# Patient Record
Sex: FEMALE | Race: BLACK OR AFRICAN AMERICAN | NOT HISPANIC OR LATINO | Employment: OTHER | URBAN - METROPOLITAN AREA
[De-identification: names, ages, dates, MRNs, and addresses within clinical notes are randomized per-mention and may not be internally consistent; named-entity substitution may affect disease eponyms.]

---

## 2017-01-03 ENCOUNTER — ANTI-COAG VISIT (OUTPATIENT)
Dept: CARDIOLOGY | Facility: CLINIC | Age: 62
End: 2017-01-03

## 2017-01-03 ENCOUNTER — LAB VISIT (OUTPATIENT)
Dept: LAB | Facility: HOSPITAL | Age: 62
End: 2017-01-03
Attending: INTERNAL MEDICINE
Payer: COMMERCIAL

## 2017-01-03 DIAGNOSIS — I48.20 ATRIAL FIBRILLATION, CHRONIC: ICD-10-CM

## 2017-01-03 DIAGNOSIS — Z95.2 STATUS POST HEART VALVE REPLACEMENT WITH MECHANICAL VALVE: ICD-10-CM

## 2017-01-03 LAB
INR PPP: 3.8
PROTHROMBIN TIME: 38.6 SEC

## 2017-01-03 PROCEDURE — 85610 PROTHROMBIN TIME: CPT

## 2017-01-03 PROCEDURE — 36415 COLL VENOUS BLD VENIPUNCTURE: CPT

## 2017-01-04 ENCOUNTER — TELEPHONE (OUTPATIENT)
Dept: SLEEP MEDICINE | Facility: OTHER | Age: 62
End: 2017-01-04

## 2017-01-10 ENCOUNTER — HOSPITAL ENCOUNTER (OUTPATIENT)
Dept: SLEEP MEDICINE | Facility: OTHER | Age: 62
Discharge: HOME | End: 2017-01-10
Attending: PSYCHIATRY & NEUROLOGY
Payer: COMMERCIAL

## 2017-01-10 DIAGNOSIS — G47.33 OSA (OBSTRUCTIVE SLEEP APNEA): ICD-10-CM

## 2017-01-10 DIAGNOSIS — G47.30 SLEEP APNEA, UNSPECIFIED TYPE: ICD-10-CM

## 2017-01-10 PROCEDURE — 95811 POLYSOM 6/>YRS CPAP 4/> PARM: CPT | Mod: 26,,, | Performed by: PSYCHIATRY & NEUROLOGY

## 2017-01-10 PROCEDURE — 95811 POLYSOM 6/>YRS CPAP 4/> PARM: CPT

## 2017-01-13 ENCOUNTER — OFFICE VISIT (OUTPATIENT)
Dept: OPTOMETRY | Facility: CLINIC | Age: 62
End: 2017-01-13
Payer: COMMERCIAL

## 2017-01-13 ENCOUNTER — ANTI-COAG VISIT (OUTPATIENT)
Dept: CARDIOLOGY | Facility: CLINIC | Age: 62
End: 2017-01-13
Payer: COMMERCIAL

## 2017-01-13 DIAGNOSIS — Z95.2 STATUS POST HEART VALVE REPLACEMENT WITH MECHANICAL VALVE: ICD-10-CM

## 2017-01-13 DIAGNOSIS — I48.20 ATRIAL FIBRILLATION, CHRONIC: ICD-10-CM

## 2017-01-13 DIAGNOSIS — H40.1111 PRIMARY OPEN ANGLE GLAUCOMA OF RIGHT EYE, MILD STAGE: Primary | ICD-10-CM

## 2017-01-13 LAB
CTP QC/QA: NORMAL
INR PPP: 3.9 (ref 2–3)

## 2017-01-13 PROCEDURE — 99999 PR PBB SHADOW E&M-EST. PATIENT-LVL II: CPT | Mod: PBBFAC,,, | Performed by: OPTOMETRIST

## 2017-01-13 PROCEDURE — 99211 OFF/OP EST MAY X REQ PHY/QHP: CPT | Mod: 25,S$GLB,,

## 2017-01-13 PROCEDURE — 85610 PROTHROMBIN TIME: CPT | Mod: QW,S$GLB,,

## 2017-01-13 PROCEDURE — 92012 INTRM OPH EXAM EST PATIENT: CPT | Mod: S$GLB,,, | Performed by: OPTOMETRIST

## 2017-01-13 RX ORDER — BRIMONIDINE TARTRATE 2 MG/ML
1 SOLUTION/ DROPS OPHTHALMIC 3 TIMES DAILY
Qty: 5 ML | Refills: 6 | Status: SHIPPED | OUTPATIENT
Start: 2017-01-13 | End: 2017-03-20 | Stop reason: SDUPTHER

## 2017-01-13 NOTE — PROGRESS NOTES
Pt confirms dose. Denies any changes to alter INR. INR now higher without any changes. Will lower dose. Pt will eat a portion of greens today as well.

## 2017-01-13 NOTE — PROGRESS NOTES
HPI     Patient's age: 61 y.o.    Approximate date of last eye examination:  12/16/16  Name of last eye doctor seen: Dr. Funes    Pt states that she is here to see how the new drops are working had   pressure in eyes on last visit    Wears glasses? yes     Wears CLs?:  no              ! OTC eyedrops currently using: OTC Refresh - OU prn   ! Prescription eye meds currently using:  Alphagan  BID - OD only            Last edited by Khushbu Funes, OD on 1/13/2017  9:48 AM.         Assessment /Plan     For exam results, see Encounter Report.    Primary open angle glaucoma of right eye, mild stage    Continue with      brimonidine 0.2% (ALPHAGAN) 0.2 % Drop; Place 1 drop into the right eye 3 (three) times daily.  Dispense: 5 mL; Refill: 6    RTC 2 months for HVF 24-2 and OCT/ IOP

## 2017-01-18 ENCOUNTER — TELEPHONE (OUTPATIENT)
Dept: SLEEP MEDICINE | Facility: CLINIC | Age: 62
End: 2017-01-18

## 2017-01-18 NOTE — PROCEDURES
"See imported Sleep Study result in "Chart Review" under the "Media tab".      (This Sleep Study was interpreted by a Board Certified Sleep Specialist who conducted an epoch-by-epoch review of the entire raw data recording.)     (The indication for this sleep study was reviewed and deemed appropriate by [facility] Practice Parameters or other reasons by a Board Certified Sleep Specialist.)    "

## 2017-01-18 NOTE — TELEPHONE ENCOUNTER
Please call the patient to inform re: recent sleep study was positive for very severe sleep apnea.  We were not  able to figure out CPAP settings  Overnight dedicated study with a different upgraded PAP machine is recommended.    If the patient has more questions, please  schedule for the follow up with MD / NP  to review sleep study results, or  can schedule CPAP titration.  Thanks!    Thanks!

## 2017-01-18 NOTE — PROGRESS NOTES
Received message from [employer] that pt needs auth form faxed for home meter. She is scheduled for clinic visit 1/20. Please have her fill out and sign an auth form then fax directly to Pooja with [employer] at [phone]. Thanks!

## 2017-01-19 ENCOUNTER — TELEPHONE (OUTPATIENT)
Dept: SLEEP MEDICINE | Facility: CLINIC | Age: 62
End: 2017-01-19

## 2017-01-20 ENCOUNTER — ANTI-COAG VISIT (OUTPATIENT)
Dept: CARDIOLOGY | Facility: CLINIC | Age: 62
End: 2017-01-20
Payer: COMMERCIAL

## 2017-01-20 DIAGNOSIS — Z95.2 STATUS POST HEART VALVE REPLACEMENT WITH MECHANICAL VALVE: ICD-10-CM

## 2017-01-20 DIAGNOSIS — I48.20 ATRIAL FIBRILLATION, CHRONIC: ICD-10-CM

## 2017-01-20 DIAGNOSIS — Z79.01 LONG TERM (CURRENT) USE OF ANTICOAGULANTS: Primary | ICD-10-CM

## 2017-01-20 LAB — INR PPP: 4 (ref 2.5–3.5)

## 2017-01-20 PROCEDURE — 85610 PROTHROMBIN TIME: CPT | Mod: QW,S$GLB,, | Performed by: PHARMACIST

## 2017-01-20 PROCEDURE — 99211 OFF/OP EST MAY X REQ PHY/QHP: CPT | Mod: 25,S$GLB,, | Performed by: PHARMACIST

## 2017-01-20 NOTE — PROGRESS NOTES
Patient reports no greens last week and is not interested in incorporating them. She denies bleeding issues.  Since she is not planning to eat greens and her INRs are slightly above her therapeutic range, I will lower her weekly coumadin dose at this time.  Patient signed authorization form and is was faxed to  Pooja with [employer] at [phone]

## 2017-01-27 ENCOUNTER — ANTI-COAG VISIT (OUTPATIENT)
Dept: CARDIOLOGY | Facility: CLINIC | Age: 62
End: 2017-01-27
Payer: COMMERCIAL

## 2017-01-27 DIAGNOSIS — I48.20 ATRIAL FIBRILLATION, CHRONIC: ICD-10-CM

## 2017-01-27 DIAGNOSIS — Z79.01 LONG TERM (CURRENT) USE OF ANTICOAGULANTS: Primary | ICD-10-CM

## 2017-01-27 DIAGNOSIS — Z95.2 STATUS POST HEART VALVE REPLACEMENT WITH MECHANICAL VALVE: ICD-10-CM

## 2017-01-27 LAB
CTP QC/QA: YES
INR PPP: 3.8 (ref 2.5–3.5)

## 2017-01-27 PROCEDURE — 85610 PROTHROMBIN TIME: CPT | Mod: QW,S$GLB,, | Performed by: PHARMACIST

## 2017-01-27 NOTE — PROGRESS NOTES
Patient took a higher than prescribed dose of coumadin on Monday. Otherwise, Patient denies any changes in diet, medications, or health that would effect warfarin therapy.

## 2017-02-02 ENCOUNTER — OFFICE VISIT (OUTPATIENT)
Dept: INTERNAL MEDICINE | Facility: CLINIC | Age: 62
End: 2017-02-02
Payer: COMMERCIAL

## 2017-02-02 ENCOUNTER — ANTI-COAG VISIT (OUTPATIENT)
Dept: CARDIOLOGY | Facility: CLINIC | Age: 62
End: 2017-02-02
Payer: COMMERCIAL

## 2017-02-02 ENCOUNTER — HOSPITAL ENCOUNTER (OUTPATIENT)
Dept: RADIOLOGY | Facility: HOSPITAL | Age: 62
Discharge: HOME | End: 2017-02-02
Attending: INTERNAL MEDICINE
Payer: COMMERCIAL

## 2017-02-02 DIAGNOSIS — I10 ESSENTIAL HYPERTENSION: ICD-10-CM

## 2017-02-02 DIAGNOSIS — I48.20 ATRIAL FIBRILLATION, CHRONIC: ICD-10-CM

## 2017-02-02 DIAGNOSIS — N13.30 HYDRONEPHROSIS, UNSPECIFIED HYDRONEPHROSIS TYPE: Primary | ICD-10-CM

## 2017-02-02 DIAGNOSIS — Z95.2 STATUS POST HEART VALVE REPLACEMENT WITH MECHANICAL VALVE: ICD-10-CM

## 2017-02-02 LAB — INR PPP: 3.3 (ref 2–3)

## 2017-02-02 PROCEDURE — 71020 XR CHEST PA AND LATERAL: CPT | Mod: TC

## 2017-02-02 PROCEDURE — 71020 XR CHEST PA AND LATERAL: CPT | Mod: 26,,, | Performed by: RADIOLOGY

## 2017-02-02 PROCEDURE — 3074F SYST BP LT 130 MM HG: CPT | Mod: S$GLB,,, | Performed by: INTERNAL MEDICINE

## 2017-02-02 PROCEDURE — 3078F DIAST BP <80 MM HG: CPT | Mod: S$GLB,,, | Performed by: INTERNAL MEDICINE

## 2017-02-02 PROCEDURE — 85610 PROTHROMBIN TIME: CPT | Mod: QW,S$GLB,,

## 2017-02-02 PROCEDURE — 99999 PR PBB SHADOW E&M-EST. PATIENT-LVL IV: CPT | Mod: PBBFAC,,, | Performed by: INTERNAL MEDICINE

## 2017-02-02 PROCEDURE — 99214 OFFICE O/P EST MOD 30 MIN: CPT | Mod: S$GLB,,, | Performed by: INTERNAL MEDICINE

## 2017-02-02 RX ORDER — FLUTICASONE PROPIONATE AND SALMETEROL 250; 50 UG/1; UG/1
1 POWDER RESPIRATORY (INHALATION) 2 TIMES DAILY
Qty: 1 EACH | Refills: 3 | Status: SHIPPED | OUTPATIENT
Start: 2017-02-02 | End: 2017-09-18 | Stop reason: SDUPTHER

## 2017-02-02 RX ORDER — ALBUTEROL SULFATE 90 UG/1
2 AEROSOL, METERED RESPIRATORY (INHALATION) EVERY 6 HOURS PRN
Qty: 18 G | Refills: 3 | Status: SHIPPED | OUTPATIENT
Start: 2017-02-02 | End: 2017-09-07 | Stop reason: SDUPTHER

## 2017-02-02 NOTE — PROGRESS NOTES
Pt confirms dose. States she may be starting new medications after seeing PCP today. Advised to call CC with any changes. Will maintain dose for now. Pt seen by Ed

## 2017-02-02 NOTE — MR AVS SNAPSHOT
[facility] - Internal Medicine  [address]  [facility] [address]  Phone: [phone]  Fax: [phone]                  Elayne Almanzar   2017 9:00 AM   Office Visit    Description:  Female : 1955   Provider:  Nubia Crocker MD   Department:  [facility] - Internal Medicine           Reason for Visit     Follow-up           Diagnoses this Visit        Comments    Hydronephrosis, unspecified hydronephrosis type    -  Primary     Essential hypertension                To Do List           Future Appointments        Provider Department Dept Phone    2017 10:10 AM LAB, APPOINTMENT [facility] INTMED [facility] [phone]    2017 10:30 AM [facility] XRIM1 [address] LIMIT [facility] [phone]    2017 8:30 AM Shaun Rahman, PharmD [facility] Coumadin [phone]    3/20/2017 10:00 AM [place], VISUAL BOONE [facility] Ophthalmology [phone]    3/20/2017 10:45 AM Khushbu Funes, OD [facility] - Optometry [phone]      Goals (5 Years of Data)     None      Follow-Up and Disposition     Return for EPP 4 months.       These Medications        Disp Refills Start End    albuterol (VENTOLIN HFA) 90 mcg/actuation inhaler 18 g 3 2017     Inhale 2 puffs into the lungs every 6 (six) hours as needed for Wheezing. - Inhalation    Pharmacy: [facility] [place] [address] Ph #: [phone]       fluticasone-salmeterol 250-50 mcg/dose (ADVAIR) 250-50 mcg/dose diskus inhaler 1 each 3 2017    Inhale 1 puff into the lungs 2 (two) times daily. - Inhalation    Pharmacy: [facility] [place] [address] Ph #: [phone]         [facility]     [facility] Care [facility] -  Assistance  Registered nurses in the [facility] provide clinical advisement, health education, appointment booking, and other advisory services.  Call for this  free service at [phone].             Medications           Message regarding Medications     Verify the changes and/or additions to your medication regime listed below are the same as discussed with your clinician today.  If any of these changes or additions are incorrect, please notify your healthcare provider.        STOP taking these medications     ondansetron (ZOFRAN) 4 MG tablet Take 1 tablet (4 mg total) by mouth every 8 (eight) hours.           Verify that the below list of medications is an accurate representation of the medications you are currently taking.  If none reported, the list may be blank. If incorrect, please contact your healthcare provider. Carry this list with you in case of emergency.           Current Medications     albuterol (VENTOLIN HFA) 90 mcg/actuation inhaler Inhale 2 puffs into the lungs every 6 (six) hours as needed for Wheezing.    aspirin (ECOTRIN) 81 MG EC tablet Take by mouth. 1 Tablet, Delayed Release (E.C.) Oral Every day    brimonidine 0.2% (ALPHAGAN) 0.2 % Drop Place 1 drop into the right eye 3 (three) times daily.    conjugated estrogens (PREMARIN) vaginal cream 1 g with applicator or dime-sized amt with finger in vagina nightly x 2 weeks, then twice a week thereafter    fluocinolone-hydroq.-tretinoin (TRI-VENTURA) 0.01-4-0.05 % Crea Apply 1 application topically once daily. To dark areas on face    fluocinonide (LIDEX) 0.05 % ointment AAA bid to affected areas of arms    fluticasone (FLONASE) 50 mcg/actuation nasal spray 1 spray by Each Nare route 2 (two) times daily as needed for Rhinitis.    furosemide (LASIX) 20 MG tablet Take 1 tablet (20 mg total) by mouth every other day.    metoprolol succinate (TOPROL-XL) 200 MG 24 hr tablet TAKE 1 TABLET (200 MG TOTAL) BY MOUTH ONCE DAILY.    oxybutynin (DITROPAN-XL) 10 MG 24 hr tablet     sertraline (ZOLOFT) 100 MG tablet Take 1 tablet (100 mg total) by mouth once daily.    tamsulosin (FLOMAX) 0.4 mg Cp24 Take 1 capsule (0.4  "mg total) by mouth once daily.    warfarin (COUMADIN) 5 MG tablet Take 1 1/2 tablets (7.5mg.) by mouth daily, except 2 tablets (10mg.) on Tuesdays, Or as directed by Coumadin Clinic.    fluticasone-salmeterol 250-50 mcg/dose (ADVAIR) 250-50 mcg/dose diskus inhaler Inhale 1 puff into the lungs 2 (two) times daily.           Clinical Reference Information           Vital Signs - Last Recorded  Most recent update: 2/2/2017  8:54 AM by Janeen Liao MA    BP Pulse Ht Wt LMP SpO2    118/64 (!) 52 5' 2.5" (1.588 m) 114.4 kg (252 lb 3.3 oz) 07/22/2012 98%    BMI                45.39 kg/m2          Blood Pressure          Most Recent Value    BP  118/64      Allergies as of 2/2/2017     No Known Allergies      Immunizations Administered on Date of Encounter - 2/2/2017     None      Orders Placed During Today's Visit      Normal Orders This Visit    Ambulatory consult to Urology     Future Labs/Procedures Expected by Expires    Comprehensive metabolic panel  2/2/2017 2/2/2018    Lipid panel  2/2/2017 2/2/2018    X-Ray Chest PA And Lateral  2/2/2017 2/2/2018      [facility] Sign-Up     Activating your [facility] account is as easy as 1-2-3!     1) Visit [email or web address], select Sign Up Now, enter this activation code and your date of birth, then select Next.  [email or web address]  Expires: 3/19/2017  9:52 AM      2) Create a username and password to use when you visit [facility] in the future and select a security question in case you lose your password and select Next.    3) Enter your e-mail address and click Sign Up!    Additional Information  If you have questions, please e-mail [email or web address] or call [phone] to talk to our [facility] staff. Remember, [facility] is NOT to be used for urgent needs. For medical emergencies, dial 911.         "

## 2017-02-03 RX ORDER — SERTRALINE HYDROCHLORIDE 100 MG/1
TABLET, FILM COATED ORAL
Qty: 30 TABLET | Refills: 3 | Status: SHIPPED | OUTPATIENT
Start: 2017-02-03 | End: 2017-08-04 | Stop reason: SDUPTHER

## 2017-02-04 ENCOUNTER — TELEPHONE (OUTPATIENT)
Dept: INTERNAL MEDICINE | Facility: CLINIC | Age: 62
End: 2017-02-04

## 2017-02-04 NOTE — TELEPHONE ENCOUNTER
Please contact patient and let her know all of her labwork is acceptable except her cholesterol is still elevated. Previously I had given her pravachol. Is she still taking pravachol?

## 2017-02-05 ENCOUNTER — TELEPHONE (OUTPATIENT)
Dept: INTERNAL MEDICINE | Facility: CLINIC | Age: 62
End: 2017-02-05

## 2017-02-05 VITALS
TEMPERATURE: 99 F | HEART RATE: 62 BPM | OXYGEN SATURATION: 98 % | DIASTOLIC BLOOD PRESSURE: 64 MMHG | BODY MASS INDEX: 44.68 KG/M2 | WEIGHT: 252.19 LBS | HEIGHT: 63 IN | SYSTOLIC BLOOD PRESSURE: 118 MMHG

## 2017-02-05 DIAGNOSIS — N13.30 HYDRONEPHROSIS, UNSPECIFIED HYDRONEPHROSIS TYPE: Primary | ICD-10-CM

## 2017-02-05 NOTE — PROGRESS NOTES
Subjective:       Patient ID: Elayne Almanzar is a 61 y.o. female.    Chief Complaint: Hypertension    HPI: She returns for management of hypertension.  She has had hypertension for over a year.  Current treatment has included medications outlined in medication list.  She denies chest pain or shortness of breath.  No palpitations.  Denies left arm or neck pain.    Past medical history: Hypertension, A. fib, COPD, hyperlipidemia, nephrolithiasis, status post mitral valve replacement . She had a colonoscopy April 2014      Medications: Proventil MDI 2 puffs 4 times a day when necessary,Advair 250/50 one puff twice a day, Lasix 20 mg every other day,Toprol- mg daily, Coumadin as monitored by Coumadin clinic , Zoloft 100 mg daily, Pravachol 40 mg daily      ALLERGIES: dilaudid           Review of Systems   Constitutional: Negative for chills, fatigue, fever and unexpected weight change.   Respiratory: Negative for chest tightness and shortness of breath.    Cardiovascular: Negative for chest pain and palpitations.   Gastrointestinal: Negative for abdominal pain and blood in stool.   Neurological: Negative for dizziness, syncope, numbness and headaches.       Objective:      Physical Exam   HENT:   Right Ear: External ear normal.   Left Ear: External ear normal.   Nose: Nose normal.   Mouth/Throat: Oropharynx is clear and moist.   Eyes: Pupils are equal, round, and reactive to light.   Neck: Normal range of motion.   Cardiovascular: Normal rate and regular rhythm.    No murmur heard.  Pulmonary/Chest: Breath sounds normal.   Abdominal: She exhibits no distension. There is no hepatosplenomegaly. There is no tenderness.   Lymphadenopathy:     She has no cervical adenopathy.     She has no axillary adenopathy.   Neurological: She has normal strength and normal reflexes. No cranial nerve deficit or sensory deficit.       Assessment:     assessment and plan: Hypertension: Check CMP, lipid panel, chest x-ray.   Schedule follow-up appointment with urology in regard to hydronephrosis      Plan:       As above

## 2017-02-05 NOTE — TELEPHONE ENCOUNTER
When she was in the office, I had requested she schedule a urology appt. Unfortunately, it does not look like it was scheduled.Please schedule urology appt, order in

## 2017-02-06 RX ORDER — WARFARIN SODIUM 5 MG/1
TABLET ORAL
Qty: 45 TABLET | Refills: 6 | Status: SHIPPED | OUTPATIENT
Start: 2017-02-06 | End: 2018-03-08 | Stop reason: SDUPTHER

## 2017-02-09 ENCOUNTER — TELEPHONE (OUTPATIENT)
Dept: INTERNAL MEDICINE | Facility: CLINIC | Age: 62
End: 2017-02-09

## 2017-02-09 NOTE — TELEPHONE ENCOUNTER
"Pt states that she is taking the pravastatin "every now and then".  Pt would like x ray results.   Please advise.    "

## 2017-02-09 NOTE — TELEPHONE ENCOUNTER
----- Message from Huy Morales sent at 2/9/2017 11:27 AM CST -----  Contact: Self/[phone] cell  Type: Returning a call    Who left a message?Janeen    When did the practice call?02/09/2017    Comments:Pt is returning phone call. Please call and advise.    Thank you!

## 2017-02-09 NOTE — TELEPHONE ENCOUNTER
Attempted to contact pt. Left message requesting a call back. Spoke w/ pts . Asked him to have the pt call the office   DELMI Liao

## 2017-02-09 NOTE — TELEPHONE ENCOUNTER
Spoke w/ pt. Urology appt was not scheduled because the pt is established with Dr. Wilkinson and other departments are not permitted to schedule on her schedule. The pt does have the number to urology and she stated she will call to schedule.   DELMI Liao

## 2017-02-09 NOTE — TELEPHONE ENCOUNTER
Please let her know her Xray was acceptable. Please advise her to be compliant with her cholesterol medication and her diet. I will recheck her level at her next visit

## 2017-02-16 ENCOUNTER — ANTI-COAG VISIT (OUTPATIENT)
Dept: CARDIOLOGY | Facility: CLINIC | Age: 62
End: 2017-02-16
Payer: COMMERCIAL

## 2017-02-16 DIAGNOSIS — Z95.2 STATUS POST HEART VALVE REPLACEMENT WITH MECHANICAL VALVE: ICD-10-CM

## 2017-02-16 DIAGNOSIS — I48.20 ATRIAL FIBRILLATION, CHRONIC: ICD-10-CM

## 2017-02-16 DIAGNOSIS — Z79.01 LONG TERM (CURRENT) USE OF ANTICOAGULANTS: Primary | ICD-10-CM

## 2017-02-16 LAB — INR PPP: 3.5 (ref 2.5–3.5)

## 2017-02-16 PROCEDURE — 99211 OFF/OP EST MAY X REQ PHY/QHP: CPT | Mod: 25,S$GLB,, | Performed by: PHARMACIST

## 2017-02-16 PROCEDURE — 85610 PROTHROMBIN TIME: CPT | Mod: QW,S$GLB,, | Performed by: PHARMACIST

## 2017-02-16 NOTE — PROGRESS NOTES
Patient reports a higher than prescribed dose of warfarin (7.5mg daily). No other changes reported. Patient was re-educated on situations that would require placing a call to the coumadin clinic, including bleeding or unusual bruising issues, changes in health, diet or medications, upcoming procedures that require warfarin interruption, and missed coumadin dose(s). Patient expressed understanding that avoidance of consistency with these parameters could cause fluctuations in INR, leading to more frequent visits and increase risk of adverse events.

## 2017-03-09 ENCOUNTER — ANTI-COAG VISIT (OUTPATIENT)
Dept: CARDIOLOGY | Facility: CLINIC | Age: 62
End: 2017-03-09
Payer: COMMERCIAL

## 2017-03-09 DIAGNOSIS — I48.20 ATRIAL FIBRILLATION, CHRONIC: ICD-10-CM

## 2017-03-09 DIAGNOSIS — H40.9 GLAUCOMA, UNSPECIFIED GLAUCOMA, UNSPECIFIED LATERALITY: Primary | ICD-10-CM

## 2017-03-09 DIAGNOSIS — Z79.01 LONG TERM (CURRENT) USE OF ANTICOAGULANTS: Primary | ICD-10-CM

## 2017-03-09 DIAGNOSIS — Z95.2 STATUS POST HEART VALVE REPLACEMENT WITH MECHANICAL VALVE: ICD-10-CM

## 2017-03-09 LAB — INR PPP: 3.8 (ref 2.5–3.5)

## 2017-03-09 PROCEDURE — 99211 OFF/OP EST MAY X REQ PHY/QHP: CPT | Mod: 25,S$GLB,, | Performed by: PHARMACIST

## 2017-03-09 PROCEDURE — 85610 PROTHROMBIN TIME: CPT | Mod: QW,S$GLB,, | Performed by: PHARMACIST

## 2017-03-09 NOTE — PROGRESS NOTES
Patient denies any changes in diet, medications, or health that would effect warfarin therapy.  No changes to explain the rise in INR. I will lower dose slightly and watch closely

## 2017-03-20 ENCOUNTER — ANTI-COAG VISIT (OUTPATIENT)
Dept: CARDIOLOGY | Facility: CLINIC | Age: 62
End: 2017-03-20
Payer: COMMERCIAL

## 2017-03-20 ENCOUNTER — CLINICAL SUPPORT (OUTPATIENT)
Dept: OPHTHALMOLOGY | Facility: CLINIC | Age: 62
End: 2017-03-20
Payer: COMMERCIAL

## 2017-03-20 ENCOUNTER — OFFICE VISIT (OUTPATIENT)
Dept: OPTOMETRY | Facility: CLINIC | Age: 62
End: 2017-03-20
Payer: COMMERCIAL

## 2017-03-20 DIAGNOSIS — H40.1111 PRIMARY OPEN ANGLE GLAUCOMA OF RIGHT EYE, MILD STAGE: Primary | ICD-10-CM

## 2017-03-20 DIAGNOSIS — Z79.01 LONG TERM (CURRENT) USE OF ANTICOAGULANTS: Primary | ICD-10-CM

## 2017-03-20 DIAGNOSIS — Z95.2 STATUS POST HEART VALVE REPLACEMENT WITH MECHANICAL VALVE: ICD-10-CM

## 2017-03-20 DIAGNOSIS — I48.20 ATRIAL FIBRILLATION, CHRONIC: ICD-10-CM

## 2017-03-20 DIAGNOSIS — H40.9 GLAUCOMA, UNSPECIFIED GLAUCOMA, UNSPECIFIED LATERALITY: ICD-10-CM

## 2017-03-20 LAB — INR PPP: 1.9 (ref 2–3)

## 2017-03-20 PROCEDURE — 92012 INTRM OPH EXAM EST PATIENT: CPT | Mod: S$GLB,,, | Performed by: OPTOMETRIST

## 2017-03-20 PROCEDURE — 85610 PROTHROMBIN TIME: CPT | Mod: QW,S$GLB,, | Performed by: PHARMACIST

## 2017-03-20 PROCEDURE — 92133 CPTRZD OPH DX IMG PST SGM ON: CPT | Mod: S$GLB,,, | Performed by: OPTOMETRIST

## 2017-03-20 PROCEDURE — 99999 PR PBB SHADOW E&M-EST. PATIENT-LVL II: CPT | Mod: PBBFAC,,, | Performed by: OPTOMETRIST

## 2017-03-20 PROCEDURE — 99211 OFF/OP EST MAY X REQ PHY/QHP: CPT | Mod: 25,S$GLB,, | Performed by: PHARMACIST

## 2017-03-20 PROCEDURE — 92083 EXTENDED VISUAL FIELD XM: CPT | Mod: S$GLB,,, | Performed by: OPTOMETRIST

## 2017-03-20 RX ORDER — BRIMONIDINE TARTRATE 2 MG/ML
1 SOLUTION/ DROPS OPHTHALMIC 3 TIMES DAILY
Qty: 15 ML | Refills: 3 | Status: SHIPPED | OUTPATIENT
Start: 2017-03-20 | End: 2018-12-05

## 2017-03-20 NOTE — PROGRESS NOTES
HPI     Patient's age: 61 y.o.    Approximate date of last eye examination:  1/13/17  Name of last eye doctor seen: Dr. Funes    Pt states that she is here for follow up exam, had test upstairs,     Wears glasses? yes         ! OTC eyedrops currently using:  Refresh - OU PRN   ! Prescription eye meds currently using:  Alphagan BID - OU last used             Last edited by Svetlana Iqbal MA on 3/20/2017 10:59 AM.         Assessment /Plan     For exam results, see Encounter Report.    Primary open angle glaucoma of right eye, mild stage    Anderson Visual Field - OU - Extended - OD Inferior arcuate defects, OS WNL  - Posterior Segment OCT Optic Nerve- OD sup nasal thin, OS WNL      Asymmetric cupping OD>OS  Borderline IOP  Jan 2016  - Posterior Segment OCT Optic Nerve- Both eyes: mild thinning OU  HVF 24-2: OD borderline/ scattered defects  OS: WNL  gonio:open 360 OU  pachy: 551/555  IOP: No difference between eyes even though only using drops in OD, not lower that previous and initial reading       Started on latanoprost OD ONLY September 2016, Changed drops to bimatoprost (LUMIGAN) 0.01 % Drop; October 2016        Discontinue Lumigan and start brimonidine 0.2% (ALPHAGAN) 0.2 % Drop; Place 1 drop into the right eye 2 (two) times daily. Dispense: 5 mL; Refill: 1     RTC 6 mo IOP, OCT, HVF

## 2017-03-20 NOTE — PROGRESS NOTES
Patient reports no bleeding or bruising, no new medications and no diet changes.  She did forget to lower her weekly dose of warfarin and took an extra 2.5mg over the past week.  She denied any missed doses or any increase in vit k.  As her INR is low, I am boosting her dose today and asking her to continue with her current weekly dose.  I will monitor closely with a repeat INR next week.  I reminded the patient to call with any problems, changes or questions before the next visit.  I have reviewed the student's initial findings and agree with their assessment.  I have personally spoken with and assessed the patient in clinic to devise care plan.

## 2017-03-21 ENCOUNTER — OFFICE VISIT (OUTPATIENT)
Dept: SLEEP MEDICINE | Facility: CLINIC | Age: 62
End: 2017-03-21
Payer: COMMERCIAL

## 2017-03-21 VITALS
HEART RATE: 57 BPM | DIASTOLIC BLOOD PRESSURE: 67 MMHG | BODY MASS INDEX: 44.65 KG/M2 | SYSTOLIC BLOOD PRESSURE: 132 MMHG | HEIGHT: 63 IN | WEIGHT: 252 LBS

## 2017-03-21 DIAGNOSIS — G47.33 OSA (OBSTRUCTIVE SLEEP APNEA): Primary | ICD-10-CM

## 2017-03-21 DIAGNOSIS — E66.01 MORBID OBESITY WITH BMI OF 45.0-49.9, ADULT: ICD-10-CM

## 2017-03-21 DIAGNOSIS — G47.31 COMPLEX SLEEP APNEA SYNDROME: ICD-10-CM

## 2017-03-21 DIAGNOSIS — I10 ESSENTIAL HYPERTENSION: ICD-10-CM

## 2017-03-21 PROCEDURE — 99999 PR PBB SHADOW E&M-EST. PATIENT-LVL III: CPT | Mod: PBBFAC,,, | Performed by: PSYCHIATRY & NEUROLOGY

## 2017-03-21 PROCEDURE — 1160F RVW MEDS BY RX/DR IN RCRD: CPT | Mod: S$GLB,,, | Performed by: PSYCHIATRY & NEUROLOGY

## 2017-03-21 PROCEDURE — 99213 OFFICE O/P EST LOW 20 MIN: CPT | Mod: S$GLB,,, | Performed by: PSYCHIATRY & NEUROLOGY

## 2017-03-21 PROCEDURE — 3078F DIAST BP <80 MM HG: CPT | Mod: S$GLB,,, | Performed by: PSYCHIATRY & NEUROLOGY

## 2017-03-21 PROCEDURE — 3075F SYST BP GE 130 - 139MM HG: CPT | Mod: S$GLB,,, | Performed by: PSYCHIATRY & NEUROLOGY

## 2017-03-21 RX ORDER — METOPROLOL SUCCINATE 200 MG/1
TABLET, EXTENDED RELEASE ORAL
Qty: 30 TABLET | Refills: 1 | Status: SHIPPED | OUTPATIENT
Start: 2017-03-21 | End: 2017-05-12 | Stop reason: SDUPTHER

## 2017-03-21 NOTE — PATIENT INSTRUCTIONS
SLEEP LAB ([facility] or [facility]) will contact you to schedulethe sleep study. Their number is [phone] (ext 1). Please call them if you do not hear from them in 10 business days from now.  [facility] [place] is located on 7th floor of the [facility]; [place] lab is located in [facility].    SLEEP CLINIC (my assistant) will call you when the sleep study results are ready - if you have not heard from us by 2 weeks from the date of the study, please call [phone] (ext 2) or you can use [facility] to contact me.    You are advised to abstain from driving should you feel sleepy or drowsy.

## 2017-03-21 NOTE — MR AVS SNAPSHOT
[facility] - Sleep Clinic  [address]  [facility] [address]  Phone: [phone]                  Elayne Almanzar   3/21/2017 9:00 AM   Office Visit    Description:  Female : 1955   Provider:  Jenna Jones MD   Department:  [facility] [place] [facility]           Reason for Visit     Sleep Apnea           Diagnoses this Visit        Comments    KEYSHAWN (obstructive sleep apnea)    -  Primary     Complex sleep apnea syndrome         Morbid obesity with BMI of 45.0-49.9, adult                To Do List           Future Appointments        Provider Department Dept Phone    3/29/2017 9:30 AM Shaun Rahman, PharmD [facility] - Coumadin [phone]    2017 9:00 AM MD [facility] - Internal Medicine [phone]      Goals (5 Years of Data)     None      [facility]     [facility] Care [facility] -  Assistance  Registered nurses in the [facility] On Call Center provide clinical advisement, health education, appointment booking, and other advisory services.  Call for this free service at [phone].             Medications           Message regarding Medications     Verify the changes and/or additions to your medication regime listed below are the same as discussed with your clinician today.  If any of these changes or additions are incorrect, please notify your healthcare provider.             Verify that the below list of medications is an accurate representation of the medications you are currently taking.  If none reported, the list may be blank. If incorrect, please contact your healthcare provider. Carry this list with you in case of emergency.           Current Medications     albuterol (VENTOLIN HFA) 90 mcg/actuation inhaler Inhale 2 puffs into the lungs every 6 (six) hours as needed for Wheezing.    aspirin (ECOTRIN) 81 MG EC tablet Take by mouth. 1 Tablet, Delayed Release (E.C.) Oral Every day    brimonidine 0.2% (ALPHAGAN) 0.2 % Drop Place  "1 drop into the right eye 3 (three) times daily.    conjugated estrogens (PREMARIN) vaginal cream 1 g with applicator or dime-sized amt with finger in vagina nightly x 2 weeks, then twice a week thereafter    fluocinolone-hydroq.-tretinoin (TRI-VENTURA) 0.01-4-0.05 % Crea Apply 1 application topically once daily. To dark areas on face    fluocinonide (LIDEX) 0.05 % ointment AAA bid to affected areas of arms    fluticasone (FLONASE) 50 mcg/actuation nasal spray 1 spray by Each Nare route 2 (two) times daily as needed for Rhinitis.    fluticasone-salmeterol 250-50 mcg/dose (ADVAIR) 250-50 mcg/dose diskus inhaler Inhale 1 puff into the lungs 2 (two) times daily.    furosemide (LASIX) 20 MG tablet Take 1 tablet (20 mg total) by mouth every other day.    metoprolol succinate (TOPROL-XL) 200 MG 24 hr tablet TAKE 1 TABLET (200 MG TOTAL) BY MOUTH ONCE DAILY.    oxybutynin (DITROPAN-XL) 10 MG 24 hr tablet     sertraline (ZOLOFT) 100 MG tablet TAKE 1 TABLET (100 MG TOTAL) BY MOUTH ONCE DAILY.    tamsulosin (FLOMAX) 0.4 mg Cp24 Take 1 capsule (0.4 mg total) by mouth once daily.    warfarin (COUMADIN) 5 MG tablet 5mg Monday and 7.5mg all other days or Or as directed by Coumadin Clinic.           Clinical Reference Information           Your Vitals Were     BP Pulse Height Weight Last Period BMI    132/67 (BP Location: Left arm, Patient Position: Sitting, BP Method: Automatic) 57 5' 2.5" (1.588 m) 114.3 kg (251 lb 15.8 oz) 07/22/2012 45.35 kg/m2      Blood Pressure          Most Recent Value    BP  132/67      Allergies as of 3/21/2017     No Known Allergies      Immunizations Administered on Date of Encounter - 3/21/2017     None      Orders Placed During Today's Visit      Normal Orders This Visit    Ambulatory Referral to Bariatric Medicine     Future Labs/Procedures Expected by Expires    CPAP Titration (Must have dx of KEYSHAWN from previous sleep study.)  As directed 3/21/2018      [facility] Sign-Up     Activating your [facility] " account is as easy as 1-2-3!     1) Visit [email or web address], select Sign Up Now, enter this activation code and your date of birth, then select Next.  [email or web address]  Expires: 5/5/2017 10:08 AM      2) Create a username and password to use when you visit [facility] in the future and select a security question in case you lose your password and select Next.    3) Enter your e-mail address and click Sign Up!    Additional Information  If you have questions, please e-mail [email or web address] or call [phone] to talk to our [facility] staff. Remember, [facility] is NOT to be used for urgent needs. For medical emergencies, dial 911.         Ant    SLEEP LAB ([facility] or [facility]) will contact you to schedulethe sleep study. Their number is [phone] (ext 1). Please call them if you do not hear from them in 10 business days from now.  [facility] Lab is located on 7th floor of the [facility]; [place] lab is located in [facility].    SLEEP CLINIC (my assistant) will call you when the sleep study results are ready - if you have not heard from us by 2 weeks from the date of the study, please call [phone] (ext 2) or you can use [facility] to contact me.    You are advised to abstain from driving should you feel sleepy or drowsy.         Language Assistance Services     ATTENTION: Language assistance services are available, free of charge. Please call [phone].      ATENCIÓN: Si habla español, tiene a kidd disposición servicios gratuitos de asistencia lingüística. Llame al [phone].     CHÚ Ý: N?u b?n nói Ti?ng Vi?t, có các d?ch v? h? tr? ngôn ng? mi?n phí dành cho b?n. G?i s? [phone].         [facility] complies with applicable Federal civil rights laws and does not discriminate on the basis of race, color, national origin, age, disability, or sex.

## 2017-03-21 NOTE — PROGRESS NOTES
Elayne Almanzar  was seen at the request of  No ref. provider found for sleep evaluation.    12/22/2016 INITIAL HISTORY OF PRESENT ILLNESS:  Elayne Almanzar is a 61 y.o. female is here to be evaluated for a sleep disorder.       CHIEF COMPLAINT:      The patient's complaints include excessive daytime sleepiness, excessive daytime fatigue, snoring, choking  and interrupted sleep since  A few months ago.    Reports more trouble sleeping since she lost her mother Cot 2015    Reports  dry mouth and sore throat  Reports nasal congestion Flonase - does not help  Reports  morning headaches  Reports occasional  interrupted sleep  Reports frequent leg movements  Denies symptoms concerning for parasomnia    The ESS ([place] Sleepiness Score) taken on initial visit is 8 /24    The patient never had tonsillectomy, adenoidectomy or UPPP     INTERVAL HISTORY:    03/21/2017:  The patient has not presented any new complaints since the previous visit. She comes to discuss PSG - extremely severe complex sleep apnea - titration was only 50% decreasing the AHi due to treatment emergent central apnea.         SLEEP ROUTINE AND LIFESTYLE 03/21/2017 :    Occupation:not working    Bed partner: her  - he is a snorer     Time to bed - wake up time on a workday : 2 AM (not sleepy earlier) to 9 AM  Time to bed - wake up time on a day off: same   Sleep onset latency: 30 min  Disruptions or awakenings: 2  Time to fall back into sleep: 1 hour   Perceived sleep quality: 0/5  Perceived total sleep time:  6 hrs  hours.  Daytime naps: 3 times    Exercise routine: no  Caffeine:      PREVIOUS SLEEP STUDIES:   PSG 1/10/176: Significant Obstructive sleep apnea (KEYSHAWN) with AHI (apnea hypopnea Index) of 116 and SaO2 of 68% (weight  255 lbs).Efective control of SDB was not achieved due to central apneas in the treatment part on every tested pressure (some centrals were present in the diagnostic part).  ESS 6/24, yet reports feeling fatigued and  sleepy.      DME:       PAST MEDICAL HISTORY:    Active Ambulatory Problems     Diagnosis Date Noted    Dyspnea on exertion 2012    Status post heart valve replacement with mechanical valve 2013    Atrial fibrillation, chronic 2013    Nephrolithiasis 2013    HTN (hypertension) 2013    Chronic anticoagulation 2013    H/O mitral valve replacement with mechanical valve 2015    COPD (chronic obstructive pulmonary disease) with acute bronchitis 2015    Hyperlipidemia 2015    Mixed urge and stress incontinence 2016    Hematuria 2016    Atrophic vaginitis 2016    Acute kidney injury 2016    Dyspepsia 2016    Sleep apnea      Resolved Ambulatory Problems     Diagnosis Date Noted    Wheezing 2012    CKD (chronic kidney disease) stage 3, GFR 30-59 ml/min 2013    Hypoxia 2013    Dizziness 2014    Encounter for screening colonoscopy 04/10/2014    Fever 2015    Hair loss 2015    Cephalalgia 2015     Past Medical History:   Diagnosis Date    Anticoagulant long-term use     Atrial fibrillation     Cataract     CHF (congestive heart failure)     Chronic kidney disease     COPD (chronic obstructive pulmonary disease)     Depression     Dysuria     Flank pain     HTN (hypertension)     Nephrolithiasis     Rheumatic disease of mitral valve     Urinary tract infection                 PAST SURGICAL HISTORY:    Past Surgical History:   Procedure Laterality Date    CARDIAC VALVE SURGERY      mechanical mitral valve     SECTION      kidney stone removal      MITRAL VALVE REPLACEMENT  2004    TUBAL LIGATION           FAMILY HISTORY:                Family History   Problem Relation Age of Onset    Stroke Paternal Grandmother     Colon cancer Maternal Grandmother     Cancer Brother      testicular cancer    Diabetes Sister     Hypertension Sister      Breast cancer Paternal Aunt     Diabetes Sister     Diabetes Sister     Heart disease Father 70     MI    Diabetes Father     Colon cancer Mother        SOCIAL HISTORY:          Tobacco:   History   Smoking Status    Former Smoker    Packs/day: 0.50    Years: 20.00    Types: Cigarettes    Quit date: 5/8/2002   Smokeless Tobacco    Never Used       alcohol use:    History   Alcohol Use No                   ALLERGIES:    Review of patient's allergies indicates:   Allergen Reactions    Hydromorphone (bulk) Nausea And Vomiting    Hydromorphone hcl Nausea And Vomiting       CURRENT MEDICATIONS:    Current Outpatient Prescriptions   Medication Sig Dispense Refill    albuterol (VENTOLIN HFA) 90 mcg/actuation inhaler Inhale 2 puffs into the lungs every 6 (six) hours as needed for Wheezing. 18 g 3    aspirin (ECOTRIN) 81 MG EC tablet Take by mouth. 1 Tablet, Delayed Release (E.C.) Oral Every day      brimonidine 0.2% (ALPHAGAN) 0.2 % Drop Place 1 drop into the right eye 3 (three) times daily. 15 mL 3    conjugated estrogens (PREMARIN) vaginal cream 1 g with applicator or dime-sized amt with finger in vagina nightly x 2 weeks, then twice a week thereafter 42.5 g 12    fluocinolone-hydroq.-tretinoin (TRI-VENTURA) 0.01-4-0.05 % Crea Apply 1 application topically once daily. To dark areas on face 30 g 3    fluocinonide (LIDEX) 0.05 % ointment AAA bid to affected areas of arms 60 g 3    fluticasone (FLONASE) 50 mcg/actuation nasal spray 1 spray by Each Nare route 2 (two) times daily as needed for Rhinitis. 15 g 3    fluticasone-salmeterol 250-50 mcg/dose (ADVAIR) 250-50 mcg/dose diskus inhaler Inhale 1 puff into the lungs 2 (two) times daily. 1 each 3    furosemide (LASIX) 20 MG tablet Take 1 tablet (20 mg total) by mouth every other day. 30 tablet 3    metoprolol succinate (TOPROL-XL) 200 MG 24 hr tablet TAKE 1 TABLET (200 MG TOTAL) BY MOUTH ONCE DAILY. 30 tablet 1    oxybutynin (DITROPAN-XL) 10 MG 24 hr  "tablet   11    sertraline (ZOLOFT) 100 MG tablet TAKE 1 TABLET (100 MG TOTAL) BY MOUTH ONCE DAILY. 30 tablet 3    tamsulosin (FLOMAX) 0.4 mg Cp24 Take 1 capsule (0.4 mg total) by mouth once daily. 30 capsule 11    warfarin (COUMADIN) 5 MG tablet 5mg Monday and 7.5mg all other days or Or as directed by Coumadin Clinic. 45 tablet 6     No current facility-administered medications for this visit.                       REVIEW OF SYSTEMS:   Sleep related symptoms as per HPI    reports weight gain  Reports occasional dyspnea  Reports palpitations  Reports occasional acid reflux   Denies polyuria  Reports  mood diturbance  Denies  anemia  Reports occasional  muscle pain  Denies  Gait imbalance    Otherwise, a balance of 10 systems reviewed is negative.    PHYSICAL EXAM:  /67 (BP Location: Left arm, Patient Position: Sitting, BP Method: Automatic)  Pulse (!) 57  Ht 5' 2.5" (1.588 m)  Wt 114.3 kg (251 lb 15.8 oz)  LMP 07/22/2012  BMI 45.35 kg/m2  GENERAL: Overweight body habitus, well groomed.  HEENT:   HEENT:  Conjunctivae are non-erythematous; Pupils equal, round, and reactive to light; Nose is symmetrical; Nasal mucosa is pink and moist; Septum isightly to the right; Inferior turbinates are hypertrophied; Nasal airflow is diminshed; Posterior pharynx is pink; Modified Mallampati:III; Posterior palate is low; Tonsils not visualized; Uvula is reddened;Tongue is normal; Dentition is fair; No TMJ tenderness; Jaw opening and protrusion without click and without discomfort.  NECK: Supple. Neck circumference is 18 inches. No thyromegaly. No palpable nodes.     SKIN: On face and neck: No abrasions, no rashes, no lesions.  No subcutaneous nodules are palpable.  RESPIRATORY: Chest is clear to auscultation.  Normal chest expansion and non-labored breathing at rest.  CARDIOVASCULAR: Normal S1, S2.  No murmurs, gallops or rubs. No carotid bruits bilaterally.  No edema. No clubbing. No cyanosis.    NEURO: Oriented to " "time, place and person. Normal attention span and concentration. Gait normal.    PSYCH: Affect is full. Mood is normal  MUSCULOSKELETAL: Moves 4 extremities. Gait normal.         Using [facility]:       ASSESSMENT:    1. Severe Complex Sleep Apnea The patient symptomatically has  excessive daytime sleepiness, snoring,  witnessed breathing pauses, excessive daytime fatigue, gasping for air in sleep and interrupted sleep  with exam findings of "a crowded oral airway and elevated body mass index. The patient has medical co-morbidities of atrial fibrillation, mechanical valve COPD, hyperlipidemia and hypertension,  which can be worsened by KEYSHAWN. This warrants treatment          PLAN:    CPAP titration with head of bed elevation at the range   16-20 cm H2O; if centrals persist, please try ASV.     Weight loss strategies were discussed in detail - would like to be referred to bariatric program.  PSG in lab testing is warranted due to medical comorbidities, iCOPD,  The patient is a graham risk for obesity hypoventilation  Syndrome given excessive BMI (nearly 50); high risk airway comromised which can be a safety issue due to inadequate titration with auto PAP.        More than 25 minutes of this 45 minutes visit was spent in counseling: during our discussion today, we talked about the etiology of  KEYSHAWN as well as the potential ramifications of untreated sleep apnea, which could include daytime sleepiness, hypertension, heart disease and/or stroke.  We discussed potential treatment options, which could include weight loss, body positioning, continuous positive airway pressure (CPAP), or referral for surgical consideration. Meanwhile, she  is urged to avoid supine sleep, weight gain and alcoholic beverages since all of these can worsen KEYSHAWN.     Precautions: The patient was advised to abstain from driving should he feel sleepy or drowsy.    Follow up: MD/NP  after the sleep study has been completed.     Thank you for allowing me " the opportunity to participate in the care of your patient.    This visit summary will be sent to referring provider via [employer]

## 2017-03-29 ENCOUNTER — ANTI-COAG VISIT (OUTPATIENT)
Dept: CARDIOLOGY | Facility: CLINIC | Age: 62
End: 2017-03-29
Payer: COMMERCIAL

## 2017-03-29 DIAGNOSIS — I48.20 ATRIAL FIBRILLATION, CHRONIC: ICD-10-CM

## 2017-03-29 DIAGNOSIS — Z79.01 LONG TERM (CURRENT) USE OF ANTICOAGULANTS: Primary | ICD-10-CM

## 2017-03-29 DIAGNOSIS — Z95.2 STATUS POST HEART VALVE REPLACEMENT WITH MECHANICAL VALVE: ICD-10-CM

## 2017-03-29 LAB — INR PPP: 3.1 (ref 2.5–3.5)

## 2017-03-29 PROCEDURE — 99211 OFF/OP EST MAY X REQ PHY/QHP: CPT | Mod: 25,S$GLB,, | Performed by: PHARMACIST

## 2017-03-29 PROCEDURE — 85610 PROTHROMBIN TIME: CPT | Mod: QW,S$GLB,, | Performed by: PHARMACIST

## 2017-03-29 NOTE — PROGRESS NOTES
INR within range but increased rapidly since last assessment on 3/20. Therefore, we will watch more closely for now. Patient was re-educated on situations that would require placing a call to the coumadin clinic, including bleeding or unusual bruising issues, changes in health, diet or medications, upcoming procedures that require warfarin interruption, and missed coumadin dose(s). Patient expressed understanding that avoidance of consistency with these parameters could cause fluctuations in INR, leading to more frequent visits and increase risk of adverse events.

## 2017-03-30 ENCOUNTER — TELEPHONE (OUTPATIENT)
Dept: SLEEP MEDICINE | Facility: OTHER | Age: 62
End: 2017-03-30

## 2017-04-05 ENCOUNTER — ANTI-COAG VISIT (OUTPATIENT)
Dept: CARDIOLOGY | Facility: CLINIC | Age: 62
End: 2017-04-05
Payer: COMMERCIAL

## 2017-04-05 ENCOUNTER — HOSPITAL ENCOUNTER (OUTPATIENT)
Dept: SLEEP MEDICINE | Facility: OTHER | Age: 62
Discharge: HOME | End: 2017-04-05
Attending: PSYCHIATRY & NEUROLOGY
Payer: COMMERCIAL

## 2017-04-05 DIAGNOSIS — Z79.01 LONG TERM (CURRENT) USE OF ANTICOAGULANTS: Primary | ICD-10-CM

## 2017-04-05 DIAGNOSIS — G47.31 COMPLEX SLEEP APNEA SYNDROME: ICD-10-CM

## 2017-04-05 DIAGNOSIS — I48.20 ATRIAL FIBRILLATION, CHRONIC: ICD-10-CM

## 2017-04-05 DIAGNOSIS — Z95.2 STATUS POST HEART VALVE REPLACEMENT WITH MECHANICAL VALVE: ICD-10-CM

## 2017-04-05 LAB — INR PPP: 3.2 (ref 2.5–3.5)

## 2017-04-05 PROCEDURE — 95811 POLYSOM 6/>YRS CPAP 4/> PARM: CPT

## 2017-04-05 PROCEDURE — 85610 PROTHROMBIN TIME: CPT | Mod: QW,S$GLB,, | Performed by: PHARMACIST

## 2017-04-05 PROCEDURE — 99211 OFF/OP EST MAY X REQ PHY/QHP: CPT | Mod: 25,S$GLB,, | Performed by: PHARMACIST

## 2017-04-05 PROCEDURE — 95811 POLYSOM 6/>YRS CPAP 4/> PARM: CPT | Mod: 26,,, | Performed by: PSYCHIATRY & NEUROLOGY

## 2017-04-05 NOTE — PROGRESS NOTES
We had a quick follow up from 3/29 to assess stability with this dose since INR had dropped a few weeks ago. INR appears to have stablized so we will extend appointment intervals.

## 2017-04-06 NOTE — PROGRESS NOTES
Titration was performed on Elayne Almanzar on 4/5/2017.  Procedure was explained and questions answered prior to start of study.     EKG in Lead I appeared to be A-Fib (known hx).  Low sat 86%.    Difficulties with study or patient: Audible and visual mask leak per video - tech in room to adjust mask.  Artifact - observed F3, C3, O1 to M2.    Humidity, C-Flex in use with small Simplus full face mask.  Titrated quickly from CPAP 4 - 19 cmH2O then swithed to ASV per MD order.  Patterned breathing was noted during NREM.  Supine REM observed on 18 cmH2O and 19 cmH2O as well as ASV.  Pt appeared to tolerate pressures and the mask well.  Patient reaction to PAP.

## 2017-04-18 NOTE — PROGRESS NOTES
Spoke with pt concerning pending status with [facility]. Gave her number to call Juan Florez so that meter can be shipped out. She agreed to do so.

## 2017-04-20 ENCOUNTER — ANTI-COAG VISIT (OUTPATIENT)
Dept: CARDIOLOGY | Facility: CLINIC | Age: 62
End: 2017-04-20
Payer: COMMERCIAL

## 2017-04-20 DIAGNOSIS — Z95.2 STATUS POST HEART VALVE REPLACEMENT WITH MECHANICAL VALVE: ICD-10-CM

## 2017-04-20 DIAGNOSIS — Z79.01 LONG TERM (CURRENT) USE OF ANTICOAGULANTS: Primary | ICD-10-CM

## 2017-04-20 DIAGNOSIS — I48.20 ATRIAL FIBRILLATION, CHRONIC: ICD-10-CM

## 2017-04-20 LAB — INR PPP: 3 (ref 2–3)

## 2017-04-20 PROCEDURE — 99211 OFF/OP EST MAY X REQ PHY/QHP: CPT | Mod: 25,S$GLB,,

## 2017-04-20 PROCEDURE — 85610 PROTHROMBIN TIME: CPT | Mod: QW,S$GLB,,

## 2017-04-20 NOTE — PROGRESS NOTES
Today was a quick follow up due to variable INRs. Patient denies changes to diet, medications, or health that would affect INR.  INR is stabilizing on warfarin regimen.  Patient will continue weekly warfarin regimen at this time.  Repeat INR 3-4 weeks.  Patient advised to contact clinic with any changes, questions, or concerns.

## 2017-04-24 ENCOUNTER — TELEPHONE (OUTPATIENT)
Dept: SLEEP MEDICINE | Facility: CLINIC | Age: 62
End: 2017-04-24

## 2017-04-24 NOTE — TELEPHONE ENCOUNTER
Please call the patient to inform re: recent sleep study was positive for significant KEYSHAWN  We were able to figure out CPAP settings  We can order CPAP machine (in that case order the follow up visit in 6 weeks), or does she want to come now to discuss sleep study with MD/NP?    Thanks!      ASV default

## 2017-04-24 NOTE — TELEPHONE ENCOUNTER
----- Message from Nilsa Hearn MA sent at 4/24/2017  9:59 AM CDT -----  Contact: pt  pls advise  ----- Message -----     From: Landon Maya     Sent: 4/21/2017   2:49 PM       To: [facility] Staff    x_ 1st Request   _ 2nd Request   _ 3rd Request     Who: KWASI JONES [[id number]]    Why: pt is requesting a call back in reference to getting the results from her sleep study 4/5    What Number to Call Back: [phone]    When to Expect a call back: (Before the end of the day)   -- if call after 3:00 call back will be tomorrow.

## 2017-04-25 ENCOUNTER — TELEPHONE (OUTPATIENT)
Dept: SLEEP MEDICINE | Facility: CLINIC | Age: 62
End: 2017-04-25

## 2017-04-25 DIAGNOSIS — G47.33 OSA (OBSTRUCTIVE SLEEP APNEA): Primary | ICD-10-CM

## 2017-04-25 NOTE — TELEPHONE ENCOUNTER
Ms Almanzar says alrite to order machine w/ 6 week f/u      ----- Message -----        From: Nilsa Hearn MA        Sent: 4/24/2017   4:24 PM          To: Nilsa Hearn MA                Please call the patient to inform re: recent sleep study was positive for significant KEYSHAWN     We were able to figure out CPAP settings     We can order CPAP machine (in that case order the follow up visit in 6 weeks), or does she want to come now to discuss sleep study with MD/NP?           Thanks!                 ASV default          Will message [facility] staff through [employer] tool to schedule the follow up.

## 2017-05-02 ENCOUNTER — INITIAL CONSULT (OUTPATIENT)
Dept: BARIATRICS | Facility: CLINIC | Age: 62
End: 2017-05-02
Payer: COMMERCIAL

## 2017-05-02 VITALS
DIASTOLIC BLOOD PRESSURE: 80 MMHG | HEIGHT: 59 IN | SYSTOLIC BLOOD PRESSURE: 138 MMHG | WEIGHT: 251.13 LBS | HEART RATE: 76 BPM | BODY MASS INDEX: 50.63 KG/M2

## 2017-05-02 DIAGNOSIS — E78.2 MIXED HYPERLIPIDEMIA: ICD-10-CM

## 2017-05-02 DIAGNOSIS — G47.31 COMPLEX SLEEP APNEA SYNDROME: ICD-10-CM

## 2017-05-02 DIAGNOSIS — Z79.01 CHRONIC ANTICOAGULATION: ICD-10-CM

## 2017-05-02 DIAGNOSIS — Z95.2 H/O MITRAL VALVE REPLACEMENT WITH MECHANICAL VALVE: Primary | ICD-10-CM

## 2017-05-02 DIAGNOSIS — I10 ESSENTIAL (PRIMARY) HYPERTENSION: ICD-10-CM

## 2017-05-02 DIAGNOSIS — I48.20 ATRIAL FIBRILLATION, CHRONIC: ICD-10-CM

## 2017-05-02 DIAGNOSIS — E66.01 MORBID OBESITY WITH BMI OF 50.0-59.9, ADULT: ICD-10-CM

## 2017-05-02 PROCEDURE — 99205 OFFICE O/P NEW HI 60 MIN: CPT | Mod: S$GLB,,, | Performed by: PHYSICIAN ASSISTANT

## 2017-05-02 PROCEDURE — 3075F SYST BP GE 130 - 139MM HG: CPT | Mod: S$GLB,,, | Performed by: PHYSICIAN ASSISTANT

## 2017-05-02 PROCEDURE — 3079F DIAST BP 80-89 MM HG: CPT | Mod: S$GLB,,, | Performed by: PHYSICIAN ASSISTANT

## 2017-05-02 PROCEDURE — 1160F RVW MEDS BY RX/DR IN RCRD: CPT | Mod: S$GLB,,, | Performed by: PHYSICIAN ASSISTANT

## 2017-05-02 PROCEDURE — 99999 PR PBB SHADOW E&M-EST. PATIENT-LVL IV: CPT | Mod: PBBFAC,,, | Performed by: PHYSICIAN ASSISTANT

## 2017-05-02 NOTE — MR AVS SNAPSHOT
[facility]ariat[facility]  [address]  [facility] [address]  Phone: [phone]  Fax: [phone]                  Elayne Almanzar   2017 12:40 PM   Initial consult    Description:  Female : 1955   Provider:  Nkechi Crawford PA-C   Department:  [facility]ariat[facility] Surgery           Reason for Visit     Consult           Diagnoses this Visit        Comments    H/O mitral valve replacement with mechanical valve    -  Primary     Essential (primary) hypertension         Mixed hyperlipidemia         Atrial fibrillation, chronic         Chronic anticoagulation         Complex sleep apnea syndrome         Morbid obesity with BMI of 50.0-59.9, adult                To Do List           Future Appointments        Provider Department Dept Phone    2017 7:30 AM LAB, APPOINTMENT [place] [facility] [phone]    2017 8:00 AM My [address] RD [facility] [phone]    2017 9:00 AM Shaun Rahman, PharmD [facility] - Coumadin [phone]    2017 9:00 AM Nubia Crocker MD [facility] - Internal Medicine [phone]    2017 10:20 AM Jenna Jones MD [facility] Sleep [facility] [phone]      Goals (5 Years of Data)     None      Follow-Up and Disposition     Return for diet appointment.      [facility]     [facility] Nurse Care Line -  Assistance  Unless otherwise directed by your provider, please contact [facility], our nurse care line that is available for  assistance.     Registered nurses in the [facility] provide: appointment scheduling, clinical advisement, health education, and other advisory services.  Call: [phone] (toll free)               Medications           Message regarding Medications     Verify the changes and/or additions to your medication regime listed below are the same as discussed with your clinician today.  If any of these changes or  additions are incorrect, please notify your healthcare provider.             Verify that the below list of medications is an accurate representation of the medications you are currently taking.  If none reported, the list may be blank. If incorrect, please contact your healthcare provider. Carry this list with you in case of emergency.           Current Medications     albuterol (VENTOLIN HFA) 90 mcg/actuation inhaler Inhale 2 puffs into the lungs every 6 (six) hours as needed for Wheezing.    aspirin (ECOTRIN) 81 MG EC tablet Take by mouth. 1 Tablet, Delayed Release (E.C.) Oral Every day    conjugated estrogens (PREMARIN) vaginal cream 1 g with applicator or dime-sized amt with finger in vagina nightly x 2 weeks, then twice a week thereafter    fluticasone (FLONASE) 50 mcg/actuation nasal spray 1 spray by Each Nare route 2 (two) times daily as needed for Rhinitis.    fluticasone-salmeterol 250-50 mcg/dose (ADVAIR) 250-50 mcg/dose diskus inhaler Inhale 1 puff into the lungs 2 (two) times daily.    furosemide (LASIX) 20 MG tablet Take 1 tablet (20 mg total) by mouth every other day.    metoprolol succinate (TOPROL-XL) 200 MG 24 hr tablet TAKE 1 TABLET (200 MG TOTAL) BY MOUTH ONCE DAILY.    oxybutynin (DITROPAN-XL) 10 MG 24 hr tablet     sertraline (ZOLOFT) 100 MG tablet TAKE 1 TABLET (100 MG TOTAL) BY MOUTH ONCE DAILY.    tamsulosin (FLOMAX) 0.4 mg Cp24 Take 1 capsule (0.4 mg total) by mouth once daily.    warfarin (COUMADIN) 5 MG tablet 5mg Monday and 7.5mg all other days or Or as directed by Coumadin Clinic.    brimonidine 0.2% (ALPHAGAN) 0.2 % Drop Place 1 drop into the right eye 3 (three) times daily.    fluocinolone-hydroq.-tretinoin (TRI-VENTURA) 0.01-4-0.05 % Crea Apply 1 application topically once daily. To dark areas on face    fluocinonide (LIDEX) 0.05 % ointment AAA bid to affected areas of arms           Clinical Reference Information           Your Vitals Were     BP Pulse Height Weight Last Period BMI     "138/80 76 4' 11" (1.499 m) 113.9 kg (251 lb 1.7 oz) 07/22/2012 50.72 kg/m2      Blood Pressure          Most Recent Value    BP  138/80      Allergies as of 5/2/2017     No Known Allergies      Immunizations Administered on Date of Encounter - 5/2/2017     None      Orders Placed During Today's Visit      Normal Orders This Visit    Psych Consult     Future Labs/Procedures Expected by Expires    CBC w/ Auto Differential  5/2/2017 7/1/2018    Free T4  5/2/2017 7/1/2018    H. Pylori Antibody, IGG  5/2/2017 7/1/2018    Hg A1c  5/2/2017 7/1/2018    Iron & TIBC  5/2/2017 7/1/2018    Magnesium  5/2/2017 7/1/2018    Phosphorus  5/2/2017 7/1/2018    T3  5/2/2017 7/1/2018    T4  5/2/2017 7/1/2018    TSH  5/2/2017 7/1/2018    Vitamin B12  5/2/2017 7/1/2018    Vitamin B1  5/2/2017 7/1/2018    Vitamin D 25 Hydroxy  5/2/2017 7/1/2018      [facility] Sign-Up     Activating your [facility] account is as easy as 1-2-3!     1) Visit [email or web address], select Sign Up Now, enter this activation code and your date of birth, then select Next.  [email or web address]  Expires: 5/5/2017 10:08 AM      2) Create a username and password to use when you visit [facility] in the future and select a security question in case you lose your password and select Next.    3) Enter your e-mail address and click Sign Up!    Additional Information  If you have questions, please e-mail [email or web address] or call [phone] to talk to our [facility] staff. Remember, [facility] is NOT to be used for urgent needs. For medical emergencies, dial 911.         Instructions    Prior to surgery you will need to complete:  - Dietitian consult and follow up appointments as needed  - Cardiac clearance  - Labs  - Chest X-ray  - EKG  - Psychological evaluation, Please call psychiatry [phone] to schedule  - EGD: completed Nov 2016    In preparation for bariatric surgery, please complete the following:   · Discuss your current medications with your primary care provider, " remember your medications will need to be crushed, chewable, or in liquid form for the first 3-6 months after a gastric bypass or sleeve.  For a gastric band, your medications will need to be crushed indefinitely.    · If you take a blood thinner such as: Coumadin (warfarin), Pradaxa (dabigatran), or Plavix (clopidogrel), you will need to speak with your prescribing provider on how or if this medication can be stopped before surgery.   · If you take a medication for depression or anxiety, you will need to begin crushing or opening the capsule 1-3 months prior to surgery.  Remember to discuss this with the psychologist or psychiatrist that you see.   · If you take medication for arthritis on a daily basis that is considered a non-steroidal anti-inflammatory (NSAID), please discuss with your prescribing physician an alternative medication.  After having gastric bypass or gastric sleeve, this group of medications is not appropriate to take due to increased risk of bleeding stomach ulcers.      DEFINITIONS  Appointments: Pre-scheduled meetings or consultations with any physician, advanced practice provider, dietitian, or psychologist, and labs, imaging studies, sleep studies, etc.   Late cancellation: Cancelling an appointment 24-48 hours prior to scheduled time.  No-Show appointment:  is when    You do NOT arrive to your appointment at the time its scheduled.   You call to cancel or cancel via [facility] less than 24 hours in advance of your scheduled appointment   You show up 15 minutes AFTER your scheduled appointment time without any notification of being late.     POLICY  1. You are allowed up to 3 cancellations for appointments.    After 3 cancellations your case will be placed on hold for 2 months and after that time you can resume the program.   2. You are allowed only 1 no-show for an appointment.    You will be re-scheduled one time and if there is a 2nd no-show at any point, your case will be placed on  hold for 3 months.  After 3 months you can resume the program.     3. Upon resuming the program after being placed on hold for either above mentioned reasons, if you have a late cancel or no show for any appointment, the bariatric team will review if youre an appropriate candidate for surgery at the monthly meeting.             Language Assistance Services     ATTENTION: Language assistance services are available, free of charge. Please call [phone].      ATENCIÓN: Si habla español, tiene a kidd disposición servicios gratuitos de asistencia lingüística. Llame al [phone].     CHÚ Ý: N?u b?n nói Ti?ng Vi?t, có các d?ch v? h? tr? ngôn ng? mi?n phí dành cho b?n. G?i s? [phone].         [facility] complies with applicable Federal civil rights laws and does not discriminate on the basis of race, color, national origin, age, disability, or sex.

## 2017-05-02 NOTE — PATIENT INSTRUCTIONS
Prior to surgery you will need to complete:  - Dietitian consult and follow up appointments as needed  - Cardiac clearance  - Labs  - Chest X-ray  - EKG  - Psychological evaluation, Please call psychiatry [phone] to schedule  - EGD: completed Nov 2016    In preparation for bariatric surgery, please complete the following:   · Discuss your current medications with your primary care provider, remember your medications will need to be crushed, chewable, or in liquid form for the first 3-6 months after a gastric bypass or sleeve.  For a gastric band, your medications will need to be crushed indefinitely.    · If you take a blood thinner such as: Coumadin (warfarin), Pradaxa (dabigatran), or Plavix (clopidogrel), you will need to speak with your prescribing provider on how or if this medication can be stopped before surgery.   · If you take a medication for depression or anxiety, you will need to begin crushing or opening the capsule 1-3 months prior to surgery.  Remember to discuss this with the psychologist or psychiatrist that you see.   · If you take medication for arthritis on a daily basis that is considered a non-steroidal anti-inflammatory (NSAID), please discuss with your prescribing physician an alternative medication.  After having gastric bypass or gastric sleeve, this group of medications is not appropriate to take due to increased risk of bleeding stomach ulcers.      DEFINITIONS  Appointments: Pre-scheduled meetings or consultations with any physician, advanced practice provider, dietitian, or psychologist, and labs, imaging studies, sleep studies, etc.   Late cancellation: Cancelling an appointment 24-48 hours prior to scheduled time.  No-Show appointment:  is when    You do NOT arrive to your appointment at the time its scheduled.   You call to cancel or cancel via [facility] less than 24 hours in advance of your scheduled appointment   You show up 15 minutes AFTER your scheduled appointment  time without any notification of being late.     POLICY  1. You are allowed up to 3 cancellations for appointments.    After 3 cancellations your case will be placed on hold for 2 months and after that time you can resume the program.   2. You are allowed only 1 no-show for an appointment.    You will be re-scheduled one time and if there is a 2nd no-show at any point, your case will be placed on hold for 3 months.  After 3 months you can resume the program.     3. Upon resuming the program after being placed on hold for either above mentioned reasons, if you have a late cancel or no show for any appointment, the bariatric team will review if youre an appropriate candidate for surgery at the monthly meeting.

## 2017-05-02 NOTE — LETTER
"  [facility] - B[facility]ric [facility]  [address]  [facility] [address]  Phone: [phone]  Fax: [phone] May 2, 2017      Jenna Jones MD  [address]  [facility] [address]  [facility] [address]    Patient: Elayne Almanzar   MR Number: [id number]   YOB: 1955   Date of Visit: 5/2/2017     Dear Dr. Jones:    Thank you for referring Elayne Almanzar to me for evaluation. Below are the relevant portions of my assessment and plan of care.    Elayne Almanzar is a 61-year-old female presenting for morbid obesity Body mass index is 50.72 kg/(m^2), and inability to lose weight. The patient has tried starvation diets as "a young women." She states that she loves vegetables, baked meats, fresh fruits. She states that she does not mcduffie foods. She states that she does not lose weight with conventional diet methods. She states that she gives up sodas and chips for Lent every year and has not restarted drinking sodas at this time. She does love sweets. She states that she started gaining weight over the past 10 years or so. She states that when she was working, she was able to maintain her weight with healthy eating and exercise: 150 pounds. She is currently at her heaviest adult weight.   Her mother passed away of stage 4 colon cancer 1 year ago.     DIAGNOSIS:  1. Morbid obesity with Body mass index is 50.72 kg/(m^2). and inability to lose weight.  2. Co-morbidities: uncontrolled KEYSHAWN, hypertension, and COPD.  3. Atrial fibrillation: on coumadin.  4. Hx mitral valve replacement: on coumadin.  5. History of tobacco use.     PLAN: The patient is a good candidate for Bariatric Surgery. she is interested in sleeve gastrectomy (LRNY not an option with insurance) with Dr. Elam. The surgery and post-op care were discussed in detail with the patient. All questions were answered.     She understands that bariatric surgery is a tool to aid in weight loss and that she needs to be committed to the diet and " exercise post-operatively for successful weight loss. Discussed expected weight loss outcomes after surgery which is 50% of the excess weight on her frame. Goal weight is 185#s. Discussed with patient that bariatric surgery is not the easy way out and that it will take plenty of dedication on the patient's part to be successful. Also discussed the possibility of weight regain if the patient strays from the diet guidelines or exercise requirements. Patient verbalized understanding and wishes to proceed with the work-up.     ORDERS:  1. Chest X-Ray, EKG and EGD.  2. Psychological Consult, Bariatric Dietician Consult and Cardiac Clearance.  3. Bariatric Labs: BMP, CBC, Folate Serum, H. pylori, HgA1C, Hepatic Panel/LFT, Iron & TIBC, Lipid Profile, Magnesium, Phosphate, T3, T4, TSH, Free T4, Vitamin B12, and Vitamin B1.    If you have questions, please do not hesitate to call me. I look forward to following Elayne along with you.    Sincerely,      Nkechi Crawford PA-C   Section of Bariatric Surgery  [facility]    UBD/kailash    CC  MD Lorraine Metcalf MD

## 2017-05-02 NOTE — PROGRESS NOTES
"BARIATRIC NEW PATIENT EVALUATION    CHIEF COMPLAINT:   Morbid obesity Body mass index is 50.72 kg/(m^2). and inability to lose weight.    HISTORY OF PRESENT ILLNESS:  Elayne Almanzar  is a 61 y.o. female presenting for morbid obesity Body mass index is 50.72 kg/(m^2). and inability to lose weight. The patient has tried starvation diets as "a young women."  She states that she loves vegetables, baked meats, fresh fruits.  She states that she does not mcduffie foods.  She states that she does not lose weight with conventional diet methods.  She states that she gives up sodas and chips for Lent every year and has not restarted drinking sodas at this time.  She does love sweets.  She states that she started gaining weight over the past 10 years or so.  She states that when she was working,  she was able to maintain her weight with healthy eating and exercise: 150 pounds.   She is currently at her heaviest adult weight.    Her mother passed away of stage 4 colon cancer 1 year ago.        PAST MEDICAL HISTORY:  Past Medical History:   Diagnosis Date    Anticoagulant long-term use     Atrial fibrillation     Cataract     CHF (congestive heart failure)     Chronic kidney disease     COPD (chronic obstructive pulmonary disease)     Depression     Dysuria     Flank pain     HTN (hypertension)     Nephrolithiasis     KEYSHAWN (obstructive sleep apnea)     awaiting CPAP machine     Rheumatic disease of mitral valve     Urinary incontinence     Urinary tract infection          PAST SURGICAL HISTORY:  Past Surgical History:   Procedure Laterality Date     SECTION      kidney stone removal      MITRAL VALVE REPLACEMENT  2004    TUBAL LIGATION         FAMILY HISTORY:  Family History   Problem Relation Age of Onset    Stroke Paternal Grandmother     Colon cancer Maternal Grandmother     Cancer Brother      testicular cancer    Diabetes Sister     Hypertension Sister     Breast cancer Paternal Aunt  "    Diabetes Sister     Diabetes Sister     Heart disease Father 70     MI    Diabetes Father     Colon cancer Mother        SOCIAL HISTORY:  Social History     Social History    Marital status:      Spouse name: N/A    Number of children: 2    Years of education: N/A     Occupational History    Cook      Retired     Social History Main Topics    Smoking status: Former Smoker     Packs/day: 0.50     Years: 20.00     Types: Cigarettes     Quit date: 5/8/2002    Smokeless tobacco: Never Used    Alcohol use No    Drug use: No    Sexual activity: No     Other Topics Concern    Not on file     Social History Narrative    Disability since 2004.  Laid off 2002.  Previously worked as cook in a kitchen.         MEDICATIONS:  Current Outpatient Prescriptions   Medication Sig Dispense Refill    albuterol (VENTOLIN HFA) 90 mcg/actuation inhaler Inhale 2 puffs into the lungs every 6 (six) hours as needed for Wheezing. 18 g 3    aspirin (ECOTRIN) 81 MG EC tablet Take by mouth. 1 Tablet, Delayed Release (E.C.) Oral Every day      conjugated estrogens (PREMARIN) vaginal cream 1 g with applicator or dime-sized amt with finger in vagina nightly x 2 weeks, then twice a week thereafter 42.5 g 12    fluticasone (FLONASE) 50 mcg/actuation nasal spray 1 spray by Each Nare route 2 (two) times daily as needed for Rhinitis. 15 g 3    fluticasone-salmeterol 250-50 mcg/dose (ADVAIR) 250-50 mcg/dose diskus inhaler Inhale 1 puff into the lungs 2 (two) times daily. 1 each 3    furosemide (LASIX) 20 MG tablet Take 1 tablet (20 mg total) by mouth every other day. 30 tablet 3    metoprolol succinate (TOPROL-XL) 200 MG 24 hr tablet TAKE 1 TABLET (200 MG TOTAL) BY MOUTH ONCE DAILY. 30 tablet 1    oxybutynin (DITROPAN-XL) 10 MG 24 hr tablet   11    sertraline (ZOLOFT) 100 MG tablet TAKE 1 TABLET (100 MG TOTAL) BY MOUTH ONCE DAILY. 30 tablet 3    tamsulosin (FLOMAX) 0.4 mg Cp24 Take 1 capsule (0.4 mg total) by mouth once  "daily. 30 capsule 11    warfarin (COUMADIN) 5 MG tablet 5mg Monday and 7.5mg all other days or Or as directed by [facility]. 45 tablet 6    brimonidine 0.2% (ALPHAGAN) 0.2 % Drop Place 1 drop into the right eye 3 (three) times daily. 15 mL 3    fluocinolone-hydroq.-tretinoin (TRI-VENTURA) 0.01-4-0.05 % Crea Apply 1 application topically once daily. To dark areas on face 30 g 3    fluocinonide (LIDEX) 0.05 % ointment AAA bid to affected areas of arms 60 g 3     No current facility-administered medications for this visit.        ALLERGIES:  Review of patient's allergies indicates:  No Known Allergies    ROS:  Review of Systems   Constitutional: Negative for chills, fever and weight loss.   Eyes: Negative for blurred vision, double vision and pain.   Respiratory: Negative for cough, shortness of breath and wheezing.    Cardiovascular: Negative for chest pain, palpitations and leg swelling.   Gastrointestinal: Negative for abdominal pain, blood in stool, constipation, diarrhea, heartburn, melena, nausea and vomiting.   Genitourinary: Negative for dysuria, frequency and hematuria.   Skin: Negative for itching and rash.   Neurological: Negative for headaches.   Psychiatric/Behavioral: Negative for depression and suicidal ideas.       PE:  Vitals:    05/02/17 1257   BP: 138/80   Pulse: 76   Weight: 113.9 kg (251 lb 1.7 oz)   Height: 4' 11" (1.499 m)       Physical Exam   Constitutional: She is oriented to person, place, and time. She appears well-developed and well-nourished. No distress.   Morbidly obese   HENT:   Head: Normocephalic and atraumatic.   Eyes: Conjunctivae are normal. Right eye exhibits no discharge. Left eye exhibits no discharge. No scleral icterus.   Neck: Neck supple.   Cardiovascular: Normal rate and regular rhythm.  Exam reveals no gallop and no friction rub.    Murmur heard.  Pulmonary/Chest: Effort normal and breath sounds normal. No respiratory distress. She has no wheezes. She has no rales. "   Abdominal: Soft. Bowel sounds are normal. She exhibits no distension and no mass. There is no tenderness. There is no rebound and no guarding. No hernia.   Musculoskeletal: Normal range of motion. She exhibits no edema.   Neurological: She is alert and oriented to person, place, and time.   Skin: Skin is warm, dry and intact. No rash noted. She is not diaphoretic. No erythema. No pallor.   Psychiatric: She has a normal mood and affect. Her speech is normal and behavior is normal. Judgment and thought content normal.   Nursing note and vitals reviewed.       DIAGNOSIS:  1. Morbid obesity with Body mass index is 50.72 kg/(m^2). and inability to lose weight.  2. Co-morbidities: uncontrolled KEYSHAWN, hypertension, and COPD.  3. Atrial fibrillation: on coumadin.  4. Hx mitral valve replacement: on coumadin.  5. History of tobacco use.    PLAN:  The patient is a good candidate for Bariatric Surgery. she is interested in sleeve gastrectomy (LRNY not an option with insurance) with Dr. Elam. The surgery and post-op care were discussed in detail with the patient. All questions were answered.    she understands that bariatric surgery is a tool to aid in weight loss and that she needs to be committed to the diet and exercise post-operatively for successful weight loss.  Discussed expected weight loss outcomes after surgery which is 50% of the excess weight on her frame.  Goal weight is 185#s.  Discussed with patient that bariatric surgery is not the easy way out and that it will take plenty of dedication on the patient's part to be successful. Also discussed the possibility of weight regain if the patient strays from the diet guidelines or exercise requirements. Patient verbalized understanding and wishes to proceed with the work-up.    ORDERS:  1. Chest X-Ray, EKG and EGD.  2. Psychological Consult, Bariatric Dietician Consult and Cardiac Clearance.  3. Bariatric Labs: BMP, CBC, Folate Serum, H. pylori, HgA1C, Hepatic  Panel/LFT, Iron & TIBC, Lipid Profile, Magnesium, Phosphate, T3, T4, TSH, Free T4, Vitamin B12, and Vitamin B1.    Primary Physician: Nubia Crocker MD  RTC: As scheduled.    Blue packet reviewed, pertinent information entered in Epic.    60 minute visit, over 50% of time spent counseling patient face to face on surgical options, risks, benefits, expected diet, recommended exercise regimen, and expected weight loss.

## 2017-05-02 NOTE — Clinical Note
May 2, 2017      Jenna Jones MD  [address]  [address]  [facility] [address]           [facility] - Bariatric Surgery  [address]  [facility] [address]  Phone: [phone]  Fax: [phone]          Patient: Elayne Almanzar   MR Number: [id number]   YOB: 1955   Date of Visit: 5/2/2017       Dear Dr. Jenna Jones:    Thank you for referring Elayne Almanzar to me for evaluation. Attached you will find relevant portions of my assessment and plan of care.    If you have questions, please do not hesitate to call me. I look forward to following Elayne Almanzar along with you.    Sincerely,    DEJON Mandelosure  CC:  No Recipients    If you would like to receive this communication electronically, please contact [email or web address] or [phone] to request more information on [email or web address] access.    For providers and/or their staff who would like to refer a patient to [facility], please contact us through our one-stop-shop provider referral line, [facility], at [phone].    If you feel you have received this communication in error or would no longer like to receive these types of communications, please e-mail [email or web address]

## 2017-05-11 ENCOUNTER — TELEPHONE (OUTPATIENT)
Dept: BARIATRICS | Facility: CLINIC | Age: 62
End: 2017-05-11

## 2017-05-11 ENCOUNTER — TELEPHONE (OUTPATIENT)
Dept: SLEEP MEDICINE | Facility: CLINIC | Age: 62
End: 2017-05-11

## 2017-05-11 DIAGNOSIS — G47.31 COMPLEX SLEEP APNEA SYNDROME: Primary | ICD-10-CM

## 2017-05-12 ENCOUNTER — OFFICE VISIT (OUTPATIENT)
Dept: CARDIOLOGY | Facility: CLINIC | Age: 62
End: 2017-05-12
Payer: COMMERCIAL

## 2017-05-12 ENCOUNTER — LAB VISIT (OUTPATIENT)
Dept: LAB | Facility: HOSPITAL | Age: 62
End: 2017-05-12
Attending: SURGERY
Payer: COMMERCIAL

## 2017-05-12 ENCOUNTER — HOSPITAL ENCOUNTER (OUTPATIENT)
Dept: CARDIOLOGY | Facility: CLINIC | Age: 62
Discharge: HOME | End: 2017-05-12
Payer: COMMERCIAL

## 2017-05-12 ENCOUNTER — CLINICAL SUPPORT (OUTPATIENT)
Dept: BARIATRICS | Facility: CLINIC | Age: 62
End: 2017-05-12
Payer: COMMERCIAL

## 2017-05-12 VITALS
SYSTOLIC BLOOD PRESSURE: 159 MMHG | HEIGHT: 62 IN | HEART RATE: 73 BPM | DIASTOLIC BLOOD PRESSURE: 73 MMHG | BODY MASS INDEX: 46.82 KG/M2 | WEIGHT: 254.44 LBS

## 2017-05-12 VITALS — WEIGHT: 253.5 LBS | BODY MASS INDEX: 51.21 KG/M2

## 2017-05-12 DIAGNOSIS — G47.31 COMPLEX SLEEP APNEA SYNDROME: ICD-10-CM

## 2017-05-12 DIAGNOSIS — Z79.01 CHRONIC ANTICOAGULATION: ICD-10-CM

## 2017-05-12 DIAGNOSIS — I10 ESSENTIAL HYPERTENSION: Chronic | ICD-10-CM

## 2017-05-12 DIAGNOSIS — Z95.2 STATUS POST HEART VALVE REPLACEMENT WITH MECHANICAL VALVE: ICD-10-CM

## 2017-05-12 DIAGNOSIS — Z01.810 PREOP CARDIOVASCULAR EXAM: Primary | ICD-10-CM

## 2017-05-12 DIAGNOSIS — I48.20 ATRIAL FIBRILLATION, CHRONIC: ICD-10-CM

## 2017-05-12 DIAGNOSIS — E66.01 MORBID OBESITY WITH BMI OF 50.0-59.9, ADULT: ICD-10-CM

## 2017-05-12 DIAGNOSIS — Z95.2 H/O MITRAL VALVE REPLACEMENT WITH MECHANICAL VALVE: ICD-10-CM

## 2017-05-12 DIAGNOSIS — E66.01 MORBID OBESITY DUE TO EXCESS CALORIES: ICD-10-CM

## 2017-05-12 DIAGNOSIS — E78.2 MIXED HYPERLIPIDEMIA: Chronic | ICD-10-CM

## 2017-05-12 DIAGNOSIS — Z71.3 DIETARY COUNSELING AND SURVEILLANCE: ICD-10-CM

## 2017-05-12 DIAGNOSIS — I48.20 ATRIAL FIBRILLATION, CHRONIC: Chronic | ICD-10-CM

## 2017-05-12 DIAGNOSIS — J44.0 COPD (CHRONIC OBSTRUCTIVE PULMONARY DISEASE) WITH ACUTE BRONCHITIS: ICD-10-CM

## 2017-05-12 DIAGNOSIS — J20.9 COPD (CHRONIC OBSTRUCTIVE PULMONARY DISEASE) WITH ACUTE BRONCHITIS: ICD-10-CM

## 2017-05-12 DIAGNOSIS — E78.2 MIXED HYPERLIPIDEMIA: ICD-10-CM

## 2017-05-12 DIAGNOSIS — I10 ESSENTIAL (PRIMARY) HYPERTENSION: ICD-10-CM

## 2017-05-12 DIAGNOSIS — I51.89 DIASTOLIC DYSFUNCTION: Chronic | ICD-10-CM

## 2017-05-12 LAB
25(OH)D3+25(OH)D2 SERPL-MCNC: 17 NG/ML
ANISOCYTOSIS BLD QL SMEAR: SLIGHT
BASOPHILS # BLD AUTO: 0.02 K/UL
BASOPHILS NFR BLD: 0.4 %
DIFFERENTIAL METHOD: ABNORMAL
EOSINOPHIL # BLD AUTO: 0.3 K/UL
EOSINOPHIL NFR BLD: 6.4 %
ERYTHROCYTE [DISTWIDTH] IN BLOOD BY AUTOMATED COUNT: 17.6 %
HCT VFR BLD AUTO: 38.2 %
HGB BLD-MCNC: 10.6 G/DL
HYPOCHROMIA BLD QL SMEAR: ABNORMAL
IRON SERPL-MCNC: 51 UG/DL
LYMPHOCYTES # BLD AUTO: 1.7 K/UL
LYMPHOCYTES NFR BLD: 31.9 %
MAGNESIUM SERPL-MCNC: 1.6 MG/DL
MCH RBC QN AUTO: 19.3 PG
MCHC RBC AUTO-ENTMCNC: 27.7 %
MCV RBC AUTO: 70 FL
MONOCYTES # BLD AUTO: 0.4 K/UL
MONOCYTES NFR BLD: 7.9 %
NEUTROPHILS # BLD AUTO: 2.8 K/UL
NEUTROPHILS NFR BLD: 53.4 %
OVALOCYTES BLD QL SMEAR: ABNORMAL
PHOSPHATE SERPL-MCNC: 3.2 MG/DL
PLATELET # BLD AUTO: 190 K/UL
PLATELET BLD QL SMEAR: ABNORMAL
PMV BLD AUTO: ABNORMAL FL
POIKILOCYTOSIS BLD QL SMEAR: SLIGHT
POLYCHROMASIA BLD QL SMEAR: ABNORMAL
RBC # BLD AUTO: 5.5 M/UL
SATURATED IRON: 10 %
T3 SERPL-MCNC: 91 NG/DL
T4 FREE SERPL-MCNC: 0.95 NG/DL
T4 SERPL-MCNC: 6 UG/DL
TOTAL IRON BINDING CAPACITY: 493 UG/DL
TRANSFERRIN SERPL-MCNC: 333 MG/DL
TSH SERPL DL<=0.005 MIU/L-ACNC: 2.15 UIU/ML
VIT B12 SERPL-MCNC: 371 PG/ML
WBC # BLD AUTO: 5.3 K/UL

## 2017-05-12 PROCEDURE — 83036 HEMOGLOBIN GLYCOSYLATED A1C: CPT

## 2017-05-12 PROCEDURE — 82607 VITAMIN B-12: CPT

## 2017-05-12 PROCEDURE — 85025 COMPLETE CBC W/AUTO DIFF WBC: CPT

## 2017-05-12 PROCEDURE — 3078F DIAST BP <80 MM HG: CPT | Mod: S$GLB,,, | Performed by: NURSE PRACTITIONER

## 2017-05-12 PROCEDURE — 86677 HELICOBACTER PYLORI ANTIBODY: CPT

## 2017-05-12 PROCEDURE — 3077F SYST BP >= 140 MM HG: CPT | Mod: S$GLB,,, | Performed by: NURSE PRACTITIONER

## 2017-05-12 PROCEDURE — 82306 VITAMIN D 25 HYDROXY: CPT

## 2017-05-12 PROCEDURE — 84443 ASSAY THYROID STIM HORMONE: CPT

## 2017-05-12 PROCEDURE — 99999 PR PBB SHADOW E&M-EST. PATIENT-LVL I: CPT | Mod: PBBFAC,,, | Performed by: DIETITIAN, REGISTERED

## 2017-05-12 PROCEDURE — 99999 PR PBB SHADOW E&M-EST. PATIENT-LVL III: CPT | Mod: PBBFAC,,, | Performed by: NURSE PRACTITIONER

## 2017-05-12 PROCEDURE — 99214 OFFICE O/P EST MOD 30 MIN: CPT | Mod: S$GLB,,, | Performed by: NURSE PRACTITIONER

## 2017-05-12 PROCEDURE — 36415 COLL VENOUS BLD VENIPUNCTURE: CPT

## 2017-05-12 PROCEDURE — 1160F RVW MEDS BY RX/DR IN RCRD: CPT | Mod: S$GLB,,, | Performed by: NURSE PRACTITIONER

## 2017-05-12 PROCEDURE — 84425 ASSAY OF VITAMIN B-1: CPT

## 2017-05-12 PROCEDURE — 84480 ASSAY TRIIODOTHYRONINE (T3): CPT

## 2017-05-12 PROCEDURE — 99499 UNLISTED E&M SERVICE: CPT | Mod: S$GLB,,, | Performed by: DIETITIAN, REGISTERED

## 2017-05-12 PROCEDURE — 83735 ASSAY OF MAGNESIUM: CPT

## 2017-05-12 PROCEDURE — 84100 ASSAY OF PHOSPHORUS: CPT

## 2017-05-12 PROCEDURE — 84436 ASSAY OF TOTAL THYROXINE: CPT

## 2017-05-12 PROCEDURE — 84439 ASSAY OF FREE THYROXINE: CPT

## 2017-05-12 PROCEDURE — 97802 MEDICAL NUTRITION INDIV IN: CPT | Mod: S$GLB,,, | Performed by: DIETITIAN, REGISTERED

## 2017-05-12 PROCEDURE — 83540 ASSAY OF IRON: CPT

## 2017-05-12 RX ORDER — METOPROLOL SUCCINATE 200 MG/1
200 TABLET, EXTENDED RELEASE ORAL DAILY
Qty: 30 TABLET | Refills: 11 | Status: SHIPPED | OUTPATIENT
Start: 2017-05-12 | End: 2017-09-18 | Stop reason: SDUPTHER

## 2017-05-12 RX ORDER — FUROSEMIDE 20 MG/1
20 TABLET ORAL EVERY OTHER DAY
Qty: 30 TABLET | Refills: 3 | Status: ON HOLD | OUTPATIENT
Start: 2017-05-12 | End: 2017-09-08

## 2017-05-12 NOTE — PROGRESS NOTES
Ms. Almanzar is a patient of Dr. Messina and was last seen in [facility] .    Subjective:   Patient ID:  Elayne Almanzar is a 61 y.o. female who presents for follow-up of Pre-op Exam (Bariatric Surgery)    HPI  Ms. Almanzar is a 61y.o.  Female with rheumatic heart disease with CHF (admission 1/2014), MVR for MS 2/2004, CKD stage III, COPD, hypertension, and hyperlipidemia. Blood pressure is running 130s/60-80s typically but elevated in clinic today.  Patient denies chest pain with exertion or at rest, palpitations, SOB, dizziness, syncope, edema, orthopnea, PND, or claudication. Patient can walk 4-5 blocks before she becomes SOB. She has untreated hyperlipidemia but she did not tolerate pravastatin therapy because of myalgias.  She weighs herself daily and takes her lasix dose if she has SOB.  10 pack year h/o smoking.    Revised Cardiac Risk Index for Pre-Operative Risk Yes +1 No 0   1. High-risk surgery: Intraperitoneal; intrathoracic; suprainguinal vascular   1    2. History of ischemic heart disease:  History of myocardial infarction (MI); history of positive exercise test; current chest paint considered due to myocardial ischemia; use of nitrate therapy or ECG with pathological Q waves    0   3. History of congestive heart failure:  Pulmonary edema, bilateral rales or S3 gallop; paroxysmal nocturnal dyspnea; chest x-ray (CXR) showing pulmonary vascular redistribution   1    4. History of cerebrovascular disease: Prior transient ischemic attack (TIA) or stroke    0   5.  Pre-operative treatment with insulin  0     Total Risk for ramila-operative major cardiac event, 6.6%, low risk.    Review of Systems   Constitution: Negative for decreased appetite, diaphoresis, weakness, malaise/fatigue, weight gain and weight loss.   HENT: Negative for headaches.    Eyes: Negative for visual disturbance.   Cardiovascular: Positive for dyspnea on exertion. Negative for chest pain, claudication, irregular  heartbeat, leg swelling, near-syncope, orthopnea, palpitations, paroxysmal nocturnal dyspnea and syncope.        Denies chest pressure   Respiratory: Negative for cough, hemoptysis, shortness of breath, sleep disturbances due to breathing and snoring.    Endocrine: Negative for cold intolerance and heat intolerance.   Hematologic/Lymphatic: Negative for bleeding problem. Does not bruise/bleed easily.   Musculoskeletal: Negative for myalgias.   Gastrointestinal: Negative for bloating, abdominal pain, anorexia, change in bowel habit, constipation, diarrhea, nausea and vomiting.   Neurological: Negative for difficulty with concentration, disturbances in coordination, excessive daytime sleepiness, dizziness, light-headedness, loss of balance and numbness.   Psychiatric/Behavioral: The patient does not have insomnia.      Allergies and current medications updated and reviewed:  Review of patient's allergies indicates:  No Known Allergies  Current Outpatient Prescriptions   Medication Sig    albuterol (VENTOLIN HFA) 90 mcg/actuation inhaler Inhale 2 puffs into the lungs every 6 (six) hours as needed for Wheezing.    aspirin (ECOTRIN) 81 MG EC tablet Take by mouth. 1 Tablet, Delayed Release (E.C.) Oral Every day    brimonidine 0.2% (ALPHAGAN) 0.2 % Drop Place 1 drop into the right eye 3 (three) times daily.    conjugated estrogens (PREMARIN) vaginal cream 1 g with applicator or dime-sized amt with finger in vagina nightly x 2 weeks, then twice a week thereafter    fluocinolone-hydroq.-tretinoin (TRI-VENTURA) 0.01-4-0.05 % Crea Apply 1 application topically once daily. To dark areas on face    fluticasone (FLONASE) 50 mcg/actuation nasal spray 1 spray by Each Nare route 2 (two) times daily as needed for Rhinitis.    fluticasone-salmeterol 250-50 mcg/dose (ADVAIR) 250-50 mcg/dose diskus inhaler Inhale 1 puff into the lungs 2 (two) times daily.    furosemide (LASIX) 20 MG tablet Take 1 tablet (20 mg total) by mouth every  "other day.    metoprolol succinate (TOPROL-XL) 200 MG 24 hr tablet Take 1 tablet (200 mg total) by mouth once daily.    oxybutynin (DITROPAN-XL) 10 MG 24 hr tablet     sertraline (ZOLOFT) 100 MG tablet TAKE 1 TABLET (100 MG TOTAL) BY MOUTH ONCE DAILY.    tamsulosin (FLOMAX) 0.4 mg Cp24 Take 1 capsule (0.4 mg total) by mouth once daily.    warfarin (COUMADIN) 5 MG tablet 5mg Monday and 7.5mg all other days or Or as directed by Coumadin Clinic.     No current facility-administered medications for this visit.        Objective:     Right Arm BP - Sittin/72 (17 1039)  Left Arm BP - Sittin/73 (17 1039)    BP (!) 159/73 (BP Location: Left arm, Patient Position: Sitting, BP Method: Automatic)  Pulse 73  Ht 5' 2" (1.575 m)  Wt 115.4 kg (254 lb 6.6 oz)  LMP 2012  BMI 46.53 kg/m2    Physical Exam   Constitutional: She is oriented to person, place, and time. Vital signs are normal. She appears well-developed and well-nourished. She is active. No distress.   HENT:   Head: Normocephalic and atraumatic.   Eyes: Conjunctivae and lids are normal. No scleral icterus.   Neck: Neck supple. Normal carotid pulses, no hepatojugular reflux and no JVD present. Carotid bruit is not present.   Cardiovascular: Normal rate, S1 normal, S2 normal and intact distal pulses.  An irregularly irregular rhythm present. PMI is not displaced.  Exam reveals a midsystolic click. Exam reveals no gallop and no friction rub.    No murmur heard.  Pulses:       Carotid pulses are 2+ on the right side, and 2+ on the left side.       Radial pulses are 2+ on the right side, and 2+ on the left side.        Dorsalis pedis pulses are 2+ on the right side, and 2+ on the left side.        Posterior tibial pulses are 1+ on the right side, and 1+ on the left side.   Pulmonary/Chest: Effort normal and breath sounds normal. No respiratory distress. She has no decreased breath sounds. She has no wheezes. She has no rhonchi. She has no " rales. She exhibits no tenderness.   Abdominal: Soft. Normal appearance and bowel sounds are normal. She exhibits no distension (Central adiposity), no fluid wave, no abdominal bruit, no ascites and no pulsatile midline mass. There is no hepatosplenomegaly. There is no tenderness.   Musculoskeletal: She exhibits no edema.   Neurological: She is alert and oriented to person, place, and time. Gait normal.   Skin: Skin is warm, dry and intact. No rash noted. She is not diaphoretic. Nails show no clubbing.   Psychiatric: She has a normal mood and affect. Her speech is normal and behavior is normal. Judgment and thought content normal. Cognition and memory are normal.   Nursing note and vitals reviewed.      Chemistry        Component Value Date/Time     02/02/2017 1007    K 4.9 02/02/2017 1007     02/02/2017 1007    CO2 31 (H) 02/02/2017 1007    BUN 18 02/02/2017 1007    CREATININE 1.2 02/02/2017 1007    GLU 94 02/02/2017 1007        Component Value Date/Time    CALCIUM 9.3 02/02/2017 1007    ALKPHOS 65 02/02/2017 1007    AST 21 02/02/2017 1007    ALT 11 02/02/2017 1007    BILITOT 0.6 02/02/2017 1007          Recent Labs  Lab 07/10/14  1701 11/28/14  1253 01/21/15  0955  07/01/16  1135 08/02/16  1010 02/02/17  1007   WHITE BLOOD CELL COUNT 6.35 8.48 5.60  < >  --  10.15  --    HEMOGLOBIN 12.5 11.1 L 11.6 L  < >  --  11.5 L  --    HEMATOCRIT 41.1 36.6 L 38.5  < >  --  38.2  --    MCV 72 L 72 L 71 L  < >  --  70 L  --    PLATELETS 215 199 180  < >  --  215  --    BNP 95 61 82  --   --   --   --    TSH  --   --   --   < > 3.247  --   --    CHOLESTEROL  --   --   --   < > 233 H  --  250 H   HDL  --   --   --   < > 44  --  44   LDL CHOLESTEROL  --   --   --   < > 165.8 H  --  178.6 H   TRIGLYCERIDES  --   --   --   < > 116  --  137   HDL/CHOLESTEROL RATIO  --   --   --   < > 18.9 L  --  17.6 L   < > = values in this interval not displayed.      Recent Labs  Lab 03/20/17  0956 03/29/17  1004 04/05/17  0828  04/20/17  1602   INR 1.9 3.1 3.2 3.0        Test(s) Reviewed  I have reviewed the following in detail:  [] Stress test   [] Angiography   [] Echocardiogram   [] Labs   [] Other:       Assessment:     1. Atrial fibrillation, chronic  Rate well controlled   2. Essential (primary) hypertension  Home BP at goal, <130/80. BP in clinic elevated.   3. Mixed hyperlipidemia  Not on statin therapy d/t intolerance   4. Status post heart valve replacement with mechanical valve  Anticoagulation therapy with coumadin   5. Chronic anticoagulation  Managed by coumadin clinic   6. Complex sleep apnea syndrome  Waiting for CPAP to be delivered   7. COPD (chronic obstructive pulmonary disease) with acute bronchitis     8. Morbid obesity with BMI of 50.0-59.9, adult  BMI 46   9. Diastolic dysfunction  No s/s of heart failure      Plan:     Preop cardiovascular exam  Comments:  Low risk for major cardiac event. No cardiac reason to postpone surgery found. Recommend bridging with LMWH when coumadin is stopped for surgery.    Atrial fibrillation, chronic  Comments:  Continue Metoprolol for rate control    Mixed hyperlipidemia  Comments:  Intolerant of therapy. Recommend mediterranean diet.     Status post heart valve replacement with mechanical valve  Comments:  Continue Coumadin therapy    Chronic anticoagulation    Complex sleep apnea syndrome    COPD (chronic obstructive pulmonary disease) with acute bronchitis    Morbid obesity with BMI of 50.0-59.9, adult    Essential hypertension  Comments:  No changes to medications. Instructed to contact clinic in 2 weeks with home blood pressure readings. Consider re-starting lisinopril  Orders:  -     metoprolol succinate (TOPROL-XL) 200 MG 24 hr tablet; Take 1 tablet (200 mg total) by mouth once daily.  Dispense: 30 tablet; Refill: 11    Diastolic dysfunction  Comments:  Weigh self daily. Low salt diet. Continue lasix as ordered. Encouraged increased exercise for a goal of 150min/week    Other  orders  -     furosemide (LASIX) 20 MG tablet; Take 1 tablet (20 mg total) by mouth every other day.  Dispense: 30 tablet; Refill: 3      Follow up with Dr. Messina as previously planned.

## 2017-05-12 NOTE — MR AVS SNAPSHOT
[facility]  [address]  [facility] [address]  Phone: [phone]                  Elayne Almanzar   2017 10:00 AM   Office Visit    Description:  Female : 1955   Provider:  Kassi Lang NP   Department:  [facility]           Reason for Visit     Pre-op Exam           Diagnoses this Visit        Comments    Preop cardiovascular exam    -  Primary Low risk for major cardiac event. No cardiac reason to postpone surgery found. Recommend bridging with LMWH when coumadin is stopped for surgery.    Atrial fibrillation, chronic     Continue Metoprolol for rate control    Mixed hyperlipidemia     Intolerant of therapy. Recommend mediterranean diet.     Status post heart valve replacement with mechanical valve     Continue Coumadin therapy    Chronic anticoagulation         Complex sleep apnea syndrome         COPD (chronic obstructive pulmonary disease) with acute bronchitis         Morbid obesity with BMI of 50.0-59.9, adult         Essential hypertension     No changes to medications. Instructed to contact clinic in 2 weeks with home blood pressure readings. Consider re-starting lisinopril    Diastolic dysfunction     Weigh self daily. Low salt diet. Continue lasix as ordered. Encouraged increased exercise for a goal of 150min/week           To Do List           Future Appointments        Provider Department Dept Phone    2017 9:00 AM Viviana OchoaD [facility] - Coumadin [phone]    2017 9:00 AM MD [facility] Met[facility] - Internal Medicine [phone]    2017 10:20 AM Jenna Jones MD [facility] - Sleep Clinic [phone]    6/15/2017 8:00 AM My Awan RD [facility] - Bariatric Surgery [phone]      Goals (5 Years of Data)     None      Follow-Up and Disposition     Follow-up and Disposition History       These Medications        Disp Refills Start End    metoprolol succinate (TOPROL-XL) 200 MG 24 hr  tablet 30 tablet 11 5/12/2017     Take 1 tablet (200 mg total) by mouth once daily. - Oral    Pharmacy: [facility] - [place], [place] - [address] Ph #: [phone]       furosemide (LASIX) 20 MG tablet 30 tablet 3 5/12/2017     Take 1 tablet (20 mg total) by mouth every other day. - Oral    Pharmacy: [facility] - [place], [place] - [address] [facility] Ph #: [phone]         [facility]     [facility] Nurse Care Line - 24/7 Assistance  Unless otherwise directed by your provider, please contact [facility], our nurse care line that is available for 24/7 assistance.     Registered nurses in the [facility] provide: appointment scheduling, clinical advisement, health education, and other advisory services.  Call: [phone] (toll free)               Medications           Message regarding Medications     Verify the changes and/or additions to your medication regime listed below are the same as discussed with your clinician today.  If any of these changes or additions are incorrect, please notify your healthcare provider.        CHANGE how you are taking these medications     Start Taking Instead of    metoprolol succinate (TOPROL-XL) 200 MG 24 hr tablet metoprolol succinate (TOPROL-XL) 200 MG 24 hr tablet    Dosage:  Take 1 tablet (200 mg total) by mouth once daily. Dosage:  TAKE 1 TABLET (200 MG TOTAL) BY MOUTH ONCE DAILY.    Reason for Change:  Reorder       STOP taking these medications     fluocinonide (LIDEX) 0.05 % ointment AAA bid to affected areas of arms           Verify that the below list of medications is an accurate representation of the medications you are currently taking.  If none reported, the list may be blank. If incorrect, please contact your healthcare provider. Carry this list with you in case of emergency.           Current Medications     albuterol (VENTOLIN HFA) 90 mcg/actuation inhaler Inhale 2 puffs into the lungs every 6  "(six) hours as needed for Wheezing.    aspirin (ECOTRIN) 81 MG EC tablet Take by mouth. 1 Tablet, Delayed Release (E.C.) Oral Every day    brimonidine 0.2% (ALPHAGAN) 0.2 % Drop Place 1 drop into the right eye 3 (three) times daily.    conjugated estrogens (PREMARIN) vaginal cream 1 g with applicator or dime-sized amt with finger in vagina nightly x 2 weeks, then twice a week thereafter    fluocinolone-hydroq.-tretinoin (TRI-VENTURA) 0.01-4-0.05 % Crea Apply 1 application topically once daily. To dark areas on face    fluticasone (FLONASE) 50 mcg/actuation nasal spray 1 spray by Each Nare route 2 (two) times daily as needed for Rhinitis.    fluticasone-salmeterol 250-50 mcg/dose (ADVAIR) 250-50 mcg/dose diskus inhaler Inhale 1 puff into the lungs 2 (two) times daily.    furosemide (LASIX) 20 MG tablet Take 1 tablet (20 mg total) by mouth every other day.    metoprolol succinate (TOPROL-XL) 200 MG 24 hr tablet Take 1 tablet (200 mg total) by mouth once daily.    oxybutynin (DITROPAN-XL) 10 MG 24 hr tablet     sertraline (ZOLOFT) 100 MG tablet TAKE 1 TABLET (100 MG TOTAL) BY MOUTH ONCE DAILY.    tamsulosin (FLOMAX) 0.4 mg Cp24 Take 1 capsule (0.4 mg total) by mouth once daily.    warfarin (COUMADIN) 5 MG tablet 5mg Monday and 7.5mg all other days or Or as directed by Coumadin Clinic.           Clinical Reference Information           Your Vitals Were     BP Pulse Height Weight Last Period BMI    159/73 (BP Location: Left arm, Patient Position: Sitting, BP Method: Automatic) 73 5' 2" (1.575 m) 115.4 kg (254 lb 6.6 oz) 07/22/2012 46.53 kg/m2      Blood Pressure          Most Recent Value    Right Arm BP - Sitting  155/72    Left Arm BP - Sitting  159/73    BP  (!)  159/73      Allergies as of 5/12/2017     No Known Allergies      Immunizations Administered on Date of Encounter - 5/12/2017     None      [facility] Sign-Up     Activating your [facility] account is as easy as 1-2-3!     1) Visit [email or web address], select Sign Up " Now, enter this activation code and your date of birth, then select Next.  [email or web address]  Expires: 6/26/2017  9:15 AM      2) Create a username and password to use when you visit [facility] in the future and select a security question in case you lose your password and select Next.    3) Enter your e-mail address and click Sign Up!    Additional Information  If you have questions, please e-mail [email or web address] or call [phone] to talk to our [facility] staff. Remember, [facility] is NOT to be used for urgent needs. For medical emergencies, dial 911.         Language Assistance Services     ATTENTION: Language assistance services are available, free of charge. Please call [phone].      ATENCIÓN: Si ximena collins, tiene a kidd disposición servicios gratuitos de asistencia lingüística. Llame al [phone].     CHÚ Ý: N?u b?n nói Ti?ng Vi?t, có các d?ch v? h? tr? ngôn ng? mi?n phí dành cho b?n. G?i s? [phone].         [facility] complies with applicable Federal civil rights laws and does not discriminate on the basis of race, color, national origin, age, disability, or sex.

## 2017-05-12 NOTE — TELEPHONE ENCOUNTER
----- Message from Mckenna Champagne sent at 5/3/2017  3:31 PM CDT -----  Regarding: RE: Mckenna   Contact: [phone]  The patients order is for a BIPAP ASV. The order can not be for Dx G47.33  ----- Message -----     From: Jenna Jones MD     Sent: 5/3/2017   2:44 PM       To: Mckenna Champagne  Subject: RE: Mckenna Andres, could you specify please?  ----- Message -----     From: Mckenna Champagne     Sent: 5/2/2017  11:05 AM       To: Jenna Jones MD  Subject: Mckenna Jones,        Please correct the patient Dx code.         Thank you,    Mckenna

## 2017-05-12 NOTE — MR AVS SNAPSHOT
[facility] - Bariatric Surgery  [address]  [facility] [address]  Phone: [phone]  Fax: [phone]                  Elayne Almanzar   2017 8:00 AM   Clinical Support    Description:  Female : 1955   Provider:  My Awan RD   Department:  [facility] - Bariatric Surgery                To Do List           Future Appointments        Provider Department Dept Phone    2017 9:30 AM EKG, APPT [facility] - [phone]    2017 10:00 AM Kassi Lang NP [facility] - Cardiology [phone]    2017 9:00 AM Shaun Rahman, PharmD [facility] Coumadin [phone]    2017 9:00 AM Nubia Crocker MD [facility] - Internal Medicine [phone]    2017 10:20 AM Jenna Jones MD [facility] Sleep Clinic [phone]      Goals (5 Years of Data)     None      [facility]     [facility] On Call Nurse Care Line -  Assistance  Unless otherwise directed by your provider, please contact [facility], our nurse care line that is available for  assistance.     Registered nurses in the [facility] On Call Center provide: appointment scheduling, clinical advisement, health education, and other advisory services.  Call: [phone] (toll free)               Medications           Message regarding Medications     Verify the changes and/or additions to your medication regime listed below are the same as discussed with your clinician today.  If any of these changes or additions are incorrect, please notify your healthcare provider.             Verify that the below list of medications is an accurate representation of the medications you are currently taking.  If none reported, the list may be blank. If incorrect, please contact your healthcare provider. Carry this list with you in case of emergency.           Current Medications     albuterol (VENTOLIN HFA) 90 mcg/actuation inhaler Inhale 2 puffs into the lungs every 6 (six) hours as needed for  Wheezing.    aspirin (ECOTRIN) 81 MG EC tablet Take by mouth. 1 Tablet, Delayed Release (E.C.) Oral Every day    brimonidine 0.2% (ALPHAGAN) 0.2 % Drop Place 1 drop into the right eye 3 (three) times daily.    conjugated estrogens (PREMARIN) vaginal cream 1 g with applicator or dime-sized amt with finger in vagina nightly x 2 weeks, then twice a week thereafter    fluocinolone-hydroq.-tretinoin (TRI-VENTURA) 0.01-4-0.05 % Crea Apply 1 application topically once daily. To dark areas on face    fluocinonide (LIDEX) 0.05 % ointment AAA bid to affected areas of arms    fluticasone (FLONASE) 50 mcg/actuation nasal spray 1 spray by Each Nare route 2 (two) times daily as needed for Rhinitis.    fluticasone-salmeterol 250-50 mcg/dose (ADVAIR) 250-50 mcg/dose diskus inhaler Inhale 1 puff into the lungs 2 (two) times daily.    furosemide (LASIX) 20 MG tablet Take 1 tablet (20 mg total) by mouth every other day.    metoprolol succinate (TOPROL-XL) 200 MG 24 hr tablet TAKE 1 TABLET (200 MG TOTAL) BY MOUTH ONCE DAILY.    oxybutynin (DITROPAN-XL) 10 MG 24 hr tablet     sertraline (ZOLOFT) 100 MG tablet TAKE 1 TABLET (100 MG TOTAL) BY MOUTH ONCE DAILY.    tamsulosin (FLOMAX) 0.4 mg Cp24 Take 1 capsule (0.4 mg total) by mouth once daily.    warfarin (COUMADIN) 5 MG tablet 5mg Monday and 7.5mg all other days or Or as directed by Coumadin Clinic.           Clinical Reference Information           Your Vitals Were     Weight Last Period BMI          115 kg (253 lb 8.5 oz) 07/22/2012 51.21 kg/m2        Allergies as of 5/12/2017     No Known Allergies      Immunizations Administered on Date of Encounter - 5/12/2017     None      [email or web address] Sign-Up     Activating your [facility] account is as easy as 1-2-3!     1) Visit [email or web address], select Sign Up Now, enter this activation code and your date of birth, then select Next.  [email or web address]  Expires: 6/26/2017  9:15 AM      2) Create a username and password to use when you visit [facility] in  the future and select a security question in case you lose your password and select Next.    3) Enter your e-mail address and click Sign Up!    Additional Information  If you have questions, please e-mail [email or web address] or call [phone] to talk to our [facility] staff. Remember, [facility] is NOT to be used for urgent needs. For medical emergencies, dial 911.         Instructions    1200- 1500 calorie Sample meal plan  80-120g protein per day.   Protein drinks and bars: 0-4 grams sugar  Drink lots of water throughout the day and exercise!  MENU # 1  Breakfast: 2 eggs, 1 turkey sausage cornel, 1 apple  Snack: Atkins bar  Lunch: 2 roll-ups (sliced turkey, low-fat sliced cheese, mustard), 12 baby carrots dipped in 1 Tbsp natural peanut butter  Mid-Day Snack: ¼ cup unsalted almonds, ½ cup fruit  Dinner: 1 chicken thigh simmered in tomato sauce + 2 Tbsp mozzarella cheese, ½ cup black beans, 1/2 cup steamed carrots  Evening Snack: 1/2 cup grapes with 4 cubes low-fat cheddar cheese   ___________________________________________________  MENU # 2  Breakfast: 200 calorie protein drink  Mid-morning snack : ¼ cup unsalted nuts, medium banana  Lunch: 3oz tuna or chicken salad made with 2 tbsp light de la rosa, over salad with tomatoes and cucumbers.   Snack: low-fat cheese stick  Dinner: 3oz hamburger cornel, slice of low-fat cheese, 1 cup boiled yellow squash and zucchini.   Snack: 6oz light yogurt  _______________________________________________________  Menu #3  Breakfast: 6oz plain Greek yogurt + fruit (½ banana, ½ cup fruit - pineapple, blueberries, strawberries, peach), may add Splenda to jamshid.  Lunch: Grilled  chicken breast w/ slice low-fat pepper ana maria cheese, 1/2 cup grilled/baked asparagus and small salad with Salad Spritzer.    Mid-Day snack: 200 calorie protein drink   Dinner: 4oz Grilled fish, ½ cup white beans, ½ cup cooked spinach  Evening Snack: fudgsicle no-sugar-added    Menu # 4  Breakfast: 1 scoop vanilla  protein powder + 4oz skim milk + 4oz coffee   Snack: Pure protein bar  Lunch: 2 Lettuce tacos: 3oz seasoned ground turkey wrapped in a Zacarias lettuce leaves with 1 Tbsp shredded cheese and dollop low-fat sour cream  Snack: ½ cup cottage cheese, ½ cup pineapple chunks  Dinner: Shrimp omelet: 2 eggs, ½ cup shrimp, green onions, and shredded cheese  ______________________________________________________  Menu #5  Breakfast: 1 cup low-fat cottage cheese, ½ cup peaches (no sugar added)  Snack: 1 apple, 4 cubes cheese  Lunch: 3oz baked pork chop, 1 cup okra and tomato stew  Snack: 1/2 cup black beans + salsa + dollop light sour cream  Dinner: Caprese chicken salad: 3 oz chicken breast, 1oz fresh mozzarella, sliced tomato, 1 Tbsp olive oil, basil  Snack: sugar-free popsicle    Menu #6  Breakfast: ½ cup part-skim ricotta cheese, 2 Tbsp sugar-free strawberry preserves, sprinkle of slivered almonds  Snack: 1 orange  Lunch: 3 oz canned chicken, 1oz shredded cheddar cheese, ½  cup black beans, 2 Tbsp salsa  Snack: 200 calorie Protein drink  Dinner: 4 oz grilled salmon steak, over mixed green salad with 2 tbsp light dressing    Meal Ideas for Regular Bariatric Diet  *Recipes and products available at [email or web address]      Breakfast: (15-20g protein)    - Egg white omelet: 2 egg whites or ½ cup Egg Beaters. (Optional proteins: cheese, shrimp, black beans, chicken, sliced turkey) (Optional veggies: tomatoes, salsa, spinach, mushrooms, onions, green peppers, or small slice avocado)     - Egg and sausage: 1 egg or ¼ cup Egg Beaters (any variety), with 1 cornel or 2 links of Turkey sausage or Veggie breakfast sausage (Lauro Farms or Boca)    - Crust-less breakfast quiche: To make a glass pie dish, mix 4oz part skim Ricotta, 1 cup skim milk, and 2 eggs as your base. Add protein: shredded cheese, sliced lean ham or turkey, turkey mujica/sausage. Add veggies: tomato, onion, green onion, mushroom, green pepper, spinach,  etc.    - Yogurt parfait: Mix 1 - 6oz container Dannon Light N Fit vanilla yogurt, with ¼ cup crushed unsalted nuts    - Cottage cheese and fruit: ½ cup part-skim cottage cheese or ricotta cheese topped with fresh fruit or sugar free preserves     - Joy Dewey's Vanilla Egg custard* (add 2 Tbsp instant coffee granules to make Cappuccino Custard*)    - Hi-Protein café latte (skim milk, decaf coffee, 1 scoop protein powder). Optional to add Sugar free syrup or extract flavoring.    - Breakfast Lox: spread fat free cream cheese on slices of smoked salmon. Serve over scrambled or egg over easy (sauteed with nonstick cookspray) OR on a cucumber slice    - Eggwhich: Scramble or cook 1 large egg over easy using nonstick cookspray. Place between 2 slices of Russian mujica and low fat cheese.     Lunch: (20-30g protein)    - ½ cup Black bean soup (Homemade or Progresso), with ¼ cup shredded low-fat cheese. Top with chopped tomato or fresh salsa.     - Lean deli turkey breast and low-fat sliced cheese, mustard or light de la rosa to moisten, rolled up together, or wrapped in a Zacarias lettuce leaf    - Chicken salad made from dinner leftovers, moisten with low-fat salad dressing or light de la rosa. Also try leftover salmon, shrimp, tuna or boiled eggs. Serve ½ cup over dark green salad    - Fat-free canned refried beans, topped with ¼ cup shredded low-fat cheese. Top with chopped tomato or fresh salsa.     - Greek salad: Top mixed greens with 1-2oz grilled chicken, tomatoes, red onions, 2-3 kalamata olives, and sprinkle lightly with feta cheese. Spritz with Balsamic vinegar to taste.     - Crust-less lunch quiche: To make a glass pie dish, mix 4oz part skim Ricotta, 1 cup skim milk, and 2 eggs as your base. Add protein: shredded cheese, sliced lean ham or turkey, shrimp, chicken. Add veggies: tomato, onion, green onion, mushroom, green pepper, spinach, artichoke, broccoli, etc.    - Pizza bake: spread a  juan j bridgett mushroom with  tomato sauce, low-fat shredded mozzarella and turkey pepperoni or Lithuanian mujica. Add any veggies. Roast for 10-15 minutes, until cheese melted.     - Cucumber crab bites: Spread ¼ cup crab dip (lump crabmeat + light cream cheese and green onions) over sliced cucumber.     - Chicken with light spinach and artichoke dip*: Puree in : 6oz cooked and drained spinach, 2 cloves garlic, 1 can cannelloni beans, ½ cup chopped green onions, 1 can drained artichoke hearts (not marinated in oil), lemon juice and basil. Mix in 2oz chopped up chicken.    Supper: (20-30g protein)    - Serve grilled fish over dark green salad tossed with low-fat dressing, served with grilled asparagus kennedy     - Rotisserie chicken salad: served with sliced strawberries, walnuts, fat-free feta cheese crumbles and 1 tbsp Chesters Own Light Raspberry Otisco Vinaigrette    - Shrimp cocktail: Dip cold boiled shrimp in homemade low-sugar cocktail sauce (1/2 cup Candelaria One Carb ketchup, 2 tbsp horseradish, 1/4 tsp hot sauce, 1 tsp Worcestershire sauce, 1 tbsp freshly-squeezed lemon juice). Serve with dark green salad, walnuts, and crumbled blue cheese drizzled with olive oil and Balsamic vinegar    - Tuna Melt: Spread tuna salad onto 2 thick slices of tomato. Top with low-fat cheese and broil until cheese is melted. May also be made with chicken salad of shrimp salad. [place] with different types of cheeses.    - Chicken or beef fajitas (no tortilla, rice, beans, chips). Top meat and veggies w/ fresh salsa, fat free sour cream.     - Homemade low-fat Chili using extra lean ground beef or ground turkey. Top with shredded cheese and salsa as desired. May add dollop fat-free sour cream if desired    - Chicken parmesan: Top chicken breast w/ low sugar marinara sauce, mozzarella cheese and bake until chicken reaches 165*.  Serve w/ spaghetti SQUASH or Latvian cut green beans    - Dinner Omelet with shrimp or chicken and onion, green peppers  and chives.    - No noodle lasagna: Use sliced zucchini or eggplant in place of noodles.  Layer with part skim ricotta cheese and low sugar meat sauce (use very lean ground beef or ground turkey).    - Mexican chicken bake: Bake chunks of chicken breast or thigh with taco seasoning, Pace brand enchilada sauce, green onions and low-fat cheese. Serve with ¼ cup black beans or fat free refried beans topped with chopped tomatoes or salsa.    - Dereje frozen meatballs, simmered in Classico Marinara sauce. Different flavors of salsa or spaghetti sauce create different dishes! Sprinkle with parmesan cheese. Serve with grilled or steamed veggies, or a dark green salad.    - Simmer boneless skinless chicken thigh chunks in Classico Marinara sauce or roasted salsa until tender with chopped onion, bell pepper, garlic, mushrooms, spinach, etc.     - Hamburger or veggie burger, without the bun, dressed the way you like. Served with grilled or steamed veggies.    - Eggplant parmesan: Bake slices of eggplant at 350 degrees for 15 minutes. Layer tomato sauce, sliced eggplant and low-fat mozzarella cheese in a baking dish and cover with foil. Bake 30-40 more minutes or until bubbly. Uncover and bake at 400 degrees for about 15 more minutes, or until top is slightly crisp.    - Fish tacos: grilled/baked white fish, wrapped in Zacarias lettuce leaf, topped with salsa, shredded low-fat cheese, and light coleslaw.    - Chicken rufus: Sprinkle chicken w/ 1 tsp of hidden valley ranch dip mix. Then grill chicken and top with black beans, salsa and 1 tsp fat free sour cream.     - Cauliflower pizza crust: Use cauliflower as crust (see recipe on keyur, no flour!). Top w/ low fat cheese, turkey pepperoni and veggies and bake again    - chicken or turkey crust pizza: use ground chicken or turkey instead of cauliflower, spread in [place] and bake at 350 for about 20-30 minutes(may want to add garlic, black pepper, oregano and other  herbs to ground meat mixture).  Remove and top w/ low fat cheese, turkey pepperoni and veggies and bake again for another 10 minutes or until cheese is browned.     Snacks: (100-200 calories; >5g protein)    - 1 low-fat cheese stick with 8 cherry tomatoes or 1 serving fresh fruit  - 4 thin slices fat-free turkey breast and 1 slice low-fat cheese  - 4 thin slices fat-free honey ham with wedge of melon  - 6-8 edamame pods (equivalent to about 1/4 cup edamame without pods).   - 1/4 cup unsalted nuts with ½ cup fruit  - 6-oz container Dannon Light n Fit vanilla yogurt, topped with 1oz unsalted nuts         - apple, celery or baby carrots spread with 2 Tbsp PB2  - apple slices with 1 oz slice low-fat cheese  - Apple slices dipped in 2 Tbsp of PB2  - celery, cucumber, bell pepper or baby carrots dipped in ¼ cup hummus bean spread or light spinach and artichoke dip (*recipe in lunch section)  - celery, cucumber, baby carrots dipped in high protein greek yogurt (Mix 16 oz plain greek yogurt + 1 packet of hidden valley ranch dip mix)  - Gilbert Links Beef Steak - 14g protein! (similar to beef jerky)  - 2 wedges Laughing Cow - Light Herb & Garlic Cheese with sliced cucumber or green bell pepper  - 1/2 cup low-fat cottage cheese with ¼ cup fruit or ¼ cup salsa  - RTD Protein drinks: Atkins, Low Carb Slim Fast, EAS light, Muscle Milk Light, etc.  - Homemade Protein drinks: GNC Soy95, Isopure, Nectar, UNJURY, Whey Gourmet, etc. Mix 1 scoop powder with 8oz skim/1% milk or light soymilk.  - Protein bars: Atkins, EAS, Pure Protein, Think Thin, Detour, etc. Must have 0-4 grams sugar - Read the label.    Takeout Options: No more than twice/week  Deli - Salads (no pasta or rice), meats, cheeses. Roasted chicken. Lox (salmon)    Mexican - Platters which don't include tortillas, chips, or rice. Go easy on the beans. Example: Fajitas without the tortillas. Ask the  not to bring chips to the table if they are too tempting.    Yoruba -  Meat or fish and vegetable, but no bread or rice. Including hummus, baba ganoush, etc, is OK. Most sit-down Greek restaurants can provide you with cucumber slices for dipping instead of nigel bread.    Fast Food (Avoid as much as possible) - Salads (no croutons and limit salad dressing to 2 tbsp), grilled chicken sandwich without the bun and ask for no de la rosa. Rachelles low fat chili or Taco Bell pintos and cheese.    BBQ - The meats are fine if you ask for sauces on the side, but most of the traditional side dishes are loaded with carbs. Phan slaw, baked beans and BBQ sauce are typically made with sugar.    Chinese - Nothing deep-fried, no rice or noodles. Many Chinese sauces have starch and sugar in them, so you'll have to use your judgement. If you find that these sauces trigger cravings, or cause Dumping, you can ask for the sauce to be made without sugar or just use soy sauce.           Language Assistance Services     ATTENTION: Language assistance services are available, free of charge. Please call [phone].      ATENCIÓN: Si habla español, tiene a kidd disposición servicios gratuitos de asistencia lingüística. Llame al [phone].     DIOR Ý: N?u b?n nói Ti?ng Vi?t, có các d?ch v? h? tr? ngôn ng? mi?n phí dành cho b?n. G?i s? [phone].         [facility] complies with applicable Federal civil rights laws and does not discriminate on the basis of race, color, national origin, age, disability, or sex.

## 2017-05-12 NOTE — PROGRESS NOTES
"NUTRITIONAL CONSULT    Referring Physician: Juvencio Elam M.D.  Reason for MNT Referral: Initial assessment for sleeve gastrectomy work-up    PAST MEDICAL HISTORY:   61 y.o. female presents with a BMI of Body mass index is 51.21 kg/(m^2).  Weight history includes she states that she started gaining weight over the past 10 years or so. She states that when she was working, she was able to maintain her weight with healthy eating and exercise: 150 pounds. Highest weight is current weight, 253 lbs.    Dieting attempts include the patient has tried starvation diets as "a young women." She states that she loves vegetables, baked meats, fresh fruits. She states that she does not mcduffie foods. She states that she does not lose weight with conventional diet methods. She states that she gives up sodas and chips for Lent every year and has not restarted drinking sodas at this time. She does love sweets. Patient reports she likes potato chips, pickles and junk foods.     Past Medical History:   Diagnosis Date    Anticoagulant long-term use     Atrial fibrillation 2002    Cataract     CHF (congestive heart failure)     Chronic kidney disease     COPD (chronic obstructive pulmonary disease)     Depression     Dysuria     Flank pain     HTN (hypertension)     Nephrolithiasis     KEYSHAWN (obstructive sleep apnea)     awaiting CPAP machine     Rheumatic disease of mitral valve     Urinary incontinence     Urinary tract infection        CLINICAL DATA:  61 y.o.-year-old Black or  female.  Height: 4 ft 11 in  Weight: 253 lbs  IBW: 123 lbs  BMI: 51.21  The patient's goal weight (50% EBW): 188 lbs  Personal goal weight: would like to lose ~100 lbs (patient reports she wants she would like to weigh 150 lbs)    Goal for Bariatric Surgery: to improve health, to improve quality of life and to lose weight    NUTRITION & HEALTH HISTORY:  Greatest challenge: sweets, starchy CHO, portion control, irregular meal " patterns, emotional eating and high-fat diet    Current diet recall: (Nutrition Questionnaire completed)  Brk: skipped  L (1 pm): 2 cups of apple sauce, Babybel cheese  Snk (2 pm): Milky Way + water + Hi-C  Snk (4 pm): Ice cream + fruit punch + 2 bottles of water  D (6 pm): Liver & onions + cauliflower + can of tea  Snk (6: 30 pm): banana + apple juice  Eats potato chips and pickles for lunch most days; eats ice throughout the day   Likes eggs, Babybel cheese, cinnamon applesauce, candy  Not a big milk drinker-reports she doesn't like yogurt but hasn't tried    Current Diet:  Meal pattern: 1-2 meals/day  Protein supplements: Patient has tried Boost-does not like  Snackin-3 / day  Vegetables: Likes a variety. Eats 2-3 times per week.  Fruits: Likes a variety. Eats 2-3 times per week.  Beverages: water, soda, sugary beverages, sweet tea and whole milk  Dining out: Weekly. (once/week- or ) Mostly fast food, restaurants and take-out. (Iris, Anais Johnston, Taco Bell)  Cooking at home: Daily. (every other day) Mostly baked, grilled and fried meat, starchy CHO and vegetables. 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  Grocery shopping and food prep done by patiet.  Patient believes the household will be supportive after surgery. (Patient reports  doesn't know and daughter objects to her having the surgery)  Alcohol: None.  Smoking: None. (Quit smoking 15 years ago)    ASSESSMENT:  · Patient reports attempts at weight loss, only to regain lost weight.  · Patient demonstrated knowledge of healthy eating behaviors and  exercise patterns; admits to not eating healthy and not exercising at this point.  · Patient states willingness to change lifestyle and make behavior modifications.  · Expect good  compliance after surgery at this time.    Insurance requires no medically supervised diet prior to consideration for bariatric surgery.    BARIATRIC DIET DISCUSSION:  Discussed diet after surgery and related to patients food record.  Reviewed diet progression before and after surgery.  Reinforced that surgery is not a magic bullet and importance of low fat foods and no snacking.  Stressed importance of exercise and its role in achieving weight loss goals.  Answered all questions.    RECOMMENDATIONS:  Patient is a potential candidate for bariatric surgery.    Needs at least one additional visit(s) with RD for dietary modifications in preparation for bariatric surgery.     PLAN:  Continue to review Bariatric Nutrition Guidebook at home and call with any questions.  Work on Bariatric Nutrition Checklist.  Work on expanding variety of vegetables.  Work on gradually cutting back on starchy CHO in the diet.  Begin trying various protein supplements to determine preference.  1200-calorie diet.  5-6 meals per day.  Start including protein supplements in the diet plan daily.  Reduce frequency of dining out.  More grocery shopping and meal preparation at home.  Increase exercise.  Start shopping for bariatric vitamins & minerals.  Return to clinic.    SESSION TIME:  60 minutes

## 2017-05-13 LAB
ESTIMATED AVG GLUCOSE: 128 MG/DL
HBA1C MFR BLD HPLC: 6.1 %

## 2017-05-15 DIAGNOSIS — E55.9 VITAMIN D DEFICIENCY: ICD-10-CM

## 2017-05-15 DIAGNOSIS — K63.5 POLYP OF COLON, UNSPECIFIED PART OF COLON, UNSPECIFIED TYPE: ICD-10-CM

## 2017-05-15 DIAGNOSIS — D64.9 ANEMIA, UNSPECIFIED TYPE: Primary | ICD-10-CM

## 2017-05-15 LAB — H PYLORI IGG SERPL QL IA: NEGATIVE

## 2017-05-15 RX ORDER — ERGOCALCIFEROL 1.25 MG/1
50000 CAPSULE ORAL
Qty: 24 CAPSULE | Refills: 0 | Status: SHIPPED | OUTPATIENT
Start: 2017-05-15 | End: 2017-05-15 | Stop reason: SDUPTHER

## 2017-05-15 RX ORDER — FERROUS SULFATE 325(65) MG
325 TABLET ORAL DAILY
Qty: 30 TABLET | Refills: 2 | Status: SHIPPED | OUTPATIENT
Start: 2017-05-15 | End: 2017-05-15 | Stop reason: SDUPTHER

## 2017-05-15 NOTE — PROGRESS NOTES
Pt labs reviewed.  Needs pharmacy for vit d & iron.  She needs updated colonoscopy  Called pt and sent rx to pharmacy and she will schedule colonoscsopy.

## 2017-05-16 ENCOUNTER — TELEPHONE (OUTPATIENT)
Dept: BARIATRICS | Facility: CLINIC | Age: 62
End: 2017-05-16

## 2017-05-16 RX ORDER — FERROUS SULFATE 325(65) MG
325 TABLET ORAL DAILY
Qty: 30 TABLET | Refills: 2 | Status: SHIPPED | OUTPATIENT
Start: 2017-05-16 | End: 2017-10-30

## 2017-05-16 RX ORDER — FERROUS SULFATE 325(65) MG
325 TABLET ORAL DAILY
Qty: 30 TABLET | Refills: 2 | Status: SHIPPED | OUTPATIENT
Start: 2017-05-16 | End: 2017-05-16 | Stop reason: SDUPTHER

## 2017-05-16 RX ORDER — ERGOCALCIFEROL 1.25 MG/1
50000 CAPSULE ORAL
Qty: 24 CAPSULE | Refills: 0 | Status: SHIPPED | OUTPATIENT
Start: 2017-05-18 | End: 2017-08-15 | Stop reason: SDUPTHER

## 2017-05-16 RX ORDER — ERGOCALCIFEROL 1.25 MG/1
50000 CAPSULE ORAL
Qty: 24 CAPSULE | Refills: 0 | Status: SHIPPED | OUTPATIENT
Start: 2017-05-18 | End: 2019-09-23 | Stop reason: SDUPTHER

## 2017-05-17 ENCOUNTER — TELEPHONE (OUTPATIENT)
Dept: INTERNAL MEDICINE | Facility: CLINIC | Age: 62
End: 2017-05-17

## 2017-05-17 ENCOUNTER — TELEPHONE (OUTPATIENT)
Dept: BARIATRICS | Facility: CLINIC | Age: 62
End: 2017-05-17

## 2017-05-17 DIAGNOSIS — E51.9 THIAMINE DEFICIENCY: ICD-10-CM

## 2017-05-17 LAB — VIT B1 SERPL-MCNC: 29 UG/L (ref 38–122)

## 2017-05-17 RX ORDER — THIAMINE HYDROCHLORIDE 100 MG/ML
100 INJECTION, SOLUTION INTRAMUSCULAR; INTRAVENOUS WEEKLY
Qty: 3 ML | Refills: 0 | Status: SHIPPED | OUTPATIENT
Start: 2017-05-17 | End: 2017-06-01

## 2017-05-17 NOTE — TELEPHONE ENCOUNTER
Message left for pt to call office to reschedule appt with dr. ennis on 6/2/17. Dr. Ennis wont be in clinic.

## 2017-05-17 NOTE — TELEPHONE ENCOUNTER
----- Message from Jennifer Marquez PA-C sent at 5/17/2017 11:47 AM CDT -----  Please set her up for once a week injection in our injection clinic for 3 weeks total.  RX for injection sent to the [facility].  Must be sent here for the injection clinic to administer.  Will repeat levels after injection series complete.

## 2017-05-17 NOTE — PROGRESS NOTES
Please set her up for once a week injection in our injection clinic for 3 weeks total.  RX for injection sent to the [facility] pharmacy.  Must be sent here for the injection clinic to administer.  Will repeat levels after injection series complete.

## 2017-05-18 ENCOUNTER — ANTI-COAG VISIT (OUTPATIENT)
Dept: CARDIOLOGY | Facility: CLINIC | Age: 62
End: 2017-05-18
Payer: COMMERCIAL

## 2017-05-18 DIAGNOSIS — Z95.2 STATUS POST HEART VALVE REPLACEMENT WITH MECHANICAL VALVE: ICD-10-CM

## 2017-05-18 DIAGNOSIS — I48.20 ATRIAL FIBRILLATION, CHRONIC: ICD-10-CM

## 2017-05-18 LAB — INR PPP: 3 (ref 2–3)

## 2017-05-18 PROCEDURE — 85610 PROTHROMBIN TIME: CPT | Mod: QW,S$GLB,,

## 2017-05-18 NOTE — PROGRESS NOTES
Patient reports no changes to alter INR. Will maintain dose.  Advised to call Coumadin Clinic with any issues.

## 2017-05-24 ENCOUNTER — TELEPHONE (OUTPATIENT)
Dept: BARIATRICS | Facility: CLINIC | Age: 62
End: 2017-05-24

## 2017-05-24 RX ORDER — SYRINGE, DISPOSABLE, 3 ML
1 SYRINGE, EMPTY DISPOSABLE MISCELLANEOUS WEEKLY
Qty: 25 EACH | Refills: 0 | Status: SHIPPED | OUTPATIENT
Start: 2017-05-24 | End: 2017-09-07 | Stop reason: SDUPTHER

## 2017-05-24 NOTE — TELEPHONE ENCOUNTER
----- Message from Twila Fortune sent at 5/24/2017  1:24 PM CDT -----  Contact: Pt  Pt is requesting syringes for Thiamine (B-1) 100mg medication    Pt contact number [phone]  Thanks

## 2017-05-24 NOTE — TELEPHONE ENCOUNTER
----- Message from Twila Fortune sent at 5/24/2017  1:24 PM CDT -----  Contact: Pt  Pt is requesting syringes for Thiamine (B-1) 100mg medication    Pt contact number [phone]  Thanks

## 2017-06-22 ENCOUNTER — TELEPHONE (OUTPATIENT)
Dept: BARIATRICS | Facility: CLINIC | Age: 62
End: 2017-06-22

## 2017-07-03 ENCOUNTER — ANTI-COAG VISIT (OUTPATIENT)
Dept: CARDIOLOGY | Facility: CLINIC | Age: 62
End: 2017-07-03
Payer: COMMERCIAL

## 2017-07-03 DIAGNOSIS — Z79.01 LONG TERM (CURRENT) USE OF ANTICOAGULANTS: Primary | ICD-10-CM

## 2017-07-03 DIAGNOSIS — I48.20 ATRIAL FIBRILLATION, CHRONIC: ICD-10-CM

## 2017-07-03 DIAGNOSIS — Z95.2 STATUS POST HEART VALVE REPLACEMENT WITH MECHANICAL VALVE: ICD-10-CM

## 2017-07-03 LAB — INR PPP: 3.7 (ref 2.5–3.5)

## 2017-07-03 PROCEDURE — 99211 OFF/OP EST MAY X REQ PHY/QHP: CPT | Mod: 25,S$GLB,, | Performed by: PHARMACIST

## 2017-07-03 PROCEDURE — 85610 PROTHROMBIN TIME: CPT | Mod: QW,S$GLB,, | Performed by: PHARMACIST

## 2017-07-03 NOTE — PROGRESS NOTES
Patient reports she will be starting vitamin B12 today. Otherwise, Patient denies any changes in diet, medications, or health that would effect warfarin therapy.  Patient/caretaker advised by student, Viviane. I have reviewed the student's initial findings and agree with their assessment.  I have personally spoken with and assessed the patient in clinic to devise care plan.

## 2017-07-27 NOTE — PROGRESS NOTES
Patient called to reschedule today's appointment due to a family emergency, it was rescheduled to 8/03

## 2017-08-03 ENCOUNTER — ANTI-COAG VISIT (OUTPATIENT)
Dept: CARDIOLOGY | Facility: CLINIC | Age: 62
End: 2017-08-03
Payer: COMMERCIAL

## 2017-08-03 DIAGNOSIS — Z79.01 LONG TERM (CURRENT) USE OF ANTICOAGULANTS: Primary | ICD-10-CM

## 2017-08-03 DIAGNOSIS — Z95.2 STATUS POST HEART VALVE REPLACEMENT WITH MECHANICAL VALVE: ICD-10-CM

## 2017-08-03 DIAGNOSIS — I48.20 ATRIAL FIBRILLATION, CHRONIC: ICD-10-CM

## 2017-08-03 LAB — INR PPP: 3.7 (ref 2.5–3.5)

## 2017-08-03 PROCEDURE — 85610 PROTHROMBIN TIME: CPT | Mod: QW,S$GLB,, | Performed by: PHARMACIST

## 2017-08-03 NOTE — PROGRESS NOTES
Patient denies any changes in diet, medications, or health that would effect warfarin therapy.  INR slightly elevated so we will lower her weekly dose at this time. Patient was re-educated on situations that would require placing a call to the coumadin clinic, including bleeding or unusual bruising issues, changes in health, diet or medications, upcoming procedures that require warfarin interruption, and missed coumadin dose(s). Patient expressed understanding that avoidance of consistency with these parameters could cause fluctuations in INR, leading to more frequent visits and increase risk of adverse events.

## 2017-08-04 ENCOUNTER — TELEPHONE (OUTPATIENT)
Dept: INTERNAL MEDICINE | Facility: CLINIC | Age: 62
End: 2017-08-04

## 2017-08-04 RX ORDER — SERTRALINE HYDROCHLORIDE 100 MG/1
TABLET, FILM COATED ORAL
Qty: 30 TABLET | Refills: 0 | Status: SHIPPED | OUTPATIENT
Start: 2017-08-04 | End: 2017-09-18 | Stop reason: SDUPTHER

## 2017-08-15 ENCOUNTER — HOSPITAL ENCOUNTER (EMERGENCY)
Facility: OTHER | Age: 62
Discharge: HOME | End: 2017-08-15
Attending: EMERGENCY MEDICINE
Payer: COMMERCIAL

## 2017-08-15 VITALS
OXYGEN SATURATION: 96 % | BODY MASS INDEX: 45.82 KG/M2 | SYSTOLIC BLOOD PRESSURE: 164 MMHG | HEART RATE: 113 BPM | RESPIRATION RATE: 18 BRPM | HEIGHT: 62 IN | TEMPERATURE: 97 F | DIASTOLIC BLOOD PRESSURE: 72 MMHG | WEIGHT: 249 LBS

## 2017-08-15 DIAGNOSIS — R06.02 SHORTNESS OF BREATH: ICD-10-CM

## 2017-08-15 DIAGNOSIS — J44.1 COPD WITH ACUTE EXACERBATION: Primary | ICD-10-CM

## 2017-08-15 DIAGNOSIS — D64.9 ANEMIA, UNSPECIFIED: ICD-10-CM

## 2017-08-15 DIAGNOSIS — R07.9 CHEST PAIN: ICD-10-CM

## 2017-08-15 LAB
ANION GAP SERPL CALC-SCNC: 8 MMOL/L
ANISOCYTOSIS BLD QL SMEAR: SLIGHT
BASOPHILS # BLD AUTO: 0.01 K/UL
BASOPHILS NFR BLD: 0.2 %
BNP SERPL-MCNC: 139 PG/ML
BUN SERPL-MCNC: 14 MG/DL
CALCIUM SERPL-MCNC: 8.9 MG/DL
CHLORIDE SERPL-SCNC: 107 MMOL/L
CO2 SERPL-SCNC: 28 MMOL/L
CREAT SERPL-MCNC: 1.1 MG/DL
DIFFERENTIAL METHOD: ABNORMAL
EOSINOPHIL # BLD AUTO: 0.5 K/UL
EOSINOPHIL NFR BLD: 9.4 %
ERYTHROCYTE [DISTWIDTH] IN BLOOD BY AUTOMATED COUNT: 17.4 %
EST. GFR  (AFRICAN AMERICAN): >60 ML/MIN/1.73 M^2
EST. GFR  (NON AFRICAN AMERICAN): 54 ML/MIN/1.73 M^2
GLUCOSE SERPL-MCNC: 100 MG/DL
HCT VFR BLD AUTO: 35.8 %
HGB BLD-MCNC: 10.2 G/DL
INR PPP: 3.1
LYMPHOCYTES # BLD AUTO: 1.2 K/UL
LYMPHOCYTES NFR BLD: 25.4 %
MCH RBC QN AUTO: 19.8 PG
MCHC RBC AUTO-ENTMCNC: 28.5 G/DL
MCV RBC AUTO: 70 FL
MONOCYTES # BLD AUTO: 0.4 K/UL
MONOCYTES NFR BLD: 7.2 %
NEUTROPHILS # BLD AUTO: 2.8 K/UL
NEUTROPHILS NFR BLD: 57.8 %
PLATELET # BLD AUTO: 195 K/UL
PLATELET BLD QL SMEAR: ABNORMAL
PMV BLD AUTO: 10.2 FL
POLYCHROMASIA BLD QL SMEAR: ABNORMAL
POTASSIUM SERPL-SCNC: 3.9 MMOL/L
PROTHROMBIN TIME: 33.5 SEC
RBC # BLD AUTO: 5.15 M/UL
SODIUM SERPL-SCNC: 143 MMOL/L
TROPONIN I SERPL DL<=0.01 NG/ML-MCNC: 0.01 NG/ML
WBC # BLD AUTO: 4.88 K/UL

## 2017-08-15 PROCEDURE — 99284 EMERGENCY DEPT VISIT MOD MDM: CPT | Mod: 25

## 2017-08-15 PROCEDURE — 85025 COMPLETE CBC W/AUTO DIFF WBC: CPT

## 2017-08-15 PROCEDURE — 93005 ELECTROCARDIOGRAM TRACING: CPT

## 2017-08-15 PROCEDURE — 96372 THER/PROPH/DIAG INJ SC/IM: CPT

## 2017-08-15 PROCEDURE — 84484 ASSAY OF TROPONIN QUANT: CPT

## 2017-08-15 PROCEDURE — 63600175 PHARM REV CODE 636 W HCPCS: Performed by: EMERGENCY MEDICINE

## 2017-08-15 PROCEDURE — 80048 BASIC METABOLIC PNL TOTAL CA: CPT

## 2017-08-15 PROCEDURE — 25000242 PHARM REV CODE 250 ALT 637 W/ HCPCS: Performed by: EMERGENCY MEDICINE

## 2017-08-15 PROCEDURE — 85610 PROTHROMBIN TIME: CPT

## 2017-08-15 PROCEDURE — 94640 AIRWAY INHALATION TREATMENT: CPT

## 2017-08-15 PROCEDURE — 93010 ELECTROCARDIOGRAM REPORT: CPT | Mod: ,,, | Performed by: INTERNAL MEDICINE

## 2017-08-15 PROCEDURE — 83880 ASSAY OF NATRIURETIC PEPTIDE: CPT

## 2017-08-15 RX ORDER — METHYLPREDNISOLONE SOD SUCC 125 MG
125 VIAL (EA) INJECTION
Status: COMPLETED | OUTPATIENT
Start: 2017-08-15 | End: 2017-08-15

## 2017-08-15 RX ORDER — IPRATROPIUM BROMIDE AND ALBUTEROL SULFATE 2.5; .5 MG/3ML; MG/3ML
3 SOLUTION RESPIRATORY (INHALATION)
Status: COMPLETED | OUTPATIENT
Start: 2017-08-15 | End: 2017-08-15

## 2017-08-15 RX ORDER — ALBUTEROL SULFATE 90 UG/1
1-2 AEROSOL, METERED RESPIRATORY (INHALATION) EVERY 6 HOURS PRN
Qty: 1 INHALER | Refills: 1 | Status: SHIPPED | OUTPATIENT
Start: 2017-08-15 | End: 2017-09-18 | Stop reason: SDUPTHER

## 2017-08-15 RX ORDER — PREDNISONE 20 MG/1
40 TABLET ORAL DAILY
Qty: 6 TABLET | Refills: 0 | Status: SHIPPED | OUTPATIENT
Start: 2017-08-15 | End: 2017-08-18

## 2017-08-15 RX ADMIN — IPRATROPIUM BROMIDE AND ALBUTEROL SULFATE 3 ML: .5; 3 SOLUTION RESPIRATORY (INHALATION) at 03:08

## 2017-08-15 RX ADMIN — IPRATROPIUM BROMIDE AND ALBUTEROL SULFATE 3 ML: .5; 3 SOLUTION RESPIRATORY (INHALATION) at 04:08

## 2017-08-15 RX ADMIN — METHYLPREDNISOLONE SODIUM SUCCINATE 125 MG: 125 INJECTION, POWDER, FOR SOLUTION INTRAMUSCULAR; INTRAVENOUS at 03:08

## 2017-08-15 NOTE — ED PROVIDER NOTES
Encounter Date: 8/15/2017       History     Chief Complaint   Patient presents with    Chest Pain     Pt c/o mid sternal CP with SOB X 2 months. Pt reports that she has COPD, but is experiencing greater SOB than usual. Pt also c/o sore throat with left ear pain.     Time seen by provider: 2:50 PM    This is a 62 y.o. female who presents with complaint of     SOB x 2 months  -typically intermittent, now stays (has been for 3 weeks)  -hasn't seen anyone  Tried inhalers with no relief.   No oxygen at home - was never told she needed it  Bronchitis and asthma  Worse with exertion better with rest  No alcohol drug smoke (quit )    Chest tightness with coughing.  No hx of CHF    Has been admitted in the past  No fever chills  L ear discomfort x few days  Sore throat  Took her medicines today   Hx of afib    Pe.  Decrease breath sound bilaterally with inspiratory and expiratory wheezes.   Oropharynx clear and intact  TM clear and intact bilaterally.          Review of patient's allergies indicates:  No Known Allergies  Past Medical History:   Diagnosis Date    Anticoagulant long-term use     Atrial fibrillation     Cataract     CHF (congestive heart failure)     Chronic kidney disease     COPD (chronic obstructive pulmonary disease)     Depression     Dysuria     Flank pain     HTN (hypertension)     Nephrolithiasis     KEYSHAWN (obstructive sleep apnea)     awaiting CPAP machine     Rheumatic disease of mitral valve     Urinary incontinence     Urinary tract infection      Past Surgical History:   Procedure Laterality Date     SECTION      kidney stone removal      MITRAL VALVE REPLACEMENT  2004    TUBAL LIGATION       Family History   Problem Relation Age of Onset    Stroke Paternal Grandmother     Colon cancer Maternal Grandmother     Cancer Brother      testicular cancer    Diabetes Sister     Hypertension Sister     Breast cancer Paternal Aunt     Diabetes Sister     Diabetes  Sister     Heart disease Father 70     MI    Diabetes Father     Colon cancer Mother      Social History   Substance Use Topics    Smoking status: Former Smoker     Packs/day: 0.50     Years: 20.00     Types: Cigarettes     Quit date: 5/8/2002    Smokeless tobacco: Never Used    Alcohol use No     Review of Systems    Physical Exam     Initial Vitals [08/15/17 1438]   BP Pulse Resp Temp SpO2   138/67 82 20 97.2 °F (36.2 °C) 96 %      MAP       90.67         Physical Exam    ED Course   Procedures  Labs Reviewed - No data to display                            ED Course     Clinical Impression:   {Add your Clinical Impression here. If you haven't documented one yet, please pend the note, finalize a Clinical Impression, and refresh your note before signing.:[id number]}

## 2017-08-15 NOTE — ED PROVIDER NOTES
Encounter Date: 8/15/2017    SCRIBE #1 NOTE: I, Martina Mijares, am scribing for, and in the presence of,  Dr. Ravi. I have scribed the entire note.       History     Chief Complaint   Patient presents with    Chest Pain     Pt c/o mid sternal CP with SOB X 2 months. Pt reports that she has COPD, but is experiencing greater SOB than usual. Pt also c/o sore throat with left ear pain.     Time seen by provider: 2:50 PM     This is a 62 y.o. female who presents with complaint of SOB x 2 months. Symptom was intermittent but has been constant for the past 3 weeks. Pt has tried her Rx inhalers with no relief. SOB is worse with exertion and better with rest. Pt notes cough and chest tightness (occurring only with cough). She has been hospitalized in the past for similar symptoms. She is not on any home oxygen. She also c/o L ear pain and sore throat for a few days. Pt denies any fever, chills, sweating, palpitations, leg swelling, abdominal pain, N/V/D, lightheadedness, and dizziness. She does not drink or use drugs. She quit smoking 15 years ago. She took her medications today. She has not seen her PCP for these symptoms. Pt's PMHx includes, but is not limited to, HTN, rheumatic disease of mitral valve, CKD, CHF, atrial fibrillation, bronchitis, asthma, and COPD. Her PSHx includes, but is not limited to mitral valve replacement (2004).      The history is provided by the patient.     Review of patient's allergies indicates:  No Known Allergies  Past Medical History:   Diagnosis Date    Anticoagulant long-term use     Atrial fibrillation 2002    Cataract     CHF (congestive heart failure)     Chronic kidney disease     COPD (chronic obstructive pulmonary disease)     Depression     Dysuria     Flank pain     HTN (hypertension)     Nephrolithiasis     KEYSHAWN (obstructive sleep apnea)     awaiting CPAP machine     Rheumatic disease of mitral valve     Urinary incontinence     Urinary tract infection       Past Surgical History:   Procedure Laterality Date     SECTION      kidney stone removal      MITRAL VALVE REPLACEMENT  2004    TUBAL LIGATION       Family History   Problem Relation Age of Onset    Stroke Paternal Grandmother     Colon cancer Maternal Grandmother     Cancer Brother      testicular cancer    Diabetes Sister     Hypertension Sister     Breast cancer Paternal Aunt     Diabetes Sister     Diabetes Sister     Heart disease Father 70     MI    Diabetes Father     Colon cancer Mother      Social History   Substance Use Topics    Smoking status: Former Smoker     Packs/day: 0.50     Years: 20.00     Types: Cigarettes     Quit date: 2002    Smokeless tobacco: Never Used    Alcohol use No     Review of Systems   Constitutional: Negative for chills, diaphoresis and fever.   HENT: Positive for ear pain (L) and sore throat. Negative for ear discharge, facial swelling and hearing loss.    Eyes: Negative for pain and visual disturbance.   Respiratory: Positive for cough, chest tightness (with cough) and shortness of breath. Negative for wheezing.    Cardiovascular: Negative for palpitations and leg swelling.   Gastrointestinal: Negative for abdominal pain, diarrhea, nausea and vomiting.   Genitourinary: Negative for decreased urine volume, difficulty urinating, dysuria and urgency.   Musculoskeletal: Negative for back pain and neck pain.   Skin: Negative for color change, pallor, rash and wound.   Neurological: Negative for dizziness, syncope, weakness, light-headedness, numbness and headaches.   Hematological: Does not bruise/bleed easily.   Psychiatric/Behavioral: Negative for behavioral problems and confusion.       Physical Exam     Initial Vitals [08/15/17 1438]   BP Pulse Resp Temp SpO2   138/67 82 20 97.2 °F (36.2 °C) 96 %      MAP       90.67         Physical Exam    Nursing note and vitals reviewed.  Constitutional: She appears well-developed and well-nourished. She is  not diaphoretic. No distress.   HENT:   Head: Normocephalic and atraumatic.   Right Ear: External ear normal.   Left Ear: External ear normal.   Oropharynx is clear and intact. Moist mucous membranes. TM clear and intact bilaterally.   Eyes: Conjunctivae and EOM are normal. Pupils are equal, round, and reactive to light. Right eye exhibits no discharge. Left eye exhibits no discharge.   Conjunctiva are pink, clear, and intact.   Neck: Normal range of motion. Neck supple.   Cardiovascular: Normal rate, regular rhythm and normal heart sounds.   Normal S1, S2. No murmurs, no rubs, no gallops.    Pulmonary/Chest: No respiratory distress. She has wheezes. She has no rhonchi. She has no rales.   Decrease breath sound bilaterally with inspiratory and expiratory wheezes.    Abdominal: Soft. Bowel sounds are normal. She exhibits no distension. There is no tenderness. There is no rebound and no guarding.   No audible bruits.   Musculoskeletal: Normal range of motion. She exhibits no edema or tenderness.   Lymphadenopathy:     She has no cervical adenopathy.   Neurological: She is alert and oriented to person, place, and time. She has normal strength. No sensory deficit.   Skin: Skin is warm and dry. No rash noted. No erythema.   No skin tenting.   Psychiatric: She has a normal mood and affect. Her behavior is normal.         ED Course   Procedures  Labs Reviewed   CBC W/ AUTO DIFFERENTIAL - Abnormal; Notable for the following:        Result Value    Hemoglobin 10.2 (*)     Hematocrit 35.8 (*)     MCV 70 (*)     MCH 19.8 (*)     MCHC 28.5 (*)     RDW 17.4 (*)     Eosinophil% 9.4 (*)     All other components within normal limits   BASIC METABOLIC PANEL - Abnormal; Notable for the following:     eGFR if non  54 (*)     All other components within normal limits   PROTIME-INR - Abnormal; Notable for the following:     Prothrombin Time 33.5 (*)     INR 3.1 (*)     All other components within normal limits   B-TYPE  NATRIURETIC PEPTIDE - Abnormal; Notable for the following:      (*)     All other components within normal limits   TROPONIN I     EKG Readings: (Independently Interpreted)   Atrial fibrillation without rapid ventricular response at a rate of 97 bpm.          Medical Decision Making:   Independently Interpreted Test(s):   I have ordered and independently interpreted EKG Reading(s) - see prior notes  Clinical Tests:   Lab Tests: Ordered and Reviewed  Medical Tests: Ordered and Reviewed  ED Management:  4:13 PM - On reevaluation, pt states that she feels much better. She has increased breath sounds with mild inspiratory and expiratory wheezes bilaterally. Will order another set of breathing treatments.   5:03 PM - Lungs are now clear to auscultation bilaterally. Pt states she feels back to baseline and is ready to go home.            Scribe Attestation:   Scribe #1: I performed the above scribed service and the documentation accurately describes the services I performed. I attest to the accuracy of the note.    Attending Attestation:           Physician Attestation for Scribe:  Physician Attestation Statement for Scribe #1: I, Dr. Ravi, reviewed documentation, as scribed by Martina Mijares in my presence, and it is both accurate and complete.         Attending ED Notes:   Emergent evaluation of 62-year-old female with shortness of breath, wheezing and cough.  Patient is afebrile and in no acute respiratory distress.  Repeat oxygen saturation is 98% on room air.  Heart rate is 113.  No elevation of white blood cell count.  H&H is 10.2 and 35.8.  No acute findings on BMP. BNP is 139.  On reevaluation lungs are clear to auscultation bilaterally.  Patient states she feels much better, back to baseline and is requesting to go home.  The patient is extensively counseled on her diagnosis and treatment including all diagnostic, laboratory and physical exam findings.  The patient is discharged in good condition and  directed follow-up with her PCP in the next 24-48 hours.          ED Course     Clinical Impression:     1. COPD with acute exacerbation    2. Chest pain    3. Shortness of breath    4. Anemia, unspecified                                 Jose M Vang MD  08/15/17 2036

## 2017-08-15 NOTE — ED NOTES
Patients stated her breathing is easier now and patient is no longer having chest discomfort.  Patient remains on cardiac monitoring, blood pressure and pulse ox.  VS are stable.  No signs of distress noted.  Patient is complete breathing treatment.  The call light is in the patients hand and she is watching tv.  Fall precautions are maintained.  Will continue to monitor.

## 2017-08-22 ENCOUNTER — ANTI-COAG VISIT (OUTPATIENT)
Dept: CARDIOLOGY | Facility: CLINIC | Age: 62
End: 2017-08-22
Payer: COMMERCIAL

## 2017-08-22 DIAGNOSIS — I48.20 ATRIAL FIBRILLATION, CHRONIC: ICD-10-CM

## 2017-08-22 DIAGNOSIS — Z95.2 STATUS POST HEART VALVE REPLACEMENT WITH MECHANICAL VALVE: ICD-10-CM

## 2017-08-22 DIAGNOSIS — Z79.01 LONG TERM (CURRENT) USE OF ANTICOAGULANTS: Primary | ICD-10-CM

## 2017-08-22 LAB — INR PPP: 2.8 (ref 2–3)

## 2017-08-22 PROCEDURE — 85610 PROTHROMBIN TIME: CPT | Mod: QW,S$GLB,, | Performed by: PHARMACIST

## 2017-08-22 PROCEDURE — 99211 OFF/OP EST MAY X REQ PHY/QHP: CPT | Mod: 25,S$GLB,, | Performed by: PHARMACIST

## 2017-08-22 NOTE — PROGRESS NOTES
Patient reports a higher than prescribed dose of warfarin; she has been taking 7.5mg daily.  Patient presented to the ER on 8/15/17 with acure COPD exacerbationa dn was prescribed prednisone 40mg daily 8/15-8/17. Otherwise, Patient denies any changes in diet, medications, or health that would effect warfarin therapy.  Patient was re-educated on situations that would require placing a call to the coumadin clinic, including bleeding or unusual bruising issues, changes in health, diet or medications, upcoming procedures that require warfarin interruption, and missed coumadin dose(s). Patient expressed understanding that avoidance of consistency with these parameters could cause fluctuations in INR, leading to more frequent visits and increase risk of adverse events.

## 2017-09-07 ENCOUNTER — ANTI-COAG VISIT (OUTPATIENT)
Dept: CARDIOLOGY | Facility: CLINIC | Age: 62
End: 2017-09-07
Payer: COMMERCIAL

## 2017-09-07 ENCOUNTER — HOSPITAL ENCOUNTER (OUTPATIENT)
Facility: OTHER | Age: 62
Discharge: HOME | End: 2017-09-08
Attending: EMERGENCY MEDICINE | Admitting: HOSPITALIST
Payer: COMMERCIAL

## 2017-09-07 DIAGNOSIS — Z79.01 LONG TERM (CURRENT) USE OF ANTICOAGULANTS: Primary | ICD-10-CM

## 2017-09-07 DIAGNOSIS — J44.1 COPD EXACERBATION: ICD-10-CM

## 2017-09-07 DIAGNOSIS — I48.20 CHRONIC ATRIAL FIBRILLATION WITH RAPID VENTRICULAR RESPONSE: ICD-10-CM

## 2017-09-07 DIAGNOSIS — I50.33 ACUTE ON CHRONIC DIASTOLIC CONGESTIVE HEART FAILURE: Primary | ICD-10-CM

## 2017-09-07 DIAGNOSIS — I48.20 ATRIAL FIBRILLATION, CHRONIC: ICD-10-CM

## 2017-09-07 DIAGNOSIS — Z95.2 STATUS POST HEART VALVE REPLACEMENT WITH MECHANICAL VALVE: ICD-10-CM

## 2017-09-07 DIAGNOSIS — J45.901 ASTHMA EXACERBATION: ICD-10-CM

## 2017-09-07 DIAGNOSIS — R06.02 SOB (SHORTNESS OF BREATH): ICD-10-CM

## 2017-09-07 PROBLEM — R79.1 SUPRATHERAPEUTIC INR: Status: ACTIVE | Noted: 2017-09-07

## 2017-09-07 LAB
ALBUMIN SERPL BCP-MCNC: 3.5 G/DL
ALP SERPL-CCNC: 65 U/L
ALT SERPL W/O P-5'-P-CCNC: 12 U/L
ANION GAP SERPL CALC-SCNC: 12 MMOL/L
ANISOCYTOSIS BLD QL SMEAR: SLIGHT
AST SERPL-CCNC: 24 U/L
BASOPHILS # BLD AUTO: 0.01 K/UL
BASOPHILS NFR BLD: 0.2 %
BILIRUB DIRECT SERPL-MCNC: 0.2 MG/DL
BILIRUB SERPL-MCNC: 0.4 MG/DL
BILIRUB UR QL STRIP: NEGATIVE
BUN SERPL-MCNC: 17 MG/DL
CALCIUM SERPL-MCNC: 9.3 MG/DL
CHLORIDE SERPL-SCNC: 109 MMOL/L
CLARITY UR: CLEAR
CO2 SERPL-SCNC: 24 MMOL/L
COLOR UR: YELLOW
CREAT SERPL-MCNC: 1.1 MG/DL
DACRYOCYTES BLD QL SMEAR: ABNORMAL
DIFFERENTIAL METHOD: ABNORMAL
EOSINOPHIL # BLD AUTO: 0.5 K/UL
EOSINOPHIL NFR BLD: 9.7 %
ERYTHROCYTE [DISTWIDTH] IN BLOOD BY AUTOMATED COUNT: 17.8 %
EST. GFR  (AFRICAN AMERICAN): >60 ML/MIN/1.73 M^2
EST. GFR  (NON AFRICAN AMERICAN): 54 ML/MIN/1.73 M^2
FLUAV AG SPEC QL IA: NEGATIVE
FLUBV AG SPEC QL IA: NEGATIVE
GIANT PLATELETS BLD QL SMEAR: PRESENT
GLUCOSE SERPL-MCNC: 142 MG/DL
GLUCOSE UR QL STRIP: NEGATIVE
HCT VFR BLD AUTO: 37 %
HGB BLD-MCNC: 10.5 G/DL
HGB UR QL STRIP: ABNORMAL
HYPOCHROMIA BLD QL SMEAR: ABNORMAL
INR PPP: 4.1 (ref 2.5–3.5)
KETONES UR QL STRIP: NEGATIVE
LEUKOCYTE ESTERASE UR QL STRIP: NEGATIVE
LYMPHOCYTES # BLD AUTO: 1.2 K/UL
LYMPHOCYTES NFR BLD: 21.3 %
MAGNESIUM SERPL-MCNC: 1.6 MG/DL
MCH RBC QN AUTO: 20 PG
MCHC RBC AUTO-ENTMCNC: 28.4 G/DL
MCV RBC AUTO: 71 FL
MONOCYTES # BLD AUTO: 0.3 K/UL
MONOCYTES NFR BLD: 6.2 %
NEUTROPHILS # BLD AUTO: 3.4 K/UL
NEUTROPHILS NFR BLD: 62.6 %
NITRITE UR QL STRIP: NEGATIVE
OVALOCYTES BLD QL SMEAR: ABNORMAL
PH UR STRIP: 6 [PH] (ref 5–8)
PHOSPHATE SERPL-MCNC: 2.4 MG/DL
PLATELET # BLD AUTO: 186 K/UL
PLATELET BLD QL SMEAR: ABNORMAL
PMV BLD AUTO: 9.9 FL
POIKILOCYTOSIS BLD QL SMEAR: SLIGHT
POLYCHROMASIA BLD QL SMEAR: ABNORMAL
POTASSIUM SERPL-SCNC: 3.8 MMOL/L
PROT SERPL-MCNC: 7.5 G/DL
PROT UR QL STRIP: NEGATIVE
RBC # BLD AUTO: 5.25 M/UL
SCHISTOCYTES BLD QL SMEAR: PRESENT
SODIUM SERPL-SCNC: 145 MMOL/L
SP GR UR STRIP: <=1.005 (ref 1–1.03)
SPECIMEN SOURCE: NORMAL
URN SPEC COLLECT METH UR: ABNORMAL
UROBILINOGEN UR STRIP-ACNC: NEGATIVE EU/DL
WBC # BLD AUTO: 5.45 K/UL

## 2017-09-07 PROCEDURE — 80076 HEPATIC FUNCTION PANEL: CPT

## 2017-09-07 PROCEDURE — 99220 PR INITIAL OBSERVATION CARE,LEVL III: CPT | Mod: ,,, | Performed by: INTERNAL MEDICINE

## 2017-09-07 PROCEDURE — 85610 PROTHROMBIN TIME: CPT | Mod: QW,S$GLB,, | Performed by: PHARMACIST

## 2017-09-07 PROCEDURE — 94761 N-INVAS EAR/PLS OXIMETRY MLT: CPT

## 2017-09-07 PROCEDURE — 83735 ASSAY OF MAGNESIUM: CPT

## 2017-09-07 PROCEDURE — 25000242 PHARM REV CODE 250 ALT 637 W/ HCPCS: Performed by: PHYSICIAN ASSISTANT

## 2017-09-07 PROCEDURE — G0378 HOSPITAL OBSERVATION PER HR: HCPCS

## 2017-09-07 PROCEDURE — 84100 ASSAY OF PHOSPHORUS: CPT

## 2017-09-07 PROCEDURE — 99220 PR INITIAL OBSERVATION CARE,LEVL III: CPT | Mod: ,,, | Performed by: PHYSICIAN ASSISTANT

## 2017-09-07 PROCEDURE — 93010 ELECTROCARDIOGRAM REPORT: CPT | Mod: ,,, | Performed by: INTERNAL MEDICINE

## 2017-09-07 PROCEDURE — 27000190 HC CPAP FULL FACE MASK W/VALVE

## 2017-09-07 PROCEDURE — 81003 URINALYSIS AUTO W/O SCOPE: CPT

## 2017-09-07 PROCEDURE — 63600175 PHARM REV CODE 636 W HCPCS: Performed by: PHYSICIAN ASSISTANT

## 2017-09-07 PROCEDURE — 63600175 PHARM REV CODE 636 W HCPCS: Performed by: EMERGENCY MEDICINE

## 2017-09-07 PROCEDURE — 99900035 HC TECH TIME PER 15 MIN (STAT)

## 2017-09-07 PROCEDURE — 87400 INFLUENZA A/B EACH AG IA: CPT | Mod: 59

## 2017-09-07 PROCEDURE — 93005 ELECTROCARDIOGRAM TRACING: CPT

## 2017-09-07 PROCEDURE — 94640 AIRWAY INHALATION TREATMENT: CPT

## 2017-09-07 PROCEDURE — 99211 OFF/OP EST MAY X REQ PHY/QHP: CPT | Mod: 25,S$GLB,, | Performed by: PHARMACIST

## 2017-09-07 PROCEDURE — 25000242 PHARM REV CODE 250 ALT 637 W/ HCPCS: Performed by: EMERGENCY MEDICINE

## 2017-09-07 PROCEDURE — 94644 CONT INHLJ TX 1ST HOUR: CPT

## 2017-09-07 PROCEDURE — 25000003 PHARM REV CODE 250: Performed by: EMERGENCY MEDICINE

## 2017-09-07 PROCEDURE — 85025 COMPLETE CBC W/AUTO DIFF WBC: CPT

## 2017-09-07 PROCEDURE — 25000003 PHARM REV CODE 250: Performed by: PHYSICIAN ASSISTANT

## 2017-09-07 PROCEDURE — 99291 CRITICAL CARE FIRST HOUR: CPT | Mod: 25

## 2017-09-07 PROCEDURE — 80048 BASIC METABOLIC PNL TOTAL CA: CPT

## 2017-09-07 PROCEDURE — 25000242 PHARM REV CODE 250 ALT 637 W/ HCPCS: Performed by: HOSPITALIST

## 2017-09-07 PROCEDURE — 94660 CPAP INITIATION&MGMT: CPT

## 2017-09-07 RX ORDER — WARFARIN SODIUM 5 MG/1
5 TABLET ORAL ONCE
Status: COMPLETED | OUTPATIENT
Start: 2017-09-07 | End: 2017-09-07

## 2017-09-07 RX ORDER — HYDRALAZINE HYDROCHLORIDE 20 MG/ML
10 INJECTION INTRAMUSCULAR; INTRAVENOUS EVERY 6 HOURS PRN
Status: DISCONTINUED | OUTPATIENT
Start: 2017-09-07 | End: 2017-09-08 | Stop reason: HOSPADM

## 2017-09-07 RX ORDER — ALBUTEROL SULFATE 0.83 MG/ML
15 SOLUTION RESPIRATORY (INHALATION)
Status: COMPLETED | OUTPATIENT
Start: 2017-09-07 | End: 2017-09-07

## 2017-09-07 RX ORDER — SERTRALINE HYDROCHLORIDE 50 MG/1
100 TABLET, FILM COATED ORAL DAILY
Status: DISCONTINUED | OUTPATIENT
Start: 2017-09-08 | End: 2017-09-08 | Stop reason: HOSPADM

## 2017-09-07 RX ORDER — PREDNISONE 20 MG/1
40 TABLET ORAL DAILY
Status: DISCONTINUED | OUTPATIENT
Start: 2017-09-08 | End: 2017-09-08 | Stop reason: HOSPADM

## 2017-09-07 RX ORDER — FUROSEMIDE 10 MG/ML
60 INJECTION INTRAMUSCULAR; INTRAVENOUS ONCE
Status: COMPLETED | OUTPATIENT
Start: 2017-09-07 | End: 2017-09-07

## 2017-09-07 RX ORDER — FLUTICASONE PROPIONATE AND SALMETEROL 250; 50 UG/1; UG/1
1 POWDER RESPIRATORY (INHALATION) 2 TIMES DAILY
Status: DISCONTINUED | OUTPATIENT
Start: 2017-09-07 | End: 2017-09-07 | Stop reason: CLARIF

## 2017-09-07 RX ORDER — ALBUTEROL SULFATE 90 UG/1
2 AEROSOL, METERED RESPIRATORY (INHALATION) EVERY 6 HOURS PRN
Status: DISCONTINUED | OUTPATIENT
Start: 2017-09-07 | End: 2017-09-08 | Stop reason: HOSPADM

## 2017-09-07 RX ORDER — ASPIRIN 81 MG/1
81 TABLET ORAL DAILY
Status: DISCONTINUED | OUTPATIENT
Start: 2017-09-08 | End: 2017-09-08 | Stop reason: HOSPADM

## 2017-09-07 RX ORDER — ACETAMINOPHEN 325 MG/1
650 TABLET ORAL EVERY 6 HOURS PRN
Status: DISCONTINUED | OUTPATIENT
Start: 2017-09-07 | End: 2017-09-08 | Stop reason: HOSPADM

## 2017-09-07 RX ORDER — ACETAMINOPHEN 325 MG/1
650 TABLET ORAL EVERY 8 HOURS PRN
Status: DISCONTINUED | OUTPATIENT
Start: 2017-09-07 | End: 2017-09-08 | Stop reason: HOSPADM

## 2017-09-07 RX ORDER — ONDANSETRON 8 MG/1
8 TABLET, ORALLY DISINTEGRATING ORAL EVERY 8 HOURS PRN
Status: DISCONTINUED | OUTPATIENT
Start: 2017-09-07 | End: 2017-09-08 | Stop reason: HOSPADM

## 2017-09-07 RX ORDER — IPRATROPIUM BROMIDE AND ALBUTEROL SULFATE 2.5; .5 MG/3ML; MG/3ML
3 SOLUTION RESPIRATORY (INHALATION)
Status: COMPLETED | OUTPATIENT
Start: 2017-09-07 | End: 2017-09-07

## 2017-09-07 RX ORDER — METOPROLOL SUCCINATE 50 MG/1
200 TABLET, EXTENDED RELEASE ORAL ONCE
Status: COMPLETED | OUTPATIENT
Start: 2017-09-07 | End: 2017-09-07

## 2017-09-07 RX ORDER — BRIMONIDINE TARTRATE 2 MG/ML
1 SOLUTION/ DROPS OPHTHALMIC 2 TIMES DAILY
Status: DISCONTINUED | OUTPATIENT
Start: 2017-09-07 | End: 2017-09-08

## 2017-09-07 RX ORDER — IPRATROPIUM BROMIDE 0.5 MG/2.5ML
1 SOLUTION RESPIRATORY (INHALATION)
Status: COMPLETED | OUTPATIENT
Start: 2017-09-07 | End: 2017-09-07

## 2017-09-07 RX ORDER — IPRATROPIUM BROMIDE AND ALBUTEROL SULFATE 2.5; .5 MG/3ML; MG/3ML
3 SOLUTION RESPIRATORY (INHALATION) EVERY 4 HOURS
Status: DISCONTINUED | OUTPATIENT
Start: 2017-09-07 | End: 2017-09-08 | Stop reason: HOSPADM

## 2017-09-07 RX ORDER — PREDNISONE 20 MG/1
60 TABLET ORAL
Status: COMPLETED | OUTPATIENT
Start: 2017-09-07 | End: 2017-09-07

## 2017-09-07 RX ORDER — ACETAMINOPHEN 325 MG/1
650 TABLET ORAL EVERY 8 HOURS PRN
Status: DISCONTINUED | OUTPATIENT
Start: 2017-09-07 | End: 2017-09-07 | Stop reason: SDUPTHER

## 2017-09-07 RX ORDER — FAMOTIDINE 20 MG/1
20 TABLET, FILM COATED ORAL 2 TIMES DAILY
Status: DISCONTINUED | OUTPATIENT
Start: 2017-09-07 | End: 2017-09-08 | Stop reason: HOSPADM

## 2017-09-07 RX ORDER — METOPROLOL SUCCINATE 50 MG/1
200 TABLET, EXTENDED RELEASE ORAL DAILY
Status: DISCONTINUED | OUTPATIENT
Start: 2017-09-08 | End: 2017-09-07

## 2017-09-07 RX ORDER — OXYBUTYNIN CHLORIDE 5 MG/1
10 TABLET, EXTENDED RELEASE ORAL DAILY
Status: DISCONTINUED | OUTPATIENT
Start: 2017-09-08 | End: 2017-09-08 | Stop reason: HOSPADM

## 2017-09-07 RX ORDER — POLYETHYLENE GLYCOL 3350 17 G/17G
17 POWDER, FOR SOLUTION ORAL DAILY
Status: DISCONTINUED | OUTPATIENT
Start: 2017-09-08 | End: 2017-09-08 | Stop reason: HOSPADM

## 2017-09-07 RX ORDER — METOPROLOL SUCCINATE 50 MG/1
200 TABLET, EXTENDED RELEASE ORAL DAILY
Status: DISCONTINUED | OUTPATIENT
Start: 2017-09-08 | End: 2017-09-08 | Stop reason: HOSPADM

## 2017-09-07 RX ORDER — RAMELTEON 8 MG/1
8 TABLET ORAL NIGHTLY PRN
Status: DISCONTINUED | OUTPATIENT
Start: 2017-09-07 | End: 2017-09-08 | Stop reason: HOSPADM

## 2017-09-07 RX ORDER — FLUTICASONE FUROATE AND VILANTEROL 100; 25 UG/1; UG/1
1 POWDER RESPIRATORY (INHALATION) DAILY
Status: DISCONTINUED | OUTPATIENT
Start: 2017-09-07 | End: 2017-09-08 | Stop reason: HOSPADM

## 2017-09-07 RX ADMIN — FLUTICASONE FUROATE AND VILANTEROL TRIFENATATE 1 PUFF: 100; 25 POWDER RESPIRATORY (INHALATION) at 09:09

## 2017-09-07 RX ADMIN — IPRATROPIUM BROMIDE AND ALBUTEROL SULFATE 3 ML: .5; 3 SOLUTION RESPIRATORY (INHALATION) at 11:09

## 2017-09-07 RX ADMIN — METOPROLOL SUCCINATE 200 MG: 50 TABLET, EXTENDED RELEASE ORAL at 07:09

## 2017-09-07 RX ADMIN — FAMOTIDINE 20 MG: 20 TABLET, FILM COATED ORAL at 09:09

## 2017-09-07 RX ADMIN — WARFARIN SODIUM 5 MG: 5 TABLET ORAL at 09:09

## 2017-09-07 RX ADMIN — IPRATROPIUM BROMIDE AND ALBUTEROL SULFATE 3 ML: .5; 3 SOLUTION RESPIRATORY (INHALATION) at 09:09

## 2017-09-07 RX ADMIN — RAMELTEON 8 MG: 8 TABLET, FILM COATED ORAL at 09:09

## 2017-09-07 RX ADMIN — IPRATROPIUM BROMIDE 1 MG: 0.5 SOLUTION RESPIRATORY (INHALATION) at 03:09

## 2017-09-07 RX ADMIN — ACETAMINOPHEN 650 MG: 325 TABLET ORAL at 09:09

## 2017-09-07 RX ADMIN — IPRATROPIUM BROMIDE AND ALBUTEROL SULFATE 3 ML: .5; 3 SOLUTION RESPIRATORY (INHALATION) at 05:09

## 2017-09-07 RX ADMIN — FUROSEMIDE 60 MG: 10 INJECTION, SOLUTION INTRAMUSCULAR; INTRAVENOUS at 09:09

## 2017-09-07 RX ADMIN — PREDNISONE 60 MG: 20 TABLET ORAL at 05:09

## 2017-09-07 RX ADMIN — ALBUTEROL SULFATE 15 MG: 2.5 SOLUTION RESPIRATORY (INHALATION) at 03:09

## 2017-09-07 NOTE — ED NOTES
Pt sitting up in bed, side rail up x1, call light within reach.  Pt awake, alert, oriented x3.  Pt has continuous breathing treatment mask on. Will continue to monitor.

## 2017-09-07 NOTE — PROGRESS NOTES
Patient has been suffering with a cold so her diet may have been a little off lately. Otherwise, Patient denies any changes in diet, medications, or health that would effect warfarin therapy.  I will lower her dose for today then re-challenge her previously stable dose.

## 2017-09-07 NOTE — ED NOTES
Pt sitting up in bed, side rails up x1.  Call light within reach.  Pt awake, alert, oriented x 3 .  Will continue to monitor.

## 2017-09-07 NOTE — ED NOTES
Patient Identifiers for Elayne Almanzar checked and correct  LOC: The patient is awake, alert and aware of environment with an appropriate affect, the patient is oriented x 3 and speaking appropriate.  APPEARANCE: Patient resting comfortably and in no acute distress. The patient is clean and well groomed. The patient's clothing is properly fastened.  SKIN: The skin is warm and dry. The patient has normal skin turgor and moist mucus membranes. No rashes or lesions upon observation. Skin Intact , no breakdown noted.  Musculoskeletal :  Normal range of motion noted. Moves all extremities well, No swelling or tenderness noted  RESPIRATORY: Airway is open and patent, respirations are spontaneous, patient has a normal effort and rate. Wheezing bilaterally, tightness noted.    CARDIAC: Patient has chronic Afib, rate is 105 no peripheral edema noted, capillary refill < 3 seconds.   PULSES: 2+ radial pulses, symmetrical in all extremities.  Will continue to monitor

## 2017-09-07 NOTE — ED PROVIDER NOTES
"Encounter Date: 9/7/2017    SCRIBE #1 NOTE: I, Nelson Phillips, am scribing for, and in the presence of, Dr. Chowdhury.       History     Chief Complaint   Patient presents with    Asthma     + increased SOB x two weeks. Pt reports using prescribed inhaler x 4 today. Pt states," I have asthma real bad and my COPD real bad but they don't give me no oxygen or a CPAP at night for my sleep apnea". Denies chest pain, jaw pain, weakness, fatigue, N/V/D or dizziness at this time     3:22 PM    Patient is a 62 year old female with history of COPD, HTN, A Fib and asthma who presents to the ED with complaint of shortness of breath. Symptoms are gradual onset over the last two weeks. She reports cough and intermittent fever. She notes a fever of 100.2 four days ago. She denies leg swelling, rash, or chest pain. She reports using an inhaler without relief. She does not have nebulizer at home. She came to the ED two weeks ago for similar symptoms, and felt better when she was sent home, but symptoms have gradually returned. She did not have a chest x-ray at that time. She reports she has been hospitalized for COPD exacerbation in the past, but have never been intubated. She takes ventolin and advair. She has a history of pneumonia. She is not currently on prednisone. She has no history of CAD or MI. She denies history of CHF, however as per medical records she has a history of CHF. She does not see a pulmonologist. She reports she quit smoking fifteen years ago.      The history is provided by the patient.     Review of patient's allergies indicates:  No Known Allergies  Past Medical History:   Diagnosis Date    Anticoagulant long-term use     Atrial fibrillation 2002    Cataract     Chronic kidney disease     COPD (chronic obstructive pulmonary disease)     Depression     Dysuria     Flank pain     HTN (hypertension)     Nephrolithiasis     KEYSHAWN (obstructive sleep apnea)     awaiting CPAP machine     Rheumatic disease " of mitral valve     Urinary incontinence     Urinary tract infection      Past Surgical History:   Procedure Laterality Date     SECTION      kidney stone removal      MITRAL VALVE REPLACEMENT  2004    TUBAL LIGATION       Family History   Problem Relation Age of Onset    Stroke Paternal Grandmother     Colon cancer Maternal Grandmother     Cancer Brother      testicular cancer    Diabetes Sister     Hypertension Sister     Breast cancer Paternal Aunt     Diabetes Sister     Diabetes Sister     Heart disease Father 70     MI    Diabetes Father     Colon cancer Mother      Social History   Substance Use Topics    Smoking status: Former Smoker     Packs/day: 0.50     Years: 20.00     Types: Cigarettes     Quit date: 2002    Smokeless tobacco: Never Used    Alcohol use No     Review of Systems   Constitutional: Positive for fever (100.2 four days ago).   HENT: Negative for sore throat.    Respiratory: Positive for cough and shortness of breath.    Cardiovascular: Negative for chest pain and leg swelling.   Gastrointestinal: Negative for abdominal pain and nausea.   Genitourinary: Negative for dysuria.   Musculoskeletal: Negative for back pain.   Skin: Negative for rash.   Neurological: Negative for weakness.   Hematological: Does not bruise/bleed easily.       Physical Exam     Initial Vitals [17 1518]   BP Pulse Resp Temp SpO2   121/79 (!) 117 (!) 26 98.2 °F (36.8 °C) 95 %      MAP       93         Physical Exam    ED Course   Critical Care  Date/Time: 2017 10:12 PM  Performed by: ENRICO YA II  Authorized by: LUCERO ADAME   Direct patient critical care time: 18 minutes  Additional history critical care time: 4 minutes  Ordering / reviewing critical care time: 8 minutes  Documentation critical care time: 10 minutes  Consulting other physicians critical care time: 4 minutes  Total critical care time (exclusive of procedural time) : 44 minutes  Critical care was  necessary to treat or prevent imminent or life-threatening deterioration of the following conditions: respiratory failure and cardiac failure.        Labs Reviewed   CBC W/ AUTO DIFFERENTIAL - Abnormal; Notable for the following:        Result Value    Hemoglobin 10.5 (*)     MCV 71 (*)     MCH 20.0 (*)     MCHC 28.4 (*)     RDW 17.8 (*)     Eosinophil% 9.7 (*)     All other components within normal limits   BASIC METABOLIC PANEL - Abnormal; Notable for the following:     Glucose 142 (*)     eGFR if non  54 (*)     All other components within normal limits      Imaging Results          X-Ray Chest AP Portable (Final result)  Result time 09/07/17 16:23:18    Final result by Galileo Jarrett MD (09/07/17 16:23:18)                 Impression:        Central vascular congestion      Electronically signed by: GALILEO JARRETT MD  Date:     09/07/17  Time:    16:23              Narrative:    PORTABLE AP CHEST:      Comparison: 2/2/17    Findings:     No consolidations. Central vascular congestion. There is no pleural effusion or pneumothorax. The cardiac silhouette is unchanged in size.  Prior mitral valve repair. There are no acute bony abnormalities.                              EKG Readings: (Independently Interpreted)   Atrial fibrillation. Ventricular rate of 115. No acute ischemia.       X-Rays:   Independently Interpreted Readings:   Chest X-Ray: Limited by habitus. No obvious infiltrate.     Medical Decision Making:   Independently Interpreted Test(s):   I have ordered and independently interpreted X-rays - see prior notes.  I have ordered and independently interpreted EKG Reading(s) - see prior notes  Clinical Tests:   Lab Tests: Ordered and Reviewed  Radiological Study: Ordered and Reviewed  Medical Tests: Ordered and Reviewed  ED Management:  4:15 PM - Patient is resting more comfortably. Decrease respiratory effort, but still with diffuse expiratory wheezes.  7:09 PM - Patient is feeling  "better, but is very tachycardic with a heart rate in the 140's, but she did not take her metoprolol. Still wheezing and diminished air exchange, will admit to the hospitalist service.   7:13 PM - Discussed with Ms. Jaeger, will admit to [facility].            Scribe Attestation:   Scribe #1: I performed the above scribed service and the documentation accurately describes the services I performed. I attest to the accuracy of the note.    Attending Attestation:           Physician Attestation for Scribe:  Physician Attestation Statement for Scribe #1: I, Dr. Chowdhury, reviewed documentation, as scribed by Nelson Phillips in my presence, and it is both accurate and complete.                 ED Course      Patient presents with shortness of breath, history of asthma/COPD.  No relief with her home inhalers.  She does not have a nebulizer machine.  Also reports history of "severe" sleep apnea but is unable to afford the co-pay portion to get the machine on initial exam diffuse wheezing and decreased air exchange and accessory muscle use.  As you chronic atrial fibrillation borderline tachycardic.  After and tinnitus and that the treatments improved but nowhere near baseline.  Given further nebulizer treatments with continued improvement, but still with wheezing and diminished air exchange also with resultant tachycardia partly due to the albuterol, but patient also did not take her high dose of Toprol today.  Given this dose.  Patient will need admission for further steroids, nebulizer treatments.  Chest x-ray does not show pneumonia.  This discussed with the hospitalist service    Clinical Impression:     1. Asthma exacerbation    2. SOB (shortness of breath)    3. Chronic atrial fibrillation with rapid ventricular response                                 Dustin Chowdhury II, MD  09/07/17 2684    "

## 2017-09-08 VITALS
HEIGHT: 62 IN | HEART RATE: 81 BPM | TEMPERATURE: 98 F | WEIGHT: 248 LBS | SYSTOLIC BLOOD PRESSURE: 147 MMHG | DIASTOLIC BLOOD PRESSURE: 64 MMHG | RESPIRATION RATE: 18 BRPM | BODY MASS INDEX: 45.64 KG/M2 | OXYGEN SATURATION: 96 %

## 2017-09-08 PROBLEM — R79.1 SUPRATHERAPEUTIC INR: Status: RESOLVED | Noted: 2017-09-07 | Resolved: 2017-09-08

## 2017-09-08 LAB
ALBUMIN SERPL BCP-MCNC: 3.5 G/DL
ALP SERPL-CCNC: 64 U/L
ALT SERPL W/O P-5'-P-CCNC: 12 U/L
ANION GAP SERPL CALC-SCNC: 11 MMOL/L
ANISOCYTOSIS BLD QL SMEAR: SLIGHT
AST SERPL-CCNC: 20 U/L
BASOPHILS # BLD AUTO: 0 K/UL
BASOPHILS NFR BLD: 0 %
BILIRUB SERPL-MCNC: 0.5 MG/DL
BUN SERPL-MCNC: 17 MG/DL
CALCIUM SERPL-MCNC: 9.8 MG/DL
CHLORIDE SERPL-SCNC: 106 MMOL/L
CO2 SERPL-SCNC: 27 MMOL/L
CREAT SERPL-MCNC: 1.2 MG/DL
DIFFERENTIAL METHOD: ABNORMAL
EOSINOPHIL # BLD AUTO: 0 K/UL
EOSINOPHIL NFR BLD: 0.2 %
ERYTHROCYTE [DISTWIDTH] IN BLOOD BY AUTOMATED COUNT: 17.9 %
EST. GFR  (AFRICAN AMERICAN): 56 ML/MIN/1.73 M^2
EST. GFR  (NON AFRICAN AMERICAN): 49 ML/MIN/1.73 M^2
GIANT PLATELETS BLD QL SMEAR: PRESENT
GLUCOSE SERPL-MCNC: 152 MG/DL
HCT VFR BLD AUTO: 37.8 %
HGB BLD-MCNC: 10.6 G/DL
INR PPP: 2.7
LYMPHOCYTES # BLD AUTO: 0.5 K/UL
LYMPHOCYTES NFR BLD: 8.1 %
MAGNESIUM SERPL-MCNC: 1.6 MG/DL
MCH RBC QN AUTO: 19.8 PG
MCHC RBC AUTO-ENTMCNC: 28 G/DL
MCV RBC AUTO: 71 FL
MONOCYTES # BLD AUTO: 0.2 K/UL
MONOCYTES NFR BLD: 3.4 %
NEUTROPHILS # BLD AUTO: 5.7 K/UL
NEUTROPHILS NFR BLD: 88.3 %
PHOSPHATE SERPL-MCNC: 2.5 MG/DL
PLATELET # BLD AUTO: 218 K/UL
PLATELET BLD QL SMEAR: ABNORMAL
PMV BLD AUTO: 10.6 FL
POIKILOCYTOSIS BLD QL SMEAR: SLIGHT
POTASSIUM SERPL-SCNC: 5 MMOL/L
PROT SERPL-MCNC: 7.7 G/DL
PROTHROMBIN TIME: 29.4 SEC
RBC # BLD AUTO: 5.36 M/UL
SODIUM SERPL-SCNC: 144 MMOL/L
WBC # BLD AUTO: 6.52 K/UL

## 2017-09-08 PROCEDURE — 84100 ASSAY OF PHOSPHORUS: CPT

## 2017-09-08 PROCEDURE — 94640 AIRWAY INHALATION TREATMENT: CPT

## 2017-09-08 PROCEDURE — G0378 HOSPITAL OBSERVATION PER HR: HCPCS

## 2017-09-08 PROCEDURE — 90670 PCV13 VACCINE IM: CPT | Performed by: HOSPITALIST

## 2017-09-08 PROCEDURE — 25000242 PHARM REV CODE 250 ALT 637 W/ HCPCS: Performed by: PHYSICIAN ASSISTANT

## 2017-09-08 PROCEDURE — 83735 ASSAY OF MAGNESIUM: CPT

## 2017-09-08 PROCEDURE — 85025 COMPLETE CBC W/AUTO DIFF WBC: CPT

## 2017-09-08 PROCEDURE — 90471 IMMUNIZATION ADMIN: CPT | Performed by: HOSPITALIST

## 2017-09-08 PROCEDURE — 94761 N-INVAS EAR/PLS OXIMETRY MLT: CPT

## 2017-09-08 PROCEDURE — 99217 PR OBSERVATION CARE DISCHARGE: CPT | Mod: ,,, | Performed by: INTERNAL MEDICINE

## 2017-09-08 PROCEDURE — 63600175 PHARM REV CODE 636 W HCPCS: Performed by: HOSPITALIST

## 2017-09-08 PROCEDURE — 36415 COLL VENOUS BLD VENIPUNCTURE: CPT

## 2017-09-08 PROCEDURE — 85610 PROTHROMBIN TIME: CPT

## 2017-09-08 PROCEDURE — 63600175 PHARM REV CODE 636 W HCPCS: Performed by: PHYSICIAN ASSISTANT

## 2017-09-08 PROCEDURE — 25000003 PHARM REV CODE 250: Performed by: PHYSICIAN ASSISTANT

## 2017-09-08 PROCEDURE — 80053 COMPREHEN METABOLIC PANEL: CPT

## 2017-09-08 RX ORDER — SODIUM,POTASSIUM PHOSPHATES 280-250MG
1 POWDER IN PACKET (EA) ORAL EVERY 4 HOURS
Status: DISPENSED | OUTPATIENT
Start: 2017-09-08 | End: 2017-09-08

## 2017-09-08 RX ORDER — FUROSEMIDE 20 MG/1
20 TABLET ORAL DAILY
Qty: 30 TABLET | Refills: 3 | Status: SHIPPED | OUTPATIENT
Start: 2017-09-08 | End: 2017-09-08

## 2017-09-08 RX ORDER — PREDNISONE 20 MG/1
40 TABLET ORAL DAILY
Qty: 10 TABLET | Refills: 0 | Status: SHIPPED | OUTPATIENT
Start: 2017-09-09 | End: 2017-09-08

## 2017-09-08 RX ORDER — BRIMONIDINE TARTRATE 1.5 MG/ML
1 SOLUTION/ DROPS OPHTHALMIC 3 TIMES DAILY
Status: DISCONTINUED | OUTPATIENT
Start: 2017-09-08 | End: 2017-09-08 | Stop reason: HOSPADM

## 2017-09-08 RX ORDER — FUROSEMIDE 20 MG/1
20 TABLET ORAL DAILY
Qty: 30 TABLET | Refills: 3 | Status: SHIPPED | OUTPATIENT
Start: 2017-09-08 | End: 2017-09-18 | Stop reason: SDUPTHER

## 2017-09-08 RX ORDER — PREDNISONE 20 MG/1
40 TABLET ORAL DAILY
Qty: 10 TABLET | Refills: 0 | Status: SHIPPED | OUTPATIENT
Start: 2017-09-09 | End: 2017-09-14

## 2017-09-08 RX ADMIN — SERTRALINE HYDROCHLORIDE 100 MG: 50 TABLET ORAL at 08:09

## 2017-09-08 RX ADMIN — FAMOTIDINE 20 MG: 20 TABLET, FILM COATED ORAL at 08:09

## 2017-09-08 RX ADMIN — PREDNISONE 40 MG: 20 TABLET ORAL at 08:09

## 2017-09-08 RX ADMIN — IPRATROPIUM BROMIDE AND ALBUTEROL SULFATE 3 ML: .5; 3 SOLUTION RESPIRATORY (INHALATION) at 08:09

## 2017-09-08 RX ADMIN — POLYETHYLENE GLYCOL 3350 17 G: 17 POWDER, FOR SOLUTION ORAL at 08:09

## 2017-09-08 RX ADMIN — IPRATROPIUM BROMIDE AND ALBUTEROL SULFATE 3 ML: .5; 3 SOLUTION RESPIRATORY (INHALATION) at 12:09

## 2017-09-08 RX ADMIN — PNEUMOCOCCAL 13-VALENT CONJUGATE VACCINE 0.5 ML: 2.2; 2.2; 2.2; 2.2; 2.2; 4.4; 2.2; 2.2; 2.2; 2.2; 2.2; 2.2; 2.2 INJECTION, SUSPENSION INTRAMUSCULAR at 02:09

## 2017-09-08 RX ADMIN — OXYBUTYNIN CHLORIDE 10 MG: 5 TABLET, EXTENDED RELEASE ORAL at 08:09

## 2017-09-08 RX ADMIN — IPRATROPIUM BROMIDE AND ALBUTEROL SULFATE 3 ML: .5; 3 SOLUTION RESPIRATORY (INHALATION) at 03:09

## 2017-09-08 RX ADMIN — FLUTICASONE FUROATE AND VILANTEROL TRIFENATATE 1 PUFF: 100; 25 POWDER RESPIRATORY (INHALATION) at 08:09

## 2017-09-08 RX ADMIN — ASPIRIN 81 MG: 81 TABLET, COATED ORAL at 08:09

## 2017-09-08 RX ADMIN — METOPROLOL SUCCINATE 200 MG: 50 TABLET, EXTENDED RELEASE ORAL at 08:09

## 2017-09-08 RX ADMIN — POTASSIUM & SODIUM PHOSPHATES POWDER PACK 280-160-250 MG 1 PACKET: 280-160-250 PACK at 05:09

## 2017-09-08 RX ADMIN — BRIMONIDINE TARTRATE 1 DROP: 1.5 SOLUTION OPHTHALMIC at 05:09

## 2017-09-08 NOTE — SUBJECTIVE & OBJECTIVE
Past Medical History:   Diagnosis Date    Anticoagulant long-term use     Atrial fibrillation     Cataract     Chronic kidney disease     COPD (chronic obstructive pulmonary disease)     Depression     Dysuria     Flank pain     HTN (hypertension)     Nephrolithiasis     KEYSHAWN (obstructive sleep apnea)     awaiting CPAP machine     Rheumatic disease of mitral valve     Urinary incontinence     Urinary tract infection        Past Surgical History:   Procedure Laterality Date     SECTION      kidney stone removal      MITRAL VALVE REPLACEMENT  2004    TUBAL LIGATION         Review of patient's allergies indicates:  No Known Allergies    No current facility-administered medications on file prior to encounter.      Current Outpatient Prescriptions on File Prior to Encounter   Medication Sig    albuterol 90 mcg/actuation inhaler Inhale 1-2 puffs into the lungs every 6 (six) hours as needed for Wheezing or Shortness of Breath. Rescue    aspirin (ECOTRIN) 81 MG EC tablet Take 81 mg by mouth once daily.     brimonidine 0.2% (ALPHAGAN) 0.2 % Drop Place 1 drop into the right eye 3 (three) times daily. (Patient taking differently: Place 1 drop into the right eye 2 (two) times daily. )    ergocalciferol (ERGOCALCIFEROL) 50,000 unit Cap Take 1 capsule (50,000 Units total) by mouth twice a week. (Patient taking differently: Take 50,000 Units by mouth once a week. on tuesday)    fluticasone-salmeterol 250-50 mcg/dose (ADVAIR) 250-50 mcg/dose diskus inhaler Inhale 1 puff into the lungs 2 (two) times daily.    furosemide (LASIX) 20 MG tablet Take 1 tablet (20 mg total) by mouth every other day. (Patient taking differently: Take 20 mg by mouth every other day. as needed)    metoprolol succinate (TOPROL-XL) 200 MG 24 hr tablet Take 1 tablet (200 mg total) by mouth once daily.    oxybutynin (DITROPAN-XL) 10 MG 24 hr tablet Take 10 mg by mouth once daily.     sertraline (ZOLOFT) 100 MG tablet TAKE  "1 TABLET (100 MG TOTAL) BY MOUTH ONCE DAILY.    warfarin (COUMADIN) 5 MG tablet 5mg Monday and 7.5mg all other days or Or as directed by [facility]. (Patient taking differently: Take 7.5mg by mouth once daily or Or as directed by [facility].)    ferrous sulfate 325 mg (65 mg iron) Tab tablet Take 1 tablet (325 mg total) by mouth once daily. Take one tablet daily on an empty stomach for anemia (Patient taking differently: Take one tablet daily on an empty stomach for anemia)    fluticasone (FLONASE) 50 mcg/actuation nasal spray 1 spray by Each Nare route 2 (two) times daily as needed for Rhinitis.    needle, disp, 26 gauge 26 gauge x 1/2" Ndle 1 application by Misc.(Non-Drug; Combo Route) route once a week.    [DISCONTINUED] albuterol (VENTOLIN HFA) 90 mcg/actuation inhaler Inhale 2 puffs into the lungs every 6 (six) hours as needed for Wheezing.    [DISCONTINUED] syringe, disposable, 3 mL Syrg 1 Syringe by Misc.(Non-Drug; Combo Route) route once a week.    [DISCONTINUED] tamsulosin (FLOMAX) 0.4 mg Cp24 Take 1 capsule (0.4 mg total) by mouth once daily.     Family History     Problem Relation (Age of Onset)    Breast cancer Paternal Aunt    Cancer Brother    Colon cancer Maternal Grandmother, Mother    Diabetes Sister, Sister, Sister, Father    Heart disease Father (70)    Hypertension Sister    Stroke Paternal Grandmother        Social History Main Topics    Smoking status: Former Smoker     Packs/day: 0.50     Years: 20.00     Types: Cigarettes     Quit date: 5/8/2002    Smokeless tobacco: Never Used    Alcohol use No    Drug use: No    Sexual activity: No     Review of Systems   Constitutional: Positive for diaphoresis and fever. Negative for appetite change, chills and fatigue.   HENT: Negative for congestion, postnasal drip and rhinorrhea.    Respiratory: Positive for cough and shortness of breath. Negative for chest tightness and wheezing.    Cardiovascular: Positive for palpitations. " Negative for chest pain (with cough and touch only) and leg swelling.   Gastrointestinal: Positive for diarrhea. Negative for abdominal pain, constipation, nausea and vomiting.   Genitourinary: Positive for dysuria. Negative for flank pain, frequency, hematuria and urgency.   Musculoskeletal: Positive for back pain (bilateral sides of mid back) and myalgias (bilateral sides of mid back). Negative for arthralgias and neck pain.   Skin: Negative for color change and pallor.   Neurological: Positive for headaches. Negative for dizziness, weakness and light-headedness.     Objective:     Vital Signs (Most Recent):  Temp: 98.5 °F (36.9 °C) (09/07/17 2045)  Pulse: (!) 130 (09/07/17 2045)  Resp: 20 (09/07/17 2045)  BP: (!) 154/90 (09/07/17 2045)  SpO2: 95 % (09/07/17 2045) Vital Signs (24h Range):  Temp:  [98.2 °F (36.8 °C)-98.5 °F (36.9 °C)] 98.5 °F (36.9 °C)  Pulse:  [104-152] 130  Resp:  [20-36] 20  SpO2:  [95 %-100 %] 95 %  BP: (121-159)/(67-90) 154/90     Weight: 112.5 kg (248 lb)  Body mass index is 45.36 kg/m².    Physical Exam   Constitutional: She is oriented to person, place, and time. She appears well-developed and well-nourished. No distress.   Obese female, appears stated age. NAD, speaking in full sentences, able to change positions with ease.    HENT:   Head: Normocephalic and atraumatic.   Eyes: Conjunctivae and EOM are normal. Pupils are equal, round, and reactive to light. No scleral icterus.   Neck: Normal range of motion. Neck supple. No tracheal deviation present.   Cardiovascular: Normal rate, regular rhythm and intact distal pulses.  Exam reveals no gallop and no friction rub.    No murmur heard.  Audible clicking of mechanical MV.   2+ distal radial/DT/PT pulses. No carotid bruits.     Pulmonary/Chest: Effort normal. No stridor. No respiratory distress. She has no wheezes. She has no rales. She exhibits tenderness (peristernal chest wall TTP ).   Decreased bilateral breath sounds.     Abdominal:  Soft. Bowel sounds are normal. She exhibits no distension. There is no tenderness. There is no guarding.   Discomfort with palpation of the RUQ of abdomen. Negative Valdes's sign. No guarding.    Musculoskeletal: Normal range of motion. She exhibits no deformity.   Neurological: She is alert and oriented to person, place, and time. No cranial nerve deficit. She exhibits normal muscle tone.   Skin: Skin is warm and dry. No rash noted. She is not diaphoretic. No erythema. No pallor.   Psychiatric: She has a normal mood and affect. Her behavior is normal. Judgment and thought content normal.   Nursing note and vitals reviewed.       Significant Labs:   BMP:   Recent Labs  Lab 09/07/17  1543   *      K 3.8      CO2 24   BUN 17   CREATININE 1.1   CALCIUM 9.3     CBC:   Recent Labs  Lab 09/07/17  1543   WBC 5.45   HGB 10.5*   HCT 37.0        CMP:   Recent Labs  Lab 09/07/17  1543      K 3.8      CO2 24   *   BUN 17   CREATININE 1.1   CALCIUM 9.3   ANIONGAP 12   EGFRNONAA 54*     All pertinent labs within the past 24 hours have been reviewed.    Significant Imaging: I have reviewed all pertinent imaging results/findings within the past 24 hours.     Imaging Results          X-Ray Chest AP Portable (Final result)  Result time 09/07/17 16:23:18    Final result by Galileo Echevarria MD (09/07/17 16:23:18)                 Impression:        Central vascular congestion      Electronically signed by: GALILEO ECHEVARRIA MD  Date:     09/07/17  Time:    16:23              Narrative:    PORTABLE AP CHEST:      Comparison: 2/2/17    Findings:     No consolidations. Central vascular congestion. There is no pleural effusion or pneumothorax. The cardiac silhouette is unchanged in size.  Prior mitral valve repair. There are no acute bony abnormalities.

## 2017-09-08 NOTE — NURSING
Patient discharged to home via transport.  arrived to  patient. IV removed and telemetry monitor removed. Patient is no apparent distress.

## 2017-09-08 NOTE — HPI
Ms. Elayne Almanzar is a 62 y.o. female, with PMH of COPD, diastolic dysfunction (EF 55% on 10/5/16), Chronic A. 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 She states the cough just became productive of clear mucus in the past 2 days. She states she has also had intermittent fever (TMAX of 100.2F PO), as well as sweats, and palpitations. She states she has been using her inhaler at home without complete relief. She last used steroids 2 weeks ago for 3 days. She has a remote 11 pack-year smoking history, quit 2002.

## 2017-09-08 NOTE — ASSESSMENT & PLAN NOTE
- has had 2 sleep studies  - can not afford CPAP machine    - consulted social work for discharge planning/assistance with obtaining affordable CPAP  - will order CPAP for while in patient

## 2017-09-08 NOTE — ASSESSMENT & PLAN NOTE
- continue coumadin 7.5 mg QD as per Coumadin clinic rec's   - follows with Dr. Rahman at Coumadin clinic

## 2017-09-08 NOTE — ASSESSMENT & PLAN NOTE
- diagnosed at Coumadin clinic today  - patient only too 5 mg coumadin as advised by clinic   - monitor INR in AM & resume 7.5 mg dose per coumadin clinic unless INR continues to be elevated

## 2017-09-08 NOTE — ASSESSMENT & PLAN NOTE
- likely 2/2 inadequate home medication regimen vs. Influenza    - rapid flu ordered   - overall appears stable, and is not in acute distress upon exam   - has increased sputum volume, increased dyspnea, and cardiac disease without recent ABX use   - continue oral steroids started in ED   - Nebs Y1inbge   - consider antibiotics   - not started as patient is afebrile, no leukocytosis

## 2017-09-08 NOTE — ASSESSMENT & PLAN NOTE
- anticoagulated on coumadin, and rate controlled with Metoprolol   - did not take metoprolol today and had A. Fib with RVR on EKG in ED    - Toprol administered in ED   - will monitor on tele   - continue all home meds

## 2017-09-08 NOTE — ASSESSMENT & PLAN NOTE
- patient states she was taken off of lisinopril by her PCP 1 year ago  - BP in ED was 154/90  - hydralazine PRN for SBP > 170

## 2017-09-08 NOTE — ASSESSMENT & PLAN NOTE
- Last ECHO was 10/2016 with EF 55% and diastolic dysfunction   - takes Lasix PRN, no recent use, no b/l LE swelling   - as CXR shows pulmonary vascular congestion, ordered Lasix 60 mg one time dose

## 2017-09-08 NOTE — H&P
Admit Date: 9/7/2017    Discharge Date and Time: 09/08/2017      Discharge Attending Physician: Jose Fenton MD     Diagnoses:  Active Hospital Problems    Diagnosis  POA    *Acute on chronic diastolic congestive heart failure [I50.33]  Yes    COPD exacerbation [J44.1]  Yes    Complex sleep apnea syndrome [G47.31]  Yes    Chronic anticoagulation [Z79.01]  Not Applicable    Chronic atrial fibrillation with rapid ventricular response [I48.2]  Yes    Essential (primary) hypertension [I10]  Yes     Overview:   dx update      Status post heart valve replacement with mechanical valve [Z95.2]  Not Applicable      Resolved Hospital Problems    Diagnosis Date Resolved POA    Supratherapeutic INR [R79.1] 09/08/2017 Yes       Discharged Condition: good      Hospital Course:   Ms. Elayne Almanzar is a 62 y.o. female, with PMH of COPD, diastolic dysfunction (EF 55% on 10/5/16), Chronic A. 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 She states the cough just became productive of clear mucus in the past 2 days. She states she has also had intermittent fever (TMAX of 100.2F PO), as well as sweats, and palpitations. She states she has been using her inhaler at home without complete relief. She last used steroids 2 weeks ago for 3 days. She has a remote 11 pack-year smoking history, quit 2002.       Clinically she seems to have some component of decompensated HF along with COPD/ She was treated with IV duiresis ( neg 1.2 L), nebs, po steroids with clinical improvement    She is not using CPAP as she can't afford the 700 dollar co-pay for the machine.    Discharged on po lasix and short course of steroids.    SW was going to see what we could do about the CPCP machine    Consults: None    Significant Diagnostic Studies:  CXR: pulmonary edema    Disposition: Home or Self Care    Patient  Instructions:     Current Discharge Medication List      START taking these medications    Details   predniSONE (DELTASONE) 20 MG tablet Take 2 tablets (40 mg total) by mouth once daily.  Qty: 10 tablet, Refills: 0    Associated Diagnoses: COPD exacerbation         CONTINUE these medications which have CHANGED    Details   furosemide (LASIX) 20 MG tablet Take 1 tablet (20 mg total) by mouth once daily.  Qty: 30 tablet, Refills: 3    Associated Diagnoses: Acute on chronic diastolic congestive heart failure         CONTINUE these medications which have NOT CHANGED    Details   albuterol 90 mcg/actuation inhaler Inhale 1-2 puffs into the lungs every 6 (six) hours as needed for Wheezing or Shortness of Breath. Rescue  Qty: 1 Inhaler, Refills: 1      aspirin (ECOTRIN) 81 MG EC tablet Take 81 mg by mouth once daily.       brimonidine 0.2% (ALPHAGAN) 0.2 % Drop Place 1 drop into the right eye 3 (three) times daily.  Qty: 15 mL, Refills: 3      ergocalciferol (ERGOCALCIFEROL) 50,000 unit Cap Take 1 capsule (50,000 Units total) by mouth twice a week.  Qty: 24 capsule, Refills: 0      fluticasone-salmeterol 250-50 mcg/dose (ADVAIR) 250-50 mcg/dose diskus inhaler Inhale 1 puff into the lungs 2 (two) times daily.  Qty: 1 each, Refills: 3      metoprolol succinate (TOPROL-XL) 200 MG 24 hr tablet Take 1 tablet (200 mg total) by mouth once daily.  Qty: 30 tablet, Refills: 11    Associated Diagnoses: Essential hypertension      oxybutynin (DITROPAN-XL) 10 MG 24 hr tablet Take 10 mg by mouth once daily.   Refills: 11      sertraline (ZOLOFT) 100 MG tablet TAKE 1 TABLET (100 MG TOTAL) BY MOUTH ONCE DAILY.  Qty: 30 tablet, Refills: 0      warfarin (COUMADIN) 5 MG tablet 5mg Monday and 7.5mg all other days or Or as directed by Coumadin Clinic.  Qty: 45 tablet, Refills: 6    Comments: [facility] ph. # [phone], fax # [phone] (Refill request was faxed)    Dr. Lorraine Messina  Associated Diagnoses: Atrial fibrillation,  "chronic; Status post heart valve replacement with mechanical valve      ferrous sulfate 325 mg (65 mg iron) Tab tablet Take 1 tablet (325 mg total) by mouth once daily. Take one tablet daily on an empty stomach for anemia  Qty: 30 tablet, Refills: 2      fluticasone (FLONASE) 50 mcg/actuation nasal spray 1 spray by Each Nare route 2 (two) times daily as needed for Rhinitis.  Qty: 15 g, Refills: 3      needle, disp, 26 gauge 26 gauge x 1/2" Ndle 1 application by Misc.(Non-Drug; Combo Route) route once a week.  Qty: 10 each, Refills: 0               Discharge Procedure Orders  Diet general     Activity as tolerated     Call MD for:  difficulty breathing or increased cough       "

## 2017-09-08 NOTE — DISCHARGE INSTRUCTIONS
Thank you for choosing [facility] as your Health Care Provider. [facility] strives to provide the best healthcare available to you. In the next few days you may receive a Survey, either by mail or email,  asking you to rate our care that was provided to you during your stay.  Please return the survey to us, as your feedback is important. We aim to meet your expectations of safe, quality health care.    From your [facility] Team.      Discharge Instructions: COPD  You have been diagnosed with chronic obstructive pulmonary disease (COPD). This is a name given to a group of diseases that limit the flow of air in and out of your lungs. This makes it harder to breathe. With COPD, you are also more likely to get lung infections. COPD includes chronic bronchitis and emphysema. COPD is most often caused by heavy, long-term cigarette smoking.  Home care  Quit smoking  · If you smoke, quit. It is the best thing you can do for your COPD and your overall health.  · Join a stop-smoking program. There are even telephone, text message, and Internet programs to help you quit.  · Ask your healthcare provider about medicines or other methods to help you quit.  · Ask family members to quit smoking as well.  · Don't allow people to smoke in your home, in your car, or when they are around you.  Protect yourself from infection  · Wash your hands often. Do your best to keep your hands away from your face. Most germs are spread from your hands to your mouth.  · Get a flu shot every year. Also ask your provider about pneumonia vaccines.  · Avoid crowds. It's especially important to do this in the winter when more people have colds and flu.  · To stay healthy, get enough sleep, exercise regularly, and eat a balanced diet. You should:  ¨ Get about 8 hours of sleep every night.  ¨ Try to exercise for at least 30 minutes on most days.  ¨ Have healthy foods including fruits and vegetables, 100% whole grains, lean  meats and fish, and low-fat dairy products. Try to stay away from foods high in fats and sugar.  Take your medicines  Take your medicines exactly as directed. Don't skip doses.  Manage your stress  Stress can make COPD worse. Use this stress management technique:  · Find a quiet place and sit or lie in a comfortable position.  · Close your eyes and perform breathing exercises for several minutes. Ask your provider about the best way to breathe.  Pulmonary rehabilitation  · Pulmonary rehab can help you feel better. These programs include exercise, breathing techniques, information about COPD, counseling, and help for smokers.  · Ask your provider or your local hospital about programs in your area.  When to call your healthcare provider  Call your provider immediately if you have any of the following:  · Shortness of breath, wheezing, or coughing  · Increased mucus  · Yellow, green, bloody, or smelly mucus  · Fever or chills  · Tightness in your chest that does not go away with rest or medicine  · An irregular heartbeat or a feeling that your heart is beating very fast  · Swollen ankles   Date Last Reviewed: 5/1/2016  © 3606-8278 [employer]. [address] [place] [address]. All rights reserved. This information is not intended as a substitute for professional medical care. Always follow your healthcare professional's instructions.      Continuous Positive Airway Pressure (CPAP)  Your healthcare provider has prescribed continuous positive airway pressure (CPAP) therapy for you. A CPAP device helps you breathe better at night. The device sends air through your nose or mouth when you breathe in to keep your air passages open. CPAP is:  · Used most often to treat sleep apnea and some other problems. (Sleep apnea is a chronic condition with periods of sleep in which you briefly stop breathing.)  · Safe and very effective. But it takes time to get used to the mask.  Your healthcare provider, nurse,  or medical supplier will give you tips for wearing and caring for your CPAP device.  General guidelines  Recommendations include the following:  · It's very important not to give up! It takes time to get used to wearing the mask at night.  · Practice using your CPAP device during the day, especially whenever you take a nap.  · Remember, there are several different types of masks. If you cant get used to your mask, ask your provider or medical supply company about trying another style.  · If you have nasal stuffiness or dryness when using your CPAP device, talk with your provider or medical supply company. There are ways to ease these problems. For example, your provider may recommend using a moistening nasal spray. Or the medical supply company may recommend a device with a humidifier.  · The goal is to use your CPAP all night, every night, during all naps, and even when you travel.  · Keep your mask clean. Wash it with soap and water. Be sure to rinse the mask and tubing well with water to remove any soap. Let them air-dry completely before using.  · Make yourself comfortable when sleeping with CPAP. Try using extra pillows.  Work with your medical supply company so that you know how to correctly use your CPAP. The company's representative will be able to help you:  · Use the CPAP correctly  · Troubleshoot any problems that come up  · Learn to clean and maintain the device  · Adjust to regular use of the CPAP     The CPAP device settings are given as centimeters of water, or cm/H2O. Each persons pressure settings are different. Your healthcare provider will tell you what settings to use. Never change your CPAP pressure setting unless your provider tells you to.  CPAP ____________cm/H20 pressure when you breathe in   Date Last Reviewed: 5/1/2016  © 8770-0580 [employer]. [address] [place] [address], [place] [address]. All rights reserved. This information is not intended as a substitute for professional  medical care. Always follow your healthcare professional's instructions.

## 2017-09-08 NOTE — NURSING
Discharge instructions reviewed with patient, all questions answered, and understanding verbalized.  Lifestyle modifications discussed.  Side effects of medications discussed.  IV removed per GENOVEVA Aguilar as well as tele.  Pt states her  will be picking her up shortly.  Transport to be requested upon his arrival.

## 2017-09-10 NOTE — DISCHARGE SUMMARY
Admit Date: 9/7/2017    Discharge Date and Time: 9/8/2017  3:52 PM     Discharge Attending Physician: No att. providers found     Diagnoses:  Active Hospital Problems    Diagnosis  POA    *Acute on chronic diastolic congestive heart failure [I50.33]  Yes    COPD exacerbation [J44.1]  Yes    Complex sleep apnea syndrome [G47.31]  Yes    Chronic anticoagulation [Z79.01]  Not Applicable    Chronic atrial fibrillation with rapid ventricular response [I48.2]  Yes    Essential (primary) hypertension [I10]  Yes     Overview:   dx update      Status post heart valve replacement with mechanical valve [Z95.2]  Not Applicable      Resolved Hospital Problems    Diagnosis Date Resolved POA    Supratherapeutic INR [R79.1] 09/08/2017 Yes       Discharged Condition: good      Hospital Course:   Hospital Course:   Ms. Elayne Almanzar is a 62 y.o. female, with PMH of COPD, diastolic dysfunction (EF 55% on 10/5/16), Chronic A. 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 She states the cough just became productive of clear mucus in the past 2 days. She states she has also had intermittent fever (TMAX of 100.2F PO), as well as sweats, and palpitations. She states she has been using her inhaler at home without complete relief. She last used steroids 2 weeks ago for 3 days. She has a remote 11 pack-year smoking history, quit 2002.         Clinically she seems to have some component of decompensated HF along with COPD/ She was treated with IV duiresis ( neg 1.2 L), nebs, po steroids with clinical improvement     She is not using CPAP as she can't afford the 700 dollar co-pay for the machine.     Discharged on po lasix and short course of steroids.     SW was going to see what we could do about the CPCP machine     Consults: None     Significant Diagnostic Studies:  CXR: pulmonary  edema         Disposition: Home or Self Care    Patient Instructions:     Discharge Medication List as of 9/8/2017  2:06 PM      CONTINUE these medications which have CHANGED    Details   furosemide (LASIX) 20 MG tablet Take 1 tablet (20 mg total) by mouth once daily., Starting Fri 9/8/2017, Normal      predniSONE (DELTASONE) 20 MG tablet Take 2 tablets (40 mg total) by mouth once daily., Starting Sat 9/9/2017, Until Thu 9/14/2017, Normal         CONTINUE these medications which have NOT CHANGED    Details   albuterol 90 mcg/actuation inhaler Inhale 1-2 puffs into the lungs every 6 (six) hours as needed for Wheezing or Shortness of Breath. Rescue, Starting Tue 8/15/2017, Until Wed 8/15/2018, Print      aspirin (ECOTRIN) 81 MG EC tablet Take 81 mg by mouth once daily. , Historical Med      brimonidine 0.2% (ALPHAGAN) 0.2 % Drop Place 1 drop into the right eye 3 (three) times daily., Starting 3/20/2017, Until Tue 3/20/18, Normal      ergocalciferol (ERGOCALCIFEROL) 50,000 unit Cap Take 1 capsule (50,000 Units total) by mouth twice a week., Starting 5/18/2017, Until Discontinued, Normal      fluticasone-salmeterol 250-50 mcg/dose (ADVAIR) 250-50 mcg/dose diskus inhaler Inhale 1 puff into the lungs 2 (two) times daily., Starting 2/2/2017, Until Fri 2/2/18, Normal      metoprolol succinate (TOPROL-XL) 200 MG 24 hr tablet Take 1 tablet (200 mg total) by mouth once daily., Starting 5/12/2017, Until Discontinued, Normal      oxybutynin (DITROPAN-XL) 10 MG 24 hr tablet Take 10 mg by mouth once daily. , Starting Mon 11/21/2016, Historical Med      sertraline (ZOLOFT) 100 MG tablet TAKE 1 TABLET (100 MG TOTAL) BY MOUTH ONCE DAILY., Normal      warfarin (COUMADIN) 5 MG tablet 5mg Monday and 7.5mg all other days or Or as directed by [facility]mad[facility] Clinic., Print      ferrous sulfate 325 mg (65 mg iron) Tab tablet Take 1 tablet (325 mg total) by mouth once daily. Take one tablet daily on an empty stomach for anemia, Starting 5/16/2017,  "Until Discontinued, Normal      fluticasone (FLONASE) 50 mcg/actuation nasal spray 1 spray by Each Nare route 2 (two) times daily as needed for Rhinitis., Starting 4/29/2015, Until Discontinued, Normal      needle, disp, 26 gauge 26 gauge x 1/2" Ndle 1 application by Misc.(Non-Drug; Combo Route) route once a week., Starting Wed 5/24/2017, Normal               Discharge Procedure Orders  Diet general     Activity as tolerated     Call MD for:  difficulty breathing or increased cough       "

## 2017-09-18 ENCOUNTER — HOSPITAL ENCOUNTER (OUTPATIENT)
Dept: RADIOLOGY | Facility: HOSPITAL | Age: 62
Discharge: HOME | End: 2017-09-18
Attending: INTERNAL MEDICINE
Payer: COMMERCIAL

## 2017-09-18 ENCOUNTER — OFFICE VISIT (OUTPATIENT)
Dept: INTERNAL MEDICINE | Facility: CLINIC | Age: 62
End: 2017-09-18
Payer: COMMERCIAL

## 2017-09-18 VITALS
OXYGEN SATURATION: 96 % | SYSTOLIC BLOOD PRESSURE: 132 MMHG | BODY MASS INDEX: 46.56 KG/M2 | HEIGHT: 62 IN | HEART RATE: 73 BPM | TEMPERATURE: 99 F | DIASTOLIC BLOOD PRESSURE: 72 MMHG | WEIGHT: 253 LBS

## 2017-09-18 DIAGNOSIS — Z01.419 ENCOUNTER FOR WELL WOMAN EXAM WITH ROUTINE GYNECOLOGICAL EXAM: ICD-10-CM

## 2017-09-18 DIAGNOSIS — Z12.31 ENCOUNTER FOR SCREENING MAMMOGRAM FOR BREAST CANCER: ICD-10-CM

## 2017-09-18 DIAGNOSIS — N13.30 HYDRONEPHROSIS, UNSPECIFIED HYDRONEPHROSIS TYPE: Primary | ICD-10-CM

## 2017-09-18 DIAGNOSIS — I48.91 ATRIAL FIBRILLATION, UNSPECIFIED TYPE: ICD-10-CM

## 2017-09-18 DIAGNOSIS — I10 ESSENTIAL HYPERTENSION: Chronic | ICD-10-CM

## 2017-09-18 DIAGNOSIS — I50.33 ACUTE ON CHRONIC DIASTOLIC CONGESTIVE HEART FAILURE: ICD-10-CM

## 2017-09-18 PROCEDURE — 3008F BODY MASS INDEX DOCD: CPT | Mod: S$GLB,,, | Performed by: INTERNAL MEDICINE

## 2017-09-18 PROCEDURE — 77067 SCR MAMMO BI INCL CAD: CPT | Mod: 26,,, | Performed by: RADIOLOGY

## 2017-09-18 PROCEDURE — 99214 OFFICE O/P EST MOD 30 MIN: CPT | Mod: S$GLB,,, | Performed by: INTERNAL MEDICINE

## 2017-09-18 PROCEDURE — 77067 SCR MAMMO BI INCL CAD: CPT | Mod: TC

## 2017-09-18 PROCEDURE — 3075F SYST BP GE 130 - 139MM HG: CPT | Mod: S$GLB,,, | Performed by: INTERNAL MEDICINE

## 2017-09-18 PROCEDURE — 99999 PR PBB SHADOW E&M-EST. PATIENT-LVL V: CPT | Mod: PBBFAC,,, | Performed by: INTERNAL MEDICINE

## 2017-09-18 PROCEDURE — 3078F DIAST BP <80 MM HG: CPT | Mod: S$GLB,,, | Performed by: INTERNAL MEDICINE

## 2017-09-18 RX ORDER — PRAVASTATIN SODIUM 40 MG/1
40 TABLET ORAL DAILY
Qty: 30 TABLET | Refills: 3 | Status: SHIPPED | OUTPATIENT
Start: 2017-09-18 | End: 2017-10-30

## 2017-09-18 RX ORDER — METOPROLOL SUCCINATE 200 MG/1
200 TABLET, EXTENDED RELEASE ORAL DAILY
Qty: 30 TABLET | Refills: 3 | Status: SHIPPED | OUTPATIENT
Start: 2017-09-18 | End: 2018-03-22 | Stop reason: SDUPTHER

## 2017-09-18 RX ORDER — SERTRALINE HYDROCHLORIDE 100 MG/1
100 TABLET, FILM COATED ORAL DAILY
Qty: 30 TABLET | Refills: 4 | Status: SHIPPED | OUTPATIENT
Start: 2017-09-18 | End: 2018-05-09 | Stop reason: SDUPTHER

## 2017-09-18 RX ORDER — FUROSEMIDE 20 MG/1
20 TABLET ORAL DAILY
Qty: 30 TABLET | Refills: 3 | Status: SHIPPED | OUTPATIENT
Start: 2017-09-18 | End: 2018-09-10 | Stop reason: SDUPTHER

## 2017-09-18 RX ORDER — FLUTICASONE PROPIONATE AND SALMETEROL 250; 50 UG/1; UG/1
1 POWDER RESPIRATORY (INHALATION) 2 TIMES DAILY
Qty: 1 EACH | Refills: 5 | Status: SHIPPED | OUTPATIENT
Start: 2017-09-18 | End: 2017-11-07

## 2017-09-18 RX ORDER — ALBUTEROL SULFATE 90 UG/1
1-2 AEROSOL, METERED RESPIRATORY (INHALATION) EVERY 6 HOURS PRN
Qty: 1 INHALER | Refills: 6 | Status: SHIPPED | OUTPATIENT
Start: 2017-09-18 | End: 2018-04-23 | Stop reason: SDUPTHER

## 2017-09-20 DIAGNOSIS — N39.46 MIXED URGE AND STRESS INCONTINENCE: ICD-10-CM

## 2017-09-22 ENCOUNTER — ANTI-COAG VISIT (OUTPATIENT)
Dept: CARDIOLOGY | Facility: CLINIC | Age: 62
End: 2017-09-22
Payer: COMMERCIAL

## 2017-09-22 DIAGNOSIS — Z95.2 STATUS POST HEART VALVE REPLACEMENT WITH MECHANICAL VALVE: ICD-10-CM

## 2017-09-22 DIAGNOSIS — I48.20 CHRONIC ATRIAL FIBRILLATION WITH RAPID VENTRICULAR RESPONSE: ICD-10-CM

## 2017-09-22 DIAGNOSIS — Z79.01 LONG TERM (CURRENT) USE OF ANTICOAGULANTS: Primary | ICD-10-CM

## 2017-09-22 LAB — INR PPP: 2.6 (ref 2.5–3.5)

## 2017-09-22 PROCEDURE — 85610 PROTHROMBIN TIME: CPT | Mod: QW,S$GLB,, | Performed by: PHARMACIST

## 2017-09-23 NOTE — PROGRESS NOTES
Subjective:       Patient ID: Elayne Almanzar is a 62 y.o. female.    Chief Complaint: Hypertension    HPI: She returns for management of hypertension.  She has had hypertension for over a year.  Current treatment has included medications outlined in medication list.  She denies chest pain or shortness of breath.  No palpitations.  Denies left arm or neck pain.    Past medical history: Hypertension, A. fib, COPD, hyperlipidemia, nephrolithiasis , hydronephrosis, glaucoma, sleep apnea, status post mitral valve replacement . She had a colonoscopy April 2014      Medications: Proventil MDI 2 puffs 4 times a day when necessary,Advair 250/50 one puff twice a day, Lasix 20 mg daily,Toprol- mg daily, Coumadin as monitored by Coumadin clinic , Zoloft 100 mg daily, Pravachol 40 mg daily      ALLERGIES: dilaudid      Review of Systems   Constitutional: Negative for chills, fatigue, fever and unexpected weight change.   Respiratory: Negative for chest tightness and shortness of breath.    Cardiovascular: Negative for chest pain and palpitations.   Gastrointestinal: Negative for abdominal pain and blood in stool.   Neurological: Negative for dizziness, syncope, numbness and headaches.       Objective:      Physical Exam   HENT:   Right Ear: External ear normal.   Left Ear: External ear normal.   Nose: Nose normal.   Mouth/Throat: Oropharynx is clear and moist.   Eyes: Pupils are equal, round, and reactive to light.   Neck: Normal range of motion.   Cardiovascular: Normal rate and regular rhythm.    No murmur heard.  Pulmonary/Chest: Breath sounds normal.   Abdominal: She exhibits no distension. There is no hepatosplenomegaly. There is no tenderness.   Lymphadenopathy:     She has no cervical adenopathy.     She has no axillary adenopathy.   Neurological: She has normal strength and normal reflexes. No cranial nerve deficit or sensory deficit.     breast exam: No mass palpated, no nipple discharge  expressed  Assessment:         assessment and plan: Hypertension: Check CMP and lipid panel.  Schedule mammogram  Plan:       As above

## 2017-09-26 RX ORDER — OXYBUTYNIN CHLORIDE 10 MG/1
10 TABLET, EXTENDED RELEASE ORAL DAILY
Qty: 30 TABLET | Refills: 11 | Status: SHIPPED | OUTPATIENT
Start: 2017-09-26 | End: 2017-10-24 | Stop reason: ALTCHOICE

## 2017-10-05 ENCOUNTER — OFFICE VISIT (OUTPATIENT)
Dept: SLEEP MEDICINE | Facility: CLINIC | Age: 62
End: 2017-10-05
Payer: COMMERCIAL

## 2017-10-05 VITALS
DIASTOLIC BLOOD PRESSURE: 73 MMHG | SYSTOLIC BLOOD PRESSURE: 142 MMHG | HEIGHT: 62 IN | BODY MASS INDEX: 46.56 KG/M2 | WEIGHT: 253 LBS | HEART RATE: 93 BPM

## 2017-10-05 DIAGNOSIS — G47.31 COMPLEX SLEEP APNEA SYNDROME: Primary | ICD-10-CM

## 2017-10-05 PROCEDURE — 99215 OFFICE O/P EST HI 40 MIN: CPT | Mod: S$GLB,,, | Performed by: PSYCHIATRY & NEUROLOGY

## 2017-10-05 PROCEDURE — 99999 PR PBB SHADOW E&M-EST. PATIENT-LVL III: CPT | Mod: PBBFAC,,, | Performed by: PSYCHIATRY & NEUROLOGY

## 2017-10-05 NOTE — PATIENT INSTRUCTIONS
Domenica Carvalho MD  4.76 / 63 responses   Primary Location  [address] [place] [address]   Get Directions   Specialties (2 total)  Pulmonary Services   Pulmonary Hypertension Program  -----------------------------------------------------------------------  MARIANNA Tolentino MD  4.93 / 131 responses   Primary Location  [address] [place] [address]   Get Directions   Specialties (1 total)  Pulmonary Services  Media Highlights  [facility]: Smoking Cessation  View more

## 2017-10-05 NOTE — PROGRESS NOTES
Elayne Almanzar  was seen at the request of  No ref. provider found for sleep evaluation.    12/22/2016 INITIAL HISTORY OF PRESENT ILLNESS:  Elayne Almanzar is a 62 y.o. female is here to be evaluated for a sleep disorder.       CHIEF COMPLAINT:      The patient's complaints include excessive daytime sleepiness, excessive daytime fatigue, snoring, choking  and interrupted sleep since  A few months ago.    Reports more trouble sleeping since she lost her mother Cot 2015    Reports  dry mouth and sore throat  Reports nasal congestion Flonase - does not help  Reports  morning headaches  Reports occasional  interrupted sleep  Reports frequent leg movements  Denies symptoms concerning for parasomnia    The ESS (Vidal Sleepiness Score) taken on initial visit is 8 /24    The patient never had tonsillectomy, adenoidectomy or UPPP     INTERVAL HISTORY:    03/21/2017:  The patient has not presented any new complaints since the previous visit. She comes to discuss PSG - extremely severe complex sleep apnea - titration was only 50% decreasing the AHi due to treatment emergent central apnea.   ASV was very effective in controlling AHI and hypoxemia.  Trying to lose weight - could not afford [facility] program. ASV was also not affordable through durable medical equipment at [facility].  ESS 11/24.        SLEEP ROUTINE AND LIFESTYLE 10/05/2017 :    Occupation:not working    Bed partner: her  - he is a snorer     Time to bed - wake up time on a workday : 2 AM (not sleepy earlier) to 9 AM  Time to bed - wake up time on a day off: same   Sleep onset latency: 30 min  Disruptions or awakenings: 2  Time to fall back into sleep: 1 hour   Perceived sleep quality: 0/5  Perceived total sleep time:  6 hrs  hours.  Daytime naps: 3 times    Exercise routine: no  Caffeine:      PREVIOUS SLEEP STUDIES:   PSG 1/10/176: Significant Obstructive sleep apnea (KEYSHAWN) with AHI (apnea hypopnea Index) of 116 and SaO2 of 68% (weight  255  lbs).Efective control of SDB was not achieved due to central apneas in the treatment part on every tested pressure (some centrals were present in the diagnostic part).  ESS 6/24, yet reports feeling fatigued and sleepy.  CPAP titration on 2017: Effective control of respiratory events was not achieved on CPAP/BPAP. Best results on ASV at default settings.        DME:       PAST MEDICAL HISTORY:    Active Ambulatory Problems     Diagnosis Date Noted    Dyspnea on exertion 08/17/2012    Status post heart valve replacement with mechanical valve 03/20/2013    Chronic atrial fibrillation with rapid ventricular response 03/21/2013    Nephrolithiasis 03/21/2013    Essential (primary) hypertension 03/21/2013    Chronic anticoagulation 12/22/2013    H/O mitral valve replacement with mechanical valve 01/22/2015    COPD (chronic obstructive pulmonary disease) with acute bronchitis 01/22/2015    Hyperlipidemia 05/01/2015    Mixed urge and stress incontinence 03/16/2016    Hematuria 03/16/2016    Atrophic vaginitis 03/16/2016    Acute kidney injury 09/28/2016    Complex sleep apnea syndrome     Morbid obesity with BMI of 50.0-59.9, adult 05/02/2017    Thiamine deficiency 05/17/2017    Asthma exacerbation 09/07/2017    COPD exacerbation 09/07/2017    Acute on chronic diastolic congestive heart failure      Resolved Ambulatory Problems     Diagnosis Date Noted    Wheezing 08/17/2012    CKD (chronic kidney disease) stage 3, GFR 30-59 ml/min 03/21/2013    Hypoxia 12/22/2013    Dizziness 03/05/2014    Encounter for screening colonoscopy 04/10/2014    Fever 01/22/2015    Hair loss 05/01/2015    Cephalalgia 05/01/2015    Dyspepsia 11/17/2016    Supratherapeutic INR 09/07/2017     Past Medical History:   Diagnosis Date    Acute on chronic diastolic congestive heart failure     Anticoagulant long-term use     Atrial fibrillation 2002    Cataract     Chronic kidney disease     COPD (chronic obstructive  pulmonary disease)     Depression     Dysuria     Flank pain     HTN (hypertension)     Nephrolithiasis     KEYSHAWN (obstructive sleep apnea)     Rheumatic disease of mitral valve     Urinary incontinence     Urinary tract infection                 PAST SURGICAL HISTORY:    Past Surgical History:   Procedure Laterality Date     SECTION      kidney stone removal      MITRAL VALVE REPLACEMENT  2004    TUBAL LIGATION           FAMILY HISTORY:                Family History   Problem Relation Age of Onset    Stroke Paternal Grandmother     Colon cancer Maternal Grandmother     Cancer Brother      testicular cancer    Diabetes Sister     Hypertension Sister     Breast cancer Paternal Aunt     Diabetes Sister     Diabetes Sister     Heart disease Father 70     MI    Diabetes Father     Colon cancer Mother        SOCIAL HISTORY:          Tobacco:   History   Smoking Status    Former Smoker    Packs/day: 0.50    Years: 20.00    Types: Cigarettes    Quit date: 2002   Smokeless Tobacco    Never Used       alcohol use:    History   Alcohol Use No                   ALLERGIES:    Review of patient's allergies indicates:   Allergen Reactions    Hydromorphone (bulk) Nausea And Vomiting    Hydromorphone hcl Nausea And Vomiting       CURRENT MEDICATIONS:    Current Outpatient Prescriptions   Medication Sig Dispense Refill    albuterol 90 mcg/actuation inhaler Inhale 1-2 puffs into the lungs every 6 (six) hours as needed for Wheezing or Shortness of Breath. Rescue 1 Inhaler 6    aspirin (ECOTRIN) 81 MG EC tablet Take 81 mg by mouth once daily.       brimonidine 0.2% (ALPHAGAN) 0.2 % Drop Place 1 drop into the right eye 3 (three) times daily. (Patient taking differently: Place 1 drop into the right eye 2 (two) times daily. ) 15 mL 3    ergocalciferol (ERGOCALCIFEROL) 50,000 unit Cap Take 1 capsule (50,000 Units total) by mouth twice a week. (Patient taking differently: Take 50,000 Units by  "mouth once a week. on tuesday) 24 capsule 0    ferrous sulfate 325 mg (65 mg iron) Tab tablet Take 1 tablet (325 mg total) by mouth once daily. Take one tablet daily on an empty stomach for anemia (Patient taking differently: Take one tablet daily on an empty stomach for anemia) 30 tablet 2    fluticasone (FLONASE) 50 mcg/actuation nasal spray 1 spray by Each Nare route 2 (two) times daily as needed for Rhinitis. 15 g 3    fluticasone-salmeterol 250-50 mcg/dose (ADVAIR) 250-50 mcg/dose diskus inhaler Inhale 1 puff into the lungs 2 (two) times daily. 1 each 5    furosemide (LASIX) 20 MG tablet Take 1 tablet (20 mg total) by mouth once daily. 30 tablet 3    metoprolol succinate (TOPROL-XL) 200 MG 24 hr tablet Take 1 tablet (200 mg total) by mouth once daily. 30 tablet 3    needle, disp, 26 gauge 26 gauge x 1/2" Ndle 1 application by Misc.(Non-Drug; Combo Route) route once a week. 10 each 0    oxybutynin (DITROPAN-XL) 10 MG 24 hr tablet Take 10 mg by mouth once daily.   11    oxybutynin (DITROPAN-XL) 10 MG 24 hr tablet TAKE 1 TABLET (10 MG TOTAL) BY MOUTH ONCE DAILY. 30 tablet 11    pravastatin (PRAVACHOL) 40 MG tablet Take 1 tablet (40 mg total) by mouth once daily. 30 tablet 3    sertraline (ZOLOFT) 100 MG tablet Take 1 tablet (100 mg total) by mouth once daily. 30 tablet 4    warfarin (COUMADIN) 5 MG tablet 5mg Monday and 7.5mg all other days or Or as directed by [facility] Clinic. (Patient taking differently: Take 7.5mg by mouth once daily or Or as directed by [facility]madin Clinic.) 45 tablet 6     No current facility-administered medications for this visit.                       REVIEW OF SYSTEMS:   Sleep related symptoms as per HPI    reports weight gain  Reports occasional dyspnea  Reports palpitations  Reports occasional acid reflux   Denies polyuria  Reports  mood diturbance  Denies  anemia  Reports occasional  muscle pain  Denies  Gait imbalance    Otherwise, a balance of 10 systems reviewed is " "negative.    PHYSICAL EXAM:  BP (!) 142/73 (BP Location: Left arm, Patient Position: Sitting, BP Method: Large (Automatic))   Pulse 93   Ht 5' 2" (1.575 m)   Wt 114.8 kg (253 lb)   LMP 07/22/2012   BMI 46.27 kg/m²   GENERAL: Overweight body habitus, well groomed.  HEENT:   HEENT:  Conjunctivae are non-erythematous; Pupils equal, round, and reactive to light; Nose is symmetrical; Nasal mucosa is pink and moist; Septum isightly to the right; Inferior turbinates are hypertrophied; Nasal airflow is diminshed; Posterior pharynx is pink; Modified Mallampati:III; Posterior palate is low; Tonsils not visualized; Uvula is reddened;Tongue is normal; Dentition is fair; No TMJ tenderness; Jaw opening and protrusion without click and without discomfort.  NECK: Supple. Neck circumference is 18 inches. No thyromegaly. No palpable nodes.     SKIN: On face and neck: No abrasions, no rashes, no lesions.  No subcutaneous nodules are palpable.  RESPIRATORY: Chest is clear to auscultation.  Normal chest expansion and non-labored breathing at rest.  CARDIOVASCULAR: Normal S1, S2.  No murmurs, gallops or rubs. No carotid bruits bilaterally.  No edema. No clubbing. No cyanosis.    NEURO: Oriented to time, place and person. Normal attention span and concentration. Gait normal.    PSYCH: Affect is full. Mood is normal  MUSCULOSKELETAL: Moves 4 extremities. Gait normal.         Using [facility]:       ASSESSMENT:    1. Severe Complex Sleep Apnea. The patient symptomatically has  excessive daytime sleepiness, snoring,  witnessed breathing pauses, excessive daytime fatigue, gasping for air in sleep and interrupted sleep  with exam findings of "a crowded oral airway and elevated body mass index. The patient has medical co-morbidities of atrial fibrillation, mechanical valve COPD, hyperlipidemia and hypertension,  which can be worsened by KEYSHAWN. This warrants treatment. CPOAP and BPAP did not control sleep apnea and hypoxemia at various " settings - only ASV provided a good control of AHi and hypoxemia.          PLAN:    ASV machine at default settings.  max pressure 25 cm H2O, EPAP min -6 cm H2O, EPAP max - 10 cm H2O, max PS_ 16, min PS 2 cm H2O. With a [employer] FFM  She was also given list of DMEs to see which one would be more affordable.    Weight loss strategies were discussed in detail - would like to be referred to bariatric program. If our program is not affordable, consider [facility].    PSG in lab testing is warranted due to medical comorbidities, iCOPD,  The patient is a graham risk for obesity hypoventilation  Syndrome given excessive BMI (nearly 50); high risk airway comromised which can be a safety issue due to inadequate titration with auto PAP.        More than 25 minutes of this 45 minutes visit was spent in counseling: during our discussion today, we talked about the etiology of  KEYSHAWN as well as the potential ramifications of untreated sleep apnea, which could include daytime sleepiness, hypertension, heart disease and/or stroke.  We discussed potential treatment options, which could include weight loss, body positioning, continuous positive airway pressure (CPAP), or referral for surgical consideration. Meanwhile, she  is urged to avoid supine sleep, weight gain and alcoholic beverages since all of these can worsen KEYSHAWN.     Precautions: The patient was advised to abstain from driving should he feel sleepy or drowsy.    Follow up: MD/NP  after the sleep study has been completed.     Thank you for allowing me the opportunity to participate in the care of your patient.    This visit summary will be sent to referring provider via inbasket

## 2017-10-20 ENCOUNTER — LAB VISIT (OUTPATIENT)
Dept: LAB | Facility: HOSPITAL | Age: 62
End: 2017-10-20
Attending: INTERNAL MEDICINE
Payer: COMMERCIAL

## 2017-10-20 ENCOUNTER — OFFICE VISIT (OUTPATIENT)
Dept: CARDIOLOGY | Facility: CLINIC | Age: 62
End: 2017-10-20
Payer: COMMERCIAL

## 2017-10-20 VITALS
OXYGEN SATURATION: 96 % | SYSTOLIC BLOOD PRESSURE: 144 MMHG | HEIGHT: 62 IN | WEIGHT: 253.75 LBS | BODY MASS INDEX: 46.7 KG/M2 | HEART RATE: 81 BPM | DIASTOLIC BLOOD PRESSURE: 76 MMHG

## 2017-10-20 DIAGNOSIS — J20.9 COPD (CHRONIC OBSTRUCTIVE PULMONARY DISEASE) WITH ACUTE BRONCHITIS: ICD-10-CM

## 2017-10-20 DIAGNOSIS — I10 ESSENTIAL (PRIMARY) HYPERTENSION: ICD-10-CM

## 2017-10-20 DIAGNOSIS — G47.31 COMPLEX SLEEP APNEA SYNDROME: ICD-10-CM

## 2017-10-20 DIAGNOSIS — E78.2 MIXED HYPERLIPIDEMIA: ICD-10-CM

## 2017-10-20 DIAGNOSIS — Z79.01 CHRONIC ANTICOAGULATION: ICD-10-CM

## 2017-10-20 DIAGNOSIS — J44.0 COPD (CHRONIC OBSTRUCTIVE PULMONARY DISEASE) WITH ACUTE BRONCHITIS: Primary | ICD-10-CM

## 2017-10-20 DIAGNOSIS — I50.32 CHRONIC DIASTOLIC CONGESTIVE HEART FAILURE: ICD-10-CM

## 2017-10-20 DIAGNOSIS — J44.0 COPD (CHRONIC OBSTRUCTIVE PULMONARY DISEASE) WITH ACUTE BRONCHITIS: ICD-10-CM

## 2017-10-20 DIAGNOSIS — Z95.2 STATUS POST HEART VALVE REPLACEMENT WITH MECHANICAL VALVE: ICD-10-CM

## 2017-10-20 DIAGNOSIS — R06.02 SHORTNESS OF BREATH: ICD-10-CM

## 2017-10-20 DIAGNOSIS — E66.01 MORBID OBESITY WITH BMI OF 50.0-59.9, ADULT: ICD-10-CM

## 2017-10-20 DIAGNOSIS — J20.9 COPD (CHRONIC OBSTRUCTIVE PULMONARY DISEASE) WITH ACUTE BRONCHITIS: Primary | ICD-10-CM

## 2017-10-20 DIAGNOSIS — Z95.2 H/O MITRAL VALVE REPLACEMENT WITH MECHANICAL VALVE: ICD-10-CM

## 2017-10-20 LAB — BNP SERPL-MCNC: 120 PG/ML

## 2017-10-20 PROCEDURE — 36415 COLL VENOUS BLD VENIPUNCTURE: CPT

## 2017-10-20 PROCEDURE — 99214 OFFICE O/P EST MOD 30 MIN: CPT | Mod: S$GLB,,, | Performed by: INTERNAL MEDICINE

## 2017-10-20 PROCEDURE — 83880 ASSAY OF NATRIURETIC PEPTIDE: CPT

## 2017-10-20 PROCEDURE — 99999 PR PBB SHADOW E&M-EST. PATIENT-LVL IV: CPT | Mod: PBBFAC,,, | Performed by: INTERNAL MEDICINE

## 2017-10-20 NOTE — PROGRESS NOTES
Subjective:   Patient ID:  Elayne Almanzar is a 62 y.o. female who presents for follow-up of Congestive Heart Failure (Hospital f/u 17) and Shortness of Breath      HPI: She was admitted last month with shortness of breath and treated for a COPD flair as well as some superimposed volume overload. It was recommend she take lasix daily but she has continued to use it as needed about once a week. She states she initially felt better while taking prednisone but since stopping the prednisone, she has been wheezing, coughing and short of breath. She has no edema, pnd, or orthopnea. She has no fevers. Her cough is productive of clear sputum. She has not yet been able to obtain a cpap mask due to the high co-pay. Her last echo was 10/16 and showed an LVEF of 55% and a well seated mechanical mitral valve with a mean gradient of 4.       Past Medical History:   Diagnosis Date    Acute on chronic diastolic congestive heart failure     Anticoagulant long-term use     Atrial fibrillation     Cataract     Chronic kidney disease     COPD (chronic obstructive pulmonary disease)     Depression     Dysuria     Flank pain     HTN (hypertension)     Nephrolithiasis     KEYSHAWN (obstructive sleep apnea)     awaiting CPAP machine     Rheumatic disease of mitral valve     Urinary incontinence     Urinary tract infection        Past Surgical History:   Procedure Laterality Date     SECTION      kidney stone removal      MITRAL VALVE REPLACEMENT  2004    TUBAL LIGATION         Social History     Social History    Marital status:      Spouse name: N/A    Number of children: 2    Years of education: N/A     Occupational History    Cook      Retired     Social History Main Topics    Smoking status: Former Smoker     Packs/day: 0.50     Years: 20.00     Types: Cigarettes     Quit date: 2002    Smokeless tobacco: Never Used    Alcohol use No    Drug use: No    Sexual activity: No  "    Other Topics Concern    None     Social History Narrative    Disability since 2004.  Laid off 2002.  Previously worked as cook in a kitchen.         Family History   Problem Relation Age of Onset    Stroke Paternal Grandmother     Colon cancer Maternal Grandmother     Cancer Brother      testicular cancer    Diabetes Sister     Hypertension Sister     Breast cancer Paternal Aunt     Diabetes Sister     Diabetes Sister     Heart disease Father 70     MI    Diabetes Father     Colon cancer Mother        Patient's Medications   New Prescriptions    No medications on file   Previous Medications    ALBUTEROL 90 MCG/ACTUATION INHALER    Inhale 1-2 puffs into the lungs every 6 (six) hours as needed for Wheezing or Shortness of Breath. Rescue    ASPIRIN (ECOTRIN) 81 MG EC TABLET    Take 81 mg by mouth once daily.     BRIMONIDINE 0.2% (ALPHAGAN) 0.2 % DROP    Place 1 drop into the right eye 3 (three) times daily.    ERGOCALCIFEROL (ERGOCALCIFEROL) 50,000 UNIT CAP    Take 1 capsule (50,000 Units total) by mouth twice a week.    FERROUS SULFATE 325 MG (65 MG IRON) TAB TABLET    Take 1 tablet (325 mg total) by mouth once daily. Take one tablet daily on an empty stomach for anemia    FLUTICASONE (FLONASE) 50 MCG/ACTUATION NASAL SPRAY    1 spray by Each Nare route 2 (two) times daily as needed for Rhinitis.    FLUTICASONE-SALMETEROL 250-50 MCG/DOSE (ADVAIR) 250-50 MCG/DOSE DISKUS INHALER    Inhale 1 puff into the lungs 2 (two) times daily.    FUROSEMIDE (LASIX) 20 MG TABLET    Take 1 tablet (20 mg total) by mouth once daily.    METOPROLOL SUCCINATE (TOPROL-XL) 200 MG 24 HR TABLET    Take 1 tablet (200 mg total) by mouth once daily.    NEEDLE, DISP, 26 GAUGE 26 GAUGE X 1/2" NDLE    1 application by Misc.(Non-Drug; Combo Route) route once a week.    OXYBUTYNIN (DITROPAN-XL) 10 MG 24 HR TABLET    Take 10 mg by mouth once daily.     OXYBUTYNIN (DITROPAN-XL) 10 MG 24 HR TABLET    TAKE 1 TABLET (10 MG TOTAL) BY MOUTH " "ONCE DAILY.    PRAVASTATIN (PRAVACHOL) 40 MG TABLET    Take 1 tablet (40 mg total) by mouth once daily.    SERTRALINE (ZOLOFT) 100 MG TABLET    Take 1 tablet (100 mg total) by mouth once daily.    WARFARIN (COUMADIN) 5 MG TABLET    5mg Monday and 7.5mg all other days or Or as directed by [facility].   Modified Medications    No medications on file   Discontinued Medications    No medications on file       Review of Systems   Constitution: Negative for weakness, malaise/fatigue and weight gain.   HENT: Negative for hearing loss.    Eyes: Negative for visual disturbance.   Cardiovascular: Positive for dyspnea on exertion. Negative for chest pain, claudication, leg swelling, near-syncope, orthopnea, palpitations, paroxysmal nocturnal dyspnea and syncope.   Respiratory: Positive for cough, shortness of breath, sleep disturbances due to breathing and wheezing. Negative for snoring.    Endocrine: Negative for cold intolerance, heat intolerance, polydipsia, polyphagia and polyuria.   Hematologic/Lymphatic: Negative for bleeding problem. Does not bruise/bleed easily.   Skin: Negative for rash and suspicious lesions.   Musculoskeletal: Negative for arthritis, falls, joint pain, muscle weakness and myalgias.   Gastrointestinal: Negative for abdominal pain, change in bowel habit, constipation, diarrhea, heartburn, hematochezia, melena and nausea.   Genitourinary: Negative for hematuria and nocturia.   Neurological: Negative for excessive daytime sleepiness, dizziness, headaches, light-headedness and loss of balance.   Psychiatric/Behavioral: Negative for depression. The patient is not nervous/anxious.    Allergic/Immunologic: Negative for environmental allergies.       BP (!) 144/76 (BP Location: Left arm, Patient Position: Sitting, BP Method: Large (Automatic))   Pulse 81   Ht 5' 2" (1.575 m)   Wt 115.1 kg (253 lb 12 oz)   LMP 07/22/2012   SpO2 96%   BMI 46.41 kg/m²     Objective:   Physical Exam   Constitutional: " She is oriented to person, place, and time. She appears well-developed and well-nourished.        HENT:   Head: Normocephalic and atraumatic.   Mouth/Throat: Oropharynx is clear and moist.   Eyes: Conjunctivae and EOM are normal. Pupils are equal, round, and reactive to light. No scleral icterus.   Neck: Normal range of motion. Neck supple. No hepatojugular reflux and no JVD present. No tracheal deviation present. No thyromegaly present.   Cardiovascular: Normal rate, normal heart sounds and intact distal pulses.  An irregularly irregular rhythm present. PMI is not displaced.    Pulses:       Carotid pulses are 2+ on the right side, and 2+ on the left side.       Radial pulses are 2+ on the right side, and 2+ on the left side.        Dorsalis pedis pulses are 2+ on the right side, and 2+ on the left side.        Posterior tibial pulses are 2+ on the right side, and 2+ on the left side.   Mechanical click present   Pulmonary/Chest: Effort normal. She has wheezes.   Abdominal: Soft. Bowel sounds are normal. She exhibits no distension and no mass. There is no hepatosplenomegaly. There is no tenderness.   Musculoskeletal: She exhibits no edema or tenderness.   Lymphadenopathy:     She has no cervical adenopathy.   Neurological: She is alert and oriented to person, place, and time.   Skin: Skin is warm and dry. No rash noted. No cyanosis or erythema. Nails show no clubbing.   Psychiatric: She has a normal mood and affect. Her speech is normal and behavior is normal.       Lab Results   Component Value Date     09/18/2017    K 4.5 09/18/2017     09/18/2017    CO2 32 (H) 09/18/2017    BUN 22 09/18/2017    CREATININE 1.2 09/18/2017    GLU 81 09/18/2017    HGBA1C 6.1 05/12/2017    MG 1.6 09/08/2017    AST 16 09/18/2017    ALT 10 09/18/2017    ALBUMIN 3.3 (L) 09/18/2017    PROT 6.9 09/18/2017    BILITOT 0.4 09/18/2017    WBC 6.52 09/08/2017    HGB 10.6 (L) 09/08/2017    HCT 37.8 09/08/2017    MCV 71 (L) 09/08/2017      09/08/2017    INR 2.6 09/22/2017    INR 2.7 (H) 09/08/2017    TSH 2.146 05/12/2017    CHOL 218 (H) 09/18/2017    HDL 54 09/18/2017    LDLCALC 132.8 09/18/2017    TRIG 156 (H) 09/18/2017     (H) 08/15/2017       Assessment:     1. COPD (chronic obstructive pulmonary disease) with acute bronchitis    2. Chronic diastolic congestive heart failure : Increase lasix to daily. BNP today and BMP in one week.    3. Essential (primary) hypertension : BP above goal today. Lasix daily added.   4. H/O mitral valve replacement with mechanical valve : Echo ordered. SBE prophylaxis is recommended prior to dental procedures.   5. Mixed hyperlipidemia : Lipids improved on pravastatin. I am not increasing th dose due to muscle cramps.   6. Status post heart valve replacement with mechanical valve    7. Chronic anticoagulation    8. Morbid obesity with BMI of 50.0-59.9, adult    9. Complex sleep apnea syndrome : She is looking for a less expensive cpap mask.   10. Shortness of breath : I am increasing her diuretics and setting her up to see pulmonary to see if her COPD regimen needs adjustment.        Plan:     Elayne was seen today for congestive heart failure and shortness of breath.    Diagnoses and all orders for this visit:    COPD (chronic obstructive pulmonary disease) with acute bronchitis  -     Brain natriuretic peptide; Future  -     Basic metabolic panel; Future  -     2D echo with color flow doppler; Future    Chronic diastolic congestive heart failure  -     Brain natriuretic peptide; Future  -     Basic metabolic panel; Future  -     2D echo with color flow doppler; Future    Essential (primary) hypertension  -     Brain natriuretic peptide; Future  -     Basic metabolic panel; Future  -     2D echo with color flow doppler; Future    H/O mitral valve replacement with mechanical valve  -     Brain natriuretic peptide; Future  -     Basic metabolic panel; Future  -     2D echo with color flow doppler;  Future    Mixed hyperlipidemia  -     Brain natriuretic peptide; Future  -     Basic metabolic panel; Future  -     2D echo with color flow doppler; Future    Status post heart valve replacement with mechanical valve  -     Brain natriuretic peptide; Future  -     Basic metabolic panel; Future  -     2D echo with color flow doppler; Future    Chronic anticoagulation    Morbid obesity with BMI of 50.0-59.9, adult    Complex sleep apnea syndrome    Shortness of breath        Thank you for allowing me to participate in this patient's care. Please do not hesitate to contact me with any questions or concerns.

## 2017-10-20 NOTE — LETTER
October 20, 2017      Nubia Crocker MD  [address]  [place] [address]           [facility] - Cardiology  [address]  [place] [address]  Phone: [phone]          Patient: Elayne Almanzar   MR Number: [id number]   YOB: 1955   Date of Visit: 10/20/2017       Dear Dr. Nubia Crocker:    Thank you for referring Elayne Almanzar to me for evaluation. Attached you will find relevant portions of my assessment and plan of care.    If you have questions, please do not hesitate to call me. I look forward to following Elayne Almanzar along with you.    Sincerely,    Lorraine Messina MD    Enclosure  CC:  No Recipients    If you would like to receive this communication electronically, please contact [email or web address] or [phone] to request more information on [employer] Link access.    For providers and/or their staff who would like to refer a patient to [facility], please contact us through our one-stop-shop provider referral line, [facility], at [phone].    If you feel you have received this communication in error or would no longer like to receive these types of communications, please e-mail [email or web address]

## 2017-10-23 DIAGNOSIS — J44.9 CHRONIC OBSTRUCTIVE PULMONARY DISEASE, UNSPECIFIED COPD TYPE: Primary | ICD-10-CM

## 2017-10-23 NOTE — PROGRESS NOTES
The pt will be scheduling multiple (~11) dental extractions and the fitting of new dentures in the near future.  She will need to hold coumadin x 4-5 days prior and, due to her hx of a mech heart valve, she will require a lovenox bridge.  I have provided a clearance for this procedure to [email or web address] and asked that we are informed of a date, once determined, for this procedure.

## 2017-10-24 ENCOUNTER — OFFICE VISIT (OUTPATIENT)
Dept: UROLOGY | Facility: CLINIC | Age: 62
End: 2017-10-24
Payer: COMMERCIAL

## 2017-10-24 ENCOUNTER — ANTI-COAG VISIT (OUTPATIENT)
Dept: CARDIOLOGY | Facility: CLINIC | Age: 62
End: 2017-10-24
Payer: COMMERCIAL

## 2017-10-24 VITALS
BODY MASS INDEX: 46.65 KG/M2 | WEIGHT: 253.5 LBS | HEART RATE: 91 BPM | HEIGHT: 62 IN | DIASTOLIC BLOOD PRESSURE: 90 MMHG | SYSTOLIC BLOOD PRESSURE: 127 MMHG

## 2017-10-24 DIAGNOSIS — Z95.2 STATUS POST HEART VALVE REPLACEMENT WITH MECHANICAL VALVE: ICD-10-CM

## 2017-10-24 DIAGNOSIS — N39.46 MIXED URGE AND STRESS INCONTINENCE: Primary | ICD-10-CM

## 2017-10-24 DIAGNOSIS — N13.30 HYDRONEPHROSIS, UNSPECIFIED HYDRONEPHROSIS TYPE: ICD-10-CM

## 2017-10-24 DIAGNOSIS — I48.20 CHRONIC ATRIAL FIBRILLATION WITH RAPID VENTRICULAR RESPONSE: ICD-10-CM

## 2017-10-24 DIAGNOSIS — Z79.01 LONG-TERM (CURRENT) USE OF ANTICOAGULANTS: Primary | ICD-10-CM

## 2017-10-24 DIAGNOSIS — N20.0 NEPHROLITHIASIS: ICD-10-CM

## 2017-10-24 LAB
BILIRUB SERPL-MCNC: NORMAL MG/DL
BLOOD URINE, POC: NORMAL
COLOR, POC UA: YELLOW
GLUCOSE UR QL STRIP: NORMAL
INR PPP: 3.1 (ref 2.5–3.5)
KETONES UR QL STRIP: NORMAL
LEUKOCYTE ESTERASE URINE, POC: NORMAL
NITRITE, POC UA: NORMAL
PH, POC UA: 5
POC RESIDUAL URINE VOLUME: 80 ML (ref 0–100)
PROTEIN, POC: NORMAL
SPECIFIC GRAVITY, POC UA: 1.02
UROBILINOGEN, POC UA: NORMAL

## 2017-10-24 PROCEDURE — 85610 PROTHROMBIN TIME: CPT | Mod: QW,S$GLB,, | Performed by: PHARMACIST

## 2017-10-24 PROCEDURE — 99999 PR PBB SHADOW E&M-EST. PATIENT-LVL V: CPT | Mod: PBBFAC,,, | Performed by: NURSE PRACTITIONER

## 2017-10-24 PROCEDURE — 51798 US URINE CAPACITY MEASURE: CPT | Mod: S$GLB,,, | Performed by: NURSE PRACTITIONER

## 2017-10-24 PROCEDURE — 99213 OFFICE O/P EST LOW 20 MIN: CPT | Mod: 25,S$GLB,, | Performed by: NURSE PRACTITIONER

## 2017-10-24 PROCEDURE — 81002 URINALYSIS NONAUTO W/O SCOPE: CPT | Mod: S$GLB,,, | Performed by: NURSE PRACTITIONER

## 2017-10-24 RX ORDER — AMOXICILLIN 500 MG/1
CAPSULE ORAL
COMMUNITY
Start: 2017-10-21 | End: 2017-11-22 | Stop reason: ALTCHOICE

## 2017-10-24 RX ORDER — HYDROCODONE BITARTRATE AND ACETAMINOPHEN 5; 325 MG/1; MG/1
TABLET ORAL
COMMUNITY
Start: 2017-10-21 | End: 2017-11-02

## 2017-10-24 RX ORDER — OXYBUTYNIN CHLORIDE 15 MG/1
15 TABLET, EXTENDED RELEASE ORAL DAILY
Qty: 30 TABLET | Refills: 11 | Status: SHIPPED | OUTPATIENT
Start: 2017-10-24 | End: 2018-10-16 | Stop reason: SDUPTHER

## 2017-10-24 NOTE — PROGRESS NOTES
CHIEF COMPLAINT:    Mrs. Almanzar is a 62 y.o. female presenting for hydronephrosis.   PRESENTING ILLNESS:    Elayne Almanzar is a 62 y.o. female with a PMH of kidney stones who presents for hydronephrosis   Last seen in the clinic with me on 9/2016.   She was last seen on 3/15/2013 with Dr. Galindo for uteroscope.    She has a hx of Afib and on coumadin.    She had a CTRSS on 9/23/17 that showed a 9 mm left UVJ stone and hydro. She was scheduled for a stone intervention at that time. She cancelled it because she stated the pain resolved and had passed the stone. She had the stone but has thrown it away now. She did not follow up for a renal US after passing the stone.     Today, she reports frequency >8x- on lasix daily, nocturia 0-1x, urgency, urge incontinence, and stress incontinence wears pads 2-3 daily. She takes oxybutynin 10 mg XR daily. She reports dry eyes and dry mouth. Denies dysuria or hematuria. Sometimes good urinary stream and sometimes a thin, slow stream. Reports complete bladder emptying. She takes flomax daily with no problems. She reports if she does not take flomax then she notices abdominal bloating and more difficulty urinating.     She also reports intermittent right flank pain that occurs 2 times a week. She describes the pain as a punch or sharpness to right flank and rates the pain a 6/10. Nothing worsens or improves pain. She does not take pain meds.     REVIEW OF SYSTEMS:    Review of Systems    Constitutional: Negative for fever and chills.   HENT: Negative for hearing loss.   Eyes: Negative for visual disturbance.   Respiratory: Negative for shortness of breath.   Cardiovascular: Negative for chest pain.   Gastrointestinal: Negative for nausea, vomiting, and constipation.   Genitourinary:  See above  Neurological: Negative for dizziness.   Hematological: Does not bruise/bleed easily.   Psychiatric/Behavioral: Negative for confusion.     PATIENT HISTORY:    Past Medical History:    Diagnosis Date    Acute on chronic diastolic congestive heart failure     Anticoagulant long-term use     Atrial fibrillation     Cataract     Chronic kidney disease     COPD (chronic obstructive pulmonary disease)     Depression     Dysuria     Flank pain     HTN (hypertension)     Nephrolithiasis     KEYSHAWN (obstructive sleep apnea)     awaiting CPAP machine     Rheumatic disease of mitral valve     Urinary incontinence     Urinary tract infection        Past Surgical History:   Procedure Laterality Date     SECTION      kidney stone removal      MITRAL VALVE REPLACEMENT  2004    TUBAL LIGATION         Family History   Problem Relation Age of Onset    Stroke Paternal Grandmother     Colon cancer Maternal Grandmother     Cancer Brother      testicular cancer    Diabetes Sister     Hypertension Sister     Breast cancer Paternal Aunt     Diabetes Sister     Diabetes Sister     Heart disease Father 70     MI    Diabetes Father     Colon cancer Mother        Social History     Social History    Marital status:      Spouse name: N/A    Number of children: 2    Years of education: N/A     Occupational History    Cook      Retired     Social History Main Topics    Smoking status: Former Smoker     Packs/day: 0.50     Years: 20.00     Types: Cigarettes     Quit date: 2002    Smokeless tobacco: Never Used    Alcohol use No    Drug use: No    Sexual activity: No     Other Topics Concern    Not on file     Social History Narrative    Disability since .  Laid off .  Previously worked as cook in a kitchen.         Allergies:  Patient has no known allergies.    Medications:    Current Outpatient Prescriptions:     albuterol 90 mcg/actuation inhaler, Inhale 1-2 puffs into the lungs every 6 (six) hours as needed for Wheezing or Shortness of Breath. Rescue, Disp: 1 Inhaler, Rfl: 6    amoxicillin (AMOXIL) 500 MG capsule, , Disp: , Rfl:     aspirin (ECOTRIN)  "81 MG EC tablet, Take 81 mg by mouth once daily. , Disp: , Rfl:     brimonidine 0.2% (ALPHAGAN) 0.2 % Drop, Place 1 drop into the right eye 3 (three) times daily. (Patient taking differently: Place 1 drop into the right eye 2 (two) times daily. ), Disp: 15 mL, Rfl: 3    ergocalciferol (ERGOCALCIFEROL) 50,000 unit Cap, Take 1 capsule (50,000 Units total) by mouth twice a week. (Patient taking differently: Take 50,000 Units by mouth once a week. on tuesday), Disp: 24 capsule, Rfl: 0    fluticasone (FLONASE) 50 mcg/actuation nasal spray, 1 spray by Each Nare route 2 (two) times daily as needed for Rhinitis., Disp: 15 g, Rfl: 3    fluticasone-salmeterol 250-50 mcg/dose (ADVAIR) 250-50 mcg/dose diskus inhaler, Inhale 1 puff into the lungs 2 (two) times daily., Disp: 1 each, Rfl: 5    furosemide (LASIX) 20 MG tablet, Take 1 tablet (20 mg total) by mouth once daily., Disp: 30 tablet, Rfl: 3    metoprolol succinate (TOPROL-XL) 200 MG 24 hr tablet, Take 1 tablet (200 mg total) by mouth once daily. (Patient taking differently: Take 100 mg by mouth once daily. ), Disp: 30 tablet, Rfl: 3    needle, disp, 26 gauge 26 gauge x 1/2" Ndle, 1 application by Misc.(Non-Drug; Combo Route) route once a week., Disp: 10 each, Rfl: 0    pravastatin (PRAVACHOL) 40 MG tablet, Take 1 tablet (40 mg total) by mouth once daily., Disp: 30 tablet, Rfl: 3    sertraline (ZOLOFT) 100 MG tablet, Take 1 tablet (100 mg total) by mouth once daily., Disp: 30 tablet, Rfl: 4    warfarin (COUMADIN) 5 MG tablet, 5mg Monday and 7.5mg all other days or Or as directed by [facility]. (Patient taking differently: Take 7.5mg by mouth once daily or Or as directed by [facility].), Disp: 45 tablet, Rfl: 6    ferrous sulfate 325 mg (65 mg iron) Tab tablet, Take 1 tablet (325 mg total) by mouth once daily. Take one tablet daily on an empty stomach for anemia (Patient taking differently: Take one tablet daily on an empty stomach for anemia), Disp: 30 " tablet, Rfl: 2    hydrocodone-acetaminophen 5-325mg (NORCO) 5-325 mg per tablet, , Disp: , Rfl:     oxybutynin (DITROPAN XL) 15 MG TR24, Take 1 tablet (15 mg total) by mouth once daily., Disp: 30 tablet, Rfl: 11    PHYSICAL EXAMINATION:    Constitutional: She is oriented to person, place, and time. She appears well-developed and well-nourished.  She is in no apparent distress.    Neck: No tracheal deviation present.     Cardiovascular: Normal rate.      Pulmonary/Chest: Effort normal. No respiratory distress.     Abdominal:  She exhibits no distension.  Right CVA tenderness and no left CVA tenderness.     Lymphadenopathy:          Right: No supraclavicular adenopathy present.        Left: No supraclavicular adenopathy present.     Neurological: She is alert and oriented to person, place, and time.     Skin: Skin is warm and dry.     Extremities: No pitting edema noted in lower extremities bilaterally    Psych: Cooperative with normal affect.    Physical Exam      LABS:    U/a today showed + leuk, and trace protein. Spec grav 1.020 and ph 5.   PVR with bladder scan showed 80 cc.     IMPRESSION:    Encounter Diagnoses   Name Primary?    Mixed urge and stress incontinence Yes    Nephrolithiasis     Hydronephrosis, unspecified hydronephrosis type        PLAN:  -Discussed plan of care with pt,  -Renal US to reassess hydro. Will notify her with the results.   -Increase oxybutynin to 15 mg XR daily.   -RTC in 1 month to reassess OAB symptoms.     I encouraged her or any of her family members to call or email me with questions and/or concerns.      ALVAREZ Echevarria

## 2017-10-24 NOTE — LETTER
October 24, 2017      Nubia Crocker MD  [address]  [place] [address]           [facility] - Urology 4th Floor  [address]  [facility] [address]  Phone: [phone]          Patient: Elayne Almanzar   MR Number: [id number]   YOB: 1955   Date of Visit: 10/24/2017       Dear Dr. Nubia Crocker:    Thank you for referring Elayne Almanzar to me for evaluation. Attached you will find relevant portions of my assessment and plan of care.    If you have questions, please do not hesitate to call me. I look forward to following Elayne Almanzar along with you.    Sincerely,    Diane Wilkinson, CHARLA    Enclosure  CC:  No Recipients    If you would like to receive this communication electronically, please contact [email or web address] or [phone] to request more information on [employer] Link access.    For providers and/or their staff who would like to refer a patient to [facility], please contact us through our one-stop-shop provider referral line, [facility], at [phone].    If you feel you have received this communication in error or would no longer like to receive these types of communications, please e-mail [email or web address]

## 2017-10-24 NOTE — PATIENT INSTRUCTIONS
Avoid Bladder Irritants: Tea, coffee, caffeine, alcohol, artificial sweeteners, citrus, spicy foods, acidic foods,chocolate, tomato-based foods, smoking

## 2017-10-26 DIAGNOSIS — R10.9 BILATERAL FLANK PAIN: ICD-10-CM

## 2017-10-26 DIAGNOSIS — N13.30 HYDRONEPHROSIS, UNSPECIFIED HYDRONEPHROSIS TYPE: ICD-10-CM

## 2017-10-30 ENCOUNTER — OFFICE VISIT (OUTPATIENT)
Dept: OBSTETRICS AND GYNECOLOGY | Facility: CLINIC | Age: 62
End: 2017-10-30
Attending: OBSTETRICS & GYNECOLOGY
Payer: COMMERCIAL

## 2017-10-30 ENCOUNTER — HOSPITAL ENCOUNTER (OUTPATIENT)
Dept: RADIOLOGY | Facility: OTHER | Age: 62
Discharge: HOME | End: 2017-10-30
Attending: NURSE PRACTITIONER
Payer: COMMERCIAL

## 2017-10-30 VITALS
WEIGHT: 253.31 LBS | DIASTOLIC BLOOD PRESSURE: 90 MMHG | HEIGHT: 62 IN | BODY MASS INDEX: 46.61 KG/M2 | SYSTOLIC BLOOD PRESSURE: 124 MMHG

## 2017-10-30 DIAGNOSIS — N39.46 MIXED URGE AND STRESS INCONTINENCE: ICD-10-CM

## 2017-10-30 DIAGNOSIS — N13.30 HYDRONEPHROSIS, UNSPECIFIED HYDRONEPHROSIS TYPE: ICD-10-CM

## 2017-10-30 DIAGNOSIS — Z01.419 WELL FEMALE EXAM WITH ROUTINE GYNECOLOGICAL EXAM: Primary | ICD-10-CM

## 2017-10-30 DIAGNOSIS — N20.0 NEPHROLITHIASIS: ICD-10-CM

## 2017-10-30 DIAGNOSIS — E66.01 MORBID OBESITY WITH BMI OF 50.0-59.9, ADULT: ICD-10-CM

## 2017-10-30 PROCEDURE — 76770 US EXAM ABDO BACK WALL COMP: CPT | Mod: 26,,, | Performed by: RADIOLOGY

## 2017-10-30 PROCEDURE — 99999 PR PBB SHADOW E&M-EST. PATIENT-LVL III: CPT | Mod: PBBFAC,,, | Performed by: OBSTETRICS & GYNECOLOGY

## 2017-10-30 PROCEDURE — 76770 US EXAM ABDO BACK WALL COMP: CPT | Mod: TC

## 2017-10-30 PROCEDURE — 88175 CYTOPATH C/V AUTO FLUID REDO: CPT

## 2017-10-30 PROCEDURE — 99396 PREV VISIT EST AGE 40-64: CPT | Mod: S$GLB,,, | Performed by: OBSTETRICS & GYNECOLOGY

## 2017-10-30 RX ORDER — TAMSULOSIN HYDROCHLORIDE 0.4 MG/1
0.4 CAPSULE ORAL DAILY
Qty: 30 CAPSULE | Refills: 11 | Status: SHIPPED | OUTPATIENT
Start: 2017-10-30 | End: 2018-11-09 | Stop reason: SDUPTHER

## 2017-10-30 NOTE — PROGRESS NOTES
SUBJECTIVE:   62 y.o. female   for routine gyn exam. Patient's last menstrual period was 2012..  She has no unusual complaints.  No PMB.  No HRT.         Past Medical History:   Diagnosis Date    Acute on chronic diastolic congestive heart failure     Anticoagulant long-term use     Atrial fibrillation     Cataract     Chronic kidney disease     COPD (chronic obstructive pulmonary disease)     Depression     Dysuria     Flank pain     HTN (hypertension)     Nephrolithiasis     KEYSHAWN (obstructive sleep apnea)     awaiting CPAP machine     Rheumatic disease of mitral valve     Urinary incontinence     Urinary tract infection      Past Surgical History:   Procedure Laterality Date     SECTION      kidney stone removal      MITRAL VALVE REPLACEMENT  2004    TUBAL LIGATION       Social History     Social History    Marital status:      Spouse name: N/A    Number of children: 2    Years of education: N/A     Occupational History    Cook      Retired     Social History Main Topics    Smoking status: Former Smoker     Packs/day: 0.50     Years: 20.00     Types: Cigarettes     Quit date: 2002    Smokeless tobacco: Never Used    Alcohol use No    Drug use: No    Sexual activity: No     Other Topics Concern    Not on file     Social History Narrative    Disability since .  Laid off .  Previously worked as cook in a kitchen.       Family History   Problem Relation Age of Onset    Stroke Paternal Grandmother     Colon cancer Maternal Grandmother     Cancer Brother      testicular cancer    Diabetes Sister     Hypertension Sister     Breast cancer Paternal Aunt     Diabetes Sister     Diabetes Sister     Heart disease Father 70     MI    Diabetes Father     Colon cancer Mother     Ovarian cancer Neg Hx      OB History    Para Term  AB Living   4 3 3   1     SAB TAB Ectopic Multiple Live Births   1              # Outcome Date GA Lbr  "Fidel/2nd Weight Sex Delivery Anes PTL Lv   4 Term      CS-LTranv      3 SAB            2 Term      CS-LTranv      1 Term      Vag-Spont               Current Outpatient Prescriptions   Medication Sig Dispense Refill    albuterol 90 mcg/actuation inhaler Inhale 1-2 puffs into the lungs every 6 (six) hours as needed for Wheezing or Shortness of Breath. Rescue 1 Inhaler 6    amoxicillin (AMOXIL) 500 MG capsule       aspirin (ECOTRIN) 81 MG EC tablet Take 81 mg by mouth once daily.       brimonidine 0.2% (ALPHAGAN) 0.2 % Drop Place 1 drop into the right eye 3 (three) times daily. (Patient taking differently: Place 1 drop into the right eye 2 (two) times daily. ) 15 mL 3    ergocalciferol (ERGOCALCIFEROL) 50,000 unit Cap Take 1 capsule (50,000 Units total) by mouth twice a week. (Patient taking differently: Take 50,000 Units by mouth once a week. on tuesday) 24 capsule 0    fluticasone (FLONASE) 50 mcg/actuation nasal spray 1 spray by Each Nare route 2 (two) times daily as needed for Rhinitis. 15 g 3    fluticasone-salmeterol 250-50 mcg/dose (ADVAIR) 250-50 mcg/dose diskus inhaler Inhale 1 puff into the lungs 2 (two) times daily. 1 each 5    furosemide (LASIX) 20 MG tablet Take 1 tablet (20 mg total) by mouth once daily. 30 tablet 3    hydrocodone-acetaminophen 5-325mg (NORCO) 5-325 mg per tablet       metoprolol succinate (TOPROL-XL) 200 MG 24 hr tablet Take 1 tablet (200 mg total) by mouth once daily. (Patient taking differently: Take 100 mg by mouth once daily. ) 30 tablet 3    needle, disp, 26 gauge 26 gauge x 1/2" Ndle 1 application by Misc.(Non-Drug; Combo Route) route once a week. 10 each 0    oxybutynin (DITROPAN XL) 15 MG TR24 Take 1 tablet (15 mg total) by mouth once daily. 30 tablet 11    sertraline (ZOLOFT) 100 MG tablet Take 1 tablet (100 mg total) by mouth once daily. 30 tablet 4    warfarin (COUMADIN) 5 MG tablet 5mg Monday and 7.5mg all other days or Or as directed by [facility]. " "(Patient taking differently: Take 7.5mg by mouth once daily or Or as directed by [facility].) 45 tablet 6    tamsulosin (FLOMAX) 0.4 mg Cp24 TAKE 1 CAPSULE (0.4 MG TOTAL) BY MOUTH ONCE DAILY. 30 capsule 11     No current facility-administered medications for this visit.      Allergies: Patient has no known allergies.     ROS:  Constitutional: no weight loss, weight gain, fever, fatigue  Eyes:  No vision changes, glasses/contacts  ENT/Mouth: No ulcers, sinus problems, ears ringing, headache  Cardiovascular: No inability to lie flat, chest pain, exercise intolerance, swelling, heart palpitations  Respiratory: + wheezing, no coughing blood, +shortness of breath, +cough  Gastrointestinal: No diarrhea, bloody stool, nausea/vomiting, constipation, gas, hemorrhoids  Genitourinary: No blood in urine, painful urination, urgency of urination, frequency of urination, incomplete emptying, incontinence, abnormal bleeding, painful periods, heavy periods, vaginal discharge, vaginal odor, painful intercourse, sexual problems, bleeding after intercourse.  Musculoskeletal: No muscle weakness  Skin/Breast: No painful breasts, nipple discharge, masses, rash, ulcers  Neurological: No passing out, seizures, numbness, headache  Endocrine: No diabetes, hypothyroid, hyperthyroid, hot flashes, hair loss, abnormal hair growth, ance  Psychiatric: No depression, crying  Hematologic: No bruises, bleeding, swollen lymph nodes, anemia.      OBJECTIVE:   The patient appears well, alert, oriented x 3, in no distress.  BP (!) 124/90 (BP Method: Large (Manual))   Ht 5' 2" (1.575 m)   Wt 114.9 kg (253 lb 4.9 oz)   LMP 07/22/2012   BMI 46.33 kg/m²   NECK: no thyromegaly, trachea midline  SKIN: no acne, striae, hirsutism  CHEST: Diffuse wheezing  CV: RRR  BREAST EXAM: breasts appear normal, no suspicious masses, no skin or nipple changes or axillary nodes  ABDOMEN: no hernias, masses, or hepatosplenomegaly  GENITALIA: normal external genitalia, " no erythema, no discharge  URETHRA: normal urethra, normal urethral meatus  VAGINA: Normal  CERVIX: no lesions or cervical motion tenderness  UTERUS: normal size, contour, position, consistency, mobility, non-tender  ADNEXA: no mass, fullness, tenderness      ASSESSMENT:   1. Well female exam with routine gynecological exam  Liquid-based pap smear, screening   2. Morbid obesity with BMI of 50.0-59.9, adult         PLAN:   Orders Placed This Encounter    Liquid-based pap smear, screening     Sees pulm MD for brochitis  Discussed weight loss, diet, exercise.  Insurance will not cover weight loss surgery  Return to clinic in 1 year

## 2017-10-30 NOTE — LETTER
October 30, 2017      Nubia Crocker MD  [address]  [facility] [address]           [facility] [address]  [address]  [facility] [address]  Phone: [phone]  Fax: [phone]          Patient: Elayne Almanzar   MR Number: [id number]   YOB: 1955   Date of Visit: 10/30/2017       Dear Dr. Nubia Crocker:    Thank you for referring Elayne Amlanzar to me for evaluation. Attached you will find relevant portions of my assessment and plan of care.    If you have questions, please do not hesitate to call me. I look forward to following Elayne Almanzar along with you.    Sincerely,    Breanne Dahl MD    Enclosure  CC:  No Recipients    If you would like to receive this communication electronically, please contact [email or web address] or [phone] to request more information on [employer] Link access.    For providers and/or their staff who would like to refer a patient to [facility], please contact us through our one-stop-shop provider referral line, [facility], at [phone].    If you feel you have received this communication in error or would no longer like to receive these types of communications, please e-mail [email or web address]

## 2017-11-01 ENCOUNTER — TELEPHONE (OUTPATIENT)
Dept: UROLOGY | Facility: CLINIC | Age: 62
End: 2017-11-01

## 2017-11-01 NOTE — TELEPHONE ENCOUNTER
Called to discuss results of renal US- Normal results.  Explained to f/u with pcp regarding pain. She verbalized understanding.

## 2017-11-02 ENCOUNTER — HOSPITAL ENCOUNTER (EMERGENCY)
Facility: OTHER | Age: 62
Discharge: HOME | End: 2017-11-02
Attending: EMERGENCY MEDICINE
Payer: COMMERCIAL

## 2017-11-02 VITALS
DIASTOLIC BLOOD PRESSURE: 59 MMHG | SYSTOLIC BLOOD PRESSURE: 168 MMHG | TEMPERATURE: 98 F | BODY MASS INDEX: 45.64 KG/M2 | HEIGHT: 62 IN | HEART RATE: 100 BPM | WEIGHT: 248 LBS | OXYGEN SATURATION: 92 % | RESPIRATION RATE: 19 BRPM

## 2017-11-02 DIAGNOSIS — R06.2 WHEEZING: ICD-10-CM

## 2017-11-02 DIAGNOSIS — J44.1 COPD WITH ACUTE EXACERBATION: Primary | ICD-10-CM

## 2017-11-02 LAB
ALBUMIN SERPL BCP-MCNC: 3.7 G/DL
ALP SERPL-CCNC: 61 U/L
ALT SERPL W/O P-5'-P-CCNC: 13 U/L
ANION GAP SERPL CALC-SCNC: 14 MMOL/L
ANISOCYTOSIS BLD QL SMEAR: ABNORMAL
AST SERPL-CCNC: 24 U/L
BASOPHILS # BLD AUTO: 0.01 K/UL
BASOPHILS NFR BLD: 0.1 %
BILIRUB SERPL-MCNC: 0.5 MG/DL
BNP SERPL-MCNC: 141 PG/ML
BUN SERPL-MCNC: 14 MG/DL
CALCIUM SERPL-MCNC: 9.9 MG/DL
CHLORIDE SERPL-SCNC: 103 MMOL/L
CO2 SERPL-SCNC: 25 MMOL/L
CREAT SERPL-MCNC: 0.9 MG/DL
DIFFERENTIAL METHOD: ABNORMAL
EOSINOPHIL # BLD AUTO: 1 K/UL
EOSINOPHIL NFR BLD: 13.6 %
ERYTHROCYTE [DISTWIDTH] IN BLOOD BY AUTOMATED COUNT: 17.9 %
EST. GFR  (AFRICAN AMERICAN): >60 ML/MIN/1.73 M^2
EST. GFR  (NON AFRICAN AMERICAN): >60 ML/MIN/1.73 M^2
GLUCOSE SERPL-MCNC: 89 MG/DL
HCT VFR BLD AUTO: 38.6 %
HGB BLD-MCNC: 10.8 G/DL
INR PPP: 2.9
LYMPHOCYTES # BLD AUTO: 1.5 K/UL
LYMPHOCYTES NFR BLD: 20.8 %
MCH RBC QN AUTO: 19.8 PG
MCHC RBC AUTO-ENTMCNC: 28 G/DL
MCV RBC AUTO: 71 FL
MONOCYTES # BLD AUTO: 0.6 K/UL
MONOCYTES NFR BLD: 8.6 %
NEUTROPHILS # BLD AUTO: 4 K/UL
NEUTROPHILS NFR BLD: 56.9 %
PLATELET # BLD AUTO: 196 K/UL
PLATELET BLD QL SMEAR: ABNORMAL
PMV BLD AUTO: 10.4 FL
POLYCHROMASIA BLD QL SMEAR: ABNORMAL
POTASSIUM SERPL-SCNC: 4.4 MMOL/L
PROT SERPL-MCNC: 7.7 G/DL
PROTHROMBIN TIME: 31 SEC
RBC # BLD AUTO: 5.45 M/UL
SODIUM SERPL-SCNC: 142 MMOL/L
TROPONIN I SERPL DL<=0.01 NG/ML-MCNC: 0.01 NG/ML
WBC # BLD AUTO: 6.98 K/UL

## 2017-11-02 PROCEDURE — 85610 PROTHROMBIN TIME: CPT

## 2017-11-02 PROCEDURE — 96374 THER/PROPH/DIAG INJ IV PUSH: CPT

## 2017-11-02 PROCEDURE — 99900035 HC TECH TIME PER 15 MIN (STAT)

## 2017-11-02 PROCEDURE — 85025 COMPLETE CBC W/AUTO DIFF WBC: CPT

## 2017-11-02 PROCEDURE — 83880 ASSAY OF NATRIURETIC PEPTIDE: CPT

## 2017-11-02 PROCEDURE — 94640 AIRWAY INHALATION TREATMENT: CPT | Mod: 76

## 2017-11-02 PROCEDURE — 93005 ELECTROCARDIOGRAM TRACING: CPT

## 2017-11-02 PROCEDURE — 84484 ASSAY OF TROPONIN QUANT: CPT

## 2017-11-02 PROCEDURE — 25000242 PHARM REV CODE 250 ALT 637 W/ HCPCS: Performed by: EMERGENCY MEDICINE

## 2017-11-02 PROCEDURE — 93010 ELECTROCARDIOGRAM REPORT: CPT | Mod: ,,, | Performed by: INTERNAL MEDICINE

## 2017-11-02 PROCEDURE — 63600175 PHARM REV CODE 636 W HCPCS: Performed by: EMERGENCY MEDICINE

## 2017-11-02 PROCEDURE — 80053 COMPREHEN METABOLIC PANEL: CPT

## 2017-11-02 PROCEDURE — 99284 EMERGENCY DEPT VISIT MOD MDM: CPT | Mod: 25

## 2017-11-02 RX ORDER — IPRATROPIUM BROMIDE AND ALBUTEROL SULFATE 2.5; .5 MG/3ML; MG/3ML
3 SOLUTION RESPIRATORY (INHALATION)
Status: COMPLETED | OUTPATIENT
Start: 2017-11-02 | End: 2017-11-02

## 2017-11-02 RX ORDER — PREDNISONE 20 MG/1
40 TABLET ORAL
Status: COMPLETED | OUTPATIENT
Start: 2017-11-02 | End: 2017-11-02

## 2017-11-02 RX ORDER — METHYLPREDNISOLONE SOD SUCC 125 MG
125 VIAL (EA) INJECTION
Status: COMPLETED | OUTPATIENT
Start: 2017-11-02 | End: 2017-11-02

## 2017-11-02 RX ORDER — PREDNISONE 20 MG/1
40 TABLET ORAL DAILY
Qty: 10 TABLET | Refills: 0 | Status: SHIPPED | OUTPATIENT
Start: 2017-11-02 | End: 2017-11-07

## 2017-11-02 RX ADMIN — METHYLPREDNISOLONE SODIUM SUCCINATE 125 MG: 125 INJECTION, POWDER, FOR SOLUTION INTRAMUSCULAR; INTRAVENOUS at 02:11

## 2017-11-02 RX ADMIN — IPRATROPIUM BROMIDE AND ALBUTEROL SULFATE 3 ML: .5; 3 SOLUTION RESPIRATORY (INHALATION) at 02:11

## 2017-11-02 RX ADMIN — PREDNISONE 40 MG: 20 TABLET ORAL at 05:11

## 2017-11-02 RX ADMIN — IPRATROPIUM BROMIDE AND ALBUTEROL SULFATE 3 ML: .5; 3 SOLUTION RESPIRATORY (INHALATION) at 04:11

## 2017-11-02 NOTE — ED PROVIDER NOTES
"Encounter Date: 11/2/2017    SCRIBE #1 NOTE: I, Saima Blancas, am scribing for, and in the presence of, Dr. Owens.       History     Chief Complaint   Patient presents with    Wheezing     pt states "my wheezing is back and none of my medications are working."  I feel like I can't breathe     Time seen by provider: 2:21 PM    This is a 62 y.o. female, with history of COPD, CHF, A-Fib, who presents with complaint of wheezing which started one month ago and has since worsened as of three days ago. The patient states that she has been out of advair for the last week and was supposed to  her prescription today. She reports associated difficulty breathing, cough, chest tightness, occasional headache, occasional rhinorrhea, left ear pain, diarrhea and sore throat but denies fever, chills, chest pain, palpitations, leg swelling, abdominal pain, dysuria, or changes in appetite. The patient states that cough worsened last night and is usually dry, but that she occasionally produces yellow sputum. She describes chest tightness as squeezing and attributes it to episodes of coughing. Patient states that "it feels like there's difficulty getting air in my lungs" after a night of coughing. The patient believes that sore throat is attributed to increased use of ventolin since she has been out of advair. Symptoms were unchanged with robitussin. Of note, the patient was recently diagnosed with obstructive sleep apnea and is scheduled to receive cpap in one week. Patient also states that she currently sleeps on three pillows each night, unchanged now.    Patient was admitted to hospital one month ago for COPD/CHF exacerbation. She states that current symptoms are inconsistent with previous episodes of CHF. Patient notes that she is currently on coumadin as well as amoxicillin for upcoming dental procedure. The patient reports that she is compliant to furosemide and denies a recent change in medications. The patient " "reports that she typically has asthmatic "flare ups" and is on prednisone twice per year. She has a PSHx of mechanical mitral valve replacement in  and stopped use of tobacco in .       The history is provided by the patient.     Review of patient's allergies indicates:  No Known Allergies  Past Medical History:   Diagnosis Date    Acute on chronic diastolic congestive heart failure     Anticoagulant long-term use     Atrial fibrillation     Cataract     Chronic kidney disease     COPD (chronic obstructive pulmonary disease)     Depression     Dysuria     Flank pain     HTN (hypertension)     Nephrolithiasis     KEYSHAWN (obstructive sleep apnea)     awaiting CPAP machine     Rheumatic disease of mitral valve     Urinary incontinence     Urinary tract infection      Past Surgical History:   Procedure Laterality Date     SECTION      kidney stone removal      MITRAL VALVE REPLACEMENT  2004    TUBAL LIGATION       Family History   Problem Relation Age of Onset    Stroke Paternal Grandmother     Colon cancer Maternal Grandmother     Cancer Brother      testicular cancer    Diabetes Sister     Hypertension Sister     Breast cancer Paternal Aunt     Diabetes Sister     Diabetes Sister     Heart disease Father 70     MI    Diabetes Father     Colon cancer Mother     Ovarian cancer Neg Hx      Social History   Substance Use Topics    Smoking status: Former Smoker     Packs/day: 0.50     Years: 20.00     Types: Cigarettes     Quit date: 2002    Smokeless tobacco: Never Used    Alcohol use No     Review of Systems   Constitutional: Negative for appetite change, chills and fever.   HENT: Positive for ear pain, rhinorrhea and sore throat.    Respiratory: Positive for cough, chest tightness, shortness of breath and wheezing.    Cardiovascular: Negative for chest pain, palpitations and leg swelling.   Gastrointestinal: Positive for diarrhea. Negative for abdominal pain, " constipation, nausea and vomiting.   Genitourinary: Negative for dysuria.   Musculoskeletal: Negative for back pain.   Skin: Negative for rash.   Neurological: Positive for headaches. Negative for weakness.   Hematological: Does not bruise/bleed easily.       Physical Exam     Initial Vitals [11/02/17 1301]   BP Pulse Resp Temp SpO2   (!) 183/85 88 20 98.3 °F (36.8 °C) 95 %      MAP       117.67         Physical Exam    Nursing note and vitals reviewed.  Constitutional: She appears well-developed and well-nourished. She is not diaphoretic. No distress.   Large body habitus.   HENT:   Head: Normocephalic and atraumatic.   Eyes: EOM are normal. Right eye exhibits no discharge. Left eye exhibits no discharge.   Neck: Normal range of motion.   Cardiovascular: Normal rate, regular rhythm and normal heart sounds. Exam reveals no gallop and no friction rub.    No murmur heard.  Pulmonary/Chest: No respiratory distress. She has wheezes. She has no rhonchi. She has no rales.   Diffuse inspiratory and expiratory wheezing.    Abdominal: Soft. There is no tenderness. There is no rebound and no guarding.   Musculoskeletal: Normal range of motion. She exhibits no edema or tenderness.   No edema.   Neurological: She is alert and oriented to person, place, and time.   Skin: Skin is warm and dry. No rash and no abscess noted. No erythema. No pallor.   Psychiatric: She has a normal mood and affect. Her behavior is normal. Judgment and thought content normal.         ED Course   Procedures  Labs Reviewed   CBC W/ AUTO DIFFERENTIAL - Abnormal; Notable for the following:        Result Value    RBC 5.45 (*)     Hemoglobin 10.8 (*)     MCV 71 (*)     MCH 19.8 (*)     MCHC 28.0 (*)     RDW 17.9 (*)     Eos # 1.0 (*)     Eosinophil% 13.6 (*)     All other components within normal limits   B-TYPE NATRIURETIC PEPTIDE - Abnormal; Notable for the following:      (*)     All other components within normal limits   PROTIME-INR - Abnormal;  Notable for the following:     Prothrombin Time 31.0 (*)     INR 2.9 (*)     All other components within normal limits   COMPREHENSIVE METABOLIC PANEL   TROPONIN I     EKG Readings: (Independently Interpreted)   Initial Reading: No STEMI.   EKG Reading (4:38PM)- A-Fib at rate of 88 bpm. No ST-T wave changes or acute ischemia compared to previous EKG. No STEMI.         X-Rays:   Independently Interpreted Readings:   Chest X-Ray: CXR Reading (2:41PM)- Cardiomegaly. Mechanical mitral valve in place. No infiltrate. No gross CHF.     Medical Decision Making:   Independently Interpreted Test(s):   I have ordered and independently interpreted X-rays - see prior notes.  I have ordered and independently interpreted EKG Reading(s) - see prior notes  Clinical Tests:   Lab Tests: Ordered and Reviewed  Radiological Study: Ordered and Reviewed  Medical Tests: Ordered and Reviewed  ED Management:      A 62 y.o. female patient with history of COPD, CHF, asthma, A-fib and mechanical valve on coumadin, presents with wheezing for one month worse in the past three days. She was admitted here about five weeks ago for CHF/COPD exacerbation and has been compliant with lasix since then. She ran out of advir one week ago with worsening symptoms since. Initial exam more consistent with COPD exacerbation, pt with no edema or rales but diffuse wheezing.     Patient treated with solumedrol and nebs with improvement, minimal wheezing on re-assess. ED workup with slightly elevated BNP but no overt CHF on X-Ray. No sign of pneumonia, and patient already on amoxicillin for dental infection which would should cover any bacterial bronchitis. Patient now ambulatory with no hypoxia or SOB. Can continue lasix regular dose and will add prednisone for five days to cover COPD exacerbation. Will restart advair as well. Patient stable for discharge. Will return for any worsening symptoms and f/u PCP        Imaging Results          X-Ray Chest PA And Lateral  (Final result)  Result time 11/02/17 14:36:27    Final result by Stacy Ovalle MD (11/02/17 14:36:27)                 Impression:        Prior median sternotomy for valve replacement.The cardiomediastinal silhouette is remains mildly enlarged, stable. Mild pulmonary vascular engorgement.    The lungs appear unchanged, without new confluent pulmonary parenchymal opacity/consolidation. No pleural fluid or pneumothorax.    Osseous structures appear intact.            Electronically signed by: STACY OVALLE MD  Date:     11/02/17  Time:    14:36              Narrative:    XR Chest    11/02/17 14:21:00    Accession# [id number]    CLINICAL INDICATION: 62 year old F with  wheezing    TECHNIQUE: PA and lateral radiographs of the chest.    COMPARISON:  09/07/2017    FINDINGS                                      Scribe Attestation:   Scribe #1: I performed the above scribed service and the documentation accurately describes the services I performed. I attest to the accuracy of the note.    I, Dr. El Owens, personally performed the services described in this documentation. All medical record entries made by the scribe were at my direction and in my presence.  I have reviewed the chart and agree that the record reflects my personal performance and is accurate and complete. El Owens MD.  12:41 AM 11/03/2017            ED Course      Clinical Impression:     1. COPD with acute exacerbation    2. Wheezing                                 El Owens MD  11/03/17 0042

## 2017-11-02 NOTE — ED NOTES
"Hourly rounding complete. Pt sitting in bed. No resp. Distress noted. Pt states "I feel much better." Pt denies any pain. Plan of care updated and discussed. Restroom and comfort needs addressed.  VS monitoring in progress. Call light within reach. Will continue to monitor.   "

## 2017-11-02 NOTE — ED NOTES
Hourly rounding Complete. Pt assisted to restroom. Pt denies pain. No respiratory distress noted. Plan of care discussed. VS monitoring in progress. Call light within reach. Will continue to monitor.

## 2017-11-02 NOTE — ED NOTES
Hourly rounding complete. Pt lying in bed. Pt states 0/10 pain level. Plan of care updated and discussed. VS monitoring in progress. Call light within reach. Comfort and restroom needs addressed. Will continue to monitor.

## 2017-11-02 NOTE — ED TRIAGE NOTES
Pt presents with wheezing for 1 month, recent hospitalization here about a month ago for asthma exacerbation. Pt has been out of advair for about a week, has albuterol but its not helping. Pt complains of chest tightness. Pt coughing during assessment, audible wheezing. Sometimes HA, denies NV.

## 2017-11-02 NOTE — ED NOTES
Ambulated pt per provider order. No respiratory distress noted. Pt O2 sats while ambulating 90%-95%. Pt demonstrated no SOB after ambulation or labored breathing.

## 2017-11-03 ENCOUNTER — HOSPITAL ENCOUNTER (OUTPATIENT)
Dept: CARDIOLOGY | Facility: CLINIC | Age: 62
Discharge: HOME | End: 2017-11-03
Payer: COMMERCIAL

## 2017-11-03 ENCOUNTER — LAB VISIT (OUTPATIENT)
Dept: LAB | Facility: HOSPITAL | Age: 62
End: 2017-11-03
Attending: INTERNAL MEDICINE
Payer: COMMERCIAL

## 2017-11-03 ENCOUNTER — DOCUMENTATION ONLY (OUTPATIENT)
Dept: CARDIOLOGY | Facility: CLINIC | Age: 62
End: 2017-11-03

## 2017-11-03 DIAGNOSIS — Z95.2 H/O MITRAL VALVE REPLACEMENT WITH MECHANICAL VALVE: ICD-10-CM

## 2017-11-03 DIAGNOSIS — I10 ESSENTIAL (PRIMARY) HYPERTENSION: ICD-10-CM

## 2017-11-03 DIAGNOSIS — Z95.2 STATUS POST HEART VALVE REPLACEMENT WITH MECHANICAL VALVE: ICD-10-CM

## 2017-11-03 DIAGNOSIS — J20.9 COPD (CHRONIC OBSTRUCTIVE PULMONARY DISEASE) WITH ACUTE BRONCHITIS: ICD-10-CM

## 2017-11-03 DIAGNOSIS — I50.32 CHRONIC DIASTOLIC CONGESTIVE HEART FAILURE: ICD-10-CM

## 2017-11-03 DIAGNOSIS — E78.2 MIXED HYPERLIPIDEMIA: ICD-10-CM

## 2017-11-03 DIAGNOSIS — J44.0 COPD (CHRONIC OBSTRUCTIVE PULMONARY DISEASE) WITH ACUTE BRONCHITIS: ICD-10-CM

## 2017-11-03 LAB
ANION GAP SERPL CALC-SCNC: 10 MMOL/L
BUN SERPL-MCNC: 18 MG/DL
CALCIUM SERPL-MCNC: 9.8 MG/DL
CHLORIDE SERPL-SCNC: 103 MMOL/L
CO2 SERPL-SCNC: 28 MMOL/L
CREAT SERPL-MCNC: 1 MG/DL
EST. GFR  (AFRICAN AMERICAN): >60 ML/MIN/1.73 M^2
EST. GFR  (NON AFRICAN AMERICAN): >60 ML/MIN/1.73 M^2
ESTIMATED PA SYSTOLIC PRESSURE: 26.15
GLUCOSE SERPL-MCNC: 108 MG/DL
POTASSIUM SERPL-SCNC: 4.8 MMOL/L
RETIRED EF AND QEF - SEE NOTES: 55 (ref 55–65)
SODIUM SERPL-SCNC: 141 MMOL/L
TRICUSPID VALVE REGURGITATION: NORMAL

## 2017-11-03 PROCEDURE — 93306 TTE W/DOPPLER COMPLETE: CPT | Mod: S$GLB,,, | Performed by: INTERNAL MEDICINE

## 2017-11-03 PROCEDURE — 36415 COLL VENOUS BLD VENIPUNCTURE: CPT

## 2017-11-03 PROCEDURE — 80048 BASIC METABOLIC PNL TOTAL CA: CPT

## 2017-11-03 NOTE — PROGRESS NOTES
Patient identified by 2 identifiers. Denies previous reactions to blood transfusions and allergies reviewed.  Procedure explained & consent obtained.  22 g IV placed to Rt Hand, flushed w/ 10cc NS pre & post contrast administration.  3cc Optison administered, echo images obtained.  Pt tolerated procedure well.  IV D/C'ed, preasure dsg applied.  Pt D/C'ed to home.

## 2017-11-06 ENCOUNTER — TELEPHONE (OUTPATIENT)
Dept: CARDIOLOGY | Facility: CLINIC | Age: 62
End: 2017-11-06

## 2017-11-06 NOTE — TELEPHONE ENCOUNTER
----- Message from Lorraine Messina MD sent at 11/3/2017  5:02 PM CDT -----  The echo showed normal heart function and a well seated mechanical mitral valve.

## 2017-11-06 NOTE — TELEPHONE ENCOUNTER
----- Message from Lorraine Messina MD sent at 11/3/2017  5:03 PM CDT -----  Kidney function is stable on increased lasix.

## 2017-11-07 ENCOUNTER — HOSPITAL ENCOUNTER (OUTPATIENT)
Dept: PULMONOLOGY | Facility: CLINIC | Age: 62
Discharge: HOME | End: 2017-11-07
Payer: COMMERCIAL

## 2017-11-07 ENCOUNTER — OFFICE VISIT (OUTPATIENT)
Dept: PULMONOLOGY | Facility: CLINIC | Age: 62
End: 2017-11-07
Payer: COMMERCIAL

## 2017-11-07 VITALS
DIASTOLIC BLOOD PRESSURE: 83 MMHG | WEIGHT: 250 LBS | BODY MASS INDEX: 46.01 KG/M2 | HEIGHT: 62 IN | HEART RATE: 82 BPM | OXYGEN SATURATION: 97 % | SYSTOLIC BLOOD PRESSURE: 132 MMHG

## 2017-11-07 DIAGNOSIS — J20.9 COPD (CHRONIC OBSTRUCTIVE PULMONARY DISEASE) WITH ACUTE BRONCHITIS: ICD-10-CM

## 2017-11-07 DIAGNOSIS — J44.9 CHRONIC OBSTRUCTIVE PULMONARY DISEASE, UNSPECIFIED COPD TYPE: ICD-10-CM

## 2017-11-07 DIAGNOSIS — J44.0 COPD (CHRONIC OBSTRUCTIVE PULMONARY DISEASE) WITH ACUTE BRONCHITIS: ICD-10-CM

## 2017-11-07 DIAGNOSIS — R06.02 SHORTNESS OF BREATH: Primary | ICD-10-CM

## 2017-11-07 DIAGNOSIS — D50.9 IRON DEFICIENCY ANEMIA, UNSPECIFIED IRON DEFICIENCY ANEMIA TYPE: ICD-10-CM

## 2017-11-07 LAB
PRE FEV1 FVC: 66
PRE FEV1: 0.85
PRE FVC: 1.28
PREDICTED FEV1 FVC: 82
PREDICTED FEV1: 2.08
PREDICTED FVC: 2.57

## 2017-11-07 PROCEDURE — 99204 OFFICE O/P NEW MOD 45 MIN: CPT | Mod: 25,S$GLB,, | Performed by: INTERNAL MEDICINE

## 2017-11-07 PROCEDURE — 94729 DIFFUSING CAPACITY: CPT | Mod: S$GLB,,, | Performed by: INTERNAL MEDICINE

## 2017-11-07 PROCEDURE — 99999 PR PBB SHADOW E&M-EST. PATIENT-LVL III: CPT | Mod: PBBFAC,,, | Performed by: INTERNAL MEDICINE

## 2017-11-07 PROCEDURE — 94010 BREATHING CAPACITY TEST: CPT | Mod: S$GLB,,, | Performed by: INTERNAL MEDICINE

## 2017-11-07 RX ORDER — FLUTICASONE PROPIONATE AND SALMETEROL 500; 50 UG/1; UG/1
1 POWDER RESPIRATORY (INHALATION) 2 TIMES DAILY
Qty: 1 INHALER | Refills: 6 | Status: SHIPPED | OUTPATIENT
Start: 2017-11-07 | End: 2018-08-20 | Stop reason: SDUPTHER

## 2017-11-07 RX ORDER — FLUTICASONE PROPIONATE 50 MCG
2 SPRAY, SUSPENSION (ML) NASAL DAILY
Qty: 1 BOTTLE | Refills: 6 | Status: SHIPPED | OUTPATIENT
Start: 2017-11-07 | End: 2020-05-27 | Stop reason: SDUPTHER

## 2017-11-07 NOTE — PROGRESS NOTES
Subjective:       Patient ID: Elayne Almanzar is a 62 y.o. female.    Chief Complaint: No chief complaint on file.    HPI   Elayne Almanzar 62 y.o. female    has a past medical history of Acute on chronic diastolic congestive heart failure; Anticoagulant long-term use; Atrial fibrillation (); Cataract; Chronic kidney disease; COPD (chronic obstructive pulmonary disease); Depression; Dysuria; Flank pain; HTN (hypertension); Nephrolithiasis; KEYSHAWN (obstructive sleep apnea); Rheumatic disease of mitral valve; Urinary incontinence; and Urinary tract infection.    has a past surgical history that includes Mitral valve replacement (2004);  section; Tubal ligation; and kidney stone removal.   reports that she quit smoking about 15 years ago. Her smoking use included Cigarettes. She has a 10.00 pack-year smoking history. She has never used smokeless tobacco. She reports that she does not drink alcohol or use drugs.  Referred by: Martinreferral Self  Who had no chief complaint listed for this encounter.  The patient's last visit with me was on 2012.    Having bronchitis more frequently  Wheezing, cough, mucus, sob, trouble sleeping  advair and ventolin  Going back to ER frequently  Prednisone helps 20mg daily  Any scents,   No history of nasal polyps  Takes flonase as needed  No other sinus treatment  Good uop with lasix 20mg   Drinking 16oz x 4 and then cups of crushed ice  Craving significant ice  hgb 10.8      Review of Systems   All other systems reviewed and are negative.      Objective:      Physical Exam   Constitutional: She is oriented to person, place, and time. She appears well-developed and well-nourished. She appears not cachectic. No distress. She is not obese.   HENT:   Head: Normocephalic.   Nose: Nose normal. No mucosal edema.   Mouth/Throat: Normal dentition. No oropharyngeal exudate.   Neck: Normal range of motion. Neck supple. No tracheal deviation present.   Cardiovascular: Normal  rate, regular rhythm, normal heart sounds and intact distal pulses.  Exam reveals no gallop and no friction rub.    No murmur heard.  Pulmonary/Chest: Normal expansion, symmetric chest wall expansion, effort normal and breath sounds normal. No stridor.   Abdominal: Soft. Bowel sounds are normal.   Musculoskeletal: Normal range of motion. She exhibits no edema, tenderness or deformity.   Lymphadenopathy: No supraclavicular adenopathy is present.     She has no cervical adenopathy.   Neurological: She is alert and oriented to person, place, and time. No cranial nerve deficit. Gait normal.   Skin: Skin is warm and dry. No rash noted. She is not diaphoretic. No cyanosis or erythema. No pallor. Nails show no clubbing.   Psychiatric: She has a normal mood and affect. Her behavior is normal. Judgment and thought content normal.     Personal Diagnostic Review    No flowsheet data found.      Assessment:       1. Shortness of breath        Outpatient Encounter Prescriptions as of 11/7/2017   Medication Sig Dispense Refill    albuterol 90 mcg/actuation inhaler Inhale 1-2 puffs into the lungs every 6 (six) hours as needed for Wheezing or Shortness of Breath. Rescue 1 Inhaler 6    amoxicillin (AMOXIL) 500 MG capsule       aspirin (ECOTRIN) 81 MG EC tablet Take 81 mg by mouth once daily.       brimonidine 0.2% (ALPHAGAN) 0.2 % Drop Place 1 drop into the right eye 3 (three) times daily. (Patient taking differently: Place 1 drop into the right eye 2 (two) times daily. ) 15 mL 3    ergocalciferol (ERGOCALCIFEROL) 50,000 unit Cap Take 1 capsule (50,000 Units total) by mouth twice a week. (Patient taking differently: Take 50,000 Units by mouth once a week. on tuesday) 24 capsule 0    fluticasone (FLONASE) 50 mcg/actuation nasal spray 1 spray by Each Nare route 2 (two) times daily as needed for Rhinitis. 15 g 3    fluticasone-salmeterol 250-50 mcg/dose (ADVAIR) 250-50 mcg/dose diskus inhaler Inhale 1 puff into the lungs 2 (two)  "times daily. 1 each 5    furosemide (LASIX) 20 MG tablet Take 1 tablet (20 mg total) by mouth once daily. 30 tablet 3    metoprolol succinate (TOPROL-XL) 200 MG 24 hr tablet Take 1 tablet (200 mg total) by mouth once daily. (Patient taking differently: Take 100 mg by mouth once daily. ) 30 tablet 3    needle, disp, 26 gauge 26 gauge x 1/2" Ndle 1 application by Misc.(Non-Drug; Combo Route) route once a week. 10 each 0    oxybutynin (DITROPAN XL) 15 MG TR24 Take 1 tablet (15 mg total) by mouth once daily. 30 tablet 11    predniSONE (DELTASONE) 20 MG tablet Take 2 tablets (40 mg total) by mouth once daily. 10 tablet 0    sertraline (ZOLOFT) 100 MG tablet Take 1 tablet (100 mg total) by mouth once daily. 30 tablet 4    tamsulosin (FLOMAX) 0.4 mg Cp24 TAKE 1 CAPSULE (0.4 MG TOTAL) BY MOUTH ONCE DAILY. 30 capsule 11    warfarin (COUMADIN) 5 MG tablet 5mg Monday and 7.5mg all other days or Or as directed by [facility]. (Patient taking differently: Take 7.5mg by mouth once daily or Or as directed by [facility] Clinic.) 45 tablet 6     No facility-administered encounter medications on file as of 11/7/2017.      No orders of the defined types were placed in this encounter.    Plan:             I personally reviewed the      1. Echo report   2. PFT moderate obstruction  3. CXR   4. CXR report     Assessment:  Diagnoses and all orders for this visit:    Shortness of breath    Iron deficiency anemia, unspecified iron deficiency anemia type  -     Occult Blood Stool, CA Screen; Future    COPD (chronic obstructive pulmonary disease) with acute bronchitis    Other orders  -     fluticasone-salmeterol 500-50 mcg/dose (ADVAIR DISKUS) 500-50 mcg/dose DsDv diskus inhaler; Inhale 1 puff into the lungs 2 (two) times daily.  -     fluticasone (FLONASE) 50 mcg/actuation nasal spray; 2 sprays by Each Nare route once daily.        Plan:  As below  Iron deficiency anemia- may need colonoscopy    No Follow-up on file.    Patient " Instructions   Iron 325 mg three times per day- take with stool softener each time    Stool sample for blood    Increase advair to 500mcg twice daily- rinse mouth out after use    Start flonase 2sprays each nostril once a day    Take benadryl 25mg nightly    Call or email me in 2-4 weeks      Immunization History   Administered Date(s) Administered    Pneumococcal Conjugate - 13 Valent 09/08/2017    Pneumococcal Conjugate - 7 Valent 12/06/2011

## 2017-11-07 NOTE — PATIENT INSTRUCTIONS
Iron 325 mg three times per day- take with stool softener each time    Stool sample for blood    Increase advair to 500mcg twice daily- rinse mouth out after use    Start flonase 2sprays each nostril once a day    Take benadryl 25mg nightly    Call or email me in 2-4 weeks

## 2017-11-21 ENCOUNTER — OFFICE VISIT (OUTPATIENT)
Dept: UROLOGY | Facility: CLINIC | Age: 62
End: 2017-11-21
Payer: COMMERCIAL

## 2017-11-21 ENCOUNTER — LAB VISIT (OUTPATIENT)
Dept: LAB | Facility: HOSPITAL | Age: 62
End: 2017-11-21
Attending: INTERNAL MEDICINE
Payer: COMMERCIAL

## 2017-11-21 VITALS — HEIGHT: 62 IN | BODY MASS INDEX: 46.01 KG/M2 | WEIGHT: 250 LBS

## 2017-11-21 DIAGNOSIS — R10.9 RIGHT FLANK PAIN: Primary | ICD-10-CM

## 2017-11-21 DIAGNOSIS — N32.81 OAB (OVERACTIVE BLADDER): ICD-10-CM

## 2017-11-21 DIAGNOSIS — D50.9 IRON DEFICIENCY ANEMIA, UNSPECIFIED IRON DEFICIENCY ANEMIA TYPE: ICD-10-CM

## 2017-11-21 LAB
BILIRUB SERPL-MCNC: NORMAL MG/DL
BLOOD URINE, POC: NORMAL
COLOR, POC UA: NORMAL
GLUCOSE UR QL STRIP: NORMAL
KETONES UR QL STRIP: NORMAL
LEUKOCYTE ESTERASE URINE, POC: NORMAL
NITRITE, POC UA: NORMAL
PH, POC UA: 5
POC RESIDUAL URINE VOLUME: NORMAL ML (ref 0–100)
PROTEIN, POC: NORMAL
SPECIFIC GRAVITY, POC UA: 1.01
UROBILINOGEN, POC UA: NORMAL

## 2017-11-21 PROCEDURE — 82270 OCCULT BLOOD FECES: CPT

## 2017-11-21 PROCEDURE — 99999 PR PBB SHADOW E&M-EST. PATIENT-LVL IV: CPT | Mod: PBBFAC,,, | Performed by: NURSE PRACTITIONER

## 2017-11-21 PROCEDURE — 51798 US URINE CAPACITY MEASURE: CPT | Mod: S$GLB,,, | Performed by: NURSE PRACTITIONER

## 2017-11-21 PROCEDURE — 87088 URINE BACTERIA CULTURE: CPT

## 2017-11-21 PROCEDURE — 87186 SC STD MICRODIL/AGAR DIL: CPT

## 2017-11-21 PROCEDURE — 81002 URINALYSIS NONAUTO W/O SCOPE: CPT | Mod: S$GLB,,, | Performed by: NURSE PRACTITIONER

## 2017-11-21 PROCEDURE — 87077 CULTURE AEROBIC IDENTIFY: CPT

## 2017-11-21 PROCEDURE — 87086 URINE CULTURE/COLONY COUNT: CPT

## 2017-11-21 PROCEDURE — 99213 OFFICE O/P EST LOW 20 MIN: CPT | Mod: 25,S$GLB,, | Performed by: NURSE PRACTITIONER

## 2017-11-21 NOTE — PROGRESS NOTES
CHIEF COMPLAINT:    Mrs. Almanzar is a 62 y.o. female presenting for right flank pain.   PRESENTING ILLNESS:    Elayne Almanzar is a 62 y.o. female with a PMH of kidney stones who presents for right flank pain.   Last seen in the clinic with me on 10/24/17  She was last seen on 3/15/2013 with Dr. Galindo for uteroscope.    She has a hx of Afib and on coumadin.    She reports sharp/ pulsating right flank pain. It occurs 2-3x weekly. She reports the pain radiates from her flank to abdomen. Nothing improves pain. She does not take pain medications. Lifting or walking too quickly increases pain. She rates the pain a 9/10 at worst. This is the same flank pain that she reported at the last visit.     She had a CTRSS on 9/23/17 that showed a 9 mm left UVJ stone and hydro. She was scheduled for a stone intervention at that time. She cancelled it because she stated the pain resolved and had passed the stone. She had the stone but has thrown it away now. Renal US on 10/24/17 showed no hydro.     Today, her OAB symptoms have improved with the increased oxybutynin 15 mg XR. she reports frequency 5-6 x- on lasix daily, nocturia 0-1x, urgency, urge incontinence, and stress incontinence wears pads 2-3 daily. She reports dry eyes and dry mouth. She is able to tolerate symptoms. Denies dysuria or hematuria. She reports an improved FOS. Reports complete bladder emptying. She takes flomax daily with no problems. She reports if she does not take flomax then she notices abdominal bloating and more difficulty urinating.     REVIEW OF SYSTEMS:    Review of Systems    Constitutional: Negative for fever and chills.   HENT: Negative for hearing loss.   Eyes: Negative for visual disturbance.   Respiratory: Negative for shortness of breath.   Cardiovascular: Negative for chest pain.   Gastrointestinal: Negative for nausea, vomiting, and constipation.   Genitourinary:  See above  Neurological: Negative for dizziness.   Hematological: Does not  bruise/bleed easily.   Psychiatric/Behavioral: Negative for confusion.     PATIENT HISTORY:    Past Medical History:   Diagnosis Date    Acute on chronic diastolic congestive heart failure     Anticoagulant long-term use     Atrial fibrillation     Cataract     Chronic kidney disease     COPD (chronic obstructive pulmonary disease)     Depression     Dysuria     Flank pain     HTN (hypertension)     Nephrolithiasis     KEYSHAWN (obstructive sleep apnea)     awaiting CPAP machine     Rheumatic disease of mitral valve     Urinary incontinence     Urinary tract infection        Past Surgical History:   Procedure Laterality Date     SECTION      kidney stone removal      MITRAL VALVE REPLACEMENT  2004    TUBAL LIGATION         Family History   Problem Relation Age of Onset    Stroke Paternal Grandmother     Colon cancer Maternal Grandmother     Cancer Brother      testicular cancer    Diabetes Sister     Hypertension Sister     Breast cancer Paternal Aunt     Diabetes Sister     Diabetes Sister     Heart disease Father 70     MI    Diabetes Father     Colon cancer Mother     Ovarian cancer Neg Hx        Social History     Social History    Marital status:      Spouse name: N/A    Number of children: 2    Years of education: N/A     Occupational History    Cook      Retired     Social History Main Topics    Smoking status: Former Smoker     Packs/day: 0.50     Years: 20.00     Types: Cigarettes     Quit date: 2002    Smokeless tobacco: Never Used    Alcohol use No    Drug use: No    Sexual activity: Not on file     Other Topics Concern    Not on file     Social History Narrative    Disability since .  Laid off .  Previously worked as cook in a kitchen.         Allergies:  Patient has no known allergies.    Medications:    Current Outpatient Prescriptions:     albuterol 90 mcg/actuation inhaler, Inhale 1-2 puffs into the lungs every 6 (six) hours as  "needed for Wheezing or Shortness of Breath. Rescue, Disp: 1 Inhaler, Rfl: 6    amoxicillin (AMOXIL) 500 MG capsule, , Disp: , Rfl:     aspirin (ECOTRIN) 81 MG EC tablet, Take 81 mg by mouth once daily. , Disp: , Rfl:     brimonidine 0.2% (ALPHAGAN) 0.2 % Drop, Place 1 drop into the right eye 3 (three) times daily. (Patient taking differently: Place 1 drop into the right eye 2 (two) times daily. ), Disp: 15 mL, Rfl: 3    ergocalciferol (ERGOCALCIFEROL) 50,000 unit Cap, Take 1 capsule (50,000 Units total) by mouth twice a week. (Patient taking differently: Take 50,000 Units by mouth once a week. on tuesday), Disp: 24 capsule, Rfl: 0    fluticasone (FLONASE) 50 mcg/actuation nasal spray, 2 sprays by Each Nare route once daily., Disp: 1 Bottle, Rfl: 6    fluticasone-salmeterol 500-50 mcg/dose (ADVAIR DISKUS) 500-50 mcg/dose DsDv diskus inhaler, Inhale 1 puff into the lungs 2 (two) times daily., Disp: 1 Inhaler, Rfl: 6    furosemide (LASIX) 20 MG tablet, Take 1 tablet (20 mg total) by mouth once daily., Disp: 30 tablet, Rfl: 3    metoprolol succinate (TOPROL-XL) 200 MG 24 hr tablet, Take 1 tablet (200 mg total) by mouth once daily. (Patient taking differently: Take 100 mg by mouth once daily. ), Disp: 30 tablet, Rfl: 3    needle, disp, 26 gauge 26 gauge x 1/2" Ndle, 1 application by Misc.(Non-Drug; Combo Route) route once a week., Disp: 10 each, Rfl: 0    oxybutynin (DITROPAN XL) 15 MG TR24, Take 1 tablet (15 mg total) by mouth once daily., Disp: 30 tablet, Rfl: 11    sertraline (ZOLOFT) 100 MG tablet, Take 1 tablet (100 mg total) by mouth once daily., Disp: 30 tablet, Rfl: 4    tamsulosin (FLOMAX) 0.4 mg Cp24, TAKE 1 CAPSULE (0.4 MG TOTAL) BY MOUTH ONCE DAILY., Disp: 30 capsule, Rfl: 11    warfarin (COUMADIN) 5 MG tablet, 5mg Monday and 7.5mg all other days or Or as directed by [facility]. (Patient taking differently: Take 7.5mg by mouth once daily or Or as directed by [facility].), Disp: 45 " tablet, Rfl: 6    PHYSICAL EXAMINATION:    Constitutional: She is oriented to person, place, and time. She appears well-developed and well-nourished.  She is in no apparent distress.    Neck: No tracheal deviation present.     Cardiovascular: Normal rate.      Pulmonary/Chest: Effort normal. No respiratory distress.     Abdominal:  She exhibits no distension.  Right CVA tenderness and no left CVA tenderness.     Lymphadenopathy:          Right: No supraclavicular adenopathy present.        Left: No supraclavicular adenopathy present.     Neurological: She is alert and oriented to person, place, and time.     Skin: Skin is warm and dry.     Extremities: No pitting edema noted in lower extremities bilaterally    Psych: Cooperative with normal affect.    Physical Exam      LABS:    U/a today showed no blood or bacteria.   PVR with bladder scan showed 88 cc    IMPRESSION:    Encounter Diagnoses   Name Primary?    Right flank pain Yes    OAB (overactive bladder)        PLAN:  -Discussed plan of care with pt,  -Continue oxybutynin to 15 mg XR daily.   -continue flomax  -CTRSS for flank pain,   -RTC based on CTRSS    I encouraged her or any of her family members to call or email me with questions and/or concerns.      ALVAREZ Echevarria

## 2017-11-22 DIAGNOSIS — N30.00 ACUTE CYSTITIS WITHOUT HEMATURIA: Primary | ICD-10-CM

## 2017-11-22 LAB
BACTERIA UR CULT: NORMAL
OB PNL STL: NEGATIVE

## 2017-11-22 RX ORDER — CEPHALEXIN 500 MG/1
500 CAPSULE ORAL 2 TIMES DAILY
Qty: 10 CAPSULE | Refills: 0 | Status: SHIPPED | OUTPATIENT
Start: 2017-11-22 | End: 2017-11-27

## 2017-11-24 ENCOUNTER — TELEPHONE (OUTPATIENT)
Dept: UROLOGY | Facility: CLINIC | Age: 62
End: 2017-11-24

## 2017-11-28 ENCOUNTER — HOSPITAL ENCOUNTER (OUTPATIENT)
Dept: RADIOLOGY | Facility: HOSPITAL | Age: 62
Discharge: HOME | End: 2017-11-28
Attending: NURSE PRACTITIONER
Payer: COMMERCIAL

## 2017-11-28 ENCOUNTER — ANTI-COAG VISIT (OUTPATIENT)
Dept: CARDIOLOGY | Facility: CLINIC | Age: 62
End: 2017-11-28
Payer: COMMERCIAL

## 2017-11-28 ENCOUNTER — TELEPHONE (OUTPATIENT)
Dept: PULMONOLOGY | Facility: CLINIC | Age: 62
End: 2017-11-28

## 2017-11-28 DIAGNOSIS — Z79.01 LONG-TERM (CURRENT) USE OF ANTICOAGULANTS: Primary | ICD-10-CM

## 2017-11-28 DIAGNOSIS — Z95.2 STATUS POST HEART VALVE REPLACEMENT WITH MECHANICAL VALVE: ICD-10-CM

## 2017-11-28 DIAGNOSIS — I48.20 CHRONIC ATRIAL FIBRILLATION WITH RAPID VENTRICULAR RESPONSE: ICD-10-CM

## 2017-11-28 DIAGNOSIS — R10.9 RIGHT FLANK PAIN: ICD-10-CM

## 2017-11-28 LAB — INR PPP: 3.6 (ref 2.5–3.5)

## 2017-11-28 PROCEDURE — 85610 PROTHROMBIN TIME: CPT | Mod: QW,S$GLB,, | Performed by: PHARMACIST

## 2017-11-28 PROCEDURE — 74176 CT ABD & PELVIS W/O CONTRAST: CPT | Mod: 26,,, | Performed by: RADIOLOGY

## 2017-11-28 PROCEDURE — 99211 OFF/OP EST MAY X REQ PHY/QHP: CPT | Mod: 25,S$GLB,, | Performed by: PHARMACIST

## 2017-11-28 PROCEDURE — 74176 CT ABD & PELVIS W/O CONTRAST: CPT | Mod: TC

## 2017-11-28 NOTE — PROGRESS NOTES
INR is slightly elevated today with no reported  problems. She did take a course of keflex and prednisone ~2-3 weeks ago. Patient advised to always call coumadin clinic when starting new medications. Patient denies any changes in diet, medications, or health that would effect warfarin therapy.  I will lower her coumadin dose for today then place her back on her previously stable dose. Patient was re-educated on situations that would require placing a call to the coumadin clinic, including bleeding or unusual bruising issues, changes in health, diet or medications, upcoming procedures that require warfarin interruption, and missed coumadin dose(s). Patient expressed understanding that avoidance of consistency with these parameters could cause fluctuations in INR, leading to more frequent visits and increase risk of adverse events.

## 2017-12-01 ENCOUNTER — TELEPHONE (OUTPATIENT)
Dept: PEDIATRIC UROLOGY | Facility: CLINIC | Age: 62
End: 2017-12-01

## 2017-12-01 DIAGNOSIS — K42.9 UMBILICAL HERNIA WITHOUT OBSTRUCTION AND WITHOUT GANGRENE: Primary | ICD-10-CM

## 2017-12-01 NOTE — TELEPHONE ENCOUNTER
Called to discuss CT results. Explained umbilical hernia and she wants to see a surgeon to discuss. She verbalized of the scan.

## 2017-12-05 ENCOUNTER — OFFICE VISIT (OUTPATIENT)
Dept: SLEEP MEDICINE | Facility: CLINIC | Age: 62
End: 2017-12-05
Payer: COMMERCIAL

## 2017-12-05 VITALS
SYSTOLIC BLOOD PRESSURE: 122 MMHG | DIASTOLIC BLOOD PRESSURE: 76 MMHG | HEART RATE: 80 BPM | HEIGHT: 62 IN | WEIGHT: 254.19 LBS | BODY MASS INDEX: 46.78 KG/M2

## 2017-12-05 DIAGNOSIS — G47.00 INSOMNIA, UNSPECIFIED TYPE: Primary | ICD-10-CM

## 2017-12-05 PROCEDURE — 99999 PR PBB SHADOW E&M-EST. PATIENT-LVL IV: CPT | Mod: PBBFAC,,, | Performed by: PSYCHIATRY & NEUROLOGY

## 2017-12-05 PROCEDURE — 99204 OFFICE O/P NEW MOD 45 MIN: CPT | Mod: S$GLB,,, | Performed by: PSYCHIATRY & NEUROLOGY

## 2017-12-05 RX ORDER — TRAZODONE HYDROCHLORIDE 50 MG/1
TABLET ORAL
Qty: 60 TABLET | Refills: 5 | Status: SHIPPED | OUTPATIENT
Start: 2017-12-05 | End: 2018-03-09

## 2017-12-05 NOTE — PROGRESS NOTES
Elayne Almanzar  was seen at the request of  No ref. provider found for sleep evaluation.    12/22/2016 INITIAL HISTORY OF PRESENT ILLNESS:  Elayne Almanzar is a 62 y.o. female is here to be evaluated for a sleep disorder.       CHIEF COMPLAINT:      The patient's complaints include excessive daytime sleepiness, excessive daytime fatigue, snoring, choking  and interrupted sleep since  A few months ago.    Reports more trouble sleeping since she lost her mother Cot 2015    Reports  dry mouth and sore throat  Reports nasal congestion Flonase - does not help  Reports  morning headaches  Reports occasional  interrupted sleep  Reports frequent leg movements  Denies symptoms concerning for parasomnia    The ESS ([place] Sleepiness Score) taken on initial visit is 8 /24    The patient never had tonsillectomy, adenoidectomy or UPPP       INTERVAL HISTORY:    12/05/2017:  The patient has not presented any new complaints since the previous visit.   Almost met compliance, but missed some d because of mask discomfort and the noise.  Likes ASV machine.   25; EPAP 10; PS 2-16; PS2; with a FFM    Therapy Event Summary Date Range: 10/28/2017 - 11/26/2017     Hide      Compliance Summary  Apnea Indices  Ventilator Statistics    Days with Device Usage:  15 days  Average AHI:  2.0  Average Breath Rate:  15.4 bpm    Percentage of Days >=4 Hours:  43.3%  Average OA Index:  0.3  Average % Patient Triggered Breaths:  96.3%    Average Usage (Days Used):  6 hrs. 15 mins. 57 secs.  Average CA Index:  0.3  Average Tidal Volume:  431.2 ml    Average Usage (All Days):  3 hrs. 7 mins. 58 secs.    Average Minute Vent:  6.7        Large Leak  Periodic Breathing     Average Time in Large Leak:  5 mins. 4 secs.  Average % of Night in PB:  0.3%     Average % of Night in Large Leak:  1.3%                        INTERVAL HISTORY:    03/21/2017:  The patient has not presented any new complaints since the previous visit. She comes to discuss PSG -  extremely severe complex sleep apnea - titration was only 50% decreasing the AHi due to treatment emergent central apnea.   ASV was very effective in controlling AHI and hypoxemia.  Trying to lose weight - could not afford [facility] program. ASV was also not affordable through durable medical equipment at [facility].  ESS 11/24.        SLEEP ROUTINE AND LIFESTYLE 12/05/2017 :    Occupation:not working    Bed partner: her  - he is a snorer     Time to bed - wake up time on a workday : 2 AM (not sleepy earlier) to 9 AM  Time to bed - wake up time on a day off: same   Sleep onset latency: 30 min  Disruptions or awakenings: 2  Time to fall back into sleep: 1 hour   Perceived sleep quality: 0/5  Perceived total sleep time:  6 hrs  hours.  Daytime naps: 3 times    Exercise routine: no  Caffeine:      PREVIOUS SLEEP STUDIES:   PSG 1/10/176: Significant Obstructive sleep apnea (KEYSHAWN) with AHI (apnea hypopnea Index) of 116 and SaO2 of 68% (weight  255 lbs).Efective control of SDB was not achieved due to central apneas in the treatment part on every tested pressure (some centrals were present in the diagnostic part).  ESS 6/24, yet reports feeling fatigued and sleepy.  CPAP titration on 2017: Effective control of respiratory events was not achieved on CPAP/BPAP. Best results on ASV at default settings.  CPAP titration on 4/17: Complex sleep Apnea. Best results achieved with ASV.         DME:       PAST MEDICAL HISTORY:    Active Ambulatory Problems     Diagnosis Date Noted    Shortness of breath 08/17/2012    Status post heart valve replacement with mechanical valve 03/20/2013    Chronic atrial fibrillation with rapid ventricular response 03/21/2013    Nephrolithiasis 03/21/2013    Essential (primary) hypertension 03/21/2013    Chronic anticoagulation 12/22/2013    H/O mitral valve replacement with mechanical valve 01/22/2015    COPD (chronic obstructive pulmonary disease) with acute bronchitis 01/22/2015     Hyperlipidemia 2015    Mixed urge and stress incontinence 2016    Hematuria 2016    Atrophic vaginitis 2016    Acute kidney injury 2016    Complex sleep apnea syndrome     Morbid obesity with BMI of 50.0-59.9, adult 2017    Thiamine deficiency 2017    Asthma exacerbation 2017    COPD exacerbation 2017    Chronic diastolic congestive heart failure      Resolved Ambulatory Problems     Diagnosis Date Noted    Wheezing 2012    CKD (chronic kidney disease) stage 3, GFR 30-59 ml/min 2013    Hypoxia 2013    Dizziness 2014    Encounter for screening colonoscopy 04/10/2014    Fever 2015    Hair loss 2015    Cephalalgia 2015    Dyspepsia 2016    Supratherapeutic INR 2017     Past Medical History:   Diagnosis Date    Acute on chronic diastolic congestive heart failure     Anticoagulant long-term use     Atrial fibrillation     Cataract     Chronic kidney disease     COPD (chronic obstructive pulmonary disease)     Depression     Dysuria     Flank pain     HTN (hypertension)     Nephrolithiasis     KEYSHAWN (obstructive sleep apnea)     Rheumatic disease of mitral valve     Urinary incontinence     Urinary tract infection                 PAST SURGICAL HISTORY:    Past Surgical History:   Procedure Laterality Date     SECTION      kidney stone removal      MITRAL VALVE REPLACEMENT  2004    TUBAL LIGATION           FAMILY HISTORY:                Family History   Problem Relation Age of Onset    Stroke Paternal Grandmother     Colon cancer Maternal Grandmother     Cancer Brother      testicular cancer    Diabetes Sister     Hypertension Sister     Breast cancer Paternal Aunt     Diabetes Sister     Diabetes Sister     Heart disease Father 70     MI    Diabetes Father     Colon cancer Mother     Ovarian cancer Neg Hx        SOCIAL HISTORY:          Tobacco:  "  History   Smoking Status    Former Smoker    Packs/day: 0.50    Years: 20.00    Types: Cigarettes    Quit date: 5/8/2002   Smokeless Tobacco    Never Used       alcohol use:    History   Alcohol Use No                   ALLERGIES:    Review of patient's allergies indicates:   Allergen Reactions    Hydromorphone (bulk) Nausea And Vomiting    Hydromorphone hcl Nausea And Vomiting       CURRENT MEDICATIONS:    Current Outpatient Prescriptions   Medication Sig Dispense Refill    albuterol 90 mcg/actuation inhaler Inhale 1-2 puffs into the lungs every 6 (six) hours as needed for Wheezing or Shortness of Breath. Rescue 1 Inhaler 6    aspirin (ECOTRIN) 81 MG EC tablet Take 81 mg by mouth once daily.       brimonidine 0.2% (ALPHAGAN) 0.2 % Drop Place 1 drop into the right eye 3 (three) times daily. (Patient taking differently: Place 1 drop into the right eye 2 (two) times daily. ) 15 mL 3    ergocalciferol (ERGOCALCIFEROL) 50,000 unit Cap Take 1 capsule (50,000 Units total) by mouth twice a week. (Patient taking differently: Take 50,000 Units by mouth once a week. on tuesday) 24 capsule 0    fluticasone (FLONASE) 50 mcg/actuation nasal spray 2 sprays by Each Nare route once daily. 1 Bottle 6    fluticasone-salmeterol 500-50 mcg/dose (ADVAIR DISKUS) 500-50 mcg/dose DsDv diskus inhaler Inhale 1 puff into the lungs 2 (two) times daily. 1 Inhaler 6    furosemide (LASIX) 20 MG tablet Take 1 tablet (20 mg total) by mouth once daily. 30 tablet 3    metoprolol succinate (TOPROL-XL) 200 MG 24 hr tablet Take 1 tablet (200 mg total) by mouth once daily. (Patient taking differently: Take 100 mg by mouth once daily. ) 30 tablet 3    needle, disp, 26 gauge 26 gauge x 1/2" Ndle 1 application by Misc.(Non-Drug; Combo Route) route once a week. 10 each 0    oxybutynin (DITROPAN XL) 15 MG TR24 Take 1 tablet (15 mg total) by mouth once daily. 30 tablet 11    sertraline (ZOLOFT) 100 MG tablet Take 1 tablet (100 mg total) by " "mouth once daily. 30 tablet 4    tamsulosin (FLOMAX) 0.4 mg Cp24 TAKE 1 CAPSULE (0.4 MG TOTAL) BY MOUTH ONCE DAILY. 30 capsule 11    warfarin (COUMADIN) 5 MG tablet 5mg Monday and 7.5mg all other days or Or as directed by [facility]. (Patient taking differently: Take 7.5mg by mouth once daily or Or as directed by [facility] Clinic.) 45 tablet 6     No current facility-administered medications for this visit.                       REVIEW OF SYSTEMS:   Sleep related symptoms as per HPI    reports weight gain  Reports occasional dyspnea  Reports palpitations  Reports occasional acid reflux   Denies polyuria  Reports  mood diturbance  Denies  anemia  Reports occasional  muscle pain  Denies  Gait imbalance    Otherwise, a balance of 10 systems reviewed is negative.    PHYSICAL EXAM:  /76 (BP Location: Right arm, Patient Position: Sitting, BP Method: Large (Automatic))   Pulse 80   Ht 5' 2" (1.575 m)   Wt 115.3 kg (254 lb 3.1 oz)   LMP 07/22/2012   BMI 46.49 kg/m²   GENERAL: Overweight body habitus, well groomed.  HEENT:   HEENT:  Conjunctivae are non-erythematous; Pupils equal, round, and reactive to light; Nose is symmetrical; Nasal mucosa is pink and moist; Septum isightly to the right; Inferior turbinates are hypertrophied; Nasal airflow is diminshed; Posterior pharynx is pink; Modified Mallampati:III; Posterior palate is low; Tonsils not visualized; Uvula is reddened;Tongue is normal; Dentition is fair; No TMJ tenderness; Jaw opening and protrusion without click and without discomfort.  NECK: Supple. Neck circumference is 18 inches. No thyromegaly. No palpable nodes.     SKIN: On face and neck: No abrasions, no rashes, no lesions.  No subcutaneous nodules are palpable.  RESPIRATORY: Chest is clear to auscultation.  Normal chest expansion and non-labored breathing at rest.  CARDIOVASCULAR: Normal S1, S2.  No murmurs, gallops or rubs. No carotid bruits bilaterally.  No edema. No clubbing. No cyanosis.  " "  NEURO: Oriented to time, place and person. Normal attention span and concentration. Gait normal.    PSYCH: Affect is full. Mood is normal  MUSCULOSKELETAL: Moves 4 extremities. Gait normal.         Using [facility]:       ASSESSMENT:    1. Severe Complex Sleep Apnea. The patient symptomatically has  excessive daytime sleepiness, snoring,  witnessed breathing pauses, excessive daytime fatigue, gasping for air in sleep and interrupted sleep  with exam findings of "a crowded oral airway and elevated body mass index. The patient has medical co-morbidities of atrial fibrillation, mechanical valve COPD, hyperlipidemia and hypertension,  which can be worsened by KEYSHAWN. This warrants treatment. CPOAP and BPAP did not control sleep apnea and hypoxemia at various settings - only ASV provided a good control of AHi and hypoxemia.          PLAN:    ASV machine at default settings.  max pressure 25 cm H2O, EPAP min -6 cm H2O, EPAP max - 10 cm H2O, max PS_ 16, min PS 2 cm H2O. With a [employer] FFM  She was also given list of DMEs to see which one would be more affordable.    Weight loss strategies were discussed in detail - would like to be referred to bariatric program. If our program is not affordable, consider [facility].    PSG in lab testing is warranted due to medical comorbidities, iCOPD,  The patient is a graham risk for obesity hypoventilation  Syndrome given excessive BMI (nearly 50); high risk airway comromised which can be a safety issue due to inadequate titration with auto PAP.        More than 25 minutes of this 45 minutes visit was spent in counseling: during our discussion today, we talked about the etiology of  KEYSHAWN as well as the potential ramifications of untreated sleep apnea, which could include daytime sleepiness, hypertension, heart disease and/or stroke.  We discussed potential treatment options, which could include weight loss, body positioning, continuous positive airway pressure (CPAP), or referral for " surgical consideration. Meanwhile, she  is urged to avoid supine sleep, weight gain and alcoholic beverages since all of these can worsen KEYSHAWN.     Precautions: The patient was advised to abstain from driving should he feel sleepy or drowsy.    Follow up: MD/NP  after the sleep study has been completed.     Thank you for allowing me the opportunity to participate in the care of your patient.    This visit summary will be sent to referring provider via inbasket

## 2017-12-05 NOTE — PATIENT INSTRUCTIONS
1. Please try Eclipse curtain from [employer]  2. 3-5-10 mg Melatonin - fast release (dissolving under the toungue or gummies)    3. If that does not work, then please bring  Paper rx for Trazodone to the pharmacy    4. Change your Zoloft to the morning    5. Please try to get more hours out your machine - if you know you did not use it enough at night, watch TV with it next day to meet the compliance

## 2017-12-19 NOTE — PROGRESS NOTES
Patient called to reschedule today's appointment with the coumadin clinic, it was rescheduled to 12/22

## 2017-12-22 ENCOUNTER — ANTI-COAG VISIT (OUTPATIENT)
Dept: CARDIOLOGY | Facility: CLINIC | Age: 62
End: 2017-12-22
Payer: COMMERCIAL

## 2017-12-22 DIAGNOSIS — Z79.01 LONG-TERM (CURRENT) USE OF ANTICOAGULANTS: Primary | ICD-10-CM

## 2017-12-22 DIAGNOSIS — I48.20 CHRONIC ATRIAL FIBRILLATION WITH RAPID VENTRICULAR RESPONSE: ICD-10-CM

## 2017-12-22 DIAGNOSIS — Z95.2 STATUS POST HEART VALVE REPLACEMENT WITH MECHANICAL VALVE: ICD-10-CM

## 2017-12-22 LAB — INR PPP: 2.5 (ref 2–3)

## 2017-12-22 PROCEDURE — 85610 PROTHROMBIN TIME: CPT | Mod: QW,S$GLB,, | Performed by: INTERNAL MEDICINE

## 2017-12-22 PROCEDURE — 99211 OFF/OP EST MAY X REQ PHY/QHP: CPT | Mod: 25,S$GLB,,

## 2017-12-22 NOTE — PROGRESS NOTES
INR within normal range today. Patient with bruises on arms from use. Denies any bleeding or other changes. Patient is stable on this dose. She will continue this dose until follow-up in 4 weeks. I advised her to contact us with any changes or problems.

## 2017-12-27 ENCOUNTER — OFFICE VISIT (OUTPATIENT)
Dept: OPTOMETRY | Facility: CLINIC | Age: 62
End: 2017-12-27
Payer: COMMERCIAL

## 2017-12-27 ENCOUNTER — CLINICAL SUPPORT (OUTPATIENT)
Dept: OPHTHALMOLOGY | Facility: CLINIC | Age: 62
End: 2017-12-27
Payer: COMMERCIAL

## 2017-12-27 DIAGNOSIS — H40.9 GLAUCOMA, UNSPECIFIED GLAUCOMA TYPE, UNSPECIFIED LATERALITY: Primary | ICD-10-CM

## 2017-12-27 DIAGNOSIS — H25.13 NS (NUCLEAR SCLEROSIS), BILATERAL: ICD-10-CM

## 2017-12-27 DIAGNOSIS — H04.123 DRY EYE SYNDROME, BILATERAL: ICD-10-CM

## 2017-12-27 DIAGNOSIS — H40.9 GLAUCOMA, UNSPECIFIED GLAUCOMA TYPE, UNSPECIFIED LATERALITY: ICD-10-CM

## 2017-12-27 DIAGNOSIS — H52.4 PRESBYOPIA: ICD-10-CM

## 2017-12-27 DIAGNOSIS — H40.1111 PRIMARY OPEN ANGLE GLAUCOMA OF RIGHT EYE, MILD STAGE: Primary | ICD-10-CM

## 2017-12-27 DIAGNOSIS — I10 ESSENTIAL HYPERTENSION: ICD-10-CM

## 2017-12-27 PROCEDURE — 99999 PR PBB SHADOW E&M-EST. PATIENT-LVL III: CPT | Mod: PBBFAC,,, | Performed by: OPTOMETRIST

## 2017-12-27 PROCEDURE — 92083 EXTENDED VISUAL FIELD XM: CPT | Mod: S$GLB,,, | Performed by: OPTOMETRIST

## 2017-12-27 PROCEDURE — 92014 COMPRE OPH EXAM EST PT 1/>: CPT | Mod: S$GLB,,, | Performed by: OPTOMETRIST

## 2017-12-27 PROCEDURE — 92133 CPTRZD OPH DX IMG PST SGM ON: CPT | Mod: S$GLB,,, | Performed by: OPTOMETRIST

## 2017-12-27 RX ORDER — OXYBUTYNIN CHLORIDE 10 MG/1
TABLET, EXTENDED RELEASE ORAL
COMMUNITY
Start: 2017-12-21 | End: 2018-04-23 | Stop reason: DRUGHIGH

## 2017-12-27 RX ORDER — DORZOLAMIDE HYDROCHLORIDE AND TIMOLOL MALEATE 20; 5 MG/ML; MG/ML
1 SOLUTION/ DROPS OPHTHALMIC 2 TIMES DAILY
Qty: 10 ML | Refills: 1 | Status: SHIPPED | OUTPATIENT
Start: 2017-12-27 | End: 2018-12-05

## 2017-12-27 NOTE — PROGRESS NOTES
HPI     Patient's age: 62 y.o.    Approximate date of last eye examination:  3/20/17  Name of last eye doctor seen: Dr. Funes    Pt states that she is here for follow up visit, in the last couple of   months been having trouble seeing with or without glasses especially in   the right eye.  Also eyes be burning    Wears glasses? yes        ! OTC eyedrops currently using:  Refresh - OU   ! Prescription eye meds currently using:  Alphagan BID - OU         Last edited by Svetlana Iqbal MA on 12/27/2017  9:48 AM. (History)            Assessment /Plan     For exam results, see Encounter Report.    Primary open angle glaucoma of right eye, mild/ moderate stage OD>OS       Anderson Visual Field - OU - Extended - OD Inferior arcuate defects, OS WNL  - Posterior Segment OCT Optic Nerve- OD sup nasal thin, OS WNL      Asymmetric cupping OD>OS  Borderline IOP  Jan 2016  - Posterior Segment OCT Optic Nerve- Both eyes: mild thinning OU,  Progression OU today  HVF 24-2: OD borderline/ scattered defects, progression today inferior arcutate  OS: WNL/ scattered defects    gonio:open 360 OU  pachy: 551/555  IOP: 17 today      pt previously on latanoprost OD ONLY September 2016, Changed drops to bimatoprost (LUMIGAN) 0.01 % Drop; October 2016    Pt reports she was not using alphagan because it would sting      Needs to restart drop  Switch to cosopt BID OU  -     dorzolamide-timolol 2-0.5% (COSOPT) 22.3-6.8 mg/mL ophthalmic solution; Place 1 drop into both eyes 2 (two) times daily.  Dispense: 10 mL; Refill: 1      RTC 1 month for IOP check    Repeat OCT and HVF 6 months       Dry eye syndrome, bilateral  NS (nuclear sclerosis), bilateral   Monitor    Essential hypertension   No retinopathy, monitor    Presbyopia   Rx specs

## 2018-01-05 ENCOUNTER — OFFICE VISIT (OUTPATIENT)
Dept: SURGERY | Facility: CLINIC | Age: 63
End: 2018-01-05
Payer: COMMERCIAL

## 2018-01-05 VITALS
HEIGHT: 62 IN | BODY MASS INDEX: 47.34 KG/M2 | WEIGHT: 257.25 LBS | HEART RATE: 97 BPM | DIASTOLIC BLOOD PRESSURE: 77 MMHG | SYSTOLIC BLOOD PRESSURE: 128 MMHG

## 2018-01-05 DIAGNOSIS — Z95.2 H/O MITRAL VALVE REPLACEMENT WITH MECHANICAL VALVE: ICD-10-CM

## 2018-01-05 DIAGNOSIS — I48.20 ATRIAL FIBRILLATION, CHRONIC: ICD-10-CM

## 2018-01-05 DIAGNOSIS — E66.01 MORBID OBESITY: ICD-10-CM

## 2018-01-05 DIAGNOSIS — K43.2 INCISIONAL HERNIA, WITHOUT OBSTRUCTION OR GANGRENE: Primary | ICD-10-CM

## 2018-01-05 PROCEDURE — 99214 OFFICE O/P EST MOD 30 MIN: CPT | Mod: S$GLB,,, | Performed by: SURGERY

## 2018-01-05 PROCEDURE — 99999 PR PBB SHADOW E&M-EST. PATIENT-LVL III: CPT | Mod: PBBFAC,,, | Performed by: SURGERY

## 2018-01-05 NOTE — LETTER
January 5, 2018      Diane Wilkinson, NP  [address] [facility] [address]           [facility] - General Surgery  [address]  [facility] [address]  Phone: [phone]          Patient: Elayne Almanzar   MR Number: [id number]   YOB: 1955   Date of Visit: 1/5/2018       Dear Diane Wilkinson:    Thank you for referring Elayne Almanzar to me for evaluation. Attached you will find relevant portions of my assessment and plan of care.    If you have questions, please do not hesitate to call me. I look forward to following Elayne Almanzar along with you.    Sincerely,    Bird Gallegos Jr., MD    Enclosure  CC:  No Recipients    If you would like to receive this communication electronically, please contact [email or web address] or [phone] to request more information on [facility] Link access.    For providers and/or their staff who would like to refer a patient to [facility], please contact us through our one-stop-shop provider referral line, [facility], at [phone].    If you feel you have received this communication in error or would no longer like to receive these types of communications, please e-mail [email or web address]

## 2018-01-05 NOTE — PROGRESS NOTES
"History & Physical    SUBJECTIVE:     History of Present Illness:  Patient is a 62 y.o. female presents with an incisional hernia at the superior aspect of her incision.  No pain.  Pt found out about this when getting a CT for kidney stones.  No changes in bowel habits. No nbausea/vomting. No fevers/chills.  No noticioble bulge.    No chief complaint on file.      Review of patient's allergies indicates:   Allergen Reactions    Hydromorphone (bulk) Nausea And Vomiting       Current Outpatient Prescriptions   Medication Sig Dispense Refill    albuterol 90 mcg/actuation inhaler Inhale 1-2 puffs into the lungs every 6 (six) hours as needed for Wheezing or Shortness of Breath. Rescue 1 Inhaler 6    aspirin (ECOTRIN) 81 MG EC tablet Take 81 mg by mouth once daily.       brimonidine 0.2% (ALPHAGAN) 0.2 % Drop Place 1 drop into the right eye 3 (three) times daily. (Patient taking differently: Place 1 drop into the right eye 2 (two) times daily. ) 15 mL 3    dorzolamide-timolol 2-0.5% (COSOPT) 22.3-6.8 mg/mL ophthalmic solution Place 1 drop into both eyes 2 (two) times daily. 10 mL 1    ergocalciferol (ERGOCALCIFEROL) 50,000 unit Cap Take 1 capsule (50,000 Units total) by mouth twice a week. (Patient taking differently: Take 50,000 Units by mouth once a week. on tuesday) 24 capsule 0    fluticasone (FLONASE) 50 mcg/actuation nasal spray 2 sprays by Each Nare route once daily. 1 Bottle 6    fluticasone-salmeterol 500-50 mcg/dose (ADVAIR DISKUS) 500-50 mcg/dose DsDv diskus inhaler Inhale 1 puff into the lungs 2 (two) times daily. 1 Inhaler 6    furosemide (LASIX) 20 MG tablet Take 1 tablet (20 mg total) by mouth once daily. 30 tablet 3    metoprolol succinate (TOPROL-XL) 200 MG 24 hr tablet Take 1 tablet (200 mg total) by mouth once daily. (Patient taking differently: Take 100 mg by mouth once daily. ) 30 tablet 3    needle, disp, 26 gauge 26 gauge x 1/2" Ndle 1 application by Misc.(Non-Drug; Combo Route) route " once a week. 10 each 0    oxybutynin (DITROPAN XL) 15 MG TR24 Take 1 tablet (15 mg total) by mouth once daily. 30 tablet 11    oxybutynin (DITROPAN-XL) 10 MG 24 hr tablet       sertraline (ZOLOFT) 100 MG tablet Take 1 tablet (100 mg total) by mouth once daily. 30 tablet 4    tamsulosin (FLOMAX) 0.4 mg Cp24 TAKE 1 CAPSULE (0.4 MG TOTAL) BY MOUTH ONCE DAILY. 30 capsule 11    traZODone (DESYREL) 50 MG tablet 1 pill PO PRN for insomnia at bedtime. OK to take 2nd pill in 30 minutes if still awake. 60 tablet 5    warfarin (COUMADIN) 5 MG tablet 5mg Monday and 7.5mg all other days or Or as directed by [facility] Clinic. (Patient taking differently: Take 7.5mg by mouth once daily or Or as directed by [facility]madin Clinic.) 45 tablet 6     No current facility-administered medications for this visit.        Past Medical History:   Diagnosis Date    Acute on chronic diastolic congestive heart failure     Anticoagulant long-term use     Atrial fibrillation     Cataract     Chronic kidney disease     COPD (chronic obstructive pulmonary disease)     Depression     Dysuria     Flank pain     HTN (hypertension)     Nephrolithiasis     KEYSHAWN (obstructive sleep apnea)     awaiting CPAP machine     Rheumatic disease of mitral valve     Urinary incontinence     Urinary tract infection      Past Surgical History:   Procedure Laterality Date     SECTION      kidney stone removal      MITRAL VALVE REPLACEMENT  2004    TUBAL LIGATION       Family History   Problem Relation Age of Onset    Stroke Paternal Grandmother     Colon cancer Maternal Grandmother     Cancer Brother      testicular cancer    Diabetes Sister     Hypertension Sister     Breast cancer Paternal Aunt     Diabetes Sister     Diabetes Sister     Heart disease Father 70     MI    Diabetes Father     Colon cancer Mother     Ovarian cancer Neg Hx      Social History   Substance Use Topics    Smoking status: Former Smoker      "Packs/day: 0.50     Years: 20.00     Types: Cigarettes     Quit date: 5/8/2002    Smokeless tobacco: Never Used    Alcohol use No        Review of Systems:  Review of Systems   Constitutional: Negative for activity change, appetite change, chills, diaphoresis, fatigue and fever.   HENT: Negative for congestion, postnasal drip, rhinorrhea, sinus pressure, sneezing, sore throat and tinnitus.    Eyes: Negative for photophobia, pain, discharge, redness, itching and visual disturbance.   Respiratory: Negative for apnea, cough, choking, chest tightness, shortness of breath, wheezing and stridor.    Cardiovascular: Negative for chest pain, palpitations and leg swelling.   Gastrointestinal: Negative for abdominal distention, abdominal pain, constipation, diarrhea, nausea and vomiting.   Musculoskeletal: Negative for arthralgias, back pain and joint swelling.   Skin: Negative for color change, pallor and rash.   Neurological: Negative for dizziness, facial asymmetry, light-headedness, numbness and headaches.   Hematological: Negative for adenopathy.   Psychiatric/Behavioral: Negative for agitation, behavioral problems, confusion and decreased concentration.       OBJECTIVE:     Vital Signs (Most Recent)  Temp:  (pt had a sip to drink) (01/05/18 0932)  Pulse: 97 (01/05/18 0932)  BP: 128/77 (01/05/18 0932)  5' 2" (1.575 m)  116.7 kg (257 lb 4.4 oz)     Physical Exam:  Physical Exam   Constitutional: She is oriented to person, place, and time. She appears well-developed and well-nourished.   HENT:   Head: Normocephalic and atraumatic.   Right Ear: External ear normal.   Left Ear: External ear normal.   Eyes: Conjunctivae and EOM are normal. Right eye exhibits no discharge. Left eye exhibits no discharge. No scleral icterus.   Neck: Normal range of motion. Neck supple. No thyromegaly present.   Cardiovascular: Exam reveals no gallop and no friction rub.    No murmur heard.  Irregularly irregular.  With click from valve " noted.     Pulmonary/Chest: Effort normal and breath sounds normal. No respiratory distress. She has no wheezes. She has no rales.   Abdominal: Soft. Bowel sounds are normal. She exhibits no distension and no mass. There is no tenderness. There is no rebound and no guarding. A hernia (non reducible.  Chronic incarceration.  Mild discomfort with palpation.) is present.   Musculoskeletal: Normal range of motion. She exhibits no edema or tenderness.   Neurological: She is alert and oriented to person, place, and time.   Skin: Skin is warm and dry. No rash noted. No erythema. No pallor.   Psychiatric: She has a normal mood and affect. Her behavior is normal. Judgment and thought content normal.         ASSESSMENT/PLAN:     Small Incisional hernia    PLAN:Plan     Pt does not desire surgery.  No pain. No sympoms  Instructed ta call with changes or desire for surgery  Would need cards leiva etc if we proceed  Continue to attempt weight loss if we proceed.         Bird Gallegos MD

## 2018-01-10 ENCOUNTER — OFFICE VISIT (OUTPATIENT)
Dept: OPTOMETRY | Facility: CLINIC | Age: 63
End: 2018-01-10
Payer: COMMERCIAL

## 2018-01-10 DIAGNOSIS — H40.1111 PRIMARY OPEN ANGLE GLAUCOMA OF RIGHT EYE, MILD STAGE: Primary | ICD-10-CM

## 2018-01-10 PROCEDURE — 92012 INTRM OPH EXAM EST PATIENT: CPT | Mod: S$GLB,,, | Performed by: OPTOMETRIST

## 2018-01-10 PROCEDURE — 99999 PR PBB SHADOW E&M-EST. PATIENT-LVL II: CPT | Mod: PBBFAC,,, | Performed by: OPTOMETRIST

## 2018-01-10 NOTE — PROGRESS NOTES
HPI     Patient in for progress check.  States that she is not having any changes   or problems with eyes at this time.    Being followed for (diagnosis):   Glaucoma    Date last seen:  12/27/17    Doctor last seen:  Dr. Funes    Prescribed eye medications(s) using:  Cosopt BID - OU    OTC eye medication(s) using:      Allergies/Medications reviewed today?  yes            Last edited by Svetlana Iqbal MA on 1/10/2018 10:04 AM. (History)            Assessment /Plan     For exam results, see Encounter Report.    Primary open angle glaucoma of right eye, mild stage      IOP stable today, slightly lower today compared to prior visit    Continue with Cosopt BID OU    May need to add drop OD if progressing    RTC June 2018 for OCT/HVF/IOP

## 2018-01-17 ENCOUNTER — TELEPHONE (OUTPATIENT)
Dept: INTERNAL MEDICINE | Facility: CLINIC | Age: 63
End: 2018-01-17

## 2018-01-25 ENCOUNTER — TELEPHONE (OUTPATIENT)
Dept: INTERNAL MEDICINE | Facility: CLINIC | Age: 63
End: 2018-01-25

## 2018-01-25 NOTE — TELEPHONE ENCOUNTER
Message left for pt to call office to reschedule appt with dr ennis that was cancelled or pt was a no show due to the weather

## 2018-01-26 ENCOUNTER — ANTI-COAG VISIT (OUTPATIENT)
Dept: CARDIOLOGY | Facility: CLINIC | Age: 63
End: 2018-01-26
Payer: COMMERCIAL

## 2018-01-26 DIAGNOSIS — Z95.2 STATUS POST HEART VALVE REPLACEMENT WITH MECHANICAL VALVE: ICD-10-CM

## 2018-01-26 DIAGNOSIS — I48.20 CHRONIC ATRIAL FIBRILLATION WITH RAPID VENTRICULAR RESPONSE: Primary | ICD-10-CM

## 2018-01-26 LAB — INR PPP: 3.3 (ref 2.5–3.5)

## 2018-01-26 PROCEDURE — 85610 PROTHROMBIN TIME: CPT | Mod: QW,S$GLB,, | Performed by: PHARMACIST

## 2018-01-26 NOTE — PROGRESS NOTES
Patient denies any changes in diet, medications, or health that would effect warfarin therapy.\Patient was re-educated on situations that would require placing a call to the coumadin clinic, including bleeding or unusual bruising issues, changes in health, diet or medications, upcoming procedures that require warfarin interruption, and missed coumadin dose(s). Patient expressed understanding that avoidance of consistency with these parameters could cause fluctuations in INR, leading to more frequent visits and increase risk of adverse events.

## 2018-02-08 ENCOUNTER — OFFICE VISIT (OUTPATIENT)
Dept: SLEEP MEDICINE | Facility: CLINIC | Age: 63
End: 2018-02-08
Payer: COMMERCIAL

## 2018-02-08 VITALS
DIASTOLIC BLOOD PRESSURE: 73 MMHG | HEIGHT: 62 IN | HEART RATE: 63 BPM | BODY MASS INDEX: 46.7 KG/M2 | WEIGHT: 253.75 LBS | SYSTOLIC BLOOD PRESSURE: 141 MMHG

## 2018-02-08 DIAGNOSIS — G47.31 COMPLEX SLEEP APNEA SYNDROME: ICD-10-CM

## 2018-02-08 DIAGNOSIS — G47.33 OSA (OBSTRUCTIVE SLEEP APNEA): ICD-10-CM

## 2018-02-08 DIAGNOSIS — G47.00 INSOMNIA, UNSPECIFIED TYPE: Primary | ICD-10-CM

## 2018-02-08 PROCEDURE — 99214 OFFICE O/P EST MOD 30 MIN: CPT | Mod: S$GLB,,, | Performed by: PSYCHIATRY & NEUROLOGY

## 2018-02-08 PROCEDURE — 3008F BODY MASS INDEX DOCD: CPT | Mod: S$GLB,,, | Performed by: PSYCHIATRY & NEUROLOGY

## 2018-02-08 PROCEDURE — 99999 PR PBB SHADOW E&M-EST. PATIENT-LVL III: CPT | Mod: PBBFAC,,, | Performed by: PSYCHIATRY & NEUROLOGY

## 2018-02-08 NOTE — PROGRESS NOTES
Elayne Almanzar  was seen at the request of  No ref. provider found for sleep evaluation.    12/22/2016 INITIAL HISTORY OF PRESENT ILLNESS:  Elayne Almanzar is a 62 y.o. female is here to be evaluated for a sleep disorder.       CHIEF COMPLAINT:      The patient's complaints include excessive daytime sleepiness, excessive daytime fatigue, snoring, choking  and interrupted sleep since  A few months ago.    Reports more trouble sleeping since she lost her mother Cot 2015    Reports  dry mouth and sore throat  Reports nasal congestion Flonase - does not help  Reports  morning headaches  Reports occasional  interrupted sleep  Reports frequent leg movements  Denies symptoms concerning for parasomnia    The ESS ([place] Sleepiness Score) taken on initial visit is 8 /24    The patient never had tonsillectomy, adenoidectomy or UPPP       Tried Ambien one - severe confused behavior when given AMBIEN in the hospital    INTERVAL HISTORY:    03/21/2017:  The patient has not presented any new complaints since the previous visit. She comes to discuss PSG - extremely severe complex sleep apnea - titration was only 50% decreasing the AHi due to treatment emergent central apnea.   ASV was very effective in controlling AHI and hypoxemia.  Trying to lose weight - could not afford [facility] program. ASV was also not affordable through durable medical equipment at [facility].  ESS 11/24.      12/05/2017:  The patient has not presented any new complaints since the previous visit.   Almost met compliance, but missed some d because of mask discomfort and the noise.  Likes ASV machine.   25; EPAP 10; PS 2-16; PS2; with a FFM    Therapy Event Summary Date Range: 10/28/2017 - 11/26/2017     Hide      Compliance Summary  Apnea Indices  Ventilator Statistics    Days with Device Usage:  15 days  Average AHI:  2.0  Average Breath Rate:  15.4 bpm    Percentage of Days >=4 Hours:  43.3%  Average OA Index:  0.3  Average % Patient Triggered  Breaths:  96.3%    Average Usage (Days Used):  6 hrs. 15 mins. 57 secs.  Average CA Index:  0.3  Average Tidal Volume:  431.2 ml    Average Usage (All Days):  3 hrs. 7 mins. 58 secs.    Average Minute Vent:  6.7        Large Leak  Periodic Breathing     Average Time in Large Leak:  5 mins. 4 secs.  Average % of Night in PB:  0.3%     Average % of Night in Large Leak:  1.3%                  02/08/2018:     Still ASV same settings. Met compliance - 93% now.  Like Simplus mask    90% - 14/9    States once she falls asleep,     Therapy Event Summary Date Range: 1/8/2018 - 2/6/2018     Hide      Compliance Summary  Apnea Indices  Ventilator Statistics    Days with Device Usage:  30 days  Average AHI:  3.7  Average Breath Rate:  15.9 bpm    Percentage of Days >=4 Hours:  93.3%  Average OA Index:  0.3  Average % Patient Triggered Breaths:  97.5%    Average Usage (Days Used):  7 hrs. 48 mins. 16 secs.  Average CA Index:  0.5  Average Tidal Volume:  349.8 ml    Average Usage (All Days):  7 hrs. 48 mins. 16 secs.    Average Minute Vent:  5.7        Large Leak  Periodic Breathing     Average Time in Large Leak:  6 secs.  Average % of Night in PB:  0.8%     Average % of Night in Large Leak:  0.0%              ECHO 11/17:    CONCLUSIONS     1 - Technically difficult study.     2 - Concentric remodeling.     3 - Normal left ventricular systolic function (EF 55-60%).     4 - Biatrial enlargement.     5 - Mechanical mitral valve prosthesis with a mean gradient of 6mm Hg at a heart rate averaging near 100 bpm.     6 - Intermediate central venous pressure.         SLEEP ROUTINE AND LIFESTYLE 02/08/2018 :    Occupation:not working    Bed partner: her  - he is a snorer     Time to bed - wake up time on a workday : 2 AM (not sleepy earlier) to 9 AM  Time to bed - wake up time on a day off: same   Sleep onset latency: 30 min  Disruptions or awakenings: 2  Time to fall back into sleep: 1 hour   Perceived sleep quality:  0/5  Perceived total sleep time:  6 hrs  hours.  Daytime naps: 3 times    Exercise routine: no  Caffeine:      PREVIOUS SLEEP STUDIES:   PSG 1/10/176: Significant Obstructive sleep apnea (KEYSHAWN) with AHI (apnea hypopnea Index) of 116 and SaO2 of 68% (weight  255 lbs).Efective control of SDB was not achieved due to central apneas in the treatment part on every tested pressure (some centrals were present in the diagnostic part).  ESS 6/24, yet reports feeling fatigued and sleepy.  CPAP titration on 2017: Effective control of respiratory events was not achieved on CPAP/BPAP. Best results on ASV at default settings.  CPAP titration on 4/17: Complex sleep Apnea. Best results achieved with ASV.         DME:       PAST MEDICAL HISTORY:    Active Ambulatory Problems     Diagnosis Date Noted    Shortness of breath 08/17/2012    Status post heart valve replacement with mechanical valve 03/20/2013    Chronic atrial fibrillation with rapid ventricular response 03/21/2013    Nephrolithiasis 03/21/2013    Essential (primary) hypertension 03/21/2013    Chronic anticoagulation 12/22/2013    H/O mitral valve replacement with mechanical valve 01/22/2015    COPD (chronic obstructive pulmonary disease) with acute bronchitis 01/22/2015    Hyperlipidemia 05/01/2015    Mixed urge and stress incontinence 03/16/2016    Hematuria 03/16/2016    Atrophic vaginitis 03/16/2016    Acute kidney injury 09/28/2016    Complex sleep apnea syndrome     Morbid obesity with BMI of 50.0-59.9, adult 05/02/2017    Thiamine deficiency 05/17/2017    Asthma exacerbation 09/07/2017    COPD exacerbation 09/07/2017    Chronic diastolic congestive heart failure      Resolved Ambulatory Problems     Diagnosis Date Noted    Wheezing 08/17/2012    CKD (chronic kidney disease) stage 3, GFR 30-59 ml/min 03/21/2013    Hypoxia 12/22/2013    Dizziness 03/05/2014    Encounter for screening colonoscopy 04/10/2014    Fever 01/22/2015    Hair loss  2015    Cephalalgia 2015    Dyspepsia 2016    Supratherapeutic INR 2017     Past Medical History:   Diagnosis Date    Acute on chronic diastolic congestive heart failure     Anticoagulant long-term use     Atrial fibrillation     Cataract     Chronic kidney disease     COPD (chronic obstructive pulmonary disease)     Depression     Dysuria     Flank pain     HTN (hypertension)     Nephrolithiasis     KEYSHAWN (obstructive sleep apnea)     Rheumatic disease of mitral valve     Urinary incontinence     Urinary tract infection                 PAST SURGICAL HISTORY:    Past Surgical History:   Procedure Laterality Date     SECTION      kidney stone removal      MITRAL VALVE REPLACEMENT  2004    TUBAL LIGATION           FAMILY HISTORY:                Family History   Problem Relation Age of Onset    Stroke Paternal Grandmother     Colon cancer Maternal Grandmother     Cancer Brother      testicular cancer    Diabetes Sister     Hypertension Sister     Breast cancer Paternal Aunt     Diabetes Sister     Diabetes Sister     Heart disease Father 70     MI    Diabetes Father     Colon cancer Mother     Ovarian cancer Neg Hx        SOCIAL HISTORY:          Tobacco:   History   Smoking Status    Former Smoker    Packs/day: 0.50    Years: 20.00    Types: Cigarettes    Quit date: 2002   Smokeless Tobacco    Never Used       alcohol use:    History   Alcohol Use No                   ALLERGIES:    Review of patient's allergies indicates:   Allergen Reactions    Hydromorphone (bulk) Nausea And Vomiting    Hydromorphone hcl Nausea And Vomiting       CURRENT MEDICATIONS:    Current Outpatient Prescriptions   Medication Sig Dispense Refill    albuterol 90 mcg/actuation inhaler Inhale 1-2 puffs into the lungs every 6 (six) hours as needed for Wheezing or Shortness of Breath. Rescue 1 Inhaler 6    aspirin (ECOTRIN) 81 MG EC tablet Take 81 mg by mouth once  "daily.       brimonidine 0.2% (ALPHAGAN) 0.2 % Drop Place 1 drop into the right eye 3 (three) times daily. (Patient taking differently: Place 1 drop into the right eye 2 (two) times daily. ) 15 mL 3    dorzolamide-timolol 2-0.5% (COSOPT) 22.3-6.8 mg/mL ophthalmic solution Place 1 drop into both eyes 2 (two) times daily. 10 mL 1    ergocalciferol (ERGOCALCIFEROL) 50,000 unit Cap Take 1 capsule (50,000 Units total) by mouth twice a week. (Patient taking differently: Take 50,000 Units by mouth once a week. on tuesday) 24 capsule 0    fluticasone (FLONASE) 50 mcg/actuation nasal spray 2 sprays by Each Nare route once daily. 1 Bottle 6    fluticasone-salmeterol 500-50 mcg/dose (ADVAIR DISKUS) 500-50 mcg/dose DsDv diskus inhaler Inhale 1 puff into the lungs 2 (two) times daily. 1 Inhaler 6    furosemide (LASIX) 20 MG tablet Take 1 tablet (20 mg total) by mouth once daily. 30 tablet 3    metoprolol succinate (TOPROL-XL) 200 MG 24 hr tablet Take 1 tablet (200 mg total) by mouth once daily. (Patient taking differently: Take 100 mg by mouth once daily. ) 30 tablet 3    needle, disp, 26 gauge 26 gauge x 1/2" Ndle 1 application by Misc.(Non-Drug; Combo Route) route once a week. 10 each 0    oxybutynin (DITROPAN XL) 15 MG TR24 Take 1 tablet (15 mg total) by mouth once daily. 30 tablet 11    oxybutynin (DITROPAN-XL) 10 MG 24 hr tablet       sertraline (ZOLOFT) 100 MG tablet Take 1 tablet (100 mg total) by mouth once daily. 30 tablet 4    tamsulosin (FLOMAX) 0.4 mg Cp24 TAKE 1 CAPSULE (0.4 MG TOTAL) BY MOUTH ONCE DAILY. 30 capsule 11    traZODone (DESYREL) 50 MG tablet 1 pill PO PRN for insomnia at bedtime. OK to take 2nd pill in 30 minutes if still awake. 60 tablet 5    warfarin (COUMADIN) 5 MG tablet 5mg Monday and 7.5mg all other days or Or as directed by [facility] Clinic. (Patient taking differently: Take 7.5mg by mouth once daily or Or as directed by Coumadin Clinic.) 45 tablet 6     No current " "facility-administered medications for this visit.                       REVIEW OF SYSTEMS:   Sleep related symptoms as per HPI    reports weight gain  Reports occasional dyspnea  Reports palpitations  Reports occasional acid reflux   Denies polyuria  Reports  mood diturbance  Denies  anemia  Reports occasional  muscle pain  Denies  Gait imbalance    Otherwise, a balance of 10 systems reviewed is negative.    PHYSICAL EXAM:  BP (!) 141/73   Pulse 63   Ht 5' 2" (1.575 m)   Wt 115.1 kg (253 lb 12 oz)   LMP 07/22/2012   BMI 46.41 kg/m²   GENERAL: Overweight body habitus, well groomed.  HEENT:   HEENT:  Conjunctivae are non-erythematous; Pupils equal, round, and reactive to light; Nose is symmetrical; Nasal mucosa is pink and moist; Septum isightly to the right; Inferior turbinates are hypertrophied; Nasal airflow is diminshed; Posterior pharynx is pink; Modified Mallampati:III; Posterior palate is low; Tonsils not visualized; Uvula is reddened;Tongue is normal; Dentition is fair; No TMJ tenderness; Jaw opening and protrusion without click and without discomfort.  NECK: Supple. Neck circumference is 18 inches. No thyromegaly. No palpable nodes.     SKIN: On face and neck: No abrasions, no rashes, no lesions.  No subcutaneous nodules are palpable.  RESPIRATORY: Chest is clear to auscultation.  Normal chest expansion and non-labored breathing at rest.  CARDIOVASCULAR: Normal S1, S2.  No murmurs, gallops or rubs. No carotid bruits bilaterally.  No edema. No clubbing. No cyanosis.    NEURO: Oriented to time, place and person. Normal attention span and concentration. Gait normal.    PSYCH: Affect is full. Mood is normal  MUSCULOSKELETAL: Moves 4 extremities. Gait normal.         Using [facility]:       ASSESSMENT:    1. Severe Complex Sleep Apnea. The patient symptomatically has  excessive daytime sleepiness, snoring,  witnessed breathing pauses, excessive daytime fatigue, gasping for air in sleep and interrupted sleep  " "with exam findings of "a crowded oral airway and elevated body mass index. The patient has medical co-morbidities of atrial fibrillation, mechanical valve COPD, hyperlipidemia and hypertension,  which can be worsened by KEYSHAWN. This warrants treatment. CPOAP and BPAP did not control sleep apnea and hypoxemia at various settings - only ASV provided a good control of AHi and hypoxemia. Doing well on ASV - met compliance, improved sleep quality and sleep continuity    2. Ongoing difficulty falling asleep. Melatonin 5 mg effective, but "groggy" in AM>          PLAN:    ASV machine at default settings.  max pressure 25 cm H2O, EPAP min -6 cm H2O, EPAP max - 10 cm H2O, max PS_ 16, min PS 2 cm H2O. With a Quattro FFM  CPAP comfort cover was recommended    She will monitor possible break through snoring    Decrease Melatonin dose to 1 mg    Weight loss strategies were discussed in detail - would like to be referred to bariatric program. If our program is not affordable, consider [facility].    PSG in lab testing is warranted due to medical comorbidities, iCOPD,  The patient is a graham risk for obesity hypoventilation  Syndrome given excessive BMI (nearly 50); high risk airway comromised which can be a safety issue due to inadequate titration with auto PAP.        More than 25 minutes of this 45 minutes visit was spent in counseling: during our discussion today, we talked about the etiology of  KEYSHAWN as well as the potential ramifications of untreated sleep apnea, which could include daytime sleepiness, hypertension, heart disease and/or stroke.  We discussed potential treatment options, which could include weight loss, body positioning, continuous positive airway pressure (CPAP), or referral for surgical consideration. Meanwhile, she  is urged to avoid supine sleep, weight gain and alcoholic beverages since all of these can worsen KEYSHAWN.     Precautions: The patient was advised to abstain from driving should he feel sleepy or " drowsy.    Follow up: MD/NP  after the sleep study has been completed.     Thank you for allowing me the opportunity to participate in the care of your patient.    This visit summary will be sent to referring provider via inbasket

## 2018-02-08 NOTE — PATIENT INSTRUCTIONS
Can take Melatonin drops - like Natrol liquid brand or 1 mg Melatonin pills or gummies.    Please go to CPAP store or [employer] to get CPAP Comfort Cover

## 2018-02-16 ENCOUNTER — HOSPITAL ENCOUNTER (EMERGENCY)
Facility: OTHER | Age: 63
Discharge: HOME | End: 2018-02-16
Attending: EMERGENCY MEDICINE
Payer: COMMERCIAL

## 2018-02-16 VITALS
SYSTOLIC BLOOD PRESSURE: 129 MMHG | HEART RATE: 122 BPM | TEMPERATURE: 98 F | DIASTOLIC BLOOD PRESSURE: 72 MMHG | RESPIRATION RATE: 19 BRPM | OXYGEN SATURATION: 91 %

## 2018-02-16 DIAGNOSIS — J44.1 COPD EXACERBATION: Primary | ICD-10-CM

## 2018-02-16 LAB
ANION GAP SERPL CALC-SCNC: 11 MMOL/L
ANISOCYTOSIS BLD QL SMEAR: SLIGHT
BASOPHILS # BLD AUTO: 0.03 K/UL
BASOPHILS NFR BLD: 0.5 %
BNP SERPL-MCNC: 76 PG/ML
BUN SERPL-MCNC: 15 MG/DL
CALCIUM SERPL-MCNC: 9.5 MG/DL
CHLORIDE SERPL-SCNC: 105 MMOL/L
CO2 SERPL-SCNC: 24 MMOL/L
CREAT SERPL-MCNC: 1.1 MG/DL
DIFFERENTIAL METHOD: ABNORMAL
EOSINOPHIL # BLD AUTO: 0.9 K/UL
EOSINOPHIL NFR BLD: 15.1 %
ERYTHROCYTE [DISTWIDTH] IN BLOOD BY AUTOMATED COUNT: 17.6 %
EST. GFR  (AFRICAN AMERICAN): >60 ML/MIN/1.73 M^2
EST. GFR  (NON AFRICAN AMERICAN): 54 ML/MIN/1.73 M^2
GIANT PLATELETS BLD QL SMEAR: PRESENT
GLUCOSE SERPL-MCNC: 91 MG/DL
HCT VFR BLD AUTO: 38.7 %
HGB BLD-MCNC: 11 G/DL
LYMPHOCYTES # BLD AUTO: 1.5 K/UL
LYMPHOCYTES NFR BLD: 25.3 %
MCH RBC QN AUTO: 19.5 PG
MCHC RBC AUTO-ENTMCNC: 28.4 G/DL
MCV RBC AUTO: 69 FL
MONOCYTES # BLD AUTO: 0.7 K/UL
MONOCYTES NFR BLD: 11.1 %
NEUTROPHILS # BLD AUTO: 2.8 K/UL
NEUTROPHILS NFR BLD: 48 %
OVALOCYTES BLD QL SMEAR: ABNORMAL
PLATELET # BLD AUTO: 192 K/UL
PLATELET BLD QL SMEAR: ABNORMAL
PMV BLD AUTO: ABNORMAL FL
POIKILOCYTOSIS BLD QL SMEAR: SLIGHT
POLYCHROMASIA BLD QL SMEAR: ABNORMAL
POTASSIUM SERPL-SCNC: 4.5 MMOL/L
RBC # BLD AUTO: 5.65 M/UL
SODIUM SERPL-SCNC: 140 MMOL/L
WBC # BLD AUTO: 5.88 K/UL

## 2018-02-16 PROCEDURE — 94644 CONT INHLJ TX 1ST HOUR: CPT

## 2018-02-16 PROCEDURE — 99284 EMERGENCY DEPT VISIT MOD MDM: CPT | Mod: 25

## 2018-02-16 PROCEDURE — 94761 N-INVAS EAR/PLS OXIMETRY MLT: CPT

## 2018-02-16 PROCEDURE — 63600175 PHARM REV CODE 636 W HCPCS: Performed by: EMERGENCY MEDICINE

## 2018-02-16 PROCEDURE — 25000003 PHARM REV CODE 250: Performed by: EMERGENCY MEDICINE

## 2018-02-16 PROCEDURE — 36415 COLL VENOUS BLD VENIPUNCTURE: CPT

## 2018-02-16 PROCEDURE — 85025 COMPLETE CBC W/AUTO DIFF WBC: CPT

## 2018-02-16 PROCEDURE — 80048 BASIC METABOLIC PNL TOTAL CA: CPT

## 2018-02-16 PROCEDURE — 94640 AIRWAY INHALATION TREATMENT: CPT

## 2018-02-16 PROCEDURE — 96374 THER/PROPH/DIAG INJ IV PUSH: CPT

## 2018-02-16 PROCEDURE — 25000242 PHARM REV CODE 250 ALT 637 W/ HCPCS: Performed by: EMERGENCY MEDICINE

## 2018-02-16 PROCEDURE — 83880 ASSAY OF NATRIURETIC PEPTIDE: CPT

## 2018-02-16 RX ORDER — ALBUTEROL SULFATE 90 UG/1
1-2 AEROSOL, METERED RESPIRATORY (INHALATION) EVERY 6 HOURS PRN
Qty: 1 INHALER | Refills: 0 | Status: SHIPPED | OUTPATIENT
Start: 2018-02-16 | End: 2018-08-20 | Stop reason: SDUPTHER

## 2018-02-16 RX ORDER — PREDNISONE 50 MG/1
50 TABLET ORAL DAILY
Qty: 5 TABLET | Refills: 0 | Status: SHIPPED | OUTPATIENT
Start: 2018-02-16 | End: 2018-02-21

## 2018-02-16 RX ORDER — ALBUTEROL SULFATE 0.83 MG/ML
15 SOLUTION RESPIRATORY (INHALATION)
Status: COMPLETED | OUTPATIENT
Start: 2018-02-16 | End: 2018-02-16

## 2018-02-16 RX ORDER — METHYLPREDNISOLONE SOD SUCC 125 MG
125 VIAL (EA) INJECTION
Status: COMPLETED | OUTPATIENT
Start: 2018-02-16 | End: 2018-02-16

## 2018-02-16 RX ORDER — IPRATROPIUM BROMIDE AND ALBUTEROL SULFATE 2.5; .5 MG/3ML; MG/3ML
3 SOLUTION RESPIRATORY (INHALATION)
Status: COMPLETED | OUTPATIENT
Start: 2018-02-16 | End: 2018-02-16

## 2018-02-16 RX ORDER — ACETAMINOPHEN 500 MG
1000 TABLET ORAL
Status: COMPLETED | OUTPATIENT
Start: 2018-02-16 | End: 2018-02-16

## 2018-02-16 RX ADMIN — ACETAMINOPHEN 1000 MG: 500 TABLET ORAL at 07:02

## 2018-02-16 RX ADMIN — ALBUTEROL SULFATE 15 MG: 2.5 SOLUTION RESPIRATORY (INHALATION) at 06:02

## 2018-02-16 RX ADMIN — IPRATROPIUM BROMIDE AND ALBUTEROL SULFATE 3 ML: .5; 3 SOLUTION RESPIRATORY (INHALATION) at 08:02

## 2018-02-16 RX ADMIN — METHYLPREDNISOLONE SODIUM SUCCINATE 125 MG: 125 INJECTION, POWDER, FOR SOLUTION INTRAMUSCULAR; INTRAVENOUS at 05:02

## 2018-02-16 NOTE — ED TRIAGE NOTES
"Patient complains of wheezing over the last three weeks that "has not got better'. She states that she has used her inhaler today with no help. Reports shortness of breath but denies chest pain.   "

## 2018-02-16 NOTE — ED PROVIDER NOTES
Encounter Date: 2018    SCRIBE #1 NOTE: I, Ruby Torres, am scribing for, and in the presence of, Dr. Euceda.       History     Chief Complaint   Patient presents with    Shortness of Breath     wheezing for 2 months but recently has not been able to get rid of the cough (non prductive) but feels a flutter in her lungs. Body aches and had fever yesterday.  Has been doing albuterol treatments at home but not helping. Expiratory wheezes all 4 lung fields     Time seen by provider: 5:44 PM    This is a 62 y.o. female, with a history of COPD and CHF, who presents with complaint of cough that began two months ago. She reports associated wheezing and shortness of breath. She presents to the Ed because symptoms are not improving. She reports fever, nausea, and one episode of vomiting that occurred two days ago. She denies fever, chills, congestion, nausea, vomiting, dizziness, or lightheadedness. She reports using an albuterol inhaler at home with little relief of symptoms. She has not received any prior evaluation for this complaint.       The history is provided by the patient.     Review of patient's allergies indicates:  No Known Allergies  Past Medical History:   Diagnosis Date    Acute on chronic diastolic congestive heart failure     Anticoagulant long-term use     Atrial fibrillation     Cataract     Chronic kidney disease     COPD (chronic obstructive pulmonary disease)     Depression     Dysuria     Flank pain     HTN (hypertension)     Nephrolithiasis     KEYSHAWN (obstructive sleep apnea)     awaiting CPAP machine     Rheumatic disease of mitral valve     Urinary incontinence     Urinary tract infection      Past Surgical History:   Procedure Laterality Date     SECTION      kidney stone removal      MITRAL VALVE REPLACEMENT  2004    TUBAL LIGATION       Family History   Problem Relation Age of Onset    Stroke Paternal Grandmother     Colon cancer Maternal Grandmother      Cancer Brother      testicular cancer    Diabetes Sister     Hypertension Sister     Breast cancer Paternal Aunt     Diabetes Sister     Diabetes Sister     Heart disease Father 70     MI    Diabetes Father     Colon cancer Mother     Ovarian cancer Neg Hx      Social History   Substance Use Topics    Smoking status: Former Smoker     Packs/day: 0.50     Years: 20.00     Types: Cigarettes     Quit date: 5/8/2002    Smokeless tobacco: Never Used    Alcohol use No     Review of Systems   Constitutional: Negative for chills and fever.   HENT: Negative for congestion and sore throat.    Respiratory: Positive for cough, shortness of breath and wheezing.    Cardiovascular: Negative for chest pain.   Gastrointestinal: Negative for abdominal pain, diarrhea, nausea and vomiting.   Genitourinary: Negative for dysuria.   Musculoskeletal: Negative for back pain.   Skin: Negative for rash.   Neurological: Negative for dizziness, weakness and light-headedness.   Hematological: Does not bruise/bleed easily.       Physical Exam     Initial Vitals [02/16/18 1621]   BP Pulse Resp Temp SpO2   136/61 73 (!) 22 98.4 °F (36.9 °C) 97 %      MAP       86         Physical Exam    Nursing note and vitals reviewed.  Constitutional: She appears well-developed and well-nourished. She is not diaphoretic. No distress.   HENT:   Head: Normocephalic and atraumatic.   Right Ear: External ear normal.   Left Ear: External ear normal.   Eyes: EOM are normal. Right eye exhibits no discharge. Left eye exhibits no discharge.   Neck: Normal range of motion.   Cardiovascular: Normal rate, regular rhythm and normal heart sounds. Exam reveals no gallop and no friction rub.    No murmur heard.  Pulmonary/Chest: No tachypnea. No respiratory distress. She has no rhonchi. She has no rales.   Bilateral wheezes.    Abdominal: Soft. There is no tenderness. There is no rebound and no guarding.   Musculoskeletal: Normal range of motion. She exhibits no  edema or tenderness.   No lower extremity edema.    Neurological: She is alert and oriented to person, place, and time.   Skin: Skin is warm and dry. No rash and no abscess noted. No erythema. No pallor.   Psychiatric: She has a normal mood and affect. Her behavior is normal. Judgment and thought content normal.         ED Course   Procedures  Labs Reviewed   CBC W/ AUTO DIFFERENTIAL - Abnormal; Notable for the following:        Result Value    RBC 5.65 (*)     Hemoglobin 11.0 (*)     MCV 69 (*)     MCH 19.5 (*)     MCHC 28.4 (*)     RDW 17.6 (*)     All other components within normal limits   BASIC METABOLIC PANEL - Abnormal; Notable for the following:     eGFR if non  54 (*)     All other components within normal limits   B-TYPE NATRIURETIC PEPTIDE     Imaging Results          X-Ray Chest AP Portable (Final result)  Result time 02/16/18 18:13:35    Final result by Courtney Leonardo MD (02/16/18 18:13:35)                 Impression:      Cardiomegaly with suspected mild interstitial edema.      Electronically signed by: COURTNEY LEONARDO MD  Date:     02/16/18  Time:    18:13              Narrative:    Chest AP single view.  Comparison: 11/2017.    Examination limited by large body habitus and relative underpenetrated technique.  Postsurgical sternotomy changes and cardiac valve repair seen.  There is prominent cardiomegaly, stable from prior exam.  Lungs are hypoinflated which accentuates pulmonary vascular markings, however there is suspected mild interstitial edema.  No evidence of focal elevation, mass, or large effusion.                                   X-Rays:   Independently Interpreted Readings:   Chest X-Ray: No opacities.      Medical Decision Making:   Independently Interpreted Test(s):   I have ordered and independently interpreted X-rays - see prior notes.  Clinical Tests:   Lab Tests: Ordered and Reviewed  Radiological Study: Ordered and Reviewed  ED Management:  Patient presents  "shortness of breath which she trims to "wheezing ".  She reports that it's her COPD.  She does have heart failure, but she reports she's not having any swelling in her legs.  She is diffusely wheezy on examination.  No eloina respiratory distress.  Significant improvement after 1 hour DuoNeb and 3 stacked DuoNeb's.  Chest x-ray no signs of infection.  No effusions.  BMP less than 100.  Very unlikely be CHF.  Started on steroid burst.  I refilled her albuterol.  Encouraged follow-up with primary care return here if worse.    I did have an extensive talk regarding signs to return for and need for follow up. Patient expressed understanding and will monitor symptoms closely and follow-up as needed.    MARTY Euceda M.D.  02/16/2018  9:15 PM                 Attending Attestation:           Physician Attestation for Scribe:  Physician Attestation Statement for Scribe #1: I, Dr. Euceda, reviewed documentation, as scribed by Ruby Torres in my presence, and it is both accurate and complete.                    Clinical Impression:     1. COPD exacerbation                               Jason Euceda MD  02/16/18 2116    "

## 2018-03-08 ENCOUNTER — ANTI-COAG VISIT (OUTPATIENT)
Dept: CARDIOLOGY | Facility: CLINIC | Age: 63
End: 2018-03-08
Payer: COMMERCIAL

## 2018-03-08 DIAGNOSIS — Z95.2 STATUS POST HEART VALVE REPLACEMENT WITH MECHANICAL VALVE: ICD-10-CM

## 2018-03-08 DIAGNOSIS — Z79.01 LONG TERM (CURRENT) USE OF ANTICOAGULANTS: Primary | ICD-10-CM

## 2018-03-08 DIAGNOSIS — I48.20 CHRONIC ATRIAL FIBRILLATION WITH RAPID VENTRICULAR RESPONSE: ICD-10-CM

## 2018-03-08 DIAGNOSIS — I48.20 ATRIAL FIBRILLATION, CHRONIC: ICD-10-CM

## 2018-03-08 LAB — INR PPP: 5 (ref 2–3)

## 2018-03-08 PROCEDURE — 85610 PROTHROMBIN TIME: CPT | Mod: QW,S$GLB,, | Performed by: PHARMACIST

## 2018-03-08 PROCEDURE — 99211 OFF/OP EST MAY X REQ PHY/QHP: CPT | Mod: 25,S$GLB,, | Performed by: PHARMACIST

## 2018-03-08 RX ORDER — WARFARIN SODIUM 5 MG/1
TABLET ORAL
Qty: 130 TABLET | Refills: 3 | Status: SHIPPED | OUTPATIENT
Start: 2018-03-08 | End: 2019-02-28 | Stop reason: SDUPTHER

## 2018-03-08 NOTE — PROGRESS NOTES
"Patient reports no bruising and no diet changes.  She reports that she has had a few nosebleeds lately.  She has had bronchitis roughly "2 weeks ago" and reports getting "steroid shots" in regards to this.  She couldn't confirm the names of medications given for her recent illness.  I am holding her coumadin tonight, advising greens and asking for an INR re-check next week.  I reminded the patient to call with any problems, changes or questions before the next visit.  I have reviewed the student's initial findings and agree with their assessment.  I have personally spoken with and assessed the patient in clinic to devise care plan.  "

## 2018-03-09 ENCOUNTER — HOSPITAL ENCOUNTER (EMERGENCY)
Facility: OTHER | Age: 63
Discharge: HOME | End: 2018-03-09
Attending: EMERGENCY MEDICINE
Payer: COMMERCIAL

## 2018-03-09 VITALS
HEIGHT: 62 IN | SYSTOLIC BLOOD PRESSURE: 132 MMHG | RESPIRATION RATE: 18 BRPM | HEART RATE: 105 BPM | BODY MASS INDEX: 45.64 KG/M2 | DIASTOLIC BLOOD PRESSURE: 86 MMHG | WEIGHT: 248 LBS | OXYGEN SATURATION: 98 % | TEMPERATURE: 98 F

## 2018-03-09 DIAGNOSIS — J44.1 COPD EXACERBATION: Primary | ICD-10-CM

## 2018-03-09 DIAGNOSIS — I48.91 ATRIAL FIBRILLATION WITH RAPID VENTRICULAR RESPONSE: ICD-10-CM

## 2018-03-09 DIAGNOSIS — R06.02 SOB (SHORTNESS OF BREATH): ICD-10-CM

## 2018-03-09 LAB
ALBUMIN SERPL BCP-MCNC: 3.8 G/DL
ALP SERPL-CCNC: 70 U/L
ALT SERPL W/O P-5'-P-CCNC: 14 U/L
ANION GAP SERPL CALC-SCNC: 13 MMOL/L
ANISOCYTOSIS BLD QL SMEAR: ABNORMAL
AST SERPL-CCNC: 21 U/L
BASOPHILS # BLD AUTO: 0.01 K/UL
BASOPHILS NFR BLD: 0.2 %
BILIRUB SERPL-MCNC: 0.7 MG/DL
BNP SERPL-MCNC: 97 PG/ML
BUN SERPL-MCNC: 13 MG/DL
CALCIUM SERPL-MCNC: 9.9 MG/DL
CHLORIDE SERPL-SCNC: 104 MMOL/L
CO2 SERPL-SCNC: 26 MMOL/L
CREAT SERPL-MCNC: 1.1 MG/DL
DIFFERENTIAL METHOD: ABNORMAL
EOSINOPHIL # BLD AUTO: 0.2 K/UL
EOSINOPHIL NFR BLD: 2.8 %
ERYTHROCYTE [DISTWIDTH] IN BLOOD BY AUTOMATED COUNT: 18.5 %
EST. GFR  (AFRICAN AMERICAN): >60 ML/MIN/1.73 M^2
EST. GFR  (NON AFRICAN AMERICAN): 54 ML/MIN/1.73 M^2
GIANT PLATELETS BLD QL SMEAR: PRESENT
GLUCOSE SERPL-MCNC: 156 MG/DL
HCT VFR BLD AUTO: 38.2 %
HGB BLD-MCNC: 10.7 G/DL
INR PPP: 2.2
LYMPHOCYTES # BLD AUTO: 0.6 K/UL
LYMPHOCYTES NFR BLD: 10.9 %
MCH RBC QN AUTO: 19.5 PG
MCHC RBC AUTO-ENTMCNC: 28 G/DL
MCV RBC AUTO: 70 FL
MONOCYTES # BLD AUTO: 0.1 K/UL
MONOCYTES NFR BLD: 2.1 %
NEUTROPHILS # BLD AUTO: 4.7 K/UL
NEUTROPHILS NFR BLD: 84 %
OVALOCYTES BLD QL SMEAR: ABNORMAL
PLATELET # BLD AUTO: 151 K/UL
PLATELET BLD QL SMEAR: ABNORMAL
PMV BLD AUTO: ABNORMAL FL
POIKILOCYTOSIS BLD QL SMEAR: SLIGHT
POLYCHROMASIA BLD QL SMEAR: ABNORMAL
POTASSIUM SERPL-SCNC: 4.3 MMOL/L
PROT SERPL-MCNC: 7.5 G/DL
PROTHROMBIN TIME: 23.4 SEC
RBC # BLD AUTO: 5.49 M/UL
SODIUM SERPL-SCNC: 143 MMOL/L
SPHEROCYTES BLD QL SMEAR: ABNORMAL
WBC # BLD AUTO: 5.62 K/UL

## 2018-03-09 PROCEDURE — 99284 EMERGENCY DEPT VISIT MOD MDM: CPT | Mod: 25

## 2018-03-09 PROCEDURE — 94640 AIRWAY INHALATION TREATMENT: CPT

## 2018-03-09 PROCEDURE — 25000003 PHARM REV CODE 250: Performed by: EMERGENCY MEDICINE

## 2018-03-09 PROCEDURE — 63600175 PHARM REV CODE 636 W HCPCS: Performed by: EMERGENCY MEDICINE

## 2018-03-09 PROCEDURE — 94761 N-INVAS EAR/PLS OXIMETRY MLT: CPT

## 2018-03-09 PROCEDURE — 85025 COMPLETE CBC W/AUTO DIFF WBC: CPT

## 2018-03-09 PROCEDURE — 85610 PROTHROMBIN TIME: CPT

## 2018-03-09 PROCEDURE — 80053 COMPREHEN METABOLIC PANEL: CPT

## 2018-03-09 PROCEDURE — 94644 CONT INHLJ TX 1ST HOUR: CPT

## 2018-03-09 PROCEDURE — 83880 ASSAY OF NATRIURETIC PEPTIDE: CPT

## 2018-03-09 PROCEDURE — 25000242 PHARM REV CODE 250 ALT 637 W/ HCPCS: Performed by: EMERGENCY MEDICINE

## 2018-03-09 PROCEDURE — 96374 THER/PROPH/DIAG INJ IV PUSH: CPT

## 2018-03-09 RX ORDER — METOPROLOL SUCCINATE 50 MG/1
TABLET, EXTENDED RELEASE ORAL
Status: DISCONTINUED
Start: 2018-03-09 | End: 2018-03-09 | Stop reason: HOSPADM

## 2018-03-09 RX ORDER — ALBUTEROL SULFATE 0.83 MG/ML
SOLUTION RESPIRATORY (INHALATION)
Status: DISPENSED
Start: 2018-03-09 | End: 2018-03-10

## 2018-03-09 RX ORDER — ALBUTEROL SULFATE 0.83 MG/ML
2.5 SOLUTION RESPIRATORY (INHALATION)
Status: DISCONTINUED | OUTPATIENT
Start: 2018-03-09 | End: 2018-03-09

## 2018-03-09 RX ORDER — IBUPROFEN 600 MG/1
600 TABLET ORAL
Status: DISCONTINUED | OUTPATIENT
Start: 2018-03-09 | End: 2018-03-09

## 2018-03-09 RX ORDER — IPRATROPIUM BROMIDE AND ALBUTEROL SULFATE 2.5; .5 MG/3ML; MG/3ML
SOLUTION RESPIRATORY (INHALATION)
Status: DISPENSED
Start: 2018-03-09 | End: 2018-03-10

## 2018-03-09 RX ORDER — PREDNISONE 10 MG/1
10 TABLET ORAL DAILY
Qty: 21 TABLET | Refills: 0 | Status: SHIPPED | OUTPATIENT
Start: 2018-03-09 | End: 2018-03-19

## 2018-03-09 RX ORDER — METOPROLOL SUCCINATE 50 MG/1
200 TABLET, EXTENDED RELEASE ORAL
Status: COMPLETED | OUTPATIENT
Start: 2018-03-09 | End: 2018-03-09

## 2018-03-09 RX ORDER — PREDNISONE 20 MG/1
TABLET ORAL
Status: DISPENSED
Start: 2018-03-09 | End: 2018-03-10

## 2018-03-09 RX ORDER — ACETAMINOPHEN 500 MG
TABLET ORAL
Status: DISPENSED
Start: 2018-03-09 | End: 2018-03-10

## 2018-03-09 RX ORDER — PREDNISONE 10 MG/1
10 TABLET ORAL DAILY
Qty: 21 TABLET | Refills: 0 | Status: SHIPPED | OUTPATIENT
Start: 2018-03-09 | End: 2018-03-09

## 2018-03-09 RX ORDER — ACETAMINOPHEN 500 MG
1000 TABLET ORAL
Status: COMPLETED | OUTPATIENT
Start: 2018-03-09 | End: 2018-03-09

## 2018-03-09 RX ORDER — METOPROLOL SUCCINATE 50 MG/1
200 TABLET, EXTENDED RELEASE ORAL DAILY
Status: DISCONTINUED | OUTPATIENT
Start: 2018-03-10 | End: 2018-03-09

## 2018-03-09 RX ORDER — METOPROLOL TARTRATE 1 MG/ML
INJECTION, SOLUTION INTRAVENOUS
Status: DISPENSED
Start: 2018-03-09 | End: 2018-03-10

## 2018-03-09 RX ORDER — IPRATROPIUM BROMIDE 0.5 MG/2.5ML
1 SOLUTION RESPIRATORY (INHALATION)
Status: COMPLETED | OUTPATIENT
Start: 2018-03-09 | End: 2018-03-09

## 2018-03-09 RX ORDER — IPRATROPIUM BROMIDE 0.5 MG/2.5ML
SOLUTION RESPIRATORY (INHALATION)
Status: DISPENSED
Start: 2018-03-09 | End: 2018-03-10

## 2018-03-09 RX ORDER — ALBUTEROL SULFATE 0.83 MG/ML
15 SOLUTION RESPIRATORY (INHALATION)
Status: COMPLETED | OUTPATIENT
Start: 2018-03-09 | End: 2018-03-09

## 2018-03-09 RX ORDER — IPRATROPIUM BROMIDE AND ALBUTEROL SULFATE 2.5; .5 MG/3ML; MG/3ML
3 SOLUTION RESPIRATORY (INHALATION)
Status: COMPLETED | OUTPATIENT
Start: 2018-03-09 | End: 2018-03-09

## 2018-03-09 RX ORDER — PREDNISONE 20 MG/1
60 TABLET ORAL
Status: COMPLETED | OUTPATIENT
Start: 2018-03-09 | End: 2018-03-09

## 2018-03-09 RX ORDER — METOPROLOL TARTRATE 1 MG/ML
5 INJECTION, SOLUTION INTRAVENOUS
Status: COMPLETED | OUTPATIENT
Start: 2018-03-09 | End: 2018-03-09

## 2018-03-09 RX ADMIN — IPRATROPIUM BROMIDE 1 MG: 0.5 SOLUTION RESPIRATORY (INHALATION) at 03:03

## 2018-03-09 RX ADMIN — ACETAMINOPHEN 1000 MG: 500 TABLET ORAL at 02:03

## 2018-03-09 RX ADMIN — PREDNISONE 60 MG: 20 TABLET ORAL at 01:03

## 2018-03-09 RX ADMIN — ALBUTEROL SULFATE 15 MG: 2.5 SOLUTION RESPIRATORY (INHALATION) at 03:03

## 2018-03-09 RX ADMIN — IPRATROPIUM BROMIDE AND ALBUTEROL SULFATE 3 ML: .5; 3 SOLUTION RESPIRATORY (INHALATION) at 01:03

## 2018-03-09 RX ADMIN — METOPROLOL TARTRATE 5 MG: 5 INJECTION INTRAVENOUS at 06:03

## 2018-03-09 RX ADMIN — METOPROLOL SUCCINATE 200 MG: 50 TABLET, EXTENDED RELEASE ORAL at 05:03

## 2018-03-09 NOTE — ED NOTES
Patient identifiers for Elayne Almanzar checked and correct.  LOC: Patient is awake, alert, and aware of environment with an appropriate affect. Patient is oriented x 3 and speaking appropriately.  APPEARANCE: Pt ambulatory in intake room 15, admits increased SOB with ambulation. Pt sitting in recliner with audible exp wheezing but able to speak in full sentences.  Patient is clean and well groomed, patient's clothing is properly fastened.  SKIN: The skin is warm and dry. Patient has normal skin turgor and moist mucus membrances. Skin is intact; no bruising or breakdown noted.  MUSKULOSKELETAL: Patient is moving all extremities well, no obvious swelling or deformities noted. Pulses intact.   RESPIRATORY: Airway is open and patent. Respirations are spontaneous, diminished and somewhat labored. Audible exp wheezes noted.   CARDIAC: Patient has a normal rate and rhythm. No peripheral edema noted. Capillary refill < 3 seconds.  ABDOMEN: No distention noted.   Allergies reported: Review of patient's allergies indicates:  No Known Allergies

## 2018-03-09 NOTE — ED NOTES
Dr. Chowdhury informed tele demonstrating: Afib with HR: 120'-150's. Ok to ambulate pt with pulse ox check in 30 mins after metoprolol given per MD. Pt informed of plan of care.

## 2018-03-09 NOTE — ED NOTES
"ROUNDING:  Reclining in chair; AAOx4. C/o HA 8/10. Continuous nebulizer treatment in progress. States breathing "much better." Denies cp or any other discomfort at this time. Skin is warm and dry. Comfort and BR needs addressed. Plan of care updated. NADN. Recliner wheels locked and call light within reach. Will continue to monitor.    "

## 2018-03-09 NOTE — ED NOTES
"Rounding on the patient has been done. she has been updated on the plan of care and her current status. Pain was assessed and is currently a 6/10 "headache." Nebulizer treatment in progress. Exp wheezing noted. Denies any sob, cp or any other discomfort at this time. Comfort and restroom needs were addressed. Necessary items were placed with in her reach and she was advised when a reassessment would take place. The call bell remains at the bedside for any additional patient needs. The patient is reclining in the chair, respirations are even and unlabored, skin warm and dry. Dr. Chowdhury informed of pt's HA 6/10. Will continue to monitor.   "

## 2018-03-09 NOTE — ED NOTES
"ROUNDING:  Reclining in chair; AAOx4. States HA 5/10 "it's getting better." Lungs clear bilat. Denies cp, sob, palpitations or any other discomfort at this time. Skin is warm and dry. Resp:20 even and unlabored. Comfort and BR needs addressed. Plan of care updated. NADN. Recliner wheels locked and call light within reach. Will continue to monitor    "

## 2018-03-09 NOTE — ED NOTES
ROUNDING: Reclining in chair with eyes closed. Eyes open with voice. Denies any sob at this time. Resp:20 even and unlabored. Exp wheezing all lobes noted. O2 sat=94-97% on room air. Skin is warm and dry. NADN. BR  and comfort needs addressed. Plan of care updated. Dr. Chowdhury notified. Recliner wheels locked and call light within reach. Will continue to monitor.

## 2018-03-09 NOTE — ED NOTES
"Continuous nebulizer treatment completed. Pt states breathing "much better." Denies cp or sob at this time. Resp:20 even and unlabored while sitting in the recliner. Skin is warm and dry. NADN. BR and comfort needs addressed. Plan of care updated. Recliner wheels locked and call light within reach. Will continue to monitor.   "

## 2018-03-09 NOTE — ED NOTES
"ROUNDING:  Reclining in chair; AAOx4. Nebulizer treatment in progress. States HA 3/10 "a lot better." Denies cp, sob or any other discomfort at this time. Skin is warm and dry. Resp:20even and unlabored. Comfort and BR needs addressed. Plan of care updated. NADN. Recliner wheels locked and call light within reach. Will continue to monitor    "

## 2018-03-09 NOTE — ED TRIAGE NOTES
Pt reports to ED c/o SOB with productive cough x 2 days, PMH of asthma, no relief from home inhaler. No swelling to lower extremities, exp wheezing noted with shallow BBS. Pt denies fevers, chills, chest pain, N/V/D. Pt did not take BP meds this morning.

## 2018-03-09 NOTE — ED PROVIDER NOTES
Encounter Date: 3/9/2018    SCRIBE #1 NOTE: I, Nelson Phillips, am scribing for, and in the presence of, Dr. Chowdhury.       History     Chief Complaint   Patient presents with    Cough     Pt c/o cough and wheezing that started yesterday. Pt states she used an inhaler at home without improvement.     1:08 PM    Patient is a 62 y.o. female with a history of COPD who presents to the ED with complaint of progressively worsening shortness of breath since yesterday. She reports associated wheezing and productive cough with yellow sputum. She denies fever, nausea, vomiting, diarrhea, or decreased appetite. Symptoms were unimproved with using inhaler at home. She reports taking a 5 day steriod regimen a couple weeks ago after being seen here for shortness of breath, but is not currently taking steroids. She reports receiving the pneumonia vaccine, but states she did not get the flu shot this year. She reports compliance with prescription medications and has not run out of any. She reports being taken off lisinoprol a few years ago. She also notes a past mitral valve replacement. Her PCP is Dr. Nubia Crocker, her pulmonologist is Dr. Domenica Carvalho, her cardiologist is Dr. Messina. She reports quitting tobacco 16 years ago.      The history is provided by the patient.     Review of patient's allergies indicates:  No Known Allergies  Past Medical History:   Diagnosis Date    Acute on chronic diastolic congestive heart failure     Anticoagulant long-term use     Atrial fibrillation     Cataract     Chronic kidney disease     COPD (chronic obstructive pulmonary disease)     Depression     Dysuria     Flank pain     HTN (hypertension)     Nephrolithiasis     KEYSHAWN (obstructive sleep apnea)     awaiting CPAP machine     Rheumatic disease of mitral valve     Urinary incontinence     Urinary tract infection      Past Surgical History:   Procedure Laterality Date     SECTION      kidney stone removal       MITRAL VALVE REPLACEMENT  2/2004    TUBAL LIGATION       Family History   Problem Relation Age of Onset    Stroke Paternal Grandmother     Colon cancer Maternal Grandmother     Cancer Brother      testicular cancer    Diabetes Sister     Hypertension Sister     Breast cancer Paternal Aunt     Diabetes Sister     Diabetes Sister     Heart disease Father 70     MI    Diabetes Father     Colon cancer Mother     Ovarian cancer Neg Hx      Social History   Substance Use Topics    Smoking status: Former Smoker     Packs/day: 0.50     Years: 20.00     Types: Cigarettes     Quit date: 5/8/2002    Smokeless tobacco: Never Used    Alcohol use No     Review of Systems   Constitutional: Negative for appetite change, chills and fever.   HENT: Negative for congestion, rhinorrhea and sore throat.    Respiratory: Positive for cough, shortness of breath and wheezing.    Cardiovascular: Negative for chest pain and palpitations.   Gastrointestinal: Negative for abdominal pain, diarrhea, nausea and vomiting.   Genitourinary: Negative for dysuria.   Musculoskeletal: Negative for back pain.   Skin: Negative for rash.   Neurological: Negative for dizziness and headaches.   Psychiatric/Behavioral: Negative for confusion.       Physical Exam     Initial Vitals [03/09/18 1258]   BP Pulse Resp Temp SpO2   (!) 199/79 94 18 98.6 °F (37 °C) (!) 94 %      MAP       119         Physical Exam    Nursing note and vitals reviewed.  Constitutional: She appears well-developed and well-nourished. No distress.   Morbidly obese. Frequent non-productive cough during exam. Mildly labored breathing but speaking in full sentences.   HENT:   Head: Normocephalic and atraumatic.   Eyes: Conjunctivae and EOM are normal.   Neck: Normal range of motion. Neck supple.   Cardiovascular: Normal rate and regular rhythm.   Loud S2.   Pulmonary/Chest: No respiratory distress. She has wheezes.   Diffuse inspiratory and expiratory wheezes with mildly  diminished air exchange. Tachypnea with exertion, but no accessory muscle use or retractions.   Musculoskeletal: Normal range of motion.   Neurological: She is alert and oriented to person, place, and time.   Skin: Skin is warm and dry.   Psychiatric: She has a normal mood and affect. Her behavior is normal. Judgment and thought content normal.         ED Course   Procedures  Labs Reviewed   CBC W/ AUTO DIFFERENTIAL - Abnormal; Notable for the following:        Result Value    RBC 5.49 (*)     Hemoglobin 10.7 (*)     MCV 70 (*)     MCH 19.5 (*)     MCHC 28.0 (*)     RDW 18.5 (*)     Lymph # 0.6 (*)     Mono # 0.1 (*)     Gran% 84.0 (*)     Lymph% 10.9 (*)     Mono% 2.1 (*)     All other components within normal limits   COMPREHENSIVE METABOLIC PANEL - Abnormal; Notable for the following:     Glucose 156 (*)     eGFR if non  54 (*)     All other components within normal limits   PROTIME-INR - Abnormal; Notable for the following:     Prothrombin Time 23.4 (*)     INR 2.2 (*)     All other components within normal limits   B-TYPE NATRIURETIC PEPTIDE      Imaging Results          X-Ray Chest PA And Lateral (Final result)  Result time 03/09/18 14:58:02    Final result by Justice Watkins MD (03/09/18 14:58:02)                 Impression:     Status post cardiac surgery - tricuspid and mitral valve repair. Probable mild CHF.      Electronically signed by: JUSTICE WATKINS MD  Date:     03/09/18  Time:    14:58              Narrative:    PA and lateral views of the chest were obtained.  Comparison -- 2/16/18. Postoperative changes from cardiac surgery, tricuspid and mitral valve repair are again identified. The heart is a little enlarger and mildly enlarged. Mediastinum shows aortic atherosclerosis. The lungs are expanded with mildly increased pulmonary vascularity. Lungs are clear without consolidation or pleural effusion. Skeletal structures show degenerative spine changes and multiple wires in the  sternum.                                     X-Rays:   Independently Interpreted Readings:   Chest X-Ray: Sternotomy wires. Artificial valve. Likely cardiomegaly. No focal infiltrate or effusions.     Medical Decision Making:   Independently Interpreted Test(s):   I have ordered and independently interpreted X-rays - see prior notes.  Clinical Tests:   Radiological Study: Ordered and Reviewed  ED Management:  2:45 PM - Feels better, but still with inspiratory wheezes and dyspnea on exertion. Will give further treatments.  4:23 PM - Continuous neb nearly complete. Feels better but not back to baseline. Still with expiratory wheezes. Will get labs in case of an admission.  5:45 PM - Heart rate in 120-140's after treatment, but now feels better and lungs clear to auscultation. Patient has not taken daily does of toprol, will administer and continue to observe.  6:43 PM - Patient ambulated up and down the belcher without dyspnea and pulse ox maintained 96% or greater. Feels ready to go home.            Scribe Attestation:   Scribe #1: I performed the above scribed service and the documentation accurately describes the services I performed. I attest to the accuracy of the note.    Attending Attestation:           Physician Attestation for Scribe:  Physician Attestation Statement for Scribe #1: I, Dr. Chowdhury, reviewed documentation, as scribed by Nelson Phillips in my presence, and it is both accurate and complete.          patient with multiple medical problems including COPD, chronic A. fib, CHF presents with shortness of breath.  No fevers.  Positive cough and wheezing.  Tried home nebulizer with inadequate relief.  Was on steroids approximately a week and a half ago.  Compliant with her medications.  On exam she has wheezing, diminished air exchange but no rails.  Clinically picture is suggestive of COPD exacerbation rather than cardiac cause.  Improved but not cleared after first round of nebulizer treatments.  Required  continuous nebulization treatment therefore x-ray and lab work performed these do not show focal infiltrate.  BNP not significantly elevated.  Mild anemia.  After further treatments, steroids patient is now feeling back to baseline and is able to ambulate without difficulty or dyspnea or hypoxia.  She did develop tachycardia after the continuous nebulizer treatment but had not taken her metoprolol.  Given her sustained release as well as a dose of IV with improvement in heart rate now approximately 100 continue home medications.  Start on another course of steroids.  Encouraged follow-up this coming week, especially if continues to be symptomatic           Clinical Impression:     1. COPD exacerbation    2. SOB (shortness of breath)    3. Atrial fibrillation with rapid ventricular response                              Dustin Chowdhury II, MD  03/10/18 0093

## 2018-03-10 NOTE — ED NOTES
Sitting in the chair: O2 sat=98% on room air  Ambulated in the hallway: O2 sat=96% on room air  Post-ambulation: O2 sat=96% on room air  Pt tolerated well without any complaints of cp, sob, palpations, dizziness, lightheadedness or any other discomfort. Pt able to converse with nurse while ambulating with no tachypnea noted. Results reported to Dr. Chowdhury. Will continue to monitor.

## 2018-03-10 NOTE — ED NOTES
ELEVATED HR:  Dr Chowdhury informed tele demonstrating: Afib with HR: 120-150's. AAOx4. Resp:18 even and unlabored. Pt denies any cp, sob, palpitations, dizziness, lightheadedenss or any other discomfort at this time. Ok to delay ambulating pt with pulse ox monitoring per Dr. Chowdhury. Pt informed of plan of care. Will continue to monitor.

## 2018-03-10 NOTE — ED NOTES
Ok to ambulate pt in the hallway while monitoring pulse oximetry per Dr. Chowdhury. Pt informed of plan of care.

## 2018-03-13 ENCOUNTER — ANTI-COAG VISIT (OUTPATIENT)
Dept: CARDIOLOGY | Facility: CLINIC | Age: 63
End: 2018-03-13
Payer: COMMERCIAL

## 2018-03-13 DIAGNOSIS — I48.20 CHRONIC ATRIAL FIBRILLATION WITH RAPID VENTRICULAR RESPONSE: ICD-10-CM

## 2018-03-13 DIAGNOSIS — Z95.2 STATUS POST HEART VALVE REPLACEMENT WITH MECHANICAL VALVE: ICD-10-CM

## 2018-03-13 DIAGNOSIS — Z79.01 LONG TERM (CURRENT) USE OF ANTICOAGULANTS: Primary | ICD-10-CM

## 2018-03-13 LAB — INR PPP: 1.9 (ref 2–3)

## 2018-03-13 PROCEDURE — 85610 PROTHROMBIN TIME: CPT | Mod: QW,S$GLB,, | Performed by: PHARMACIST

## 2018-03-13 PROCEDURE — 99211 OFF/OP EST MAY X REQ PHY/QHP: CPT | Mod: 25,S$GLB,, | Performed by: PHARMACIST

## 2018-03-13 NOTE — PROGRESS NOTES
Patient started a prednisone taper (10mg): Take 4 tabs x 3 days, then Take 2 tabs x 3 days, then Take 1 tab x 3 days., Pt seen by Lenka SCHRADER. I have reviewed her initial findings and agree with her assessment and plan

## 2018-03-13 NOTE — PROGRESS NOTES
Quick follow up for low INR on 3/9. INR low today. Patient states that she went to the ER on 3/9 for COPD exacerbation. She started Prednisone 10mg on 3/9 and will continue until 3/19. DDI expected. Will maintain current dose and monitor closely for DDI. Follow up on Friday. Patient denies any bleeding, bruising or other changes. Advised patient to notify coumadin clinic of any changes or concerns.

## 2018-03-16 ENCOUNTER — ANTI-COAG VISIT (OUTPATIENT)
Dept: CARDIOLOGY | Facility: CLINIC | Age: 63
End: 2018-03-16
Payer: COMMERCIAL

## 2018-03-16 DIAGNOSIS — I48.20 CHRONIC ATRIAL FIBRILLATION WITH RAPID VENTRICULAR RESPONSE: ICD-10-CM

## 2018-03-16 DIAGNOSIS — Z79.01 LONG TERM (CURRENT) USE OF ANTICOAGULANTS: Primary | ICD-10-CM

## 2018-03-16 DIAGNOSIS — Z95.2 STATUS POST HEART VALVE REPLACEMENT WITH MECHANICAL VALVE: ICD-10-CM

## 2018-03-16 LAB — INR PPP: 2.4 (ref 2–3)

## 2018-03-16 PROCEDURE — 99211 OFF/OP EST MAY X REQ PHY/QHP: CPT | Mod: 25,S$GLB,ICN, | Performed by: PHARMACIST

## 2018-03-16 PROCEDURE — 85610 PROTHROMBIN TIME: CPT | Mod: QW,S$GLB,, | Performed by: PHARMACIST

## 2018-03-16 NOTE — PROGRESS NOTES
Quick follow up for low INR on 3/13. INR low today. Patient denies any bleeding or bruising . She continues on Prednisone. Due to DDI we will monitor her INR closely and follow up in 1 week. Advised patient to notify us of any changes or concerns.

## 2018-03-16 NOTE — PROGRESS NOTES
Pt seen by Lenka SCHRADER . I have reviewed her initial findings and agree with her assessment and plan

## 2018-03-22 DIAGNOSIS — I10 ESSENTIAL HYPERTENSION: Chronic | ICD-10-CM

## 2018-03-23 ENCOUNTER — ANTI-COAG VISIT (OUTPATIENT)
Dept: CARDIOLOGY | Facility: CLINIC | Age: 63
End: 2018-03-23
Payer: COMMERCIAL

## 2018-03-23 ENCOUNTER — TELEPHONE (OUTPATIENT)
Dept: INTERNAL MEDICINE | Facility: CLINIC | Age: 63
End: 2018-03-23

## 2018-03-23 DIAGNOSIS — Z79.01 LONG TERM (CURRENT) USE OF ANTICOAGULANTS: Primary | ICD-10-CM

## 2018-03-23 DIAGNOSIS — I48.20 CHRONIC ATRIAL FIBRILLATION WITH RAPID VENTRICULAR RESPONSE: ICD-10-CM

## 2018-03-23 DIAGNOSIS — Z95.2 STATUS POST HEART VALVE REPLACEMENT WITH MECHANICAL VALVE: ICD-10-CM

## 2018-03-23 LAB — INR PPP: 2.1 (ref 2–3)

## 2018-03-23 PROCEDURE — 85610 PROTHROMBIN TIME: CPT | Mod: QW,S$GLB,, | Performed by: PHARMACIST

## 2018-03-23 PROCEDURE — 99211 OFF/OP EST MAY X REQ PHY/QHP: CPT | Mod: 25,S$GLB,, | Performed by: PHARMACIST

## 2018-03-23 RX ORDER — METOPROLOL SUCCINATE 200 MG/1
200 TABLET, EXTENDED RELEASE ORAL DAILY
Qty: 30 TABLET | Refills: 0 | Status: SHIPPED | OUTPATIENT
Start: 2018-03-23 | End: 2018-07-09 | Stop reason: SDUPTHER

## 2018-03-23 NOTE — PROGRESS NOTES
Pt seen by So SCHRADER. I have reviewed her documentation and agree with her assessment and plan.

## 2018-03-23 NOTE — PROGRESS NOTES
Quick follow up from low INR on 3/16. INR low today. Patient reports she finished prednisone on 3/18. No bleeding or bruising. Will increase weekly dose and follow up 1.5 weeks. Advised patient to call with any changes or concerns.

## 2018-04-03 ENCOUNTER — ANTI-COAG VISIT (OUTPATIENT)
Dept: CARDIOLOGY | Facility: CLINIC | Age: 63
End: 2018-04-03
Payer: COMMERCIAL

## 2018-04-03 DIAGNOSIS — I48.20 CHRONIC ATRIAL FIBRILLATION WITH RAPID VENTRICULAR RESPONSE: ICD-10-CM

## 2018-04-03 DIAGNOSIS — Z95.2 STATUS POST HEART VALVE REPLACEMENT WITH MECHANICAL VALVE: ICD-10-CM

## 2018-04-03 DIAGNOSIS — Z79.01 LONG TERM (CURRENT) USE OF ANTICOAGULANTS: Primary | ICD-10-CM

## 2018-04-03 LAB — INR PPP: 3.2 (ref 2.5–3.5)

## 2018-04-03 PROCEDURE — 85610 PROTHROMBIN TIME: CPT | Mod: QW,S$GLB,, | Performed by: PHARMACIST

## 2018-04-03 PROCEDURE — 99211 OFF/OP EST MAY X REQ PHY/QHP: CPT | Mod: 25,S$GLB,, | Performed by: PHARMACIST

## 2018-04-03 NOTE — PROGRESS NOTES
Patient is here for a quick follow up status post low INR on 3/23. INR within range now but we will need to watch closely at this time for rising INR. She reports a little less greens and will resume her usual amount now. She does not have a date set yet for her dental procedure but will notify us once it is set. She has completed the prednisone. Patient was re-educated on situations that would require placing a call to the coumadin clinic, including bleeding or unusual bruising issues, changes in health, diet or medications, upcoming procedures that require warfarin interruption, and missed coumadin dose(s). Patient expressed understanding that avoidance of consistency with these parameters could cause fluctuations in INR, leading to more frequent visits and increase risk of adverse events.

## 2018-04-09 ENCOUNTER — HOSPITAL ENCOUNTER (EMERGENCY)
Facility: OTHER | Age: 63
Discharge: HOME | End: 2018-04-09
Attending: EMERGENCY MEDICINE
Payer: COMMERCIAL

## 2018-04-09 VITALS
RESPIRATION RATE: 16 BRPM | SYSTOLIC BLOOD PRESSURE: 146 MMHG | DIASTOLIC BLOOD PRESSURE: 82 MMHG | HEART RATE: 88 BPM | OXYGEN SATURATION: 95 % | HEIGHT: 62 IN | WEIGHT: 240 LBS | TEMPERATURE: 98 F | BODY MASS INDEX: 44.16 KG/M2

## 2018-04-09 DIAGNOSIS — R60.9 SWELLING: ICD-10-CM

## 2018-04-09 DIAGNOSIS — R60.0 PERIPHERAL EDEMA: Primary | ICD-10-CM

## 2018-04-09 DIAGNOSIS — M79.605 PAIN OF LEFT LOWER EXTREMITY: ICD-10-CM

## 2018-04-09 PROCEDURE — 99284 EMERGENCY DEPT VISIT MOD MDM: CPT

## 2018-04-09 RX ORDER — METHOCARBAMOL 500 MG/1
1000 TABLET, FILM COATED ORAL 3 TIMES DAILY PRN
Qty: 20 TABLET | Refills: 0 | Status: SHIPPED | OUTPATIENT
Start: 2018-04-09 | End: 2018-04-14

## 2018-04-09 NOTE — ED PROVIDER NOTES
"Encounter Date: 4/9/2018    SCRIBE #1 NOTE: I, Nelson Phillips, am scribing for, and in the presence of, Dr. Chowdhury.       History     Chief Complaint   Patient presents with    Leg Pain     to left leg from knee to groin X 3 weeks, Pt states sometimes it is warm and sometimes it is swollen, also this happened around Khalif Pearson but resolved on it's own     7:12 AM    Patient is a 62 y.o. female who presents to the ED with complaint of left leg pain and swelling for the last three weeks. Pain and swelling is located in the upper leg above the knee. Pain is worse after long periods of walking and weight bearing. She also notes the leg "feels warm at night." She denies any falls or trauma to the leg. She denies swelling in the lower leg, but notes an "indentation" around her left ankle after taking her socks off. She denies a similar "indentation" in her right ankle. She denies stiffness in her left leg after waking up in the mornings. She denies foot pain, toe pain, or any other joint pain. She reports taking tylenol with minimal relief, and applying bengay, icyhot, and heating pads without relief. She is curerntly anti-coagulated on coumadin and states her INR was 3.2 when measured last week. She denies a history of blood clots. She reports no known allergies. She has made an appointment with her PCP, Dr. Nubia Crocker, but states this is in over a month. She reports a past history of tobacco use, but states she quit smoking 16 years ago. She has no additional complaints.      The history is provided by the patient.     Review of patient's allergies indicates:  No Known Allergies  Past Medical History:   Diagnosis Date    Acute on chronic diastolic congestive heart failure     Anticoagulant long-term use     Asthma     Atrial fibrillation 2002    Cataract     Chronic kidney disease     COPD (chronic obstructive pulmonary disease)     Depression     Dysuria     Flank pain     HTN (hypertension)     " Nephrolithiasis     KEYSHAWN (obstructive sleep apnea)     awaiting CPAP machine     Rheumatic disease of mitral valve     Urinary incontinence     Urinary tract infection      Past Surgical History:   Procedure Laterality Date     SECTION      kidney stone removal      MITRAL VALVE REPLACEMENT  2004    TUBAL LIGATION       Family History   Problem Relation Age of Onset    Stroke Paternal Grandmother     Colon cancer Maternal Grandmother     Cancer Brother      testicular cancer    Diabetes Sister     Hypertension Sister     Breast cancer Paternal Aunt     Diabetes Sister     Diabetes Sister     Heart disease Father 70     MI    Diabetes Father     Colon cancer Mother     Ovarian cancer Neg Hx      Social History   Substance Use Topics    Smoking status: Former Smoker     Packs/day: 0.50     Years: 20.00     Types: Cigarettes     Quit date: 2002    Smokeless tobacco: Never Used    Alcohol use No     Review of Systems   Constitutional: Negative for fever.   HENT: Negative for sore throat.    Respiratory: Negative for shortness of breath.    Cardiovascular: Positive for leg swelling (left leg). Negative for chest pain.   Gastrointestinal: Negative for nausea.   Genitourinary: Negative for dysuria.   Musculoskeletal: Positive for gait problem (secondary to pain). Negative for back pain.        Positive for left leg pain and swelling. Negative for foot or toe pain.   Skin: Negative for color change and rash.   Neurological: Negative for weakness.   Hematological: Does not bruise/bleed easily.   Psychiatric/Behavioral: Negative for confusion.       Physical Exam     Initial Vitals [18 0641]   BP Pulse Resp Temp SpO2   (!) 177/73 87 18 98.7 °F (37.1 °C) 98 %      MAP       107.67         Physical Exam    Nursing note and vitals reviewed.  Constitutional: She appears well-developed and well-nourished. She is not diaphoretic. No distress.   Morbidly obese.   HENT:   Head: Normocephalic  and atraumatic.   Eyes: Conjunctivae and EOM are normal. Pupils are equal, round, and reactive to light.   Neck: Normal range of motion. Neck supple.   Cardiovascular: Normal rate and normal heart sounds. An irregularly irregular rhythm present.   Pulmonary/Chest: Breath sounds normal. No respiratory distress. She has no wheezes. She has no rhonchi. She has no rales.   Musculoskeletal: Normal range of motion. She exhibits edema (trace edema of both ankles and lower tibial region, perhaps slightly worse on the left).   No bony tenderness. Full range of motion. No palpable cords. Calf circumferences are equal.    Neurological: She is alert and oriented to person, place, and time. She has normal strength. No sensory deficit.   Neurovascularly intact distally.   Skin: Skin is warm and dry. No erythema.   Psychiatric: She has a normal mood and affect. Her behavior is normal. Judgment and thought content normal.         ED Course   Procedures  Labs Reviewed - No data to display   Imaging Results          US Lower Extremity Veins Left (Final result)  Result time 04/09/18 08:17:59    Final result by Marco Antonio Hunter MD (04/09/18 08:17:59)                 Impression:      No evidence of deep venous thrombosis in left lower extremity      Electronically signed by: Marco Antonio Hunter MD  Date:    04/09/2018  Time:    08:17             Narrative:    EXAMINATION:  US LOWER EXTREMITY VEINS LEFT    CLINICAL HISTORY:  Edema, unspecified    TECHNIQUE:  Duplex and color flow Doppler evaluation and graded compression of the left lower extremity veins was performed.    COMPARISON:  None    FINDINGS:  Left thigh veins: The common femoral, femoral, popliteal, upper greater saphenous, and deep femoral veins are patent and free of thrombus. The veins are normally compressible and have normal phasic flow and augmentation response.    Left calf veins: The visualized calf veins are patent.    Contralateral CFV: No evidence of deep venous thrombosis in  either lower extremity.                                        Medical Decision Making:   Clinical Tests:   Radiological Study: Ordered and Reviewed            Scribe Attestation:   Scribe #1: I performed the above scribed service and the documentation accurately describes the services I performed. I attest to the accuracy of the note.    Attending Attestation:           Physician Attestation for Scribe:  Physician Attestation Statement for Scribe #1: I, Dr. Chowdhury, reviewed documentation, as scribed by Nelson Phillips in my presence, and it is both accurate and complete.          patient presents complaining of pain in the left thigh to left knee for the past several days.  Reports occasional swelling, warmth of the area but denies rash.  No recent injury.  On exam no palpable cords.  Slight edema.  No bony tenderness.  Good range of motion.  Patient is already on anticoagulants due to atrial fibrillation.  No history of DVT.  Ultrasound of the left lower extremity shows no evidence of DVT.  Stable for discharge.  Encouraged follow-up with primary care, especially if symptoms persist.           Clinical Impression:     1. Peripheral edema    2. Swelling    3. Pain of left lower extremity                                Dustin Chowdhury II, MD  04/09/18 1615

## 2018-04-09 NOTE — ED TRIAGE NOTES
Pt presents to the ED c/o of left leg pain that radiates to the groin x 3 weeks. Pt states that she have swelling and tenderness to knee cap. Pt denies trauma to left leg. Pt states that she applied a heating pad, compression stocking and Sreedhar-serrano to her left knee with no relief. Pt describes her pain as throbbing. Pt states her pain is a 9/10.

## 2018-04-16 NOTE — PROGRESS NOTES
Patient called to reschedule 4/17 coumadin clinic appointment due to transportation problem, appointment was rescheduled to 4/25

## 2018-04-18 ENCOUNTER — ANTI-COAG VISIT (OUTPATIENT)
Dept: CARDIOLOGY | Facility: CLINIC | Age: 63
End: 2018-04-18
Payer: COMMERCIAL

## 2018-04-18 DIAGNOSIS — Z95.2 STATUS POST HEART VALVE REPLACEMENT WITH MECHANICAL VALVE: ICD-10-CM

## 2018-04-18 DIAGNOSIS — Z79.01 LONG TERM (CURRENT) USE OF ANTICOAGULANTS: Primary | ICD-10-CM

## 2018-04-18 DIAGNOSIS — I48.20 CHRONIC ATRIAL FIBRILLATION WITH RAPID VENTRICULAR RESPONSE: ICD-10-CM

## 2018-04-18 LAB — INR PPP: 2.3 (ref 2–3)

## 2018-04-18 PROCEDURE — 85610 PROTHROMBIN TIME: CPT | Mod: QW,S$GLB,, | Performed by: INTERNAL MEDICINE

## 2018-04-18 PROCEDURE — 99211 OFF/OP EST MAY X REQ PHY/QHP: CPT | Mod: 25,S$GLB,, | Performed by: PHARMACIST

## 2018-04-18 NOTE — PROGRESS NOTES
Pt seen by Carmen SCHRADER. I have reviewed her documentation and agree with her assessment and plan.

## 2018-04-18 NOTE — PROGRESS NOTES
INR low today. Patient states that she has not been feeling well the last 3 days with fever, sore throat and congestion. She has bruises from use, denies any bleeding or other changes. Will boost dose today and then resume weekly dose until follow-up in 3 weeks. Advised her to call with any changes or concerns.

## 2018-04-23 ENCOUNTER — OFFICE VISIT (OUTPATIENT)
Dept: INTERNAL MEDICINE | Facility: CLINIC | Age: 63
End: 2018-04-23
Payer: COMMERCIAL

## 2018-04-23 ENCOUNTER — OFFICE VISIT (OUTPATIENT)
Dept: CARDIOLOGY | Facility: CLINIC | Age: 63
End: 2018-04-23
Payer: COMMERCIAL

## 2018-04-23 VITALS
SYSTOLIC BLOOD PRESSURE: 123 MMHG | HEIGHT: 62 IN | WEIGHT: 254 LBS | DIASTOLIC BLOOD PRESSURE: 74 MMHG | HEART RATE: 94 BPM | BODY MASS INDEX: 46.74 KG/M2

## 2018-04-23 VITALS
WEIGHT: 253.31 LBS | DIASTOLIC BLOOD PRESSURE: 74 MMHG | HEIGHT: 62 IN | OXYGEN SATURATION: 94 % | HEART RATE: 94 BPM | SYSTOLIC BLOOD PRESSURE: 123 MMHG | BODY MASS INDEX: 46.61 KG/M2

## 2018-04-23 DIAGNOSIS — E66.01 MORBID OBESITY WITH BMI OF 45.0-49.9, ADULT: ICD-10-CM

## 2018-04-23 DIAGNOSIS — Z95.2 H/O MITRAL VALVE REPLACEMENT WITH MECHANICAL VALVE: ICD-10-CM

## 2018-04-23 DIAGNOSIS — I10 ESSENTIAL (PRIMARY) HYPERTENSION: ICD-10-CM

## 2018-04-23 DIAGNOSIS — Z95.2 STATUS POST HEART VALVE REPLACEMENT WITH MECHANICAL VALVE: ICD-10-CM

## 2018-04-23 DIAGNOSIS — I48.20 CHRONIC ATRIAL FIBRILLATION WITH RAPID VENTRICULAR RESPONSE: ICD-10-CM

## 2018-04-23 DIAGNOSIS — E78.2 MIXED HYPERLIPIDEMIA: ICD-10-CM

## 2018-04-23 DIAGNOSIS — Z79.01 CHRONIC ANTICOAGULATION: ICD-10-CM

## 2018-04-23 DIAGNOSIS — J44.1 COPD EXACERBATION: ICD-10-CM

## 2018-04-23 DIAGNOSIS — I50.32 CHRONIC DIASTOLIC CONGESTIVE HEART FAILURE: ICD-10-CM

## 2018-04-23 DIAGNOSIS — I50.32 CHRONIC DIASTOLIC CONGESTIVE HEART FAILURE: Primary | Chronic | ICD-10-CM

## 2018-04-23 DIAGNOSIS — R73.02 GLUCOSE INTOLERANCE (IMPAIRED GLUCOSE TOLERANCE): ICD-10-CM

## 2018-04-23 DIAGNOSIS — J44.1 COPD EXACERBATION: Primary | ICD-10-CM

## 2018-04-23 PROCEDURE — 3078F DIAST BP <80 MM HG: CPT | Mod: CPTII,S$GLB,, | Performed by: STUDENT IN AN ORGANIZED HEALTH CARE EDUCATION/TRAINING PROGRAM

## 2018-04-23 PROCEDURE — 99999 PR PBB SHADOW E&M-EST. PATIENT-LVL III: CPT | Mod: PBBFAC,,, | Performed by: STUDENT IN AN ORGANIZED HEALTH CARE EDUCATION/TRAINING PROGRAM

## 2018-04-23 PROCEDURE — 3074F SYST BP LT 130 MM HG: CPT | Mod: CPTII,S$GLB,, | Performed by: NURSE PRACTITIONER

## 2018-04-23 PROCEDURE — 99999 PR PBB SHADOW E&M-EST. PATIENT-LVL III: CPT | Mod: PBBFAC,,, | Performed by: NURSE PRACTITIONER

## 2018-04-23 PROCEDURE — 3078F DIAST BP <80 MM HG: CPT | Mod: CPTII,S$GLB,, | Performed by: NURSE PRACTITIONER

## 2018-04-23 PROCEDURE — 99214 OFFICE O/P EST MOD 30 MIN: CPT | Mod: SA,S$GLB,, | Performed by: NURSE PRACTITIONER

## 2018-04-23 PROCEDURE — 3074F SYST BP LT 130 MM HG: CPT | Mod: CPTII,S$GLB,, | Performed by: STUDENT IN AN ORGANIZED HEALTH CARE EDUCATION/TRAINING PROGRAM

## 2018-04-23 PROCEDURE — 99214 OFFICE O/P EST MOD 30 MIN: CPT | Mod: S$GLB,,, | Performed by: STUDENT IN AN ORGANIZED HEALTH CARE EDUCATION/TRAINING PROGRAM

## 2018-04-23 RX ORDER — AMOXICILLIN 875 MG/1
875 TABLET, FILM COATED ORAL EVERY 12 HOURS
Qty: 5 TABLET | Refills: 0 | Status: SHIPPED | OUTPATIENT
Start: 2018-04-23 | End: 2018-04-28

## 2018-04-23 RX ORDER — PREDNISONE 20 MG/1
40 TABLET ORAL DAILY
Qty: 10 TABLET | Refills: 0 | Status: SHIPPED | OUTPATIENT
Start: 2018-04-23 | End: 2018-04-28

## 2018-04-23 NOTE — PATIENT INSTRUCTIONS
Increase cardiovascular exercise to 30 minutes of brisk walking a day for 4-5 days a week.  You can use a stationary bike or swim for 30 minutes a day instead of walking.  Whatever exercise you choose, make sure you are working hard enough to increase your heart rate.     You can take an extra dose of Lasix (furosemide) if weight increases 2 or more pounds in a 24 hr period, or 3-4 pounds over a 3-4 day period. Contact our office if weight does not return to baseline within 1-2 days    LOW-SALT DIET (2 grams/day)   This diet eliminates foods that are high in salt and restricts the amount of salt that you cook with. It is most often used for patients with high blood pressure, edema (fluid retention), kidney, liver, and heart disease.   Table salt contains the mineral sodium. The body needs a little bit of sodium to work normally. But too much sodium can make your health problems worse. Your total daily allowance of salt (sodium) is 2 grams. This equals 2000 milligrams (mg). This is about a teaspoon of salt. This means you can have only about 500 mg of sodium at each meal. Most individuals get excessive sodium from snacks. Look carefully for sodium levels at or around 250 mg per serving and do not eat more than 1 serving. Really low-sodium snacks contain less than 100 mg per serving.    When you cook, limit the salt you use. And if you can avoid using salt, even better. Do not add salt at the table. So, throw away the saltshaker!   When shopping, read the package labels. Salt is often called sodium on the label. Choose foods that are Salt-Free, Low Salt, or Very Low Salt. Note that foods with Reduced Salt may not lower your salt intake enough.     BEVERAGES OK: Tea, coffee, carbonated beverages, juices   AVOID: Flavored international coffees, electrolyte replacement drinks, sports beverages     BREAD & CEREALS OK: All regular bread, rolls, cereals, cakes; low-salt crackers, matzoh crackers   AVOID: Salted crackers,  pretzels, popcorn; Telugu toast, pancakes, muffins     FRUITS & DESSERTS OK: Ice cream, frozen yogurt, juice bars, gelatin (Jell-O), cookies and pies, sugar, honey, jelly, hard candy   AVOID: Most pies, cakes and cookies prepared or processed with salt, instant pudding     MEATS OK: All fresh meat, fish, poultry, low-salt tuna   AVOID: Smoked, pickled, brine-cured, or salted meats or fish. This includes mujica, chipped beef, corned beef, hot dogs, luncheon meats, ham, kosher meats, salt pork, sausage, canned tuna, salted codfish, smoked salmon, herring, sardines, or anchovies.     DAIRY OK: Milk, chocolate milk, hot chocolate mix; eggs, Low Salt cheeses, yogurt, egg substitute   AVOID: Processed cheese, cheese spreads, Roquefort, Camembert, and cottage cheese, buttermilk, instant breakfast drink     BEANS, POTATOES & PASTA OK: Dry beans, split peas, lentils, potatoes, rice, macaroni, noodles, spaghetti without added salt   AVOID: Potato chips, tortilla chips, and similar products     SOUPS OK: Low-salt soups and broths made with allowed foods   AVOID: Bouillon cubes, soups with smoked or salted meats, regular soup and broth     VEGETABLES OK: Most are okay; low-salt tomato and vegetable juices   AVOID: Sauerkraut and other brine-soaked vegetables, pickles and other pickled vegetables, tomato juice, olives       SEASONING & SPICES: OK: Most seasonings are okay. Good substitutes for salt include: fresh herb blends, Tabasco, lemon, garlic, deutsch, vinegar, dry mustard, parsley, cilantro, horseradish, tomato paste, regular margarine, mayonnaise, butter, cream cheese, vegetable oil, cream, low-salt salad dressing and gravy   AVOID: Regular ketchup, relishes, pickles, soy sauce, teriyaki sauce, Worcestershire sauce, BBQ sauce, tartar sauce, meat tenderizer, chili sauce, regular gravy, regular salad dressing   © 0242-8862 [employer], [address] [place] [id number], [place] [address]. All rights reserved. This information is  not intended as a substitute for professional medical care. Always follow your healthcare professional's instructions.      Prediabetes  You have been diagnosed with prediabetes. This means that the level of sugar (glucose) in your blood is too high. If you have prediabetes, you are at risk for developing type 2 diabetes. Type 2 diabetes is diagnosed when the level of glucose in the blood reaches a certain high level. With prediabetes, it hasnt reached this point yet, but it is higher than normal. It is vital to make lifestyle changes to lower your blood sugar, improve your health, and prevent diabetes. This sheet will tell you more.      Why worry about prediabetes?  Prediabetes is a disease where the bodys cells have trouble using glucose in the blood for energy. As a result, too much glucose stays in the blood and can affect how your heart and blood vessels work. Without changes in diet and lifestyle, the problem can get worse. Once you have type 2 diabetes, it is chronic (ongoing) and needs to be managed for the rest of your life. Diabetes can harm the body and your health by damaging organs, such as your eyes and kidneys. It makes you more likely to have heart disease. And it can damage nerves and blood vessels.  Who is a risk for prediabetes?  The exact cause of prediabetes is not clear. But certain risk factors make a person more likely to have it. These include:  · A family history of type 2 diabetes  · Being overweight  · Being over age 45  · Have hypertension or elevated cholesterol   · Having had gestational diabetes  · Not being physically active  · Being ,  American, , , , or   Diagnosing prediabetes  Prediabetes may have no symptoms or you may have some of the symptoms of diabetes. The diagnosis is made with a blood test. You may have one or more of these blood tests:   · Fasting glucose test. Blood is taken and tested after you  have fasted (not eaten) for at least 8 hours. A normal test result is 99 milligrams per deciliter (mg/dL) or lower. Prediabetes is 100 mg/dL to 125 mg/dL. Diabetes is 126 mg/dL or higher.  · Glucose tolerance test. Your blood sugar is measured before and after you drink a very sugary liquid. A normal test result is 139 milligrams per deciliter (mg/dL) or lower. Prediabetes is 140 mg/dL to 199 mg/dL. Diabetes is 200 mg/dL or higher.  · Hemoglobin A1c (HbA1c). Your HbA1c is normal if it is below 5.7%. Prediabetes is 5.7% to 6.4%. Diabetes is 6.5% or higher.   Treating prediabetes  The best way to treat prediabetes is to lose at least 5% to 7% of your current weight and be more physically active by getting at least 150 minutes a week of physical activity. When sitting for long periods of time, get up for short sessions of light activity every 30 minutes. These changes help the bodys cells use blood sugar better. Even a small amount of weight loss can help. Work with your healthcare provider to make a plan to eat well and be more active. Keep in mind that small changes can add up. Other changes in your lifestyle (or even taking certain medicines, such as metformin) may make you less likely to develop diabetes. Your healthcare provider can talk with you about these.  Follow-up  If it is untreated, prediabetes can turn into diabetes. This is a serious health condition. Take steps to stop this from happening. Follow the treatment plan you have been given. You may have your blood glucose tested again in about 12 to 18 months.  Symptoms of diabetes  Let your healthcare provider know if you have any of the following:  · Always feel very tired  · Feel very thirsty or hungry much of the time  · Have to urinate often  · Lose weight for no reason  · Feel numbness or tingling in your fingers or toes  · Have cuts or bruises that dont seem to heal  · Have blurry vision   Date Last Reviewed: 5/1/2016  © 8347-6288 [employer]. [address] [place] [address], [place] [address]. All rights reserved. This information is not intended as a substitute for professional medical care. Always follow your healthcare professional's instructions.

## 2018-04-23 NOTE — PROGRESS NOTES
Subjective:       Patient ID: Elayne Almanzar is a 62 y.o. female.    Chief Complaint: Influenza    HPI  Review of Systems    Objective:      Physical Exam    Assessment:       1. COPD exacerbation    2. H/O mitral valve replacement with mechanical valve    3. Chronic anticoagulation    4. Chronic diastolic congestive heart failure        Plan:   Elayne was seen today for influenza.    Diagnoses and all orders for this visit:    COPD exacerbation  -     predniSONE (DELTASONE) 20 MG tablet; Take 2 tablets (40 mg total) by mouth once daily.  -     amoxicillin (AMOXIL) 875 MG tablet; Take 1 tablet (875 mg total) by mouth every 12 (twelve) hours.    H/O mitral valve replacement with mechanical valve    Chronic anticoagulation    Chronic diastolic congestive heart failure      See meds, orders, follow up, routing and instructions sections of encounter.

## 2018-04-23 NOTE — PROGRESS NOTES
"Subjective:       Patient ID: Elayne Almanzar is a 62 y.o. female.    Chief Complaint: Influenza    HPI 63 yo female with CHF, COPD, Rheumatic heart diseases s/p MV replacement,  A.fibe on coumadin, and HTN, KEYSHAWN who came in form cardiology clinic with productive cough (green sputum), sob, body aches, subjective fever, "parched mouth", sore throat, hoarseness of voice, decreased appetite. Per patient, son had a cold a week ago. Denies chest pain, dizziness. Had gotten PNA vaccination, no flu shots this season. Recent admission for COPD exacerbation about two months ago.  Denies chills, nausea, vomiting, diarrhea, syncope, LE edema, orthopnea, PND.       Review of Systems   Constitutional: Positive for fatigue. Negative for chills and fever.   Respiratory: Positive for cough and wheezing. Negative for shortness of breath.    Cardiovascular: Negative for chest pain and palpitations.   Gastrointestinal: Negative for abdominal pain, nausea and vomiting.   Genitourinary: Negative for dysuria, frequency and urgency.   Musculoskeletal: Negative for back pain and myalgias.   Skin: Negative for pallor and rash.   Neurological: Negative for dizziness, tremors and headaches.       Objective:      Physical Exam   Constitutional: She is oriented to person, place, and time. She appears well-developed and well-nourished. No distress.   HENT:   Head: Normocephalic and atraumatic.   Right Ear: External ear normal.   Left Ear: External ear normal.   Nose: Nose normal.   Mouth/Throat: Oropharynx is clear and moist.   Eyes: Conjunctivae and EOM are normal. Pupils are equal, round, and reactive to light.   Neck: Normal range of motion. Neck supple. No JVD present.   Cardiovascular: Normal rate, regular rhythm, normal heart sounds and intact distal pulses.    No murmur heard.  Pulmonary/Chest: Effort normal. No respiratory distress. She has wheezes. She exhibits no tenderness.   No crackles, has expiratory wheezes in the lung bases  "   Abdominal: Soft. Bowel sounds are normal. She exhibits no distension.   Musculoskeletal: Normal range of motion. She exhibits no edema or deformity.   Neurological: She is alert and oriented to person, place, and time. She has normal reflexes. No cranial nerve deficit.   Skin: Skin is warm and dry. Capillary refill takes less than 2 seconds. No erythema.       Assessment:    61 yo female with CHF and COPD came in with productive cough and subjective fever with dyspnea concerning for mild COPD exacerbation. Also considered CHF exacerbation but no LE edema, no crackles on lung bases. Will treat as COPD exacerbation giving co-morbidities.     Plan:     Elayne was seen today for influenza.    Diagnoses and all orders for this visit:    COPD exacerbation  -     predniSONE (DELTASONE) 20 MG tablet; Take 2 tablets (40 mg total) by mouth once daily.  -     amoxicillin (AMOXIL) 875 MG tablet; Take 1 tablet (875 mg total) by mouth every 12 (twelve) hours for five days  -     Continue home breathing treatment with albuterol as needed and Advair  -     Continue hydration with increasing PO intake, hot soup, may try tea with lemon  -     Avoid second hand smoking/smoking  -     ER if symptoms worsen.

## 2018-04-23 NOTE — PATIENT INSTRUCTIONS
-Please take antibiotic 875 mg Twice daily for 5 days  -Take prednisone 40 mg, for five days  -keep yourself hydrated  -May use aleve/Tylenol for sore throat  -Take hot soup, lemon with tea   -Keep using albuterol and Advair

## 2018-04-23 NOTE — PROGRESS NOTES
Ms. Almanzar is a patient of Dr. Messina and was last seen in [facility] Cardiology Visit 10/20/17.    Subjective:   Patient ID:  Elayne Almanzar is a 62 y.o. female who presents for follow-up of COPD (chronic obstructive pulmonary disease) with acute bron and Chronic diastolic congestive heart failure    Problems:  HFpEF, EF 55-60%  Mitral valve prosthesis is well seated mechanical mitral valve with a mean gradient of 4  Rheumatic disease MV s/p MVR in 2004  COPD  KEYSHAWN using cpap  HTN  Atrial fibrillation  10 pack h/o smoking, quit in 2002  Morbid obesity  Aortic atherosclerosis  Hepatic steatosis by CT 2012  Glucose intolerance  CKD stage III    HPI  Ms. Almanzar is in clinic today for routine follow up.  Reports a 4 day h/o fever (up to 101), chills, productive cough, myalgias and arthralgias.  She did not get the flu shot.  Weight is unchanged from previous visit.  Patient denies chest pain with exertion or at rest, palpitations, dizziness, syncope, edema, orthopnea, PND, or claudication.  Reports no routine exercise.  Reports not following a low salt diet. Clinic blood pressure readings are 120-140s.  Patient is taking warfarin for thromboembolic prophylaxis.  Denies bleeding gums, epistaxis, hematuria, and hematochezia.  Patient has history of obstructive sleep apnea.  Reports nightly use of CPAP machine and denies any associated sx of KEYSHAWN.  Under ACC guidelines, this patient's 10 year risk for atherosclerotic cardiovascular disease (ASCVD) is The 10-year ASCVD risk score (Sindy NOELLE Jr., et al., 2013) is: 7.1%    Values used to calculate the score:      Age: 62 years      Sex: Female      Is Non- : Yes      Diabetic: No      Tobacco smoker: No      Systolic Blood Pressure: 123 mmHg      Is BP treated: Yes      HDL Cholesterol: 54 mg/dL      Total Cholesterol: 218 mg/dL     Last ECG 11/2/17: Atrial fibrillation     Review of Systems   Constitution: Negative for decreased appetite, diaphoresis,  weakness, malaise/fatigue, weight gain and weight loss.   HENT: Positive for congestion.    Eyes: Negative for visual disturbance.   Cardiovascular: Negative for chest pain, claudication, dyspnea on exertion, irregular heartbeat, leg swelling, near-syncope, orthopnea, palpitations, paroxysmal nocturnal dyspnea and syncope.   Respiratory: Positive for cough and shortness of breath. Negative for hemoptysis, sleep disturbances due to breathing and snoring.    Endocrine: Negative for cold intolerance and heat intolerance.   Hematologic/Lymphatic: Negative for bleeding problem. Does not bruise/bleed easily.   Musculoskeletal: Negative for myalgias.   Gastrointestinal: Negative for bloating, abdominal pain, anorexia, change in bowel habit, constipation, diarrhea, nausea and vomiting.   Neurological: Negative for difficulty with concentration, disturbances in coordination, excessive daytime sleepiness, dizziness, headaches, light-headedness, loss of balance and numbness.   Psychiatric/Behavioral: The patient does not have insomnia.      Allergies and current medications updated and reviewed:  Review of patient's allergies indicates:  No Known Allergies  Current Outpatient Prescriptions   Medication Sig    albuterol 90 mcg/actuation inhaler Inhale 1-2 puffs into the lungs every 6 (six) hours as needed for Wheezing. Rescue    aspirin (ECOTRIN) 81 MG EC tablet Take 81 mg by mouth once daily.     brimonidine 0.2% (ALPHAGAN) 0.2 % Drop Place 1 drop into the right eye 3 (three) times daily. (Patient taking differently: Place 1 drop into the right eye 2 (two) times daily. )    dorzolamide-timolol 2-0.5% (COSOPT) 22.3-6.8 mg/mL ophthalmic solution Place 1 drop into both eyes 2 (two) times daily.    ergocalciferol (ERGOCALCIFEROL) 50,000 unit Cap Take 1 capsule (50,000 Units total) by mouth twice a week. (Patient taking differently: Take 50,000 Units by mouth once a week. on tuesday)    fluticasone (FLONASE) 50 mcg/actuation  "nasal spray 2 sprays by Each Nare route once daily.    fluticasone-salmeterol 500-50 mcg/dose (ADVAIR DISKUS) 500-50 mcg/dose DsDv diskus inhaler Inhale 1 puff into the lungs 2 (two) times daily.    furosemide (LASIX) 20 MG tablet Take 1 tablet (20 mg total) by mouth once daily.    metoprolol succinate (TOPROL-XL) 200 MG 24 hr tablet TAKE 1 TABLET (200 MG TOTAL) BY MOUTH ONCE DAILY.    needle, disp, 26 gauge 26 gauge x 1/2" Ndle 1 application by Misc.(Non-Drug; Combo Route) route once a week.    oxybutynin (DITROPAN XL) 15 MG TR24 Take 1 tablet (15 mg total) by mouth once daily.    sertraline (ZOLOFT) 100 MG tablet Take 1 tablet (100 mg total) by mouth once daily.    tamsulosin (FLOMAX) 0.4 mg Cp24 TAKE 1 CAPSULE (0.4 MG TOTAL) BY MOUTH ONCE DAILY.    warfarin (COUMADIN) 5 MG tablet Take 1-1.5 pills by mouth daily or as directed by [facility].; #130 pills = 3 month supply     No current facility-administered medications for this visit.      Objective:     Right Arm BP - Sittin/78 (18 1105)  Left Arm BP - Sittin/74 (18 1105)    /74 (BP Location: Left arm, Patient Position: Sitting, BP Method: X-Large (Automatic))   Pulse 94   Ht 5' 2" (1.575 m)   Wt 114.9 kg (253 lb 4.9 oz)   LMP 2012   SpO2 (!) 94%   BMI 46.33 kg/m²     Physical Exam   Constitutional: She is oriented to person, place, and time. Vital signs are normal. She appears well-developed and well-nourished. She is active. No distress.   HENT:   Head: Normocephalic and atraumatic.   Eyes: Conjunctivae and lids are normal. No scleral icterus.   Neck: Neck supple. Normal carotid pulses, no hepatojugular reflux and no JVD present. Carotid bruit is not present.   Cardiovascular: S1 normal, S2 normal and intact distal pulses.  An irregularly irregular rhythm present. Tachycardia present.  PMI is not displaced.  Exam reveals a midsystolic click. Exam reveals no gallop and no friction rub.    No murmur " heard.  Pulses:       Carotid pulses are 2+ on the right side, and 2+ on the left side.       Radial pulses are 2+ on the right side, and 2+ on the left side.        Dorsalis pedis pulses are 2+ on the right side, and 2+ on the left side.        Posterior tibial pulses are 2+ on the right side, and 2+ on the left side.   Pulmonary/Chest: Effort normal. No respiratory distress. She has decreased breath sounds. She has no wheezes. She has no rhonchi. She has no rales. She exhibits no tenderness.   Abdominal: Soft. Normal appearance and bowel sounds are normal. She exhibits no distension, no fluid wave, no abdominal bruit, no ascites and no pulsatile midline mass. There is hepatomegaly. There is no hepatosplenomegaly. There is tenderness.   Musculoskeletal: She exhibits no edema.   Neurological: She is alert and oriented to person, place, and time. Gait normal.   Skin: Skin is warm, dry and intact. No rash noted. She is not diaphoretic. Nails show no clubbing.   Psychiatric: She has a normal mood and affect. Her speech is normal and behavior is normal. Judgment and thought content normal. Cognition and memory are normal.   Nursing note and vitals reviewed.      Chemistry        Component Value Date/Time     03/09/2018 1633    K 4.3 03/09/2018 1633     03/09/2018 1633    CO2 26 03/09/2018 1633    BUN 13 03/09/2018 1633    CREATININE 1.1 03/09/2018 1633     (H) 03/09/2018 1633        Component Value Date/Time    CALCIUM 9.9 03/09/2018 1633    ALKPHOS 70 03/09/2018 1633    AST 21 03/09/2018 1633    ALT 14 03/09/2018 1633    BILITOT 0.7 03/09/2018 1633    ESTGFRAFRICA >60 03/09/2018 1633    EGFRNONAA 54 (A) 03/09/2018 1633        Lab Results   Component Value Date    HGBA1C 6.1 05/12/2017     Lab Results   Component Value Date    GOEUBKRS08 371 05/12/2017         Recent Labs  Lab 05/12/17  1210  09/18/17  1117  11/02/17  1423  02/16/18  1931 03/09/18  1633   WHITE BLOOD CELL COUNT 5.30  < >  --   --   6.98  < >  --  5.62   HEMOGLOBIN 10.6 L  < >  --   --  10.8 L  < >  --  10.7 L   HEMATOCRIT 38.2  < >  --   --  38.6  < >  --  38.2   MCV 70 L  < >  --   --  71 L  < >  --  70 L   PLATELETS 190  < >  --   --  196  < >  --  151   BNP  --   < >  --   < > 141 H  --  76 97   TSH 2.146  --   --   --   --   --   --   --    CHOLESTEROL  --   --  218 H  --   --   --   --   --    HDL  --   --  54  --   --   --   --   --    LDL CHOLESTEROL  --   --  132.8  --   --   --   --   --    TRIGLYCERIDES  --   --  156 H  --   --   --   --   --    HDL/CHOLESTEROL RATIO  --   --  24.8  --   --   --   --   --    < > = values in this interval not displayed.  Lab Results   Component Value Date    IRON 51 05/12/2017    TIBC 493 (H) 05/12/2017    FERRITIN 41 09/20/2016       Recent Labs  Lab 03/16/18  1204 03/23/18  1133 04/03/18  0841 04/18/18  0935   INR 2.4 2.1 3.2 2.3        Test(s) Reviewed  I have reviewed the following in detail:  [] Stress test   [] Angiography   [x] Echocardiogram   [x] Labs   [] Other:         Assessment/Plan:     Chronic diastolic congestive heart failure  Comments:  Well compensated. Wt stable. SOB and cough not 2/2 HF. No changes.  Orders:  -     Comprehensive metabolic panel; Future; Expected date: 10/23/2018  -     Magnesium; Future; Expected date: 10/23/2018    Chronic atrial fibrillation with rapid ventricular response  Comments:  HR elevated, low 100s. Suspect infection and the fact that pt has only been taking 1/2 of her metoprolol dose. Instructed to take the full tablet.     Status post heart valve replacement with mechanical valve    Chronic anticoagulation  Comments:  Denies bleeding. F/U with coumadin clinic    Essential (primary) hypertension  Comments:  BP at goal <130/80. continue current regimen    Mixed hyperlipidemia  Comments:  . ASCVD risk <7.5%, not in a statin benefit group. TG >150. Discussed reationship to BG elevation    Morbid obesity with BMI of 45.0-49.9, adult  Comments:  BMI   46.3 Encouraged increased CV exercise to 30 minutes a day for 4-5 days a week  Orders:  -     Hemoglobin A1c; Future; Expected date: 04/23/2018    Glucose intolerance (impaired glucose tolerance)  Comments:  . Last A1C in 5/2017 was 6.1. Discussed that she is trying to develop DM and the implications for CV system. Encouraged lifestyle modifications.   Orders:  -     Hemoglobin A1c; Future; Expected date: 04/23/2018    COPD exacerbation  Comments:  Wheezing, SOB, cough, febrile. Suspect acute exacerbation of COPD. Set up with urgent care appointment for this afternoon.         Follow-up in about 6 months (around 10/23/2018).

## 2018-04-23 NOTE — PROGRESS NOTES
I have seen the patient, reviewed the house staff's history and physical, assessment and plan. I have personally interviewed and reexamined the patient at bedside and agree with the findings, assessment and plan.  Seen in cardio today and referred here due to ?? AECB.

## 2018-05-08 ENCOUNTER — LAB VISIT (OUTPATIENT)
Dept: LAB | Facility: HOSPITAL | Age: 63
End: 2018-05-08
Attending: INTERNAL MEDICINE
Payer: COMMERCIAL

## 2018-05-08 ENCOUNTER — ANTI-COAG VISIT (OUTPATIENT)
Dept: CARDIOLOGY | Facility: CLINIC | Age: 63
End: 2018-05-08
Payer: COMMERCIAL

## 2018-05-08 ENCOUNTER — OFFICE VISIT (OUTPATIENT)
Dept: INTERNAL MEDICINE | Facility: CLINIC | Age: 63
End: 2018-05-08
Payer: COMMERCIAL

## 2018-05-08 DIAGNOSIS — N63.0 BREAST MASS: Primary | ICD-10-CM

## 2018-05-08 DIAGNOSIS — I10 HYPERTENSION, UNSPECIFIED TYPE: ICD-10-CM

## 2018-05-08 DIAGNOSIS — R10.84 GENERALIZED ABDOMINAL PAIN: ICD-10-CM

## 2018-05-08 DIAGNOSIS — I48.20 CHRONIC ATRIAL FIBRILLATION WITH RAPID VENTRICULAR RESPONSE: ICD-10-CM

## 2018-05-08 DIAGNOSIS — M25.569 KNEE PAIN, UNSPECIFIED CHRONICITY, UNSPECIFIED LATERALITY: ICD-10-CM

## 2018-05-08 DIAGNOSIS — Z79.01 LONG TERM (CURRENT) USE OF ANTICOAGULANTS: Primary | ICD-10-CM

## 2018-05-08 DIAGNOSIS — Z95.2 STATUS POST HEART VALVE REPLACEMENT WITH MECHANICAL VALVE: ICD-10-CM

## 2018-05-08 LAB
ALBUMIN SERPL BCP-MCNC: 3.6 G/DL
ALP SERPL-CCNC: 67 U/L
ALT SERPL W/O P-5'-P-CCNC: 11 U/L
ANION GAP SERPL CALC-SCNC: 8 MMOL/L
AST SERPL-CCNC: 20 U/L
BASOPHILS # BLD AUTO: 0.07 K/UL
BASOPHILS NFR BLD: 1.3 %
BILIRUB SERPL-MCNC: 0.7 MG/DL
BUN SERPL-MCNC: 14 MG/DL
CALCIUM SERPL-MCNC: 9.7 MG/DL
CHLORIDE SERPL-SCNC: 107 MMOL/L
CO2 SERPL-SCNC: 28 MMOL/L
CREAT SERPL-MCNC: 1.1 MG/DL
DIFFERENTIAL METHOD: ABNORMAL
EOSINOPHIL # BLD AUTO: 0.4 K/UL
EOSINOPHIL NFR BLD: 6.7 %
ERYTHROCYTE [DISTWIDTH] IN BLOOD BY AUTOMATED COUNT: 19.5 %
EST. GFR  (AFRICAN AMERICAN): >60 ML/MIN/1.73 M^2
EST. GFR  (NON AFRICAN AMERICAN): 54.3 ML/MIN/1.73 M^2
GLUCOSE SERPL-MCNC: 88 MG/DL
HCT VFR BLD AUTO: 42.3 %
HGB BLD-MCNC: 11.7 G/DL
INR PPP: 4 (ref 2–3)
LYMPHOCYTES # BLD AUTO: 1.6 K/UL
LYMPHOCYTES NFR BLD: 29.6 %
MCH RBC QN AUTO: 19.8 PG
MCHC RBC AUTO-ENTMCNC: 27.7 G/DL
MCV RBC AUTO: 72 FL
MONOCYTES # BLD AUTO: 0.8 K/UL
MONOCYTES NFR BLD: 14.4 %
NEUTROPHILS # BLD AUTO: 2.6 K/UL
NEUTROPHILS NFR BLD: 47.8 %
NRBC BLD-RTO: 0 /100 WBC
PLATELET # BLD AUTO: 248 K/UL
PMV BLD AUTO: 11.3 FL
POTASSIUM SERPL-SCNC: 4.9 MMOL/L
PROT SERPL-MCNC: 7.2 G/DL
RBC # BLD AUTO: 5.92 M/UL
SODIUM SERPL-SCNC: 143 MMOL/L
WBC # BLD AUTO: 5.4 K/UL

## 2018-05-08 PROCEDURE — 3008F BODY MASS INDEX DOCD: CPT | Mod: CPTII,S$GLB,, | Performed by: INTERNAL MEDICINE

## 2018-05-08 PROCEDURE — 3075F SYST BP GE 130 - 139MM HG: CPT | Mod: CPTII,S$GLB,, | Performed by: INTERNAL MEDICINE

## 2018-05-08 PROCEDURE — 85610 PROTHROMBIN TIME: CPT | Mod: QW,S$GLB,,

## 2018-05-08 PROCEDURE — 99214 OFFICE O/P EST MOD 30 MIN: CPT | Mod: S$GLB,,, | Performed by: INTERNAL MEDICINE

## 2018-05-08 PROCEDURE — 3078F DIAST BP <80 MM HG: CPT | Mod: CPTII,S$GLB,, | Performed by: INTERNAL MEDICINE

## 2018-05-08 PROCEDURE — 80053 COMPREHEN METABOLIC PANEL: CPT

## 2018-05-08 PROCEDURE — 85025 COMPLETE CBC W/AUTO DIFF WBC: CPT

## 2018-05-08 PROCEDURE — 36415 COLL VENOUS BLD VENIPUNCTURE: CPT

## 2018-05-08 PROCEDURE — 84443 ASSAY THYROID STIM HORMONE: CPT

## 2018-05-08 PROCEDURE — 99999 PR PBB SHADOW E&M-EST. PATIENT-LVL V: CPT | Mod: PBBFAC,,, | Performed by: INTERNAL MEDICINE

## 2018-05-08 RX ORDER — OXYBUTYNIN CHLORIDE 10 MG/1
15 TABLET, EXTENDED RELEASE ORAL
COMMUNITY
Start: 2018-05-01 | End: 2018-05-08

## 2018-05-08 NOTE — PROGRESS NOTES
Quick follow up from low INR on 4/18. INR high today. Patient reports a increase in stress. Reports a decrease in appetite/diet and will resume with this new change. No bleeding or bruising. Will decrease weekly dose until follow up in 1 week for close monitoring. Advised patient to call with any changes or concerns.

## 2018-05-09 ENCOUNTER — HOSPITAL ENCOUNTER (OUTPATIENT)
Dept: RADIOLOGY | Facility: HOSPITAL | Age: 63
Discharge: HOME | End: 2018-05-09
Attending: INTERNAL MEDICINE
Payer: COMMERCIAL

## 2018-05-09 DIAGNOSIS — R10.84 GENERALIZED ABDOMINAL PAIN: ICD-10-CM

## 2018-05-09 DIAGNOSIS — M25.569 KNEE PAIN, UNSPECIFIED CHRONICITY, UNSPECIFIED LATERALITY: ICD-10-CM

## 2018-05-09 LAB — TSH SERPL DL<=0.005 MIU/L-ACNC: 1.98 UIU/ML

## 2018-05-09 PROCEDURE — 74177 CT ABD & PELVIS W/CONTRAST: CPT | Mod: 26,,, | Performed by: RADIOLOGY

## 2018-05-09 PROCEDURE — 74177 CT ABD & PELVIS W/CONTRAST: CPT | Mod: TC

## 2018-05-09 PROCEDURE — 73560 X-RAY EXAM OF KNEE 1 OR 2: CPT | Mod: 26,XS,RT, | Performed by: RADIOLOGY

## 2018-05-09 PROCEDURE — 25500020 PHARM REV CODE 255: Performed by: INTERNAL MEDICINE

## 2018-05-09 PROCEDURE — 73562 X-RAY EXAM OF KNEE 3: CPT | Mod: 26,LT,, | Performed by: RADIOLOGY

## 2018-05-09 PROCEDURE — 73560 X-RAY EXAM OF KNEE 1 OR 2: CPT | Mod: 59,TC,RT

## 2018-05-09 RX ORDER — SERTRALINE HYDROCHLORIDE 100 MG/1
100 TABLET, FILM COATED ORAL DAILY
Qty: 30 TABLET | Refills: 4 | Status: SHIPPED | OUTPATIENT
Start: 2018-05-09 | End: 2019-01-23 | Stop reason: SDUPTHER

## 2018-05-09 RX ADMIN — IOHEXOL 15 ML: 350 INJECTION, SOLUTION INTRAVENOUS at 05:05

## 2018-05-09 RX ADMIN — IOHEXOL 100 ML: 350 INJECTION, SOLUTION INTRAVENOUS at 06:05

## 2018-05-09 RX ADMIN — IOHEXOL 15 ML: 350 INJECTION, SOLUTION INTRAVENOUS at 04:05

## 2018-05-11 NOTE — PROGRESS NOTES
Patient seen by So SCHRADER. I have reviewed her initial findings and agree with her assessment.  Care plan made together.

## 2018-05-13 VITALS
WEIGHT: 246.94 LBS | TEMPERATURE: 98 F | BODY MASS INDEX: 45.44 KG/M2 | HEIGHT: 62 IN | SYSTOLIC BLOOD PRESSURE: 132 MMHG | DIASTOLIC BLOOD PRESSURE: 78 MMHG | OXYGEN SATURATION: 98 % | HEART RATE: 83 BPM

## 2018-05-13 NOTE — PROGRESS NOTES
Subjective:       Patient ID: Elayne Almanzar is a 62 y.o. female.    Chief Complaint: Hypertension    HPI  She returns for management of hypertension.  She has had hypertension for over a year.  Current treatment has included medications outlined in medication list.  She denies chest pain or shortness of breath.  No palpitations.  Denies left arm or neck pain.  She complains of intermittent bilateral lower quadrant abdominal pain.  Currently asymptomatic.  She also complains of having a lump on her breast.  No nipple discharge.  Also complains of left knee pain.  Pain is indicated along the anterior aspect of the left knee.  Denies any acute trauma    Past medical history: Hypertension, A. fib, COPD, hyperlipidemia, nephrolithiasis , hydronephrosis, glaucoma, sleep apnea, status post mitral valve replacement . She had a colonoscopy April 2014      Medications: Proventil MDI 2 puffs 4 times a day when necessary,Advair 500/50 one puff twice a day, Lasix 20 mg daily,Toprol- mg daily, Coumadin as monitored by Coumadin clinic , Zoloft 100 mg daily      ALLERGIES: dilaudid    Review of Systems   Constitutional: Negative for chills, fatigue, fever and unexpected weight change.   Respiratory: Negative for chest tightness and shortness of breath.    Cardiovascular: Negative for chest pain and palpitations.   Gastrointestinal: Negative for abdominal pain and blood in stool.   Neurological: Negative for dizziness, syncope, numbness and headaches.       Objective:      Physical Exam   HENT:   Right Ear: External ear normal.   Left Ear: External ear normal.   Nose: Nose normal.   Mouth/Throat: Oropharynx is clear and moist.   Eyes: Pupils are equal, round, and reactive to light.   Neck: Normal range of motion.   Cardiovascular: Normal rate and regular rhythm.    No murmur heard.  Pulmonary/Chest: Breath sounds normal.   Abdominal: She exhibits no distension. There is no hepatosplenomegaly. There is no tenderness.    Lymphadenopathy:     She has no cervical adenopathy.     She has no axillary adenopathy.   Neurological: She has normal strength and normal reflexes. No cranial nerve deficit or sensory deficit.     left knee without tenderness or redness  Assessment/Plan     assessment and plan: Hypertension: Check CMP  2.  Abdominal pain: Check CBC.  Schedule CT scan of abdomen  3.  Breast lump: Schedule appointment with breast Center  4.  Check left knee x-ray  5.  Check TSH

## 2018-05-15 ENCOUNTER — ANTI-COAG VISIT (OUTPATIENT)
Dept: CARDIOLOGY | Facility: CLINIC | Age: 63
End: 2018-05-15
Payer: COMMERCIAL

## 2018-05-15 DIAGNOSIS — I48.20 CHRONIC ATRIAL FIBRILLATION WITH RAPID VENTRICULAR RESPONSE: ICD-10-CM

## 2018-05-15 DIAGNOSIS — Z79.01 LONG TERM (CURRENT) USE OF ANTICOAGULANTS: Primary | ICD-10-CM

## 2018-05-15 DIAGNOSIS — Z95.2 STATUS POST HEART VALVE REPLACEMENT WITH MECHANICAL VALVE: ICD-10-CM

## 2018-05-15 LAB — INR PPP: 3.3 (ref 2–3)

## 2018-05-15 PROCEDURE — 85610 PROTHROMBIN TIME: CPT | Mod: QW,S$GLB,, | Performed by: INTERNAL MEDICINE

## 2018-05-15 NOTE — PROGRESS NOTES
Quick follow up for elevated INR on 5/8. .INR within therapeutic range today. Patient denies any bleeding, bruising or other changes that would affect warfarin therapy. Will maintain current dose. Patient advised to call coumadin clinic with any changes or concerns.

## 2018-05-15 NOTE — PROGRESS NOTES
Pt seen by Lenka SCHRADER. I have reviewed her initial findings and agree with her assessment and plan

## 2018-05-30 ENCOUNTER — ANTI-COAG VISIT (OUTPATIENT)
Dept: CARDIOLOGY | Facility: CLINIC | Age: 63
End: 2018-05-30
Payer: COMMERCIAL

## 2018-05-30 DIAGNOSIS — Z95.2 STATUS POST HEART VALVE REPLACEMENT WITH MECHANICAL VALVE: ICD-10-CM

## 2018-05-30 DIAGNOSIS — Z79.01 LONG TERM (CURRENT) USE OF ANTICOAGULANTS: Primary | ICD-10-CM

## 2018-05-30 DIAGNOSIS — I48.20 CHRONIC ATRIAL FIBRILLATION WITH RAPID VENTRICULAR RESPONSE: ICD-10-CM

## 2018-05-30 LAB — INR PPP: 3.3 (ref 2–3)

## 2018-05-30 PROCEDURE — 85610 PROTHROMBIN TIME: CPT | Mod: QW,S$GLB,, | Performed by: INTERNAL MEDICINE

## 2018-05-30 NOTE — PROGRESS NOTES
INR within therapeutic range today. Patient denies any bleeding, bruising or other changes that would affect warfarin therapy. Will maintain current dose. Patient advised to call coumadin clinic with any changes or concerns.

## 2018-06-07 ENCOUNTER — TELEPHONE (OUTPATIENT)
Dept: INTERNAL MEDICINE | Facility: CLINIC | Age: 63
End: 2018-06-07

## 2018-06-07 DIAGNOSIS — M25.569 KNEE PAIN, UNSPECIFIED CHRONICITY, UNSPECIFIED LATERALITY: Primary | ICD-10-CM

## 2018-06-07 NOTE — TELEPHONE ENCOUNTER
----- Message from Tres Chowdhury sent at 6/6/2018  6:11 PM CDT -----  Contact: Pt  Pt would like to be called back regarding having question about Xray images in May.    Pt can be reached at [phone].    Thank You.

## 2018-06-11 ENCOUNTER — OFFICE VISIT (OUTPATIENT)
Dept: ORTHOPEDICS | Facility: CLINIC | Age: 63
End: 2018-06-11
Payer: COMMERCIAL

## 2018-06-11 VITALS
DIASTOLIC BLOOD PRESSURE: 78 MMHG | SYSTOLIC BLOOD PRESSURE: 152 MMHG | HEART RATE: 94 BPM | HEIGHT: 62 IN | WEIGHT: 256.19 LBS | BODY MASS INDEX: 47.15 KG/M2

## 2018-06-11 DIAGNOSIS — M17.12 PRIMARY OSTEOARTHRITIS OF LEFT KNEE: Primary | ICD-10-CM

## 2018-06-11 DIAGNOSIS — E66.01 MORBID OBESITY WITH BMI OF 45.0-49.9, ADULT: ICD-10-CM

## 2018-06-11 PROCEDURE — 3077F SYST BP >= 140 MM HG: CPT | Mod: CPTII,S$GLB,, | Performed by: ORTHOPAEDIC SURGERY

## 2018-06-11 PROCEDURE — 3008F BODY MASS INDEX DOCD: CPT | Mod: CPTII,S$GLB,, | Performed by: ORTHOPAEDIC SURGERY

## 2018-06-11 PROCEDURE — 3078F DIAST BP <80 MM HG: CPT | Mod: CPTII,S$GLB,, | Performed by: ORTHOPAEDIC SURGERY

## 2018-06-11 PROCEDURE — 99203 OFFICE O/P NEW LOW 30 MIN: CPT | Mod: S$GLB,,, | Performed by: ORTHOPAEDIC SURGERY

## 2018-06-11 PROCEDURE — 99999 PR PBB SHADOW E&M-EST. PATIENT-LVL III: CPT | Mod: PBBFAC,,, | Performed by: ORTHOPAEDIC SURGERY

## 2018-06-11 NOTE — LETTER
June 11, 2018      Nubia Crocker MD  [address]  [facility] [address]           [facility]  [address]  [facility] [address]  Phone: [phone]          Patient: Elayne Almanzar   MR Number: [id number]   YOB: 1955   Date of Visit: 6/11/2018       Dear Dr. Nubia Crocker:    Thank you for referring Elayne Almanzar to me for evaluation. Attached you will find relevant portions of my assessment and plan of care.    If you have questions, please do not hesitate to call me. I look forward to following Elayne Almanzar along with you.    Sincerely,    Juan Miguel Arroyo MD    Enclosure  CC:  No Recipients    If you would like to receive this communication electronically, please contact [email or web address] or [phone] to request more information on [employer] Link access.    For providers and/or their staff who would like to refer a patient to [facility], please contact us through our one-stop-shop provider referral line, [facility], at [phone].    If you feel you have received this communication in error or would no longer like to receive these types of communications, please e-mail [email or web address]

## 2018-06-11 NOTE — PROGRESS NOTES
Subjective:      Patient ID: Elayne Almanzar is a 62 y.o. female.    Chief Complaint: Pain of the Left Knee    HPI    Elayne Almanzar is a 62 year old female, with atrial fibrillation on coumadin, here with a 6 month history of left knee pain. The patient is a homemaker. There was not a history of trauma.  The pain is severe The pain is located in the medial aspect of the knee. There is is not radiation.  There is not catching or locking.   The pain is described as achy. The patient has not had prior surgery. It is aggravated by standing and walking.  It is alleviated by rest. There is not numbness or tingling of the lower extremity.  There is not back pain.  She  has tried tylenol and topical over-the-counter creams but no injections.  (She cannot take NSAIDs due to coumadin use.) They have not helped.  She does have difficulty getting in or out of a car, getting dressed, or going up or down stairs.  The patient does not use an assistive device.    Past Medical History:   Diagnosis Date    Acute on chronic diastolic congestive heart failure     Anticoagulant long-term use     Asthma     Atrial fibrillation 2002    Cataract     Chronic kidney disease     COPD (chronic obstructive pulmonary disease)     Depression     Dysuria     Flank pain     HTN (hypertension)     Nephrolithiasis     KEYSHAWN (obstructive sleep apnea)     awaiting CPAP machine     Rheumatic disease of mitral valve     Urinary incontinence     Urinary tract infection        Current Outpatient Prescriptions on File Prior to Visit   Medication Sig Dispense Refill    albuterol 90 mcg/actuation inhaler Inhale 1-2 puffs into the lungs every 6 (six) hours as needed for Wheezing. Rescue 1 Inhaler 0    aspirin (ECOTRIN) 81 MG EC tablet Take 81 mg by mouth once daily.       dorzolamide-timolol 2-0.5% (COSOPT) 22.3-6.8 mg/mL ophthalmic solution Place 1 drop into both eyes 2 (two) times daily. 10 mL 1    ergocalciferol  "(ERGOCALCIFEROL) 50,000 unit Cap Take 1 capsule (50,000 Units total) by mouth twice a week. (Patient taking differently: Take 50,000 Units by mouth once a week. on tuesday) 24 capsule 0    fluticasone (FLONASE) 50 mcg/actuation nasal spray 2 sprays by Each Nare route once daily. 1 Bottle 6    fluticasone-salmeterol 500-50 mcg/dose (ADVAIR DISKUS) 500-50 mcg/dose DsDv diskus inhaler Inhale 1 puff into the lungs 2 (two) times daily. 1 Inhaler 6    furosemide (LASIX) 20 MG tablet Take 1 tablet (20 mg total) by mouth once daily. 30 tablet 3    metoprolol succinate (TOPROL-XL) 200 MG 24 hr tablet TAKE 1 TABLET (200 MG TOTAL) BY MOUTH ONCE DAILY. 30 tablet 0    needle, disp, 26 gauge 26 gauge x 1/2" Ndle 1 application by Misc.(Non-Drug; Combo Route) route once a week. 10 each 0    oxybutynin (DITROPAN XL) 15 MG TR24 Take 1 tablet (15 mg total) by mouth once daily. 30 tablet 11    sertraline (ZOLOFT) 100 MG tablet Take 1 tablet (100 mg total) by mouth once daily. 30 tablet 4    tamsulosin (FLOMAX) 0.4 mg Cp24 TAKE 1 CAPSULE (0.4 MG TOTAL) BY MOUTH ONCE DAILY. 30 capsule 11    warfarin (COUMADIN) 5 MG tablet Take 1-1.5 pills by mouth daily or as directed by Coumadin Clinic.; #130 pills = 3 month supply 130 tablet 3    brimonidine 0.2% (ALPHAGAN) 0.2 % Drop Place 1 drop into the right eye 3 (three) times daily. (Patient taking differently: Place 1 drop into the right eye 2 (two) times daily. ) 15 mL 3     No current facility-administered medications on file prior to visit.        Past Surgical History:   Procedure Laterality Date     SECTION      kidney stone removal      MITRAL VALVE REPLACEMENT  2004    TUBAL LIGATION         Family History   Problem Relation Age of Onset    Stroke Paternal Grandmother     Colon cancer Maternal Grandmother     Cancer Brother         testicular cancer    Diabetes Sister     Hypertension Sister     Breast cancer Paternal Aunt     Diabetes Sister     Diabetes " Sister     Heart disease Father 70        MI    Diabetes Father     Colon cancer Mother     Ovarian cancer Neg Hx        Social History     Social History    Marital status:      Spouse name: N/A    Number of children: 2    Years of education: N/A     Occupational History    Cook      Retired     Social History Main Topics    Smoking status: Former Smoker     Packs/day: 0.50     Years: 20.00     Types: Cigarettes     Quit date: 5/8/2002    Smokeless tobacco: Never Used    Alcohol use No    Drug use: No    Sexual activity: Not on file     Other Topics Concern    Not on file     Social History Narrative    Disability since 2004.  Laid off 2002.  Previously worked as cook in a kitchen.             Review of Systems   Constitution: Negative for chills, fever and night sweats.   HENT: Negative for hearing loss.    Eyes: Negative for blurred vision, double vision and pain.   Cardiovascular: Negative for chest pain, claudication, dyspnea on exertion and leg swelling.   Respiratory: Negative for cough and shortness of breath.    Endocrine: Negative for cold intolerance, heat intolerance, polydipsia, polyphagia and polyuria.   Hematologic/Lymphatic: Negative for adenopathy and bleeding problem. Does not bruise/bleed easily.   Skin: Negative for poor wound healing.   Musculoskeletal: Positive for arthritis and joint pain.   Gastrointestinal: Negative for abdominal pain, change in bowel habit, diarrhea and heartburn.   Genitourinary: Negative for bladder incontinence and dysuria.   Neurological: Negative for focal weakness, headaches, numbness, paresthesias and sensory change.   Psychiatric/Behavioral: Negative for altered mental status, depression and hallucinations. The patient is not nervous/anxious.    Allergic/Immunologic: Negative for persistent infections.         Objective:            General    Vitals reviewed.  Constitutional: She is oriented to person, place, and time. She appears well-developed  and well-nourished.   obese   HENT:   Head: Normocephalic and atraumatic.   Eyes: Conjunctivae and EOM are normal.   Neck: Normal range of motion.   Cardiovascular: Normal rate, regular rhythm and intact distal pulses.    Pulmonary/Chest: Effort normal. No respiratory distress.   Abdominal: She exhibits no distension.   Neurological: She is alert and oriented to person, place, and time.   Psychiatric: She has a normal mood and affect. Her behavior is normal.     General Musculoskeletal Exam   Gait: antalgic       Right Knee Exam     Inspection   Erythema: absent  Scars: absent  Swelling: present  Effusion: effusion  Deformity: deformity  Bruising: absent    Tenderness   The patient is tender to palpation of the medial joint line.    Range of Motion   Extension: 0   Flexion: 130     Tests   Ligament Examination Lachman: normal (-1 to 2mm)   MCL - Valgus: normal (0 to 2mm)  LCL - Varus: normal  Patella   Passive Patellar Tilt: neutral    Other   Sensation: normal    Left Knee Exam     Inspection   Erythema: absent  Scars: absent  Swelling: present  Effusion: absent  Deformity: deformity  Bruising: absent    Tenderness   The patient tender to palpation of the medial joint line and lateral joint line.    Range of Motion   Extension: 0   Flexion: 130     Tests   Meniscus   Reid:  Medial - negative Lateral - negative  Stability Lachman: normal (-1 to 2mm)   MCL - Valgus: normal (0 to 2mm)  LCL - Varus: normal (0 to 2mm)  Patella   Passive Patellar Tilt: neutral  Patellar Grind: negative  J-Sign: J sign absent    Other   Sensation: normal    Muscle Strength   Right Lower Extremity   Hip Abduction: 5/5   Quadriceps:  5/5   Hamstrin/5   Left Lower Extremity   Hip Abduction: 5/5   Quadriceps:  5/5   Hamstrin/5     Reflexes     Left Side  Quadriceps:  2+  Achilles:  2+    Right Side   Quadriceps:  2+  Achilles:  2+    Vascular Exam     Right Pulses  Dorsalis Pedis:      2+          Left Pulses  Dorsalis Pedis:       2+  Posterior Tibial:      2+        Edema  Right Lower Leg: absent  Left Lower Leg: absent        Radiographs taken May 9, 2018 and reviewed by me demonstrate mild arthritic change of the bilateral KNEE(s).There  is not bone destruction.  There is not a fracture. The medial compartment is most involved.  There is no deformity.           Assessment:       Encounter Diagnoses   Name Primary?    Primary osteoarthritis of left knee Yes    Morbid obesity with BMI of 45.0-49.9, adult           Plan:       Elayne was seen today for pain.    Diagnoses and all orders for this visit:    Primary osteoarthritis of left knee  -     Prior Authorization Order    Morbid obesity with BMI of 45.0-49.9, adult  -     Prior Authorization Order    Other orders  -     sodium hyaluronate (EUFLEXXA) 10 mg/mL(mw 2.4 -3.6 million) Syrg 40 mg; Inject 4 mLs (40 mg total) into the articular space once a week.      Treatment options have been discussed.  We have decided to proceed with left knee Euflexxa injections.  The risk of pseudoseptic reactions were discussed, as was the minimal risk of infection.  Elayne Servando Almanzar will return for her first injection..

## 2018-06-21 ENCOUNTER — TELEPHONE (OUTPATIENT)
Dept: ORTHOPEDICS | Facility: CLINIC | Age: 63
End: 2018-06-21

## 2018-06-21 NOTE — TELEPHONE ENCOUNTER
----- Message from Vidya Coronel sent at 6/21/2018  2:34 PM CDT -----  Contact: Self  Patient is currently ill and does not feel well enough to come for euflexaa injection appt tomorrow.    Please call patient to reschedule at [phone]

## 2018-06-21 NOTE — TELEPHONE ENCOUNTER
Spoke with  and she explained to me that she's having respiratory problems and she will need to r/s injection appt. I stated that her appt jax be moved and she would start injections next week with Alexandra as planned the pt verbalized understanding.

## 2018-06-26 ENCOUNTER — OFFICE VISIT (OUTPATIENT)
Dept: SURGERY | Facility: CLINIC | Age: 63
End: 2018-06-26
Payer: COMMERCIAL

## 2018-06-26 ENCOUNTER — LAB VISIT (OUTPATIENT)
Dept: LAB | Facility: HOSPITAL | Age: 63
End: 2018-06-26
Payer: COMMERCIAL

## 2018-06-26 VITALS
DIASTOLIC BLOOD PRESSURE: 70 MMHG | WEIGHT: 255.88 LBS | SYSTOLIC BLOOD PRESSURE: 110 MMHG | HEIGHT: 62 IN | HEART RATE: 55 BPM | TEMPERATURE: 98 F | BODY MASS INDEX: 47.09 KG/M2

## 2018-06-26 DIAGNOSIS — N63.0 BREAST MASS: Primary | ICD-10-CM

## 2018-06-26 DIAGNOSIS — Z95.2 STATUS POST HEART VALVE REPLACEMENT WITH MECHANICAL VALVE: ICD-10-CM

## 2018-06-26 DIAGNOSIS — Z79.01 LONG TERM (CURRENT) USE OF ANTICOAGULANTS: ICD-10-CM

## 2018-06-26 DIAGNOSIS — I48.20 CHRONIC ATRIAL FIBRILLATION WITH RAPID VENTRICULAR RESPONSE: ICD-10-CM

## 2018-06-26 DIAGNOSIS — N64.4 BREAST PAIN, RIGHT: Primary | ICD-10-CM

## 2018-06-26 LAB
INR PPP: 3
PROTHROMBIN TIME: 29.2 SEC

## 2018-06-26 PROCEDURE — 99999 PR PBB SHADOW E&M-EST. PATIENT-LVL III: CPT | Mod: PBBFAC,,, | Performed by: SURGERY

## 2018-06-26 PROCEDURE — 99243 OFF/OP CNSLTJ NEW/EST LOW 30: CPT | Mod: S$GLB,,, | Performed by: SURGERY

## 2018-06-26 PROCEDURE — 85610 PROTHROMBIN TIME: CPT

## 2018-06-26 PROCEDURE — 36415 COLL VENOUS BLD VENIPUNCTURE: CPT

## 2018-06-26 RX ORDER — OXYBUTYNIN CHLORIDE 10 MG/1
TABLET, EXTENDED RELEASE ORAL
Refills: 11 | COMMUNITY
Start: 2018-06-04 | End: 2020-01-03

## 2018-06-26 NOTE — MEDICAL/APP STUDENT
History and Physical  [facility]  Department of Surgery    CHIEF COMPLAINT: breast mass right    REFERRING:  Nubia Crocker MD  [address] [place] [address]  Nubia Crocker MD      Subjective:      Elayne Almanzar is a 62 y.o. postmenopausal female referred for evaluation of a breast mass. Change was noted one month ago.  Patient does not routinely do self breast exams.  Patient has noted a change on breast exam with her PCP.  Patient denies nipple discharge. Patient denies previous breast biopsy. Patient denies a personal history of breast cancer.     GYN History:  Age of menarche was 16. Age of menopause was 54. Patient admits to hormonal therapy. She used estrogen cream about 5 years ago for vaginal dryness. Patient is . She had one live birth and one . Third pregnancy was complicated by placenta previa. Age of first live birth was 24.  Patient did not breast feed.    FAMILY history:  Breast cancer - paternal aunt, maternal great aunts x2 breast cancer, paternal great aunt,  age unknown  Testicular cancer - brother (,23)  Colon cancer - mother (, 82) and maternal grandmother (, 74)    Past Medical History:   Diagnosis Date    Acute on chronic diastolic congestive heart failure     Anticoagulant long-term use     Asthma     Atrial fibrillation     Cataract     Chronic kidney disease     COPD (chronic obstructive pulmonary disease)     Depression     Dysuria     Flank pain     HTN (hypertension)     Nephrolithiasis     KEYSHAWN (obstructive sleep apnea)     awaiting CPAP machine     Rheumatic disease of mitral valve     Urinary incontinence     Urinary tract infection      Past Surgical History:   Procedure Laterality Date     SECTION      kidney stone removal      MITRAL VALVE REPLACEMENT  2004    TUBAL LIGATION       Current Outpatient Prescriptions on File Prior to Visit   Medication Sig Dispense Refill     "albuterol 90 mcg/actuation inhaler Inhale 1-2 puffs into the lungs every 6 (six) hours as needed for Wheezing. Rescue 1 Inhaler 0    aspirin (ECOTRIN) 81 MG EC tablet Take 81 mg by mouth once daily.       dorzolamide-timolol 2-0.5% (COSOPT) 22.3-6.8 mg/mL ophthalmic solution Place 1 drop into both eyes 2 (two) times daily. 10 mL 1    ergocalciferol (ERGOCALCIFEROL) 50,000 unit Cap Take 1 capsule (50,000 Units total) by mouth twice a week. (Patient taking differently: Take 50,000 Units by mouth once a week. on tuesday) 24 capsule 0    fluticasone (FLONASE) 50 mcg/actuation nasal spray 2 sprays by Each Nare route once daily. 1 Bottle 6    fluticasone-salmeterol 500-50 mcg/dose (ADVAIR DISKUS) 500-50 mcg/dose DsDv diskus inhaler Inhale 1 puff into the lungs 2 (two) times daily. 1 Inhaler 6    furosemide (LASIX) 20 MG tablet Take 1 tablet (20 mg total) by mouth once daily. 30 tablet 3    metoprolol succinate (TOPROL-XL) 200 MG 24 hr tablet TAKE 1 TABLET (200 MG TOTAL) BY MOUTH ONCE DAILY. 30 tablet 0    needle, disp, 26 gauge 26 gauge x 1/2" Ndle 1 application by Misc.(Non-Drug; Combo Route) route once a week. 10 each 0    oxybutynin (DITROPAN XL) 15 MG TR24 Take 1 tablet (15 mg total) by mouth once daily. 30 tablet 11    sertraline (ZOLOFT) 100 MG tablet Take 1 tablet (100 mg total) by mouth once daily. 30 tablet 4    tamsulosin (FLOMAX) 0.4 mg Cp24 TAKE 1 CAPSULE (0.4 MG TOTAL) BY MOUTH ONCE DAILY. 30 capsule 11    warfarin (COUMADIN) 5 MG tablet Take 1-1.5 pills by mouth daily or as directed by [facility]madin Clinic.; #130 pills = 3 month supply 130 tablet 3    brimonidine 0.2% (ALPHAGAN) 0.2 % Drop Place 1 drop into the right eye 3 (three) times daily. (Patient taking differently: Place 1 drop into the right eye 2 (two) times daily. ) 15 mL 3     Current Facility-Administered Medications on File Prior to Visit   Medication Dose Route Frequency Provider Last Rate Last Dose    sodium hyaluronate (EUFLEXXA) 10 " "mg/mL(mw 2.4 -3.6 million) Syrg 40 mg  40 mg Intra-articular Weekly Juan Miguel Arroyo MD         Social History     Social History    Marital status:      Spouse name: N/A    Number of children: 2    Years of education: N/A     Occupational History    Cook      Retired     Social History Main Topics    Smoking status: Former Smoker     Packs/day: 0.50     Years: 20.00     Types: Cigarettes     Quit date: 5/8/2002    Smokeless tobacco: Never Used    Alcohol use No    Drug use: No    Sexual activity: Not on file     Other Topics Concern    Not on file     Social History Narrative    Disability since 2004.  Laid off 2002.  Previously worked as cook in a kitchen.       Family History   Problem Relation Age of Onset    Stroke Paternal Grandmother     Colon cancer Maternal Grandmother     Cancer Brother         testicular cancer    Diabetes Sister     Hypertension Sister     Breast cancer Paternal Aunt     Diabetes Sister     Diabetes Sister     Heart disease Father 70        MI    Diabetes Father     Colon cancer Mother     Ovarian cancer Neg Hx        Review of Systems  Denies fevers, chills. Positive for weight gain  Denies chest pain or palpitations  Positive for dyspnea, negative for cough  Negative for nausea or vomiting  Negative for headaches, seizures, dizziness  Negative for dysuria, nocturia  Positive for easy bruising and bleeding  No skin changes        Objective:        /70 (BP Location: Left arm, Patient Position: Sitting, BP Method: Large (Automatic))   Pulse (!) 55   Temp 98 °F (36.7 °C)   Ht 5' 2" (1.575 m)   Wt 116 kg (255 lb 13.5 oz)   LMP 07/22/2012   BMI 46.79 kg/m²   General appearance: alert, appears stated age and cooperative  Head: Normocephalic, without obvious abnormality, atraumatic  Neck: no adenopathy and supple, symmetrical, trachea midline  Lungs: normal effort, nonlabored breathing  Breasts: No nipple retraction or dimpling, No nipple discharge " or bleeding, No axillary or supraclavicular adenopathy, positive findings: 3cm mass in right breast 2:00, tender to palpation  Heart: regular rate and rhythm  Abdomen: soft, non-tender; bowel sounds normal; no masses,  no organomegaly  Extremities: extremities normal, atraumatic, no cyanosis or edema  Skin: normal, no edema and no lesions noted  Lymph nodes: Cervical, supraclavicular, and axillary nodes normal.  Neurologic: Grossly normal    Radiology review: Images personally reviewed by me in the clinic.  Mammogram:9/18/17 -   The breasts are almost entirely fatty. There is no evidence of suspicious masses, microcalcifications or architectural distortion. No evidence of malignancy.     The patient's estimated lifetime risk of breast cancer (to age 85) based on Tyrer-Cuzick - 7 risk assessment model is: Tyrer-Cuzick: 10.1 %. According to the American Cancer Society,  patients with a lifetime breast cancer risk of 20% or higher might benefit from supplemental screening tests.        Assessment:      Elayne Almanzar is a 62 y.o. postmenopausal female with a right breat mass for one month.      Plan:

## 2018-06-26 NOTE — LETTER
June 27, 2018      Nubia Crocker MD  [address]  [place] [address]           [facility]  [address]  [facility] [address]  Phone: [phone]  Fax: [phone]          Patient: Elayne Almanzar   MR Number: [id number]   YOB: 1955   Date of Visit: 6/26/2018       Dear Dr. Nubia Crocker:    Thank you for referring Elayne Almanzar to me for evaluation. Attached you will find relevant portions of my assessment and plan of care.    If you have questions, please do not hesitate to call me. I look forward to following Elayne Almanzar along with you.    Sincerely,    Quique Montoya MD    Enclosure  CC:  No Recipients    If you would like to receive this communication electronically, please contact [email or web address] or [phone] to request more information on [employer] Link access.    For providers and/or their staff who would like to refer a patient to [facility], please contact us through our one-stop-shop provider referral line, [facility], at [phone].    If you feel you have received this communication in error or would no longer like to receive these types of communications, please e-mail [email or web address]

## 2018-06-27 ENCOUNTER — HOSPITAL ENCOUNTER (OUTPATIENT)
Dept: RADIOLOGY | Facility: HOSPITAL | Age: 63
Discharge: HOME | End: 2018-06-27
Attending: SURGERY
Payer: COMMERCIAL

## 2018-06-27 ENCOUNTER — ANTI-COAG VISIT (OUTPATIENT)
Dept: CARDIOLOGY | Facility: CLINIC | Age: 63
End: 2018-06-27

## 2018-06-27 DIAGNOSIS — N63.0 BREAST MASS: ICD-10-CM

## 2018-06-27 DIAGNOSIS — Z79.01 LONG TERM (CURRENT) USE OF ANTICOAGULANTS: Primary | ICD-10-CM

## 2018-06-27 DIAGNOSIS — I48.20 CHRONIC ATRIAL FIBRILLATION WITH RAPID VENTRICULAR RESPONSE: ICD-10-CM

## 2018-06-27 DIAGNOSIS — Z95.2 STATUS POST HEART VALVE REPLACEMENT WITH MECHANICAL VALVE: ICD-10-CM

## 2018-06-27 PROCEDURE — 77065 DX MAMMO INCL CAD UNI: CPT | Mod: 26,,, | Performed by: RADIOLOGY

## 2018-06-27 PROCEDURE — 77061 BREAST TOMOSYNTHESIS UNI: CPT | Mod: TC,PO

## 2018-06-27 PROCEDURE — 77065 DX MAMMO INCL CAD UNI: CPT | Mod: TC,PO

## 2018-06-27 PROCEDURE — 77061 BREAST TOMOSYNTHESIS UNI: CPT | Mod: 26,,, | Performed by: RADIOLOGY

## 2018-06-27 NOTE — PROGRESS NOTES
CHIEF COMPLAINT: breast mass right     REFERRING:  Nubia Crocker MD  [address] [place] [address]  Nubia Crocker MD        Subjective:       Elayne Almanzar is a 62 y.o. postmenopausal female referred for evaluation of a breast mass. Change was noted one month ago.  Patient does not routinely do self breast exams.  Patient has noted a change on breast exam with her PCP.  Patient denies nipple discharge. Patient denies previous breast biopsy. Patient denies a personal history of breast cancer.      GYN History:  Age of menarche was 16. Age of menopause was 54. Patient admits to hormonal therapy. She used estrogen cream about 5 years ago for vaginal dryness. Patient is . She had one live birth and one . Third pregnancy was complicated by placenta previa. Age of first live birth was 24.  Patient did not breast feed.     FAMILY history:  Breast cancer - paternal aunt, maternal great aunts x2 breast cancer, paternal great aunt,  age unknown  Testicular cancer - brother (,23)  Colon cancer - mother (, 82) and maternal grandmother (, 74)          Past Medical History:   Diagnosis Date    Acute on chronic diastolic congestive heart failure      Anticoagulant long-term use      Asthma      Atrial fibrillation     Cataract      Chronic kidney disease      COPD (chronic obstructive pulmonary disease)      Depression      Dysuria      Flank pain      HTN (hypertension)      Nephrolithiasis      KEYSHAWN (obstructive sleep apnea)       awaiting CPAP machine     Rheumatic disease of mitral valve      Urinary incontinence      Urinary tract infection              Past Surgical History:   Procedure Laterality Date     SECTION        kidney stone removal        MITRAL VALVE REPLACEMENT   2004    TUBAL LIGATION                 Current Outpatient Prescriptions on File Prior to Visit   Medication Sig Dispense Refill    albuterol 90  "mcg/actuation inhaler Inhale 1-2 puffs into the lungs every 6 (six) hours as needed for Wheezing. Rescue 1 Inhaler 0    aspirin (ECOTRIN) 81 MG EC tablet Take 81 mg by mouth once daily.         dorzolamide-timolol 2-0.5% (COSOPT) 22.3-6.8 mg/mL ophthalmic solution Place 1 drop into both eyes 2 (two) times daily. 10 mL 1    ergocalciferol (ERGOCALCIFEROL) 50,000 unit Cap Take 1 capsule (50,000 Units total) by mouth twice a week. (Patient taking differently: Take 50,000 Units by mouth once a week. on tuesday) 24 capsule 0    fluticasone (FLONASE) 50 mcg/actuation nasal spray 2 sprays by Each Nare route once daily. 1 Bottle 6    fluticasone-salmeterol 500-50 mcg/dose (ADVAIR DISKUS) 500-50 mcg/dose DsDv diskus inhaler Inhale 1 puff into the lungs 2 (two) times daily. 1 Inhaler 6    furosemide (LASIX) 20 MG tablet Take 1 tablet (20 mg total) by mouth once daily. 30 tablet 3    metoprolol succinate (TOPROL-XL) 200 MG 24 hr tablet TAKE 1 TABLET (200 MG TOTAL) BY MOUTH ONCE DAILY. 30 tablet 0    needle, disp, 26 gauge 26 gauge x 1/2" Ndle 1 application by Misc.(Non-Drug; Combo Route) route once a week. 10 each 0    oxybutynin (DITROPAN XL) 15 MG TR24 Take 1 tablet (15 mg total) by mouth once daily. 30 tablet 11    sertraline (ZOLOFT) 100 MG tablet Take 1 tablet (100 mg total) by mouth once daily. 30 tablet 4    tamsulosin (FLOMAX) 0.4 mg Cp24 TAKE 1 CAPSULE (0.4 MG TOTAL) BY MOUTH ONCE DAILY. 30 capsule 11    warfarin (COUMADIN) 5 MG tablet Take 1-1.5 pills by mouth daily or as directed by [facility].; #130 pills = 3 month supply 130 tablet 3    brimonidine 0.2% (ALPHAGAN) 0.2 % Drop Place 1 drop into the right eye 3 (three) times daily. (Patient taking differently: Place 1 drop into the right eye 2 (two) times daily. ) 15 mL 3                Current Facility-Administered Medications on File Prior to Visit   Medication Dose Route Frequency Provider Last Rate Last Dose    sodium hyaluronate (EUFLEXXA) 10 " "mg/mL(mw 2.4 -3.6 million) Syrg 40 mg  40 mg Intra-articular Weekly Juan Miguel Arroyo MD          Social History   Social History            Social History    Marital status:        Spouse name: N/A    Number of children: 2    Years of education: N/A            Occupational History    Cook         Retired            Social History Main Topics    Smoking status: Former Smoker       Packs/day: 0.50       Years: 20.00       Types: Cigarettes       Quit date: 5/8/2002    Smokeless tobacco: Never Used    Alcohol use No    Drug use: No    Sexual activity: Not on file           Other Topics Concern    Not on file          Social History Narrative     Disability since 2004.  Laid off 2002.  Previously worked as cook in a kitchen.                 Family History   Problem Relation Age of Onset    Stroke Paternal Grandmother      Colon cancer Maternal Grandmother      Cancer Brother           testicular cancer    Diabetes Sister      Hypertension Sister      Breast cancer Paternal Aunt      Diabetes Sister      Diabetes Sister      Heart disease Father 70         MI    Diabetes Father      Colon cancer Mother      Ovarian cancer Neg Hx           Review of Systems  Denies fevers, chills. Positive for weight gain  Denies chest pain or palpitations  Positive for dyspnea, negative for cough  Negative for nausea or vomiting  Negative for headaches, seizures, dizziness  Negative for dysuria, nocturia  Positive for easy bruising and bleeding  No skin changes         Objective:      /70 (BP Location: Left arm, Patient Position: Sitting, BP Method: Large (Automatic))   Pulse (!) 55   Temp 98 °F (36.7 °C)   Ht 5' 2" (1.575 m)   Wt 116 kg (255 lb 13.5 oz)   LMP 07/22/2012   BMI 46.79 kg/m²   General appearance: alert, appears stated age and cooperative  Head: Normocephalic, without obvious abnormality, atraumatic  Neck: no adenopathy and supple, symmetrical, trachea midline  Lungs: normal " effort, nonlabored breathing  Breasts: No nipple retraction or dimpling, No nipple discharge or bleeding, No axillary or supraclavicular adenopathy, positive findings: 3cm mass in right breast 2:00, tender to palpation  Heart: regular rate and rhythm  Abdomen: soft, non-tender; bowel sounds normal; no masses,  no organomegaly  Extremities: extremities normal, atraumatic, no cyanosis or edema  Skin: normal, no edema and no lesions noted  Lymph nodes: Cervical, supraclavicular, and axillary nodes normal.  Neurologic: Grossly normal     Radiology review: Images personally reviewed by me in the clinic.  Mammogram:9/18/17 -   The breasts are almost entirely fatty. There is no evidence of suspicious masses, microcalcifications or architectural distortion. No evidence of malignancy.      The patient's estimated lifetime risk of breast cancer (to age 85) based on Tyrer-Cuzick - 7 risk assessment model is: Tyrer-Cuzick: 10.1 %. According to the American Cancer Society,  patients with a lifetime breast cancer risk of 20% or higher might benefit from supplemental screening tests.           Assessment:       Elayne Almanzar is a 62 y.o. postmenopausal female with a right breat mass for one month.      Plan:     Pt with family hx of breast cancer as above.  Pt with pain and tenderness in RUIQ of breast improved since making her appt.  Density associated with it is also not as noticeable to her subjectively as sx have subsided.  B CBE essentially WNL.  No dominant masses, nodules, or denisites in the RUIQ and breast density is virtually symmetrical bilaterally.  She remains slightly tender in RUIQ but CBE is WNL with o masses, skin cahnges, or LAD.    Last MMG was Sept 2017.    Will repeat R diag MMG and R breast US of RUIQ over area of patient's symptoms.  F/U prn pending diag imaging results.

## 2018-06-29 ENCOUNTER — OFFICE VISIT (OUTPATIENT)
Dept: ORTHOPEDICS | Facility: CLINIC | Age: 63
End: 2018-06-29
Payer: COMMERCIAL

## 2018-06-29 VITALS
BODY MASS INDEX: 46.75 KG/M2 | HEIGHT: 62 IN | HEART RATE: 82 BPM | SYSTOLIC BLOOD PRESSURE: 130 MMHG | WEIGHT: 254.06 LBS | DIASTOLIC BLOOD PRESSURE: 67 MMHG

## 2018-06-29 DIAGNOSIS — M17.12 PRIMARY OSTEOARTHRITIS OF LEFT KNEE: Primary | ICD-10-CM

## 2018-06-29 PROCEDURE — 99999 PR PBB SHADOW E&M-EST. PATIENT-LVL IV: CPT | Mod: PBBFAC,,, | Performed by: PHYSICIAN ASSISTANT

## 2018-06-29 PROCEDURE — 20610 DRAIN/INJ JOINT/BURSA W/O US: CPT | Mod: LT,S$GLB,, | Performed by: PHYSICIAN ASSISTANT

## 2018-06-29 PROCEDURE — 99499 UNLISTED E&M SERVICE: CPT | Mod: S$GLB,,, | Performed by: PHYSICIAN ASSISTANT

## 2018-06-29 NOTE — PROGRESS NOTES
Elayne Almanzar presents to clinic today for the first left knee Euflexxa injection.    Exam demonstrates the no effusion in the  left knee, and the skin is intact.    Diagnosis: osteoarthritis knee    After time out was performed and patient ID, side, and site were verified, the  left  knee was sterilly prepped in the standard fashion.  A 22-gauge needle was introduced into left knee joint from an gisela-lateral site without complication and knee was then injected with 2 ml of Euflexxa.  Sterile dressing was applied.  The patient was instructed to resume activities as tolerated and to call with any problems.     We will see Elayne Almanzar back next week for the second injection.

## 2018-06-29 NOTE — LETTER
June 29, 2018      Juan Miguel Arroyo MD  [address]  [facility] [address]           [facility] - Orthopedics  [address]  [facility] [address]  Phone: [phone]          Patient: Elayne Almanzar   MR Number: [id number]   YOB: 1955   Date of Visit: 6/29/2018       Dear Dr. Juan Miguel Arroyo:    Thank you for referring Elayne Almanzar to me for evaluation. Attached you will find relevant portions of my assessment and plan of care.    If you have questions, please do not hesitate to call me. I look forward to following Elayne Almanzar along with you.    Sincerely,    Alexandra Paredes PA-C    Enclosure  CC:  No Recipients    If you would like to receive this communication electronically, please contact [email or web address] or [phone] to request more information on [facility] Link access.    For providers and/or their staff who would like to refer a patient to [facility], please contact us through our one-stop-shop provider referral line, [facility] , at [phone].    If you feel you have received this communication in error or would no longer like to receive these types of communications, please e-mail [email or web address]

## 2018-07-06 ENCOUNTER — OFFICE VISIT (OUTPATIENT)
Dept: ORTHOPEDICS | Facility: CLINIC | Age: 63
End: 2018-07-06
Payer: COMMERCIAL

## 2018-07-06 DIAGNOSIS — M17.12 PRIMARY OSTEOARTHRITIS OF LEFT KNEE: ICD-10-CM

## 2018-07-06 PROCEDURE — 99999 PR PBB SHADOW E&M-EST. PATIENT-LVL III: CPT | Mod: PBBFAC,,, | Performed by: NURSE PRACTITIONER

## 2018-07-06 PROCEDURE — 20610 DRAIN/INJ JOINT/BURSA W/O US: CPT | Mod: LT,S$GLB,, | Performed by: NURSE PRACTITIONER

## 2018-07-06 PROCEDURE — 99499 UNLISTED E&M SERVICE: CPT | Mod: S$GLB,,, | Performed by: NURSE PRACTITIONER

## 2018-07-06 NOTE — PROGRESS NOTES
Elayne Almanzar presents to clinic today for the second left knee Euflexxa injection.    Exam demonstrates the no effusion in the  left knee, and the skin is intact.    Diagnosis: osteoarthritis knee    After time out was performed and patient ID, side, and site were verified, the  left  knee was sterilly prepped in the standard fashion.  A 22-gauge needle was introduced into left knee joint from an gisela-lateral site without complication and knee was then injected with 2 ml of Euflexxa.  Sterile dressing was applied.  The patient was instructed to resume activities as tolerated and to call with any problems.     We will see Elayne Almanzar back next week for the third injection.

## 2018-07-06 NOTE — LETTER
July 6, 2018      Juan Miguel Arroyo MD  [address]  [facility] [address]           [facility] - Orthopedics  [address]  [facility] [address]  Phone: [phone]          Patient: Elayne Almanzar   MR Number: [id number]   YOB: 1955   Date of Visit: 7/6/2018       Dear Dr. Juan Miguel Arroyo:    Thank you for referring Elayne Almanzar to me for evaluation. Attached you will find relevant portions of my assessment and plan of care.    If you have questions, please do not hesitate to call me. I look forward to following Elayne Almanzar along with you.    Sincerely,    Tari Dumont, NP    Enclosure  CC:  No Recipients    If you would like to receive this communication electronically, please contact [email or web address] or [phone] to request more information on [facility] Link access.    For providers and/or their staff who would like to refer a patient to [facility], please contact us through our one-stop-shop provider referral line, [facility], at [phone].    If you feel you have received this communication in error or would no longer like to receive these types of communications, please e-mail [email or web address]

## 2018-07-09 DIAGNOSIS — I10 ESSENTIAL HYPERTENSION: Chronic | ICD-10-CM

## 2018-07-10 DIAGNOSIS — I10 ESSENTIAL HYPERTENSION: Chronic | ICD-10-CM

## 2018-07-10 RX ORDER — ALBUTEROL SULFATE 90 UG/1
1-2 AEROSOL, METERED RESPIRATORY (INHALATION) EVERY 6 HOURS PRN
Qty: 18 G | Refills: 6 | Status: SHIPPED | OUTPATIENT
Start: 2018-07-10 | End: 2019-07-10

## 2018-07-10 RX ORDER — METOPROLOL SUCCINATE 200 MG/1
200 TABLET, EXTENDED RELEASE ORAL DAILY
Qty: 30 TABLET | Refills: 2 | Status: SHIPPED | OUTPATIENT
Start: 2018-07-10 | End: 2018-09-10 | Stop reason: SDUPTHER

## 2018-07-10 RX ORDER — METOPROLOL SUCCINATE 200 MG/1
200 TABLET, EXTENDED RELEASE ORAL DAILY
Qty: 30 TABLET | Refills: 11 | Status: SHIPPED | OUTPATIENT
Start: 2018-07-10 | End: 2019-09-23 | Stop reason: SDUPTHER

## 2018-07-13 ENCOUNTER — TELEPHONE (OUTPATIENT)
Dept: ORTHOPEDICS | Facility: CLINIC | Age: 63
End: 2018-07-13

## 2018-07-13 ENCOUNTER — OFFICE VISIT (OUTPATIENT)
Dept: ORTHOPEDICS | Facility: CLINIC | Age: 63
End: 2018-07-13
Payer: COMMERCIAL

## 2018-07-13 VITALS
WEIGHT: 257.25 LBS | BODY MASS INDEX: 47.34 KG/M2 | SYSTOLIC BLOOD PRESSURE: 116 MMHG | HEIGHT: 62 IN | HEART RATE: 68 BPM | DIASTOLIC BLOOD PRESSURE: 62 MMHG

## 2018-07-13 DIAGNOSIS — M17.12 PRIMARY OSTEOARTHRITIS OF LEFT KNEE: Primary | ICD-10-CM

## 2018-07-13 PROCEDURE — 99499 UNLISTED E&M SERVICE: CPT | Mod: S$GLB,,, | Performed by: PHYSICIAN ASSISTANT

## 2018-07-13 PROCEDURE — 99999 PR PBB SHADOW E&M-EST. PATIENT-LVL IV: CPT | Mod: PBBFAC,,, | Performed by: PHYSICIAN ASSISTANT

## 2018-07-13 PROCEDURE — 20610 DRAIN/INJ JOINT/BURSA W/O US: CPT | Mod: LT,S$GLB,, | Performed by: PHYSICIAN ASSISTANT

## 2018-07-13 NOTE — LETTER
July 13, 2018      Juan Miguel Arroyo MD  [address]  [facility] [address]           [facility] - Orthopedics  [address]  [facility] [address]  Phone: [phone]          Patient: Elayne Almanzar   MR Number: [id number]   YOB: 1955   Date of Visit: 7/13/2018       Dear Dr. Juan Miguel Arroyo:    Thank you for referring Elayne Almanzar to me for evaluation. Attached you will find relevant portions of my assessment and plan of care.    If you have questions, please do not hesitate to call me. I look forward to following Elayne Almanzar along with you.    Sincerely,    Alexandra Paredes PA-C    Enclosure  CC:  No Recipients    If you would like to receive this communication electronically, please contact [email or web address] or [phone] to request more information on [facility] Link access.    For providers and/or their staff who would like to refer a patient to [facility], please contact us through our one-stop-shop provider referral line, [facility], at [phone].    If you feel you have received this communication in error or would no longer like to receive these types of communications, please e-mail [email or web address]

## 2018-07-13 NOTE — TELEPHONE ENCOUNTER
----- Message from Alexandra Paredes PA-C sent at 7/13/2018  9:15 AM CDT -----  Contact: self  No problem. Ill see her when she gets here. Financial can take care of the money part..       ----- Message -----  From: Abdiel Horta MA  Sent: 7/13/2018   9:12 AM  To: Alexandra Paredes PA-C        ----- Message -----  From: Ruby Proctor  Sent: 7/13/2018   9:02 AM  To: Reggie Morris Staff    [place] called [place]. She is still on her way for her injection apt but just want Alexandra to know that she will be a few mins late. Attempted to contact Abdiel also to inform her of the reschedule notification that was placed next to the apt and that it stated upon accessing the patient chart that she has been dismissed from [facility] due to a temporary financial hold. Please contact patient at the number above

## 2018-07-13 NOTE — PROGRESS NOTES
Elayne Almanzar presents to clinic today for the third left knee Euflexxa injection.    Exam demonstrates the no effusion in the  left knee, and the skin is intact.    Diagnosis: osteoarthritis knee    After time out was performed and patient ID, side, and site were verified, the  left  knee was sterilly prepped in the standard fashion.  A 22-gauge needle was introduced into left knee joint from an gisela-lateral site without complication and knee was then injected with 2 ml of Euflexxa.  Sterile dressing was applied.  The patient was instructed to resume activities as tolerated and to call with any problems.     F/u prn.

## 2018-08-01 ENCOUNTER — ANTI-COAG VISIT (OUTPATIENT)
Dept: CARDIOLOGY | Facility: CLINIC | Age: 63
End: 2018-08-01

## 2018-08-01 ENCOUNTER — LAB VISIT (OUTPATIENT)
Dept: LAB | Facility: HOSPITAL | Age: 63
End: 2018-08-01
Attending: INTERNAL MEDICINE
Payer: COMMERCIAL

## 2018-08-01 DIAGNOSIS — I48.20 CHRONIC ATRIAL FIBRILLATION WITH RAPID VENTRICULAR RESPONSE: ICD-10-CM

## 2018-08-01 DIAGNOSIS — Z95.2 STATUS POST HEART VALVE REPLACEMENT WITH MECHANICAL VALVE: ICD-10-CM

## 2018-08-01 DIAGNOSIS — Z79.01 LONG TERM (CURRENT) USE OF ANTICOAGULANTS: ICD-10-CM

## 2018-08-01 LAB
INR PPP: 2.6
PROTHROMBIN TIME: 25.5 SEC

## 2018-08-01 PROCEDURE — 85610 PROTHROMBIN TIME: CPT

## 2018-08-01 PROCEDURE — 36415 COLL VENOUS BLD VENIPUNCTURE: CPT

## 2018-08-20 ENCOUNTER — HOSPITAL ENCOUNTER (EMERGENCY)
Facility: OTHER | Age: 63
Discharge: HOME | End: 2018-08-21
Attending: EMERGENCY MEDICINE
Payer: COMMERCIAL

## 2018-08-20 VITALS
RESPIRATION RATE: 18 BRPM | DIASTOLIC BLOOD PRESSURE: 67 MMHG | SYSTOLIC BLOOD PRESSURE: 147 MMHG | OXYGEN SATURATION: 95 % | HEART RATE: 100 BPM | TEMPERATURE: 98 F

## 2018-08-20 DIAGNOSIS — J44.1 COPD EXACERBATION: Primary | ICD-10-CM

## 2018-08-20 DIAGNOSIS — R07.9 CHEST PAIN: ICD-10-CM

## 2018-08-20 LAB
ALBUMIN SERPL BCP-MCNC: 3.9 G/DL
ALP SERPL-CCNC: 74 U/L
ALT SERPL W/O P-5'-P-CCNC: 13 U/L
ANION GAP SERPL CALC-SCNC: 9 MMOL/L
AST SERPL-CCNC: 25 U/L
BASOPHILS # BLD AUTO: 0.02 K/UL
BASOPHILS NFR BLD: 0.4 %
BILIRUB SERPL-MCNC: 0.6 MG/DL
BNP SERPL-MCNC: 124 PG/ML
BUN SERPL-MCNC: 14 MG/DL
CALCIUM SERPL-MCNC: 9.4 MG/DL
CHLORIDE SERPL-SCNC: 106 MMOL/L
CO2 SERPL-SCNC: 26 MMOL/L
CREAT SERPL-MCNC: 1.1 MG/DL
DIFFERENTIAL METHOD: ABNORMAL
EOSINOPHIL # BLD AUTO: 0.6 K/UL
EOSINOPHIL NFR BLD: 12.6 %
ERYTHROCYTE [DISTWIDTH] IN BLOOD BY AUTOMATED COUNT: 17.3 %
EST. GFR  (AFRICAN AMERICAN): >60 ML/MIN/1.73 M^2
EST. GFR  (NON AFRICAN AMERICAN): 54 ML/MIN/1.73 M^2
GLUCOSE SERPL-MCNC: 92 MG/DL
HCT VFR BLD AUTO: 39.2 %
HGB BLD-MCNC: 11.1 G/DL
INR PPP: 2.9
LYMPHOCYTES # BLD AUTO: 1.5 K/UL
LYMPHOCYTES NFR BLD: 30.9 %
MCH RBC QN AUTO: 19.8 PG
MCHC RBC AUTO-ENTMCNC: 28.3 G/DL
MCV RBC AUTO: 70 FL
MONOCYTES # BLD AUTO: 0.4 K/UL
MONOCYTES NFR BLD: 7 %
NEUTROPHILS # BLD AUTO: 2.4 K/UL
NEUTROPHILS NFR BLD: 48.9 %
PLATELET # BLD AUTO: 187 K/UL
PMV BLD AUTO: 9.8 FL
POTASSIUM SERPL-SCNC: 4.4 MMOL/L
PROT SERPL-MCNC: 8 G/DL
PROTHROMBIN TIME: 31.3 SEC
RBC # BLD AUTO: 5.62 M/UL
SODIUM SERPL-SCNC: 141 MMOL/L
TROPONIN I SERPL DL<=0.01 NG/ML-MCNC: <0.006 NG/ML
WBC # BLD AUTO: 4.99 K/UL

## 2018-08-20 PROCEDURE — 85610 PROTHROMBIN TIME: CPT

## 2018-08-20 PROCEDURE — 84484 ASSAY OF TROPONIN QUANT: CPT

## 2018-08-20 PROCEDURE — 83880 ASSAY OF NATRIURETIC PEPTIDE: CPT

## 2018-08-20 PROCEDURE — 94761 N-INVAS EAR/PLS OXIMETRY MLT: CPT

## 2018-08-20 PROCEDURE — 25000242 PHARM REV CODE 250 ALT 637 W/ HCPCS: Performed by: EMERGENCY MEDICINE

## 2018-08-20 PROCEDURE — 93005 ELECTROCARDIOGRAM TRACING: CPT

## 2018-08-20 PROCEDURE — 63600175 PHARM REV CODE 636 W HCPCS: Performed by: EMERGENCY MEDICINE

## 2018-08-20 PROCEDURE — 85025 COMPLETE CBC W/AUTO DIFF WBC: CPT

## 2018-08-20 PROCEDURE — 94644 CONT INHLJ TX 1ST HOUR: CPT

## 2018-08-20 PROCEDURE — 94645 CONT INHLJ TX EACH ADDL HOUR: CPT

## 2018-08-20 PROCEDURE — 80053 COMPREHEN METABOLIC PANEL: CPT

## 2018-08-20 PROCEDURE — 94640 AIRWAY INHALATION TREATMENT: CPT

## 2018-08-20 PROCEDURE — 93010 ELECTROCARDIOGRAM REPORT: CPT | Mod: ,,, | Performed by: INTERNAL MEDICINE

## 2018-08-20 PROCEDURE — 36415 COLL VENOUS BLD VENIPUNCTURE: CPT

## 2018-08-20 PROCEDURE — 99284 EMERGENCY DEPT VISIT MOD MDM: CPT | Mod: 25

## 2018-08-20 RX ORDER — IPRATROPIUM BROMIDE AND ALBUTEROL SULFATE 2.5; .5 MG/3ML; MG/3ML
3 SOLUTION RESPIRATORY (INHALATION)
Status: DISCONTINUED | OUTPATIENT
Start: 2018-08-20 | End: 2018-08-20

## 2018-08-20 RX ORDER — IPRATROPIUM BROMIDE 0.5 MG/2.5ML
1 SOLUTION RESPIRATORY (INHALATION)
Status: COMPLETED | OUTPATIENT
Start: 2018-08-20 | End: 2018-08-20

## 2018-08-20 RX ORDER — ALBUTEROL SULFATE 0.83 MG/ML
SOLUTION RESPIRATORY (INHALATION)
Status: COMPLETED
Start: 2018-08-20 | End: 2018-08-20

## 2018-08-20 RX ORDER — ALBUTEROL SULFATE 0.83 MG/ML
2.5 SOLUTION RESPIRATORY (INHALATION)
Status: DISCONTINUED | OUTPATIENT
Start: 2018-08-20 | End: 2018-08-20

## 2018-08-20 RX ORDER — IPRATROPIUM BROMIDE 0.5 MG/2.5ML
1.5 SOLUTION RESPIRATORY (INHALATION) CONTINUOUS
Status: ACTIVE | OUTPATIENT
Start: 2018-08-20 | End: 2018-08-20

## 2018-08-20 RX ORDER — IPRATROPIUM BROMIDE 0.5 MG/2.5ML
SOLUTION RESPIRATORY (INHALATION)
Status: COMPLETED
Start: 2018-08-20 | End: 2018-08-20

## 2018-08-20 RX ORDER — ALBUTEROL SULFATE 0.83 MG/ML
15 SOLUTION RESPIRATORY (INHALATION)
Status: COMPLETED | OUTPATIENT
Start: 2018-08-20 | End: 2018-08-20

## 2018-08-20 RX ORDER — ALBUTEROL SULFATE 0.83 MG/ML
15 SOLUTION RESPIRATORY (INHALATION) CONTINUOUS
Status: DISPENSED | OUTPATIENT
Start: 2018-08-20 | End: 2018-08-20

## 2018-08-20 RX ORDER — PREDNISONE 20 MG/1
60 TABLET ORAL DAILY
Qty: 15 TABLET | Refills: 0 | Status: SHIPPED | OUTPATIENT
Start: 2018-08-20 | End: 2018-08-25

## 2018-08-20 RX ORDER — PREDNISONE 20 MG/1
60 TABLET ORAL
Status: COMPLETED | OUTPATIENT
Start: 2018-08-20 | End: 2018-08-20

## 2018-08-20 RX ORDER — FLUTICASONE PROPIONATE AND SALMETEROL 500; 50 UG/1; UG/1
1 POWDER RESPIRATORY (INHALATION) 2 TIMES DAILY
Qty: 1 INHALER | Refills: 6 | Status: SHIPPED | OUTPATIENT
Start: 2018-08-20 | End: 2018-09-10 | Stop reason: SDUPTHER

## 2018-08-20 RX ORDER — ALBUTEROL SULFATE 90 UG/1
1-2 AEROSOL, METERED RESPIRATORY (INHALATION) EVERY 6 HOURS PRN
Qty: 1 INHALER | Refills: 6 | Status: SHIPPED | OUTPATIENT
Start: 2018-08-20 | End: 2018-09-10 | Stop reason: SDUPTHER

## 2018-08-20 RX ADMIN — IPRATROPIUM BROMIDE 1 MG: 0.5 SOLUTION RESPIRATORY (INHALATION) at 08:08

## 2018-08-20 RX ADMIN — ALBUTEROL SULFATE 15 MG: 2.5 SOLUTION RESPIRATORY (INHALATION) at 08:08

## 2018-08-20 RX ADMIN — PREDNISONE 60 MG: 20 TABLET ORAL at 06:08

## 2018-08-20 NOTE — ED PROVIDER NOTES
"Encounter Date: 2018    SCRIBE #1 NOTE: I, Martha Mann, am scribing for, and in the presence of, Dr. Coelho.   SCRIBE #2 NOTE: I, Juliana Conroy, am scribing for, and in the presence of, Dr. Coelho.     History     Chief Complaint   Patient presents with    Wheezing     for month, increasingly worse.     Shortness of Breath    Chest Pain     started last night. jaw pain      Time seen by provider: 6:25 PM    This is a 63 y.o. female who presents with complaint of wheezing and coughing worsening over the last month. She denies improvement with Ventolin "all day" and Advair BID. The patient mentions using distilled water in CPAP, though it feels like it is "expanding" her lungs too much. The patient reports having chest pain with cough, back pain, and "burning" and "aching" bilateral leg pain at baseline. She denies fever, chills, N/V/D, or leg swelling. She is currently taking Coumadin, and has her levels checked once a month.      The history is provided by the patient.     Review of patient's allergies indicates:  No Known Allergies  Past Medical History:   Diagnosis Date    Acute on chronic diastolic congestive heart failure     Anticoagulant long-term use     Asthma     Atrial fibrillation     Cataract     Chronic kidney disease     COPD (chronic obstructive pulmonary disease)     Depression     Dysuria     Flank pain     HTN (hypertension)     Nephrolithiasis     KEYSHAWN (obstructive sleep apnea)     awaiting CPAP machine     Rheumatic disease of mitral valve     Urinary incontinence     Urinary tract infection      Past Surgical History:   Procedure Laterality Date     SECTION      kidney stone removal      MITRAL VALVE REPLACEMENT  2004    TUBAL LIGATION       Family History   Problem Relation Age of Onset    Stroke Paternal Grandmother     Colon cancer Maternal Grandmother     Cancer Brother         testicular cancer    Diabetes Sister     Hypertension Sister     " Breast cancer Paternal Aunt     Diabetes Sister     Diabetes Sister     Heart disease Father 70        MI    Diabetes Father     Colon cancer Mother     Breast cancer Maternal Aunt     Ovarian cancer Neg Hx      Social History     Tobacco Use    Smoking status: Former Smoker     Packs/day: 0.50     Years: 20.00     Pack years: 10.00     Types: Cigarettes     Last attempt to quit: 2002     Years since quittin.2    Smokeless tobacco: Never Used   Substance Use Topics    Alcohol use: No    Drug use: No     Review of Systems   Constitutional: Negative for chills and fever.   HENT: Negative for congestion and sore throat.    Eyes: Negative for visual disturbance.   Respiratory: Positive for cough and wheezing. Negative for shortness of breath.    Cardiovascular: Positive for chest pain. Negative for palpitations and leg swelling.   Gastrointestinal: Negative for abdominal pain, diarrhea, nausea and vomiting.   Genitourinary: Negative for decreased urine volume, dysuria and vaginal discharge.   Musculoskeletal: Positive for back pain. Negative for joint swelling, myalgias, neck pain and neck stiffness.        Positive for leg pain.   Skin: Negative for rash and wound.   Neurological: Negative for weakness, numbness and headaches.   Hematological: Does not bruise/bleed easily.   Psychiatric/Behavioral: Negative for confusion.       Physical Exam     Initial Vitals [18 1813]   BP Pulse Resp Temp SpO2   (!) 143/87 81 (!) 24 98.3 °F (36.8 °C) 96 %      MAP       --         Physical Exam    Nursing note and vitals reviewed.  Constitutional: She appears well-developed and well-nourished. She is not diaphoretic.   HENT:   Head: Normocephalic and atraumatic.   Eyes: Conjunctivae and EOM are normal. No scleral icterus.   Neck: Normal range of motion. Neck supple.   Cardiovascular: Normal rate and normal heart sounds. An irregularly irregular rhythm present.  Exam reveals no gallop and no friction rub.     No murmur heard.  Pulmonary/Chest: No respiratory distress. She has wheezes (Inspiratory and expiratory in posterior lung fields). She has no rhonchi. She has no rales.   Prolonged expiration.   Musculoskeletal: Normal range of motion.   TTP lateral right calf area without edema or swelling.   Lymphadenopathy:     She has no cervical adenopathy.   Neurological: She is alert and oriented to person, place, and time.   Skin: Skin is warm and dry. No erythema. No pallor.   Psychiatric: She has a normal mood and affect. Her behavior is normal. Judgment and thought content normal.         ED Course   Procedures  Labs Reviewed   CBC W/ AUTO DIFFERENTIAL - Abnormal; Notable for the following components:       Result Value    RBC 5.62 (*)     Hemoglobin 11.1 (*)     MCV 70 (*)     MCH 19.8 (*)     MCHC 28.3 (*)     RDW 17.3 (*)     Eos # 0.6 (*)     Eosinophil% 12.6 (*)     All other components within normal limits   COMPREHENSIVE METABOLIC PANEL - Abnormal; Notable for the following components:    eGFR if non  54 (*)     All other components within normal limits   B-TYPE NATRIURETIC PEPTIDE - Abnormal; Notable for the following components:     (*)     All other components within normal limits   PROTIME-INR - Abnormal; Notable for the following components:    Prothrombin Time 31.3 (*)     INR 2.9 (*)     All other components within normal limits   TROPONIN I   APTT   PROTIME-INR     EKG Readings: (Independently Interpreted)   Atrial fibrillation at rate of 79 bpm. No STEMI.       Imaging Results          X-Ray Chest AP Portable (Final result)  Result time 08/20/18 19:29:00    Final result by Tre Galeana III, MD (08/20/18 19:29:00)                 Impression:      See above    Cardiomegaly and possible mild CHF.      Electronically signed by: Tre Galeana MD  Date:    08/20/2018  Time:    19:29             Narrative:    EXAMINATION:  XR CHEST AP PORTABLE    CLINICAL  HISTORY:  Asthma;    FINDINGS:  Heart size is upper normal.  There is postoperative change.  There is a valve replacement.  There may be mild CHF.                              X-Rays:   Independently Interpreted Readings:   Chest X-Ray: Limited due to portable technique and body habitus. Enlarged cardiac silhouette with patchy interstitial infiltrates throughout. Sternotomy wires in place. No pneumothorax.     Medical Decision Making:   Clinical Tests:   Lab Tests: Ordered and Reviewed  Radiological Study: Ordered and Reviewed  Medical Tests: Ordered and Reviewed  ED Management:  Emergent evaluation of 63-year-old female with complaint of wheezing and shortness of breath, history of COPD.  Patient states symptoms have been ongoing and worsening over the last month.  Vital signs are benign, afebrile.  On exam she is in atrial fibrillation of which she has a history.  There is diffuse wheezes without significant respiratory distress. She was treated with duo nebs and prednisone with improvement.  She did require 2nd set of nebulizer treatments for resolution.  Patient has many potential reasons to be short of breath including her atrial fibrillation, CHF with valve replacement, COPD, possible MI.  Workup shows minimal elevation in BNP, and symptoms resolved with treatments for COPD.  Chest x-ray shows no pneumonia.  I am comfortable with discharge home.  I do not think she has had an acute MI.  She is discharged with prescription for prednisone and also requested prescriptions for Advair and albuterol, admits at time of discharge that she has been out of her Advair.  She was discharged in good condition and is encouraged close follow-up with PCP or return for worsening.            Scribe Attestation:   Scribe #1: I performed the above scribed service and the documentation accurately describes the services I performed. I attest to the accuracy of the note.  Scribe #2: I performed the above scribed service and the  documentation accurately describes the services I performed. I attest to the accuracy of the note.    Attending Attestation:           Physician Attestation for Scribe:  Physician Attestation Statement for Scribe #1: I, Dr. Coelho, reviewed documentation, as scribed by Martha Mann in my presence, and it is both accurate and complete.   Physician Attestation Statement for Scribe #2: I, Dr. Coelho, reviewed documentation, as scribed by Juliana Conroy in my presence, and it is both accurate and complete. I also acknowledge and confirm the content of the note done by Scribe #1.              ED Course as of Aug 20 2335   Mon Aug 20, 2018   2013 On reassessment, she has continued expiratory wheezes.  Will repeat nebulizer treatments.  [AK]   2236 On reexamination, wheezing has resolved.  Patient states she is feeling much improved and is ready to go on home.  [AK]      ED Course User Index  [AK] Monique Coelho MD     Clinical Impression:     1. COPD exacerbation    2. Chest pain                                   Monique Coelho MD  08/20/18 3018

## 2018-08-20 NOTE — ED NOTES
Pt presents to ED via self with complaints of SOB. PMH of COPD, tobacco use, asthma, bronchitis. Pt reports SOB over past week, worsening up until this encounter. Used home inhalers without relief. Inspiratory and expiratory wheezes heard on auscultation. Pt denies fever/chills, dysuria, N/V/D. AAOx4, RR even and unlabored. Will continue to monitor.

## 2018-08-21 NOTE — ED NOTES
Pt lying in bed with eyes open, Tx in process, respirations even and unlabored. Pt reports improvement of symptoms. Bed locked and low rails up Xs 2 will continue to monitor.

## 2018-08-21 NOTE — ED NOTES
Neb Tx complete Pt reports improvement in symptoms, NAD respirations even and unlabored, will continue to monitor.

## 2018-09-10 ENCOUNTER — LAB VISIT (OUTPATIENT)
Dept: LAB | Facility: HOSPITAL | Age: 63
End: 2018-09-10
Attending: INTERNAL MEDICINE
Payer: COMMERCIAL

## 2018-09-10 ENCOUNTER — ANTI-COAG VISIT (OUTPATIENT)
Dept: CARDIOLOGY | Facility: CLINIC | Age: 63
End: 2018-09-10

## 2018-09-10 ENCOUNTER — OFFICE VISIT (OUTPATIENT)
Dept: INTERNAL MEDICINE | Facility: CLINIC | Age: 63
End: 2018-09-10
Payer: COMMERCIAL

## 2018-09-10 DIAGNOSIS — Z12.31 SCREENING MAMMOGRAM, ENCOUNTER FOR: Primary | ICD-10-CM

## 2018-09-10 DIAGNOSIS — I48.20 CHRONIC ATRIAL FIBRILLATION WITH RAPID VENTRICULAR RESPONSE: ICD-10-CM

## 2018-09-10 DIAGNOSIS — I50.33 ACUTE ON CHRONIC DIASTOLIC CONGESTIVE HEART FAILURE: ICD-10-CM

## 2018-09-10 DIAGNOSIS — M79.606 PAIN OF LOWER EXTREMITY, UNSPECIFIED LATERALITY: ICD-10-CM

## 2018-09-10 DIAGNOSIS — Z95.2 STATUS POST HEART VALVE REPLACEMENT WITH MECHANICAL VALVE: ICD-10-CM

## 2018-09-10 DIAGNOSIS — I10 ESSENTIAL HYPERTENSION: Chronic | ICD-10-CM

## 2018-09-10 DIAGNOSIS — Z79.01 LONG TERM (CURRENT) USE OF ANTICOAGULANTS: ICD-10-CM

## 2018-09-10 DIAGNOSIS — N28.1 RENAL CYST: ICD-10-CM

## 2018-09-10 LAB
INR PPP: 4.5
PROTHROMBIN TIME: 45.2 SEC

## 2018-09-10 PROCEDURE — 36415 COLL VENOUS BLD VENIPUNCTURE: CPT

## 2018-09-10 PROCEDURE — 99999 PR PBB SHADOW E&M-EST. PATIENT-LVL V: CPT | Mod: PBBFAC,,, | Performed by: INTERNAL MEDICINE

## 2018-09-10 PROCEDURE — 3074F SYST BP LT 130 MM HG: CPT | Mod: CPTII,S$GLB,, | Performed by: INTERNAL MEDICINE

## 2018-09-10 PROCEDURE — 99214 OFFICE O/P EST MOD 30 MIN: CPT | Mod: S$GLB,,, | Performed by: INTERNAL MEDICINE

## 2018-09-10 PROCEDURE — 85610 PROTHROMBIN TIME: CPT

## 2018-09-10 PROCEDURE — 3008F BODY MASS INDEX DOCD: CPT | Mod: CPTII,S$GLB,, | Performed by: INTERNAL MEDICINE

## 2018-09-10 PROCEDURE — 3078F DIAST BP <80 MM HG: CPT | Mod: CPTII,S$GLB,, | Performed by: INTERNAL MEDICINE

## 2018-09-10 RX ORDER — METOPROLOL SUCCINATE 200 MG/1
200 TABLET, EXTENDED RELEASE ORAL DAILY
Qty: 30 TABLET | Refills: 4 | Status: SHIPPED | OUTPATIENT
Start: 2018-09-10 | End: 2019-05-20 | Stop reason: SDUPTHER

## 2018-09-10 RX ORDER — FUROSEMIDE 20 MG/1
20 TABLET ORAL DAILY
Qty: 30 TABLET | Refills: 4 | Status: SHIPPED | OUTPATIENT
Start: 2018-09-10 | End: 2019-02-26 | Stop reason: SDUPTHER

## 2018-09-10 RX ORDER — GABAPENTIN 300 MG/1
300 CAPSULE ORAL NIGHTLY
Qty: 30 CAPSULE | Refills: 3 | Status: ON HOLD | OUTPATIENT
Start: 2018-09-10 | End: 2020-07-10 | Stop reason: HOSPADM

## 2018-09-10 RX ORDER — FLUTICASONE PROPIONATE AND SALMETEROL 500; 50 UG/1; UG/1
1 POWDER RESPIRATORY (INHALATION) 2 TIMES DAILY
Qty: 1 INHALER | Refills: 6 | Status: SHIPPED | OUTPATIENT
Start: 2018-09-10 | End: 2019-03-08 | Stop reason: SDUPTHER

## 2018-09-10 RX ORDER — ALBUTEROL SULFATE 90 UG/1
2 AEROSOL, METERED RESPIRATORY (INHALATION) EVERY 6 HOURS PRN
Qty: 1 INHALER | Refills: 6 | Status: SHIPPED | OUTPATIENT
Start: 2018-09-10 | End: 2019-03-08 | Stop reason: SDUPTHER

## 2018-09-10 NOTE — PROGRESS NOTES
INR elevated today, previously stable on this dose for quite some time.  Will hold x 1 then maintain with close follow-up.  Course of steroids late August, but no other changes recently expect diarrhea over the weekend.

## 2018-09-15 ENCOUNTER — TELEPHONE (OUTPATIENT)
Dept: INTERNAL MEDICINE | Facility: CLINIC | Age: 63
End: 2018-09-15

## 2018-09-15 VITALS
HEIGHT: 62 IN | HEART RATE: 69 BPM | BODY MASS INDEX: 47.06 KG/M2 | DIASTOLIC BLOOD PRESSURE: 72 MMHG | SYSTOLIC BLOOD PRESSURE: 116 MMHG | WEIGHT: 255.75 LBS

## 2018-09-15 DIAGNOSIS — E78.5 HYPERLIPIDEMIA, UNSPECIFIED HYPERLIPIDEMIA TYPE: Primary | ICD-10-CM

## 2018-09-15 NOTE — TELEPHONE ENCOUNTER
Please contact patient and inform her that her cholesterol is elevated. I will need to give her a medication for this. Please ask her what pharmacy she would like that sent to

## 2018-09-16 NOTE — PROGRESS NOTES
Subjective:       Patient ID: Elayne Almanzar is a 63 y.o. female.    Chief Complaint: Hypertension    HPI  She returns for management of hypertension.  She has had hypertension for over a year.  Current treatment has included medications outlined in medication list.  She denies chest pain or shortness of breath.  No palpitations.  Denies left arm or neck pain.    Past medical history: Hypertension, A. fib, COPD, hyperlipidemia, nephrolithiasis , hydronephrosis, glaucoma, sleep apnea, status post mitral valve replacement . She had a colonoscopy April 2014      Medications: Proventil MDI 2 puffs 4 times a day when necessary,Advair 500/50 one puff twice a day, Lasix 20 mg daily,Toprol- mg daily, Coumadin as monitored by Coumadin clinic , Zoloft 100 mg daily      ALLERGIES: dilaudid    Review of Systems   Constitutional: Negative for chills, fatigue, fever and unexpected weight change.   Respiratory: Negative for chest tightness and shortness of breath.    Cardiovascular: Negative for chest pain and palpitations.   Gastrointestinal: Negative for abdominal pain and blood in stool.   Neurological: Negative for dizziness, syncope, numbness and headaches.       Objective:      Physical Exam   HENT:   Right Ear: External ear normal.   Left Ear: External ear normal.   Nose: Nose normal.   Mouth/Throat: Oropharynx is clear and moist.   Eyes: Pupils are equal, round, and reactive to light.   Neck: Normal range of motion.   Cardiovascular: Normal rate and regular rhythm.   No murmur heard.  Pulmonary/Chest: Breath sounds normal.   Abdominal: She exhibits no distension. There is no hepatosplenomegaly. There is no tenderness.   Lymphadenopathy:     She has no cervical adenopathy.     She has no axillary adenopathy.   Neurological: She has normal strength and normal reflexes. No cranial nerve deficit or sensory deficit.     breast exam:  No masses palpated, no nipple discharge expressed  Assessment/Plan       Assessment  and plan:  Hypertension:  Check CMP and lipid panel.  Schedule NISHANT.  Schedule mammogram

## 2018-09-18 RX ORDER — PRAVASTATIN SODIUM 40 MG/1
40 TABLET ORAL DAILY
Qty: 30 TABLET | Refills: 3 | Status: SHIPPED | OUTPATIENT
Start: 2018-09-18 | End: 2019-03-08 | Stop reason: SDUPTHER

## 2018-09-18 NOTE — TELEPHONE ENCOUNTER
Pt advised of results, verbalized understanding. Please send rx to [facility] pharmacy in [place]. Pt stated she can't take lipitor because it makes her legs hurt.

## 2018-09-19 ENCOUNTER — CLINICAL SUPPORT (OUTPATIENT)
Dept: CARDIOLOGY | Facility: CLINIC | Age: 63
End: 2018-09-19
Attending: INTERNAL MEDICINE
Payer: COMMERCIAL

## 2018-09-19 ENCOUNTER — ANTI-COAG VISIT (OUTPATIENT)
Dept: CARDIOLOGY | Facility: CLINIC | Age: 63
End: 2018-09-19

## 2018-09-19 ENCOUNTER — LAB VISIT (OUTPATIENT)
Dept: LAB | Facility: HOSPITAL | Age: 63
End: 2018-09-19
Payer: COMMERCIAL

## 2018-09-19 DIAGNOSIS — Z79.01 LONG TERM (CURRENT) USE OF ANTICOAGULANTS: ICD-10-CM

## 2018-09-19 DIAGNOSIS — M79.606 PAIN OF LOWER EXTREMITY, UNSPECIFIED LATERALITY: ICD-10-CM

## 2018-09-19 DIAGNOSIS — I48.20 CHRONIC ATRIAL FIBRILLATION WITH RAPID VENTRICULAR RESPONSE: ICD-10-CM

## 2018-09-19 DIAGNOSIS — Z95.2 STATUS POST HEART VALVE REPLACEMENT WITH MECHANICAL VALVE: ICD-10-CM

## 2018-09-19 LAB
INR PPP: 3.1
PROTHROMBIN TIME: 29.5 SEC
VASCULAR ANKLE BRACHIAL INDEX (ABI) LEFT: 1.03 (ref 0.9–1.2)

## 2018-09-19 PROCEDURE — 36415 COLL VENOUS BLD VENIPUNCTURE: CPT

## 2018-09-19 PROCEDURE — 85610 PROTHROMBIN TIME: CPT

## 2018-09-19 PROCEDURE — 93922 UPR/L XTREMITY ART 2 LEVELS: CPT | Mod: S$GLB,,, | Performed by: INTERNAL MEDICINE

## 2018-10-03 ENCOUNTER — HOSPITAL ENCOUNTER (OUTPATIENT)
Dept: RADIOLOGY | Facility: HOSPITAL | Age: 63
Discharge: HOME | End: 2018-10-03
Attending: INTERNAL MEDICINE
Payer: COMMERCIAL

## 2018-10-03 DIAGNOSIS — Z12.31 SCREENING MAMMOGRAM, ENCOUNTER FOR: ICD-10-CM

## 2018-10-03 PROCEDURE — 77067 SCR MAMMO BI INCL CAD: CPT | Mod: TC

## 2018-10-03 PROCEDURE — 77067 SCR MAMMO BI INCL CAD: CPT | Mod: 26,,, | Performed by: RADIOLOGY

## 2018-10-05 ENCOUNTER — ANTI-COAG VISIT (OUTPATIENT)
Dept: CARDIOLOGY | Facility: CLINIC | Age: 63
End: 2018-10-05
Payer: COMMERCIAL

## 2018-10-05 ENCOUNTER — OFFICE VISIT (OUTPATIENT)
Dept: UROLOGY | Facility: CLINIC | Age: 63
End: 2018-10-05
Payer: COMMERCIAL

## 2018-10-05 VITALS
SYSTOLIC BLOOD PRESSURE: 147 MMHG | HEART RATE: 59 BPM | BODY MASS INDEX: 47.26 KG/M2 | HEIGHT: 62 IN | WEIGHT: 256.81 LBS | DIASTOLIC BLOOD PRESSURE: 69 MMHG

## 2018-10-05 DIAGNOSIS — N95.2 ATROPHIC VAGINITIS: ICD-10-CM

## 2018-10-05 DIAGNOSIS — Z79.01 LONG TERM (CURRENT) USE OF ANTICOAGULANTS: Primary | ICD-10-CM

## 2018-10-05 DIAGNOSIS — R82.71 BACTERIA IN URINE: ICD-10-CM

## 2018-10-05 DIAGNOSIS — N20.0 NEPHROLITHIASIS: ICD-10-CM

## 2018-10-05 DIAGNOSIS — R30.0 DYSURIA: Primary | ICD-10-CM

## 2018-10-05 DIAGNOSIS — I48.20 CHRONIC ATRIAL FIBRILLATION WITH RAPID VENTRICULAR RESPONSE: ICD-10-CM

## 2018-10-05 DIAGNOSIS — Z95.2 STATUS POST HEART VALVE REPLACEMENT WITH MECHANICAL VALVE: ICD-10-CM

## 2018-10-05 LAB
BILIRUB SERPL-MCNC: NORMAL MG/DL
BLOOD URINE, POC: NORMAL
COLOR, POC UA: YELLOW
GLUCOSE UR QL STRIP: NORMAL
INR PPP: 3.7 (ref 2–3)
KETONES UR QL STRIP: NORMAL
LEUKOCYTE ESTERASE URINE, POC: NORMAL
NITRITE, POC UA: NORMAL
PH, POC UA: NORMAL
PROTEIN, POC: NORMAL
SPECIFIC GRAVITY, POC UA: 1.02
UROBILINOGEN, POC UA: NORMAL

## 2018-10-05 PROCEDURE — 99211 OFF/OP EST MAY X REQ PHY/QHP: CPT | Mod: 25,S$GLB,, | Performed by: INTERNAL MEDICINE

## 2018-10-05 PROCEDURE — 3077F SYST BP >= 140 MM HG: CPT | Mod: CPTII,S$GLB,, | Performed by: NURSE PRACTITIONER

## 2018-10-05 PROCEDURE — 87086 URINE CULTURE/COLONY COUNT: CPT

## 2018-10-05 PROCEDURE — 85610 PROTHROMBIN TIME: CPT | Mod: QW,S$GLB,, | Performed by: INTERNAL MEDICINE

## 2018-10-05 PROCEDURE — 81002 URINALYSIS NONAUTO W/O SCOPE: CPT | Mod: S$GLB,,, | Performed by: NURSE PRACTITIONER

## 2018-10-05 PROCEDURE — 99213 OFFICE O/P EST LOW 20 MIN: CPT | Mod: 25,S$GLB,, | Performed by: NURSE PRACTITIONER

## 2018-10-05 PROCEDURE — 51701 INSERT BLADDER CATHETER: CPT | Mod: S$GLB,,, | Performed by: NURSE PRACTITIONER

## 2018-10-05 PROCEDURE — 3008F BODY MASS INDEX DOCD: CPT | Mod: CPTII,S$GLB,, | Performed by: NURSE PRACTITIONER

## 2018-10-05 PROCEDURE — 99999 PR PBB SHADOW E&M-EST. PATIENT-LVL V: CPT | Mod: PBBFAC,,, | Performed by: NURSE PRACTITIONER

## 2018-10-05 PROCEDURE — 3078F DIAST BP <80 MM HG: CPT | Mod: CPTII,S$GLB,, | Performed by: NURSE PRACTITIONER

## 2018-10-05 RX ORDER — AMOXICILLIN AND CLAVULANATE POTASSIUM 500; 125 MG/1; MG/1
1 TABLET, FILM COATED ORAL 2 TIMES DAILY
Qty: 10 TABLET | Refills: 0 | Status: SHIPPED | OUTPATIENT
Start: 2018-10-05 | End: 2018-10-10

## 2018-10-05 NOTE — PROGRESS NOTES
INR elevated today. Patient reports new medication to be started today: Augmentin for UTI. No DDI. She denies any bleeding, bruising or other changes that may affect warfarin therapy. We advised a decreased dose today then resume. Follow up in 2 weeks. Care plan made with CIARA JACOBSON

## 2018-10-05 NOTE — PROGRESS NOTES
"CHIEF COMPLAINT:    Mrs. Almanzar is a 63 y.o. female presenting for right back pain and dysuria.   PRESENTING ILLNESS:    Elayne Almanzar is a 63 y.o. female with a PMH of kidney stones who presents for right back pain.   Last seen in the clinic with me on 11/21/17  She was last seen on 3/15/2013 with Dr. Galindo for uteroscope.    She has a hx of Afib and on coumadin.    She reports aching right back pain. It occurs 2-3x weekly. She reports the pain radiates from her back to abdomen. Nothing improves pain. She does not take pain medications. Lifting or walking too quickly increases pain. She rates the pain a 7/10. This is the same pain that she reported at the last visit 2 visits.     She had a CTRSS on 9/23/17 that showed a 9 mm left UVJ stone and hydro. She was scheduled for a stone intervention at that time. She cancelled it because she stated the pain resolved and had passed the stone. She had the stone but has thrown it away now. CTRSS in 11/2017 showed punctate left stones.    She reports today for dysuira throughout her entire stream that started last week. She also reports that her urine is "cloudy and frothy". Her symptoms are worsening. Denies hematuria. Denies F/V/N/V.    Her OAB symptoms have continued to improve with oxybutynin 15 mg XR. She reports frequency 5-6 x- on lasix daily, nocturia 0-1x, urgency, and stress incontinence wears pads 2-3 daily.  She reports dry eyes and dry mouth. She is able to tolerate symptoms.  Reports complete bladder emptying and good FOS. She takes flomax daily with no problems. She reports if she does not take flomax then she notices abdominal bloating and more difficulty urinating.     REVIEW OF SYSTEMS:    Review of Systems    Constitutional: Negative for fever and chills.   HENT: Negative for hearing loss.   Eyes: Negative for visual disturbance.   Respiratory: Negative for shortness of breath.   Cardiovascular: Negative for chest pain.   Gastrointestinal: Negative for " nausea, vomiting, and constipation.   Genitourinary:  See above  Neurological: Negative for dizziness.   Hematological: Does not bruise/bleed easily.   Psychiatric/Behavioral: Negative for confusion.     PATIENT HISTORY:    Past Medical History:   Diagnosis Date    Acute on chronic diastolic congestive heart failure     Anticoagulant long-term use     Asthma     Atrial fibrillation 2002    Cataract     Chronic kidney disease     COPD (chronic obstructive pulmonary disease)     Depression     Dysuria     Flank pain     HTN (hypertension)     Nephrolithiasis     KEYSHAWN (obstructive sleep apnea)     awaiting CPAP machine     Rheumatic disease of mitral valve     Urinary incontinence     Urinary tract infection        Past Surgical History:   Procedure Laterality Date     SECTION      COLONOSCOPY N/A 4/10/2014    Performed by [facility] (4TH FLR)    CYSTOSCOPY N/A 3/22/2013    Performed by Deandre Galindo Jr., [facility] at [facility] OR 1ST FLR    ESOPHAGOGASTRODUODENOSCOPY (EGD) N/A 2016    Performed by Jose M Kelly MD at [facility] ENDO (4TH FLR)    EXTRACTION - STONE Right 3/22/2013    Performed by [facility] Jr[facility] at [facility] OR 1ST FLR    kidney stone removal      MITRAL VALVE REPLACEMENT  2004    REMOVAL, STENT, URETER Right 3/22/2013    Per[facility] Jr[facility] at [facility] OR 1ST FLR    TUBAL LIGATION      URETEROSCOPY Right 3/22/2013    Performed by [facility]re [facility] Jr[facility] at [facility] OR 1ST FLR       Family History   Problem Relation Age of Onset    Stroke Paternal Grandmother     Colon cancer Maternal Grandmother     Cancer Brother         testicular cancer    Diabetes Sister     Hypertension Sister     Breast cancer Paternal Aunt     Diabetes Sister     Diabetes Sister     Heart disease Father 70        MI    Diabetes Father     Colon cancer Mother     Breast cancer Maternal Aunt     Ovarian cancer Neg Hx        Social History      Socioeconomic History    Marital status:      Spouse name: Not on file    Number of children: 2    Years of education: Not on file    Highest education level: Not on file   Social Needs    Financial resource strain: Not on file    Food insecurity - worry: Not on file    Food insecurity - inability: Not on file    Transportation needs - medical: Not on file    Transportation needs - non-medical: Not on file   Occupational History    Occupation: [employer]     Comment: Retired   Tobacco Use    Smoking status: Former Smoker     Packs/day: 0.50     Years: 20.00     Pack years: 10.00     Types: Cigarettes     Last attempt to quit: 2002     Years since quittin.4    Smokeless tobacco: Never Used   Substance and Sexual Activity    Alcohol use: No    Drug use: No    Sexual activity: Not on file   Other Topics Concern    Not on file   Social History Narrative    Disability since .  Laid off .  Previously worked as cook in a kitchen.         Allergies:  Hydromorphone (bulk)    Medications:    Current Outpatient Medications:     albuterol 90 mcg/actuation inhaler, Inhale 2 puffs into the lungs every 6 (six) hours as needed for Wheezing. Rescue, Disp: 1 Inhaler, Rfl: 6    aspirin (ECOTRIN) 81 MG EC tablet, Take 81 mg by mouth once daily. , Disp: , Rfl:     dorzolamide-timolol 2-0.5% (COSOPT) 22.3-6.8 mg/mL ophthalmic solution, Place 1 drop into both eyes 2 (two) times daily., Disp: 10 mL, Rfl: 1    ergocalciferol (ERGOCALCIFEROL) 50,000 unit Cap, Take 1 capsule (50,000 Units total) by mouth twice a week. (Patient taking differently: Take 50,000 Units by mouth once a week. on tuesday), Disp: 24 capsule, Rfl: 0    fluticasone (FLONASE) 50 mcg/actuation nasal spray, 2 sprays by Each Nare route once daily., Disp: 1 Bottle, Rfl: 6    fluticasone-salmeterol 500-50 mcg/dose (ADVAIR DISKUS) 500-50 mcg/dose DsDv diskus inhaler, Inhale 1 puff into the lungs 2 (two) times daily., Disp: 1 Inhaler,  "Rfl: 6    furosemide (LASIX) 20 MG tablet, Take 1 tablet (20 mg total) by mouth once daily., Disp: 30 tablet, Rfl: 4    gabapentin (NEURONTIN) 300 MG capsule, Take 1 capsule (300 mg total) by mouth every evening., Disp: 30 capsule, Rfl: 3    metoprolol succinate (TOPROL-XL) 200 MG 24 hr tablet, TAKE 1 TABLET (200 MG TOTAL) BY MOUTH ONCE DAILY., Disp: 30 tablet, Rfl: 11    metoprolol succinate (TOPROL-XL) 200 MG 24 hr tablet, Take 1 tablet (200 mg total) by mouth once daily., Disp: 30 tablet, Rfl: 4    needle, disp, 26 gauge 26 gauge x 1/2" Ndle, 1 application by Misc.(Non-Drug; Combo Route) route once a week., Disp: 10 each, Rfl: 0    oxybutynin (DITROPAN XL) 15 MG TR24, Take 1 tablet (15 mg total) by mouth once daily., Disp: 30 tablet, Rfl: 11    oxybutynin (DITROPAN-XL) 10 MG 24 hr tablet, TAKE 1 TABLET  10 MG TOTAL  BY MOUTH ONCE DAILY, Disp: , Rfl: 11    pravastatin (PRAVACHOL) 40 MG tablet, Take 1 tablet (40 mg total) by mouth once daily., Disp: 30 tablet, Rfl: 3    sertraline (ZOLOFT) 100 MG tablet, Take 1 tablet (100 mg total) by mouth once daily., Disp: 30 tablet, Rfl: 4    tamsulosin (FLOMAX) 0.4 mg Cp24, TAKE 1 CAPSULE (0.4 MG TOTAL) BY MOUTH ONCE DAILY., Disp: 30 capsule, Rfl: 11    VENTOLIN HFA 90 mcg/actuation inhaler, INHALE 1-2 PUFFS INTO THE LUNGS EVERY 6 (SIX) HOURS AS NEEDED FOR WHEEZING OR SHORTNESS OF BREATH. RESCUE, Disp: 18 g, Rfl: 6    warfarin (COUMADIN) 5 MG tablet, Take 1-1.5 pills by mouth daily or as directed by Coumadin Clinic.; #130 pills = 3 month supply, Disp: 130 tablet, Rfl: 3    amoxicillin-clavulanate 500-125mg (AUGMENTIN) 500-125 mg Tab, Take 1 tablet (500 mg total) by mouth 2 (two) times daily. for 5 days, Disp: 10 tablet, Rfl: 0    brimonidine 0.2% (ALPHAGAN) 0.2 % Drop, Place 1 drop into the right eye 3 (three) times daily. (Patient taking differently: Place 1 drop into the right eye 2 (two) times daily. ), Disp: 15 mL, Rfl: 3    Current Facility-Administered " Medications:     sodium hyaluronate (EUFLEXXA) 10 mg/mL(mw 2.4 -3.6 million) Syrg 40 mg, 40 mg, Intra-articular, Weekly, Juan Miguel Arroyo MD, 20 mg at 07/13/18 1210    PHYSICAL EXAMINATION:    Constitutional: She is oriented to person, place, and time. She appears well-developed and well-nourished.  She is in no apparent distress.    Neck: No tracheal deviation present.     Cardiovascular: Normal rate.      Pulmonary/Chest: Effort normal. No respiratory distress.     Abdominal:  She exhibits no distension.      Lymphadenopathy:          Right: No supraclavicular adenopathy present.        Left: No supraclavicular adenopathy present.     Neurological: She is alert and oriented to person, place, and time.     Skin: Skin is warm and dry.     Extremities: No pitting edema noted in lower extremities bilaterally    Psych: Cooperative with normal affect.    Normal external female genitalia  Urethral meatus is normal  Atrophic vaginitis  PVR by catheterization was 5 cc    Physical Exam      LABS:    U/a today showed +leuk, nit, and blood. Spec grav 1.025 and ph 5.       IMPRESSION:    Encounter Diagnoses   Name Primary?    Dysuria Yes    Bacteria in urine     Nephrolithiasis     Atrophic vaginitis        PLAN:  -Discussed plan of care with pt,  -Dicussed her Ct abd pelvis  -Continue oxybutynin to 15 mg XR daily and flomax  -UC sent from cath urine. Will notify with results. Augmentin sent empirically.    -Continue to monitor kidney stones  -RTC based on results    I encouraged her or any of her family members to call or email me with questions and/or concerns.      ALVAREZ Echevarria

## 2018-10-05 NOTE — LETTER
October 5, 2018      Nubia Crocker MD  [address]  [place] [address]           [facility] - Urology 4th Floor  [address]  [facility] [address]  Phone: [phone]          Patient: Elayne Almanzar   MR Number: [id number]   YOB: 1955   Date of Visit: 10/5/2018       Dear Dr. Nubia Crocker:    Thank you for referring Elayne Almanzar to me for evaluation. Attached you will find relevant portions of my assessment and plan of care.    If you have questions, please do not hesitate to call me. I look forward to following Elayne Almanzar along with you.    Sincerely,    Diane Wilkinson, CHARLA    Enclosure  CC:  No Recipients    If you would like to receive this communication electronically, please contact [email or web address] or [phone] to request more information on [employer] access.    For providers and/or their staff who would like to refer a patient to [facility], please contact us through our one-stop-shop provider referral line, [facility], at [phone].    If you feel you have received this communication in error or would no longer like to receive these types of communications, please e-mail [email or web address]

## 2018-10-06 LAB — BACTERIA UR CULT: NO GROWTH

## 2018-10-12 DIAGNOSIS — N39.46 MIXED URGE AND STRESS INCONTINENCE: ICD-10-CM

## 2018-10-12 RX ORDER — OXYBUTYNIN CHLORIDE 10 MG/1
10 TABLET, EXTENDED RELEASE ORAL DAILY
Qty: 30 TABLET | Refills: 11 | OUTPATIENT
Start: 2018-10-12

## 2018-10-16 DIAGNOSIS — N39.46 MIXED URGE AND STRESS INCONTINENCE: ICD-10-CM

## 2018-10-16 RX ORDER — OXYBUTYNIN CHLORIDE 15 MG/1
15 TABLET, EXTENDED RELEASE ORAL DAILY
Qty: 30 TABLET | Refills: 11 | Status: SHIPPED | OUTPATIENT
Start: 2018-10-16 | End: 2019-10-16

## 2018-10-18 ENCOUNTER — LAB VISIT (OUTPATIENT)
Dept: LAB | Facility: HOSPITAL | Age: 63
End: 2018-10-18
Attending: INTERNAL MEDICINE
Payer: COMMERCIAL

## 2018-10-18 ENCOUNTER — ANTI-COAG VISIT (OUTPATIENT)
Dept: CARDIOLOGY | Facility: CLINIC | Age: 63
End: 2018-10-18

## 2018-10-18 DIAGNOSIS — Z79.01 LONG TERM (CURRENT) USE OF ANTICOAGULANTS: ICD-10-CM

## 2018-10-18 DIAGNOSIS — Z95.2 STATUS POST HEART VALVE REPLACEMENT WITH MECHANICAL VALVE: ICD-10-CM

## 2018-10-18 DIAGNOSIS — E78.5 HYPERLIPIDEMIA, UNSPECIFIED HYPERLIPIDEMIA TYPE: ICD-10-CM

## 2018-10-18 DIAGNOSIS — I48.20 CHRONIC ATRIAL FIBRILLATION WITH RAPID VENTRICULAR RESPONSE: ICD-10-CM

## 2018-10-18 LAB
ALBUMIN SERPL BCP-MCNC: 3.5 G/DL
ALP SERPL-CCNC: 69 U/L
ALT SERPL W/O P-5'-P-CCNC: 12 U/L
ANION GAP SERPL CALC-SCNC: 5 MMOL/L
AST SERPL-CCNC: 24 U/L
BILIRUB SERPL-MCNC: 0.6 MG/DL
BUN SERPL-MCNC: 17 MG/DL
CALCIUM SERPL-MCNC: 9.3 MG/DL
CHLORIDE SERPL-SCNC: 107 MMOL/L
CO2 SERPL-SCNC: 29 MMOL/L
CREAT SERPL-MCNC: 1 MG/DL
EST. GFR  (AFRICAN AMERICAN): >60 ML/MIN/1.73 M^2
EST. GFR  (NON AFRICAN AMERICAN): >60 ML/MIN/1.73 M^2
GLUCOSE SERPL-MCNC: 104 MG/DL
INR PPP: 2.3
POTASSIUM SERPL-SCNC: 4.3 MMOL/L
PROT SERPL-MCNC: 7 G/DL
PROTHROMBIN TIME: 22.9 SEC
SODIUM SERPL-SCNC: 141 MMOL/L

## 2018-10-18 PROCEDURE — 80053 COMPREHEN METABOLIC PANEL: CPT

## 2018-10-18 PROCEDURE — 85610 PROTHROMBIN TIME: CPT

## 2018-10-18 PROCEDURE — 36415 COLL VENOUS BLD VENIPUNCTURE: CPT

## 2018-10-18 NOTE — PROGRESS NOTES
INR slightly low today but patient does report some increased vit K food intake. Will boost and f/u in 2 weeks.

## 2018-10-19 ENCOUNTER — DOCUMENTATION ONLY (OUTPATIENT)
Dept: BARIATRICS | Facility: CLINIC | Age: 63
End: 2018-10-19

## 2018-11-02 ENCOUNTER — ANTI-COAG VISIT (OUTPATIENT)
Dept: CARDIOLOGY | Facility: CLINIC | Age: 63
End: 2018-11-02
Payer: COMMERCIAL

## 2018-11-02 DIAGNOSIS — Z95.2 STATUS POST HEART VALVE REPLACEMENT WITH MECHANICAL VALVE: ICD-10-CM

## 2018-11-02 DIAGNOSIS — N13.30 HYDRONEPHROSIS, UNSPECIFIED HYDRONEPHROSIS TYPE: ICD-10-CM

## 2018-11-02 DIAGNOSIS — R10.9 BILATERAL FLANK PAIN: ICD-10-CM

## 2018-11-02 DIAGNOSIS — I48.20 CHRONIC ATRIAL FIBRILLATION WITH RAPID VENTRICULAR RESPONSE: ICD-10-CM

## 2018-11-02 DIAGNOSIS — Z79.01 LONG TERM (CURRENT) USE OF ANTICOAGULANTS: Primary | ICD-10-CM

## 2018-11-02 LAB — INR PPP: 4.1 (ref 2–3)

## 2018-11-02 PROCEDURE — 99211 OFF/OP EST MAY X REQ PHY/QHP: CPT | Mod: 25,S$GLB,, | Performed by: INTERNAL MEDICINE

## 2018-11-02 PROCEDURE — 85610 PROTHROMBIN TIME: CPT | Mod: QW,S$GLB,, | Performed by: INTERNAL MEDICINE

## 2018-11-02 RX ORDER — TAMSULOSIN HYDROCHLORIDE 0.4 MG/1
0.4 CAPSULE ORAL DAILY
Qty: 30 CAPSULE | Refills: 11 | OUTPATIENT
Start: 2018-11-02

## 2018-11-02 NOTE — PROGRESS NOTES
Quick follow up from low INR on 10/18. INR high today. Patient reports a increase in stress. Reports appetite has not been very good. No bleeding or bruising. Will decrease today and resume with weekly dose until follow up in 2 weeks. Advised patient to call with any changes or concerns. Care Plan made with Viviana Bobo D.

## 2018-11-09 DIAGNOSIS — R10.9 BILATERAL FLANK PAIN: ICD-10-CM

## 2018-11-09 DIAGNOSIS — N13.30 HYDRONEPHROSIS, UNSPECIFIED HYDRONEPHROSIS TYPE: ICD-10-CM

## 2018-11-09 RX ORDER — TAMSULOSIN HYDROCHLORIDE 0.4 MG/1
0.4 CAPSULE ORAL DAILY
Qty: 30 CAPSULE | Refills: 11 | Status: SHIPPED | OUTPATIENT
Start: 2018-11-09 | End: 2020-01-03

## 2018-11-16 ENCOUNTER — ANTI-COAG VISIT (OUTPATIENT)
Dept: CARDIOLOGY | Facility: CLINIC | Age: 63
End: 2018-11-16
Payer: COMMERCIAL

## 2018-11-16 DIAGNOSIS — I48.20 CHRONIC ATRIAL FIBRILLATION WITH RAPID VENTRICULAR RESPONSE: ICD-10-CM

## 2018-11-16 DIAGNOSIS — Z95.2 STATUS POST HEART VALVE REPLACEMENT WITH MECHANICAL VALVE: ICD-10-CM

## 2018-11-16 DIAGNOSIS — Z79.01 LONG TERM (CURRENT) USE OF ANTICOAGULANTS: Primary | ICD-10-CM

## 2018-11-16 LAB — INR PPP: 2.5 (ref 2–3)

## 2018-11-16 PROCEDURE — 85610 PROTHROMBIN TIME: CPT | Mod: QW,S$GLB,, | Performed by: INTERNAL MEDICINE

## 2018-11-16 PROCEDURE — 99211 OFF/OP EST MAY X REQ PHY/QHP: CPT | Mod: 25,S$GLB,, | Performed by: INTERNAL MEDICINE

## 2018-11-16 NOTE — PROGRESS NOTES
Quick follow up from high INR on 11/2. INR in therapeutic range. Patient reports no bleeding, bruising or other changes. Will maintain weekly dose until follow up in 2 weeks. Advised patient to call with any changes or concerns.

## 2018-11-30 ENCOUNTER — ANTI-COAG VISIT (OUTPATIENT)
Dept: CARDIOLOGY | Facility: CLINIC | Age: 63
End: 2018-11-30
Payer: COMMERCIAL

## 2018-11-30 DIAGNOSIS — Z79.01 LONG TERM (CURRENT) USE OF ANTICOAGULANTS: Primary | ICD-10-CM

## 2018-11-30 DIAGNOSIS — Z95.2 STATUS POST HEART VALVE REPLACEMENT WITH MECHANICAL VALVE: ICD-10-CM

## 2018-11-30 DIAGNOSIS — I48.20 CHRONIC ATRIAL FIBRILLATION WITH RAPID VENTRICULAR RESPONSE: ICD-10-CM

## 2018-11-30 LAB — INR PPP: 3.3 (ref 2–3)

## 2018-11-30 PROCEDURE — 85610 PROTHROMBIN TIME: CPT | Mod: QW,S$GLB,, | Performed by: INTERNAL MEDICINE

## 2018-11-30 PROCEDURE — 99211 OFF/OP EST MAY X REQ PHY/QHP: CPT | Mod: 25,S$GLB,, | Performed by: PHARMACIST

## 2018-11-30 NOTE — PROGRESS NOTES
INR within therapeutic range today. Bruising noted to arms from use. Patient denies any bleeding or other changes that would affect warfarin therapy. Will maintain current dose and follow up in 3 weeks. Patient advised to call coumadin clinic with any changes or concerns. Care plan made with CIARA OHARA

## 2018-12-03 DIAGNOSIS — H40.009 OPEN ANGLE WITH BORDERLINE INTRAOCULAR PRESSURE, UNSPECIFIED LATERALITY: Primary | ICD-10-CM

## 2018-12-04 ENCOUNTER — TELEPHONE (OUTPATIENT)
Dept: OPTOMETRY | Facility: CLINIC | Age: 63
End: 2018-12-04

## 2018-12-04 NOTE — TELEPHONE ENCOUNTER
[facility] BENEFIT DETAILS AS OF 12/5/18:    Member: KWASI JONES  YOB: 1955  Subscriber ID: [id number]  Product Name:   Plan Code: EF  Please Note: Member must be eligible at date of service to receive benefit.  In Network Coverage Frequency  Category Benefit Eligibility Frequency    Exam Available 1 every 12 month(s)  Selection Contact Lens Fit Available 1 every 12 month(s)  Non-Selection Contact Lens Fit Available 1 every 12 month(s)  Frame Available 1 every 12 month(s)  Lenses Available 1 every 12 month(s)  Selection Contact Lenses - Daily Wearï Available Every 12 month(s)  Selection Contact Lenses - Monthly Wearï Available Every 12 month(s)  Non Selection Contact Lensesï Available Every 12 month(s)    Dilated Fundus Exam: Recommended  ï Contact Lenses are in Lieu of Eyeglasses  In Network Coverage   [facility] Patient Responsibility  (includes applicable copay)  Professional Services   Exam $10.00  Non-Selection Contact Lens Fit 100% of Billed Charges  Selection Contact Lens Fit Covered-in-Full  Frames  Frame 70.00% of Balance over $130.00 Benefit Allowance  Pediatric Replacement Frames Ages 0-12 70.00% of Balance over $130.00 Benefit Allowance for  Ages 0-12  Lenses  Lenses / Blended Bifocals 80% of Billed Charges  Lenses / Free-form SV Lenses 80% of Billed Charges  Lenses / MF Aspheric Lenses 80% of Billed Charges  Lenses / Occupational Double Seg Lenses 80% of Billed Charges  Lenses / Progressive Lenses: Tier 1 (Standard) $70.00  Lenses / Progressive Lenses: Tier 2 (Deluxe) $110.00  Lenses / Progressive Lenses: Tier 3 (Premium) $150.00  Lenses / Progressive Lenses: Tier 4 (Platinum) $250.00  Lenses / Progressive Lenses: Tier 5 (Non-formulary) 80% of Billed Charges  Lenses / Standard Lenses Covered-in-Full  Lenses / SV Aspheric Lenses 80% of Billed Charges  Rendered: 12/4/2018 10:12 AM Page 1 of 3  Pediatric Replacement All Lenses Ages 0-12 / Blended Bifocals 80% of Billed  Charges for Ages 0-12  Pediatric Replacement All Lenses Ages 0-12 / Free-form SV  Lenses  80% of Billed Charges for Ages 0-12  Pediatric Replacement All Lenses Ages 0-12 / MF Aspheric  Lenses  80% of Billed Charges for Ages 0-12  Pediatric Replacement All Lenses Ages 0-12 / Occupational  Double Seg Lenses  80% of Billed Charges for Ages 0-12  Pediatric Replacement All Lenses Ages 0-12 / Progressive  Lenses: Tier 1 (Standard)  $70.00 for Ages 0-12  Pediatric Replacement All Lenses Ages 0-12 / Progressive  Lenses: Tier 2 (Deluxe)  $110.00 for Ages 0-12  Pediatric Replacement All Lenses Ages 0-12 / Progressive  Lenses: Tier 3 (Premium)  $150.00 for Ages 0-12  Pediatric Replacement All Lenses Ages 0-12 / Progressive  Lenses: Tier 4 (Platinum)  $250.00 for Ages 0-12  Pediatric Replacement All Lenses Ages 0-12 / Progressive  Lenses: Tier 5 (Non-formulary)  80% of Billed Charges for Ages 0-12  Pediatric Replacement All Lenses Ages 0-12 / Standard Lenses Covered-in-Full for Ages 0-12  Pediatric Replacement All Lenses Ages 0-12 / SV Aspheric  Lenses  80% of Billed Charges for Ages 0-12  Lens Materials  (Pricing shown is in addition to Patient Responsibility from Lens section above)  High Index 1.66 - 1.73 $63.00  High Index less than or equal to 1.66 $53.00  High Index, >= 1.74 80% of Billed Charges  Polycarbonate Lenses Covered-in-Full for Ages 0-18  Polycarbonate Lenses $33.00 for Ages 19+  Lens Options  Edge Coating 80% of Billed Charges  Miscellaneous Lens Options 80% of Billed Charges  Non-Formulary Anti-Reflective Coating 80% of Billed Charges  One Year Scratch Warranty $10.00  Oversize Lenses 80% of Billed Charges  Photochromic $67.00  Platinum Anti-Reflective Coating $90.00  Polarized 80% of Billed Charges  Polished Edges / Roll & Polish $13.00  Premium Anti-Reflective Coating $80.00  Scratch Coating Covered-in-Full  Standard Anti-Reflective Coating $40.00  Tint $14.00  UV Coating $16.00  All other lens options - 20%  off Provider's retail price  Rendered: 12/4/2018 10:12 AM Page 2 of 3  Contact Lenses  Necessary Contact Lensesï Covered-in-Full  Non Selection Contact Lensesï 100.00% of Balance over $105.00 Benefit Allowance  Selection Contact Lenses - Daily Wearï Covered-in-Full for up to 4 Boxes  Selection Contact Lenses - Monthly Wearï Covered-in-Full for up to 2 Boxes  Please use the Selection Contact Lenses for Vision Plans Formulary. Contact lenses (including disposable lenses), the  fitting/evaluation fees, and up to two follow-up visits are covered-in-full up to the maximum allowed in a benefit year

## 2018-12-05 ENCOUNTER — OFFICE VISIT (OUTPATIENT)
Dept: OPTOMETRY | Facility: CLINIC | Age: 63
End: 2018-12-05
Payer: COMMERCIAL

## 2018-12-05 ENCOUNTER — CLINICAL SUPPORT (OUTPATIENT)
Dept: OPHTHALMOLOGY | Facility: CLINIC | Age: 63
End: 2018-12-05
Payer: COMMERCIAL

## 2018-12-05 DIAGNOSIS — H52.4 PRESBYOPIA: Primary | ICD-10-CM

## 2018-12-05 DIAGNOSIS — H40.009 OPEN ANGLE WITH BORDERLINE INTRAOCULAR PRESSURE, UNSPECIFIED LATERALITY: ICD-10-CM

## 2018-12-05 DIAGNOSIS — H10.13 CONJUNCTIVITIS, ALLERGIC, BILATERAL: ICD-10-CM

## 2018-12-05 DIAGNOSIS — H04.123 DRY EYE SYNDROME, BILATERAL: ICD-10-CM

## 2018-12-05 DIAGNOSIS — H35.62 RETINAL HEMORRHAGE OF LEFT EYE: ICD-10-CM

## 2018-12-05 DIAGNOSIS — H40.1111 PRIMARY OPEN ANGLE GLAUCOMA OF RIGHT EYE, MILD STAGE: ICD-10-CM

## 2018-12-05 DIAGNOSIS — I10 ESSENTIAL HYPERTENSION: ICD-10-CM

## 2018-12-05 DIAGNOSIS — H25.13 NS (NUCLEAR SCLEROSIS), BILATERAL: ICD-10-CM

## 2018-12-05 PROCEDURE — 92250 FUNDUS PHOTOGRAPHY W/I&R: CPT | Mod: S$GLB,,, | Performed by: OPTOMETRIST

## 2018-12-05 PROCEDURE — 92133 CPTRZD OPH DX IMG PST SGM ON: CPT | Mod: S$GLB,,, | Performed by: OPTOMETRIST

## 2018-12-05 PROCEDURE — 92015 DETERMINE REFRACTIVE STATE: CPT | Mod: S$GLB,,, | Performed by: OPTOMETRIST

## 2018-12-05 PROCEDURE — 99999 PR PBB SHADOW E&M-EST. PATIENT-LVL II: CPT | Mod: PBBFAC,,, | Performed by: OPTOMETRIST

## 2018-12-05 PROCEDURE — 92014 COMPRE OPH EXAM EST PT 1/>: CPT | Mod: S$GLB,,, | Performed by: OPTOMETRIST

## 2018-12-05 PROCEDURE — 92083 EXTENDED VISUAL FIELD XM: CPT | Mod: S$GLB,,, | Performed by: OPTOMETRIST

## 2018-12-05 RX ORDER — OLOPATADINE HYDROCHLORIDE 2 MG/ML
1 SOLUTION/ DROPS OPHTHALMIC EVERY MORNING
Qty: 2.5 ML | Refills: 12 | Status: ON HOLD | OUTPATIENT
Start: 2018-12-05 | End: 2020-07-10 | Stop reason: HOSPADM

## 2018-12-05 RX ORDER — LATANOPROST 50 UG/ML
1 SOLUTION/ DROPS OPHTHALMIC DAILY
Qty: 2.5 ML | Refills: 12 | Status: SHIPPED | OUTPATIENT
Start: 2018-12-05 | End: 2020-01-08 | Stop reason: SDUPTHER

## 2018-12-19 NOTE — PROGRESS NOTES
"HPI     63yr old female present for 6 months check due to POAG OU. Patient notice   blurry vision OU with and without wearing glasses x 6 months. Patient   seeing "sparkles" in her vision when headaches occur. Patient seeing   floaters on occasion, no flashes of light. Patient says she discontinue   using Dorzolamide-Timolol BID "sometime this year", due to drops burning   eyes like "fire".     Last edited by West Brown MA on 12/5/2018  9:53 AM. (History)            Assessment /Plan     For exam results, see Encounter Report.  Presbyopia              Rx specs       Retinal hemorrhage of left eye  Essential hypertension   Color Fundus Photography - OU - Both Eyes   RTC 1 moth DFE    Dry eye syndrome, bilateral   Art tears PRN    NS (nuclear sclerosis), bilateral    Conjunctivitis, allergic, bilateral    olopatadine (PATADAY) 0.2 % Drop; Place 1 drop into both eyes every morning.  Dispense: 2.5 mL; Refill: 12      Primary open angle glaucoma of right eye, mild/ moderate stage OD>OS   Asymmetric cupping OD>OS  Borderline IOP    - Posterior Segment OCT Optic Nerve- OD thin temporally, OD mild thinning temp  HVF 24-2: OD borderline/ scattered defects, progression today inferior arcutate                 OS: WNL/ scattered defects     gonio:open 360 OU  pachy: 551/555  IOP: 19 today       pt previously on latanoprost OD ONLY September 2016, Changed drops to bimatoprost (LUMIGAN) 0.01 % Drop; October 2016    Pt reports she was not using alphagan because it would sting   2017 Switch to cosopt BID OU                   Pt stopped because it was burning    Restart today:    latanoprost (XALATAN) 0.005 % ophthalmic solution; Place 1 drop into the right eye once daily.  Dispense: 2.5 mL; Refill: 12    RTC 1 month for IOP check     Repeat OCT and HVF 6 months                Drops : pataday QAM OU, latanaprost OD only QHS    "

## 2018-12-21 ENCOUNTER — ANTI-COAG VISIT (OUTPATIENT)
Dept: CARDIOLOGY | Facility: CLINIC | Age: 63
End: 2018-12-21
Payer: COMMERCIAL

## 2018-12-21 DIAGNOSIS — Z95.2 STATUS POST HEART VALVE REPLACEMENT WITH MECHANICAL VALVE: ICD-10-CM

## 2018-12-21 DIAGNOSIS — I48.20 CHRONIC ATRIAL FIBRILLATION WITH RAPID VENTRICULAR RESPONSE: ICD-10-CM

## 2018-12-21 DIAGNOSIS — Z79.01 LONG TERM (CURRENT) USE OF ANTICOAGULANTS: Primary | ICD-10-CM

## 2018-12-21 LAB — INR PPP: 2.5 (ref 2–3)

## 2018-12-21 PROCEDURE — 99211 OFF/OP EST MAY X REQ PHY/QHP: CPT | Mod: 25,S$GLB,,

## 2018-12-21 PROCEDURE — 85610 PROTHROMBIN TIME: CPT | Mod: QW,S$GLB,, | Performed by: INTERNAL MEDICINE

## 2018-12-21 NOTE — PROGRESS NOTES
INR within therapeutic range today. Bruising noted to arms from use. Patient denies any bleeding or other changes that would affect warfarin therapy. Will maintain current dose and follow up in 4 weeks. Patient advised to call coumadin clinic with any changes or concerns. Care plan made with CIARA OHARA.

## 2018-12-28 ENCOUNTER — TELEPHONE (OUTPATIENT)
Dept: OPTOMETRY | Facility: CLINIC | Age: 63
End: 2018-12-28

## 2018-12-28 NOTE — TELEPHONE ENCOUNTER
[facility] BENEFITS as of 1/2/19:    Member: KWASI JONES  YOB: 1955  Subscriber ID: [id number]  Product Name:   Plan Code: EF  Please Note: Member must be eligible at date of service to receive benefit.  In Network Coverage Frequency  Category Benefit Eligibility Frequency     Exam Available on 12/05/2019 1 every 12 month(s)    Selection Contact Lens Fit Available 1 every 12 month(s)  Non-Selection Contact Lens Fit Available 1 every 12 month(s)  Frame Available 1 every 12 month(s)  Lenses Available 1 every 12 month(s)  Selection Contact Lenses - Daily Wearï Available Every 12 month(s)  Selection Contact Lenses - Monthly Wearï Available Every 12 month(s)  Non-Selection Contact Lensesï Available Every 12 month(s)   Dilated Fundus Exam: Recommended  ï Contact Lenses are in Lieu of Eyeglasses  In Network Coverage  [facility] Patient Responsibility  (includes applicable copay)  Professional Services  Exam $10.00  Non-Selection Contact Lens Fit 100% of Billed Charges  Selection Contact Lens Fit Covered-in-Full  Frames  Frame 70.00% of Balance over $130.00 Benefit Allowance  Pediatric Replacement Frames Ages 0-12 70.00% of Balance over $130.00 Benefit Allowance for  Ages 0-12  Lenses  Lenses / Blended Bifocals 80% of Billed Charges  Lenses / Free-form SV Lenses 80% of Billed Charges  Lenses / MF Aspheric Lenses 80% of Billed Charges  Lenses / Occupational Double Seg Lenses 80% of Billed Charges  Lenses / Progressive Lenses: Tier 1 (Standard) $70.00  Lenses / Progressive Lenses: Tier 2 (Deluxe) $110.00  Lenses / Progressive Lenses: Tier 3 (Premium) $150.00  Lenses / Progressive Lenses: Tier 4 (Platinum) $250.00  Lenses / Progressive Lenses: Tier 5 (Non-formulary) 80% of Billed Charges  Lenses / Standard Lenses Covered-in-Full  Lenses / SV Aspheric Lenses 80% of Billed Charges  Rendered: 12/28/2018 10:18 AM Page 1 of 3  Pediatric Replacement All Lenses Ages 0-12 / Blended Bifocals 80% of  Billed Charges for Ages 0-12  Pediatric Replacement All Lenses Ages 0-12 / Free-form SV  Lenses  80% of Billed Charges for Ages 0-12  Pediatric Replacement All Lenses Ages 0-12 / MF Aspheric  Lenses  80% of Billed Charges for Ages 0-12  Pediatric Replacement All Lenses Ages 0-12 / Occupational  Double Seg Lenses  80% of Billed Charges for Ages 0-12  Pediatric Replacement All Lenses Ages 0-12 / Progressive  Lenses: Tier 1 (Standard)  $70.00 for Ages 0-12  Pediatric Replacement All Lenses Ages 0-12 / Progressive  Lenses: Tier 2 (Deluxe)  $110.00 for Ages 0-12  Pediatric Replacement All Lenses Ages 0-12 / Progressive  Lenses: Tier 3 (Premium)  $150.00 for Ages 0-12  Pediatric Replacement All Lenses Ages 0-12 / Progressive  Lenses: Tier 4 (Platinum)  $250.00 for Ages 0-12  Pediatric Replacement All Lenses Ages 0-12 / Progressive  Lenses: Tier 5 (Non-formulary)  80% of Billed Charges for Ages 0-12  Pediatric Replacement All Lenses Ages 0-12 / Standard Lenses Covered-in-Full for Ages 0-12  Pediatric Replacement All Lenses Ages 0-12 / SV Aspheric  Lenses  80% of Billed Charges for Ages 0-12  Lens Materials  (Pricing shown is in addition to Patient Responsibility from Lens section above)  High Index 1.66 - 1.73 $63.00  High Index less than or equal to 1.66 $53.00  High Index, >= 1.74 80% of Billed Charges  Polycarbonate Lenses Covered-in-Full for Ages 0-18  Polycarbonate Lenses $33.00 for Ages 19+  Lens Options  Edge Coating 80% of Billed Charges  Miscellaneous Lens Options 80% of Billed Charges  Non-Formulary Anti-Reflective Coating 80% of Billed Charges  One Year Scratch Warranty $10.00  Oversize Lenses 80% of Billed Charges  Photochromic $67.00  Platinum Anti-Reflective Coating $90.00  Polarized 80% of Billed Charges  Polished Edges / Roll & Polish $13.00  Premium Anti-Reflective Coating $80.00  Scratch Coating Covered-in-Full  Standard Anti-Reflective Coating $40.00  Tint $14.00  UV Coating $16.00  All other lens  options - 20% off Provider's retail price  Rendered: 12/28/2018 10:18 AM Page 2 of 3  Contact Lenses  Necessary Contact Lensesï Covered-in-Full  Non-Selection Contact Lensesï 100.00% of Balance over $105.00 Benefit Allowance  Selection Contact Lenses - Daily Wearï Covered-in-Full for up to 4 Boxes  Selection Contact Lenses - Monthly Wearï Covered-in-Full for up to 2 Boxes  Please use the Selection Contact Lenses for Vision Plans Formulary. Contact lenses (including disposable lenses), the  fitting/evaluation fees, and up to two follow-up visits are covered-in-full up to the maximum allowed in a benefit year.

## 2019-01-02 ENCOUNTER — OFFICE VISIT (OUTPATIENT)
Dept: OPTOMETRY | Facility: CLINIC | Age: 64
End: 2019-01-02
Payer: COMMERCIAL

## 2019-01-02 DIAGNOSIS — H40.1111 PRIMARY OPEN ANGLE GLAUCOMA OF RIGHT EYE, MILD STAGE: Primary | ICD-10-CM

## 2019-01-02 DIAGNOSIS — H35.62 RETINAL HEMORRHAGE OF LEFT EYE: ICD-10-CM

## 2019-01-02 PROCEDURE — 99999 PR PBB SHADOW E&M-EST. PATIENT-LVL II: CPT | Mod: PBBFAC,,, | Performed by: OPTOMETRIST

## 2019-01-02 PROCEDURE — 92014 PR EYE EXAM, EST PATIENT,COMPREHESV: ICD-10-PCS | Mod: S$GLB,,, | Performed by: OPTOMETRIST

## 2019-01-02 PROCEDURE — 99999 PR PBB SHADOW E&M-EST. PATIENT-LVL II: ICD-10-PCS | Mod: PBBFAC,,, | Performed by: OPTOMETRIST

## 2019-01-02 PROCEDURE — 92014 COMPRE OPH EXAM EST PT 1/>: CPT | Mod: S$GLB,,, | Performed by: OPTOMETRIST

## 2019-01-03 NOTE — PROGRESS NOTES
HPI     1 mo IOP check   DLS- 12/05/2018 Dr. Funes     Pt sts no changes or problems since last visit.     Latanoprost QHS OD only (sometimes burns eye)       Last edited by Lauren So on 1/2/2019 10:06 AM. (History)            Assessment /Plan     For exam results, see Encounter Report.    Primary open angle glaucoma of right eye, mild stage   IOP OD lower from 19 last visit to 15 today   Continue with Latanoprost OD Only QHS  Allergic conjunctivitis   Patanol too expensive   Switch to [place] QAM OU    Retinal hemorrhage of left eye   Resolved, no hemes today    RTC 6 mo for IOP and repeat HVF 24-2 and OCT optic nerve   Consider adding drop OS at next visit

## 2019-01-23 RX ORDER — SERTRALINE HYDROCHLORIDE 100 MG/1
100 TABLET, FILM COATED ORAL DAILY
Qty: 30 TABLET | Refills: 4 | Status: SHIPPED | OUTPATIENT
Start: 2019-01-23 | End: 2019-05-20 | Stop reason: SDUPTHER

## 2019-02-08 ENCOUNTER — ANTI-COAG VISIT (OUTPATIENT)
Dept: CARDIOLOGY | Facility: CLINIC | Age: 64
End: 2019-02-08
Payer: COMMERCIAL

## 2019-02-08 DIAGNOSIS — Z79.01 LONG TERM (CURRENT) USE OF ANTICOAGULANTS: Primary | ICD-10-CM

## 2019-02-08 DIAGNOSIS — Z95.2 STATUS POST HEART VALVE REPLACEMENT WITH MECHANICAL VALVE: ICD-10-CM

## 2019-02-08 DIAGNOSIS — I48.20 ATRIAL FIBRILLATION, CHRONIC: ICD-10-CM

## 2019-02-08 DIAGNOSIS — I48.20 CHRONIC ATRIAL FIBRILLATION WITH RAPID VENTRICULAR RESPONSE: ICD-10-CM

## 2019-02-08 LAB — INR PPP: 3.1 (ref 2–3)

## 2019-02-08 PROCEDURE — 85610 POCT INR: ICD-10-PCS | Mod: QW,S$GLB,, | Performed by: INTERNAL MEDICINE

## 2019-02-08 PROCEDURE — 85610 PROTHROMBIN TIME: CPT | Mod: QW,S$GLB,, | Performed by: INTERNAL MEDICINE

## 2019-02-08 PROCEDURE — 99211 OFF/OP EST MAY X REQ PHY/QHP: CPT | Mod: 25,S$GLB,,

## 2019-02-08 PROCEDURE — 99211 PR OFFICE/OUTPT VISIT, EST, LEVL I: ICD-10-PCS | Mod: 25,S$GLB,,

## 2019-02-08 NOTE — PROGRESS NOTES
INR within range, denies change to health, medications, or diet; will maintain and follow up in 4 weeks, Bruising from use but denies bleeding or other issues.

## 2019-02-26 ENCOUNTER — HOSPITAL ENCOUNTER (EMERGENCY)
Facility: OTHER | Age: 64
Discharge: HOME | End: 2019-02-26
Attending: EMERGENCY MEDICINE
Payer: COMMERCIAL

## 2019-02-26 VITALS
OXYGEN SATURATION: 96 % | TEMPERATURE: 99 F | HEART RATE: 78 BPM | DIASTOLIC BLOOD PRESSURE: 61 MMHG | SYSTOLIC BLOOD PRESSURE: 120 MMHG | WEIGHT: 235 LBS | RESPIRATION RATE: 18 BRPM | BODY MASS INDEX: 43.24 KG/M2 | HEIGHT: 62 IN

## 2019-02-26 DIAGNOSIS — I50.33 ACUTE ON CHRONIC DIASTOLIC CONGESTIVE HEART FAILURE: ICD-10-CM

## 2019-02-26 DIAGNOSIS — R06.02 SOB (SHORTNESS OF BREATH): ICD-10-CM

## 2019-02-26 DIAGNOSIS — J44.1 COPD WITH ACUTE EXACERBATION: ICD-10-CM

## 2019-02-26 DIAGNOSIS — I50.9 CONGESTIVE HEART FAILURE, UNSPECIFIED HF CHRONICITY, UNSPECIFIED HEART FAILURE TYPE: Primary | ICD-10-CM

## 2019-02-26 LAB
ALBUMIN SERPL BCP-MCNC: 3.6 G/DL
ALP SERPL-CCNC: 64 U/L
ALT SERPL W/O P-5'-P-CCNC: 16 U/L
ANION GAP SERPL CALC-SCNC: 12 MMOL/L
ANISOCYTOSIS BLD QL SMEAR: SLIGHT
AST SERPL-CCNC: 27 U/L
BASOPHILS # BLD AUTO: 0.03 K/UL
BASOPHILS NFR BLD: 0.6 %
BILIRUB SERPL-MCNC: 0.5 MG/DL
BNP SERPL-MCNC: 123 PG/ML
BUN SERPL-MCNC: 16 MG/DL
CALCIUM SERPL-MCNC: 9.2 MG/DL
CHLORIDE SERPL-SCNC: 106 MMOL/L
CO2 SERPL-SCNC: 24 MMOL/L
CREAT SERPL-MCNC: 1.1 MG/DL
DIFFERENTIAL METHOD: ABNORMAL
EOSINOPHIL # BLD AUTO: 0.7 K/UL
EOSINOPHIL NFR BLD: 13.9 %
ERYTHROCYTE [DISTWIDTH] IN BLOOD BY AUTOMATED COUNT: 17.7 %
EST. GFR  (AFRICAN AMERICAN): >60 ML/MIN/1.73 M^2
EST. GFR  (NON AFRICAN AMERICAN): 54 ML/MIN/1.73 M^2
GLUCOSE SERPL-MCNC: 114 MG/DL
HCT VFR BLD AUTO: 37.8 %
HGB BLD-MCNC: 10.9 G/DL
HYPOCHROMIA BLD QL SMEAR: ABNORMAL
INR PPP: 4.5
LYMPHOCYTES # BLD AUTO: 1.2 K/UL
LYMPHOCYTES NFR BLD: 23.2 %
MAGNESIUM SERPL-MCNC: 1.7 MG/DL
MCH RBC QN AUTO: 20.1 PG
MCHC RBC AUTO-ENTMCNC: 28.8 G/DL
MCV RBC AUTO: 70 FL
MONOCYTES # BLD AUTO: 0.6 K/UL
MONOCYTES NFR BLD: 11.1 %
NEUTROPHILS # BLD AUTO: 2.5 K/UL
NEUTROPHILS NFR BLD: 51.2 %
OVALOCYTES BLD QL SMEAR: ABNORMAL
PLATELET # BLD AUTO: 192 K/UL
PLATELET BLD QL SMEAR: ABNORMAL
PMV BLD AUTO: ABNORMAL FL
POIKILOCYTOSIS BLD QL SMEAR: SLIGHT
POLYCHROMASIA BLD QL SMEAR: ABNORMAL
POTASSIUM SERPL-SCNC: 4.4 MMOL/L
PROT SERPL-MCNC: 7 G/DL
PROTHROMBIN TIME: 49.1 SEC
RBC # BLD AUTO: 5.42 M/UL
SCHISTOCYTES BLD QL SMEAR: PRESENT
SODIUM SERPL-SCNC: 142 MMOL/L
WBC # BLD AUTO: 4.96 K/UL

## 2019-02-26 PROCEDURE — 25000242 PHARM REV CODE 250 ALT 637 W/ HCPCS: Performed by: EMERGENCY MEDICINE

## 2019-02-26 PROCEDURE — 85610 PROTHROMBIN TIME: CPT

## 2019-02-26 PROCEDURE — 96375 TX/PRO/DX INJ NEW DRUG ADDON: CPT

## 2019-02-26 PROCEDURE — 80053 COMPREHEN METABOLIC PANEL: CPT

## 2019-02-26 PROCEDURE — 93010 ELECTROCARDIOGRAM REPORT: CPT | Mod: ,,, | Performed by: INTERNAL MEDICINE

## 2019-02-26 PROCEDURE — 85025 COMPLETE CBC W/AUTO DIFF WBC: CPT

## 2019-02-26 PROCEDURE — 83880 ASSAY OF NATRIURETIC PEPTIDE: CPT

## 2019-02-26 PROCEDURE — 94640 AIRWAY INHALATION TREATMENT: CPT

## 2019-02-26 PROCEDURE — 63600175 PHARM REV CODE 636 W HCPCS: Performed by: EMERGENCY MEDICINE

## 2019-02-26 PROCEDURE — 83735 ASSAY OF MAGNESIUM: CPT

## 2019-02-26 PROCEDURE — 93005 ELECTROCARDIOGRAM TRACING: CPT

## 2019-02-26 PROCEDURE — 93010 EKG 12-LEAD: ICD-10-PCS | Mod: ,,, | Performed by: INTERNAL MEDICINE

## 2019-02-26 PROCEDURE — 99285 EMERGENCY DEPT VISIT HI MDM: CPT | Mod: 25

## 2019-02-26 PROCEDURE — 94761 N-INVAS EAR/PLS OXIMETRY MLT: CPT

## 2019-02-26 PROCEDURE — 96374 THER/PROPH/DIAG INJ IV PUSH: CPT

## 2019-02-26 RX ORDER — PREDNISONE 10 MG/1
10 TABLET ORAL DAILY
Qty: 21 TABLET | Refills: 0 | Status: SHIPPED | OUTPATIENT
Start: 2019-02-26 | End: 2019-03-08

## 2019-02-26 RX ORDER — IPRATROPIUM BROMIDE AND ALBUTEROL SULFATE 2.5; .5 MG/3ML; MG/3ML
3 SOLUTION RESPIRATORY (INHALATION)
Status: COMPLETED | OUTPATIENT
Start: 2019-02-26 | End: 2019-02-26

## 2019-02-26 RX ORDER — METHYLPREDNISOLONE SOD SUCC 125 MG
125 VIAL (EA) INJECTION
Status: COMPLETED | OUTPATIENT
Start: 2019-02-26 | End: 2019-02-26

## 2019-02-26 RX ORDER — FUROSEMIDE 20 MG/1
20 TABLET ORAL DAILY
Qty: 60 TABLET | Refills: 0 | Status: SHIPPED | OUTPATIENT
Start: 2019-02-26 | End: 2019-03-08 | Stop reason: SDUPTHER

## 2019-02-26 RX ORDER — FUROSEMIDE 10 MG/ML
40 INJECTION INTRAMUSCULAR; INTRAVENOUS
Status: COMPLETED | OUTPATIENT
Start: 2019-02-26 | End: 2019-02-26

## 2019-02-26 RX ADMIN — IPRATROPIUM BROMIDE AND ALBUTEROL SULFATE 3 ML: .5; 3 SOLUTION RESPIRATORY (INHALATION) at 06:02

## 2019-02-26 RX ADMIN — FUROSEMIDE 40 MG: 10 INJECTION, SOLUTION INTRAVENOUS at 07:02

## 2019-02-26 RX ADMIN — IPRATROPIUM BROMIDE AND ALBUTEROL SULFATE 3 ML: .5; 3 SOLUTION RESPIRATORY (INHALATION) at 05:02

## 2019-02-26 RX ADMIN — METHYLPREDNISOLONE SODIUM SUCCINATE 125 MG: 125 INJECTION, POWDER, FOR SOLUTION INTRAMUSCULAR; INTRAVENOUS at 05:02

## 2019-02-26 NOTE — ED NOTES
"Pt resting in bed calmly RR easy non labored, NAD. Pt reports feeling better after first round of breathing tx's but "my chest still feels tight", bilateral equal chest rise and fall, AAOx4, VSS, updated on current plan of care, MD aware of current status, side rails up x2, call light within reach, bed in lowest locked position, will continue to monitor and assess for changes.   "

## 2019-02-26 NOTE — ED NOTES
Pt ambulated with mobile pulse ox approx 50 feet in ED hallway. Pt able to ambulate with steady gait. HR is 98, O2 sats >95% throughout ambulation. MD, Andrea, aware. Will continue to monitor.

## 2019-02-26 NOTE — ED NOTES
BSSR received from GENOVEVA Navraro. Pt resting comfortably in stretcher at this time. Pt has no questions, comments, or concerns. Pt updated on plan of care. Pt is AAOx4, speaking in clear and complete sentences, RR even and unlabored. Breath sounds are minimally diminished bilaterally after receiving breathing treatment. Skin is warm, dry, and intact. Will continue to monitor.

## 2019-02-26 NOTE — ED PROVIDER NOTES
Encounter Date: 2019    SCRIBE #1 NOTE: INelson, am scribing for, and in the presence of, Dr. Owens.       History     Chief Complaint   Patient presents with    Shortness of Breath     SOB, wheezing, and cough.      Seen by provider: 5:14 AM    Patient is a 63 y.o. female with a history of AFIB, COPD, CHF, and mechanical MVR who presents to the ED with complaint of shortness of breath for about one week. She reports associated wheezing and chest tightness and states her symptoms feel like her COPD. She denies any fever, chills, cough, congestion, runny nose, sore throat, vomiting, or chest pain. She states her son moved home about two months ago and smokes cigars in her home, which she believes is exacerbating her COPD. She takes Advair and ventolin and reports using her inhalers with only temporary improvement. She states she does not have frequent COPD exacerbations, last about 6 months ago. She denies recent steroid use. She takes lasix as needed, but states she only takes this rarely. She reports complaince with Coumadin. She has no additional complaints at this time.       The history is provided by the patient.     Review of patient's allergies indicates:  No Known Allergies  Past Medical History:   Diagnosis Date    Acute on chronic diastolic congestive heart failure     Anticoagulant long-term use     Asthma     Atrial fibrillation     Cataract     Chronic kidney disease     COPD (chronic obstructive pulmonary disease)     Depression     Dysuria     Flank pain     HTN (hypertension)     Nephrolithiasis     KEYSHAWN (obstructive sleep apnea)     awaiting CPAP machine     Rheumatic disease of mitral valve     Urinary incontinence     Urinary tract infection      Past Surgical History:   Procedure Laterality Date     SECTION      COLONOSCOPY N/A 4/10/2014    Performed by [facility]ling [facility] at [facility] (4[facility] F[facility])    CYSTOSCOPY N/A 3/22/2013    Performed by  Deandre Galindo Jr., MD at [facility] OR 1ST FLR    ESOPHAGOGASTRODUODENOSCOPY (EGD) N/A 2016    Performed by Jose M Kelly MD at [facility] ENDO (4TH FLR)    EXTRACTION - STONE Right 3/22/2013    Performed by Deandre Galindo Jr., MD at [facility] OR 1ST FLR    kidney stone removal      MITRAL VALVE REPLACEMENT  2004    REMOVAL, STENT, URETER Right 3/22/2013    Performed by De[facility]re Galindo Jr., MD at [facility] OR 1ST FLR    TUBAL LIGATION      URETEROSCOPY Right 3/22/2013    Performed by Deandre Galindo Jr., MD at [facility] OR 1ST FLR     Family History   Problem Relation Age of Onset    Stroke Paternal Grandmother     Colon cancer Maternal Grandmother     Cancer Brother         testicular cancer    Diabetes Sister     Hypertension Sister     Breast cancer Paternal Aunt     Diabetes Sister     Diabetes Sister     Heart disease Father 70        MI    Diabetes Father     Colon cancer Mother     Breast cancer Maternal Aunt     Ovarian cancer Neg Hx      Social History     Tobacco Use    Smoking status: Former Smoker     Packs/day: 0.50     Years: 20.00     Pack years: 10.00     Types: Cigarettes     Last attempt to quit: 2002     Years since quittin.8    Smokeless tobacco: Never Used   Substance Use Topics    Alcohol use: No    Drug use: No     Review of Systems   Constitutional: Negative for chills and fever.   HENT: Negative for congestion and sore throat.    Respiratory: Positive for chest tightness, shortness of breath and wheezing. Negative for cough.    Cardiovascular: Negative for chest pain and leg swelling.   Gastrointestinal: Negative for nausea and vomiting.   Genitourinary: Negative for dysuria.   Musculoskeletal: Negative for back pain.   Skin: Negative for rash.   Neurological: Negative for weakness and headaches.   Psychiatric/Behavioral: Negative for confusion.       Physical Exam     Initial Vitals [19 0500]   BP Pulse Resp Temp SpO2   (!) 145/103 77 (!) 22 98.4 °F (36.9 °C) 95 %       MAP       --         Physical Exam    Nursing note and vitals reviewed.  Constitutional: She appears well-developed and well-nourished. She is not diaphoretic. No distress.   HENT:   Head: Normocephalic and atraumatic.   Eyes: Conjunctivae and EOM are normal. Pupils are equal, round, and reactive to light.   Neck: Normal range of motion. Neck supple.   Cardiovascular: Normal rate, normal heart sounds and normal pulses. An irregularly irregular rhythm present.    No murmur heard.  Pulmonary/Chest: She has decreased breath sounds. She has wheezes.   Decreased breath sounds with diffuse expiratory wheezes.    Abdominal: Soft. There is no tenderness. There is no rebound and no guarding.   Musculoskeletal: She exhibits no edema.   Neurological: She is alert and oriented to person, place, and time. She has normal strength. No cranial nerve deficit.   Skin: Skin is warm and dry.   Psychiatric: She has a normal mood and affect. Her behavior is normal. Thought content normal.         ED Course   Procedures  Labs Reviewed   COMPREHENSIVE METABOLIC PANEL - Abnormal; Notable for the following components:       Result Value    Glucose 114 (*)     eGFR if non  54 (*)     All other components within normal limits   CBC W/ AUTO DIFFERENTIAL - Abnormal; Notable for the following components:    RBC 5.42 (*)     Hemoglobin 10.9 (*)     MCV 70 (*)     MCH 20.1 (*)     MCHC 28.8 (*)     RDW 17.7 (*)     Eos # 0.7 (*)     Eosinophil% 13.9 (*)     All other components within normal limits   B-TYPE NATRIURETIC PEPTIDE - Abnormal; Notable for the following components:     (*)     All other components within normal limits   PROTIME-INR - Abnormal; Notable for the following components:    Prothrombin Time 49.1 (*)     INR 4.5 (*)     All other components within normal limits   MAGNESIUM     EKG Readings: (Independently Interpreted)   Atrial fibrillation. Rate of 79 bpm. No significant change from previous.         Imaging Results          X-Ray Chest PA And Lateral (Final result)  Result time 02/26/19 06:01:01    Final result by Jose M Lux MD (02/26/19 06:01:01)                 Impression:      Central venous congestion and increased interstitial attenuation bilaterally suggesting edema/CHF.      Electronically signed by: Jose M Lux MD  Date:    02/26/2019  Time:    06:01             Narrative:    EXAMINATION:  XR CHEST PA AND LATERAL    CLINICAL HISTORY:  Chronic obstructive pulmonary disease with (acute) exacerbation    TECHNIQUE:  PA and lateral views of the chest were performed.    COMPARISON:  08/20/2018, 03/09/2018    FINDINGS:  Cardiac monitoring leads overlie the chest.  Cardiomediastinal silhouette is enlarged.  There is postoperative change of prior median sternotomy and valve replacement.  There is atherosclerotic calcification of the thoracic aorta.  There is prominence of the central pulmonary vasculature with increased interstitial attenuation bilaterally suggesting edema.  No large pleural effusion or pneumothorax identified.  Osseous structures demonstrate stable degenerative changes.                                 Medical Decision Making:   Initial Assessment:       63-year-old female with history of COPD, CHF, AFib and MVR on Coumadin presents with persistent wheezing and SOB for the past week.  No preceding URI symptoms or cough/fever, wheezing is only transiently relieved by albuterol.  She thinks it is due to her son smoking cigars in her house, but he has been there for the past 2 months and she only developed symptoms in the past week.  Her COPD usually mild and she has not required in about 6 months.  She does not take Lasix regularly for CHF, only p.r.n. edema, and has not needed for while since no edema. No associated orthopnea, and no recent CHF exacerbations.       ED workup with CBC with baseline anemia, normal CMP, slightly elevated BNP, and chest x-ray with some evidence of  CHF.  Her SOB and wheezing appear to be from a mixed COPD and CHF exacerbation.  Her last admission for CHF was about 1.5 years ago, and she did not have edema then either.  After COPD treatment of duonebs and Solu-Medrol, she feels better but still has some persistent wheezing.  She was then treated with IV Lasix with good urine output.  She was ambulatory in the ED with no hypoxia or tachypnea, and feels comfortable with outpatient management.  Will Rx prednisone to treat COPD exacerbation and continue albuterol p.r.n., and restart Lasix at 20 mg daily until SOB and wheezing resolved.  She will closely follow up with PCP and return to the ED for any worsening SOB, wheezing, or any other concerns.  Of note, INR still pending when she was discharged and she will follow up results on [facility] and call Cardiology Clinic for Coumadin dose adjustment as needed.    Independently Interpreted Test(s):   I have ordered and independently interpreted EKG Reading(s) - see prior notes  Clinical Tests:   Lab Tests: Ordered and Reviewed  Radiological Study: Ordered and Reviewed  Medical Tests: Ordered and Reviewed            Scribe Attestation:   Scribe #1: I performed the above scribed service and the documentation accurately describes the services I performed. I attest to the accuracy of the note.    Attending Attestation:           Physician Attestation for Scribe:  Physician Attestation Statement for Scribe #1: I, Dr. Owens, reviewed documentation, as scribed by Nelson Phillips in my presence, and it is both accurate and complete.                    Clinical Impression:     1. Congestive heart failure, unspecified HF chronicity, unspecified heart failure type    2. SOB (shortness of breath)    3. COPD with acute exacerbation    4. Acute on chronic diastolic congestive heart failure                              El Owens MD  02/27/19 2454

## 2019-02-26 NOTE — ED NOTES
"Pt presented to ED via POV with complaints of increasing SOB x1 week pta, pt has hx of COPD and CHF and relates new onset of symptoms to "my son has been smoking cigars in the damn house again". On arrival pt appears calm and cooperative RR easy non labored, NAD. Expiratory wheezing noted without distress, speaking in full sentences with oxygen saturation of 97% RA. Denies any other symptoms at this time, AAOx4, VSS, updated on current plan of care. Continuous blood pressure, pulse ox and cardiac monitoring in place, side rails up x2, call light within reach, bed in lowest locked position, will continue to monitor and assess for changes.   "

## 2019-02-28 DIAGNOSIS — I48.20 ATRIAL FIBRILLATION, CHRONIC: ICD-10-CM

## 2019-02-28 DIAGNOSIS — Z95.2 STATUS POST HEART VALVE REPLACEMENT WITH MECHANICAL VALVE: ICD-10-CM

## 2019-02-28 RX ORDER — WARFARIN SODIUM 5 MG/1
TABLET ORAL
Qty: 45 TABLET | Refills: 4 | Status: SHIPPED | OUTPATIENT
Start: 2019-02-28 | End: 2019-08-13 | Stop reason: SDUPTHER

## 2019-03-01 RX ORDER — WARFARIN SODIUM 5 MG/1
TABLET ORAL
Qty: 130 TABLET | Refills: 3 | Status: SHIPPED | OUTPATIENT
Start: 2019-03-01 | End: 2019-08-12 | Stop reason: SDUPTHER

## 2019-03-08 ENCOUNTER — OFFICE VISIT (OUTPATIENT)
Dept: INTERNAL MEDICINE | Facility: CLINIC | Age: 64
End: 2019-03-08
Payer: COMMERCIAL

## 2019-03-08 ENCOUNTER — ANTI-COAG VISIT (OUTPATIENT)
Dept: CARDIOLOGY | Facility: CLINIC | Age: 64
End: 2019-03-08

## 2019-03-08 ENCOUNTER — LAB VISIT (OUTPATIENT)
Dept: LAB | Facility: HOSPITAL | Age: 64
End: 2019-03-08
Attending: INTERNAL MEDICINE
Payer: COMMERCIAL

## 2019-03-08 DIAGNOSIS — I10 ESSENTIAL HYPERTENSION: Chronic | ICD-10-CM

## 2019-03-08 DIAGNOSIS — I48.91 ATRIAL FIBRILLATION, UNSPECIFIED TYPE: ICD-10-CM

## 2019-03-08 DIAGNOSIS — Z80.0 FAMILY HISTORY OF COLON CANCER: Primary | ICD-10-CM

## 2019-03-08 DIAGNOSIS — I50.33 ACUTE ON CHRONIC DIASTOLIC CONGESTIVE HEART FAILURE: ICD-10-CM

## 2019-03-08 DIAGNOSIS — Z95.2 STATUS POST HEART VALVE REPLACEMENT WITH MECHANICAL VALVE: ICD-10-CM

## 2019-03-08 DIAGNOSIS — I48.20 CHRONIC ATRIAL FIBRILLATION WITH RAPID VENTRICULAR RESPONSE: ICD-10-CM

## 2019-03-08 LAB
ALBUMIN SERPL BCP-MCNC: 3.6 G/DL
ALP SERPL-CCNC: 69 U/L
ALT SERPL W/O P-5'-P-CCNC: 11 U/L
ANION GAP SERPL CALC-SCNC: 14 MMOL/L
AST SERPL-CCNC: 16 U/L
BILIRUB SERPL-MCNC: 0.6 MG/DL
BUN SERPL-MCNC: 21 MG/DL
CALCIUM SERPL-MCNC: 9.5 MG/DL
CHLORIDE SERPL-SCNC: 101 MMOL/L
CHOLEST SERPL-MCNC: 228 MG/DL
CHOLEST/HDLC SERPL: 3.5 {RATIO}
CO2 SERPL-SCNC: 25 MMOL/L
CREAT SERPL-MCNC: 1.2 MG/DL
EST. GFR  (AFRICAN AMERICAN): 56 ML/MIN/1.73 M^2
EST. GFR  (NON AFRICAN AMERICAN): 49 ML/MIN/1.73 M^2
GLUCOSE SERPL-MCNC: 74 MG/DL
HDLC SERPL-MCNC: 66 MG/DL
HDLC SERPL: 28.9 %
LDLC SERPL CALC-MCNC: 139.2 MG/DL
NONHDLC SERPL-MCNC: 162 MG/DL
POTASSIUM SERPL-SCNC: 4.1 MMOL/L
PROT SERPL-MCNC: 7.1 G/DL
SODIUM SERPL-SCNC: 140 MMOL/L
TRIGL SERPL-MCNC: 114 MG/DL

## 2019-03-08 PROCEDURE — 99999 PR PBB SHADOW E&M-EST. PATIENT-LVL V: ICD-10-PCS | Mod: PBBFAC,,, | Performed by: INTERNAL MEDICINE

## 2019-03-08 PROCEDURE — 3074F SYST BP LT 130 MM HG: CPT | Mod: CPTII,S$GLB,, | Performed by: INTERNAL MEDICINE

## 2019-03-08 PROCEDURE — 99214 PR OFFICE/OUTPT VISIT, EST, LEVL IV, 30-39 MIN: ICD-10-PCS | Mod: S$GLB,,, | Performed by: INTERNAL MEDICINE

## 2019-03-08 PROCEDURE — 3008F BODY MASS INDEX DOCD: CPT | Mod: CPTII,S$GLB,, | Performed by: INTERNAL MEDICINE

## 2019-03-08 PROCEDURE — 99214 OFFICE O/P EST MOD 30 MIN: CPT | Mod: S$GLB,,, | Performed by: INTERNAL MEDICINE

## 2019-03-08 PROCEDURE — 3074F PR MOST RECENT SYSTOLIC BLOOD PRESSURE < 130 MM HG: ICD-10-PCS | Mod: CPTII,S$GLB,, | Performed by: INTERNAL MEDICINE

## 2019-03-08 PROCEDURE — 80053 COMPREHEN METABOLIC PANEL: CPT

## 2019-03-08 PROCEDURE — 3078F DIAST BP <80 MM HG: CPT | Mod: CPTII,S$GLB,, | Performed by: INTERNAL MEDICINE

## 2019-03-08 PROCEDURE — 3078F PR MOST RECENT DIASTOLIC BLOOD PRESSURE < 80 MM HG: ICD-10-PCS | Mod: CPTII,S$GLB,, | Performed by: INTERNAL MEDICINE

## 2019-03-08 PROCEDURE — 3008F PR BODY MASS INDEX (BMI) DOCUMENTED: ICD-10-PCS | Mod: CPTII,S$GLB,, | Performed by: INTERNAL MEDICINE

## 2019-03-08 PROCEDURE — 36415 COLL VENOUS BLD VENIPUNCTURE: CPT

## 2019-03-08 PROCEDURE — 99999 PR PBB SHADOW E&M-EST. PATIENT-LVL V: CPT | Mod: PBBFAC,,, | Performed by: INTERNAL MEDICINE

## 2019-03-08 PROCEDURE — 80061 LIPID PANEL: CPT

## 2019-03-08 RX ORDER — HYDROCODONE BITARTRATE AND ACETAMINOPHEN 5; 325 MG/1; MG/1
1 TABLET ORAL EVERY 8 HOURS PRN
Qty: 21 TABLET | Refills: 0 | Status: SHIPPED | OUTPATIENT
Start: 2019-03-08 | End: 2019-10-25

## 2019-03-08 RX ORDER — FUROSEMIDE 20 MG/1
20 TABLET ORAL DAILY
Qty: 60 TABLET | Refills: 3 | Status: SHIPPED | OUTPATIENT
Start: 2019-03-08 | End: 2019-05-20 | Stop reason: SDUPTHER

## 2019-03-08 RX ORDER — PRAVASTATIN SODIUM 40 MG/1
40 TABLET ORAL DAILY
Qty: 30 TABLET | Refills: 3 | Status: SHIPPED | OUTPATIENT
Start: 2019-03-08 | End: 2019-05-20 | Stop reason: SDUPTHER

## 2019-03-08 RX ORDER — FLUTICASONE PROPIONATE AND SALMETEROL 500; 50 UG/1; UG/1
1 POWDER RESPIRATORY (INHALATION) 2 TIMES DAILY
Qty: 1 INHALER | Refills: 6 | Status: SHIPPED | OUTPATIENT
Start: 2019-03-08 | End: 2019-09-20 | Stop reason: SDUPTHER

## 2019-03-08 RX ORDER — AMOXICILLIN 875 MG/1
875 TABLET, FILM COATED ORAL EVERY 12 HOURS
Qty: 14 TABLET | Refills: 0 | Status: SHIPPED | OUTPATIENT
Start: 2019-03-08 | End: 2019-03-15

## 2019-03-08 RX ORDER — ALBUTEROL SULFATE 90 UG/1
2 AEROSOL, METERED RESPIRATORY (INHALATION) EVERY 6 HOURS PRN
Qty: 1 INHALER | Refills: 6 | Status: SHIPPED | OUTPATIENT
Start: 2019-03-08 | End: 2019-05-20 | Stop reason: SDUPTHER

## 2019-03-08 NOTE — PROGRESS NOTES
INR elevated again. Patient reports taking warfarin doses as instructed. She did, however, have a course of steroids last week and a long bout of diarrhea recently which has now resolved. Will lower tonight's dose and resume maintenance plan. Will f/u INR next week w/appt.

## 2019-03-10 VITALS
DIASTOLIC BLOOD PRESSURE: 64 MMHG | BODY MASS INDEX: 45.72 KG/M2 | WEIGHT: 248.44 LBS | OXYGEN SATURATION: 95 % | HEART RATE: 63 BPM | HEIGHT: 62 IN | SYSTOLIC BLOOD PRESSURE: 118 MMHG

## 2019-03-11 NOTE — PROGRESS NOTES
Subjective:       Patient ID: Elayne Almanzar is a 63 y.o. female.    Chief Complaint: Hypertension    HPI  She returns for management of hypertension.  She has had hypertension for over a year.  Current treatment has included medications outlined in medication list.  She denies chest pain or shortness of breath.  No palpitations.  Denies left arm or neck pain.    Past medical history: Hypertension, A. fib, COPD, hyperlipidemia, nephrolithiasis , hydronephrosis, glaucoma, sleep apnea, status post mitral valve replacement , family history of colon cancer-mother. She had a colonoscopy April 2014      Medications: Proventil MDI 2 puffs 4 times a day when necessary,Advair 500/50 one puff twice a day, Lasix 20 mg daily,Toprol-XL 200 mg daily, Coumadin as monitored by Coumadin clinic , Zoloft 100 mg daily, xalatan, Pravachol 40 mg daily      No known drug allergies       Review of Systems   Constitutional: Negative for chills, fatigue, fever and unexpected weight change.   Respiratory: Negative for chest tightness and shortness of breath.    Cardiovascular: Negative for chest pain and palpitations.   Gastrointestinal: Negative for abdominal pain and blood in stool.   Neurological: Negative for dizziness, syncope, numbness and headaches.       Objective:      Physical Exam   HENT:   Right Ear: External ear normal.   Left Ear: External ear normal.   Nose: Nose normal.   Mouth/Throat: Oropharynx is clear and moist.   Eyes: Pupils are equal, round, and reactive to light.   Neck: Normal range of motion.   Cardiovascular: Normal rate and regular rhythm.   No murmur heard.  Pulmonary/Chest: Breath sounds normal.   Abdominal: She exhibits no distension. There is no hepatosplenomegaly. There is no tenderness.   Lymphadenopathy:     She has no cervical adenopathy.     She has no axillary adenopathy.   Neurological: She has normal strength and normal reflexes. No cranial nerve deficit or sensory deficit.       Assessment/Plan         Assessment and plan:  1.  Hypertension:  Check CMP and lipid panel  2.  Family history of colon cancer:  Schedule colonoscopy  3.  Schedule echocardiogram.  Follow up with Cardiology  4.  Discussed Pap smear, pelvic exam.  She declined all

## 2019-03-15 ENCOUNTER — HOSPITAL ENCOUNTER (OUTPATIENT)
Dept: CARDIOLOGY | Facility: CLINIC | Age: 64
Discharge: HOME | End: 2019-03-15
Attending: INTERNAL MEDICINE
Payer: COMMERCIAL

## 2019-03-15 ENCOUNTER — ANTI-COAG VISIT (OUTPATIENT)
Dept: CARDIOLOGY | Facility: CLINIC | Age: 64
End: 2019-03-15
Payer: COMMERCIAL

## 2019-03-15 VITALS
WEIGHT: 248 LBS | HEIGHT: 62 IN | DIASTOLIC BLOOD PRESSURE: 80 MMHG | HEART RATE: 56 BPM | SYSTOLIC BLOOD PRESSURE: 134 MMHG | BODY MASS INDEX: 45.64 KG/M2

## 2019-03-15 DIAGNOSIS — Z95.2 STATUS POST HEART VALVE REPLACEMENT WITH MECHANICAL VALVE: ICD-10-CM

## 2019-03-15 DIAGNOSIS — I50.33 ACUTE ON CHRONIC DIASTOLIC CONGESTIVE HEART FAILURE: ICD-10-CM

## 2019-03-15 DIAGNOSIS — Z79.01 LONG TERM (CURRENT) USE OF ANTICOAGULANTS: Primary | ICD-10-CM

## 2019-03-15 DIAGNOSIS — I48.20 CHRONIC ATRIAL FIBRILLATION WITH RAPID VENTRICULAR RESPONSE: ICD-10-CM

## 2019-03-15 LAB
ASCENDING AORTA: 3.01 CM
AV INDEX (PROSTH): 0.69
AV MEAN GRADIENT: 4.59 MMHG
AV PEAK GRADIENT: 6.76 MMHG
AV VALVE AREA: 2.35 CM2
AV VELOCITY RATIO: 0.69
BSA FOR ECHO PROCEDURE: 2.22 M2
CV ECHO LV RWT: 0.3 CM
DOP CALC AO PEAK VEL: 1.3 M/S
DOP CALC AO VTI: 34.95 CM
DOP CALC LVOT AREA: 3.43 CM2
DOP CALC LVOT DIAMETER: 2.09 CM
DOP CALC LVOT PEAK VEL: 0.9 M/S
DOP CALC LVOT STROKE VOLUME: 82.23 CM3
DOP CALCLVOT PEAK VEL VTI: 23.98 CM
E/E' RATIO: 19.88
ECHO LV POSTERIOR WALL: 0.75 CM (ref 0.6–1.1)
FRACTIONAL SHORTENING: 36 % (ref 28–44)
INR PPP: 5 (ref 2–3)
INTERVENTRICULAR SEPTUM: 0.74 CM (ref 0.6–1.1)
LA MAJOR: 4.53 CM
LA MINOR: 6 CM
LA WIDTH: 3.82 CM
LEFT ATRIUM SIZE: 6.14 CM
LEFT ATRIUM VOLUME INDEX: 49.1 ML/M2
LEFT ATRIUM VOLUME: 102.92 CM3
LEFT INTERNAL DIMENSION IN SYSTOLE: 3.24 CM (ref 2.1–4)
LEFT VENTRICLE DIASTOLIC VOLUME INDEX: 58.06 ML/M2
LEFT VENTRICLE DIASTOLIC VOLUME: 121.61 ML
LEFT VENTRICLE MASS INDEX: 60.5 G/M2
LEFT VENTRICLE SYSTOLIC VOLUME INDEX: 20.1 ML/M2
LEFT VENTRICLE SYSTOLIC VOLUME: 42.18 ML
LEFT VENTRICULAR INTERNAL DIMENSION IN DIASTOLE: 5.06 CM (ref 3.5–6)
LEFT VENTRICULAR MASS: 126.61 G
LV LATERAL E/E' RATIO: 16.9
LV SEPTAL E/E' RATIO: 24.14
MV MEAN GRADIENT: 2 MMHG
MV PEAK E VEL: 1.69 M/S
PISA TR MAX VEL: 2.53 M/S
PULM VEIN S/D RATIO: 1.71
PV PEAK D VEL: 0.21 M/S
PV PEAK S VEL: 0.36 M/S
RA MAJOR: 4.46 CM
RA PRESSURE: 3 MMHG
RA WIDTH: 3.42 CM
RIGHT VENTRICULAR END-DIASTOLIC DIMENSION: 3.06 CM
RV TISSUE DOPPLER FREE WALL SYSTOLIC VELOCITY 1 (APICAL 4 CHAMBER VIEW): 6.38 M/S
SINUS: 2.69 CM
STJ: 2.91 CM
TDI LATERAL: 0.1
TDI SEPTAL: 0.07
TDI: 0.09
TR MAX PG: 25.6 MMHG
TRICUSPID ANNULAR PLANE SYSTOLIC EXCURSION: 1.16 CM
TV REST PULMONARY ARTERY PRESSURE: 29 MMHG

## 2019-03-15 PROCEDURE — 99211 OFF/OP EST MAY X REQ PHY/QHP: CPT | Mod: 25,S$GLB,,

## 2019-03-15 PROCEDURE — 99211 PR OFFICE/OUTPT VISIT, EST, LEVL I: ICD-10-PCS | Mod: 25,S$GLB,,

## 2019-03-15 PROCEDURE — 85610 PROTHROMBIN TIME: CPT | Mod: QW,S$GLB,, | Performed by: INTERNAL MEDICINE

## 2019-03-15 PROCEDURE — 93306 TRANSTHORACIC ECHO (TTE) COMPLETE (CUPID ONLY): ICD-10-PCS | Mod: S$GLB,,, | Performed by: INTERNAL MEDICINE

## 2019-03-15 PROCEDURE — 93306 TTE W/DOPPLER COMPLETE: CPT | Mod: S$GLB,,, | Performed by: INTERNAL MEDICINE

## 2019-03-15 PROCEDURE — 85610 POCT INR: ICD-10-PCS | Mod: QW,S$GLB,, | Performed by: INTERNAL MEDICINE

## 2019-03-15 NOTE — PROGRESS NOTES
Procedure explained. 22 g sl started in right forearm for optison use. optison given ivp via sl for imaging. Denies transfusion rxn. Tolerated well. Sl d/barbra after. Pressure applied.

## 2019-03-22 ENCOUNTER — ANTI-COAG VISIT (OUTPATIENT)
Dept: CARDIOLOGY | Facility: CLINIC | Age: 64
End: 2019-03-22
Payer: COMMERCIAL

## 2019-03-22 DIAGNOSIS — I48.20 CHRONIC ATRIAL FIBRILLATION WITH RAPID VENTRICULAR RESPONSE: ICD-10-CM

## 2019-03-22 DIAGNOSIS — Z79.01 LONG TERM (CURRENT) USE OF ANTICOAGULANTS: Primary | ICD-10-CM

## 2019-03-22 DIAGNOSIS — Z95.2 STATUS POST HEART VALVE REPLACEMENT WITH MECHANICAL VALVE: ICD-10-CM

## 2019-03-22 LAB — INR PPP: 3.6 (ref 2–3)

## 2019-03-22 PROCEDURE — 85610 POCT INR: ICD-10-PCS | Mod: QW,S$GLB,, | Performed by: INTERNAL MEDICINE

## 2019-03-22 PROCEDURE — 99211 OFF/OP EST MAY X REQ PHY/QHP: CPT | Mod: 25,S$GLB,, | Performed by: INTERNAL MEDICINE

## 2019-03-22 PROCEDURE — 99211 PR OFFICE/OUTPT VISIT, EST, LEVL I: ICD-10-PCS | Mod: 25,S$GLB,, | Performed by: INTERNAL MEDICINE

## 2019-03-22 PROCEDURE — 85610 PROTHROMBIN TIME: CPT | Mod: QW,S$GLB,, | Performed by: INTERNAL MEDICINE

## 2019-03-22 NOTE — PROGRESS NOTES
Quick follow up for elevated INR on 4/15. INR elevated today but improved. Patient denies any bleeding or bruising. She states that she is going to have all her top teeth removed on 3/28. She will need to hold and bridge with lovenox. We advised a decreased dose X 2 days then resume. Follow up with lab on 3/25. I called her dental office and spoke with dental assistant Polo at [facility] GIANNA Vargas D.D.S (P-( [phone] F- ([phone]). Polo states that the patient is NOT scheduled for extraction until 4/1. Patient should be off coumadin for minimum 3 days and expect bleeding for a week post-extraction. We will continue with current dosing plan until INR on Monday.     Patient was re-educated on situations that would require placing a call to the Coumadin  Clinic, including bleeding or unusual bruising issues, changes in health, diet or medications,upcoming procedures that require warfarin interruption, and missed Coumadin dose(s). Patient expressed understanding that avoidance of consistency with these parameters could cause fluctuations in INR, leading to more frequent visits and increase risk of adverse events. Care plan made with CIARA OAHRA

## 2019-03-25 ENCOUNTER — LAB VISIT (OUTPATIENT)
Dept: LAB | Facility: HOSPITAL | Age: 64
End: 2019-03-25
Payer: COMMERCIAL

## 2019-03-25 ENCOUNTER — ANTI-COAG VISIT (OUTPATIENT)
Dept: CARDIOLOGY | Facility: CLINIC | Age: 64
End: 2019-03-25

## 2019-03-25 DIAGNOSIS — Z79.01 LONG TERM (CURRENT) USE OF ANTICOAGULANTS: Primary | ICD-10-CM

## 2019-03-25 DIAGNOSIS — Z95.2 STATUS POST HEART VALVE REPLACEMENT WITH MECHANICAL VALVE: ICD-10-CM

## 2019-03-25 DIAGNOSIS — I48.20 CHRONIC ATRIAL FIBRILLATION WITH RAPID VENTRICULAR RESPONSE: ICD-10-CM

## 2019-03-25 DIAGNOSIS — Z79.01 LONG TERM (CURRENT) USE OF ANTICOAGULANTS: ICD-10-CM

## 2019-03-25 LAB
INR PPP: 3.7 (ref 0.8–1.2)
PROTHROMBIN TIME: 35.6 SEC (ref 9–12.5)

## 2019-03-25 PROCEDURE — 36415 COLL VENOUS BLD VENIPUNCTURE: CPT

## 2019-03-25 PROCEDURE — 85610 PROTHROMBIN TIME: CPT

## 2019-03-25 NOTE — PROGRESS NOTES
I spoke to Dr Villegas, dentist who will be extracting 5 teeth. He also reports it wll not be a routine exctraction and more surgical as he expects a need for cutting into the gums. I advised that I recommend then holding coumadin 5 days prior, especially since her INRs have been running high. I also advised that we will be doing a lovenox bridge as she has a mechanical mitral valve and plan to stop lovenox 24 hours pre-procedure.  As far as post procedure, he will not be able to predict post procedure bleed risk until after the procedure so we agreed to communicate with him on Monday to see if anticoagulation can be restarted as usual protocol.  ( [phone])    Of note, dose of lovenox will be 110mg every 12 hours (crcl>30); Rx called into [facility] 120mg syringes #14 with 1 refill

## 2019-03-28 RX ORDER — ENOXAPARIN SODIUM 150 MG/ML
110 INJECTION SUBCUTANEOUS
COMMUNITY
End: 2019-04-16

## 2019-03-29 ENCOUNTER — ANTI-COAG VISIT (OUTPATIENT)
Dept: CARDIOLOGY | Facility: CLINIC | Age: 64
End: 2019-03-29
Payer: COMMERCIAL

## 2019-03-29 DIAGNOSIS — Z95.2 STATUS POST HEART VALVE REPLACEMENT WITH MECHANICAL VALVE: ICD-10-CM

## 2019-03-29 DIAGNOSIS — I48.20 CHRONIC ATRIAL FIBRILLATION WITH RAPID VENTRICULAR RESPONSE: ICD-10-CM

## 2019-03-29 DIAGNOSIS — Z79.01 LONG TERM (CURRENT) USE OF ANTICOAGULANTS: Primary | ICD-10-CM

## 2019-03-29 LAB — INR PPP: 2 (ref 2–3)

## 2019-03-29 PROCEDURE — 99211 OFF/OP EST MAY X REQ PHY/QHP: CPT | Mod: 25,S$GLB,,

## 2019-03-29 PROCEDURE — 85610 POCT INR: ICD-10-PCS | Mod: QW,S$GLB,, | Performed by: INTERNAL MEDICINE

## 2019-03-29 PROCEDURE — 85610 PROTHROMBIN TIME: CPT | Mod: QW,S$GLB,, | Performed by: INTERNAL MEDICINE

## 2019-03-29 PROCEDURE — 99211 PR OFFICE/OUTPT VISIT, EST, LEVL I: ICD-10-PCS | Mod: 25,S$GLB,,

## 2019-03-29 NOTE — PROGRESS NOTES
INR low but patient has been holding since 3/27/19 with multiple dental extractions planning for 4/1/19.  Patient given verbal and written instructions to continue holding Coumadin per calendar, Lovenox per pharmD notes, Lovenox prescription sent to [facility] on [address] for , and plan of care that patient will call on 4/1/19 after dental procedure to notify us of the dentist's instructions for when Coumadin and  Lovenox can be resumed so we can give her further instructions on meds and next INR will be which she verbalized understanding.

## 2019-03-29 NOTE — PROGRESS NOTES
Patient has already begun holding warfarin for dental extractions on 4/1. She needs LMWH bridge instructions at this time    Pre-Procedure Instructions:    Start enoxaparin injections the evening of:         3/29                    Enoxaparin dose is:     110mg                Every 12 hours (Twice Daily)  Last dose of enoxaparin will be the morning of:            3/31                    Post-Procedure Instructions:    We will f/u with dentist and patient on 4/1 following procedure to make a plan for post-procedure anticoagulation

## 2019-04-01 NOTE — PROGRESS NOTES
Patient called after dental procedure today and states her dentist says she was clotting well can proceed with Coumadin. She states he prescribed her a medication but she is not sure what it is and has not picked it up from her pharmacy yet.  I called the dentist office and they states is was Tylenol with Codeine for pain control.  Will send chart to pharmD for dosing.

## 2019-04-04 ENCOUNTER — ANTI-COAG VISIT (OUTPATIENT)
Dept: CARDIOLOGY | Facility: CLINIC | Age: 64
End: 2019-04-04
Payer: COMMERCIAL

## 2019-04-04 ENCOUNTER — LAB VISIT (OUTPATIENT)
Dept: LAB | Facility: HOSPITAL | Age: 64
End: 2019-04-04
Payer: COMMERCIAL

## 2019-04-04 DIAGNOSIS — Z95.2 STATUS POST HEART VALVE REPLACEMENT WITH MECHANICAL VALVE: ICD-10-CM

## 2019-04-04 DIAGNOSIS — Z79.01 LONG TERM (CURRENT) USE OF ANTICOAGULANTS: ICD-10-CM

## 2019-04-04 DIAGNOSIS — I48.20 CHRONIC ATRIAL FIBRILLATION WITH RAPID VENTRICULAR RESPONSE: ICD-10-CM

## 2019-04-04 LAB
INR PPP: 1.4 (ref 0.8–1.2)
PROTHROMBIN TIME: 14.1 SEC (ref 9–12.5)

## 2019-04-04 PROCEDURE — 93793 ANTICOAG MGMT PT WARFARIN: CPT | Mod: S$GLB,,, | Performed by: PHARMACIST

## 2019-04-04 PROCEDURE — 93793 PR ANTICOAGULANT MGMT FOR PT TAKING WARFARIN: ICD-10-PCS | Mod: S$GLB,,, | Performed by: PHARMACIST

## 2019-04-04 PROCEDURE — 85610 PROTHROMBIN TIME: CPT

## 2019-04-04 PROCEDURE — 36415 COLL VENOUS BLD VENIPUNCTURE: CPT

## 2019-04-04 NOTE — PROGRESS NOTES
INR subtherapeutic today.  Patient had 2 teeth surgically removed and just resumed warfarin on 4/1 at usual dose.  Patient is still noticing some gum bleeding.  Using Lovenox BID.

## 2019-04-06 ENCOUNTER — HOSPITAL ENCOUNTER (EMERGENCY)
Facility: OTHER | Age: 64
Discharge: ANOTHER HOSPITAL | End: 2019-04-06
Attending: EMERGENCY MEDICINE
Payer: COMMERCIAL

## 2019-04-06 VITALS
WEIGHT: 240 LBS | TEMPERATURE: 99 F | DIASTOLIC BLOOD PRESSURE: 78 MMHG | HEART RATE: 92 BPM | BODY MASS INDEX: 44.16 KG/M2 | HEIGHT: 62 IN | SYSTOLIC BLOOD PRESSURE: 153 MMHG | RESPIRATION RATE: 20 BRPM | OXYGEN SATURATION: 95 %

## 2019-04-06 DIAGNOSIS — I48.20 CHRONIC ATRIAL FIBRILLATION: Primary | ICD-10-CM

## 2019-04-06 DIAGNOSIS — R58 BLEEDING: ICD-10-CM

## 2019-04-06 DIAGNOSIS — K08.89 PAIN, DENTAL: ICD-10-CM

## 2019-04-06 DIAGNOSIS — D62 ACUTE BLOOD LOSS AS CAUSE OF POSTOPERATIVE ANEMIA: ICD-10-CM

## 2019-04-06 LAB
ABO + RH BLD: NORMAL
ALBUMIN SERPL BCP-MCNC: 3.5 G/DL (ref 3.5–5.2)
ALP SERPL-CCNC: 61 U/L (ref 55–135)
ALT SERPL W/O P-5'-P-CCNC: 11 U/L (ref 10–44)
ANION GAP SERPL CALC-SCNC: 10 MMOL/L (ref 8–16)
ANISOCYTOSIS BLD QL SMEAR: SLIGHT
AST SERPL-CCNC: 17 U/L (ref 10–40)
BASOPHILS # BLD AUTO: 0.02 K/UL (ref 0–0.2)
BASOPHILS NFR BLD: 0.3 % (ref 0–1.9)
BILIRUB SERPL-MCNC: 0.7 MG/DL (ref 0.1–1)
BLD GP AB SCN CELLS X3 SERPL QL: NORMAL
BNP SERPL-MCNC: 107 PG/ML (ref 0–99)
BUN SERPL-MCNC: 12 MG/DL (ref 8–23)
CALCIUM SERPL-MCNC: 9.6 MG/DL (ref 8.7–10.5)
CHLORIDE SERPL-SCNC: 101 MMOL/L (ref 95–110)
CO2 SERPL-SCNC: 28 MMOL/L (ref 23–29)
CREAT SERPL-MCNC: 0.9 MG/DL (ref 0.5–1.4)
DIFFERENTIAL METHOD: ABNORMAL
EOSINOPHIL # BLD AUTO: 0.2 K/UL (ref 0–0.5)
EOSINOPHIL NFR BLD: 3.2 % (ref 0–8)
ERYTHROCYTE [DISTWIDTH] IN BLOOD BY AUTOMATED COUNT: 17.8 % (ref 11.5–14.5)
EST. GFR  (AFRICAN AMERICAN): >60 ML/MIN/1.73 M^2
EST. GFR  (NON AFRICAN AMERICAN): >60 ML/MIN/1.73 M^2
GIANT PLATELETS BLD QL SMEAR: PRESENT
GLUCOSE SERPL-MCNC: 106 MG/DL (ref 70–110)
HCT VFR BLD AUTO: 32.3 % (ref 37–48.5)
HGB BLD-MCNC: 9.4 G/DL (ref 12–16)
HYPOCHROMIA BLD QL SMEAR: ABNORMAL
INR PPP: 1.8 (ref 0.8–1.2)
LYMPHOCYTES # BLD AUTO: 1.5 K/UL (ref 1–4.8)
LYMPHOCYTES NFR BLD: 20.2 % (ref 18–48)
MCH RBC QN AUTO: 20.3 PG (ref 27–31)
MCHC RBC AUTO-ENTMCNC: 29.1 G/DL (ref 32–36)
MCV RBC AUTO: 70 FL (ref 82–98)
MONOCYTES # BLD AUTO: 0.9 K/UL (ref 0.3–1)
MONOCYTES NFR BLD: 12.2 % (ref 4–15)
NEUTROPHILS # BLD AUTO: 4.6 K/UL (ref 1.8–7.7)
NEUTROPHILS NFR BLD: 64.1 % (ref 38–73)
OVALOCYTES BLD QL SMEAR: ABNORMAL
PLATELET # BLD AUTO: 204 K/UL (ref 150–350)
PLATELET BLD QL SMEAR: ABNORMAL
PMV BLD AUTO: 10.9 FL (ref 9.2–12.9)
POCT GLUCOSE: 104 MG/DL (ref 70–110)
POIKILOCYTOSIS BLD QL SMEAR: SLIGHT
POTASSIUM SERPL-SCNC: 3.9 MMOL/L (ref 3.5–5.1)
PROT SERPL-MCNC: 7 G/DL (ref 6–8.4)
PROTHROMBIN TIME: 20.1 SEC (ref 9–12.5)
RBC # BLD AUTO: 4.64 M/UL (ref 4–5.4)
SODIUM SERPL-SCNC: 139 MMOL/L (ref 136–145)
WBC # BLD AUTO: 7.24 K/UL (ref 3.9–12.7)

## 2019-04-06 PROCEDURE — 93010 ELECTROCARDIOGRAM REPORT: CPT | Mod: ,,, | Performed by: INTERNAL MEDICINE

## 2019-04-06 PROCEDURE — 80053 COMPREHEN METABOLIC PANEL: CPT

## 2019-04-06 PROCEDURE — 83880 ASSAY OF NATRIURETIC PEPTIDE: CPT

## 2019-04-06 PROCEDURE — 63600175 PHARM REV CODE 636 W HCPCS: Performed by: EMERGENCY MEDICINE

## 2019-04-06 PROCEDURE — 85610 PROTHROMBIN TIME: CPT

## 2019-04-06 PROCEDURE — 93005 ELECTROCARDIOGRAM TRACING: CPT

## 2019-04-06 PROCEDURE — 82962 GLUCOSE BLOOD TEST: CPT

## 2019-04-06 PROCEDURE — 93010 EKG 12-LEAD: ICD-10-PCS | Mod: ,,, | Performed by: INTERNAL MEDICINE

## 2019-04-06 PROCEDURE — 85025 COMPLETE CBC W/AUTO DIFF WBC: CPT

## 2019-04-06 PROCEDURE — 96361 HYDRATE IV INFUSION ADD-ON: CPT

## 2019-04-06 PROCEDURE — 96374 THER/PROPH/DIAG INJ IV PUSH: CPT

## 2019-04-06 PROCEDURE — 99291 CRITICAL CARE FIRST HOUR: CPT | Mod: 25

## 2019-04-06 PROCEDURE — 25000003 PHARM REV CODE 250: Performed by: EMERGENCY MEDICINE

## 2019-04-06 PROCEDURE — 86901 BLOOD TYPING SEROLOGIC RH(D): CPT

## 2019-04-06 RX ORDER — ACETAMINOPHEN 325 MG/1
650 TABLET ORAL
Status: DISCONTINUED | OUTPATIENT
Start: 2019-04-06 | End: 2019-04-06 | Stop reason: HOSPADM

## 2019-04-06 RX ORDER — ONDANSETRON 2 MG/ML
4 INJECTION INTRAMUSCULAR; INTRAVENOUS
Status: COMPLETED | OUTPATIENT
Start: 2019-04-06 | End: 2019-04-06

## 2019-04-06 RX ADMIN — ONDANSETRON 4 MG: 2 INJECTION INTRAMUSCULAR; INTRAVENOUS at 12:04

## 2019-04-06 RX ADMIN — SODIUM CHLORIDE 500 ML: 0.9 INJECTION, SOLUTION INTRAVENOUS at 12:04

## 2019-04-06 NOTE — ED NOTES
MD at [facility] discussing POC and pt transfer to [facility] for OMFS consult. Family member at [facility].

## 2019-04-06 NOTE — ED NOTES
Pt AAOx4 and appropriate at this time, sitting upright at 90 degrees, holding suction, suctioning inside her mouth, as needed. Bleeding controlled at this time. Respirations even and unlabored. No acute distress noted.  Awaiting further orders. Pt updated on POC. Bed is locked and in lowest position with side rails up x2. Call bell within reach and pt oriented to use of call bell. Pt remains on continuous cardiac monitoring, continuous pulse ox, and continuous BP cuff. Will continue to monitor. Family member at BS.

## 2019-04-06 NOTE — ED NOTES
Matthewian at BS. Pt AAOx4 and appropriate at this time. Respirations even and unlabored. No acute distress noted. Skin warm and dry.

## 2019-04-06 NOTE — ED NOTES
Appearance: Pt awake, alert & oriented to person, place & time. Pt in no acute distress at present time. Pt is clean and well groomed with clothes appropriately fastened.   Skin: Skin warm, dry & intact. Pt with generalized pallor throughout.  Mucous membranes dry. Bruising to lower face noted. Upper lip swelling noted.   Musculoskeletal: Patient moving all extremities well, no obvious swelling or deformities noted.   Respiratory: Respirations spontaneous, even, and non-labored. Visible chest rise noted. Airway is open and patent. No accessory muscle use noted.   Neurologic: Sensation is intact. Muffled speech noted due to having large clot inside upper mouth noted. Eyes open spontaneously, behavior appropriate to situation, follows commands, facial expression symmetrical, bilateral hand grasp equal and even, purposeful motor response noted.  Cardiac: All peripheral pulses present. No Bilateral lower extremity edema. Cap refill is <3 seconds. Pt denies active chest pains, SOB, dizziness, blurred vision, weakness or fatigue at this time.   Abdomen: Pt denies active abd pains, cramping or discomfort, No N/V/D at this time.

## 2019-04-06 NOTE — ED NOTES
Per Eliza in transfer center, pt to be transferred to [facility] for OMFS consult. Accepting physician ED MD, Dr. Ng. Pt updated on POC.

## 2019-04-06 NOTE — ED NOTES
Called report to [facility] ED, gave report via telephone to GENOVEVA Blackwood.

## 2019-04-06 NOTE — ED PROVIDER NOTES
Encounter Date: 4/6/2019    SCRIBE #1 NOTE: I, Laura Abernathy, am scribing for, and in the presence of,  Dr. Yadav. I have scribed the following portions of the note - the EKG reading. Other sections scribed: Patient re-evaluation.       History     Chief Complaint   Patient presents with    Dental Problem     Pt reports + dental pain after dental surgery removing front teeth on 4/1/2019. Pt reports + active bleeding since procedure. Denies recent injury or truama. + nausea w/out vomiting. Pt reports taking blood thinners.     Coagulation Disorder     + large dark colored clot noted to mouth, no resp distress noted at this time.      63-year-old patient with history of COPD CHF chronic AFib and mechanical valve who is followed by [facility] cardiologist Dr. Breanne Messina and doctor [facility].  Her dentist Dr. Serge Taylor  extracted four upper teeth on April 1st when patient's bridge broke and she needed care to her upper teeth.  She was given Augmentin for several days before the treatment.  Her Coumadin was discontinued and she was bridged with Lovenox.  She did continue her baby aspirin.  Her Coumadin Clinic has scheduled her according to her needs (mecahnical valve) and her INR.  On  March 29th her INR was 2.0.  Currently she is taking 7 mg of Coumadin daily, this is her usual dose. .  She is still taking her metoprolol and her other oral medications.  Since the procedure she has continued to have oozing of blood.She has been  Using several packs of gauze daily.  She develops a very large clot in her mouth,  And eventually the clot is expectorated  she has more bleeding and then another clot develops.  She has been getting  g fatigued with this process.  No cough no vomiting. She is able to eat take her medicines and take in fluids.  On Friday (yesterday) she thought the bleeding was getting worse and she noticed feeling hot and some chills. Her niece took her temperature orally and  found it to be 100 .  They called the dentist.  The dentist told her to go to the emergency room.  Patient did not feel bad enough to need emergency care until till early this morning when she called her niece.  Her niece thinks she looks pale.  Patient is breathing through her nose comfortably and swallowing without dysphagia or odynophagia, however she cannot swallow to keep up with the oozing so spits it into a cup. The  large clot and continual bleeding from her gums is irritating, fatiguing, and concerning to her.  She feels a bit weak in general.  Also nausea from the swallowed blood.  No cough no chest pain. Today she has no fever and chills does report a mild frontal headache.  Patient says yesterday her upper lip started tingling/burning.  It is swollen and tender.  She also has some nontender flat ecchymosis at her mandibular region of her face bilaterally  She denies melena and denies hematuria      Need for admit and Transfer was explained to patient and her niece.  They are in agreement with the transfer    The history is provided by the patient and a relative. The history is limited by the condition of the patient (Patient alert and good historian, but her ability to articulate well is obstructed by the large clot in her mouth and the continual oozing when gauze is applied.).   Dental Pain   The primary symptoms include mouth pain, oral bleeding and fever. Primary symptoms do not include shortness of breath. The symptoms began several days ago (Bleeding started after surgery on 04/01). The symptoms are worsening. The symptoms are new. The symptoms occur constantly.   Mouth pain began 5 - 7 days ago. Mouth pain occurs constantly. Affected locations include: gum(s) (Upper lip swollen, alveolar ridge tender, gums bleeding with large clots). At its highest the mouth pain was at 7/10. The mouth pain is currently at 7/10.   The bleeding is worsening (Bleeding sounds to be basically unchanged, but it is  getting harder to tolerate.). Location of the bleeding: gum(s). Oral bleeding associated with: Elective extraction of 4 upper teeth. The fever began yesterday. The fever has been resolved since its onset. The fever has been present for less than 1 day. The temperature was taken by an oral thermometer. The maximum temperature recorded prior to her arrival was 100 to 100.9 F (Oral temp of 100° reported by her niece went to temperature taking yesterday).   Additional symptoms include: gum swelling and facial swelling. Additional symptoms do not include: pain with swallowing.     Review of patient's allergies indicates:  No Known Allergies  Past Medical History:   Diagnosis Date    Acute on chronic diastolic congestive heart failure     Anticoagulant long-term use     Asthma     Atrial fibrillation     Cataract     Chronic kidney disease     COPD (chronic obstructive pulmonary disease)     Depression     Dysuria     Flank pain     HTN (hypertension)     Nephrolithiasis     KEYSHAWN (obstructive sleep apnea)     awaiting CPAP machine     Rheumatic disease of mitral valve     Urinary incontinence     Urinary tract infection      Past Surgical History:   Procedure Laterality Date     SECTION      COLONOSCOPY N/A 4/10/2014    Performed by Devon Bowling MD at [facility] ENDO (4TH FLR)    CYSTOSCOPY N/A 3/22/2013    Performed by Deandre Galindo Jr., MD at [facility] OR 1ST FLR    ESOPHAGOGASTRODUODENOSCOPY (EGD) N/A 2016    Performed by Jose M Kelly MD at [facility] ENDO (4TH FLR)    EXTRACTION - STONE Right 3/22/2013    Performed by Deandre Galindo Jr., MD at [facility] OR 1ST FLR    kidney stone removal      MITRAL VALVE REPLACEMENT  2004    REMOVAL, STENT, URETER Right 3/22/2013    Performed by De[facility]re Galindo Jr., MD at [facility] OR 1ST FLR    TUBAL LIGATION      URETEROSCOPY Right 3/22/2013    Performed by Deandre Galindo Jr., MD at [facility] OR 1ST FLR     Family History   Problem Relation Age of Onset     Stroke Paternal Grandmother     Colon cancer Maternal Grandmother     Cancer Brother         testicular cancer    Diabetes Sister     Hypertension Sister     Breast cancer Paternal Aunt     Diabetes Sister     Diabetes Sister     Heart disease Father 70        MI    Diabetes Father     Colon cancer Mother     Breast cancer Maternal Aunt     Ovarian cancer Neg Hx      Social History     Tobacco Use    Smoking status: Former Smoker     Packs/day: 0.50     Years: 20.00     Pack years: 10.00     Types: Cigarettes     Last attempt to quit: 2002     Years since quittin.9    Smokeless tobacco: Never Used   Substance Use Topics    Alcohol use: No    Drug use: No     Review of Systems   Constitutional: Positive for fever.   HENT: Positive for dental problem and facial swelling. Negative for voice change.    Eyes: Negative for pain.   Respiratory: Negative for shortness of breath.    Cardiovascular: Negative for chest pain and leg swelling.   Gastrointestinal: Negative for abdominal pain.   Genitourinary: Negative for difficulty urinating and hematuria.   Musculoskeletal: Negative for back pain.   Skin: Positive for pallor.   Neurological: Positive for facial asymmetry. Negative for dizziness and numbness.   Hematological: Bruises/bleeds easily.   Psychiatric/Behavioral: Negative for confusion, decreased concentration and dysphoric mood.       Physical Exam     Initial Vitals [19 1051]   BP Pulse Resp Temp SpO2   (!) 151/74 (!) 111 20 -- 98 %      MAP       --         Physical Exam    Nursing note and vitals reviewed.  Constitutional: She appears well-developed and well-nourished. She is not diaphoretic.   Patient is spitting blood in to a cup, protecting her airway  Her speech is muffled her normal swallowing ability somewhat impaired by a large clot at her upper gums.    She has no trismus no swelling on the floor of her mouth and her posterior pharynx is clear   HENT:   Head:  Normocephalic.       Nose: No mucosal edema or rhinorrhea. No epistaxis. Right sinus exhibits no maxillary sinus tenderness and no frontal sinus tenderness. Left sinus exhibits no maxillary sinus tenderness and no frontal sinus tenderness.   Mouth/Throat: Uvula is midline. Mucous membranes are dry. No trismus in the jaw. Abnormal dentition. No uvula swelling. No posterior oropharyngeal edema.       Eyes: Conjunctivae are normal. Pupils are equal, round, and reactive to light.   Neck: Normal range of motion.   Pulmonary/Chest: Breath sounds normal. She has no wheezes. She has no rales.   Abdominal: Soft.   Musculoskeletal: Normal range of motion.   Neurological: She is alert and oriented to person, place, and time. She has normal strength. No cranial nerve deficit.   Skin: Skin is warm. Capillary refill takes less than 2 seconds. There is pallor.   Psychiatric: She has a normal mood and affect. Thought content normal.         ED Course   Critical Care  Date/Time: 4/6/2019 1:00 PM  Performed by: Katie Yadav MD  Authorized by: Katie Yadav MD   Direct patient critical care time: 10 minutes  Additional history critical care time: 5 minutes  Ordering / reviewing critical care time: 5 minutes  Documentation critical care time: 5 minutes  Consulting other physicians critical care time: 2 minutes  Other critical care time: 3 (Transfer center, 3 calls) minutes  Total critical care time (exclusive of procedural time) : 30 minutes        Labs Reviewed   PROTIME-INR - Abnormal; Notable for the following components:       Result Value    Prothrombin Time 20.1 (*)     INR 1.8 (*)     All other components within normal limits   CBC W/ AUTO DIFFERENTIAL - Abnormal; Notable for the following components:    Hemoglobin 9.4 (*)     Hematocrit 32.3 (*)     MCV 70 (*)     MCH 20.3 (*)     MCHC 29.1 (*)     RDW 17.8 (*)     All other components within normal limits   COMPREHENSIVE METABOLIC PANEL   B-TYPE NATRIURETIC PEPTIDE    TYPE & SCREEN   POCT GLUCOSE   POCT GLUCOSE MONITORING CONTINUOUS     EKG Readings: (Independently Interpreted)   Initial Reading: No STEMI.   Atrial fibrillation. Rate of 104 bpm. No ST elevation. History of atrial fibrillation. Unchanged compared to EKG from 2/26/2019.       Imaging Results          X-Ray Chest 1 View (Final result)  Result time 04/06/19 11:39:34    Final result by bK Raza DO (04/06/19 11:39:34)                 Impression:      No acute cardiopulmonary process.      Electronically signed by: bK Raza DO  Date:    04/06/2019  Time:    11:39             Narrative:    EXAMINATION:  XR CHEST 1 VIEW    CLINICAL HISTORY:  Hemorrhage, not elsewhere classified    TECHNIQUE:  Single frontal view of the chest was performed.    COMPARISON:  02/26/2019    FINDINGS:  No infiltrates or pleural effusions.  There is evidence for prior median sternotomy.  A cardiac valve replacement is noted.  The heart is enlarged.  There is calcification of the aortic knob.                                 Medical Decision Making:   History:   I obtained history from: someone other than patient.       <> Summary of History: History taking from patient, from her niece, and from the medical records  Old Medical Records: I decided to obtain old medical records.  Old Records Summarized: records from clinic visits.  Initial Assessment:   63-year-old female, pale, spitting blood into a cup with large clot in her mouth, adherent to her upper gums.  Status post 4 teeth extraction on April 1st.  She is a Coumadin patient who had a dental procedure and has had continual bleeding  Differential Diagnosis:   Respiratory distress, airway obstruction, aspiration of blood, coagulopathic, anemia, upper lip abscess, angioedema, CHF exacerbation, aspiration pneumonia, amongst other diagnosis  ED Management:  12:10 PM- Patient doing fine. Using suction to suction blood in mouth. She reports clot in mouth has been collecting and  then dislocating and reforming again since surgery. Patient able to swallow an take medications.    12:22 PM- Discussed case with Dr. Villalba (Internal Medicine ). After hearing details, he recommends transferring patient somewhere that has OMFS. Will plan to transfer to [facility].    12:34 PM- Discussed case with transfer center. Will plan to transfer patient to closest facility with OMFS. Patient will most likely be transferred to [facility].            Scribe Attestation:   Scribe #1: I performed the above scribed service and the documentation accurately describes the services I performed. I attest to the accuracy of the note.    Attending Attestation:           Physician Attestation for Scribe:  Physician Attestation Statement for Scribe #1: I, Dr. Yadav, reviewed documentation, as scribed by Laura Abernathy in my presence, and it is both accurate and complete.                    Clinical Impression:     1. Chronic atrial fibrillation    2. Bleeding    3. Acute blood loss as cause of postoperative anemia    4. Pain, dental                                 Katie Yadav MD  04/06/19 1303       Katie Yadav MD  04/06/19 1320       Katie Yadav MD  04/06/19 1330

## 2019-04-06 NOTE — ED NOTES
"Pt to ED, Hx of AFib, currently on oral blood thinners, reporting she had dental procedure 4/1/19, stating "they had to fix my bridge on my upper teeth." pt reporting blood clot to upper mouth with bleeding since procedure. Pt with muffled speech due to large clot located to upper mouth. Mild intermittent bleeding noted from mouth. Denies respiratory or airway difficulty. Pt AAOx4 and appropriate at this time. Respirations even and unlabored. No acute distress noted. Awaiting further orders. Pt updated on POC. Bed is locked and in lowest position with side rails up x2. Call bell within reach and pt oriented to use of call bell. Pt placed on continuous cardiac monitoring, continuous pulse ox, and continuous BP cuff. Will continue to monitor.     "

## 2019-04-06 NOTE — ED NOTES
Pt remains sitting upright using suction to suction sanguinous oral secretions. Pt AAOx4 and appropriate at this time. Respirations even and unlabored. No acute distress noted. . Awaiting further orders. Pt updated on POC. Bed is locked and in lowest position with side rails up x2. Call bell within reach and pt oriented to use of call bell. Pt on continuous cardiac monitoring, continuous pulse ox, and continuous BP cuff. Will continue to monitor.

## 2019-04-08 ENCOUNTER — ANTI-COAG VISIT (OUTPATIENT)
Dept: CARDIOLOGY | Facility: CLINIC | Age: 64
End: 2019-04-08

## 2019-04-08 DIAGNOSIS — I48.20 CHRONIC ATRIAL FIBRILLATION WITH RAPID VENTRICULAR RESPONSE: ICD-10-CM

## 2019-04-08 DIAGNOSIS — Z95.2 STATUS POST HEART VALVE REPLACEMENT WITH MECHANICAL VALVE: ICD-10-CM

## 2019-04-09 NOTE — PROGRESS NOTES
([phone])- [facility]) called 04/09/19 to inform us that patient is being discharged today. Patient is going home on (Warfarin 7.5mg daily, except 5mg Tues). Also (Lovenox 120mg) twice daily. And (Sennacot 8.6mg) 1 tablet twice daily. Please advise.

## 2019-04-10 ENCOUNTER — LAB VISIT (OUTPATIENT)
Dept: LAB | Facility: OTHER | Age: 64
End: 2019-04-10
Payer: COMMERCIAL

## 2019-04-10 ENCOUNTER — ANTI-COAG VISIT (OUTPATIENT)
Dept: CARDIOLOGY | Facility: CLINIC | Age: 64
End: 2019-04-10
Payer: COMMERCIAL

## 2019-04-10 DIAGNOSIS — Z79.01 LONG TERM (CURRENT) USE OF ANTICOAGULANTS: ICD-10-CM

## 2019-04-10 DIAGNOSIS — Z95.2 STATUS POST HEART VALVE REPLACEMENT WITH MECHANICAL VALVE: ICD-10-CM

## 2019-04-10 DIAGNOSIS — I48.20 CHRONIC ATRIAL FIBRILLATION WITH RAPID VENTRICULAR RESPONSE: ICD-10-CM

## 2019-04-10 LAB
INR PPP: 1.1 (ref 0.8–1.2)
PROTHROMBIN TIME: 11.9 SEC (ref 9–12.5)

## 2019-04-10 PROCEDURE — 36415 COLL VENOUS BLD VENIPUNCTURE: CPT

## 2019-04-10 PROCEDURE — 85610 PROTHROMBIN TIME: CPT

## 2019-04-10 PROCEDURE — 93793 ANTICOAG MGMT PT WARFARIN: CPT | Mod: S$GLB,,, | Performed by: PHARMACIST

## 2019-04-10 PROCEDURE — 93793 PR ANTICOAGULANT MGMT FOR PT TAKING WARFARIN: ICD-10-PCS | Mod: S$GLB,,, | Performed by: PHARMACIST

## 2019-04-10 NOTE — PROGRESS NOTES
Patient reports being discharged from [facility]  on yesterday 4/9. Patient reports she was admitted to [facility] due to bleeding post dental procedure.  She reports the bleeding stopped on Saturday evening and the MD at [facility] restarted her coumadin Tuesday 4/9 but she thinks her lovenox was restarted Sunday 4/7.     Reports taking a Lovenox injection on last night and this am     Reports she was told by Dr Juarez to  Lovenox RX from pharmacy but was advised by pharmacy only RX received was for warfarin.   Reports taking 7.5mg last night of warfarin. Confirmed with pharmacy on file that lovenox was not called in. I called in Rx for 120mg syringes #12 with 1 refill(110mg every 12 hours per coumadin clinic). Close watch needed to avoid bleeding issues.

## 2019-04-12 ENCOUNTER — ANTI-COAG VISIT (OUTPATIENT)
Dept: CARDIOLOGY | Facility: CLINIC | Age: 64
End: 2019-04-12
Payer: COMMERCIAL

## 2019-04-12 ENCOUNTER — LAB VISIT (OUTPATIENT)
Dept: LAB | Facility: HOSPITAL | Age: 64
End: 2019-04-12
Payer: COMMERCIAL

## 2019-04-12 DIAGNOSIS — I48.20 CHRONIC ATRIAL FIBRILLATION WITH RAPID VENTRICULAR RESPONSE: ICD-10-CM

## 2019-04-12 DIAGNOSIS — Z95.2 STATUS POST HEART VALVE REPLACEMENT WITH MECHANICAL VALVE: ICD-10-CM

## 2019-04-12 DIAGNOSIS — Z79.01 LONG TERM (CURRENT) USE OF ANTICOAGULANTS: ICD-10-CM

## 2019-04-12 LAB
INR PPP: 1.4 (ref 0.8–1.2)
PROTHROMBIN TIME: 14 SEC (ref 9–12.5)

## 2019-04-12 PROCEDURE — 36415 COLL VENOUS BLD VENIPUNCTURE: CPT

## 2019-04-12 PROCEDURE — 93793 PR ANTICOAGULANT MGMT FOR PT TAKING WARFARIN: ICD-10-PCS | Mod: S$GLB,,,

## 2019-04-12 PROCEDURE — 85610 PROTHROMBIN TIME: CPT

## 2019-04-12 PROCEDURE — 93793 ANTICOAG MGMT PT WARFARIN: CPT | Mod: S$GLB,,,

## 2019-04-12 NOTE — PROGRESS NOTES
INR not at goal. Medications, chart, and patient findings reviewed. Will forego boost dose in light of recent bleeding. Will continue LMWH. See calendar for adjustments to dose and follow up plan.

## 2019-04-16 ENCOUNTER — LAB VISIT (OUTPATIENT)
Dept: LAB | Facility: HOSPITAL | Age: 64
End: 2019-04-16
Payer: COMMERCIAL

## 2019-04-16 ENCOUNTER — ANTI-COAG VISIT (OUTPATIENT)
Dept: CARDIOLOGY | Facility: CLINIC | Age: 64
End: 2019-04-16
Payer: COMMERCIAL

## 2019-04-16 ENCOUNTER — OFFICE VISIT (OUTPATIENT)
Dept: INTERNAL MEDICINE | Facility: CLINIC | Age: 64
End: 2019-04-16
Payer: COMMERCIAL

## 2019-04-16 ENCOUNTER — TELEPHONE (OUTPATIENT)
Dept: INTERNAL MEDICINE | Facility: CLINIC | Age: 64
End: 2019-04-16

## 2019-04-16 VITALS
DIASTOLIC BLOOD PRESSURE: 60 MMHG | SYSTOLIC BLOOD PRESSURE: 132 MMHG | OXYGEN SATURATION: 96 % | BODY MASS INDEX: 43.82 KG/M2 | HEIGHT: 62 IN | HEART RATE: 81 BPM | WEIGHT: 238.13 LBS

## 2019-04-16 DIAGNOSIS — I48.20 CHRONIC ATRIAL FIBRILLATION WITH RAPID VENTRICULAR RESPONSE: ICD-10-CM

## 2019-04-16 DIAGNOSIS — I50.32 CHRONIC DIASTOLIC CONGESTIVE HEART FAILURE: ICD-10-CM

## 2019-04-16 DIAGNOSIS — Z95.2 STATUS POST HEART VALVE REPLACEMENT WITH MECHANICAL VALVE: ICD-10-CM

## 2019-04-16 DIAGNOSIS — K06.8 GUMS, BLEEDING: Primary | ICD-10-CM

## 2019-04-16 DIAGNOSIS — I10 ESSENTIAL (PRIMARY) HYPERTENSION: ICD-10-CM

## 2019-04-16 DIAGNOSIS — Z79.01 CHRONIC ANTICOAGULATION: ICD-10-CM

## 2019-04-16 DIAGNOSIS — M79.89 NODULE OF SOFT TISSUE: ICD-10-CM

## 2019-04-16 DIAGNOSIS — E66.01 MORBID OBESITY WITH BMI OF 45.0-49.9, ADULT: ICD-10-CM

## 2019-04-16 DIAGNOSIS — J44.0 COPD (CHRONIC OBSTRUCTIVE PULMONARY DISEASE) WITH ACUTE BRONCHITIS: ICD-10-CM

## 2019-04-16 DIAGNOSIS — J20.9 COPD (CHRONIC OBSTRUCTIVE PULMONARY DISEASE) WITH ACUTE BRONCHITIS: ICD-10-CM

## 2019-04-16 DIAGNOSIS — G47.31 COMPLEX SLEEP APNEA SYNDROME: ICD-10-CM

## 2019-04-16 DIAGNOSIS — E78.2 MIXED HYPERLIPIDEMIA: ICD-10-CM

## 2019-04-16 DIAGNOSIS — R73.02 GLUCOSE INTOLERANCE (IMPAIRED GLUCOSE TOLERANCE): ICD-10-CM

## 2019-04-16 DIAGNOSIS — Z79.01 LONG TERM (CURRENT) USE OF ANTICOAGULANTS: ICD-10-CM

## 2019-04-16 LAB
INR PPP: 2.1 (ref 0.8–1.2)
PROTHROMBIN TIME: 20.5 SEC (ref 9–12.5)

## 2019-04-16 PROCEDURE — 3008F BODY MASS INDEX DOCD: CPT | Mod: CPTII,S$GLB,, | Performed by: NURSE PRACTITIONER

## 2019-04-16 PROCEDURE — 3078F PR MOST RECENT DIASTOLIC BLOOD PRESSURE < 80 MM HG: ICD-10-PCS | Mod: CPTII,S$GLB,, | Performed by: NURSE PRACTITIONER

## 2019-04-16 PROCEDURE — 99214 PR OFFICE/OUTPT VISIT, EST, LEVL IV, 30-39 MIN: ICD-10-PCS | Mod: 25,S$GLB,, | Performed by: NURSE PRACTITIONER

## 2019-04-16 PROCEDURE — 3075F PR MOST RECENT SYSTOLIC BLOOD PRESS GE 130-139MM HG: ICD-10-PCS | Mod: CPTII,S$GLB,, | Performed by: NURSE PRACTITIONER

## 2019-04-16 PROCEDURE — 3008F PR BODY MASS INDEX (BMI) DOCUMENTED: ICD-10-PCS | Mod: CPTII,S$GLB,, | Performed by: NURSE PRACTITIONER

## 2019-04-16 PROCEDURE — 3075F SYST BP GE 130 - 139MM HG: CPT | Mod: CPTII,S$GLB,, | Performed by: NURSE PRACTITIONER

## 2019-04-16 PROCEDURE — 99999 PR PBB SHADOW E&M-EST. PATIENT-LVL V: ICD-10-PCS | Mod: PBBFAC,,, | Performed by: NURSE PRACTITIONER

## 2019-04-16 PROCEDURE — 99214 OFFICE O/P EST MOD 30 MIN: CPT | Mod: 25,S$GLB,, | Performed by: NURSE PRACTITIONER

## 2019-04-16 PROCEDURE — 93793 PR ANTICOAGULANT MGMT FOR PT TAKING WARFARIN: ICD-10-PCS | Mod: S$GLB,,, | Performed by: PHARMACIST

## 2019-04-16 PROCEDURE — 99999 PR PBB SHADOW E&M-EST. PATIENT-LVL V: CPT | Mod: PBBFAC,,, | Performed by: NURSE PRACTITIONER

## 2019-04-16 PROCEDURE — 36415 COLL VENOUS BLD VENIPUNCTURE: CPT

## 2019-04-16 PROCEDURE — 85610 PROTHROMBIN TIME: CPT

## 2019-04-16 PROCEDURE — 93793 ANTICOAG MGMT PT WARFARIN: CPT | Mod: S$GLB,,, | Performed by: PHARMACIST

## 2019-04-16 PROCEDURE — 3078F DIAST BP <80 MM HG: CPT | Mod: CPTII,S$GLB,, | Performed by: NURSE PRACTITIONER

## 2019-04-16 NOTE — PROGRESS NOTES
INTERNAL MEDICINE PROGRESS NOTE    CHIEF COMPLAINT     Chief Complaint   Patient presents with    Follow-up    Headache       HPI     Elayne Almanzar is a 63 y.o. female with COPD, CHF, chronic Afib and mechanical valve who presents for an ED follow up visit today.    Was seen in the ED 4/6/2019 for dental pain and bleeding - he is on Coumadin transferred to [facility] where OMFS was consulted. She p/w prolonged postop bleeding after dental extraction of multiple teeth on 4/1 that worsened as pt was placed lovenox bridge to warfarin. Pt. Was initially given vitamin K to decrease bleeding and bleeding stopped 1 days after admission.   Has follow up with Dr Salmon who performed procedure  Tomorrow     Prolonged post-op gum bleeding 2/2 extraction of multiple teeth on 4/1 in setting of Lovenox bridge. Hb trended down to 7.9 from baseline of 10.9 inpatient.     Chronic afib- EKG Afib INR decreased from 2.5 to 2.2. On metoprolol 200mg. Coumadin held throughout stay. Restarted on coumadin at d/c - continue lovenox bridge   Today anticoag visit - IRN 2.1     Hx of rheumatic heart disease - pt at high risk of CVA when not on A/C lovenox bridge was started after it was confirmed for 1 day that she did not have any bleeding in her gums    Gum pain- initially needed percocet, after pain resolved percocet d/c'ed and pain controlled with tylenol 650mg prn.     COPD- advair 100-50 and albuterol PRN.     Depression zoloft 100mg nightly     Bladder spasms- oxybutinin 10mg     HLD- atorvastatin 10mg     Here for hospital follow up - She has completed lovenox bridge and is taking coumadin as directed. Woke up this morning with minimal bleeding from the one spot on the front left. Attributes to CPAP use at night. Has mild tenderness at the gumline. Also with headache to the frontal region.   Denies fever and chills.   INR this morning 2.1.     Also with right ankle soft tissue swelling with area of induration. Lump is itchy and tender  to touch with warm. Since 4/6/2019. Has increased in size.     Past Medical History:  Past Medical History:   Diagnosis Date    Acute on chronic diastolic congestive heart failure     Anticoagulant long-term use     Asthma     Atrial fibrillation 2002    Cataract     Chronic kidney disease     COPD (chronic obstructive pulmonary disease)     Depression     Dysuria     Flank pain     HTN (hypertension)     Nephrolithiasis     KEYSHAWN (obstructive sleep apnea)     awaiting CPAP machine     Rheumatic disease of mitral valve     Urinary incontinence     Urinary tract infection        Home Medications:  Prior to Admission medications    Medication Sig Start Date End Date Taking? Authorizing Provider   albuterol (PROVENTIL/VENTOLIN HFA) 90 mcg/actuation inhaler Inhale 2 puffs into the lungs every 6 (six) hours as needed for Wheezing. Rescue 3/8/19 3/7/20 Yes Nubia Crocker MD   aspirin (ECOTRIN) 81 MG EC tablet Take 81 mg by mouth once daily.    Yes Historical Provider, MD   enoxaparin (LOVENOX) 120 mg/0.8 mL Syrg Inject 110 mg into the skin every 12 (twelve) hours. Dose is 110mg every 12 hours per coumadin clinic   Yes Historical Provider, MD   enoxaparin (LOVENOX) 120 mg/0.8 mL Syrg Inject 110 mg into the skin every 12 hours as directed by clinic 3/28/19  Yes Lorraine Messina MD   ergocalciferol (ERGOCALCIFEROL) 50,000 unit Cap Take 1 capsule (50,000 Units total) by mouth twice a week.  Patient taking differently: Take 50,000 Units by mouth once a week. on tuesday 5/18/17  Yes Jennifer Marquez PA-C   fluticasone (FLONASE) 50 mcg/actuation nasal spray 2 sprays by Each Nare route once daily. 11/7/17  Yes Domenica Carvalho MD   fluticasone-salmeterol diskus inhaler 500-50 mcg Inhale 1 puff into the lungs 2 (two) times daily. 3/8/19  Yes Nubia Crocker MD   furosemide (LASIX) 20 MG tablet Take 1 tablet (20 mg total) by mouth once daily. 3/8/19  Yes Nubia Crocker MD   gabapentin (NEURONTIN) 300 MG  "capsule Take 1 capsule (300 mg total) by mouth every evening. 9/10/18 9/10/19 Yes Nubia Crocker MD   HYDROcodone-acetaminophen (NORCO) 5-325 mg per tablet Take 1 tablet by mouth every 8 (eight) hours as needed for Pain. 3/8/19  Yes Nubia Crocker MD   latanoprost (XALATAN) 0.005 % ophthalmic solution Place 1 drop into the right eye once daily. 12/5/18 12/5/19 Yes Khushbu Funes, OD   metoprolol succinate (TOPROL-XL) 200 MG 24 hr tablet TAKE 1 TABLET (200 MG TOTAL) BY MOUTH ONCE DAILY. 7/10/18  Yes Kassi Lang NP   metoprolol succinate (TOPROL-XL) 200 MG 24 hr tablet Take 1 tablet (200 mg total) by mouth once daily. 9/10/18  Yes Nubia Crocker MD   needle, disp, 26 gauge 26 gauge x 1/2" Ndle 1 application by Misc.(Non-Drug; Combo Route) route once a week. 5/24/17  Yes Jennifer Marquez PA-C   olopatadine (PATADAY) 0.2 % Drop Place 1 drop into both eyes every morning. 12/5/18 12/5/19 Yes Khushbu Funes, OD   oxybutynin (DITROPAN XL) 15 MG TR24 Take 1 tablet (15 mg total) by mouth once daily. 10/16/18 10/16/19 Yes Diane Wilkinson NP   oxybutynin (DITROPAN-XL) 10 MG 24 hr tablet TAKE 1 TABLET  10 MG TOTAL  BY MOUTH ONCE DAILY 6/4/18  Yes Breana Mcnair MD   pravastatin (PRAVACHOL) 40 MG tablet Take 1 tablet (40 mg total) by mouth once daily. 3/8/19 3/7/20 Yes Nubia Crocker MD   sertraline (ZOLOFT) 100 MG tablet TAKE 1 TABLET (100 MG TOTAL) BY MOUTH ONCE DAILY. 1/23/19  Yes Nubia Crocker MD   tamsulosin (FLOMAX) 0.4 mg Cap Take 1 capsule (0.4 mg total) by mouth once daily. 11/9/18 11/9/19 Yes Betsy Shah, NP   VENTOLIN HFA 90 mcg/actuation inhaler INHALE 1-2 PUFFS INTO THE LUNGS EVERY 6 (SIX) HOURS AS NEEDED FOR WHEEZING OR SHORTNESS OF BREATH. RESCUE 7/10/18 7/10/19 Yes Nubia Crocker MD   warfarin (COUMADIN) 5 MG tablet TAKE 1-1.5 PILLS BY MOUTH DAILY OR AS DIRECTED BY COUMADIN CLINIC.; #130 PILLS = 3 MONTH SUPPLY 3/1/19  Yes Lorraine Messina MD   warfarin (COUMADIN) 5 " "MG tablet Take 1 1/2 tablets (7.5mg) by mouth daily, except 1 tablet (5mg.) on Tuesdays, Or as directed by Coumadin Clinic 2/28/19  Yes Lorraine Messina MD       Review of Systems:  Review of Systems   Constitutional: Negative for chills, fatigue and fever.   Respiratory: Negative for cough, shortness of breath and wheezing.    Cardiovascular: Positive for palpitations (heart racing ). Negative for chest pain.   Neurological: Positive for headaches. Negative for dizziness and light-headedness.       Health Maintainence:   Immunizations:  Health Maintenance       Date Due Completion Date    TETANUS VACCINE 07/29/1974 ---    Colonoscopy 04/10/2015 4/10/2014    Zoster Vaccine 07/29/2016 ---    Influenza Vaccine 08/01/2018 ---    Mammogram 10/03/2019 10/3/2018    Lipid Panel 03/08/2020 3/8/2019    Pap Smear with HPV Cotest 10/30/2020 10/30/2017           PHYSICAL EXAM     /60 (BP Location: Left arm, Patient Position: Sitting, BP Method: Large (Manual))   Pulse 81   Ht 5' 2" (1.575 m)   Wt 108 kg (238 lb 1.6 oz)   LMP 07/22/2012   SpO2 96%   BMI 43.55 kg/m²     Physical Exam   Constitutional: She is oriented to person, place, and time. She appears well-developed and well-nourished.   HENT:   Head: Normocephalic.   Right Ear: External ear normal.   Left Ear: External ear normal.   Nose: Nose normal.   Mouth/Throat: Oropharynx is clear and moist. No trismus in the jaw. Abnormal dentition. No uvula swelling or lacerations. No oropharyngeal exudate.       Eyes: Pupils are equal, round, and reactive to light.   Neck: Neck supple. No JVD present. No tracheal deviation present. No thyromegaly present.   Cardiovascular: Normal rate, normal heart sounds and intact distal pulses. An irregular rhythm present. Exam reveals no gallop and no friction rub.   No murmur heard.  Pulmonary/Chest: Effort normal and breath sounds normal. No respiratory distress. She has no wheezes. She has no rales.   Abdominal: Soft. Bowel " sounds are normal. She exhibits no distension. There is no tenderness.   Musculoskeletal: Normal range of motion. She exhibits no edema or tenderness.   Lymphadenopathy:     She has no cervical adenopathy.   Neurological: She is alert and oriented to person, place, and time.   Skin: Skin is warm and dry. Rash noted. Rash is nodular.        Psychiatric: She has a normal mood and affect. Her behavior is normal.   Vitals reviewed.      LABS     Lab Results   Component Value Date    HGBA1C 6.1 05/12/2017     CMP  Sodium   Date Value Ref Range Status   04/06/2019 139 136 - 145 mmol/L Final     Potassium   Date Value Ref Range Status   04/06/2019 3.9 3.5 - 5.1 mmol/L Final     Chloride   Date Value Ref Range Status   04/06/2019 101 95 - 110 mmol/L Final     CO2   Date Value Ref Range Status   04/06/2019 28 23 - 29 mmol/L Final     Glucose   Date Value Ref Range Status   04/06/2019 106 70 - 110 mg/dL Final     BUN, Bld   Date Value Ref Range Status   04/06/2019 12 8 - 23 mg/dL Final     Creatinine   Date Value Ref Range Status   04/06/2019 0.9 0.5 - 1.4 mg/dL Final     Calcium   Date Value Ref Range Status   04/06/2019 9.6 8.7 - 10.5 mg/dL Final     Total Protein   Date Value Ref Range Status   04/06/2019 7.0 6.0 - 8.4 g/dL Final     Albumin   Date Value Ref Range Status   04/06/2019 3.5 3.5 - 5.2 g/dL Final     Total Bilirubin   Date Value Ref Range Status   04/06/2019 0.7 0.1 - 1.0 mg/dL Final     Comment:     For infants and newborns, interpretation of results should be based  on gestational age, weight and in agreement with clinical  observations.  Premature Infant recommended reference ranges:  Up to 24 hours.............<8.0 mg/dL  Up to 48 hours............<12.0 mg/dL  3-5 days..................<15.0 mg/dL  6-29 days.................<15.0 mg/dL       Alkaline Phosphatase   Date Value Ref Range Status   04/06/2019 61 55 - 135 U/L Final     AST   Date Value Ref Range Status   04/06/2019 17 10 - 40 U/L Final     ALT    Date Value Ref Range Status   04/06/2019 11 10 - 44 U/L Final     Anion Gap   Date Value Ref Range Status   04/06/2019 10 8 - 16 mmol/L Final     eGFR if    Date Value Ref Range Status   04/06/2019 >60 >60 mL/min/1.73 m^2 Final     eGFR if non    Date Value Ref Range Status   04/06/2019 >60 >60 mL/min/1.73 m^2 Final     Comment:     Calculation used to obtain the estimated glomerular filtration  rate (eGFR) is the CKD-EPI equation.        Lab Results   Component Value Date    WBC 7.24 04/06/2019    HGB 9.4 (L) 04/06/2019    HCT 32.3 (L) 04/06/2019    MCV 70 (L) 04/06/2019     04/06/2019     Lab Results   Component Value Date    CHOL 228 (H) 03/08/2019    CHOL 229 (H) 09/10/2018    CHOL 218 (H) 09/18/2017     Lab Results   Component Value Date    HDL 66 03/08/2019    HDL 40 09/10/2018    HDL 54 09/18/2017     Lab Results   Component Value Date    LDLCALC 139.2 03/08/2019    LDLCALC 159.2 (H) 09/10/2018    LDLCALC 132.8 09/18/2017     Lab Results   Component Value Date    TRIG 114 03/08/2019    TRIG 149 09/10/2018    TRIG 156 (H) 09/18/2017     Lab Results   Component Value Date    CHOLHDL 28.9 03/08/2019    CHOLHDL 17.5 (L) 09/10/2018    CHOLHDL 24.8 09/18/2017     Lab Results   Component Value Date    TSH 1.982 05/08/2018    W0EMEML 91 05/12/2017    X0ZOXLS 6.0 05/12/2017       ASSESSMENT/PLAN     Elayne Almanzar is a 63 y.o. female with  Past Medical History:   Diagnosis Date    Acute on chronic diastolic congestive heart failure     Anticoagulant long-term use     Asthma     Atrial fibrillation 2002    Cataract     Chronic kidney disease     COPD (chronic obstructive pulmonary disease)     Depression     Dysuria     Flank pain     HTN (hypertension)     Nephrolithiasis     KEYSHAWN (obstructive sleep apnea)     awaiting CPAP machine     Rheumatic disease of mitral valve     Urinary incontinence     Urinary tract infection      Gums, bleeding- f/u with  dentist tomorrow. Avoid CPAP at this time - likely causing tissue to dry out and bleed easily. Will check CBC.   -     CBC auto differential; Future; Expected date: 04/16/2019    Chronic atrial fibrillation with rapid ventricular response- rate controlled. Will cont metoprolol and coumadin     Chronic diastolic congestive heart failure- weight stable. No swelling. Cont lasix, metoprolol, statin. Low Na diet and daily weights     Essential (primary) hypertension- at goal. Cont current meds.     Mixed hyperlipidemia- stable. Cont statin     Status post heart valve replacement with mechanical valve- stable. Cont coumadin     COPD (chronic obstructive pulmonary disease) with acute bronchitis- stable. No wheezing. Cont advair bid and albuterol as needed     Chronic anticoagulation- stable. INR reviewed.     Glucose intolerance (impaired glucose tolerance)- stable. Cont low sugar and carb diet     Complex sleep apnea syndrome    Nodule of soft tissue- will send for u/s   -     Cancel: US Extremity Non Vascular Complete Right; Future; Expected date: 04/16/2019  -     US Extremity Non Vascular Limited Right; Future; Expected date: 04/16/2019    Morbid obesity with BMI of 45.0-49.9, adult    Follow up with PCP in 1 month     Patient education provided from [facility]. Patient was counseled on when and how to seek emergent care.       Jia GRAVES, ZACHARIAH, FNP-c   Department of Internal Medicine - [facility]  9:58 AM

## 2019-04-16 NOTE — PROGRESS NOTES
"Confirmed coumadin 10mg 4/12 and correct dose after.    Reports having 1 Lovenox injection remaining.     Reports gum bleeding, seen by nurse pract. Today. (Woke up this morning with minimal bleeding from the one spot on the front left. Attributes to CPAP use at night. Per GIANNA Wilson NP, "  Gums, bleeding- f/u with dentist tomorrow. Avoid CPAP at this time - likely causing tissue to dry out and bleed easily. Will check CBC. Coumadin continued         Denies any other changes. Due to bleeding and INR of 2.1 we will stop the lovenox at this time .   Patient will be advised to call coumadin clinic if bleeding restarts.   "

## 2019-04-16 NOTE — PROGRESS NOTES
Patient was advised of coumadin instructions: 4/16 -7.5mg, 4/17 -5mg and get lab redrawn early 4/18/19, Patient stated understanding, appointment already booked

## 2019-04-17 ENCOUNTER — HOSPITAL ENCOUNTER (OUTPATIENT)
Dept: RADIOLOGY | Facility: HOSPITAL | Age: 64
Discharge: HOME | End: 2019-04-17
Attending: NURSE PRACTITIONER
Payer: COMMERCIAL

## 2019-04-17 DIAGNOSIS — M79.89 NODULE OF SOFT TISSUE: ICD-10-CM

## 2019-04-17 PROCEDURE — 76882 US EXTREMITY NON VASCULAR LIMITED RIGHT: ICD-10-PCS | Mod: 26,RT,, | Performed by: RADIOLOGY

## 2019-04-17 PROCEDURE — 76882 US LMTD JT/FCL EVL NVASC XTR: CPT | Mod: 26,RT,, | Performed by: RADIOLOGY

## 2019-04-17 PROCEDURE — 76882 US LMTD JT/FCL EVL NVASC XTR: CPT | Mod: TC,RT

## 2019-04-18 ENCOUNTER — LAB VISIT (OUTPATIENT)
Dept: LAB | Facility: HOSPITAL | Age: 64
End: 2019-04-18
Attending: INTERNAL MEDICINE
Payer: COMMERCIAL

## 2019-04-18 ENCOUNTER — ANTI-COAG VISIT (OUTPATIENT)
Dept: CARDIOLOGY | Facility: CLINIC | Age: 64
End: 2019-04-18
Payer: COMMERCIAL

## 2019-04-18 DIAGNOSIS — Z79.01 LONG TERM (CURRENT) USE OF ANTICOAGULANTS: ICD-10-CM

## 2019-04-18 DIAGNOSIS — I48.20 CHRONIC ATRIAL FIBRILLATION WITH RAPID VENTRICULAR RESPONSE: ICD-10-CM

## 2019-04-18 DIAGNOSIS — Z95.2 STATUS POST HEART VALVE REPLACEMENT WITH MECHANICAL VALVE: ICD-10-CM

## 2019-04-18 LAB
INR PPP: 2.3 (ref 0.8–1.2)
PROTHROMBIN TIME: 21.9 SEC (ref 9–12.5)

## 2019-04-18 PROCEDURE — 93793 PR ANTICOAGULANT MGMT FOR PT TAKING WARFARIN: ICD-10-PCS | Mod: S$GLB,,, | Performed by: PHARMACIST

## 2019-04-18 PROCEDURE — 36415 COLL VENOUS BLD VENIPUNCTURE: CPT

## 2019-04-18 PROCEDURE — 93793 ANTICOAG MGMT PT WARFARIN: CPT | Mod: S$GLB,,, | Performed by: PHARMACIST

## 2019-04-18 PROCEDURE — 85610 PROTHROMBIN TIME: CPT

## 2019-04-22 ENCOUNTER — ANTI-COAG VISIT (OUTPATIENT)
Dept: CARDIOLOGY | Facility: CLINIC | Age: 64
End: 2019-04-22
Payer: COMMERCIAL

## 2019-04-22 ENCOUNTER — LAB VISIT (OUTPATIENT)
Dept: LAB | Facility: HOSPITAL | Age: 64
End: 2019-04-22
Attending: INTERNAL MEDICINE
Payer: COMMERCIAL

## 2019-04-22 DIAGNOSIS — Z95.2 STATUS POST HEART VALVE REPLACEMENT WITH MECHANICAL VALVE: ICD-10-CM

## 2019-04-22 DIAGNOSIS — I48.20 CHRONIC ATRIAL FIBRILLATION WITH RAPID VENTRICULAR RESPONSE: ICD-10-CM

## 2019-04-22 DIAGNOSIS — Z79.01 LONG TERM (CURRENT) USE OF ANTICOAGULANTS: ICD-10-CM

## 2019-04-22 LAB
INR PPP: 2.5 (ref 0.8–1.2)
PROTHROMBIN TIME: 24.4 SEC (ref 9–12.5)

## 2019-04-22 PROCEDURE — 93793 ANTICOAG MGMT PT WARFARIN: CPT | Mod: S$GLB,,, | Performed by: PHARMACIST

## 2019-04-22 PROCEDURE — 93793 PR ANTICOAGULANT MGMT FOR PT TAKING WARFARIN: ICD-10-PCS | Mod: S$GLB,,, | Performed by: PHARMACIST

## 2019-04-22 PROCEDURE — 85610 PROTHROMBIN TIME: CPT

## 2019-04-22 PROCEDURE — 36415 COLL VENOUS BLD VENIPUNCTURE: CPT

## 2019-04-23 NOTE — PROGRESS NOTES
Patient was called and given lab result, verified coumadin: 7.5mg daily except 5mg -Tuesday, no other changes reported

## 2019-04-23 NOTE — PROGRESS NOTES
Patient was given lab result, coumadin instructions: 7.5mg daily except 5mg-Wednesday and redraw lab 4/25, patient stated understanding, appointment booked

## 2019-04-25 ENCOUNTER — ANTI-COAG VISIT (OUTPATIENT)
Dept: CARDIOLOGY | Facility: CLINIC | Age: 64
End: 2019-04-25
Payer: COMMERCIAL

## 2019-04-25 ENCOUNTER — LAB VISIT (OUTPATIENT)
Dept: LAB | Facility: HOSPITAL | Age: 64
End: 2019-04-25
Payer: COMMERCIAL

## 2019-04-25 DIAGNOSIS — Z95.2 STATUS POST HEART VALVE REPLACEMENT WITH MECHANICAL VALVE: ICD-10-CM

## 2019-04-25 DIAGNOSIS — Z79.01 CHRONIC ANTICOAGULATION: ICD-10-CM

## 2019-04-25 DIAGNOSIS — I48.20 CHRONIC ATRIAL FIBRILLATION WITH RAPID VENTRICULAR RESPONSE: ICD-10-CM

## 2019-04-25 LAB
INR PPP: 2.5 (ref 0.8–1.2)
PROTHROMBIN TIME: 23.8 SEC (ref 9–12.5)

## 2019-04-25 PROCEDURE — 85610 PROTHROMBIN TIME: CPT

## 2019-04-25 PROCEDURE — 93793 ANTICOAG MGMT PT WARFARIN: CPT | Mod: S$GLB,,,

## 2019-04-25 PROCEDURE — 36415 COLL VENOUS BLD VENIPUNCTURE: CPT

## 2019-04-25 PROCEDURE — 93793 PR ANTICOAGULANT MGMT FOR PT TAKING WARFARIN: ICD-10-PCS | Mod: S$GLB,,,

## 2019-05-01 ENCOUNTER — TELEPHONE (OUTPATIENT)
Dept: INTERNAL MEDICINE | Facility: CLINIC | Age: 64
End: 2019-05-01

## 2019-05-08 ENCOUNTER — LAB VISIT (OUTPATIENT)
Dept: LAB | Facility: HOSPITAL | Age: 64
End: 2019-05-08
Payer: COMMERCIAL

## 2019-05-08 ENCOUNTER — ANTI-COAG VISIT (OUTPATIENT)
Dept: CARDIOLOGY | Facility: CLINIC | Age: 64
End: 2019-05-08
Payer: COMMERCIAL

## 2019-05-08 DIAGNOSIS — I48.20 CHRONIC ATRIAL FIBRILLATION WITH RAPID VENTRICULAR RESPONSE: ICD-10-CM

## 2019-05-08 DIAGNOSIS — Z79.01 CHRONIC ANTICOAGULATION: ICD-10-CM

## 2019-05-08 DIAGNOSIS — Z95.2 STATUS POST HEART VALVE REPLACEMENT WITH MECHANICAL VALVE: ICD-10-CM

## 2019-05-08 LAB
INR PPP: 3.2 (ref 0.8–1.2)
PROTHROMBIN TIME: 31.3 SEC (ref 9–12.5)

## 2019-05-08 PROCEDURE — 36415 COLL VENOUS BLD VENIPUNCTURE: CPT

## 2019-05-08 PROCEDURE — 93793 PR ANTICOAGULANT MGMT FOR PT TAKING WARFARIN: ICD-10-PCS | Mod: S$GLB,,,

## 2019-05-08 PROCEDURE — 85610 PROTHROMBIN TIME: CPT

## 2019-05-08 PROCEDURE — 93793 ANTICOAG MGMT PT WARFARIN: CPT | Mod: S$GLB,,,

## 2019-05-20 ENCOUNTER — ANTI-COAG VISIT (OUTPATIENT)
Dept: CARDIOLOGY | Facility: CLINIC | Age: 64
End: 2019-05-20
Payer: COMMERCIAL

## 2019-05-20 ENCOUNTER — OFFICE VISIT (OUTPATIENT)
Dept: INTERNAL MEDICINE | Facility: CLINIC | Age: 64
End: 2019-05-20
Payer: COMMERCIAL

## 2019-05-20 ENCOUNTER — LAB VISIT (OUTPATIENT)
Dept: LAB | Facility: HOSPITAL | Age: 64
End: 2019-05-20
Payer: COMMERCIAL

## 2019-05-20 DIAGNOSIS — Z79.01 CHRONIC ANTICOAGULATION: ICD-10-CM

## 2019-05-20 DIAGNOSIS — Z95.2 STATUS POST HEART VALVE REPLACEMENT WITH MECHANICAL VALVE: ICD-10-CM

## 2019-05-20 DIAGNOSIS — Z80.0 FAMILY HISTORY OF COLON CANCER: Primary | ICD-10-CM

## 2019-05-20 DIAGNOSIS — I10 ESSENTIAL HYPERTENSION: Chronic | ICD-10-CM

## 2019-05-20 DIAGNOSIS — I50.33 ACUTE ON CHRONIC DIASTOLIC CONGESTIVE HEART FAILURE: ICD-10-CM

## 2019-05-20 DIAGNOSIS — I48.20 CHRONIC ATRIAL FIBRILLATION WITH RAPID VENTRICULAR RESPONSE: ICD-10-CM

## 2019-05-20 LAB
INR PPP: 2.6 (ref 0.8–1.2)
PROTHROMBIN TIME: 26.8 SEC (ref 9–12.5)

## 2019-05-20 PROCEDURE — 99214 PR OFFICE/OUTPT VISIT, EST, LEVL IV, 30-39 MIN: ICD-10-PCS | Mod: 25,S$GLB,, | Performed by: INTERNAL MEDICINE

## 2019-05-20 PROCEDURE — 93793 PR ANTICOAGULANT MGMT FOR PT TAKING WARFARIN: ICD-10-PCS | Mod: S$GLB,,,

## 2019-05-20 PROCEDURE — 3079F PR MOST RECENT DIASTOLIC BLOOD PRESSURE 80-89 MM HG: ICD-10-PCS | Mod: CPTII,S$GLB,, | Performed by: INTERNAL MEDICINE

## 2019-05-20 PROCEDURE — 99214 OFFICE O/P EST MOD 30 MIN: CPT | Mod: 25,S$GLB,, | Performed by: INTERNAL MEDICINE

## 2019-05-20 PROCEDURE — 36415 COLL VENOUS BLD VENIPUNCTURE: CPT

## 2019-05-20 PROCEDURE — 3008F BODY MASS INDEX DOCD: CPT | Mod: CPTII,S$GLB,, | Performed by: INTERNAL MEDICINE

## 2019-05-20 PROCEDURE — 99999 PR PBB SHADOW E&M-EST. PATIENT-LVL IV: CPT | Mod: PBBFAC,,, | Performed by: INTERNAL MEDICINE

## 2019-05-20 PROCEDURE — 3008F PR BODY MASS INDEX (BMI) DOCUMENTED: ICD-10-PCS | Mod: CPTII,S$GLB,, | Performed by: INTERNAL MEDICINE

## 2019-05-20 PROCEDURE — 85610 PROTHROMBIN TIME: CPT

## 2019-05-20 PROCEDURE — 3079F DIAST BP 80-89 MM HG: CPT | Mod: CPTII,S$GLB,, | Performed by: INTERNAL MEDICINE

## 2019-05-20 PROCEDURE — 99999 PR PBB SHADOW E&M-EST. PATIENT-LVL IV: ICD-10-PCS | Mod: PBBFAC,,, | Performed by: INTERNAL MEDICINE

## 2019-05-20 PROCEDURE — 3075F SYST BP GE 130 - 139MM HG: CPT | Mod: CPTII,S$GLB,, | Performed by: INTERNAL MEDICINE

## 2019-05-20 PROCEDURE — 3075F PR MOST RECENT SYSTOLIC BLOOD PRESS GE 130-139MM HG: ICD-10-PCS | Mod: CPTII,S$GLB,, | Performed by: INTERNAL MEDICINE

## 2019-05-20 PROCEDURE — 93793 ANTICOAG MGMT PT WARFARIN: CPT | Mod: S$GLB,,,

## 2019-05-20 RX ORDER — AMOXICILLIN 500 MG/1
CAPSULE ORAL
Refills: 0 | COMMUNITY
Start: 2019-05-08 | End: 2019-09-23

## 2019-05-20 RX ORDER — ALBUTEROL SULFATE 90 UG/1
2 AEROSOL, METERED RESPIRATORY (INHALATION) EVERY 6 HOURS PRN
Qty: 1 INHALER | Refills: 6 | Status: SHIPPED | OUTPATIENT
Start: 2019-05-20 | End: 2019-09-20 | Stop reason: SDUPTHER

## 2019-05-20 RX ORDER — FUROSEMIDE 20 MG/1
20 TABLET ORAL DAILY
Qty: 60 TABLET | Refills: 3 | Status: SHIPPED | OUTPATIENT
Start: 2019-05-20 | End: 2019-09-20 | Stop reason: SDUPTHER

## 2019-05-20 RX ORDER — METOPROLOL SUCCINATE 200 MG/1
200 TABLET, EXTENDED RELEASE ORAL DAILY
Qty: 30 TABLET | Refills: 4 | Status: SHIPPED | OUTPATIENT
Start: 2019-05-20 | End: 2019-09-20 | Stop reason: SDUPTHER

## 2019-05-20 RX ORDER — SERTRALINE HYDROCHLORIDE 100 MG/1
100 TABLET, FILM COATED ORAL DAILY
Qty: 30 TABLET | Refills: 4 | Status: SHIPPED | OUTPATIENT
Start: 2019-05-20 | End: 2019-09-20 | Stop reason: SDUPTHER

## 2019-05-20 RX ORDER — PRAVASTATIN SODIUM 40 MG/1
40 TABLET ORAL DAILY
Qty: 30 TABLET | Refills: 3 | Status: SHIPPED | OUTPATIENT
Start: 2019-05-20 | End: 2019-09-20 | Stop reason: SDUPTHER

## 2019-05-25 VITALS
DIASTOLIC BLOOD PRESSURE: 80 MMHG | HEART RATE: 73 BPM | WEIGHT: 236.56 LBS | OXYGEN SATURATION: 96 % | HEIGHT: 62 IN | BODY MASS INDEX: 43.53 KG/M2 | SYSTOLIC BLOOD PRESSURE: 138 MMHG

## 2019-05-26 NOTE — PROGRESS NOTES
Subjective:       Patient ID: Elayne Almanzar is a 63 y.o. female.    Chief Complaint: Hypertension    HPI  She returns for management of hypertension.  She has had hypertension for over a year.  Current treatment has included medications outlined in medication list.  She denies chest pain or shortness of breath.  No palpitations.  Denies left arm or neck pain.    Past medical history: Hypertension, A. fib, COPD, hyperlipidemia, nephrolithiasis , glaucoma, sleep apnea, status post mitral valve replacement , family history of colon cancer-mother. She had a colonoscopy April 2014      Medications: Proventil MDI 2 puffs 4 times a day when necessary,Advair 500/50 one puff twice a day, Lasix 20 mg daily,Toprol-XL 200 mg daily, Coumadin as monitored by Coumadin clinic , Zoloft 100 mg daily, xalatan, Pravachol 40 mg daily      No known drug allergies          Review of Systems   Constitutional: Negative for chills, fatigue, fever and unexpected weight change.   Respiratory: Negative for chest tightness and shortness of breath.    Cardiovascular: Negative for chest pain and palpitations.   Gastrointestinal: Negative for abdominal pain and blood in stool.   Neurological: Negative for dizziness, syncope, numbness and headaches.       Objective:      Physical Exam   HENT:   Right Ear: External ear normal.   Left Ear: External ear normal.   Nose: Nose normal.   Mouth/Throat: Oropharynx is clear and moist.   Eyes: Pupils are equal, round, and reactive to light.   Neck: Normal range of motion.   Cardiovascular: Normal rate and regular rhythm.   No murmur heard.  Pulmonary/Chest: Breath sounds normal.   Abdominal: She exhibits no distension. There is no hepatosplenomegaly. There is no tenderness.   Lymphadenopathy:     She has no cervical adenopathy.     She has no axillary adenopathy.   Neurological: She has normal strength and normal reflexes. No cranial nerve deficit or sensory deficit.       Assessment/Plan        Assessment and plan:  Hypertension:  Controlled.  Schedule colonoscopy

## 2019-05-30 ENCOUNTER — TELEPHONE (OUTPATIENT)
Dept: ENDOSCOPY | Facility: HOSPITAL | Age: 64
End: 2019-05-30

## 2019-05-30 DIAGNOSIS — Z12.11 SPECIAL SCREENING FOR MALIGNANT NEOPLASMS, COLON: Primary | ICD-10-CM

## 2019-05-30 RX ORDER — POLYETHYLENE GLYCOL 3350, SODIUM SULFATE ANHYDROUS, SODIUM BICARBONATE, SODIUM CHLORIDE, POTASSIUM CHLORIDE 236; 22.74; 6.74; 5.86; 2.97 G/4L; G/4L; G/4L; G/4L; G/4L
4 POWDER, FOR SOLUTION ORAL ONCE
Qty: 4000 ML | Refills: 0 | Status: SHIPPED | OUTPATIENT
Start: 2019-05-30 | End: 2019-05-30

## 2019-05-30 NOTE — PROGRESS NOTES
5/30/19 received message that patient is scheduled for c-scope on 6/26. Due to mechanical valve replacement patient will require LMWH bridge with 5 day warfarin interruption. Procedure team updated and Dr. JUWAN Messina in agreement with plan.

## 2019-05-30 NOTE — TELEPHONE ENCOUNTER
We have cleared this patient to hold warfarin x5 days for c-scope on 6/26. We are planning to bridge with LMWH due to mechanical valve replacement. Please let us know if you have any questions or concerns otherwise we will proceed as planned.     Thanks!    Coumadin Clinic  Ph [phone]

## 2019-05-30 NOTE — TELEPHONE ENCOUNTER
Nina Krishna, PharmD   to Me  Lorraine Messina MD            5/30/19 9:08 AM    See notes   Nina Krishna, Abraham           5/30/19 9:07 AM   Note      We have cleared this patient to hold warfarin x5 days for c-scope on 6/26. We are planning to bridge with LMWH due to mechanical valve replacement. Please let us know if you have any questions or concerns otherwise we will proceed as planned.      Thanks!     Coumadin Clinic   [phone]                    5/30/19 9:04 AM   You routed this conversation to [facility] Coumad Provider    Me           5/30/19 9:02 AM   Note      Pt is scheduled for a colonoscopy on 6/26/19.  Please advise pt on holding instructions for Coumadin for procedure.  Thanks

## 2019-05-30 NOTE — TELEPHONE ENCOUNTER
Pt is scheduled for a colonoscopy on 6/26/19.  Please advise pt on holding instructions for Coumadin for procedure.  Thanks

## 2019-06-10 ENCOUNTER — ANTI-COAG VISIT (OUTPATIENT)
Dept: CARDIOLOGY | Facility: CLINIC | Age: 64
End: 2019-06-10
Payer: COMMERCIAL

## 2019-06-10 ENCOUNTER — LAB VISIT (OUTPATIENT)
Dept: LAB | Facility: HOSPITAL | Age: 64
End: 2019-06-10
Payer: COMMERCIAL

## 2019-06-10 DIAGNOSIS — I48.20 CHRONIC ATRIAL FIBRILLATION WITH RAPID VENTRICULAR RESPONSE: ICD-10-CM

## 2019-06-10 DIAGNOSIS — Z95.2 STATUS POST HEART VALVE REPLACEMENT WITH MECHANICAL VALVE: ICD-10-CM

## 2019-06-10 DIAGNOSIS — Z79.01 CHRONIC ANTICOAGULATION: ICD-10-CM

## 2019-06-10 LAB
INR PPP: 2.5 (ref 0.8–1.2)
PROTHROMBIN TIME: 24.6 SEC (ref 9–12.5)

## 2019-06-10 PROCEDURE — 93793 ANTICOAG MGMT PT WARFARIN: CPT | Mod: S$GLB,,,

## 2019-06-10 PROCEDURE — 93793 PR ANTICOAGULANT MGMT FOR PT TAKING WARFARIN: ICD-10-PCS | Mod: S$GLB,,,

## 2019-06-10 PROCEDURE — 85610 PROTHROMBIN TIME: CPT

## 2019-06-10 PROCEDURE — 36415 COLL VENOUS BLD VENIPUNCTURE: CPT

## 2019-06-19 ENCOUNTER — OFFICE VISIT (OUTPATIENT)
Dept: CARDIOLOGY | Facility: CLINIC | Age: 64
End: 2019-06-19
Payer: COMMERCIAL

## 2019-06-19 ENCOUNTER — ANTI-COAG VISIT (OUTPATIENT)
Dept: CARDIOLOGY | Facility: CLINIC | Age: 64
End: 2019-06-19
Payer: COMMERCIAL

## 2019-06-19 ENCOUNTER — LAB VISIT (OUTPATIENT)
Dept: LAB | Facility: HOSPITAL | Age: 64
End: 2019-06-19
Payer: COMMERCIAL

## 2019-06-19 VITALS
SYSTOLIC BLOOD PRESSURE: 128 MMHG | OXYGEN SATURATION: 96 % | BODY MASS INDEX: 43.41 KG/M2 | HEIGHT: 62 IN | WEIGHT: 235.88 LBS | DIASTOLIC BLOOD PRESSURE: 82 MMHG | HEART RATE: 103 BPM

## 2019-06-19 DIAGNOSIS — Z95.2 STATUS POST HEART VALVE REPLACEMENT WITH MECHANICAL VALVE: ICD-10-CM

## 2019-06-19 DIAGNOSIS — E78.2 MIXED HYPERLIPIDEMIA: ICD-10-CM

## 2019-06-19 DIAGNOSIS — I48.20 CHRONIC ATRIAL FIBRILLATION WITH RAPID VENTRICULAR RESPONSE: ICD-10-CM

## 2019-06-19 DIAGNOSIS — I48.20 CHRONIC ATRIAL FIBRILLATION WITH RAPID VENTRICULAR RESPONSE: Primary | ICD-10-CM

## 2019-06-19 DIAGNOSIS — I50.32 CHRONIC DIASTOLIC CONGESTIVE HEART FAILURE: ICD-10-CM

## 2019-06-19 DIAGNOSIS — Z79.01 LONG TERM (CURRENT) USE OF ANTICOAGULANTS: Primary | ICD-10-CM

## 2019-06-19 DIAGNOSIS — Z95.2 H/O MITRAL VALVE REPLACEMENT WITH MECHANICAL VALVE: ICD-10-CM

## 2019-06-19 DIAGNOSIS — I10 ESSENTIAL (PRIMARY) HYPERTENSION: ICD-10-CM

## 2019-06-19 DIAGNOSIS — G47.31 COMPLEX SLEEP APNEA SYNDROME: ICD-10-CM

## 2019-06-19 DIAGNOSIS — Z79.01 CHRONIC ANTICOAGULATION: ICD-10-CM

## 2019-06-19 LAB
INR PPP: 2.1 (ref 0.8–1.2)
PROTHROMBIN TIME: 20.3 SEC (ref 9–12.5)

## 2019-06-19 PROCEDURE — 99214 PR OFFICE/OUTPT VISIT, EST, LEVL IV, 30-39 MIN: ICD-10-PCS | Mod: S$GLB,,, | Performed by: INTERNAL MEDICINE

## 2019-06-19 PROCEDURE — 3008F BODY MASS INDEX DOCD: CPT | Mod: CPTII,S$GLB,, | Performed by: INTERNAL MEDICINE

## 2019-06-19 PROCEDURE — 3074F SYST BP LT 130 MM HG: CPT | Mod: CPTII,S$GLB,, | Performed by: INTERNAL MEDICINE

## 2019-06-19 PROCEDURE — 85610 PROTHROMBIN TIME: CPT

## 2019-06-19 PROCEDURE — 3079F PR MOST RECENT DIASTOLIC BLOOD PRESSURE 80-89 MM HG: ICD-10-PCS | Mod: CPTII,S$GLB,, | Performed by: INTERNAL MEDICINE

## 2019-06-19 PROCEDURE — 3008F PR BODY MASS INDEX (BMI) DOCUMENTED: ICD-10-PCS | Mod: CPTII,S$GLB,, | Performed by: INTERNAL MEDICINE

## 2019-06-19 PROCEDURE — 3074F PR MOST RECENT SYSTOLIC BLOOD PRESSURE < 130 MM HG: ICD-10-PCS | Mod: CPTII,S$GLB,, | Performed by: INTERNAL MEDICINE

## 2019-06-19 PROCEDURE — 99214 OFFICE O/P EST MOD 30 MIN: CPT | Mod: S$GLB,,, | Performed by: INTERNAL MEDICINE

## 2019-06-19 PROCEDURE — 36415 COLL VENOUS BLD VENIPUNCTURE: CPT

## 2019-06-19 PROCEDURE — 99999 PR PBB SHADOW E&M-EST. PATIENT-LVL III: CPT | Mod: PBBFAC,,, | Performed by: INTERNAL MEDICINE

## 2019-06-19 PROCEDURE — 99999 PR PBB SHADOW E&M-EST. PATIENT-LVL III: ICD-10-PCS | Mod: PBBFAC,,, | Performed by: INTERNAL MEDICINE

## 2019-06-19 PROCEDURE — 3079F DIAST BP 80-89 MM HG: CPT | Mod: CPTII,S$GLB,, | Performed by: INTERNAL MEDICINE

## 2019-06-19 RX ORDER — AMLODIPINE BESYLATE 2.5 MG/1
2.5 TABLET ORAL DAILY
Qty: 30 TABLET | Refills: 11 | Status: SHIPPED | OUTPATIENT
Start: 2019-06-19 | End: 2020-07-01 | Stop reason: SDUPTHER

## 2019-06-19 NOTE — LETTER
June 19, 2019      Nubia Crocker MD  [address]  [place] [address]           [facility] - Cardiology  [address]  [place] [address]  Phone: [phone]          Patient: Elayne Almanzar   MR Number: [id number]   YOB: 1955   Date of Visit: 6/19/2019       Dear Dr. Nubia Crocker:    Thank you for referring Elayne Almanzar to me for evaluation. Attached you will find relevant portions of my assessment and plan of care.    If you have questions, please do not hesitate to call me. I look forward to following Elayne Almanzar along with you.    Sincerely,    Lorraine Messina MD    Enclosure  CC:  No Recipients    If you would like to receive this communication electronically, please contact [email or web address] or [phone] to request more information on [employer] Link access.    For providers and/or their staff who would like to refer a patient to [facility], please contact us through our one-stop-shop provider referral line, [facility], at [phone].    If you feel you have received this communication in error or would no longer like to receive these types of communications, please e-mail [email or web address]

## 2019-06-19 NOTE — PROGRESS NOTES
Subjective:   Patient ID:  Elayne Almanzar is a 63 y.o. female who presents for follow-up of Atrial Fibrillation; Shortness of Breath; and Echo Results      HPI: She has been having a flare of her COPD. Her heart rate has been up. She stopped drinking soda and eating chips and has lost 20 lbs. She had an echo done 3/19 which showed an LVEF of 55% with a well seated mechanical MVR. NISHANT's done  were normal.    ECG (19): Atrial fibrillation     Past Medical History:   Diagnosis Date    Acute on chronic diastolic congestive heart failure     Anticoagulant long-term use     Asthma     Atrial fibrillation     Cataract     Chronic kidney disease     COPD (chronic obstructive pulmonary disease)     Depression     Dysuria     Flank pain     HTN (hypertension)     Nephrolithiasis     KEYSHAWN (obstructive sleep apnea)     awaiting CPAP machine     Rheumatic disease of mitral valve     Urinary incontinence     Urinary tract infection        Past Surgical History:   Procedure Laterality Date     SECTION      COLONOSCOPY N/A 4/10/2014    Performed by Devon Bowling MD at [facility] ENDO (4TH FLR)    CYSTOSCOPY N/A 3/22/2013    Performed by Deandre Galindo Jr., MD at [facility] OR 1ST FLR    ESOPHAGOGASTRODUODENOSCOPY (EGD) N/A 2016    Performed by Jose M Kelly MD at [facility] ENDO (4TH FLR)    EXTRACTION - STONE Right 3/22/2013    Performed by Deandre Galindo Jr., MD at [facility] OR 1ST FLR    kidney stone removal      MITRAL VALVE REPLACEMENT  2004    REMOVAL, STENT, URETER Right 3/22/2013    Performed by Deandre Galindo Jr., MD at [facility] OR 1ST FLR    TUBAL LIGATION      URETEROSCOPY Right 3/22/2013    Performed by Deandre Galindo Jr., MD at [facility] OR 1ST FLR       Social History     Socioeconomic History    Marital status:      Spouse name: Not on file    Number of children: 2    Years of education: Not on file    Highest education level: Not on file   Occupational History     Occupation: [employer]     Comment: Retired   Social Needs    Financial resource strain: Not on file    Food insecurity:     Worry: Not on file     Inability: Not on file    Transportation needs:     Medical: Not on file     Non-medical: Not on file   Tobacco Use    Smoking status: Former Smoker     Packs/day: 0.50     Years: 20.00     Pack years: 10.00     Types: Cigarettes     Last attempt to quit: 2002     Years since quittin.1    Smokeless tobacco: Never Used   Substance and Sexual Activity    Alcohol use: No    Drug use: No    Sexual activity: Not on file   Lifestyle    Physical activity:     Days per week: Not on file     Minutes per session: Not on file    Stress: Not on file   Relationships    Social connections:     Talks on phone: Not on file     Gets together: Not on file     Attends Zoroastrian service: Not on file     Active member of club or organization: Not on file     Attends meetings of clubs or organizations: Not on file     Relationship status: Not on file   Other Topics Concern    Not on file   Social History Narrative    Disability since .  Laid off .  Previously worked as cook in a kitchen.         Family History   Problem Relation Age of Onset    Stroke Paternal Grandmother     Colon cancer Maternal Grandmother     Cancer Brother         testicular cancer    Diabetes Sister     Hypertension Sister     Breast cancer Paternal Aunt     Diabetes Sister     Diabetes Sister     Heart disease Father 70        MI    Diabetes Father     Colon cancer Mother     Breast cancer Maternal Aunt     Ovarian cancer Neg Hx        Patient's Medications   New Prescriptions    AMLODIPINE (NORVASC) 2.5 MG TABLET    Take 1 tablet (2.5 mg total) by mouth once daily.   Previous Medications    ALBUTEROL (PROVENTIL/VENTOLIN HFA) 90 MCG/ACTUATION INHALER    Inhale 2 puffs into the lungs every 6 (six) hours as needed for Wheezing. Rescue    AMOXICILLIN (AMOXIL) 500 MG CAPSULE    TAKE 1  "CAPSULE EVERY 8 HOURS UNTIL GONE.    ASPIRIN (ECOTRIN) 81 MG EC TABLET    Take 81 mg by mouth once daily.     ERGOCALCIFEROL (ERGOCALCIFEROL) 50,000 UNIT CAP    Take 1 capsule (50,000 Units total) by mouth twice a week.    FLUTICASONE (FLONASE) 50 MCG/ACTUATION NASAL SPRAY    2 sprays by Each Nare route once daily.    FLUTICASONE-SALMETEROL DISKUS INHALER 500-50 MCG    Inhale 1 puff into the lungs 2 (two) times daily.    FUROSEMIDE (LASIX) 20 MG TABLET    Take 1 tablet (20 mg total) by mouth once daily.    GABAPENTIN (NEURONTIN) 300 MG CAPSULE    Take 1 capsule (300 mg total) by mouth every evening.    HYDROCODONE-ACETAMINOPHEN (NORCO) 5-325 MG PER TABLET    Take 1 tablet by mouth every 8 (eight) hours as needed for Pain.    LATANOPROST (XALATAN) 0.005 % OPHTHALMIC SOLUTION    Place 1 drop into the right eye once daily.    METOPROLOL SUCCINATE (TOPROL-XL) 200 MG 24 HR TABLET    TAKE 1 TABLET (200 MG TOTAL) BY MOUTH ONCE DAILY.    METOPROLOL SUCCINATE (TOPROL-XL) 200 MG 24 HR TABLET    Take 1 tablet (200 mg total) by mouth once daily.    NEEDLE, DISP, 26 GAUGE 26 GAUGE X 1/2" NDLE    1 application by Misc.(Non-Drug; Combo Route) route once a week.    OLOPATADINE (PATADAY) 0.2 % DROP    Place 1 drop into both eyes every morning.    OXYBUTYNIN (DITROPAN XL) 15 MG TR24    Take 1 tablet (15 mg total) by mouth once daily.    OXYBUTYNIN (DITROPAN-XL) 10 MG 24 HR TABLET    TAKE 1 TABLET  10 MG TOTAL  BY MOUTH ONCE DAILY    PRAVASTATIN (PRAVACHOL) 40 MG TABLET    Take 1 tablet (40 mg total) by mouth once daily.    SERTRALINE (ZOLOFT) 100 MG TABLET    Take 1 tablet (100 mg total) by mouth once daily.    TAMSULOSIN (FLOMAX) 0.4 MG CAP    Take 1 capsule (0.4 mg total) by mouth once daily.    VENTOLIN HFA 90 MCG/ACTUATION INHALER    INHALE 1-2 PUFFS INTO THE LUNGS EVERY 6 (SIX) HOURS AS NEEDED FOR WHEEZING OR SHORTNESS OF BREATH. RESCUE    WARFARIN (COUMADIN) 5 MG TABLET    TAKE 1-1.5 PILLS BY MOUTH DAILY OR AS DIRECTED BY " "COUMADIN CLINIC.; #130 PILLS = 3 MONTH SUPPLY    WARFARIN (COUMADIN) 5 MG TABLET    Take 1 1/2 tablets (7.5mg) by mouth daily, except 1 tablet (5mg.) on Tuesdays, Or as directed by Coumadin Clinic   Modified Medications    No medications on file   Discontinued Medications    No medications on file       Review of Systems   Constitution: Negative for malaise/fatigue and weight gain.   HENT: Negative for hearing loss.    Eyes: Negative for visual disturbance.   Cardiovascular: Negative for chest pain, claudication, dyspnea on exertion, leg swelling, near-syncope, orthopnea, palpitations, paroxysmal nocturnal dyspnea and syncope.   Respiratory: Positive for shortness of breath and wheezing. Negative for cough, sleep disturbances due to breathing and snoring.    Endocrine: Negative for cold intolerance, heat intolerance, polydipsia, polyphagia and polyuria.   Hematologic/Lymphatic: Negative for bleeding problem. Does not bruise/bleed easily.   Skin: Negative for rash and suspicious lesions.   Musculoskeletal: Negative for arthritis, falls, joint pain, muscle weakness and myalgias.   Gastrointestinal: Negative for abdominal pain, change in bowel habit, constipation, diarrhea, heartburn, hematochezia, melena and nausea.   Genitourinary: Negative for hematuria and nocturia.   Neurological: Negative for excessive daytime sleepiness, dizziness, headaches, light-headedness, loss of balance and weakness.   Psychiatric/Behavioral: Negative for depression. The patient is not nervous/anxious.    Allergic/Immunologic: Negative for environmental allergies.       /82   Pulse 103   Ht 5' 2" (1.575 m)   Wt 107 kg (235 lb 14.3 oz)   LMP 07/22/2012   SpO2 96%   BMI 43.15 kg/m²     Objective:   Physical Exam   Constitutional: She is oriented to person, place, and time. She appears well-developed and well-nourished.        HENT:   Head: Normocephalic and atraumatic.   Mouth/Throat: Oropharynx is clear and moist.   Eyes: Pupils " are equal, round, and reactive to light. Conjunctivae and EOM are normal. No scleral icterus.   Neck: Normal range of motion. Neck supple. No hepatojugular reflux and no JVD present. No tracheal deviation present. No thyromegaly present.   Cardiovascular: Normal rate, normal heart sounds and intact distal pulses. An irregularly irregular rhythm present. PMI is not displaced.   Pulses:       Carotid pulses are 2+ on the right side, and 2+ on the left side.       Radial pulses are 2+ on the right side, and 2+ on the left side.        Dorsalis pedis pulses are 2+ on the right side, and 2+ on the left side.        Posterior tibial pulses are 2+ on the right side, and 2+ on the left side.   Mechanical S1   Pulmonary/Chest: Effort normal. She has wheezes.   Abdominal: Soft. Bowel sounds are normal. She exhibits no distension and no mass. There is no hepatosplenomegaly. There is no tenderness.   Musculoskeletal: She exhibits no edema or tenderness.   Lymphadenopathy:     She has no cervical adenopathy.   Neurological: She is alert and oriented to person, place, and time.   Skin: Skin is warm and dry. No rash noted. No cyanosis or erythema. Nails show no clubbing.   Psychiatric: She has a normal mood and affect. Her speech is normal and behavior is normal.       Lab Results   Component Value Date     04/06/2019    K 3.9 04/06/2019     04/06/2019    CO2 28 04/06/2019    BUN 12 04/06/2019    CREATININE 0.9 04/06/2019     04/06/2019    HGBA1C 6.1 05/12/2017    MG 1.7 02/26/2019    AST 17 04/06/2019    ALT 11 04/06/2019    ALBUMIN 3.5 04/06/2019    PROT 7.0 04/06/2019    BILITOT 0.7 04/06/2019    WBC 5.17 04/16/2019    HGB 8.8 (L) 04/16/2019    HCT 31.4 (L) 04/16/2019    MCV 71 (L) 04/16/2019     04/16/2019    INR 2.1 (H) 06/19/2019    TSH 1.982 05/08/2018    CHOL 228 (H) 03/08/2019    HDL 66 03/08/2019    LDLCALC 139.2 03/08/2019    TRIG 114 03/08/2019     (H) 04/06/2019       Assessment:      1. Chronic atrial fibrillation with rapid ventricular response : Continue coumadin and metoprolol. 24 hour holter monitor ordered to assess rate control.   2. Chronic diastolic congestive heart failure : She appears well compensated. She is using lasix prn.   3. H/O mitral valve replacement with mechanical valve : Continue coumadin and asa.   4. Essential (primary) hypertension : Her blood pressure has been above goal. Start amlodipine 2.5 mg daily. She is not interested in Digital HTN Program.   5. Mixed hyperlipidemia : LDL is above goal. She does not want to increase pravastatin or change to a more potent statin due to myalgias in the past.   6. Status post heart valve replacement with mechanical valve    7. Complex sleep apnea syndrome : Continue cpap.       Plan:     Elayne was seen today for atrial fibrillation, shortness of breath and echo results.    Diagnoses and all orders for this visit:    Chronic atrial fibrillation with rapid ventricular response  -     Holter monitor - 24 hour; Future    Chronic diastolic congestive heart failure    H/O mitral valve replacement with mechanical valve    Essential (primary) hypertension    Mixed hyperlipidemia    Status post heart valve replacement with mechanical valve    Complex sleep apnea syndrome    Other orders  -     amLODIPine (NORVASC) 2.5 MG tablet; Take 1 tablet (2.5 mg total) by mouth once daily.        Thank you for allowing me to participate in this patient's care. Please do not hesitate to contact me with any questions or concerns.

## 2019-06-20 NOTE — PROGRESS NOTES
INR at goal. Medications and chart reviewed. No changes noted to necessitate adjustment of warfarin or follow-up plan. See calendar.    Patient is here for lovenox bridging instructions    Height  62in              Weight   107kg           SCr    0.9            CrCl   51ml/min              Pre-Procedure Instructions:    Take last dose of warfarin on :  6/20/19                               Start enoxaparin injections the evening of:     6/21/2019                            Enoxaparin dose is:    100mg                    Every 12 hours (Twice Daily)    Last dose of enoxaparin will be the morning of:       6/25/2019                          Post-Procedure Instructions:    Restart warfarin dose on    7.5mg  6/26/2019                       Unless directed otherwise by your physician    Restart lovenox injections on the morning of     6/27/2019                      Unless directed otherwise by your physician    Call the coumadin clinic your physician has different recommendations post procedure    Pt advised & instructed.  She wrote down her instructions & answered questions appropriately.  Notified pt if she gets confused to contact [facility] and we can further assist her.

## 2019-06-21 RX ORDER — ENOXAPARIN SODIUM 100 MG/ML
100 INJECTION SUBCUTANEOUS EVERY 12 HOURS
Qty: 20 ML | Refills: 1 | Status: SHIPPED | OUTPATIENT
Start: 2019-06-21 | End: 2019-07-02 | Stop reason: SDUPTHER

## 2019-06-25 ENCOUNTER — ANESTHESIA EVENT (OUTPATIENT)
Dept: ENDOSCOPY | Facility: HOSPITAL | Age: 64
End: 2019-06-25
Payer: COMMERCIAL

## 2019-06-26 ENCOUNTER — HOSPITAL ENCOUNTER (OUTPATIENT)
Facility: HOSPITAL | Age: 64
Discharge: HOME | End: 2019-06-26
Attending: COLON & RECTAL SURGERY | Admitting: COLON & RECTAL SURGERY
Payer: COMMERCIAL

## 2019-06-26 ENCOUNTER — ANESTHESIA (OUTPATIENT)
Dept: ENDOSCOPY | Facility: HOSPITAL | Age: 64
End: 2019-06-26
Payer: COMMERCIAL

## 2019-06-26 VITALS
RESPIRATION RATE: 16 BRPM | HEART RATE: 84 BPM | DIASTOLIC BLOOD PRESSURE: 56 MMHG | BODY MASS INDEX: 43.06 KG/M2 | HEIGHT: 62 IN | SYSTOLIC BLOOD PRESSURE: 119 MMHG | WEIGHT: 234 LBS | OXYGEN SATURATION: 98 % | TEMPERATURE: 98 F

## 2019-06-26 DIAGNOSIS — Z12.11 ENCOUNTER FOR SCREENING COLONOSCOPY: ICD-10-CM

## 2019-06-26 PROCEDURE — E9220 PRA ENDO ANESTHESIA: ICD-10-PCS | Mod: 33,,, | Performed by: NURSE ANESTHETIST, CERTIFIED REGISTERED

## 2019-06-26 PROCEDURE — 45380 COLONOSCOPY AND BIOPSY: CPT | Mod: 33,,, | Performed by: COLON & RECTAL SURGERY

## 2019-06-26 PROCEDURE — 45380 PR COLONOSCOPY,BIOPSY: ICD-10-PCS | Mod: 33,,, | Performed by: COLON & RECTAL SURGERY

## 2019-06-26 PROCEDURE — 27201012 HC FORCEPS, HOT/COLD, DISP: Performed by: COLON & RECTAL SURGERY

## 2019-06-26 PROCEDURE — 45380 COLONOSCOPY AND BIOPSY: CPT | Performed by: COLON & RECTAL SURGERY

## 2019-06-26 PROCEDURE — 25000003 PHARM REV CODE 250: Performed by: NURSE ANESTHETIST, CERTIFIED REGISTERED

## 2019-06-26 PROCEDURE — 63600175 PHARM REV CODE 636 W HCPCS: Performed by: NURSE ANESTHETIST, CERTIFIED REGISTERED

## 2019-06-26 PROCEDURE — 88305 TISSUE SPECIMEN TO PATHOLOGY - SURGERY: ICD-10-PCS | Mod: 26,,, | Performed by: PATHOLOGY

## 2019-06-26 PROCEDURE — 88305 TISSUE EXAM BY PATHOLOGIST: CPT | Mod: 26,,, | Performed by: PATHOLOGY

## 2019-06-26 PROCEDURE — 88305 TISSUE EXAM BY PATHOLOGIST: CPT | Performed by: PATHOLOGY

## 2019-06-26 PROCEDURE — E9220 PRA ENDO ANESTHESIA: HCPCS | Mod: 33,,, | Performed by: NURSE ANESTHETIST, CERTIFIED REGISTERED

## 2019-06-26 PROCEDURE — 25000003 PHARM REV CODE 250: Performed by: COLON & RECTAL SURGERY

## 2019-06-26 PROCEDURE — 37000009 HC ANESTHESIA EA ADD 15 MINS: Performed by: COLON & RECTAL SURGERY

## 2019-06-26 PROCEDURE — 37000008 HC ANESTHESIA 1ST 15 MINUTES: Performed by: COLON & RECTAL SURGERY

## 2019-06-26 RX ORDER — PROPOFOL 10 MG/ML
INJECTION, EMULSION INTRAVENOUS CONTINUOUS PRN
Status: DISCONTINUED | OUTPATIENT
Start: 2019-06-26 | End: 2019-06-26

## 2019-06-26 RX ORDER — PROPOFOL 10 MG/ML
INJECTION, EMULSION INTRAVENOUS
Status: DISCONTINUED | OUTPATIENT
Start: 2019-06-26 | End: 2019-06-26

## 2019-06-26 RX ORDER — LIDOCAINE HCL/PF 100 MG/5ML
SYRINGE (ML) INTRAVENOUS
Status: DISCONTINUED | OUTPATIENT
Start: 2019-06-26 | End: 2019-06-26

## 2019-06-26 RX ORDER — SODIUM CHLORIDE 9 MG/ML
INJECTION, SOLUTION INTRAVENOUS CONTINUOUS
Status: DISCONTINUED | OUTPATIENT
Start: 2019-06-26 | End: 2019-06-26 | Stop reason: HOSPADM

## 2019-06-26 RX ORDER — GLYCOPYRROLATE 0.2 MG/ML
INJECTION INTRAMUSCULAR; INTRAVENOUS
Status: DISCONTINUED | OUTPATIENT
Start: 2019-06-26 | End: 2019-06-26

## 2019-06-26 RX ADMIN — PROPOFOL 200 MCG/KG/MIN: 10 INJECTION, EMULSION INTRAVENOUS at 08:06

## 2019-06-26 RX ADMIN — SODIUM CHLORIDE: 0.9 INJECTION, SOLUTION INTRAVENOUS at 08:06

## 2019-06-26 RX ADMIN — GLYCOPYRROLATE 0.2 MG: 0.2 INJECTION, SOLUTION INTRAMUSCULAR; INTRAVENOUS at 08:06

## 2019-06-26 RX ADMIN — LIDOCAINE HYDROCHLORIDE 100 MG: 20 INJECTION, SOLUTION INTRAVENOUS at 08:06

## 2019-06-26 RX ADMIN — PROPOFOL 70 MG: 10 INJECTION, EMULSION INTRAVENOUS at 08:06

## 2019-06-26 NOTE — H&P
Endoscopy H&P    Procedure : Colonoscopy      asymptomatic screening exam      Past Medical History:   Diagnosis Date    Acute on chronic diastolic congestive heart failure     Anticoagulant long-term use     Asthma     Atrial fibrillation     Cataract     Chronic kidney disease     COPD (chronic obstructive pulmonary disease)     Depression     Dysuria     Flank pain     HTN (hypertension)     Nephrolithiasis     KEYSHAWN (obstructive sleep apnea)     awaiting CPAP machine     Rheumatic disease of mitral valve     Urinary incontinence     Urinary tract infection        Family History   Problem Relation Age of Onset    Stroke Paternal Grandmother     Colon cancer Maternal Grandmother     Cancer Brother         testicular cancer    Diabetes Sister     Hypertension Sister     Breast cancer Paternal Aunt     Diabetes Sister     Diabetes Sister     Heart disease Father 70        MI    Diabetes Father     Colon cancer Mother     Breast cancer Maternal Aunt     Ovarian cancer Neg Hx        Social History     Socioeconomic History    Marital status:      Spouse name: Not on file    Number of children: 2    Years of education: Not on file    Highest education level: Not on file   Occupational History    Occupation: [employer]     Comment: Retired   Social Needs    Financial resource strain: Not on file    Food insecurity:     Worry: Not on file     Inability: Not on file    Transportation needs:     Medical: Not on file     Non-medical: Not on file   Tobacco Use    Smoking status: Former Smoker     Packs/day: 0.50     Years: 20.00     Pack years: 10.00     Types: Cigarettes     Last attempt to quit: 2002     Years since quittin.1    Smokeless tobacco: Never Used   Substance and Sexual Activity    Alcohol use: No    Drug use: No    Sexual activity: Not on file   Lifestyle    Physical activity:     Days  per week: Not on file     Minutes per session: Not on file    Stress: Not on file   Relationships    Social connections:     Talks on phone: Not on file     Gets together: Not on file     Attends [employer] service: Not on file     Active member of club or organization: Not on file     Attends meetings of clubs or organizations: Not on file     Relationship status: Not on file   Other Topics Concern    Not on file   Social History Narrative    Disability since 2004.  Laid off 2002.  Previously worked as cook in a kitchen.         Review of Systems:  Respiratory ROS: no cough, shortness of breath, or wheezing  Cardiovascular ROS: no chest pain or dyspnea on exertion  Gastrointestinal ROS: no abdominal pain, change in bowel habits, or black or bloody stools  Musculoskeletal ROS: negative  Neurological ROS: no TIA or stroke symptoms        Physical Exam:  General: no distress  Head: normocephalic  Neck: supple, symmetrical, trachea midline  Lungs:  clear to auscultation bilaterally and normal respiratory effort  Heart: regular rate and rhythm, S1, S2 normal, no murmur, rub or gallop  Abdomen: soft, non-tender non-distented; bowel sounds normal; no masses,  no organomegaly  Extremities: no cyanosis or edema, or clubbing       Deep Sedation: Mallampati Score II (hard and soft palate, upper portion of tonsils anduvula visible)    II    Assessment and Plan:  Proceed with Colonoscopy

## 2019-06-26 NOTE — ANESTHESIA POSTPROCEDURE EVALUATION
Anesthesia Post Evaluation    Patient: Elayne Almanzar    Procedure(s) Performed: Procedure(s) (LRB):  COLONOSCOPY (N/A)    Final Anesthesia Type: general  Patient location during evaluation: PACU  Patient participation: Yes- Able to Participate  Level of consciousness: awake and alert  Post-procedure vital signs: reviewed and stable  Pain management: adequate  Airway patency: patent  PONV status at discharge: No PONV  Anesthetic complications: no      Cardiovascular status: blood pressure returned to baseline and stable  Respiratory status: unassisted  Hydration status: euvolemic  Follow-up not needed.          Vitals Value Taken Time   /56 6/26/2019  9:31 AM   Temp 36.6 °C (97.9 °F) 6/26/2019  9:10 AM   Pulse 84 6/26/2019  9:31 AM   Resp 16 6/26/2019  9:31 AM   SpO2 98 % 6/26/2019  9:31 AM         Event Time     Out of Recovery 09:32:41          Pain/Cleo Score: Cleo Score: 10 (6/26/2019  9:31 AM)  Modified Cleo Score: 20 (6/26/2019  9:31 AM)

## 2019-06-26 NOTE — TRANSFER OF CARE
"Anesthesia Transfer of Care Note    Patient: Elayne Almanzar    Procedure(s) Performed: Procedure(s) (LRB):  COLONOSCOPY (N/A)    Patient location: GI    Anesthesia Type: general    Transport from OR: Transported from OR on room air with adequate spontaneous ventilation    Post pain: adequate analgesia    Post assessment: no apparent anesthetic complications and tolerated procedure well    Post vital signs: stable    Level of consciousness: awake    Nausea/Vomiting: no nausea/vomiting    Complications: none    Transfer of care protocol was followed      Last vitals:   Visit Vitals  BP (!) 100/56 (BP Location: Left arm, Patient Position: Lying)   Pulse 89   Temp 36.6 °C (97.9 °F) (Temporal)   Resp 16   Ht 5' 2" (1.575 m)   Wt 106.1 kg (234 lb)   LMP 07/22/2012   SpO2 100%   Breastfeeding? No   BMI 42.80 kg/m²     "

## 2019-06-26 NOTE — DISCHARGE INSTRUCTIONS
Colonoscopy     A camera attached to a flexible tube with a viewing lens is used to take video pictures.     Colonoscopy is a test to view the inside of your lower digestive tract (colon and rectum). Sometimes it can show the last part of the small intestine (ileum). During the test, small pieces of tissue may be removed for testing. This is called a biopsy. Small growths, such as polyps, may also be removed.   Why is colonoscopy done?  The test is done to help look for colon cancer. And it can help find the source of abdominal pain, bleeding, and changes in bowel habits. It may be needed once a year, depending on factors such as your:  · Age  · Health history  · Family health history  · Symptoms  · Results from any prior colonoscopy  Risks and possible complications  These include:  · Bleeding               · A puncture or tear in the colon   · Risks of anesthesia  · A cancer lesion not being seen  Getting ready   To prepare for the test:  · Talk with your healthcare provider about the risks of the test (see below). Also ask your healthcare provider about alternatives to the test.  · Tell your healthcare provider about any medicines you take. Also tell him or her about any health conditions you may have.  · Make sure your rectum and colon are empty for the test. Follow the diet and bowel prep instructions exactly. If you dont, the test may need to be rescheduled.  · Plan for a friend or family member to drive you home after the test.     Colonoscopy provides an inside view of the entire colon.     You may discuss the results with your doctor right away or at a future visit.  During the test   The test is usually done in the hospital on an outpatient basis. This means you go home the same day. The procedure takes about 30 minutes. During that time:  · You are given relaxing (sedating) medicine through an IV line. You may be drowsy, or fully asleep.  · The healthcare provider will first give you a physical exam to  check for anal and rectal problems.  · Then the anus is lubricated and the scope inserted.  · If you are awake, you may have a feeling similar to needing to have a bowel movement. You may also feel pressure as air is pumped into the colon. Its OK to pass gas during the procedure.  · Biopsy, polyp removal, or other treatments may be done during the test.  After the test   You may have gas right after the test. It can help to try to pass it to help prevent later bloating. Your healthcare provider may discuss the results with you right away. Or you may need to schedule a follow-up visit to talk about the results. After the test, you can go back to your normal eating and other activities. You may be tired from the sedation and need to rest for a few hours.  Date Last Reviewed: 11/1/2016 © 2000-2017 [employer]. [address] [place] [address], [place] [address]. All rights reserved. This information is not intended as a substitute for professional medical care. Always follow your healthcare professional's instructions.

## 2019-06-26 NOTE — ANESTHESIA PREPROCEDURE EVALUATION
06/26/2019  Elayne Almanzar is a 63 y.o., female.    Anesthesia Evaluation    I have reviewed the Patient Summary Reports.        Review of Systems  Anesthesia Hx:  No problems with previous Anesthesia    Social:  Non-Smoker    Hematology/Oncology:  Hematology Normal   Oncology Normal     EENT/Dental:EENT/Dental Normal   Cardiovascular:   Hypertension CHF    Pulmonary:   COPD Asthma Shortness of breath Sleep Apnea    Renal/:   Chronic Renal Disease, CRI    Hepatic/GI:  Hepatic/GI Normal    Musculoskeletal:  Musculoskeletal Normal    Neurological:  Neurology Normal    Endocrine:  Endocrine Normal    Dermatological:  Skin Normal    Psych:  Psychiatric Normal           Physical Exam  General:  Well nourished    Airway/Jaw/Neck:  Airway Findings: Mouth Opening: Normal Tongue: Normal  General Airway Assessment: Adult  Mallampati: II  Improves to II with phonation.  TM Distance: Normal, at least 6 cm  Jaw/Neck Findings:  Neck ROM: Normal ROM      Dental:  Dental Findings: In tact   Chest/Lungs:  Chest/Lungs Findings: Clear to auscultation, Normal Respiratory Rate     Heart/Vascular:  Heart Findings: Rate: Normal  Rhythm: Regular Rhythm  Sounds: Normal             Anesthesia Plan  Type of Anesthesia, risks & benefits discussed:  Anesthesia Type:  general  Patient's Preference: General  Intra-op Monitoring Plan:   Intra-op Monitoring Plan Comments:   Post Op Pain Control Plan:   Post Op Pain Control Plan Comments:   Induction:   IV  Beta Blocker:  Patient is not currently on a Beta-Blocker (No further documentation required).       Informed Consent: Patient understands risks and agrees with Anesthesia plan.  Questions answered. Anesthesia consent signed with patient.  ASA Score: 3     Day of Surgery Review of History & Physical: I have interviewed and examined the patient. I have reviewed the patient's H&P  dated:  There are no significant changes.          Ready For Surgery From Anesthesia Perspective.

## 2019-06-26 NOTE — [employer] PATIENT INSTRUCTIONS
Discharge Summary/Instructions after an Endoscopic Procedure  Patient Name: Elayne Almanzar  Patient MRN: [id number]  Patient YOB: 1955 Wednesday, June 26, 2019  Devon Bowling MD  RESTRICTIONS:  During your procedure today, you received medications for sedation.  These   medications may affect your judgment, balance and coordination.  Therefore,   for 24 hours, you have the following restrictions:   - DO NOT drive a car, operate machinery, make legal/financial decisions,   sign important papers or drink alcohol.    ACTIVITY:  Today: no heavy lifting, straining or running due to procedural   sedation/anesthesia.  The following day: return to full activity including work.  DIET:  Eat and drink normally unless instructed otherwise.     TREATMENT FOR COMMON SIDE EFFECTS:  - Mild abdominal pain, nausea, belching, bloating or excessive gas:  rest,   eat lightly and use a heating pad.  - Sore Throat: treat with throat lozenges and/or gargle with warm salt   water.  - Because air was used during the procedure, expelling large amounts of air   from your rectum or belching is normal.  - If a bowel prep was taken, you may not have a bowel movement for 1-3 days.    This is normal.  SYMPTOMS TO WATCH FOR AND REPORT TO YOUR PHYSICIAN:  1. Abdominal pain or bloating, other than gas cramps.  2. Chest pain.  3. Back pain.  4. Signs of infection such as: chills or fever occurring within 24 hours   after the procedure.  5. Rectal bleeding, which would show as bright red, maroon, or black stools.   (A tablespoon of blood from the rectum is not serious, especially if   hemorrhoids are present.)  6. Vomiting.  7. Weakness or dizziness.  GO DIRECTLY TO THE NEAREST EMERGENCY ROOM IF YOU HAVE ANY OF THE FOLLOWING:      Difficulty breathing              Chills and/or fever over 101 F   Persistent vomiting and/or vomiting blood   Severe abdominal pain   Severe chest pain   Black, tarry stools   Bleeding- more than one  tablespoon   Any other symptom or condition that you feel may need urgent attention  Your doctor recommends these additional instructions:  If any biopsies were taken, your doctors clinic will contact you in 1 to 2   weeks with any results.  - Repeat colonoscopy in 5 years for screening purposes.   - Resume previous diet indefinitely.   - Continue present medications.   - Discharge patient to home (ambulatory).  For questions, problems or results please call your physician - Devon Bowling MD at Work:  [phone].  [facility], EMERGENCY ROOM PHONE NUMBER: [phone]  IF A COMPLICATION OR EMERGENCY SITUATION ARISES AND YOU ARE UNABLE TO REACH   YOUR PHYSICIAN - GO DIRECTLY TO THE EMERGENCY ROOM.  Devon Bowling MD  6/26/2019 9:04:52 AM  This report has been verified and signed electronically.  [employer]

## 2019-07-02 ENCOUNTER — LAB VISIT (OUTPATIENT)
Dept: LAB | Facility: HOSPITAL | Age: 64
End: 2019-07-02
Payer: COMMERCIAL

## 2019-07-02 ENCOUNTER — ANTI-COAG VISIT (OUTPATIENT)
Dept: CARDIOLOGY | Facility: CLINIC | Age: 64
End: 2019-07-02
Payer: COMMERCIAL

## 2019-07-02 DIAGNOSIS — I48.20 CHRONIC ATRIAL FIBRILLATION WITH RAPID VENTRICULAR RESPONSE: ICD-10-CM

## 2019-07-02 DIAGNOSIS — Z95.2 STATUS POST HEART VALVE REPLACEMENT WITH MECHANICAL VALVE: ICD-10-CM

## 2019-07-02 DIAGNOSIS — Z79.01 CHRONIC ANTICOAGULATION: ICD-10-CM

## 2019-07-02 DIAGNOSIS — Z79.01 LONG TERM (CURRENT) USE OF ANTICOAGULANTS: ICD-10-CM

## 2019-07-02 LAB
INR PPP: 1.5 (ref 0.8–1.2)
PROTHROMBIN TIME: 14.9 SEC (ref 9–12.5)

## 2019-07-02 PROCEDURE — 93793 ANTICOAG MGMT PT WARFARIN: CPT | Mod: S$GLB,,,

## 2019-07-02 PROCEDURE — 93793 PR ANTICOAGULANT MGMT FOR PT TAKING WARFARIN: ICD-10-PCS | Mod: S$GLB,,,

## 2019-07-02 PROCEDURE — 85610 PROTHROMBIN TIME: CPT

## 2019-07-02 PROCEDURE — 36415 COLL VENOUS BLD VENIPUNCTURE: CPT

## 2019-07-02 RX ORDER — ENOXAPARIN SODIUM 100 MG/ML
100 INJECTION SUBCUTANEOUS EVERY 12 HOURS
Qty: 10 ML | Refills: 0 | Status: SHIPPED | OUTPATIENT
Start: 2019-07-02 | End: 2019-07-12

## 2019-07-02 NOTE — PROGRESS NOTES
INR not at goal. Medications, chart, and patient findings reviewed. See calendar for adjustments to dose and follow up plan.  Will resume lovenox at previous dose for valve protection and low INR today.  Pt joseph not take requested boosted boost and additionally ate dark greens.  Will boost and repeat INR on Friday stat.

## 2019-07-03 ENCOUNTER — TELEPHONE (OUTPATIENT)
Dept: ENDOSCOPY | Facility: HOSPITAL | Age: 64
End: 2019-07-03

## 2019-07-05 ENCOUNTER — LAB VISIT (OUTPATIENT)
Dept: LAB | Facility: HOSPITAL | Age: 64
End: 2019-07-05
Attending: INTERNAL MEDICINE
Payer: COMMERCIAL

## 2019-07-05 ENCOUNTER — ANTI-COAG VISIT (OUTPATIENT)
Dept: CARDIOLOGY | Facility: CLINIC | Age: 64
End: 2019-07-05
Payer: COMMERCIAL

## 2019-07-05 DIAGNOSIS — Z95.2 STATUS POST HEART VALVE REPLACEMENT WITH MECHANICAL VALVE: ICD-10-CM

## 2019-07-05 DIAGNOSIS — I48.20 CHRONIC ATRIAL FIBRILLATION WITH RAPID VENTRICULAR RESPONSE: ICD-10-CM

## 2019-07-05 DIAGNOSIS — Z79.01 CHRONIC ANTICOAGULATION: ICD-10-CM

## 2019-07-05 LAB
INR PPP: 1.9 (ref 0.8–1.2)
PROTHROMBIN TIME: 18.2 SEC (ref 9–12.5)

## 2019-07-05 PROCEDURE — 93793 PR ANTICOAGULANT MGMT FOR PT TAKING WARFARIN: ICD-10-PCS | Mod: S$GLB,,,

## 2019-07-05 PROCEDURE — 36415 COLL VENOUS BLD VENIPUNCTURE: CPT

## 2019-07-05 PROCEDURE — 93793 ANTICOAG MGMT PT WARFARIN: CPT | Mod: S$GLB,,,

## 2019-07-05 PROCEDURE — 85610 PROTHROMBIN TIME: CPT

## 2019-07-05 NOTE — PROGRESS NOTES
Patient questioned for low INR, Patient questioned and is  currently on Lovenox due to being subtherapeutic, denies change to health, medications, or diet, she verified her past Coumadin doses which were taking correctly

## 2019-07-05 NOTE — PROGRESS NOTES
INR not at goal. Medications, chart, and patient findings reviewed. See calendar for adjustments to dose and follow up plan.  Dose per calendar and continue lovenox until >2.0.

## 2019-07-05 NOTE — PROGRESS NOTES
Patient was given coumadin instructions: 7/05 -10mg, 7/06-10mg, 7/07-7.5mg and also to continue Lovenox and get lab 7/08/19, Patient verbalized understanding, appointment booked

## 2019-07-08 ENCOUNTER — LAB VISIT (OUTPATIENT)
Dept: LAB | Facility: HOSPITAL | Age: 64
End: 2019-07-08
Payer: COMMERCIAL

## 2019-07-08 ENCOUNTER — ANTI-COAG VISIT (OUTPATIENT)
Dept: CARDIOLOGY | Facility: CLINIC | Age: 64
End: 2019-07-08
Payer: COMMERCIAL

## 2019-07-08 DIAGNOSIS — I48.20 CHRONIC ATRIAL FIBRILLATION WITH RAPID VENTRICULAR RESPONSE: ICD-10-CM

## 2019-07-08 DIAGNOSIS — Z95.2 STATUS POST HEART VALVE REPLACEMENT WITH MECHANICAL VALVE: ICD-10-CM

## 2019-07-08 DIAGNOSIS — Z79.01 CHRONIC ANTICOAGULATION: ICD-10-CM

## 2019-07-08 LAB
INR PPP: 2.2 (ref 0.8–1.2)
PROTHROMBIN TIME: 21.3 SEC (ref 9–12.5)

## 2019-07-08 PROCEDURE — 36415 COLL VENOUS BLD VENIPUNCTURE: CPT

## 2019-07-08 PROCEDURE — 85610 PROTHROMBIN TIME: CPT

## 2019-07-08 PROCEDURE — 93793 PR ANTICOAGULANT MGMT FOR PT TAKING WARFARIN: ICD-10-PCS | Mod: S$GLB,,,

## 2019-07-08 PROCEDURE — 93793 ANTICOAG MGMT PT WARFARIN: CPT | Mod: S$GLB,,,

## 2019-07-08 NOTE — PROGRESS NOTES
Patient given coumadin instructions: 7/08 -10mg, 7/09-10mg, 7/10-5mg, get lab drawn 7/11 and if no call regarding the result then on 7/11 take 7.5mg, Patient was also advised to stop the lovenox injections, patient verbalized understanding, appointment booked

## 2019-07-08 NOTE — PROGRESS NOTES
INR not at goal. Medications, chart, and patient findings reviewed. See calendar for adjustments to dose and follow up plan.  Pt findings: Pt confirms dose and continues on lovenox.  She had a few cashews in a salad and denies any other changes.  Pt can d/c lovenox at this time since INR > 2.0.  Will dose coumadin per calendar and re-assess INR 7/11.

## 2019-07-11 ENCOUNTER — LAB VISIT (OUTPATIENT)
Dept: LAB | Facility: HOSPITAL | Age: 64
End: 2019-07-11
Payer: COMMERCIAL

## 2019-07-11 ENCOUNTER — CLINICAL SUPPORT (OUTPATIENT)
Dept: CARDIOLOGY | Facility: HOSPITAL | Age: 64
End: 2019-07-11
Attending: INTERNAL MEDICINE
Payer: COMMERCIAL

## 2019-07-11 ENCOUNTER — ANTI-COAG VISIT (OUTPATIENT)
Dept: CARDIOLOGY | Facility: CLINIC | Age: 64
End: 2019-07-11
Payer: COMMERCIAL

## 2019-07-11 DIAGNOSIS — I48.20 CHRONIC ATRIAL FIBRILLATION WITH RAPID VENTRICULAR RESPONSE: ICD-10-CM

## 2019-07-11 DIAGNOSIS — Z95.2 STATUS POST HEART VALVE REPLACEMENT WITH MECHANICAL VALVE: ICD-10-CM

## 2019-07-11 DIAGNOSIS — Z79.01 CHRONIC ANTICOAGULATION: ICD-10-CM

## 2019-07-11 LAB
INR PPP: 3.1 (ref 0.8–1.2)
PROTHROMBIN TIME: 29.7 SEC (ref 9–12.5)

## 2019-07-11 PROCEDURE — 93227 HOLTER MONITOR - 24 HOUR (CUPID ONLY): ICD-10-PCS | Mod: ,,, | Performed by: INTERNAL MEDICINE

## 2019-07-11 PROCEDURE — 85610 PROTHROMBIN TIME: CPT

## 2019-07-11 PROCEDURE — 93793 ANTICOAG MGMT PT WARFARIN: CPT | Mod: S$GLB,,,

## 2019-07-11 PROCEDURE — 36415 COLL VENOUS BLD VENIPUNCTURE: CPT

## 2019-07-11 PROCEDURE — 93793 PR ANTICOAGULANT MGMT FOR PT TAKING WARFARIN: ICD-10-PCS | Mod: S$GLB,,,

## 2019-07-11 PROCEDURE — 93227 XTRNL ECG REC<48 HR R&I: CPT | Mod: ,,, | Performed by: INTERNAL MEDICINE

## 2019-07-11 PROCEDURE — 93226 XTRNL ECG REC<48 HR SCAN A/R: CPT

## 2019-07-12 NOTE — PROGRESS NOTES
Left tcb, if unable to call back today left message to take 7.5mg today -Friday, tomorrow -Saturday and Sunday and call back Monday am for further instructions, No answer at 's #

## 2019-07-12 NOTE — PROGRESS NOTES
Left message with coumadin dose and to call back to verify message was received and to be given further instructions

## 2019-07-16 ENCOUNTER — TELEPHONE (OUTPATIENT)
Dept: CARDIOLOGY | Facility: CLINIC | Age: 64
End: 2019-07-16

## 2019-07-16 LAB
OHS CV EVENT MONITOR DAY: 0
OHS CV HOLTER LENGTH DECIMAL HOURS: 24
OHS CV HOLTER LENGTH HOURS: 24
OHS CV HOLTER LENGTH MINUTES: 0

## 2019-07-16 NOTE — TELEPHONE ENCOUNTER
Spoke with patient and gave her holter results. She verbalized understanding and repeated back.    Notes recorded by Lorraine Messina MD on 7/16/2019 at 1:52 PM CDT  AFib rate controlled. Average HR 66. No changes to medication recommended.

## 2019-07-18 ENCOUNTER — LAB VISIT (OUTPATIENT)
Dept: LAB | Facility: HOSPITAL | Age: 64
End: 2019-07-18
Payer: COMMERCIAL

## 2019-07-18 ENCOUNTER — ANTI-COAG VISIT (OUTPATIENT)
Dept: CARDIOLOGY | Facility: CLINIC | Age: 64
End: 2019-07-18
Payer: COMMERCIAL

## 2019-07-18 DIAGNOSIS — Z95.2 STATUS POST HEART VALVE REPLACEMENT WITH MECHANICAL VALVE: ICD-10-CM

## 2019-07-18 DIAGNOSIS — I48.20 CHRONIC ATRIAL FIBRILLATION WITH RAPID VENTRICULAR RESPONSE: ICD-10-CM

## 2019-07-18 DIAGNOSIS — Z79.01 CHRONIC ANTICOAGULATION: ICD-10-CM

## 2019-07-18 LAB
INR PPP: 2.6 (ref 0.8–1.2)
PROTHROMBIN TIME: 24.8 SEC (ref 9–12.5)

## 2019-07-18 PROCEDURE — 36415 COLL VENOUS BLD VENIPUNCTURE: CPT

## 2019-07-18 PROCEDURE — 93793 ANTICOAG MGMT PT WARFARIN: CPT | Mod: S$GLB,,,

## 2019-07-18 PROCEDURE — 93793 PR ANTICOAGULANT MGMT FOR PT TAKING WARFARIN: ICD-10-PCS | Mod: S$GLB,,,

## 2019-07-18 PROCEDURE — 85610 PROTHROMBIN TIME: CPT

## 2019-07-31 ENCOUNTER — ANTI-COAG VISIT (OUTPATIENT)
Dept: CARDIOLOGY | Facility: CLINIC | Age: 64
End: 2019-07-31
Payer: COMMERCIAL

## 2019-07-31 ENCOUNTER — LAB VISIT (OUTPATIENT)
Dept: LAB | Facility: HOSPITAL | Age: 64
End: 2019-07-31
Attending: INTERNAL MEDICINE
Payer: COMMERCIAL

## 2019-07-31 DIAGNOSIS — I48.20 CHRONIC ATRIAL FIBRILLATION WITH RAPID VENTRICULAR RESPONSE: ICD-10-CM

## 2019-07-31 DIAGNOSIS — Z79.01 CHRONIC ANTICOAGULATION: ICD-10-CM

## 2019-07-31 DIAGNOSIS — Z95.2 STATUS POST HEART VALVE REPLACEMENT WITH MECHANICAL VALVE: ICD-10-CM

## 2019-07-31 LAB
INR PPP: 3 (ref 0.8–1.2)
PROTHROMBIN TIME: 28.7 SEC (ref 9–12.5)

## 2019-07-31 PROCEDURE — 85610 PROTHROMBIN TIME: CPT

## 2019-07-31 PROCEDURE — 36415 COLL VENOUS BLD VENIPUNCTURE: CPT

## 2019-07-31 PROCEDURE — 93793 ANTICOAG MGMT PT WARFARIN: CPT | Mod: S$GLB,,,

## 2019-07-31 PROCEDURE — 93793 PR ANTICOAGULANT MGMT FOR PT TAKING WARFARIN: ICD-10-PCS | Mod: S$GLB,,,

## 2019-08-12 DIAGNOSIS — I48.20 ATRIAL FIBRILLATION, CHRONIC: ICD-10-CM

## 2019-08-12 DIAGNOSIS — Z95.2 STATUS POST HEART VALVE REPLACEMENT WITH MECHANICAL VALVE: ICD-10-CM

## 2019-08-12 RX ORDER — WARFARIN SODIUM 5 MG/1
TABLET ORAL
Qty: 45 TABLET | Refills: 3 | Status: SHIPPED | OUTPATIENT
Start: 2019-08-12 | End: 2019-08-13 | Stop reason: SDUPTHER

## 2019-08-13 ENCOUNTER — LAB VISIT (OUTPATIENT)
Dept: LAB | Facility: HOSPITAL | Age: 64
End: 2019-08-13
Payer: COMMERCIAL

## 2019-08-13 ENCOUNTER — ANTI-COAG VISIT (OUTPATIENT)
Dept: CARDIOLOGY | Facility: CLINIC | Age: 64
End: 2019-08-13
Payer: COMMERCIAL

## 2019-08-13 DIAGNOSIS — I48.20 CHRONIC ATRIAL FIBRILLATION WITH RAPID VENTRICULAR RESPONSE: ICD-10-CM

## 2019-08-13 DIAGNOSIS — Z79.01 LONG TERM (CURRENT) USE OF ANTICOAGULANTS: ICD-10-CM

## 2019-08-13 DIAGNOSIS — Z95.2 STATUS POST HEART VALVE REPLACEMENT WITH MECHANICAL VALVE: ICD-10-CM

## 2019-08-13 DIAGNOSIS — I48.20 ATRIAL FIBRILLATION, CHRONIC: ICD-10-CM

## 2019-08-13 LAB
INR PPP: 2.4 (ref 0.8–1.2)
PROTHROMBIN TIME: 22.8 SEC (ref 9–12.5)

## 2019-08-13 PROCEDURE — 93793 PR ANTICOAGULANT MGMT FOR PT TAKING WARFARIN: ICD-10-PCS | Mod: S$GLB,,,

## 2019-08-13 PROCEDURE — 93793 ANTICOAG MGMT PT WARFARIN: CPT | Mod: S$GLB,,,

## 2019-08-13 PROCEDURE — 36415 COLL VENOUS BLD VENIPUNCTURE: CPT

## 2019-08-13 PROCEDURE — 85610 PROTHROMBIN TIME: CPT

## 2019-08-13 RX ORDER — WARFARIN SODIUM 5 MG/1
TABLET ORAL
Qty: 45 TABLET | Refills: 4 | Status: SHIPPED | OUTPATIENT
Start: 2019-08-13 | End: 2020-02-21 | Stop reason: SDUPTHER

## 2019-08-13 NOTE — PROGRESS NOTES
INR not at goal. Medications, chart, and patient findings reviewed. See calendar for adjustments to dose and follow up plan.  Pt denies any changes.  Will instruct pt to take 10mg 8/13 & resume maintenance regimen.  Plan to re-assess in 2 weeks. New prescription sent to preferred pharmacy.

## 2019-08-13 NOTE — PROGRESS NOTES
Patient was given coumadin instructions: 8/13 -10mg then resume 5mg Wednesdays and 7.5mg all other days, and also given next lab date, verbalized understanding, appointment booked

## 2019-09-02 ENCOUNTER — HOSPITAL ENCOUNTER (EMERGENCY)
Facility: OTHER | Age: 64
Discharge: HOME | End: 2019-09-03
Attending: EMERGENCY MEDICINE
Payer: COMMERCIAL

## 2019-09-02 DIAGNOSIS — D64.9 ANEMIA, UNSPECIFIED TYPE: ICD-10-CM

## 2019-09-02 DIAGNOSIS — J44.1 COPD WITH ACUTE EXACERBATION: Primary | ICD-10-CM

## 2019-09-02 DIAGNOSIS — R06.02 SHORTNESS OF BREATH: ICD-10-CM

## 2019-09-02 LAB
ALBUMIN SERPL BCP-MCNC: 3.6 G/DL (ref 3.5–5.2)
ALP SERPL-CCNC: 71 U/L (ref 55–135)
ALT SERPL W/O P-5'-P-CCNC: 11 U/L (ref 10–44)
ANION GAP SERPL CALC-SCNC: 10 MMOL/L (ref 8–16)
ANISOCYTOSIS BLD QL SMEAR: SLIGHT
AST SERPL-CCNC: 19 U/L (ref 10–40)
BASOPHILS # BLD AUTO: 0.05 K/UL (ref 0–0.2)
BASOPHILS NFR BLD: 0.7 % (ref 0–1.9)
BILIRUB SERPL-MCNC: 0.5 MG/DL (ref 0.1–1)
BNP SERPL-MCNC: 129 PG/ML (ref 0–99)
BUN SERPL-MCNC: 18 MG/DL (ref 8–23)
CALCIUM SERPL-MCNC: 9.3 MG/DL (ref 8.7–10.5)
CHLORIDE SERPL-SCNC: 107 MMOL/L (ref 95–110)
CO2 SERPL-SCNC: 26 MMOL/L (ref 23–29)
CREAT SERPL-MCNC: 1.1 MG/DL (ref 0.5–1.4)
DACRYOCYTES BLD QL SMEAR: ABNORMAL
DIFFERENTIAL METHOD: ABNORMAL
EOSINOPHIL # BLD AUTO: 0.7 K/UL (ref 0–0.5)
EOSINOPHIL NFR BLD: 9.9 % (ref 0–8)
ERYTHROCYTE [DISTWIDTH] IN BLOOD BY AUTOMATED COUNT: 20.2 % (ref 11.5–14.5)
EST. GFR  (AFRICAN AMERICAN): >60 ML/MIN/1.73 M^2
EST. GFR  (NON AFRICAN AMERICAN): 54 ML/MIN/1.73 M^2
GIANT PLATELETS BLD QL SMEAR: PRESENT
GLUCOSE SERPL-MCNC: 99 MG/DL (ref 70–110)
HCT VFR BLD AUTO: 29.8 % (ref 37–48.5)
HGB BLD-MCNC: 7.9 G/DL (ref 12–16)
HYPOCHROMIA BLD QL SMEAR: ABNORMAL
IMM GRANULOCYTES # BLD AUTO: 0.03 K/UL (ref 0–0.04)
IMM GRANULOCYTES NFR BLD AUTO: 0.4 % (ref 0–0.5)
INR PPP: 2 (ref 0.8–1.2)
LYMPHOCYTES # BLD AUTO: 1.2 K/UL (ref 1–4.8)
LYMPHOCYTES NFR BLD: 17.7 % (ref 18–48)
MCH RBC QN AUTO: 16.8 PG (ref 27–31)
MCHC RBC AUTO-ENTMCNC: 26.5 G/DL (ref 32–36)
MCV RBC AUTO: 63 FL (ref 82–98)
MONOCYTES # BLD AUTO: 0.7 K/UL (ref 0.3–1)
MONOCYTES NFR BLD: 10.2 % (ref 4–15)
NEUTROPHILS # BLD AUTO: 4.2 K/UL (ref 1.8–7.7)
NEUTROPHILS NFR BLD: 61.1 % (ref 38–73)
NRBC BLD-RTO: 0 /100 WBC
PLATELET # BLD AUTO: 239 K/UL (ref 150–350)
PLATELET BLD QL SMEAR: ABNORMAL
PMV BLD AUTO: ABNORMAL FL (ref 9.2–12.9)
POIKILOCYTOSIS BLD QL SMEAR: SLIGHT
POLYCHROMASIA BLD QL SMEAR: ABNORMAL
POTASSIUM SERPL-SCNC: 4.3 MMOL/L (ref 3.5–5.1)
PROT SERPL-MCNC: 7.4 G/DL (ref 6–8.4)
PROTHROMBIN TIME: 22.3 SEC (ref 9–12.5)
RBC # BLD AUTO: 4.7 M/UL (ref 4–5.4)
SODIUM SERPL-SCNC: 143 MMOL/L (ref 136–145)
TROPONIN I SERPL DL<=0.01 NG/ML-MCNC: <0.006 NG/ML (ref 0–0.03)
WBC # BLD AUTO: 6.89 K/UL (ref 3.9–12.7)

## 2019-09-02 PROCEDURE — 93010 EKG 12-LEAD: ICD-10-PCS | Mod: ,,, | Performed by: INTERNAL MEDICINE

## 2019-09-02 PROCEDURE — 63600175 PHARM REV CODE 636 W HCPCS: Performed by: PHYSICIAN ASSISTANT

## 2019-09-02 PROCEDURE — 84484 ASSAY OF TROPONIN QUANT: CPT

## 2019-09-02 PROCEDURE — 85025 COMPLETE CBC W/AUTO DIFF WBC: CPT

## 2019-09-02 PROCEDURE — 96365 THER/PROPH/DIAG IV INF INIT: CPT

## 2019-09-02 PROCEDURE — 36415 COLL VENOUS BLD VENIPUNCTURE: CPT

## 2019-09-02 PROCEDURE — 83880 ASSAY OF NATRIURETIC PEPTIDE: CPT

## 2019-09-02 PROCEDURE — 85610 PROTHROMBIN TIME: CPT

## 2019-09-02 PROCEDURE — 93005 ELECTROCARDIOGRAM TRACING: CPT

## 2019-09-02 PROCEDURE — 25000242 PHARM REV CODE 250 ALT 637 W/ HCPCS: Performed by: PHYSICIAN ASSISTANT

## 2019-09-02 PROCEDURE — 94644 CONT INHLJ TX 1ST HOUR: CPT

## 2019-09-02 PROCEDURE — 99285 EMERGENCY DEPT VISIT HI MDM: CPT | Mod: 25

## 2019-09-02 PROCEDURE — 63600175 PHARM REV CODE 636 W HCPCS: Performed by: EMERGENCY MEDICINE

## 2019-09-02 PROCEDURE — 80053 COMPREHEN METABOLIC PANEL: CPT

## 2019-09-02 PROCEDURE — 94640 AIRWAY INHALATION TREATMENT: CPT

## 2019-09-02 PROCEDURE — 93010 ELECTROCARDIOGRAM REPORT: CPT | Mod: ,,, | Performed by: INTERNAL MEDICINE

## 2019-09-02 RX ORDER — ALBUTEROL SULFATE 0.83 MG/ML
2.5 SOLUTION RESPIRATORY (INHALATION)
Status: COMPLETED | OUTPATIENT
Start: 2019-09-02 | End: 2019-09-02

## 2019-09-02 RX ORDER — MAGNESIUM SULFATE 1 G/100ML
1 INJECTION INTRAVENOUS ONCE
Status: COMPLETED | OUTPATIENT
Start: 2019-09-02 | End: 2019-09-03

## 2019-09-02 RX ORDER — MAGNESIUM SULFATE 1 G/100ML
1 INJECTION INTRAVENOUS ONCE
Status: DISCONTINUED | OUTPATIENT
Start: 2019-09-03 | End: 2019-09-02

## 2019-09-02 RX ORDER — ALBUTEROL SULFATE 0.83 MG/ML
15 SOLUTION RESPIRATORY (INHALATION)
Status: COMPLETED | OUTPATIENT
Start: 2019-09-02 | End: 2019-09-02

## 2019-09-02 RX ORDER — PREDNISONE 20 MG/1
60 TABLET ORAL
Status: COMPLETED | OUTPATIENT
Start: 2019-09-02 | End: 2019-09-02

## 2019-09-02 RX ADMIN — ALBUTEROL SULFATE 15 MG: 2.5 SOLUTION RESPIRATORY (INHALATION) at 10:09

## 2019-09-02 RX ADMIN — ALBUTEROL SULFATE 2.5 MG: 2.5 SOLUTION RESPIRATORY (INHALATION) at 09:09

## 2019-09-02 RX ADMIN — PREDNISONE 60 MG: 20 TABLET ORAL at 09:09

## 2019-09-02 RX ADMIN — MAGNESIUM SULFATE 1 G: 1 INJECTION INTRAVENOUS at 11:09

## 2019-09-03 ENCOUNTER — ANTI-COAG VISIT (OUTPATIENT)
Dept: CARDIOLOGY | Facility: CLINIC | Age: 64
End: 2019-09-03
Payer: COMMERCIAL

## 2019-09-03 VITALS
BODY MASS INDEX: 44.16 KG/M2 | HEART RATE: 82 BPM | WEIGHT: 240 LBS | RESPIRATION RATE: 16 BRPM | HEIGHT: 62 IN | DIASTOLIC BLOOD PRESSURE: 58 MMHG | OXYGEN SATURATION: 95 % | SYSTOLIC BLOOD PRESSURE: 126 MMHG | TEMPERATURE: 99 F

## 2019-09-03 DIAGNOSIS — I48.20 CHRONIC ATRIAL FIBRILLATION WITH RAPID VENTRICULAR RESPONSE: ICD-10-CM

## 2019-09-03 DIAGNOSIS — Z95.2 STATUS POST HEART VALVE REPLACEMENT WITH MECHANICAL VALVE: ICD-10-CM

## 2019-09-03 PROCEDURE — 93793 ANTICOAG MGMT PT WARFARIN: CPT | Mod: S$GLB,,,

## 2019-09-03 PROCEDURE — 93793 PR ANTICOAGULANT MGMT FOR PT TAKING WARFARIN: ICD-10-PCS | Mod: S$GLB,,,

## 2019-09-03 RX ORDER — PREDNISONE 50 MG/1
50 TABLET ORAL DAILY
Qty: 4 TABLET | Refills: 0 | Status: SHIPPED | OUTPATIENT
Start: 2019-09-03 | End: 2019-09-07

## 2019-09-03 NOTE — PROGRESS NOTES
INR not at goal. Medications, chart, and patient findings reviewed. See calendar for adjustments to dose and follow up plan.  Findings: Pt starts Prednisone 50mg x4 days; complains of diarrhea on Saturday and Sunday; and ate spinach on Saturday and had some cashews.  Will increase pt's maintenance regimen & re-assess in 1 week.

## 2019-09-03 NOTE — ED TRIAGE NOTES
"Pt arrives to ED with c/o SOB with onset 2 months ago. Pt states "I thought it would go away but it didn't, gemma been short of breath. I feel like I have a cold." pt sitting in bed, respirations labored, even, NAD noted, answering questions appropriately. Pt placed on BP cycling, pulse ox, and cardiac monitoring.  "

## 2019-09-03 NOTE — ED PROVIDER NOTES
Encounter Date: 2019       History     Chief Complaint   Patient presents with    Shortness of Breath     Pt CO SOB Xs 2 months     64-year-old female who is obese with hypertension, chronic kidney disease, atrial fibrillation with long-term use of anticoagulants, COPD, obstructive sleep apnea and asthma, CHF presents emergency department with complaints of shortness of breath for the last 2 months.  She reports that her symptoms have gradually worsened.  She states that at night she is experiencing some diaphoresis, chest pain and jaw pain.  She denies any fever but does report cough.  She denies any swelling to the lower extremities. She complains of overall pain as an 8/10.    The history is provided by the patient.     Review of patient's allergies indicates:  No Known Allergies  Past Medical History:   Diagnosis Date    Acute on chronic diastolic congestive heart failure     Anticoagulant long-term use     Asthma     Atrial fibrillation     Cataract     Chronic kidney disease     COPD (chronic obstructive pulmonary disease)     Depression     Dysuria     Flank pain     HTN (hypertension)     Nephrolithiasis     KEYSHAWN (obstructive sleep apnea)     awaiting CPAP machine     Rheumatic disease of mitral valve     Urinary incontinence     Urinary tract infection      Past Surgical History:   Procedure Laterality Date     SECTION      COLONOSCOPY N/A 2019    Performed by [facility] at [facility] ([facility])    COLONOSCOPY N/A 4/10/2014    [facility] at [facility] ([facility] F[facility])    CYSTOSCOPY N/A 3/22/2013    Per[facility] by [facility]re [facility]indo Jr., [facility] at [facility] OR 1ST FLR    ESOPHAGOGASTRODUODENOSCOPY (EGD) N/A 2016    Performed by Jose M Kelly MD at [facility] (4TH FLR)    EXTRACTION - STONE Right 3/22/2013    Per[facility] Jr[facility] at [facility] 1[facility] FLR    kidney stone removal      MITRAL VALVE REPLACEMENT  2004    REMOVAL, STENT,  URETER Right 3/22/2013    [facility] F[facility]    TUBAL LIGATION      URETEROSCOPY Right 3/22/2013    Per[facility] by [facility] 1ST FLR     Family History   Problem Relation Age of Onset    Stroke Paternal Grandmother     Colon cancer Maternal Grandmother     Cancer Brother         testicular cancer    Diabetes Sister     Hypertension Sister     Breast cancer Paternal Aunt     Diabetes Sister     Diabetes Sister     Heart disease Father 70        MI    Diabetes Father     Colon cancer Mother     Breast cancer Maternal Aunt     Ovarian cancer Neg Hx      Social History     Tobacco Use    Smoking status: Former Smoker     Packs/day: 0.50     Years: 20.00     Pack years: 10.00     Types: Cigarettes     Last attempt to quit: 2002     Years since quittin.3    Smokeless tobacco: Never Used   Substance Use Topics    Alcohol use: No    Drug use: No     Review of Systems   Constitutional: Positive for diaphoresis. Negative for chills and fever.   HENT: Negative for sore throat.    Respiratory: Positive for chest tightness and shortness of breath.    Cardiovascular: Positive for chest pain. Negative for leg swelling.   Gastrointestinal: Negative for nausea and vomiting.   Genitourinary: Negative for dysuria.   Musculoskeletal: Negative for back pain.   Skin: Negative for rash.   Neurological: Negative for weakness.   Hematological: Does not bruise/bleed easily.       Physical Exam     Initial Vitals [19]   BP Pulse Resp Temp SpO2   139/74 88 (!) 26 98.6 °F (37 °C) 95 %      MAP       --         Physical Exam    Nursing note and vitals reviewed.  Constitutional: She appears well-developed and well-nourished. She is not diaphoretic. She is Obese .  Non-toxic appearance. She appears distressed.   HENT:   Head: Normocephalic and atraumatic.   Right Ear: External ear normal.   Left Ear: External ear normal.   Nose: Nose normal.    Mouth/Throat: Oropharynx is clear and moist.   Eyes: Conjunctivae, EOM and lids are normal. Pupils are equal, round, and reactive to light. No scleral icterus.   Neck: Normal range of motion and phonation normal. Neck supple.   Cardiovascular: Normal rate, regular rhythm and normal heart sounds. Exam reveals no gallop and no friction rub.    No murmur heard.  Pulmonary/Chest: Tachypnea noted. She is in respiratory distress. She has wheezes. She has no rhonchi. She has no rales.   Abdominal: Normal appearance.   Musculoskeletal: Normal range of motion.   No obvious deformities, moving all extremities, normal gait   Neurological: She is alert and oriented to person, place, and time. No sensory deficit.   Skin: Skin is warm, dry and intact. No lesion and no rash noted. No erythema.   Psychiatric: She has a normal mood and affect. Her speech is normal and behavior is normal. Judgment normal. Cognition and memory are normal.         ED Course   Procedures  Labs Reviewed   CBC W/ AUTO DIFFERENTIAL - Abnormal; Notable for the following components:       Result Value    Hemoglobin 7.9 (*)     Hematocrit 29.8 (*)     Mean Corpuscular Volume 63 (*)     Mean Corpuscular Hemoglobin 16.8 (*)     Mean Corpuscular Hemoglobin Conc 26.5 (*)     RDW 20.2 (*)     Eos # 0.7 (*)     Lymph% 17.7 (*)     Eosinophil% 9.9 (*)     All other components within normal limits   COMPREHENSIVE METABOLIC PANEL - Abnormal; Notable for the following components:    eGFR if non  54 (*)     All other components within normal limits   B-TYPE NATRIURETIC PEPTIDE - Abnormal; Notable for the following components:     (*)     All other components within normal limits   PROTIME-INR - Abnormal; Notable for the following components:    Prothrombin Time 22.3 (*)     INR 2.0 (*)     All other components within normal limits   TROPONIN I   PROTIME-INR     EKG Readings: (Independently Interpreted)   Initial Reading: No STEMI. Rhythm: Atrial  Fibrillation. Heart Rate: 88. Clinical Impression: Atrial Fibrillation       Imaging Results          X-Ray Chest AP Portable (Final result)  Result time 09/02/19 22:11:35    Final result by Gabi Carbone MD (09/02/19 22:11:35)                 Impression:      Cardiomegaly with suspected mild pulmonary interstitial edema.      Electronically signed by: Gabi Carbone MD  Date:    09/02/2019  Time:    22:11             Narrative:    EXAMINATION:  XR CHEST AP PORTABLE    CLINICAL HISTORY:  Shortness of breath    TECHNIQUE:  Single frontal view of the chest was performed.    COMPARISON:  04/06/2019.    FINDINGS:  Heart is enlarged but stable in size.  Postsurgical sternotomy changes and cardiac valve replacement are seen.  There is increased interstitial attenuation suggestive for mild edema.  No evidence of new focal consolidation, pneumothorax, or significant pleural effusion.  No acute osseous abnormality identified.                                 Medical Decision Making:   History:   Old Medical Records: I decided to obtain old medical records.  Initial Assessment:   64-year-old female with complaints consistent with COPD exacerbation with associated shortness of breath. Tachypneic on arrival to the emergency department.  Afebrile.  Oxygen saturation was 95% on room air.  She was not tachycardic.  On exam she has diffuse wheezing heard in all lung fields.  Independently Interpreted Test(s):   I have ordered and independently interpreted EKG Reading(s) - see prior notes  Clinical Tests:   Lab Tests: Ordered and Reviewed  Radiological Study: Ordered and Reviewed  Medical Tests: Reviewed and Ordered  ED Management:  Workup obtained due to patient's wheezing and shortness of breath with extensive history of COPD, KEYSHAWN and CHF as well as anemia.  No elevation in white blood cell count.  Her H&H is 7.9 and 29.8.  Patient with known history of anemia.  She has not been compliant with her B12 injections, stating that  she did not receive this syringes.  Patient also admits that she has not been taking her oral iron supplements.  She denies any bleeding and had recent scope done.  No significant electrolyte abnormality.  Her BNP is minimally elevated at 129.  Troponin is not elevated.  Her INR is 2.  Chest x-ray shows cardiomegaly with suspected mild pulmonary interstitial edema. EKG consistent with atrial fibrillation with a rate of 88 beats per minute. Patient with known history of AFib.  Blood pressure well controlled here in the emergency department.  She has no pitting edema to lower extremities. She states that this shortness of breath is more consistent with asthma/COPD and not with her CHF flares in the past.  She was administered 3 albuterol nebs as well as oral prednisone.  On re-evaluation she had persistent wheezing.  She was administered a hour long continuous neb as well as IV magnesium.  12:50 AM patient ambulated without significant decompensation.  Patient states I feel like my old self again.  Will discharge home with a short course of oral steroids.  She is urged to continue using her inhalers as prescribed.  She is urged to follow up closely with her primary care physician in the next 48 hr or return to the emergency department for any worsening signs or symptoms otherwise.  She states understanding agrees with this plan.  This is the extent of patient's complaints today.  This patient was discussed with the attending physician who also evaluated her and agrees with treatment plan  Other:   I have discussed this case with another health care provider.       <> Summary of the Discussion: Roman  This note was created using [employer] dictation.  There may be typographical errors secondary to dictation.                        Clinical Impression:     1. COPD with acute exacerbation    2. Shortness of breath    3. Anemia, unspecified type            Disposition:   Disposition: Discharged  Condition:  Stable                        Ynes Horton PA-C  09/03/19 0055

## 2019-09-09 ENCOUNTER — ANTI-COAG VISIT (OUTPATIENT)
Dept: CARDIOLOGY | Facility: CLINIC | Age: 64
End: 2019-09-09
Payer: COMMERCIAL

## 2019-09-09 ENCOUNTER — LAB VISIT (OUTPATIENT)
Dept: LAB | Facility: HOSPITAL | Age: 64
End: 2019-09-09
Attending: INTERNAL MEDICINE
Payer: COMMERCIAL

## 2019-09-09 DIAGNOSIS — Z95.2 STATUS POST HEART VALVE REPLACEMENT WITH MECHANICAL VALVE: ICD-10-CM

## 2019-09-09 DIAGNOSIS — I48.20 CHRONIC ATRIAL FIBRILLATION WITH RAPID VENTRICULAR RESPONSE: ICD-10-CM

## 2019-09-09 LAB
INR PPP: 2.5 (ref 0.8–1.2)
PROTHROMBIN TIME: 24.4 SEC (ref 9–12.5)

## 2019-09-09 PROCEDURE — 93793 PR ANTICOAGULANT MGMT FOR PT TAKING WARFARIN: ICD-10-PCS | Mod: S$GLB,,,

## 2019-09-09 PROCEDURE — 85610 PROTHROMBIN TIME: CPT

## 2019-09-09 PROCEDURE — 36415 COLL VENOUS BLD VENIPUNCTURE: CPT

## 2019-09-09 PROCEDURE — 93793 ANTICOAG MGMT PT WARFARIN: CPT | Mod: S$GLB,,,

## 2019-09-20 ENCOUNTER — ANTI-COAG VISIT (OUTPATIENT)
Dept: CARDIOLOGY | Facility: CLINIC | Age: 64
End: 2019-09-20
Payer: COMMERCIAL

## 2019-09-20 ENCOUNTER — LAB VISIT (OUTPATIENT)
Dept: LAB | Facility: HOSPITAL | Age: 64
End: 2019-09-20
Attending: INTERNAL MEDICINE
Payer: COMMERCIAL

## 2019-09-20 ENCOUNTER — OFFICE VISIT (OUTPATIENT)
Dept: INTERNAL MEDICINE | Facility: CLINIC | Age: 64
End: 2019-09-20
Payer: COMMERCIAL

## 2019-09-20 DIAGNOSIS — E55.9 VITAMIN D DEFICIENCY: ICD-10-CM

## 2019-09-20 DIAGNOSIS — I10 ESSENTIAL HYPERTENSION: Chronic | ICD-10-CM

## 2019-09-20 DIAGNOSIS — D64.9 ANEMIA, UNSPECIFIED TYPE: ICD-10-CM

## 2019-09-20 DIAGNOSIS — I50.33 ACUTE ON CHRONIC DIASTOLIC CONGESTIVE HEART FAILURE: ICD-10-CM

## 2019-09-20 DIAGNOSIS — R07.9 CHEST PAIN, UNSPECIFIED TYPE: ICD-10-CM

## 2019-09-20 DIAGNOSIS — Z12.31 SCREENING MAMMOGRAM, ENCOUNTER FOR: Primary | ICD-10-CM

## 2019-09-20 DIAGNOSIS — Z95.2 STATUS POST HEART VALVE REPLACEMENT WITH MECHANICAL VALVE: ICD-10-CM

## 2019-09-20 DIAGNOSIS — I48.20 CHRONIC ATRIAL FIBRILLATION WITH RAPID VENTRICULAR RESPONSE: ICD-10-CM

## 2019-09-20 LAB
25(OH)D3+25(OH)D2 SERPL-MCNC: 15 NG/ML (ref 30–96)
BASOPHILS # BLD AUTO: 0.03 K/UL (ref 0–0.2)
BASOPHILS NFR BLD: 0.5 % (ref 0–1.9)
DIFFERENTIAL METHOD: ABNORMAL
EOSINOPHIL # BLD AUTO: 0.5 K/UL (ref 0–0.5)
EOSINOPHIL NFR BLD: 8.5 % (ref 0–8)
ERYTHROCYTE [DISTWIDTH] IN BLOOD BY AUTOMATED COUNT: 20.9 % (ref 11.5–14.5)
FERRITIN SERPL-MCNC: 18 NG/ML (ref 20–300)
FOLATE SERPL-MCNC: 5.7 NG/ML (ref 4–24)
HCT VFR BLD AUTO: 33.4 % (ref 37–48.5)
HGB BLD-MCNC: 8.5 G/DL (ref 12–16)
IRON SERPL-MCNC: 25 UG/DL (ref 30–160)
LYMPHOCYTES # BLD AUTO: 1.5 K/UL (ref 1–4.8)
LYMPHOCYTES NFR BLD: 26.3 % (ref 18–48)
MCH RBC QN AUTO: 16.8 PG (ref 27–31)
MCHC RBC AUTO-ENTMCNC: 25.4 G/DL (ref 32–36)
MCV RBC AUTO: 66 FL (ref 82–98)
MONOCYTES # BLD AUTO: 0.6 K/UL (ref 0.3–1)
MONOCYTES NFR BLD: 10.4 % (ref 4–15)
NEUTROPHILS # BLD AUTO: 3.1 K/UL (ref 1.8–7.7)
NEUTROPHILS NFR BLD: 54 % (ref 38–73)
NRBC BLD-RTO: 0 /100 WBC
PLATELET # BLD AUTO: 222 K/UL (ref 150–350)
PMV BLD AUTO: ABNORMAL FL (ref 9.2–12.9)
RBC # BLD AUTO: 5.05 M/UL (ref 4–5.4)
RETICS/RBC NFR AUTO: 1.4 % (ref 0.5–2.5)
SATURATED IRON: 5 % (ref 20–50)
TOTAL IRON BINDING CAPACITY: 533 UG/DL (ref 250–450)
TRANSFERRIN SERPL-MCNC: 360 MG/DL (ref 200–375)
TSH SERPL DL<=0.005 MIU/L-ACNC: 1.75 UIU/ML (ref 0.4–4)
VIT B12 SERPL-MCNC: 482 PG/ML (ref 210–950)
WBC # BLD AUTO: 5.75 K/UL (ref 3.9–12.7)

## 2019-09-20 PROCEDURE — 82728 ASSAY OF FERRITIN: CPT

## 2019-09-20 PROCEDURE — 82607 VITAMIN B-12: CPT

## 2019-09-20 PROCEDURE — 36415 COLL VENOUS BLD VENIPUNCTURE: CPT

## 2019-09-20 PROCEDURE — 85045 AUTOMATED RETICULOCYTE COUNT: CPT

## 2019-09-20 PROCEDURE — 3078F PR MOST RECENT DIASTOLIC BLOOD PRESSURE < 80 MM HG: ICD-10-PCS | Mod: CPTII,S$GLB,, | Performed by: INTERNAL MEDICINE

## 2019-09-20 PROCEDURE — 99214 PR OFFICE/OUTPT VISIT, EST, LEVL IV, 30-39 MIN: ICD-10-PCS | Mod: S$GLB,,, | Performed by: INTERNAL MEDICINE

## 2019-09-20 PROCEDURE — 85025 COMPLETE CBC W/AUTO DIFF WBC: CPT

## 2019-09-20 PROCEDURE — 84443 ASSAY THYROID STIM HORMONE: CPT

## 2019-09-20 PROCEDURE — 82306 VITAMIN D 25 HYDROXY: CPT

## 2019-09-20 PROCEDURE — 99214 OFFICE O/P EST MOD 30 MIN: CPT | Mod: S$GLB,,, | Performed by: INTERNAL MEDICINE

## 2019-09-20 PROCEDURE — 3074F SYST BP LT 130 MM HG: CPT | Mod: CPTII,S$GLB,, | Performed by: INTERNAL MEDICINE

## 2019-09-20 PROCEDURE — 99999 PR PBB SHADOW E&M-EST. PATIENT-LVL V: ICD-10-PCS | Mod: PBBFAC,,, | Performed by: INTERNAL MEDICINE

## 2019-09-20 PROCEDURE — 93793 PR ANTICOAGULANT MGMT FOR PT TAKING WARFARIN: ICD-10-PCS | Mod: S$GLB,,,

## 2019-09-20 PROCEDURE — 3008F PR BODY MASS INDEX (BMI) DOCUMENTED: ICD-10-PCS | Mod: CPTII,S$GLB,, | Performed by: INTERNAL MEDICINE

## 2019-09-20 PROCEDURE — 3078F DIAST BP <80 MM HG: CPT | Mod: CPTII,S$GLB,, | Performed by: INTERNAL MEDICINE

## 2019-09-20 PROCEDURE — 93793 ANTICOAG MGMT PT WARFARIN: CPT | Mod: S$GLB,,,

## 2019-09-20 PROCEDURE — 99999 PR PBB SHADOW E&M-EST. PATIENT-LVL V: CPT | Mod: PBBFAC,,, | Performed by: INTERNAL MEDICINE

## 2019-09-20 PROCEDURE — 3074F PR MOST RECENT SYSTOLIC BLOOD PRESSURE < 130 MM HG: ICD-10-PCS | Mod: CPTII,S$GLB,, | Performed by: INTERNAL MEDICINE

## 2019-09-20 PROCEDURE — 82746 ASSAY OF FOLIC ACID SERUM: CPT

## 2019-09-20 PROCEDURE — 3008F BODY MASS INDEX DOCD: CPT | Mod: CPTII,S$GLB,, | Performed by: INTERNAL MEDICINE

## 2019-09-20 PROCEDURE — 83540 ASSAY OF IRON: CPT

## 2019-09-20 RX ORDER — WARFARIN SODIUM 5 MG/1
TABLET ORAL
COMMUNITY
Start: 2019-04-09 | End: 2019-11-29

## 2019-09-20 RX ORDER — PRAVASTATIN SODIUM 40 MG/1
40 TABLET ORAL DAILY
Qty: 30 TABLET | Refills: 3 | Status: ON HOLD | OUTPATIENT
Start: 2019-09-20 | End: 2020-07-10

## 2019-09-20 RX ORDER — FLUTICASONE PROPIONATE AND SALMETEROL 500; 50 UG/1; UG/1
1 POWDER RESPIRATORY (INHALATION) 2 TIMES DAILY
Qty: 1 INHALER | Refills: 6 | Status: ON HOLD | OUTPATIENT
Start: 2019-09-20 | End: 2020-07-10 | Stop reason: HOSPADM

## 2019-09-20 RX ORDER — ALBUTEROL SULFATE 90 UG/1
AEROSOL, METERED RESPIRATORY (INHALATION)
Refills: 6 | COMMUNITY
Start: 2019-06-24 | End: 2020-06-11

## 2019-09-20 RX ORDER — ALBUTEROL SULFATE 90 UG/1
2 AEROSOL, METERED RESPIRATORY (INHALATION) EVERY 6 HOURS PRN
Qty: 1 INHALER | Refills: 6 | Status: SHIPPED | OUTPATIENT
Start: 2019-09-20 | End: 2019-10-25

## 2019-09-20 RX ORDER — METOPROLOL SUCCINATE 200 MG/1
200 TABLET, EXTENDED RELEASE ORAL DAILY
Qty: 30 TABLET | Refills: 4 | Status: SHIPPED | OUTPATIENT
Start: 2019-09-20 | End: 2020-05-13 | Stop reason: SDUPTHER

## 2019-09-20 RX ORDER — SERTRALINE HYDROCHLORIDE 100 MG/1
100 TABLET, FILM COATED ORAL DAILY
Qty: 30 TABLET | Refills: 4 | Status: SHIPPED | OUTPATIENT
Start: 2019-09-20 | End: 2020-07-20

## 2019-09-20 RX ORDER — FUROSEMIDE 20 MG/1
20 TABLET ORAL DAILY
Qty: 60 TABLET | Refills: 3 | Status: ON HOLD | OUTPATIENT
Start: 2019-09-20 | End: 2020-07-10 | Stop reason: SDUPTHER

## 2019-09-20 NOTE — PROGRESS NOTES
INR not at goal. Medications, chart, and patient findings reviewed. See calendar for adjustments to dose and follow up plan.  Pt denies any changes.  Will slightly increase pt's dose & re-assess in 2 weeks.

## 2019-09-21 ENCOUNTER — TELEPHONE (OUTPATIENT)
Dept: INTERNAL MEDICINE | Facility: CLINIC | Age: 64
End: 2019-09-21

## 2019-09-21 DIAGNOSIS — D50.9 IRON DEFICIENCY ANEMIA, UNSPECIFIED IRON DEFICIENCY ANEMIA TYPE: Primary | ICD-10-CM

## 2019-09-21 NOTE — TELEPHONE ENCOUNTER
Pt informed her labs revealed she has iron deficiency anemia and her vitamin d level is low. Pt verbaly understood. Please send prescriptions to pharmacy on file, [facility] pharmacy. Gastro appt scheduled for November 11th. Appt also added to wait list.

## 2019-09-21 NOTE — TELEPHONE ENCOUNTER
Please contact patient and inform her that her lab work indicates she has an iron deficiency anemia.  I will need to give her an iron supplement.  Please ask her what pharmacy she would like that sent to.    She will also need to see Gastroenterology.  Order in.  Please schedule    Her vitamin-D level is low as well, I will need to give her a supplement for this

## 2019-09-23 RX ORDER — FERROUS SULFATE 325(65) MG
325 TABLET, DELAYED RELEASE (ENTERIC COATED) ORAL 2 TIMES DAILY
Qty: 60 TABLET | Refills: 4 | Status: SHIPPED | OUTPATIENT
Start: 2019-09-23 | End: 2019-11-29

## 2019-09-23 RX ORDER — ERGOCALCIFEROL 1.25 MG/1
50000 CAPSULE ORAL
Qty: 12 CAPSULE | Refills: 1 | Status: SHIPPED | OUTPATIENT
Start: 2019-09-23 | End: 2019-10-25 | Stop reason: SDUPTHER

## 2019-09-24 ENCOUNTER — TELEPHONE (OUTPATIENT)
Dept: INTERNAL MEDICINE | Facility: CLINIC | Age: 64
End: 2019-09-24

## 2019-09-24 NOTE — TELEPHONE ENCOUNTER
Spoke with patient and informed her that her medications were sent to the pharmacy. Ferrous sulfate 325 mg and ergocalciferol 50,000 units oral.

## 2019-09-24 NOTE — TELEPHONE ENCOUNTER
----- Message from Radha Faye sent at 9/24/2019  1:18 PM CDT -----  Contact: KWASI JONES   Patient Returning Call      Who Called: KWASI JONES       Who Left Message for Patient: unknown      Does the patient know what this is regarding?: unknown      Best Call Back Number: [phone]      Additional Information: n/a

## 2019-09-26 VITALS
HEART RATE: 80 BPM | OXYGEN SATURATION: 98 % | BODY MASS INDEX: 43.53 KG/M2 | SYSTOLIC BLOOD PRESSURE: 124 MMHG | HEIGHT: 62 IN | DIASTOLIC BLOOD PRESSURE: 78 MMHG | WEIGHT: 236.56 LBS

## 2019-09-27 NOTE — PROGRESS NOTES
Subjective:       Patient ID: Elayne Almanzar is a 64 y.o. female.    Chief Complaint: Hypertension    HPI  She returns for management of hypertension.  She has had hypertension for over a year.  Current treatment has included medications outlined in medication list.  She denies chest pain or shortness of breath.  No palpitations.  Denies left arm or neck pain.    Past medical history: Hypertension, A. fib, COPD, hyperlipidemia, nephrolithiasis , glaucoma, sleep apnea, status post mitral valve replacement , family history of colon cancer-mother. She had a colonoscopy June 2019      Medications: Proventil MDI 2 puffs 4 times a day when necessary,Advair 500/50 one puff twice a day, Lasix 20 mg daily,Toprol-XL 200 mg daily, Coumadin as monitored by Coumadin clinic , Zoloft 100 mg daily, xalatan, Pravachol 40 mg daily, Norvasc 2.5 mg daily      No known drug allergies       Review of Systems   Constitutional: Negative for chills, fatigue, fever and unexpected weight change.   Respiratory: Negative for chest tightness and shortness of breath.    Cardiovascular: Negative for chest pain and palpitations.   Gastrointestinal: Negative for abdominal pain and blood in stool.   Neurological: Negative for dizziness, syncope, numbness and headaches.       Objective:      Physical Exam   HENT:   Right Ear: External ear normal.   Left Ear: External ear normal.   Nose: Nose normal.   Mouth/Throat: Oropharynx is clear and moist.   Eyes: Pupils are equal, round, and reactive to light.   Neck: Normal range of motion.   Cardiovascular: Normal rate and regular rhythm.   No murmur heard.  Pulmonary/Chest: Breath sounds normal.   Abdominal: She exhibits no distension. There is no hepatosplenomegaly. There is no tenderness.   Lymphadenopathy:     She has no cervical adenopathy.     She has no axillary adenopathy.   Neurological: She has normal strength and normal reflexes. No cranial nerve deficit or sensory deficit.     breast exam:   No masses palpated, no nipple discharge expressed  Assessment/Plan       Assessment and plan:  1.  Hypertension:  Check vitamin-D and TSH  2.  Anemia:  Check CBC, iron, TIBC, ferritin, B12, folate  3.  Schedule mammogram

## 2019-10-03 ENCOUNTER — ANTI-COAG VISIT (OUTPATIENT)
Dept: CARDIOLOGY | Facility: CLINIC | Age: 64
End: 2019-10-03
Payer: COMMERCIAL

## 2019-10-03 ENCOUNTER — LAB VISIT (OUTPATIENT)
Dept: LAB | Facility: HOSPITAL | Age: 64
End: 2019-10-03
Attending: INTERNAL MEDICINE
Payer: COMMERCIAL

## 2019-10-03 DIAGNOSIS — I48.20 CHRONIC ATRIAL FIBRILLATION WITH RAPID VENTRICULAR RESPONSE: ICD-10-CM

## 2019-10-03 DIAGNOSIS — Z95.2 STATUS POST HEART VALVE REPLACEMENT WITH MECHANICAL VALVE: ICD-10-CM

## 2019-10-03 LAB
INR PPP: 2.2 (ref 0.8–1.2)
PROTHROMBIN TIME: 21.5 SEC (ref 9–12.5)

## 2019-10-03 PROCEDURE — 93793 ANTICOAG MGMT PT WARFARIN: CPT | Mod: S$GLB,,,

## 2019-10-03 PROCEDURE — 85610 PROTHROMBIN TIME: CPT

## 2019-10-03 PROCEDURE — 93793 PR ANTICOAGULANT MGMT FOR PT TAKING WARFARIN: ICD-10-PCS | Mod: S$GLB,,,

## 2019-10-03 PROCEDURE — 36415 COLL VENOUS BLD VENIPUNCTURE: CPT

## 2019-10-03 NOTE — PROGRESS NOTES
INR not at goal. Medications, chart, and patient findings reviewed. See calendar for adjustments to dose and follow up plan.  Findings:  Pt reports greens 1x week on Saturdays & denies any other changes.  Will instruct pt to take 10mg 10/3 & increase maintenance dose further.  Plan to re-assess in 2 weeks.

## 2019-10-04 ENCOUNTER — HOSPITAL ENCOUNTER (OUTPATIENT)
Dept: RADIOLOGY | Facility: HOSPITAL | Age: 64
Discharge: HOME | End: 2019-10-04
Attending: INTERNAL MEDICINE
Payer: COMMERCIAL

## 2019-10-04 DIAGNOSIS — Z12.31 SCREENING MAMMOGRAM, ENCOUNTER FOR: ICD-10-CM

## 2019-10-04 PROCEDURE — 77063 MAMMO DIGITAL SCREENING BILAT WITH TOMOSYNTHESIS_CAD: ICD-10-PCS | Mod: 26,,, | Performed by: RADIOLOGY

## 2019-10-04 PROCEDURE — 77063 BREAST TOMOSYNTHESIS BI: CPT | Mod: 26,,, | Performed by: RADIOLOGY

## 2019-10-04 PROCEDURE — 77067 SCR MAMMO BI INCL CAD: CPT | Mod: 26,,, | Performed by: RADIOLOGY

## 2019-10-04 PROCEDURE — 77067 SCR MAMMO BI INCL CAD: CPT | Mod: TC

## 2019-10-04 PROCEDURE — 77067 MAMMO DIGITAL SCREENING BILAT WITH TOMOSYNTHESIS_CAD: ICD-10-PCS | Mod: 26,,, | Performed by: RADIOLOGY

## 2019-10-10 ENCOUNTER — TELEPHONE (OUTPATIENT)
Dept: RADIOLOGY | Facility: HOSPITAL | Age: 64
End: 2019-10-10

## 2019-10-10 NOTE — TELEPHONE ENCOUNTER
Spoke with patient and explained mammogram findings.Patient expressed understanding of results. Patient scheduled abnormal mammogram follow up appointment at [facility] on 10/17/2019.

## 2019-10-17 ENCOUNTER — HOSPITAL ENCOUNTER (OUTPATIENT)
Dept: RADIOLOGY | Facility: HOSPITAL | Age: 64
Discharge: HOME | End: 2019-10-17
Attending: INTERNAL MEDICINE
Payer: COMMERCIAL

## 2019-10-17 ENCOUNTER — TELEPHONE (OUTPATIENT)
Dept: RADIOLOGY | Facility: HOSPITAL | Age: 64
End: 2019-10-17

## 2019-10-17 ENCOUNTER — ANTI-COAG VISIT (OUTPATIENT)
Dept: CARDIOLOGY | Facility: CLINIC | Age: 64
End: 2019-10-17
Payer: COMMERCIAL

## 2019-10-17 DIAGNOSIS — R92.8 ABNORMAL MAMMOGRAM: ICD-10-CM

## 2019-10-17 DIAGNOSIS — Z95.2 STATUS POST HEART VALVE REPLACEMENT WITH MECHANICAL VALVE: Primary | ICD-10-CM

## 2019-10-17 DIAGNOSIS — I48.20 CHRONIC ATRIAL FIBRILLATION WITH RAPID VENTRICULAR RESPONSE: ICD-10-CM

## 2019-10-17 PROCEDURE — 93793 PR ANTICOAGULANT MGMT FOR PT TAKING WARFARIN: ICD-10-PCS | Mod: S$GLB,,,

## 2019-10-17 PROCEDURE — 77061 MAMMO DIGITAL DIAGNOSTIC LEFT WITH TOMOSYNTHESIS_CAD: ICD-10-PCS | Mod: 26,LT,, | Performed by: RADIOLOGY

## 2019-10-17 PROCEDURE — 77061 BREAST TOMOSYNTHESIS UNI: CPT | Mod: TC,PO,LT

## 2019-10-17 PROCEDURE — 77065 MAMMO DIGITAL DIAGNOSTIC LEFT WITH TOMOSYNTHESIS_CAD: ICD-10-PCS | Mod: 26,LT,, | Performed by: RADIOLOGY

## 2019-10-17 PROCEDURE — 77065 DX MAMMO INCL CAD UNI: CPT | Mod: 26,LT,, | Performed by: RADIOLOGY

## 2019-10-17 PROCEDURE — 93793 ANTICOAG MGMT PT WARFARIN: CPT | Mod: S$GLB,,,

## 2019-10-17 PROCEDURE — 77061 BREAST TOMOSYNTHESIS UNI: CPT | Mod: 26,LT,, | Performed by: RADIOLOGY

## 2019-10-17 PROCEDURE — 77065 DX MAMMO INCL CAD UNI: CPT | Mod: TC,PO,LT

## 2019-10-17 NOTE — TELEPHONE ENCOUNTER
Spoke with patient. Reviewed breast biopsy procedure and reviewed instructions for breast biopsy. Patient expressed understanding and all questions were answered. Provided patient with my phone number to call for any further concerns or questions.   Patient scheduled breast biopsy at the [facility] on 10/25/2019.

## 2019-10-17 NOTE — PROGRESS NOTES
Prem Espinoza/Breast Imaging) ext ([phone]) called 10/17/19 to inform us that patient is having a (Breast Biopsy) on 10/25/19. Patient is requested to *Hold* coumadin for 3 days. Please advise.

## 2019-10-18 RX ORDER — ENOXAPARIN SODIUM 150 MG/ML
120 INJECTION SUBCUTANEOUS EVERY 12 HOURS
Qty: 20 SYRINGE | Refills: 1 | Status: SHIPPED | OUTPATIENT
Start: 2019-10-18 | End: 2019-12-11 | Stop reason: SDUPTHER

## 2019-10-18 NOTE — PROGRESS NOTES
Patient instructed to take Coumadin per calender, Lovenox per pharmacist progress note, and INR 10/29/19 which she verbalized understanding and repeated back correctly.

## 2019-10-18 NOTE — PROGRESS NOTES
INR at goal but upcoming procedure needing bridge 2/2 mechanical valve replacement. Medications, chart, and patient findings reviewed. See calendar for adjustments to dose and follow up plan.    Weight 107kg and est CrCl >30 mL/min    Pre-Procedure Instructions:    Take last dose of warfarin on :           10/21                 Start enoxaparin injections the morning of:        10/23                      Enoxaparin dose is:       110mg                Every 12 hours (Twice Daily)  Last dose of enoxaparin will be the morning of:          10/24                    Post-Procedure Instructions:    Restart warfarin on        10/25                  At a dose of       Per calendar               Unless directed otherwise by your physician    Restart lovenox injections on the morning of          10/26               Unless directed otherwise by your physician    The pt has been instructed to call the Coumadin Clinic if given different recommendations post procedure.

## 2019-10-22 ENCOUNTER — PATIENT OUTREACH (OUTPATIENT)
Dept: ADMINISTRATIVE | Facility: OTHER | Age: 64
End: 2019-10-22

## 2019-10-24 ENCOUNTER — TELEPHONE (OUTPATIENT)
Dept: CARDIOLOGY | Facility: CLINIC | Age: 64
End: 2019-10-24

## 2019-10-24 DIAGNOSIS — R00.2 PALPITATION: Primary | ICD-10-CM

## 2019-10-25 ENCOUNTER — OFFICE VISIT (OUTPATIENT)
Dept: CARDIOLOGY | Facility: CLINIC | Age: 64
End: 2019-10-25
Payer: COMMERCIAL

## 2019-10-25 ENCOUNTER — HOSPITAL ENCOUNTER (OUTPATIENT)
Dept: RADIOLOGY | Facility: HOSPITAL | Age: 64
Discharge: HOME | End: 2019-10-25
Attending: INTERNAL MEDICINE
Payer: COMMERCIAL

## 2019-10-25 ENCOUNTER — HOSPITAL ENCOUNTER (OUTPATIENT)
Dept: CARDIOLOGY | Facility: CLINIC | Age: 64
Discharge: HOME | End: 2019-10-25
Payer: COMMERCIAL

## 2019-10-25 VITALS
BODY MASS INDEX: 43.73 KG/M2 | WEIGHT: 237.63 LBS | HEART RATE: 85 BPM | SYSTOLIC BLOOD PRESSURE: 133 MMHG | DIASTOLIC BLOOD PRESSURE: 60 MMHG | HEIGHT: 62 IN

## 2019-10-25 DIAGNOSIS — R00.2 PALPITATION: ICD-10-CM

## 2019-10-25 DIAGNOSIS — R92.8 ABNORMAL MAMMOGRAM: ICD-10-CM

## 2019-10-25 DIAGNOSIS — I48.20 CHRONIC ATRIAL FIBRILLATION WITH RAPID VENTRICULAR RESPONSE: ICD-10-CM

## 2019-10-25 DIAGNOSIS — R07.9 CHEST PAIN, UNSPECIFIED TYPE: Primary | ICD-10-CM

## 2019-10-25 DIAGNOSIS — I10 ESSENTIAL (PRIMARY) HYPERTENSION: ICD-10-CM

## 2019-10-25 DIAGNOSIS — Z95.2 H/O MITRAL VALVE REPLACEMENT WITH MECHANICAL VALVE: ICD-10-CM

## 2019-10-25 DIAGNOSIS — I50.32 CHRONIC DIASTOLIC CONGESTIVE HEART FAILURE: ICD-10-CM

## 2019-10-25 PROCEDURE — 88305 TISSUE SPECIMEN TO PATHOLOGY, RADIOLOGY: ICD-10-PCS | Mod: 26,,, | Performed by: PATHOLOGY

## 2019-10-25 PROCEDURE — 99999 PR PBB SHADOW E&M-EST. PATIENT-LVL III: ICD-10-PCS | Mod: PBBFAC,,, | Performed by: INTERNAL MEDICINE

## 2019-10-25 PROCEDURE — 3078F PR MOST RECENT DIASTOLIC BLOOD PRESSURE < 80 MM HG: ICD-10-PCS | Mod: CPTII,S$GLB,, | Performed by: INTERNAL MEDICINE

## 2019-10-25 PROCEDURE — 77065 DX MAMMO INCL CAD UNI: CPT | Mod: TC,PO,LT

## 2019-10-25 PROCEDURE — 3075F SYST BP GE 130 - 139MM HG: CPT | Mod: CPTII,S$GLB,, | Performed by: INTERNAL MEDICINE

## 2019-10-25 PROCEDURE — 19081 MAMMO BREAST STEREOTACTIC BREAST BIOPSY LEFT: ICD-10-PCS | Mod: LT,,, | Performed by: RADIOLOGY

## 2019-10-25 PROCEDURE — 88305 TISSUE EXAM BY PATHOLOGIST: CPT | Mod: 26,,, | Performed by: PATHOLOGY

## 2019-10-25 PROCEDURE — 77065 MAMMO DIGITAL DIAGNOSTIC LEFT WITH CAD: ICD-10-PCS | Mod: 26,LT,, | Performed by: RADIOLOGY

## 2019-10-25 PROCEDURE — 3008F PR BODY MASS INDEX (BMI) DOCUMENTED: ICD-10-PCS | Mod: CPTII,S$GLB,, | Performed by: INTERNAL MEDICINE

## 2019-10-25 PROCEDURE — 3008F BODY MASS INDEX DOCD: CPT | Mod: CPTII,S$GLB,, | Performed by: INTERNAL MEDICINE

## 2019-10-25 PROCEDURE — 93000 ELECTROCARDIOGRAM COMPLETE: CPT | Mod: S$GLB,,, | Performed by: INTERNAL MEDICINE

## 2019-10-25 PROCEDURE — 19081 BX BREAST 1ST LESION STRTCTC: CPT | Mod: PO,LT

## 2019-10-25 PROCEDURE — 3078F DIAST BP <80 MM HG: CPT | Mod: CPTII,S$GLB,, | Performed by: INTERNAL MEDICINE

## 2019-10-25 PROCEDURE — 99214 PR OFFICE/OUTPT VISIT, EST, LEVL IV, 30-39 MIN: ICD-10-PCS | Mod: S$GLB,,, | Performed by: INTERNAL MEDICINE

## 2019-10-25 PROCEDURE — 19081 BX BREAST 1ST LESION STRTCTC: CPT | Mod: LT,,, | Performed by: RADIOLOGY

## 2019-10-25 PROCEDURE — 3075F PR MOST RECENT SYSTOLIC BLOOD PRESS GE 130-139MM HG: ICD-10-PCS | Mod: CPTII,S$GLB,, | Performed by: INTERNAL MEDICINE

## 2019-10-25 PROCEDURE — 25000003 PHARM REV CODE 250: Mod: PO | Performed by: INTERNAL MEDICINE

## 2019-10-25 PROCEDURE — 99214 OFFICE O/P EST MOD 30 MIN: CPT | Mod: S$GLB,,, | Performed by: INTERNAL MEDICINE

## 2019-10-25 PROCEDURE — 88305 TISSUE EXAM BY PATHOLOGIST: CPT | Performed by: PATHOLOGY

## 2019-10-25 PROCEDURE — 77065 DX MAMMO INCL CAD UNI: CPT | Mod: 26,LT,, | Performed by: RADIOLOGY

## 2019-10-25 PROCEDURE — 99999 PR PBB SHADOW E&M-EST. PATIENT-LVL III: CPT | Mod: PBBFAC,,, | Performed by: INTERNAL MEDICINE

## 2019-10-25 PROCEDURE — 93000 EKG 12-LEAD: ICD-10-PCS | Mod: S$GLB,,, | Performed by: INTERNAL MEDICINE

## 2019-10-25 RX ORDER — NITROGLYCERIN 0.4 MG/1
0.4 TABLET SUBLINGUAL EVERY 5 MIN PRN
Qty: 30 TABLET | Refills: 1 | Status: ON HOLD | OUTPATIENT
Start: 2019-10-25 | End: 2023-01-01 | Stop reason: HOSPADM

## 2019-10-25 RX ORDER — ERGOCALCIFEROL 1.25 MG/1
50000 CAPSULE ORAL
Qty: 12 CAPSULE | Refills: 1 | Status: SHIPPED | OUTPATIENT
Start: 2019-10-25 | End: 2020-03-04

## 2019-10-25 RX ORDER — LIDOCAINE HYDROCHLORIDE AND EPINEPHRINE 20; 10 MG/ML; UG/ML
14 INJECTION, SOLUTION INFILTRATION; PERINEURAL ONCE
Status: COMPLETED | OUTPATIENT
Start: 2019-10-25 | End: 2019-10-25

## 2019-10-25 RX ORDER — LIDOCAINE HYDROCHLORIDE 10 MG/ML
2 INJECTION, SOLUTION EPIDURAL; INFILTRATION; INTRACAUDAL; PERINEURAL ONCE
Status: COMPLETED | OUTPATIENT
Start: 2019-10-25 | End: 2019-10-25

## 2019-10-25 RX ADMIN — LIDOCAINE HYDROCHLORIDE 2 ML: 10 INJECTION, SOLUTION EPIDURAL; INFILTRATION; INTRACAUDAL; PERINEURAL at 01:10

## 2019-10-25 RX ADMIN — LIDOCAINE HYDROCHLORIDE AND EPINEPHRINE 14 ML: 20; 10 INJECTION, SOLUTION INFILTRATION; PERINEURAL at 01:10

## 2019-10-25 NOTE — PROGRESS NOTES
"    Cardiology Clinic Note  Reason for Visit: Chest pain    HPI:   Ms. Elayne Almanzar is a 64 y.o. woman with history of Hypertension, permanent A. fib, COPD, hyperlipidemia, nephrolithiasis , glaucoma, sleep apnea, status post mechanical mitral valve replacement  due to rheumatic mitral stenosis who presents with complaints of chest pressure on exertion for last 1 month. She can walk upto 3 blocks and feels her heart racing and that her heart "is tired". She reports shortness of breath on exertion but attributes it to her COPD. She had 2 episodes of jaw pain in last 2 months at rest but also states that she had extensive dental work done in July. She denies any associated nausea, vomiting, diaphoresis with her chest pain. She denies orthopnea, PND-sleeps with a CPAP. She denies any dizziness or syncope. No fever or chills.     ROS:    Review of Systems   All other systems reviewed and are negative.    PMH:     Past Medical History:   Diagnosis Date    Acute on chronic diastolic congestive heart failure     Anticoagulant long-term use     Asthma     Atrial fibrillation     Cataract     Chronic kidney disease     COPD (chronic obstructive pulmonary disease)     Depression     Dysuria     Flank pain     HTN (hypertension)     Nephrolithiasis     KEYSHAWN (obstructive sleep apnea)     awaiting CPAP machine     Rheumatic disease of mitral valve     Urinary incontinence     Urinary tract infection      Past Surgical History:   Procedure Laterality Date     SECTION      COLONOSCOPY N/A 2019    Procedure: COLONOSCOPY;  Surgeon: Devon Bowling MD;  Location: [facility] ([address] [place] [address]);  Service: Endoscopy;  Laterality: N/A;  ok to hold Coumadin x 5 days with Lovenox bridge per Coumadin clinic-[facility]    kidney stone removal      MITRAL VALVE REPLACEMENT  2004    TUBAL LIGATION       Allergies:   Review of patient's allergies indicates:  No Known Allergies  Medications:     Current " "Outpatient Medications on File Prior to Visit   Medication Sig Dispense Refill    albuterol (PROVENTIL/VENTOLIN HFA) 90 mcg/actuation inhaler Inhale 2 puffs into the lungs every 6 (six) hours as needed for Wheezing. Rescue 1 Inhaler 6    amLODIPine (NORVASC) 2.5 MG tablet Take 1 tablet (2.5 mg total) by mouth once daily. 30 tablet 11    aspirin (ECOTRIN) 81 MG EC tablet Take 81 mg by mouth once daily.       enoxaparin (LOVENOX) 120 mg/0.8 mL Syrg Inject 0.8 mLs (120 mg total) into the skin every 12 (twelve) hours. AS DIRECTED BY COUMADIN CLINIC 20 Syringe 1    ergocalciferol (ERGOCALCIFEROL) 50,000 unit Cap Take 1 capsule (50,000 Units total) by mouth every 7 days. 12 capsule 1    ferrous sulfate 325 (65 FE) MG EC tablet Take 1 tablet (325 mg total) by mouth 2 (two) times daily. 60 tablet 4    fluticasone (FLONASE) 50 mcg/actuation nasal spray 2 sprays by Each Nare route once daily. 1 Bottle 6    fluticasone-salmeterol diskus inhaler 500-50 mcg Inhale 1 puff into the lungs 2 (two) times daily. 1 Inhaler 6    furosemide (LASIX) 20 MG tablet Take 1 tablet (20 mg total) by mouth once daily. 60 tablet 3    gabapentin (NEURONTIN) 300 MG capsule Take 1 capsule (300 mg total) by mouth every evening. 30 capsule 3    HYDROcodone-acetaminophen (NORCO) 5-325 mg per tablet Take 1 tablet by mouth every 8 (eight) hours as needed for Pain. 21 tablet 0    latanoprost (XALATAN) 0.005 % ophthalmic solution Place 1 drop into the right eye once daily. 2.5 mL 12    metoprolol succinate (TOPROL-XL) 200 MG 24 hr tablet Take 1 tablet (200 mg total) by mouth once daily. 30 tablet 4    needle, disp, 26 gauge 26 gauge x 1/2" Ndle 1 application by Misc.(Non-Drug; Combo Route) route once a week. 10 each 0    olopatadine (PATADAY) 0.2 % Drop Place 1 drop into both eyes every morning. 2.5 mL 12    oxybutynin (DITROPAN-XL) 10 MG 24 hr tablet TAKE 1 TABLET  10 MG TOTAL  BY MOUTH ONCE DAILY  11    pravastatin (PRAVACHOL) 40 MG " tablet Take 1 tablet (40 mg total) by mouth once daily. 30 tablet 3    sertraline (ZOLOFT) 100 MG tablet Take 1 tablet (100 mg total) by mouth once daily. 30 tablet 4    tamsulosin (FLOMAX) 0.4 mg Cap Take 1 capsule (0.4 mg total) by mouth once daily. 30 capsule 11    VENTOLIN HFA 90 mcg/actuation inhaler INHALE 2 PUFFS INTO THE LUNGS EVERY 6 (SIX) HOURS AS NEEDED FOR WHEEZING. RESCUE  6    warfarin (COUMADIN) 5 MG tablet Take 1 1/2 tablets (7.5mg) by mouth daily, except 1 tablet (5mg.) on , Or as directed by Coumadin Clinic 45 tablet 4    warfarin (COUMADIN) 5 MG tablet Take 1 and 1/2 tablets by mouth daily except 1 tablets on Tuesday       Current Facility-Administered Medications on File Prior to Visit   Medication Dose Route Frequency Provider Last Rate Last Dose    sodium hyaluronate (EUFLEXXA) 10 mg/mL(mw 2.4 -3.6 million) Syrg 40 mg  40 mg Intra-articular Weekly Juan Miguel Arroyo MD   20 mg at 18 1210     Social History:     Social History     Tobacco Use    Smoking status: Former Smoker     Packs/day: 0.50     Years: 20.00     Pack years: 10.00     Types: Cigarettes     Last attempt to quit: 2002     Years since quittin.4    Smokeless tobacco: Never Used   Substance Use Topics    Alcohol use: No     Family History:     Family History   Problem Relation Age of Onset    Stroke Paternal Grandmother     Colon cancer Maternal Grandmother     Cancer Brother         testicular cancer    Diabetes Sister     Hypertension Sister     Breast cancer Paternal Aunt     Diabetes Sister     Diabetes Sister     Heart disease Father 70        MI    Diabetes Father     Colon cancer Mother     Ovarian cancer Neg Hx      Physical Exam:   LMP 2012      Physical Exam  General: alert, awake and oriented x 3  Eyes:PERRL.   Neck:no JVD   Lungs:  clear to auscultation bilaterally   Cardiovascular: Heart: Irregular rate and rhythm, S1, S2 normal, no murmur, click, rub or gallop.    Chest Wall: no tenderness.   Pulses-1+ radial, DP, PT b/l  Extremities: no cyanosis or edema.   Abdomen/Rectal: Abdomen: soft, non-tender non-distented; bowel sounds normal  Neurologic: Normal strength and tone. No focal numbness or weakness  Labs:     Lab Results   Component Value Date     09/02/2019    K 4.3 09/02/2019     09/02/2019    CO2 26 09/02/2019    BUN 18 09/02/2019    CREATININE 1.1 09/02/2019    ANIONGAP 10 09/02/2019     Lab Results   Component Value Date    HGBA1C 6.1 05/12/2017     Lab Results   Component Value Date     (H) 09/02/2019     (H) 04/06/2019     (H) 02/26/2019    Lab Results   Component Value Date    WBC 5.75 09/20/2019    HGB 8.5 (L) 09/20/2019    HCT 33.4 (L) 09/20/2019     09/20/2019    GRAN 3.1 09/20/2019    GRAN 54.0 09/20/2019     Lab Results   Component Value Date    CHOL 228 (H) 03/08/2019    HDL 66 03/08/2019    LDLCALC 139.2 03/08/2019    TRIG 114 03/08/2019          Imaging:   Holter 7/2019  · Persistent rate controlled AF  · 1% PVC burden with 1 5-beat run of nonsustained VT at a rate of 111bpm.  Persistent atrial fibrillation with ventricular rates varying between 36 and 111 bpm with an average of 66 bpm.  Ventricular Arrhythmias  There were frequent PVCs totalling 953 and averaging 39.71 per hour. The ventricular arrhythmias percentage was 1.    · Normal left ventricular systolic function. The estimated ejection fraction is 55%  · Indeterminate left ventricular diastolic function.  · Severe left atrial enlargement.  · Normal right ventricular systolic function.  · Mild tricuspid regurgitation.  · There is a mechanical mitral valve prosthesis.  · The estimated PA systolic pressure is 29 mm Hg  · Normal central venous pressure (3 mm Hg).      The right ankle brachial index was 1.06 which is normal.   The left ankle brachial index was 1.03 which is normal.     TTE 3/2019  · Normal left ventricular systolic function. The estimated  ejection fraction is 55%  · Indeterminate left ventricular diastolic function.  · Severe left atrial enlargement.  · Normal right ventricular systolic function.  · Mild tricuspid regurgitation.  · There is a mechanical mitral valve prosthesis.  · The estimated PA systolic pressure is 29 mm Hg  · Normal central venous pressure (3 mm Hg).  Assessment/plan:     1.        2. Chest pain on exertion concerning for angina  Will get cardiac PET  Patient has anemia that is relatively new onset(last 6 months) which could be contributing.  NTG prn  Permanent atrial fibrillation with rapid ventricular response : Continue coumadin and metoprolol. 24 hour holter monitor as above with adequate rate control.     3. Chronic diastolic congestive heart failure : She appears well compensated. She is using lasix prn.   4. H/O mitral valve replacement with mechanical valve : Continue coumadin and asa.   5. Essential (primary) hypertension : Bp at goal  Continue amlodipine 2.5 mg po daily   6. Mixed hyperlipidemia : LDL is above goal. She does not want to increase pravastatin or change to a more potent statin due to myalgias in the past.  3/2019  T.colesterol 228    HDL 66     7. Status post heart valve replacement with mechanical valve    8. Complex sleep apnea syndrome : Continue cpap.               ROBERTO HOLDER  CARDIOLOGY FELLOW, PGY 6  [phone]

## 2019-10-29 ENCOUNTER — ANTI-COAG VISIT (OUTPATIENT)
Dept: CARDIOLOGY | Facility: CLINIC | Age: 64
End: 2019-10-29
Payer: COMMERCIAL

## 2019-10-29 DIAGNOSIS — Z95.2 STATUS POST HEART VALVE REPLACEMENT WITH MECHANICAL VALVE: ICD-10-CM

## 2019-10-29 DIAGNOSIS — Z95.2 H/O MITRAL VALVE REPLACEMENT WITH MECHANICAL VALVE: ICD-10-CM

## 2019-10-29 DIAGNOSIS — Z79.01 LONG TERM (CURRENT) USE OF ANTICOAGULANTS: Primary | ICD-10-CM

## 2019-10-29 DIAGNOSIS — I48.20 CHRONIC ATRIAL FIBRILLATION WITH RAPID VENTRICULAR RESPONSE: ICD-10-CM

## 2019-10-29 LAB — INR PPP: 2.1 (ref 2–3)

## 2019-10-29 PROCEDURE — 93793 ANTICOAG MGMT PT WARFARIN: CPT | Mod: S$GLB,,,

## 2019-10-29 PROCEDURE — 85610 POCT INR: ICD-10-PCS | Mod: QW,S$GLB,, | Performed by: INTERNAL MEDICINE

## 2019-10-29 PROCEDURE — 93793 PR ANTICOAGULANT MGMT FOR PT TAKING WARFARIN: ICD-10-PCS | Mod: S$GLB,,,

## 2019-10-29 PROCEDURE — 85610 PROTHROMBIN TIME: CPT | Mod: QW,S$GLB,, | Performed by: INTERNAL MEDICINE

## 2019-10-29 NOTE — PROGRESS NOTES
INR not at goal. Medications, chart, and patient findings reviewed. See calendar for adjustments to dose and follow up plan.  Findings: Pt s/p breast biopsy; she has bruising due to lovenox & denies any other changes.  Will instruct pt to take 10mg 10/29 & resume maintenance dose.  Pt can d/c lovenox.  Plan to re-assess in 2 weeks.   Patient was re-educated on situations that would require placing a call to the Coumadin Clinic, including bleeding or unusual bruising issues, changes in health, diet or medications,upcoming procedures that require warfarin interruption, and missed Coumadin dose(s). Patient expressed understanding that avoidance of consistency with these parameters could cause fluctuations in INR, leading to more frequent visits and increase risk of adverse events.

## 2019-10-31 ENCOUNTER — TELEPHONE (OUTPATIENT)
Dept: SURGERY | Facility: CLINIC | Age: 64
End: 2019-10-31

## 2019-10-31 NOTE — TELEPHONE ENCOUNTER
Patient called with results of breast biopsy from 10/25/2019,   no atypia/benign, all questions answered, pt advised to follow up in 6 months with repeat imaging per core biopsy protocol.  reviewed biopsy results and results are benign and concordant. Patient verbalized understanding.

## 2019-11-01 RX ORDER — OXYBUTYNIN CHLORIDE 10 MG/1
TABLET, EXTENDED RELEASE ORAL
Refills: 11 | OUTPATIENT
Start: 2019-11-01

## 2019-11-01 NOTE — TELEPHONE ENCOUNTER
----- Message from Moy Lugo sent at 11/1/2019  1:02 PM CDT -----  Contact: [facility] [phone] (Phone)   Patient is calling for an RX refill or new RX.  Is this a refill or new RX:  refill  RX name and strength: oxybutynin (DITROPAN-XL) 10 MG 24 hr tablet  Directions (copy/paste from chart):    Is this a 30 day or 90 day RX:  30 day  Local pharmacy or mail order pharmacy: local   Pharmacy name and phone # (copy/paste from chart):   [facility] - [address] [phone] (Phone)  [phone] (Fax)  Comments:  Patient is out of meds, please advise.

## 2019-11-01 NOTE — TELEPHONE ENCOUNTER
Refused Medications      oxybutynin (DITROPAN-XL) 10 MG 24 hr tablet         Sig: N/A    Disp:  Not specified    Refills:  11    Start: 11/1/2019    Class: Normal    Refused by: Nubia Crocker MD    Refusal reason: Refill not appropriate        Fill requested from: [facility] [address] Kyle Muller        Spoke with patient to inform them that medication has been refused.

## 2019-11-07 ENCOUNTER — PATIENT OUTREACH (OUTPATIENT)
Dept: ADMINISTRATIVE | Facility: OTHER | Age: 64
End: 2019-11-07

## 2019-11-11 ENCOUNTER — ANTI-COAG VISIT (OUTPATIENT)
Dept: CARDIOLOGY | Facility: CLINIC | Age: 64
End: 2019-11-11
Payer: COMMERCIAL

## 2019-11-11 ENCOUNTER — OFFICE VISIT (OUTPATIENT)
Dept: GASTROENTEROLOGY | Facility: CLINIC | Age: 64
End: 2019-11-11
Payer: COMMERCIAL

## 2019-11-11 ENCOUNTER — LAB VISIT (OUTPATIENT)
Dept: LAB | Facility: HOSPITAL | Age: 64
End: 2019-11-11
Payer: COMMERCIAL

## 2019-11-11 VITALS
SYSTOLIC BLOOD PRESSURE: 160 MMHG | HEART RATE: 85 BPM | HEIGHT: 62 IN | DIASTOLIC BLOOD PRESSURE: 65 MMHG | BODY MASS INDEX: 43.98 KG/M2 | WEIGHT: 239 LBS

## 2019-11-11 DIAGNOSIS — J44.0 COPD (CHRONIC OBSTRUCTIVE PULMONARY DISEASE) WITH ACUTE BRONCHITIS: ICD-10-CM

## 2019-11-11 DIAGNOSIS — E66.01 MORBID OBESITY WITH BMI OF 45.0-49.9, ADULT: ICD-10-CM

## 2019-11-11 DIAGNOSIS — I48.20 CHRONIC ATRIAL FIBRILLATION WITH RAPID VENTRICULAR RESPONSE: ICD-10-CM

## 2019-11-11 DIAGNOSIS — I10 ESSENTIAL (PRIMARY) HYPERTENSION: ICD-10-CM

## 2019-11-11 DIAGNOSIS — Z95.2 STATUS POST HEART VALVE REPLACEMENT WITH MECHANICAL VALVE: ICD-10-CM

## 2019-11-11 DIAGNOSIS — R14.0 ABDOMINAL BLOATING: ICD-10-CM

## 2019-11-11 DIAGNOSIS — I50.32 CHRONIC DIASTOLIC CONGESTIVE HEART FAILURE: ICD-10-CM

## 2019-11-11 DIAGNOSIS — J20.9 COPD (CHRONIC OBSTRUCTIVE PULMONARY DISEASE) WITH ACUTE BRONCHITIS: ICD-10-CM

## 2019-11-11 DIAGNOSIS — Z95.2 H/O MITRAL VALVE REPLACEMENT WITH MECHANICAL VALVE: ICD-10-CM

## 2019-11-11 DIAGNOSIS — K21.9 GASTROESOPHAGEAL REFLUX DISEASE WITHOUT ESOPHAGITIS: Primary | ICD-10-CM

## 2019-11-11 DIAGNOSIS — D50.9 IRON DEFICIENCY ANEMIA, UNSPECIFIED IRON DEFICIENCY ANEMIA TYPE: ICD-10-CM

## 2019-11-11 DIAGNOSIS — Z79.01 ENCOUNTER FOR CURRENT LONG-TERM USE OF ANTICOAGULANTS: ICD-10-CM

## 2019-11-11 LAB
INR PPP: 2.1 (ref 0.8–1.2)
PROTHROMBIN TIME: 20 SEC (ref 9–12.5)

## 2019-11-11 PROCEDURE — 99215 PR OFFICE/OUTPT VISIT, EST, LEVL V, 40-54 MIN: ICD-10-PCS | Mod: S$GLB,,, | Performed by: PHYSICIAN ASSISTANT

## 2019-11-11 PROCEDURE — 3077F PR MOST RECENT SYSTOLIC BLOOD PRESSURE >= 140 MM HG: ICD-10-PCS | Mod: CPTII,S$GLB,, | Performed by: PHYSICIAN ASSISTANT

## 2019-11-11 PROCEDURE — 99999 PR PBB SHADOW E&M-EST. PATIENT-LVL V: ICD-10-PCS | Mod: PBBFAC,,, | Performed by: PHYSICIAN ASSISTANT

## 2019-11-11 PROCEDURE — 99215 OFFICE O/P EST HI 40 MIN: CPT | Mod: S$GLB,,, | Performed by: PHYSICIAN ASSISTANT

## 2019-11-11 PROCEDURE — 99999 PR PBB SHADOW E&M-EST. PATIENT-LVL V: CPT | Mod: PBBFAC,,, | Performed by: PHYSICIAN ASSISTANT

## 2019-11-11 PROCEDURE — 3078F PR MOST RECENT DIASTOLIC BLOOD PRESSURE < 80 MM HG: ICD-10-PCS | Mod: CPTII,S$GLB,, | Performed by: PHYSICIAN ASSISTANT

## 2019-11-11 PROCEDURE — 3078F DIAST BP <80 MM HG: CPT | Mod: CPTII,S$GLB,, | Performed by: PHYSICIAN ASSISTANT

## 2019-11-11 PROCEDURE — 85610 PROTHROMBIN TIME: CPT

## 2019-11-11 PROCEDURE — 93793 ANTICOAG MGMT PT WARFARIN: CPT | Mod: S$GLB,,,

## 2019-11-11 PROCEDURE — 3077F SYST BP >= 140 MM HG: CPT | Mod: CPTII,S$GLB,, | Performed by: PHYSICIAN ASSISTANT

## 2019-11-11 PROCEDURE — 93793 PR ANTICOAGULANT MGMT FOR PT TAKING WARFARIN: ICD-10-PCS | Mod: S$GLB,,,

## 2019-11-11 PROCEDURE — 36415 COLL VENOUS BLD VENIPUNCTURE: CPT

## 2019-11-11 PROCEDURE — 3008F PR BODY MASS INDEX (BMI) DOCUMENTED: ICD-10-PCS | Mod: CPTII,S$GLB,, | Performed by: PHYSICIAN ASSISTANT

## 2019-11-11 PROCEDURE — 3008F BODY MASS INDEX DOCD: CPT | Mod: CPTII,S$GLB,, | Performed by: PHYSICIAN ASSISTANT

## 2019-11-11 RX ORDER — PANTOPRAZOLE SODIUM 40 MG/1
40 TABLET, DELAYED RELEASE ORAL DAILY
Qty: 30 TABLET | Refills: 3 | Status: SHIPPED | OUTPATIENT
Start: 2019-11-11 | End: 2021-05-03 | Stop reason: SDUPTHER

## 2019-11-11 NOTE — LETTER
November 11, 2019      Nubia Crocker MD  [address]  [place] [address]           [facility]  [address]  [place] [address]  Phone: [phone]  Fax: [phone]          Patient: Elayne Almanzar   MR Number: [id number]   YOB: 1955   Date of Visit: 11/11/2019       Dear Dr. Nubia Crocker:    Thank you for referring Elayne Almanzar to me for evaluation. Attached you will find relevant portions of my assessment and plan of care.    If you have questions, please do not hesitate to call me. I look forward to following Elayne Almanzar along with you.    Sincerely,    Simin Calvert PA-C    Enclosure  CC:  No Recipients    If you would like to receive this communication electronically, please contact [email or web address] or [phone] to request more information on [employer] access.    For providers and/or their staff who would like to refer a patient to [facility], please contact us through our one-stop-shop provider referral line, [facility], at [phone].    If you feel you have received this communication in error or would no longer like to receive these types of communications, please e-mail [email or web address]

## 2019-11-11 NOTE — PROGRESS NOTES
INR not at goal. Medications, chart, and patient findings reviewed. See calendar for adjustments to dose and follow up plan.  Findings:  Pt reports no greens last week (to resume greens 1x week) & denies any other changes.  Will instruct pt to take 12.5mg & increase maintenance dose.  Plan to re-assess in 1 week.

## 2019-11-11 NOTE — PATIENT INSTRUCTIONS
After you are cleared from a cardiac standpoint, call to schedule your EGD    For GERD:    Take your PPI 30-45 minutes before your first protein containing meal (breakfast) every day.    Remain upright for at least 3 hours after eating.    Elevate the head of the bead about 6 inches.  Some patients place cinder blocks under the head of the bed for elevation.    Avoid foods that you have noticed make your symptoms worse.    Set a weight loss goal of 10% of your body weight. (For example, if you weigh 150 lbs, your goal should be to loose 15 lbs).        GERD  Worst Foods for Acid Reflux  Chocolate (milk chocolate worse than dark chocolate)  Soda (all carbonated beverages)  Alcohol (beer, liquor, wine)  Fried foods  Alejo, sausage, ribs  Cream sauce  Fatty meats (beef)  Butter, margarine, lard, shortening  Coffee, tea  Mint   High fat nuts  Hot sauces and pepper  Citrus fruit/juices      Acidic foods (pH - 1 is MORE acidic, 5 is LESS acidic)     Do not eat or drink these (lower numbers are worse)    Induction diet - For 2 weeks eat nothing below pH 5     Lemon juice 2.3  Grape cranberry juice 2.5  Stomach Acid 2.5  Gelatin Dessert 2.6  Lemon/lime 2.9/2.7  Vinegar 2.9  Gatorade 3.0  Fruits - plums, apricots, strawberries, cherries 3.0  Vitamin C (ascorbic acid) 3.0  Iced tea, Snapple 3.1  Mustard 3.2  Soft drinks 3.3  Nectarines 3.3  Pomegranate 3.3  Applesauce 3.4  Grapefruit 3.4  Kiwi 3.4  Barbecue sauce 3.4  Caesar dressing 3.5  Thousand island dressing 3.6  Strawberries 3.5  Pineapple juice 3.5  Beer 3.5  Wine 3.5  Grape 3.6  Apples 3.6  Pineapple 3.7  Pickle 3.7  Blackberries, blueberries 3.7  Floris 3.7  Orange 3.8  Cherries 3.9  Red Bull 3.9  Tomatoes 4.2  Coffee 5.1      These are Safe foods:  Agave  Aloe Vera  Apple (only red)  Bagels  Banana (worsens reflux in 1%)  Beans - black, red, lima, lentils  Bread - whole grain, rye  Caramel  Celery  Chamomile tea  Chicken - skinless, never fried  Chicken stock or  bouillon  Coffee - one cup/day with milk  Fennel  Fish  María  Green vegetables (no green peppers)  Herbs  Honey  Melon  Milk - skin, soy, or Lactaid skim milk  Mushrooms  Oatmeal  Olive oil  Parsley  Pasta  Pears  Popcorn  Potatoes  Red bell peppers  Rice  Soups  Tofu  Turkey Breast  Turnip  Vegetables - no onion, tomatoes, peppers  Vinaigrette  Water - non carbonated  Whole grain breads, crackers, breakfast cereals      Best Foods for Acid Reflux  Whole grain breads  Oatmeal  Aloe Vera  Salad (no tomatoes, onions, cheese, or high fat dressing)  Banana  Melon  Fennel  Chicken and turkey (skinless, never fried)  Fish/seafood (never fried)  Celery  Parsley  Couscous and Rice    Maybe bad foods (Everyone is unique)  Tomatoes  Garlic  Onion  Nuts (macadamia nuts)  Apples (especially green)  Cucumber  Green peppers  Spicy food  Some herbal teas    GERD tips  Change what you eat:  Eat smaller meals  Eat slowly and chew thoroughly until food is almost liquid  Cut down on junk carbohydrates such as sugar and white flour  Use herbs in your cooking  Eat more raw foods (more than 10 ingredients is not a raw food)  Avoid trans fats and partially hydrogenated oils  Eat more fish and switch to grass fed beef  Switch your cooking oil to macadamia nut or olive oil  Watch extremes of salt intake (too high or too low is bad)    Change these habits:  Stop smoking  Eat dinner earlier (3-4 hours before lying down to sleep)  Elevate the head of your bed 6 inches (blocks under the head of the bed are better than pillows)  Exercise (but wait 2 hours after eating)  Drink more water (between meals)    Take these supplements:  Multi vitamin  Probiotic  Fish oil    Most common food allergens: milk, eggs, peanuts, tree nuts, fish, shellfish, wheat, and soy    All natural immediate relief:  Chew 2-3 soft probiotic capsules - Dr. Brown's Probiotics 12 Plus  Chew chewable DGL licorice tablet  Chew papaya tablet with high protein meal - [employer]  Drink 2 ounces of aloe vera juice  Swedish bitters  Prelief- reduce the acid in food to keep it form burning sensitive tissue  Iberogast  [place]  Drink Chamomile Tea  Teaspoon of baking soda in water  Spoonful of vinegar in water      All natural ulcer healers:  Zinc carnosine - 75.5 mg with food twice a day x 8 weeks   Gabrielle by Taylor - $8 for 60 pills  DGL (deglycyrrhizinated licorice) - 2 tablets before meals. Heals stomach lining   Natural Factors brand, Enzymatic therapy brand.  Aloe Vera juice  - 2 to 8 ounces a day   [place] or [place]

## 2019-11-11 NOTE — PROGRESS NOTES
[facility]    Reason for Consult:  The primary encounter diagnosis was Gastroesophageal reflux disease without esophagitis. Diagnoses of Iron deficiency anemia, unspecified iron deficiency anemia type, Abdominal bloating, Encounter for current long-term use of anticoagulants, COPD (chronic obstructive pulmonary disease) with acute bronchitis, Chronic atrial fibrillation with rapid ventricular response, Chronic diastolic congestive heart failure, Essential (primary) hypertension, H/O mitral valve replacement with mechanical valve, and Morbid obesity with BMI of 45.0-49.9, adult were also pertinent to this visit.    PCP:   Nubia Crocker   [address] [facility] / [place] [address]    Referring MD:  Nubia Crocker Md  [address] [place] [address]    HPI:  This is a 64 y.o. female here for evaluation of iron deficiency anemia  Hypertension, permanent A. fib, COPD, hyperlipidemia, nephrolithiasis , glaucoma, sleep apnea, status post mechanical mitral valve replacement 2005 due to rheumatic mitral stenosis    09/20/2019 labs revealed hemoglobin 8.5, MCV 66, iron 25, iron saturation 5, TIBC elevated, ferritin 18  Currently taking ferrous sulfate 325 mg twice daily  Recently seen by her cardiologist for chest pain  Has upcoming PET stress test    Today complains of + SOB, wheezing, some chest pain,     From a GI standpoint she reports:  Gas and stromach growling after meals  Abdominal bloating after meals  Denies rectal bleeding or melena    Increased acid reflux in the last few weeks  Denies dysphagia, N, V    6/2019 colonoscopy   11/17/16 EGD - gastritis    Diarrhea triggers - milk, ice cream, coffee    ROS:  Constitutional: No fevers, chills, No weight loss  ENT: No allergies  CV: + chest pain  Pulm: No cough, No shortness of breath  Ophtho: No vision changes  GI: see HPI  Derm: No rash  Heme: No lymphadenopathy, No bruising  MSK: No arthritis  : No dysuria,  No hematuria  Endo: No hot or cold intolerance  Neuro: No syncope, No seizure  Psych: No anxiety, No depression    Medical History:  has a past medical history of Acute on chronic diastolic congestive heart failure, Anticoagulant long-term use, Asthma, Atrial fibrillation (), Cataract, Chronic kidney disease, COPD (chronic obstructive pulmonary disease), Depression, Dysuria, Flank pain, HTN (hypertension), Nephrolithiasis, KEYSHAWN (obstructive sleep apnea), Rheumatic disease of mitral valve, Urinary incontinence, and Urinary tract infection.    Surgical History:  has a past surgical history that includes Mitral valve replacement (2004);  section; Tubal ligation; kidney stone removal; and Colonoscopy (N/A, 2019).    Family History: family history includes Breast cancer in her paternal aunt; Cancer in her brother; Colon cancer in her maternal grandmother and mother; Diabetes in her father, sister, sister, and sister; Heart disease (age of onset: 70) in her father; Hypertension in her sister; Stroke in her paternal grandmother..     Social History:  reports that she quit smoking about 17 years ago. Her smoking use included cigarettes. She has a 10.00 pack-year smoking history. She has never used smokeless tobacco. She reports that she does not drink alcohol or use drugs.    Review of patient's allergies indicates:  No Known Allergies    Current Outpatient Medications on File Prior to Visit   Medication Sig Dispense Refill    amLODIPine (NORVASC) 2.5 MG tablet Take 1 tablet (2.5 mg total) by mouth once daily. 30 tablet 11    aspirin (ECOTRIN) 81 MG EC tablet Take 81 mg by mouth once daily.       diphth,pertus,acell,,tetanus (BOOSTRIX) 2.5-8-5 Lf-mcg-Lf/0.5mL Syrg injection Inject 0.5 ml into the muscle for one dose 0.5 mL 0    enoxaparin (LOVENOX) 120 mg/0.8 mL Syrg Inject 0.8 mLs (120 mg total) into the skin every 12 (twelve) hours. AS DIRECTED BY COUMADIN CLINIC 20 Syringe 1    ergocalciferol  "(ERGOCALCIFEROL) 50,000 unit Cap TAKE 1 CAPSULE (50,000 UNITS TOTAL) BY MOUTH EVERY 7 DAYS. 12 capsule 1    ferrous sulfate 325 (65 FE) MG EC tablet Take 1 tablet (325 mg total) by mouth 2 (two) times daily. 60 tablet 4    fluticasone (FLONASE) 50 mcg/actuation nasal spray 2 sprays by Each Nare route once daily. 1 Bottle 6    fluticasone-salmeterol diskus inhaler 500-50 mcg Inhale 1 puff into the lungs 2 (two) times daily. 1 Inhaler 6    furosemide (LASIX) 20 MG tablet Take 1 tablet (20 mg total) by mouth once daily. (Patient taking differently: Take 20 mg by mouth every other day. ) 60 tablet 3    latanoprost (XALATAN) 0.005 % ophthalmic solution Place 1 drop into the right eye once daily. 2.5 mL 12    metoprolol succinate (TOPROL-XL) 200 MG 24 hr tablet Take 1 tablet (200 mg total) by mouth once daily. 30 tablet 4    needle, disp, 26 gauge 26 gauge x 1/2" Ndle 1 application by Misc.(Non-Drug; Combo Route) route once a week. 10 each 0    nitroGLYCERIN (NITROSTAT) 0.4 MG SL tablet Place 1 tablet (0.4 mg total) under the tongue every 5 (five) minutes as needed for Chest pain. 30 tablet 1    olopatadine (PATADAY) 0.2 % Drop Place 1 drop into both eyes every morning. 2.5 mL 12    oxybutynin (DITROPAN-XL) 10 MG 24 hr tablet TAKE 1 TABLET  10 MG TOTAL  BY MOUTH ONCE DAILY  11    pravastatin (PRAVACHOL) 40 MG tablet Take 1 tablet (40 mg total) by mouth once daily. 30 tablet 3    sertraline (ZOLOFT) 100 MG tablet Take 1 tablet (100 mg total) by mouth once daily. 30 tablet 4    VENTOLIN HFA 90 mcg/actuation inhaler INHALE 2 PUFFS INTO THE LUNGS EVERY 6 (SIX) HOURS AS NEEDED FOR WHEEZING. RESCUE  6    warfarin (COUMADIN) 5 MG tablet Take 1 1/2 tablets (7.5mg) by mouth daily, except 1 tablet (5mg.) on Tuesdays, Or as directed by Coumadin Clinic 45 tablet 4    warfarin (COUMADIN) 5 MG tablet Take 1 and 1/2 tablets by mouth daily except 1 tablets on Tuesday      gabapentin (NEURONTIN) 300 MG capsule Take 1 " "capsule (300 mg total) by mouth every evening. 30 capsule 3    tamsulosin (FLOMAX) 0.4 mg Cap Take 1 capsule (0.4 mg total) by mouth once daily. 30 capsule 11     Current Facility-Administered Medications on File Prior to Visit   Medication Dose Route Frequency Provider Last Rate Last Dose    sodium hyaluronate (EUFLEXXA) 10 mg/mL(mw 2.4 -3.6 million) Syrg 40 mg  40 mg Intra-articular Weekly Juan Miguel Arroyo MD   20 mg at 07/13/18 1210         Objective Findings:    Vital Signs:  BP (!) 160/65 (BP Location: Right arm, Patient Position: Sitting, BP Method: Large (Automatic))   Pulse 85   Ht 5' 2" (1.575 m)   Wt 108.4 kg (238 lb 15.7 oz)   LMP 07/22/2012   BMI 43.71 kg/m²   Body mass index is 43.71 kg/m².    Physical Exam:  General Appearance: Well appearing in no acute distress  Head:   Normocephalic, without obvious abnormality  Eyes:    No scleral icterus  ENT: Neck supple, Lips, mucosa, and tongue normal  Lungs: CTA bilaterally in anterior and posterior fields, no wheezes, no crackles.  Heart:  Regular rate and rhythm, S1, S2 normal, no murmurs heard  Abdomen: Soft, epigastric and right upper quadrant tenderness tender, no guarding, non distended with positive bowel sounds in all four quadrants.   Extremities: no edema  Skin: No rash  Neurologic: AAO x 3      Labs:  Lab Results   Component Value Date    WBC 5.75 09/20/2019    HGB 8.5 (L) 09/20/2019    HCT 33.4 (L) 09/20/2019     09/20/2019    CHOL 228 (H) 03/08/2019    TRIG 114 03/08/2019    HDL 66 03/08/2019    ALT 11 09/02/2019    AST 19 09/02/2019     09/02/2019    K 4.3 09/02/2019     09/02/2019    CREATININE 1.1 09/02/2019    BUN 18 09/02/2019    CO2 26 09/02/2019    TSH 1.748 09/20/2019    INR 2.1 (H) 11/11/2019    HGBA1C 6.1 05/12/2017       Imaging:    Endoscopy:    6/2019 colonoscopy   11/17/16 EGD - gastritis    Assessment:  1. Gastroesophageal reflux disease without esophagitis    2. Iron deficiency anemia, unspecified iron " deficiency anemia type    3. Abdominal bloating    4. Encounter for current long-term use of anticoagulants    5. COPD (chronic obstructive pulmonary disease) with acute bronchitis    6. Chronic atrial fibrillation with rapid ventricular response    7. Chronic diastolic congestive heart failure    8. Essential (primary) hypertension    9. H/O mitral valve replacement with mechanical valve    10. Morbid obesity with BMI of 45.0-49.9, adult       64-year-old female Hypertension, permanent A. fib, COPD, hyperlipidemia, nephrolithiasis , glaucoma, sleep apnea, status post mechanical mitral valve replacement 2005 due to rheumatic mitral stenosis.    presents with iron deficiency anemia.  Clinically she is having abdominal bloating after meals and increase GERD    Recommendations:  1.  Start Protonix 40 mg daily  2.  Schedule EGD to rule out gastric ulcers in sources of bleeding.  Pending upcoming stress test to rule out ischemia  3.  CBC see to rule out worsening anemia, and lab to rule out H pylori    Her blood pressure is elevated today she admits that she did not take her blood pressure medicine this morning.  Agrees she will take when she gets back home    [address] coded for today's visit:  2nd floor endoscopy. Patient is high risk for endoscopy given a number of comorbidities which include Hypertension, permanent A. fib, COPD, hyperlipidemia, sleep apnea, status post mechanical mitral valve replacement 2005 due to rheumatic mitral stenosis, BMI 43  Follow up in about 2 months (around 1/11/2020).      Order summary:  Orders Placed This Encounter    H. PYLORI ANTIBODY, IGG    CBC auto differential    pantoprazole (PROTONIX) 40 MG tablet    Case request GI: EGD (ESOPHAGOGASTRODUODENOSCOPY)         Thank you so much for allowing me to participate in the care of Elayne Calvert PA-C

## 2019-11-13 ENCOUNTER — TELEPHONE (OUTPATIENT)
Dept: CARDIOLOGY | Facility: CLINIC | Age: 64
End: 2019-11-13

## 2019-11-15 ENCOUNTER — TELEPHONE (OUTPATIENT)
Dept: CARDIAC CATH/INVASIVE PROCEDURES | Facility: HOSPITAL | Age: 64
End: 2019-11-15

## 2019-11-15 ENCOUNTER — CLINICAL SUPPORT (OUTPATIENT)
Dept: CARDIOLOGY | Facility: CLINIC | Age: 64
End: 2019-11-15
Attending: INTERNAL MEDICINE
Payer: COMMERCIAL

## 2019-11-15 VITALS — WEIGHT: 238 LBS | HEIGHT: 62 IN | BODY MASS INDEX: 43.79 KG/M2

## 2019-11-15 DIAGNOSIS — R07.9 CHEST PAIN, UNSPECIFIED TYPE: ICD-10-CM

## 2019-11-15 LAB
CFR FLOW - ANTERIOR: 1.25 CC/MIN/G
CFR FLOW - INFERIOR: 1.49 CC/MIN/G
CFR FLOW - LATERAL: 1.39 CC/MIN/G
CFR FLOW - MAX: 2.2 CC/MIN/G
CFR FLOW - MIN: 1 CC/MIN/G
CFR FLOW - SEPTAL: 1.32 CC/MIN/G
CFR FLOW - WHOLE HEART: 1.36
CV PHARM DOSE: 60 MG
CV STRESS BASE HR: 78 BPM
DIASTOLIC BLOOD PRESSURE: 64 MMHG
END DIASTOLIC INDEX-HIGH: 170 ML/M2
END SYSTOLIC INDEX-HIGH: 70 ML/M2
NUC REST DIASTOLIC VOLUME INDEX: 80
NUC REST EJECTION FRACTION: 63
NUC REST SYSTOLIC VOLUME INDEX: 29
NUC STRESS DIASTOLIC VOLUME INDEX: 75
NUC STRESS EJECTION FRACTION: 81 %
NUC STRESS SYSTOLIC VOLUME INDEX: 15
OHS CV CPX 85 PERCENT MAX PREDICTED HEART RATE MALE: 128
OHS CV CPX MAX PREDICTED HEART RATE: 151
OHS CV CPX PATIENT IS FEMALE: 1
OHS CV CPX PATIENT IS MALE: 0
OHS CV CPX PEAK DIASTOLIC BLOOD PRESSURE: 64 MMHG
OHS CV CPX PEAK HEAR RATE: 74 BPM
OHS CV CPX PEAK RATE PRESSURE PRODUCT: 8584
OHS CV CPX PEAK SYSTOLIC BLOOD PRESSURE: 116 MMHG
OHS CV CPX PERCENT MAX PREDICTED HEART RATE ACHIEVED: 49
OHS CV CPX RATE PRESSURE PRODUCT PRESENTING: 9750
REST FLOW - ANTERIOR: 0.82 CC/MIN/G
REST FLOW - INFERIOR: 0.68 CC/MIN/G
REST FLOW - LATERAL: 0.77 CC/MIN/G
REST FLOW - MAX: 1.1 CC/MIN/G
REST FLOW - MIN: 0.5 CC/MIN/G
REST FLOW - SEPTAL: 0.71 CC/MIN/G
REST FLOW - WHOLE HEART: 0.75 CC/MIN/G
RETIRED EF AND QEF - SEE NOTES: 51 %
STRESS ECHO TARGET HR: 133 BPM
STRESS FLOW - ANTERIOR: 1.01 CC/MIN/G
STRESS FLOW - INFERIOR: 1.01 CC/MIN/G
STRESS FLOW - LATERAL: 1.05 CC/MIN/G
STRESS FLOW - MAX: 1.4 CC/MIN/G
STRESS FLOW - MIN: 0.7 CC/MIN/G
STRESS FLOW - SEPTAL: 0.92 CC/MIN/G
STRESS FLOW - WHOLE HEART: 1 CC/MIN/G
SYSTOLIC BLOOD PRESSURE: 125 MMHG

## 2019-11-15 PROCEDURE — 93015 CV STRESS TEST SUPVJ I&R: CPT | Mod: S$GLB,,, | Performed by: INTERNAL MEDICINE

## 2019-11-15 PROCEDURE — A9555 RB82 RUBIDIUM: HCPCS | Mod: S$GLB,,, | Performed by: INTERNAL MEDICINE

## 2019-11-15 PROCEDURE — 93015 CARDIAC PET SCAN STRESS (CUPID ONLY): ICD-10-PCS | Mod: S$GLB,,, | Performed by: INTERNAL MEDICINE

## 2019-11-15 PROCEDURE — 78492 CARDIAC PET SCAN STRESS (CUPID ONLY): ICD-10-PCS | Mod: S$GLB,,, | Performed by: INTERNAL MEDICINE

## 2019-11-15 PROCEDURE — 99999 PR PBB SHADOW E&M-EST. PATIENT-LVL I: CPT | Mod: PBBFAC,,,

## 2019-11-15 PROCEDURE — 78492 MYOCRD IMG PET MLT RST&STRS: CPT | Mod: S$GLB,,, | Performed by: INTERNAL MEDICINE

## 2019-11-15 PROCEDURE — 99999 PR PBB SHADOW E&M-EST. PATIENT-LVL I: ICD-10-PCS | Mod: PBBFAC,,,

## 2019-11-15 PROCEDURE — A9555 CARDIAC PET SCAN STRESS (CUPID ONLY): ICD-10-PCS | Mod: S$GLB,,, | Performed by: INTERNAL MEDICINE

## 2019-11-15 RX ORDER — DIPYRIDAMOLE 5 MG/ML
60 INJECTION INTRAVENOUS
Status: COMPLETED | OUTPATIENT
Start: 2019-11-15 | End: 2019-11-15

## 2019-11-15 RX ADMIN — DIPYRIDAMOLE 60 MG: 5 INJECTION INTRAVENOUS at 09:11

## 2019-11-18 ENCOUNTER — TELEPHONE (OUTPATIENT)
Dept: ENDOSCOPY | Facility: HOSPITAL | Age: 64
End: 2019-11-18

## 2019-11-18 ENCOUNTER — LAB VISIT (OUTPATIENT)
Dept: LAB | Facility: HOSPITAL | Age: 64
End: 2019-11-18
Payer: COMMERCIAL

## 2019-11-18 ENCOUNTER — ANTI-COAG VISIT (OUTPATIENT)
Dept: CARDIOLOGY | Facility: CLINIC | Age: 64
End: 2019-11-18
Payer: COMMERCIAL

## 2019-11-18 DIAGNOSIS — I48.20 CHRONIC ATRIAL FIBRILLATION WITH RAPID VENTRICULAR RESPONSE: ICD-10-CM

## 2019-11-18 DIAGNOSIS — Z95.2 STATUS POST HEART VALVE REPLACEMENT WITH MECHANICAL VALVE: ICD-10-CM

## 2019-11-18 LAB
INR PPP: 2.9 (ref 0.8–1.2)
PROTHROMBIN TIME: 27.9 SEC (ref 9–12.5)

## 2019-11-18 PROCEDURE — 36415 COLL VENOUS BLD VENIPUNCTURE: CPT

## 2019-11-18 PROCEDURE — 85610 PROTHROMBIN TIME: CPT

## 2019-11-18 PROCEDURE — 93793 PR ANTICOAGULANT MGMT FOR PT TAKING WARFARIN: ICD-10-PCS | Mod: S$GLB,,,

## 2019-11-18 PROCEDURE — 93793 ANTICOAG MGMT PT WARFARIN: CPT | Mod: S$GLB,,,

## 2019-11-18 NOTE — TELEPHONE ENCOUNTER
Patient has an order for an EGD.  Is it ok to hold Coumadin 5 days prior to procedure?  Please advise.  Thank you.    Muriel

## 2019-11-21 ENCOUNTER — TELEPHONE (OUTPATIENT)
Dept: ENDOSCOPY | Facility: HOSPITAL | Age: 64
End: 2019-11-21

## 2019-11-21 NOTE — PROGRESS NOTES
11/21/19 Received message:   On 11/18/19 , a message was sent by one of our schedulers (Muriel Gage) a request to hold Coumadin for 5 days prior to an EGD. I read your note on the same day, however I do not see a instructions to hold or a reply back to  with those holding instructions.  Please advise.     Pt s/p MVR w/ mechanical valve, Afib (CHADVASC score=4) recommend that pt is bridged on lovenox while coumadin is held for 5 days.

## 2019-11-21 NOTE — TELEPHONE ENCOUNTER
Hello,    On 11/18/19 , a message was sent by one of our schedulers (Muriel Gage) a request to hold Coumadin for 5 days prior to an EGD. I read your note on the same day, however I do not see a instructions to hold or a reply back to  with those holding instructions.  Please advise.     Thank you,  Deana

## 2019-11-24 ENCOUNTER — TELEPHONE (OUTPATIENT)
Dept: GASTROENTEROLOGY | Facility: CLINIC | Age: 64
End: 2019-11-24

## 2019-11-25 ENCOUNTER — TELEPHONE (OUTPATIENT)
Dept: ENDOSCOPY | Facility: HOSPITAL | Age: 64
End: 2019-11-25

## 2019-11-29 ENCOUNTER — HOSPITAL ENCOUNTER (EMERGENCY)
Facility: OTHER | Age: 64
Discharge: HOME | End: 2019-11-29
Attending: EMERGENCY MEDICINE
Payer: COMMERCIAL

## 2019-11-29 VITALS
HEART RATE: 116 BPM | BODY MASS INDEX: 43.43 KG/M2 | SYSTOLIC BLOOD PRESSURE: 142 MMHG | OXYGEN SATURATION: 98 % | HEIGHT: 62 IN | WEIGHT: 236 LBS | RESPIRATION RATE: 31 BRPM | TEMPERATURE: 98 F | DIASTOLIC BLOOD PRESSURE: 78 MMHG

## 2019-11-29 DIAGNOSIS — R06.02 SOB (SHORTNESS OF BREATH): ICD-10-CM

## 2019-11-29 DIAGNOSIS — R06.2 WHEEZING: ICD-10-CM

## 2019-11-29 DIAGNOSIS — J44.1 COPD WITH ACUTE EXACERBATION: Primary | ICD-10-CM

## 2019-11-29 LAB
ALBUMIN SERPL BCP-MCNC: 3.7 G/DL (ref 3.5–5.2)
ALP SERPL-CCNC: 60 U/L (ref 55–135)
ALT SERPL W/O P-5'-P-CCNC: 12 U/L (ref 10–44)
ANION GAP SERPL CALC-SCNC: 11 MMOL/L (ref 8–16)
ANISOCYTOSIS BLD QL SMEAR: SLIGHT
AST SERPL-CCNC: 32 U/L (ref 10–40)
BASOPHILS # BLD AUTO: 0.05 K/UL (ref 0–0.2)
BASOPHILS NFR BLD: 0.8 % (ref 0–1.9)
BILIRUB SERPL-MCNC: 0.5 MG/DL (ref 0.1–1)
BNP SERPL-MCNC: 106 PG/ML (ref 0–99)
BUN SERPL-MCNC: 16 MG/DL (ref 8–23)
CALCIUM SERPL-MCNC: 9.1 MG/DL (ref 8.7–10.5)
CHLORIDE SERPL-SCNC: 107 MMOL/L (ref 95–110)
CO2 SERPL-SCNC: 22 MMOL/L (ref 23–29)
CREAT SERPL-MCNC: 1.1 MG/DL (ref 0.5–1.4)
DACRYOCYTES BLD QL SMEAR: ABNORMAL
DIFFERENTIAL METHOD: ABNORMAL
EOSINOPHIL # BLD AUTO: 0.8 K/UL (ref 0–0.5)
EOSINOPHIL NFR BLD: 12.7 % (ref 0–8)
ERYTHROCYTE [DISTWIDTH] IN BLOOD BY AUTOMATED COUNT: 20.5 % (ref 11.5–14.5)
EST. GFR  (AFRICAN AMERICAN): >60 ML/MIN/1.73 M^2
EST. GFR  (NON AFRICAN AMERICAN): 54 ML/MIN/1.73 M^2
GLUCOSE SERPL-MCNC: 110 MG/DL (ref 70–110)
HCT VFR BLD AUTO: 32.2 % (ref 37–48.5)
HGB BLD-MCNC: 8.3 G/DL (ref 12–16)
HYPOCHROMIA BLD QL SMEAR: ABNORMAL
IMM GRANULOCYTES # BLD AUTO: 0.02 K/UL (ref 0–0.04)
IMM GRANULOCYTES NFR BLD AUTO: 0.3 % (ref 0–0.5)
LYMPHOCYTES # BLD AUTO: 1.2 K/UL (ref 1–4.8)
LYMPHOCYTES NFR BLD: 19.7 % (ref 18–48)
MCH RBC QN AUTO: 16.8 PG (ref 27–31)
MCHC RBC AUTO-ENTMCNC: 25.8 G/DL (ref 32–36)
MCV RBC AUTO: 65 FL (ref 82–98)
MONOCYTES # BLD AUTO: 0.6 K/UL (ref 0.3–1)
MONOCYTES NFR BLD: 10.2 % (ref 4–15)
NEUTROPHILS # BLD AUTO: 3.5 K/UL (ref 1.8–7.7)
NEUTROPHILS NFR BLD: 56.3 % (ref 38–73)
NRBC BLD-RTO: 0 /100 WBC
PLATELET # BLD AUTO: 230 K/UL (ref 150–350)
PMV BLD AUTO: 10.1 FL (ref 9.2–12.9)
POLYCHROMASIA BLD QL SMEAR: ABNORMAL
POTASSIUM SERPL-SCNC: 4.7 MMOL/L (ref 3.5–5.1)
PROT SERPL-MCNC: 7.4 G/DL (ref 6–8.4)
RBC # BLD AUTO: 4.93 M/UL (ref 4–5.4)
SCHISTOCYTES BLD QL SMEAR: PRESENT
SODIUM SERPL-SCNC: 140 MMOL/L (ref 136–145)
WBC # BLD AUTO: 6.2 K/UL (ref 3.9–12.7)

## 2019-11-29 PROCEDURE — 94761 N-INVAS EAR/PLS OXIMETRY MLT: CPT

## 2019-11-29 PROCEDURE — 25000242 PHARM REV CODE 250 ALT 637 W/ HCPCS: Performed by: EMERGENCY MEDICINE

## 2019-11-29 PROCEDURE — 93010 ELECTROCARDIOGRAM REPORT: CPT | Mod: ,,, | Performed by: INTERNAL MEDICINE

## 2019-11-29 PROCEDURE — 63600175 PHARM REV CODE 636 W HCPCS: Performed by: EMERGENCY MEDICINE

## 2019-11-29 PROCEDURE — 93010 EKG 12-LEAD: ICD-10-PCS | Mod: ,,, | Performed by: INTERNAL MEDICINE

## 2019-11-29 PROCEDURE — 85025 COMPLETE CBC W/AUTO DIFF WBC: CPT

## 2019-11-29 PROCEDURE — 83880 ASSAY OF NATRIURETIC PEPTIDE: CPT

## 2019-11-29 PROCEDURE — 93005 ELECTROCARDIOGRAM TRACING: CPT

## 2019-11-29 PROCEDURE — 99285 EMERGENCY DEPT VISIT HI MDM: CPT | Mod: 25

## 2019-11-29 PROCEDURE — 80053 COMPREHEN METABOLIC PANEL: CPT

## 2019-11-29 PROCEDURE — 94640 AIRWAY INHALATION TREATMENT: CPT

## 2019-11-29 RX ORDER — IPRATROPIUM BROMIDE AND ALBUTEROL SULFATE 2.5; .5 MG/3ML; MG/3ML
3 SOLUTION RESPIRATORY (INHALATION)
Status: COMPLETED | OUTPATIENT
Start: 2019-11-29 | End: 2019-11-29

## 2019-11-29 RX ORDER — PREDNISONE 20 MG/1
60 TABLET ORAL
Status: COMPLETED | OUTPATIENT
Start: 2019-11-29 | End: 2019-11-29

## 2019-11-29 RX ORDER — PREDNISONE 20 MG/1
40 TABLET ORAL DAILY
Qty: 10 TABLET | Refills: 0 | Status: SHIPPED | OUTPATIENT
Start: 2019-11-29 | End: 2019-12-04

## 2019-11-29 RX ADMIN — PREDNISONE 60 MG: 20 TABLET ORAL at 06:11

## 2019-11-29 RX ADMIN — IPRATROPIUM BROMIDE AND ALBUTEROL SULFATE 3 ML: .5; 3 SOLUTION RESPIRATORY (INHALATION) at 06:11

## 2019-11-29 RX ADMIN — IPRATROPIUM BROMIDE AND ALBUTEROL SULFATE 3 ML: .5; 3 SOLUTION RESPIRATORY (INHALATION) at 07:11

## 2019-11-29 NOTE — ED NOTES
MD Coelho aware of pt's elevated HR of 127, orders to d/c delay and continue to monitor pt. Pt denies pains or needs currently, aware and updated on plan of care. Aaox4, speaking in clear full sentences, resp even and unlabored w/ no distress. Skin warm and dry. Will re-assess d/c at 10AM. Pt remains on cardiac monitor, continuous pulse oximetry and automatic blood pressure cuff cycling w/ alarms set. Bed placed in low locked position, side rails up x 2, call light is within reach of patient or family, alarms set and turned on for monitor and pulse ox, will continue to monitor.

## 2019-11-29 NOTE — ED NOTES
Rounding on the patient has been done. she has been updated on the plan of care and her current status. Pain was assessed and is currently a {0/10. Comfort positioning and restroom needs were addressed. Necessary items were placed with in her reach and she was advised when a reassessment would take place. The call bell remains at the bedside for any additional patient needs. The patient is resting comfortably on the stretcher, respirations are even and unlabored, skin warm and dry. Will continue to monitor. Pt states breathing better.

## 2019-11-29 NOTE — ED PROVIDER NOTES
Encounter Date: 2019    SCRIBE #1 NOTE: I, Ninfa Marques, am scribing for, and in the presence of,  Dr. Chowdhury. I have scribed the entire note.       History     Chief Complaint   Patient presents with    Wheezing     pt reports wheezing and SOB with onset 2 months ago, reports back pain h/o COPD     Time patient was seen by the provider: 5:53 AM      The patient is a 64 y.o. female with a past medical history of COPD and hypertension who presents to the ED with a complaint of gradually worsening wheezing for the past month. She also reports chest tightness and rhinorrhea. She has been using her Advair twice a day and has been using ventolin once a month. She denies cough, fever, chill, or any other symptoms.          The history is provided by the patient.     Review of patient's allergies indicates:  No Known Allergies  Past Medical History:   Diagnosis Date    Acute on chronic diastolic congestive heart failure     Anticoagulant long-term use     Asthma     Atrial fibrillation     Cataract     Chronic kidney disease     COPD (chronic obstructive pulmonary disease)     Depression     Dysuria     Flank pain     HTN (hypertension)     Nephrolithiasis     KEYSHAWN (obstructive sleep apnea)     awaiting CPAP machine     Rheumatic disease of mitral valve     Urinary incontinence     Urinary tract infection      Past Surgical History:   Procedure Laterality Date     SECTION      COLONOSCOPY N/A 2019    Procedure: COLONOSCOPY;  Surgeon: Devon Bowling MD;  Location: [facility] ([address] [place] [address]);  Service: Endoscopy;  Laterality: N/A;  ok to hold Coumadin x 5 days with Lovenox bridge per Coumadin clinic-[facility]    kidney stone removal      MITRAL VALVE REPLACEMENT  2004    TUBAL LIGATION       Family History   Problem Relation Age of Onset    Stroke Paternal Grandmother     Colon cancer Maternal Grandmother     Cancer Brother         testicular cancer    Diabetes Sister     Hypertension  Sister     Breast cancer Paternal Aunt     Diabetes Sister     Diabetes Sister     Heart disease Father 70        MI    Diabetes Father     Colon cancer Mother     Ovarian cancer Neg Hx      Social History     Tobacco Use    Smoking status: Former Smoker     Packs/day: 0.50     Years: 20.00     Pack years: 10.00     Types: Cigarettes     Last attempt to quit: 2002     Years since quittin.5    Smokeless tobacco: Never Used   Substance Use Topics    Alcohol use: No    Drug use: No     Review of Systems   Constitutional: Negative for chills and fever.   HENT: Positive for rhinorrhea. Negative for congestion.    Eyes: Negative for photophobia and visual disturbance.   Respiratory: Positive for chest tightness and wheezing. Negative for cough.    Gastrointestinal: Negative for abdominal pain, nausea and vomiting.   Genitourinary: Negative for difficulty urinating and dysuria.   Musculoskeletal: Negative for back pain and neck pain.   Skin: Negative for pallor.   Neurological: Negative for syncope and headaches.       Physical Exam     Initial Vitals [19 0549]   BP Pulse Resp Temp SpO2   (!) 143/68 74 18 97.9 °F (36.6 °C) 98 %      MAP       --         Physical Exam    Nursing note and vitals reviewed.  Constitutional:   Obese. Uncomfortable appearing. Easily winded with minimal exertion but can speak in full sentences.   Eyes:   Subconjunctival hemorrhage of right from 8-10 o'clock.    Cardiovascular: Normal rate, regular rhythm, normal heart sounds and intact distal pulses. Exam reveals no gallop and no friction rub.    Pulmonary/Chest:   Moderate diminished air exchange with diffuse expiratory wheezing. Accessory muscle use without retractions.    Musculoskeletal:   Trace edema bilateral ankles.         ED Course   Procedures  Labs Reviewed   CBC W/ AUTO DIFFERENTIAL - Abnormal; Notable for the following components:       Result Value    Hemoglobin 8.3 (*)     Hematocrit 32.2 (*)     Mean  Corpuscular Volume 65 (*)     Mean Corpuscular Hemoglobin 16.8 (*)     Mean Corpuscular Hemoglobin Conc 25.8 (*)     RDW 20.5 (*)     Eos # 0.8 (*)     Eosinophil% 12.7 (*)     All other components within normal limits   COMPREHENSIVE METABOLIC PANEL - Abnormal; Notable for the following components:    CO2 22 (*)     eGFR if non  54 (*)     All other components within normal limits   B-TYPE NATRIURETIC PEPTIDE - Abnormal; Notable for the following components:     (*)     All other components within normal limits     EKG Readings: (Independently Interpreted)   Initial Reading: No STEMI. Rhythm: Atrial Fibrillation.   No acute ischemia, similar to Nov 15.       Imaging Results          X-Ray Chest AP Portable (Final result)  Result time 11/29/19 06:17:08    Final result by Germain Cornejo MD (11/29/19 06:17:08)                 Impression:      Prominent appearance of the cardiac size, mild prominence of the pulmonary vascular and mild prominence of the interstitial markings correlation for mild pattern of edema is needed.      Electronically signed by: Germain Cornejo  Date:    11/29/2019  Time:    06:17             Narrative:    EXAMINATION:  XR CHEST AP PORTABLE    CLINICAL HISTORY:  Asthma;    TECHNIQUE:  Single frontal view of the chest was performed.    COMPARISON:  September 2, 2019    FINDINGS:  Single chest view is submitted, postoperative cardiothoracic changes noted, the cardiomediastinal silhouette appears stable.  There is mild prominence of the pulmonary vascular and mild pattern of accentuated interstitial prominence, there is no evidence for dense consolidation, significant pleural effusion or pneumothorax.  The osseous structures appear intact                              X-Rays:   Independently Interpreted Readings:   Chest X-Ray: Cardiomegaly.  Mild pulm vasc congestion.  No focal infiltrate     Medical Decision Making:   History:   Old Medical Records: I decided to obtain  old medical records.  Clinical Tests:   Lab Tests: Ordered and Reviewed  Radiological Study: Ordered and Reviewed  Medical Tests: Reviewed and Ordered            Scribe Attestation:   Scribe #1: I performed the above scribed service and the documentation accurately describes the services I performed. I attest to the accuracy of the note.    Attending Attestation:           Physician Attestation for Scribe:  Physician Attestation Statement for Scribe #1: I, Dr. Chowdhury, reviewed documentation, as scribed by Ninfa Marques in my presence, and it is both accurate and complete.                      Patient presents with shortness of breath reports has been present for the past month worse for the past few days despite use of her inhalers occasional cough.  On exam she is afebrile, but she is dyspneic with diminished air exchange in diffuse wheezing.  She does not have basilar rales.  EKG shows chronic AFib laboratory studies do not suggest CHF exacerbation nor does the chest x-ray.  Focal pneumonia likewise not seen on the chest x-ray after nebulized treatments the patient is feeling better and on repeat exam has improved air exchange and only faint expiratory wheeze.  She has been given a dose of prednisone.  Her care is turned over at 7:00 a.m. pending a 2nd round of nebulizer treatments per expect patient to be able to be discharged as she is clinically much improved.             Clinical Impression:       ICD-10-CM ICD-9-CM   1. COPD with acute exacerbation J44.1 491.21   2. SOB (shortness of breath) R06.02 786.05                             Dustin Chowdhury II, MD  11/29/19 0711

## 2019-11-29 NOTE — ED TRIAGE NOTES
"Pt to ED with CO of SOB, and wheezing Xs 1-2 months worsening tonight. Pt states "those damn saints did me this" Pt reports worsening SOB with exertion. Pt denies orthopnea. Pt reports tightness in her chest, but denies chest pain. Pt is compliant with all home Rx.   "

## 2019-12-01 ENCOUNTER — PATIENT OUTREACH (OUTPATIENT)
Dept: ADMINISTRATIVE | Facility: OTHER | Age: 64
End: 2019-12-01

## 2019-12-02 NOTE — PROGRESS NOTES
2/09/19 -Patient called to reschedule 1/16/19 missed appointment, it was rescheduled to 2/08/19   no

## 2019-12-03 DIAGNOSIS — I48.20 ATRIAL FIBRILLATION, CHRONIC: ICD-10-CM

## 2019-12-03 DIAGNOSIS — Z95.2 STATUS POST HEART VALVE REPLACEMENT WITH MECHANICAL VALVE: ICD-10-CM

## 2019-12-03 RX ORDER — WARFARIN SODIUM 5 MG/1
TABLET ORAL
Qty: 45 TABLET | Refills: 3 | OUTPATIENT
Start: 2019-12-03

## 2019-12-04 ENCOUNTER — ANTI-COAG VISIT (OUTPATIENT)
Dept: CARDIOLOGY | Facility: CLINIC | Age: 64
End: 2019-12-04
Payer: COMMERCIAL

## 2019-12-04 ENCOUNTER — OFFICE VISIT (OUTPATIENT)
Dept: CARDIOLOGY | Facility: CLINIC | Age: 64
End: 2019-12-04
Payer: COMMERCIAL

## 2019-12-04 ENCOUNTER — CLINICAL SUPPORT (OUTPATIENT)
Dept: INTERNAL MEDICINE | Facility: CLINIC | Age: 64
End: 2019-12-04
Payer: COMMERCIAL

## 2019-12-04 VITALS
HEIGHT: 62 IN | DIASTOLIC BLOOD PRESSURE: 74 MMHG | SYSTOLIC BLOOD PRESSURE: 164 MMHG | WEIGHT: 239.19 LBS | HEART RATE: 70 BPM | BODY MASS INDEX: 44.01 KG/M2

## 2019-12-04 DIAGNOSIS — I48.21 PERMANENT ATRIAL FIBRILLATION: ICD-10-CM

## 2019-12-04 DIAGNOSIS — J20.9 COPD (CHRONIC OBSTRUCTIVE PULMONARY DISEASE) WITH ACUTE BRONCHITIS: ICD-10-CM

## 2019-12-04 DIAGNOSIS — I10 ESSENTIAL (PRIMARY) HYPERTENSION: ICD-10-CM

## 2019-12-04 DIAGNOSIS — Z79.01 LONG TERM (CURRENT) USE OF ANTICOAGULANTS: Primary | ICD-10-CM

## 2019-12-04 DIAGNOSIS — J44.0 COPD (CHRONIC OBSTRUCTIVE PULMONARY DISEASE) WITH ACUTE BRONCHITIS: ICD-10-CM

## 2019-12-04 DIAGNOSIS — Z95.2 STATUS POST HEART VALVE REPLACEMENT WITH MECHANICAL VALVE: ICD-10-CM

## 2019-12-04 DIAGNOSIS — I48.20 CHRONIC ATRIAL FIBRILLATION WITH RAPID VENTRICULAR RESPONSE: ICD-10-CM

## 2019-12-04 DIAGNOSIS — Z95.2 H/O MITRAL VALVE REPLACEMENT WITH MECHANICAL VALVE: Primary | ICD-10-CM

## 2019-12-04 DIAGNOSIS — E78.2 MIXED HYPERLIPIDEMIA: ICD-10-CM

## 2019-12-04 DIAGNOSIS — Z79.01 CHRONIC ANTICOAGULATION: ICD-10-CM

## 2019-12-04 LAB — INR PPP: 4.2 (ref 2.5–3.5)

## 2019-12-04 PROCEDURE — 99214 OFFICE O/P EST MOD 30 MIN: CPT | Mod: S$GLB,,, | Performed by: INTERNAL MEDICINE

## 2019-12-04 PROCEDURE — 3078F PR MOST RECENT DIASTOLIC BLOOD PRESSURE < 80 MM HG: ICD-10-PCS | Mod: CPTII,S$GLB,, | Performed by: INTERNAL MEDICINE

## 2019-12-04 PROCEDURE — 3008F BODY MASS INDEX DOCD: CPT | Mod: CPTII,S$GLB,, | Performed by: INTERNAL MEDICINE

## 2019-12-04 PROCEDURE — 85610 PROTHROMBIN TIME: CPT | Mod: QW,S$GLB,, | Performed by: INTERNAL MEDICINE

## 2019-12-04 PROCEDURE — 90471 IMMUNIZATION ADMIN: CPT | Mod: S$GLB,,, | Performed by: INTERNAL MEDICINE

## 2019-12-04 PROCEDURE — 99999 PR PBB SHADOW E&M-EST. PATIENT-LVL I: ICD-10-PCS | Mod: PBBFAC,,,

## 2019-12-04 PROCEDURE — 99999 PR PBB SHADOW E&M-EST. PATIENT-LVL I: CPT | Mod: PBBFAC,,,

## 2019-12-04 PROCEDURE — 3077F SYST BP >= 140 MM HG: CPT | Mod: CPTII,S$GLB,, | Performed by: INTERNAL MEDICINE

## 2019-12-04 PROCEDURE — 3077F PR MOST RECENT SYSTOLIC BLOOD PRESSURE >= 140 MM HG: ICD-10-PCS | Mod: CPTII,S$GLB,, | Performed by: INTERNAL MEDICINE

## 2019-12-04 PROCEDURE — 90471 TDAP VACCINE GREATER THAN OR EQUAL TO 7YO IM: ICD-10-PCS | Mod: S$GLB,,, | Performed by: INTERNAL MEDICINE

## 2019-12-04 PROCEDURE — 90715 TDAP VACCINE GREATER THAN OR EQUAL TO 7YO IM: ICD-10-PCS | Mod: S$GLB,,, | Performed by: INTERNAL MEDICINE

## 2019-12-04 PROCEDURE — 99999 PR PBB SHADOW E&M-EST. PATIENT-LVL III: ICD-10-PCS | Mod: PBBFAC,,, | Performed by: INTERNAL MEDICINE

## 2019-12-04 PROCEDURE — 3008F PR BODY MASS INDEX (BMI) DOCUMENTED: ICD-10-PCS | Mod: CPTII,S$GLB,, | Performed by: INTERNAL MEDICINE

## 2019-12-04 PROCEDURE — 3078F DIAST BP <80 MM HG: CPT | Mod: CPTII,S$GLB,, | Performed by: INTERNAL MEDICINE

## 2019-12-04 PROCEDURE — 85610 POCT INR: ICD-10-PCS | Mod: QW,S$GLB,, | Performed by: INTERNAL MEDICINE

## 2019-12-04 PROCEDURE — 99214 PR OFFICE/OUTPT VISIT, EST, LEVL IV, 30-39 MIN: ICD-10-PCS | Mod: S$GLB,,, | Performed by: INTERNAL MEDICINE

## 2019-12-04 PROCEDURE — 99999 PR PBB SHADOW E&M-EST. PATIENT-LVL III: CPT | Mod: PBBFAC,,, | Performed by: INTERNAL MEDICINE

## 2019-12-04 PROCEDURE — 90715 TDAP VACCINE 7 YRS/> IM: CPT | Mod: S$GLB,,, | Performed by: INTERNAL MEDICINE

## 2019-12-04 NOTE — PROGRESS NOTES
"    Cardiology Clinic Note  Reason for Visit: Follow up    HPI:   Ms. Elayne Almanzar is a 64 y.o. woman with history of Hypertension, permanent A. fib, COPD, hyperlipidemia, nephrolithiasis , glaucoma, sleep apnea, status post mechanical mitral valve replacement  due to rheumatic mitral stenosis who presents for followup.     She was last seen in clinic 10/25/2019 with complaints of chest pressure on exertion for last 1 month. She reported that she can walk upto 3 blocks and feels her heart racing and that her heart "is tired". She reported shortness of breath on exertion but attributes it to her COPD. She had 2 episodes of jaw pain in last 2 months at rest but also states that she had extensive dental work done in July.     She was scheduled for Cardiac PET which was negative for perfusion abnormality. She denies any more episodes of chest pain since. She was seen in ED for COPD exacerbation and is currently on steroids which have improved her shortness of breath,She denies any associated nausea, vomiting, diaphoresis, leg swelling. She denies orthopnea, PND-sleeps with a CPAP. She denies any dizziness or syncope. No fever or chills.        ROS:    Review of Systems   All other systems reviewed and are negative.    PMH:     Past Medical History:   Diagnosis Date    Acute on chronic diastolic congestive heart failure     Anticoagulant long-term use     Asthma     Atrial fibrillation     Cataract     Chronic kidney disease     COPD (chronic obstructive pulmonary disease)     Depression     Dysuria     Flank pain     HTN (hypertension)     Nephrolithiasis     KEYSHAWN (obstructive sleep apnea)     awaiting CPAP machine     Rheumatic disease of mitral valve     Urinary incontinence     Urinary tract infection      Past Surgical History:   Procedure Laterality Date     SECTION      COLONOSCOPY N/A 2019    Procedure: COLONOSCOPY;  Surgeon: Devon Bowling MD;  Location: [facility]" "(4TH FLR);  Service: Endoscopy;  Laterality: N/A;  ok to hold Coumadin x 5 days with Lovenox bridge per [facility]madin [facility]    kidney stone removal      MITRAL VALVE REPLACEMENT  2/2004    TUBAL LIGATION       Allergies:   Review of patient's allergies indicates:  No Known Allergies  Medications:     Current Outpatient Medications on File Prior to Visit   Medication Sig Dispense Refill    amLODIPine (NORVASC) 2.5 MG tablet Take 1 tablet (2.5 mg total) by mouth once daily. 30 tablet 11    aspirin (ECOTRIN) 81 MG EC tablet Take 81 mg by mouth once daily.       diphth,pertus,acell,,tetanus (BOOSTRIX) 2.5-8-5 Lf-mcg-Lf/0.5mL Syrg injection Inject 0.5 ml into the muscle for one dose 0.5 mL 0    enoxaparin (LOVENOX) 120 mg/0.8 mL Syrg Inject 0.8 mLs (120 mg total) into the skin every 12 (twelve) hours. AS DIRECTED BY COUMADIN CLINIC 20 Syringe 1    ergocalciferol (ERGOCALCIFEROL) 50,000 unit Cap TAKE 1 CAPSULE (50,000 UNITS TOTAL) BY MOUTH EVERY 7 DAYS. 12 capsule 1    fluticasone (FLONASE) 50 mcg/actuation nasal spray 2 sprays by Each Nare route once daily. 1 Bottle 6    fluticasone-salmeterol diskus inhaler 500-50 mcg Inhale 1 puff into the lungs 2 (two) times daily. 1 Inhaler 6    furosemide (LASIX) 20 MG tablet Take 1 tablet (20 mg total) by mouth once daily. (Patient taking differently: Take 20 mg by mouth every other day. ) 60 tablet 3    gabapentin (NEURONTIN) 300 MG capsule Take 1 capsule (300 mg total) by mouth every evening. 30 capsule 3    latanoprost (XALATAN) 0.005 % ophthalmic solution Place 1 drop into the right eye once daily. 2.5 mL 12    metoprolol succinate (TOPROL-XL) 200 MG 24 hr tablet Take 1 tablet (200 mg total) by mouth once daily. 30 tablet 4    needle, disp, 26 gauge 26 gauge x 1/2" Ndle 1 application by Misc.(Non-Drug; Combo Route) route once a week. 10 each 0    nitroGLYCERIN (NITROSTAT) 0.4 MG SL tablet Place 1 tablet (0.4 mg total) under the tongue every 5 (five) minutes as " needed for Chest pain. 30 tablet 1    olopatadine (PATADAY) 0.2 % Drop Place 1 drop into both eyes every morning. 2.5 mL 12    oxybutynin (DITROPAN-XL) 10 MG 24 hr tablet TAKE 1 TABLET  10 MG TOTAL  BY MOUTH ONCE DAILY  11    pantoprazole (PROTONIX) 40 MG tablet Take 1 tablet (40 mg total) by mouth once daily. 30 tablet 3    pravastatin (PRAVACHOL) 40 MG tablet Take 1 tablet (40 mg total) by mouth once daily. 30 tablet 3    predniSONE (DELTASONE) 20 MG tablet Take 2 tablets (40 mg total) by mouth once daily. for 5 days 10 tablet 0    sertraline (ZOLOFT) 100 MG tablet Take 1 tablet (100 mg total) by mouth once daily. 30 tablet 4    tamsulosin (FLOMAX) 0.4 mg Cap Take 1 capsule (0.4 mg total) by mouth once daily. 30 capsule 11    VENTOLIN HFA 90 mcg/actuation inhaler INHALE 2 PUFFS INTO THE LUNGS EVERY 6 (SIX) HOURS AS NEEDED FOR WHEEZING. RESCUE  6    warfarin (COUMADIN) 5 MG tablet Take 1 1/2 tablets (7.5mg) by mouth daily, except 1 tablet (5mg.) on , Or as directed by Coumadin Clinic (Patient taking differently: 10 mg every Tues, Thurs, Sat. Take 1 1/2 tablets (7.5mg) by mouth daily on Mon, Wed, Friday, ) 45 tablet 4     Current Facility-Administered Medications on File Prior to Visit   Medication Dose Route Frequency Provider Last Rate Last Dose    sodium hyaluronate (EUFLEXXA) 10 mg/mL(mw 2.4 -3.6 million) Syrg 40 mg  40 mg Intra-articular Weekly Juan Miguel Arroyo MD   20 mg at 18 1210     Social History:     Social History     Tobacco Use    Smoking status: Former Smoker     Packs/day: 0.50     Years: 20.00     Pack years: 10.00     Types: Cigarettes     Last attempt to quit: 2002     Years since quittin.5    Smokeless tobacco: Never Used   Substance Use Topics    Alcohol use: No     Family History:     Family History   Problem Relation Age of Onset    Stroke Paternal Grandmother     Colon cancer Maternal Grandmother     Cancer Brother         testicular cancer     Diabetes Sister     Hypertension Sister     Breast cancer Paternal Aunt     Diabetes Sister     Diabetes Sister     Heart disease Father 70        MI    Diabetes Father     Colon cancer Mother     Ovarian cancer Neg Hx      Physical Exam:   LMP 07/22/2012      Physical Exam  General: alert, awake and oriented x 3  Eyes:PERRL.   Neck:no JVD   Lungs:  clear to auscultation bilaterally   Cardiovascular: Heart: Irregular rate and rhythm, mechanical S1   Chest Wall: no tenderness.   Pulses-1+ radial, DP, PT b/l  Extremities: no cyanosis or edema.   Abdomen/Rectal: Abdomen: soft, non-tender non-distented; bowel sounds normal  Neurologic: Normal strength and tone. No focal numbness or weakness  Labs:     Lab Results   Component Value Date     11/29/2019    K 4.7 11/29/2019     11/29/2019    CO2 22 (L) 11/29/2019    BUN 16 11/29/2019    CREATININE 1.1 11/29/2019    ANIONGAP 11 11/29/2019     Lab Results   Component Value Date    HGBA1C 6.1 05/12/2017     Lab Results   Component Value Date     (H) 11/29/2019     (H) 09/02/2019     (H) 04/06/2019    Lab Results   Component Value Date    WBC 6.20 11/29/2019    HGB 8.3 (L) 11/29/2019    HCT 32.2 (L) 11/29/2019     11/29/2019    GRAN 3.5 11/29/2019    GRAN 56.3 11/29/2019     Lab Results   Component Value Date    CHOL 228 (H) 03/08/2019    HDL 66 03/08/2019    LDLCALC 139.2 03/08/2019    TRIG 114 03/08/2019          Imaging:     Nuc Stress EF   Date Value Ref Range Status   11/15/2019 81 % Final     Nuc Rest EF   Date Value Ref Range Status   11/15/2019 63  Final       Cardiac PET 11/15/2019    The relative PET images are normal showing no clinically significant regional resting or stress induced perfusion defects.    Whole heart absolute myocardial perfusion (cc/min/g) averaged 0.75 cc/min/g at rest (which is normal), 1.00 cc/min/g at stress (which is moderately reduced), and 1.36 CFR (which is moderately reduced).    Coronary  flow capacity is moderately reduced globally.    Visually estimated ejection fraction is normal at rest and at stress.    Wall motion was normal at rest and during stress.    The EKG portion of this study is negative for ischemia.    There were no arrhythmias during stress.    The patient reported no chest pain during the stress test.    There are no prior studies for comparison      TTE 3/15/2019  · Normal left ventricular systolic function. The estimated ejection fraction is 55%  · Indeterminate left ventricular diastolic function.  · Severe left atrial enlargement.  · Normal right ventricular systolic function.  · Mild tricuspid regurgitation.  · There is a mechanical mitral valve prosthesis.  · The estimated PA systolic pressure is 29 mm Hg  · Normal central venous pressure (3 mm Hg).    Assessment/plan:     1.           2. Chest pain on exertion concerning for angina  Resolved, negative cardiac PET  Patient has anemia that is relatively new onset(last 6 months) which could be contributing.Upper GI endoscopy scheduled for next month.  Permanent atrial fibrillation : Continue coumadin and metoprolol. 24 hour holter monitor(7/2019) with adequate rate control.      3. Chronic diastolic congestive heart failure : She appears well compensated. She is using lasix prn.   4. H/O mitral valve replacement with mechanical valve : Continue coumadin and asa.   5. Essential (primary) hypertension :High today could be related to steroid therapy  Continue amlodipine 2.5 mg po daily  Digital HTN enrollment   6. Mixed hyperlipidemia : LDL is above goal. She does not want to increase pravastatin or change to a more potent statin due to myalgias in the past.  3/2019  T.colesterol 228    HDL 66     7. Status post heart valve replacement with mechanical valve Continue coumadin   8. Complex sleep apnea syndrome : Continue cpap.         Discussed with Dr. Ivory. RTC in 3 months.         ROBERTO HOLDER  CARDIOLOGY  FELLOW, PGY 6  [phone]

## 2019-12-04 NOTE — PROGRESS NOTES
Patient took a course of PREDNISONE 40mg daily 11/29-12/2, which likely made INR rise. Otherwise, Patient denies any changes in diet, medications, or health that would effect warfarin therapy.  She will be here next week for lovenox instructions. Patient was re-educated on situations that would require placing a call to the coumadin clinic, including bleeding or unusual bruising issues, changes in health, diet or medications, upcoming procedures that require warfarin interruption, and missed coumadin dose(s). Patient expressed understanding that avoidance of consistency with these parameters could cause fluctuations in INR, leading to more frequent visits and increase risk of adverse events.

## 2019-12-11 ENCOUNTER — ANTI-COAG VISIT (OUTPATIENT)
Dept: CARDIOLOGY | Facility: CLINIC | Age: 64
End: 2019-12-11
Payer: COMMERCIAL

## 2019-12-11 DIAGNOSIS — Z79.01 LONG TERM (CURRENT) USE OF ANTICOAGULANTS: Primary | ICD-10-CM

## 2019-12-11 DIAGNOSIS — Z95.2 STATUS POST HEART VALVE REPLACEMENT WITH MECHANICAL VALVE: ICD-10-CM

## 2019-12-11 DIAGNOSIS — I48.20 CHRONIC ATRIAL FIBRILLATION WITH RAPID VENTRICULAR RESPONSE: ICD-10-CM

## 2019-12-11 LAB — INR PPP: 2.9 (ref 2–3)

## 2019-12-11 PROCEDURE — 85610 POCT INR: ICD-10-PCS | Mod: QW,S$GLB,, | Performed by: INTERNAL MEDICINE

## 2019-12-11 PROCEDURE — 85610 PROTHROMBIN TIME: CPT | Mod: QW,S$GLB,, | Performed by: INTERNAL MEDICINE

## 2019-12-11 PROCEDURE — 93793 ANTICOAG MGMT PT WARFARIN: CPT | Mod: S$GLB,,,

## 2019-12-11 PROCEDURE — 93793 PR ANTICOAGULANT MGMT FOR PT TAKING WARFARIN: ICD-10-PCS | Mod: S$GLB,,,

## 2019-12-11 RX ORDER — ENOXAPARIN SODIUM 150 MG/ML
110 INJECTION SUBCUTANEOUS EVERY 12 HOURS
Qty: 10 SYRINGE | Refills: 1 | Status: SHIPPED | OUTPATIENT
Start: 2019-12-11 | End: 2019-12-16

## 2019-12-11 NOTE — PROGRESS NOTES
INR at goal. Medications and chart reviewed. No changes noted to necessitate adjustment of warfarin or follow-up plan. See calendar.  Findings:  Pt completed prednisone, EGD scheduled 12/19 & denies any other changes.    Patient is here for lovenox bridging instructions:     Height  62in              Weight  108.5kg            SCr   1.1             CrCl   ~41ml/min              Pre-Procedure Instructions:    Take last dose of warfarin on :     12/13/19                            Start enoxaparin injections the morning of:     12/15/19                            Enoxaparin dose is:     110mg                   Every 12 hours (Twice Daily)  Last dose of enoxaparin will be the morning of:    12/18/19                             Post-Procedure Instructions:    Restart warfarin dose on    12/19/19                         Unless directed otherwise by your physician  Restart lovenox injections on the morning of    12/20/19                       Unless directed otherwise by your physician  Call the coumadin clinic your physician has different recommendations post procedure

## 2019-12-12 ENCOUNTER — HOSPITAL ENCOUNTER (EMERGENCY)
Facility: OTHER | Age: 64
Discharge: HOME | End: 2019-12-12
Attending: EMERGENCY MEDICINE
Payer: COMMERCIAL

## 2019-12-12 VITALS
WEIGHT: 238 LBS | BODY MASS INDEX: 43.53 KG/M2 | DIASTOLIC BLOOD PRESSURE: 73 MMHG | RESPIRATION RATE: 23 BRPM | HEART RATE: 125 BPM | SYSTOLIC BLOOD PRESSURE: 116 MMHG | OXYGEN SATURATION: 96 % | TEMPERATURE: 99 F

## 2019-12-12 DIAGNOSIS — R06.02 SHORTNESS OF BREATH: ICD-10-CM

## 2019-12-12 DIAGNOSIS — J45.901 EXACERBATION OF ASTHMA, UNSPECIFIED ASTHMA SEVERITY, UNSPECIFIED WHETHER PERSISTENT: Primary | ICD-10-CM

## 2019-12-12 DIAGNOSIS — R07.89 CHEST TIGHTNESS: ICD-10-CM

## 2019-12-12 PROCEDURE — 94640 AIRWAY INHALATION TREATMENT: CPT

## 2019-12-12 PROCEDURE — 93005 ELECTROCARDIOGRAM TRACING: CPT

## 2019-12-12 PROCEDURE — 93010 EKG 12-LEAD: ICD-10-PCS | Mod: ,,, | Performed by: INTERNAL MEDICINE

## 2019-12-12 PROCEDURE — 96361 HYDRATE IV INFUSION ADD-ON: CPT

## 2019-12-12 PROCEDURE — 96374 THER/PROPH/DIAG INJ IV PUSH: CPT

## 2019-12-12 PROCEDURE — 93010 ELECTROCARDIOGRAM REPORT: CPT | Mod: ,,, | Performed by: INTERNAL MEDICINE

## 2019-12-12 PROCEDURE — 25000003 PHARM REV CODE 250: Performed by: EMERGENCY MEDICINE

## 2019-12-12 PROCEDURE — 63600175 PHARM REV CODE 636 W HCPCS: Performed by: EMERGENCY MEDICINE

## 2019-12-12 PROCEDURE — 96375 TX/PRO/DX INJ NEW DRUG ADDON: CPT

## 2019-12-12 PROCEDURE — 25000242 PHARM REV CODE 250 ALT 637 W/ HCPCS: Performed by: EMERGENCY MEDICINE

## 2019-12-12 PROCEDURE — 99285 EMERGENCY DEPT VISIT HI MDM: CPT | Mod: 25

## 2019-12-12 RX ORDER — PREDNISONE 20 MG/1
60 TABLET ORAL
Status: COMPLETED | OUTPATIENT
Start: 2019-12-12 | End: 2019-12-12

## 2019-12-12 RX ORDER — SODIUM CHLORIDE 9 MG/ML
1000 INJECTION, SOLUTION INTRAVENOUS
Status: COMPLETED | OUTPATIENT
Start: 2019-12-12 | End: 2019-12-12

## 2019-12-12 RX ORDER — IPRATROPIUM BROMIDE AND ALBUTEROL SULFATE 2.5; .5 MG/3ML; MG/3ML
3 SOLUTION RESPIRATORY (INHALATION)
Status: COMPLETED | OUTPATIENT
Start: 2019-12-12 | End: 2019-12-12

## 2019-12-12 RX ORDER — ONDANSETRON 4 MG/1
4 TABLET, ORALLY DISINTEGRATING ORAL
Status: COMPLETED | OUTPATIENT
Start: 2019-12-12 | End: 2019-12-12

## 2019-12-12 RX ORDER — METOPROLOL TARTRATE 1 MG/ML
5 INJECTION, SOLUTION INTRAVENOUS
Status: COMPLETED | OUTPATIENT
Start: 2019-12-12 | End: 2019-12-12

## 2019-12-12 RX ORDER — DILTIAZEM HYDROCHLORIDE 5 MG/ML
10 INJECTION INTRAVENOUS
Status: COMPLETED | OUTPATIENT
Start: 2019-12-12 | End: 2019-12-12

## 2019-12-12 RX ORDER — PREDNISONE 20 MG/1
60 TABLET ORAL DAILY
Qty: 12 TABLET | Refills: 0 | Status: SHIPPED | OUTPATIENT
Start: 2019-12-12 | End: 2019-12-16

## 2019-12-12 RX ORDER — BENZONATATE 100 MG/1
100 CAPSULE ORAL 3 TIMES DAILY PRN
Qty: 20 CAPSULE | Refills: 0 | Status: SHIPPED | OUTPATIENT
Start: 2019-12-12 | End: 2019-12-22

## 2019-12-12 RX ADMIN — PREDNISONE 60 MG: 20 TABLET ORAL at 01:12

## 2019-12-12 RX ADMIN — DILTIAZEM HYDROCHLORIDE 10 MG: 5 INJECTION INTRAVENOUS at 02:12

## 2019-12-12 RX ADMIN — METOPROLOL TARTRATE 5 MG: 1 INJECTION, SOLUTION INTRAVENOUS at 03:12

## 2019-12-12 RX ADMIN — ONDANSETRON 4 MG: 4 TABLET, ORALLY DISINTEGRATING ORAL at 02:12

## 2019-12-12 RX ADMIN — SODIUM CHLORIDE 1000 ML: 0.9 INJECTION, SOLUTION INTRAVENOUS at 03:12

## 2019-12-12 RX ADMIN — IPRATROPIUM BROMIDE AND ALBUTEROL SULFATE 3 ML: .5; 3 SOLUTION RESPIRATORY (INHALATION) at 02:12

## 2019-12-12 RX ADMIN — IPRATROPIUM BROMIDE AND ALBUTEROL SULFATE 3 ML: .5; 3 SOLUTION RESPIRATORY (INHALATION) at 01:12

## 2019-12-12 NOTE — ED NOTES
Rec'd report from DELMI Cruz RN. Pt is sitting up in bed A & O x 3, c/o SOB w/ productive cough of yellow sputum. Audible wheezing noted. Pt is able to speak in complete sentences. NO nasal flaring and no use of accessory muscles. Skin is warm, dry and pink. VS. Pt is in A-fib. Pt is connected to the pulse ox, B/P cuff and EKG monitor. Bed is locked and in the low position w/ the side rails up and locked for pt safety. Call bell @ the BS. Will continue to monitor closely.

## 2019-12-12 NOTE — ED NOTES
Pt states she is breathing much better and is ready to go home. No respiratory distress observed. Will continue to monitor closely.

## 2019-12-12 NOTE — ED PROVIDER NOTES
"Encounter Date: 2019    SCRIBE #1 NOTE: I, Martina Mijares, am scribing for, and in the presence of, Dr. Berger.       History     Chief Complaint   Patient presents with    Shortness of Breath     pt reports "having COPD flare" with onset yesterday, repotrs using breathing treatments and inhalers with no relief of symptoms     Time seen by provider: 1:07 AM    This is a 64 y.o. female with hx of COPD, HTN, CKD, atrial fibrillation on coumadin, and KEYSHAWN who presents with complaint of SOB for yesterday morning. She states that this is similar to prior COPD flares. She believes this was triggered by weather change. It is unchanged with exertion. Pt states that when at baseline she uses her albuterol inhaler BID and Advair BID. She states that she used her albuterol inhaler four times today. She denies any fever or chills.    The history is provided by the patient.     Review of patient's allergies indicates:  No Known Allergies  Past Medical History:   Diagnosis Date    Acute on chronic diastolic congestive heart failure     Anticoagulant long-term use     Asthma     Atrial fibrillation     Cataract     Chronic kidney disease     COPD (chronic obstructive pulmonary disease)     Depression     Dysuria     Flank pain     HTN (hypertension)     Nephrolithiasis     KEYSHAWN (obstructive sleep apnea)     awaiting CPAP machine     Rheumatic disease of mitral valve     Urinary incontinence     Urinary tract infection      Past Surgical History:   Procedure Laterality Date     SECTION      COLONOSCOPY N/A 2019    Procedure: COLONOSCOPY;  Surgeon: Devon Bowling MD;  Location: [address]);  Service: Endoscopy;  Laterality: N/A;  ok to hold Coumadin x 5 days with Lovenox bridge per [facility]    kidney stone removal      MITRAL VALVE REPLACEMENT  2004    TUBAL LIGATION       Family History   Problem Relation Age of Onset    Stroke Paternal Grandmother     Colon " cancer Maternal Grandmother     Cancer Brother         testicular cancer    Diabetes Sister     Hypertension Sister     Breast cancer Paternal Aunt     Diabetes Sister     Diabetes Sister     Heart disease Father 70        MI    Diabetes Father     Colon cancer Mother     Ovarian cancer Neg Hx      Social History     Tobacco Use    Smoking status: Former Smoker     Packs/day: 0.50     Years: 20.00     Pack years: 10.00     Types: Cigarettes     Last attempt to quit: 2002     Years since quittin.6    Smokeless tobacco: Never Used   Substance Use Topics    Alcohol use: No    Drug use: No     Review of Systems   Constitutional: Negative for chills, diaphoresis and fever.   HENT: Negative for congestion.    Eyes: Negative for discharge.   Respiratory: Positive for shortness of breath.    Cardiovascular: Negative for chest pain.   Genitourinary: Negative for dysuria.   Musculoskeletal: Negative for back pain.   Neurological: Negative for dizziness.   Hematological: Does not bruise/bleed easily.   All other systems reviewed and are negative.      Physical Exam     Initial Vitals [19 0025]   BP Pulse Resp Temp SpO2   133/73 98 18 98.8 °F (37.1 °C) 95 %      MAP       --         Physical Exam    Nursing note and vitals reviewed.  Constitutional: She appears well-developed and well-nourished.  Non-toxic appearance. She does not have a sickly appearance. No distress.   HENT:   Head: Normocephalic and atraumatic.   Nose: Nose normal.   Mouth/Throat: Oropharynx is clear and moist.   Eyes: Conjunctivae, EOM and lids are normal. Pupils are equal, round, and reactive to light. Right eye exhibits no nystagmus. Left eye exhibits no nystagmus.   Neck: Trachea normal, normal range of motion and phonation normal. Neck supple. No stridor present.   Cardiovascular: Normal rate, normal heart sounds and normal pulses. An irregularly irregular rhythm present.  Exam reveals no gallop and no friction rub.    No  murmur heard.  Pulmonary/Chest: No respiratory distress. She has wheezes. She has rhonchi. She has no rales.   Inspiratory and expiratory wheezing and rhonchi in all lung fields. No accessory muscle use.   Abdominal: Soft. Normal appearance and bowel sounds are normal. She exhibits no distension. There is no tenderness. There is no rigidity, no rebound, no guarding, no tenderness at McBurney's point and negative Valdes's sign.   Musculoskeletal: She exhibits no edema or tenderness.   Neurological: She is alert and oriented to person, place, and time. She has normal strength and normal reflexes. She displays normal reflexes. No cranial nerve deficit or sensory deficit. She displays a negative Romberg sign. GCS eye subscore is 4. GCS verbal subscore is 5. GCS motor subscore is 6.   Skin: Skin is warm, dry and intact. Capillary refill takes less than 2 seconds. No rash noted. No pallor.   Psychiatric: Her speech is normal and behavior is normal.         ED Course   Procedures  Labs Reviewed - No data to display  EKG Readings: (Independently Interpreted)   Rate of 93. Irregular rhythm. Narrow QRS. No acute ST/T wave abnormalities. No change from 11/29/19.        Imaging Results          X-Ray Chest AP Portable (Final result)  Result time 12/12/19 01:35:39    Final result by Leslie Olivera MD (12/12/19 01:35:39)                 Impression:      Cardiomegaly.  Possible pulmonary vascular congestion.  Possibility of mild CHF can not be entirely excluded.      Electronically signed by: Leslie Olivera  Date:    12/12/2019  Time:    01:35             Narrative:    EXAMINATION:  AP PORTABLE CHEST    CLINICAL HISTORY:  Shortness of breath    TECHNIQUE:  AP portable chest radiograph was submitted.    COMPARISON:  11/20/2019    FINDINGS:  Examination is limited by body habitus.  There is median sternotomy changes and a mitral replacement.  AP portable chest radiograph demonstrates cardiac silhouette.  There is mild  prominence of the pulmonary vascular and possible interstitial prominence.  There is no focal consolidation, pneumothorax, or pleural effusion.                              X-Rays:   Independently Interpreted Readings:   Chest X-Ray: Cardiomegaly. Sternal wires from prior CABG. Mildly increased congestion.      Medical Decision Making:   History:   Old Medical Records: I decided to obtain old medical records.  Old Records Summarized: other records.       <> Summary of Records:   Pt was seen here on 11/29/19 with same complaints, discharged home after many breathing treatments and blood work without significant abnormalities.  Initial Assessment:   64-year-old female with shortness of breath.  States it is her asthma flare up.  No significant respiratory distress.  Lungs have some mild wheezing.  Will give a couple breathing treatments.  Does have a history of atrial fibrillation but his rate control at this time between .  History is inconsistent with pneumonia.  Patient states it is worse due to the cold weather that just came in.  Do not suspect dissection or pulmonary embolus or pneumothorax.  Do not feel any blood work is indicated at this time.  Independently Interpreted Test(s):   I have ordered and independently interpreted X-rays - see prior notes.  I have ordered and independently interpreted EKG Reading(s) - see prior notes  Clinical Tests:   Radiological Study: Ordered and Reviewed  Medical Tests: Ordered and Reviewed            Scribe Attestation:   Scribe #1: I performed the above scribed service and the documentation accurately describes the services I performed. I attest to the accuracy of the note.    Attending Attestation:           Physician Attestation for Scribe:  Physician Attestation Statement for Scribe #1: I, Dr. Berger, reviewed documentation, as scribed by Martina Mijares in my presence, and it is both accurate and complete.                 ED Course as of Dec 12 0427   u Dec  12, 2019 0238 Upon reevaluation, pt is feeling much better. Lungs are clear. Noted that HR is 143, most likely due to beta agonist. Will give diltiazem and plan for discharge.     [CA]   0309 After tendon diltiazem heart rate went down to 117.  Blood pressure is 105/80.  Re-evaluation patient states she feels fine and a typically when she takes her breathing treatments at home her heart rate was up.  Will give her some fluids and then plan to discharge after ambulating the patient.    [SM]   0333 Re-evaluation patient continues to be asymptomatic.  Receiving IV fluids and will continue observe.  Patient states her blood pressure often is between  systolic.    [SM]   0349 On re-evaluation patient's blood pressure is 115/53.  Heart rates irregular 136 consistent with AFib with RVR but she is asymptomatic.  Will give 5 mg of metoprolol and plan for discharge.    [SM]   0425 On re-evaluation blood pressure is normal.  Patient has a stable. Heart rate down to 113.  I feel comfortable this time she be safely discharged home.  She states she will take her home metoprolol when she takes her regular medications between 9 and 10 this morning.  Feels is reasonable and will discharge patient.    Patient discharged home in stable condition. Diagnosis and treatment plan explained to patient and/or family member who is at bedside. I have answered all questions and the patient is satisfied with the plan of care. The patient demonstrates understanding of the care plan. This is the extent to the patients complaints today here in the emergency department.    [SM]      ED Course User Index  [CA] Martina Mijares  [SM] Colten Berger, DO                Clinical Impression:     1. Exacerbation of asthma, unspecified asthma severity, unspecified whether persistent    2. Chest tightness    3. Shortness of breath                              Colten Berger, DO  12/12/19 1801

## 2019-12-12 NOTE — DISCHARGE INSTRUCTIONS
Use the steroids daily for the next 3 days.  Make sure you take your blood pressure medication.  Also year medication for your atrial fibrillation.    Return for any other concerns or significant worsening issues.

## 2019-12-12 NOTE — ED TRIAGE NOTES
Pt arrived with c/o SOB since yesterday.  Reports a hx of COPD.  Incessant cough noted, pt reports sputum.  Reports chest tightness with her cough.  Denies any fever.  Speaking in full sentences.  Prolonged expiration noted.  aao x 4.

## 2019-12-12 NOTE — ED NOTES
Pt not keeping inhaler in her mouth, states she feels like she has to vomit. Pt urged to keep inhaler in mouth to reap benefits of medication. Pt complied.

## 2019-12-13 ENCOUNTER — PES CALL (OUTPATIENT)
Dept: ADMINISTRATIVE | Facility: CLINIC | Age: 64
End: 2019-12-13

## 2019-12-13 DIAGNOSIS — R10.9 BILATERAL FLANK PAIN: ICD-10-CM

## 2019-12-13 DIAGNOSIS — N13.30 HYDRONEPHROSIS, UNSPECIFIED HYDRONEPHROSIS TYPE: ICD-10-CM

## 2019-12-16 RX ORDER — ENOXAPARIN SODIUM 150 MG/ML
110 INJECTION SUBCUTANEOUS EVERY 12 HOURS
Qty: 10 SYRINGE | Refills: 1 | Status: SHIPPED | OUTPATIENT
Start: 2019-12-16 | End: 2020-03-04

## 2019-12-16 RX ORDER — TAMSULOSIN HYDROCHLORIDE 0.4 MG/1
0.4 CAPSULE ORAL DAILY
Qty: 30 CAPSULE | Refills: 11 | OUTPATIENT
Start: 2019-12-16 | End: 2020-12-15

## 2019-12-16 RX ORDER — ENOXAPARIN SODIUM 150 MG/ML
110 INJECTION SUBCUTANEOUS EVERY 12 HOURS
Qty: 10 SYRINGE | Refills: 1 | Status: SHIPPED | OUTPATIENT
Start: 2019-12-16 | End: 2019-12-16 | Stop reason: SDUPTHER

## 2019-12-19 ENCOUNTER — HOSPITAL ENCOUNTER (OUTPATIENT)
Facility: HOSPITAL | Age: 64
Discharge: HOME | End: 2019-12-19
Attending: INTERNAL MEDICINE | Admitting: INTERNAL MEDICINE
Payer: COMMERCIAL

## 2019-12-19 ENCOUNTER — ANESTHESIA EVENT (OUTPATIENT)
Dept: ENDOSCOPY | Facility: HOSPITAL | Age: 64
End: 2019-12-19
Payer: COMMERCIAL

## 2019-12-19 ENCOUNTER — ANESTHESIA (OUTPATIENT)
Dept: ENDOSCOPY | Facility: HOSPITAL | Age: 64
End: 2019-12-19
Payer: COMMERCIAL

## 2019-12-19 VITALS
WEIGHT: 236 LBS | SYSTOLIC BLOOD PRESSURE: 127 MMHG | BODY MASS INDEX: 43.43 KG/M2 | DIASTOLIC BLOOD PRESSURE: 80 MMHG | RESPIRATION RATE: 20 BRPM | OXYGEN SATURATION: 100 % | HEIGHT: 62 IN | TEMPERATURE: 97 F | HEART RATE: 130 BPM

## 2019-12-19 DIAGNOSIS — D50.9 IDA (IRON DEFICIENCY ANEMIA): ICD-10-CM

## 2019-12-19 DIAGNOSIS — D50.9 IRON DEFICIENCY ANEMIA, UNSPECIFIED IRON DEFICIENCY ANEMIA TYPE: Primary | ICD-10-CM

## 2019-12-19 PROCEDURE — 43235 PR EGD, FLEX, DIAGNOSTIC: ICD-10-PCS | Mod: ,,, | Performed by: INTERNAL MEDICINE

## 2019-12-19 PROCEDURE — D9220A PRA ANESTHESIA: Mod: ANES,,, | Performed by: ANESTHESIOLOGY

## 2019-12-19 PROCEDURE — 37000009 HC ANESTHESIA EA ADD 15 MINS: Performed by: INTERNAL MEDICINE

## 2019-12-19 PROCEDURE — D9220A PRA ANESTHESIA: ICD-10-PCS | Mod: CRNA,,, | Performed by: NURSE ANESTHETIST, CERTIFIED REGISTERED

## 2019-12-19 PROCEDURE — 37000008 HC ANESTHESIA 1ST 15 MINUTES: Performed by: INTERNAL MEDICINE

## 2019-12-19 PROCEDURE — 63600175 PHARM REV CODE 636 W HCPCS: Performed by: INTERNAL MEDICINE

## 2019-12-19 PROCEDURE — 63600175 PHARM REV CODE 636 W HCPCS: Performed by: NURSE ANESTHETIST, CERTIFIED REGISTERED

## 2019-12-19 PROCEDURE — 94640 AIRWAY INHALATION TREATMENT: CPT

## 2019-12-19 PROCEDURE — 43235 EGD DIAGNOSTIC BRUSH WASH: CPT | Mod: ,,, | Performed by: INTERNAL MEDICINE

## 2019-12-19 PROCEDURE — D9220A PRA ANESTHESIA: ICD-10-PCS | Mod: ANES,,, | Performed by: ANESTHESIOLOGY

## 2019-12-19 PROCEDURE — 43235 EGD DIAGNOSTIC BRUSH WASH: CPT | Performed by: INTERNAL MEDICINE

## 2019-12-19 PROCEDURE — D9220A PRA ANESTHESIA: Mod: CRNA,,, | Performed by: NURSE ANESTHETIST, CERTIFIED REGISTERED

## 2019-12-19 PROCEDURE — 25000242 PHARM REV CODE 250 ALT 637 W/ HCPCS: Performed by: ANESTHESIOLOGY

## 2019-12-19 PROCEDURE — 25000003 PHARM REV CODE 250: Performed by: NURSE ANESTHETIST, CERTIFIED REGISTERED

## 2019-12-19 RX ORDER — KETAMINE HCL IN 0.9 % NACL 50 MG/5 ML
SYRINGE (ML) INTRAVENOUS
Status: DISCONTINUED | OUTPATIENT
Start: 2019-12-19 | End: 2019-12-19

## 2019-12-19 RX ORDER — SODIUM CHLORIDE 0.9 % (FLUSH) 0.9 %
10 SYRINGE (ML) INJECTION
Status: DISCONTINUED | OUTPATIENT
Start: 2019-12-19 | End: 2019-12-19 | Stop reason: HOSPADM

## 2019-12-19 RX ORDER — GLYCOPYRROLATE 0.2 MG/ML
INJECTION INTRAMUSCULAR; INTRAVENOUS
Status: DISCONTINUED | OUTPATIENT
Start: 2019-12-19 | End: 2019-12-19

## 2019-12-19 RX ORDER — PROPOFOL 10 MG/ML
VIAL (ML) INTRAVENOUS
Status: DISCONTINUED | OUTPATIENT
Start: 2019-12-19 | End: 2019-12-19

## 2019-12-19 RX ORDER — LIDOCAINE HCL/PF 100 MG/5ML
SYRINGE (ML) INTRAVENOUS
Status: DISCONTINUED | OUTPATIENT
Start: 2019-12-19 | End: 2019-12-19

## 2019-12-19 RX ORDER — SODIUM CHLORIDE 9 MG/ML
INJECTION, SOLUTION INTRAVENOUS CONTINUOUS
Status: DISCONTINUED | OUTPATIENT
Start: 2019-12-19 | End: 2019-12-19 | Stop reason: HOSPADM

## 2019-12-19 RX ORDER — IPRATROPIUM BROMIDE AND ALBUTEROL SULFATE 2.5; .5 MG/3ML; MG/3ML
3 SOLUTION RESPIRATORY (INHALATION) ONCE AS NEEDED
Status: COMPLETED | OUTPATIENT
Start: 2019-12-19 | End: 2019-12-19

## 2019-12-19 RX ORDER — PROPOFOL 10 MG/ML
VIAL (ML) INTRAVENOUS CONTINUOUS PRN
Status: DISCONTINUED | OUTPATIENT
Start: 2019-12-19 | End: 2019-12-19

## 2019-12-19 RX ADMIN — PROPOFOL 70 MG: 10 INJECTION, EMULSION INTRAVENOUS at 11:12

## 2019-12-19 RX ADMIN — SODIUM CHLORIDE: 0.9 INJECTION, SOLUTION INTRAVENOUS at 10:12

## 2019-12-19 RX ADMIN — PROPOFOL 175 MCG/KG/MIN: 10 INJECTION, EMULSION INTRAVENOUS at 11:12

## 2019-12-19 RX ADMIN — IPRATROPIUM BROMIDE AND ALBUTEROL SULFATE 3 ML: .5; 3 SOLUTION RESPIRATORY (INHALATION) at 11:12

## 2019-12-19 RX ADMIN — Medication 15 MG: at 11:12

## 2019-12-19 RX ADMIN — GLYCOPYRROLATE 0.1 MG: 0.2 INJECTION, SOLUTION INTRAMUSCULAR; INTRAVENOUS at 11:12

## 2019-12-19 RX ADMIN — LIDOCAINE HYDROCHLORIDE 100 MG: 20 INJECTION, SOLUTION INTRAVENOUS at 11:12

## 2019-12-19 RX ADMIN — PROPOFOL 20 MG: 10 INJECTION, EMULSION INTRAVENOUS at 11:12

## 2019-12-19 NOTE — H&P
Short Stay Endoscopy History and Physical    PCP - Nubia Crocker MD     Procedure - EGD  ASA - per anesthesia  Mallampati - per anesthesia  History of Anesthesia problems - no  Family history Anesthesia problems -  no   Plan of anesthesia - General    HPI:  This is a 64 y.o. female here for evaluation of : iron deficiency anemia.    Recent colonoscopy (2019) 1st deg rel with colon cancer, with 3mm rectal polyp, otherwise unremarkable. Prior EGD 2016 (negative).    On ASA and lovenox (hx of mechanical mitral valve). Takes prn anacin (asa) for pain. Recommended against NSAIDs due to risk for PUD.    Last dose lovenox yesterday 9AM.  INR 2.9 ()    Reflux - no  Dysphagia - no  Abdominal pain - no  Diarrhea - no    ROS:  Constitutional: No fevers, chills, No weight loss  CV: No chest pain  Pulm: No cough, No shortness of breath  Ophtho: No vision changes  GI: see HPI  Derm: No rash    Medical History:  has a past medical history of Acute on chronic diastolic congestive heart failure, Anticoagulant long-term use, Asthma, Atrial fibrillation (), Cataract, Chronic kidney disease, COPD (chronic obstructive pulmonary disease), Depression, Dysuria, Flank pain, HTN (hypertension), Nephrolithiasis, KEYSHAWN (obstructive sleep apnea), Rheumatic disease of mitral valve, Urinary incontinence, and Urinary tract infection.    Surgical History:  has a past surgical history that includes Mitral valve replacement (2004);  section; Tubal ligation; kidney stone removal; and Colonoscopy (N/A, 2019).    Family History: family history includes Breast cancer in her paternal aunt; Cancer in her brother; Colon cancer in her maternal grandmother; Colon cancer (age of onset: 83) in her mother; Diabetes in her father, sister, sister, and sister; Heart disease (age of onset: 70) in her father; Hypertension in her sister; Stroke in her paternal grandmother.. Otherwise no colon cancer, inflammatory bowel disease, or GI  malignancies.    Social History:  reports that she quit smoking about 17 years ago. Her smoking use included cigarettes. She has a 10.00 pack-year smoking history. She has never used smokeless tobacco. She reports that she does not drink alcohol or use drugs.    Review of patient's allergies indicates:  No Known Allergies    Medications:   Facility-Administered Medications Prior to Admission   Medication Dose Route Frequency Provider Last Rate Last Dose    sodium hyaluronate (EUFLEXXA) 10 mg/mL(mw 2.4 -3.6 million) Syrg 40 mg  40 mg Intra-articular Weekly Juan Miguel Arroyo MD   20 mg at 07/13/18 1210     Medications Prior to Admission   Medication Sig Dispense Refill Last Dose    amLODIPine (NORVASC) 2.5 MG tablet Take 1 tablet (2.5 mg total) by mouth once daily. 30 tablet 11 12/18/2019 at Unknown time    aspirin (ECOTRIN) 81 MG EC tablet Take 81 mg by mouth once daily.    12/18/2019 at Unknown time    benzonatate (TESSALON) 100 MG capsule Take 1 capsule (100 mg total) by mouth 3 (three) times daily as needed for Cough. 20 capsule 0 12/18/2019 at Unknown time    enoxaparin (LOVENOX) 120 mg/0.8 mL Syrg Inject 0.7 mLs (105 mg total) into the skin every 12 (twelve) hours. Take 110mg Q12hrs SUBQ as directed by [facility] 10 Syringe 1 12/18/2019 at Unknown time    fluticasone (FLONASE) 50 mcg/actuation nasal spray 2 sprays by Each Nare route once daily. 1 Bottle 6 Past Week at Unknown time    fluticasone-salmeterol diskus inhaler 500-50 mcg Inhale 1 puff into the lungs 2 (two) times daily. 1 Inhaler 6 Past Week at Unknown time    furosemide (LASIX) 20 MG tablet Take 1 tablet (20 mg total) by mouth once daily. (Patient taking differently: Take 20 mg by mouth once as needed. ) 60 tablet 3 12/18/2019 at Unknown time    metoprolol succinate (TOPROL-XL) 200 MG 24 hr tablet Take 1 tablet (200 mg total) by mouth once daily. 30 tablet 4 12/18/2019 at Unknown time    pantoprazole (PROTONIX) 40 MG tablet Take 1  "tablet (40 mg total) by mouth once daily. 30 tablet 3 12/18/2019 at Unknown time    pravastatin (PRAVACHOL) 40 MG tablet Take 1 tablet (40 mg total) by mouth once daily. 30 tablet 3 12/18/2019 at Unknown time    VENTOLIN HFA 90 mcg/actuation inhaler INHALE 2 PUFFS INTO THE LUNGS EVERY 6 (SIX) HOURS AS NEEDED FOR WHEEZING. RESCUE  6 12/18/2019 at Unknown time    diphth,pertus,acell,,tetanus (BOOSTRIX) 2.5-8-5 Lf-mcg-Lf/0.5mL Syrg injection Inject 0.5 ml into the muscle for one dose 0.5 mL 0 Taking    ergocalciferol (ERGOCALCIFEROL) 50,000 unit Cap TAKE 1 CAPSULE (50,000 UNITS TOTAL) BY MOUTH EVERY 7 DAYS. 12 capsule 1 Unknown at Unknown time    gabapentin (NEURONTIN) 300 MG capsule Take 1 capsule (300 mg total) by mouth every evening. 30 capsule 3 12/11/2019    latanoprost (XALATAN) 0.005 % ophthalmic solution Place 1 drop into the right eye once daily. 2.5 mL 12 12/12/2019    needle, disp, 26 gauge 26 gauge x 1/2" Ndle 1 application by Misc.(Non-Drug; Combo Route) route once a week. 10 each 0 Taking    nitroGLYCERIN (NITROSTAT) 0.4 MG SL tablet Place 1 tablet (0.4 mg total) under the tongue every 5 (five) minutes as needed for Chest pain. 30 tablet 1 Unknown at Unknown time    olopatadine (PATADAY) 0.2 % Drop Place 1 drop into both eyes every morning. 2.5 mL 12 Taking    oxybutynin (DITROPAN-XL) 10 MG 24 hr tablet TAKE 1 TABLET  10 MG TOTAL  BY MOUTH ONCE DAILY  11 Unknown at Unknown time    sertraline (ZOLOFT) 100 MG tablet Take 1 tablet (100 mg total) by mouth once daily. 30 tablet 4 Past Week    tamsulosin (FLOMAX) 0.4 mg Cap Take 1 capsule (0.4 mg total) by mouth once daily. 30 capsule 11 12/11/2019    warfarin (COUMADIN) 5 MG tablet Take 1 1/2 tablets (7.5mg) by mouth daily, except 1 tablet (5mg.) on Tuesdays, Or as directed by [facility] (Patient taking differently: 10 mg every Tues, Thurs, Sat. Take 1 1/2 tablets (7.5mg) by mouth daily on Mon, Wed, Friday, Sunday) 45 tablet 4 12/12/2019 "       Physical Exam:    Vital Signs:   Vitals:    12/19/19 1037   BP: (!) 144/71   Pulse: 79   Resp: 16   Temp: 98.1 °F (36.7 °C)       General Appearance: Well appearing in no acute distress  Eyes:    No scleral icterus  ENT: Neck supple, Lips, mucosa, and tongue normal; teeth and gums normal  Lungs: CTA anteriorly  Heart:  Regular rate, S1, S2 normal, no murmurs heard.  Abdomen: Soft, non tender, non distended with normal bowel sounds. No hepatosplenomegaly, ascites, or mass.  Extremities: No edema  Skin: No rash    Labs:  Lab Results   Component Value Date    WBC 6.20 11/29/2019    HGB 8.3 (L) 11/29/2019    HCT 32.2 (L) 11/29/2019     11/29/2019    CHOL 228 (H) 03/08/2019    TRIG 114 03/08/2019    HDL 66 03/08/2019    ALT 12 11/29/2019    AST 32 11/29/2019     11/29/2019    K 4.7 11/29/2019     11/29/2019    CREATININE 1.1 11/29/2019    BUN 16 11/29/2019    CO2 22 (L) 11/29/2019    TSH 1.748 09/20/2019    INR 2.9 12/11/2019    HGBA1C 6.1 05/12/2017       I have explained the risks and benefits of endoscopy procedures to the patient including but not limited to bleeding, perforation, infection, and death.  The patient was asked if they understand and allowed to ask any further questions to their satisfaction.      Titi Ramirez MD

## 2019-12-19 NOTE — DISCHARGE INSTRUCTIONS

## 2019-12-19 NOTE — ANESTHESIA POSTPROCEDURE EVALUATION
Anesthesia Post Evaluation    Patient: Elayne Almanzar    Procedure(s) Performed: Procedure(s) (LRB):  EGD (ESOPHAGOGASTRODUODENOSCOPY) (N/A)    Final Anesthesia Type: general    Patient location during evaluation: PACU  Patient participation: Yes- Able to Participate  Level of consciousness: awake and alert  Post-procedure vital signs: reviewed and stable  Pain management: adequate  Airway patency: patent    PONV status at discharge: No PONV  Anesthetic complications: no      Cardiovascular status: stable  Respiratory status: unassisted and spontaneous ventilation  Hydration status: euvolemic  Follow-up not needed.          Vitals Value Taken Time   /80 12/19/2019 12:15 PM   Temp 36.3 °C (97.4 °F) 12/19/2019 12:15 PM   Pulse 130 12/19/2019 12:15 PM   Resp 20 12/19/2019 12:15 PM   SpO2 100 % 12/19/2019 12:15 PM         No case tracking events are documented in the log.      Pain/Cleo Score: Cleo Score: 10 (12/19/2019 12:15 PM)

## 2019-12-19 NOTE — ANESTHESIA PREPROCEDURE EVALUATION
12/19/2019  Elayne Almanzar is a 64 y.o., female.  Pre-operative evaluation for Procedure(s) (LRB):  EGD (ESOPHAGOGASTRODUODENOSCOPY) (N/A)    Elayne Almanzar is a 64 y.o. female     Patient Active Problem List   Diagnosis    Shortness of breath    Status post heart valve replacement with mechanical valve    Chronic atrial fibrillation with rapid ventricular response    Nephrolithiasis    Essential (primary) hypertension    Chronic anticoagulation    H/O mitral valve replacement with mechanical valve    COPD (chronic obstructive pulmonary disease) with acute bronchitis    Hyperlipidemia    Mixed urge and stress incontinence    Hematuria    Atrophic vaginitis    Acute kidney injury    Complex sleep apnea syndrome    Morbid obesity with BMI of 45.0-49.9, adult    Thiamine deficiency    Asthma exacerbation    COPD exacerbation    Chronic diastolic congestive heart failure    Glucose intolerance (impaired glucose tolerance)    Encounter for screening colonoscopy    MARTHA (iron deficiency anemia)       Review of patient's allergies indicates:  No Known Allergies    No current facility-administered medications on file prior to encounter.      Current Outpatient Medications on File Prior to Encounter   Medication Sig Dispense Refill    amLODIPine (NORVASC) 2.5 MG tablet Take 1 tablet (2.5 mg total) by mouth once daily. 30 tablet 11    aspirin (ECOTRIN) 81 MG EC tablet Take 81 mg by mouth once daily.       fluticasone (FLONASE) 50 mcg/actuation nasal spray 2 sprays by Each Nare route once daily. 1 Bottle 6    fluticasone-salmeterol diskus inhaler 500-50 mcg Inhale 1 puff into the lungs 2 (two) times daily. 1 Inhaler 6    furosemide (LASIX) 20 MG tablet Take 1 tablet (20 mg total) by mouth once daily. (Patient taking differently: Take 20 mg by mouth once as needed. ) 60 tablet 3     "metoprolol succinate (TOPROL-XL) 200 MG 24 hr tablet Take 1 tablet (200 mg total) by mouth once daily. 30 tablet 4    pantoprazole (PROTONIX) 40 MG tablet Take 1 tablet (40 mg total) by mouth once daily. 30 tablet 3    pravastatin (PRAVACHOL) 40 MG tablet Take 1 tablet (40 mg total) by mouth once daily. 30 tablet 3    VENTOLIN HFA 90 mcg/actuation inhaler INHALE 2 PUFFS INTO THE LUNGS EVERY 6 (SIX) HOURS AS NEEDED FOR WHEEZING. RESCUE  6    diphth,pertus,acell,,tetanus (BOOSTRIX) 2.5-8-5 Lf-mcg-Lf/0.5mL Syrg injection Inject 0.5 ml into the muscle for one dose 0.5 mL 0    ergocalciferol (ERGOCALCIFEROL) 50,000 unit Cap TAKE 1 CAPSULE (50,000 UNITS TOTAL) BY MOUTH EVERY 7 DAYS. 12 capsule 1    gabapentin (NEURONTIN) 300 MG capsule Take 1 capsule (300 mg total) by mouth every evening. 30 capsule 3    latanoprost (XALATAN) 0.005 % ophthalmic solution Place 1 drop into the right eye once daily. 2.5 mL 12    needle, disp, 26 gauge 26 gauge x 1/2" Ndle 1 application by Misc.(Non-Drug; Combo Route) route once a week. 10 each 0    nitroGLYCERIN (NITROSTAT) 0.4 MG SL tablet Place 1 tablet (0.4 mg total) under the tongue every 5 (five) minutes as needed for Chest pain. 30 tablet 1    olopatadine (PATADAY) 0.2 % Drop Place 1 drop into both eyes every morning. 2.5 mL 12    oxybutynin (DITROPAN-XL) 10 MG 24 hr tablet TAKE 1 TABLET  10 MG TOTAL  BY MOUTH ONCE DAILY  11    sertraline (ZOLOFT) 100 MG tablet Take 1 tablet (100 mg total) by mouth once daily. 30 tablet 4    tamsulosin (FLOMAX) 0.4 mg Cap Take 1 capsule (0.4 mg total) by mouth once daily. 30 capsule 11    warfarin (COUMADIN) 5 MG tablet Take 1 1/2 tablets (7.5mg) by mouth daily, except 1 tablet (5mg.) on , Or as directed by [facility] (Patient taking differently: 10 mg every Tues, Thurs, Sat. Take 1 1/2 tablets (7.5mg) by mouth daily on Mon, Wed, Friday, ) 45 tablet 4       Past Surgical History:   Procedure Laterality Date     " SECTION      COLONOSCOPY N/A 2019    Procedure: COLONOSCOPY;  Surgeon: Devon Bowling MD;  Location: [facility] ([address] [place] [address]);  Service: Endoscopy;  Laterality: N/A;  ok to hold Coumadin x 5 days with Lovenox bridge per [facility]    kidney stone removal      MITRAL VALVE REPLACEMENT  2004    TUBAL LIGATION         Social History     Socioeconomic History    Marital status:      Spouse name: Not on file    Number of children: 2    Years of education: Not on file    Highest education level: Not on file   Occupational History    Occupation: Cook     Comment: Retired   Social Needs    Financial resource strain: Not on file    Food insecurity:     Worry: Not on file     Inability: Not on file    Transportation needs:     Medical: Not on file     Non-medical: Not on file   Tobacco Use    Smoking status: Former Smoker     Packs/day: 0.50     Years: 20.00     Pack years: 10.00     Types: Cigarettes     Last attempt to quit: 2002     Years since quittin.6    Smokeless tobacco: Never Used   Substance and Sexual Activity    Alcohol use: No    Drug use: No    Sexual activity: Not on file   Lifestyle    Physical activity:     Days per week: Not on file     Minutes per session: Not on file    Stress: Not on file   Relationships    Social connections:     Talks on phone: Not on file     Gets together: Not on file     Attends Tenriism service: Not on file     Active member of club or organization: Not on file     Attends meetings of clubs or organizations: Not on file     Relationship status: Not on file   Other Topics Concern    Not on file   Social History Narrative    Disability since .  Laid off .  Previously worked as cook in a kitchen.           CBC: No results for input(s): WBC, RBC, HGB, HCT, PLT, MCV, MCH, MCHC in the last 72 hours.    CMP: No results for input(s): NA, K, CL, CO2, BUN, CREATININE, GLU, MG, PHOS, CALCIUM, ALBUMIN, PROT, ALKPHOS, ALT, AST, BILITOT  in the last 72 hours.    INR  No results for input(s): PT, INR, PROTIME, APTT in the last 72 hours.          2D Echo:  Results for orders placed or performed during the hospital encounter of 11/03/17   2D echo with color flow doppler   Result Value Ref Range    QEF 55 55 - 65    Est. PA Systolic Pressure 26.15     Tricuspid Valve Regurgitation TRIVIAL        Anesthesia Evaluation    I have reviewed the Patient Summary Reports.     I have reviewed the Medications.     Review of Systems  Anesthesia Hx:  No problems with previous Anesthesia  History of prior surgery of interest to airway management or planning:  Denies Personal Hx of Anesthesia complications.   Social:  Former Smoker    Cardiovascular:   Hypertension Valvular problems/Murmurs, MR Dysrhythmias atrial fibrillation    Pulmonary:   COPD, moderate Asthma Sleep Apnea    Hepatic/GI:  Hepatic/GI Normal    Neurological:  Neurology Normal    Endocrine:  Endocrine Normal        Physical Exam  General:  Obesity    Airway/Jaw/Neck:  Airway Findings: Mouth Opening: Normal Tongue: Normal  Improves to I with phonation.  TM Distance: Normal, at least 6 cm      Dental:  Dental Findings: In tact        Mental Status:  Mental Status Findings:  Cooperative, Alert and Oriented         Anesthesia Plan  Type of Anesthesia, risks & benefits discussed:  Anesthesia Type:  general  Patient's Preference:   Intra-op Monitoring Plan: standard ASA monitors  Intra-op Monitoring Plan Comments:   Post Op Pain Control Plan:   Post Op Pain Control Plan Comments:   Induction:   IV  Beta Blocker:  Patient is not currently on a Beta-Blocker (No further documentation required).       Informed Consent: Patient understands risks and agrees with Anesthesia plan.  Questions answered. Anesthesia consent signed with patient.  ASA Score: 3     Day of Surgery Review of History & Physical:    H&P update referred to the surgeon.         Ready For Surgery From Anesthesia Perspective.

## 2019-12-19 NOTE — TRANSFER OF CARE
"Anesthesia Transfer of Care Note    Patient: Elayne Almanzar    Procedure(s) Performed: Procedure(s) (LRB):  EGD (ESOPHAGOGASTRODUODENOSCOPY) (N/A)    Patient location: [facility]    Anesthesia Type: general    Transport from OR: Transported from OR on 2-3 L/min O2 by NC with adequate spontaneous ventilation    Post pain: adequate analgesia    Post assessment: no apparent anesthetic complications and tolerated procedure well    Post vital signs: stable    Level of consciousness: awake, alert and oriented    Nausea/Vomiting: no nausea/vomiting    Complications: none    Transfer of care protocol was followed      Last vitals:   Visit Vitals  BP (!) 144/71 (BP Location: Left arm, Patient Position: Lying)   Pulse 79   Temp 36.7 °C (98.1 °F) (Temporal)   Resp 16   Ht 5' 2" (1.575 m)   Wt 107 kg (236 lb)   LMP 07/22/2012   SpO2 96%   Breastfeeding? No   BMI 43.16 kg/m²     "

## 2019-12-19 NOTE — [employer] PATIENT INSTRUCTIONS
Discharge Summary/Instructions after an Endoscopic Procedure  Patient Name: Elayne Almanzar  Patient MRN: [id number]  Patient YOB: 1955 Thursday, December 19, 2019  Smooth Torrez MD  RESTRICTIONS:  During your procedure today, you received medications for sedation.  These   medications may affect your judgment, balance and coordination.  Therefore,   for 24 hours, you have the following restrictions:   - DO NOT drive a car, operate machinery, make legal/financial decisions,   sign important papers or drink alcohol.    ACTIVITY:  Today: no heavy lifting, straining or running due to procedural   sedation/anesthesia.  The following day: return to full activity including work.  DIET:  Eat and drink normally unless instructed otherwise.     TREATMENT FOR COMMON SIDE EFFECTS:  - Mild abdominal pain, nausea, belching, bloating or excessive gas:  rest,   eat lightly and use a heating pad.  - Sore Throat: treat with throat lozenges and/or gargle with warm salt   water.  - Because air was used during the procedure, expelling large amounts of air   from your rectum or belching is normal.  - If a bowel prep was taken, you may not have a bowel movement for 1-3 days.    This is normal.  SYMPTOMS TO WATCH FOR AND REPORT TO YOUR PHYSICIAN:  1. Abdominal pain or bloating, other than gas cramps.  2. Chest pain.  3. Back pain.  4. Signs of infection such as: chills or fever occurring within 24 hours   after the procedure.  5. Rectal bleeding, which would show as bright red, maroon, or black stools.   (A tablespoon of blood from the rectum is not serious, especially if   hemorrhoids are present.)  6. Vomiting.  7. Weakness or dizziness.  GO DIRECTLY TO THE NEAREST EMERGENCY ROOM IF YOU HAVE ANY OF THE FOLLOWING:      Difficulty breathing              Chills and/or fever over 101 F   Persistent vomiting and/or vomiting blood   Severe abdominal pain   Severe chest pain   Black, tarry stools   Bleeding- more than one tablespoon   Any  other symptom or condition that you feel may need urgent attention  Your doctor recommends these additional instructions:  If any biopsies were taken, your doctors clinic will contact you in 1 to 2   weeks with any results.  - Discharge patient to home.   - Resume previous diet.   - Continue present medications.   - Resume Coumadin (warfarin) at prior dose today.   - Resume Lovenox (enoxaparin) at prior dose today.   - The findings and recommendations were discussed with the patient.   - Patient has a contact number available for emergencies.  The signs and   symptoms of potential delayed complications were discussed with the   patient.  Return to normal activities tomorrow.  Written discharge   instructions were provided to the patient.   For questions, problems or results please call your physician - Smooth Torrez MD at Work:  [phone].  [facility], EMERGENCY ROOM PHONE NUMBER: [phone]  IF A COMPLICATION OR EMERGENCY SITUATION ARISES AND YOU ARE UNABLE TO REACH   YOUR PHYSICIAN - GO DIRECTLY TO THE EMERGENCY ROOM.  Smooth Torrez MD  12/19/2019 11:21:45 AM  This report has been verified and signed electronically.  [employer]

## 2019-12-24 ENCOUNTER — OFFICE VISIT (OUTPATIENT)
Dept: URGENT CARE | Facility: CLINIC | Age: 64
End: 2019-12-24
Payer: COMMERCIAL

## 2019-12-24 ENCOUNTER — ANTI-COAG VISIT (OUTPATIENT)
Dept: CARDIOLOGY | Facility: CLINIC | Age: 64
End: 2019-12-24
Payer: COMMERCIAL

## 2019-12-24 VITALS
DIASTOLIC BLOOD PRESSURE: 68 MMHG | HEIGHT: 62 IN | TEMPERATURE: 98 F | SYSTOLIC BLOOD PRESSURE: 145 MMHG | WEIGHT: 235.88 LBS | HEART RATE: 77 BPM | OXYGEN SATURATION: 99 % | RESPIRATION RATE: 20 BRPM | BODY MASS INDEX: 43.41 KG/M2

## 2019-12-24 DIAGNOSIS — I48.20 CHRONIC ATRIAL FIBRILLATION WITH RAPID VENTRICULAR RESPONSE: ICD-10-CM

## 2019-12-24 DIAGNOSIS — J45.901 EXACERBATION OF ASTHMA, UNSPECIFIED ASTHMA SEVERITY, UNSPECIFIED WHETHER PERSISTENT: Primary | ICD-10-CM

## 2019-12-24 DIAGNOSIS — E66.01 MORBID OBESITY WITH BMI OF 45.0-49.9, ADULT: ICD-10-CM

## 2019-12-24 DIAGNOSIS — I10 ESSENTIAL (PRIMARY) HYPERTENSION: ICD-10-CM

## 2019-12-24 DIAGNOSIS — R05.9 COUGH IN ADULT: ICD-10-CM

## 2019-12-24 DIAGNOSIS — Z79.01 LONG TERM (CURRENT) USE OF ANTICOAGULANTS: Primary | ICD-10-CM

## 2019-12-24 DIAGNOSIS — Z95.2 STATUS POST HEART VALVE REPLACEMENT WITH MECHANICAL VALVE: ICD-10-CM

## 2019-12-24 DIAGNOSIS — R50.9 FEVER, UNSPECIFIED FEVER CAUSE: ICD-10-CM

## 2019-12-24 LAB
CTP QC/QA: YES
FLUAV AG NPH QL: NEGATIVE
FLUBV AG NPH QL: NEGATIVE
INR PPP: 2.5 (ref 2–3)

## 2019-12-24 PROCEDURE — 87804 INFLUENZA ASSAY W/OPTIC: CPT | Mod: QW,S$GLB,, | Performed by: NURSE PRACTITIONER

## 2019-12-24 PROCEDURE — 99214 OFFICE O/P EST MOD 30 MIN: CPT | Mod: 25,S$GLB,, | Performed by: NURSE PRACTITIONER

## 2019-12-24 PROCEDURE — 94640 PR INHAL RX, AIRWAY OBST/DX SPUTUM INDUCT: ICD-10-PCS | Mod: S$GLB,,, | Performed by: NURSE PRACTITIONER

## 2019-12-24 PROCEDURE — 71046 XR CHEST PA AND LATERAL: ICD-10-PCS | Mod: S$GLB,,, | Performed by: RADIOLOGY

## 2019-12-24 PROCEDURE — 85610 PROTHROMBIN TIME: CPT | Mod: QW,S$GLB,, | Performed by: INTERNAL MEDICINE

## 2019-12-24 PROCEDURE — 93793 PR ANTICOAGULANT MGMT FOR PT TAKING WARFARIN: ICD-10-PCS | Mod: S$GLB,,,

## 2019-12-24 PROCEDURE — 99214 PR OFFICE/OUTPT VISIT, EST, LEVL IV, 30-39 MIN: ICD-10-PCS | Mod: 25,S$GLB,, | Performed by: NURSE PRACTITIONER

## 2019-12-24 PROCEDURE — 71046 X-RAY EXAM CHEST 2 VIEWS: CPT | Mod: S$GLB,,, | Performed by: RADIOLOGY

## 2019-12-24 PROCEDURE — 94640 AIRWAY INHALATION TREATMENT: CPT | Mod: S$GLB,,, | Performed by: NURSE PRACTITIONER

## 2019-12-24 PROCEDURE — 85610 POCT INR: ICD-10-PCS | Mod: QW,S$GLB,, | Performed by: INTERNAL MEDICINE

## 2019-12-24 PROCEDURE — 87804 POCT INFLUENZA A/B: ICD-10-PCS | Mod: QW,S$GLB,, | Performed by: NURSE PRACTITIONER

## 2019-12-24 PROCEDURE — 93793 ANTICOAG MGMT PT WARFARIN: CPT | Mod: S$GLB,,,

## 2019-12-24 RX ORDER — BENZONATATE 200 MG/1
200 CAPSULE ORAL 3 TIMES DAILY PRN
Qty: 30 CAPSULE | Refills: 0 | Status: SHIPPED | OUTPATIENT
Start: 2019-12-24 | End: 2020-01-03

## 2019-12-24 RX ORDER — IPRATROPIUM BROMIDE 0.5 MG/2.5ML
0.5 SOLUTION RESPIRATORY (INHALATION)
Status: COMPLETED | OUTPATIENT
Start: 2019-12-24 | End: 2019-12-24

## 2019-12-24 RX ORDER — FLUTICASONE PROPIONATE AND SALMETEROL XINAFOATE 115; 21 UG/1; UG/1
2 AEROSOL, METERED RESPIRATORY (INHALATION) EVERY 12 HOURS
Qty: 12 G | Refills: 0 | Status: SHIPPED | OUTPATIENT
Start: 2019-12-24 | End: 2020-07-01 | Stop reason: SDUPTHER

## 2019-12-24 RX ORDER — ALBUTEROL SULFATE 0.83 MG/ML
2.5 SOLUTION RESPIRATORY (INHALATION)
Status: COMPLETED | OUTPATIENT
Start: 2019-12-24 | End: 2019-12-24

## 2019-12-24 RX ADMIN — ALBUTEROL SULFATE 2.5 MG: 0.83 SOLUTION RESPIRATORY (INHALATION) at 11:12

## 2019-12-24 RX ADMIN — IPRATROPIUM BROMIDE 0.5 MG: 0.5 SOLUTION RESPIRATORY (INHALATION) at 11:12

## 2019-12-24 NOTE — PROGRESS NOTES
"Subjective:       Patient ID: Elayne Almanzar is a 64 y.o. female.    Vitals:  height is 5' 2" (1.575 m) and weight is 107 kg (235 lb 14.3 oz). Her oral temperature is 98.4 °F (36.9 °C). Her blood pressure is 145/68 (abnormal) and her pulse is 77. Her respiration is 20 and oxygen saturation is 99%.     Chief Complaint: Cough    Cough and body aches x's 2 weeks.  States that she has having body sweats at night.  Has been taking Mucinex without relief.    Cough   This is a new problem. The current episode started 1 to 4 weeks ago. The problem has been gradually worsening. The problem occurs every few minutes. The cough is productive of sputum. Associated symptoms include ear congestion, ear pain, a fever, headaches, myalgias, nasal congestion, postnasal drip, rhinorrhea, a sore throat and wheezing. Pertinent negatives include no chest pain, chills, eye redness, heartburn, hemoptysis, rash, shortness of breath, sweats or weight loss. Nothing aggravates the symptoms. She has tried OTC cough suppressant for the symptoms. The treatment provided mild relief. Her past medical history is significant for asthma and COPD. There is no history of bronchiectasis, bronchitis, emphysema, environmental allergies or pneumonia.       Constitution: Positive for sweating, fatigue and fever. Negative for chills.   HENT: Positive for ear pain, congestion, postnasal drip and sore throat. Negative for sinus pain, sinus pressure and voice change.    Neck: Negative for painful lymph nodes.   Cardiovascular: Negative for chest pain.   Eyes: Negative for eye redness.   Respiratory: Positive for cough, sputum production, wheezing and asthma. Negative for chest tightness, bloody sputum, COPD, shortness of breath and stridor.    Gastrointestinal: Negative for nausea, vomiting and heartburn.   Musculoskeletal: Positive for muscle ache.   Skin: Negative for rash.   Allergic/Immunologic: Positive for asthma. Negative for environmental allergies " and seasonal allergies.   Neurological: Positive for headaches.   Hematologic/Lymphatic: Negative for swollen lymph nodes.       Objective:      Physical Exam   Constitutional: She is oriented to person, place, and time. She appears well-developed and well-nourished. She is cooperative.  Non-toxic appearance. She does not have a sickly appearance. She does not appear ill. No distress.   Morbidly obese and hypertensive in clinic   HENT:   Head: Normocephalic and atraumatic.   Right Ear: Hearing, external ear and ear canal normal. A middle ear effusion is present.   Left Ear: Hearing, external ear and ear canal normal. A middle ear effusion is present.   Nose: Mucosal edema and rhinorrhea present. No nasal deformity. No epistaxis. Right sinus exhibits no maxillary sinus tenderness and no frontal sinus tenderness. Left sinus exhibits no maxillary sinus tenderness and no frontal sinus tenderness.   Mouth/Throat: Uvula is midline and mucous membranes are normal. No trismus in the jaw. Normal dentition. No uvula swelling. Posterior oropharyngeal erythema and cobblestoning present. No oropharyngeal exudate or posterior oropharyngeal edema.   Eyes: Pupils are equal, round, and reactive to light. Conjunctivae, EOM and lids are normal. No scleral icterus.   Neck: Trachea normal, normal range of motion, full passive range of motion without pain and phonation normal. Neck supple. No spinous process tenderness and no muscular tenderness present. No neck rigidity. No edema, no erythema and normal range of motion present.   Cardiovascular: Normal rate, regular rhythm, normal heart sounds and normal pulses.   Audible click noted from valve replacement   Pulmonary/Chest: Effort normal. No respiratory distress. She has decreased breath sounds in the right upper field, the right middle field, the right lower field, the left upper field and the left lower field. She has wheezes in the right upper field, the right middle field, the  right lower field, the left upper field and the left lower field. She has no rhonchi.   On initial assessment patient has decreased breath sounds throughout bilateral expiratory wheezing.  Chest x-ray performed and DuoNeb given.  X-ray negative for acute changes.  On reassessment breath sounds are now improved and clear to auscultation.  Had a detailed discussion with the patient on following up with her PCP.  States that she does have an appointment in January.   Abdominal: Normal appearance.   Musculoskeletal: Normal range of motion. She exhibits no edema or deformity.   Lymphadenopathy:     She has cervical adenopathy.        Right cervical: Superficial cervical adenopathy present.        Left cervical: Superficial cervical adenopathy present.   Neurological: She is alert and oriented to person, place, and time. She exhibits normal muscle tone. Coordination normal.   Skin: Skin is warm, dry, intact, not diaphoretic and not pale. Capillary refill takes less than 2 seconds.   Psychiatric: She has a normal mood and affect. Her speech is normal and behavior is normal. Judgment and thought content normal. Cognition and memory are normal.   Nursing note and vitals reviewed.  Xr Chest Pa And Lateral    Result Date: 12/24/2019  EXAMINATION: XR CHEST PA AND LATERAL CLINICAL HISTORY: Fever, unspecified FINDINGS: There is postoperative change.  There is cardiomegaly and aortic plaque.  Lungs are clear and there is DJD and DISH.     Cardiomegaly and possible mild CHF. Electronically signed by: Tre Galeana MD Date:    12/24/2019 Time:    11:12    X-ray Chest Ap Portable    Result Date: 12/12/2019  EXAMINATION: AP PORTABLE CHEST CLINICAL HISTORY: Shortness of breath TECHNIQUE: AP portable chest radiograph was submitted. COMPARISON: 11/20/2019 FINDINGS: Examination is limited by body habitus.  There is median sternotomy changes and a mitral replacement.  AP portable chest radiograph demonstrates cardiac silhouette.  There is  mild prominence of the pulmonary vascular and possible interstitial prominence.  There is no focal consolidation, pneumothorax, or pleural effusion.     Cardiomegaly.  Possible pulmonary vascular congestion.  Possibility of mild CHF can not be entirely excluded. Electronically signed by: Leslie Olivera Date:    12/12/2019 Time:    01:35    X-ray Chest Ap Portable    Result Date: 11/29/2019  EXAMINATION: XR CHEST AP PORTABLE CLINICAL HISTORY: Asthma; TECHNIQUE: Single frontal view of the chest was performed. COMPARISON: September 2, 2019 FINDINGS: Single chest view is submitted, postoperative cardiothoracic changes noted, the cardiomediastinal silhouette appears stable.  There is mild prominence of the pulmonary vascular and mild pattern of accentuated interstitial prominence, there is no evidence for dense consolidation, significant pleural effusion or pneumothorax.  The osseous structures appear intact     Prominent appearance of the cardiac size, mild prominence of the pulmonary vascular and mild prominence of the interstitial markings correlation for mild pattern of edema is needed. Electronically signed by: Germain Cornejo Date:    11/29/2019 Time:    06:17  Results for orders placed or performed in visit on 12/24/19   POCT Influenza A/B   Result Value Ref Range    Rapid Influenza A Ag Negative Negative    Rapid Influenza B Ag Negative Negative     Acceptable Yes          Assessment:       1. Exacerbation of asthma, unspecified asthma severity, unspecified whether persistent    2. Fever, unspecified fever cause    3. Cough in adult    4. Essential (primary) hypertension    5. Morbid obesity with BMI of 45.0-49.9, adult        Plan:         Exacerbation of asthma, unspecified asthma severity, unspecified whether persistent  -     albuterol nebulizer solution 2.5 mg  -     ipratropium 0.02 % nebulizer solution 0.5 mg  -     fluticasone propion-salmeterol 115-21 mcg/dose (ADVAIR HFA) 115-21  mcg/actuation HFAA inhaler; Inhale 2 puffs into the lungs every 12 (twelve) hours. Controller  Dispense: 12 g; Refill: 0    Fever, unspecified fever cause  -     POCT Influenza A/B  -     XR CHEST PA AND LATERAL; Future; Expected date: 12/24/2019    Cough in adult  -     benzonatate (TESSALON) 200 MG capsule; Take 1 capsule (200 mg total) by mouth 3 (three) times daily as needed for Cough.  Dispense: 30 capsule; Refill: 0    Essential (primary) hypertension    Morbid obesity with BMI of 45.0-49.9, adult          Patient Instructions       If your condition worsens or fails to improve we recommend that you receive another evaluation at the emergency room immediately or contact your primary medical clinic to discuss your concerns.   You must understand that you have received an Urgent Care treatment only and that you may be released before all of your medical problems are known or treated. You, the patient, will arrange for follow up care as instructed.   Please return here or go to the Emergency Department for any concerns or worsening of condition.  If you were prescribed antibiotics, please take them to completion.  If you were prescribed a narcotic medication, do not drive or operate heavy equipment or machinery while taking these medications.  Please follow up with your primary care doctor or specialist as needed.    If you  smoke, please stop smoking.    Discharge Instructions for Asthma  You have been diagnosed with an asthma attack. With the help of your healthcare provider, you can keep your asthma under control and have less emergency department visits and stays in the hospital.    Managing asthma  · Take your asthma medicines exactly as your provider tells you. Do this even if you feel that your athma is under control.  · Learn how to monitor your asthma. Some people watch for early changes of symptoms getting worse. Some use a peak flow meter. Your healthcare provider may decide to give you an asthma  action plan.  · Be sure to always have a quick-relief inhaler with you. If you were given a prescription, make sure you go to a pharmacy to get it filled as soon as possible.  Controlling asthma triggers  Triggers are those things that make your asthma symptoms worse or cause asthma attacks. Many people with asthma have allergies that can be triggers. Your healthcare provider may have you get allergy testing to find out what you are allergic to. This can help you stay away from triggers.  Dust or dust mites are a common asthma trigger. To avoid a dust mites, do the following:  · Use dust-proof covers on your mattress and pillows. Wash the sheets and blankets on your bed once a week in very hot water.  · Dont sleep or lie on cloth-covered cushions or furniture.  · Ask someone else to vacuum and dust your house.  · If you do vacuum and dust yourself, wear a dust mask. You can buy them from the [employer] store.  · Use a vacuum with a double-layered bag or HEPA (high-efficiency particulate air) filter.  Pets with fur or feathers are triggers for some people. If you must have pets, take these precautions:  · Keep pets out of your bedroom and off your bed. Keep the bedroom door closed.  · Cover the air vents in your bedroom with heavy material to filter the air.  · Don't use carpets or cloth-covered furniture in your home. If this is not possible, keep pets out of rooms with these items.  · Have someone bathe your pets every week. And brush them often.  If you smoke, do your best to quit.  · Enroll in a stop-smoking program to increase your chance of success.  · Ask your healthcare provider about medicines or other methods to help you quit.  · Ask family members to quit smoking as well.  · Dont allow anyone to smoke in your home, in your car, or around you.  Other steps to take  · Make sure you know what to do if exercise is a trigger for you. Many people use quick-relief inhalers before exercise or physical  activity.  · Get a flu shot every year and get pneumonia shots as advised by your healthcare provider.  · Try to keep your windows closed during pollen seasons and when mold counts are high.  · On cold or windy days, cover your nose and mouth with a scarf.  · Try to stay away from people who are sick with colds or the flu. Wash your hands often or use a hand . If respiratory infections like colds or flu trigger your asthma, use your quick-relief medicines as soon as you begin to notice respiratory symptoms. They may include a runny or stuffy nose, sore throat, or a cough.  Follow-up care  Make a follow-up appointment as directed by our staff. Follow your asthma action plan if you were given one.  When to seek medical attention  Call 911 right away if you have:  · Severe wheezing  · Shortness of breath that is not relieved by your quick-relief medication  · Trouble walking or talking because of shortness of breath  · Blue lips or fingernails  · If you monitor symptoms with a peak flow meter, readings less than 50% of your personal best   Date Last Reviewed: 2/3/2017  © 3469-5696 [employer]. [address] [place] [address], [place] [address]. All rights reserved. This information is not intended as a substitute for professional medical care. Always follow your healthcare professional's instructions.

## 2019-12-24 NOTE — PATIENT INSTRUCTIONS
If your condition worsens or fails to improve we recommend that you receive another evaluation at the emergency room immediately or contact your primary medical clinic to discuss your concerns.   You must understand that you have received an Urgent Care treatment only and that you may be released before all of your medical problems are known or treated. You, the patient, will arrange for follow up care as instructed.   Please return here or go to the Emergency Department for any concerns or worsening of condition.  If you were prescribed antibiotics, please take them to completion.  If you were prescribed a narcotic medication, do not drive or operate heavy equipment or machinery while taking these medications.  Please follow up with your primary care doctor or specialist as needed.    If you  smoke, please stop smoking.    Discharge Instructions for Asthma  You have been diagnosed with an asthma attack. With the help of your healthcare provider, you can keep your asthma under control and have less emergency department visits and stays in the hospital.    Managing asthma  · Take your asthma medicines exactly as your provider tells you. Do this even if you feel that your athma is under control.  · Learn how to monitor your asthma. Some people watch for early changes of symptoms getting worse. Some use a peak flow meter. Your healthcare provider may decide to give you an asthma action plan.  · Be sure to always have a quick-relief inhaler with you. If you were given a prescription, make sure you go to a pharmacy to get it filled as soon as possible.  Controlling asthma triggers  Triggers are those things that make your asthma symptoms worse or cause asthma attacks. Many people with asthma have allergies that can be triggers. Your healthcare provider may have you get allergy testing to find out what you are allergic to. This can help you stay away from triggers.  Dust or dust mites are a common asthma trigger. To  avoid a dust mites, do the following:  · Use dust-proof covers on your mattress and pillows. Wash the sheets and blankets on your bed once a week in very hot water.  · Dont sleep or lie on cloth-covered cushions or furniture.  · Ask someone else to vacuum and dust your house.  · If you do vacuum and dust yourself, wear a dust mask. You can buy them from the hardware store.  · Use a vacuum with a double-layered bag or HEPA (high-efficiency particulate air) filter.  Pets with fur or feathers are triggers for some people. If you must have pets, take these precautions:  · Keep pets out of your bedroom and off your bed. Keep the bedroom door closed.  · Cover the air vents in your bedroom with heavy material to filter the air.  · Don't use carpets or cloth-covered furniture in your home. If this is not possible, keep pets out of rooms with these items.  · Have someone bathe your pets every week. And brush them often.  If you smoke, do your best to quit.  · Enroll in a stop-smoking program to increase your chance of success.  · Ask your healthcare provider about medicines or other methods to help you quit.  · Ask family members to quit smoking as well.  · Dont allow anyone to smoke in your home, in your car, or around you.  Other steps to take  · Make sure you know what to do if exercise is a trigger for you. Many people use quick-relief inhalers before exercise or physical activity.  · Get a flu shot every year and get pneumonia shots as advised by your healthcare provider.  · Try to keep your windows closed during pollen seasons and when mold counts are high.  · On cold or windy days, cover your nose and mouth with a scarf.  · Try to stay away from people who are sick with colds or the flu. Wash your hands often or use a hand . If respiratory infections like colds or flu trigger your asthma, use your quick-relief medicines as soon as you begin to notice respiratory symptoms. They may include a runny or stuffy  nose, sore throat, or a cough.  Follow-up care  Make a follow-up appointment as directed by our staff. Follow your asthma action plan if you were given one.  When to seek medical attention  Call 911 right away if you have:  · Severe wheezing  · Shortness of breath that is not relieved by your quick-relief medication  · Trouble walking or talking because of shortness of breath  · Blue lips or fingernails  · If you monitor symptoms with a peak flow meter, readings less than 50% of your personal best   Date Last Reviewed: 2/3/2017  © 1115-9579 [employer]. [address] [place] [address]. All rights reserved. This information is not intended as a substitute for professional medical care. Always follow your healthcare professional's instructions.

## 2020-01-02 DIAGNOSIS — R10.9 BILATERAL FLANK PAIN: ICD-10-CM

## 2020-01-02 DIAGNOSIS — N13.30 HYDRONEPHROSIS, UNSPECIFIED HYDRONEPHROSIS TYPE: ICD-10-CM

## 2020-01-02 RX ORDER — TAMSULOSIN HYDROCHLORIDE 0.4 MG/1
0.4 CAPSULE ORAL DAILY
Qty: 30 CAPSULE | Refills: 11 | OUTPATIENT
Start: 2020-01-02 | End: 2021-01-01

## 2020-01-03 ENCOUNTER — OFFICE VISIT (OUTPATIENT)
Dept: INTERNAL MEDICINE | Facility: CLINIC | Age: 65
End: 2020-01-03
Payer: COMMERCIAL

## 2020-01-03 VITALS
HEART RATE: 96 BPM | HEIGHT: 62 IN | DIASTOLIC BLOOD PRESSURE: 70 MMHG | OXYGEN SATURATION: 96 % | BODY MASS INDEX: 43.73 KG/M2 | SYSTOLIC BLOOD PRESSURE: 118 MMHG | WEIGHT: 237.63 LBS

## 2020-01-03 DIAGNOSIS — H40.9 GLAUCOMA, UNSPECIFIED GLAUCOMA TYPE, UNSPECIFIED LATERALITY: Primary | ICD-10-CM

## 2020-01-03 DIAGNOSIS — R32 URINARY INCONTINENCE, UNSPECIFIED TYPE: ICD-10-CM

## 2020-01-03 DIAGNOSIS — I10 HYPERTENSION, UNSPECIFIED TYPE: ICD-10-CM

## 2020-01-03 PROCEDURE — 3008F BODY MASS INDEX DOCD: CPT | Mod: CPTII,S$GLB,, | Performed by: INTERNAL MEDICINE

## 2020-01-03 PROCEDURE — 99214 PR OFFICE/OUTPT VISIT, EST, LEVL IV, 30-39 MIN: ICD-10-PCS | Mod: S$GLB,,, | Performed by: INTERNAL MEDICINE

## 2020-01-03 PROCEDURE — 3078F DIAST BP <80 MM HG: CPT | Mod: CPTII,S$GLB,, | Performed by: INTERNAL MEDICINE

## 2020-01-03 PROCEDURE — 3074F SYST BP LT 130 MM HG: CPT | Mod: CPTII,S$GLB,, | Performed by: INTERNAL MEDICINE

## 2020-01-03 PROCEDURE — 99999 PR PBB SHADOW E&M-EST. PATIENT-LVL V: ICD-10-PCS | Mod: PBBFAC,,, | Performed by: INTERNAL MEDICINE

## 2020-01-03 PROCEDURE — 3074F PR MOST RECENT SYSTOLIC BLOOD PRESSURE < 130 MM HG: ICD-10-PCS | Mod: CPTII,S$GLB,, | Performed by: INTERNAL MEDICINE

## 2020-01-03 PROCEDURE — 3008F PR BODY MASS INDEX (BMI) DOCUMENTED: ICD-10-PCS | Mod: CPTII,S$GLB,, | Performed by: INTERNAL MEDICINE

## 2020-01-03 PROCEDURE — 3078F PR MOST RECENT DIASTOLIC BLOOD PRESSURE < 80 MM HG: ICD-10-PCS | Mod: CPTII,S$GLB,, | Performed by: INTERNAL MEDICINE

## 2020-01-03 PROCEDURE — 99999 PR PBB SHADOW E&M-EST. PATIENT-LVL V: CPT | Mod: PBBFAC,,, | Performed by: INTERNAL MEDICINE

## 2020-01-03 PROCEDURE — 99214 OFFICE O/P EST MOD 30 MIN: CPT | Mod: S$GLB,,, | Performed by: INTERNAL MEDICINE

## 2020-01-03 RX ORDER — FERROUS SULFATE 325(65) MG
325 TABLET, DELAYED RELEASE (ENTERIC COATED) ORAL 2 TIMES DAILY
Qty: 60 TABLET | Refills: 3 | Status: SHIPPED | OUTPATIENT
Start: 2020-01-03 | End: 2020-03-04

## 2020-01-05 NOTE — PROGRESS NOTES
Subjective:       Patient ID: Elayne Almanzar is a 64 y.o. female.    Chief Complaint: Hypertension    HPI  She returns for management of hypertension.  She has had hypertension for over a year.  Current treatment has included medications outlined in medication list.  She denies chest pain or shortness of breath.  No palpitations.  Denies left arm or neck pain.    Past medical history: Hypertension, A. fib, COPD, hyperlipidemia, nephrolithiasis , iron deficiency anemia, glaucoma, sleep apnea, status post mitral valve replacement , family history of colon cancer-mother. She had a colonoscopy June 2019      Medications: Proventil MDI 2 puffs 4 times a day when necessary,Advair 500/50 one puff twice a day, Lasix 20 mg daily,Toprol-XL 200 mg daily, Coumadin as monitored by Coumadin clinic , Zoloft 100 mg daily, xalatan, Pravachol 40 mg daily, Norvasc 2.5 mg daily      No known drug allergies    Review of Systems   Constitutional: Negative for chills, fatigue, fever and unexpected weight change.   Respiratory: Negative for chest tightness and shortness of breath.    Cardiovascular: Negative for chest pain and palpitations.   Gastrointestinal: Negative for abdominal pain and blood in stool.   Neurological: Negative for dizziness, syncope, numbness and headaches.       Objective:      Physical Exam   HENT:   Right Ear: External ear normal.   Left Ear: External ear normal.   Nose: Nose normal.   Mouth/Throat: Oropharynx is clear and moist.   Eyes: Pupils are equal, round, and reactive to light.   Neck: Normal range of motion.   Cardiovascular: Normal rate and regular rhythm.   No murmur heard.  Pulmonary/Chest: Breath sounds normal.   Abdominal: She exhibits no distension. There is no hepatosplenomegaly. There is no tenderness.   Lymphadenopathy:     She has no cervical adenopathy.     She has no axillary adenopathy.   Neurological: She has normal strength and normal reflexes. No cranial nerve deficit or sensory  deficit.       Assessment/Plan       Assessment and plan:  Hypertension:  Controlled.  She was here for urgent care only appointment.  She will return to clinic for a physical

## 2020-01-07 ENCOUNTER — ANTI-COAG VISIT (OUTPATIENT)
Dept: CARDIOLOGY | Facility: CLINIC | Age: 65
End: 2020-01-07
Payer: COMMERCIAL

## 2020-01-07 DIAGNOSIS — Z79.01 LONG TERM (CURRENT) USE OF ANTICOAGULANTS: Primary | ICD-10-CM

## 2020-01-07 DIAGNOSIS — Z95.2 STATUS POST HEART VALVE REPLACEMENT WITH MECHANICAL VALVE: ICD-10-CM

## 2020-01-07 DIAGNOSIS — I48.20 CHRONIC ATRIAL FIBRILLATION WITH RAPID VENTRICULAR RESPONSE: ICD-10-CM

## 2020-01-07 LAB — INR PPP: 2.7 (ref 2.5–3.5)

## 2020-01-07 PROCEDURE — 85610 PROTHROMBIN TIME: CPT | Mod: QW,S$GLB,, | Performed by: INTERNAL MEDICINE

## 2020-01-07 PROCEDURE — 93793 ANTICOAG MGMT PT WARFARIN: CPT | Mod: S$GLB,,, | Performed by: PHARMACIST

## 2020-01-07 PROCEDURE — 85610 POCT INR: ICD-10-PCS | Mod: QW,S$GLB,, | Performed by: INTERNAL MEDICINE

## 2020-01-07 PROCEDURE — 93793 PR ANTICOAGULANT MGMT FOR PT TAKING WARFARIN: ICD-10-PCS | Mod: S$GLB,,, | Performed by: PHARMACIST

## 2020-01-07 NOTE — PROGRESS NOTES
INR at goal. Patient reports no changes. Reports she was taking 10 mg 3 days a week and 7.5 mg all the other days which was different from our calendar. Advised her to continue her dose. Will recheck INR in 3 weeks.

## 2020-01-08 ENCOUNTER — OFFICE VISIT (OUTPATIENT)
Dept: OPTOMETRY | Facility: CLINIC | Age: 65
End: 2020-01-08
Payer: COMMERCIAL

## 2020-01-08 DIAGNOSIS — H40.1111 PRIMARY OPEN ANGLE GLAUCOMA OF RIGHT EYE, MILD STAGE: ICD-10-CM

## 2020-01-08 DIAGNOSIS — H40.009 OPEN ANGLE WITH BORDERLINE INTRAOCULAR PRESSURE, UNSPECIFIED LATERALITY: ICD-10-CM

## 2020-01-08 DIAGNOSIS — H52.4 PRESBYOPIA: Primary | ICD-10-CM

## 2020-01-08 DIAGNOSIS — H25.13 NS (NUCLEAR SCLEROSIS), BILATERAL: ICD-10-CM

## 2020-01-08 PROCEDURE — 92015 PR REFRACTION: ICD-10-PCS | Mod: S$GLB,,, | Performed by: OPTOMETRIST

## 2020-01-08 PROCEDURE — 99999 PR PBB SHADOW E&M-EST. PATIENT-LVL II: CPT | Mod: PBBFAC,,, | Performed by: OPTOMETRIST

## 2020-01-08 PROCEDURE — 92014 PR EYE EXAM, EST PATIENT,COMPREHESV: ICD-10-PCS | Mod: S$GLB,,, | Performed by: OPTOMETRIST

## 2020-01-08 PROCEDURE — 99999 PR PBB SHADOW E&M-EST. PATIENT-LVL II: ICD-10-PCS | Mod: PBBFAC,,, | Performed by: OPTOMETRIST

## 2020-01-08 PROCEDURE — 92014 COMPRE OPH EXAM EST PT 1/>: CPT | Mod: S$GLB,,, | Performed by: OPTOMETRIST

## 2020-01-08 PROCEDURE — 92015 DETERMINE REFRACTIVE STATE: CPT | Mod: S$GLB,,, | Performed by: OPTOMETRIST

## 2020-01-08 RX ORDER — LATANOPROST 50 UG/ML
1 SOLUTION/ DROPS OPHTHALMIC DAILY
Qty: 2.5 ML | Refills: 12 | Status: SHIPPED | OUTPATIENT
Start: 2020-01-08 | End: 2021-02-26 | Stop reason: SDUPTHER

## 2020-01-08 NOTE — LETTER
January 16, 2020      Nubia Crocker MD  [address] [facility]  [facility] [address]           [facility]  [address]  [place] [address]  Phone: [phone]  Fax: [phone]          Patient: Elayne Almanzar   MR Number: [id number]   YOB: 1955   Date of Visit: 1/8/2020       Dear Dr. Nubia Crocker:    Thank you for referring Elayne Almanzar to me for evaluation. Attached you will find relevant portions of my assessment and plan of care.    If you have questions, please do not hesitate to call me. I look forward to following Elayne Almanzar along with you.    Sincerely,    Khushbu Funes, OD    Enclosure  CC:  No Recipients    If you would like to receive this communication electronically, please contact [email or web address] or [phone] to request more information on [employer] access.    For providers and/or their staff who would like to refer a patient to [facility], please contact us through our one-stop-shop provider referral line, [facility], at [phone].    If you feel you have received this communication in error or would no longer like to receive these types of communications, please e-mail [email or web address]

## 2020-01-13 ENCOUNTER — INITIAL CONSULT (OUTPATIENT)
Dept: UROGYNECOLOGY | Facility: CLINIC | Age: 65
End: 2020-01-13
Payer: COMMERCIAL

## 2020-01-13 VITALS
HEIGHT: 62 IN | BODY MASS INDEX: 44.16 KG/M2 | SYSTOLIC BLOOD PRESSURE: 123 MMHG | DIASTOLIC BLOOD PRESSURE: 78 MMHG | WEIGHT: 240 LBS

## 2020-01-13 DIAGNOSIS — N39.46 MIXED INCONTINENCE URGE AND STRESS (MALE)(FEMALE): Primary | ICD-10-CM

## 2020-01-13 PROCEDURE — 99999 PR PBB SHADOW E&M-EST. PATIENT-LVL II: ICD-10-PCS | Mod: PBBFAC,,, | Performed by: OBSTETRICS & GYNECOLOGY

## 2020-01-13 PROCEDURE — 3078F DIAST BP <80 MM HG: CPT | Mod: CPTII,S$GLB,, | Performed by: OBSTETRICS & GYNECOLOGY

## 2020-01-13 PROCEDURE — 99999 PR PBB SHADOW E&M-EST. PATIENT-LVL II: CPT | Mod: PBBFAC,,, | Performed by: OBSTETRICS & GYNECOLOGY

## 2020-01-13 PROCEDURE — 3008F PR BODY MASS INDEX (BMI) DOCUMENTED: ICD-10-PCS | Mod: CPTII,S$GLB,, | Performed by: OBSTETRICS & GYNECOLOGY

## 2020-01-13 PROCEDURE — 3074F SYST BP LT 130 MM HG: CPT | Mod: CPTII,S$GLB,, | Performed by: OBSTETRICS & GYNECOLOGY

## 2020-01-13 PROCEDURE — 3078F PR MOST RECENT DIASTOLIC BLOOD PRESSURE < 80 MM HG: ICD-10-PCS | Mod: CPTII,S$GLB,, | Performed by: OBSTETRICS & GYNECOLOGY

## 2020-01-13 PROCEDURE — 3008F BODY MASS INDEX DOCD: CPT | Mod: CPTII,S$GLB,, | Performed by: OBSTETRICS & GYNECOLOGY

## 2020-01-13 PROCEDURE — 99214 OFFICE O/P EST MOD 30 MIN: CPT | Mod: S$GLB,,, | Performed by: OBSTETRICS & GYNECOLOGY

## 2020-01-13 PROCEDURE — 99214 PR OFFICE/OUTPT VISIT, EST, LEVL IV, 30-39 MIN: ICD-10-PCS | Mod: S$GLB,,, | Performed by: OBSTETRICS & GYNECOLOGY

## 2020-01-13 PROCEDURE — 3074F PR MOST RECENT SYSTOLIC BLOOD PRESSURE < 130 MM HG: ICD-10-PCS | Mod: CPTII,S$GLB,, | Performed by: OBSTETRICS & GYNECOLOGY

## 2020-01-13 RX ORDER — OXYBUTYNIN CHLORIDE 15 MG/1
15 TABLET, EXTENDED RELEASE ORAL DAILY
Qty: 30 TABLET | Refills: 12 | Status: SHIPPED | OUTPATIENT
Start: 2020-01-13 | End: 2020-05-27

## 2020-01-13 RX ORDER — TAMSULOSIN HYDROCHLORIDE 0.4 MG/1
0.4 CAPSULE ORAL DAILY
Qty: 30 CAPSULE | Refills: 11 | Status: SHIPPED | OUTPATIENT
Start: 2020-01-13 | End: 2021-02-25

## 2020-01-13 NOTE — LETTER
January 13, 2020      Nubia Crocker MD  [address]  [facility] [address]           [facility] [address]   [address] [place] [id number]  Phone: [phone]          Patient: Elayne Almanzar   MR Number: [id number]   YOB: 1955   Date of Visit: 1/13/2020       Dear Dr. Nubia Crocker:    Thank you for referring Elayne Almanzar to me for evaluation. Attached you will find relevant portions of my assessment and plan of care.    If you have questions, please do not hesitate to call me. I look forward to following Elayne Almanzar along with you.    Sincerely,    Bret Hargrove MD    Enclosure  CC:  No Recipients    If you would like to receive this communication electronically, please contact [email or web address] or [phone] to request more information on [employer] access.    For providers and/or their staff who would like to refer a patient to [facility], please contact us through our one-stop-shop provider referral line, [facility], at [phone].    If you feel you have received this communication in error or would no longer like to receive these types of communications, please e-mail [email or web address]

## 2020-01-13 NOTE — PROGRESS NOTES
Subjective:      Patient ID: Elayne Almanzar is a 64 y.o. female.    Chief Complaint:  Consult and Urinary Incontinence (KOFI )      History of Present Illness  Sixty-three  4 para 3013 vaginal delivery followed by 2  sections.  History of salpingectomy.  Patient was seen 3 years ago within this clinic diagnosis of mixed incontinence urge predominant had responded very favorably to oxybutynin at 10 actually was increased oxybutynin to 15 by Urology whom patient was referred to secondary to right flank pain.  Hannah was stone which passed.  Patient states she simply run of medicine wants to restart her meds that she found so effective.  There are no other issues.      No repeat episodes of any type of bleeding per vagina          Past Medical History:   Diagnosis Date    Acute on chronic diastolic congestive heart failure     Anticoagulant long-term use     Asthma     Atrial fibrillation     Cataract     Chronic kidney disease     COPD (chronic obstructive pulmonary disease)     Depression     Dysuria     Flank pain     HTN (hypertension)     Nephrolithiasis     KEYSHAWN (obstructive sleep apnea)     awaiting CPAP machine     Rheumatic disease of mitral valve     Urinary incontinence     Urinary tract infection        Past Surgical History:   Procedure Laterality Date     SECTION      COLONOSCOPY N/A 2019    Procedure: COLONOSCOPY;  Surgeon: Devon oBwling MD;  Location: [facility] (4TH FLR);  Service: Endoscopy;  Laterality: N/A;  ok to hold Coumadin x 5 days with Lovenox bridge per Coumadin clinic-[facility]    ESOPHAGOGASTRODUODENOSCOPY N/A 2019    Procedure: EGD (ESOPHAGOGASTRODUODENOSCOPY);  Surgeon: Smooth Torrez MD;  Location: [facility] (2ND FLR);  Service: Endoscopy;  Laterality: N/A;  2nd floor requested due to comorbidities, Hypertension, permanent A. fib, COPD, CHF, sleep apnea, status post mechanical mitral valve replacement  due to rheumatic mitral  stenosis, bm! 44     per Coumadin clinic-ok to hold for 5 days w/bridge    kidney stone removal      MITRAL VALVE REPLACEMENT  2004    TUBAL LIGATION         GYN & OB History  Patient's last menstrual period was 2012.   Date of Last Pap: 2017    OB History    Para Term  AB Living   4 3 3   1     SAB TAB Ectopic Multiple Live Births   1              # Outcome Date GA Lbr Fidel/2nd Weight Sex Delivery Anes PTL Lv   4 Term      CS-LTranv      3 SAB            2 Term      CS-LTranv      1 Term      Vag-Spont          Health Maintenance       Date Due Completion Date    Shingles Vaccine (1 of 2) 2006 ---    Lipid Panel 2020 3/8/2019    Colonoscopy 2020    Mammogram 10/25/2020 10/25/2019    Pap Smear with HPV Cotest 10/30/2020 10/30/2017    TETANUS VACCINE 2029          Family History   Problem Relation Age of Onset    Stroke Paternal Grandmother     Colon cancer Maternal Grandmother     Cancer Brother         testicular cancer    Diabetes Sister     Hypertension Sister     Breast cancer Paternal Aunt     Diabetes Sister     Diabetes Sister     Heart disease Father 70        MI    Diabetes Father     Colon cancer Mother 83    Ovarian cancer Neg Hx        Social History     Socioeconomic History    Marital status:      Spouse name: Not on file    Number of children: 2    Years of education: Not on file    Highest education level: Not on file   Occupational History    Occupation: [employer]     Comment: Retired   Social Needs    Financial resource strain: Not on file    Food insecurity:     Worry: Not on file     Inability: Not on file    Transportation needs:     Medical: Not on file     Non-medical: Not on file   Tobacco Use    Smoking status: Former Smoker     Packs/day: 0.50     Years: 20.00     Pack years: 10.00     Types: Cigarettes     Last attempt to quit: 2002     Years since quittin.6    Smokeless tobacco: Never  "Used   Substance and Sexual Activity    Alcohol use: No    Drug use: No    Sexual activity: Not on file   Lifestyle    Physical activity:     Days per week: Not on file     Minutes per session: Not on file    Stress: Not on file   Relationships    Social connections:     Talks on phone: Not on file     Gets together: Not on file     Attends [employer] service: Not on file     Active member of club or organization: Not on file     Attends meetings of clubs or organizations: Not on file     Relationship status: Not on file   Other Topics Concern    Not on file   Social History Narrative    Disability since 2004.  Laid off 2002.  Previously worked as cook in a kitchen.         Review of Systems  Review of Systems   Genitourinary: Positive for frequency and urgency.          Objective:   /78 (BP Location: Right arm, Patient Position: Sitting)   Ht 5' 2" (1.575 m)   Wt 108.9 kg (240 lb)   LMP 07/22/2012   BMI 43.90 kg/m²     Physical Exam   Constitutional: She is oriented to person, place, and time. She appears well-developed and well-nourished.   HENT:   Head: Normocephalic and atraumatic.   Neck: Normal range of motion. Neck supple.   Cardiovascular: Normal rate, regular rhythm and normal heart sounds.   Pulmonary/Chest: Effort normal and breath sounds normal.   Abdominal: Soft. Bowel sounds are normal.   Genitourinary: Pelvic exam was performed with patient supine. Rectum normal, vagina normal, uterus normal, cervix normal, skenes normal, bartholins normal and vaginal cuff normal. Right labia normal and left labia normal. Urethra exhibits no urethral caruncle, no urethral diverticulum, no urethral mass and no hypermobility. Kegel: 4/5    POP-Q  Aa: -3 Ba: -11 C: -9   GH: 2 PB: 2 TVL: 12   Ap: -3 Bp: -8 D: -10                     Neurological: She is alert and oriented to person, place, and time.   Psychiatric: She has a normal mood and affect. Her behavior is normal. Thought content normal.      "   Assessment:     1. Mixed incontinence urge and stress (male)(female)            Plan:     1. Mixed incontinence urge and stress (male)(female)      After examination had patient present my office we then reviewed my findings essentially unchanged I am going to start by renewing a medicine patient found that except will.  Excess thanked me for the encounter patient no other questions.  Total time this visit 30 min greater than 50% of time 20 min in direct discussion pain in history as well as discussing medication and treatment pathway should there be no success with an anticholinergic.  There are no Patient Instructions on file for this visit.

## 2020-01-16 NOTE — PROGRESS NOTES
HPI     Pt here for annual and iop chk  Patient denies diplopia, headaches, flashes/floaters, pain, and   itching/burning.   Supposed to be using latanoprost OD qhs, but Has stopped using the drops   Pt has been having a lot of tearing and some change in va in OD near va  Pt is taking medication that is contraindicated for glaucoma    Last edited by Khushbu Funes, OD on 1/8/2020  1:07 PM. (History)            Assessment /Plan     For exam results, see Encounter Report.    Presbyopia   Rx specs    NS (nuclear sclerosis), bilateral    Primary open angle glaucoma of right eye, mild stage  Open angle with borderline intraocular pressure, unspecified laterality  -     latanoprost (XALATAN) 0.005 % ophthalmic solution; Place 1 drop into the right eye once daily.  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

## 2020-01-17 DIAGNOSIS — H47.393 OPTIC NERVE CUPPING OF BOTH EYES: Primary | ICD-10-CM

## 2020-01-28 ENCOUNTER — ANTI-COAG VISIT (OUTPATIENT)
Dept: CARDIOLOGY | Facility: CLINIC | Age: 65
End: 2020-01-28
Payer: COMMERCIAL

## 2020-01-28 DIAGNOSIS — Z95.2 STATUS POST HEART VALVE REPLACEMENT WITH MECHANICAL VALVE: ICD-10-CM

## 2020-01-28 DIAGNOSIS — I48.20 CHRONIC ATRIAL FIBRILLATION WITH RAPID VENTRICULAR RESPONSE: ICD-10-CM

## 2020-01-28 DIAGNOSIS — Z79.01 LONG TERM (CURRENT) USE OF ANTICOAGULANTS: Primary | ICD-10-CM

## 2020-01-28 LAB — INR PPP: 3.6 (ref 2.5–3.5)

## 2020-01-28 PROCEDURE — 93793 ANTICOAG MGMT PT WARFARIN: CPT | Mod: S$GLB,,, | Performed by: PHARMACIST

## 2020-01-28 PROCEDURE — 85610 PROTHROMBIN TIME: CPT | Mod: QW,S$GLB,, | Performed by: INTERNAL MEDICINE

## 2020-01-28 PROCEDURE — 85610 POCT INR: ICD-10-PCS | Mod: QW,S$GLB,, | Performed by: INTERNAL MEDICINE

## 2020-01-28 PROCEDURE — 93793 PR ANTICOAGULANT MGMT FOR PT TAKING WARFARIN: ICD-10-PCS | Mod: S$GLB,,, | Performed by: PHARMACIST

## 2020-01-28 NOTE — PROGRESS NOTES
INR not at goal. Reports having no greens this week. Uncertain of which days she takes 10 mg, says she takes 10 mg 3 days a week and that she took 10mg on Sunday and Monday of this week. Plan to continue dosing per calendar and follow up in 3 weeks. Adjusted dosing calendar to account for the 10 mg doses being taken earlier this week. Advised her to resume normal diet and have a serving of greens today. Patient was re-educated on situations that would require placing a call to the coumadin clinic, including bleeding or unusual bruising issues, changes in health, diet or medications, upcoming procedures that require warfarin interruption, and missed coumadin dose(s). Patient expressed understanding that avoidance of consistency with these parameters could cause fluctuations in INR, leading to more frequent visits and increase risk of adverse events.

## 2020-02-13 ENCOUNTER — CLINICAL SUPPORT (OUTPATIENT)
Dept: OPHTHALMOLOGY | Facility: CLINIC | Age: 65
End: 2020-02-13
Payer: COMMERCIAL

## 2020-02-13 ENCOUNTER — OFFICE VISIT (OUTPATIENT)
Dept: OPTOMETRY | Facility: CLINIC | Age: 65
End: 2020-02-13
Payer: COMMERCIAL

## 2020-02-13 DIAGNOSIS — H47.393 OPTIC NERVE CUPPING OF BOTH EYES: ICD-10-CM

## 2020-02-13 DIAGNOSIS — H40.1111 PRIMARY OPEN ANGLE GLAUCOMA OF RIGHT EYE, MILD STAGE: Primary | ICD-10-CM

## 2020-02-13 PROCEDURE — 92083 HUMPHREY VISUAL FIELD - OU - BOTH EYES: ICD-10-PCS | Mod: S$GLB,,, | Performed by: OPTOMETRIST

## 2020-02-13 PROCEDURE — 92020 PR SPECIAL EYE EVAL,GONIOSCOPY: ICD-10-PCS | Mod: S$GLB,,, | Performed by: OPTOMETRIST

## 2020-02-13 PROCEDURE — 92133 OCT, OPTIC NERVE - OU - BOTH EYES: ICD-10-PCS | Mod: S$GLB,,, | Performed by: OPTOMETRIST

## 2020-02-13 PROCEDURE — 92133 CPTRZD OPH DX IMG PST SGM ON: CPT | Mod: S$GLB,,, | Performed by: OPTOMETRIST

## 2020-02-13 PROCEDURE — 99999 PR PBB SHADOW E&M-EST. PATIENT-LVL II: CPT | Mod: PBBFAC,,, | Performed by: OPTOMETRIST

## 2020-02-13 PROCEDURE — 92012 INTRM OPH EXAM EST PATIENT: CPT | Mod: S$GLB,,, | Performed by: OPTOMETRIST

## 2020-02-13 PROCEDURE — 99999 PR PBB SHADOW E&M-EST. PATIENT-LVL II: ICD-10-PCS | Mod: PBBFAC,,, | Performed by: OPTOMETRIST

## 2020-02-13 PROCEDURE — 92083 EXTENDED VISUAL FIELD XM: CPT | Mod: S$GLB,,, | Performed by: OPTOMETRIST

## 2020-02-13 PROCEDURE — 92012 PR EYE EXAM, EST PATIENT,INTERMED: ICD-10-PCS | Mod: S$GLB,,, | Performed by: OPTOMETRIST

## 2020-02-13 PROCEDURE — 92020 GONIOSCOPY: CPT | Mod: S$GLB,,, | Performed by: OPTOMETRIST

## 2020-02-13 NOTE — PROGRESS NOTES
HPI     Pt here for iop chk and hvf/oct review  Using latanoprost qhs OD only    Last edited by Alexandra Hines on 2/13/2020  9:45 AM. (History)            Assessment /Plan     For exam results, see Encounter Report.    Primary open angle glaucoma of right eye, mild stage                  IOP OD lower from 19 last visit to 15 today              Continue with Latanoprost OD Only QHS  HVF today: inferior defect OD, WNL OS  OCT today OD thin superior, OS WNL  Stable OU  Gonio OPen OU    Allergic conjunctivitis              Patanol too expensive              Switch to [place] QAM OU       RTC 6 mo for IOP and repeat HVF 24-2 and OCT optic nerve              Consider adding drop OS at next visit

## 2020-02-18 ENCOUNTER — ANTI-COAG VISIT (OUTPATIENT)
Dept: CARDIOLOGY | Facility: CLINIC | Age: 65
End: 2020-02-18
Payer: COMMERCIAL

## 2020-02-18 DIAGNOSIS — Z95.2 STATUS POST HEART VALVE REPLACEMENT WITH MECHANICAL VALVE: ICD-10-CM

## 2020-02-18 DIAGNOSIS — Z79.01 LONG TERM (CURRENT) USE OF ANTICOAGULANTS: Primary | ICD-10-CM

## 2020-02-18 DIAGNOSIS — I48.20 CHRONIC ATRIAL FIBRILLATION WITH RAPID VENTRICULAR RESPONSE: ICD-10-CM

## 2020-02-18 DIAGNOSIS — I48.20 ATRIAL FIBRILLATION, CHRONIC: ICD-10-CM

## 2020-02-18 LAB — INR PPP: 2.7 (ref 2.5–3.5)

## 2020-02-18 PROCEDURE — 93793 PR ANTICOAGULANT MGMT FOR PT TAKING WARFARIN: ICD-10-PCS | Mod: S$GLB,,, | Performed by: PHARMACIST

## 2020-02-18 PROCEDURE — 93793 ANTICOAG MGMT PT WARFARIN: CPT | Mod: S$GLB,,, | Performed by: PHARMACIST

## 2020-02-18 PROCEDURE — 85610 PROTHROMBIN TIME: CPT | Mod: QW,S$GLB,, | Performed by: INTERNAL MEDICINE

## 2020-02-18 PROCEDURE — 85610 POCT INR: ICD-10-PCS | Mod: QW,S$GLB,, | Performed by: INTERNAL MEDICINE

## 2020-02-21 ENCOUNTER — TELEPHONE (OUTPATIENT)
Dept: INTERNAL MEDICINE | Facility: CLINIC | Age: 65
End: 2020-02-21

## 2020-02-21 RX ORDER — WARFARIN SODIUM 5 MG/1
10 TABLET ORAL
Qty: 60 TABLET | Refills: 5 | OUTPATIENT
Start: 2020-02-22 | End: 2021-02-25

## 2020-02-21 NOTE — TELEPHONE ENCOUNTER
----- Message from Michelle Bruno sent at 2/21/2020  3:34 PM CST -----  Contact: [phone]  Type: Rx    Name of medication(s): warfarin (COUMADIN) 5 MG tablet    Is this a refill? New rx? Refill     Pharmacy Name, Phone, & Location:[address]   [phone] (Phone)  [phone] (Fax)    Comments:patients pharmacy did not receive any prescription for patient.  Please advise, thanks

## 2020-02-21 NOTE — TELEPHONE ENCOUNTER
Patient's medication was filled on 2/21/2020 but  it was in print form, by Dr. Lorraine Messina. Coumadin 5 mg tablet

## 2020-03-04 ENCOUNTER — LAB VISIT (OUTPATIENT)
Dept: LAB | Facility: HOSPITAL | Age: 65
End: 2020-03-04
Payer: COMMERCIAL

## 2020-03-04 ENCOUNTER — OFFICE VISIT (OUTPATIENT)
Dept: CARDIOLOGY | Facility: CLINIC | Age: 65
End: 2020-03-04
Payer: COMMERCIAL

## 2020-03-04 VITALS
SYSTOLIC BLOOD PRESSURE: 130 MMHG | WEIGHT: 239.63 LBS | BODY MASS INDEX: 44.1 KG/M2 | HEIGHT: 62 IN | DIASTOLIC BLOOD PRESSURE: 59 MMHG | HEART RATE: 75 BPM

## 2020-03-04 DIAGNOSIS — D50.8 OTHER IRON DEFICIENCY ANEMIA: ICD-10-CM

## 2020-03-04 DIAGNOSIS — Z95.2 STATUS POST HEART VALVE REPLACEMENT WITH MECHANICAL VALVE: ICD-10-CM

## 2020-03-04 DIAGNOSIS — Z95.2 STATUS POST HEART VALVE REPLACEMENT WITH MECHANICAL VALVE: Primary | ICD-10-CM

## 2020-03-04 LAB
ALBUMIN SERPL BCP-MCNC: 3.8 G/DL (ref 3.5–5.2)
ALP SERPL-CCNC: 65 U/L (ref 55–135)
ALT SERPL W/O P-5'-P-CCNC: 8 U/L (ref 10–44)
ANION GAP SERPL CALC-SCNC: 8 MMOL/L (ref 8–16)
ANISOCYTOSIS BLD QL SMEAR: SLIGHT
AST SERPL-CCNC: 17 U/L (ref 10–40)
BASOPHILS # BLD AUTO: 0.04 K/UL (ref 0–0.2)
BASOPHILS NFR BLD: 0.8 % (ref 0–1.9)
BILIRUB SERPL-MCNC: 0.6 MG/DL (ref 0.1–1)
BUN SERPL-MCNC: 18 MG/DL (ref 8–23)
CALCIUM SERPL-MCNC: 9.6 MG/DL (ref 8.7–10.5)
CHLORIDE SERPL-SCNC: 107 MMOL/L (ref 95–110)
CO2 SERPL-SCNC: 30 MMOL/L (ref 23–29)
CREAT SERPL-MCNC: 1.2 MG/DL (ref 0.5–1.4)
DIFFERENTIAL METHOD: ABNORMAL
EOSINOPHIL # BLD AUTO: 0.3 K/UL (ref 0–0.5)
EOSINOPHIL NFR BLD: 6.5 % (ref 0–8)
ERYTHROCYTE [DISTWIDTH] IN BLOOD BY AUTOMATED COUNT: 20.1 % (ref 11.5–14.5)
EST. GFR  (AFRICAN AMERICAN): 55.6 ML/MIN/1.73 M^2
EST. GFR  (NON AFRICAN AMERICAN): 48.2 ML/MIN/1.73 M^2
GLUCOSE SERPL-MCNC: 101 MG/DL (ref 70–110)
HAPTOGLOB SERPL-MCNC: 49 MG/DL (ref 30–250)
HCT VFR BLD AUTO: 32.7 % (ref 37–48.5)
HGB BLD-MCNC: 8.2 G/DL (ref 12–16)
HYPOCHROMIA BLD QL SMEAR: ABNORMAL
IMM GRANULOCYTES # BLD AUTO: 0.01 K/UL (ref 0–0.04)
IMM GRANULOCYTES NFR BLD AUTO: 0.2 % (ref 0–0.5)
LDH SERPL L TO P-CCNC: 367 U/L (ref 110–260)
LYMPHOCYTES # BLD AUTO: 1.3 K/UL (ref 1–4.8)
LYMPHOCYTES NFR BLD: 26.7 % (ref 18–48)
MCH RBC QN AUTO: 16.5 PG (ref 27–31)
MCHC RBC AUTO-ENTMCNC: 25.1 G/DL (ref 32–36)
MCV RBC AUTO: 66 FL (ref 82–98)
MONOCYTES # BLD AUTO: 0.6 K/UL (ref 0.3–1)
MONOCYTES NFR BLD: 11.1 % (ref 4–15)
NEUTROPHILS # BLD AUTO: 2.7 K/UL (ref 1.8–7.7)
NEUTROPHILS NFR BLD: 54.7 % (ref 38–73)
NRBC BLD-RTO: 0 /100 WBC
OVALOCYTES BLD QL SMEAR: ABNORMAL
PLATELET # BLD AUTO: 236 K/UL (ref 150–350)
PMV BLD AUTO: 9.7 FL (ref 9.2–12.9)
POIKILOCYTOSIS BLD QL SMEAR: SLIGHT
POLYCHROMASIA BLD QL SMEAR: ABNORMAL
POTASSIUM SERPL-SCNC: 4.7 MMOL/L (ref 3.5–5.1)
PROT SERPL-MCNC: 7.3 G/DL (ref 6–8.4)
RBC # BLD AUTO: 4.96 M/UL (ref 4–5.4)
RETICS/RBC NFR AUTO: 1.5 % (ref 0.5–2.5)
SODIUM SERPL-SCNC: 145 MMOL/L (ref 136–145)
WBC # BLD AUTO: 4.95 K/UL (ref 3.9–12.7)

## 2020-03-04 PROCEDURE — 83615 LACTATE (LD) (LDH) ENZYME: CPT

## 2020-03-04 PROCEDURE — 99214 PR OFFICE/OUTPT VISIT, EST, LEVL IV, 30-39 MIN: ICD-10-PCS | Mod: S$GLB,,, | Performed by: INTERNAL MEDICINE

## 2020-03-04 PROCEDURE — 99999 PR PBB SHADOW E&M-EST. PATIENT-LVL IV: CPT | Mod: PBBFAC,,, | Performed by: INTERNAL MEDICINE

## 2020-03-04 PROCEDURE — 3078F DIAST BP <80 MM HG: CPT | Mod: CPTII,S$GLB,, | Performed by: INTERNAL MEDICINE

## 2020-03-04 PROCEDURE — 3008F PR BODY MASS INDEX (BMI) DOCUMENTED: ICD-10-PCS | Mod: CPTII,S$GLB,, | Performed by: INTERNAL MEDICINE

## 2020-03-04 PROCEDURE — 80053 COMPREHEN METABOLIC PANEL: CPT

## 2020-03-04 PROCEDURE — 3075F PR MOST RECENT SYSTOLIC BLOOD PRESS GE 130-139MM HG: ICD-10-PCS | Mod: CPTII,S$GLB,, | Performed by: INTERNAL MEDICINE

## 2020-03-04 PROCEDURE — 36415 COLL VENOUS BLD VENIPUNCTURE: CPT

## 2020-03-04 PROCEDURE — 3075F SYST BP GE 130 - 139MM HG: CPT | Mod: CPTII,S$GLB,, | Performed by: INTERNAL MEDICINE

## 2020-03-04 PROCEDURE — 3078F PR MOST RECENT DIASTOLIC BLOOD PRESSURE < 80 MM HG: ICD-10-PCS | Mod: CPTII,S$GLB,, | Performed by: INTERNAL MEDICINE

## 2020-03-04 PROCEDURE — 3008F BODY MASS INDEX DOCD: CPT | Mod: CPTII,S$GLB,, | Performed by: INTERNAL MEDICINE

## 2020-03-04 PROCEDURE — 99214 OFFICE O/P EST MOD 30 MIN: CPT | Mod: S$GLB,,, | Performed by: INTERNAL MEDICINE

## 2020-03-04 PROCEDURE — 83010 ASSAY OF HAPTOGLOBIN QUANT: CPT

## 2020-03-04 PROCEDURE — 99999 PR PBB SHADOW E&M-EST. PATIENT-LVL IV: ICD-10-PCS | Mod: PBBFAC,,, | Performed by: INTERNAL MEDICINE

## 2020-03-04 PROCEDURE — 85045 AUTOMATED RETICULOCYTE COUNT: CPT

## 2020-03-04 PROCEDURE — 85025 COMPLETE CBC W/AUTO DIFF WBC: CPT

## 2020-03-04 RX ORDER — FERROUS SULFATE 324(65)MG
325 TABLET, DELAYED RELEASE (ENTERIC COATED) ORAL 3 TIMES DAILY
Qty: 90 TABLET | Refills: 6 | Status: SHIPPED | OUTPATIENT
Start: 2020-03-04 | End: 2020-08-17

## 2020-03-04 NOTE — PROGRESS NOTES
"    Cardiology Clinic Note  Reason for Visit: Follow up    HPI:   Ms. Ealyne Almanzar is a 64 y.o. woman with history of Hypertension, permanent A. fib, COPD, hyperlipidemia, nephrolithiasis, glaucoma, sleep apnea, status post mechanical mitral valve replacement 2005 due to rheumatic mitral stenosis who presents for followup.      Clinic visit 10/25/2019 -presented with complaints of chest pressure on exertion for last 1 month. She reported that she can walk upto 3 blocks and feels her heart racing and that her heart "is tired". She reported shortness of breath on exertion but attributes it to her COPD. She had 2 episodes of jaw pain in last 2 months at rest but also states that she had extensive dental work done in July. She was scheduled for Cardiac PET which was negative for perfusion abnormality. She denies any more episodes of chest pain since.       Today She reports extreme fatigue, headaches and palpitations going on for last 3 weeks. She denies orthopnea, PND-sleeps with a CPAP. She denies any dizziness or syncope. No fever or chills.She has been having worsening anemia over the course of last year and had colonoscopy 6/26/19 that showed a sessile polyp which was removed. An upper GI endoscopy done 12/19 showed gastritis. She denies any bleeding.    ROS:    Review of Systems   Constitution: Positive for malaise/fatigue. Negative for decreased appetite and fever.   HENT: Negative for hoarse voice and nosebleeds.    Eyes: Negative for blurred vision and photophobia.   Cardiovascular: Positive for palpitations. Negative for chest pain, dyspnea on exertion and orthopnea.   Respiratory: Negative for cough and shortness of breath.    Hematologic/Lymphatic: Negative for bleeding problem. Does not bruise/bleed easily.   Skin: Negative for itching and rash.   Musculoskeletal: Negative for joint swelling and neck pain.   Gastrointestinal: Negative for abdominal pain, hematemesis, hematochezia, nausea and " vomiting.   Genitourinary: Negative for hematuria.   Neurological: Positive for headaches. Negative for light-headedness and seizures.   Psychiatric/Behavioral: Negative for altered mental status. The patient is not nervous/anxious.      PMH:     Past Medical History:   Diagnosis Date    Acute on chronic diastolic congestive heart failure     Anticoagulant long-term use     Asthma     Atrial fibrillation     Cataract     Chronic kidney disease     COPD (chronic obstructive pulmonary disease)     Depression     Dysuria     Flank pain     HTN (hypertension)     Nephrolithiasis     KEYSHAWN (obstructive sleep apnea)     awaiting CPAP machine     Rheumatic disease of mitral valve     Urinary incontinence     Urinary tract infection      Past Surgical History:   Procedure Laterality Date     SECTION      COLONOSCOPY N/A 2019    Procedure: COLONOSCOPY;  Surgeon: Devon Bowling MD;  Location: [facility] (4TH FLR);  Service: Endoscopy;  Laterality: N/A;  ok to hold Coumadin x 5 days with Lovenox bridge per Coumadin clinic-[facility]    ESOPHAGOGASTRODUODENOSCOPY N/A 2019    Procedure: EGD (ESOPHAGOGASTRODUODENOSCOPY);  Surgeon: Smooth Torrez MD;  Location: [facility] (2ND FLR);  Service: Endoscopy;  Laterality: N/A;  2nd floor requested due to comorbidities, Hypertension, permanent A. 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 44     per Coumadin clinic-ok to hold for 5 days w/bridge    kidney stone removal      MITRAL VALVE REPLACEMENT  2004    TUBAL LIGATION       Allergies:   Review of patient's allergies indicates:  No Known Allergies  Medications:     Current Outpatient Medications on File Prior to Visit   Medication Sig Dispense Refill    amLODIPine (NORVASC) 2.5 MG tablet Take 1 tablet (2.5 mg total) by mouth once daily. 30 tablet 11    aspirin (ECOTRIN) 81 MG EC tablet Take 81 mg by mouth once daily.        "diphth,pertus,acell,,tetanus (BOOSTRIX) 2.5-8-5 Lf-mcg-Lf/0.5mL Syrg injection Inject 0.5 ml into the muscle for one dose 0.5 mL 0    enoxaparin (LOVENOX) 120 mg/0.8 mL Syrg Inject 0.7 mLs (105 mg total) into the skin every 12 (twelve) hours. Take 110mg Q12hrs SUBQ as directed by Coumadin Clinic 10 Syringe 1    ergocalciferol (ERGOCALCIFEROL) 50,000 unit Cap TAKE 1 CAPSULE (50,000 UNITS TOTAL) BY MOUTH EVERY 7 DAYS. 12 capsule 1    ferrous sulfate 325 (65 FE) MG EC tablet Take 1 tablet (325 mg total) by mouth 2 (two) times daily. 60 tablet 3    fluticasone (FLONASE) 50 mcg/actuation nasal spray 2 sprays by Each Nare route once daily. 1 Bottle 6    fluticasone propion-salmeterol 115-21 mcg/dose (ADVAIR HFA) 115-21 mcg/actuation HFAA inhaler Inhale 2 puffs into the lungs every 12 (twelve) hours. Controller 12 g 0    fluticasone-salmeterol diskus inhaler 500-50 mcg Inhale 1 puff into the lungs 2 (two) times daily. 1 Inhaler 6    furosemide (LASIX) 20 MG tablet Take 1 tablet (20 mg total) by mouth once daily. (Patient taking differently: Take 20 mg by mouth once as needed. ) 60 tablet 3    gabapentin (NEURONTIN) 300 MG capsule Take 1 capsule (300 mg total) by mouth every evening. 30 capsule 3    latanoprost (XALATAN) 0.005 % ophthalmic solution Place 1 drop into the right eye once daily. 2.5 mL 12    metoprolol succinate (TOPROL-XL) 200 MG 24 hr tablet Take 1 tablet (200 mg total) by mouth once daily. 30 tablet 4    needle, disp, 26 gauge 26 gauge x 1/2" Ndle 1 application by Misc.(Non-Drug; Combo Route) route once a week. 10 each 0    nitroGLYCERIN (NITROSTAT) 0.4 MG SL tablet Place 1 tablet (0.4 mg total) under the tongue every 5 (five) minutes as needed for Chest pain. 30 tablet 1    olopatadine (PATADAY) 0.2 % Drop Place 1 drop into both eyes every morning. 2.5 mL 12    oxybutynin (DITROPAN XL) 15 MG TR24 Take 1 tablet (15 mg total) by mouth once daily. 30 tablet 12    pantoprazole (PROTONIX) 40 MG " tablet Take 1 tablet (40 mg total) by mouth once daily. 30 tablet 3    pravastatin (PRAVACHOL) 40 MG tablet Take 1 tablet (40 mg total) by mouth once daily. 30 tablet 3    sertraline (ZOLOFT) 100 MG tablet Take 1 tablet (100 mg total) by mouth once daily. 30 tablet 4    tamsulosin (FLOMAX) 0.4 mg Cap Take 1 capsule (0.4 mg total) by mouth once daily. 30 capsule 11    VENTOLIN HFA 90 mcg/actuation inhaler INHALE 2 PUFFS INTO THE LUNGS EVERY 6 (SIX) HOURS AS NEEDED FOR WHEEZING. RESCUE  6    warfarin (COUMADIN) 5 MG tablet Take 2 tablets (10 mg total) by mouth every Tues, Thurs, Sat. Take 1 1/2 tablets (7.5mg) by mouth daily on Mon, Wed, Friday,  60 tablet 5     Current Facility-Administered Medications on File Prior to Visit   Medication Dose Route Frequency Provider Last Rate Last Dose    sodium hyaluronate (EUFLEXXA) 10 mg/mL(mw 2.4 -3.6 million) Syrg 40 mg  40 mg Intra-articular Weekly Juan Miguel Arroyo MD   20 mg at 18 1210     Social History:     Social History     Tobacco Use    Smoking status: Former Smoker     Packs/day: 0.50     Years: 20.00     Pack years: 10.00     Types: Cigarettes     Last attempt to quit: 2002     Years since quittin.8    Smokeless tobacco: Never Used   Substance Use Topics    Alcohol use: No     Family History:     Family History   Problem Relation Age of Onset    Stroke Paternal Grandmother     Colon cancer Maternal Grandmother     Cancer Brother         testicular cancer    Diabetes Sister     Hypertension Sister     Breast cancer Paternal Aunt     Diabetes Sister     Diabetes Sister     Heart disease Father 70        MI    Diabetes Father     Colon cancer Mother 83    Ovarian cancer Neg Hx      Physical Exam:   LMP 2012      Physical Exam  General: alert, awake and oriented x 3  Eyes:PERRL.   Neck:no JVD   Lungs:  clear to auscultation bilaterally   Cardiovascular: Heart: Irregular rate and rhythm, mechanical S1   Chest Wall: no  tenderness.   Pulses-1+ radial, DP, PT b/l  Extremities: no cyanosis or edema.   Abdomen/Rectal: Abdomen: soft, non-tender non-distented; bowel sounds normal  Neurologic: Normal strength and tone. No focal numbness or weakness  Labs:     Lab Results   Component Value Date     11/29/2019    K 4.7 11/29/2019     11/29/2019    CO2 22 (L) 11/29/2019    BUN 16 11/29/2019    CREATININE 1.1 11/29/2019    ANIONGAP 11 11/29/2019     Lab Results   Component Value Date    HGBA1C 6.1 05/12/2017     Lab Results   Component Value Date     (H) 11/29/2019     (H) 09/02/2019     (H) 04/06/2019    Lab Results   Component Value Date    WBC 6.20 11/29/2019    HGB 8.3 (L) 11/29/2019    HCT 32.2 (L) 11/29/2019     11/29/2019    GRAN 3.5 11/29/2019    GRAN 56.3 11/29/2019     Lab Results   Component Value Date    CHOL 228 (H) 03/08/2019    HDL 66 03/08/2019    LDLCALC 139.2 03/08/2019    TRIG 114 03/08/2019          Imaging:     Nuc Stress EF   Date Value Ref Range Status   11/15/2019 81 % Final     Nuc Rest EF   Date Value Ref Range Status   11/15/2019 63  Final       Assessment/plan:     1.                 2. Microcytic anemia  Upper Gi endoscopy(12/2019)with gastritis  Unclear etiology-Will get CBC, CMP, retic, LDH, haptoglobulin and echo to evaluate mechnical valve  Start Iron supplementation      Permanent atrial fibrillation : Continue coumadin and metoprolol. 24 hour holter monitor(7/2019) with adequate rate control.      3. Chronic diastolic congestive heart failure : She appears well compensated. She is using lasix prn.   4. H/O mitral valve replacement with mechanical valve : Continue coumadin and asa.Repeat 2d echo   5. Essential (primary) hypertension :  Continue amlodipine 2.5 mg po daily  Digital HTN not eligible   6. Mixed hyperlipidemia : LDL is above goal. She does not want to increase pravastatin or change to a more potent statin due to myalgias in the past.  Recheck Lipid profile  prior to next visit-might benefit from zetia vs addition of PCSK 9 inhibitor   7. Status post heart valve replacement with mechanical valve Continue coumadin   8. Complex sleep apnea syndrome : Continue cpap.       Discussed with Dr. Melo.         ROBERTO HOLDER  CARDIOLOGY FELLOW, PGY 6  [phone]

## 2020-03-13 ENCOUNTER — HOSPITAL ENCOUNTER (OUTPATIENT)
Dept: CARDIOLOGY | Facility: CLINIC | Age: 65
Discharge: HOME | End: 2020-03-13
Attending: INTERNAL MEDICINE
Payer: COMMERCIAL

## 2020-03-13 VITALS
SYSTOLIC BLOOD PRESSURE: 118 MMHG | HEIGHT: 62 IN | HEART RATE: 70 BPM | BODY MASS INDEX: 43.98 KG/M2 | WEIGHT: 239 LBS | DIASTOLIC BLOOD PRESSURE: 72 MMHG

## 2020-03-13 DIAGNOSIS — Z95.2 STATUS POST HEART VALVE REPLACEMENT WITH MECHANICAL VALVE: ICD-10-CM

## 2020-03-13 DIAGNOSIS — D50.8 OTHER IRON DEFICIENCY ANEMIA: ICD-10-CM

## 2020-03-13 LAB
ASCENDING AORTA: 2.63 CM
AV INDEX (PROSTH): 0.69
AV MEAN GRADIENT: 5 MMHG
AV PEAK GRADIENT: 9 MMHG
AV VALVE AREA: 1.85 CM2
AV VELOCITY RATIO: 0.68
BSA FOR ECHO PROCEDURE: 2.18 M2
CV ECHO LV RWT: 0.28 CM
DOP CALC AO PEAK VEL: 1.47 M/S
DOP CALC AO VTI: 36.45 CM
DOP CALC LVOT AREA: 2.7 CM2
DOP CALC LVOT DIAMETER: 1.85 CM
DOP CALC LVOT PEAK VEL: 1 M/S
DOP CALC LVOT STROKE VOLUME: 67.46 CM3
DOP CALCLVOT PEAK VEL VTI: 25.11 CM
ECHO LV POSTERIOR WALL: 0.8 CM (ref 0.6–1.1)
FRACTIONAL SHORTENING: 43 % (ref 28–44)
HR TR ECHO: 57 BPM
INTERVENTRICULAR SEPTUM: 0.64 CM (ref 0.6–1.1)
LA MAJOR: 7.08 CM
LA MINOR: 6.98 CM
LA WIDTH: 5.63 CM
LEFT ATRIUM SIZE: 5.37 CM
LEFT ATRIUM VOLUME INDEX: 87.6 ML/M2
LEFT ATRIUM VOLUME: 180.65 CM3
LEFT INTERNAL DIMENSION IN SYSTOLE: 3.23 CM (ref 2.1–4)
LEFT VENTRICLE DIASTOLIC VOLUME INDEX: 75.59 ML/M2
LEFT VENTRICLE DIASTOLIC VOLUME: 155.85 ML
LEFT VENTRICLE MASS INDEX: 71 G/M2
LEFT VENTRICLE SYSTOLIC VOLUME INDEX: 20.3 ML/M2
LEFT VENTRICLE SYSTOLIC VOLUME: 41.92 ML
LEFT VENTRICULAR INTERNAL DIMENSION IN DIASTOLE: 5.63 CM (ref 3.5–6)
LEFT VENTRICULAR MASS: 146.15 G
MV MEAN GRADIENT: 5 MMHG
PISA TR MAX VEL: 2.48 M/S
PULM VEIN S/D RATIO: 0.71
PV PEAK D VEL: 0.41 M/S
PV PEAK S VEL: 0.29 M/S
RA MAJOR: 6.09 CM
RA PRESSURE: 8 MMHG
RA WIDTH: 3.62 CM
RV TISSUE DOPPLER FREE WALL SYSTOLIC VELOCITY 1 (APICAL 4 CHAMBER VIEW): 5.9 CM/S
SINUS: 2.2 CM
STJ: 1.97 CM
TR MAX PG: 25 MMHG
TRICUSPID ANNULAR PLANE SYSTOLIC EXCURSION: 1.55 CM
TV REST PULMONARY ARTERY PRESSURE: 33 MMHG

## 2020-03-13 PROCEDURE — 93306 ECHO (CUPID ONLY): ICD-10-PCS | Mod: 26,,, | Performed by: INTERNAL MEDICINE

## 2020-03-13 PROCEDURE — 93306 TTE W/DOPPLER COMPLETE: CPT

## 2020-03-13 PROCEDURE — 93306 TTE W/DOPPLER COMPLETE: CPT | Mod: 26,,, | Performed by: INTERNAL MEDICINE

## 2020-03-13 PROCEDURE — C8929 TTE W OR WO FOL WCON,DOPPLER: HCPCS

## 2020-03-13 NOTE — PROGRESS NOTES
Procedure explained. 20 g sl started in left forearmfpor optison use. optison given ivp via sl for imaging. Denies transfusion rxn. Tolerated well. Sl d/barbra after.; pressure applied

## 2020-03-17 ENCOUNTER — ANTI-COAG VISIT (OUTPATIENT)
Dept: CARDIOLOGY | Facility: CLINIC | Age: 65
End: 2020-03-17
Payer: COMMERCIAL

## 2020-03-17 DIAGNOSIS — I48.20 CHRONIC ATRIAL FIBRILLATION WITH RAPID VENTRICULAR RESPONSE: ICD-10-CM

## 2020-03-17 DIAGNOSIS — Z95.2 STATUS POST HEART VALVE REPLACEMENT WITH MECHANICAL VALVE: ICD-10-CM

## 2020-03-17 DIAGNOSIS — Z79.01 LONG TERM (CURRENT) USE OF ANTICOAGULANTS: Primary | ICD-10-CM

## 2020-03-17 LAB — INR PPP: 3 (ref 2.5–3.5)

## 2020-03-17 PROCEDURE — 93793 PR ANTICOAGULANT MGMT FOR PT TAKING WARFARIN: ICD-10-PCS | Mod: S$GLB,,, | Performed by: PHARMACIST

## 2020-03-17 PROCEDURE — 85610 POCT INR: ICD-10-PCS | Mod: QW,S$GLB,, | Performed by: INTERNAL MEDICINE

## 2020-03-17 PROCEDURE — 85610 PROTHROMBIN TIME: CPT | Mod: QW,S$GLB,, | Performed by: INTERNAL MEDICINE

## 2020-03-17 PROCEDURE — 93793 ANTICOAG MGMT PT WARFARIN: CPT | Mod: S$GLB,,, | Performed by: PHARMACIST

## 2020-03-19 ENCOUNTER — TELEPHONE (OUTPATIENT)
Dept: CARDIOLOGY | Facility: HOSPITAL | Age: 65
End: 2020-03-19

## 2020-03-19 NOTE — TELEPHONE ENCOUNTER
Informed patient of results of lab work and echocardiogram. Continue iron supplementation. Echocardiogram unchanged from prior. No evidence of hemolysis on current labs.

## 2020-04-21 ENCOUNTER — ANTI-COAG VISIT (OUTPATIENT)
Dept: CARDIOLOGY | Facility: CLINIC | Age: 65
End: 2020-04-21
Payer: COMMERCIAL

## 2020-04-21 ENCOUNTER — LAB VISIT (OUTPATIENT)
Dept: LAB | Facility: HOSPITAL | Age: 65
End: 2020-04-21
Payer: COMMERCIAL

## 2020-04-21 DIAGNOSIS — I48.20 CHRONIC ATRIAL FIBRILLATION WITH RAPID VENTRICULAR RESPONSE: ICD-10-CM

## 2020-04-21 DIAGNOSIS — Z95.2 STATUS POST HEART VALVE REPLACEMENT WITH MECHANICAL VALVE: ICD-10-CM

## 2020-04-21 LAB
INR PPP: 3.4 (ref 0.8–1.2)
PROTHROMBIN TIME: 32.1 SEC (ref 9–12.5)

## 2020-04-21 PROCEDURE — 36415 COLL VENOUS BLD VENIPUNCTURE: CPT

## 2020-04-21 PROCEDURE — 93793 ANTICOAG MGMT PT WARFARIN: CPT | Mod: S$GLB,,,

## 2020-04-21 PROCEDURE — 93793 PR ANTICOAGULANT MGMT FOR PT TAKING WARFARIN: ICD-10-PCS | Mod: S$GLB,,,

## 2020-04-21 PROCEDURE — 85610 PROTHROMBIN TIME: CPT

## 2020-05-13 ENCOUNTER — TELEPHONE (OUTPATIENT)
Dept: INTERNAL MEDICINE | Facility: CLINIC | Age: 65
End: 2020-05-13

## 2020-05-13 DIAGNOSIS — I10 ESSENTIAL HYPERTENSION: Chronic | ICD-10-CM

## 2020-05-13 RX ORDER — METOPROLOL SUCCINATE 200 MG/1
200 TABLET, EXTENDED RELEASE ORAL DAILY
Qty: 30 TABLET | Refills: 1 | Status: SHIPPED | OUTPATIENT
Start: 2020-05-13 | End: 2020-07-01 | Stop reason: SDUPTHER

## 2020-05-13 RX ORDER — METOPROLOL SUCCINATE 200 MG/1
200 TABLET, EXTENDED RELEASE ORAL DAILY
Qty: 30 TABLET | Refills: 4 | OUTPATIENT
Start: 2020-05-13

## 2020-05-13 NOTE — TELEPHONE ENCOUNTER
----- Message from Tiffany Hawthorne sent at 5/13/2020 10:44 AM CDT -----  Contact: [facility] [phone]  Requesting an RX refill or new RX.  Is this a refill or new RX:    RX name and strength: metoprolol 200 mg  Directions (copy/paste from chart):  1 tablet by mouth once daily  Is this a 30 day or 90 day RX:  30  Local pharmacy or mail order pharmacy:  local   Pharmacy name and phone # (copy/paste from chart):    [facility] - [address] [phone]      Comments:  Pharmacy has sent several requests with no response.

## 2020-05-26 ENCOUNTER — TELEPHONE (OUTPATIENT)
Dept: INTERNAL MEDICINE | Facility: CLINIC | Age: 65
End: 2020-05-26

## 2020-05-26 DIAGNOSIS — Z20.822 EXPOSURE TO COVID-19 VIRUS: Primary | ICD-10-CM

## 2020-05-26 NOTE — TELEPHONE ENCOUNTER
----- Message from Lia Kunz sent at 5/26/2020  8:36 AM CDT -----  Contact: Elayne [phone]  Needs Advice    Reason for call:     Pt states that she called on Friday to see if the provider can send her a medication for sore throat which is causing ear pain. P  Communication Preference: Elayne [phone]    Additional Information:    Please call to advise

## 2020-05-26 NOTE — TELEPHONE ENCOUNTER
Spoke with patient and she would like to come in because she wants her throat checked because she has a mechanical  mitral valve. And COPD and wants to make sure nothing is wrong.

## 2020-05-26 NOTE — TELEPHONE ENCOUNTER
Spoke with patient and scheduled appointment, patient agreed to date and time. Patient did state that her  was positive with COVID-19. 1 st time was in March, 2nd was in May and they sent him home from work and then he came here to get tested for work and it came out positive again. They sleep in separate rooms. He has no symptoms., except no smell and taste which was in March. Patient no fever, does have sore throat and headaches has been inside since March. And states no other symptoms.

## 2020-05-27 ENCOUNTER — OFFICE VISIT (OUTPATIENT)
Dept: INTERNAL MEDICINE | Facility: CLINIC | Age: 65
End: 2020-05-27
Payer: COMMERCIAL

## 2020-05-27 ENCOUNTER — LAB VISIT (OUTPATIENT)
Dept: INTERNAL MEDICINE | Facility: CLINIC | Age: 65
End: 2020-05-27
Payer: COMMERCIAL

## 2020-05-27 VITALS
HEIGHT: 62 IN | TEMPERATURE: 98 F | HEART RATE: 60 BPM | SYSTOLIC BLOOD PRESSURE: 120 MMHG | BODY MASS INDEX: 44.87 KG/M2 | WEIGHT: 243.81 LBS | DIASTOLIC BLOOD PRESSURE: 82 MMHG

## 2020-05-27 DIAGNOSIS — I50.32 CHRONIC DIASTOLIC CONGESTIVE HEART FAILURE: ICD-10-CM

## 2020-05-27 DIAGNOSIS — J44.9 CHRONIC OBSTRUCTIVE PULMONARY DISEASE, UNSPECIFIED COPD TYPE: ICD-10-CM

## 2020-05-27 DIAGNOSIS — E66.01 MORBID OBESITY WITH BMI OF 40.0-44.9, ADULT: ICD-10-CM

## 2020-05-27 DIAGNOSIS — Z20.822 EXPOSURE TO COVID-19 VIRUS: ICD-10-CM

## 2020-05-27 DIAGNOSIS — I48.20 CHRONIC ATRIAL FIBRILLATION WITH RAPID VENTRICULAR RESPONSE: ICD-10-CM

## 2020-05-27 DIAGNOSIS — J30.1 SEASONAL ALLERGIC RHINITIS DUE TO POLLEN: Primary | ICD-10-CM

## 2020-05-27 PROBLEM — Z12.11 ENCOUNTER FOR SCREENING COLONOSCOPY: Status: RESOLVED | Noted: 2019-06-26 | Resolved: 2020-05-27

## 2020-05-27 PROBLEM — J44.1 COPD EXACERBATION: Status: RESOLVED | Noted: 2017-09-07 | Resolved: 2020-05-27

## 2020-05-27 PROCEDURE — 99213 OFFICE O/P EST LOW 20 MIN: CPT | Mod: S$GLB,,, | Performed by: PHYSICIAN ASSISTANT

## 2020-05-27 PROCEDURE — 3008F BODY MASS INDEX DOCD: CPT | Mod: CPTII,S$GLB,, | Performed by: PHYSICIAN ASSISTANT

## 2020-05-27 PROCEDURE — 99213 PR OFFICE/OUTPT VISIT, EST, LEVL III, 20-29 MIN: ICD-10-PCS | Mod: S$GLB,,, | Performed by: PHYSICIAN ASSISTANT

## 2020-05-27 PROCEDURE — U0003 INFECTIOUS AGENT DETECTION BY NUCLEIC ACID (DNA OR RNA); SEVERE ACUTE RESPIRATORY SYNDROME CORONAVIRUS 2 (SARS-COV-2) (CORONAVIRUS DISEASE [COVID-19]), AMPLIFIED PROBE TECHNIQUE, MAKING USE OF HIGH THROUGHPUT TECHNOLOGIES AS DESCRIBED BY CMS-2020-01-R: HCPCS

## 2020-05-27 PROCEDURE — 99999 PR PBB SHADOW E&M-EST. PATIENT-LVL V: ICD-10-PCS | Mod: PBBFAC,,, | Performed by: PHYSICIAN ASSISTANT

## 2020-05-27 PROCEDURE — 3079F DIAST BP 80-89 MM HG: CPT | Mod: CPTII,S$GLB,, | Performed by: PHYSICIAN ASSISTANT

## 2020-05-27 PROCEDURE — 3074F SYST BP LT 130 MM HG: CPT | Mod: CPTII,S$GLB,, | Performed by: PHYSICIAN ASSISTANT

## 2020-05-27 PROCEDURE — 3074F PR MOST RECENT SYSTOLIC BLOOD PRESSURE < 130 MM HG: ICD-10-PCS | Mod: CPTII,S$GLB,, | Performed by: PHYSICIAN ASSISTANT

## 2020-05-27 PROCEDURE — 3079F PR MOST RECENT DIASTOLIC BLOOD PRESSURE 80-89 MM HG: ICD-10-PCS | Mod: CPTII,S$GLB,, | Performed by: PHYSICIAN ASSISTANT

## 2020-05-27 PROCEDURE — 3008F PR BODY MASS INDEX (BMI) DOCUMENTED: ICD-10-PCS | Mod: CPTII,S$GLB,, | Performed by: PHYSICIAN ASSISTANT

## 2020-05-27 PROCEDURE — 99999 PR PBB SHADOW E&M-EST. PATIENT-LVL V: CPT | Mod: PBBFAC,,, | Performed by: PHYSICIAN ASSISTANT

## 2020-05-27 RX ORDER — FLUTICASONE PROPIONATE 50 MCG
2 SPRAY, SUSPENSION (ML) NASAL DAILY
Qty: 16 G | Refills: 3 | Status: ON HOLD | OUTPATIENT
Start: 2020-05-27 | End: 2023-01-01

## 2020-05-27 NOTE — PROGRESS NOTES
Subjective:       Patient ID: Elayne Almanzar is a 64 y.o. female.    Chief Complaint: Sore Throat and Otalgia    Patient presents with a one-month history of ear pain and pressure and throat.  Her  has tested positive for corona virus and is completely asymptomatic with the exception loss of taste and smell.  They do live in the same house but have been staying .  She denies any fever, cough, shortness of breath or wheezing.  She does sleep with the CPAP and says over the last month she has found it hard to tolerate her sinus issues.  She denies any colored mucus.  She has not tried taking anything for the symptoms.    Patient's medical problems are as follows:    Patient Active Problem List   Diagnosis    Status post heart valve replacement with mechanical valve    Chronic atrial fibrillation with rapid ventricular response    Nephrolithiasis    Essential (primary) hypertension    Chronic anticoagulation    H/O mitral valve replacement with mechanical valve    COPD (chronic obstructive pulmonary disease) with acute bronchitis    Hyperlipidemia    Mixed urge and stress incontinence    Atrophic vaginitis    Complex sleep apnea syndrome    Morbid obesity with BMI of 45.0-49.9, adult    Thiamine deficiency    Asthma exacerbation    Chronic diastolic congestive heart failure    Glucose intolerance (impaired glucose tolerance)    MARTHA (iron deficiency anemia)       Review of Systems   Constitutional: Negative for activity change, appetite change, chills, fatigue and fever.   HENT: Positive for congestion, postnasal drip, rhinorrhea and sore throat. Negative for ear discharge, ear pain, hearing loss, nosebleeds, trouble swallowing and voice change.    Eyes: Negative for discharge, redness and visual disturbance.   Respiratory: Negative for cough, chest tightness, shortness of breath and wheezing.    Cardiovascular: Negative for chest pain and leg swelling.   Gastrointestinal:  Negative.    Musculoskeletal: Negative for neck pain.       Objective:      Physical Exam   Constitutional: She appears well-developed and well-nourished. No distress.   HENT:   Head: Normocephalic and atraumatic.   Right Ear: External ear normal.   Left Ear: External ear normal.   Mouth/Throat: Uvula is midline, oropharynx is clear and moist and mucous membranes are normal. No oral lesions. No uvula swelling. No oropharyngeal exudate, posterior oropharyngeal edema or posterior oropharyngeal erythema.   Eyes: Pupils are equal, round, and reactive to light. Conjunctivae and EOM are normal.   Neck: Normal range of motion. Neck supple. No thyromegaly present.   Cardiovascular: Normal rate, regular rhythm and normal heart sounds. Exam reveals no gallop and no friction rub.   No murmur heard.  Pulmonary/Chest: Effort normal and breath sounds normal. No respiratory distress. She has no wheezes. She has no rales.   Abdominal: Soft. Bowel sounds are normal. There is no tenderness.   Skin: She is not diaphoretic.       Assessment:       1. Seasonal allergic rhinitis due to pollen    2. Morbid obesity with BMI of 40.0-44.9, adult    3. Chronic obstructive pulmonary disease, unspecified COPD type    4. Chronic atrial fibrillation with rapid ventricular response    5. Chronic diastolic congestive heart failure        Plan:   Elayne was seen today for sore throat and otalgia.    Diagnoses and all orders for this visit:    Seasonal allergic rhinitis due to pollen  Comments:  Flonase and Claritin daily  Orders:  -     fluticasone propionate (FLONASE) 50 mcg/actuation nasal spray; 2 sprays (100 mcg total) by Each Nostril route once daily.    Morbid obesity with BMI of 40.0-44.9, adult  Comments:  Uncontrolled, encourage diet    Chronic obstructive pulmonary disease, unspecified COPD type  Comments:  Controlled, continue current regimen    Chronic atrial fibrillation with rapid ventricular response  Comments:  Controlled, continue  current regimen    Chronic diastolic congestive heart failure  Comments:  Controlled, continue current regimen     Patient wishes to get tested for COVID due to exposure.  Order was placed yesterday.

## 2020-05-28 ENCOUNTER — TELEPHONE (OUTPATIENT)
Dept: INTERNAL MEDICINE | Facility: CLINIC | Age: 65
End: 2020-05-28

## 2020-05-28 LAB — SARS-COV-2 RNA RESP QL NAA+PROBE: NOT DETECTED

## 2020-05-28 NOTE — TELEPHONE ENCOUNTER
----- Message from Ave Bob PA-C sent at 5/28/2020  8:37 AM CDT -----  Inform patient of Negative COVID results.

## 2020-06-09 ENCOUNTER — ANTI-COAG VISIT (OUTPATIENT)
Dept: CARDIOLOGY | Facility: CLINIC | Age: 65
End: 2020-06-09
Payer: COMMERCIAL

## 2020-06-09 ENCOUNTER — LAB VISIT (OUTPATIENT)
Dept: LAB | Facility: HOSPITAL | Age: 65
End: 2020-06-09
Attending: INTERNAL MEDICINE
Payer: COMMERCIAL

## 2020-06-09 DIAGNOSIS — Z95.2 STATUS POST HEART VALVE REPLACEMENT WITH MECHANICAL VALVE: ICD-10-CM

## 2020-06-09 DIAGNOSIS — I48.20 CHRONIC ATRIAL FIBRILLATION WITH RAPID VENTRICULAR RESPONSE: ICD-10-CM

## 2020-06-09 LAB
INR PPP: 4.2 (ref 0.8–1.2)
PROTHROMBIN TIME: 39.6 SEC (ref 9–12.5)

## 2020-06-09 PROCEDURE — 36415 COLL VENOUS BLD VENIPUNCTURE: CPT

## 2020-06-09 PROCEDURE — 93793 ANTICOAG MGMT PT WARFARIN: CPT | Mod: S$GLB,,,

## 2020-06-09 PROCEDURE — 93793 PR ANTICOAGULANT MGMT FOR PT TAKING WARFARIN: ICD-10-PCS | Mod: S$GLB,,,

## 2020-06-09 PROCEDURE — 85610 PROTHROMBIN TIME: CPT

## 2020-06-09 NOTE — PROGRESS NOTES
INR not at goal. Medications, chart, and patient findings reviewed. See calendar for adjustments to dose and follow up plan.  Pt has had a decrease in vit k intake.  She has been instructed to hold coumadin 6/9 & resume.  She indicated understanding.  Plan to re-assess in 5 weeks.   Patient was re-educated on situations that would require placing a call to the [facility], including bleeding or unusual bruising issues, changes in health, diet or medications,upcoming procedures that require warfarin interruption, and missed Coumadin dose(s). Patient expressed understanding that avoidance of consistency with these parameters could cause fluctuations in INR, leading to more frequent visits and increase risk of adverse events.

## 2020-07-01 ENCOUNTER — OFFICE VISIT (OUTPATIENT)
Dept: INTERNAL MEDICINE | Facility: CLINIC | Age: 65
End: 2020-07-01
Payer: COMMERCIAL

## 2020-07-01 DIAGNOSIS — I10 ESSENTIAL HYPERTENSION: Chronic | ICD-10-CM

## 2020-07-01 DIAGNOSIS — Z98.890 H/O BREAST BIOPSY: ICD-10-CM

## 2020-07-01 DIAGNOSIS — J45.901 EXACERBATION OF ASTHMA, UNSPECIFIED ASTHMA SEVERITY, UNSPECIFIED WHETHER PERSISTENT: ICD-10-CM

## 2020-07-01 DIAGNOSIS — I48.91 ATRIAL FIBRILLATION, UNSPECIFIED TYPE: Primary | ICD-10-CM

## 2020-07-01 PROCEDURE — 3078F PR MOST RECENT DIASTOLIC BLOOD PRESSURE < 80 MM HG: ICD-10-PCS | Mod: CPTII,S$GLB,, | Performed by: INTERNAL MEDICINE

## 2020-07-01 PROCEDURE — 99999 PR PBB SHADOW E&M-EST. PATIENT-LVL V: ICD-10-PCS | Mod: PBBFAC,,, | Performed by: INTERNAL MEDICINE

## 2020-07-01 PROCEDURE — 3075F PR MOST RECENT SYSTOLIC BLOOD PRESS GE 130-139MM HG: ICD-10-PCS | Mod: CPTII,S$GLB,, | Performed by: INTERNAL MEDICINE

## 2020-07-01 PROCEDURE — 3008F PR BODY MASS INDEX (BMI) DOCUMENTED: ICD-10-PCS | Mod: CPTII,S$GLB,, | Performed by: INTERNAL MEDICINE

## 2020-07-01 PROCEDURE — 99214 PR OFFICE/OUTPT VISIT, EST, LEVL IV, 30-39 MIN: ICD-10-PCS | Mod: S$GLB,,, | Performed by: INTERNAL MEDICINE

## 2020-07-01 PROCEDURE — 99999 PR PBB SHADOW E&M-EST. PATIENT-LVL V: CPT | Mod: PBBFAC,,, | Performed by: INTERNAL MEDICINE

## 2020-07-01 PROCEDURE — 3008F BODY MASS INDEX DOCD: CPT | Mod: CPTII,S$GLB,, | Performed by: INTERNAL MEDICINE

## 2020-07-01 PROCEDURE — 99214 OFFICE O/P EST MOD 30 MIN: CPT | Mod: S$GLB,,, | Performed by: INTERNAL MEDICINE

## 2020-07-01 PROCEDURE — 3075F SYST BP GE 130 - 139MM HG: CPT | Mod: CPTII,S$GLB,, | Performed by: INTERNAL MEDICINE

## 2020-07-01 PROCEDURE — 3078F DIAST BP <80 MM HG: CPT | Mod: CPTII,S$GLB,, | Performed by: INTERNAL MEDICINE

## 2020-07-01 RX ORDER — METOPROLOL SUCCINATE 200 MG/1
200 TABLET, EXTENDED RELEASE ORAL DAILY
Qty: 30 TABLET | Refills: 1 | Status: SHIPPED | OUTPATIENT
Start: 2020-07-01 | End: 2020-09-11

## 2020-07-01 RX ORDER — FLUTICASONE PROPIONATE AND SALMETEROL XINAFOATE 115; 21 UG/1; UG/1
2 AEROSOL, METERED RESPIRATORY (INHALATION) EVERY 12 HOURS
Qty: 12 G | Refills: 11 | Status: SHIPPED | OUTPATIENT
Start: 2020-07-01 | End: 2021-07-01

## 2020-07-01 RX ORDER — ALBUTEROL SULFATE 90 UG/1
AEROSOL, METERED RESPIRATORY (INHALATION)
Qty: 18 G | Refills: 6 | Status: SHIPPED | OUTPATIENT
Start: 2020-07-01 | End: 2020-11-02 | Stop reason: SDUPTHER

## 2020-07-01 RX ORDER — AMLODIPINE BESYLATE 2.5 MG/1
2.5 TABLET ORAL DAILY
Qty: 30 TABLET | Refills: 4 | Status: SHIPPED | OUTPATIENT
Start: 2020-07-01 | End: 2020-11-02 | Stop reason: SDUPTHER

## 2020-07-04 ENCOUNTER — TELEPHONE (OUTPATIENT)
Dept: INTERNAL MEDICINE | Facility: CLINIC | Age: 65
End: 2020-07-04

## 2020-07-04 DIAGNOSIS — D64.9 ANEMIA, UNSPECIFIED TYPE: Primary | ICD-10-CM

## 2020-07-04 NOTE — TELEPHONE ENCOUNTER
Please contact patient and inform her that she remains anemic.  Please ask if she has been taking her iron supplement.    I would like for her to see Hematology.  Order in.  Please schedule

## 2020-07-05 VITALS
TEMPERATURE: 99 F | HEART RATE: 84 BPM | HEIGHT: 62 IN | BODY MASS INDEX: 46.25 KG/M2 | SYSTOLIC BLOOD PRESSURE: 136 MMHG | DIASTOLIC BLOOD PRESSURE: 74 MMHG | OXYGEN SATURATION: 95 % | WEIGHT: 251.31 LBS

## 2020-07-05 NOTE — PROGRESS NOTES
Subjective:       Patient ID: Elayne Almanzar is a 64 y.o. female.    Chief Complaint: Hypertension    HPI  She returns for management of hypertension.  She has had hypertension for over a year.  Current treatment has included medications outlined in medication list.  She denies chest pain or shortness of breath.  No palpitations.  Denies left arm or neck pain.    Medications:  See med list    Social history:  Does not smoke, does not drink alcohol      Review of Systems   Constitutional: Negative for chills, fatigue, fever and unexpected weight change.   Respiratory: Negative for chest tightness and shortness of breath.    Cardiovascular: Negative for chest pain and palpitations.   Gastrointestinal: Negative for abdominal pain and blood in stool.   Neurological: Negative for dizziness, syncope, numbness and headaches.       Objective:      Physical Exam  HENT:      Right Ear: External ear normal.      Left Ear: External ear normal.      Nose: Nose normal.      Mouth/Throat:      Mouth: Mucous membranes are moist.      Pharynx: Oropharynx is clear.   Eyes:      Pupils: Pupils are equal, round, and reactive to light.   Neck:      Musculoskeletal: Normal range of motion.   Cardiovascular:      Rate and Rhythm: Normal rate and regular rhythm.      Heart sounds: No murmur.   Pulmonary:      Breath sounds: Normal breath sounds.   Abdominal:      General: There is no distension.      Palpations: There is no hepatomegaly or splenomegaly.      Tenderness: There is no abdominal tenderness.   Lymphadenopathy:      Cervical: No cervical adenopathy.      Upper Body:      Right upper body: No axillary adenopathy.      Left upper body: No axillary adenopathy.   Neurological:      Cranial Nerves: No cranial nerve deficit.      Sensory: No sensory deficit.      Motor: Motor function is intact.      Deep Tendon Reflexes: Reflexes are normal and symmetric.       breast exam:  No masses palpated, no nipple discharge expressed              assessment and plan:  Hypertension:  Check CMP, lipid panel, CBC.  Schedule mammogram

## 2020-07-06 ENCOUNTER — TELEPHONE (OUTPATIENT)
Dept: HEMATOLOGY/ONCOLOGY | Facility: CLINIC | Age: 65
End: 2020-07-06

## 2020-07-07 ENCOUNTER — HOSPITAL ENCOUNTER (INPATIENT)
Facility: OTHER | Age: 65
LOS: 3 days | Discharge: HOME | DRG: 190 | End: 2020-07-10
Attending: EMERGENCY MEDICINE | Admitting: EMERGENCY MEDICINE
Payer: COMMERCIAL

## 2020-07-07 ENCOUNTER — TELEPHONE (OUTPATIENT)
Dept: HEMATOLOGY/ONCOLOGY | Facility: CLINIC | Age: 65
End: 2020-07-07

## 2020-07-07 DIAGNOSIS — D64.9 CHRONIC ANEMIA: ICD-10-CM

## 2020-07-07 DIAGNOSIS — R09.02 HYPOXIA: ICD-10-CM

## 2020-07-07 DIAGNOSIS — Z95.2 H/O MITRAL VALVE REPLACEMENT WITH MECHANICAL VALVE: Chronic | ICD-10-CM

## 2020-07-07 DIAGNOSIS — E66.01 MORBID OBESITY WITH BMI OF 40.0-44.9, ADULT: Chronic | ICD-10-CM

## 2020-07-07 DIAGNOSIS — R06.02 SOB (SHORTNESS OF BREATH): ICD-10-CM

## 2020-07-07 DIAGNOSIS — J81.0 ACUTE PULMONARY EDEMA: ICD-10-CM

## 2020-07-07 DIAGNOSIS — Z20.822 SUSPECTED COVID-19 VIRUS INFECTION: ICD-10-CM

## 2020-07-07 DIAGNOSIS — J96.21 ACUTE ON CHRONIC RESPIRATORY FAILURE WITH HYPOXIA: ICD-10-CM

## 2020-07-07 DIAGNOSIS — I50.33 ACUTE ON CHRONIC DIASTOLIC CONGESTIVE HEART FAILURE: ICD-10-CM

## 2020-07-07 DIAGNOSIS — J18.9 BILATERAL PNEUMONIA: Primary | ICD-10-CM

## 2020-07-07 DIAGNOSIS — Z79.01 CHRONIC ANTICOAGULATION: Chronic | ICD-10-CM

## 2020-07-07 PROBLEM — J45.901 ASTHMA EXACERBATION: Status: RESOLVED | Noted: 2017-09-07 | Resolved: 2020-07-07

## 2020-07-07 PROBLEM — D50.9 IDA (IRON DEFICIENCY ANEMIA): Chronic | Status: ACTIVE | Noted: 2019-12-19

## 2020-07-07 LAB
ALBUMIN SERPL BCP-MCNC: 3.5 G/DL (ref 3.5–5.2)
ALP SERPL-CCNC: 67 U/L (ref 55–135)
ALT SERPL W/O P-5'-P-CCNC: 9 U/L (ref 10–44)
ANION GAP SERPL CALC-SCNC: 10 MMOL/L (ref 8–16)
AST SERPL-CCNC: 16 U/L (ref 10–40)
BASOPHILS # BLD AUTO: 0.03 K/UL (ref 0–0.2)
BASOPHILS NFR BLD: 0.3 % (ref 0–1.9)
BILIRUB SERPL-MCNC: 0.9 MG/DL (ref 0.1–1)
BNP SERPL-MCNC: 155 PG/ML (ref 0–99)
BUN SERPL-MCNC: 15 MG/DL (ref 8–23)
CALCIUM SERPL-MCNC: 8.7 MG/DL (ref 8.7–10.5)
CHLORIDE SERPL-SCNC: 105 MMOL/L (ref 95–110)
CK SERPL-CCNC: 72 U/L (ref 20–180)
CO2 SERPL-SCNC: 24 MMOL/L (ref 23–29)
CREAT SERPL-MCNC: 1 MG/DL (ref 0.5–1.4)
CRP SERPL-MCNC: 58.1 MG/L (ref 0–8.2)
DIFFERENTIAL METHOD: ABNORMAL
EOSINOPHIL # BLD AUTO: 0.3 K/UL (ref 0–0.5)
EOSINOPHIL NFR BLD: 3.5 % (ref 0–8)
ERYTHROCYTE [DISTWIDTH] IN BLOOD BY AUTOMATED COUNT: 21.2 % (ref 11.5–14.5)
ERYTHROCYTE [SEDIMENTATION RATE] IN BLOOD: 45 MM/HR (ref 0–20)
EST. GFR  (AFRICAN AMERICAN): >60 ML/MIN/1.73 M^2
EST. GFR  (NON AFRICAN AMERICAN): >60 ML/MIN/1.73 M^2
GLUCOSE SERPL-MCNC: 96 MG/DL (ref 70–110)
HCT VFR BLD AUTO: 28.3 % (ref 37–48.5)
HGB BLD-MCNC: 7.5 G/DL (ref 12–16)
IMM GRANULOCYTES # BLD AUTO: 0.04 K/UL (ref 0–0.04)
IMM GRANULOCYTES NFR BLD AUTO: 0.4 % (ref 0–0.5)
LACTATE SERPL-SCNC: 0.6 MMOL/L (ref 0.5–2.2)
LDH SERPL L TO P-CCNC: 399 U/L (ref 110–260)
LYMPHOCYTES # BLD AUTO: 0.9 K/UL (ref 1–4.8)
LYMPHOCYTES NFR BLD: 9.3 % (ref 18–48)
MCH RBC QN AUTO: 17.2 PG (ref 27–31)
MCHC RBC AUTO-ENTMCNC: 26.5 G/DL (ref 32–36)
MCV RBC AUTO: 65 FL (ref 82–98)
MONOCYTES # BLD AUTO: 1 K/UL (ref 0.3–1)
MONOCYTES NFR BLD: 10.9 % (ref 4–15)
NEUTROPHILS # BLD AUTO: 7 K/UL (ref 1.8–7.7)
NEUTROPHILS NFR BLD: 75.6 % (ref 38–73)
NRBC BLD-RTO: 0 /100 WBC
PLATELET # BLD AUTO: 205 K/UL (ref 150–350)
PMV BLD AUTO: 10.2 FL (ref 9.2–12.9)
POCT GLUCOSE: 95 MG/DL (ref 70–110)
POCT GLUCOSE: 96 MG/DL (ref 70–110)
POTASSIUM SERPL-SCNC: 4.3 MMOL/L (ref 3.5–5.1)
PROCALCITONIN SERPL IA-MCNC: 0.04 NG/ML
PROT SERPL-MCNC: 7.2 G/DL (ref 6–8.4)
RBC # BLD AUTO: 4.36 M/UL (ref 4–5.4)
SARS-COV-2 RDRP RESP QL NAA+PROBE: NEGATIVE
SODIUM SERPL-SCNC: 139 MMOL/L (ref 136–145)
TROPONIN I SERPL DL<=0.01 NG/ML-MCNC: <0.006 NG/ML (ref 0–0.03)
WBC # BLD AUTO: 9.21 K/UL (ref 3.9–12.7)

## 2020-07-07 PROCEDURE — U0003 INFECTIOUS AGENT DETECTION BY NUCLEIC ACID (DNA OR RNA); SEVERE ACUTE RESPIRATORY SYNDROME CORONAVIRUS 2 (SARS-COV-2) (CORONAVIRUS DISEASE [COVID-19]), AMPLIFIED PROBE TECHNIQUE, MAKING USE OF HIGH THROUGHPUT TECHNOLOGIES AS DESCRIBED BY CMS-2020-01-R: HCPCS

## 2020-07-07 PROCEDURE — 83605 ASSAY OF LACTIC ACID: CPT

## 2020-07-07 PROCEDURE — 85025 COMPLETE CBC W/AUTO DIFF WBC: CPT

## 2020-07-07 PROCEDURE — 25000242 PHARM REV CODE 250 ALT 637 W/ HCPCS: Performed by: EMERGENCY MEDICINE

## 2020-07-07 PROCEDURE — 36415 COLL VENOUS BLD VENIPUNCTURE: CPT

## 2020-07-07 PROCEDURE — 27000221 HC OXYGEN, UP TO 24 HOURS

## 2020-07-07 PROCEDURE — 99223 1ST HOSP IP/OBS HIGH 75: CPT | Mod: ,,, | Performed by: INTERNAL MEDICINE

## 2020-07-07 PROCEDURE — 86140 C-REACTIVE PROTEIN: CPT

## 2020-07-07 PROCEDURE — 83880 ASSAY OF NATRIURETIC PEPTIDE: CPT

## 2020-07-07 PROCEDURE — 99223 PR INITIAL HOSPITAL CARE,LEVL III: ICD-10-PCS | Mod: ,,, | Performed by: INTERNAL MEDICINE

## 2020-07-07 PROCEDURE — 83615 LACTATE (LD) (LDH) ENZYME: CPT

## 2020-07-07 PROCEDURE — 63600175 PHARM REV CODE 636 W HCPCS: Performed by: INTERNAL MEDICINE

## 2020-07-07 PROCEDURE — 63700000 PHARM REV CODE 250 ALT 637 W/O HCPCS: Performed by: INTERNAL MEDICINE

## 2020-07-07 PROCEDURE — 82962 GLUCOSE BLOOD TEST: CPT

## 2020-07-07 PROCEDURE — 93010 EKG 12-LEAD: ICD-10-PCS | Mod: ,,, | Performed by: INTERNAL MEDICINE

## 2020-07-07 PROCEDURE — 82550 ASSAY OF CK (CPK): CPT

## 2020-07-07 PROCEDURE — U0002 COVID-19 LAB TEST NON-CDC: HCPCS

## 2020-07-07 PROCEDURE — 85651 RBC SED RATE NONAUTOMATED: CPT

## 2020-07-07 PROCEDURE — 93005 ELECTROCARDIOGRAM TRACING: CPT

## 2020-07-07 PROCEDURE — 94761 N-INVAS EAR/PLS OXIMETRY MLT: CPT

## 2020-07-07 PROCEDURE — 87040 BLOOD CULTURE FOR BACTERIA: CPT

## 2020-07-07 PROCEDURE — 93010 ELECTROCARDIOGRAM REPORT: CPT | Mod: ,,, | Performed by: INTERNAL MEDICINE

## 2020-07-07 PROCEDURE — 99285 EMERGENCY DEPT VISIT HI MDM: CPT | Mod: 25

## 2020-07-07 PROCEDURE — 84145 PROCALCITONIN (PCT): CPT

## 2020-07-07 PROCEDURE — 11000001 HC ACUTE MED/SURG PRIVATE ROOM

## 2020-07-07 PROCEDURE — 80053 COMPREHEN METABOLIC PANEL: CPT

## 2020-07-07 PROCEDURE — 84484 ASSAY OF TROPONIN QUANT: CPT

## 2020-07-07 RX ORDER — OXYBUTYNIN CHLORIDE 5 MG/1
15 TABLET, EXTENDED RELEASE ORAL DAILY
Status: DISCONTINUED | OUTPATIENT
Start: 2020-07-08 | End: 2020-07-10 | Stop reason: HOSPADM

## 2020-07-07 RX ORDER — GLUCAGON 1 MG
1 KIT INJECTION
Status: DISCONTINUED | OUTPATIENT
Start: 2020-07-07 | End: 2020-07-10 | Stop reason: HOSPADM

## 2020-07-07 RX ORDER — AZITHROMYCIN 250 MG/1
250 TABLET, FILM COATED ORAL DAILY
Status: DISCONTINUED | OUTPATIENT
Start: 2020-07-08 | End: 2020-07-10 | Stop reason: HOSPADM

## 2020-07-07 RX ORDER — PANTOPRAZOLE SODIUM 40 MG/1
40 TABLET, DELAYED RELEASE ORAL DAILY
Status: DISCONTINUED | OUTPATIENT
Start: 2020-07-08 | End: 2020-07-10 | Stop reason: HOSPADM

## 2020-07-07 RX ORDER — FLUTICASONE FUROATE AND VILANTEROL 100; 25 UG/1; UG/1
1 POWDER RESPIRATORY (INHALATION) DAILY
Status: DISCONTINUED | OUTPATIENT
Start: 2020-07-08 | End: 2020-07-10 | Stop reason: HOSPADM

## 2020-07-07 RX ORDER — WARFARIN SODIUM 5 MG/1
10 TABLET ORAL
Status: DISCONTINUED | OUTPATIENT
Start: 2020-07-09 | End: 2020-07-10 | Stop reason: HOSPADM

## 2020-07-07 RX ORDER — AZITHROMYCIN 250 MG/1
500 TABLET, FILM COATED ORAL ONCE
Status: COMPLETED | OUTPATIENT
Start: 2020-07-07 | End: 2020-07-07

## 2020-07-07 RX ORDER — FUROSEMIDE 10 MG/ML
80 INJECTION INTRAMUSCULAR; INTRAVENOUS
Status: DISCONTINUED | OUTPATIENT
Start: 2020-07-07 | End: 2020-07-07

## 2020-07-07 RX ORDER — SERTRALINE HYDROCHLORIDE 50 MG/1
100 TABLET, FILM COATED ORAL DAILY
Status: DISCONTINUED | OUTPATIENT
Start: 2020-07-08 | End: 2020-07-09

## 2020-07-07 RX ORDER — SODIUM CHLORIDE 0.9 % (FLUSH) 0.9 %
10 SYRINGE (ML) INJECTION
Status: DISCONTINUED | OUTPATIENT
Start: 2020-07-07 | End: 2020-07-10 | Stop reason: HOSPADM

## 2020-07-07 RX ORDER — IBUPROFEN 200 MG
24 TABLET ORAL
Status: DISCONTINUED | OUTPATIENT
Start: 2020-07-07 | End: 2020-07-10 | Stop reason: HOSPADM

## 2020-07-07 RX ORDER — IBUPROFEN 200 MG
16 TABLET ORAL
Status: DISCONTINUED | OUTPATIENT
Start: 2020-07-07 | End: 2020-07-10 | Stop reason: HOSPADM

## 2020-07-07 RX ORDER — METOPROLOL SUCCINATE 50 MG/1
200 TABLET, EXTENDED RELEASE ORAL DAILY
Status: DISCONTINUED | OUTPATIENT
Start: 2020-07-08 | End: 2020-07-10 | Stop reason: HOSPADM

## 2020-07-07 RX ORDER — TAMSULOSIN HYDROCHLORIDE 0.4 MG/1
0.4 CAPSULE ORAL DAILY
Status: DISCONTINUED | OUTPATIENT
Start: 2020-07-08 | End: 2020-07-10 | Stop reason: HOSPADM

## 2020-07-07 RX ADMIN — IPRATROPIUM BROMIDE AND ALBUTEROL 4 PUFF: 20; 100 SPRAY, METERED RESPIRATORY (INHALATION) at 03:07

## 2020-07-07 RX ADMIN — AZITHROMYCIN MONOHYDRATE 500 MG: 250 TABLET ORAL at 05:07

## 2020-07-07 RX ADMIN — CEFTRIAXONE 1 G: 1 INJECTION, SOLUTION INTRAVENOUS at 05:07

## 2020-07-07 NOTE — SUBJECTIVE & OBJECTIVE
Past Medical History:   Diagnosis Date    Acute on chronic diastolic congestive heart failure     Anticoagulant long-term use     Asthma     Atrial fibrillation     Cataract     Chronic kidney disease     COPD (chronic obstructive pulmonary disease)     Depression     Dysuria     Flank pain     HTN (hypertension)     Nephrolithiasis     KEYSHAWN (obstructive sleep apnea)     awaiting CPAP machine     Rheumatic disease of mitral valve     Uncontrolled type 2 diabetes mellitus with complication, with long-term current use of insulin 2020    Urinary incontinence     Urinary tract infection        Past Surgical History:   Procedure Laterality Date     SECTION      COLONOSCOPY N/A 2019    Procedure: COLONOSCOPY;  Surgeon: Devon Bowling MD;  Location: [facility] ENDO (4TH FLR);  Service: Endoscopy;  Laterality: N/A;  ok to hold Coumadin x 5 days with Lovenox bridge per Coumadin clinic-[facility]    ESOPHAGOGASTRODUODENOSCOPY N/A 2019    Procedure: EGD (ESOPHAGOGASTRODUODENOSCOPY);  Surgeon: Smooth Torrez MD;  Location: [facility] END[facility] (2ND FLR);  Service: Endoscopy;  Laterality: N/A;  2nd floor requested due to comorbidities, Hypertension, permanent A. 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 44     per Coumadin clinic-ok to hold for 5 days w/bridge    kidney stone removal      MITRAL VALVE REPLACEMENT  2004    TUBAL LIGATION         Review of patient's allergies indicates:  No Known Allergies    Current Facility-Administered Medications on File Prior to Encounter   Medication    [DISCONTINUED] sodium hyaluronate (EUFLEXXA) 10 mg/mL(mw 2.4 -3.6 million) Syrg 40 mg     Current Outpatient Medications on File Prior to Encounter   Medication Sig    amLODIPine (NORVASC) 2.5 MG tablet Take 1 tablet (2.5 mg total) by mouth once daily.    ferrous sulfate 324 mg (65 mg iron) TbEC Take 1 tablet (324 mg total) by mouth 3  "(three) times daily.    fluticasone propion-salmeterol 115-21 mcg/dose (ADVAIR HFA) 115-21 mcg/actuation HFAA inhaler Inhale 2 puffs into the lungs every 12 (twelve) hours. Controller    fluticasone propionate (FLONASE) 50 mcg/actuation nasal spray 2 sprays (100 mcg total) by Each Nostril route once daily.    fluticasone-salmeterol diskus inhaler 500-50 mcg Inhale 1 puff into the lungs 2 (two) times daily.    furosemide (LASIX) 20 MG tablet Take 1 tablet (20 mg total) by mouth once daily. (Patient taking differently: Take 20 mg by mouth once as needed. )    metoprolol succinate (TOPROL-XL) 200 MG 24 hr tablet Take 1 tablet (200 mg total) by mouth once daily.    OXYBUTYNIN CHLORIDE ORAL Take by mouth.    pantoprazole (PROTONIX) 40 MG tablet Take 1 tablet (40 mg total) by mouth once daily.    sertraline (ZOLOFT) 100 MG tablet Take 1 tablet (100 mg total) by mouth once daily.    tamsulosin (FLOMAX) 0.4 mg Cap Take 1 capsule (0.4 mg total) by mouth once daily.    warfarin (COUMADIN) 5 MG tablet Take 2 tablets (10 mg total) by mouth every Tues, Thurs, Sat. Take 1 1/2 tablets (7.5mg) by mouth daily on Mon, Wed, Friday, Sunday    albuterol (VENTOLIN HFA) 90 mcg/actuation inhaler INHALE 2 PUFFS INTO THE LUNGS EVERY 6 (SIX) HOURS AS NEEDED FOR WHEEZING. RESCUE    aspirin (ECOTRIN) 81 MG EC tablet Take 81 mg by mouth once daily.     gabapentin (NEURONTIN) 300 MG capsule Take 1 capsule (300 mg total) by mouth every evening. (Patient not taking: Reported on 5/27/2020)    latanoprost (XALATAN) 0.005 % ophthalmic solution Place 1 drop into the right eye once daily.    needle, disp, 26 gauge 26 gauge x 1/2" Ndle 1 application by Misc.(Non-Drug; Combo Route) route once a week.    nitroGLYCERIN (NITROSTAT) 0.4 MG SL tablet Place 1 tablet (0.4 mg total) under the tongue every 5 (five) minutes as needed for Chest pain. (Patient not taking: Reported on 7/1/2020)    olopatadine (PATADAY) 0.2 % Drop Place 1 drop into " both eyes every morning. (Patient not taking: Reported on 2020)    pravastatin (PRAVACHOL) 40 MG tablet Take 1 tablet (40 mg total) by mouth once daily.     Family History     Problem Relation (Age of Onset)    Breast cancer Paternal Aunt    Cancer Brother    Colon cancer Maternal Grandmother, Mother (83)    Diabetes Sister, Sister, Sister, Father    Heart disease Father (70)    Hypertension Sister    Stroke Paternal Grandmother        Tobacco Use    Smoking status: Former Smoker     Packs/day: 0.50     Years: 20.00     Pack years: 10.00     Types: Cigarettes     Quit date: 2002     Years since quittin.1    Smokeless tobacco: Never Used   Substance and Sexual Activity    Alcohol use: No    Drug use: No    Sexual activity: Not on file     Review of Systems   Constitutional: Positive for fatigue and fever. Negative for chills.   HENT: Positive for sore throat. Negative for trouble swallowing.    Eyes: Negative for pain and visual disturbance.   Respiratory: Positive for shortness of breath. Negative for cough.    Cardiovascular: Negative for chest pain and palpitations.   Gastrointestinal: Negative for abdominal pain, nausea and vomiting.   Genitourinary: Negative for difficulty urinating and dysuria.   Musculoskeletal: Positive for arthralgias and myalgias.   Skin: Negative for rash and wound.   Neurological: Negative for weakness and numbness.     Objective:     Vital Signs (Most Recent):  Temp: 98.8 °F (37.1 °C) (20 1135)  Pulse: 87 (20 1702)  Resp: 18 (20 1511)  BP: 124/62 (20 1702)  SpO2: 95 % (20 1653) Vital Signs (24h Range):  Temp:  [98.8 °F (37.1 °C)] 98.8 °F (37.1 °C)  Pulse:  [84-99] 87  Resp:  [18] 18  SpO2:  [90 %-100 %] 95 %  BP: (108-153)/(54-74) 124/62     Weight: 110.2 kg (243 lb)  Body mass index is 44.45 kg/m².    Physical Exam  Vitals signs and nursing note reviewed.   Constitutional:       General: She is not in acute distress.     Appearance: She  is well-developed. She is obese.   HENT:      Head: Normocephalic and atraumatic.   Eyes:      General:         Right eye: No discharge.         Left eye: No discharge.      Conjunctiva/sclera: Conjunctivae normal.   Cardiovascular:      Rate and Rhythm: Normal rate. Rhythm irregularly irregular.   Pulmonary:      Effort: Pulmonary effort is normal. No respiratory distress.      Breath sounds: Wheezing (trace expiratory) present.   Abdominal:      Palpations: Abdomen is soft.      Tenderness: There is no abdominal tenderness.   Musculoskeletal: Normal range of motion.      Right lower leg: Edema (trace) present.      Left lower leg: Edema (trace) present.   Skin:     General: Skin is warm and dry.   Neurological:      Mental Status: She is alert and oriented to person, place, and time.        Significant Labs:   CBC:  Recent Labs   Lab 07/07/20  1235   WBC 9.21   HGB 7.5*   HCT 28.3*      GRAN 75.6*  7.0   LYMPH 9.3*  0.9*   MONO 10.9  1.0   EOS 0.3   BASO 0.03      CMP:  Recent Labs   Lab 07/07/20  1235      K 4.3      CO2 24   BUN 15   CREATININE 1.0   GLU 96   CALCIUM 8.7   ALKPHOS 67   AST 16   ALT 9*   BILITOT 0.9   PROT 7.2   ALBUMIN 3.5   ANIONGAP 10     Recent Labs   Lab 07/07/20  1235   TROPONINI <0.006   *   PROCAL 0.04   CRP 58.1*     Recent Labs   Lab 07/07/20  1218   [id number] Negative     Significant Imaging:   CXR 07/07/20:  Moderate cardiomegaly.  Postsurgical changes status post valve replacement.  Atherosclerosis of the aortic arch.  Moderate cardiomegaly, slightly more prominent when compared to prior exam.  Prominent interstitial lung markings which may be seen with pulmonary edema, slightly more prominent when compared to prior exam.  No areas of consolidation.  No pleural effusion.  No pneumothorax.  Osseous structures are unremarkable.    CT Chest WO Contrast 07/07/20:  There are surgical changes with prosthetic mitral and tricuspid valves with numerous  sternotomy wires present.  The heart is borderline enlarged.  There is prominent atherosclerotic calcification within the coronary arteries.  Atherosclerotic calcification is also present within the thoracic aorta which is normal in caliber without evidence for aneurysmal dilatation.  There are multiple mediastinal lymph nodes which are at the upper limits of normal in size particularly in the right parahilar region.  Patchy ground-glass pulmonary consolidation is identified involving all lobes of both lungs.  The appearance is most suggestive of patchy bilateral pneumonia.  There is no evidence for pneumothorax or pleural effusions.  Bony structures appear intact.  Visualized upper abdomen is grossly unremarkable.

## 2020-07-07 NOTE — ASSESSMENT & PLAN NOTE
- Suspected COVID-19 infection clinically with home contact with COVID, bilateral ground glass opacities on CT, mild elevation in CRP. Differential includes bacterial PNA vs. potentially COPD exacerbation though she has no significant cough or sputum production.  - Repeat COVID testing; trend CRP, ferritin, ESR, D-dimer.  - Start empiric antibiotic therapy with 5 day course of ceftriaxone 1g IV daily, azithromycin 500mg PO once followed by 250mg PO daily for 4 additional days. Will discontinue if COVID+.  - Isolation precautions.  - Monitor symptoms; if requiring supplemental oxygen and meets higher risk criteria will start steroids / ID consult for consideration of antiviral therapy.  - Supplemental oxygen with goal SpO2 >90%.

## 2020-07-07 NOTE — ASSESSMENT & PLAN NOTE
- Continuing metoprolol 200mg PO daily, warfarin 7.5mg PO every Mon/Wed/Fri/Sun, 10mg PO every Tue/Thur/Sat; monitor INR.

## 2020-07-07 NOTE — ED PROVIDER NOTES
"Encounter Date: 2020    SCRIBE #1 NOTE: I, Laura Abernathy, am scribing for, and in the presence of, Dr. Kam.       History     Chief Complaint   Patient presents with    General Illness     pt reports low grade fever with body aches and sore throat. states "i have a bad feeling". pt reports hx of COPD.      Time seen by provider: 12:05 PM    This is a 64 y.o. female who presents with complaint of 1-2 days of mailse, chills, and 100.7 fever. Also reports SOB and mild cough. She denies any sick contacts but states her  tested positive for COVID on screening exam in April. He works at [employer]. They followed strict quarantine because he had repeated positive result. He went back to work 1 week ago. She denies any sick contacts or any other positive COVID contacts. She did not take any medications for symptoms. SOB is mild but worse with exertion.    The history is provided by the patient and medical records.     Review of patient's allergies indicates:  No Known Allergies  Past Medical History:   Diagnosis Date    Acute on chronic diastolic congestive heart failure     Anticoagulant long-term use     Asthma     Atrial fibrillation     Cataract     Chronic kidney disease     COPD (chronic obstructive pulmonary disease)     Depression     Dysuria     Flank pain     HTN (hypertension)     Nephrolithiasis     KEYSHAWN (obstructive sleep apnea)     awaiting CPAP machine     Rheumatic disease of mitral valve     Uncontrolled type 2 diabetes mellitus with complication, with long-term current use of insulin 2020    Urinary incontinence     Urinary tract infection      Past Surgical History:   Procedure Laterality Date     SECTION      COLONOSCOPY N/A 2019    Procedure: COLONOSCOPY;  Surgeon: Devon Bowling MD;  Location: [facility] ([address] [place] [address]);  Service: Endoscopy;  Laterality: N/A;  ok to hold Coumadin x 5 days with Lovenox bridge per [facility]mad[facility]    " "ESOPHAGOGASTRODUODENOSCOPY N/A 2019    Procedure: EGD (ESOPHAGOGASTRODUODENOSCOPY);  Surgeon: Smooth Torrez MD;  Location: [facility] ([address] [place] [address]);  Service: Endoscopy;  Laterality: N/A;  2nd floor requested due to comorbidities, Hypertension, permanent A. 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 44     per Coumadin clinic-ok to hold for 5 days w/bridge    kidney stone removal      MITRAL VALVE REPLACEMENT  2004    TUBAL LIGATION       Family History   Problem Relation Age of Onset    Stroke Paternal Grandmother     Colon cancer Maternal Grandmother     Cancer Brother         testicular cancer    Diabetes Sister     Hypertension Sister     Breast cancer Paternal Aunt     Diabetes Sister     Diabetes Sister     Heart disease Father 70        MI    Diabetes Father     Colon cancer Mother 83    Ovarian cancer Neg Hx      Social History     Tobacco Use    Smoking status: Former Smoker     Packs/day: 0.50     Years: 20.00     Pack years: 10.00     Types: Cigarettes     Quit date: 2002     Years since quittin.1    Smokeless tobacco: Never Used   Substance Use Topics    Alcohol use: No    Drug use: No     Review of Systems  ROS: As per HPI and below:   General: Notes fever and chills.   HENT: No facial pain.   Eyes: Negative for eye pain.   Cardiovascular: No chest pain.   Respiratory:  Notes dyspnea and cough.   GI: No abdominal pain. No nausea. No vomiting. No diarrhea.   Skin: No rashes.   Neuro:  No syncope.  No focal deficits.   Musculoskeletal: No extremity pain.  All other systems reviewed and are negative.    Physical Exam     Initial Vitals [20 1135]   BP Pulse Resp Temp SpO2   135/69 99 18 98.8 °F (37.1 °C) (!) 90 %      MAP       --         Physical Exam  Nursing note and vitals reviewed.  BP (!) 153/74   Pulse 84   Temp 98.8 °F (37.1 °C) (Oral)   Resp 18   Ht 5' 2" (1.575 m)   Wt 110.2 kg (243 " lb)   LMP 07/22/2012   SpO2 100%   BMI 44.45 kg/m²   Constitutional: AAOx3. No distress. Obese.  Eyes: EOMI. No discharge. Anicteric.  HENT:   Neck: Normal range of motion. Neck supple.  Cardiovascular: Normal rate.  Regular rhythm.    Pulmonary/Chest: No respiratory distress. Effort normal.  No tachypnea.  Abdominal: No distension and no mass.   Musculoskeletal: Normal range of motion.  No lower extremity edema.  Neurological: GCS 15. Alert and oriented to person, place, and time. No gross cranial nerve, light touch or strength deficit. Coordination normal.   Skin: Skin is warm and dry.   EXT: 2+ radial pulses.   Psychiatric: Behavior is normal. Judgment normal.    ED Course   Procedures  Labs Reviewed   CBC W/ AUTO DIFFERENTIAL - Abnormal; Notable for the following components:       Result Value    Hemoglobin 7.5 (*)     Hematocrit 28.3 (*)     Mean Corpuscular Volume 65 (*)     Mean Corpuscular Hemoglobin 17.2 (*)     Mean Corpuscular Hemoglobin Conc 26.5 (*)     RDW 21.2 (*)     Lymph # 0.9 (*)     Gran% 75.6 (*)     Lymph% 9.3 (*)     All other components within normal limits   COMPREHENSIVE METABOLIC PANEL - Abnormal; Notable for the following components:    ALT 9 (*)     All other components within normal limits   C-REACTIVE PROTEIN - Abnormal; Notable for the following components:    CRP 58.1 (*)     All other components within normal limits   LACTATE DEHYDROGENASE - Abnormal; Notable for the following components:     (*)     All other components within normal limits   B-TYPE NATRIURETIC PEPTIDE - Abnormal; Notable for the following components:     (*)     All other components within normal limits   SARS-COV-2 RNA AMPLIFICATION, QUAL    Narrative:     Pre-admit screen   CK   LACTIC ACID, PLASMA   TROPONIN I   PROCALCITONIN   B-TYPE NATRIURETIC PEPTIDE   POCT GLUCOSE, HAND-HELD DEVICE   POCT GLUCOSE        ECG Results          EKG 12-lead (In process)  Result time 07/07/20 13:23:10    In  process by Interface, Lab In [facility] (07/07/20 13:23:10)                 Narrative:    Test Reason : R06.02,    Vent. Rate : 098 BPM     Atrial Rate : 100 BPM     P-R Int : 000 ms          QRS Dur : 072 ms      QT Int : 356 ms       P-R-T Axes : 000 091 006 degrees     QTc Int : 454 ms    Atrial fibrillation  Rightward axis  Abnormal ECG      Referred By: [email or web address]   SELF           Confirmed By:                             Imaging Results          X-Ray Chest AP Portable (Final result)  Result time 07/07/20 13:09:31    Final result by Lio Gan MD (07/07/20 13:09:31)                 Impression:      Cardiomegaly with findings suggestive of mild pulmonary edema.      Electronically signed by: Lio Gan MD  Date:    07/07/2020  Time:    13:09             Narrative:    EXAMINATION:  XR CHEST AP PORTABLE    CLINICAL HISTORY:  Suspected Covid-19 Virus Infection;    TECHNIQUE:  Single frontal view of the chest was performed.    COMPARISON:  Multiple prior exams, most recent from 12/24/2019    FINDINGS:  Moderate cardiomegaly.  Postsurgical changes status post valve replacement.  Atherosclerosis of the aortic arch.  Moderate cardiomegaly, slightly more prominent when compared to prior exam.  Prominent interstitial lung markings which may be seen with pulmonary edema, slightly more prominent when compared to prior exam.  No areas of consolidation.  No pleural effusion.  No pneumothorax.  Osseous structures are unremarkable.                                 Medical Decision Making:   History:   Old Medical Records: I decided to obtain old medical records.  Independently Interpreted Test(s):   I have ordered and independently interpreted EKG Reading(s) - see prior notes  Clinical Tests:   Medical Tests: Ordered and Reviewed            Scribe Attestation:   Scribe #1: I performed the above scribed service and the documentation accurately describes the services I performed. I attest to the accuracy of the  note.    Attending Attestation:           Physician Attestation for Scribe:  Physician Attestation Statement for Scribe #1: I, Dr. Kam, reviewed documentation, as scribed by Laura Abernathy in my presence, and it is both accurate and complete.                 ED Course as of Jul 07 1419   Tue Jul 07, 2020   1254 I independently reviewed and interpreted EKG which shows rate controlled atrial fibrillation, no STEMI, no ischemic changes, normal intervals.  No acute change compared to prior tracing.      [LT]   1355 Patient is a 64-year-old female with atrial fibrillation, hypertension, COPD, well compensated diastolic CHF, obesity, chronic anticoagulation, mitral valve replacement in 2005 who presents with dyspnea on exertion and at rest sternal stay.  She denies associated lower extremity edema.  She does associated cough.    [RC]   1412 Independently interpreted and reviewed the patient's labs notable for negative COVID19 screen, BNP less than 200, elevated CRP which is nonspecific, stable baseline anemia with Hb 7.5, normal troponin.   CT pending. Patient history, findings, results discussed with Dr. Richmond. He requests reassessment after attempt at diuresis.  I discussed the patient history, findings, plan with Dr. Owens, who assumes care.        [RC]      ED Course User Index  [LT] Laura Abernathy  [RC] Navdeep Kam MD                Clinical Impression:     1. Chronic anemia    2. SOB (shortness of breath)    3. Acute pulmonary edema    4. Hypoxia                                   Navdeep Kam MD  07/07/20 1419       Navdeep Kam MD  07/07/20 1420

## 2020-07-07 NOTE — ASSESSMENT & PLAN NOTE
- Hold home amlodipine 2.5mg PO daily for time being; continue metoprolol as above. If BPs tolerating metoprolol alone, resume amlodipine.

## 2020-07-07 NOTE — ED NOTES
Pt to ED with c/o worsening SOB x 1 day. Pt reports fever x 3 days. Pt reports generalized body aches x 3 days. Pt reports HA and fatigue x 3 days. Pt reports  has tested positive for COVID-19 4 times since April. Tachypnea noted. AAOx4. Pt denies n/v/d, CP. Pt reports history of COPD and A.fib. Pt attached to cardiac monitor, pulse ox and BP cycled.

## 2020-07-07 NOTE — HPI
Ms. Almanzar is a 64/F with PMH COPD, HTN, A-fib, s/p mechanical mitral valve (on warfarin), iron deficiency anemia who presented to ED 07/07 with a 4 day history of fatigue, fever, myalgias and sore throat. She reports that her  had been found on routine screening to be COVID+, though she reports he did not have any significant symptoms, and had returned to work several weeks prior to presentation. The Sunday prior to presenting she began having sore throat and fatigue, and noted Tmax of 100.5F at home the day prior to presenting. In ED, she was found to have borderline oxygen saturations in the low 90s. Rapid COVID was negative; CT Chest was ordered and demonstrated bilateral ground glass opacities diffusely. CRP was slightly elevated and procalcitonin negative. With concern for developing pneumonia vs. COVID, hospital medicine was contacted for admission.

## 2020-07-07 NOTE — H&P
"[facility] Hospital Medicine  History & Physical    Patient Name: Elayne Almanzar  MRN: [id number]  Admission Date: 7/7/2020  Attending Physician: MAYDA Pat MD  Primary Care Provider: Nubia Crocker MD    Patient information was obtained from patient, past medical records and ER records.     Subjective:     Principal Problem:Suspected Covid-19 Virus Infection    Chief Complaint:   Chief Complaint   Patient presents with    General Illness     pt reports low grade fever with body aches and sore throat. states "i have a bad feeling". pt reports hx of COPD.         HPI: Ms. Almanzar is a 64/F with PMH COPD, HTN, A-fib, s/p mechanical mitral valve (on warfarin), iron deficiency anemia who presented to ED 07/07 with a 4 day history of fatigue, fever, myalgias and sore throat. She reports that her  had been found on routine screening to be COVID+, though she reports he did not have any significant symptoms, and had returned to work several weeks prior to presentation. The Sunday prior to presenting she began having sore throat and fatigue, and noted Tmax of 100.5F at home the day prior to presenting. In ED, she was found to have borderline oxygen saturations in the low 90s. Rapid COVID was negative; CT Chest was ordered and demonstrated bilateral ground glass opacities diffusely. CRP was slightly elevated and procalcitonin negative. With concern for developing pneumonia vs. COVID, hospital medicine was contacted for admission.    Past Medical History:   Diagnosis Date    Acute on chronic diastolic congestive heart failure     Anticoagulant long-term use     Asthma     Atrial fibrillation 2002    Cataract     Chronic kidney disease     COPD (chronic obstructive pulmonary disease)     Depression     Dysuria     Flank pain     HTN (hypertension)     Nephrolithiasis     KEYSHAWN (obstructive sleep apnea)     awaiting CPAP machine     Rheumatic disease of mitral valve     " Uncontrolled type 2 diabetes mellitus with complication, with long-term current use of insulin 2020    Urinary incontinence     Urinary tract infection        Past Surgical History:   Procedure Laterality Date     SECTION      COLONOSCOPY N/A 2019    Procedure: COLONOSCOPY;  Surgeon: Devon Bowling MD;  Location: [facility] (4TH FLR);  Service: Endoscopy;  Laterality: N/A;  ok to hold Coumadin x 5 days with Lovenox bridge per [facility]    ESOPHAGOGASTRODUODENOSCOPY N/A 2019    Procedure: EGD (ESOPHAGOGASTRODUODENOSCOPY);  Surgeon: Smooth Torrez MD;  Location: [facility] (2ND FLR);  Service: Endoscopy;  Laterality: N/A;  2nd floor requested due to comorbidities, Hypertension, permanent A. 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 44     per Coumadin clinic-ok to hold for 5 days w/bridge    kidney stone removal      MITRAL VALVE REPLACEMENT  2004    TUBAL LIGATION         Review of patient's allergies indicates:  No Known Allergies    Current Facility-Administered Medications on File Prior to Encounter   Medication    [DISCONTINUED] sodium hyaluronate (EUFLEXXA) 10 mg/mL(mw 2.4 -3.6 million) Syrg 40 mg     Current Outpatient Medications on File Prior to Encounter   Medication Sig    amLODIPine (NORVASC) 2.5 MG tablet Take 1 tablet (2.5 mg total) by mouth once daily.    ferrous sulfate 324 mg (65 mg iron) TbEC Take 1 tablet (324 mg total) by mouth 3 (three) times daily.    fluticasone propion-salmeterol 115-21 mcg/dose (ADVAIR HFA) 115-21 mcg/actuation HFAA inhaler Inhale 2 puffs into the lungs every 12 (twelve) hours. Controller    fluticasone propionate (FLONASE) 50 mcg/actuation nasal spray 2 sprays (100 mcg total) by Each Nostril route once daily.    fluticasone-salmeterol diskus inhaler 500-50 mcg Inhale 1 puff into the lungs 2 (two) times daily.    furosemide (LASIX) 20 MG tablet Take 1  "tablet (20 mg total) by mouth once daily. (Patient taking differently: Take 20 mg by mouth once as needed. )    metoprolol succinate (TOPROL-XL) 200 MG 24 hr tablet Take 1 tablet (200 mg total) by mouth once daily.    OXYBUTYNIN CHLORIDE ORAL Take by mouth.    pantoprazole (PROTONIX) 40 MG tablet Take 1 tablet (40 mg total) by mouth once daily.    sertraline (ZOLOFT) 100 MG tablet Take 1 tablet (100 mg total) by mouth once daily.    tamsulosin (FLOMAX) 0.4 mg Cap Take 1 capsule (0.4 mg total) by mouth once daily.    warfarin (COUMADIN) 5 MG tablet Take 2 tablets (10 mg total) by mouth every Tues, Thurs, Sat. Take 1 1/2 tablets (7.5mg) by mouth daily on Mon, Wed, Friday, Sunday    albuterol (VENTOLIN HFA) 90 mcg/actuation inhaler INHALE 2 PUFFS INTO THE LUNGS EVERY 6 (SIX) HOURS AS NEEDED FOR WHEEZING. RESCUE    aspirin (ECOTRIN) 81 MG EC tablet Take 81 mg by mouth once daily.     gabapentin (NEURONTIN) 300 MG capsule Take 1 capsule (300 mg total) by mouth every evening. (Patient not taking: Reported on 5/27/2020)    latanoprost (XALATAN) 0.005 % ophthalmic solution Place 1 drop into the right eye once daily.    needle, disp, 26 gauge 26 gauge x 1/2" Ndle 1 application by Misc.(Non-Drug; Combo Route) route once a week.    nitroGLYCERIN (NITROSTAT) 0.4 MG SL tablet Place 1 tablet (0.4 mg total) under the tongue every 5 (five) minutes as needed for Chest pain. (Patient not taking: Reported on 7/1/2020)    olopatadine (PATADAY) 0.2 % Drop Place 1 drop into both eyes every morning. (Patient not taking: Reported on 5/27/2020)    pravastatin (PRAVACHOL) 40 MG tablet Take 1 tablet (40 mg total) by mouth once daily.     Family History     Problem Relation (Age of Onset)    Breast cancer Paternal Aunt    Cancer Brother    Colon cancer Maternal Grandmother, Mother (83)    Diabetes Sister, Sister, Sister, Father    Heart disease Father (70)    Hypertension Sister    Stroke Paternal Grandmother        Tobacco Use "    Smoking status: Former Smoker     Packs/day: 0.50     Years: 20.00     Pack years: 10.00     Types: Cigarettes     Quit date: 2002     Years since quittin.1    Smokeless tobacco: Never Used   Substance and Sexual Activity    Alcohol use: No    Drug use: No    Sexual activity: Not on file     Review of Systems   Constitutional: Positive for fatigue and fever. Negative for chills.   HENT: Positive for sore throat. Negative for trouble swallowing.    Eyes: Negative for pain and visual disturbance.   Respiratory: Positive for shortness of breath. Negative for cough.    Cardiovascular: Negative for chest pain and palpitations.   Gastrointestinal: Negative for abdominal pain, nausea and vomiting.   Genitourinary: Negative for difficulty urinating and dysuria.   Musculoskeletal: Positive for arthralgias and myalgias.   Skin: Negative for rash and wound.   Neurological: Negative for weakness and numbness.     Objective:     Vital Signs (Most Recent):  Temp: 98.8 °F (37.1 °C) (20 1135)  Pulse: 87 (20 1702)  Resp: 18 (20 1511)  BP: 124/62 (20 1702)  SpO2: 95 % (20 1653) Vital Signs (24h Range):  Temp:  [98.8 °F (37.1 °C)] 98.8 °F (37.1 °C)  Pulse:  [84-99] 87  Resp:  [18] 18  SpO2:  [90 %-100 %] 95 %  BP: (108-153)/(54-74) 124/62     Weight: 110.2 kg (243 lb)  Body mass index is 44.45 kg/m².    Physical Exam  Vitals signs and nursing note reviewed.   Constitutional:       General: She is not in acute distress.     Appearance: She is well-developed. She is obese.   HENT:      Head: Normocephalic and atraumatic.   Eyes:      General:         Right eye: No discharge.         Left eye: No discharge.      Conjunctiva/sclera: Conjunctivae normal.   Cardiovascular:      Rate and Rhythm: Normal rate. Rhythm irregularly irregular.   Pulmonary:      Effort: Pulmonary effort is normal. No respiratory distress.      Breath sounds: Wheezing (trace expiratory) present.   Abdominal:       Palpations: Abdomen is soft.      Tenderness: There is no abdominal tenderness.   Musculoskeletal: Normal range of motion.      Right lower leg: Edema (trace) present.      Left lower leg: Edema (trace) present.   Skin:     General: Skin is warm and dry.   Neurological:      Mental Status: She is alert and oriented to person, place, and time.        Significant Labs:   CBC:  Recent Labs   Lab 07/07/20  1235   WBC 9.21   HGB 7.5*   HCT 28.3*      GRAN 75.6*  7.0   LYMPH 9.3*  0.9*   MONO 10.9  1.0   EOS 0.3   BASO 0.03      CMP:  Recent Labs   Lab 07/07/20  1235      K 4.3      CO2 24   BUN 15   CREATININE 1.0   GLU 96   CALCIUM 8.7   ALKPHOS 67   AST 16   ALT 9*   BILITOT 0.9   PROT 7.2   ALBUMIN 3.5   ANIONGAP 10     Recent Labs   Lab 07/07/20  1235   TROPONINI <0.006   *   PROCAL 0.04   CRP 58.1*     Recent Labs   Lab 07/07/20  1218   [id number] Negative     Significant Imaging:   CXR 07/07/20:  Moderate cardiomegaly.  Postsurgical changes status post valve replacement.  Atherosclerosis of the aortic arch.  Moderate cardiomegaly, slightly more prominent when compared to prior exam.  Prominent interstitial lung markings which may be seen with pulmonary edema, slightly more prominent when compared to prior exam.  No areas of consolidation.  No pleural effusion.  No pneumothorax.  Osseous structures are unremarkable.    CT Chest WO Contrast 07/07/20:  There are surgical changes with prosthetic mitral and tricuspid valves with numerous sternotomy wires present.  The heart is borderline enlarged.  There is prominent atherosclerotic calcification within the coronary arteries.  Atherosclerotic calcification is also present within the thoracic aorta which is normal in caliber without evidence for aneurysmal dilatation.  There are multiple mediastinal lymph nodes which are at the upper limits of normal in size particularly in the right parahilar region.  Patchy ground-glass pulmonary  consolidation is identified involving all lobes of both lungs.  The appearance is most suggestive of patchy bilateral pneumonia.  There is no evidence for pneumothorax or pleural effusions.  Bony structures appear intact.  Visualized upper abdomen is grossly unremarkable.    Assessment/Plan:     * Suspected Covid-19 Virus Infection  - Suspected COVID-19 infection clinically with home contact with COVID, bilateral ground glass opacities on CT, mild elevation in CRP. Differential includes bacterial PNA vs. potentially COPD exacerbation though she has no significant cough or sputum production.  - Repeat COVID testing; trend CRP, ferritin, ESR, D-dimer.  - Start empiric antibiotic therapy with 5 day course of ceftriaxone 1g IV daily, azithromycin 500mg PO once followed by 250mg PO daily for 4 additional days. Will discontinue if COVID+.  - Isolation precautions.  - Monitor symptoms; if requiring supplemental oxygen and meets higher risk criteria will start steroids / ID consult for consideration of antiviral therapy.  - Supplemental oxygen with goal SpO2 >90%.    Iron deficiency anemia  - Chronic; monitor.    Morbid obesity with BMI of 40.0-44.9, adult  - Dietary counseling.    Mixed urge and stress incontinence  - Continuing oxybutynin 15mg PO daily, tamsulosin 0.4mg PO daily.    COPD (chronic obstructive pulmonary disease)  - Continue chronic therapy with fluticasone-vilanterol 100-25mcg inhaled daily while inpatient.    H/O mitral valve replacement with mechanical valve  - Warfarin as above.    Chronic anticoagulation  - Warfarin as above.    Essential (primary) hypertension  - Hold home amlodipine 2.5mg PO daily for time being; continue metoprolol as above. If BPs tolerating metoprolol alone, resume amlodipine.    Chronic atrial fibrillation  - Continuing metoprolol 200mg PO daily, warfarin 7.5mg PO every Mon/Wed/Fri/Sun, 10mg PO every Tue/Thur/Sat; monitor INR.      VTE Risk Mitigation (From admission, onward)     None           MAYDA Pat MD  Department of Hospital Medicine   [facility]

## 2020-07-07 NOTE — ED NOTES
Pt ambulated down hallway to room 8 with continuous pulse ox attached. O2 sat dropped to 89%. Pt able to recover to 92% on RA sitting on ED stretcher. Pt placed on 1 L NC o2 sat 95%.

## 2020-07-08 LAB
ALBUMIN SERPL BCP-MCNC: 3.5 G/DL (ref 3.5–5.2)
ALP SERPL-CCNC: 63 U/L (ref 55–135)
ALT SERPL W/O P-5'-P-CCNC: 8 U/L (ref 10–44)
ANION GAP SERPL CALC-SCNC: 8 MMOL/L (ref 8–16)
AST SERPL-CCNC: 16 U/L (ref 10–40)
BASOPHILS # BLD AUTO: 0.04 K/UL (ref 0–0.2)
BASOPHILS NFR BLD: 0.5 % (ref 0–1.9)
BILIRUB SERPL-MCNC: 0.9 MG/DL (ref 0.1–1)
BUN SERPL-MCNC: 15 MG/DL (ref 8–23)
CALCIUM SERPL-MCNC: 8.9 MG/DL (ref 8.7–10.5)
CHLORIDE SERPL-SCNC: 105 MMOL/L (ref 95–110)
CO2 SERPL-SCNC: 26 MMOL/L (ref 23–29)
CREAT SERPL-MCNC: 1 MG/DL (ref 0.5–1.4)
CRP SERPL-MCNC: 85.3 MG/L (ref 0–8.2)
D DIMER PPP IA.FEU-MCNC: 0.3 MG/L FEU
DIFFERENTIAL METHOD: ABNORMAL
EOSINOPHIL # BLD AUTO: 0.3 K/UL (ref 0–0.5)
EOSINOPHIL NFR BLD: 3.5 % (ref 0–8)
ERYTHROCYTE [DISTWIDTH] IN BLOOD BY AUTOMATED COUNT: 21.3 % (ref 11.5–14.5)
EST. GFR  (AFRICAN AMERICAN): >60 ML/MIN/1.73 M^2
EST. GFR  (NON AFRICAN AMERICAN): >60 ML/MIN/1.73 M^2
FERRITIN SERPL-MCNC: 27 NG/ML (ref 20–300)
GLUCOSE SERPL-MCNC: 93 MG/DL (ref 70–110)
HCT VFR BLD AUTO: 29.1 % (ref 37–48.5)
HGB BLD-MCNC: 7.6 G/DL (ref 12–16)
IMM GRANULOCYTES # BLD AUTO: 0.04 K/UL (ref 0–0.04)
IMM GRANULOCYTES NFR BLD AUTO: 0.5 % (ref 0–0.5)
INR PPP: 2.7 (ref 0.8–1.2)
LYMPHOCYTES # BLD AUTO: 0.8 K/UL (ref 1–4.8)
LYMPHOCYTES NFR BLD: 9.9 % (ref 18–48)
MAGNESIUM SERPL-MCNC: 1.7 MG/DL (ref 1.6–2.6)
MCH RBC QN AUTO: 16.9 PG (ref 27–31)
MCHC RBC AUTO-ENTMCNC: 26.1 G/DL (ref 32–36)
MCV RBC AUTO: 65 FL (ref 82–98)
MONOCYTES # BLD AUTO: 1 K/UL (ref 0.3–1)
MONOCYTES NFR BLD: 12.3 % (ref 4–15)
NEUTROPHILS # BLD AUTO: 6.2 K/UL (ref 1.8–7.7)
NEUTROPHILS NFR BLD: 73.3 % (ref 38–73)
NRBC BLD-RTO: 0 /100 WBC
PHOSPHATE SERPL-MCNC: 3.1 MG/DL (ref 2.7–4.5)
PLATELET # BLD AUTO: 219 K/UL (ref 150–350)
PMV BLD AUTO: ABNORMAL FL (ref 9.2–12.9)
POTASSIUM SERPL-SCNC: 4.5 MMOL/L (ref 3.5–5.1)
PROCALCITONIN SERPL IA-MCNC: 0.06 NG/ML
PROT SERPL-MCNC: 7.1 G/DL (ref 6–8.4)
PROTHROMBIN TIME: 27.6 SEC (ref 9–12.5)
RBC # BLD AUTO: 4.51 M/UL (ref 4–5.4)
SARS-COV-2 RNA RESP QL NAA+PROBE: NOT DETECTED
SODIUM SERPL-SCNC: 139 MMOL/L (ref 136–145)
WBC # BLD AUTO: 8.39 K/UL (ref 3.9–12.7)

## 2020-07-08 PROCEDURE — 94640 AIRWAY INHALATION TREATMENT: CPT

## 2020-07-08 PROCEDURE — 83735 ASSAY OF MAGNESIUM: CPT

## 2020-07-08 PROCEDURE — 85379 FIBRIN DEGRADATION QUANT: CPT

## 2020-07-08 PROCEDURE — 94660 CPAP INITIATION&MGMT: CPT

## 2020-07-08 PROCEDURE — 99233 PR SUBSEQUENT HOSPITAL CARE,LEVL III: ICD-10-PCS | Mod: ,,, | Performed by: INTERNAL MEDICINE

## 2020-07-08 PROCEDURE — 25000003 PHARM REV CODE 250: Performed by: INTERNAL MEDICINE

## 2020-07-08 PROCEDURE — 99233 SBSQ HOSP IP/OBS HIGH 50: CPT | Mod: ,,, | Performed by: INTERNAL MEDICINE

## 2020-07-08 PROCEDURE — 25000242 PHARM REV CODE 250 ALT 637 W/ HCPCS: Performed by: INTERNAL MEDICINE

## 2020-07-08 PROCEDURE — 85025 COMPLETE CBC W/AUTO DIFF WBC: CPT

## 2020-07-08 PROCEDURE — 82728 ASSAY OF FERRITIN: CPT

## 2020-07-08 PROCEDURE — 85610 PROTHROMBIN TIME: CPT

## 2020-07-08 PROCEDURE — 84145 PROCALCITONIN (PCT): CPT

## 2020-07-08 PROCEDURE — 27000221 HC OXYGEN, UP TO 24 HOURS

## 2020-07-08 PROCEDURE — 63600175 PHARM REV CODE 636 W HCPCS: Performed by: INTERNAL MEDICINE

## 2020-07-08 PROCEDURE — 27000190 HC CPAP FULL FACE MASK W/VALVE

## 2020-07-08 PROCEDURE — 99900035 HC TECH TIME PER 15 MIN (STAT)

## 2020-07-08 PROCEDURE — 11000001 HC ACUTE MED/SURG PRIVATE ROOM

## 2020-07-08 PROCEDURE — 86140 C-REACTIVE PROTEIN: CPT

## 2020-07-08 PROCEDURE — 36415 COLL VENOUS BLD VENIPUNCTURE: CPT

## 2020-07-08 PROCEDURE — 84100 ASSAY OF PHOSPHORUS: CPT

## 2020-07-08 PROCEDURE — 94761 N-INVAS EAR/PLS OXIMETRY MLT: CPT

## 2020-07-08 PROCEDURE — 63700000 PHARM REV CODE 250 ALT 637 W/O HCPCS: Performed by: INTERNAL MEDICINE

## 2020-07-08 PROCEDURE — 80053 COMPREHEN METABOLIC PANEL: CPT

## 2020-07-08 RX ADMIN — FLUTICASONE FUROATE AND VILANTEROL TRIFENATATE 1 PUFF: 100; 25 POWDER RESPIRATORY (INHALATION) at 08:07

## 2020-07-08 RX ADMIN — PANTOPRAZOLE SODIUM 40 MG: 40 TABLET, DELAYED RELEASE ORAL at 10:07

## 2020-07-08 RX ADMIN — THERA TABS 1 TABLET: TAB at 10:07

## 2020-07-08 RX ADMIN — OXYBUTYNIN CHLORIDE 15 MG: 5 TABLET, EXTENDED RELEASE ORAL at 10:07

## 2020-07-08 RX ADMIN — METOPROLOL SUCCINATE 200 MG: 50 TABLET, EXTENDED RELEASE ORAL at 10:07

## 2020-07-08 RX ADMIN — TAMSULOSIN HYDROCHLORIDE 0.4 MG: 0.4 CAPSULE ORAL at 10:07

## 2020-07-08 RX ADMIN — IPRATROPIUM BROMIDE AND ALBUTEROL 1 PUFF: 20; 100 SPRAY, METERED RESPIRATORY (INHALATION) at 07:07

## 2020-07-08 RX ADMIN — CEFTRIAXONE 1 G: 1 INJECTION, SOLUTION INTRAVENOUS at 05:07

## 2020-07-08 RX ADMIN — IPRATROPIUM BROMIDE AND ALBUTEROL 1 PUFF: 20; 100 SPRAY, METERED RESPIRATORY (INHALATION) at 08:07

## 2020-07-08 RX ADMIN — IPRATROPIUM BROMIDE AND ALBUTEROL 1 PUFF: 20; 100 SPRAY, METERED RESPIRATORY (INHALATION) at 01:07

## 2020-07-08 RX ADMIN — IPRATROPIUM BROMIDE AND ALBUTEROL 1 PUFF: 20; 100 SPRAY, METERED RESPIRATORY (INHALATION) at 12:07

## 2020-07-08 RX ADMIN — WARFARIN SODIUM 7.5 MG: 5 TABLET ORAL at 05:07

## 2020-07-08 RX ADMIN — AZITHROMYCIN MONOHYDRATE 250 MG: 250 TABLET ORAL at 10:07

## 2020-07-08 NOTE — ASSESSMENT & PLAN NOTE
- Suspected COVID-19 infection clinically with home contact with COVID, bilateral ground glass opacities on CT, mild elevation in CRP. Differential includes bacterial PNA vs. potentially COPD exacerbation though she has no significant cough or sputum production.  - Repeat COVID testing pending; trend CRP, ferritin, ESR, D-dimer.  - Continuing empiric antibiotic therapy with 5 day course of ceftriaxone 1g IV daily, azithromycin 500mg PO once followed by 250mg PO daily for 4 additional days. Will discontinue if COVID+.  - Isolation precautions.  - Monitor symptoms; if requiring supplemental oxygen and meets higher risk criteria will start steroids / ID consult for consideration of antiviral therapy.  - Supplemental oxygen with goal SpO2 >90%.

## 2020-07-08 NOTE — PROGRESS NOTES
Pt received on 1LNC;SPO2 normal. Inhalers were given and tolerated well. No changes were made. Will continue to monitor.

## 2020-07-08 NOTE — PROGRESS NOTES
[facility] Hospital Medicine  Progress Note    Patient Name: Elayne Almanzar  MRN: [id number]  Patient Class: IP- Inpatient   Admission Date: 7/7/2020  Length of Stay: 1 days  Attending Physician: KAVON Pat MD  Primary Care Provider: Nubia Crocker MD        Subjective:     Principal Problem:Suspected Covid-19 Virus Infection        HPI:  Ms. Almanzar is a 64/F with PMH COPD, HTN, A-fib, s/p mechanical mitral valve (on warfarin), iron deficiency anemia who presented to ED 07/07 with a 4 day history of fatigue, fever, myalgias and sore throat. She reports that her  had been found on routine screening to be COVID+, though she reports he did not have any significant symptoms, and had returned to work several weeks prior to presentation. The Sunday prior to presenting she began having sore throat and fatigue, and noted Tmax of 100.5F at home the day prior to presenting. In ED, she was found to have borderline oxygen saturations in the low 90s. Rapid COVID was negative; CT Chest was ordered and demonstrated bilateral ground glass opacities diffusely. CRP was slightly elevated and procalcitonin negative. With concern for developing pneumonia vs. COVID, hospital medicine was contacted for admission.    Overview/Hospital Course:  No notes on file    Interval History: No acute events overnight. CRP trending upward. No other concerns at this time.    Review of Systems   Constitutional: Negative for chills and fever.   Respiratory: Positive for shortness of breath and wheezing. Negative for cough.    Cardiovascular: Negative for chest pain and palpitations.   Gastrointestinal: Negative for abdominal pain, nausea and vomiting.     Objective:     Vital Signs (Most Recent):  Temp: 98.7 °F (37.1 °C) (07/08/20 0834)  Pulse: 88 (07/08/20 1600)  Resp: 18 (07/08/20 1324)  BP: (!) 149/67 (07/08/20 0834)  SpO2: 96 % (07/08/20 1324) Vital Signs (24h Range):  Temp:  [98.7 °F (37.1 °C)-99.7 °F (37.6 °C)]  98.7 °F (37.1 °C)  Pulse:  [] 88  Resp:  [18-20] 18  SpO2:  [92 %-96 %] 96 %  BP: (124-153)/(59-74) 149/67     Weight: 117 kg (257 lb 15 oz)  Body mass index is 47.18 kg/m².    Intake/Output Summary (Last 24 hours) at 7/8/2020 1700  Last data filed at 7/8/2020 0241  Gross per 24 hour   Intake 530 ml   Output 300 ml   Net 230 ml      Physical Exam  Vitals signs and nursing note reviewed.   Constitutional:       General: She is not in acute distress.     Appearance: She is well-developed. She is obese.   HENT:      Head: Normocephalic and atraumatic.   Eyes:      General:         Right eye: No discharge.         Left eye: No discharge.      Conjunctiva/sclera: Conjunctivae normal.   Cardiovascular:      Rate and Rhythm: Normal rate. Rhythm irregularly irregular.   Pulmonary:      Effort: Pulmonary effort is normal. No respiratory distress.      Breath sounds: Wheezing (trace expiratory) present.   Abdominal:      Palpations: Abdomen is soft.      Tenderness: There is no abdominal tenderness.   Musculoskeletal: Normal range of motion.      Right lower leg: Edema (trace) present.      Left lower leg: Edema (trace) present.   Skin:     General: Skin is warm and dry.   Neurological:      Mental Status: She is alert and oriented to person, place, and time.     Significant Labs:   CBC:  Recent Labs   Lab 07/07/20  1235 07/08/20  0508   WBC 9.21 8.39   HGB 7.5* 7.6*   HCT 28.3* 29.1*    219   GRAN 75.6*  7.0 73.3*  6.2   LYMPH 9.3*  0.9* 9.9*  0.8*   MONO 10.9  1.0 12.3  1.0   EOS 0.3 0.3   BASO 0.03 0.04     CMP:  Recent Labs   Lab 07/07/20  1235 07/08/20  0507    139   K 4.3 4.5    105   CO2 24 26   BUN 15 15   CREATININE 1.0 1.0   GLU 96 93   CALCIUM 8.7 8.9   MG  --  1.7   PHOS  --  3.1   ALKPHOS 67 63   AST 16 16   ALT 9* 8*   BILITOT 0.9 0.9   PROT 7.2 7.1   ALBUMIN 3.5 3.5   ANIONGAP 10 8     Recent Labs   Lab 07/07/20  2209 07/08/20  0507 07/08/20  0508   DDIMER  --   --  0.30   SEDRATE  45*  --   --    PROCAL  --  0.06  --    CRP  --  85.3*  --      Significant Imaging:   No new imaging this morning.      Assessment/Plan:      * Suspected Covid-19 Virus Infection  - Suspected COVID-19 infection clinically with home contact with COVID, bilateral ground glass opacities on CT, mild elevation in CRP. Differential includes bacterial PNA vs. potentially COPD exacerbation though she has no significant cough or sputum production.  - Repeat COVID testing pending; trend CRP, ferritin, ESR, D-dimer.  - Continuing empiric antibiotic therapy with 5 day course of ceftriaxone 1g IV daily, azithromycin 500mg PO once followed by 250mg PO daily for 4 additional days. Will discontinue if COVID+.  - Isolation precautions.  - Monitor symptoms; if requiring supplemental oxygen and meets higher risk criteria will start steroids / ID consult for consideration of antiviral therapy.  - Supplemental oxygen with goal SpO2 >90%.    Iron deficiency anemia  - Chronic; monitor.    Morbid obesity with BMI of 40.0-44.9, adult  - Dietary counseling.    Mixed urge and stress incontinence  - Continuing oxybutynin 15mg PO daily, tamsulosin 0.4mg PO daily.    COPD (chronic obstructive pulmonary disease)  - Continue chronic therapy with fluticasone-vilanterol 100-25mcg inhaled daily while inpatient.    H/O mitral valve replacement with mechanical valve  - Warfarin as above.    Chronic anticoagulation  - Warfarin as above.    Essential (primary) hypertension  - Hold home amlodipine 2.5mg PO daily for time being; continue metoprolol as above. If BPs tolerating metoprolol alone, resume amlodipine.    Chronic atrial fibrillation  - Continuing metoprolol 200mg PO daily, warfarin 7.5mg PO every Mon/Wed/Fri/Sun, 10mg PO every Tue/Thur/Sat; monitor INR.    VTE Risk Mitigation (From admission, onward)         Ordered     warfarin (COUMADIN) tablet 10 mg  Every Tues, Thurs, Sat      07/07/20 2019     warfarin tablet 7.5 mg  Every Mon, Wed, Fri,  Sun      07/07/20 2019     IP VTE HIGH RISK PATIENT  Once      07/07/20 2019     Place sequential compression device  Until discontinued      07/07/20 2019                      MAYDA Pat MD  Department of Hospital Medicine   [facility]

## 2020-07-08 NOTE — PLAN OF CARE
LMSW completed a telephone assessment with the patient. Patient is on isolation for suspected covid-19.     Patient is alert and oriented with no communication barriers.     Prior to admission patient was independent. Patient is not current with HH or DME.       Patients PCP is correct on the face sheet. Patient choice pharmacy is [facility].         Patient denies having advance directives.      Patients family will transport the patient home at discharge.     No CM needs identified for discharge at this time.     CM team will continue to follow.                07/08/20 0974   Discharge Assessment   Assessment Type Discharge Planning Assessment   Confirmed/corrected address and phone number on facesheet? Yes   Assessment information obtained from? Patient   Communicated expected length of stay with patient/caregiver no   Prior to hospitilization cognitive status: Alert/Oriented   Prior to hospitalization functional status: Independent   Current cognitive status: Alert/Oriented   Current Functional Status: Independent   Lives With spouse   Able to Return to Prior Arrangements yes   Is patient able to care for self after discharge? Yes   Patient's perception of discharge disposition home or selfcare   Readmission Within the Last 30 Days no previous admission in last 30 days   Patient currently being followed by outpatient case management? No   Patient currently receives any other outside agency services? No   Equipment Currently Used at Home none   Do you have any problems affording any of your prescribed medications? No   Is the patient taking medications as prescribed? yes   Does the patient have transportation home? Yes   Transportation Anticipated family or friend will provide   Does the patient receive services at the [facility]madin Clinic? No   Discharge Plan A Home   DME Needed Upon Discharge  none   Patient/Family in Agreement with Plan yes

## 2020-07-08 NOTE — NURSING
Patient arrived to the unit at approximately 2000, VSS, AAOx4 on 1L nasal cannula. Patient oriented to room and call light. All safety measures in place, patient instructed to call staff for assistance.

## 2020-07-08 NOTE — ED NOTES
Bedside report received from GENOVEVA Joya.  Pt AAO x 4 and NAD noted.  Updated on POC.  Denies pain or other needs at this time.  Bedside toilet at bedside.  Call light within reach and rails up x 2.

## 2020-07-08 NOTE — PLAN OF CARE
Plan of care reviewed with patient. Patient is AAOx4, VSS, on 1 L nasal cannula. Patient is able to ambulate to the bathroom independently. Patient wears a CPAP at night for KEYSHAWN which was held due to potential COVID-19 infection. Patient's oxygen saturations stable on 1 L nasal cannula, and she reports no SOB. Patient has a skin tear under right breast. Patient states this is due to her bra. Mepilexs applied to bilateral breast folds. Tele monitored. Purposeful rounding completed. Patient instructed to call staff for any needs.

## 2020-07-08 NOTE — SUBJECTIVE & OBJECTIVE
Interval History: No acute events overnight. CRP trending upward. No other concerns at this time.    Review of Systems   Constitutional: Negative for chills and fever.   Respiratory: Positive for shortness of breath and wheezing. Negative for cough.    Cardiovascular: Negative for chest pain and palpitations.   Gastrointestinal: Negative for abdominal pain, nausea and vomiting.     Objective:     Vital Signs (Most Recent):  Temp: 98.7 °F (37.1 °C) (07/08/20 0834)  Pulse: 88 (07/08/20 1600)  Resp: 18 (07/08/20 1324)  BP: (!) 149/67 (07/08/20 0834)  SpO2: 96 % (07/08/20 1324) Vital Signs (24h Range):  Temp:  [98.7 °F (37.1 °C)-99.7 °F (37.6 °C)] 98.7 °F (37.1 °C)  Pulse:  [] 88  Resp:  [18-20] 18  SpO2:  [92 %-96 %] 96 %  BP: (124-153)/(59-74) 149/67     Weight: 117 kg (257 lb 15 oz)  Body mass index is 47.18 kg/m².    Intake/Output Summary (Last 24 hours) at 7/8/2020 1700  Last data filed at 7/8/2020 0241  Gross per 24 hour   Intake 530 ml   Output 300 ml   Net 230 ml      Physical Exam  Vitals signs and nursing note reviewed.   Constitutional:       General: She is not in acute distress.     Appearance: She is well-developed. She is obese.   HENT:      Head: Normocephalic and atraumatic.   Eyes:      General:         Right eye: No discharge.         Left eye: No discharge.      Conjunctiva/sclera: Conjunctivae normal.   Cardiovascular:      Rate and Rhythm: Normal rate. Rhythm irregularly irregular.   Pulmonary:      Effort: Pulmonary effort is normal. No respiratory distress.      Breath sounds: Wheezing (trace expiratory) present.   Abdominal:      Palpations: Abdomen is soft.      Tenderness: There is no abdominal tenderness.   Musculoskeletal: Normal range of motion.      Right lower leg: Edema (trace) present.      Left lower leg: Edema (trace) present.   Skin:     General: Skin is warm and dry.   Neurological:      Mental Status: She is alert and oriented to person, place, and time.     Significant Labs:    CBC:  Recent Labs   Lab 07/07/20  1235 07/08/20  0508   WBC 9.21 8.39   HGB 7.5* 7.6*   HCT 28.3* 29.1*    219   GRAN 75.6*  7.0 73.3*  6.2   LYMPH 9.3*  0.9* 9.9*  0.8*   MONO 10.9  1.0 12.3  1.0   EOS 0.3 0.3   BASO 0.03 0.04     CMP:  Recent Labs   Lab 07/07/20  1235 07/08/20  0507    139   K 4.3 4.5    105   CO2 24 26   BUN 15 15   CREATININE 1.0 1.0   GLU 96 93   CALCIUM 8.7 8.9   MG  --  1.7   PHOS  --  3.1   ALKPHOS 67 63   AST 16 16   ALT 9* 8*   BILITOT 0.9 0.9   PROT 7.2 7.1   ALBUMIN 3.5 3.5   ANIONGAP 10 8     Recent Labs   Lab 07/07/20 2209 07/08/20  0507 07/08/20  0508   DDIMER  --   --  0.30   SEDRATE 45*  --   --    PROCAL  --  0.06  --    CRP  --  85.3*  --      Significant Imaging:   No new imaging this morning.

## 2020-07-09 PROBLEM — J18.9 BILATERAL PNEUMONIA: Status: ACTIVE | Noted: 2020-07-07

## 2020-07-09 PROBLEM — J96.21 ACUTE ON CHRONIC RESPIRATORY FAILURE WITH HYPOXIA: Status: ACTIVE | Noted: 2020-07-09

## 2020-07-09 PROCEDURE — 99223 1ST HOSP IP/OBS HIGH 75: CPT | Mod: ,,, | Performed by: INTERNAL MEDICINE

## 2020-07-09 PROCEDURE — 27000221 HC OXYGEN, UP TO 24 HOURS

## 2020-07-09 PROCEDURE — 99233 SBSQ HOSP IP/OBS HIGH 50: CPT | Mod: ,,, | Performed by: INTERNAL MEDICINE

## 2020-07-09 PROCEDURE — 94660 CPAP INITIATION&MGMT: CPT

## 2020-07-09 PROCEDURE — 25000242 PHARM REV CODE 250 ALT 637 W/ HCPCS: Performed by: INTERNAL MEDICINE

## 2020-07-09 PROCEDURE — 63600175 PHARM REV CODE 636 W HCPCS: Performed by: INTERNAL MEDICINE

## 2020-07-09 PROCEDURE — 94640 AIRWAY INHALATION TREATMENT: CPT

## 2020-07-09 PROCEDURE — 99223 PR INITIAL HOSPITAL CARE,LEVL III: ICD-10-PCS | Mod: ,,, | Performed by: INTERNAL MEDICINE

## 2020-07-09 PROCEDURE — 94761 N-INVAS EAR/PLS OXIMETRY MLT: CPT

## 2020-07-09 PROCEDURE — 63700000 PHARM REV CODE 250 ALT 637 W/O HCPCS: Performed by: INTERNAL MEDICINE

## 2020-07-09 PROCEDURE — 99233 PR SUBSEQUENT HOSPITAL CARE,LEVL III: ICD-10-PCS | Mod: ,,, | Performed by: INTERNAL MEDICINE

## 2020-07-09 PROCEDURE — 11000001 HC ACUTE MED/SURG PRIVATE ROOM

## 2020-07-09 PROCEDURE — 99900035 HC TECH TIME PER 15 MIN (STAT)

## 2020-07-09 PROCEDURE — 25000003 PHARM REV CODE 250: Performed by: INTERNAL MEDICINE

## 2020-07-09 RX ORDER — FUROSEMIDE 10 MG/ML
20 INJECTION INTRAMUSCULAR; INTRAVENOUS ONCE
Status: COMPLETED | OUTPATIENT
Start: 2020-07-09 | End: 2020-07-09

## 2020-07-09 RX ORDER — PREDNISONE 20 MG/1
40 TABLET ORAL DAILY
Status: DISCONTINUED | OUTPATIENT
Start: 2020-07-09 | End: 2020-07-10 | Stop reason: HOSPADM

## 2020-07-09 RX ORDER — SERTRALINE HYDROCHLORIDE 50 MG/1
100 TABLET, FILM COATED ORAL NIGHTLY
Status: DISCONTINUED | OUTPATIENT
Start: 2020-07-09 | End: 2020-07-10 | Stop reason: HOSPADM

## 2020-07-09 RX ADMIN — AZITHROMYCIN MONOHYDRATE 250 MG: 250 TABLET ORAL at 09:07

## 2020-07-09 RX ADMIN — PREDNISONE 40 MG: 20 TABLET ORAL at 02:07

## 2020-07-09 RX ADMIN — TAMSULOSIN HYDROCHLORIDE 0.4 MG: 0.4 CAPSULE ORAL at 09:07

## 2020-07-09 RX ADMIN — THERA TABS 1 TABLET: TAB at 09:07

## 2020-07-09 RX ADMIN — WARFARIN SODIUM 10 MG: 5 TABLET ORAL at 05:07

## 2020-07-09 RX ADMIN — PANTOPRAZOLE SODIUM 40 MG: 40 TABLET, DELAYED RELEASE ORAL at 09:07

## 2020-07-09 RX ADMIN — IPRATROPIUM BROMIDE AND ALBUTEROL 1 PUFF: 20; 100 SPRAY, METERED RESPIRATORY (INHALATION) at 07:07

## 2020-07-09 RX ADMIN — METOPROLOL SUCCINATE 200 MG: 50 TABLET, EXTENDED RELEASE ORAL at 09:07

## 2020-07-09 RX ADMIN — FUROSEMIDE 20 MG: 10 INJECTION, SOLUTION INTRAMUSCULAR; INTRAVENOUS at 02:07

## 2020-07-09 RX ADMIN — SERTRALINE HYDROCHLORIDE 100 MG: 50 TABLET ORAL at 08:07

## 2020-07-09 RX ADMIN — IPRATROPIUM BROMIDE AND ALBUTEROL 1 PUFF: 20; 100 SPRAY, METERED RESPIRATORY (INHALATION) at 01:07

## 2020-07-09 RX ADMIN — OXYBUTYNIN CHLORIDE 15 MG: 5 TABLET, EXTENDED RELEASE ORAL at 09:07

## 2020-07-09 RX ADMIN — FLUTICASONE FUROATE AND VILANTEROL TRIFENATATE 1 PUFF: 100; 25 POWDER RESPIRATORY (INHALATION) at 07:07

## 2020-07-09 RX ADMIN — IPRATROPIUM BROMIDE AND ALBUTEROL 1 PUFF: 20; 100 SPRAY, METERED RESPIRATORY (INHALATION) at 12:07

## 2020-07-09 NOTE — PLAN OF CARE
POC reviewed with pt, pt verbalized understanding. Safety maintained throughout shift, bed locked and in lowest position, call light in reach, Side rails up X 2. Non skid sock on when OOB. Pt remained free of fall/trauma. Pt denies pain throughout shift. VSS, pt remained afebrile this shift. Airborne and droplet precautions maintained throughout shift . NC at 1L maintained, pt desats when off 02. Pt tolerating meals well, no reports of nausea and vomiting. IV abx  infused as ordered.  Telemetry placed , Afib  Will continue to monitor.

## 2020-07-09 NOTE — HPI
"64 year old former smoking female with hx COPD, HTN, Mechanical Mitral Valve on Coumadin, Paroxysmal Atrial Fibrillation presenting with 4 days of myalgias and mild fever 100.5F taken orally. She has associated fatigue and sore throat. Patient states that this is typical of her usual COPD exacerbation, which occurs about twice per year. These episodes do not usually require hospitalization but do require prednisone and ED visits. Of note, her  has been diagnosed with COVID "4 times" per patient but only has symptoms of anosmia and ageusia. In the ED, patient mildly hypoxic. CT Chest was obtained showing bilateral GGOs. She has been admitted to hospital medicine.    "

## 2020-07-09 NOTE — ASSESSMENT & PLAN NOTE
· BNP only mildly elevated but likely blunted in the setting of morbid obesity  · Recommend strict I/O. Aim for daily net negative fluid balance with Lasix PRN

## 2020-07-09 NOTE — ASSESSMENT & PLAN NOTE
- Initially suspected COVID-19 infection clinically with home contact with COVID, bilateral ground glass opacities on CT, mild elevation in CRP. Repeat COVID - though did have uptrending CRP. Potentially viral PNA versus COPD exacerbation, versus prior COVID infection (relatively asymptomatic, by history, if so) with residual imaging findings.  - Continuing empiric antibiotic therapy with 5 day course of azithromycin 250mg PO daily. Will discontinue if COVID+. With potential COPD exacerbation, start prednisone 40mg PO daily.  - Supplemental oxygen with goal SpO2 >90%.  - Pulmonology consulted; appreciate assistance.

## 2020-07-09 NOTE — PLAN OF CARE
Pt remained free from falls or injuries this shift. Pt independent in repositioning. No skin breakdown noticed. Pt rested well through the night.  CPAP whils sleeping. No signs of resp distress. Pt reports feeling much improved than when first admitted. Airborne and contact isolation precautions in place

## 2020-07-09 NOTE — SUBJECTIVE & OBJECTIVE
Past Medical History:   Diagnosis Date    Acute on chronic diastolic congestive heart failure     Anticoagulant long-term use     Asthma     Atrial fibrillation     Cataract     Chronic kidney disease     COPD (chronic obstructive pulmonary disease)     Depression     Dysuria     Flank pain     HTN (hypertension)     Nephrolithiasis     KEYSHAWN (obstructive sleep apnea)     awaiting CPAP machine     Rheumatic disease of mitral valve     Uncontrolled type 2 diabetes mellitus with complication, with long-term current use of insulin 2020    Urinary incontinence     Urinary tract infection        Past Surgical History:   Procedure Laterality Date     SECTION      COLONOSCOPY N/A 2019    Procedure: COLONOSCOPY;  Surgeon: Devon Bowling MD;  Location: [facility] ENDO (4TH FLR);  Service: Endoscopy;  Laterality: N/A;  ok to hold Coumadin x 5 days with Lovenox bridge per Coumadin clinic-[facility]    ESOPHAGOGASTRODUODENOSCOPY N/A 2019    Procedure: EGD (ESOPHAGOGASTRODUODENOSCOPY);  Surgeon: Smooth Torrez MD;  Location: [facility] ENDO (2ND FLR);  Service: Endoscopy;  Laterality: N/A;  2nd floor requested due to comorbidities, Hypertension, permanent A. 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 44     per Coumadin clinic-ok to hold for 5 days w/bridge    kidney stone removal      MITRAL VALVE REPLACEMENT  2004    TUBAL LIGATION         Review of patient's allergies indicates:  No Known Allergies    Family History     Problem Relation (Age of Onset)    Breast cancer Paternal Aunt    Cancer Brother    Colon cancer Maternal Grandmother, Mother (83)    Diabetes Sister, Sister, Sister, Father    Heart disease Father (70)    Hypertension Sister    Stroke Paternal Grandmother        Tobacco Use    Smoking status: Former Smoker     Packs/day: 0.50     Years: 20.00     Pack years: 10.00     Types: Cigarettes     Quit date:  2002     Years since quittin.1    Smokeless tobacco: Never Used   Substance and Sexual Activity    Alcohol use: No    Drug use: No    Sexual activity: Not on file         Review of Systems   Constitutional: Positive for fatigue and fever. Negative for activity change, appetite change, chills and diaphoresis.   HENT: Positive for sore throat. Negative for congestion, postnasal drip, rhinorrhea, sinus pressure and sinus pain.    Respiratory: Positive for shortness of breath. Negative for cough, chest tightness and wheezing.    Cardiovascular: Negative for chest pain, palpitations and leg swelling.   Gastrointestinal: Negative for abdominal distention, abdominal pain, constipation, diarrhea, nausea and vomiting.   Musculoskeletal: Positive for myalgias.   Skin: Negative for pallor, rash and wound.   Allergic/Immunologic: Negative for environmental allergies.   Neurological: Negative for headaches.     Objective:     Vital Signs (Most Recent):  Temp: 98.9 °F (37.2 °C) (20 0738)  Pulse: 100 (20 1000)  Resp: 19 (20 0738)  BP: (!) 150/71 (20 0738)  SpO2: (!) 92 % (20 0747) Vital Signs (24h Range):  Temp:  [97.3 °F (36.3 °C)-98.9 °F (37.2 °C)] 98.9 °F (37.2 °C)  Pulse:  [] 100  Resp:  [16-20] 19  SpO2:  [88 %-97 %] 92 %  BP: (121-150)/(57-71) 150/71     Weight: 117 kg (257 lb 15 oz)  Body mass index is 47.18 kg/m².      Intake/Output Summary (Last 24 hours) at 2020 1058  Last data filed at 2020 0400  Gross per 24 hour   Intake 1130 ml   Output 1350 ml   Net -220 ml       Physical Exam  Vitals signs and nursing note reviewed.   Constitutional:       General: She is not in acute distress.     Appearance: She is morbidly obese. She is not ill-appearing, toxic-appearing or diaphoretic.   HENT:      Head: Normocephalic and atraumatic.      Nose: No congestion or rhinorrhea.      Mouth/Throat:      Mouth: Mucous membranes are moist.      Pharynx: Oropharynx is clear.  Posterior oropharyngeal erythema present. No oropharyngeal exudate.   Eyes:      General: No scleral icterus.     Extraocular Movements: Extraocular movements intact.      Conjunctiva/sclera: Conjunctivae normal.      Pupils: Pupils are equal, round, and reactive to light.   Cardiovascular:      Rate and Rhythm: Normal rate. Rhythm irregular.      Heart sounds: Murmur (mechanical click) present.   Pulmonary:      Effort: No tachypnea, accessory muscle usage or respiratory distress.      Breath sounds: No decreased air movement. No decreased breath sounds, wheezing, rhonchi or rales.   Abdominal:      General: There is no distension.      Palpations: Abdomen is soft.   Musculoskeletal:      Right lower leg: No edema.      Left lower leg: No edema.   Skin:     General: Skin is warm and dry.      Findings: No erythema or rash.   Neurological:      General: No focal deficit present.      Mental Status: She is alert and oriented to person, place, and time. Mental status is at baseline.      Cranial Nerves: No cranial nerve deficit.         Vents:       Lines/Drains/Airways     Peripheral Intravenous Line                 Peripheral IV - Single Lumen 07/07/20 1225 22 G Right Antecubital 1 day                Significant Labs:    CBC/Anemia Profile:  Recent Labs   Lab 07/07/20  1235 07/08/20  0507 07/08/20  0508   WBC 9.21  --  8.39   HGB 7.5*  --  7.6*   HCT 28.3*  --  29.1*     --  219   MCV 65*  --  65*   RDW 21.2*  --  21.3*   FERRITIN  --  27  --         Chemistries:  Recent Labs   Lab 07/07/20  1235 07/08/20  0507    139   K 4.3 4.5    105   CO2 24 26   BUN 15 15   CREATININE 1.0 1.0   CALCIUM 8.7 8.9   ALBUMIN 3.5 3.5   PROT 7.2 7.1   BILITOT 0.9 0.9   ALKPHOS 67 63   ALT 9* 8*   AST 16 16   MG  --  1.7   PHOS  --  3.1       All pertinent labs within the past 24 hours have been reviewed.    Significant Imaging:   I have reviewed and interpreted all pertinent imaging results/findings within the past  24 hours.

## 2020-07-09 NOTE — ASSESSMENT & PLAN NOTE
· FEV1 41% but likely contribution of restrictive process from body habitus  · Continue home LABA/ICS. Duonebs Q6 PRN. Treat for COPD exacerbation as noted  · Resume follow up with pulmonary as outpatient. Encouraged weight loss and exercise regimen

## 2020-07-09 NOTE — ASSESSMENT & PLAN NOTE
· Former smoker with hx of COPD (FEV1 41%) on Advair and KEYSHAWN on CPAP  · Fever, fatigue myalgias with neg PCT. COVID neg x 2. CT chest with bilateral GGO  · Recommend treating for COPD exacerbation with Prednisone 40mg daily. This will effectively address steroid treatment for suspected COVID as well  · Would discontinue Rocephin and continue Azithromycin. CT could represent recent COVID infection vs viral PNA  · Continue home Advair or hospital equivalent. Duonebs Q4 PRN.  Avoid over-oxygenation with spO2 goal >88%.   · Out of bed to chair with aggressive PT/OT

## 2020-07-09 NOTE — PROGRESS NOTES
[facility] Hospital Medicine  Progress Note    Patient Name: Elayne Almanzar  MRN: [id number]  Patient Class: IP- Inpatient   Admission Date: 7/7/2020  Length of Stay: 2 days  Attending Physician: KAVON Pat MD  Primary Care Provider: Nubia Crocker MD        Subjective:     Principal Problem:Bilateral pneumonia    HPI:  Ms. Almanzar is a 64/F with PMH COPD, HTN, A-fib, s/p mechanical mitral valve (on warfarin), iron deficiency anemia who presented to ED 07/07 with a 4 day history of fatigue, fever, myalgias and sore throat. She reports that her  had been found on routine screening to be COVID+, though she reports he did not have any significant symptoms, and had returned to work several weeks prior to presentation. The Sunday prior to presenting she began having sore throat and fatigue, and noted Tmax of 100.5F at home the day prior to presenting. In ED, she was found to have borderline oxygen saturations in the low 90s. Rapid COVID was negative; CT Chest was ordered and demonstrated bilateral ground glass opacities diffusely. CRP was slightly elevated and procalcitonin negative. With concern for developing pneumonia vs. COVID, hospital medicine was contacted for admission.    Overview/Hospital Course:  No notes on file    Interval History: No acute events overnight. Feeling improved. No new concerns.    Review of Systems   Constitutional: Negative for chills and fever.   Respiratory: Negative for cough, shortness of breath and wheezing.    Cardiovascular: Negative for chest pain and palpitations.   Gastrointestinal: Negative for abdominal pain, nausea and vomiting.     Objective:     Vital Signs (Most Recent):  Temp: 98.3 °F (36.8 °C) (07/09/20 1704)  Pulse: 97 (07/09/20 1704)  Resp: 18 (07/09/20 1704)  BP: 132/88 (07/09/20 1704)  SpO2: (!) 90 % (07/09/20 1704) Vital Signs (24h Range):  Temp:  [98.3 °F (36.8 °C)-98.9 °F (37.2 °C)] 98.3 °F (36.8 °C)  Pulse:  [] 97  Resp:   [16-20] 18  SpO2:  [88 %-97 %] 90 %  BP: (121-150)/(57-88) 132/88     Weight: 117 kg (257 lb 15 oz)  Body mass index is 47.18 kg/m².    Intake/Output Summary (Last 24 hours) at 7/9/2020 1759  Last data filed at 7/9/2020 1400  Gross per 24 hour   Intake 770 ml   Output 2150 ml   Net -1380 ml      Physical Exam  Vitals signs and nursing note reviewed.   Constitutional:       General: She is not in acute distress.     Appearance: She is well-developed. She is obese.   HENT:      Head: Normocephalic and atraumatic.   Eyes:      General:         Right eye: No discharge.         Left eye: No discharge.      Conjunctiva/sclera: Conjunctivae normal.   Cardiovascular:      Rate and Rhythm: Normal rate. Rhythm irregularly irregular.   Pulmonary:      Effort: Pulmonary effort is normal. No respiratory distress.      Breath sounds: No wheezing.   Abdominal:      Palpations: Abdomen is soft.      Tenderness: There is no abdominal tenderness.   Musculoskeletal: Normal range of motion.      Right lower leg: Edema (trace) present.      Left lower leg: Edema (trace) present.   Skin:     General: Skin is warm and dry.   Neurological:      Mental Status: She is alert and oriented to person, place, and time.         Significant Labs:   CBC:  Recent Labs   Lab 07/07/20  1235 07/08/20  0508   WBC 9.21 8.39   HGB 7.5* 7.6*   HCT 28.3* 29.1*    219   GRAN 75.6*  7.0 73.3*  6.2   LYMPH 9.3*  0.9* 9.9*  0.8*   MONO 10.9  1.0 12.3  1.0   EOS 0.3 0.3   BASO 0.03 0.04      CMP:  Recent Labs   Lab 07/07/20  1235 07/08/20  0507    139   K 4.3 4.5    105   CO2 24 26   BUN 15 15   CREATININE 1.0 1.0   GLU 96 93   CALCIUM 8.7 8.9   MG  --  1.7   PHOS  --  3.1   ALKPHOS 67 63   AST 16 16   ALT 9* 8*   BILITOT 0.9 0.9   PROT 7.2 7.1   ALBUMIN 3.5 3.5   ANIONGAP 10 8     Recent Labs   Lab 07/07/20  2209 07/08/20  0507 07/08/20  0508   CRP  --  85.3*  --    DDIMER  --   --  0.30   SEDRATE 45*  --   --    FERRITIN  --  27  --     PROCAL  --  0.06  --      Significant Imaging:   No new imaging this morning.      Assessment/Plan:      * Bilateral pneumonia  - Initially suspected COVID-19 infection clinically with home contact with COVID, bilateral ground glass opacities on CT, mild elevation in CRP. Repeat COVID - though did have uptrending CRP. Potentially viral PNA versus COPD exacerbation, versus prior COVID infection (relatively asymptomatic, by history, if so) with residual imaging findings.  - Continuing empiric antibiotic therapy with 5 day course of azithromycin 250mg PO daily. Will discontinue if COVID+. With potential COPD exacerbation, start prednisone 40mg PO daily.  - Supplemental oxygen with goal SpO2 >90%.  - Pulmonology consulted; appreciate assistance.    Iron deficiency anemia  - Chronic; monitor.    Morbid obesity with BMI of 40.0-44.9, adult  - Dietary counseling.    Mixed urge and stress incontinence  - Continuing oxybutynin 15mg PO daily, tamsulosin 0.4mg PO daily.    COPD (chronic obstructive pulmonary disease)  - Continue chronic therapy with fluticasone-vilanterol 100-25mcg inhaled daily while inpatient.    H/O mitral valve replacement with mechanical valve  - Warfarin as above.    Chronic anticoagulation  - Warfarin as above.    Essential (primary) hypertension  - Hold home amlodipine 2.5mg PO daily for time being; continue metoprolol as above. If BPs tolerating metoprolol alone, resume amlodipine.    Chronic atrial fibrillation  - Continuing metoprolol 200mg PO daily, warfarin 7.5mg PO every Mon/Wed/Fri/Sun, 10mg PO every Tue/Thur/Sat; monitor INR.    VTE Risk Mitigation (From admission, onward)         Ordered     warfarin (COUMADIN) tablet 10 mg  Every Tues, Thurs, Sat      07/07/20 2019     warfarin tablet 7.5 mg  Every Mon, Wed, Fri, Sun      07/07/20 2019     IP VTE HIGH RISK PATIENT  Once      07/07/20 2019     Place sequential compression device  Until discontinued      07/07/20 2019                      D  Colten Pat MD  Department of Hospital Medicine   [facility]

## 2020-07-09 NOTE — SUBJECTIVE & OBJECTIVE
Interval History: No acute events overnight. Feeling improved. No new concerns.    Review of Systems   Constitutional: Negative for chills and fever.   Respiratory: Negative for cough, shortness of breath and wheezing.    Cardiovascular: Negative for chest pain and palpitations.   Gastrointestinal: Negative for abdominal pain, nausea and vomiting.     Objective:     Vital Signs (Most Recent):  Temp: 98.3 °F (36.8 °C) (07/09/20 1704)  Pulse: 97 (07/09/20 1704)  Resp: 18 (07/09/20 1704)  BP: 132/88 (07/09/20 1704)  SpO2: (!) 90 % (07/09/20 1704) Vital Signs (24h Range):  Temp:  [98.3 °F (36.8 °C)-98.9 °F (37.2 °C)] 98.3 °F (36.8 °C)  Pulse:  [] 97  Resp:  [16-20] 18  SpO2:  [88 %-97 %] 90 %  BP: (121-150)/(57-88) 132/88     Weight: 117 kg (257 lb 15 oz)  Body mass index is 47.18 kg/m².    Intake/Output Summary (Last 24 hours) at 7/9/2020 1759  Last data filed at 7/9/2020 1400  Gross per 24 hour   Intake 770 ml   Output 2150 ml   Net -1380 ml      Physical Exam  Vitals signs and nursing note reviewed.   Constitutional:       General: She is not in acute distress.     Appearance: She is well-developed. She is obese.   HENT:      Head: Normocephalic and atraumatic.   Eyes:      General:         Right eye: No discharge.         Left eye: No discharge.      Conjunctiva/sclera: Conjunctivae normal.   Cardiovascular:      Rate and Rhythm: Normal rate. Rhythm irregularly irregular.   Pulmonary:      Effort: Pulmonary effort is normal. No respiratory distress.      Breath sounds: No wheezing.   Abdominal:      Palpations: Abdomen is soft.      Tenderness: There is no abdominal tenderness.   Musculoskeletal: Normal range of motion.      Right lower leg: Edema (trace) present.      Left lower leg: Edema (trace) present.   Skin:     General: Skin is warm and dry.   Neurological:      Mental Status: She is alert and oriented to person, place, and time.         Significant Labs:   CBC:  Recent Labs   Lab 07/07/20  1235  07/08/20  0508   WBC 9.21 8.39   HGB 7.5* 7.6*   HCT 28.3* 29.1*    219   GRAN 75.6*  7.0 73.3*  6.2   LYMPH 9.3*  0.9* 9.9*  0.8*   MONO 10.9  1.0 12.3  1.0   EOS 0.3 0.3   BASO 0.03 0.04      CMP:  Recent Labs   Lab 07/07/20  1235 07/08/20  0507    139   K 4.3 4.5    105   CO2 24 26   BUN 15 15   CREATININE 1.0 1.0   GLU 96 93   CALCIUM 8.7 8.9   MG  --  1.7   PHOS  --  3.1   ALKPHOS 67 63   AST 16 16   ALT 9* 8*   BILITOT 0.9 0.9   PROT 7.2 7.1   ALBUMIN 3.5 3.5   ANIONGAP 10 8     Recent Labs   Lab 07/07/20  2209 07/08/20  0507 07/08/20  0508   CRP  --  85.3*  --    DDIMER  --   --  0.30   SEDRATE 45*  --   --    FERRITIN  --  27  --    PROCAL  --  0.06  --      Significant Imaging:   No new imaging this morning.

## 2020-07-09 NOTE — CONSULTS
"[facility]  Pulmonology  Consult Note    Patient Name: Elayne Almanzar  MRN: [id number]  Admission Date: 2020  Hospital Length of Stay: 2 days  Code Status: Full Code  Attending Physician: KAVON Pat MD  Primary Care Provider: Nubia Crocker MD   Principal Problem: Bilateral pneumonia    Inpatient consult to Pulmonary Critical Care  Consult performed by: Erick Barba MD  Consult ordered by: KAVON Pat MD        Subjective:     HPI:  64 year old former smoking female with hx COPD, HTN, Mechanical Mitral Valve on Coumadin, Paroxysmal Atrial Fibrillation presenting with 4 days of myalgias and mild fever 100.5F taken orally. She has associated fatigue and sore throat. Patient states that this is typical of her usual COPD exacerbation, which occurs about twice per year. These episodes do not usually require hospitalization but do require prednisone and ED visits. Of note, her  has been diagnosed with COVID "4 times" per patient but only has symptoms of anosmia and ageusia. In the ED, patient mildly hypoxic. CT Chest was obtained showing bilateral GGOs. She has been admitted to hospital medicine.      Past Medical History:   Diagnosis Date    Acute on chronic diastolic congestive heart failure     Anticoagulant long-term use     Asthma     Atrial fibrillation     Cataract     Chronic kidney disease     COPD (chronic obstructive pulmonary disease)     Depression     Dysuria     Flank pain     HTN (hypertension)     Nephrolithiasis     KEYSHAWN (obstructive sleep apnea)     awaiting CPAP machine     Rheumatic disease of mitral valve     Uncontrolled type 2 diabetes mellitus with complication, with long-term current use of insulin 2020    Urinary incontinence     Urinary tract infection        Past Surgical History:   Procedure Laterality Date     SECTION      COLONOSCOPY N/A 2019    Procedure: COLONOSCOPY;  Surgeon: Devon Bowling MD;  " Location: [facility] (4TH FLR);  Service: Endoscopy;  Laterality: N/A;  ok to hold Coumadin x 5 days with Lovenox bridge per Coumadin clinic-[facility]    ESOPHAGOGASTRODUODENOSCOPY N/A 2019    Procedure: EGD (ESOPHAGOGASTRODUODENOSCOPY);  Surgeon: Smooth Torrez MD;  Location: [facility] (2ND FLR);  Service: Endoscopy;  Laterality: N/A;  2nd floor requested due to comorbidities, Hypertension, permanent A. 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 44     per Coumadin clinic-ok to hold for 5 days w/bridge    kidney stone removal      MITRAL VALVE REPLACEMENT  2004    TUBAL LIGATION         Review of patient's allergies indicates:  No Known Allergies    Family History     Problem Relation (Age of Onset)    Breast cancer Paternal Aunt    Cancer Brother    Colon cancer Maternal Grandmother, Mother (83)    Diabetes Sister, Sister, Sister, Father    Heart disease Father (70)    Hypertension Sister    Stroke Paternal Grandmother        Tobacco Use    Smoking status: Former Smoker     Packs/day: 0.50     Years: 20.00     Pack years: 10.00     Types: Cigarettes     Quit date: 2002     Years since quittin.1    Smokeless tobacco: Never Used   Substance and Sexual Activity    Alcohol use: No    Drug use: No    Sexual activity: Not on file         Review of Systems   Constitutional: Positive for fatigue and fever. Negative for activity change, appetite change, chills and diaphoresis.   HENT: Positive for sore throat. Negative for congestion, postnasal drip, rhinorrhea, sinus pressure and sinus pain.    Respiratory: Positive for shortness of breath. Negative for cough, chest tightness and wheezing.    Cardiovascular: Negative for chest pain, palpitations and leg swelling.   Gastrointestinal: Negative for abdominal distention, abdominal pain, constipation, diarrhea, nausea and vomiting.   Musculoskeletal: Positive for myalgias.   Skin:  Negative for pallor, rash and wound.   Allergic/Immunologic: Negative for environmental allergies.   Neurological: Negative for headaches.     Objective:     Vital Signs (Most Recent):  Temp: 98.9 °F (37.2 °C) (07/09/20 0738)  Pulse: 100 (07/09/20 1000)  Resp: 19 (07/09/20 0738)  BP: (!) 150/71 (07/09/20 0738)  SpO2: (!) 92 % (07/09/20 0747) Vital Signs (24h Range):  Temp:  [97.3 °F (36.3 °C)-98.9 °F (37.2 °C)] 98.9 °F (37.2 °C)  Pulse:  [] 100  Resp:  [16-20] 19  SpO2:  [88 %-97 %] 92 %  BP: (121-150)/(57-71) 150/71     Weight: 117 kg (257 lb 15 oz)  Body mass index is 47.18 kg/m².      Intake/Output Summary (Last 24 hours) at 7/9/2020 1058  Last data filed at 7/9/2020 0400  Gross per 24 hour   Intake 1130 ml   Output 1350 ml   Net -220 ml       Physical Exam  Vitals signs and nursing note reviewed.   Constitutional:       General: She is not in acute distress.     Appearance: She is morbidly obese. She is not ill-appearing, toxic-appearing or diaphoretic.   HENT:      Head: Normocephalic and atraumatic.      Nose: No congestion or rhinorrhea.      Mouth/Throat:      Mouth: Mucous membranes are moist.      Pharynx: Oropharynx is clear. Posterior oropharyngeal erythema present. No oropharyngeal exudate.   Eyes:      General: No scleral icterus.     Extraocular Movements: Extraocular movements intact.      Conjunctiva/sclera: Conjunctivae normal.      Pupils: Pupils are equal, round, and reactive to light.   Cardiovascular:      Rate and Rhythm: Normal rate. Rhythm irregular.      Heart sounds: Murmur (mechanical click) present.   Pulmonary:      Effort: No tachypnea, accessory muscle usage or respiratory distress.      Breath sounds: No decreased air movement. No decreased breath sounds, wheezing, rhonchi or rales.   Abdominal:      General: There is no distension.      Palpations: Abdomen is soft.   Musculoskeletal:      Right lower leg: No edema.      Left lower leg: No edema.   Skin:     General: Skin is  warm and dry.      Findings: No erythema or rash.   Neurological:      General: No focal deficit present.      Mental Status: She is alert and oriented to person, place, and time. Mental status is at baseline.      Cranial Nerves: No cranial nerve deficit.         Vents:       Lines/Drains/Airways     Peripheral Intravenous Line                 Peripheral IV - Single Lumen 07/07/20 1225 22 G Right Antecubital 1 day                Significant Labs:    CBC/Anemia Profile:  Recent Labs   Lab 07/07/20  1235 07/08/20  0507 07/08/20  0508   WBC 9.21  --  8.39   HGB 7.5*  --  7.6*   HCT 28.3*  --  29.1*     --  219   MCV 65*  --  65*   RDW 21.2*  --  21.3*   FERRITIN  --  27  --         Chemistries:  Recent Labs   Lab 07/07/20  1235 07/08/20  0507    139   K 4.3 4.5    105   CO2 24 26   BUN 15 15   CREATININE 1.0 1.0   CALCIUM 8.7 8.9   ALBUMIN 3.5 3.5   PROT 7.2 7.1   BILITOT 0.9 0.9   ALKPHOS 67 63   ALT 9* 8*   AST 16 16   MG  --  1.7   PHOS  --  3.1       All pertinent labs within the past 24 hours have been reviewed.    Significant Imaging:   I have reviewed and interpreted all pertinent imaging results/findings within the past 24 hours.    Assessment/Plan:     Acute on chronic respiratory failure with hypoxia  · Former smoker with hx of COPD (FEV1 41%) on Advair and KEYSHAWN on CPAP  · Fever, fatigue myalgias with neg PCT. COVID neg x 2. CT chest with bilateral GGO  · Recommend treating for COPD exacerbation with Prednisone 40mg daily. This will effectively address steroid treatment for suspected COVID as well  · Would discontinue Rocephin and continue Azithromycin. CT could represent recent COVID infection vs viral PNA  · Continue home Advair or hospital equivalent. Duonebs Q4 PRN.  Avoid over-oxygenation with spO2 goal >88%.   · Out of bed to chair with aggressive PT/OT    Chronic diastolic congestive heart failure  · BNP only mildly elevated but likely blunted in the setting of morbid  obesity  · Recommend strict I/O. Aim for daily net negative fluid balance with Lasix PRN    Complex sleep apnea syndrome  · Continue nightly CPAP at home settings. Encouraged ongoing compliance    COPD (chronic obstructive pulmonary disease)  · FEV1 41% but likely contribution of restrictive process from body habitus  · Continue home LABA/ICS. Duonebs Q6 PRN. Treat for COPD exacerbation as noted  · Resume follow up with pulmonary as outpatient. Encouraged weight loss and exercise regimen        Thank you for the consult. Please feel free to contact us with any question or concerns regarding the care of this patient.       Erick Barba MD  Pulmonology  [facility]

## 2020-07-09 NOTE — NURSING
Pt AAOx4, NAD noted. Pt taken off of precautions during shift after Covid negative x2. Pt denied pain, SOB, N/V/D. Pt on RA, sats maintained >92%. Vitals monitored and stable. Pt able to ambulate independently, remained free from falls or injury. Pt tolerating cardiac diet, no N/V. Pt currently up in chair, safety measures implemented. Will continue to monitor.

## 2020-07-10 VITALS
OXYGEN SATURATION: 92 % | WEIGHT: 257.94 LBS | RESPIRATION RATE: 17 BRPM | SYSTOLIC BLOOD PRESSURE: 143 MMHG | HEART RATE: 82 BPM | HEIGHT: 62 IN | BODY MASS INDEX: 47.47 KG/M2 | TEMPERATURE: 98 F | DIASTOLIC BLOOD PRESSURE: 70 MMHG

## 2020-07-10 LAB
ALBUMIN SERPL BCP-MCNC: 3.2 G/DL (ref 3.5–5.2)
ALP SERPL-CCNC: 68 U/L (ref 55–135)
ALT SERPL W/O P-5'-P-CCNC: 10 U/L (ref 10–44)
ANION GAP SERPL CALC-SCNC: 12 MMOL/L (ref 8–16)
ANISOCYTOSIS BLD QL SMEAR: SLIGHT
AST SERPL-CCNC: 18 U/L (ref 10–40)
BASOPHILS # BLD AUTO: 0.02 K/UL (ref 0–0.2)
BASOPHILS NFR BLD: 0.2 % (ref 0–1.9)
BILIRUB SERPL-MCNC: 0.7 MG/DL (ref 0.1–1)
BUN SERPL-MCNC: 18 MG/DL (ref 8–23)
CALCIUM SERPL-MCNC: 9.2 MG/DL (ref 8.7–10.5)
CHLORIDE SERPL-SCNC: 104 MMOL/L (ref 95–110)
CO2 SERPL-SCNC: 24 MMOL/L (ref 23–29)
CREAT SERPL-MCNC: 1 MG/DL (ref 0.5–1.4)
CRP SERPL-MCNC: 106.3 MG/L (ref 0–8.2)
D DIMER PPP IA.FEU-MCNC: 0.27 MG/L FEU
DIFFERENTIAL METHOD: ABNORMAL
EOSINOPHIL # BLD AUTO: 0 K/UL (ref 0–0.5)
EOSINOPHIL NFR BLD: 0.1 % (ref 0–8)
ERYTHROCYTE [DISTWIDTH] IN BLOOD BY AUTOMATED COUNT: 21.4 % (ref 11.5–14.5)
EST. GFR  (AFRICAN AMERICAN): >60 ML/MIN/1.73 M^2
EST. GFR  (NON AFRICAN AMERICAN): >60 ML/MIN/1.73 M^2
FERRITIN SERPL-MCNC: 34 NG/ML (ref 20–300)
GLUCOSE SERPL-MCNC: 145 MG/DL (ref 70–110)
HCT VFR BLD AUTO: 29.4 % (ref 37–48.5)
HGB BLD-MCNC: 7.7 G/DL (ref 12–16)
HYPOCHROMIA BLD QL SMEAR: ABNORMAL
IMM GRANULOCYTES # BLD AUTO: 0.05 K/UL (ref 0–0.04)
IMM GRANULOCYTES NFR BLD AUTO: 0.6 % (ref 0–0.5)
INR PPP: 2.3 (ref 0.8–1.2)
LYMPHOCYTES # BLD AUTO: 0.7 K/UL (ref 1–4.8)
LYMPHOCYTES NFR BLD: 7.8 % (ref 18–48)
MAGNESIUM SERPL-MCNC: 1.9 MG/DL (ref 1.6–2.6)
MCH RBC QN AUTO: 17 PG (ref 27–31)
MCHC RBC AUTO-ENTMCNC: 26.2 G/DL (ref 32–36)
MCV RBC AUTO: 65 FL (ref 82–98)
MONOCYTES # BLD AUTO: 0.7 K/UL (ref 0.3–1)
MONOCYTES NFR BLD: 7.6 % (ref 4–15)
NEUTROPHILS # BLD AUTO: 7.4 K/UL (ref 1.8–7.7)
NEUTROPHILS NFR BLD: 83.7 % (ref 38–73)
NRBC BLD-RTO: 0 /100 WBC
PHOSPHATE SERPL-MCNC: 3.1 MG/DL (ref 2.7–4.5)
PLATELET # BLD AUTO: 236 K/UL (ref 150–350)
PLATELET BLD QL SMEAR: ABNORMAL
PMV BLD AUTO: ABNORMAL FL (ref 9.2–12.9)
POIKILOCYTOSIS BLD QL SMEAR: SLIGHT
POLYCHROMASIA BLD QL SMEAR: ABNORMAL
POTASSIUM SERPL-SCNC: 4.3 MMOL/L (ref 3.5–5.1)
PROCALCITONIN SERPL IA-MCNC: 0.06 NG/ML
PROT SERPL-MCNC: 7.2 G/DL (ref 6–8.4)
PROTHROMBIN TIME: 23.6 SEC (ref 9–12.5)
RBC # BLD AUTO: 4.54 M/UL (ref 4–5.4)
SCHISTOCYTES BLD QL SMEAR: ABNORMAL
SODIUM SERPL-SCNC: 140 MMOL/L (ref 136–145)
WBC # BLD AUTO: 8.83 K/UL (ref 3.9–12.7)

## 2020-07-10 PROCEDURE — 94761 N-INVAS EAR/PLS OXIMETRY MLT: CPT

## 2020-07-10 PROCEDURE — 99239 PR HOSPITAL DISCHARGE DAY,>30 MIN: ICD-10-PCS | Mod: ,,, | Performed by: INTERNAL MEDICINE

## 2020-07-10 PROCEDURE — 83735 ASSAY OF MAGNESIUM: CPT

## 2020-07-10 PROCEDURE — 25000242 PHARM REV CODE 250 ALT 637 W/ HCPCS: Performed by: INTERNAL MEDICINE

## 2020-07-10 PROCEDURE — 99232 SBSQ HOSP IP/OBS MODERATE 35: CPT | Mod: ,,, | Performed by: INTERNAL MEDICINE

## 2020-07-10 PROCEDURE — 85025 COMPLETE CBC W/AUTO DIFF WBC: CPT

## 2020-07-10 PROCEDURE — 85610 PROTHROMBIN TIME: CPT

## 2020-07-10 PROCEDURE — 36415 COLL VENOUS BLD VENIPUNCTURE: CPT

## 2020-07-10 PROCEDURE — 94640 AIRWAY INHALATION TREATMENT: CPT

## 2020-07-10 PROCEDURE — 82728 ASSAY OF FERRITIN: CPT

## 2020-07-10 PROCEDURE — 99232 PR SUBSEQUENT HOSPITAL CARE,LEVL II: ICD-10-PCS | Mod: ,,, | Performed by: INTERNAL MEDICINE

## 2020-07-10 PROCEDURE — 99239 HOSP IP/OBS DSCHRG MGMT >30: CPT | Mod: ,,, | Performed by: INTERNAL MEDICINE

## 2020-07-10 PROCEDURE — 84145 PROCALCITONIN (PCT): CPT

## 2020-07-10 PROCEDURE — 85379 FIBRIN DEGRADATION QUANT: CPT

## 2020-07-10 PROCEDURE — 25000003 PHARM REV CODE 250: Performed by: INTERNAL MEDICINE

## 2020-07-10 PROCEDURE — 80053 COMPREHEN METABOLIC PANEL: CPT

## 2020-07-10 PROCEDURE — 63700000 PHARM REV CODE 250 ALT 637 W/O HCPCS: Performed by: INTERNAL MEDICINE

## 2020-07-10 PROCEDURE — 84100 ASSAY OF PHOSPHORUS: CPT

## 2020-07-10 PROCEDURE — 86140 C-REACTIVE PROTEIN: CPT

## 2020-07-10 PROCEDURE — 63600175 PHARM REV CODE 636 W HCPCS: Performed by: INTERNAL MEDICINE

## 2020-07-10 PROCEDURE — 94660 CPAP INITIATION&MGMT: CPT

## 2020-07-10 RX ORDER — FUROSEMIDE 20 MG/1
20 TABLET ORAL DAILY PRN
Start: 2020-07-10 | End: 2020-09-22 | Stop reason: SDUPTHER

## 2020-07-10 RX ORDER — PREDNISONE 20 MG/1
40 TABLET ORAL DAILY
Qty: 6 TABLET | Refills: 0 | Status: SHIPPED | OUTPATIENT
Start: 2020-07-11 | End: 2020-07-14

## 2020-07-10 RX ORDER — AZITHROMYCIN 250 MG/1
250 TABLET, FILM COATED ORAL DAILY
Qty: 2 TABLET | Refills: 0 | Status: SHIPPED | OUTPATIENT
Start: 2020-07-10 | End: 2020-07-12

## 2020-07-10 RX ADMIN — IPRATROPIUM BROMIDE AND ALBUTEROL 1 PUFF: 20; 100 SPRAY, METERED RESPIRATORY (INHALATION) at 08:07

## 2020-07-10 RX ADMIN — THERA TABS 1 TABLET: TAB at 09:07

## 2020-07-10 RX ADMIN — AZITHROMYCIN MONOHYDRATE 250 MG: 250 TABLET ORAL at 09:07

## 2020-07-10 RX ADMIN — PANTOPRAZOLE SODIUM 40 MG: 40 TABLET, DELAYED RELEASE ORAL at 09:07

## 2020-07-10 RX ADMIN — METOPROLOL SUCCINATE 200 MG: 50 TABLET, EXTENDED RELEASE ORAL at 09:07

## 2020-07-10 RX ADMIN — OXYBUTYNIN CHLORIDE 15 MG: 5 TABLET, EXTENDED RELEASE ORAL at 09:07

## 2020-07-10 RX ADMIN — PREDNISONE 40 MG: 20 TABLET ORAL at 09:07

## 2020-07-10 RX ADMIN — IPRATROPIUM BROMIDE AND ALBUTEROL 1 PUFF: 20; 100 SPRAY, METERED RESPIRATORY (INHALATION) at 01:07

## 2020-07-10 RX ADMIN — TAMSULOSIN HYDROCHLORIDE 0.4 MG: 0.4 CAPSULE ORAL at 09:07

## 2020-07-10 RX ADMIN — FLUTICASONE FUROATE AND VILANTEROL TRIFENATATE 1 PUFF: 100; 25 POWDER RESPIRATORY (INHALATION) at 08:07

## 2020-07-10 NOTE — PROGRESS NOTES
Patient identified on pressure injury midnight batch report  Nursing documentation states pat has a skin tear to right lateral breast.  Upon wound care assessment noted to have intertriginous dermatitis under breasts with partial thickness skin loss. Area cleaned and aquacel lite dressing applied. Offered pt a sample of interdry AG to utilize in her brassiere to wick away moisture from the fold. Pt excited to try and see if this will improve the dermatitis as she suffers with it each summer.      07/10/20 1300        Altered Skin Integrity 07/07/20 2200 Right lower Breast Intertrigo   Date First Assessed/Time First Assessed: 07/07/20 2200   Altered Skin Integrity Present on Admission: yes  Side: Right  Orientation: lower  Location: Breast  Is this injury device related?: No  Primary Wound Type: Intertrigo   Wound Image   (media file unavailable due to wifi issues)   Dressing Appearance Clean;Intact   Drainage Amount None   Drainage Characteristics/Odor No odor   Appearance Pink;Moist   Tissue loss description Partial thickness   Red (%), Wound Tissue Color 100 %  (pink)   Periwound Area Moist   Wound Edges Open   Wound Length (cm) 0.5 cm   Wound Width (cm) 3 cm   Wound Depth (cm) 0.1 cm   Wound Volume (cm^3) 0.15 cm^3   Wound Surface Area (cm^2) 1.5 cm^2   Care Cleansed with:;Sterile normal saline   Dressing Applied;Foam  (gave sample of Interdry AG moisture wicking textile)     Xin Monet RN CWON  [id number]

## 2020-07-10 NOTE — DISCHARGE SUMMARY
[facility] Hospital Medicine  Discharge Summary      Patient Name: Elayne Almanzar  MRN: [id number]  Admission Date: 7/7/2020  Hospital Length of Stay: 3 days  Discharge Date and Time: 7/10/2020 12:59 PM  Attending Physician: No att. providers found   Discharging Provider: MAYDA Pat MD  Primary Care Provider: Nubia Crocker MD      HPI:   Ms. Almanzar is a 64/F with PMH COPD, HTN, A-fib, s/p mechanical mitral valve (on warfarin), iron deficiency anemia who presented to ED 07/07 with a 4 day history of fatigue, fever, myalgias and sore throat. She reports that her  had been found on routine screening to be COVID+, though she reports he did not have any significant symptoms, and had returned to work several weeks prior to presentation. The Sunday prior to presenting she began having sore throat and fatigue, and noted Tmax of 100.5F at home the day prior to presenting. In ED, she was found to have borderline oxygen saturations in the low 90s. Rapid COVID was negative; CT Chest was ordered and demonstrated bilateral ground glass opacities diffusely. CRP was slightly elevated and procalcitonin negative. With concern for developing pneumonia vs. COVID, hospital medicine was contacted for admission.    * No surgery found *      Hospital Course:   Admitted with concern for bilateral viral pneumonia with presentation suspicious for COVID19. Repeat COVID testing upon arrival to the floor, started on empiric ceftriaxone and azithromycin for potential bacterial pneumonia. She had minimal supplemental oxygen requirements and was able to be weaned to room air without difficulty. Pulmonology was consulted after repeat COVID testing resulted as negative, and recommended treatment for COPD exacerbation with steroids as well as continued short course of azithromycin alone. With clinical improvement and vital stability, she was prepared for discharge home. She had remotely followed with [facility] and referral was placed for her to establish follow-up with them once again.    Consults:   Consults (From admission, onward)        Status Ordering Provider     Inpatient consult to Pulmonary Critical Care  Once     Provider:  Erick Barba MD    Completed KAVON SNOWDEN          No new Assessment & Plan notes have been filed under this hospital service since the last note was generated.  Service: Hospital Medicine    Final Active Diagnoses:    Diagnosis Date Noted POA    PRINCIPAL PROBLEM:  Bilateral pneumonia [J18.9] 07/07/2020 Yes    Acute on chronic respiratory failure with hypoxia [J96.21] 07/09/2020 Yes    Suspected Covid-19 Virus Infection [R68.89]  Yes    Iron deficiency anemia [D50.9] 12/19/2019 Yes     Chronic    Chronic diastolic congestive heart failure [I50.32]  Yes     Chronic    Morbid obesity with BMI of 40.0-44.9, adult [E66.01, Z68.41] 05/02/2017 Not Applicable     Chronic    Complex sleep apnea syndrome [G47.31]  Yes    Mixed urge and stress incontinence [N39.46] 03/16/2016 Yes     Chronic    COPD (chronic obstructive pulmonary disease) [J44.9] 01/22/2015 Yes     Chronic    H/O mitral valve replacement with mechanical valve [Z95.2] 01/22/2015 Not Applicable     Chronic    Chronic anticoagulation [Z79.01] 12/22/2013 Not Applicable     Chronic    Essential (primary) hypertension [I10] 03/21/2013 Yes     Chronic    Chronic atrial fibrillation [I48.20] 03/21/2013 Yes     Chronic      Problems Resolved During this Admission:       Discharged Condition: good    Disposition: Home or Self Care    Follow Up:  Follow-up Information     Nubia Crocker MD In 2 weeks.    Specialty: Internal Medicine  Why: post-hospital follow-up  Contact information:  [address]  [place] [address]  [phone]                 Patient Instructions:      Ambulatory referral/consult to Pulmonology   Standing Status: Future   Referral Priority: Routine Referral Type: Consultation    Referral Reason: Specialty Services Required   Requested Specialty: Pulmonary Disease   Number of Visits Requested: 1     Diet Cardiac     Notify your health care provider if you experience any of the following:  temperature >100.4     Notify your health care provider if you experience any of the following:  increased confusion or weakness     Notify your health care provider if you experience any of the following:  persistent dizziness, light-headedness, or visual disturbances     Notify your health care provider if you experience any of the following:  difficulty breathing or increased cough     Activity as tolerated       Significant Diagnostic Studies:   CBC:  Recent Labs   Lab 07/07/20  1235 07/08/20  0508 07/10/20  0501   WBC 9.21 8.39 8.83   HGB 7.5* 7.6* 7.7*   HCT 28.3* 29.1* 29.4*    219 236   GRAN 75.6*  7.0 73.3*  6.2 83.7*  7.4   LYMPH 9.3*  0.9* 9.9*  0.8* 7.8*  0.7*   MONO 10.9  1.0 12.3  1.0 7.6  0.7   EOS 0.3 0.3 0.0   BASO 0.03 0.04 0.02     CMP:  Recent Labs   Lab 07/07/20  1235 07/08/20  0507 07/10/20  0501    139 140   K 4.3 4.5 4.3    105 104   CO2 24 26 24   BUN 15 15 18   CREATININE 1.0 1.0 1.0   GLU 96 93 145*   CALCIUM 8.7 8.9 9.2   MG  --  1.7 1.9   PHOS  --  3.1 3.1   ALKPHOS 67 63 68   AST 16 16 18   ALT 9* 8* 10   BILITOT 0.9 0.9 0.7   PROT 7.2 7.1 7.2   ALBUMIN 3.5 3.5 3.2*   ANIONGAP 10 8 12     Recent Labs   Lab 07/07/20  1218 07/07/20  2123   [id number] Negative Not Detected         Pending Diagnostic Studies:     None         Medications:  Reconciled Home Medications:      Medication List      START taking these medications    azithromycin 250 MG tablet  Commonly known as: Z-MARGARITA  Take 1 tablet (250 mg total) by mouth once daily. for 2 days     predniSONE 20 MG tablet  Commonly known as: DELTASONE  Take 2 tablets (40 mg total) by mouth once daily. for 3 days        CHANGE how you take these medications    fluticasone propion-salmeterol 115-21 mcg/dose  "115-21 mcg/actuation Hfaa inhaler  Commonly known as: ADVAIR HFA  Inhale 2 puffs into the lungs every 12 (twelve) hours. Controller  What changed: Another medication with the same name was removed. Continue taking this medication, and follow the directions you see here.        CONTINUE taking these medications    albuterol 90 mcg/actuation inhaler  Commonly known as: VENTOLIN HFA  INHALE 2 PUFFS INTO THE LUNGS EVERY 6 (SIX) HOURS AS NEEDED FOR WHEEZING. RESCUE     amLODIPine 2.5 MG tablet  Commonly known as: NORVASC  Take 1 tablet (2.5 mg total) by mouth once daily.     aspirin 81 MG EC tablet  Commonly known as: ECOTRIN  Take 81 mg by mouth once daily.     ferrous sulfate 324 mg (65 mg iron) Tbec  Take 1 tablet (324 mg total) by mouth 3 (three) times daily.     fluticasone propionate 50 mcg/actuation nasal spray  Commonly known as: FLONASE  2 sprays (100 mcg total) by Each Nostril route once daily.     furosemide 20 MG tablet  Commonly known as: LASIX  Take 1 tablet (20 mg total) by mouth daily as needed (Leg swelling).     latanoprost 0.005 % ophthalmic solution  Commonly known as: XALATAN  Place 1 drop into the right eye once daily.     metoprolol succinate 200 MG 24 hr tablet  Commonly known as: TOPROL-XL  Take 1 tablet (200 mg total) by mouth once daily.     needle (disp) 26 gauge 26 gauge x 1/2" Ndle  1 application by Misc.(Non-Drug; Combo Route) route once a week.     nitroGLYCERIN 0.4 MG SL tablet  Commonly known as: NITROSTAT  Place 1 tablet (0.4 mg total) under the tongue every 5 (five) minutes as needed for Chest pain.     pantoprazole 40 MG tablet  Commonly known as: PROTONIX  Take 1 tablet (40 mg total) by mouth once daily.     sertraline 100 MG tablet  Commonly known as: ZOLOFT  Take 1 tablet (100 mg total) by mouth once daily.     tamsulosin 0.4 mg Cap  Commonly known as: FLOMAX  Take 1 capsule (0.4 mg total) by mouth once daily.     warfarin 5 MG tablet  Commonly known as: COUMADIN  Take 2 tablets " (10 mg total) by mouth every Tues, Thurs, Sat. Take 1 1/2 tablets (7.5mg) by mouth daily on Mon, Wed, Friday, Sunday        STOP taking these medications    gabapentin 300 MG capsule  Commonly known as: NEURONTIN     olopatadine 0.2 % Drop  Commonly known as: PATADAY     OXYBUTYNIN CHLORIDE ORAL        ASK your doctor about these medications    oxybutynin 15 MG Tr24  Commonly known as: DITROPAN XL  Take 1 tablet (15 mg total) by mouth once daily.  Ask about: Should I take this medication?            Indwelling Lines/Drains at time of discharge:   Lines/Drains/Airways     None                 Time spent on the discharge of patient: 35 minutes  Patient was seen and examined on the date of discharge and determined to be suitable for discharge.         MAYDA Pat MD  Department of Hospital Medicine  [facility]

## 2020-07-10 NOTE — SUBJECTIVE & OBJECTIVE
No acute respiratory issues overnight off O2. Feeling well today and ready for discharge    Objective:     Vital Signs (Most Recent):  Temp: 98.4 °F (36.9 °C) (07/10/20 1152)  Pulse: 82 (07/10/20 1152)  Resp: 17 (07/10/20 0813)  BP: (!) 143/70 (07/10/20 1152)  SpO2: (!) 92 % (07/10/20 1152) Vital Signs (24h Range):  Temp:  [96.5 °F (35.8 °C)-98.9 °F (37.2 °C)] 98.4 °F (36.9 °C)  Pulse:  [] 82  Resp:  [17-18] 17  SpO2:  [90 %-95 %] 92 %  BP: (128-143)/(63-88) 143/70     Weight: 117 kg (257 lb 15 oz)  Body mass index is 47.18 kg/m².      Intake/Output Summary (Last 24 hours) at 7/10/2020 1250  Last data filed at 7/10/2020 0800  Gross per 24 hour   Intake 240 ml   Output 2550 ml   Net -2310 ml       Physical Exam  Vitals signs and nursing note reviewed.   Constitutional:       General: She is not in acute distress.     Appearance: She is morbidly obese. She is not ill-appearing, toxic-appearing or diaphoretic.   HENT:      Head: Normocephalic and atraumatic.      Nose: No congestion or rhinorrhea.      Mouth/Throat:      Mouth: Mucous membranes are moist.      Pharynx: Oropharynx is clear. Posterior oropharyngeal erythema present. No oropharyngeal exudate.   Eyes:      General: No scleral icterus.     Extraocular Movements: Extraocular movements intact.      Conjunctiva/sclera: Conjunctivae normal.      Pupils: Pupils are equal, round, and reactive to light.   Cardiovascular:      Rate and Rhythm: Normal rate. Rhythm irregular.      Heart sounds: Murmur (mechanical click) present.   Pulmonary:      Effort: No tachypnea, accessory muscle usage or respiratory distress.      Breath sounds: No decreased air movement. No decreased breath sounds, wheezing, rhonchi or rales.   Abdominal:      General: There is no distension.      Palpations: Abdomen is soft.   Musculoskeletal:      Right lower leg: No edema.      Left lower leg: No edema.   Skin:     General: Skin is warm and dry.      Findings: No erythema or rash.    Neurological:      General: No focal deficit present.      Mental Status: She is alert and oriented to person, place, and time. Mental status is at baseline.      Cranial Nerves: No cranial nerve deficit.         Vents:       Lines/Drains/Airways     Peripheral Intravenous Line                 Peripheral IV - Single Lumen 07/07/20 1225 22 G Right Antecubital 3 days                Significant Labs:    CBC/Anemia Profile:  Recent Labs   Lab 07/10/20  0501   WBC 8.83   HGB 7.7*   HCT 29.4*      MCV 65*   RDW 21.4*        Chemistries:  Recent Labs   Lab 07/10/20  0501      K 4.3      CO2 24   BUN 18   CREATININE 1.0   CALCIUM 9.2   ALBUMIN 3.2*   PROT 7.2   BILITOT 0.7   ALKPHOS 68   ALT 10   AST 18   MG 1.9   PHOS 3.1       All pertinent labs within the past 24 hours have been reviewed.    Significant Imaging:   I have reviewed and interpreted all pertinent imaging results/findings within the past 24 hours.

## 2020-07-10 NOTE — PROGRESS NOTES
[facility]  Pulmonology  Progress Note    Patient Name: Elayne Almanzar  MRN: [id number]  Admission Date: 7/7/2020  Hospital Length of Stay: 3 days  Code Status: Full Code  Attending Provider: KAVON Pat MD  Primary Care Provider: Nubia Crocker MD   Principal Problem: Bilateral pneumonia    Subjective:     No acute respiratory issues overnight off O2. Feeling well today and ready for discharge    Objective:     Vital Signs (Most Recent):  Temp: 98.4 °F (36.9 °C) (07/10/20 1152)  Pulse: 82 (07/10/20 1152)  Resp: 17 (07/10/20 0813)  BP: (!) 143/70 (07/10/20 1152)  SpO2: (!) 92 % (07/10/20 1152) Vital Signs (24h Range):  Temp:  [96.5 °F (35.8 °C)-98.9 °F (37.2 °C)] 98.4 °F (36.9 °C)  Pulse:  [] 82  Resp:  [17-18] 17  SpO2:  [90 %-95 %] 92 %  BP: (128-143)/(63-88) 143/70     Weight: 117 kg (257 lb 15 oz)  Body mass index is 47.18 kg/m².      Intake/Output Summary (Last 24 hours) at 7/10/2020 1250  Last data filed at 7/10/2020 0800  Gross per 24 hour   Intake 240 ml   Output 2550 ml   Net -2310 ml       Physical Exam  Vitals signs and nursing note reviewed.   Constitutional:       General: She is not in acute distress.     Appearance: She is morbidly obese. She is not ill-appearing, toxic-appearing or diaphoretic.   HENT:      Head: Normocephalic and atraumatic.      Nose: No congestion or rhinorrhea.      Mouth/Throat:      Mouth: Mucous membranes are moist.      Pharynx: Oropharynx is clear. Posterior oropharyngeal erythema present. No oropharyngeal exudate.   Eyes:      General: No scleral icterus.     Extraocular Movements: Extraocular movements intact.      Conjunctiva/sclera: Conjunctivae normal.      Pupils: Pupils are equal, round, and reactive to light.   Cardiovascular:      Rate and Rhythm: Normal rate. Rhythm irregular.      Heart sounds: Murmur (mechanical click) present.   Pulmonary:      Effort: No tachypnea, accessory muscle usage or respiratory distress.      Breath  sounds: No decreased air movement. No decreased breath sounds, wheezing, rhonchi or rales.   Abdominal:      General: There is no distension.      Palpations: Abdomen is soft.   Musculoskeletal:      Right lower leg: No edema.      Left lower leg: No edema.   Skin:     General: Skin is warm and dry.      Findings: No erythema or rash.   Neurological:      General: No focal deficit present.      Mental Status: She is alert and oriented to person, place, and time. Mental status is at baseline.      Cranial Nerves: No cranial nerve deficit.         Vents:       Lines/Drains/Airways     Peripheral Intravenous Line                 Peripheral IV - Single Lumen 07/07/20 1225 22 G Right Antecubital 3 days                Significant Labs:    CBC/Anemia Profile:  Recent Labs   Lab 07/10/20  0501   WBC 8.83   HGB 7.7*   HCT 29.4*      MCV 65*   RDW 21.4*        Chemistries:  Recent Labs   Lab 07/10/20  0501      K 4.3      CO2 24   BUN 18   CREATININE 1.0   CALCIUM 9.2   ALBUMIN 3.2*   PROT 7.2   BILITOT 0.7   ALKPHOS 68   ALT 10   AST 18   MG 1.9   PHOS 3.1       All pertinent labs within the past 24 hours have been reviewed.    Significant Imaging:   I have reviewed and interpreted all pertinent imaging results/findings within the past 24 hours.    Assessment/Plan:     Acute on chronic respiratory failure with hypoxia  · Former smoker with hx of COPD (FEV1 41%) on Advair and KEYSHAWN on CPAP  · Fever, fatigue myalgias with neg PCT. COVID neg x 2. CT chest with bilateral GGO  · Recommend treating for COPD exacerbation with Prednisone 40mg daily. This will effectively address steroid treatment for suspected COVID as well  · Continue Azithromycin. CT could represent recent COVID infection vs viral PNA  · Continue home Advair or hospital equivalent. Duonebs Q4 PRN.  Avoid over-oxygenation with spO2 goal >88%.   · Out of bed to chair with aggressive PT/OT    Chronic diastolic congestive heart failure  · BNP only  mildly elevated but likely blunted in the setting of morbid obesity  · Recommend strict I/O. Aim for daily net negative fluid balance with Lasix PRN    Complex sleep apnea syndrome  · Continue nightly CPAP at home settings. Encouraged ongoing compliance    COPD (chronic obstructive pulmonary disease)  · FEV1 41% but likely contribution of restrictive process from body habitus  · Continue home LABA/ICS. Duonebs Q6 PRN. Treat for COPD exacerbation as noted  · Resume follow up with pulmonary as outpatient. Encouraged weight loss and exercise regimen  · Pulmonary follow up after discharge with Dr Deven Barba MD  Pulmonology  [facility]

## 2020-07-10 NOTE — PHYSICIAN QUERY
PT Name: Elayne Almanzar  MR #: [id number]     ACUITY OF CONDITION CLARIFICATION      CDS: Alexx Franco RN CCDS               Contact information: [email or web address]   This form is a permanent document in the medical record.     Query Date: July 10, 2020    By submitting this query, we are merely seeking further clarification of documentation to reflect the severity of illness of your patient. Please utilize your independent clinical judgment when addressing the question(s) below.    The Medical record reflects the following:     Indicators   Supporting Clinical Findings Location in Medical Record     x Documentation of condition Chronic diastolic congestive heart failure 7/9/2020 Pulmonology consult Erick Barba MD/Reggie Wagoner MD     x Lab Value(s)  7/7/2020 12:35    (H)    Lab value     x Radiology Findings Cardiomegaly with findings suggestive of mild pulmonary edema.  Abnormal examination with patchy ground-glass pulmonary consolidation scattered within all lobes of both lungs.  The appearance is most suggestive of an infectious/inflammatory process and both viral and bacterial pneumonia should be considered.  The appearance and scattered patchy distribution would be atypical for pulmonary edema. 7/7/2020 Chest x-ray     7/7/2020 CT chest        x Treatment/Medication furosemide 80 mg IV  furosemide 20 mg IV  metoprolol succinate (TOPROL-XL) 24 hr tablet 200 mg PO daily 7/7/2020 Medication  7/9/2020 Medication  7/8/2020 & Current Medication     x Other SOB is mild but worse with exertion. Hypoxia, Acute pulmonary edema    Right lower leg: Edema (trace) present.   Left lower leg: Edema (trace) present.  Morbid obesity with BMI of 40.0-44.9, adult 7/7/2020 ED provider notes Navdeep Kam MD      7/7/2020 H&P D. Colten Pat MD      Provider, please specify the acuity/chronicity of Diastolic congestive heart failure:    [   ] Acute on chronic   [ X ] Chronic   [   ]  Clinically  Undetermined             Please document in your progress notes daily for the duration of treatment until resolved, and include in your discharge summary.

## 2020-07-10 NOTE — ASSESSMENT & PLAN NOTE
· Former smoker with hx of COPD (FEV1 41%) on Advair and KEYSHAWN on CPAP  · Fever, fatigue myalgias with neg PCT. COVID neg x 2. CT chest with bilateral GGO  · Recommend treating for COPD exacerbation with Prednisone 40mg daily. This will effectively address steroid treatment for suspected COVID as well  · Continue Azithromycin. CT could represent recent COVID infection vs viral PNA  · Continue home Advair or hospital equivalent. Duonebs Q4 PRN.  Avoid over-oxygenation with spO2 goal >88%.   · Out of bed to chair with aggressive PT/OT

## 2020-07-10 NOTE — PHYSICIAN QUERY
PT Name: Elayne Almanzar  MR #: [id number]     Documentation Clarification      CDS: Alexx Franco RN CCDS               Contact information: [email or web address]     This form is a permanent document in the medical record.     Query Date: July 10, 2020    By submitting this query, we are merely seeking further clarification of documentation. Please utilize your independent clinical judgment when addressing the question(s) below.    The Medical Record reflects the following:    Clinical Findings Location in Medical Record   Acute on chronic respiratory failure with hypoxia    Former smoker with hx of COPD (FEV1 41%) on Advair and KEYSHAWN on CPAP  COPD exacerbation +/- community-acquired pneumonia  Complex sleep apnea syndrome  Continue nightly CPAP at home settings.   7/9/2020 Pulmonology consult Erick Barba MD/Reggie Wagoner MD   In ED, she was found to have borderline oxygen saturations in the low 90s.  Pulmonary:      Effort: Pulmonary effort is normal. No respiratory distress.      Breath sounds: Wheezing (trace expiratory) present. 7/7/2020 H&P D. Colten Pat MD   Pt ambulated down hallway to room 8 with continuous pulse ox attached. O2 sat dropped to 89%. Pt able to recover to 92% on RA sitting on ED stretcher. 7/7/2020 ED nursing notes   SOB is mild but worse with exertion.  Pulmonary/Chest: No respiratory distress. Effort normal.  No tachypnea.  Hypoxia   Acute pulmonary edema 7/7/2020 ED provider notes Navdeep Kam MD   O2 sat 90% on RA, RR 18  Supplemental O2 @ 1L 7/7/2020 Vitals  7/7-7/9/2020 Vitals   Cardiomegaly with findings suggestive of mild pulmonary edema.    Abnormal examination with patchy ground-glass pulmonary consolidation scattered within all lobes of both lungs.  The appearance is most suggestive of an infectious/inflammatory process and both viral and bacterial pneumonia should be considered.  The appearance and scattered patchy distribution would be atypical for pulmonary  edema. 7/7/2020 Chest x-ray     7/7/2020 CT chest                                                                             Provider, please clarify the diagnosis of Acute on chronic respiratory failure with hypoxia:      [   ] Acute on chronic respiratory failure with hypoxia ruled in and additional clinical support/decision-making indicators for the diagnosis include (please specify):__________________________   [   ] Acute on chronic respiratory failure with hypoxia has been ruled out   [   ] Acute on chronic respiratory failure with hypoxia has been ruled out, other diagnosis ruled in:      [   ] Hypoxia only      [   ] Chronic Respiratory Failure with Hypoxia      [   ] Other (please specify):_______________   [   ] Other clarification (please specify): ___________________   [ x ] Clinically undetermined

## 2020-07-10 NOTE — PLAN OF CARE
Patient is discharged home with self care.     No CM needs for discharge.     Family will transport the patient home.     07/10/20 1038   Final Note   Assessment Type Final Discharge Note   Anticipated Discharge Disposition Home   What phone number can be called within the next 1-3 days to see how you are doing after discharge? [phone]   [facility] Referral Info   Post Acute Recommendation No Care

## 2020-07-10 NOTE — NURSING
Discharge instructions and follow up reviewed with pt.  Telemetry box returned.  IV access removed.  Pt denies questions at this time.  Medications delivered to bedside.  Family will provide transport home.

## 2020-07-10 NOTE — PLAN OF CARE
Patient on RA. Sats 90 to 93%. MDI txs given. Cpap w/1L O2 qhs, settings as documented. Pt. in no distress, will continue to monitor.

## 2020-07-13 ENCOUNTER — PATIENT OUTREACH (OUTPATIENT)
Dept: ADMINISTRATIVE | Facility: CLINIC | Age: 65
End: 2020-07-13

## 2020-07-13 LAB — BACTERIA BLD CULT: NORMAL

## 2020-07-13 RX ORDER — OXYBUTYNIN CHLORIDE 5 MG/1
5 TABLET, EXTENDED RELEASE ORAL DAILY
Status: ON HOLD | COMMUNITY
End: 2023-01-01

## 2020-07-13 NOTE — PATIENT INSTRUCTIONS
Pneumonia (Adult)  Pneumonia is an infection deep within the lungs. It is in the small air sacs (alveoli). Pneumonia may be caused by a virus or bacteria. Pneumonia caused by bacteria is usually treated with an antibiotic. Severe cases may need to be treated in the hospital. Milder cases can be treated at home. Symptoms usually start to get better during the first 2 days of treatment.    Home care  Follow these guidelines when caring for yourself at home:  · Rest at home for the first 2 to 3 days, or until you feel stronger. Dont let yourself get overly tired when you go back to your activities.  · Stay away from cigarette smoke - yours or other peoples.  · You may use acetaminophen or ibuprofen to control fever or pain, unless another medicine was prescribed. If you have chronic liver or kidney disease, talk with your healthcare provider before using these medicines. Also talk with your provider if youve had a stomach ulcer or gastrointestinal bleeding. Dont give aspirin to anyone younger than 18 years of age who is ill with a fever. It may cause severe liver damage.  · Your appetite may be poor, so a light diet is fine.  · Drink 6 to 8 glasses of fluids every day to make sure you are getting enough fluids. Beverages can include water, sport drinks, sodas without caffeine, juices, tea, or soup. Fluids will help loosen secretions in the lung. This will make it easier for you to cough up the phlegm (sputum). If you also have heart or kidney disease, check with your healthcare provider before you drink extra fluids.  · Take antibiotic medicine prescribed until it is all gone, even if you are feeling better after a few days.  Follow-up care  Follow up with your healthcare provider in the next 2 to 3 days, or as advised. This is to be sure the medicine is helping you get better.  If you are 65 or older, you should get a pneumococcal vaccine and a yearly flu (influenza) shot. You should also get these vaccines if  you have chronic lung disease like asthma, emphysema, or COPD. Recently, a second type of pneumonia vaccine has become available for everyone over 65 years old. This is in addition to the previous vaccine. Ask your provider about this.  When to seek medical advice  Call your healthcare provider right away if any of these occur:  · You dont get better within the first 48 hours of treatment  · Shortness of breath gets worse  · Rapid breathing (more than 25 breaths per minute)  · Coughing up blood  · Chest pain gets worse with breathing  · Fever of 100.4°F (38°C) or higher that doesnt get better with fever medicine  · Weakness, dizziness, or fainting that gets worse  · Thirst or dry mouth that gets worse  · Sinus pain, headache, or a stiff neck  · Chest pain not caused by coughing  Date Last Reviewed: 1/1/2017  © 4947-5178 [employer]. [address] [place] [address]. All rights reserved. This information is not intended as a substitute for professional medical care. Always follow your healthcare professional's instructions.

## 2020-07-13 NOTE — TELEPHONE ENCOUNTER
Spoke with patient and she agreed to date and time of appointment. 7/30/2020 at 8 am for a hospital follow up.

## 2020-07-13 NOTE — TELEPHONE ENCOUNTER
Please let her know I will be out of town. The earliest I can see her is 7-30-20 at 8 AM- please schedule.    If she needs to be seen before then, please schedule urgent care appt, first available provider

## 2020-07-14 ENCOUNTER — HOSPITAL ENCOUNTER (OUTPATIENT)
Dept: RADIOLOGY | Facility: HOSPITAL | Age: 65
Discharge: HOME | End: 2020-07-14
Attending: INTERNAL MEDICINE
Payer: COMMERCIAL

## 2020-07-14 ENCOUNTER — ANTI-COAG VISIT (OUTPATIENT)
Dept: CARDIOLOGY | Facility: CLINIC | Age: 65
End: 2020-07-14
Payer: COMMERCIAL

## 2020-07-14 DIAGNOSIS — Z98.890 H/O BREAST BIOPSY: ICD-10-CM

## 2020-07-14 DIAGNOSIS — I48.20 CHRONIC ATRIAL FIBRILLATION: ICD-10-CM

## 2020-07-14 PROCEDURE — 77065 DX MAMMO INCL CAD UNI: CPT | Mod: 26,LT,, | Performed by: RADIOLOGY

## 2020-07-14 PROCEDURE — 77061 BREAST TOMOSYNTHESIS UNI: CPT | Mod: TC,PO,LT

## 2020-07-14 PROCEDURE — 77065 MAMMO DIGITAL DIAGNOSTIC LEFT WITH TOMOSYNTHESIS_CAD: ICD-10-PCS | Mod: 26,LT,, | Performed by: RADIOLOGY

## 2020-07-14 PROCEDURE — 77065 DX MAMMO INCL CAD UNI: CPT | Mod: TC,PO,LT

## 2020-07-14 PROCEDURE — 77061 MAMMO DIGITAL DIAGNOSTIC LEFT WITH TOMOSYNTHESIS_CAD: ICD-10-PCS | Mod: 26,LT,, | Performed by: RADIOLOGY

## 2020-07-14 PROCEDURE — 77061 BREAST TOMOSYNTHESIS UNI: CPT | Mod: 26,LT,, | Performed by: RADIOLOGY

## 2020-07-14 PROCEDURE — 93793 PR ANTICOAGULANT MGMT FOR PT TAKING WARFARIN: ICD-10-PCS | Mod: S$GLB,,,

## 2020-07-14 PROCEDURE — 93793 ANTICOAG MGMT PT WARFARIN: CPT | Mod: S$GLB,,,

## 2020-07-14 NOTE — PROGRESS NOTES
Patient was given lab result, verified coumadin dosage correctly, reports no changes, Patient was advised to continue same dose of coumadin and given next lab date, verbalized understanding, appointment booked

## 2020-07-14 NOTE — PROGRESS NOTES
INR at goal. Medications and chart reviewed. No changes noted to necessitate adjustment of warfarin or follow-up plan. See calendar.  Pt  S/p hospital admission for bilateral PNA completing prednisone today.

## 2020-07-16 ENCOUNTER — TELEPHONE (OUTPATIENT)
Dept: INTERNAL MEDICINE | Facility: CLINIC | Age: 65
End: 2020-07-16

## 2020-07-17 NOTE — PROGRESS NOTES
"  PATIENT: Elayne Almanzar  MRN: [id number]  DATE: 7/21/2020      Diagnosis:   1. Iron deficiency anemia secondary to inadequate dietary iron intake        Chief Complaint: Consult for anemia    Subjective:    Initial History: Ms. Almanzar is a 64 y.o. female with pertinent PMHx of permanent atrial fibrillation on coumadin, CKD, KEYSHAWN compliant with CPAP, chronic diastolic heart failure, and s/p mechanical mitral valve placed in 2005 due to rheumatic mitral stenosis referred for evaluation of microcytic anemia.       Pt does not take ferrous sulfate due to constipation it causes. Is compliant with aspirin 81mg qday and is therapeutic on coumadin. Her last EGD 12/19/19 showing mild gastritis and her last colonoscopy 6/26/19 showed 3mm sessile polyp which was resected and a right colon "full of actively bleeding AVMs." Has had a stable persistent MCV of 68-70 since 2011 with a steady decline in her hgb/hct since that time. Most recent H&H 7.7 and 29% with MCV 65.  Her other cell lines have remained normal.  Has also had a persistently low/normal ferritin with previous iron studies indicating iron deficiency anemia and a persistently elevated RDW. B12 and folate WNL. Reticulocytes 1.4%. Does have a persistently elevated LDH with low/normal haptoglobin but normal t.bili.  CTabd May 2018 without splenomegaly. Has had TTG checked in the past which was negative.    Does feel fatigued and often cold but otherwise denies symptoms including fever, chills, shortness of breath, chest pain, hair loss, abdominal pain, n/v/d. Always has cravings for ice and feels she could eat ice all day which has been ongoing for many years. She denies a hx of heavy periods and underwent menopause at age 55. Denies maroon and black colored stools. Denies hematuria. Has intentionally lost about 20 lbs recently due to cutting out sodium and the associated foods. She quit smoking in 2002, denies alcohol use, and denies recreational drug use. Had her " home redone after Marian and drinks bottled water. States her mothers and sister all have iron deficiency anemia but denies any knowledge of a genetic condition. Denies personal hx of cancer but has significant family hx of mother and grandmother with colon cancer, aunt with breast cancer, and brother who passed away at age 23 due to testicular cancer.        Past Medical History:   Past Medical History:   Diagnosis Date    Acute on chronic diastolic congestive heart failure     Anticoagulant long-term use     Asthma     Atrial fibrillation     Cataract     Chronic kidney disease     COPD (chronic obstructive pulmonary disease)     Depression     Dysuria     Flank pain     HTN (hypertension)     Nephrolithiasis     KEYSHAWN (obstructive sleep apnea)     awaiting CPAP machine     Rheumatic disease of mitral valve     Uncontrolled type 2 diabetes mellitus with complication, with long-term current use of insulin 2020    Urinary incontinence     Urinary tract infection        Past Surgical HIstory:   Past Surgical History:   Procedure Laterality Date     SECTION      COLONOSCOPY N/A 2019    Procedure: COLONOSCOPY;  Surgeon: Devon Bowling MD;  Location: [facility] (4TH FLR);  Service: Endoscopy;  Laterality: N/A;  ok to hold Coumadin x 5 days with Lovenox bridge per Coumadin clinic-[facility]    ESOPHAGOGASTRODUODENOSCOPY N/A 2019    Procedure: EGD (ESOPHAGOGASTRODUODENOSCOPY);  Surgeon: Smooth Torrez MD;  Location: [facility] (2ND FLR);  Service: Endoscopy;  Laterality: N/A;  2nd floor requested due to comorbidities, Hypertension, permanent A. 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 44     per Coumadin clinic-ok to hold for 5 days w/bridge    kidney stone removal      MITRAL VALVE REPLACEMENT  2004    TUBAL LIGATION         Family History:   Family History   Problem Relation Age of Onset    Stroke  "Paternal Grandmother     Colon cancer Maternal Grandmother     Cancer Brother         testicular cancer    Diabetes Sister     Hypertension Sister     Breast cancer Paternal Aunt     Diabetes Sister     Diabetes Sister     Heart disease Father 70        MI    Diabetes Father     Colon cancer Mother 83    Ovarian cancer Neg Hx        Social History:  reports that she quit smoking about 18 years ago. Her smoking use included cigarettes. She has a 10.00 pack-year smoking history. She has never used smokeless tobacco. She reports that she does not drink alcohol or use drugs.    Allergies:  Review of patient's allergies indicates:  No Known Allergies    Medications:  Current Outpatient Medications   Medication Sig Dispense Refill    albuterol (VENTOLIN HFA) 90 mcg/actuation inhaler INHALE 2 PUFFS INTO THE LUNGS EVERY 6 (SIX) HOURS AS NEEDED FOR WHEEZING. RESCUE 18 g 6    amLODIPine (NORVASC) 2.5 MG tablet Take 1 tablet (2.5 mg total) by mouth once daily. 30 tablet 4    aspirin (ECOTRIN) 81 MG EC tablet Take 81 mg by mouth once daily.       ferrous sulfate 324 mg (65 mg iron) TbEC Take 1 tablet (324 mg total) by mouth 3 (three) times daily. 90 tablet 6    fluticasone propion-salmeterol 115-21 mcg/dose (ADVAIR HFA) 115-21 mcg/actuation HFAA inhaler Inhale 2 puffs into the lungs every 12 (twelve) hours. Controller 12 g 11    fluticasone propionate (FLONASE) 50 mcg/actuation nasal spray 2 sprays (100 mcg total) by Each Nostril route once daily. 16 g 3    furosemide (LASIX) 20 MG tablet Take 1 tablet (20 mg total) by mouth daily as needed (Leg swelling).      latanoprost (XALATAN) 0.005 % ophthalmic solution Place 1 drop into the right eye once daily. 2.5 mL 12    metoprolol succinate (TOPROL-XL) 200 MG 24 hr tablet Take 1 tablet (200 mg total) by mouth once daily. 30 tablet 1    needle, disp, 26 gauge 26 gauge x 1/2" Ndle 1 application by Misc.(Non-Drug; Combo Route) route once a week. 10 each 0    " "nitroGLYCERIN (NITROSTAT) 0.4 MG SL tablet Place 1 tablet (0.4 mg total) under the tongue every 5 (five) minutes as needed for Chest pain. 30 tablet 1    oxybutynin (DITROPAN-XL) 5 MG TR24 Take 5 mg by mouth once daily.      pantoprazole (PROTONIX) 40 MG tablet Take 1 tablet (40 mg total) by mouth once daily. 30 tablet 3    sertraline (ZOLOFT) 100 MG tablet TAKE 1 TABLET (100 MG TOTAL) BY MOUTH ONCE DAILY. 30 tablet 0    tamsulosin (FLOMAX) 0.4 mg Cap Take 1 capsule (0.4 mg total) by mouth once daily. 30 capsule 11    warfarin (COUMADIN) 5 MG tablet Take 2 tablets (10 mg total) by mouth every Tues, Thurs, Sat. Take 1 1/2 tablets (7.5mg) by mouth daily on Mon, Wed, Friday, Sunday 60 tablet 5     No current facility-administered medications for this visit.        Review of Systems   Constitutional: Positive for fatigue. Negative for appetite change, chills and fever.   HENT: Negative for nosebleeds and sore throat.    Eyes: Negative for photophobia and visual disturbance.   Respiratory: Negative for cough and shortness of breath.    Cardiovascular: Negative for chest pain, palpitations and leg swelling.   Gastrointestinal: Negative for abdominal pain, blood in stool, diarrhea, nausea and vomiting.   Endocrine: Positive for cold intolerance. Negative for heat intolerance, polydipsia and polyuria.   Genitourinary: Negative for dysuria, hematuria and urgency.   Musculoskeletal: Negative for arthralgias and myalgias.   Skin: Negative for rash and wound.   Neurological: Negative for dizziness and headaches.   Hematological: Negative for adenopathy. Does not bruise/bleed easily.   Psychiatric/Behavioral: Negative for confusion. The patient is not nervous/anxious.           Objective:      Vitals:   Vitals:    07/21/20 0805   BP: 138/61   Pulse: 75   Resp: 17   Temp: 98.4 °F (36.9 °C)   SpO2: 96%   Weight: 104.3 kg (230 lb)   Height: 5' 2" (1.575 m)       Physical Exam  Vitals signs reviewed.   Constitutional:       " Appearance: Normal appearance.   HENT:      Head: Normocephalic and atraumatic.      Mouth/Throat:      Mouth: Mucous membranes are moist.   Eyes:      Extraocular Movements: Extraocular movements intact.      Comments: Pale conjunctivae   Neck:      Musculoskeletal: Normal range of motion and neck supple.   Cardiovascular:      Rate and Rhythm: Rhythm irregularly irregular.   Pulmonary:      Effort: Pulmonary effort is normal.      Breath sounds: Normal breath sounds.   Abdominal:      General: Bowel sounds are normal.      Palpations: Abdomen is soft.   Skin:     General: Skin is warm and dry.      Capillary Refill: Capillary refill takes more than 3 seconds.   Neurological:      General: No focal deficit present.      Mental Status: She is alert and oriented to person, place, and time.   Psychiatric:         Mood and Affect: Affect normal.         Behavior: Behavior is cooperative.         Laboratory Data:  No visits with results within 1 Week(s) from this visit.   Latest known visit with results is:   Lab Visit on 07/14/2020   Component Date Value Ref Range Status    Prothrombin Time 07/14/2020 24.0* 9.0 - 12.5 sec Final    INR 07/14/2020 2.5* 0.8 - 1.2 Final    Comment: Coumadin Therapy:  2.0 - 3.0 for INR for all indicators except mechanical heart valves  and antiphospholipid syndromes which should use 2.5 - 3.5.           Imaging:   Assessment:       1. Iron deficiency anemia secondary to inadequate dietary iron intake            Plan:     1. Microcytic Hypochromic Anemia, likely multifactorial  2. Iron Deficiency Anemia  3. Chronic blood loss Anemia  - MCV persistently 68-70 since 2011 with most recent at 65  - Down trending hgb from ~12 to 7.7 over that time frame  - Persistently low/normal ferritin since 2013 with iron studies indicating iron deficiency anemia (low iron, low iron sat, elevated TIBC)  - Noncompliant with ferrous sulfate 324mg TID due to constipation  - Likely chronic blood loss as  "follows: Previous colonoscopy 6/26/19  showed right colon "full of actively bleeding AVMs" with pt on aspirin and coumadin. UAs also persistently positive for blood in the setting of frequent nephrolithiasis although this is much less likely contributing  - Retic 1.4, inappropriately normal indicating hypoproliferation  - B12/ folate WNL  - Has mechanical mitral valve with persistently elevated LDH but haptoglobin and T. Bili normal without schistocytes seen on smear      Recs:  1. Pt not tolerating oral iron, will schedule for IV iron  2. Will check iron/TIBC, retic, peripheral smear, copper, hgb electropheresis  3. Return to clinic in 3 months with repeat labs at that time      Signature    Jose M Park, PGY- IV  Hematology/Oncology Fellow  "

## 2020-07-21 ENCOUNTER — LAB VISIT (OUTPATIENT)
Dept: LAB | Facility: HOSPITAL | Age: 65
End: 2020-07-21
Attending: INTERNAL MEDICINE
Payer: COMMERCIAL

## 2020-07-21 ENCOUNTER — OFFICE VISIT (OUTPATIENT)
Dept: HEMATOLOGY/ONCOLOGY | Facility: CLINIC | Age: 65
End: 2020-07-21
Payer: COMMERCIAL

## 2020-07-21 VITALS
BODY MASS INDEX: 42.33 KG/M2 | TEMPERATURE: 98 F | RESPIRATION RATE: 17 BRPM | OXYGEN SATURATION: 96 % | SYSTOLIC BLOOD PRESSURE: 138 MMHG | HEART RATE: 75 BPM | HEIGHT: 62 IN | DIASTOLIC BLOOD PRESSURE: 61 MMHG | WEIGHT: 230 LBS

## 2020-07-21 DIAGNOSIS — D50.8 IRON DEFICIENCY ANEMIA SECONDARY TO INADEQUATE DIETARY IRON INTAKE: ICD-10-CM

## 2020-07-21 DIAGNOSIS — E51.9 THIAMINE DEFICIENCY: ICD-10-CM

## 2020-07-21 DIAGNOSIS — Z95.2 H/O MITRAL VALVE REPLACEMENT WITH MECHANICAL VALVE: Chronic | ICD-10-CM

## 2020-07-21 DIAGNOSIS — D50.8 IRON DEFICIENCY ANEMIA SECONDARY TO INADEQUATE DIETARY IRON INTAKE: Primary | ICD-10-CM

## 2020-07-21 LAB
BASOPHILS # BLD AUTO: 0.04 K/UL (ref 0–0.2)
BASOPHILS # BLD AUTO: 0.04 K/UL (ref 0–0.2)
BASOPHILS NFR BLD: 0.7 % (ref 0–1.9)
BASOPHILS NFR BLD: 0.7 % (ref 0–1.9)
DIFFERENTIAL METHOD: ABNORMAL
DIFFERENTIAL METHOD: ABNORMAL
EOSINOPHIL # BLD AUTO: 0.1 K/UL (ref 0–0.5)
EOSINOPHIL # BLD AUTO: 0.1 K/UL (ref 0–0.5)
EOSINOPHIL NFR BLD: 2.1 % (ref 0–8)
EOSINOPHIL NFR BLD: 2.1 % (ref 0–8)
ERYTHROCYTE [DISTWIDTH] IN BLOOD BY AUTOMATED COUNT: 21.5 % (ref 11.5–14.5)
ERYTHROCYTE [DISTWIDTH] IN BLOOD BY AUTOMATED COUNT: 21.5 % (ref 11.5–14.5)
FERRITIN SERPL-MCNC: 26 NG/ML (ref 20–300)
FERRITIN SERPL-MCNC: 26 NG/ML (ref 20–300)
HCT VFR BLD AUTO: 38 % (ref 37–48.5)
HCT VFR BLD AUTO: 38 % (ref 37–48.5)
HGB BLD-MCNC: 9.4 G/DL (ref 12–16)
HGB BLD-MCNC: 9.4 G/DL (ref 12–16)
IMM GRANULOCYTES # BLD AUTO: 0.01 K/UL (ref 0–0.04)
IMM GRANULOCYTES # BLD AUTO: 0.01 K/UL (ref 0–0.04)
IMM GRANULOCYTES NFR BLD AUTO: 0.2 % (ref 0–0.5)
IMM GRANULOCYTES NFR BLD AUTO: 0.2 % (ref 0–0.5)
IRON SERPL-MCNC: 24 UG/DL (ref 30–160)
IRON SERPL-MCNC: 24 UG/DL (ref 30–160)
LYMPHOCYTES # BLD AUTO: 1.4 K/UL (ref 1–4.8)
LYMPHOCYTES # BLD AUTO: 1.4 K/UL (ref 1–4.8)
LYMPHOCYTES NFR BLD: 25.1 % (ref 18–48)
LYMPHOCYTES NFR BLD: 25.1 % (ref 18–48)
MCH RBC QN AUTO: 16.6 PG (ref 27–31)
MCH RBC QN AUTO: 16.6 PG (ref 27–31)
MCHC RBC AUTO-ENTMCNC: 24.7 G/DL (ref 32–36)
MCHC RBC AUTO-ENTMCNC: 24.7 G/DL (ref 32–36)
MCV RBC AUTO: 67 FL (ref 82–98)
MCV RBC AUTO: 67 FL (ref 82–98)
MONOCYTES # BLD AUTO: 0.9 K/UL (ref 0.3–1)
MONOCYTES # BLD AUTO: 0.9 K/UL (ref 0.3–1)
MONOCYTES NFR BLD: 15 % (ref 4–15)
MONOCYTES NFR BLD: 15 % (ref 4–15)
NEUTROPHILS # BLD AUTO: 3.3 K/UL (ref 1.8–7.7)
NEUTROPHILS # BLD AUTO: 3.3 K/UL (ref 1.8–7.7)
NEUTROPHILS NFR BLD: 56.9 % (ref 38–73)
NEUTROPHILS NFR BLD: 56.9 % (ref 38–73)
NRBC BLD-RTO: 0 /100 WBC
NRBC BLD-RTO: 0 /100 WBC
PATH REV BLD -IMP: NORMAL
PATH REV BLD -IMP: NORMAL
PLATELET # BLD AUTO: 352 K/UL (ref 150–350)
PLATELET # BLD AUTO: 352 K/UL (ref 150–350)
PMV BLD AUTO: 9.9 FL (ref 9.2–12.9)
PMV BLD AUTO: 9.9 FL (ref 9.2–12.9)
RBC # BLD AUTO: 5.65 M/UL (ref 4–5.4)
RBC # BLD AUTO: 5.65 M/UL (ref 4–5.4)
RETICS/RBC NFR AUTO: 1.8 % (ref 0.5–2.5)
RETICS/RBC NFR AUTO: 1.8 % (ref 0.5–2.5)
SATURATED IRON: 5 % (ref 20–50)
SATURATED IRON: 5 % (ref 20–50)
TOTAL IRON BINDING CAPACITY: 521 UG/DL (ref 250–450)
TOTAL IRON BINDING CAPACITY: 521 UG/DL (ref 250–450)
TRANSFERRIN SERPL-MCNC: 352 MG/DL (ref 200–375)
TRANSFERRIN SERPL-MCNC: 352 MG/DL (ref 200–375)
WBC # BLD AUTO: 5.74 K/UL (ref 3.9–12.7)
WBC # BLD AUTO: 5.74 K/UL (ref 3.9–12.7)

## 2020-07-21 PROCEDURE — 85045 AUTOMATED RETICULOCYTE COUNT: CPT

## 2020-07-21 PROCEDURE — 85025 COMPLETE CBC W/AUTO DIFF WBC: CPT

## 2020-07-21 PROCEDURE — 85060 PATHOLOGIST REVIEW: ICD-10-PCS | Mod: ,,, | Performed by: PATHOLOGY

## 2020-07-21 PROCEDURE — 82728 ASSAY OF FERRITIN: CPT

## 2020-07-21 PROCEDURE — 36415 COLL VENOUS BLD VENIPUNCTURE: CPT

## 2020-07-21 PROCEDURE — 99999 PR PBB SHADOW E&M-EST. PATIENT-LVL IV: CPT | Mod: PBBFAC,,, | Performed by: INTERNAL MEDICINE

## 2020-07-21 PROCEDURE — 99999 PR PBB SHADOW E&M-EST. PATIENT-LVL IV: ICD-10-PCS | Mod: PBBFAC,,, | Performed by: INTERNAL MEDICINE

## 2020-07-21 PROCEDURE — 99205 OFFICE O/P NEW HI 60 MIN: CPT | Mod: S$GLB,,, | Performed by: INTERNAL MEDICINE

## 2020-07-21 PROCEDURE — 99205 PR OFFICE/OUTPT VISIT, NEW, LEVL V, 60-74 MIN: ICD-10-PCS | Mod: S$GLB,,, | Performed by: INTERNAL MEDICINE

## 2020-07-21 PROCEDURE — 85060 BLOOD SMEAR INTERPRETATION: CPT | Mod: ,,, | Performed by: PATHOLOGY

## 2020-07-21 PROCEDURE — 82525 ASSAY OF COPPER: CPT

## 2020-07-21 PROCEDURE — 83020 HEMOGLOBIN ELECTROPHORESIS: CPT

## 2020-07-21 PROCEDURE — 83540 ASSAY OF IRON: CPT

## 2020-07-21 RX ORDER — SODIUM CHLORIDE 0.9 % (FLUSH) 0.9 %
10 SYRINGE (ML) INJECTION
Status: CANCELLED | OUTPATIENT
Start: 2020-07-21

## 2020-07-21 RX ORDER — SODIUM CHLORIDE 0.9 % (FLUSH) 0.9 %
10 SYRINGE (ML) INJECTION
Status: CANCELLED | OUTPATIENT
Start: 2020-07-28

## 2020-07-21 RX ORDER — HEPARIN 100 UNIT/ML
500 SYRINGE INTRAVENOUS
Status: CANCELLED | OUTPATIENT
Start: 2020-07-28

## 2020-07-21 RX ORDER — HEPARIN 100 UNIT/ML
500 SYRINGE INTRAVENOUS
Status: CANCELLED | OUTPATIENT
Start: 2020-07-21

## 2020-07-21 NOTE — Clinical Note
1. Please have obtain ferritin, iron/TIBC, reticulocyte count, peripheral smear, copper, hgb electropheresis  2. Return to clinic with myself in 3 months with CBC, ferritin, iron/TIBC, reticulocyte count prior to appointment  3. Schedule for IV iron in near future

## 2020-07-22 LAB
HGB A2 MFR BLD HPLC: 2 % (ref 2.2–3.2)
HGB FRACT BLD ELPH-IMP: ABNORMAL
HGB FRACT BLD ELPH-IMP: NORMAL

## 2020-07-23 LAB — COPPER SERPL-MCNC: 1969 UG/L (ref 810–1990)

## 2020-07-30 ENCOUNTER — OFFICE VISIT (OUTPATIENT)
Dept: INTERNAL MEDICINE | Facility: CLINIC | Age: 65
End: 2020-07-30
Payer: COMMERCIAL

## 2020-07-30 DIAGNOSIS — J18.9 PNEUMONIA DUE TO INFECTIOUS ORGANISM, UNSPECIFIED LATERALITY, UNSPECIFIED PART OF LUNG: Primary | ICD-10-CM

## 2020-07-30 DIAGNOSIS — I10 HYPERTENSION, UNSPECIFIED TYPE: ICD-10-CM

## 2020-07-30 PROCEDURE — 3075F PR MOST RECENT SYSTOLIC BLOOD PRESS GE 130-139MM HG: ICD-10-PCS | Mod: CPTII,S$GLB,, | Performed by: INTERNAL MEDICINE

## 2020-07-30 PROCEDURE — 3078F PR MOST RECENT DIASTOLIC BLOOD PRESSURE < 80 MM HG: ICD-10-PCS | Mod: CPTII,S$GLB,, | Performed by: INTERNAL MEDICINE

## 2020-07-30 PROCEDURE — 99999 PR PBB SHADOW E&M-EST. PATIENT-LVL V: ICD-10-PCS | Mod: PBBFAC,,, | Performed by: INTERNAL MEDICINE

## 2020-07-30 PROCEDURE — 90471 IMMUNIZATION ADMIN: CPT | Mod: S$GLB,,, | Performed by: INTERNAL MEDICINE

## 2020-07-30 PROCEDURE — 99214 PR OFFICE/OUTPT VISIT, EST, LEVL IV, 30-39 MIN: ICD-10-PCS | Mod: 25,S$GLB,, | Performed by: INTERNAL MEDICINE

## 2020-07-30 PROCEDURE — 3075F SYST BP GE 130 - 139MM HG: CPT | Mod: CPTII,S$GLB,, | Performed by: INTERNAL MEDICINE

## 2020-07-30 PROCEDURE — 3008F BODY MASS INDEX DOCD: CPT | Mod: CPTII,S$GLB,, | Performed by: INTERNAL MEDICINE

## 2020-07-30 PROCEDURE — 3008F PR BODY MASS INDEX (BMI) DOCUMENTED: ICD-10-PCS | Mod: CPTII,S$GLB,, | Performed by: INTERNAL MEDICINE

## 2020-07-30 PROCEDURE — 3078F DIAST BP <80 MM HG: CPT | Mod: CPTII,S$GLB,, | Performed by: INTERNAL MEDICINE

## 2020-07-30 PROCEDURE — 99214 OFFICE O/P EST MOD 30 MIN: CPT | Mod: 25,S$GLB,, | Performed by: INTERNAL MEDICINE

## 2020-07-30 PROCEDURE — 90471 PNEUMOCOCCAL POLYSACCHARIDE VACCINE 23-VALENT =>2YO SQ IM: ICD-10-PCS | Mod: S$GLB,,, | Performed by: INTERNAL MEDICINE

## 2020-07-30 PROCEDURE — 99999 PR PBB SHADOW E&M-EST. PATIENT-LVL V: CPT | Mod: PBBFAC,,, | Performed by: INTERNAL MEDICINE

## 2020-07-30 PROCEDURE — 90732 PPSV23 VACC 2 YRS+ SUBQ/IM: CPT | Mod: S$GLB,,, | Performed by: INTERNAL MEDICINE

## 2020-07-30 PROCEDURE — 90732 PNEUMOCOCCAL POLYSACCHARIDE VACCINE 23-VALENT =>2YO SQ IM: ICD-10-PCS | Mod: S$GLB,,, | Performed by: INTERNAL MEDICINE

## 2020-08-02 VITALS
OXYGEN SATURATION: 98 % | WEIGHT: 238.31 LBS | TEMPERATURE: 99 F | BODY MASS INDEX: 43.86 KG/M2 | HEIGHT: 62 IN | SYSTOLIC BLOOD PRESSURE: 136 MMHG | HEART RATE: 73 BPM | DIASTOLIC BLOOD PRESSURE: 70 MMHG

## 2020-08-02 NOTE — PROGRESS NOTES
Subjective:       Patient ID: Elayne Almanzar is a 65 y.o. female.    Chief Complaint: Hypertension    HPI  She returns for management of hypertension.  She has had hypertension for over a year.  Current treatment has included medications outlined in medication list.  She denies chest pain or shortness of breath.  No palpitations.  Denies left arm or neck pain.  She was recently hospitalized with pneumonia.  Please see hospitalization records    Past medical history: Hypertension, A. fib, COPD, hyperlipidemia,pneumonia, nephrolithiasis , iron deficiency anemia, glaucoma, sleep apnea, status post mitral valve replacement , family history of colon cancer-mother. She had a colonoscopy June 2019      Medications: Proventil MDI 2 puffs 4 times a day when necessary,Advair 500/50 one puff twice a day, Lasix 20 mg daily,Toprol-XL 200 mg daily, Coumadin as monitored by Coumadin clinic , Zoloft 100 mg daily, xalatan, Pravachol 40 mg daily, Norvasc 2.5 mg daily      No known drug allergies     Review of Systems   Constitutional: Negative for chills, fatigue, fever and unexpected weight change.   Respiratory: Negative for chest tightness and shortness of breath.    Cardiovascular: Negative for chest pain and palpitations.   Gastrointestinal: Negative for abdominal pain and blood in stool.   Neurological: Negative for dizziness, syncope, numbness and headaches.       Objective:      Physical Exam  HENT:      Right Ear: External ear normal.      Left Ear: External ear normal.      Nose: Nose normal.      Mouth/Throat:      Mouth: Mucous membranes are moist.      Pharynx: Oropharynx is clear.   Eyes:      Pupils: Pupils are equal, round, and reactive to light.   Neck:      Musculoskeletal: Normal range of motion.   Cardiovascular:      Rate and Rhythm: Normal rate and regular rhythm.      Heart sounds: No murmur.   Pulmonary:      Breath sounds: Normal breath sounds.   Abdominal:      General: There is no distension.       Palpations: There is no hepatomegaly or splenomegaly.      Tenderness: There is no abdominal tenderness.   Lymphadenopathy:      Cervical: No cervical adenopathy.      Upper Body:      Right upper body: No axillary adenopathy.      Left upper body: No axillary adenopathy.   Neurological:      Cranial Nerves: No cranial nerve deficit.      Sensory: No sensory deficit.      Motor: Motor function is intact.      Deep Tendon Reflexes: Reflexes are normal and symmetric.         Assessment/Plan       Assessment and plan:  1.  Hypertension:  Controlled  2.  Pneumonia:  Follow up with Pulmonary  3.  She was here for urgent care only appointment.  She will return to clinic for a physical

## 2020-08-03 RX ORDER — HEPARIN 100 UNIT/ML
500 SYRINGE INTRAVENOUS
Status: CANCELLED | OUTPATIENT
Start: 2021-07-29

## 2020-08-03 RX ORDER — HEPARIN 100 UNIT/ML
500 SYRINGE INTRAVENOUS
Status: CANCELLED | OUTPATIENT
Start: 2021-07-22

## 2020-08-03 RX ORDER — SODIUM CHLORIDE 0.9 % (FLUSH) 0.9 %
10 SYRINGE (ML) INJECTION
Status: CANCELLED | OUTPATIENT
Start: 2021-10-01

## 2020-08-03 RX ORDER — HEPARIN 100 UNIT/ML
500 SYRINGE INTRAVENOUS
Status: CANCELLED | OUTPATIENT
Start: 2021-10-01

## 2020-08-03 RX ORDER — HEPARIN 100 UNIT/ML
500 SYRINGE INTRAVENOUS
Status: CANCELLED | OUTPATIENT
Start: 2020-08-03

## 2020-08-03 RX ORDER — HEPARIN 100 UNIT/ML
500 SYRINGE INTRAVENOUS
Status: CANCELLED | OUTPATIENT
Start: 2021-10-08

## 2020-08-03 RX ORDER — SODIUM CHLORIDE 0.9 % (FLUSH) 0.9 %
10 SYRINGE (ML) INJECTION
Status: CANCELLED | OUTPATIENT
Start: 2021-07-29

## 2020-08-03 RX ORDER — HEPARIN 100 UNIT/ML
500 SYRINGE INTRAVENOUS
Status: CANCELLED | OUTPATIENT
Start: 2021-08-26

## 2020-08-03 RX ORDER — SODIUM CHLORIDE 0.9 % (FLUSH) 0.9 %
10 SYRINGE (ML) INJECTION
Status: CANCELLED | OUTPATIENT
Start: 2021-08-26

## 2020-08-03 RX ORDER — SODIUM CHLORIDE 0.9 % (FLUSH) 0.9 %
10 SYRINGE (ML) INJECTION
Status: CANCELLED | OUTPATIENT
Start: 2021-08-05

## 2020-08-03 RX ORDER — HEPARIN 100 UNIT/ML
500 SYRINGE INTRAVENOUS
Status: CANCELLED | OUTPATIENT
Start: 2021-08-05

## 2020-08-03 RX ORDER — SODIUM CHLORIDE 0.9 % (FLUSH) 0.9 %
10 SYRINGE (ML) INJECTION
Status: CANCELLED | OUTPATIENT
Start: 2020-08-03

## 2020-08-03 RX ORDER — HEPARIN 100 UNIT/ML
500 SYRINGE INTRAVENOUS
Status: CANCELLED | OUTPATIENT
Start: 2021-08-19

## 2020-08-03 RX ORDER — SODIUM CHLORIDE 0.9 % (FLUSH) 0.9 %
10 SYRINGE (ML) INJECTION
Status: CANCELLED | OUTPATIENT
Start: 2021-07-22

## 2020-08-03 RX ORDER — SODIUM CHLORIDE 0.9 % (FLUSH) 0.9 %
10 SYRINGE (ML) INJECTION
Status: CANCELLED | OUTPATIENT
Start: 2021-10-08

## 2020-08-03 RX ORDER — SODIUM CHLORIDE 0.9 % (FLUSH) 0.9 %
10 SYRINGE (ML) INJECTION
Status: CANCELLED | OUTPATIENT
Start: 2021-08-19

## 2020-08-04 ENCOUNTER — TELEPHONE (OUTPATIENT)
Dept: HEMATOLOGY/ONCOLOGY | Facility: CLINIC | Age: 65
End: 2020-08-04

## 2020-08-04 ENCOUNTER — ANTI-COAG VISIT (OUTPATIENT)
Dept: CARDIOLOGY | Facility: CLINIC | Age: 65
End: 2020-08-04
Payer: COMMERCIAL

## 2020-08-04 ENCOUNTER — LAB VISIT (OUTPATIENT)
Dept: LAB | Facility: HOSPITAL | Age: 65
End: 2020-08-04
Payer: COMMERCIAL

## 2020-08-04 DIAGNOSIS — Z95.2 STATUS POST HEART VALVE REPLACEMENT WITH MECHANICAL VALVE: ICD-10-CM

## 2020-08-04 DIAGNOSIS — I48.20 CHRONIC ATRIAL FIBRILLATION: ICD-10-CM

## 2020-08-04 DIAGNOSIS — I48.20 CHRONIC ATRIAL FIBRILLATION WITH RAPID VENTRICULAR RESPONSE: ICD-10-CM

## 2020-08-04 LAB
INR PPP: 3.4 (ref 0.8–1.2)
PROTHROMBIN TIME: 35.4 SEC (ref 9–12.5)

## 2020-08-04 PROCEDURE — 36415 COLL VENOUS BLD VENIPUNCTURE: CPT

## 2020-08-04 PROCEDURE — 93793 PR ANTICOAGULANT MGMT FOR PT TAKING WARFARIN: ICD-10-PCS | Mod: S$GLB,,,

## 2020-08-04 PROCEDURE — 93793 ANTICOAG MGMT PT WARFARIN: CPT | Mod: S$GLB,,,

## 2020-08-04 PROCEDURE — 85610 PROTHROMBIN TIME: CPT

## 2020-08-04 NOTE — PROGRESS NOTES
INR at higher end of goal. Medications and chart reviewed. Plan to continue dose & recheck INR in 3 weeks. See calendar.

## 2020-08-04 NOTE — TELEPHONE ENCOUNTER
----- Message from Alina Pereira RN sent at 8/3/2020  9:42 PM CDT -----  Iron is approved. Plz scheudle   ----- Message -----  From: Jose M Park MD  Sent: 7/21/2020   9:02 AM CDT  To: Alina Pereira RN    1. Please have obtain ferritin, iron/TIBC, reticulocyte count, peripheral smear, copper, hgb electropheresis  2. Return to clinic with myself in 3 months with CBC, ferritin, iron/TIBC, reticulocyte count prior to appointment  3. Schedule for IV iron in near future

## 2020-08-07 ENCOUNTER — TELEPHONE (OUTPATIENT)
Dept: HEMATOLOGY/ONCOLOGY | Facility: CLINIC | Age: 65
End: 2020-08-07

## 2020-08-11 ENCOUNTER — PATIENT OUTREACH (OUTPATIENT)
Dept: ADMINISTRATIVE | Facility: OTHER | Age: 65
End: 2020-08-11

## 2020-08-12 ENCOUNTER — OFFICE VISIT (OUTPATIENT)
Dept: PULMONOLOGY | Facility: CLINIC | Age: 65
End: 2020-08-12
Payer: COMMERCIAL

## 2020-08-12 VITALS
SYSTOLIC BLOOD PRESSURE: 126 MMHG | HEART RATE: 88 BPM | DIASTOLIC BLOOD PRESSURE: 72 MMHG | WEIGHT: 242.06 LBS | HEIGHT: 62 IN | BODY MASS INDEX: 44.55 KG/M2 | OXYGEN SATURATION: 95 %

## 2020-08-12 DIAGNOSIS — R73.02 GLUCOSE INTOLERANCE (IMPAIRED GLUCOSE TOLERANCE): ICD-10-CM

## 2020-08-12 DIAGNOSIS — D50.0 IRON DEFICIENCY ANEMIA DUE TO CHRONIC BLOOD LOSS: Primary | Chronic | ICD-10-CM

## 2020-08-12 DIAGNOSIS — J18.9 PNEUMONIA DUE TO INFECTIOUS ORGANISM, UNSPECIFIED LATERALITY, UNSPECIFIED PART OF LUNG: ICD-10-CM

## 2020-08-12 PROCEDURE — 3078F DIAST BP <80 MM HG: CPT | Mod: CPTII,S$GLB,, | Performed by: INTERNAL MEDICINE

## 2020-08-12 PROCEDURE — 3078F PR MOST RECENT DIASTOLIC BLOOD PRESSURE < 80 MM HG: ICD-10-PCS | Mod: CPTII,S$GLB,, | Performed by: INTERNAL MEDICINE

## 2020-08-12 PROCEDURE — 3008F BODY MASS INDEX DOCD: CPT | Mod: CPTII,S$GLB,, | Performed by: INTERNAL MEDICINE

## 2020-08-12 PROCEDURE — 3074F SYST BP LT 130 MM HG: CPT | Mod: CPTII,S$GLB,, | Performed by: INTERNAL MEDICINE

## 2020-08-12 PROCEDURE — 99214 PR OFFICE/OUTPT VISIT, EST, LEVL IV, 30-39 MIN: ICD-10-PCS | Mod: S$GLB,,, | Performed by: INTERNAL MEDICINE

## 2020-08-12 PROCEDURE — 99999 PR PBB SHADOW E&M-EST. PATIENT-LVL IV: ICD-10-PCS | Mod: PBBFAC,,, | Performed by: INTERNAL MEDICINE

## 2020-08-12 PROCEDURE — 3008F PR BODY MASS INDEX (BMI) DOCUMENTED: ICD-10-PCS | Mod: CPTII,S$GLB,, | Performed by: INTERNAL MEDICINE

## 2020-08-12 PROCEDURE — 99214 OFFICE O/P EST MOD 30 MIN: CPT | Mod: S$GLB,,, | Performed by: INTERNAL MEDICINE

## 2020-08-12 PROCEDURE — 3074F PR MOST RECENT SYSTOLIC BLOOD PRESSURE < 130 MM HG: ICD-10-PCS | Mod: CPTII,S$GLB,, | Performed by: INTERNAL MEDICINE

## 2020-08-12 PROCEDURE — 99999 PR PBB SHADOW E&M-EST. PATIENT-LVL IV: CPT | Mod: PBBFAC,,, | Performed by: INTERNAL MEDICINE

## 2020-08-12 NOTE — PROGRESS NOTES
Requested updates from Care Everywhere.  Reviewed chart for overdue ROCIO topics.  Updated Health Maintenance.   Reconciled immunizations in LINKS.

## 2020-08-12 NOTE — LETTER
August 12, 2020      Nubia Crocker MD  [address]  [place] [address]           [facility]monary [facility]  [address]  [place] [address]  Phone: [phone]          Patient: Elayne Almanzar   MR Number: [id number]   YOB: 1955   Date of Visit: 8/12/2020       Dear Dr. Nubia Crocker:    Thank you for referring Elayne Almanzar to me for evaluation. Attached you will find relevant portions of my assessment and plan of care.    If you have questions, please do not hesitate to call me. I look forward to following Elayne Almanzar along with you.    Sincerely,    Domenica Carvalho MD    Enclosure  CC:  No Recipients    If you would like to receive this communication electronically, please contact [email or web address] or [phone] to request more information on [facility] Link access.    For providers and/or their staff who would like to refer a patient to [facility], please contact us through our one-stop-shop provider referral line, [facility], at [phone].    If you feel you have received this communication in error or would no longer like to receive these types of communications, please e-mail [email or web address]

## 2020-08-12 NOTE — PROGRESS NOTES
Subjective:       Patient ID: Elayne Almanzar is a 65 y.o. female.    Chief Complaint: No chief complaint on file.    HPI   Elayne Almanzar 65 y.o. female    has a past medical history of Acute on chronic diastolic congestive heart failure, Anticoagulant long-term use, Asthma, Atrial fibrillation (), Cataract, Chronic kidney disease, COPD (chronic obstructive pulmonary disease), Depression, Dysuria, Flank pain, HTN (hypertension), Nephrolithiasis, KEYSHAWN (obstructive sleep apnea), Rheumatic disease of mitral valve, Uncontrolled type 2 diabetes mellitus with complication, with long-term current use of insulin (2020), Urinary incontinence, and Urinary tract infection.    has a past surgical history that includes Mitral valve replacement (2004);  section; Tubal ligation; kidney stone removal; Colonoscopy (N/A, 2019); and Esophagogastroduodenoscopy (N/A, 2019).   reports that she quit smoking about 18 years ago. Her smoking use included cigarettes. She has a 10.00 pack-year smoking history. She has never used smokeless tobacco. She reports that she does not drink alcohol or use drugs.  Referred by: Dr. Nubia Crocker  Who had no chief complaint listed for this encounter.  The patient's last visit with me was on 2017.  LAST VISIT    Interval History  Here for followup after pneumonia- patient was doing well-  with covid but patient has been negative and noted that she started fhaving sx of sob, cough. Went to [facility] ed and noted bilateral ggo. Treated with abx and steroids and has done well. Feels good, no sob, cough sputum  Not exercising, but otherwise doing well.  Noted to be anemia when I last saw her- she did not take iron and stool softner like I rec and has remained anemia  Seen by hematology and will be getting iron infusions and follow up in their clinic  No fever chills, ns, wt changes, nausea, vomiting, diarrhea, constipation, chest pain, tightness, pressure.  Remainder of review of systems is negative.  Otherwise asymptomatic from pulmonary standpoint.    Review of Systems   All other systems reviewed and are negative.      Objective:      Physical Exam   Constitutional: She is oriented to person, place, and time. She appears well-developed and well-nourished. She appears not cachectic. No distress. She is obese.   HENT:   Head: Normocephalic.   Nose: Nose normal. No mucosal edema.   Mouth/Throat: Normal dentition. No oropharyngeal exudate.   Neck: Normal range of motion. Neck supple. No tracheal deviation present.   Cardiovascular: Normal rate, regular rhythm, normal heart sounds and intact distal pulses. Exam reveals no gallop and no friction rub.   No murmur heard.  Pulmonary/Chest: Normal expansion, symmetric chest wall expansion, effort normal and breath sounds normal. No stridor.   Abdominal: Soft. Bowel sounds are normal.   Musculoskeletal: Normal range of motion.         General: No tenderness, deformity or edema.   Lymphadenopathy: No supraclavicular adenopathy is present.     She has no cervical adenopathy.   Neurological: She is alert and oriented to person, place, and time. No cranial nerve deficit. Gait normal.   Skin: Skin is warm and dry. No rash noted. She is not diaphoretic. No cyanosis or erythema. No pallor. Nails show no clubbing.   Psychiatric: She has a normal mood and affect. Her behavior is normal. Judgment and thought content normal.     Personal Diagnostic Review    No flowsheet data found.      Assessment:       1. Iron deficiency anemia due to chronic blood loss    2. Pneumonia due to infectious organism, unspecified laterality, unspecified part of lung    3. Glucose intolerance (impaired glucose tolerance)        Outpatient Encounter Medications as of 8/12/2020   Medication Sig Dispense Refill    albuterol (VENTOLIN HFA) 90 mcg/actuation inhaler INHALE 2 PUFFS INTO THE LUNGS EVERY 6 (SIX) HOURS AS NEEDED FOR WHEEZING. RESCUE 18 g 6     "amLODIPine (NORVASC) 2.5 MG tablet Take 1 tablet (2.5 mg total) by mouth once daily. 30 tablet 4    aspirin (ECOTRIN) 81 MG EC tablet Take 81 mg by mouth once daily.       ferrous sulfate 324 mg (65 mg iron) TbEC Take 1 tablet (324 mg total) by mouth 3 (three) times daily. 90 tablet 6    fluticasone propion-salmeterol 115-21 mcg/dose (ADVAIR HFA) 115-21 mcg/actuation HFAA inhaler Inhale 2 puffs into the lungs every 12 (twelve) hours. Controller 12 g 11    fluticasone propionate (FLONASE) 50 mcg/actuation nasal spray 2 sprays (100 mcg total) by Each Nostril route once daily. 16 g 3    furosemide (LASIX) 20 MG tablet Take 1 tablet (20 mg total) by mouth daily as needed (Leg swelling).      latanoprost (XALATAN) 0.005 % ophthalmic solution Place 1 drop into the right eye once daily. 2.5 mL 12    metoprolol succinate (TOPROL-XL) 200 MG 24 hr tablet Take 1 tablet (200 mg total) by mouth once daily. 30 tablet 1    needle, disp, 26 gauge 26 gauge x 1/2" Ndle 1 application by Misc.(Non-Drug; Combo Route) route once a week. 10 each 0    nitroGLYCERIN (NITROSTAT) 0.4 MG SL tablet Place 1 tablet (0.4 mg total) under the tongue every 5 (five) minutes as needed for Chest pain. 30 tablet 1    oxybutynin (DITROPAN-XL) 5 MG TR24 Take 5 mg by mouth once daily.      pantoprazole (PROTONIX) 40 MG tablet Take 1 tablet (40 mg total) by mouth once daily. 30 tablet 3    sertraline (ZOLOFT) 100 MG tablet TAKE 1 TABLET (100 MG TOTAL) BY MOUTH ONCE DAILY. 30 tablet 0    tamsulosin (FLOMAX) 0.4 mg Cap Take 1 capsule (0.4 mg total) by mouth once daily. 30 capsule 11    warfarin (COUMADIN) 5 MG tablet Take 2 tablets (10 mg total) by mouth every Tues, Thurs, Sat. Take 1 1/2 tablets (7.5mg) by mouth daily on Mon, Wed, Friday, Sunday 60 tablet 5     No facility-administered encounter medications on file as of 8/12/2020.      No orders of the defined types were placed in this encounter.      Plan:               Assessment:  Diagnoses " and all orders for this visit:    Iron deficiency anemia due to chronic blood loss    Pneumonia due to infectious organism, unspecified laterality, unspecified part of lung  -     Ambulatory referral/consult to Pulmonology    Glucose intolerance (impaired glucose tolerance)        Plan:  Problem List Items Addressed This Visit     Glucose intolerance (impaired glucose tolerance)    Iron deficiency anemia - Primary (Chronic)      Other Visit Diagnoses     Pneumonia due to infectious organism, unspecified laterality, unspecified part of lung              Pneumonia has resolved  Plan for iron infusions  F/u prn    Follow up if symptoms worsen or fail to improve.    There are no Patient Instructions on file for this visit.    Immunization History   Administered Date(s) Administered    Pneumococcal Conjugate - 13 Valent 09/08/2017    Pneumococcal Conjugate - 7 Valent 12/06/2011    Pneumococcal Polysaccharide - 23 Valent 07/30/2020    Tdap 12/04/2019

## 2020-08-17 ENCOUNTER — TELEPHONE (OUTPATIENT)
Dept: HEMATOLOGY/ONCOLOGY | Facility: CLINIC | Age: 65
End: 2020-08-17

## 2020-08-17 DIAGNOSIS — D50.8 IRON DEFICIENCY ANEMIA SECONDARY TO INADEQUATE DIETARY IRON INTAKE: Primary | ICD-10-CM

## 2020-08-17 RX ORDER — FERROUS SULFATE 325(65) MG
325 TABLET ORAL DAILY
Qty: 30 TABLET | Refills: 3 | Status: SHIPPED | OUTPATIENT
Start: 2020-08-17 | End: 2021-08-17

## 2020-08-17 NOTE — TELEPHONE ENCOUNTER
"----- Message from Emy Gan sent at 8/17/2020 12:00 PM CDT -----  Consult/Advisory:    Name Of Caller: Elayne   Contact Preference?: [phone]    Provider Name: Dr Park     What is the nature of the call?:  Pt request a callback to discuss infusion treatment plan because she is confused about what is going on.    Additional Notes:  "Thank you for all that you do for our patients'"           "

## 2020-08-17 NOTE — TELEPHONE ENCOUNTER
Spoke with patient about why there are so many iron infusions (insurance reasons)- cancelled tomorrows due to conflicting personal plans. Patient asked for oral iron. Spoke with MD. MD ordered med. Called back patient to review med and discuss if she wants oral instead of IV and possible side effects. No answer. Left  with return number

## 2020-08-21 RX ORDER — SERTRALINE HYDROCHLORIDE 100 MG/1
100 TABLET, FILM COATED ORAL DAILY
Qty: 30 TABLET | Refills: 3 | Status: SHIPPED | OUTPATIENT
Start: 2020-08-21 | End: 2021-01-07

## 2020-08-21 NOTE — TELEPHONE ENCOUNTER
----- Message from Rebekah Arita sent at 8/21/2020  1:32 PM CDT -----  Contact: Hussain vang/ [facility]   Requesting an RX refill or new RX.  Is this a refill or new RX:    RX name and strength: sertraline (ZOLOFT) 100 MG tablet  Directions (copy/paste from chart):  TAKE 1 TABLET (100 MG TOTAL) BY MOUTH ONCE DAILY. - Oral  Is this a 30 day or 90 day RX:    Local pharmacy or mail order pharmacy:    Pharmacy name and phone # (copy/paste from chart):   [facility] - [address] [phone] (Phone)  [phone] (Fax)      Comments:

## 2020-08-21 NOTE — TELEPHONE ENCOUNTER
Approved Prescriptions     sertraline (ZOLOFT) 100 MG tablet         Sig: Take 1 tablet (100 mg total) by mouth once daily.    Disp:  30 tablet    Refills:  3    Start: 8/21/2020    Class: Normal    Authorized by: Nubia Crocker MD        To be filled at: [facility] [address]

## 2020-08-24 ENCOUNTER — TELEPHONE (OUTPATIENT)
Dept: HEMATOLOGY/ONCOLOGY | Facility: CLINIC | Age: 65
End: 2020-08-24

## 2020-08-24 NOTE — TELEPHONE ENCOUNTER
Spoke with patient about oral vs iv iron again. Patient confirms that she wants to proceed with oral iron before IV infusions. Reviewed side effects of oral iron, when to call, and how to call. Patient asked to cancel all IV iron infusion appointments and she will keep us updated on how she was doing and if she wants to reconsider IV replacement. MD lincoln.

## 2020-08-24 NOTE — TELEPHONE ENCOUNTER
----- Message from Ruthann York sent at 8/24/2020  8:28 AM CDT -----  Regarding: Appt  Contact: pt  Reason: Pt ask for a call back to RS appts scheduled on tomorrow also she have a question for staff pertaining to procedure    Communication: [phone]

## 2020-08-25 ENCOUNTER — LAB VISIT (OUTPATIENT)
Dept: LAB | Facility: HOSPITAL | Age: 65
End: 2020-08-25
Attending: INTERNAL MEDICINE
Payer: COMMERCIAL

## 2020-08-25 ENCOUNTER — ANTI-COAG VISIT (OUTPATIENT)
Dept: CARDIOLOGY | Facility: CLINIC | Age: 65
End: 2020-08-25
Payer: COMMERCIAL

## 2020-08-25 DIAGNOSIS — I48.20 CHRONIC ATRIAL FIBRILLATION: ICD-10-CM

## 2020-08-25 DIAGNOSIS — Z95.2 STATUS POST HEART VALVE REPLACEMENT WITH MECHANICAL VALVE: ICD-10-CM

## 2020-08-25 DIAGNOSIS — I48.20 CHRONIC ATRIAL FIBRILLATION WITH RAPID VENTRICULAR RESPONSE: ICD-10-CM

## 2020-08-25 LAB
INR PPP: 3.3 (ref 0.8–1.2)
PROTHROMBIN TIME: 34.2 SEC (ref 9–12.5)

## 2020-08-25 PROCEDURE — 93793 ANTICOAG MGMT PT WARFARIN: CPT | Mod: S$GLB,,,

## 2020-08-25 PROCEDURE — 93793 PR ANTICOAGULANT MGMT FOR PT TAKING WARFARIN: ICD-10-PCS | Mod: S$GLB,,,

## 2020-08-25 PROCEDURE — 85610 PROTHROMBIN TIME: CPT

## 2020-08-25 PROCEDURE — 36415 COLL VENOUS BLD VENIPUNCTURE: CPT

## 2020-09-11 ENCOUNTER — TELEPHONE (OUTPATIENT)
Dept: INTERNAL MEDICINE | Facility: CLINIC | Age: 65
End: 2020-09-11

## 2020-09-11 NOTE — TELEPHONE ENCOUNTER
----- Message from Deanna Turner sent at 9/11/2020  3:06 PM CDT -----  Regarding: Ms. Wasserman @ [facility] [place] Mobile# [phone], fax# [phone]  Requesting an RX refill or new RX.  Is this a refill or new RX:  Refill  RX name and strength: metoprolol succinate (TOPROL-XL) 200 MG 24 hr tablet  Directions (copy/paste from chart):  N/A  Is this a 30 day or 90 day RX:  30  Local pharmacy or mail order pharmacy:  [facility] - [address]  Pharmacy name and phone # [phone] Fax# [phone]

## 2020-09-15 ENCOUNTER — ANTI-COAG VISIT (OUTPATIENT)
Dept: CARDIOLOGY | Facility: CLINIC | Age: 65
End: 2020-09-15
Payer: COMMERCIAL

## 2020-09-15 ENCOUNTER — LAB VISIT (OUTPATIENT)
Dept: LAB | Facility: HOSPITAL | Age: 65
End: 2020-09-15
Attending: INTERNAL MEDICINE
Payer: COMMERCIAL

## 2020-09-15 DIAGNOSIS — I48.20 CHRONIC ATRIAL FIBRILLATION WITH RAPID VENTRICULAR RESPONSE: ICD-10-CM

## 2020-09-15 DIAGNOSIS — I48.20 CHRONIC ATRIAL FIBRILLATION: ICD-10-CM

## 2020-09-15 DIAGNOSIS — Z95.2 STATUS POST HEART VALVE REPLACEMENT WITH MECHANICAL VALVE: ICD-10-CM

## 2020-09-15 LAB
INR PPP: 2.5 (ref 0.8–1.2)
PROTHROMBIN TIME: 26.4 SEC (ref 9–12.5)

## 2020-09-15 PROCEDURE — 85610 PROTHROMBIN TIME: CPT

## 2020-09-15 PROCEDURE — 36415 COLL VENOUS BLD VENIPUNCTURE: CPT

## 2020-09-15 PROCEDURE — 93793 ANTICOAG MGMT PT WARFARIN: CPT | Mod: S$GLB,,,

## 2020-09-15 PROCEDURE — 93793 PR ANTICOAGULANT MGMT FOR PT TAKING WARFARIN: ICD-10-PCS | Mod: S$GLB,,,

## 2020-09-21 ENCOUNTER — PATIENT OUTREACH (OUTPATIENT)
Dept: ADMINISTRATIVE | Facility: OTHER | Age: 65
End: 2020-09-21

## 2020-09-22 ENCOUNTER — OFFICE VISIT (OUTPATIENT)
Dept: CARDIOLOGY | Facility: CLINIC | Age: 65
End: 2020-09-22
Payer: COMMERCIAL

## 2020-09-22 VITALS
HEIGHT: 62 IN | SYSTOLIC BLOOD PRESSURE: 130 MMHG | WEIGHT: 243.63 LBS | HEART RATE: 104 BPM | DIASTOLIC BLOOD PRESSURE: 64 MMHG | BODY MASS INDEX: 44.83 KG/M2

## 2020-09-22 DIAGNOSIS — Z95.2 S/P MVR (MITRAL VALVE REPLACEMENT): Primary | ICD-10-CM

## 2020-09-22 DIAGNOSIS — I10 ESSENTIAL HYPERTENSION: Chronic | ICD-10-CM

## 2020-09-22 DIAGNOSIS — E78.2 MIXED HYPERLIPIDEMIA: ICD-10-CM

## 2020-09-22 DIAGNOSIS — I50.32 CHRONIC DIASTOLIC CONGESTIVE HEART FAILURE: Chronic | ICD-10-CM

## 2020-09-22 DIAGNOSIS — J44.9 CHRONIC OBSTRUCTIVE PULMONARY DISEASE, UNSPECIFIED COPD TYPE: Chronic | ICD-10-CM

## 2020-09-22 DIAGNOSIS — Z95.2 H/O MITRAL VALVE REPLACEMENT WITH MECHANICAL VALVE: Chronic | ICD-10-CM

## 2020-09-22 DIAGNOSIS — I50.33 ACUTE ON CHRONIC DIASTOLIC CONGESTIVE HEART FAILURE: ICD-10-CM

## 2020-09-22 DIAGNOSIS — E66.01 MORBID OBESITY WITH BMI OF 40.0-44.9, ADULT: Chronic | ICD-10-CM

## 2020-09-22 DIAGNOSIS — I48.20 CHRONIC ATRIAL FIBRILLATION: Chronic | ICD-10-CM

## 2020-09-22 DIAGNOSIS — I10 ESSENTIAL (PRIMARY) HYPERTENSION: Chronic | ICD-10-CM

## 2020-09-22 DIAGNOSIS — I48.91 ATRIAL FIBRILLATION, UNSPECIFIED TYPE: ICD-10-CM

## 2020-09-22 DIAGNOSIS — G47.31 COMPLEX SLEEP APNEA SYNDROME: ICD-10-CM

## 2020-09-22 PROCEDURE — 3075F PR MOST RECENT SYSTOLIC BLOOD PRESS GE 130-139MM HG: ICD-10-PCS | Mod: CPTII,S$GLB,, | Performed by: INTERNAL MEDICINE

## 2020-09-22 PROCEDURE — 99999 PR PBB SHADOW E&M-EST. PATIENT-LVL V: CPT | Mod: PBBFAC,,, | Performed by: INTERNAL MEDICINE

## 2020-09-22 PROCEDURE — 3075F SYST BP GE 130 - 139MM HG: CPT | Mod: CPTII,S$GLB,, | Performed by: INTERNAL MEDICINE

## 2020-09-22 PROCEDURE — 99999 PR PBB SHADOW E&M-EST. PATIENT-LVL V: ICD-10-PCS | Mod: PBBFAC,,, | Performed by: INTERNAL MEDICINE

## 2020-09-22 PROCEDURE — 3008F PR BODY MASS INDEX (BMI) DOCUMENTED: ICD-10-PCS | Mod: CPTII,S$GLB,, | Performed by: INTERNAL MEDICINE

## 2020-09-22 PROCEDURE — 3078F PR MOST RECENT DIASTOLIC BLOOD PRESSURE < 80 MM HG: ICD-10-PCS | Mod: CPTII,S$GLB,, | Performed by: INTERNAL MEDICINE

## 2020-09-22 PROCEDURE — 3008F BODY MASS INDEX DOCD: CPT | Mod: CPTII,S$GLB,, | Performed by: INTERNAL MEDICINE

## 2020-09-22 PROCEDURE — 3078F DIAST BP <80 MM HG: CPT | Mod: CPTII,S$GLB,, | Performed by: INTERNAL MEDICINE

## 2020-09-22 PROCEDURE — 99214 PR OFFICE/OUTPT VISIT, EST, LEVL IV, 30-39 MIN: ICD-10-PCS | Mod: S$GLB,,, | Performed by: INTERNAL MEDICINE

## 2020-09-22 PROCEDURE — 99214 OFFICE O/P EST MOD 30 MIN: CPT | Mod: S$GLB,,, | Performed by: INTERNAL MEDICINE

## 2020-09-22 RX ORDER — FUROSEMIDE 20 MG/1
20 TABLET ORAL DAILY PRN
Qty: 30 TABLET | Refills: 11
Start: 2020-09-22 | End: 2020-12-01

## 2020-09-22 RX ORDER — METOPROLOL SUCCINATE 200 MG/1
200 TABLET, EXTENDED RELEASE ORAL DAILY
Qty: 30 TABLET | Refills: 11 | Status: SHIPPED | OUTPATIENT
Start: 2020-09-22 | End: 2021-10-01 | Stop reason: SDUPTHER

## 2020-09-22 NOTE — LETTER
September 22, 2020      Nubia Crocker MD  [address]  [facility] [address]           [address] [address]  [address]  [facility] [address]  Phone: [phone]          Patient: Elayne Almanzar   MR Number: [id number]   YOB: 1955   Date of Visit: 9/22/2020       Dear Dr. Nubia Crocker:    Thank you for referring Elayne Almanzar to me for evaluation. Attached you will find relevant portions of my assessment and plan of care.    If you have questions, please do not hesitate to call me. I look forward to following Elayne Almanzar along with you.    Sincerely,    Lorraine Messina MD    Enclosure  CC:  No Recipients    If you would like to receive this communication electronically, please contact [email or web address] or [phone] to request more information on [employer] [facility] access.    For providers and/or their staff who would like to refer a patient to [facility], please contact us through our one-stop-shop provider referral line, [facility], at [phone].    If you feel you have received this communication in error or would no longer like to receive these types of communications, please e-mail [email or web address]

## 2020-09-22 NOTE — PROGRESS NOTES
Subjective:   Patient ID:  Elayne Almanzar is a 65 y.o. female who presents for follow-up of No chief complaint on file.      HPI:  She was admitted to [facility] in July with concern over COVID pneumonia.  However she had 2 swabs done which were negative and she was treated for decompensated heart failure.  Her  had tested positive for COVID at the time.  She has been feeling okay since her discharge.  She continues to use Lasix on an as-needed basis and ends up taking it about 2 times per week.  She does find her fluid retention is related to her diet which has been more challenging during the pandemic.  She had an echocardiogram performed in 2020 which showed a well-seated mechanical mitral valve with a mean gradient of 5 across the valve.  Her left ventricular ejection fraction was 55%.  A PET stress was performed in 2019 which was negative for ischemia.    ECG 2020:  Atrial fibrillation 98 beats per minute    Past Medical History:   Diagnosis Date    Acute on chronic diastolic congestive heart failure     Anticoagulant long-term use     Asthma     Atrial fibrillation     Cataract     Chronic kidney disease     COPD (chronic obstructive pulmonary disease)     Depression     Dysuria     Flank pain     HTN (hypertension)     Nephrolithiasis     KEYSHAWN (obstructive sleep apnea)     awaiting CPAP machine     Rheumatic disease of mitral valve     Uncontrolled type 2 diabetes mellitus with complication, with long-term current use of insulin 2020    Urinary incontinence     Urinary tract infection        Past Surgical History:   Procedure Laterality Date     SECTION      COLONOSCOPY N/A 2019    Procedure: COLONOSCOPY;  Surgeon: Devon Bowling MD;  Location: [address]);  Service: Endoscopy;  Laterality: N/A;  ok to hold Coumadin x 5 days with Lovenox bridge per Coumadin clinic-[facility]    ESOPHAGOGASTRODUODENOSCOPY N/A 2019     Procedure: EGD (ESOPHAGOGASTRODUODENOSCOPY);  Surgeon: Smooth Torrez MD;  Location: [facility] ([address] [place] [address]);  Service: Endoscopy;  Laterality: N/A;  2nd floor requested due to comorbidities, Hypertension, permanent A. 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 44     per Coumadin clinic-ok to hold for 5 days w/bridge    kidney stone removal      MITRAL VALVE REPLACEMENT  2004    TUBAL LIGATION         Social History     Socioeconomic History    Marital status:      Spouse name: Not on file    Number of children: 2    Years of education: Not on file    Highest education level: Not on file   Occupational History    Occupation: Cook     Comment: Retired   Social Needs    Financial resource strain: Not on file    Food insecurity     Worry: Not on file     Inability: Not on file    Transportation needs     Medical: Not on file     Non-medical: Not on file   Tobacco Use    Smoking status: Former Smoker     Packs/day: 0.50     Years: 20.00     Pack years: 10.00     Types: Cigarettes     Quit date: 2002     Years since quittin.3    Smokeless tobacco: Never Used   Substance and Sexual Activity    Alcohol use: No    Drug use: No    Sexual activity: Not on file   Lifestyle    Physical activity     Days per week: Not on file     Minutes per session: Not on file    Stress: Not on file   Relationships    Social connections     Talks on phone: Not on file     Gets together: Not on file     Attends Restoration service: Not on file     Active member of club or organization: Not on file     Attends meetings of clubs or organizations: Not on file     Relationship status: Not on file   Other Topics Concern    Not on file   Social History Narrative    Disability since .  Laid off .  Previously worked as cook in a kitchen.         Family History   Problem Relation Age of Onset    Stroke Paternal Grandmother     Colon cancer  "Maternal Grandmother     Cancer Brother         testicular cancer    Diabetes Sister     Hypertension Sister     Breast cancer Paternal Aunt     Diabetes Sister     Diabetes Sister     Heart disease Father 70        MI    Diabetes Father     Colon cancer Mother 83    Ovarian cancer Neg Hx        Patient's Medications   New Prescriptions    No medications on file   Previous Medications    ALBUTEROL (VENTOLIN HFA) 90 MCG/ACTUATION INHALER    INHALE 2 PUFFS INTO THE LUNGS EVERY 6 (SIX) HOURS AS NEEDED FOR WHEEZING. RESCUE    AMLODIPINE (NORVASC) 2.5 MG TABLET    Take 1 tablet (2.5 mg total) by mouth once daily.    ASPIRIN (ECOTRIN) 81 MG EC TABLET    Take 81 mg by mouth once daily.     FERROUS SULFATE (FEOSOL) 325 MG (65 MG IRON) TAB TABLET    Take 1 tablet (325 mg total) by mouth once daily.    FLUTICASONE PROPION-SALMETEROL 115-21 MCG/DOSE (ADVAIR HFA) 115-21 MCG/ACTUATION HFAA INHALER    Inhale 2 puffs into the lungs every 12 (twelve) hours. Controller    FLUTICASONE PROPIONATE (FLONASE) 50 MCG/ACTUATION NASAL SPRAY    2 sprays (100 mcg total) by Each Nostril route once daily.    LATANOPROST (XALATAN) 0.005 % OPHTHALMIC SOLUTION    Place 1 drop into the right eye once daily.    NEEDLE, DISP, 26 GAUGE 26 GAUGE X 1/2" NDLE    1 application by Misc.(Non-Drug; Combo Route) route once a week.    NITROGLYCERIN (NITROSTAT) 0.4 MG SL TABLET    Place 1 tablet (0.4 mg total) under the tongue every 5 (five) minutes as needed for Chest pain.    OXYBUTYNIN (DITROPAN-XL) 5 MG TR24    Take 5 mg by mouth once daily.    PANTOPRAZOLE (PROTONIX) 40 MG TABLET    Take 1 tablet (40 mg total) by mouth once daily.    SERTRALINE (ZOLOFT) 100 MG TABLET    Take 1 tablet (100 mg total) by mouth once daily.    TAMSULOSIN (FLOMAX) 0.4 MG CAP    Take 1 capsule (0.4 mg total) by mouth once daily.    WARFARIN (COUMADIN) 5 MG TABLET    Take 2 tablets (10 mg total) by mouth every Tues, Thurs, Sat. Take 1 1/2 tablets (7.5mg) by mouth daily " on Mon, Wed, Friday, Sunday   Modified Medications    Modified Medication Previous Medication    FUROSEMIDE (LASIX) 20 MG TABLET furosemide (LASIX) 20 MG tablet       Take 1 tablet (20 mg total) by mouth daily as needed (Leg swelling).    Take 1 tablet (20 mg total) by mouth daily as needed (Leg swelling).    METOPROLOL SUCCINATE (TOPROL-XL) 200 MG 24 HR TABLET metoprolol succinate (TOPROL-XL) 200 MG 24 hr tablet       Take 1 tablet (200 mg total) by mouth once daily.    TAKE 1 TABLET (200 MG TOTAL) BY MOUTH ONCE DAILY.   Discontinued Medications    No medications on file       Review of Systems   Constitution: Negative for malaise/fatigue and weight gain.   HENT: Negative for hearing loss.    Eyes: Negative for visual disturbance.   Cardiovascular: Positive for dyspnea on exertion. Negative for chest pain, claudication, leg swelling, near-syncope, orthopnea, palpitations, paroxysmal nocturnal dyspnea and syncope.   Respiratory: Positive for wheezing. Negative for cough, shortness of breath, sleep disturbances due to breathing and snoring.    Endocrine: Negative for cold intolerance, heat intolerance, polydipsia, polyphagia and polyuria.   Hematologic/Lymphatic: Negative for bleeding problem. Does not bruise/bleed easily.   Skin: Negative for rash and suspicious lesions.   Musculoskeletal: Negative for arthritis, falls, joint pain, muscle weakness and myalgias.   Gastrointestinal: Negative for abdominal pain, change in bowel habit, constipation, diarrhea, heartburn, hematochezia, melena and nausea.   Genitourinary: Negative for hematuria and nocturia.   Neurological: Negative for excessive daytime sleepiness, dizziness, headaches, light-headedness, loss of balance and weakness.   Psychiatric/Behavioral: Negative for depression. The patient is not nervous/anxious.    Allergic/Immunologic: Negative for environmental allergies.       /64 (BP Location: Left arm, Patient Position: Sitting, BP Method: X-Large  "(Automatic))   Pulse 104   Ht 5' 2" (1.575 m)   Wt 110.5 kg (243 lb 9.7 oz)   LMP 07/22/2012   BMI 44.56 kg/m²     Objective:   Physical Exam   Constitutional: She is oriented to person, place, and time. She appears well-developed and well-nourished.        HENT:   Head: Normocephalic and atraumatic.   Mouth/Throat: Oropharynx is clear and moist.   Eyes: Pupils are equal, round, and reactive to light. Conjunctivae and EOM are normal. No scleral icterus.   Neck: Normal range of motion. Neck supple. No hepatojugular reflux and no JVD present. No tracheal deviation present. No thyromegaly present.   Cardiovascular: Normal rate, normal heart sounds and intact distal pulses. An irregularly irregular rhythm present. PMI is not displaced.   Pulses:       Carotid pulses are 2+ on the right side and 2+ on the left side.       Radial pulses are 2+ on the right side and 2+ on the left side.        Dorsalis pedis pulses are 2+ on the right side and 2+ on the left side.        Posterior tibial pulses are 2+ on the right side and 2+ on the left side.   Mechanical click present   Pulmonary/Chest: Effort normal. She has wheezes.   Abdominal: Soft. Bowel sounds are normal. She exhibits no distension and no mass. There is no hepatosplenomegaly. There is no abdominal tenderness.   Musculoskeletal:         General: Edema present. No tenderness.      Comments: Trace edema left leg   Lymphadenopathy:     She has no cervical adenopathy.   Neurological: She is alert and oriented to person, place, and time.   Skin: Skin is warm and dry. No rash noted. No cyanosis or erythema. Nails show no clubbing.   Psychiatric: She has a normal mood and affect. Her speech is normal and behavior is normal.       Lab Results   Component Value Date     07/10/2020    K 4.3 07/10/2020     07/10/2020    CO2 24 07/10/2020    BUN 18 07/10/2020    CREATININE 1.0 07/10/2020     (H) 07/10/2020    HGBA1C 6.1 05/12/2017    MG 1.9 07/10/2020    " AST 18 07/10/2020    ALT 10 07/10/2020    ALBUMIN 3.2 (L) 07/10/2020    PROT 7.2 07/10/2020    BILITOT 0.7 07/10/2020    WBC 5.74 07/21/2020    WBC 5.74 07/21/2020    HGB 9.4 (L) 07/21/2020    HGB 9.4 (L) 07/21/2020    HCT 38.0 07/21/2020    HCT 38.0 07/21/2020    MCV 67 (L) 07/21/2020    MCV 67 (L) 07/21/2020     (H) 07/21/2020     (H) 07/21/2020    INR 2.5 (H) 09/15/2020    TSH 1.748 09/20/2019    CHOL 175 07/01/2020    HDL 41 07/01/2020    LDLCALC 117.8 07/01/2020    TRIG 81 07/01/2020     (H) 07/07/2020       Assessment:     1. S/P MVR (mitral valve replacement) :  She is chronically anticoagulated with Coumadin.  She is scheduled for a follow-up echo in 6 months time.SBE prophylaxis is recommended prior to dental procedures.     2. Atrial fibrillation, unspecified type :  She is anticoagulated with Coumadin and rate controlled with metoprolol.   3. Essential hypertension :  Her blood pressure is at goal.  I have made no changes.   4. Acute on chronic diastolic congestive heart failure :  He appears well compensated and euvolemic today.  Continue using Lasix on an as-needed basis.Limit sodium intake to < 1500mg daily.     5. Chronic obstructive pulmonary disease, unspecified COPD type    6.    7.    8.    9.    10. Mixed hyperlipidemia :  Her 10 year ASCVD risk score is 8.6% placing her at intermediate risk.  She has been intolerant of statins due to myalgias in the past.  We will continue with risk factor modification at this time.   11. Morbid obesity with BMI of 40.0-44.9, adult    12. Complex sleep apnea syndrome        Plan:     Diagnoses and all orders for this visit:    S/P MVR (mitral valve replacement)  -     Echo Color Flow Doppler? Yes    Atrial fibrillation, unspecified type  -     Ambulatory referral/consult to Cardiology    Essential hypertension  Comments:  No changes to medications. Instructed to contact clinic in 2 weeks with home blood pressure readings. Consider  re-starting lisinopril  Orders:  -     metoprolol succinate (TOPROL-XL) 200 MG 24 hr tablet; Take 1 tablet (200 mg total) by mouth once daily.    Acute on chronic diastolic congestive heart failure  -     metoprolol succinate (TOPROL-XL) 200 MG 24 hr tablet; Take 1 tablet (200 mg total) by mouth once daily.  -     furosemide (LASIX) 20 MG tablet; Take 1 tablet (20 mg total) by mouth daily as needed (Leg swelling).  -     COVID-19 (SARS CoV-2) IgG Antibody; Future  -     Echo Color Flow Doppler? Yes  -     Comprehensive metabolic panel; Future  -     CBC auto differential; Future    Chronic obstructive pulmonary disease, unspecified COPD type    Chronic atrial fibrillation    H/O mitral valve replacement with mechanical valve    Chronic diastolic congestive heart failure    Essential (primary) hypertension    Mixed hyperlipidemia    Morbid obesity with BMI of 40.0-44.9, adult    Complex sleep apnea syndrome        Thank you for allowing me to participate in this patient's care. Please do not hesitate to contact me with any questions or concerns.

## 2020-09-30 ENCOUNTER — HOSPITAL ENCOUNTER (OUTPATIENT)
Dept: CARDIOLOGY | Facility: HOSPITAL | Age: 65
Discharge: HOME | End: 2020-09-30
Attending: INTERNAL MEDICINE
Payer: COMMERCIAL

## 2020-09-30 VITALS
HEART RATE: 76 BPM | SYSTOLIC BLOOD PRESSURE: 125 MMHG | HEIGHT: 62 IN | WEIGHT: 243 LBS | BODY MASS INDEX: 44.72 KG/M2 | DIASTOLIC BLOOD PRESSURE: 78 MMHG

## 2020-09-30 LAB
ASCENDING AORTA: 3.08 CM
AV INDEX (PROSTH): 0.86
AV MEAN GRADIENT: 6 MMHG
AV PEAK GRADIENT: 10 MMHG
AV VALVE AREA: 2.62 CM2
AV VELOCITY RATIO: 0.74
BSA FOR ECHO PROCEDURE: 2.2 M2
CV ECHO LV RWT: 0.41 CM
DOP CALC AO PEAK VEL: 1.6 M/S
DOP CALC AO VTI: 36.39 CM
DOP CALC LVOT AREA: 3 CM2
DOP CALC LVOT DIAMETER: 1.97 CM
DOP CALC LVOT PEAK VEL: 1.19 M/S
DOP CALC LVOT STROKE VOLUME: 95.26 CM3
DOP CALCLVOT PEAK VEL VTI: 31.27 CM
ECHO LV POSTERIOR WALL: 1.01 CM (ref 0.6–1.1)
FRACTIONAL SHORTENING: 32 % (ref 28–44)
HR TR ECHO: 60 BPM
INTERVENTRICULAR SEPTUM: 1.02 CM (ref 0.6–1.1)
LA MAJOR: 6 CM
LA MINOR: 6 CM
LA WIDTH: 6 CM
LEFT ATRIUM SIZE: 6.2 CM
LEFT ATRIUM VOLUME INDEX: 91.4 ML/M2
LEFT ATRIUM VOLUME: 189.72 CM3
LEFT INTERNAL DIMENSION IN SYSTOLE: 3.35 CM (ref 2.1–4)
LEFT VENTRICLE DIASTOLIC VOLUME INDEX: 55.03 ML/M2
LEFT VENTRICLE DIASTOLIC VOLUME: 114.26 ML
LEFT VENTRICLE MASS INDEX: 87 G/M2
LEFT VENTRICLE SYSTOLIC VOLUME INDEX: 22.1 ML/M2
LEFT VENTRICLE SYSTOLIC VOLUME: 45.81 ML
LEFT VENTRICULAR INTERNAL DIMENSION IN DIASTOLE: 4.93 CM (ref 3.5–6)
LEFT VENTRICULAR MASS: 181.42 G
MV MEAN GRADIENT: 4 MMHG
MV PEAK E VEL: 1.43 M/S
MV STENOSIS PRESSURE HALF TIME: 73.46 MS
MV VALVE AREA P 1/2 METHOD: 2.99 CM2
PISA TR MAX VEL: 2.86 M/S
PULM VEIN S/D RATIO: 0.82
PV PEAK D VEL: 0.65 M/S
PV PEAK S VEL: 0.53 M/S
RA MAJOR: 5.09 CM
RA PRESSURE: 15 MMHG
RA WIDTH: 3.36 CM
RIGHT VENTRICULAR END-DIASTOLIC DIMENSION: 2.92 CM
SINUS: 2.44 CM
STJ: 2.89 CM
TR MAX PG: 33 MMHG
TRICUSPID ANNULAR PLANE SYSTOLIC EXCURSION: 1.7 CM
TV REST PULMONARY ARTERY PRESSURE: 48 MMHG

## 2020-09-30 PROCEDURE — 93306 TTE W/DOPPLER COMPLETE: CPT

## 2020-09-30 PROCEDURE — C8929 TTE W OR WO FOL WCON,DOPPLER: HCPCS

## 2020-09-30 PROCEDURE — 93306 ECHO (CUPID ONLY): ICD-10-PCS | Mod: 26,,, | Performed by: INTERNAL MEDICINE

## 2020-09-30 PROCEDURE — 93306 TTE W/DOPPLER COMPLETE: CPT | Mod: 26,,, | Performed by: INTERNAL MEDICINE

## 2020-10-01 ENCOUNTER — TELEPHONE (OUTPATIENT)
Dept: CARDIOLOGY | Facility: CLINIC | Age: 65
End: 2020-10-01

## 2020-10-01 NOTE — TELEPHONE ENCOUNTER
----- Message from Lorraine Messina MD sent at 10/1/2020  8:23 AM CDT -----  The echo showed the heart function remains strong, and the mitral valve replacement is functioning well. Her filling pressure were elevated, so if she having shortness of breath, she can take her lasix daily for a week.

## 2020-10-06 ENCOUNTER — LAB VISIT (OUTPATIENT)
Dept: LAB | Facility: HOSPITAL | Age: 65
End: 2020-10-06
Attending: INTERNAL MEDICINE
Payer: COMMERCIAL

## 2020-10-06 ENCOUNTER — ANTI-COAG VISIT (OUTPATIENT)
Dept: CARDIOLOGY | Facility: CLINIC | Age: 65
End: 2020-10-06
Payer: COMMERCIAL

## 2020-10-06 DIAGNOSIS — I48.20 CHRONIC ATRIAL FIBRILLATION: ICD-10-CM

## 2020-10-06 DIAGNOSIS — I50.33 ACUTE ON CHRONIC DIASTOLIC CONGESTIVE HEART FAILURE: ICD-10-CM

## 2020-10-06 LAB
INR PPP: 3.9 (ref 0.8–1.2)
PROTHROMBIN TIME: 40.9 SEC (ref 9–12.5)
SARS-COV-2 IGG SERPLBLD QL IA.RAPID: NEGATIVE

## 2020-10-06 PROCEDURE — 86769 SARS-COV-2 COVID-19 ANTIBODY: CPT

## 2020-10-06 PROCEDURE — 36415 COLL VENOUS BLD VENIPUNCTURE: CPT

## 2020-10-06 PROCEDURE — 85610 PROTHROMBIN TIME: CPT

## 2020-10-06 PROCEDURE — 93793 PR ANTICOAGULANT MGMT FOR PT TAKING WARFARIN: ICD-10-PCS | Mod: S$GLB,,,

## 2020-10-06 PROCEDURE — 93793 ANTICOAG MGMT PT WARFARIN: CPT | Mod: S$GLB,,,

## 2020-10-06 NOTE — PROGRESS NOTES
INR not at goal. Medications, chart, and patient findings reviewed. See calendar for adjustments to dose and follow up plan.  Pt states she had some alcohol & plans to remove greens from her diet.  Will decrease pt's regimen & re-assess in 2 weeks.

## 2020-10-20 ENCOUNTER — ANTI-COAG VISIT (OUTPATIENT)
Dept: CARDIOLOGY | Facility: CLINIC | Age: 65
End: 2020-10-20
Payer: COMMERCIAL

## 2020-10-20 ENCOUNTER — LAB VISIT (OUTPATIENT)
Dept: LAB | Facility: HOSPITAL | Age: 65
End: 2020-10-20
Attending: INTERNAL MEDICINE
Payer: COMMERCIAL

## 2020-10-20 DIAGNOSIS — I48.20 CHRONIC ATRIAL FIBRILLATION: ICD-10-CM

## 2020-10-20 LAB
INR PPP: 3.4 (ref 0.8–1.2)
PROTHROMBIN TIME: 35.2 SEC (ref 9–12.5)

## 2020-10-20 PROCEDURE — 85610 PROTHROMBIN TIME: CPT

## 2020-10-20 PROCEDURE — 93793 PR ANTICOAGULANT MGMT FOR PT TAKING WARFARIN: ICD-10-PCS | Mod: S$GLB,,,

## 2020-10-20 PROCEDURE — 93793 ANTICOAG MGMT PT WARFARIN: CPT | Mod: S$GLB,,,

## 2020-10-20 PROCEDURE — 36415 COLL VENOUS BLD VENIPUNCTURE: CPT

## 2020-10-25 DIAGNOSIS — D50.9 HYPOCHROMIC MICROCYTIC ANEMIA: Primary | ICD-10-CM

## 2020-10-25 NOTE — PROGRESS NOTES
"  PATIENT: Elayne Almanzar  MRN: [id number]  DATE: 10/27/2020      Diagnosis:   1. Iron deficiency anemia secondary to inadequate dietary iron intake        Chief Complaint: Anemia follow up    Subjective:    Initial History: Ms. Almanzar is a 65 y.o. female with pertinent PMHx of permanent atrial fibrillation on coumadin, CKD, KEYSHAWN compliant with CPAP, chronic diastolic heart failure, and s/p mechanical mitral valve placed in 2005 due to rheumatic mitral stenosis who was referred for evaluation of microcytic anemia.       Pt does not take ferrous sulfate due to constipation it causes. Is compliant with aspirin 81mg qday and is therapeutic on coumadin. Her last EGD 12/19/19 showing mild gastritis and her last colonoscopy 6/26/19 showed 3mm sessile polyp which was resected and a right colon "full of actively bleeding AVMs." Has had a stable persistent MCV of 68-70 since 2011 with a steady decline in her hgb/hct since that time. Most recent H&H 7.7 and 29% with MCV 65.  Her other cell lines have remained normal.  Has also had a persistently low/normal ferritin with previous iron studies indicating iron deficiency anemia and a persistently elevated RDW. B12 and folate WNL. Reticulocytes 1.4%. Does have a persistently elevated LDH with low/normal haptoglobin but normal t.bili.  CTabd May 2018 without splenomegaly. Has had TTG checked in the past which was negative.    Does feel fatigued and often cold but otherwise denies symptoms including fever, chills, shortness of breath, chest pain, hair loss, abdominal pain, n/v/d. Always has cravings for ice and feels she could eat ice all day which has been ongoing for many years. She denies a hx of heavy periods and underwent menopause at age 55. Denies maroon and black colored stools. Denies hematuria. Has intentionally lost about 20 lbs recently due to cutting out sodium and the associated foods. She quit smoking in 2002, denies alcohol use, and denies recreational drug use. Had " her home redone after Marian and drinks bottled water. States her mothers and sister all have iron deficiency anemia but denies any knowledge of a genetic condition. Denies personal hx of cancer but has significant family hx of mother and grandmother with colon cancer, aunt with breast cancer, and brother who passed away at age 23 due to testicular cancer.    Interval Hx:  Pt was prescribed IV iron at the last visit however did not want to commit to it at that time. Opted for oral iron. States pharmacy never received the rx so she has not been taking any bib supplement. Continues to feel fatigued with easy bruising. Has dyspnea on exertion but is nela to walk an entire grocery store without needing to rest. Does not feel limited by her energy. Still craves ice. Denies any new complaints.       Past Medical History:   Past Medical History:   Diagnosis Date    Acute on chronic diastolic congestive heart failure     Anticoagulant long-term use     Asthma     Atrial fibrillation     Cataract     Chronic kidney disease     COPD (chronic obstructive pulmonary disease)     Depression     Dysuria     Flank pain     HTN (hypertension)     Nephrolithiasis     KEYSHAWN (obstructive sleep apnea)     awaiting CPAP machine     Rheumatic disease of mitral valve     Uncontrolled type 2 diabetes mellitus with complication, with long-term current use of insulin 2020    Urinary incontinence     Urinary tract infection        Past Surgical HIstory:   Past Surgical History:   Procedure Laterality Date     SECTION      COLONOSCOPY N/A 2019    Procedure: COLONOSCOPY;  Surgeon: Devon Bowling MD;  Location: [facility] (4TH FLR);  Service: Endoscopy;  Laterality: N/A;  ok to hold Coumadin x 5 days with Lovenox bridge per Coumadin clinic-[facility]    ESOPHAGOGASTRODUODENOSCOPY N/A 2019    Procedure: EGD (ESOPHAGOGASTRODUODENOSCOPY);  Surgeon: Smooth Torrez MD;  Location: [facility] (2ND FLR);  Service:  Endoscopy;  Laterality: N/A;  2nd floor requested due to comorbidities, Hypertension, permanent A. fib, COPD, CHF, sleep apnea, status post mechanical mitral valve replacement 2005 due to rheumatic mitral stenosis, bm! 44     per Coumadin clinic-ok to hold for 5 days w/bridge    kidney stone removal      MITRAL VALVE REPLACEMENT  2/2004    TUBAL LIGATION         Family History:   Family History   Problem Relation Age of Onset    Stroke Paternal Grandmother     Colon cancer Maternal Grandmother     Cancer Brother         testicular cancer    Diabetes Sister     Hypertension Sister     Breast cancer Paternal Aunt     Diabetes Sister     Diabetes Sister     Heart disease Father 70        MI    Diabetes Father     Colon cancer Mother 83    Ovarian cancer Neg Hx        Social History:  reports that she quit smoking about 18 years ago. Her smoking use included cigarettes. She has a 10.00 pack-year smoking history. She has never used smokeless tobacco. She reports that she does not drink alcohol or use drugs.    Allergies:  Review of patient's allergies indicates:  No Known Allergies    Medications:  Current Outpatient Medications   Medication Sig Dispense Refill    albuterol (VENTOLIN HFA) 90 mcg/actuation inhaler INHALE 2 PUFFS INTO THE LUNGS EVERY 6 (SIX) HOURS AS NEEDED FOR WHEEZING. RESCUE 18 g 6    amLODIPine (NORVASC) 2.5 MG tablet Take 1 tablet (2.5 mg total) by mouth once daily. 30 tablet 4    aspirin (ECOTRIN) 81 MG EC tablet Take 81 mg by mouth once daily.       ferrous sulfate (FEOSOL) 325 mg (65 mg iron) Tab tablet Take 1 tablet (325 mg total) by mouth once daily. 30 tablet 3    fluticasone propion-salmeterol 115-21 mcg/dose (ADVAIR HFA) 115-21 mcg/actuation HFAA inhaler Inhale 2 puffs into the lungs every 12 (twelve) hours. Controller 12 g 11    fluticasone propionate (FLONASE) 50 mcg/actuation nasal spray 2 sprays (100 mcg total) by Each Nostril route once daily. 16 g 3    furosemide  "(LASIX) 20 MG tablet Take 1 tablet (20 mg total) by mouth daily as needed (Leg swelling). 30 tablet 11    latanoprost (XALATAN) 0.005 % ophthalmic solution Place 1 drop into the right eye once daily. 2.5 mL 12    metoprolol succinate (TOPROL-XL) 200 MG 24 hr tablet Take 1 tablet (200 mg total) by mouth once daily. 30 tablet 11    needle, disp, 26 gauge 26 gauge x 1/2" Ndle 1 application by Misc.(Non-Drug; Combo Route) route once a week. 10 each 0    oxybutynin (DITROPAN-XL) 5 MG TR24 Take 5 mg by mouth once daily.      pantoprazole (PROTONIX) 40 MG tablet Take 1 tablet (40 mg total) by mouth once daily. 30 tablet 3    sertraline (ZOLOFT) 100 MG tablet Take 1 tablet (100 mg total) by mouth once daily. 30 tablet 3    tamsulosin (FLOMAX) 0.4 mg Cap Take 1 capsule (0.4 mg total) by mouth once daily. 30 capsule 11    warfarin (COUMADIN) 5 MG tablet Take 2 tablets (10 mg total) by mouth every Tues, Thurs, Sat. Take 1 1/2 tablets (7.5mg) by mouth daily on Mon, Wed, Friday, Sunday 60 tablet 5    nitroGLYCERIN (NITROSTAT) 0.4 MG SL tablet Place 1 tablet (0.4 mg total) under the tongue every 5 (five) minutes as needed for Chest pain. 30 tablet 1     No current facility-administered medications for this visit.        Review of Systems   Constitutional: Positive for fatigue. Negative for appetite change, chills and fever.   HENT: Negative for nosebleeds and sore throat.    Eyes: Negative for photophobia and visual disturbance.   Respiratory: Negative for cough and shortness of breath.    Cardiovascular: Negative for chest pain, palpitations and leg swelling.   Gastrointestinal: Negative for abdominal pain, blood in stool, diarrhea, nausea and vomiting.   Endocrine: Positive for cold intolerance. Negative for heat intolerance, polydipsia and polyuria.   Genitourinary: Negative for dysuria, hematuria and urgency.   Musculoskeletal: Negative for arthralgias and myalgias.   Skin: Negative for rash and wound. " "  Neurological: Negative for dizziness and headaches.   Hematological: Negative for adenopathy. Does not bruise/bleed easily.   Psychiatric/Behavioral: Negative for confusion. The patient is not nervous/anxious.           Objective:      Vitals:   Vitals:    10/27/20 0854   BP: 134/60   BP Location: Left arm   Patient Position: Sitting   BP Method: Large (Automatic)   Pulse: 76   Resp: 17   Temp: 98.9 °F (37.2 °C)   SpO2: (!) 93%   Weight: 112.5 kg (248 lb)   Height: 5' 2" (1.575 m)       Physical Exam  Vitals signs reviewed.   Constitutional:       Appearance: Normal appearance.   HENT:      Head: Normocephalic and atraumatic.      Mouth/Throat:      Mouth: Mucous membranes are moist.   Eyes:      Extraocular Movements: Extraocular movements intact.      Comments: Pale conjunctivae   Neck:      Musculoskeletal: Normal range of motion and neck supple.   Cardiovascular:      Rate and Rhythm: Rhythm irregularly irregular.   Pulmonary:      Effort: Pulmonary effort is normal.      Breath sounds: Normal breath sounds.   Abdominal:      General: Bowel sounds are normal.      Palpations: Abdomen is soft.   Skin:     General: Skin is warm and dry.      Capillary Refill: Capillary refill takes more than 3 seconds.      Findings: Bruising (scattered to extremities) present.   Neurological:      General: No focal deficit present.      Mental Status: She is alert and oriented to person, place, and time.   Psychiatric:         Mood and Affect: Affect normal.         Behavior: Behavior is cooperative.         Laboratory Data:  Labs reviewed    Imaging: None  Assessment:       1. Iron deficiency anemia secondary to inadequate dietary iron intake            Plan:     Microcytic Hypochromic Anemia, likely multifactorial  Iron Deficiency Anemia  Chronic blood loss Anemia  -- MCV persistently 68-70 since 2011 with most recent at 67  -- Down trending hgb from ~12 to 7.7 over that time frame, although last check was 9.4  -- " "Persistently low/normal ferritin since 2013 with iron studies indicating iron deficiency anemia (low iron, low iron sat, elevated TIBC)  -- Noncompliant with ferrous sulfate 324mg TID due to constipation  -- Likely chronic blood loss as follows: Previous colonoscopy 6/26/19  showed right colon "full of actively bleeding AVMs" with pt on aspirin and coumadin. UAs also persistently positive for blood in the setting of frequent nephrolithiasis although this is much less likely contributing  -- Retic 1.4, inappropriately normal indicating hypoproliferation  -- B12/ folate WNL  -- Has mechanical mitral valve with persistently elevated LDH but haptoglobin and T. Bili normal without schistocytes seen on smear  -- Copper and hgb electropheresis WNL   -- Labs 10/27 showed continued MARTHA with ferritin of 19, iron of 29, and %sat of 5.     Recs:  1. Did not fill oral iron and does not want the IV iron, advised to  Ferrous Sulfate. Confirmed with pharmacy the rx is there.  2. Will check alpha gene analysis r/o alpha thalassemia  3. RTC in 3 months with repeat iron studies at that time to assess response      Jose M Park, PGY- IV  Hematology/Oncology Fellow    "

## 2020-10-27 ENCOUNTER — LAB VISIT (OUTPATIENT)
Dept: LAB | Facility: HOSPITAL | Age: 65
End: 2020-10-27
Payer: COMMERCIAL

## 2020-10-27 ENCOUNTER — OFFICE VISIT (OUTPATIENT)
Dept: HEMATOLOGY/ONCOLOGY | Facility: CLINIC | Age: 65
End: 2020-10-27
Payer: COMMERCIAL

## 2020-10-27 VITALS
RESPIRATION RATE: 17 BRPM | OXYGEN SATURATION: 93 % | DIASTOLIC BLOOD PRESSURE: 60 MMHG | SYSTOLIC BLOOD PRESSURE: 134 MMHG | HEART RATE: 76 BPM | BODY MASS INDEX: 45.64 KG/M2 | HEIGHT: 62 IN | WEIGHT: 248 LBS | TEMPERATURE: 99 F

## 2020-10-27 DIAGNOSIS — D50.8 IRON DEFICIENCY ANEMIA SECONDARY TO INADEQUATE DIETARY IRON INTAKE: ICD-10-CM

## 2020-10-27 DIAGNOSIS — D50.9 HYPOCHROMIC MICROCYTIC ANEMIA: ICD-10-CM

## 2020-10-27 DIAGNOSIS — D50.8 IRON DEFICIENCY ANEMIA SECONDARY TO INADEQUATE DIETARY IRON INTAKE: Primary | ICD-10-CM

## 2020-10-27 LAB
ACANTHOCYTES BLD QL SMEAR: PRESENT
ANISOCYTOSIS BLD QL SMEAR: SLIGHT
BASOPHILS # BLD AUTO: 0.03 K/UL (ref 0–0.2)
BASOPHILS NFR BLD: 0.6 % (ref 0–1.9)
DACRYOCYTES BLD QL SMEAR: ABNORMAL
DIFFERENTIAL METHOD: ABNORMAL
EOSINOPHIL # BLD AUTO: 0.3 K/UL (ref 0–0.5)
EOSINOPHIL NFR BLD: 6.4 % (ref 0–8)
ERYTHROCYTE [DISTWIDTH] IN BLOOD BY AUTOMATED COUNT: 21.2 % (ref 11.5–14.5)
FERRITIN SERPL-MCNC: 19 NG/ML (ref 20–300)
HCT VFR BLD AUTO: 31.1 % (ref 37–48.5)
HGB BLD-MCNC: 8 G/DL (ref 12–16)
HYPOCHROMIA BLD QL SMEAR: ABNORMAL
IMM GRANULOCYTES # BLD AUTO: 0.02 K/UL (ref 0–0.04)
IMM GRANULOCYTES NFR BLD AUTO: 0.4 % (ref 0–0.5)
IRON SERPL-MCNC: 29 UG/DL (ref 30–160)
LYMPHOCYTES # BLD AUTO: 1.2 K/UL (ref 1–4.8)
LYMPHOCYTES NFR BLD: 23 % (ref 18–48)
MCH RBC QN AUTO: 17.3 PG (ref 27–31)
MCHC RBC AUTO-ENTMCNC: 25.7 G/DL (ref 32–36)
MCV RBC AUTO: 67 FL (ref 82–98)
MONOCYTES # BLD AUTO: 0.6 K/UL (ref 0.3–1)
MONOCYTES NFR BLD: 12 % (ref 4–15)
NEUTROPHILS # BLD AUTO: 3 K/UL (ref 1.8–7.7)
NEUTROPHILS NFR BLD: 57.6 % (ref 38–73)
NRBC BLD-RTO: 0 /100 WBC
OVALOCYTES BLD QL SMEAR: ABNORMAL
PLATELET # BLD AUTO: 235 K/UL (ref 150–350)
PLATELET BLD QL SMEAR: ABNORMAL
PMV BLD AUTO: ABNORMAL FL (ref 9.2–12.9)
POIKILOCYTOSIS BLD QL SMEAR: ABNORMAL
POLYCHROMASIA BLD QL SMEAR: ABNORMAL
RBC # BLD AUTO: 4.62 M/UL (ref 4–5.4)
RETICS/RBC NFR AUTO: 1.9 % (ref 0.5–2.5)
SATURATED IRON: 5 % (ref 20–50)
SCHISTOCYTES BLD QL SMEAR: ABNORMAL
SCHISTOCYTES BLD QL SMEAR: PRESENT
TARGETS BLD QL SMEAR: ABNORMAL
TOTAL IRON BINDING CAPACITY: 542 UG/DL (ref 250–450)
TRANSFERRIN SERPL-MCNC: 366 MG/DL (ref 200–375)
WBC # BLD AUTO: 5.18 K/UL (ref 3.9–12.7)

## 2020-10-27 PROCEDURE — 83540 ASSAY OF IRON: CPT

## 2020-10-27 PROCEDURE — 3008F BODY MASS INDEX DOCD: CPT | Mod: CPTII,S$GLB,, | Performed by: INTERNAL MEDICINE

## 2020-10-27 PROCEDURE — 85045 AUTOMATED RETICULOCYTE COUNT: CPT

## 2020-10-27 PROCEDURE — 85025 COMPLETE CBC W/AUTO DIFF WBC: CPT

## 2020-10-27 PROCEDURE — 3075F PR MOST RECENT SYSTOLIC BLOOD PRESS GE 130-139MM HG: ICD-10-PCS | Mod: CPTII,S$GLB,, | Performed by: INTERNAL MEDICINE

## 2020-10-27 PROCEDURE — 81269 HBA1/HBA2 GENE DUP/DEL VRNTS: CPT

## 2020-10-27 PROCEDURE — 3008F PR BODY MASS INDEX (BMI) DOCUMENTED: ICD-10-PCS | Mod: CPTII,S$GLB,, | Performed by: INTERNAL MEDICINE

## 2020-10-27 PROCEDURE — 99999 PR PBB SHADOW E&M-EST. PATIENT-LVL IV: ICD-10-PCS | Mod: PBBFAC,,, | Performed by: INTERNAL MEDICINE

## 2020-10-27 PROCEDURE — 3075F SYST BP GE 130 - 139MM HG: CPT | Mod: CPTII,S$GLB,, | Performed by: INTERNAL MEDICINE

## 2020-10-27 PROCEDURE — 99215 PR OFFICE/OUTPT VISIT, EST, LEVL V, 40-54 MIN: ICD-10-PCS | Mod: S$GLB,,, | Performed by: INTERNAL MEDICINE

## 2020-10-27 PROCEDURE — 3078F DIAST BP <80 MM HG: CPT | Mod: CPTII,S$GLB,, | Performed by: INTERNAL MEDICINE

## 2020-10-27 PROCEDURE — 99999 PR PBB SHADOW E&M-EST. PATIENT-LVL IV: CPT | Mod: PBBFAC,,, | Performed by: INTERNAL MEDICINE

## 2020-10-27 PROCEDURE — 82728 ASSAY OF FERRITIN: CPT

## 2020-10-27 PROCEDURE — 99215 OFFICE O/P EST HI 40 MIN: CPT | Mod: S$GLB,,, | Performed by: INTERNAL MEDICINE

## 2020-10-27 PROCEDURE — 3078F PR MOST RECENT DIASTOLIC BLOOD PRESSURE < 80 MM HG: ICD-10-PCS | Mod: CPTII,S$GLB,, | Performed by: INTERNAL MEDICINE

## 2020-10-27 NOTE — Clinical Note
1. RTC with myself in 3 months  2. Obtain CBC, ferritin, iron, retic the morning of that appointment    Thanks!

## 2020-11-02 ENCOUNTER — OFFICE VISIT (OUTPATIENT)
Dept: INTERNAL MEDICINE | Facility: CLINIC | Age: 65
End: 2020-11-02
Payer: COMMERCIAL

## 2020-11-02 ENCOUNTER — LAB VISIT (OUTPATIENT)
Dept: LAB | Facility: HOSPITAL | Age: 65
End: 2020-11-02
Payer: COMMERCIAL

## 2020-11-02 ENCOUNTER — ANTI-COAG VISIT (OUTPATIENT)
Dept: CARDIOLOGY | Facility: CLINIC | Age: 65
End: 2020-11-02
Payer: COMMERCIAL

## 2020-11-02 DIAGNOSIS — I48.20 CHRONIC ATRIAL FIBRILLATION: ICD-10-CM

## 2020-11-02 DIAGNOSIS — H92.09 OTALGIA, UNSPECIFIED LATERALITY: Primary | ICD-10-CM

## 2020-11-02 DIAGNOSIS — Z12.31 SCREENING MAMMOGRAM, ENCOUNTER FOR: ICD-10-CM

## 2020-11-02 DIAGNOSIS — I10 HYPERTENSION, UNSPECIFIED TYPE: ICD-10-CM

## 2020-11-02 LAB
INR PPP: 2.5 (ref 0.8–1.2)
PROTHROMBIN TIME: 26.1 SEC (ref 9–12.5)

## 2020-11-02 PROCEDURE — 99999 PR PBB SHADOW E&M-EST. PATIENT-LVL V: ICD-10-PCS | Mod: PBBFAC,,, | Performed by: INTERNAL MEDICINE

## 2020-11-02 PROCEDURE — 93793 PR ANTICOAGULANT MGMT FOR PT TAKING WARFARIN: ICD-10-PCS | Mod: S$GLB,,,

## 2020-11-02 PROCEDURE — 93793 ANTICOAG MGMT PT WARFARIN: CPT | Mod: S$GLB,,,

## 2020-11-02 PROCEDURE — 36415 COLL VENOUS BLD VENIPUNCTURE: CPT

## 2020-11-02 PROCEDURE — 3075F PR MOST RECENT SYSTOLIC BLOOD PRESS GE 130-139MM HG: ICD-10-PCS | Mod: CPTII,S$GLB,, | Performed by: INTERNAL MEDICINE

## 2020-11-02 PROCEDURE — 99214 OFFICE O/P EST MOD 30 MIN: CPT | Mod: S$GLB,,, | Performed by: INTERNAL MEDICINE

## 2020-11-02 PROCEDURE — 99214 PR OFFICE/OUTPT VISIT, EST, LEVL IV, 30-39 MIN: ICD-10-PCS | Mod: S$GLB,,, | Performed by: INTERNAL MEDICINE

## 2020-11-02 PROCEDURE — 3008F PR BODY MASS INDEX (BMI) DOCUMENTED: ICD-10-PCS | Mod: CPTII,S$GLB,, | Performed by: INTERNAL MEDICINE

## 2020-11-02 PROCEDURE — 3008F BODY MASS INDEX DOCD: CPT | Mod: CPTII,S$GLB,, | Performed by: INTERNAL MEDICINE

## 2020-11-02 PROCEDURE — 3078F DIAST BP <80 MM HG: CPT | Mod: CPTII,S$GLB,, | Performed by: INTERNAL MEDICINE

## 2020-11-02 PROCEDURE — 3078F PR MOST RECENT DIASTOLIC BLOOD PRESSURE < 80 MM HG: ICD-10-PCS | Mod: CPTII,S$GLB,, | Performed by: INTERNAL MEDICINE

## 2020-11-02 PROCEDURE — 99999 PR PBB SHADOW E&M-EST. PATIENT-LVL V: CPT | Mod: PBBFAC,,, | Performed by: INTERNAL MEDICINE

## 2020-11-02 PROCEDURE — 3075F SYST BP GE 130 - 139MM HG: CPT | Mod: CPTII,S$GLB,, | Performed by: INTERNAL MEDICINE

## 2020-11-02 PROCEDURE — 85610 PROTHROMBIN TIME: CPT

## 2020-11-02 RX ORDER — ALBUTEROL SULFATE 90 UG/1
AEROSOL, METERED RESPIRATORY (INHALATION)
Qty: 18 G | Refills: 6 | Status: SHIPPED | OUTPATIENT
Start: 2020-11-02 | End: 2021-10-01 | Stop reason: SDUPTHER

## 2020-11-02 RX ORDER — AMLODIPINE BESYLATE 2.5 MG/1
2.5 TABLET ORAL DAILY
Qty: 30 TABLET | Refills: 4 | Status: SHIPPED | OUTPATIENT
Start: 2020-11-02 | End: 2021-05-03 | Stop reason: SDUPTHER

## 2020-11-02 RX ORDER — AMOXICILLIN 875 MG/1
875 TABLET, FILM COATED ORAL 2 TIMES DAILY
Qty: 14 TABLET | Refills: 0 | Status: SHIPPED | OUTPATIENT
Start: 2020-11-02 | End: 2020-11-09

## 2020-11-05 LAB
ALPHA GLOBIN RELEASED BY: NORMAL
ALPHA-GLOBIN SPECIMEN: NORMAL
GENETICIST REVIEW: NORMAL
HBA1 GENE MUT ANL BLD/T: NORMAL
HBA1 GENE MUT ANL BLD/T: NORMAL
REF LAB TEST METHOD: NORMAL
SERVICE CMNT-IMP: NORMAL
SPECIMEN SOURCE: NORMAL

## 2020-11-07 ENCOUNTER — PATIENT OUTREACH (OUTPATIENT)
Dept: ADMINISTRATIVE | Facility: OTHER | Age: 65
End: 2020-11-07

## 2020-11-07 NOTE — PROGRESS NOTES
LINKS immunization registry updated  Care Everywhere updated  Health Maintenance updated  Chart reviewed for overdue Proactive [facility] Encounters ([facility]) health maintenance testing (CRS, Breast Ca, Diabetic Eye Exam)   Orders entered:N/A

## 2020-11-08 VITALS
DIASTOLIC BLOOD PRESSURE: 78 MMHG | WEIGHT: 241.38 LBS | BODY MASS INDEX: 44.42 KG/M2 | HEART RATE: 77 BPM | SYSTOLIC BLOOD PRESSURE: 136 MMHG | HEIGHT: 62 IN | OXYGEN SATURATION: 99 % | TEMPERATURE: 99 F

## 2020-11-08 NOTE — PROGRESS NOTES
Subjective:       Patient ID: Elayne Almanzar is a 65 y.o. female.    Chief Complaint: Hypertension    HPI  She returns for management of hypertension.  She has had hypertension for over a year.  Current treatment has included medications outlined in medication list.  She denies chest pain or shortness of breath.  No palpitations.  Denies left arm or neck pain.    Medications:  See med list    Social history:  Does not smoke, does not drink alcohol      Review of Systems   Constitutional: Negative for chills, fatigue, fever and unexpected weight change.   Respiratory: Negative for chest tightness and shortness of breath.    Cardiovascular: Negative for chest pain and palpitations.   Gastrointestinal: Negative for abdominal pain and blood in stool.   Neurological: Negative for dizziness, syncope, numbness and headaches.       Objective:      Physical Exam  HENT:      Right Ear: External ear normal.      Left Ear: External ear normal.      Nose: Nose normal.      Mouth/Throat:      Mouth: Mucous membranes are moist.      Pharynx: Oropharynx is clear.   Eyes:      Pupils: Pupils are equal, round, and reactive to light.   Neck:      Musculoskeletal: Normal range of motion.   Cardiovascular:      Rate and Rhythm: Normal rate and regular rhythm.      Heart sounds: No murmur.   Pulmonary:      Breath sounds: Normal breath sounds.   Abdominal:      General: There is no distension.      Palpations: There is no hepatomegaly or splenomegaly.      Tenderness: There is no abdominal tenderness.   Lymphadenopathy:      Cervical: No cervical adenopathy.      Upper Body:      Right upper body: No axillary adenopathy.      Left upper body: No axillary adenopathy.   Neurological:      Cranial Nerves: No cranial nerve deficit.      Sensory: No sensory deficit.      Motor: Motor function is intact.      Deep Tendon Reflexes: Reflexes are normal and symmetric.       breast exam:  No masses palpated, no nipple discharge  expressed  Assessment/Plan           assessment and plan:  Hypertension:  Check CMP and TSH.  Schedule mammogram

## 2020-11-09 ENCOUNTER — OFFICE VISIT (OUTPATIENT)
Dept: OTOLARYNGOLOGY | Facility: CLINIC | Age: 65
End: 2020-11-09
Payer: COMMERCIAL

## 2020-11-09 ENCOUNTER — CLINICAL SUPPORT (OUTPATIENT)
Dept: AUDIOLOGY | Facility: CLINIC | Age: 65
End: 2020-11-09
Payer: COMMERCIAL

## 2020-11-09 ENCOUNTER — HOSPITAL ENCOUNTER (OUTPATIENT)
Dept: RADIOLOGY | Facility: HOSPITAL | Age: 65
Discharge: HOME | End: 2020-11-09
Attending: INTERNAL MEDICINE
Payer: COMMERCIAL

## 2020-11-09 DIAGNOSIS — J02.9 SORE THROAT: ICD-10-CM

## 2020-11-09 DIAGNOSIS — H91.93 HIGH FREQUENCY HEARING LOSS OF BOTH EARS: ICD-10-CM

## 2020-11-09 DIAGNOSIS — H92.09 OTALGIA, UNSPECIFIED LATERALITY: Primary | ICD-10-CM

## 2020-11-09 DIAGNOSIS — M26.609 TMJ (TEMPOROMANDIBULAR JOINT DISORDER): Primary | ICD-10-CM

## 2020-11-09 DIAGNOSIS — Z12.31 SCREENING MAMMOGRAM, ENCOUNTER FOR: ICD-10-CM

## 2020-11-09 DIAGNOSIS — H92.09 OTALGIA, UNSPECIFIED LATERALITY: ICD-10-CM

## 2020-11-09 DIAGNOSIS — H60.8X2 CHRONIC ECZEMATOUS OTITIS EXTERNA OF LEFT EAR: ICD-10-CM

## 2020-11-09 PROCEDURE — 99204 OFFICE O/P NEW MOD 45 MIN: CPT | Mod: S$GLB,,, | Performed by: OTOLARYNGOLOGY

## 2020-11-09 PROCEDURE — 77067 SCR MAMMO BI INCL CAD: CPT | Mod: TC

## 2020-11-09 PROCEDURE — 77067 SCR MAMMO BI INCL CAD: CPT | Mod: 26,,, | Performed by: RADIOLOGY

## 2020-11-09 PROCEDURE — 92557 PR COMPREHENSIVE HEARING TEST: ICD-10-PCS | Mod: S$GLB,,, | Performed by: PHYSICIAN ASSISTANT

## 2020-11-09 PROCEDURE — 92557 COMPREHENSIVE HEARING TEST: CPT | Mod: S$GLB,,, | Performed by: PHYSICIAN ASSISTANT

## 2020-11-09 PROCEDURE — 92567 TYMPANOMETRY: CPT | Mod: S$GLB,,, | Performed by: PHYSICIAN ASSISTANT

## 2020-11-09 PROCEDURE — 77063 MAMMO DIGITAL SCREENING BILAT WITH TOMO: ICD-10-PCS | Mod: 26,,, | Performed by: RADIOLOGY

## 2020-11-09 PROCEDURE — 99204 PR OFFICE/OUTPT VISIT, NEW, LEVL IV, 45-59 MIN: ICD-10-PCS | Mod: S$GLB,,, | Performed by: OTOLARYNGOLOGY

## 2020-11-09 PROCEDURE — 77063 BREAST TOMOSYNTHESIS BI: CPT | Mod: 26,,, | Performed by: RADIOLOGY

## 2020-11-09 PROCEDURE — 99999 PR PBB SHADOW E&M-EST. PATIENT-LVL II: CPT | Mod: PBBFAC,,, | Performed by: OTOLARYNGOLOGY

## 2020-11-09 PROCEDURE — 99999 PR PBB SHADOW E&M-EST. PATIENT-LVL II: ICD-10-PCS | Mod: PBBFAC,,, | Performed by: OTOLARYNGOLOGY

## 2020-11-09 PROCEDURE — 77067 MAMMO DIGITAL SCREENING BILAT WITH TOMO: ICD-10-PCS | Mod: 26,,, | Performed by: RADIOLOGY

## 2020-11-09 PROCEDURE — 92567 PR TYMPA2METRY: ICD-10-PCS | Mod: S$GLB,,, | Performed by: PHYSICIAN ASSISTANT

## 2020-11-09 RX ORDER — LIDOCAINE HYDROCHLORIDE 20 MG/ML
SOLUTION OROPHARYNGEAL 3 TIMES DAILY PRN
Qty: 100 ML | Refills: 2 | Status: SHIPPED | OUTPATIENT
Start: 2020-11-09 | End: 2021-11-03

## 2020-11-09 RX ORDER — BETAMETHASONE VALERATE 0.1 %
LOTION (ML) TOPICAL 2 TIMES DAILY
Qty: 60 ML | Refills: 0 | Status: SHIPPED | OUTPATIENT
Start: 2020-11-09 | End: 2021-03-29

## 2020-11-09 NOTE — PROGRESS NOTES
Otology/Neurotology History and Physical     CC: ear pain, left     HPI:  Elayne Almanzar is a 65 y.o. female who was referred to me by Dr. Nubia Crocker in consultation for left ear pain for the last 2 months. She reports that her ear feels dry and is sometimes itchy. No otorrhea. No dizziness. No changes in hearing or tinnitus. No history of ear surgery or frequent ear infections. She also complains of significant sore throat that is burning in nature associated with dry mouth. She uses CPAP nightly. She reports dysphagia to solids, especially meats. No changes in voice. She has COPD and uses albuterol PRN and advair BID. No history of thrush. She denies teeth grinding at night because she wear partial dentures.       Past Medical History  She has a past medical history of Acute on chronic diastolic congestive heart failure, Anticoagulant long-term use, Asthma, Atrial fibrillation, Cataract, Chronic kidney disease, COPD (chronic obstructive pulmonary disease), Depression, Dysuria, Flank pain, HTN (hypertension), Nephrolithiasis, KEYSHAWN (obstructive sleep apnea), Rheumatic disease of mitral valve, Uncontrolled type 2 diabetes mellitus with complication, with long-term current use of insulin, Urinary incontinence, and Urinary tract infection.    Past Surgical History  She has a past surgical history that includes Mitral valve replacement (2004);  section; Tubal ligation; kidney stone removal; Colonoscopy (N/A, 2019); and Esophagogastroduodenoscopy (N/A, 2019).    Family History  Her family history includes Breast cancer in her paternal aunt; Cancer in her brother; Colon cancer in her maternal grandmother; Colon cancer (age of onset: 83) in her mother; Diabetes in her father, sister, sister, and sister; Heart disease (age of onset: 70) in her father; Hypertension in her sister; Stroke in her paternal grandmother.    Social History  She reports that she quit smoking about 18 years ago. Her  smoking use included cigarettes. She has a 10.00 pack-year smoking history. She has never used smokeless tobacco. She reports that she does not drink alcohol or use drugs.    Allergies  She has No Known Allergies.    Medications  She has a current medication list which includes the following prescription(s): albuterol, amlodipine, amoxicillin, aspirin, betamethasone valerate 0.1%, ferrous sulfate, fluticasone propion-salmeterol 115-21 mcg/dose, fluticasone propionate, furosemide, latanoprost, lidocaine hcl 2%, metoprolol succinate, needle (disp) 26 gauge, nitroglycerin, oxybutynin, pantoprazole, sertraline, tamsulosin, and warfarin.    Review of Systems   Constitutional: Negative for chills and fever.   HENT: Positive for dental problem, ear pain, sore throat and trouble swallowing (solids (meats)). Negative for congestion, ear discharge, facial swelling, hearing loss, tinnitus and voice change.    Eyes: Negative for visual disturbance.   Respiratory: Negative for cough, shortness of breath and stridor.    Cardiovascular: Negative for chest pain.   Gastrointestinal: Negative for abdominal pain, nausea and vomiting.   Musculoskeletal: Negative for neck pain.   Skin: Negative for wound.   Allergic/Immunologic: Negative for environmental allergies and immunocompromised state.   Neurological: Negative for dizziness, speech difficulty and headaches.   Hematological: Does not bruise/bleed easily.   Psychiatric/Behavioral: Negative for decreased concentration and dysphoric mood. The patient is not nervous/anxious.           Objective:     [facility] 07/22/2012    Physical Exam  Constitutional: Well appearing/communicating. Voice clear. NAD.  Eyes: EOM I Bilaterally  Head/Face: Normocephalic.  House Brackmann I Bilaterally. Tenderness to pressure over left TMJ.  Right Ear: External Auditory Canal WNL,TM w/o masses/lesions/perforations.  Auricle WNL.  Left Ear: External Auditory Canal WNL,TM w/o masses/lesions/perforations. Auricle  WNL.  Nose: No gross nasal septal deviation. Inferior Turbinates 2+ bilaterally. No septal perforation. No masses/lesions. External nasal skin without masses/lesions.  Oral Cavity: Gingiva/lips WNL. Oral Tongue mobile. Hard Palate WNL.   Oropharynx: Tonsillar fossa/pharyngeal wall without lesions. Posterior oropharynx WNL.  Soft palate without masses. Midline uvula. No sign of thrush.  Neck/Lymphatic: No LAD I-VI bilaterally.  No thyromegaly.  No masses noted on exam.  Neuro/Psychiatric: AOx3.  Normal mood and affect.   Respiratory: Normal respiratory effort, no stridor/stertor, no retractions noted.      Procedure    None        Data Reviewed               Assessment:     1. TMJ (temporomandibular joint disorder)    2. Otalgia, unspecified laterality    3. Chronic eczematous otitis externa of left ear    4. Sore throat         Plan:   Warm compresses, Tylenol PRN, avoid gum chewing/hard foods/jaw clenching for TMJ. Handout given   Valisone rx for left ear   Try viscous lidocaine for throat pain, if no improvement then consider referral for flexible laryngoscopy     Diagnoses and all orders for this visit:    TMJ (temporomandibular joint disorder)    Otalgia, unspecified laterality  -     Ambulatory referral/consult to ENT    Chronic eczematous otitis externa of left ear    Sore throat    Other orders  -     betamethasone valerate 0.1% (VALISONE) 0.1 % Lotn; Apply topically 2 (two) times daily.  -     lidocaine HCl 2% (XYLOCAINE) 2 % Soln; by Mucous Membrane route 3 (three) times daily as needed.

## 2020-11-09 NOTE — PROGRESS NOTES
Elayne Almanzar was seen today in the clinic for an audiologic evaluation.  Her main complaint was hearing loss and pain in the ear.    Tympanometry revealed Type A in the right ear and Type A in the left ear. Audiogram results revealed a mild to moderate mixed hearing loss bilaterally. Speech reception thresholds were noted at 10 dB in the right ear and 10 dB in the left ear.  Speech discrimination scores were 92% in the right ear and 88% in the left ear.    Recommendations:  1. Otologic evaluation  2. Annual audiogram  3. Noise protection when in noise  4. Hearing aid consultation when patient is ready

## 2020-11-09 NOTE — LETTER
November 9, 2020      Nubia Crocker MD  [address]  [facility] [address]           [facility]  [address]  [facility] [address]  Phone: [phone]  Fax: [phone]          Patient: Elayne Almanzar   MR Number: [id number]   YOB: 1955   Date of Visit: 11/9/2020       Dear Dr. Nubia Crocker:    Thank you for referring Elayne Almanzar to me for evaluation. Attached you will find relevant portions of my assessment and plan of care.    If you have questions, please do not hesitate to call me. I look forward to following Elayne Almanzar along with you.    Sincerely,    Germain Knight MD    Enclosure  CC:  No Recipients    If you would like to receive this communication electronically, please contact [email or web address] or [phone] to request more information on [facility] [employer] access.    For providers and/or their staff who would like to refer a patient to [facility], please contact us through our one-stop-shop provider referral line, [facility], at [phone].    If you feel you have received this communication in error or would no longer like to receive these types of communications, please e-mail [email or web address]

## 2020-11-16 ENCOUNTER — LAB VISIT (OUTPATIENT)
Dept: LAB | Facility: HOSPITAL | Age: 65
End: 2020-11-16
Attending: INTERNAL MEDICINE
Payer: COMMERCIAL

## 2020-11-16 ENCOUNTER — ANTI-COAG VISIT (OUTPATIENT)
Dept: CARDIOLOGY | Facility: CLINIC | Age: 65
End: 2020-11-16
Payer: COMMERCIAL

## 2020-11-16 DIAGNOSIS — I48.20 CHRONIC ATRIAL FIBRILLATION: ICD-10-CM

## 2020-11-16 LAB
INR PPP: 2.3 (ref 0.8–1.2)
PROTHROMBIN TIME: 24.6 SEC (ref 9–12.5)

## 2020-11-16 PROCEDURE — 93793 ANTICOAG MGMT PT WARFARIN: CPT | Mod: S$GLB,,,

## 2020-11-16 PROCEDURE — 85610 PROTHROMBIN TIME: CPT

## 2020-11-16 PROCEDURE — 36415 COLL VENOUS BLD VENIPUNCTURE: CPT

## 2020-11-16 PROCEDURE — 93793 PR ANTICOAGULANT MGMT FOR PT TAKING WARFARIN: ICD-10-PCS | Mod: S$GLB,,,

## 2020-11-16 NOTE — PROGRESS NOTES
INR not at goal. Medications, chart, and patient findings reviewed. See calendar for adjustments to dose and follow up plan.  Pt denies any significant changes.  Recommend she takes 10mg 11/16 & resume maintenance dose.  Plan to re-assess in 2 weeks.

## 2020-11-30 ENCOUNTER — LAB VISIT (OUTPATIENT)
Dept: LAB | Facility: HOSPITAL | Age: 65
End: 2020-11-30
Attending: INTERNAL MEDICINE
Payer: COMMERCIAL

## 2020-11-30 ENCOUNTER — ANTI-COAG VISIT (OUTPATIENT)
Dept: CARDIOLOGY | Facility: CLINIC | Age: 65
End: 2020-11-30
Payer: COMMERCIAL

## 2020-11-30 DIAGNOSIS — I48.20 CHRONIC ATRIAL FIBRILLATION: ICD-10-CM

## 2020-11-30 LAB
INR PPP: 2.7 (ref 0.8–1.2)
PROTHROMBIN TIME: 28.7 SEC (ref 9–12.5)

## 2020-11-30 PROCEDURE — 36415 COLL VENOUS BLD VENIPUNCTURE: CPT

## 2020-11-30 PROCEDURE — 93793 ANTICOAG MGMT PT WARFARIN: CPT | Mod: S$GLB,,,

## 2020-11-30 PROCEDURE — 93793 PR ANTICOAGULANT MGMT FOR PT TAKING WARFARIN: ICD-10-PCS | Mod: S$GLB,,,

## 2020-11-30 PROCEDURE — 85610 PROTHROMBIN TIME: CPT

## 2020-12-14 ENCOUNTER — ANTI-COAG VISIT (OUTPATIENT)
Dept: CARDIOLOGY | Facility: CLINIC | Age: 65
End: 2020-12-14
Payer: COMMERCIAL

## 2020-12-14 ENCOUNTER — LAB VISIT (OUTPATIENT)
Dept: LAB | Facility: HOSPITAL | Age: 65
End: 2020-12-14
Attending: INTERNAL MEDICINE
Payer: COMMERCIAL

## 2020-12-14 DIAGNOSIS — I48.20 CHRONIC ATRIAL FIBRILLATION: ICD-10-CM

## 2020-12-14 LAB
INR PPP: 2.7 (ref 0.8–1.2)
PROTHROMBIN TIME: 28.2 SEC (ref 9–12.5)

## 2020-12-14 PROCEDURE — 93793 ANTICOAG MGMT PT WARFARIN: CPT | Mod: S$GLB,,,

## 2020-12-14 PROCEDURE — 36415 COLL VENOUS BLD VENIPUNCTURE: CPT

## 2020-12-14 PROCEDURE — 93793 PR ANTICOAGULANT MGMT FOR PT TAKING WARFARIN: ICD-10-PCS | Mod: S$GLB,,,

## 2020-12-14 PROCEDURE — 85610 PROTHROMBIN TIME: CPT

## 2020-12-28 ENCOUNTER — ANTI-COAG VISIT (OUTPATIENT)
Dept: CARDIOLOGY | Facility: CLINIC | Age: 65
End: 2020-12-28
Payer: COMMERCIAL

## 2020-12-28 ENCOUNTER — LAB VISIT (OUTPATIENT)
Dept: LAB | Facility: HOSPITAL | Age: 65
End: 2020-12-28
Attending: INTERNAL MEDICINE
Payer: COMMERCIAL

## 2020-12-28 DIAGNOSIS — I48.20 CHRONIC ATRIAL FIBRILLATION: ICD-10-CM

## 2020-12-28 LAB
INR PPP: 3.1 (ref 0.8–1.2)
PROTHROMBIN TIME: 31.5 SEC (ref 9–12.5)

## 2020-12-28 PROCEDURE — 93793 PR ANTICOAGULANT MGMT FOR PT TAKING WARFARIN: ICD-10-PCS | Mod: S$GLB,,,

## 2020-12-28 PROCEDURE — 93793 ANTICOAG MGMT PT WARFARIN: CPT | Mod: S$GLB,,,

## 2020-12-28 PROCEDURE — 36415 COLL VENOUS BLD VENIPUNCTURE: CPT

## 2020-12-28 PROCEDURE — 85610 PROTHROMBIN TIME: CPT

## 2021-01-19 ENCOUNTER — ANTI-COAG VISIT (OUTPATIENT)
Dept: CARDIOLOGY | Facility: CLINIC | Age: 66
End: 2021-01-19
Payer: COMMERCIAL

## 2021-01-19 ENCOUNTER — LAB VISIT (OUTPATIENT)
Dept: LAB | Facility: HOSPITAL | Age: 66
End: 2021-01-19
Attending: INTERNAL MEDICINE
Payer: COMMERCIAL

## 2021-01-19 DIAGNOSIS — I48.20 CHRONIC ATRIAL FIBRILLATION: ICD-10-CM

## 2021-01-19 LAB
INR PPP: 3.2 (ref 0.8–1.2)
PROTHROMBIN TIME: 32.8 SEC (ref 9–12.5)

## 2021-01-19 PROCEDURE — 93793 ANTICOAG MGMT PT WARFARIN: CPT | Mod: S$GLB,,,

## 2021-01-19 PROCEDURE — 85610 PROTHROMBIN TIME: CPT

## 2021-01-19 PROCEDURE — 93793 PR ANTICOAGULANT MGMT FOR PT TAKING WARFARIN: ICD-10-PCS | Mod: S$GLB,,,

## 2021-01-19 PROCEDURE — 36415 COLL VENOUS BLD VENIPUNCTURE: CPT

## 2021-01-26 ENCOUNTER — TELEPHONE (OUTPATIENT)
Dept: HEMATOLOGY/ONCOLOGY | Facility: CLINIC | Age: 66
End: 2021-01-26

## 2021-02-09 ENCOUNTER — TELEPHONE (OUTPATIENT)
Dept: HEMATOLOGY/ONCOLOGY | Facility: CLINIC | Age: 66
End: 2021-02-09

## 2021-02-11 LAB
25(OH)D3 SERPL-MCNC: 18 NG/ML
ALBUMIN SERPL BCP-MCNC: 4.1 G/DL (ref 3.5–5)
ALP SERPL-CCNC: 64 U/L (ref 25–125)
ALT SERPL W P-5'-P-CCNC: 9 U/L (ref 7–35)
ANION GAP SERPL CALC-SCNC: 17 MMOL/L (ref ?–30)
AST SERPL-CCNC: 18 U/L (ref 13–35)
BASOPHILS NFR BLD: 0.7 % (ref 0–3)
BASOPHILS NFR BLD: 32 K/UL
BILIRUB SERPL-MCNC: 0.5 MG/DL (ref 0.1–1.4)
BUN SERPL-MCNC: 17 MG/DL
CALCIUM SERPL-MCNC: 9.1 MG/DL (ref 8.7–10.7)
CHLORIDE SERPL-SCNC: 103 MMOL/L (ref 99–108)
CHOLEST SERPL-MSCNC: 213 MG/DL (ref 0–200)
CO2 SERPL-SCNC: 31 MMOL/L (ref 13–22)
CREAT SERPL-MCNC: 1 MG/DL (ref 0.5–1.1)
EOSINOPHIL NFR BLD: 329 K/UL
EOSINOPHIL NFR BLD: 7.3 %
ERYTHROCYTE [DISTWIDTH] IN BLOOD BY AUTOMATED COUNT: 19.9 % (ref 11.5–14.5)
EST. GFR  (AFRICAN AMERICAN): 67 ML/MIN/1.73 M2
EST. GFR  (NON AFRICAN AMERICAN): 57 ML/MIN/1.73 M2
FOLATE: 8 NG/ML
GLUCOSE SERPL-MCNC: 91 MG/DL
GRAN #: 2417 K/UL
GRAN%: 53.7 %
HCT VFR BLD AUTO: 33 % (ref 36–46)
HDL/CHOLESTEROL RATIO: 4.5 %
HDLC SERPL-MCNC: 47 MG/DL (ref 35–70)
HGB BLD-MCNC: 8.9 G/DL (ref 12–16)
LDLC SERPL CALC-MCNC: 146 MG/DL
LYMPH #: 1233 K/UL
LYMPH%: 27.4 % (ref 18–52)
MAGNESIUM SERPL-MCNC: 1.8 MG/DL
MCH RBC QN AUTO: 17.5 PG (ref 26–34)
MCHC RBC AUTO-ENTMCNC: 27 G/DL (ref 30–37)
MCV RBC AUTO: 65 FL (ref 82–108)
MONO #: 491 K/UL
MONO%: 10.9 % (ref 3–10)
NONHDLC SERPL-MCNC: 166 MG/DL
PLATELET # BLD AUTO: 213 K/UL (ref 150–399)
POTASSIUM SERPL-SCNC: 4.6 MMOL/L (ref 3.4–5.3)
PROT SERPL-MCNC: 7 G/DL
RBC # BLD AUTO: 5.08 M/UL (ref 4–5.2)
SODIUM BLD-SCNC: 142 MMOL/L (ref 137–147)
TRIGL SERPL-MCNC: 94 MG/DL (ref 40–160)
TSH SERPL DL<=0.005 MIU/L-ACNC: 3.45 UIU/ML (ref 0.41–5.9)
VIT B1 BLD-MCNC: 59 UG/DL
VIT B12 SERPL-MCNC: 510 PG/ML
WBC # BLD AUTO: 4.5 K/UL

## 2021-02-12 NOTE — PROGRESS NOTES
INR high, reports dental pain/infection and started taking Amoxicillin 875 mg BID for 7 days with last dose to be taken today, will hold today the 1/2 tab tomorrow then follow up on 1 week.  Bruising from use but denies bleeding or other issues.  DELMI Krishna, Pharm D assisted with dosing    Patient was re-educated on situations that would require placing a call to the [facility]madin Clinic, including bleeding or unusual bruising issues, changes in health, diet or medications, upcoming procedures that require Coumadin interruption, and missed Coumadin dose(s). Patient expressed understanding that avoidance of consistency with these parameters could cause fluctuations in INR, leading to more frequent visits and increase risk of adverse events.          Topical Clindamycin Pregnancy And Lactation Text: This medication is Pregnancy Category B and is considered safe during pregnancy. It is unknown if it is excreted in breast milk. Detail Level: Detailed Dapsone Pregnancy And Lactation Text: This medication is Pregnancy Category C and is not considered safe during pregnancy or breast feeding. Erythromycin Counseling:  I discussed with the patient the risks of erythromycin including but not limited to GI upset, allergic reaction, drug rash, diarrhea, increase in liver enzymes, and yeast infections. Tazorac Pregnancy And Lactation Text: This medication is not safe during pregnancy. It is unknown if this medication is excreted in breast milk. Tetracycline Counseling: Patient counseled regarding possible photosensitivity and increased risk for sunburn.  Patient instructed to avoid sunlight, if possible.  When exposed to sunlight, patients should wear protective clothing, sunglasses, and sunscreen.  The patient was instructed to call the office immediately if the following severe adverse effects occur:  hearing changes, easy bruising/bleeding, severe headache, or vision changes.  The patient verbalized understanding of the proper use and possible adverse effects of tetracycline.  All of the patient's questions and concerns were addressed. Patient understands to avoid pregnancy while on therapy due to potential birth defects. High Dose Vitamin A Pregnancy And Lactation Text: High dose vitamin A therapy is contraindicated during pregnancy and breast feeding. Birth Control Pills Counseling: Birth Control Pill Counseling: I discussed with the patient the potential side effects of OCPs including but not limited to increased risk of stroke, heart attack, thrombophlebitis, deep venous thrombosis, hepatic adenomas, breast changes, GI upset, headaches, and depression.  The patient verbalized understanding of the proper use and possible adverse effects of OCPs. All of the patient's questions and concerns were addressed. Minocycline Counseling: Patient advised regarding possible photosensitivity and discoloration of the teeth, skin, lips, tongue and gums.  Patient instructed to avoid sunlight, if possible.  When exposed to sunlight, patients should wear protective clothing, sunglasses, and sunscreen.  The patient was instructed to call the office immediately if the following severe adverse effects occur:  hearing changes, easy bruising/bleeding, severe headache, or vision changes.  The patient verbalized understanding of the proper use and possible adverse effects of minocycline.  All of the patient's questions and concerns were addressed. Minocycline Pregnancy And Lactation Text: This medication is Pregnancy Category D and not consider safe during pregnancy. It is also excreted in breast milk. Topical Clindamycin Counseling: Patient counseled that this medication may cause skin irritation or allergic reactions.  In the event of skin irritation, the patient was advised to reduce the amount of the drug applied or use it less frequently.   The patient verbalized understanding of the proper use and possible adverse effects of clindamycin.  All of the patient's questions and concerns were addressed. Include Pregnancy/Lactation Warning?: No Isotretinoin Pregnancy And Lactation Text: This medication is Pregnancy Category X and is considered extremely dangerous during pregnancy. It is unknown if it is excreted in breast milk. Doxycycline Pregnancy And Lactation Text: This medication is Pregnancy Category D and not consider safe during pregnancy. It is also excreted in breast milk but is considered safe for shorter treatment courses. Azithromycin Pregnancy And Lactation Text: This medication is considered safe during pregnancy and is also secreted in breast milk. Topical Retinoid counseling:  Patient advised to apply a pea-sized amount only at bedtime and wait 30 minutes after washing their face before applying.  If too drying, patient may add a non-comedogenic moisturizer. The patient verbalized understanding of the proper use and possible adverse effects of retinoids.  All of the patient's questions and concerns were addressed. Dapsone Counseling: I discussed with the patient the risks of dapsone including but not limited to hemolytic anemia, agranulocytosis, rashes, methemoglobinemia, kidney failure, peripheral neuropathy, headaches, GI upset, and liver toxicity.  Patients who start dapsone require monitoring including baseline LFTs and weekly CBCs for the first month, then every month thereafter.  The patient verbalized understanding of the proper use and possible adverse effects of dapsone.  All of the patient's questions and concerns were addressed. High Dose Vitamin A Counseling: Side effects reviewed, pt to contact office should one occur. Birth Control Pills Pregnancy And Lactation Text: This medication should be avoided if pregnant and for the first 30 days post-partum. Bactrim Counseling:  I discussed with the patient the risks of sulfa antibiotics including but not limited to GI upset, allergic reaction, drug rash, diarrhea, dizziness, photosensitivity, and yeast infections.  Rarely, more serious reactions can occur including but not limited to aplastic anemia, agranulocytosis, methemoglobinemia, blood dyscrasias, liver or kidney failure, lung infiltrates or desquamative/blistering drug rashes. Topical Retinoid Pregnancy And Lactation Text: This medication is Pregnancy Category C. It is unknown if this medication is excreted in breast milk. Bactrim Pregnancy And Lactation Text: This medication is Pregnancy Category D and is known to cause fetal risk.  It is also excreted in breast milk. Topical Sulfur Applications Pregnancy And Lactation Text: This medication is Pregnancy Category C and has an unknown safety profile during pregnancy. It is unknown if this topical medication is excreted in breast milk. Azithromycin Counseling:  I discussed with the patient the risks of azithromycin including but not limited to GI upset, allergic reaction, drug rash, diarrhea, and yeast infections. Spironolactone Pregnancy And Lactation Text: This medication can cause feminization of the male fetus and should be avoided during pregnancy. The active metabolite is also found in breast milk. Doxycycline Counseling:  Patient counseled regarding possible photosensitivity and increased risk for sunburn.  Patient instructed to avoid sunlight, if possible.  When exposed to sunlight, patients should wear protective clothing, sunglasses, and sunscreen.  The patient was instructed to call the office immediately if the following severe adverse effects occur:  hearing changes, easy bruising/bleeding, severe headache, or vision changes.  The patient verbalized understanding of the proper use and possible adverse effects of doxycycline.  All of the patient's questions and concerns were addressed. Tazorac Counseling:  Patient advised that medication is irritating and drying.  Patient may need to apply sparingly and wash off after an hour before eventually leaving it on overnight.  The patient verbalized understanding of the proper use and possible adverse effects of tazorac.  All of the patient's questions and concerns were addressed. Isotretinoin Counseling: Patient should get monthly blood tests, not donate blood, not drive at night if vision affected, not share medication, and not undergo elective surgery for 6 months after tx completed. Side effects reviewed, pt to contact office should one occur. Topical Sulfur Applications Counseling: Topical Sulfur Counseling: Patient counseled that this medication may cause skin irritation or allergic reactions.  In the event of skin irritation, the patient was advised to reduce the amount of the drug applied or use it less frequently.   The patient verbalized understanding of the proper use and possible adverse effects of topical sulfur application.  All of the patient's questions and concerns were addressed. Spironolactone Counseling: Patient advised regarding risks of diarrhea, abdominal pain, hyperkalemia, birth defects (for female patients), liver toxicity and renal toxicity. The patient may need blood work to monitor liver and kidney function and potassium levels while on therapy. The patient verbalized understanding of the proper use and possible adverse effects of spironolactone.  All of the patient's questions and concerns were addressed. Erythromycin Pregnancy And Lactation Text: This medication is Pregnancy Category B and is considered safe during pregnancy. It is also excreted in breast milk. Sarecycline Counseling: Patient advised regarding possible photosensitivity and discoloration of the teeth, skin, lips, tongue and gums.  Patient instructed to avoid sunlight, if possible.  When exposed to sunlight, patients should wear protective clothing, sunglasses, and sunscreen.  The patient was instructed to call the office immediately if the following severe adverse effects occur:  hearing changes, easy bruising/bleeding, severe headache, or vision changes.  The patient verbalized understanding of the proper use and possible adverse effects of sarecycline.  All of the patient's questions and concerns were addressed. Benzoyl Peroxide Pregnancy And Lactation Text: This medication is Pregnancy Category C. It is unknown if benzoyl peroxide is excreted in breast milk. Benzoyl Peroxide Counseling: Patient counseled that medicine may cause skin irritation and bleach clothing.  In the event of skin irritation, the patient was advised to reduce the amount of the drug applied or use it less frequently.   The patient verbalized understanding of the proper use and possible adverse effects of benzoyl peroxide.  All of the patient's questions and concerns were addressed.

## 2021-02-21 PROBLEM — D50.0 ANEMIA DUE TO CHRONIC BLOOD LOSS: Status: ACTIVE | Noted: 2021-02-21

## 2021-02-21 PROBLEM — D56.3 ALPHA THALASSEMIA TRAIT: Status: ACTIVE | Noted: 2021-02-21

## 2021-02-23 ENCOUNTER — LAB VISIT (OUTPATIENT)
Dept: LAB | Facility: HOSPITAL | Age: 66
End: 2021-02-23
Attending: INTERNAL MEDICINE
Payer: COMMERCIAL

## 2021-02-23 ENCOUNTER — ANTI-COAG VISIT (OUTPATIENT)
Dept: CARDIOLOGY | Facility: CLINIC | Age: 66
End: 2021-02-23
Payer: COMMERCIAL

## 2021-02-23 ENCOUNTER — OFFICE VISIT (OUTPATIENT)
Dept: HEMATOLOGY/ONCOLOGY | Facility: CLINIC | Age: 66
End: 2021-02-23
Payer: COMMERCIAL

## 2021-02-23 ENCOUNTER — TELEPHONE (OUTPATIENT)
Dept: CARDIOLOGY | Facility: CLINIC | Age: 66
End: 2021-02-23

## 2021-02-23 VITALS
BODY MASS INDEX: 44.92 KG/M2 | WEIGHT: 245.56 LBS | OXYGEN SATURATION: 97 % | SYSTOLIC BLOOD PRESSURE: 128 MMHG | HEART RATE: 83 BPM | TEMPERATURE: 99 F | RESPIRATION RATE: 18 BRPM | DIASTOLIC BLOOD PRESSURE: 65 MMHG

## 2021-02-23 DIAGNOSIS — I48.20 CHRONIC ATRIAL FIBRILLATION: Chronic | ICD-10-CM

## 2021-02-23 DIAGNOSIS — D50.8 IRON DEFICIENCY ANEMIA SECONDARY TO INADEQUATE DIETARY IRON INTAKE: ICD-10-CM

## 2021-02-23 DIAGNOSIS — Z79.01 CHRONIC ANTICOAGULATION: Chronic | ICD-10-CM

## 2021-02-23 DIAGNOSIS — I48.20 CHRONIC ATRIAL FIBRILLATION: ICD-10-CM

## 2021-02-23 DIAGNOSIS — Z95.2 H/O MITRAL VALVE REPLACEMENT WITH MECHANICAL VALVE: Chronic | ICD-10-CM

## 2021-02-23 DIAGNOSIS — D50.8 IRON DEFICIENCY ANEMIA SECONDARY TO INADEQUATE DIETARY IRON INTAKE: Primary | Chronic | ICD-10-CM

## 2021-02-23 DIAGNOSIS — D50.0 ANEMIA DUE TO CHRONIC BLOOD LOSS: ICD-10-CM

## 2021-02-23 DIAGNOSIS — D56.3 ALPHA THALASSEMIA TRAIT: ICD-10-CM

## 2021-02-23 DIAGNOSIS — I48.20 CHRONIC ATRIAL FIBRILLATION: Primary | ICD-10-CM

## 2021-02-23 DIAGNOSIS — I50.33 ACUTE ON CHRONIC DIASTOLIC CONGESTIVE HEART FAILURE: ICD-10-CM

## 2021-02-23 LAB
ALBUMIN SERPL BCP-MCNC: 3.6 G/DL (ref 3.5–5.2)
ALP SERPL-CCNC: 68 U/L (ref 55–135)
ALT SERPL W/O P-5'-P-CCNC: 10 U/L (ref 10–44)
ANION GAP SERPL CALC-SCNC: 7 MMOL/L (ref 8–16)
AST SERPL-CCNC: 19 U/L (ref 10–40)
BILIRUB SERPL-MCNC: 0.6 MG/DL (ref 0.1–1)
BUN SERPL-MCNC: 15 MG/DL (ref 8–23)
CALCIUM SERPL-MCNC: 8.9 MG/DL (ref 8.7–10.5)
CHLORIDE SERPL-SCNC: 106 MMOL/L (ref 95–110)
CO2 SERPL-SCNC: 28 MMOL/L (ref 23–29)
CREAT SERPL-MCNC: 1.1 MG/DL (ref 0.5–1.4)
ERYTHROCYTE [DISTWIDTH] IN BLOOD BY AUTOMATED COUNT: 20.6 % (ref 11.5–14.5)
EST. GFR  (AFRICAN AMERICAN): >60 ML/MIN/1.73 M^2
EST. GFR  (NON AFRICAN AMERICAN): 53.2 ML/MIN/1.73 M^2
FERRITIN SERPL-MCNC: 17 NG/ML (ref 20–300)
GLUCOSE SERPL-MCNC: 100 MG/DL (ref 70–110)
HCT VFR BLD AUTO: 33.6 % (ref 37–48.5)
HGB BLD-MCNC: 8.6 G/DL (ref 12–16)
INR PPP: 3.2 (ref 0.8–1.2)
IRON SERPL-MCNC: 28 UG/DL (ref 30–160)
MCH RBC QN AUTO: 17.2 PG (ref 27–31)
MCHC RBC AUTO-ENTMCNC: 25.6 G/DL (ref 32–36)
MCV RBC AUTO: 67 FL (ref 82–98)
PLATELET # BLD AUTO: 214 K/UL (ref 150–350)
PMV BLD AUTO: ABNORMAL FL (ref 9.2–12.9)
POTASSIUM SERPL-SCNC: 4.4 MMOL/L (ref 3.5–5.1)
PROT SERPL-MCNC: 7 G/DL (ref 6–8.4)
PROTHROMBIN TIME: 32.5 SEC (ref 9–12.5)
RBC # BLD AUTO: 4.99 M/UL (ref 4–5.4)
RETICS/RBC NFR AUTO: 1.6 % (ref 0.5–2.5)
SATURATED IRON: 5 % (ref 20–50)
SODIUM SERPL-SCNC: 141 MMOL/L (ref 136–145)
TOTAL IRON BINDING CAPACITY: 524 UG/DL (ref 250–450)
TRANSFERRIN SERPL-MCNC: 354 MG/DL (ref 200–375)
WBC # BLD AUTO: 4.6 K/UL (ref 3.9–12.7)

## 2021-02-23 PROCEDURE — 99999 PR PBB SHADOW E&M-EST. PATIENT-LVL IV: CPT | Mod: PBBFAC,,, | Performed by: INTERNAL MEDICINE

## 2021-02-23 PROCEDURE — 83540 ASSAY OF IRON: CPT

## 2021-02-23 PROCEDURE — 3074F PR MOST RECENT SYSTOLIC BLOOD PRESSURE < 130 MM HG: ICD-10-PCS | Mod: CPTII,S$GLB,, | Performed by: INTERNAL MEDICINE

## 2021-02-23 PROCEDURE — 99214 PR OFFICE/OUTPT VISIT, EST, LEVL IV, 30-39 MIN: ICD-10-PCS | Mod: S$GLB,,, | Performed by: INTERNAL MEDICINE

## 2021-02-23 PROCEDURE — 3074F SYST BP LT 130 MM HG: CPT | Mod: CPTII,S$GLB,, | Performed by: INTERNAL MEDICINE

## 2021-02-23 PROCEDURE — 3008F PR BODY MASS INDEX (BMI) DOCUMENTED: ICD-10-PCS | Mod: CPTII,S$GLB,, | Performed by: INTERNAL MEDICINE

## 2021-02-23 PROCEDURE — 1126F PR PAIN SEVERITY QUANTIFIED, NO PAIN PRESENT: ICD-10-PCS | Mod: S$GLB,,, | Performed by: INTERNAL MEDICINE

## 2021-02-23 PROCEDURE — 85610 PROTHROMBIN TIME: CPT

## 2021-02-23 PROCEDURE — 99214 OFFICE O/P EST MOD 30 MIN: CPT | Mod: S$GLB,,, | Performed by: INTERNAL MEDICINE

## 2021-02-23 PROCEDURE — 3008F BODY MASS INDEX DOCD: CPT | Mod: CPTII,S$GLB,, | Performed by: INTERNAL MEDICINE

## 2021-02-23 PROCEDURE — 80053 COMPREHEN METABOLIC PANEL: CPT

## 2021-02-23 PROCEDURE — 1126F AMNT PAIN NOTED NONE PRSNT: CPT | Mod: S$GLB,,, | Performed by: INTERNAL MEDICINE

## 2021-02-23 PROCEDURE — 85045 AUTOMATED RETICULOCYTE COUNT: CPT

## 2021-02-23 PROCEDURE — 3078F DIAST BP <80 MM HG: CPT | Mod: CPTII,S$GLB,, | Performed by: INTERNAL MEDICINE

## 2021-02-23 PROCEDURE — 82728 ASSAY OF FERRITIN: CPT

## 2021-02-23 PROCEDURE — 3078F PR MOST RECENT DIASTOLIC BLOOD PRESSURE < 80 MM HG: ICD-10-PCS | Mod: CPTII,S$GLB,, | Performed by: INTERNAL MEDICINE

## 2021-02-23 PROCEDURE — 93793 ANTICOAG MGMT PT WARFARIN: CPT | Mod: S$GLB,,,

## 2021-02-23 PROCEDURE — 99999 PR PBB SHADOW E&M-EST. PATIENT-LVL IV: ICD-10-PCS | Mod: PBBFAC,,, | Performed by: INTERNAL MEDICINE

## 2021-02-23 PROCEDURE — 93793 PR ANTICOAGULANT MGMT FOR PT TAKING WARFARIN: ICD-10-PCS | Mod: S$GLB,,,

## 2021-02-23 PROCEDURE — 85027 COMPLETE CBC AUTOMATED: CPT

## 2021-02-23 PROCEDURE — 36415 COLL VENOUS BLD VENIPUNCTURE: CPT

## 2021-02-23 RX ORDER — FAMOTIDINE 40 MG/1
TABLET, FILM COATED ORAL
COMMUNITY
Start: 2021-01-14 | End: 2021-05-03

## 2021-02-23 RX ORDER — METOPROLOL TARTRATE 100 MG/1
100 TABLET ORAL DAILY
COMMUNITY
Start: 2021-01-14 | End: 2021-03-22

## 2021-02-25 ENCOUNTER — TELEPHONE (OUTPATIENT)
Dept: INTERNAL MEDICINE | Facility: CLINIC | Age: 66
End: 2021-02-25

## 2021-02-25 ENCOUNTER — PATIENT OUTREACH (OUTPATIENT)
Dept: ADMINISTRATIVE | Facility: OTHER | Age: 66
End: 2021-02-25

## 2021-02-26 ENCOUNTER — OFFICE VISIT (OUTPATIENT)
Dept: OPTOMETRY | Facility: CLINIC | Age: 66
End: 2021-02-26
Payer: COMMERCIAL

## 2021-02-26 DIAGNOSIS — H52.4 PRESBYOPIA: Primary | ICD-10-CM

## 2021-02-26 DIAGNOSIS — H25.13 NS (NUCLEAR SCLEROSIS), BILATERAL: ICD-10-CM

## 2021-02-26 DIAGNOSIS — H43.811 PVD (POSTERIOR VITREOUS DETACHMENT), RIGHT EYE: ICD-10-CM

## 2021-02-26 DIAGNOSIS — H40.1111 PRIMARY OPEN ANGLE GLAUCOMA OF RIGHT EYE, MILD STAGE: ICD-10-CM

## 2021-02-26 DIAGNOSIS — H40.009 OPEN ANGLE WITH BORDERLINE INTRAOCULAR PRESSURE, UNSPECIFIED LATERALITY: ICD-10-CM

## 2021-02-26 DIAGNOSIS — H47.393 OPTIC NERVE CUPPING OF BOTH EYES: ICD-10-CM

## 2021-02-26 DIAGNOSIS — I10 ESSENTIAL (PRIMARY) HYPERTENSION: Chronic | ICD-10-CM

## 2021-02-26 DIAGNOSIS — E11.9 TYPE 2 DIABETES MELLITUS WITHOUT OPHTHALMIC MANIFESTATIONS: ICD-10-CM

## 2021-02-26 PROCEDURE — 92014 PR EYE EXAM, EST PATIENT,COMPREHESV: ICD-10-PCS | Mod: S$GLB,,, | Performed by: OPTOMETRIST

## 2021-02-26 PROCEDURE — 92015 PR REFRACTION: ICD-10-PCS | Mod: S$GLB,,, | Performed by: OPTOMETRIST

## 2021-02-26 PROCEDURE — 92250 COLOR FUNDUS PHOTOGRAPHY - OU - BOTH EYES: ICD-10-PCS | Mod: S$GLB,,, | Performed by: OPTOMETRIST

## 2021-02-26 PROCEDURE — 99999 PR PBB SHADOW E&M-EST. PATIENT-LVL I: ICD-10-PCS | Mod: PBBFAC,,, | Performed by: OPTOMETRIST

## 2021-02-26 PROCEDURE — 92015 DETERMINE REFRACTIVE STATE: CPT | Mod: S$GLB,,, | Performed by: OPTOMETRIST

## 2021-02-26 PROCEDURE — 92250 FUNDUS PHOTOGRAPHY W/I&R: CPT | Mod: S$GLB,,, | Performed by: OPTOMETRIST

## 2021-02-26 PROCEDURE — 92014 COMPRE OPH EXAM EST PT 1/>: CPT | Mod: S$GLB,,, | Performed by: OPTOMETRIST

## 2021-02-26 PROCEDURE — 99999 PR PBB SHADOW E&M-EST. PATIENT-LVL I: CPT | Mod: PBBFAC,,, | Performed by: OPTOMETRIST

## 2021-02-26 RX ORDER — LATANOPROST 50 UG/ML
1 SOLUTION/ DROPS OPHTHALMIC DAILY
Qty: 7.5 ML | Refills: 3 | Status: SHIPPED | OUTPATIENT
Start: 2021-02-26 | End: 2022-04-12 | Stop reason: SDUPTHER

## 2021-03-01 DIAGNOSIS — H47.393 OPTIC NERVE CUPPING OF BOTH EYES: Primary | ICD-10-CM

## 2021-03-10 ENCOUNTER — HOSPITAL ENCOUNTER (OUTPATIENT)
Dept: RADIOLOGY | Facility: HOSPITAL | Age: 66
Discharge: HOME | End: 2021-03-10
Attending: INTERNAL MEDICINE
Payer: COMMERCIAL

## 2021-03-10 ENCOUNTER — OFFICE VISIT (OUTPATIENT)
Dept: INTERNAL MEDICINE | Facility: CLINIC | Age: 66
End: 2021-03-10
Payer: COMMERCIAL

## 2021-03-10 DIAGNOSIS — I10 HYPERTENSION, UNSPECIFIED TYPE: Primary | ICD-10-CM

## 2021-03-10 DIAGNOSIS — Z01.818 PREOP EXAMINATION: ICD-10-CM

## 2021-03-10 DIAGNOSIS — Z01.419 WELL WOMAN EXAM: ICD-10-CM

## 2021-03-10 DIAGNOSIS — I10 HYPERTENSION, UNSPECIFIED TYPE: ICD-10-CM

## 2021-03-10 PROCEDURE — 71046 XR CHEST PA AND LATERAL: ICD-10-PCS | Mod: 26,,, | Performed by: RADIOLOGY

## 2021-03-10 PROCEDURE — 99396 PREV VISIT EST AGE 40-64: CPT | Mod: S$GLB,,, | Performed by: INTERNAL MEDICINE

## 2021-03-10 PROCEDURE — 3079F DIAST BP 80-89 MM HG: CPT | Mod: CPTII,S$GLB,, | Performed by: INTERNAL MEDICINE

## 2021-03-10 PROCEDURE — 3074F SYST BP LT 130 MM HG: CPT | Mod: CPTII,S$GLB,, | Performed by: INTERNAL MEDICINE

## 2021-03-10 PROCEDURE — 3008F PR BODY MASS INDEX (BMI) DOCUMENTED: ICD-10-PCS | Mod: CPTII,S$GLB,, | Performed by: INTERNAL MEDICINE

## 2021-03-10 PROCEDURE — 3074F PR MOST RECENT SYSTOLIC BLOOD PRESSURE < 130 MM HG: ICD-10-PCS | Mod: CPTII,S$GLB,, | Performed by: INTERNAL MEDICINE

## 2021-03-10 PROCEDURE — 71046 X-RAY EXAM CHEST 2 VIEWS: CPT | Mod: 26,,, | Performed by: RADIOLOGY

## 2021-03-10 PROCEDURE — 3008F BODY MASS INDEX DOCD: CPT | Mod: CPTII,S$GLB,, | Performed by: INTERNAL MEDICINE

## 2021-03-10 PROCEDURE — 99999 PR PBB SHADOW E&M-EST. PATIENT-LVL V: ICD-10-PCS | Mod: PBBFAC,,, | Performed by: INTERNAL MEDICINE

## 2021-03-10 PROCEDURE — 3079F PR MOST RECENT DIASTOLIC BLOOD PRESSURE 80-89 MM HG: ICD-10-PCS | Mod: CPTII,S$GLB,, | Performed by: INTERNAL MEDICINE

## 2021-03-10 PROCEDURE — 99999 PR PBB SHADOW E&M-EST. PATIENT-LVL V: CPT | Mod: PBBFAC,,, | Performed by: INTERNAL MEDICINE

## 2021-03-10 PROCEDURE — 71046 X-RAY EXAM CHEST 2 VIEWS: CPT | Mod: TC

## 2021-03-10 PROCEDURE — 99396 PR PREVENTIVE VISIT,EST,40-64: ICD-10-PCS | Mod: S$GLB,,, | Performed by: INTERNAL MEDICINE

## 2021-03-10 RX ORDER — AMOXICILLIN 500 MG/1
TABLET, FILM COATED ORAL
Qty: 4 TABLET | Refills: 0 | Status: SHIPPED | OUTPATIENT
Start: 2021-03-10 | End: 2023-01-01

## 2021-03-10 RX ORDER — MECLIZINE HYDROCHLORIDE 25 MG/1
25 TABLET ORAL 2 TIMES DAILY PRN
Qty: 30 TABLET | Refills: 1 | Status: SHIPPED | OUTPATIENT
Start: 2021-03-10 | End: 2021-10-01 | Stop reason: SDUPTHER

## 2021-03-14 VITALS
WEIGHT: 243.38 LBS | OXYGEN SATURATION: 97 % | TEMPERATURE: 99 F | HEIGHT: 62 IN | HEART RATE: 94 BPM | SYSTOLIC BLOOD PRESSURE: 118 MMHG | DIASTOLIC BLOOD PRESSURE: 86 MMHG | BODY MASS INDEX: 44.79 KG/M2

## 2021-03-15 ENCOUNTER — LAB VISIT (OUTPATIENT)
Dept: LAB | Facility: HOSPITAL | Age: 66
End: 2021-03-15
Payer: COMMERCIAL

## 2021-03-15 DIAGNOSIS — I50.33 ACUTE ON CHRONIC DIASTOLIC CONGESTIVE HEART FAILURE: ICD-10-CM

## 2021-03-15 LAB
BASOPHILS # BLD AUTO: 0.03 K/UL (ref 0–0.2)
BASOPHILS NFR BLD: 0.6 % (ref 0–1.9)
DIFFERENTIAL METHOD: ABNORMAL
EOSINOPHIL # BLD AUTO: 0.4 K/UL (ref 0–0.5)
EOSINOPHIL NFR BLD: 8.3 % (ref 0–8)
ERYTHROCYTE [DISTWIDTH] IN BLOOD BY AUTOMATED COUNT: 20.9 % (ref 11.5–14.5)
HCT VFR BLD AUTO: 35.3 % (ref 37–48.5)
HGB BLD-MCNC: 9.1 G/DL (ref 12–16)
IMM GRANULOCYTES # BLD AUTO: 0.01 K/UL (ref 0–0.04)
IMM GRANULOCYTES NFR BLD AUTO: 0.2 % (ref 0–0.5)
LYMPHOCYTES # BLD AUTO: 1.5 K/UL (ref 1–4.8)
LYMPHOCYTES NFR BLD: 28.8 % (ref 18–48)
MCH RBC QN AUTO: 17.1 PG (ref 27–31)
MCHC RBC AUTO-ENTMCNC: 25.8 G/DL (ref 32–36)
MCV RBC AUTO: 67 FL (ref 82–98)
MONOCYTES # BLD AUTO: 0.6 K/UL (ref 0.3–1)
MONOCYTES NFR BLD: 10.6 % (ref 4–15)
NEUTROPHILS # BLD AUTO: 2.7 K/UL (ref 1.8–7.7)
NEUTROPHILS NFR BLD: 51.5 % (ref 38–73)
NRBC BLD-RTO: 0 /100 WBC
PLATELET # BLD AUTO: 216 K/UL (ref 150–350)
PMV BLD AUTO: ABNORMAL FL (ref 9.2–12.9)
RBC # BLD AUTO: 5.31 M/UL (ref 4–5.4)
WBC # BLD AUTO: 5.27 K/UL (ref 3.9–12.7)

## 2021-03-15 PROCEDURE — 85025 COMPLETE CBC W/AUTO DIFF WBC: CPT | Performed by: INTERNAL MEDICINE

## 2021-03-15 PROCEDURE — 36415 COLL VENOUS BLD VENIPUNCTURE: CPT | Performed by: INTERNAL MEDICINE

## 2021-03-16 ENCOUNTER — TELEPHONE (OUTPATIENT)
Dept: INTERNAL MEDICINE | Facility: CLINIC | Age: 66
End: 2021-03-16

## 2021-03-16 DIAGNOSIS — Z01.818 PREOP EXAMINATION: Primary | ICD-10-CM

## 2021-03-21 ENCOUNTER — TELEPHONE (OUTPATIENT)
Dept: CARDIOLOGY | Facility: CLINIC | Age: 66
End: 2021-03-21

## 2021-03-22 ENCOUNTER — TELEPHONE (OUTPATIENT)
Dept: CARDIOLOGY | Facility: CLINIC | Age: 66
End: 2021-03-22

## 2021-03-22 ENCOUNTER — OFFICE VISIT (OUTPATIENT)
Dept: CARDIOLOGY | Facility: CLINIC | Age: 66
End: 2021-03-22
Payer: COMMERCIAL

## 2021-03-22 VITALS
WEIGHT: 244.69 LBS | BODY MASS INDEX: 45.03 KG/M2 | DIASTOLIC BLOOD PRESSURE: 56 MMHG | HEIGHT: 62 IN | SYSTOLIC BLOOD PRESSURE: 124 MMHG | HEART RATE: 73 BPM

## 2021-03-22 DIAGNOSIS — E66.01 MORBID OBESITY WITH BMI OF 40.0-44.9, ADULT: Chronic | ICD-10-CM

## 2021-03-22 DIAGNOSIS — J44.9 CHRONIC OBSTRUCTIVE PULMONARY DISEASE, UNSPECIFIED COPD TYPE: Chronic | ICD-10-CM

## 2021-03-22 DIAGNOSIS — Z79.01 CHRONIC ANTICOAGULATION: Chronic | ICD-10-CM

## 2021-03-22 DIAGNOSIS — I10 ESSENTIAL (PRIMARY) HYPERTENSION: Chronic | ICD-10-CM

## 2021-03-22 DIAGNOSIS — I50.32 CHRONIC DIASTOLIC CONGESTIVE HEART FAILURE: Chronic | ICD-10-CM

## 2021-03-22 DIAGNOSIS — I48.20 CHRONIC ATRIAL FIBRILLATION: Chronic | ICD-10-CM

## 2021-03-22 DIAGNOSIS — Z01.810 PRE-OPERATIVE CARDIOVASCULAR EXAMINATION: Primary | ICD-10-CM

## 2021-03-22 DIAGNOSIS — G47.31 COMPLEX SLEEP APNEA SYNDROME: ICD-10-CM

## 2021-03-22 DIAGNOSIS — Z95.2 H/O MITRAL VALVE REPLACEMENT WITH MECHANICAL VALVE: Chronic | ICD-10-CM

## 2021-03-22 DIAGNOSIS — E78.2 MIXED HYPERLIPIDEMIA: ICD-10-CM

## 2021-03-22 PROCEDURE — 1126F AMNT PAIN NOTED NONE PRSNT: CPT | Mod: S$GLB,,, | Performed by: PHYSICIAN ASSISTANT

## 2021-03-22 PROCEDURE — 3008F BODY MASS INDEX DOCD: CPT | Mod: CPTII,S$GLB,, | Performed by: PHYSICIAN ASSISTANT

## 2021-03-22 PROCEDURE — 99214 PR OFFICE/OUTPT VISIT, EST, LEVL IV, 30-39 MIN: ICD-10-PCS | Mod: S$GLB,,, | Performed by: PHYSICIAN ASSISTANT

## 2021-03-22 PROCEDURE — 3078F DIAST BP <80 MM HG: CPT | Mod: CPTII,S$GLB,, | Performed by: PHYSICIAN ASSISTANT

## 2021-03-22 PROCEDURE — 99999 PR PBB SHADOW E&M-EST. PATIENT-LVL V: CPT | Mod: PBBFAC,,, | Performed by: PHYSICIAN ASSISTANT

## 2021-03-22 PROCEDURE — 1126F PR PAIN SEVERITY QUANTIFIED, NO PAIN PRESENT: ICD-10-PCS | Mod: S$GLB,,, | Performed by: PHYSICIAN ASSISTANT

## 2021-03-22 PROCEDURE — 3078F PR MOST RECENT DIASTOLIC BLOOD PRESSURE < 80 MM HG: ICD-10-PCS | Mod: CPTII,S$GLB,, | Performed by: PHYSICIAN ASSISTANT

## 2021-03-22 PROCEDURE — 99214 OFFICE O/P EST MOD 30 MIN: CPT | Mod: S$GLB,,, | Performed by: PHYSICIAN ASSISTANT

## 2021-03-22 PROCEDURE — 3008F PR BODY MASS INDEX (BMI) DOCUMENTED: ICD-10-PCS | Mod: CPTII,S$GLB,, | Performed by: PHYSICIAN ASSISTANT

## 2021-03-22 PROCEDURE — 99999 PR PBB SHADOW E&M-EST. PATIENT-LVL V: ICD-10-PCS | Mod: PBBFAC,,, | Performed by: PHYSICIAN ASSISTANT

## 2021-03-22 PROCEDURE — 3074F PR MOST RECENT SYSTOLIC BLOOD PRESSURE < 130 MM HG: ICD-10-PCS | Mod: CPTII,S$GLB,, | Performed by: PHYSICIAN ASSISTANT

## 2021-03-22 PROCEDURE — 3074F SYST BP LT 130 MM HG: CPT | Mod: CPTII,S$GLB,, | Performed by: PHYSICIAN ASSISTANT

## 2021-03-26 ENCOUNTER — TELEPHONE (OUTPATIENT)
Dept: HEMATOLOGY/ONCOLOGY | Facility: CLINIC | Age: 66
End: 2021-03-26

## 2021-03-29 ENCOUNTER — OFFICE VISIT (OUTPATIENT)
Dept: OBSTETRICS AND GYNECOLOGY | Facility: CLINIC | Age: 66
End: 2021-03-29
Attending: OBSTETRICS & GYNECOLOGY
Payer: COMMERCIAL

## 2021-03-29 VITALS
DIASTOLIC BLOOD PRESSURE: 72 MMHG | WEIGHT: 244.25 LBS | SYSTOLIC BLOOD PRESSURE: 138 MMHG | BODY MASS INDEX: 44.95 KG/M2 | HEIGHT: 62 IN

## 2021-03-29 DIAGNOSIS — E66.01 MORBID OBESITY WITH BMI OF 40.0-44.9, ADULT: Chronic | ICD-10-CM

## 2021-03-29 DIAGNOSIS — N89.8 VAGINAL DISCHARGE: ICD-10-CM

## 2021-03-29 DIAGNOSIS — Z01.419 WELL WOMAN EXAM: ICD-10-CM

## 2021-03-29 DIAGNOSIS — Z01.419 WELL FEMALE EXAM WITH ROUTINE GYNECOLOGICAL EXAM: Primary | ICD-10-CM

## 2021-03-29 PROBLEM — J18.9 BILATERAL PNEUMONIA: Status: RESOLVED | Noted: 2020-07-07 | Resolved: 2021-03-29

## 2021-03-29 PROBLEM — J96.21 ACUTE ON CHRONIC RESPIRATORY FAILURE WITH HYPOXIA: Status: RESOLVED | Noted: 2020-07-09 | Resolved: 2021-03-29

## 2021-03-29 PROCEDURE — 3075F SYST BP GE 130 - 139MM HG: CPT | Mod: CPTII,S$GLB,, | Performed by: OBSTETRICS & GYNECOLOGY

## 2021-03-29 PROCEDURE — 3008F PR BODY MASS INDEX (BMI) DOCUMENTED: ICD-10-PCS | Mod: CPTII,S$GLB,, | Performed by: OBSTETRICS & GYNECOLOGY

## 2021-03-29 PROCEDURE — 3008F BODY MASS INDEX DOCD: CPT | Mod: CPTII,S$GLB,, | Performed by: OBSTETRICS & GYNECOLOGY

## 2021-03-29 PROCEDURE — 87481 CANDIDA DNA AMP PROBE: CPT | Mod: 59 | Performed by: OBSTETRICS & GYNECOLOGY

## 2021-03-29 PROCEDURE — 88175 CYTOPATH C/V AUTO FLUID REDO: CPT | Performed by: OBSTETRICS & GYNECOLOGY

## 2021-03-29 PROCEDURE — 99396 PREV VISIT EST AGE 40-64: CPT | Mod: S$GLB,,, | Performed by: OBSTETRICS & GYNECOLOGY

## 2021-03-29 PROCEDURE — 99999 PR PBB SHADOW E&M-EST. PATIENT-LVL IV: CPT | Mod: PBBFAC,,, | Performed by: OBSTETRICS & GYNECOLOGY

## 2021-03-29 PROCEDURE — 3075F PR MOST RECENT SYSTOLIC BLOOD PRESS GE 130-139MM HG: ICD-10-PCS | Mod: CPTII,S$GLB,, | Performed by: OBSTETRICS & GYNECOLOGY

## 2021-03-29 PROCEDURE — 1126F PR PAIN SEVERITY QUANTIFIED, NO PAIN PRESENT: ICD-10-PCS | Mod: S$GLB,,, | Performed by: OBSTETRICS & GYNECOLOGY

## 2021-03-29 PROCEDURE — 99396 PR PREVENTIVE VISIT,EST,40-64: ICD-10-PCS | Mod: S$GLB,,, | Performed by: OBSTETRICS & GYNECOLOGY

## 2021-03-29 PROCEDURE — 1126F AMNT PAIN NOTED NONE PRSNT: CPT | Mod: S$GLB,,, | Performed by: OBSTETRICS & GYNECOLOGY

## 2021-03-29 PROCEDURE — 99999 PR PBB SHADOW E&M-EST. PATIENT-LVL IV: ICD-10-PCS | Mod: PBBFAC,,, | Performed by: OBSTETRICS & GYNECOLOGY

## 2021-03-29 PROCEDURE — 3078F DIAST BP <80 MM HG: CPT | Mod: CPTII,S$GLB,, | Performed by: OBSTETRICS & GYNECOLOGY

## 2021-03-29 PROCEDURE — 3078F PR MOST RECENT DIASTOLIC BLOOD PRESSURE < 80 MM HG: ICD-10-PCS | Mod: CPTII,S$GLB,, | Performed by: OBSTETRICS & GYNECOLOGY

## 2021-03-30 ENCOUNTER — LAB VISIT (OUTPATIENT)
Dept: LAB | Facility: HOSPITAL | Age: 66
End: 2021-03-30
Attending: INTERNAL MEDICINE
Payer: COMMERCIAL

## 2021-03-30 ENCOUNTER — ANTI-COAG VISIT (OUTPATIENT)
Dept: CARDIOLOGY | Facility: CLINIC | Age: 66
End: 2021-03-30
Payer: COMMERCIAL

## 2021-03-30 ENCOUNTER — OFFICE VISIT (OUTPATIENT)
Dept: OPTOMETRY | Facility: CLINIC | Age: 66
End: 2021-03-30
Payer: COMMERCIAL

## 2021-03-30 ENCOUNTER — CLINICAL SUPPORT (OUTPATIENT)
Dept: OPHTHALMOLOGY | Facility: CLINIC | Age: 66
End: 2021-03-30
Payer: COMMERCIAL

## 2021-03-30 DIAGNOSIS — H47.393 OPTIC NERVE CUPPING OF BOTH EYES: Primary | ICD-10-CM

## 2021-03-30 DIAGNOSIS — I48.20 CHRONIC ATRIAL FIBRILLATION: Primary | ICD-10-CM

## 2021-03-30 DIAGNOSIS — H40.1111 PRIMARY OPEN ANGLE GLAUCOMA OF RIGHT EYE, MILD STAGE: ICD-10-CM

## 2021-03-30 DIAGNOSIS — I48.20 CHRONIC ATRIAL FIBRILLATION: ICD-10-CM

## 2021-03-30 DIAGNOSIS — H40.009 OPEN ANGLE WITH BORDERLINE INTRAOCULAR PRESSURE, UNSPECIFIED LATERALITY: ICD-10-CM

## 2021-03-30 DIAGNOSIS — H47.393 OPTIC NERVE CUPPING OF BOTH EYES: ICD-10-CM

## 2021-03-30 LAB
BACTERIAL VAGINOSIS DNA: NEGATIVE
CANDIDA GLABRATA DNA: NEGATIVE
CANDIDA KRUSEI DNA: NEGATIVE
CANDIDA RRNA VAG QL PROBE: NEGATIVE
INR PPP: 6.2 (ref 0.8–1.2)
INR PPP: 6.22
PROTHROMBIN TIME: 60.7 SEC (ref 9–12.5)
T VAGINALIS RRNA GENITAL QL PROBE: NEGATIVE

## 2021-03-30 PROCEDURE — 92020 GONIOSCOPY: CPT | Mod: S$GLB,,, | Performed by: OPTOMETRIST

## 2021-03-30 PROCEDURE — 92012 INTRM OPH EXAM EST PATIENT: CPT | Mod: S$GLB,,, | Performed by: OPTOMETRIST

## 2021-03-30 PROCEDURE — 99999 PR PBB SHADOW E&M-EST. PATIENT-LVL III: ICD-10-PCS | Mod: PBBFAC,,, | Performed by: OPTOMETRIST

## 2021-03-30 PROCEDURE — 99999 PR PBB SHADOW E&M-EST. PATIENT-LVL III: CPT | Mod: PBBFAC,,, | Performed by: OPTOMETRIST

## 2021-03-30 PROCEDURE — 92020 PR SPECIAL EYE EVAL,GONIOSCOPY: ICD-10-PCS | Mod: S$GLB,,, | Performed by: OPTOMETRIST

## 2021-03-30 PROCEDURE — 93793 PR ANTICOAGULANT MGMT FOR PT TAKING WARFARIN: ICD-10-PCS | Mod: S$GLB,,,

## 2021-03-30 PROCEDURE — 92012 PR EYE EXAM, EST PATIENT,INTERMED: ICD-10-PCS | Mod: S$GLB,,, | Performed by: OPTOMETRIST

## 2021-03-30 PROCEDURE — 36415 COLL VENOUS BLD VENIPUNCTURE: CPT | Performed by: INTERNAL MEDICINE

## 2021-03-30 PROCEDURE — 93793 ANTICOAG MGMT PT WARFARIN: CPT | Mod: S$GLB,,,

## 2021-03-30 PROCEDURE — 85610 PROTHROMBIN TIME: CPT | Performed by: INTERNAL MEDICINE

## 2021-03-30 RX ORDER — BRIMONIDINE TARTRATE 1 MG/ML
1 SOLUTION/ DROPS OPHTHALMIC 2 TIMES DAILY
Qty: 3 ML | Refills: 11 | Status: SHIPPED | OUTPATIENT
Start: 2021-03-30 | End: 2022-01-19 | Stop reason: ALTCHOICE

## 2021-03-31 ENCOUNTER — TELEPHONE (OUTPATIENT)
Dept: OPTOMETRY | Facility: CLINIC | Age: 66
End: 2021-03-31

## 2021-04-01 ENCOUNTER — LAB VISIT (OUTPATIENT)
Dept: LAB | Facility: HOSPITAL | Age: 66
End: 2021-04-01
Attending: INTERNAL MEDICINE
Payer: COMMERCIAL

## 2021-04-01 ENCOUNTER — ANTI-COAG VISIT (OUTPATIENT)
Dept: CARDIOLOGY | Facility: CLINIC | Age: 66
End: 2021-04-01
Payer: COMMERCIAL

## 2021-04-01 DIAGNOSIS — I48.20 CHRONIC ATRIAL FIBRILLATION: ICD-10-CM

## 2021-04-01 DIAGNOSIS — I48.20 CHRONIC ATRIAL FIBRILLATION: Primary | ICD-10-CM

## 2021-04-01 LAB
INR PPP: 3.3 (ref 0.8–1.2)
PROTHROMBIN TIME: 33.4 SEC (ref 9–12.5)

## 2021-04-01 PROCEDURE — 93793 PR ANTICOAGULANT MGMT FOR PT TAKING WARFARIN: ICD-10-PCS | Mod: S$GLB,,,

## 2021-04-01 PROCEDURE — 93793 ANTICOAG MGMT PT WARFARIN: CPT | Mod: S$GLB,,,

## 2021-04-01 PROCEDURE — 85610 PROTHROMBIN TIME: CPT | Performed by: INTERNAL MEDICINE

## 2021-04-01 PROCEDURE — 36415 COLL VENOUS BLD VENIPUNCTURE: CPT | Performed by: INTERNAL MEDICINE

## 2021-04-07 ENCOUNTER — ANTI-COAG VISIT (OUTPATIENT)
Dept: CARDIOLOGY | Facility: CLINIC | Age: 66
End: 2021-04-07
Payer: COMMERCIAL

## 2021-04-07 ENCOUNTER — LAB VISIT (OUTPATIENT)
Dept: LAB | Facility: HOSPITAL | Age: 66
End: 2021-04-07
Attending: INTERNAL MEDICINE
Payer: COMMERCIAL

## 2021-04-07 DIAGNOSIS — I48.20 CHRONIC ATRIAL FIBRILLATION: ICD-10-CM

## 2021-04-07 DIAGNOSIS — I48.20 CHRONIC ATRIAL FIBRILLATION: Primary | ICD-10-CM

## 2021-04-07 LAB
FINAL PATHOLOGIC DIAGNOSIS: NORMAL
INR PPP: 2.8 (ref 0.8–1.2)
Lab: NORMAL
PROTHROMBIN TIME: 28.4 SEC (ref 9–12.5)

## 2021-04-07 PROCEDURE — 93793 ANTICOAG MGMT PT WARFARIN: CPT | Mod: S$GLB,,,

## 2021-04-07 PROCEDURE — 93793 PR ANTICOAGULANT MGMT FOR PT TAKING WARFARIN: ICD-10-PCS | Mod: S$GLB,,,

## 2021-04-07 PROCEDURE — 85610 PROTHROMBIN TIME: CPT | Performed by: INTERNAL MEDICINE

## 2021-04-07 PROCEDURE — 36415 COLL VENOUS BLD VENIPUNCTURE: CPT | Performed by: INTERNAL MEDICINE

## 2021-04-23 ENCOUNTER — OFFICE VISIT (OUTPATIENT)
Dept: OPHTHALMOLOGY | Facility: CLINIC | Age: 66
End: 2021-04-23
Payer: COMMERCIAL

## 2021-04-23 DIAGNOSIS — H11.32 NON-TRAUMATIC SUBCONJUNCTIVAL HEMORRHAGE, LEFT: ICD-10-CM

## 2021-04-23 DIAGNOSIS — H40.1212 NORMAL TENSION GLAUCOMA OF RIGHT EYE, MODERATE STAGE: Primary | ICD-10-CM

## 2021-04-23 DIAGNOSIS — H40.002 GLAUCOMA SUSPECT, LEFT: ICD-10-CM

## 2021-04-23 PROCEDURE — 99999 PR PBB SHADOW E&M-EST. PATIENT-LVL III: CPT | Mod: PBBFAC,,, | Performed by: STUDENT IN AN ORGANIZED HEALTH CARE EDUCATION/TRAINING PROGRAM

## 2021-04-23 PROCEDURE — 99999 PR PBB SHADOW E&M-EST. PATIENT-LVL III: ICD-10-PCS | Mod: PBBFAC,,, | Performed by: STUDENT IN AN ORGANIZED HEALTH CARE EDUCATION/TRAINING PROGRAM

## 2021-04-23 PROCEDURE — 76514 PR  US, EYE, FOR CORNEAL THICKNESS: ICD-10-PCS | Mod: S$GLB,,, | Performed by: STUDENT IN AN ORGANIZED HEALTH CARE EDUCATION/TRAINING PROGRAM

## 2021-04-23 PROCEDURE — 92004 PR EYE EXAM, NEW PATIENT,COMPREHESV: ICD-10-PCS | Mod: S$GLB,,, | Performed by: STUDENT IN AN ORGANIZED HEALTH CARE EDUCATION/TRAINING PROGRAM

## 2021-04-23 PROCEDURE — 1126F AMNT PAIN NOTED NONE PRSNT: CPT | Mod: S$GLB,,, | Performed by: STUDENT IN AN ORGANIZED HEALTH CARE EDUCATION/TRAINING PROGRAM

## 2021-04-23 PROCEDURE — 92004 COMPRE OPH EXAM NEW PT 1/>: CPT | Mod: S$GLB,,, | Performed by: STUDENT IN AN ORGANIZED HEALTH CARE EDUCATION/TRAINING PROGRAM

## 2021-04-23 PROCEDURE — 76514 ECHO EXAM OF EYE THICKNESS: CPT | Mod: S$GLB,,, | Performed by: STUDENT IN AN ORGANIZED HEALTH CARE EDUCATION/TRAINING PROGRAM

## 2021-04-23 PROCEDURE — 92020 PR SPECIAL EYE EVAL,GONIOSCOPY: ICD-10-PCS | Mod: S$GLB,,, | Performed by: STUDENT IN AN ORGANIZED HEALTH CARE EDUCATION/TRAINING PROGRAM

## 2021-04-23 PROCEDURE — 92020 GONIOSCOPY: CPT | Mod: S$GLB,,, | Performed by: STUDENT IN AN ORGANIZED HEALTH CARE EDUCATION/TRAINING PROGRAM

## 2021-04-23 PROCEDURE — 1126F PR PAIN SEVERITY QUANTIFIED, NO PAIN PRESENT: ICD-10-PCS | Mod: S$GLB,,, | Performed by: STUDENT IN AN ORGANIZED HEALTH CARE EDUCATION/TRAINING PROGRAM

## 2021-04-28 ENCOUNTER — ANTI-COAG VISIT (OUTPATIENT)
Dept: CARDIOLOGY | Facility: CLINIC | Age: 66
End: 2021-04-28
Payer: COMMERCIAL

## 2021-04-28 ENCOUNTER — LAB VISIT (OUTPATIENT)
Dept: LAB | Facility: HOSPITAL | Age: 66
End: 2021-04-28
Attending: INTERNAL MEDICINE
Payer: COMMERCIAL

## 2021-04-28 DIAGNOSIS — I48.20 CHRONIC ATRIAL FIBRILLATION: Primary | ICD-10-CM

## 2021-04-28 DIAGNOSIS — I48.20 CHRONIC ATRIAL FIBRILLATION: ICD-10-CM

## 2021-04-28 LAB
INR PPP: 3.8 (ref 0.8–1.2)
PROTHROMBIN TIME: 38.4 SEC (ref 9–12.5)

## 2021-04-28 PROCEDURE — 85610 PROTHROMBIN TIME: CPT | Performed by: INTERNAL MEDICINE

## 2021-04-28 PROCEDURE — 93793 PR ANTICOAGULANT MGMT FOR PT TAKING WARFARIN: ICD-10-PCS | Mod: S$GLB,,,

## 2021-04-28 PROCEDURE — 36415 COLL VENOUS BLD VENIPUNCTURE: CPT | Performed by: INTERNAL MEDICINE

## 2021-04-28 PROCEDURE — 93793 ANTICOAG MGMT PT WARFARIN: CPT | Mod: S$GLB,,,

## 2021-05-03 ENCOUNTER — HOSPITAL ENCOUNTER (OUTPATIENT)
Dept: RADIOLOGY | Facility: HOSPITAL | Age: 66
Discharge: HOME | End: 2021-05-03
Attending: INTERNAL MEDICINE
Payer: COMMERCIAL

## 2021-05-03 ENCOUNTER — IMMUNIZATION (OUTPATIENT)
Dept: PHARMACY | Facility: CLINIC | Age: 66
End: 2021-05-03

## 2021-05-03 ENCOUNTER — OFFICE VISIT (OUTPATIENT)
Dept: INTERNAL MEDICINE | Facility: CLINIC | Age: 66
End: 2021-05-03
Payer: COMMERCIAL

## 2021-05-03 DIAGNOSIS — Z23 NEED FOR VACCINATION: Primary | ICD-10-CM

## 2021-05-03 DIAGNOSIS — K21.9 GASTROESOPHAGEAL REFLUX DISEASE WITHOUT ESOPHAGITIS: ICD-10-CM

## 2021-05-03 DIAGNOSIS — M25.569 KNEE PAIN, UNSPECIFIED CHRONICITY, UNSPECIFIED LATERALITY: ICD-10-CM

## 2021-05-03 DIAGNOSIS — Z71.89 ENCOUNTER FOR PSYCHOLOGICAL ASSESSMENT PRIOR TO BARIATRIC SURGERY: Primary | ICD-10-CM

## 2021-05-03 DIAGNOSIS — I50.33 ACUTE ON CHRONIC DIASTOLIC CONGESTIVE HEART FAILURE: ICD-10-CM

## 2021-05-03 DIAGNOSIS — I10 HYPERTENSION, UNSPECIFIED TYPE: ICD-10-CM

## 2021-05-03 PROCEDURE — 73560 XR KNEE ORTHO LEFT: ICD-10-PCS | Mod: 26,RT,, | Performed by: RADIOLOGY

## 2021-05-03 PROCEDURE — 73560 X-RAY EXAM OF KNEE 1 OR 2: CPT | Mod: 26,RT,, | Performed by: RADIOLOGY

## 2021-05-03 PROCEDURE — 73560 X-RAY EXAM OF KNEE 1 OR 2: CPT | Mod: TC,RT

## 2021-05-03 PROCEDURE — 73562 XR KNEE ORTHO LEFT: ICD-10-PCS | Mod: 26,LT,, | Performed by: RADIOLOGY

## 2021-05-03 PROCEDURE — 99999 PR PBB SHADOW E&M-EST. PATIENT-LVL V: ICD-10-PCS | Mod: PBBFAC,,, | Performed by: INTERNAL MEDICINE

## 2021-05-03 PROCEDURE — 99999 PR PBB SHADOW E&M-EST. PATIENT-LVL V: CPT | Mod: PBBFAC,,, | Performed by: INTERNAL MEDICINE

## 2021-05-03 PROCEDURE — 73562 X-RAY EXAM OF KNEE 3: CPT | Mod: 26,LT,, | Performed by: RADIOLOGY

## 2021-05-03 PROCEDURE — 3008F BODY MASS INDEX DOCD: CPT | Mod: CPTII,S$GLB,, | Performed by: INTERNAL MEDICINE

## 2021-05-03 PROCEDURE — 99214 OFFICE O/P EST MOD 30 MIN: CPT | Mod: S$GLB,,, | Performed by: INTERNAL MEDICINE

## 2021-05-03 PROCEDURE — 3008F PR BODY MASS INDEX (BMI) DOCUMENTED: ICD-10-PCS | Mod: CPTII,S$GLB,, | Performed by: INTERNAL MEDICINE

## 2021-05-03 PROCEDURE — 99214 PR OFFICE/OUTPT VISIT, EST, LEVL IV, 30-39 MIN: ICD-10-PCS | Mod: S$GLB,,, | Performed by: INTERNAL MEDICINE

## 2021-05-03 RX ORDER — ACETAMINOPHEN AND CODEINE PHOSPHATE 300; 30 MG/1; MG/1
1 TABLET ORAL 3 TIMES DAILY PRN
Qty: 21 TABLET | Refills: 0 | Status: SHIPPED | OUTPATIENT
Start: 2021-05-03 | End: 2021-05-10

## 2021-05-03 RX ORDER — AMLODIPINE BESYLATE 2.5 MG/1
2.5 TABLET ORAL DAILY
Qty: 30 TABLET | Refills: 4 | Status: SHIPPED | OUTPATIENT
Start: 2021-05-03 | End: 2021-10-01 | Stop reason: SDUPTHER

## 2021-05-03 RX ORDER — SERTRALINE HYDROCHLORIDE 100 MG/1
100 TABLET, FILM COATED ORAL DAILY
Qty: 30 TABLET | Refills: 3 | Status: SHIPPED | OUTPATIENT
Start: 2021-05-03 | End: 2021-10-01 | Stop reason: SDUPTHER

## 2021-05-03 RX ORDER — PANTOPRAZOLE SODIUM 40 MG/1
40 TABLET, DELAYED RELEASE ORAL DAILY PRN
Qty: 30 TABLET | Refills: 3 | Status: ON HOLD | OUTPATIENT
Start: 2021-05-03 | End: 2023-01-01

## 2021-05-03 RX ORDER — FUROSEMIDE 20 MG/1
20 TABLET ORAL DAILY
Qty: 30 TABLET | Refills: 4 | Status: SHIPPED | OUTPATIENT
Start: 2021-05-03 | End: 2021-10-01 | Stop reason: SDUPTHER

## 2021-05-08 VITALS
SYSTOLIC BLOOD PRESSURE: 126 MMHG | OXYGEN SATURATION: 96 % | TEMPERATURE: 99 F | HEART RATE: 87 BPM | HEIGHT: 62 IN | DIASTOLIC BLOOD PRESSURE: 70 MMHG | WEIGHT: 240.75 LBS | BODY MASS INDEX: 44.3 KG/M2

## 2021-05-12 ENCOUNTER — ANTI-COAG VISIT (OUTPATIENT)
Dept: CARDIOLOGY | Facility: CLINIC | Age: 66
End: 2021-05-12
Payer: COMMERCIAL

## 2021-05-12 ENCOUNTER — OFFICE VISIT (OUTPATIENT)
Dept: ORTHOPEDICS | Facility: CLINIC | Age: 66
End: 2021-05-12
Payer: COMMERCIAL

## 2021-05-12 ENCOUNTER — LAB VISIT (OUTPATIENT)
Dept: LAB | Facility: HOSPITAL | Age: 66
End: 2021-05-12
Attending: INTERNAL MEDICINE
Payer: COMMERCIAL

## 2021-05-12 DIAGNOSIS — I48.20 ATRIAL FIBRILLATION, CHRONIC: ICD-10-CM

## 2021-05-12 DIAGNOSIS — M25.569 KNEE PAIN, UNSPECIFIED CHRONICITY, UNSPECIFIED LATERALITY: ICD-10-CM

## 2021-05-12 DIAGNOSIS — Z79.01 LONG TERM (CURRENT) USE OF ANTICOAGULANTS: ICD-10-CM

## 2021-05-12 DIAGNOSIS — I48.20 CHRONIC ATRIAL FIBRILLATION: ICD-10-CM

## 2021-05-12 DIAGNOSIS — I48.20 CHRONIC ATRIAL FIBRILLATION: Primary | ICD-10-CM

## 2021-05-12 DIAGNOSIS — Z95.2 STATUS POST HEART VALVE REPLACEMENT WITH MECHANICAL VALVE: ICD-10-CM

## 2021-05-12 LAB
INR PPP: 2.2 (ref 0.8–1.2)
PROTHROMBIN TIME: 23.2 SEC (ref 9–12.5)

## 2021-05-12 PROCEDURE — 99999 PR PBB SHADOW E&M-EST. PATIENT-LVL III: CPT | Mod: PBBFAC,,, | Performed by: NURSE PRACTITIONER

## 2021-05-12 PROCEDURE — 1125F AMNT PAIN NOTED PAIN PRSNT: CPT | Mod: S$GLB,,, | Performed by: NURSE PRACTITIONER

## 2021-05-12 PROCEDURE — 93793 PR ANTICOAGULANT MGMT FOR PT TAKING WARFARIN: ICD-10-PCS | Mod: S$GLB,,,

## 2021-05-12 PROCEDURE — 99213 PR OFFICE/OUTPT VISIT, EST, LEVL III, 20-29 MIN: ICD-10-PCS | Mod: S$GLB,,, | Performed by: NURSE PRACTITIONER

## 2021-05-12 PROCEDURE — 99213 OFFICE O/P EST LOW 20 MIN: CPT | Mod: S$GLB,,, | Performed by: NURSE PRACTITIONER

## 2021-05-12 PROCEDURE — 85610 PROTHROMBIN TIME: CPT | Performed by: INTERNAL MEDICINE

## 2021-05-12 PROCEDURE — 1125F PR PAIN SEVERITY QUANTIFIED, PAIN PRESENT: ICD-10-PCS | Mod: S$GLB,,, | Performed by: NURSE PRACTITIONER

## 2021-05-12 PROCEDURE — 99999 PR PBB SHADOW E&M-EST. PATIENT-LVL III: ICD-10-PCS | Mod: PBBFAC,,, | Performed by: NURSE PRACTITIONER

## 2021-05-12 PROCEDURE — 36415 COLL VENOUS BLD VENIPUNCTURE: CPT | Performed by: INTERNAL MEDICINE

## 2021-05-12 PROCEDURE — 93793 ANTICOAG MGMT PT WARFARIN: CPT | Mod: S$GLB,,,

## 2021-05-12 RX ORDER — METHYLPREDNISOLONE 4 MG/1
TABLET ORAL
Qty: 1 PACKAGE | Refills: 0 | Status: SHIPPED | OUTPATIENT
Start: 2021-05-12 | End: 2021-06-02

## 2021-05-17 RX ORDER — WARFARIN SODIUM 5 MG/1
TABLET ORAL
Qty: 50 TABLET | Refills: 3 | Status: SHIPPED | OUTPATIENT
Start: 2021-05-17 | End: 2021-10-06 | Stop reason: SDUPTHER

## 2021-05-20 ENCOUNTER — OFFICE VISIT (OUTPATIENT)
Dept: SLEEP MEDICINE | Facility: CLINIC | Age: 66
End: 2021-05-20
Payer: COMMERCIAL

## 2021-05-20 VITALS
WEIGHT: 236.13 LBS | DIASTOLIC BLOOD PRESSURE: 75 MMHG | HEIGHT: 62 IN | SYSTOLIC BLOOD PRESSURE: 129 MMHG | BODY MASS INDEX: 43.45 KG/M2 | HEART RATE: 84 BPM

## 2021-05-20 DIAGNOSIS — E66.01 MORBID OBESITY WITH BMI OF 40.0-44.9, ADULT: ICD-10-CM

## 2021-05-20 DIAGNOSIS — J44.9 CHRONIC OBSTRUCTIVE PULMONARY DISEASE, UNSPECIFIED COPD TYPE: ICD-10-CM

## 2021-05-20 DIAGNOSIS — I50.32 CHRONIC DIASTOLIC CONGESTIVE HEART FAILURE: ICD-10-CM

## 2021-05-20 DIAGNOSIS — I10 ESSENTIAL (PRIMARY) HYPERTENSION: ICD-10-CM

## 2021-05-20 DIAGNOSIS — E78.2 MIXED HYPERLIPIDEMIA: ICD-10-CM

## 2021-05-20 DIAGNOSIS — I48.20 CHRONIC ATRIAL FIBRILLATION: ICD-10-CM

## 2021-05-20 DIAGNOSIS — G47.33 OSA (OBSTRUCTIVE SLEEP APNEA): Primary | ICD-10-CM

## 2021-05-20 PROCEDURE — 99999 PR PBB SHADOW E&M-EST. PATIENT-LVL IV: ICD-10-PCS | Mod: PBBFAC,,, | Performed by: INTERNAL MEDICINE

## 2021-05-20 PROCEDURE — 99999 PR PBB SHADOW E&M-EST. PATIENT-LVL IV: CPT | Mod: PBBFAC,,, | Performed by: INTERNAL MEDICINE

## 2021-05-20 PROCEDURE — 1126F PR PAIN SEVERITY QUANTIFIED, NO PAIN PRESENT: ICD-10-PCS | Mod: S$GLB,,, | Performed by: INTERNAL MEDICINE

## 2021-05-20 PROCEDURE — 1126F AMNT PAIN NOTED NONE PRSNT: CPT | Mod: S$GLB,,, | Performed by: INTERNAL MEDICINE

## 2021-05-20 PROCEDURE — 3008F PR BODY MASS INDEX (BMI) DOCUMENTED: ICD-10-PCS | Mod: CPTII,S$GLB,, | Performed by: INTERNAL MEDICINE

## 2021-05-20 PROCEDURE — 3008F BODY MASS INDEX DOCD: CPT | Mod: CPTII,S$GLB,, | Performed by: INTERNAL MEDICINE

## 2021-05-20 PROCEDURE — 99203 PR OFFICE/OUTPT VISIT, NEW, LEVL III, 30-44 MIN: ICD-10-PCS | Mod: S$GLB,,, | Performed by: INTERNAL MEDICINE

## 2021-05-20 PROCEDURE — 99203 OFFICE O/P NEW LOW 30 MIN: CPT | Mod: S$GLB,,, | Performed by: INTERNAL MEDICINE

## 2021-05-26 ENCOUNTER — LAB VISIT (OUTPATIENT)
Dept: LAB | Facility: HOSPITAL | Age: 66
End: 2021-05-26
Attending: INTERNAL MEDICINE
Payer: COMMERCIAL

## 2021-05-26 ENCOUNTER — ANTI-COAG VISIT (OUTPATIENT)
Dept: CARDIOLOGY | Facility: CLINIC | Age: 66
End: 2021-05-26
Payer: COMMERCIAL

## 2021-05-26 DIAGNOSIS — I48.20 CHRONIC ATRIAL FIBRILLATION: ICD-10-CM

## 2021-05-26 DIAGNOSIS — I48.20 CHRONIC ATRIAL FIBRILLATION: Primary | ICD-10-CM

## 2021-05-26 LAB
INR PPP: 7.4
INR PPP: 7.4 (ref 0.8–1.2)
PROTHROMBIN TIME: 71.2 SEC (ref 9–12.5)

## 2021-05-26 PROCEDURE — 93793 ANTICOAG MGMT PT WARFARIN: CPT | Mod: S$GLB,,,

## 2021-05-26 PROCEDURE — 85610 PROTHROMBIN TIME: CPT | Performed by: INTERNAL MEDICINE

## 2021-05-26 PROCEDURE — 36415 COLL VENOUS BLD VENIPUNCTURE: CPT | Performed by: INTERNAL MEDICINE

## 2021-05-26 PROCEDURE — 93793 PR ANTICOAGULANT MGMT FOR PT TAKING WARFARIN: ICD-10-PCS | Mod: S$GLB,,,

## 2021-05-28 ENCOUNTER — ANTI-COAG VISIT (OUTPATIENT)
Dept: CARDIOLOGY | Facility: CLINIC | Age: 66
End: 2021-05-28
Payer: COMMERCIAL

## 2021-05-28 ENCOUNTER — LAB VISIT (OUTPATIENT)
Dept: LAB | Facility: HOSPITAL | Age: 66
End: 2021-05-28
Attending: INTERNAL MEDICINE
Payer: COMMERCIAL

## 2021-05-28 DIAGNOSIS — I48.20 CHRONIC ATRIAL FIBRILLATION: ICD-10-CM

## 2021-05-28 DIAGNOSIS — I48.20 CHRONIC ATRIAL FIBRILLATION: Primary | ICD-10-CM

## 2021-05-28 LAB
INR PPP: 3.4 (ref 0.8–1.2)
PROTHROMBIN TIME: 34.3 SEC (ref 9–12.5)

## 2021-05-28 PROCEDURE — 85610 PROTHROMBIN TIME: CPT | Performed by: INTERNAL MEDICINE

## 2021-05-28 PROCEDURE — 93793 ANTICOAG MGMT PT WARFARIN: CPT | Mod: S$GLB,,,

## 2021-05-28 PROCEDURE — 36415 COLL VENOUS BLD VENIPUNCTURE: CPT | Performed by: INTERNAL MEDICINE

## 2021-05-28 PROCEDURE — 93793 PR ANTICOAGULANT MGMT FOR PT TAKING WARFARIN: ICD-10-PCS | Mod: S$GLB,,,

## 2021-05-31 ENCOUNTER — IMMUNIZATION (OUTPATIENT)
Dept: PHARMACY | Facility: CLINIC | Age: 66
End: 2021-05-31

## 2021-05-31 DIAGNOSIS — Z23 NEED FOR VACCINATION: Primary | ICD-10-CM

## 2021-06-01 ENCOUNTER — ANTI-COAG VISIT (OUTPATIENT)
Dept: CARDIOLOGY | Facility: CLINIC | Age: 66
End: 2021-06-01
Payer: COMMERCIAL

## 2021-06-01 ENCOUNTER — LAB VISIT (OUTPATIENT)
Dept: LAB | Facility: HOSPITAL | Age: 66
End: 2021-06-01
Attending: INTERNAL MEDICINE
Payer: COMMERCIAL

## 2021-06-01 DIAGNOSIS — I48.20 CHRONIC ATRIAL FIBRILLATION: ICD-10-CM

## 2021-06-01 DIAGNOSIS — I48.20 CHRONIC ATRIAL FIBRILLATION: Primary | ICD-10-CM

## 2021-06-01 LAB
INR PPP: 2.5 (ref 0.8–1.2)
PROTHROMBIN TIME: 25.6 SEC (ref 9–12.5)

## 2021-06-01 PROCEDURE — 93793 ANTICOAG MGMT PT WARFARIN: CPT | Mod: S$GLB,,,

## 2021-06-01 PROCEDURE — 36415 COLL VENOUS BLD VENIPUNCTURE: CPT | Performed by: INTERNAL MEDICINE

## 2021-06-01 PROCEDURE — 85610 PROTHROMBIN TIME: CPT | Performed by: INTERNAL MEDICINE

## 2021-06-01 PROCEDURE — 93793 PR ANTICOAGULANT MGMT FOR PT TAKING WARFARIN: ICD-10-PCS | Mod: S$GLB,,,

## 2021-06-15 ENCOUNTER — ANTI-COAG VISIT (OUTPATIENT)
Dept: CARDIOLOGY | Facility: CLINIC | Age: 66
End: 2021-06-15
Payer: COMMERCIAL

## 2021-06-15 ENCOUNTER — LAB VISIT (OUTPATIENT)
Dept: LAB | Facility: HOSPITAL | Age: 66
End: 2021-06-15
Attending: INTERNAL MEDICINE
Payer: COMMERCIAL

## 2021-06-15 DIAGNOSIS — I48.20 CHRONIC ATRIAL FIBRILLATION: Primary | ICD-10-CM

## 2021-06-15 DIAGNOSIS — I48.20 CHRONIC ATRIAL FIBRILLATION: ICD-10-CM

## 2021-06-15 LAB
INR PPP: 2.5 (ref 0.8–1.2)
PROTHROMBIN TIME: 26 SEC (ref 9–12.5)

## 2021-06-15 PROCEDURE — 93793 PR ANTICOAGULANT MGMT FOR PT TAKING WARFARIN: ICD-10-PCS | Mod: S$GLB,,,

## 2021-06-15 PROCEDURE — 85610 PROTHROMBIN TIME: CPT | Performed by: INTERNAL MEDICINE

## 2021-06-15 PROCEDURE — 93793 ANTICOAG MGMT PT WARFARIN: CPT | Mod: S$GLB,,,

## 2021-06-15 PROCEDURE — 36415 COLL VENOUS BLD VENIPUNCTURE: CPT | Performed by: INTERNAL MEDICINE

## 2021-06-29 ENCOUNTER — ANTI-COAG VISIT (OUTPATIENT)
Dept: CARDIOLOGY | Facility: CLINIC | Age: 66
End: 2021-06-29
Payer: COMMERCIAL

## 2021-06-29 ENCOUNTER — OFFICE VISIT (OUTPATIENT)
Dept: HEMATOLOGY/ONCOLOGY | Facility: CLINIC | Age: 66
End: 2021-06-29
Payer: COMMERCIAL

## 2021-06-29 ENCOUNTER — LAB VISIT (OUTPATIENT)
Dept: LAB | Facility: HOSPITAL | Age: 66
End: 2021-06-29
Attending: INTERNAL MEDICINE
Payer: COMMERCIAL

## 2021-06-29 VITALS
TEMPERATURE: 98 F | DIASTOLIC BLOOD PRESSURE: 60 MMHG | SYSTOLIC BLOOD PRESSURE: 130 MMHG | WEIGHT: 241.19 LBS | RESPIRATION RATE: 16 BRPM | HEIGHT: 60 IN | HEART RATE: 66 BPM | OXYGEN SATURATION: 97 % | BODY MASS INDEX: 47.35 KG/M2

## 2021-06-29 DIAGNOSIS — D56.3 ALPHA THALASSEMIA TRAIT: ICD-10-CM

## 2021-06-29 DIAGNOSIS — I48.20 CHRONIC ATRIAL FIBRILLATION: ICD-10-CM

## 2021-06-29 DIAGNOSIS — Z01.818 PREOP EXAMINATION: ICD-10-CM

## 2021-06-29 DIAGNOSIS — D50.8 IRON DEFICIENCY ANEMIA SECONDARY TO INADEQUATE DIETARY IRON INTAKE: Chronic | ICD-10-CM

## 2021-06-29 DIAGNOSIS — Z79.01 CHRONIC ANTICOAGULATION: ICD-10-CM

## 2021-06-29 DIAGNOSIS — Z95.2 H/O MITRAL VALVE REPLACEMENT WITH MECHANICAL VALVE: ICD-10-CM

## 2021-06-29 DIAGNOSIS — I48.20 CHRONIC ATRIAL FIBRILLATION: Primary | ICD-10-CM

## 2021-06-29 DIAGNOSIS — D50.0 ANEMIA DUE TO CHRONIC BLOOD LOSS: ICD-10-CM

## 2021-06-29 DIAGNOSIS — D50.8 IRON DEFICIENCY ANEMIA SECONDARY TO INADEQUATE DIETARY IRON INTAKE: Primary | ICD-10-CM

## 2021-06-29 LAB
ANISOCYTOSIS BLD QL SMEAR: SLIGHT
BASOPHILS # BLD AUTO: 0.02 K/UL (ref 0–0.2)
BASOPHILS NFR BLD: 0.4 % (ref 0–1.9)
DACRYOCYTES BLD QL SMEAR: ABNORMAL
DIFFERENTIAL METHOD: ABNORMAL
EOSINOPHIL # BLD AUTO: 0.4 K/UL (ref 0–0.5)
EOSINOPHIL NFR BLD: 8.3 % (ref 0–8)
ERYTHROCYTE [DISTWIDTH] IN BLOOD BY AUTOMATED COUNT: 20.7 % (ref 11.5–14.5)
FERRITIN SERPL-MCNC: 15 NG/ML (ref 20–300)
HCT VFR BLD AUTO: 33.2 % (ref 37–48.5)
HGB BLD-MCNC: 8.9 G/DL (ref 12–16)
IMM GRANULOCYTES # BLD AUTO: 0.01 K/UL (ref 0–0.04)
IMM GRANULOCYTES NFR BLD AUTO: 0.2 % (ref 0–0.5)
INR PPP: 2.9 (ref 0.8–1.2)
IRON SERPL-MCNC: 24 UG/DL (ref 30–160)
LYMPHOCYTES # BLD AUTO: 1.1 K/UL (ref 1–4.8)
LYMPHOCYTES NFR BLD: 23.6 % (ref 18–48)
MCH RBC QN AUTO: 18.1 PG (ref 27–31)
MCHC RBC AUTO-ENTMCNC: 26.8 G/DL (ref 32–36)
MCV RBC AUTO: 68 FL (ref 82–98)
MONOCYTES # BLD AUTO: 0.6 K/UL (ref 0.3–1)
MONOCYTES NFR BLD: 11.9 % (ref 4–15)
NEUTROPHILS # BLD AUTO: 2.6 K/UL (ref 1.8–7.7)
NEUTROPHILS NFR BLD: 55.6 % (ref 38–73)
NRBC BLD-RTO: 0 /100 WBC
OVALOCYTES BLD QL SMEAR: ABNORMAL
PLATELET # BLD AUTO: 184 K/UL (ref 150–450)
PMV BLD AUTO: ABNORMAL FL (ref 9.2–12.9)
POIKILOCYTOSIS BLD QL SMEAR: SLIGHT
POLYCHROMASIA BLD QL SMEAR: ABNORMAL
PROTHROMBIN TIME: 29.5 SEC (ref 9–12.5)
RBC # BLD AUTO: 4.91 M/UL (ref 4–5.4)
RETICS/RBC NFR AUTO: 1.3 % (ref 0.5–2.5)
SATURATED IRON: 4 % (ref 20–50)
TOTAL IRON BINDING CAPACITY: 545 UG/DL (ref 250–450)
TRANSFERRIN SERPL-MCNC: 368 MG/DL (ref 200–375)
WBC # BLD AUTO: 4.71 K/UL (ref 3.9–12.7)

## 2021-06-29 PROCEDURE — 36415 COLL VENOUS BLD VENIPUNCTURE: CPT | Performed by: INTERNAL MEDICINE

## 2021-06-29 PROCEDURE — 93793 PR ANTICOAGULANT MGMT FOR PT TAKING WARFARIN: ICD-10-PCS | Mod: S$GLB,,,

## 2021-06-29 PROCEDURE — 99999 PR PBB SHADOW E&M-EST. PATIENT-LVL V: CPT | Mod: PBBFAC,,, | Performed by: INTERNAL MEDICINE

## 2021-06-29 PROCEDURE — 3008F PR BODY MASS INDEX (BMI) DOCUMENTED: ICD-10-PCS | Mod: CPTII,S$GLB,, | Performed by: INTERNAL MEDICINE

## 2021-06-29 PROCEDURE — 99215 PR OFFICE/OUTPT VISIT, EST, LEVL V, 40-54 MIN: ICD-10-PCS | Mod: S$GLB,,, | Performed by: INTERNAL MEDICINE

## 2021-06-29 PROCEDURE — 85610 PROTHROMBIN TIME: CPT | Performed by: INTERNAL MEDICINE

## 2021-06-29 PROCEDURE — 82728 ASSAY OF FERRITIN: CPT | Performed by: INTERNAL MEDICINE

## 2021-06-29 PROCEDURE — 85025 COMPLETE CBC W/AUTO DIFF WBC: CPT | Performed by: INTERNAL MEDICINE

## 2021-06-29 PROCEDURE — 83540 ASSAY OF IRON: CPT | Performed by: INTERNAL MEDICINE

## 2021-06-29 PROCEDURE — 3008F BODY MASS INDEX DOCD: CPT | Mod: CPTII,S$GLB,, | Performed by: INTERNAL MEDICINE

## 2021-06-29 PROCEDURE — 1125F AMNT PAIN NOTED PAIN PRSNT: CPT | Mod: S$GLB,,, | Performed by: INTERNAL MEDICINE

## 2021-06-29 PROCEDURE — 85045 AUTOMATED RETICULOCYTE COUNT: CPT | Performed by: INTERNAL MEDICINE

## 2021-06-29 PROCEDURE — 99215 OFFICE O/P EST HI 40 MIN: CPT | Mod: S$GLB,,, | Performed by: INTERNAL MEDICINE

## 2021-06-29 PROCEDURE — 93793 ANTICOAG MGMT PT WARFARIN: CPT | Mod: S$GLB,,,

## 2021-06-29 PROCEDURE — 99999 PR PBB SHADOW E&M-EST. PATIENT-LVL V: ICD-10-PCS | Mod: PBBFAC,,, | Performed by: INTERNAL MEDICINE

## 2021-06-29 PROCEDURE — 1125F PR PAIN SEVERITY QUANTIFIED, PAIN PRESENT: ICD-10-PCS | Mod: S$GLB,,, | Performed by: INTERNAL MEDICINE

## 2021-07-06 ENCOUNTER — TELEPHONE (OUTPATIENT)
Dept: INTERNAL MEDICINE | Facility: CLINIC | Age: 66
End: 2021-07-06

## 2021-07-07 ENCOUNTER — TELEPHONE (OUTPATIENT)
Dept: INTERNAL MEDICINE | Facility: CLINIC | Age: 66
End: 2021-07-07

## 2021-07-13 ENCOUNTER — ANTI-COAG VISIT (OUTPATIENT)
Dept: CARDIOLOGY | Facility: CLINIC | Age: 66
End: 2021-07-13
Payer: COMMERCIAL

## 2021-07-13 ENCOUNTER — LAB VISIT (OUTPATIENT)
Dept: LAB | Facility: HOSPITAL | Age: 66
End: 2021-07-13
Attending: INTERNAL MEDICINE
Payer: COMMERCIAL

## 2021-07-13 DIAGNOSIS — I48.20 CHRONIC ATRIAL FIBRILLATION: ICD-10-CM

## 2021-07-13 DIAGNOSIS — I48.20 CHRONIC ATRIAL FIBRILLATION: Primary | ICD-10-CM

## 2021-07-13 LAB
INR PPP: 2.7 (ref 0.8–1.2)
PROTHROMBIN TIME: 28.1 SEC (ref 9–12.5)

## 2021-07-13 PROCEDURE — 93793 ANTICOAG MGMT PT WARFARIN: CPT | Mod: S$GLB,,,

## 2021-07-13 PROCEDURE — 85610 PROTHROMBIN TIME: CPT | Performed by: INTERNAL MEDICINE

## 2021-07-13 PROCEDURE — 36415 COLL VENOUS BLD VENIPUNCTURE: CPT | Performed by: INTERNAL MEDICINE

## 2021-07-13 PROCEDURE — 93793 PR ANTICOAGULANT MGMT FOR PT TAKING WARFARIN: ICD-10-PCS | Mod: S$GLB,,,

## 2021-07-15 ENCOUNTER — TELEPHONE (OUTPATIENT)
Dept: SLEEP MEDICINE | Facility: CLINIC | Age: 66
End: 2021-07-15

## 2021-07-19 ENCOUNTER — TELEPHONE (OUTPATIENT)
Dept: SLEEP MEDICINE | Facility: CLINIC | Age: 66
End: 2021-07-19

## 2021-07-21 ENCOUNTER — INFUSION (OUTPATIENT)
Dept: INFUSION THERAPY | Facility: HOSPITAL | Age: 66
End: 2021-07-21
Payer: COMMERCIAL

## 2021-07-21 VITALS
OXYGEN SATURATION: 96 % | DIASTOLIC BLOOD PRESSURE: 59 MMHG | HEART RATE: 78 BPM | SYSTOLIC BLOOD PRESSURE: 138 MMHG | RESPIRATION RATE: 18 BRPM | TEMPERATURE: 98 F

## 2021-07-21 DIAGNOSIS — D50.0 IRON DEFICIENCY ANEMIA DUE TO CHRONIC BLOOD LOSS: Primary | ICD-10-CM

## 2021-07-21 PROCEDURE — 96374 THER/PROPH/DIAG INJ IV PUSH: CPT

## 2021-07-21 PROCEDURE — 63600175 PHARM REV CODE 636 W HCPCS: Performed by: INTERNAL MEDICINE

## 2021-07-21 PROCEDURE — 25000003 PHARM REV CODE 250: Performed by: INTERNAL MEDICINE

## 2021-07-21 RX ORDER — HEPARIN 100 UNIT/ML
500 SYRINGE INTRAVENOUS
Status: DISCONTINUED | OUTPATIENT
Start: 2021-07-21 | End: 2021-07-21 | Stop reason: HOSPADM

## 2021-07-21 RX ORDER — SODIUM CHLORIDE 0.9 % (FLUSH) 0.9 %
10 SYRINGE (ML) INJECTION
Status: DISCONTINUED | OUTPATIENT
Start: 2021-07-21 | End: 2021-07-21 | Stop reason: HOSPADM

## 2021-07-21 RX ADMIN — SODIUM CHLORIDE: 9 INJECTION, SOLUTION INTRAVENOUS at 01:07

## 2021-07-21 RX ADMIN — IRON SUCROSE 200 MG: 20 INJECTION, SOLUTION INTRAVENOUS at 01:07

## 2021-07-22 ENCOUNTER — TELEPHONE (OUTPATIENT)
Dept: SLEEP MEDICINE | Facility: CLINIC | Age: 66
End: 2021-07-22

## 2021-07-27 ENCOUNTER — ANTI-COAG VISIT (OUTPATIENT)
Dept: CARDIOLOGY | Facility: CLINIC | Age: 66
End: 2021-07-27
Payer: COMMERCIAL

## 2021-07-27 ENCOUNTER — LAB VISIT (OUTPATIENT)
Dept: LAB | Facility: HOSPITAL | Age: 66
End: 2021-07-27
Attending: INTERNAL MEDICINE
Payer: COMMERCIAL

## 2021-07-27 DIAGNOSIS — I48.20 CHRONIC ATRIAL FIBRILLATION: Primary | ICD-10-CM

## 2021-07-27 DIAGNOSIS — I48.20 CHRONIC ATRIAL FIBRILLATION: ICD-10-CM

## 2021-07-27 LAB
INR PPP: 2.9 (ref 0.8–1.2)
PROTHROMBIN TIME: 29.3 SEC (ref 9–12.5)

## 2021-07-27 PROCEDURE — 93793 PR ANTICOAGULANT MGMT FOR PT TAKING WARFARIN: ICD-10-PCS | Mod: S$GLB,,,

## 2021-07-27 PROCEDURE — 93793 ANTICOAG MGMT PT WARFARIN: CPT | Mod: S$GLB,,,

## 2021-07-27 PROCEDURE — 36415 COLL VENOUS BLD VENIPUNCTURE: CPT | Performed by: INTERNAL MEDICINE

## 2021-07-27 PROCEDURE — 85610 PROTHROMBIN TIME: CPT | Performed by: INTERNAL MEDICINE

## 2021-07-28 ENCOUNTER — INFUSION (OUTPATIENT)
Dept: INFUSION THERAPY | Facility: HOSPITAL | Age: 66
End: 2021-07-28
Payer: COMMERCIAL

## 2021-07-28 VITALS
DIASTOLIC BLOOD PRESSURE: 56 MMHG | SYSTOLIC BLOOD PRESSURE: 123 MMHG | TEMPERATURE: 99 F | HEART RATE: 70 BPM | RESPIRATION RATE: 18 BRPM

## 2021-07-28 DIAGNOSIS — D50.0 IRON DEFICIENCY ANEMIA DUE TO CHRONIC BLOOD LOSS: Primary | ICD-10-CM

## 2021-07-28 PROCEDURE — 96375 TX/PRO/DX INJ NEW DRUG ADDON: CPT

## 2021-07-28 PROCEDURE — 96374 THER/PROPH/DIAG INJ IV PUSH: CPT

## 2021-07-28 PROCEDURE — 63600175 PHARM REV CODE 636 W HCPCS: Performed by: INTERNAL MEDICINE

## 2021-07-28 PROCEDURE — 25000003 PHARM REV CODE 250: Performed by: INTERNAL MEDICINE

## 2021-07-28 RX ORDER — SODIUM CHLORIDE 0.9 % (FLUSH) 0.9 %
10 SYRINGE (ML) INJECTION
Status: DISCONTINUED | OUTPATIENT
Start: 2021-07-28 | End: 2021-07-28 | Stop reason: HOSPADM

## 2021-07-28 RX ORDER — HEPARIN 100 UNIT/ML
500 SYRINGE INTRAVENOUS
Status: DISCONTINUED | OUTPATIENT
Start: 2021-07-28 | End: 2021-07-28 | Stop reason: HOSPADM

## 2021-07-28 RX ADMIN — SODIUM CHLORIDE: 0.9 INJECTION, SOLUTION INTRAVENOUS at 01:07

## 2021-07-28 RX ADMIN — IRON SUCROSE 200 MG: 20 INJECTION, SOLUTION INTRAVENOUS at 01:07

## 2021-08-04 ENCOUNTER — INFUSION (OUTPATIENT)
Dept: INFUSION THERAPY | Facility: HOSPITAL | Age: 66
End: 2021-08-04
Payer: COMMERCIAL

## 2021-08-04 VITALS
RESPIRATION RATE: 18 BRPM | OXYGEN SATURATION: 94 % | SYSTOLIC BLOOD PRESSURE: 137 MMHG | DIASTOLIC BLOOD PRESSURE: 62 MMHG | TEMPERATURE: 99 F | HEART RATE: 80 BPM

## 2021-08-04 DIAGNOSIS — D50.0 IRON DEFICIENCY ANEMIA DUE TO CHRONIC BLOOD LOSS: Primary | ICD-10-CM

## 2021-08-04 PROCEDURE — 96374 THER/PROPH/DIAG INJ IV PUSH: CPT

## 2021-08-04 PROCEDURE — 63600175 PHARM REV CODE 636 W HCPCS: Performed by: INTERNAL MEDICINE

## 2021-08-04 PROCEDURE — 25000003 PHARM REV CODE 250: Performed by: INTERNAL MEDICINE

## 2021-08-04 RX ORDER — SODIUM CHLORIDE 0.9 % (FLUSH) 0.9 %
10 SYRINGE (ML) INJECTION
Status: DISCONTINUED | OUTPATIENT
Start: 2021-08-04 | End: 2021-08-04 | Stop reason: HOSPADM

## 2021-08-04 RX ORDER — HEPARIN 100 UNIT/ML
500 SYRINGE INTRAVENOUS
Status: DISCONTINUED | OUTPATIENT
Start: 2021-08-04 | End: 2021-08-04 | Stop reason: HOSPADM

## 2021-08-04 RX ADMIN — IRON SUCROSE 200 MG: 20 INJECTION, SOLUTION INTRAVENOUS at 01:08

## 2021-08-04 RX ADMIN — SODIUM CHLORIDE: 9 INJECTION, SOLUTION INTRAVENOUS at 01:08

## 2021-08-05 ENCOUNTER — DOCUMENTATION ONLY (OUTPATIENT)
Dept: CARDIOLOGY | Facility: CLINIC | Age: 66
End: 2021-08-05

## 2021-08-06 ENCOUNTER — ANTI-COAG VISIT (OUTPATIENT)
Dept: CARDIOLOGY | Facility: CLINIC | Age: 66
End: 2021-08-06
Payer: COMMERCIAL

## 2021-08-06 ENCOUNTER — LAB VISIT (OUTPATIENT)
Dept: LAB | Facility: HOSPITAL | Age: 66
End: 2021-08-06
Attending: INTERNAL MEDICINE
Payer: COMMERCIAL

## 2021-08-06 DIAGNOSIS — I48.20 CHRONIC ATRIAL FIBRILLATION: Primary | ICD-10-CM

## 2021-08-06 DIAGNOSIS — I48.20 CHRONIC ATRIAL FIBRILLATION: ICD-10-CM

## 2021-08-06 LAB
INR PPP: 2.3 (ref 0.8–1.2)
PROTHROMBIN TIME: 24.1 SEC (ref 9–12.5)

## 2021-08-06 PROCEDURE — 93793 PR ANTICOAGULANT MGMT FOR PT TAKING WARFARIN: ICD-10-PCS | Mod: S$GLB,,,

## 2021-08-06 PROCEDURE — 93793 ANTICOAG MGMT PT WARFARIN: CPT | Mod: S$GLB,,,

## 2021-08-06 PROCEDURE — 36415 COLL VENOUS BLD VENIPUNCTURE: CPT | Performed by: INTERNAL MEDICINE

## 2021-08-06 PROCEDURE — 85610 PROTHROMBIN TIME: CPT | Performed by: INTERNAL MEDICINE

## 2021-08-10 ENCOUNTER — TELEPHONE (OUTPATIENT)
Dept: HEMATOLOGY/ONCOLOGY | Facility: CLINIC | Age: 66
End: 2021-08-10

## 2021-08-18 ENCOUNTER — ANTI-COAG VISIT (OUTPATIENT)
Dept: CARDIOLOGY | Facility: CLINIC | Age: 66
End: 2021-08-18
Payer: COMMERCIAL

## 2021-08-18 ENCOUNTER — LAB VISIT (OUTPATIENT)
Dept: LAB | Facility: HOSPITAL | Age: 66
End: 2021-08-18
Payer: COMMERCIAL

## 2021-08-18 ENCOUNTER — INFUSION (OUTPATIENT)
Dept: INFUSION THERAPY | Facility: HOSPITAL | Age: 66
End: 2021-08-18
Payer: COMMERCIAL

## 2021-08-18 VITALS
HEART RATE: 91 BPM | TEMPERATURE: 99 F | RESPIRATION RATE: 18 BRPM | DIASTOLIC BLOOD PRESSURE: 65 MMHG | SYSTOLIC BLOOD PRESSURE: 106 MMHG

## 2021-08-18 DIAGNOSIS — I48.20 CHRONIC ATRIAL FIBRILLATION: Primary | ICD-10-CM

## 2021-08-18 DIAGNOSIS — Z79.01 LONG TERM (CURRENT) USE OF ANTICOAGULANTS: ICD-10-CM

## 2021-08-18 DIAGNOSIS — I48.20 CHRONIC ATRIAL FIBRILLATION: ICD-10-CM

## 2021-08-18 DIAGNOSIS — Z95.2 STATUS POST HEART VALVE REPLACEMENT WITH MECHANICAL VALVE: ICD-10-CM

## 2021-08-18 DIAGNOSIS — I48.20 ATRIAL FIBRILLATION, CHRONIC: ICD-10-CM

## 2021-08-18 DIAGNOSIS — D50.0 IRON DEFICIENCY ANEMIA DUE TO CHRONIC BLOOD LOSS: Primary | ICD-10-CM

## 2021-08-18 LAB
INR PPP: 3.2 (ref 0.8–1.2)
PROTHROMBIN TIME: 32.9 SEC (ref 9–12.5)

## 2021-08-18 PROCEDURE — 93793 PR ANTICOAGULANT MGMT FOR PT TAKING WARFARIN: ICD-10-PCS | Mod: S$GLB,,,

## 2021-08-18 PROCEDURE — 36415 COLL VENOUS BLD VENIPUNCTURE: CPT | Performed by: INTERNAL MEDICINE

## 2021-08-18 PROCEDURE — 85610 PROTHROMBIN TIME: CPT | Performed by: INTERNAL MEDICINE

## 2021-08-18 PROCEDURE — 63600175 PHARM REV CODE 636 W HCPCS: Performed by: INTERNAL MEDICINE

## 2021-08-18 PROCEDURE — 93793 ANTICOAG MGMT PT WARFARIN: CPT | Mod: S$GLB,,,

## 2021-08-18 PROCEDURE — 96374 THER/PROPH/DIAG INJ IV PUSH: CPT

## 2021-08-18 RX ORDER — SODIUM CHLORIDE 0.9 % (FLUSH) 0.9 %
10 SYRINGE (ML) INJECTION
Status: DISCONTINUED | OUTPATIENT
Start: 2021-08-18 | End: 2021-08-18 | Stop reason: HOSPADM

## 2021-08-18 RX ORDER — HEPARIN 100 UNIT/ML
500 SYRINGE INTRAVENOUS
Status: DISCONTINUED | OUTPATIENT
Start: 2021-08-18 | End: 2021-08-18 | Stop reason: HOSPADM

## 2021-08-18 RX ADMIN — IRON SUCROSE 200 MG: 20 INJECTION, SOLUTION INTRAVENOUS at 01:08

## 2021-08-23 ENCOUNTER — TELEPHONE (OUTPATIENT)
Dept: HEMATOLOGY/ONCOLOGY | Facility: CLINIC | Age: 66
End: 2021-08-23

## 2021-08-25 ENCOUNTER — INFUSION (OUTPATIENT)
Dept: INFUSION THERAPY | Facility: HOSPITAL | Age: 66
End: 2021-08-25
Payer: COMMERCIAL

## 2021-08-25 VITALS
DIASTOLIC BLOOD PRESSURE: 60 MMHG | BODY MASS INDEX: 47.35 KG/M2 | RESPIRATION RATE: 18 BRPM | HEART RATE: 75 BPM | SYSTOLIC BLOOD PRESSURE: 133 MMHG | WEIGHT: 241.19 LBS | TEMPERATURE: 98 F | OXYGEN SATURATION: 99 % | HEIGHT: 60 IN

## 2021-08-25 DIAGNOSIS — D50.0 IRON DEFICIENCY ANEMIA DUE TO CHRONIC BLOOD LOSS: Primary | ICD-10-CM

## 2021-08-25 PROCEDURE — 63600175 PHARM REV CODE 636 W HCPCS: Performed by: INTERNAL MEDICINE

## 2021-08-25 PROCEDURE — 25000003 PHARM REV CODE 250: Performed by: INTERNAL MEDICINE

## 2021-08-25 PROCEDURE — 96365 THER/PROPH/DIAG IV INF INIT: CPT

## 2021-08-25 RX ORDER — HEPARIN 100 UNIT/ML
500 SYRINGE INTRAVENOUS
Status: DISCONTINUED | OUTPATIENT
Start: 2021-08-25 | End: 2021-08-25 | Stop reason: HOSPADM

## 2021-08-25 RX ORDER — SODIUM CHLORIDE 0.9 % (FLUSH) 0.9 %
10 SYRINGE (ML) INJECTION
Status: DISCONTINUED | OUTPATIENT
Start: 2021-08-25 | End: 2021-08-25 | Stop reason: HOSPADM

## 2021-08-25 RX ADMIN — IRON SUCROSE 200 MG: 20 INJECTION, SOLUTION INTRAVENOUS at 01:08

## 2021-08-25 RX ADMIN — SODIUM CHLORIDE: 0.9 INJECTION, SOLUTION INTRAVENOUS at 01:08

## 2021-09-27 ENCOUNTER — TELEPHONE (OUTPATIENT)
Dept: INTERNAL MEDICINE | Facility: CLINIC | Age: 66
End: 2021-09-27

## 2021-09-30 ENCOUNTER — TELEPHONE (OUTPATIENT)
Dept: HEMATOLOGY/ONCOLOGY | Facility: CLINIC | Age: 66
End: 2021-09-30

## 2021-09-30 ENCOUNTER — INFUSION (OUTPATIENT)
Dept: INFUSION THERAPY | Facility: HOSPITAL | Age: 66
End: 2021-09-30

## 2021-09-30 ENCOUNTER — LAB VISIT (OUTPATIENT)
Dept: LAB | Facility: HOSPITAL | Age: 66
End: 2021-09-30
Payer: MEDICARE

## 2021-09-30 ENCOUNTER — OFFICE VISIT (OUTPATIENT)
Dept: HEMATOLOGY/ONCOLOGY | Facility: CLINIC | Age: 66
End: 2021-09-30
Payer: MEDICARE

## 2021-09-30 VITALS
HEART RATE: 69 BPM | SYSTOLIC BLOOD PRESSURE: 142 MMHG | HEIGHT: 60 IN | TEMPERATURE: 99 F | BODY MASS INDEX: 47.57 KG/M2 | RESPIRATION RATE: 16 BRPM | OXYGEN SATURATION: 97 % | WEIGHT: 242.31 LBS | DIASTOLIC BLOOD PRESSURE: 68 MMHG

## 2021-09-30 VITALS
BODY MASS INDEX: 47.57 KG/M2 | OXYGEN SATURATION: 95 % | DIASTOLIC BLOOD PRESSURE: 64 MMHG | SYSTOLIC BLOOD PRESSURE: 136 MMHG | RESPIRATION RATE: 12 BRPM | TEMPERATURE: 98 F | HEART RATE: 79 BPM | WEIGHT: 242.31 LBS | HEIGHT: 60 IN

## 2021-09-30 DIAGNOSIS — D50.8 IRON DEFICIENCY ANEMIA SECONDARY TO INADEQUATE DIETARY IRON INTAKE: Chronic | ICD-10-CM

## 2021-09-30 DIAGNOSIS — D50.0 ANEMIA DUE TO CHRONIC BLOOD LOSS: Primary | ICD-10-CM

## 2021-09-30 DIAGNOSIS — D50.0 IRON DEFICIENCY ANEMIA DUE TO CHRONIC BLOOD LOSS: Primary | ICD-10-CM

## 2021-09-30 DIAGNOSIS — D50.8 IRON DEFICIENCY ANEMIA SECONDARY TO INADEQUATE DIETARY IRON INTAKE: ICD-10-CM

## 2021-09-30 DIAGNOSIS — D56.3 ALPHA THALASSEMIA TRAIT: ICD-10-CM

## 2021-09-30 LAB
ALBUMIN SERPL BCP-MCNC: 3.8 G/DL (ref 3.5–5.2)
ALP SERPL-CCNC: 69 U/L (ref 55–135)
ALT SERPL W/O P-5'-P-CCNC: 12 U/L (ref 10–44)
ANION GAP SERPL CALC-SCNC: 12 MMOL/L (ref 8–16)
AST SERPL-CCNC: 18 U/L (ref 10–40)
BASOPHILS # BLD AUTO: 0.04 K/UL (ref 0–0.2)
BASOPHILS NFR BLD: 0.8 % (ref 0–1.9)
BILIRUB SERPL-MCNC: 0.7 MG/DL (ref 0.1–1)
BUN SERPL-MCNC: 13 MG/DL (ref 8–23)
CALCIUM SERPL-MCNC: 9.6 MG/DL (ref 8.7–10.5)
CHLORIDE SERPL-SCNC: 101 MMOL/L (ref 95–110)
CO2 SERPL-SCNC: 25 MMOL/L (ref 23–29)
CREAT SERPL-MCNC: 0.9 MG/DL (ref 0.5–1.4)
DIFFERENTIAL METHOD: ABNORMAL
EOSINOPHIL # BLD AUTO: 0.3 K/UL (ref 0–0.5)
EOSINOPHIL NFR BLD: 6.4 % (ref 0–8)
ERYTHROCYTE [DISTWIDTH] IN BLOOD BY AUTOMATED COUNT: 20.4 % (ref 11.5–14.5)
EST. GFR  (AFRICAN AMERICAN): >60 ML/MIN/1.73 M^2
EST. GFR  (NON AFRICAN AMERICAN): >60 ML/MIN/1.73 M^2
FERRITIN SERPL-MCNC: 102 NG/ML (ref 20–300)
GLUCOSE SERPL-MCNC: 90 MG/DL (ref 70–110)
HCT VFR BLD AUTO: 42.8 % (ref 37–48.5)
HGB BLD-MCNC: 12 G/DL (ref 12–16)
IMM GRANULOCYTES # BLD AUTO: 0.02 K/UL (ref 0–0.04)
IMM GRANULOCYTES NFR BLD AUTO: 0.4 % (ref 0–0.5)
IRON SERPL-MCNC: 58 UG/DL (ref 30–160)
LYMPHOCYTES # BLD AUTO: 1.2 K/UL (ref 1–4.8)
LYMPHOCYTES NFR BLD: 25.3 % (ref 18–48)
MCH RBC QN AUTO: 20.8 PG (ref 27–31)
MCHC RBC AUTO-ENTMCNC: 28 G/DL (ref 32–36)
MCV RBC AUTO: 74 FL (ref 82–98)
MONOCYTES # BLD AUTO: 0.6 K/UL (ref 0.3–1)
MONOCYTES NFR BLD: 12.6 % (ref 4–15)
NEUTROPHILS # BLD AUTO: 2.6 K/UL (ref 1.8–7.7)
NEUTROPHILS NFR BLD: 54.5 % (ref 38–73)
NRBC BLD-RTO: 0 /100 WBC
PLATELET # BLD AUTO: 193 K/UL (ref 150–450)
PMV BLD AUTO: 10.5 FL (ref 9.2–12.9)
POTASSIUM SERPL-SCNC: 4.6 MMOL/L (ref 3.5–5.1)
PROT SERPL-MCNC: 7.3 G/DL (ref 6–8.4)
RBC # BLD AUTO: 5.76 M/UL (ref 4–5.4)
SATURATED IRON: ABNORMAL % (ref 20–50)
SODIUM SERPL-SCNC: 138 MMOL/L (ref 136–145)
TOTAL IRON BINDING CAPACITY: ABNORMAL UG/DL (ref 250–450)
TRANSFERRIN SERPL-MCNC: <20 MG/DL (ref 200–375)
WBC # BLD AUTO: 4.83 K/UL (ref 3.9–12.7)

## 2021-09-30 PROCEDURE — 3008F PR BODY MASS INDEX (BMI) DOCUMENTED: ICD-10-PCS | Mod: HCNC,CPTII,S$GLB, | Performed by: INTERNAL MEDICINE

## 2021-09-30 PROCEDURE — 99999 PR PBB SHADOW E&M-EST. PATIENT-LVL V: ICD-10-PCS | Mod: PBBFAC,HCNC,, | Performed by: INTERNAL MEDICINE

## 2021-09-30 PROCEDURE — 3008F BODY MASS INDEX DOCD: CPT | Mod: HCNC,CPTII,S$GLB, | Performed by: INTERNAL MEDICINE

## 2021-09-30 PROCEDURE — 80053 COMPREHEN METABOLIC PANEL: CPT | Mod: HCNC | Performed by: INTERNAL MEDICINE

## 2021-09-30 PROCEDURE — 84466 ASSAY OF TRANSFERRIN: CPT | Mod: HCNC | Performed by: INTERNAL MEDICINE

## 2021-09-30 PROCEDURE — 36415 COLL VENOUS BLD VENIPUNCTURE: CPT | Mod: HCNC | Performed by: INTERNAL MEDICINE

## 2021-09-30 PROCEDURE — 3288F PR FALLS RISK ASSESSMENT DOCUMENTED: ICD-10-PCS | Mod: HCNC,CPTII,S$GLB, | Performed by: INTERNAL MEDICINE

## 2021-09-30 PROCEDURE — 82728 ASSAY OF FERRITIN: CPT | Mod: HCNC | Performed by: INTERNAL MEDICINE

## 2021-09-30 PROCEDURE — 99999 PR PBB SHADOW E&M-EST. PATIENT-LVL V: CPT | Mod: PBBFAC,HCNC,, | Performed by: INTERNAL MEDICINE

## 2021-09-30 PROCEDURE — 99215 OFFICE O/P EST HI 40 MIN: CPT | Mod: HCNC,S$GLB,, | Performed by: INTERNAL MEDICINE

## 2021-09-30 PROCEDURE — 3075F SYST BP GE 130 - 139MM HG: CPT | Mod: HCNC,CPTII,S$GLB, | Performed by: INTERNAL MEDICINE

## 2021-09-30 PROCEDURE — 85025 COMPLETE CBC W/AUTO DIFF WBC: CPT | Mod: HCNC | Performed by: INTERNAL MEDICINE

## 2021-09-30 PROCEDURE — 3078F DIAST BP <80 MM HG: CPT | Mod: HCNC,CPTII,S$GLB, | Performed by: INTERNAL MEDICINE

## 2021-09-30 PROCEDURE — 1101F PT FALLS ASSESS-DOCD LE1/YR: CPT | Mod: HCNC,CPTII,S$GLB, | Performed by: INTERNAL MEDICINE

## 2021-09-30 PROCEDURE — 99215 PR OFFICE/OUTPT VISIT, EST, LEVL V, 40-54 MIN: ICD-10-PCS | Mod: HCNC,S$GLB,, | Performed by: INTERNAL MEDICINE

## 2021-09-30 PROCEDURE — 3075F PR MOST RECENT SYSTOLIC BLOOD PRESS GE 130-139MM HG: ICD-10-PCS | Mod: HCNC,CPTII,S$GLB, | Performed by: INTERNAL MEDICINE

## 2021-09-30 PROCEDURE — 3078F PR MOST RECENT DIASTOLIC BLOOD PRESSURE < 80 MM HG: ICD-10-PCS | Mod: HCNC,CPTII,S$GLB, | Performed by: INTERNAL MEDICINE

## 2021-09-30 PROCEDURE — 1101F PR PT FALLS ASSESS DOC 0-1 FALLS W/OUT INJ PAST YR: ICD-10-PCS | Mod: HCNC,CPTII,S$GLB, | Performed by: INTERNAL MEDICINE

## 2021-09-30 PROCEDURE — 3288F FALL RISK ASSESSMENT DOCD: CPT | Mod: HCNC,CPTII,S$GLB, | Performed by: INTERNAL MEDICINE

## 2021-09-30 RX ORDER — HEPARIN 100 UNIT/ML
500 SYRINGE INTRAVENOUS
Status: DISCONTINUED | OUTPATIENT
Start: 2021-09-30 | End: 2021-09-30 | Stop reason: HOSPADM

## 2021-09-30 RX ORDER — SODIUM CHLORIDE 0.9 % (FLUSH) 0.9 %
10 SYRINGE (ML) INJECTION
Status: DISCONTINUED | OUTPATIENT
Start: 2021-09-30 | End: 2021-09-30 | Stop reason: HOSPADM

## 2021-10-01 ENCOUNTER — OFFICE VISIT (OUTPATIENT)
Dept: INTERNAL MEDICINE | Facility: CLINIC | Age: 66
End: 2021-10-01
Payer: MEDICARE

## 2021-10-01 ENCOUNTER — LAB VISIT (OUTPATIENT)
Dept: LAB | Facility: HOSPITAL | Age: 66
End: 2021-10-01
Attending: INTERNAL MEDICINE
Payer: MEDICARE

## 2021-10-01 DIAGNOSIS — I10 ESSENTIAL HYPERTENSION: Chronic | ICD-10-CM

## 2021-10-01 DIAGNOSIS — I50.33 ACUTE ON CHRONIC DIASTOLIC CONGESTIVE HEART FAILURE: ICD-10-CM

## 2021-10-01 DIAGNOSIS — H40.9 GLAUCOMA, UNSPECIFIED GLAUCOMA TYPE, UNSPECIFIED LATERALITY: ICD-10-CM

## 2021-10-01 DIAGNOSIS — Z78.0 ASYMPTOMATIC MENOPAUSAL STATE: ICD-10-CM

## 2021-10-01 DIAGNOSIS — N63.0 BREAST MASS: ICD-10-CM

## 2021-10-01 DIAGNOSIS — R45.84 ANHEDONIA: ICD-10-CM

## 2021-10-01 DIAGNOSIS — N64.9 DISORDER OF BREAST, UNSPECIFIED: ICD-10-CM

## 2021-10-01 DIAGNOSIS — G47.30 SLEEP APNEA, UNSPECIFIED TYPE: ICD-10-CM

## 2021-10-01 DIAGNOSIS — I48.91 ATRIAL FIBRILLATION, UNSPECIFIED TYPE: Primary | ICD-10-CM

## 2021-10-01 DIAGNOSIS — M25.569 KNEE PAIN, UNSPECIFIED CHRONICITY, UNSPECIFIED LATERALITY: ICD-10-CM

## 2021-10-01 LAB
CHOLEST SERPL-MCNC: 241 MG/DL (ref 120–199)
CHOLEST/HDLC SERPL: 4.6 {RATIO} (ref 2–5)
HDLC SERPL-MCNC: 52 MG/DL (ref 40–75)
HDLC SERPL: 21.6 % (ref 20–50)
LDLC SERPL CALC-MCNC: 163 MG/DL (ref 63–159)
NONHDLC SERPL-MCNC: 189 MG/DL
TRIGL SERPL-MCNC: 130 MG/DL (ref 30–150)
TSH SERPL DL<=0.005 MIU/L-ACNC: 2.03 UIU/ML (ref 0.4–4)

## 2021-10-01 PROCEDURE — 1101F PT FALLS ASSESS-DOCD LE1/YR: CPT | Mod: HCNC,CPTII,S$GLB, | Performed by: INTERNAL MEDICINE

## 2021-10-01 PROCEDURE — 3288F FALL RISK ASSESSMENT DOCD: CPT | Mod: HCNC,CPTII,S$GLB, | Performed by: INTERNAL MEDICINE

## 2021-10-01 PROCEDURE — 36415 COLL VENOUS BLD VENIPUNCTURE: CPT | Mod: HCNC | Performed by: INTERNAL MEDICINE

## 2021-10-01 PROCEDURE — 99499 RISK ADDL DX/OHS AUDIT: ICD-10-PCS | Mod: S$GLB,,, | Performed by: INTERNAL MEDICINE

## 2021-10-01 PROCEDURE — 3008F BODY MASS INDEX DOCD: CPT | Mod: HCNC,CPTII,S$GLB, | Performed by: INTERNAL MEDICINE

## 2021-10-01 PROCEDURE — 99999 PR PBB SHADOW E&M-EST. PATIENT-LVL V: ICD-10-PCS | Mod: PBBFAC,HCNC,, | Performed by: INTERNAL MEDICINE

## 2021-10-01 PROCEDURE — 3008F PR BODY MASS INDEX (BMI) DOCUMENTED: ICD-10-PCS | Mod: HCNC,CPTII,S$GLB, | Performed by: INTERNAL MEDICINE

## 2021-10-01 PROCEDURE — 99214 OFFICE O/P EST MOD 30 MIN: CPT | Mod: HCNC,S$GLB,, | Performed by: INTERNAL MEDICINE

## 2021-10-01 PROCEDURE — 80061 LIPID PANEL: CPT | Mod: HCNC | Performed by: INTERNAL MEDICINE

## 2021-10-01 PROCEDURE — 99999 PR PBB SHADOW E&M-EST. PATIENT-LVL V: CPT | Mod: PBBFAC,HCNC,, | Performed by: INTERNAL MEDICINE

## 2021-10-01 PROCEDURE — 3288F PR FALLS RISK ASSESSMENT DOCUMENTED: ICD-10-PCS | Mod: HCNC,CPTII,S$GLB, | Performed by: INTERNAL MEDICINE

## 2021-10-01 PROCEDURE — 3078F PR MOST RECENT DIASTOLIC BLOOD PRESSURE < 80 MM HG: ICD-10-PCS | Mod: HCNC,CPTII,S$GLB, | Performed by: INTERNAL MEDICINE

## 2021-10-01 PROCEDURE — 1101F PR PT FALLS ASSESS DOC 0-1 FALLS W/OUT INJ PAST YR: ICD-10-PCS | Mod: HCNC,CPTII,S$GLB, | Performed by: INTERNAL MEDICINE

## 2021-10-01 PROCEDURE — 3074F SYST BP LT 130 MM HG: CPT | Mod: HCNC,CPTII,S$GLB, | Performed by: INTERNAL MEDICINE

## 2021-10-01 PROCEDURE — 3078F DIAST BP <80 MM HG: CPT | Mod: HCNC,CPTII,S$GLB, | Performed by: INTERNAL MEDICINE

## 2021-10-01 PROCEDURE — 99214 PR OFFICE/OUTPT VISIT, EST, LEVL IV, 30-39 MIN: ICD-10-PCS | Mod: HCNC,S$GLB,, | Performed by: INTERNAL MEDICINE

## 2021-10-01 PROCEDURE — 99499 UNLISTED E&M SERVICE: CPT | Mod: S$GLB,,, | Performed by: INTERNAL MEDICINE

## 2021-10-01 PROCEDURE — 84443 ASSAY THYROID STIM HORMONE: CPT | Mod: HCNC | Performed by: INTERNAL MEDICINE

## 2021-10-01 PROCEDURE — 3074F PR MOST RECENT SYSTOLIC BLOOD PRESSURE < 130 MM HG: ICD-10-PCS | Mod: HCNC,CPTII,S$GLB, | Performed by: INTERNAL MEDICINE

## 2021-10-01 RX ORDER — ALBUTEROL SULFATE 90 UG/1
AEROSOL, METERED RESPIRATORY (INHALATION)
Qty: 18 G | Refills: 6 | Status: SHIPPED | OUTPATIENT
Start: 2021-10-01 | End: 2022-03-10 | Stop reason: SDUPTHER

## 2021-10-01 RX ORDER — FLUTICASONE PROPIONATE AND SALMETEROL 50; 500 UG/1; UG/1
POWDER RESPIRATORY (INHALATION)
Qty: 60 EACH | Refills: 6 | Status: SHIPPED | OUTPATIENT
Start: 2021-10-01 | End: 2022-03-10 | Stop reason: SDUPTHER

## 2021-10-01 RX ORDER — MECLIZINE HYDROCHLORIDE 25 MG/1
25 TABLET ORAL 2 TIMES DAILY PRN
Qty: 30 TABLET | Refills: 1 | Status: ON HOLD | OUTPATIENT
Start: 2021-10-01 | End: 2023-01-01

## 2021-10-01 RX ORDER — METOPROLOL SUCCINATE 200 MG/1
200 TABLET, EXTENDED RELEASE ORAL DAILY
Qty: 30 TABLET | Refills: 4 | Status: SHIPPED | OUTPATIENT
Start: 2021-10-01 | End: 2022-03-10 | Stop reason: SDUPTHER

## 2021-10-01 RX ORDER — AMLODIPINE BESYLATE 2.5 MG/1
2.5 TABLET ORAL DAILY
Qty: 30 TABLET | Refills: 4 | Status: SHIPPED | OUTPATIENT
Start: 2021-10-01 | End: 2022-03-10 | Stop reason: SDUPTHER

## 2021-10-01 RX ORDER — FUROSEMIDE 20 MG/1
20 TABLET ORAL DAILY
Qty: 30 TABLET | Refills: 4 | Status: SHIPPED | OUTPATIENT
Start: 2021-10-01 | End: 2022-03-10 | Stop reason: SDUPTHER

## 2021-10-01 RX ORDER — SERTRALINE HYDROCHLORIDE 100 MG/1
100 TABLET, FILM COATED ORAL DAILY
Qty: 30 TABLET | Refills: 3 | Status: SHIPPED | OUTPATIENT
Start: 2021-10-01 | End: 2022-03-10 | Stop reason: SDUPTHER

## 2021-10-02 ENCOUNTER — TELEPHONE (OUTPATIENT)
Dept: INTERNAL MEDICINE | Facility: CLINIC | Age: 66
End: 2021-10-02

## 2021-10-05 ENCOUNTER — HOSPITAL ENCOUNTER (OUTPATIENT)
Dept: RADIOLOGY | Facility: OTHER | Age: 66
Discharge: HOME | End: 2021-10-05
Attending: INTERNAL MEDICINE
Payer: MEDICARE

## 2021-10-05 DIAGNOSIS — Z78.0 ASYMPTOMATIC MENOPAUSAL STATE: ICD-10-CM

## 2021-10-05 PROCEDURE — 77080 DEXA BONE DENSITY SPINE HIP: ICD-10-PCS | Mod: 26,HCNC,, | Performed by: RADIOLOGY

## 2021-10-05 PROCEDURE — 77080 DXA BONE DENSITY AXIAL: CPT | Mod: 26,HCNC,, | Performed by: RADIOLOGY

## 2021-10-05 PROCEDURE — 77080 DXA BONE DENSITY AXIAL: CPT | Mod: TC,HCNC

## 2021-10-06 ENCOUNTER — OFFICE VISIT (OUTPATIENT)
Dept: ORTHOPEDICS | Facility: CLINIC | Age: 66
End: 2021-10-06
Payer: MEDICARE

## 2021-10-06 ENCOUNTER — HOSPITAL ENCOUNTER (OUTPATIENT)
Dept: RADIOLOGY | Facility: HOSPITAL | Age: 66
Discharge: HOME | End: 2021-10-06
Attending: INTERNAL MEDICINE
Payer: MEDICARE

## 2021-10-06 ENCOUNTER — HOSPITAL ENCOUNTER (OUTPATIENT)
Dept: RADIOLOGY | Facility: HOSPITAL | Age: 66
Discharge: HOME | End: 2021-10-06
Attending: NURSE PRACTITIONER
Payer: MEDICARE

## 2021-10-06 ENCOUNTER — TELEPHONE (OUTPATIENT)
Dept: SURGERY | Facility: CLINIC | Age: 66
End: 2021-10-06

## 2021-10-06 VITALS — WEIGHT: 240 LBS | BODY MASS INDEX: 44.16 KG/M2 | HEIGHT: 62 IN

## 2021-10-06 VITALS — HEIGHT: 62 IN | BODY MASS INDEX: 44.18 KG/M2 | WEIGHT: 240.06 LBS

## 2021-10-06 DIAGNOSIS — N63.0 BREAST MASS: ICD-10-CM

## 2021-10-06 DIAGNOSIS — M25.569 KNEE PAIN, UNSPECIFIED CHRONICITY, UNSPECIFIED LATERALITY: ICD-10-CM

## 2021-10-06 DIAGNOSIS — N64.9 DISORDER OF BREAST, UNSPECIFIED: ICD-10-CM

## 2021-10-06 DIAGNOSIS — M17.12 PRIMARY OSTEOARTHRITIS OF LEFT KNEE: Primary | ICD-10-CM

## 2021-10-06 PROCEDURE — 73564 X-RAY EXAM KNEE 4 OR MORE: CPT | Mod: TC,HCNC,RT

## 2021-10-06 PROCEDURE — 99999 PR PBB SHADOW E&M-EST. PATIENT-LVL IV: ICD-10-PCS | Mod: PBBFAC,HCNC,, | Performed by: NURSE PRACTITIONER

## 2021-10-06 PROCEDURE — 3288F PR FALLS RISK ASSESSMENT DOCUMENTED: ICD-10-PCS | Mod: HCNC,CPTII,S$GLB, | Performed by: NURSE PRACTITIONER

## 2021-10-06 PROCEDURE — 99213 OFFICE O/P EST LOW 20 MIN: CPT | Mod: 25,HCNC,S$GLB, | Performed by: NURSE PRACTITIONER

## 2021-10-06 PROCEDURE — 1159F PR MEDICATION LIST DOCUMENTED IN MEDICAL RECORD: ICD-10-PCS | Mod: HCNC,CPTII,S$GLB, | Performed by: NURSE PRACTITIONER

## 2021-10-06 PROCEDURE — 77062 MAMMO DIGITAL DIAGNOSTIC BILAT WITH TOMO: ICD-10-PCS | Mod: 26,HCNC,, | Performed by: RADIOLOGY

## 2021-10-06 PROCEDURE — 99213 PR OFFICE/OUTPT VISIT, EST, LEVL III, 20-29 MIN: ICD-10-PCS | Mod: 25,HCNC,S$GLB, | Performed by: NURSE PRACTITIONER

## 2021-10-06 PROCEDURE — 3288F FALL RISK ASSESSMENT DOCD: CPT | Mod: HCNC,CPTII,S$GLB, | Performed by: NURSE PRACTITIONER

## 2021-10-06 PROCEDURE — 3008F PR BODY MASS INDEX (BMI) DOCUMENTED: ICD-10-PCS | Mod: HCNC,CPTII,S$GLB, | Performed by: NURSE PRACTITIONER

## 2021-10-06 PROCEDURE — 1159F MED LIST DOCD IN RCRD: CPT | Mod: HCNC,CPTII,S$GLB, | Performed by: NURSE PRACTITIONER

## 2021-10-06 PROCEDURE — 3008F BODY MASS INDEX DOCD: CPT | Mod: HCNC,CPTII,S$GLB, | Performed by: NURSE PRACTITIONER

## 2021-10-06 PROCEDURE — 1160F PR REVIEW ALL MEDS BY PRESCRIBER/CLIN PHARMACIST DOCUMENTED: ICD-10-PCS | Mod: HCNC,CPTII,S$GLB, | Performed by: NURSE PRACTITIONER

## 2021-10-06 PROCEDURE — 99999 PR PBB SHADOW E&M-EST. PATIENT-LVL IV: CPT | Mod: PBBFAC,HCNC,, | Performed by: NURSE PRACTITIONER

## 2021-10-06 PROCEDURE — 1101F PT FALLS ASSESS-DOCD LE1/YR: CPT | Mod: HCNC,CPTII,S$GLB, | Performed by: NURSE PRACTITIONER

## 2021-10-06 PROCEDURE — 77066 MAMMO DIGITAL DIAGNOSTIC BILAT WITH TOMO: ICD-10-PCS | Mod: 26,HCNC,, | Performed by: RADIOLOGY

## 2021-10-06 PROCEDURE — 20610 DRAIN/INJ JOINT/BURSA W/O US: CPT | Mod: HCNC,LT,S$GLB, | Performed by: NURSE PRACTITIONER

## 2021-10-06 PROCEDURE — 76642 ULTRASOUND BREAST LIMITED: CPT | Mod: 26,HCNC,LT, | Performed by: RADIOLOGY

## 2021-10-06 PROCEDURE — 76642 ULTRASOUND BREAST LIMITED: CPT | Mod: TC,HCNC,LT

## 2021-10-06 PROCEDURE — 1160F RVW MEDS BY RX/DR IN RCRD: CPT | Mod: HCNC,CPTII,S$GLB, | Performed by: NURSE PRACTITIONER

## 2021-10-06 PROCEDURE — 77062 BREAST TOMOSYNTHESIS BI: CPT | Mod: 26,HCNC,, | Performed by: RADIOLOGY

## 2021-10-06 PROCEDURE — 73564 XR KNEE ORTHO RIGHT WITH FLEXION: ICD-10-PCS | Mod: 26,HCNC,RT, | Performed by: RADIOLOGY

## 2021-10-06 PROCEDURE — 20610 PR DRAIN/INJECT LARGE JOINT/BURSA: ICD-10-PCS | Mod: HCNC,LT,S$GLB, | Performed by: NURSE PRACTITIONER

## 2021-10-06 PROCEDURE — 73564 X-RAY EXAM KNEE 4 OR MORE: CPT | Mod: 26,HCNC,RT, | Performed by: RADIOLOGY

## 2021-10-06 PROCEDURE — 77062 BREAST TOMOSYNTHESIS BI: CPT | Mod: TC,HCNC

## 2021-10-06 PROCEDURE — 1101F PR PT FALLS ASSESS DOC 0-1 FALLS W/OUT INJ PAST YR: ICD-10-PCS | Mod: HCNC,CPTII,S$GLB, | Performed by: NURSE PRACTITIONER

## 2021-10-06 PROCEDURE — 77066 DX MAMMO INCL CAD BI: CPT | Mod: 26,HCNC,, | Performed by: RADIOLOGY

## 2021-10-06 PROCEDURE — 73562 X-RAY EXAM OF KNEE 3: CPT | Mod: 26,HCNC,LT, | Performed by: RADIOLOGY

## 2021-10-06 PROCEDURE — 73562 XR KNEE ORTHO RIGHT WITH FLEXION: ICD-10-PCS | Mod: 26,HCNC,LT, | Performed by: RADIOLOGY

## 2021-10-06 PROCEDURE — 76642 US BREAST LEFT LIMITED: ICD-10-PCS | Mod: 26,HCNC,LT, | Performed by: RADIOLOGY

## 2021-10-06 RX ORDER — ACETAMINOPHEN AND CODEINE PHOSPHATE 300; 30 MG/1; MG/1
1 TABLET ORAL EVERY 6 HOURS PRN
Qty: 28 TABLET | Refills: 0 | Status: SHIPPED | OUTPATIENT
Start: 2021-10-06 | End: 2021-10-16

## 2021-10-06 RX ORDER — WARFARIN SODIUM 5 MG/1
TABLET ORAL
Qty: 45 TABLET | Refills: 3 | Status: SHIPPED | OUTPATIENT
Start: 2021-10-06 | End: 2022-02-09 | Stop reason: SDUPTHER

## 2021-10-06 RX ADMIN — TRIAMCINOLONE ACETONIDE 40 MG: 40 INJECTION, SUSPENSION INTRA-ARTICULAR; INTRAMUSCULAR at 03:10

## 2021-10-08 VITALS
HEART RATE: 96 BPM | BODY MASS INDEX: 44.26 KG/M2 | OXYGEN SATURATION: 95 % | WEIGHT: 240.5 LBS | SYSTOLIC BLOOD PRESSURE: 112 MMHG | DIASTOLIC BLOOD PRESSURE: 62 MMHG | TEMPERATURE: 99 F | HEIGHT: 62 IN

## 2021-10-08 RX ORDER — TRIAMCINOLONE ACETONIDE 40 MG/ML
40 INJECTION, SUSPENSION INTRA-ARTICULAR; INTRAMUSCULAR
Status: COMPLETED | OUTPATIENT
Start: 2021-10-06 | End: 2021-10-06

## 2021-10-28 ENCOUNTER — TELEPHONE (OUTPATIENT)
Dept: BARIATRICS | Facility: CLINIC | Age: 66
End: 2021-10-28
Payer: MEDICARE

## 2021-11-03 ENCOUNTER — TELEPHONE (OUTPATIENT)
Dept: BARIATRICS | Facility: CLINIC | Age: 66
End: 2021-11-03
Payer: MEDICARE

## 2021-11-03 ENCOUNTER — OFFICE VISIT (OUTPATIENT)
Dept: CARDIOLOGY | Facility: CLINIC | Age: 66
End: 2021-11-03
Payer: MEDICARE

## 2021-11-03 VITALS
SYSTOLIC BLOOD PRESSURE: 132 MMHG | HEART RATE: 77 BPM | DIASTOLIC BLOOD PRESSURE: 61 MMHG | BODY MASS INDEX: 44.46 KG/M2 | WEIGHT: 241.63 LBS | HEIGHT: 62 IN

## 2021-11-03 DIAGNOSIS — I10 ESSENTIAL (PRIMARY) HYPERTENSION: Chronic | ICD-10-CM

## 2021-11-03 DIAGNOSIS — J44.9 CHRONIC OBSTRUCTIVE PULMONARY DISEASE, UNSPECIFIED COPD TYPE: Primary | Chronic | ICD-10-CM

## 2021-11-03 DIAGNOSIS — Z95.2 H/O MITRAL VALVE REPLACEMENT WITH MECHANICAL VALVE: ICD-10-CM

## 2021-11-03 DIAGNOSIS — R73.02 GLUCOSE INTOLERANCE (IMPAIRED GLUCOSE TOLERANCE): ICD-10-CM

## 2021-11-03 DIAGNOSIS — R07.9 CHEST PAIN, UNSPECIFIED TYPE: ICD-10-CM

## 2021-11-03 DIAGNOSIS — I48.20 CHRONIC ATRIAL FIBRILLATION: ICD-10-CM

## 2021-11-03 DIAGNOSIS — G47.31 COMPLEX SLEEP APNEA SYNDROME: ICD-10-CM

## 2021-11-03 DIAGNOSIS — E78.2 MIXED HYPERLIPIDEMIA: ICD-10-CM

## 2021-11-03 DIAGNOSIS — E66.01 MORBID OBESITY WITH BMI OF 40.0-44.9, ADULT: Chronic | ICD-10-CM

## 2021-11-03 DIAGNOSIS — I50.32 CHRONIC DIASTOLIC CONGESTIVE HEART FAILURE: ICD-10-CM

## 2021-11-03 DIAGNOSIS — I48.91 ATRIAL FIBRILLATION, UNSPECIFIED TYPE: ICD-10-CM

## 2021-11-03 PROCEDURE — 93010 ELECTROCARDIOGRAM REPORT: CPT | Mod: HCNC,S$GLB,, | Performed by: INTERNAL MEDICINE

## 2021-11-03 PROCEDURE — 99999 PR PBB SHADOW E&M-EST. PATIENT-LVL V: CPT | Mod: PBBFAC,HCNC,, | Performed by: INTERNAL MEDICINE

## 2021-11-03 PROCEDURE — 3078F PR MOST RECENT DIASTOLIC BLOOD PRESSURE < 80 MM HG: ICD-10-PCS | Mod: HCNC,CPTII,S$GLB, | Performed by: INTERNAL MEDICINE

## 2021-11-03 PROCEDURE — 93005 EKG 12-LEAD: ICD-10-PCS | Mod: HCNC,S$GLB,, | Performed by: INTERNAL MEDICINE

## 2021-11-03 PROCEDURE — 99999 PR PBB SHADOW E&M-EST. PATIENT-LVL V: ICD-10-PCS | Mod: PBBFAC,HCNC,, | Performed by: INTERNAL MEDICINE

## 2021-11-03 PROCEDURE — 93005 ELECTROCARDIOGRAM TRACING: CPT | Mod: HCNC,S$GLB,, | Performed by: INTERNAL MEDICINE

## 2021-11-03 PROCEDURE — 3008F BODY MASS INDEX DOCD: CPT | Mod: HCNC,CPTII,S$GLB, | Performed by: INTERNAL MEDICINE

## 2021-11-03 PROCEDURE — 99499 UNLISTED E&M SERVICE: CPT | Mod: S$GLB,,, | Performed by: INTERNAL MEDICINE

## 2021-11-03 PROCEDURE — 99214 PR OFFICE/OUTPT VISIT, EST, LEVL IV, 30-39 MIN: ICD-10-PCS | Mod: HCNC,S$GLB,, | Performed by: INTERNAL MEDICINE

## 2021-11-03 PROCEDURE — 1159F PR MEDICATION LIST DOCUMENTED IN MEDICAL RECORD: ICD-10-PCS | Mod: HCNC,CPTII,S$GLB, | Performed by: INTERNAL MEDICINE

## 2021-11-03 PROCEDURE — 3075F SYST BP GE 130 - 139MM HG: CPT | Mod: HCNC,CPTII,S$GLB, | Performed by: INTERNAL MEDICINE

## 2021-11-03 PROCEDURE — 3075F PR MOST RECENT SYSTOLIC BLOOD PRESS GE 130-139MM HG: ICD-10-PCS | Mod: HCNC,CPTII,S$GLB, | Performed by: INTERNAL MEDICINE

## 2021-11-03 PROCEDURE — 99214 OFFICE O/P EST MOD 30 MIN: CPT | Mod: HCNC,S$GLB,, | Performed by: INTERNAL MEDICINE

## 2021-11-03 PROCEDURE — 99499 RISK ADDL DX/OHS AUDIT: ICD-10-PCS | Mod: S$GLB,,, | Performed by: INTERNAL MEDICINE

## 2021-11-03 PROCEDURE — 1159F MED LIST DOCD IN RCRD: CPT | Mod: HCNC,CPTII,S$GLB, | Performed by: INTERNAL MEDICINE

## 2021-11-03 PROCEDURE — 3008F PR BODY MASS INDEX (BMI) DOCUMENTED: ICD-10-PCS | Mod: HCNC,CPTII,S$GLB, | Performed by: INTERNAL MEDICINE

## 2021-11-03 PROCEDURE — 93010 EKG 12-LEAD: ICD-10-PCS | Mod: HCNC,S$GLB,, | Performed by: INTERNAL MEDICINE

## 2021-11-03 PROCEDURE — 3078F DIAST BP <80 MM HG: CPT | Mod: HCNC,CPTII,S$GLB, | Performed by: INTERNAL MEDICINE

## 2021-11-03 RX ORDER — ROSUVASTATIN CALCIUM 20 MG/1
20 TABLET, COATED ORAL DAILY
Qty: 90 TABLET | Refills: 3 | Status: SHIPPED | OUTPATIENT
Start: 2021-11-03 | End: 2022-01-01 | Stop reason: SDUPTHER

## 2021-11-04 ENCOUNTER — OFFICE VISIT (OUTPATIENT)
Dept: INTERNAL MEDICINE | Facility: CLINIC | Age: 66
End: 2021-11-04
Payer: MEDICARE

## 2021-11-04 DIAGNOSIS — I10 HYPERTENSION, UNSPECIFIED TYPE: Primary | ICD-10-CM

## 2021-11-04 DIAGNOSIS — E78.5 HYPERLIPIDEMIA, UNSPECIFIED HYPERLIPIDEMIA TYPE: ICD-10-CM

## 2021-11-04 PROCEDURE — 3008F BODY MASS INDEX DOCD: CPT | Mod: HCNC,CPTII,S$GLB, | Performed by: INTERNAL MEDICINE

## 2021-11-04 PROCEDURE — 99214 PR OFFICE/OUTPT VISIT, EST, LEVL IV, 30-39 MIN: ICD-10-PCS | Mod: HCNC,S$GLB,, | Performed by: INTERNAL MEDICINE

## 2021-11-04 PROCEDURE — 3079F PR MOST RECENT DIASTOLIC BLOOD PRESSURE 80-89 MM HG: ICD-10-PCS | Mod: HCNC,CPTII,S$GLB, | Performed by: INTERNAL MEDICINE

## 2021-11-04 PROCEDURE — 1101F PT FALLS ASSESS-DOCD LE1/YR: CPT | Mod: HCNC,CPTII,S$GLB, | Performed by: INTERNAL MEDICINE

## 2021-11-04 PROCEDURE — 3288F FALL RISK ASSESSMENT DOCD: CPT | Mod: HCNC,CPTII,S$GLB, | Performed by: INTERNAL MEDICINE

## 2021-11-04 PROCEDURE — 3008F PR BODY MASS INDEX (BMI) DOCUMENTED: ICD-10-PCS | Mod: HCNC,CPTII,S$GLB, | Performed by: INTERNAL MEDICINE

## 2021-11-04 PROCEDURE — 1159F PR MEDICATION LIST DOCUMENTED IN MEDICAL RECORD: ICD-10-PCS | Mod: HCNC,CPTII,S$GLB, | Performed by: INTERNAL MEDICINE

## 2021-11-04 PROCEDURE — 99214 OFFICE O/P EST MOD 30 MIN: CPT | Mod: HCNC,S$GLB,, | Performed by: INTERNAL MEDICINE

## 2021-11-04 PROCEDURE — 3079F DIAST BP 80-89 MM HG: CPT | Mod: HCNC,CPTII,S$GLB, | Performed by: INTERNAL MEDICINE

## 2021-11-04 PROCEDURE — 1159F MED LIST DOCD IN RCRD: CPT | Mod: HCNC,CPTII,S$GLB, | Performed by: INTERNAL MEDICINE

## 2021-11-04 PROCEDURE — 1101F PR PT FALLS ASSESS DOC 0-1 FALLS W/OUT INJ PAST YR: ICD-10-PCS | Mod: HCNC,CPTII,S$GLB, | Performed by: INTERNAL MEDICINE

## 2021-11-04 PROCEDURE — 3075F SYST BP GE 130 - 139MM HG: CPT | Mod: HCNC,CPTII,S$GLB, | Performed by: INTERNAL MEDICINE

## 2021-11-04 PROCEDURE — 3288F PR FALLS RISK ASSESSMENT DOCUMENTED: ICD-10-PCS | Mod: HCNC,CPTII,S$GLB, | Performed by: INTERNAL MEDICINE

## 2021-11-04 PROCEDURE — 3075F PR MOST RECENT SYSTOLIC BLOOD PRESS GE 130-139MM HG: ICD-10-PCS | Mod: HCNC,CPTII,S$GLB, | Performed by: INTERNAL MEDICINE

## 2021-11-04 PROCEDURE — 99999 PR PBB SHADOW E&M-EST. PATIENT-LVL IV: CPT | Mod: PBBFAC,HCNC,, | Performed by: INTERNAL MEDICINE

## 2021-11-04 PROCEDURE — 99999 PR PBB SHADOW E&M-EST. PATIENT-LVL IV: ICD-10-PCS | Mod: PBBFAC,HCNC,, | Performed by: INTERNAL MEDICINE

## 2021-11-07 VITALS
OXYGEN SATURATION: 97 % | HEART RATE: 75 BPM | SYSTOLIC BLOOD PRESSURE: 132 MMHG | BODY MASS INDEX: 44.3 KG/M2 | TEMPERATURE: 99 F | WEIGHT: 240.75 LBS | DIASTOLIC BLOOD PRESSURE: 80 MMHG | HEIGHT: 62 IN

## 2021-11-08 ENCOUNTER — LAB VISIT (OUTPATIENT)
Dept: LAB | Facility: HOSPITAL | Age: 66
End: 2021-11-08
Payer: MEDICARE

## 2021-11-08 ENCOUNTER — ANTI-COAG VISIT (OUTPATIENT)
Dept: CARDIOLOGY | Facility: CLINIC | Age: 66
End: 2021-11-08
Payer: MEDICARE

## 2021-11-08 DIAGNOSIS — I48.20 CHRONIC ATRIAL FIBRILLATION: ICD-10-CM

## 2021-11-08 DIAGNOSIS — I48.20 CHRONIC ATRIAL FIBRILLATION: Primary | ICD-10-CM

## 2021-11-08 LAB
INR PPP: 6.39
INR PPP: 6.4 (ref 0.8–1.2)
PROTHROMBIN TIME: 62.3 SEC (ref 9–12.5)

## 2021-11-08 PROCEDURE — 85610 PROTHROMBIN TIME: CPT | Mod: HCNC | Performed by: INTERNAL MEDICINE

## 2021-11-08 PROCEDURE — 93793 ANTICOAG MGMT PT WARFARIN: CPT | Mod: S$GLB,,,

## 2021-11-08 PROCEDURE — 93793 PR ANTICOAGULANT MGMT FOR PT TAKING WARFARIN: ICD-10-PCS | Mod: S$GLB,,,

## 2021-11-08 PROCEDURE — 36415 COLL VENOUS BLD VENIPUNCTURE: CPT | Mod: HCNC | Performed by: INTERNAL MEDICINE

## 2021-11-11 ENCOUNTER — ANTI-COAG VISIT (OUTPATIENT)
Dept: CARDIOLOGY | Facility: CLINIC | Age: 66
End: 2021-11-11
Payer: MEDICARE

## 2021-11-11 ENCOUNTER — TELEPHONE (OUTPATIENT)
Dept: SURGERY | Facility: CLINIC | Age: 66
End: 2021-11-11
Payer: MEDICARE

## 2021-11-11 ENCOUNTER — TELEPHONE (OUTPATIENT)
Dept: BARIATRICS | Facility: CLINIC | Age: 66
End: 2021-11-11
Payer: MEDICARE

## 2021-11-11 ENCOUNTER — LAB VISIT (OUTPATIENT)
Dept: LAB | Facility: HOSPITAL | Age: 66
End: 2021-11-11
Attending: INTERNAL MEDICINE
Payer: MEDICARE

## 2021-11-11 DIAGNOSIS — I48.20 CHRONIC ATRIAL FIBRILLATION: ICD-10-CM

## 2021-11-11 DIAGNOSIS — I48.20 CHRONIC ATRIAL FIBRILLATION: Primary | ICD-10-CM

## 2021-11-11 LAB
INR PPP: 2.4 (ref 0.8–1.2)
PROTHROMBIN TIME: 24.5 SEC (ref 9–12.5)

## 2021-11-11 PROCEDURE — 93793 ANTICOAG MGMT PT WARFARIN: CPT | Mod: S$GLB,,,

## 2021-11-11 PROCEDURE — 85610 PROTHROMBIN TIME: CPT | Mod: HCNC | Performed by: INTERNAL MEDICINE

## 2021-11-11 PROCEDURE — 93793 PR ANTICOAGULANT MGMT FOR PT TAKING WARFARIN: ICD-10-PCS | Mod: S$GLB,,,

## 2021-11-11 PROCEDURE — 36415 COLL VENOUS BLD VENIPUNCTURE: CPT | Mod: HCNC | Performed by: INTERNAL MEDICINE

## 2021-11-17 ENCOUNTER — LAB VISIT (OUTPATIENT)
Dept: LAB | Facility: HOSPITAL | Age: 66
End: 2021-11-17
Attending: INTERNAL MEDICINE
Payer: MEDICARE

## 2021-11-17 ENCOUNTER — ANTI-COAG VISIT (OUTPATIENT)
Dept: CARDIOLOGY | Facility: CLINIC | Age: 66
End: 2021-11-17
Payer: MEDICARE

## 2021-11-17 DIAGNOSIS — I48.20 CHRONIC ATRIAL FIBRILLATION: ICD-10-CM

## 2021-11-17 DIAGNOSIS — I48.20 CHRONIC ATRIAL FIBRILLATION: Primary | ICD-10-CM

## 2021-11-17 LAB
INR PPP: 2.8 (ref 0.8–1.2)
PROTHROMBIN TIME: 28.9 SEC (ref 9–12.5)

## 2021-11-17 PROCEDURE — 85610 PROTHROMBIN TIME: CPT | Mod: HCNC | Performed by: INTERNAL MEDICINE

## 2021-11-17 PROCEDURE — 93793 PR ANTICOAGULANT MGMT FOR PT TAKING WARFARIN: ICD-10-PCS | Mod: S$GLB,,,

## 2021-11-17 PROCEDURE — 36415 COLL VENOUS BLD VENIPUNCTURE: CPT | Mod: HCNC | Performed by: INTERNAL MEDICINE

## 2021-11-17 PROCEDURE — 93793 ANTICOAG MGMT PT WARFARIN: CPT | Mod: S$GLB,,,

## 2021-11-26 ENCOUNTER — HOSPITAL ENCOUNTER (OUTPATIENT)
Dept: CARDIOLOGY | Facility: HOSPITAL | Age: 66
Discharge: HOME | End: 2021-11-26
Attending: INTERNAL MEDICINE
Payer: COMMERCIAL

## 2021-11-26 ENCOUNTER — HOSPITAL ENCOUNTER (OUTPATIENT)
Dept: CARDIOLOGY | Facility: HOSPITAL | Age: 66
Discharge: HOME | End: 2021-11-26
Attending: INTERNAL MEDICINE
Payer: MEDICARE

## 2021-11-26 VITALS
DIASTOLIC BLOOD PRESSURE: 70 MMHG | BODY MASS INDEX: 44.16 KG/M2 | HEART RATE: 80 BPM | HEIGHT: 62 IN | SYSTOLIC BLOOD PRESSURE: 130 MMHG | WEIGHT: 240 LBS

## 2021-11-26 VITALS
WEIGHT: 240 LBS | BODY MASS INDEX: 44.16 KG/M2 | SYSTOLIC BLOOD PRESSURE: 159 MMHG | DIASTOLIC BLOOD PRESSURE: 74 MMHG | HEIGHT: 62 IN | HEART RATE: 73 BPM

## 2021-11-26 DIAGNOSIS — Z95.2 H/O MITRAL VALVE REPLACEMENT WITH MECHANICAL VALVE: Chronic | ICD-10-CM

## 2021-11-26 DIAGNOSIS — I48.20 CHRONIC ATRIAL FIBRILLATION: Chronic | ICD-10-CM

## 2021-11-26 DIAGNOSIS — I50.32 CHRONIC DIASTOLIC CONGESTIVE HEART FAILURE: Chronic | ICD-10-CM

## 2021-11-26 LAB
ASCENDING AORTA: 3.02 CM
AV INDEX (PROSTH): 0.84
AV MEAN GRADIENT: 7 MMHG
AV PEAK GRADIENT: 12 MMHG
AV VALVE AREA: 2.36 CM2
AV VELOCITY RATIO: 0.75
BSA FOR ECHO PROCEDURE: 2.18 M2
CFR FLOW - ANTERIOR: 0.53
CFR FLOW - INFERIOR: 1.09
CFR FLOW - LATERAL: 1.15
CFR FLOW - MIN: 1.93
CFR FLOW - SEPTAL: 1.15
CFR FLOW - WHOLE HEART: 0.98
CV ECHO LV RWT: 0.32 CM
CV PHARM DOSE: 60 MG
CV STRESS BASE HR: 73 BPM
DIASTOLIC BLOOD PRESSURE: 74 MMHG
DOP CALC AO PEAK VEL: 1.74 M/S
DOP CALC AO VTI: 41.1 CM
DOP CALC LVOT AREA: 2.8 CM2
DOP CALC LVOT DIAMETER: 1.89 CM
DOP CALC LVOT PEAK VEL: 1.31 M/S
DOP CALC LVOT STROKE VOLUME: 96.99 CM3
DOP CALC MV VTI: 36.92 CM
DOP CALCLVOT PEAK VEL VTI: 34.59 CM
E/E' RATIO: 15.25 M/S
ECHO LV POSTERIOR WALL: 0.75 CM (ref 0.6–1.1)
EJECTION FRACTION- HIGH: 65 %
EJECTION FRACTION: 65 %
END DIASTOLIC INDEX-HIGH: 153 ML/M2
END DIASTOLIC INDEX-LOW: 93 ML/M2
END SYSTOLIC INDEX-HIGH: 71 ML/M2
END SYSTOLIC INDEX-LOW: 31 ML/M2
FRACTIONAL SHORTENING: 37 % (ref 28–44)
HR MV ECHO: 60 BPM
INTERVENTRICULAR SEPTUM: 0.71 CM (ref 0.6–1.1)
IVRT: 41.86 MSEC
LA MAJOR: 6.97 CM
LA MINOR: 7.17 CM
LA WIDTH: 4.64 CM
LEFT ATRIUM SIZE: 5.35 CM
LEFT ATRIUM VOLUME INDEX MOD: 58.1 ML/M2
LEFT ATRIUM VOLUME INDEX: 72.1 ML/M2
LEFT ATRIUM VOLUME MOD: 120.35 CM3
LEFT ATRIUM VOLUME: 149.15 CM3
LEFT INTERNAL DIMENSION IN SYSTOLE: 3.01 CM (ref 2.1–4)
LEFT VENTRICLE DIASTOLIC VOLUME INDEX: 51 ML/M2
LEFT VENTRICLE DIASTOLIC VOLUME: 105.57 ML
LEFT VENTRICLE MASS INDEX: 54 G/M2
LEFT VENTRICLE SYSTOLIC VOLUME INDEX: 17 ML/M2
LEFT VENTRICLE SYSTOLIC VOLUME: 35.25 ML
LEFT VENTRICULAR INTERNAL DIMENSION IN DIASTOLE: 4.76 CM (ref 3.5–6)
LEFT VENTRICULAR MASS: 111.08 G
LV LATERAL E/E' RATIO: 12.2 M/S
LV SEPTAL E/E' RATIO: 20.33 M/S
MV MEAN GRADIENT: 5 MMHG
MV PEAK E VEL: 1.22 M/S
MV PEAK GRADIENT: 14 MMHG
MV VALVE AREA BY CONTINUITY EQUATION: 2.63 CM2
NUC REST DIASTOLIC VOLUME INDEX: 94
NUC REST EJECTION FRACTION: 55
NUC REST SYSTOLIC VOLUME INDEX: 42
NUC STRESS DIASTOLIC VOLUME INDEX: 110
NUC STRESS EJECTION FRACTION: 60 %
NUC STRESS SYSTOLIC VOLUME INDEX: 44
OHS CV CPX 85 PERCENT MAX PREDICTED HEART RATE MALE: 126
OHS CV CPX MAX PREDICTED HEART RATE: 149
OHS CV CPX PATIENT IS FEMALE: 1
OHS CV CPX PATIENT IS MALE: 0
OHS CV CPX PEAK DIASTOLIC BLOOD PRESSURE: 59 MMHG
OHS CV CPX PEAK HEAR RATE: 71 BPM
OHS CV CPX PEAK RATE PRESSURE PRODUCT: 8875
OHS CV CPX PEAK SYSTOLIC BLOOD PRESSURE: 125 MMHG
OHS CV CPX PERCENT MAX PREDICTED HEART RATE ACHIEVED: 48
OHS CV CPX RATE PRESSURE PRODUCT PRESENTING: NORMAL
PISA TR MAX VEL: 2.52 M/S
RA MAJOR: 5.14 CM
RA PRESSURE: 8 MMHG
RA WIDTH: 3.44 CM
REST FLOW - ANTERIOR: 0.79 CC/MIN/G
REST FLOW - INFERIOR: 0.66 CC/MIN/G
REST FLOW - LATERAL: 0.79 CC/MIN/G
REST FLOW - MAX: 1.05 CC/MIN/G
REST FLOW - MIN: 0.41 CC/MIN/G
REST FLOW - SEPTAL: 0.65 CC/MIN/G
REST FLOW - WHOLE HEART: 0.72 CC/MIN/G
RETIRED EF AND QEF - SEE NOTES: 53 %
RIGHT VENTRICULAR END-DIASTOLIC DIMENSION: 3.22 CM
RV TISSUE DOPPLER FREE WALL SYSTOLIC VELOCITY 1 (APICAL 4 CHAMBER VIEW): 6.43 CM/S
SINUS: 2.73 CM
STJ: 2.33 CM
STRESS FLOW - ANTERIOR: 0.91 CC/MIN/G
STRESS FLOW - INFERIOR: 0.74 CC/MIN/G
STRESS FLOW - LATERAL: 0.9 CC/MIN/G
STRESS FLOW - MAX: 1.37 CC/MIN/G
STRESS FLOW - MIN: 0.41 CC/MIN/G
STRESS FLOW - SEPTAL: 0.7 CC/MIN/G
STRESS FLOW - WHOLE HEART: 0.81 CC/MIN/G
SYSTOLIC BLOOD PRESSURE: 159 MMHG
TDI LATERAL: 0.1 M/S
TDI SEPTAL: 0.06 M/S
TDI: 0.08 M/S
TR MAX PG: 25 MMHG
TRICUSPID ANNULAR PLANE SYSTOLIC EXCURSION: 1.35 CM
TV REST PULMONARY ARTERY PRESSURE: 33 MMHG

## 2021-11-26 PROCEDURE — 93306 TTE W/DOPPLER COMPLETE: CPT | Mod: 26,HCNC,, | Performed by: INTERNAL MEDICINE

## 2021-11-26 PROCEDURE — 93306 ECHO (CUPID ONLY): ICD-10-PCS | Mod: 26,HCNC,, | Performed by: INTERNAL MEDICINE

## 2021-11-26 PROCEDURE — 93018 CARDIAC PET SCAN STRESS (CUPID ONLY): ICD-10-PCS | Mod: HCNC,,, | Performed by: INTERNAL MEDICINE

## 2021-11-26 PROCEDURE — 93016 CARDIAC PET SCAN STRESS (CUPID ONLY): ICD-10-PCS | Mod: HCNC,,, | Performed by: INTERNAL MEDICINE

## 2021-11-26 PROCEDURE — 93306 TTE W/DOPPLER COMPLETE: CPT | Mod: HCNC

## 2021-11-26 PROCEDURE — 63600175 PHARM REV CODE 636 W HCPCS: Mod: HCNC | Performed by: INTERNAL MEDICINE

## 2021-11-26 PROCEDURE — 78431 CARDIAC PET SCAN STRESS (CUPID ONLY): ICD-10-PCS | Mod: 26,HCNC,, | Performed by: INTERNAL MEDICINE

## 2021-11-26 PROCEDURE — 93016 CV STRESS TEST SUPVJ ONLY: CPT | Mod: HCNC,,, | Performed by: INTERNAL MEDICINE

## 2021-11-26 PROCEDURE — 78434 AQMBF PET REST & RX STRESS: CPT | Mod: 26,HCNC,, | Performed by: INTERNAL MEDICINE

## 2021-11-26 PROCEDURE — 78434 AQMBF PET REST & RX STRESS: CPT | Mod: HCNC

## 2021-11-26 PROCEDURE — 93018 CV STRESS TEST I&R ONLY: CPT | Mod: HCNC,,, | Performed by: INTERNAL MEDICINE

## 2021-11-26 PROCEDURE — 78434 CARDIAC PET SCAN STRESS (CUPID ONLY): ICD-10-PCS | Mod: 26,HCNC,, | Performed by: INTERNAL MEDICINE

## 2021-11-26 PROCEDURE — 78431 MYOCRD IMG PET RST&STRS CT: CPT | Mod: 26,HCNC,, | Performed by: INTERNAL MEDICINE

## 2021-11-26 RX ORDER — DIPYRIDAMOLE 5 MG/ML
60 INJECTION INTRAVENOUS ONCE
Status: COMPLETED | OUTPATIENT
Start: 2021-11-26 | End: 2021-11-26

## 2021-11-26 RX ADMIN — DIPYRIDAMOLE 60 MG: 5 INJECTION INTRAVENOUS at 11:11

## 2021-11-29 ENCOUNTER — ANTI-COAG VISIT (OUTPATIENT)
Dept: CARDIOLOGY | Facility: CLINIC | Age: 66
End: 2021-11-29
Payer: MEDICARE

## 2021-11-29 ENCOUNTER — OFFICE VISIT (OUTPATIENT)
Dept: OPTOMETRY | Facility: CLINIC | Age: 66
End: 2021-11-29
Payer: MEDICARE

## 2021-11-29 ENCOUNTER — LAB VISIT (OUTPATIENT)
Dept: LAB | Facility: HOSPITAL | Age: 66
End: 2021-11-29
Attending: INTERNAL MEDICINE
Payer: MEDICARE

## 2021-11-29 ENCOUNTER — TELEPHONE (OUTPATIENT)
Dept: CARDIOLOGY | Facility: CLINIC | Age: 66
End: 2021-11-29
Payer: MEDICARE

## 2021-11-29 DIAGNOSIS — H25.13 NS (NUCLEAR SCLEROSIS), BILATERAL: ICD-10-CM

## 2021-11-29 DIAGNOSIS — H52.4 PRESBYOPIA: ICD-10-CM

## 2021-11-29 DIAGNOSIS — H47.393 OPTIC NERVE CUPPING OF BOTH EYES: Primary | ICD-10-CM

## 2021-11-29 DIAGNOSIS — I48.20 CHRONIC ATRIAL FIBRILLATION: ICD-10-CM

## 2021-11-29 DIAGNOSIS — H40.1111 PRIMARY OPEN ANGLE GLAUCOMA OF RIGHT EYE, MILD STAGE: ICD-10-CM

## 2021-11-29 DIAGNOSIS — H40.009 OPEN ANGLE WITH BORDERLINE INTRAOCULAR PRESSURE, UNSPECIFIED LATERALITY: ICD-10-CM

## 2021-11-29 DIAGNOSIS — I48.20 CHRONIC ATRIAL FIBRILLATION: Primary | ICD-10-CM

## 2021-11-29 LAB
INR PPP: 3.6 (ref 0.8–1.2)
PROTHROMBIN TIME: 36.3 SEC (ref 9–12.5)

## 2021-11-29 PROCEDURE — 99999 PR PBB SHADOW E&M-EST. PATIENT-LVL III: ICD-10-PCS | Mod: PBBFAC,HCNC,, | Performed by: OPTOMETRIST

## 2021-11-29 PROCEDURE — 99211 OFF/OP EST MAY X REQ PHY/QHP: CPT | Mod: S$GLB,,, | Performed by: INTERNAL MEDICINE

## 2021-11-29 PROCEDURE — 92015 PR REFRACTION: ICD-10-PCS | Mod: HCNC,S$GLB,, | Performed by: OPTOMETRIST

## 2021-11-29 PROCEDURE — 36415 COLL VENOUS BLD VENIPUNCTURE: CPT | Mod: HCNC | Performed by: INTERNAL MEDICINE

## 2021-11-29 PROCEDURE — 92012 INTRM OPH EXAM EST PATIENT: CPT | Mod: HCNC,S$GLB,, | Performed by: OPTOMETRIST

## 2021-11-29 PROCEDURE — 92012 PR EYE EXAM, EST PATIENT,INTERMED: ICD-10-PCS | Mod: HCNC,S$GLB,, | Performed by: OPTOMETRIST

## 2021-11-29 PROCEDURE — 99211 PR OFFICE/OUTPT VISIT, EST, LEVL I: ICD-10-PCS | Mod: S$GLB,,, | Performed by: INTERNAL MEDICINE

## 2021-11-29 PROCEDURE — 92015 DETERMINE REFRACTIVE STATE: CPT | Mod: HCNC,S$GLB,, | Performed by: OPTOMETRIST

## 2021-11-29 PROCEDURE — 85610 PROTHROMBIN TIME: CPT | Mod: HCNC | Performed by: INTERNAL MEDICINE

## 2021-11-29 PROCEDURE — 99999 PR PBB SHADOW E&M-EST. PATIENT-LVL III: CPT | Mod: PBBFAC,HCNC,, | Performed by: OPTOMETRIST

## 2021-12-15 ENCOUNTER — IMMUNIZATION (OUTPATIENT)
Dept: PHARMACY | Facility: CLINIC | Age: 66
End: 2021-12-15
Payer: MEDICARE

## 2021-12-15 ENCOUNTER — ANTI-COAG VISIT (OUTPATIENT)
Dept: CARDIOLOGY | Facility: CLINIC | Age: 66
End: 2021-12-15
Payer: MEDICARE

## 2021-12-15 ENCOUNTER — LAB VISIT (OUTPATIENT)
Dept: LAB | Facility: HOSPITAL | Age: 66
End: 2021-12-15
Attending: INTERNAL MEDICINE
Payer: MEDICARE

## 2021-12-15 DIAGNOSIS — Z23 NEED FOR VACCINATION: Primary | ICD-10-CM

## 2021-12-15 DIAGNOSIS — I48.20 CHRONIC ATRIAL FIBRILLATION: ICD-10-CM

## 2021-12-15 DIAGNOSIS — E78.2 MIXED HYPERLIPIDEMIA: ICD-10-CM

## 2021-12-15 DIAGNOSIS — I48.20 CHRONIC ATRIAL FIBRILLATION: Primary | ICD-10-CM

## 2021-12-15 LAB
ALBUMIN SERPL BCP-MCNC: 3.7 G/DL (ref 3.5–5.2)
ALP SERPL-CCNC: 65 U/L (ref 55–135)
ALT SERPL W/O P-5'-P-CCNC: 8 U/L (ref 10–44)
AST SERPL-CCNC: 16 U/L (ref 10–40)
BILIRUB DIRECT SERPL-MCNC: 0.3 MG/DL (ref 0.1–0.3)
BILIRUB SERPL-MCNC: 0.7 MG/DL (ref 0.1–1)
CHOLEST SERPL-MCNC: 141 MG/DL (ref 120–199)
CHOLEST/HDLC SERPL: 2.6 {RATIO} (ref 2–5)
HDLC SERPL-MCNC: 54 MG/DL (ref 40–75)
HDLC SERPL: 38.3 % (ref 20–50)
INR PPP: 4.6 (ref 0.8–1.2)
LDLC SERPL CALC-MCNC: 70.2 MG/DL (ref 63–159)
NONHDLC SERPL-MCNC: 87 MG/DL
PROT SERPL-MCNC: 7.2 G/DL (ref 6–8.4)
PROTHROMBIN TIME: 45.8 SEC (ref 9–12.5)
TRIGL SERPL-MCNC: 84 MG/DL (ref 30–150)

## 2021-12-15 PROCEDURE — 80061 LIPID PANEL: CPT | Mod: HCNC | Performed by: INTERNAL MEDICINE

## 2021-12-15 PROCEDURE — 99211 OFF/OP EST MAY X REQ PHY/QHP: CPT | Mod: S$GLB,,, | Performed by: INTERNAL MEDICINE

## 2021-12-15 PROCEDURE — 36415 COLL VENOUS BLD VENIPUNCTURE: CPT | Mod: HCNC | Performed by: INTERNAL MEDICINE

## 2021-12-15 PROCEDURE — 85610 PROTHROMBIN TIME: CPT | Mod: HCNC | Performed by: INTERNAL MEDICINE

## 2021-12-15 PROCEDURE — 80076 HEPATIC FUNCTION PANEL: CPT | Mod: HCNC | Performed by: INTERNAL MEDICINE

## 2021-12-15 PROCEDURE — 99211 PR OFFICE/OUTPT VISIT, EST, LEVL I: ICD-10-PCS | Mod: S$GLB,,, | Performed by: INTERNAL MEDICINE

## 2021-12-16 ENCOUNTER — TELEPHONE (OUTPATIENT)
Dept: CARDIOLOGY | Facility: CLINIC | Age: 66
End: 2021-12-16
Payer: MEDICARE

## 2021-12-21 DIAGNOSIS — D50.8 IRON DEFICIENCY ANEMIA SECONDARY TO INADEQUATE DIETARY IRON INTAKE: ICD-10-CM

## 2021-12-21 DIAGNOSIS — D50.0 ANEMIA DUE TO CHRONIC BLOOD LOSS: Primary | ICD-10-CM

## 2021-12-22 ENCOUNTER — LAB VISIT (OUTPATIENT)
Dept: LAB | Facility: HOSPITAL | Age: 66
End: 2021-12-22
Attending: INTERNAL MEDICINE
Payer: MEDICARE

## 2021-12-22 ENCOUNTER — ANTI-COAG VISIT (OUTPATIENT)
Dept: CARDIOLOGY | Facility: CLINIC | Age: 66
End: 2021-12-22
Payer: MEDICARE

## 2021-12-22 DIAGNOSIS — I48.20 CHRONIC ATRIAL FIBRILLATION: ICD-10-CM

## 2021-12-22 DIAGNOSIS — I48.20 CHRONIC ATRIAL FIBRILLATION: Primary | ICD-10-CM

## 2021-12-22 LAB
INR PPP: 3.7 (ref 0.8–1.2)
PROTHROMBIN TIME: 37.7 SEC (ref 9–12.5)

## 2021-12-22 PROCEDURE — 36415 COLL VENOUS BLD VENIPUNCTURE: CPT | Mod: HCNC | Performed by: INTERNAL MEDICINE

## 2021-12-22 PROCEDURE — 85610 PROTHROMBIN TIME: CPT | Mod: HCNC | Performed by: INTERNAL MEDICINE

## 2021-12-22 PROCEDURE — 99211 PR OFFICE/OUTPT VISIT, EST, LEVL I: ICD-10-PCS | Mod: S$GLB,,, | Performed by: INTERNAL MEDICINE

## 2021-12-22 PROCEDURE — 99211 OFF/OP EST MAY X REQ PHY/QHP: CPT | Mod: S$GLB,,, | Performed by: INTERNAL MEDICINE

## 2022-01-01 ENCOUNTER — OFFICE VISIT (OUTPATIENT)
Dept: ORTHOPEDICS | Facility: CLINIC | Age: 67
End: 2022-01-01
Payer: MEDICARE

## 2022-01-01 ENCOUNTER — PATIENT MESSAGE (OUTPATIENT)
Dept: ADMINISTRATIVE | Facility: HOSPITAL | Age: 67
End: 2022-01-01
Payer: MEDICARE

## 2022-01-01 ENCOUNTER — TELEPHONE (OUTPATIENT)
Dept: CARDIOLOGY | Facility: CLINIC | Age: 67
End: 2022-01-01
Payer: MEDICARE

## 2022-01-01 ENCOUNTER — HOSPITAL ENCOUNTER (OUTPATIENT)
Dept: RADIOLOGY | Facility: HOSPITAL | Age: 67
Discharge: HOME | End: 2022-12-07
Attending: INTERNAL MEDICINE
Payer: MEDICARE

## 2022-01-01 ENCOUNTER — ANTI-COAG VISIT (OUTPATIENT)
Dept: CARDIOLOGY | Facility: CLINIC | Age: 67
End: 2022-01-01
Payer: MEDICARE

## 2022-01-01 ENCOUNTER — PATIENT MESSAGE (OUTPATIENT)
Dept: BARIATRICS | Facility: CLINIC | Age: 67
End: 2022-01-01
Payer: MEDICARE

## 2022-01-01 ENCOUNTER — TELEPHONE (OUTPATIENT)
Dept: BARIATRICS | Facility: CLINIC | Age: 67
End: 2022-01-01
Payer: MEDICARE

## 2022-01-01 ENCOUNTER — LAB VISIT (OUTPATIENT)
Dept: LAB | Facility: HOSPITAL | Age: 67
End: 2022-01-01
Attending: INTERNAL MEDICINE
Payer: MEDICARE

## 2022-01-01 ENCOUNTER — HOSPITAL ENCOUNTER (OUTPATIENT)
Dept: CARDIOLOGY | Facility: HOSPITAL | Age: 67
Discharge: HOME | End: 2022-10-12
Attending: INTERNAL MEDICINE
Payer: MEDICARE

## 2022-01-01 ENCOUNTER — OFFICE VISIT (OUTPATIENT)
Dept: OPTOMETRY | Facility: CLINIC | Age: 67
End: 2022-01-01
Payer: MEDICARE

## 2022-01-01 ENCOUNTER — HOSPITAL ENCOUNTER (OUTPATIENT)
Dept: RADIOLOGY | Facility: HOSPITAL | Age: 67
Discharge: HOME | End: 2022-12-14
Attending: INTERNAL MEDICINE
Payer: MEDICARE

## 2022-01-01 ENCOUNTER — OFFICE VISIT (OUTPATIENT)
Dept: INTERNAL MEDICINE | Facility: CLINIC | Age: 67
End: 2022-01-01
Payer: MEDICARE

## 2022-01-01 ENCOUNTER — HOSPITAL ENCOUNTER (OUTPATIENT)
Dept: RADIOLOGY | Facility: HOSPITAL | Age: 67
Discharge: HOME | End: 2022-11-03
Attending: PHYSICIAN ASSISTANT
Payer: MEDICARE

## 2022-01-01 ENCOUNTER — TELEPHONE (OUTPATIENT)
Dept: INTERNAL MEDICINE | Facility: CLINIC | Age: 67
End: 2022-01-01
Payer: MEDICARE

## 2022-01-01 ENCOUNTER — TELEPHONE (OUTPATIENT)
Dept: PAIN MEDICINE | Facility: CLINIC | Age: 67
End: 2022-01-01
Payer: MEDICARE

## 2022-01-01 ENCOUNTER — OFFICE VISIT (OUTPATIENT)
Dept: OBSTETRICS AND GYNECOLOGY | Facility: CLINIC | Age: 67
End: 2022-01-01
Payer: MEDICARE

## 2022-01-01 ENCOUNTER — OFFICE VISIT (OUTPATIENT)
Dept: HEMATOLOGY/ONCOLOGY | Facility: CLINIC | Age: 67
End: 2022-01-01
Payer: MEDICARE

## 2022-01-01 VITALS
TEMPERATURE: 99 F | OXYGEN SATURATION: 96 % | BODY MASS INDEX: 43.57 KG/M2 | WEIGHT: 236.75 LBS | DIASTOLIC BLOOD PRESSURE: 64 MMHG | HEIGHT: 62 IN | HEART RATE: 86 BPM | SYSTOLIC BLOOD PRESSURE: 126 MMHG

## 2022-01-01 VITALS
HEART RATE: 62 BPM | DIASTOLIC BLOOD PRESSURE: 88 MMHG | DIASTOLIC BLOOD PRESSURE: 82 MMHG | TEMPERATURE: 99 F | HEIGHT: 62 IN | WEIGHT: 238.13 LBS | SYSTOLIC BLOOD PRESSURE: 138 MMHG | WEIGHT: 233.94 LBS | BODY MASS INDEX: 43.82 KG/M2 | SYSTOLIC BLOOD PRESSURE: 132 MMHG | BODY MASS INDEX: 42.78 KG/M2 | OXYGEN SATURATION: 99 %

## 2022-01-01 VITALS
DIASTOLIC BLOOD PRESSURE: 69 MMHG | SYSTOLIC BLOOD PRESSURE: 131 MMHG | BODY MASS INDEX: 42.55 KG/M2 | WEIGHT: 231.25 LBS | HEART RATE: 71 BPM | HEIGHT: 62 IN

## 2022-01-01 VITALS
DIASTOLIC BLOOD PRESSURE: 74 MMHG | BODY MASS INDEX: 42.69 KG/M2 | HEIGHT: 62 IN | HEART RATE: 80 BPM | WEIGHT: 232 LBS | SYSTOLIC BLOOD PRESSURE: 134 MMHG

## 2022-01-01 VITALS
RESPIRATION RATE: 16 BRPM | WEIGHT: 233 LBS | HEART RATE: 74 BPM | OXYGEN SATURATION: 96 % | SYSTOLIC BLOOD PRESSURE: 133 MMHG | BODY MASS INDEX: 42.88 KG/M2 | HEIGHT: 62 IN | DIASTOLIC BLOOD PRESSURE: 67 MMHG | TEMPERATURE: 99 F

## 2022-01-01 DIAGNOSIS — I48.20 CHRONIC ATRIAL FIBRILLATION: Primary | Chronic | ICD-10-CM

## 2022-01-01 DIAGNOSIS — E11.9 TYPE 2 DIABETES MELLITUS WITHOUT COMPLICATION: ICD-10-CM

## 2022-01-01 DIAGNOSIS — Z01.419 WELL WOMAN EXAM: ICD-10-CM

## 2022-01-01 DIAGNOSIS — M54.40 ACUTE BACK PAIN WITH SCIATICA, UNSPECIFIED LATERALITY: ICD-10-CM

## 2022-01-01 DIAGNOSIS — H25.13 NS (NUCLEAR SCLEROSIS), BILATERAL: ICD-10-CM

## 2022-01-01 DIAGNOSIS — I50.33 ACUTE ON CHRONIC DIASTOLIC CONGESTIVE HEART FAILURE: ICD-10-CM

## 2022-01-01 DIAGNOSIS — M54.9 DORSALGIA, UNSPECIFIED: ICD-10-CM

## 2022-01-01 DIAGNOSIS — M25.552 HIP PAIN, BILATERAL: ICD-10-CM

## 2022-01-01 DIAGNOSIS — M79.10 MYALGIA: ICD-10-CM

## 2022-01-01 DIAGNOSIS — H04.123 DRY EYE SYNDROME, BILATERAL: Primary | ICD-10-CM

## 2022-01-01 DIAGNOSIS — H52.203 ASTIGMATISM OF BOTH EYES, UNSPECIFIED TYPE: ICD-10-CM

## 2022-01-01 DIAGNOSIS — Z95.2 H/O MITRAL VALVE REPLACEMENT WITH MECHANICAL VALVE: Chronic | ICD-10-CM

## 2022-01-01 DIAGNOSIS — I10 HYPERTENSION, UNSPECIFIED TYPE: ICD-10-CM

## 2022-01-01 DIAGNOSIS — I48.20 CHRONIC ATRIAL FIBRILLATION: ICD-10-CM

## 2022-01-01 DIAGNOSIS — M25.552 HIP PAIN, BILATERAL: Primary | ICD-10-CM

## 2022-01-01 DIAGNOSIS — I50.32 CHRONIC DIASTOLIC CONGESTIVE HEART FAILURE: Primary | Chronic | ICD-10-CM

## 2022-01-01 DIAGNOSIS — D50.8 IRON DEFICIENCY ANEMIA SECONDARY TO INADEQUATE DIETARY IRON INTAKE: Primary | ICD-10-CM

## 2022-01-01 DIAGNOSIS — M54.9 DORSALGIA, UNSPECIFIED: Primary | ICD-10-CM

## 2022-01-01 DIAGNOSIS — M54.50 LUMBAR SPINE PAIN: ICD-10-CM

## 2022-01-01 DIAGNOSIS — M70.61 TROCHANTERIC BURSITIS OF BOTH HIPS: ICD-10-CM

## 2022-01-01 DIAGNOSIS — R35.0 URINE FREQUENCY: ICD-10-CM

## 2022-01-01 DIAGNOSIS — I70.0 AORTIC ATHEROSCLEROSIS: ICD-10-CM

## 2022-01-01 DIAGNOSIS — D50.0 IRON DEFICIENCY ANEMIA DUE TO CHRONIC BLOOD LOSS: ICD-10-CM

## 2022-01-01 DIAGNOSIS — M70.62 TROCHANTERIC BURSITIS OF BOTH HIPS: ICD-10-CM

## 2022-01-01 DIAGNOSIS — I48.20 ATRIAL FIBRILLATION, CHRONIC: ICD-10-CM

## 2022-01-01 DIAGNOSIS — Z95.2 STATUS POST HEART VALVE REPLACEMENT WITH MECHANICAL VALVE: ICD-10-CM

## 2022-01-01 DIAGNOSIS — M16.0 PRIMARY OSTEOARTHRITIS OF BOTH HIPS: ICD-10-CM

## 2022-01-01 DIAGNOSIS — M25.551 HIP PAIN, BILATERAL: ICD-10-CM

## 2022-01-01 DIAGNOSIS — I50.32 CHRONIC DIASTOLIC CONGESTIVE HEART FAILURE: Chronic | ICD-10-CM

## 2022-01-01 DIAGNOSIS — J44.9 CHRONIC OBSTRUCTIVE PULMONARY DISEASE, UNSPECIFIED COPD TYPE: Chronic | ICD-10-CM

## 2022-01-01 DIAGNOSIS — Z01.419 WELL WOMAN EXAM WITH ROUTINE GYNECOLOGICAL EXAM: Primary | ICD-10-CM

## 2022-01-01 DIAGNOSIS — E78.2 MIXED HYPERLIPIDEMIA: ICD-10-CM

## 2022-01-01 DIAGNOSIS — M25.569 KNEE PAIN, UNSPECIFIED CHRONICITY, UNSPECIFIED LATERALITY: ICD-10-CM

## 2022-01-01 DIAGNOSIS — I10 HYPERTENSION, UNSPECIFIED TYPE: Primary | ICD-10-CM

## 2022-01-01 DIAGNOSIS — Z12.31 SCREENING MAMMOGRAM, ENCOUNTER FOR: ICD-10-CM

## 2022-01-01 DIAGNOSIS — Z79.01 LONG TERM (CURRENT) USE OF ANTICOAGULANTS: ICD-10-CM

## 2022-01-01 DIAGNOSIS — E11.9 TYPE 2 DIABETES MELLITUS WITHOUT COMPLICATION, UNSPECIFIED WHETHER LONG TERM INSULIN USE: ICD-10-CM

## 2022-01-01 DIAGNOSIS — M25.551 HIP PAIN, BILATERAL: Primary | ICD-10-CM

## 2022-01-01 DIAGNOSIS — Z95.2 H/O MITRAL VALVE REPLACEMENT WITH MECHANICAL VALVE: ICD-10-CM

## 2022-01-01 DIAGNOSIS — H40.1212 NORMAL TENSION GLAUCOMA OF RIGHT EYE, MODERATE STAGE: ICD-10-CM

## 2022-01-01 LAB
ALBUMIN SERPL BCP-MCNC: 3.5 G/DL (ref 3.5–5.2)
ALP SERPL-CCNC: 68 U/L (ref 55–135)
ALT SERPL W/O P-5'-P-CCNC: 11 U/L (ref 10–44)
ANION GAP SERPL CALC-SCNC: 7 MMOL/L (ref 8–16)
ANION GAP SERPL CALC-SCNC: 7 MMOL/L (ref 8–16)
ASCENDING AORTA: 2.59 CM
AST SERPL-CCNC: 17 U/L (ref 10–40)
AV MEAN GRADIENT: 5 MMHG
BASOPHILS # BLD AUTO: 0.03 K/UL (ref 0–0.2)
BASOPHILS NFR BLD: 0.6 % (ref 0–1.9)
BILIRUB SERPL-MCNC: 0.9 MG/DL (ref 0.1–1)
BILIRUB SERPL-MCNC: NORMAL MG/DL
BLOOD URINE, POC: NORMAL
BSA FOR ECHO PROCEDURE: 2.15 M2
BUN SERPL-MCNC: 18 MG/DL (ref 8–23)
BUN SERPL-MCNC: 20 MG/DL (ref 8–23)
CALCIUM SERPL-MCNC: 9 MG/DL (ref 8.7–10.5)
CALCIUM SERPL-MCNC: 9.7 MG/DL (ref 8.7–10.5)
CHLORIDE SERPL-SCNC: 103 MMOL/L (ref 95–110)
CHLORIDE SERPL-SCNC: 106 MMOL/L (ref 95–110)
CLARITY, POC UA: CLEAR
CO2 SERPL-SCNC: 30 MMOL/L (ref 23–29)
CO2 SERPL-SCNC: 32 MMOL/L (ref 23–29)
COLOR, POC UA: YELLOW
CREAT SERPL-MCNC: 0.9 MG/DL (ref 0.5–1.4)
CREAT SERPL-MCNC: 1 MG/DL (ref 0.5–1.4)
CV ECHO LV RWT: 0.26 CM
DIFFERENTIAL METHOD: ABNORMAL
DOP CALC LVOT AREA: 2.7 CM2
DOP CALC LVOT DIAMETER: 1.86 CM
DOP CALC LVOT PEAK VEL: 0.79 M/S
DOP CALC LVOT STROKE VOLUME: 51.76 CM3
DOP CALC MV VTI: 51.01 CM
DOP CALCLVOT PEAK VEL VTI: 19.06 CM
ECHO LV POSTERIOR WALL: 0.6 CM (ref 0.6–1.1)
EJECTION FRACTION: 58 %
EOSINOPHIL # BLD AUTO: 0.2 K/UL (ref 0–0.5)
EOSINOPHIL NFR BLD: 4.4 % (ref 0–8)
ERYTHROCYTE [DISTWIDTH] IN BLOOD BY AUTOMATED COUNT: 16.2 % (ref 11.5–14.5)
EST. GFR  (NO RACE VARIABLE): >60 ML/MIN/1.73 M^2
EST. GFR  (NO RACE VARIABLE): >60 ML/MIN/1.73 M^2
FERRITIN SERPL-MCNC: 46 NG/ML (ref 20–300)
FRACTIONAL SHORTENING: 34 % (ref 28–44)
GLUCOSE SERPL-MCNC: 86 MG/DL (ref 70–110)
GLUCOSE SERPL-MCNC: 96 MG/DL (ref 70–110)
GLUCOSE UR QL STRIP: NORMAL
HCT VFR BLD AUTO: 39.7 % (ref 37–48.5)
HGB BLD-MCNC: 11.4 G/DL (ref 12–16)
IMM GRANULOCYTES # BLD AUTO: 0.02 K/UL (ref 0–0.04)
IMM GRANULOCYTES NFR BLD AUTO: 0.4 % (ref 0–0.5)
INR PPP: 2.6 (ref 0.8–1.2)
INR PPP: 2.8 (ref 0.8–1.2)
INR PPP: 3.3 (ref 0.8–1.2)
INTERVENTRICULAR SEPTUM: 0.92 CM (ref 0.6–1.1)
IRON SERPL-MCNC: 71 UG/DL (ref 30–160)
KETONES UR QL STRIP: NORMAL
LA MAJOR: 5.03 CM
LA MINOR: 5.94 CM
LA WIDTH: 5.67 CM
LEFT ATRIUM SIZE: 6.11 CM
LEFT ATRIUM VOLUME INDEX MOD: 58.5 ML/M2
LEFT ATRIUM VOLUME INDEX: 78.6 ML/M2
LEFT ATRIUM VOLUME MOD: 119.25 CM3
LEFT ATRIUM VOLUME: 160.41 CM3
LEFT INTERNAL DIMENSION IN SYSTOLE: 3.02 CM (ref 2.1–4)
LEFT VENTRICLE DIASTOLIC VOLUME INDEX: 46.44 ML/M2
LEFT VENTRICLE DIASTOLIC VOLUME: 94.74 ML
LEFT VENTRICLE MASS INDEX: 53 G/M2
LEFT VENTRICLE SYSTOLIC VOLUME INDEX: 17.4 ML/M2
LEFT VENTRICLE SYSTOLIC VOLUME: 35.52 ML
LEFT VENTRICULAR INTERNAL DIMENSION IN DIASTOLE: 4.55 CM (ref 3.5–6)
LEFT VENTRICULAR MASS: 108.3 G
LEUKOCYTE ESTERASE URINE, POC: NORMAL
LYMPHOCYTES # BLD AUTO: 1.2 K/UL (ref 1–4.8)
LYMPHOCYTES NFR BLD: 23.8 % (ref 18–48)
MCH RBC QN AUTO: 21.3 PG (ref 27–31)
MCHC RBC AUTO-ENTMCNC: 28.7 G/DL (ref 32–36)
MCV RBC AUTO: 74 FL (ref 82–98)
MONOCYTES # BLD AUTO: 0.7 K/UL (ref 0.3–1)
MONOCYTES NFR BLD: 12.9 % (ref 4–15)
MV A" WAVE DURATION": 11.42 MSEC
MV MEAN GRADIENT: 11 MMHG
MV PEAK E VEL: 2.02 M/S
MV PEAK GRADIENT: 22 MMHG
MV STENOSIS PRESSURE HALF TIME: 90.07 MS
MV VALVE AREA BY CONTINUITY EQUATION: 1.01 CM2
MV VALVE AREA P 1/2 METHOD: 2.44 CM2
NEUTROPHILS # BLD AUTO: 3 K/UL (ref 1.8–7.7)
NEUTROPHILS NFR BLD: 57.9 % (ref 38–73)
NITRITE, POC UA: NORMAL
NRBC BLD-RTO: 0 /100 WBC
PH, POC UA: NORMAL
PISA TR MAX VEL: 3.14 M/S
PLATELET # BLD AUTO: 182 K/UL (ref 150–450)
PMV BLD AUTO: 10.3 FL (ref 9.2–12.9)
POTASSIUM SERPL-SCNC: 4.1 MMOL/L (ref 3.5–5.1)
POTASSIUM SERPL-SCNC: 4.5 MMOL/L (ref 3.5–5.1)
PROT SERPL-MCNC: 6.8 G/DL (ref 6–8.4)
PROTEIN, POC: NORMAL
PROTHROMBIN TIME: 25.9 SEC (ref 9–12.5)
PROTHROMBIN TIME: 27.3 SEC (ref 9–12.5)
PROTHROMBIN TIME: 31.9 SEC (ref 9–12.5)
PULM VEIN S/D RATIO: 0.92
PV PEAK D VEL: 0.36 M/S
PV PEAK S VEL: 0.33 M/S
RA MAJOR: 5.23 CM
RA PRESSURE: 15 MMHG
RA WIDTH: 5.07 CM
RBC # BLD AUTO: 5.35 M/UL (ref 4–5.4)
RIGHT VENTRICULAR END-DIASTOLIC DIMENSION: 4.27 CM
RV TISSUE DOPPLER FREE WALL SYSTOLIC VELOCITY 1 (APICAL 4 CHAMBER VIEW): 4.41 CM/S
SATURATED IRON: 17 % (ref 20–50)
SINUS: 2.37 CM
SODIUM SERPL-SCNC: 142 MMOL/L (ref 136–145)
SODIUM SERPL-SCNC: 143 MMOL/L (ref 136–145)
SPECIFIC GRAVITY, POC UA: 1
STJ: 2.16 CM
TOTAL IRON BINDING CAPACITY: 416 UG/DL (ref 250–450)
TR MAX PG: 39 MMHG
TRANSFERRIN SERPL-MCNC: 281 MG/DL (ref 200–375)
TRICUSPID ANNULAR PLANE SYSTOLIC EXCURSION: 1.53 CM
TV REST PULMONARY ARTERY PRESSURE: 54 MMHG
UROBILINOGEN, POC UA: NORMAL
WBC # BLD AUTO: 5.21 K/UL (ref 3.9–12.7)

## 2022-01-01 PROCEDURE — 72131 CT LUMBAR SPINE W/O DYE: CPT | Mod: 26,,, | Performed by: RADIOLOGY

## 2022-01-01 PROCEDURE — 3008F BODY MASS INDEX DOCD: CPT | Mod: CPTII,S$GLB,, | Performed by: PHYSICIAN ASSISTANT

## 2022-01-01 PROCEDURE — 73521 X-RAY EXAM HIPS BI 2 VIEWS: CPT | Mod: TC

## 2022-01-01 PROCEDURE — 3078F PR MOST RECENT DIASTOLIC BLOOD PRESSURE < 80 MM HG: ICD-10-PCS | Mod: CPTII,S$GLB,, | Performed by: INTERNAL MEDICINE

## 2022-01-01 PROCEDURE — 3288F PR FALLS RISK ASSESSMENT DOCUMENTED: ICD-10-PCS | Mod: CPTII,S$GLB,, | Performed by: INTERNAL MEDICINE

## 2022-01-01 PROCEDURE — 1125F PR PAIN SEVERITY QUANTIFIED, PAIN PRESENT: ICD-10-PCS | Mod: CPTII,S$GLB,, | Performed by: PHYSICIAN ASSISTANT

## 2022-01-01 PROCEDURE — 3072F LOW RISK FOR RETINOPATHY: CPT | Mod: CPTII,S$GLB,, | Performed by: OBSTETRICS & GYNECOLOGY

## 2022-01-01 PROCEDURE — 1101F PR PT FALLS ASSESS DOC 0-1 FALLS W/OUT INJ PAST YR: ICD-10-PCS | Mod: CPTII,S$GLB,, | Performed by: INTERNAL MEDICINE

## 2022-01-01 PROCEDURE — 99214 OFFICE O/P EST MOD 30 MIN: CPT | Mod: HCNC,S$GLB,, | Performed by: INTERNAL MEDICINE

## 2022-01-01 PROCEDURE — 3072F LOW RISK FOR RETINOPATHY: CPT | Mod: CPTII,S$GLB,, | Performed by: PHYSICIAN ASSISTANT

## 2022-01-01 PROCEDURE — 93306 TTE W/DOPPLER COMPLETE: CPT

## 2022-01-01 PROCEDURE — 1159F MED LIST DOCD IN RCRD: CPT | Mod: HCNC,CPTII,S$GLB, | Performed by: INTERNAL MEDICINE

## 2022-01-01 PROCEDURE — 72110 XR LUMBAR SPINE AP AND LAT WITH FLEX/EXT: ICD-10-PCS | Mod: 26,,, | Performed by: RADIOLOGY

## 2022-01-01 PROCEDURE — 1126F AMNT PAIN NOTED NONE PRSNT: CPT | Mod: CPTII,S$GLB,, | Performed by: OBSTETRICS & GYNECOLOGY

## 2022-01-01 PROCEDURE — 99215 OFFICE O/P EST HI 40 MIN: CPT | Mod: S$GLB,,, | Performed by: INTERNAL MEDICINE

## 2022-01-01 PROCEDURE — 3008F PR BODY MASS INDEX (BMI) DOCUMENTED: ICD-10-PCS | Mod: HCNC,CPTII,S$GLB, | Performed by: INTERNAL MEDICINE

## 2022-01-01 PROCEDURE — 93793 PR ANTICOAGULANT MGMT FOR PT TAKING WARFARIN: ICD-10-PCS | Mod: S$GLB,,,

## 2022-01-01 PROCEDURE — 99999 PR PBB SHADOW E&M-EST. PATIENT-LVL V: CPT | Mod: PBBFAC,,, | Performed by: PHYSICIAN ASSISTANT

## 2022-01-01 PROCEDURE — 1101F PT FALLS ASSESS-DOCD LE1/YR: CPT | Mod: CPTII,S$GLB,, | Performed by: INTERNAL MEDICINE

## 2022-01-01 PROCEDURE — 72110 X-RAY EXAM L-2 SPINE 4/>VWS: CPT | Mod: 26,,, | Performed by: RADIOLOGY

## 2022-01-01 PROCEDURE — 93793 ANTICOAG MGMT PT WARFARIN: CPT | Mod: S$GLB,,,

## 2022-01-01 PROCEDURE — 85025 COMPLETE CBC W/AUTO DIFF WBC: CPT | Performed by: INTERNAL MEDICINE

## 2022-01-01 PROCEDURE — 3008F BODY MASS INDEX DOCD: CPT | Mod: CPTII,S$GLB,, | Performed by: INTERNAL MEDICINE

## 2022-01-01 PROCEDURE — 3074F PR MOST RECENT SYSTOLIC BLOOD PRESSURE < 130 MM HG: ICD-10-PCS | Mod: HCNC,CPTII,S$GLB, | Performed by: INTERNAL MEDICINE

## 2022-01-01 PROCEDURE — 36415 COLL VENOUS BLD VENIPUNCTURE: CPT | Performed by: INTERNAL MEDICINE

## 2022-01-01 PROCEDURE — 1101F PR PT FALLS ASSESS DOC 0-1 FALLS W/OUT INJ PAST YR: ICD-10-PCS | Mod: CPTII,S$GLB,, | Performed by: PHYSICIAN ASSISTANT

## 2022-01-01 PROCEDURE — 3075F SYST BP GE 130 - 139MM HG: CPT | Mod: CPTII,S$GLB,, | Performed by: PHYSICIAN ASSISTANT

## 2022-01-01 PROCEDURE — 81002 POCT URINE DIPSTICK WITHOUT MICROSCOPE: ICD-10-PCS | Mod: S$GLB,,, | Performed by: OBSTETRICS & GYNECOLOGY

## 2022-01-01 PROCEDURE — 85610 PROTHROMBIN TIME: CPT | Performed by: INTERNAL MEDICINE

## 2022-01-01 PROCEDURE — 1101F PT FALLS ASSESS-DOCD LE1/YR: CPT | Mod: HCNC,CPTII,S$GLB, | Performed by: INTERNAL MEDICINE

## 2022-01-01 PROCEDURE — 73562 XR KNEE ORTHO LEFT: ICD-10-PCS | Mod: 26,LT,, | Performed by: RADIOLOGY

## 2022-01-01 PROCEDURE — 3288F FALL RISK ASSESSMENT DOCD: CPT | Mod: CPTII,S$GLB,, | Performed by: OBSTETRICS & GYNECOLOGY

## 2022-01-01 PROCEDURE — 99214 PR OFFICE/OUTPT VISIT, EST, LEVL IV, 30-39 MIN: ICD-10-PCS | Mod: S$GLB,,, | Performed by: INTERNAL MEDICINE

## 2022-01-01 PROCEDURE — 3075F PR MOST RECENT SYSTOLIC BLOOD PRESS GE 130-139MM HG: ICD-10-PCS | Mod: CPTII,S$GLB,, | Performed by: INTERNAL MEDICINE

## 2022-01-01 PROCEDURE — 3074F SYST BP LT 130 MM HG: CPT | Mod: HCNC,CPTII,S$GLB, | Performed by: INTERNAL MEDICINE

## 2022-01-01 PROCEDURE — 3008F BODY MASS INDEX DOCD: CPT | Mod: CPTII,S$GLB,, | Performed by: OBSTETRICS & GYNECOLOGY

## 2022-01-01 PROCEDURE — 1126F AMNT PAIN NOTED NONE PRSNT: CPT | Mod: CPTII,S$GLB,, | Performed by: INTERNAL MEDICINE

## 2022-01-01 PROCEDURE — 99999 PR PBB SHADOW E&M-EST. PATIENT-LVL IV: CPT | Mod: PBBFAC,,, | Performed by: OBSTETRICS & GYNECOLOGY

## 2022-01-01 PROCEDURE — 3075F PR MOST RECENT SYSTOLIC BLOOD PRESS GE 130-139MM HG: ICD-10-PCS | Mod: CPTII,S$GLB,, | Performed by: OBSTETRICS & GYNECOLOGY

## 2022-01-01 PROCEDURE — 3072F LOW RISK FOR RETINOPATHY: CPT | Mod: HCNC,CPTII,S$GLB, | Performed by: INTERNAL MEDICINE

## 2022-01-01 PROCEDURE — 73560 X-RAY EXAM OF KNEE 1 OR 2: CPT | Mod: TC,59,RT

## 2022-01-01 PROCEDURE — 3075F SYST BP GE 130 - 139MM HG: CPT | Mod: CPTII,S$GLB,, | Performed by: OBSTETRICS & GYNECOLOGY

## 2022-01-01 PROCEDURE — 3079F PR MOST RECENT DIASTOLIC BLOOD PRESSURE 80-89 MM HG: ICD-10-PCS | Mod: CPTII,S$GLB,, | Performed by: OBSTETRICS & GYNECOLOGY

## 2022-01-01 PROCEDURE — 1159F MED LIST DOCD IN RCRD: CPT | Mod: CPTII,S$GLB,, | Performed by: OBSTETRICS & GYNECOLOGY

## 2022-01-01 PROCEDURE — 99999 PR PBB SHADOW E&M-EST. PATIENT-LVL III: ICD-10-PCS | Mod: PBBFAC,,, | Performed by: OPTOMETRIST

## 2022-01-01 PROCEDURE — 1159F MED LIST DOCD IN RCRD: CPT | Mod: CPTII,S$GLB,, | Performed by: PHYSICIAN ASSISTANT

## 2022-01-01 PROCEDURE — 3078F DIAST BP <80 MM HG: CPT | Mod: CPTII,S$GLB,, | Performed by: PHYSICIAN ASSISTANT

## 2022-01-01 PROCEDURE — 3288F FALL RISK ASSESSMENT DOCD: CPT | Mod: CPTII,S$GLB,, | Performed by: PHYSICIAN ASSISTANT

## 2022-01-01 PROCEDURE — 73562 X-RAY EXAM OF KNEE 3: CPT | Mod: 26,LT,, | Performed by: RADIOLOGY

## 2022-01-01 PROCEDURE — 72131 CT LUMBAR SPINE W/O DYE: CPT | Mod: TC

## 2022-01-01 PROCEDURE — 1101F PT FALLS ASSESS-DOCD LE1/YR: CPT | Mod: CPTII,S$GLB,, | Performed by: OPTOMETRIST

## 2022-01-01 PROCEDURE — 3288F PR FALLS RISK ASSESSMENT DOCUMENTED: ICD-10-PCS | Mod: CPTII,S$GLB,, | Performed by: PHYSICIAN ASSISTANT

## 2022-01-01 PROCEDURE — 3079F DIAST BP 80-89 MM HG: CPT | Mod: CPTII,S$GLB,, | Performed by: INTERNAL MEDICINE

## 2022-01-01 PROCEDURE — 99214 PR OFFICE/OUTPT VISIT, EST, LEVL IV, 30-39 MIN: ICD-10-PCS | Mod: 25,S$GLB,, | Performed by: PHYSICIAN ASSISTANT

## 2022-01-01 PROCEDURE — 3072F LOW RISK FOR RETINOPATHY: CPT | Mod: CPTII,S$GLB,, | Performed by: INTERNAL MEDICINE

## 2022-01-01 PROCEDURE — 99999 PR PBB SHADOW E&M-EST. PATIENT-LVL V: ICD-10-PCS | Mod: PBBFAC,,, | Performed by: INTERNAL MEDICINE

## 2022-01-01 PROCEDURE — 3008F PR BODY MASS INDEX (BMI) DOCUMENTED: ICD-10-PCS | Mod: CPTII,S$GLB,, | Performed by: INTERNAL MEDICINE

## 2022-01-01 PROCEDURE — 1159F MED LIST DOCD IN RCRD: CPT | Mod: CPTII,S$GLB,, | Performed by: OPTOMETRIST

## 2022-01-01 PROCEDURE — 3288F PR FALLS RISK ASSESSMENT DOCUMENTED: ICD-10-PCS | Mod: HCNC,CPTII,S$GLB, | Performed by: INTERNAL MEDICINE

## 2022-01-01 PROCEDURE — 3078F PR MOST RECENT DIASTOLIC BLOOD PRESSURE < 80 MM HG: ICD-10-PCS | Mod: HCNC,CPTII,S$GLB, | Performed by: INTERNAL MEDICINE

## 2022-01-01 PROCEDURE — 3075F SYST BP GE 130 - 139MM HG: CPT | Mod: CPTII,S$GLB,, | Performed by: INTERNAL MEDICINE

## 2022-01-01 PROCEDURE — 3072F PR LOW RISK FOR RETINOPATHY: ICD-10-PCS | Mod: CPTII,S$GLB,, | Performed by: PHYSICIAN ASSISTANT

## 2022-01-01 PROCEDURE — 73521 XR HIPS BILATERAL 2 VIEW INCL AP PELVIS: ICD-10-PCS | Mod: 26,,, | Performed by: RADIOLOGY

## 2022-01-01 PROCEDURE — 3288F PR FALLS RISK ASSESSMENT DOCUMENTED: ICD-10-PCS | Mod: CPTII,S$GLB,, | Performed by: OBSTETRICS & GYNECOLOGY

## 2022-01-01 PROCEDURE — 3078F PR MOST RECENT DIASTOLIC BLOOD PRESSURE < 80 MM HG: ICD-10-PCS | Mod: CPTII,S$GLB,, | Performed by: PHYSICIAN ASSISTANT

## 2022-01-01 PROCEDURE — G0101 PR CA SCREEN;PELVIC/BREAST EXAM: ICD-10-PCS | Mod: S$GLB,,, | Performed by: OBSTETRICS & GYNECOLOGY

## 2022-01-01 PROCEDURE — 99214 PR OFFICE/OUTPT VISIT, EST, LEVL IV, 30-39 MIN: ICD-10-PCS | Mod: HCNC,S$GLB,, | Performed by: INTERNAL MEDICINE

## 2022-01-01 PROCEDURE — 99999 PR PBB SHADOW E&M-EST. PATIENT-LVL IV: ICD-10-PCS | Mod: PBBFAC,,, | Performed by: OBSTETRICS & GYNECOLOGY

## 2022-01-01 PROCEDURE — 82728 ASSAY OF FERRITIN: CPT | Performed by: INTERNAL MEDICINE

## 2022-01-01 PROCEDURE — 3288F FALL RISK ASSESSMENT DOCD: CPT | Mod: HCNC,CPTII,S$GLB, | Performed by: INTERNAL MEDICINE

## 2022-01-01 PROCEDURE — 3079F DIAST BP 80-89 MM HG: CPT | Mod: CPTII,S$GLB,, | Performed by: OBSTETRICS & GYNECOLOGY

## 2022-01-01 PROCEDURE — 99215 PR OFFICE/OUTPT VISIT, EST, LEVL V, 40-54 MIN: ICD-10-PCS | Mod: S$GLB,,, | Performed by: INTERNAL MEDICINE

## 2022-01-01 PROCEDURE — 3072F PR LOW RISK FOR RETINOPATHY: ICD-10-PCS | Mod: CPTII,S$GLB,, | Performed by: INTERNAL MEDICINE

## 2022-01-01 PROCEDURE — G0101 CA SCREEN;PELVIC/BREAST EXAM: HCPCS | Mod: S$GLB,,, | Performed by: OBSTETRICS & GYNECOLOGY

## 2022-01-01 PROCEDURE — 20610 DRAIN/INJ JOINT/BURSA W/O US: CPT | Mod: 50,S$GLB,, | Performed by: PHYSICIAN ASSISTANT

## 2022-01-01 PROCEDURE — 1126F PR PAIN SEVERITY QUANTIFIED, NO PAIN PRESENT: ICD-10-PCS | Mod: CPTII,S$GLB,, | Performed by: OBSTETRICS & GYNECOLOGY

## 2022-01-01 PROCEDURE — 1159F PR MEDICATION LIST DOCUMENTED IN MEDICAL RECORD: ICD-10-PCS | Mod: CPTII,S$GLB,, | Performed by: OPTOMETRIST

## 2022-01-01 PROCEDURE — 73521 X-RAY EXAM HIPS BI 2 VIEWS: CPT | Mod: 26,,, | Performed by: RADIOLOGY

## 2022-01-01 PROCEDURE — 1159F PR MEDICATION LIST DOCUMENTED IN MEDICAL RECORD: ICD-10-PCS | Mod: CPTII,S$GLB,, | Performed by: OBSTETRICS & GYNECOLOGY

## 2022-01-01 PROCEDURE — 3079F PR MOST RECENT DIASTOLIC BLOOD PRESSURE 80-89 MM HG: ICD-10-PCS | Mod: CPTII,S$GLB,, | Performed by: INTERNAL MEDICINE

## 2022-01-01 PROCEDURE — 1125F AMNT PAIN NOTED PAIN PRSNT: CPT | Mod: CPTII,S$GLB,, | Performed by: PHYSICIAN ASSISTANT

## 2022-01-01 PROCEDURE — 99214 OFFICE O/P EST MOD 30 MIN: CPT | Mod: S$GLB,,, | Performed by: INTERNAL MEDICINE

## 2022-01-01 PROCEDURE — 36415 COLL VENOUS BLD VENIPUNCTURE: CPT | Mod: HCNC | Performed by: INTERNAL MEDICINE

## 2022-01-01 PROCEDURE — 99999 PR PBB SHADOW E&M-EST. PATIENT-LVL III: CPT | Mod: PBBFAC,,, | Performed by: OPTOMETRIST

## 2022-01-01 PROCEDURE — 3008F BODY MASS INDEX DOCD: CPT | Mod: HCNC,CPTII,S$GLB, | Performed by: INTERNAL MEDICINE

## 2022-01-01 PROCEDURE — 93306 TTE W/DOPPLER COMPLETE: CPT | Mod: 26,,, | Performed by: INTERNAL MEDICINE

## 2022-01-01 PROCEDURE — 1126F PR PAIN SEVERITY QUANTIFIED, NO PAIN PRESENT: ICD-10-PCS | Mod: CPTII,S$GLB,, | Performed by: INTERNAL MEDICINE

## 2022-01-01 PROCEDURE — 1101F PR PT FALLS ASSESS DOC 0-1 FALLS W/OUT INJ PAST YR: ICD-10-PCS | Mod: HCNC,CPTII,S$GLB, | Performed by: INTERNAL MEDICINE

## 2022-01-01 PROCEDURE — 80048 BASIC METABOLIC PNL TOTAL CA: CPT | Performed by: INTERNAL MEDICINE

## 2022-01-01 PROCEDURE — 99214 OFFICE O/P EST MOD 30 MIN: CPT | Mod: 25,S$GLB,, | Performed by: PHYSICIAN ASSISTANT

## 2022-01-01 PROCEDURE — 3078F DIAST BP <80 MM HG: CPT | Mod: HCNC,CPTII,S$GLB, | Performed by: INTERNAL MEDICINE

## 2022-01-01 PROCEDURE — 99999 PR PBB SHADOW E&M-EST. PATIENT-LVL III: CPT | Mod: PBBFAC,,, | Performed by: INTERNAL MEDICINE

## 2022-01-01 PROCEDURE — 3072F PR LOW RISK FOR RETINOPATHY: ICD-10-PCS | Mod: CPTII,S$GLB,, | Performed by: OBSTETRICS & GYNECOLOGY

## 2022-01-01 PROCEDURE — 99999 PR PBB SHADOW E&M-EST. PATIENT-LVL IV: ICD-10-PCS | Mod: PBBFAC,HCNC,, | Performed by: INTERNAL MEDICINE

## 2022-01-01 PROCEDURE — 72131 CT LUMBAR SPINE WITHOUT CONTRAST: ICD-10-PCS | Mod: 26,,, | Performed by: RADIOLOGY

## 2022-01-01 PROCEDURE — 20610 PR DRAIN/INJECT LARGE JOINT/BURSA: ICD-10-PCS | Mod: 50,S$GLB,, | Performed by: PHYSICIAN ASSISTANT

## 2022-01-01 PROCEDURE — 84466 ASSAY OF TRANSFERRIN: CPT | Performed by: INTERNAL MEDICINE

## 2022-01-01 PROCEDURE — 1101F PR PT FALLS ASSESS DOC 0-1 FALLS W/OUT INJ PAST YR: ICD-10-PCS | Mod: CPTII,S$GLB,, | Performed by: OBSTETRICS & GYNECOLOGY

## 2022-01-01 PROCEDURE — 99999 PR PBB SHADOW E&M-EST. PATIENT-LVL IV: CPT | Mod: PBBFAC,HCNC,, | Performed by: INTERNAL MEDICINE

## 2022-01-01 PROCEDURE — 92012 INTRM OPH EXAM EST PATIENT: CPT | Mod: S$GLB,,, | Performed by: OPTOMETRIST

## 2022-01-01 PROCEDURE — 3078F DIAST BP <80 MM HG: CPT | Mod: CPTII,S$GLB,, | Performed by: INTERNAL MEDICINE

## 2022-01-01 PROCEDURE — 93306 ECHO (CUPID ONLY): ICD-10-PCS | Mod: 26,,, | Performed by: INTERNAL MEDICINE

## 2022-01-01 PROCEDURE — 85610 PROTHROMBIN TIME: CPT | Mod: HCNC | Performed by: INTERNAL MEDICINE

## 2022-01-01 PROCEDURE — 1159F PR MEDICATION LIST DOCUMENTED IN MEDICAL RECORD: ICD-10-PCS | Mod: CPTII,S$GLB,, | Performed by: PHYSICIAN ASSISTANT

## 2022-01-01 PROCEDURE — 92015 PR REFRACTION: ICD-10-PCS | Mod: S$GLB,,, | Performed by: OPTOMETRIST

## 2022-01-01 PROCEDURE — 72110 X-RAY EXAM L-2 SPINE 4/>VWS: CPT | Mod: TC

## 2022-01-01 PROCEDURE — 73560 X-RAY EXAM OF KNEE 1 OR 2: CPT | Mod: 26,RT,, | Performed by: RADIOLOGY

## 2022-01-01 PROCEDURE — 1101F PT FALLS ASSESS-DOCD LE1/YR: CPT | Mod: CPTII,S$GLB,, | Performed by: PHYSICIAN ASSISTANT

## 2022-01-01 PROCEDURE — 3075F PR MOST RECENT SYSTOLIC BLOOD PRESS GE 130-139MM HG: ICD-10-PCS | Mod: CPTII,S$GLB,, | Performed by: PHYSICIAN ASSISTANT

## 2022-01-01 PROCEDURE — 81002 URINALYSIS NONAUTO W/O SCOPE: CPT | Mod: S$GLB,,, | Performed by: OBSTETRICS & GYNECOLOGY

## 2022-01-01 PROCEDURE — 1126F AMNT PAIN NOTED NONE PRSNT: CPT | Mod: CPTII,S$GLB,, | Performed by: OPTOMETRIST

## 2022-01-01 PROCEDURE — 1101F PR PT FALLS ASSESS DOC 0-1 FALLS W/OUT INJ PAST YR: ICD-10-PCS | Mod: CPTII,S$GLB,, | Performed by: OPTOMETRIST

## 2022-01-01 PROCEDURE — 1160F RVW MEDS BY RX/DR IN RCRD: CPT | Mod: CPTII,S$GLB,, | Performed by: PHYSICIAN ASSISTANT

## 2022-01-01 PROCEDURE — 99999 PR PBB SHADOW E&M-EST. PATIENT-LVL III: ICD-10-PCS | Mod: PBBFAC,,, | Performed by: INTERNAL MEDICINE

## 2022-01-01 PROCEDURE — 92015 DETERMINE REFRACTIVE STATE: CPT | Mod: S$GLB,,, | Performed by: OPTOMETRIST

## 2022-01-01 PROCEDURE — 1160F PR REVIEW ALL MEDS BY PRESCRIBER/CLIN PHARMACIST DOCUMENTED: ICD-10-PCS | Mod: CPTII,S$GLB,, | Performed by: PHYSICIAN ASSISTANT

## 2022-01-01 PROCEDURE — 99999 PR PBB SHADOW E&M-EST. PATIENT-LVL V: CPT | Mod: PBBFAC,,, | Performed by: INTERNAL MEDICINE

## 2022-01-01 PROCEDURE — 3288F PR FALLS RISK ASSESSMENT DOCUMENTED: ICD-10-PCS | Mod: CPTII,S$GLB,, | Performed by: OPTOMETRIST

## 2022-01-01 PROCEDURE — 99999 PR PBB SHADOW E&M-EST. PATIENT-LVL V: ICD-10-PCS | Mod: PBBFAC,,, | Performed by: PHYSICIAN ASSISTANT

## 2022-01-01 PROCEDURE — 73560 XR KNEE ORTHO LEFT: ICD-10-PCS | Mod: 26,RT,, | Performed by: RADIOLOGY

## 2022-01-01 PROCEDURE — 3288F FALL RISK ASSESSMENT DOCD: CPT | Mod: CPTII,S$GLB,, | Performed by: OPTOMETRIST

## 2022-01-01 PROCEDURE — 3008F PR BODY MASS INDEX (BMI) DOCUMENTED: ICD-10-PCS | Mod: CPTII,S$GLB,, | Performed by: OBSTETRICS & GYNECOLOGY

## 2022-01-01 PROCEDURE — 3072F PR LOW RISK FOR RETINOPATHY: ICD-10-PCS | Mod: HCNC,CPTII,S$GLB, | Performed by: INTERNAL MEDICINE

## 2022-01-01 PROCEDURE — 1126F PR PAIN SEVERITY QUANTIFIED, NO PAIN PRESENT: ICD-10-PCS | Mod: CPTII,S$GLB,, | Performed by: OPTOMETRIST

## 2022-01-01 PROCEDURE — 80053 COMPREHEN METABOLIC PANEL: CPT | Performed by: INTERNAL MEDICINE

## 2022-01-01 PROCEDURE — 92012 PR EYE EXAM, EST PATIENT,INTERMED: ICD-10-PCS | Mod: S$GLB,,, | Performed by: OPTOMETRIST

## 2022-01-01 PROCEDURE — 3008F PR BODY MASS INDEX (BMI) DOCUMENTED: ICD-10-PCS | Mod: CPTII,S$GLB,, | Performed by: PHYSICIAN ASSISTANT

## 2022-01-01 PROCEDURE — 1159F PR MEDICATION LIST DOCUMENTED IN MEDICAL RECORD: ICD-10-PCS | Mod: HCNC,CPTII,S$GLB, | Performed by: INTERNAL MEDICINE

## 2022-01-01 PROCEDURE — 3288F FALL RISK ASSESSMENT DOCD: CPT | Mod: CPTII,S$GLB,, | Performed by: INTERNAL MEDICINE

## 2022-01-01 PROCEDURE — 1101F PT FALLS ASSESS-DOCD LE1/YR: CPT | Mod: CPTII,S$GLB,, | Performed by: OBSTETRICS & GYNECOLOGY

## 2022-01-01 RX ORDER — AMLODIPINE BESYLATE 2.5 MG/1
2.5 TABLET ORAL DAILY
Qty: 90 TABLET | Refills: 1 | Status: SHIPPED | OUTPATIENT
Start: 2022-01-01 | End: 2023-01-01

## 2022-01-01 RX ORDER — TAMSULOSIN HYDROCHLORIDE 0.4 MG/1
0.4 CAPSULE ORAL DAILY
Status: ON HOLD | COMMUNITY
Start: 2022-01-01 | End: 2023-01-01

## 2022-01-01 RX ORDER — HYDROCODONE BITARTRATE AND ACETAMINOPHEN 5; 325 MG/1; MG/1
1 TABLET ORAL EVERY 6 HOURS PRN
Qty: 28 TABLET | Refills: 0 | Status: SHIPPED | OUTPATIENT
Start: 2022-01-01 | End: 2023-01-01

## 2022-01-01 RX ORDER — FUROSEMIDE 20 MG/1
40 TABLET ORAL DAILY
Qty: 30 TABLET | Refills: 6 | Status: SHIPPED | OUTPATIENT
Start: 2022-01-01 | End: 2023-01-01

## 2022-01-01 RX ORDER — ROSUVASTATIN CALCIUM 20 MG/1
20 TABLET, COATED ORAL DAILY
Qty: 90 TABLET | Refills: 3 | Status: ON HOLD | OUTPATIENT
Start: 2022-01-01 | End: 2023-01-01

## 2022-01-01 RX ORDER — WARFARIN SODIUM 5 MG/1
TABLET ORAL
Qty: 45 TABLET | Refills: 3 | Status: SHIPPED | OUTPATIENT
Start: 2022-01-01 | End: 2023-01-01 | Stop reason: SDUPTHER

## 2022-01-01 RX ORDER — HYDROCODONE BITARTRATE AND ACETAMINOPHEN 5; 325 MG/1; MG/1
1 TABLET ORAL EVERY 6 HOURS PRN
Qty: 28 TABLET | Refills: 0 | Status: SHIPPED | OUTPATIENT
Start: 2022-01-01 | End: 2022-01-01 | Stop reason: SDUPTHER

## 2022-01-01 RX ORDER — BETAMETHASONE SODIUM PHOSPHATE AND BETAMETHASONE ACETATE 3; 3 MG/ML; MG/ML
12 INJECTION, SUSPENSION INTRA-ARTICULAR; INTRALESIONAL; INTRAMUSCULAR; SOFT TISSUE
Status: COMPLETED | OUTPATIENT
Start: 2022-01-01 | End: 2022-01-01

## 2022-01-01 RX ADMIN — BETAMETHASONE SODIUM PHOSPHATE AND BETAMETHASONE ACETATE 12 MG: 3; 3 INJECTION, SUSPENSION INTRA-ARTICULAR; INTRALESIONAL; INTRAMUSCULAR; SOFT TISSUE at 03:11

## 2022-01-05 ENCOUNTER — LAB VISIT (OUTPATIENT)
Dept: LAB | Facility: HOSPITAL | Age: 67
End: 2022-01-05
Attending: INTERNAL MEDICINE
Payer: MEDICARE

## 2022-01-05 ENCOUNTER — ANTI-COAG VISIT (OUTPATIENT)
Dept: CARDIOLOGY | Facility: CLINIC | Age: 67
End: 2022-01-05
Payer: MEDICARE

## 2022-01-05 DIAGNOSIS — I48.20 CHRONIC ATRIAL FIBRILLATION: Primary | ICD-10-CM

## 2022-01-05 DIAGNOSIS — I48.20 CHRONIC ATRIAL FIBRILLATION: ICD-10-CM

## 2022-01-05 LAB
INR PPP: 2.9 (ref 0.8–1.2)
PROTHROMBIN TIME: 29.8 SEC (ref 9–12.5)

## 2022-01-05 PROCEDURE — 99211 PR OFFICE/OUTPT VISIT, EST, LEVL I: ICD-10-PCS | Mod: S$GLB,,, | Performed by: INTERNAL MEDICINE

## 2022-01-05 PROCEDURE — 99211 OFF/OP EST MAY X REQ PHY/QHP: CPT | Mod: S$GLB,,, | Performed by: INTERNAL MEDICINE

## 2022-01-05 PROCEDURE — 85610 PROTHROMBIN TIME: CPT | Mod: HCNC | Performed by: INTERNAL MEDICINE

## 2022-01-05 PROCEDURE — 36415 COLL VENOUS BLD VENIPUNCTURE: CPT | Mod: HCNC | Performed by: INTERNAL MEDICINE

## 2022-01-19 ENCOUNTER — ANTI-COAG VISIT (OUTPATIENT)
Dept: CARDIOLOGY | Facility: CLINIC | Age: 67
End: 2022-01-19
Payer: MEDICARE

## 2022-01-19 ENCOUNTER — LAB VISIT (OUTPATIENT)
Dept: LAB | Facility: HOSPITAL | Age: 67
End: 2022-01-19
Payer: MEDICARE

## 2022-01-19 ENCOUNTER — OFFICE VISIT (OUTPATIENT)
Dept: OPHTHALMOLOGY | Facility: CLINIC | Age: 67
End: 2022-01-19
Payer: MEDICARE

## 2022-01-19 DIAGNOSIS — I48.20 CHRONIC ATRIAL FIBRILLATION: ICD-10-CM

## 2022-01-19 DIAGNOSIS — H10.13 ALLERGIC CONJUNCTIVITIS OF BOTH EYES: ICD-10-CM

## 2022-01-19 DIAGNOSIS — I48.20 CHRONIC ATRIAL FIBRILLATION: Primary | ICD-10-CM

## 2022-01-19 DIAGNOSIS — H40.1212 NORMAL TENSION GLAUCOMA OF RIGHT EYE, MODERATE STAGE: Primary | ICD-10-CM

## 2022-01-19 DIAGNOSIS — H02.59 FLOPPY EYELID SYNDROME OF BOTH EYES: ICD-10-CM

## 2022-01-19 DIAGNOSIS — H40.002 GLAUCOMA SUSPECT, LEFT: ICD-10-CM

## 2022-01-19 LAB
INR PPP: 4.4 (ref 0.8–1.2)
PROTHROMBIN TIME: 44 SEC (ref 9–12.5)

## 2022-01-19 PROCEDURE — 3288F FALL RISK ASSESSMENT DOCD: CPT | Mod: HCNC,CPTII,S$GLB, | Performed by: STUDENT IN AN ORGANIZED HEALTH CARE EDUCATION/TRAINING PROGRAM

## 2022-01-19 PROCEDURE — 36415 COLL VENOUS BLD VENIPUNCTURE: CPT | Mod: HCNC | Performed by: INTERNAL MEDICINE

## 2022-01-19 PROCEDURE — 99214 PR OFFICE/OUTPT VISIT, EST, LEVL IV, 30-39 MIN: ICD-10-PCS | Mod: HCNC,S$GLB,, | Performed by: STUDENT IN AN ORGANIZED HEALTH CARE EDUCATION/TRAINING PROGRAM

## 2022-01-19 PROCEDURE — 1126F PR PAIN SEVERITY QUANTIFIED, NO PAIN PRESENT: ICD-10-PCS | Mod: HCNC,CPTII,S$GLB, | Performed by: STUDENT IN AN ORGANIZED HEALTH CARE EDUCATION/TRAINING PROGRAM

## 2022-01-19 PROCEDURE — 1160F PR REVIEW ALL MEDS BY PRESCRIBER/CLIN PHARMACIST DOCUMENTED: ICD-10-PCS | Mod: HCNC,CPTII,S$GLB, | Performed by: STUDENT IN AN ORGANIZED HEALTH CARE EDUCATION/TRAINING PROGRAM

## 2022-01-19 PROCEDURE — 99999 PR PBB SHADOW E&M-EST. PATIENT-LVL III: ICD-10-PCS | Mod: PBBFAC,HCNC,, | Performed by: STUDENT IN AN ORGANIZED HEALTH CARE EDUCATION/TRAINING PROGRAM

## 2022-01-19 PROCEDURE — 1159F MED LIST DOCD IN RCRD: CPT | Mod: HCNC,CPTII,S$GLB, | Performed by: STUDENT IN AN ORGANIZED HEALTH CARE EDUCATION/TRAINING PROGRAM

## 2022-01-19 PROCEDURE — 1160F RVW MEDS BY RX/DR IN RCRD: CPT | Mod: HCNC,CPTII,S$GLB, | Performed by: STUDENT IN AN ORGANIZED HEALTH CARE EDUCATION/TRAINING PROGRAM

## 2022-01-19 PROCEDURE — 99214 OFFICE O/P EST MOD 30 MIN: CPT | Mod: HCNC,S$GLB,, | Performed by: STUDENT IN AN ORGANIZED HEALTH CARE EDUCATION/TRAINING PROGRAM

## 2022-01-19 PROCEDURE — 3288F PR FALLS RISK ASSESSMENT DOCUMENTED: ICD-10-PCS | Mod: HCNC,CPTII,S$GLB, | Performed by: STUDENT IN AN ORGANIZED HEALTH CARE EDUCATION/TRAINING PROGRAM

## 2022-01-19 PROCEDURE — 99999 PR PBB SHADOW E&M-EST. PATIENT-LVL III: CPT | Mod: PBBFAC,HCNC,, | Performed by: STUDENT IN AN ORGANIZED HEALTH CARE EDUCATION/TRAINING PROGRAM

## 2022-01-19 PROCEDURE — 85610 PROTHROMBIN TIME: CPT | Mod: HCNC | Performed by: INTERNAL MEDICINE

## 2022-01-19 PROCEDURE — 1101F PT FALLS ASSESS-DOCD LE1/YR: CPT | Mod: HCNC,CPTII,S$GLB, | Performed by: STUDENT IN AN ORGANIZED HEALTH CARE EDUCATION/TRAINING PROGRAM

## 2022-01-19 PROCEDURE — 1126F AMNT PAIN NOTED NONE PRSNT: CPT | Mod: HCNC,CPTII,S$GLB, | Performed by: STUDENT IN AN ORGANIZED HEALTH CARE EDUCATION/TRAINING PROGRAM

## 2022-01-19 PROCEDURE — 1159F PR MEDICATION LIST DOCUMENTED IN MEDICAL RECORD: ICD-10-PCS | Mod: HCNC,CPTII,S$GLB, | Performed by: STUDENT IN AN ORGANIZED HEALTH CARE EDUCATION/TRAINING PROGRAM

## 2022-01-19 PROCEDURE — 1101F PR PT FALLS ASSESS DOC 0-1 FALLS W/OUT INJ PAST YR: ICD-10-PCS | Mod: HCNC,CPTII,S$GLB, | Performed by: STUDENT IN AN ORGANIZED HEALTH CARE EDUCATION/TRAINING PROGRAM

## 2022-01-19 PROCEDURE — 99211 PR OFFICE/OUTPT VISIT, EST, LEVL I: ICD-10-PCS | Mod: S$GLB,,, | Performed by: INTERNAL MEDICINE

## 2022-01-19 PROCEDURE — 99211 OFF/OP EST MAY X REQ PHY/QHP: CPT | Mod: S$GLB,,, | Performed by: INTERNAL MEDICINE

## 2022-01-19 RX ORDER — DIPYRIDAMOLE 5 MG/ML
INJECTION INTRAVENOUS
Status: ON HOLD | COMMUNITY
Start: 2021-11-26 | End: 2023-01-01

## 2022-01-19 RX ORDER — TRIAMCINOLONE ACETONIDE 40 MG/ML
INJECTION, SUSPENSION INTRA-ARTICULAR; INTRAMUSCULAR
COMMUNITY
Start: 2021-10-06 | End: 2023-01-01

## 2022-01-19 RX ORDER — DORZOLAMIDE HCL 20 MG/ML
1 SOLUTION/ DROPS OPHTHALMIC 2 TIMES DAILY
Qty: 10 ML | Refills: 3 | Status: SHIPPED | OUTPATIENT
Start: 2022-01-19 | End: 2022-04-12 | Stop reason: SDUPTHER

## 2022-01-19 NOTE — PROGRESS NOTES
Subjective:       Patient ID: Elayne Almanzar is a 66 y.o. female.    Chief Complaint: Glaucoma    HPI     DLS: 04/23/2021  IOP check   Glaucoma   NSC OU     Brimonidine BID OU   Latanoprost Q HS OU      Pt reports OD burning, itching, and vision foggy. Pt reports getting IV   iron infusions.     Last edited by Prisca Puckett MD on 1/19/2022 12:26 PM. (History)               Assessment & Plan   Normal tension glaucoma of right eye, moderate stage  -     dorzolamide (TRUSOPT) 2 % ophthalmic solution; Place 1 drop into the right eye 2 (two) times a day.  Dispense: 10 mL; Refill: 3  -     Posterior Segment OCT Optic Nerve- Both eyes; Future  -     Anderson Visual Field - OU - Extended - Both Eyes; Future  -     Color Fundus Photography - OU - Both Eyes; Future    Glaucoma suspect, left  -     Posterior Segment OCT Optic Nerve- Both eyes; Future  -     Anderson Visual Field - OU - Extended - Both Eyes; Future  -     Color Fundus Photography - OU - Both Eyes; Future    Floppy eyelid syndrome of both eyes    Allergic conjunctivitis of both eyes       Referral from Dr. Funes OD for consideration of SLT or step-up Tx      NTG moderate OD // Suspect OS  -(-)Fhx, ( )Steroids, (-)Trauma  -Tm 21OU  -Drops:  Latanoprost qHS OD // Dorzolamide BID OD  -Drop intolerance/contraindication: COPD/KEYSHAWN may want to avoid BB, follicular conjunctivitis with brimonidine (mild)  -Laser: none  -Surgeries: none  -CCT: normal   -Gonio: 3+ with very lightly pigmented TM and steep approach N/T OU    DFE(3/21), Gonio (3/21), Photos (2/21)  HVF (IAD OD), OCT-n (2 quadrant thinning OD / full OS)    NTG Risk factors  Vascular risk factors: (+ in youth) migraines, (+)KEYSHAWN, (+ thalassemia trait, Hgh 9 with low Fe++) anemia, (+)transfusions, (+)hypotension, (-)h/o Raynaud's disease    Getting Tx for Fe-deficiency with IV iron (connection between iron deficiency and normal tension glaucoma)  Recommend BP meds in AM instead of PM (connection between  night time hypotension and worsening NTG)      IOP well controlled today on Brimonidine BID and Latanoprost qHS OU  Has follicular conjunctivitis -- stop Brimonidine and use Dorzolamide BID OD - sent to pharmacy, warned may burn  Cont latan OD      Floppy eyelid syndrome OU  Known KEYSHAWN  Rec re-start CPAP  Use AT celia qHS      Follicular conjunctivitis OU  Allergic - brimonidine  Stop brimonidine  Start antihistamine       NS OU  NVS, monitor      PLAN  Change brimonidine to dorzolamide 2/2 allergy  Start antihistamine gtt daily  Start AT celia qHS    RTC 2 months IOP check, HVF, OCT-RNFL    Prisca Puckett M.D., M.S.  Department of Ophthalmology   Division of Glaucoma Surgery  [facility]

## 2022-01-20 ENCOUNTER — PATIENT MESSAGE (OUTPATIENT)
Dept: BARIATRICS | Facility: CLINIC | Age: 67
End: 2022-01-20
Payer: MEDICARE

## 2022-01-21 ENCOUNTER — OFFICE VISIT (OUTPATIENT)
Dept: BARIATRICS | Facility: CLINIC | Age: 67
End: 2022-01-21
Payer: MEDICARE

## 2022-01-21 ENCOUNTER — CLINICAL SUPPORT (OUTPATIENT)
Dept: BARIATRICS | Facility: CLINIC | Age: 67
End: 2022-01-21
Payer: MEDICARE

## 2022-01-21 VITALS
BODY MASS INDEX: 48.95 KG/M2 | RESPIRATION RATE: 16 BRPM | HEIGHT: 60 IN | WEIGHT: 249.31 LBS | OXYGEN SATURATION: 94 % | HEART RATE: 52 BPM

## 2022-01-21 VITALS — HEIGHT: 60 IN | WEIGHT: 249.31 LBS | BODY MASS INDEX: 48.95 KG/M2

## 2022-01-21 DIAGNOSIS — I10 ESSENTIAL (PRIMARY) HYPERTENSION: Primary | ICD-10-CM

## 2022-01-21 DIAGNOSIS — Z95.2 H/O MITRAL VALVE REPLACEMENT WITH MECHANICAL VALVE: ICD-10-CM

## 2022-01-21 DIAGNOSIS — E78.5 HYPERLIPIDEMIA, UNSPECIFIED HYPERLIPIDEMIA TYPE: ICD-10-CM

## 2022-01-21 DIAGNOSIS — I10 ESSENTIAL (PRIMARY) HYPERTENSION: ICD-10-CM

## 2022-01-21 DIAGNOSIS — Z71.3 DIETARY COUNSELING AND SURVEILLANCE: ICD-10-CM

## 2022-01-21 DIAGNOSIS — J44.9 CHRONIC OBSTRUCTIVE PULMONARY DISEASE, UNSPECIFIED COPD TYPE: ICD-10-CM

## 2022-01-21 DIAGNOSIS — E66.01 MORBID OBESITY WITH BMI OF 45.0-49.9, ADULT: ICD-10-CM

## 2022-01-21 DIAGNOSIS — I48.20 CHRONIC ATRIAL FIBRILLATION: ICD-10-CM

## 2022-01-21 DIAGNOSIS — D50.0 ANEMIA DUE TO CHRONIC BLOOD LOSS: ICD-10-CM

## 2022-01-21 DIAGNOSIS — E66.01 MORBID OBESITY WITH BMI OF 40.0-44.9, ADULT: Primary | ICD-10-CM

## 2022-01-21 PROCEDURE — 99999 PR PBB SHADOW E&M-EST. PATIENT-LVL II: CPT | Mod: PBBFAC,HCNC,, | Performed by: DIETITIAN, REGISTERED

## 2022-01-21 PROCEDURE — 1160F RVW MEDS BY RX/DR IN RCRD: CPT | Mod: HCNC,CPTII,S$GLB, | Performed by: PHYSICIAN ASSISTANT

## 2022-01-21 PROCEDURE — 99205 OFFICE O/P NEW HI 60 MIN: CPT | Mod: HCNC,S$GLB,, | Performed by: PHYSICIAN ASSISTANT

## 2022-01-21 PROCEDURE — 99999 PR PBB SHADOW E&M-EST. PATIENT-LVL IV: CPT | Mod: PBBFAC,HCNC,, | Performed by: PHYSICIAN ASSISTANT

## 2022-01-21 PROCEDURE — 99499 NO LOS: ICD-10-PCS | Mod: HCNC,S$GLB,, | Performed by: DIETITIAN, REGISTERED

## 2022-01-21 PROCEDURE — 3008F PR BODY MASS INDEX (BMI) DOCUMENTED: ICD-10-PCS | Mod: HCNC,CPTII,S$GLB, | Performed by: PHYSICIAN ASSISTANT

## 2022-01-21 PROCEDURE — 1160F PR REVIEW ALL MEDS BY PRESCRIBER/CLIN PHARMACIST DOCUMENTED: ICD-10-PCS | Mod: HCNC,CPTII,S$GLB, | Performed by: PHYSICIAN ASSISTANT

## 2022-01-21 PROCEDURE — 99499 UNLISTED E&M SERVICE: CPT | Mod: S$GLB,,, | Performed by: PHYSICIAN ASSISTANT

## 2022-01-21 PROCEDURE — 3008F BODY MASS INDEX DOCD: CPT | Mod: HCNC,CPTII,S$GLB, | Performed by: PHYSICIAN ASSISTANT

## 2022-01-21 PROCEDURE — 99999 PR PBB SHADOW E&M-EST. PATIENT-LVL II: ICD-10-PCS | Mod: PBBFAC,HCNC,, | Performed by: DIETITIAN, REGISTERED

## 2022-01-21 PROCEDURE — 1159F MED LIST DOCD IN RCRD: CPT | Mod: HCNC,CPTII,S$GLB, | Performed by: PHYSICIAN ASSISTANT

## 2022-01-21 PROCEDURE — 1159F PR MEDICATION LIST DOCUMENTED IN MEDICAL RECORD: ICD-10-PCS | Mod: HCNC,CPTII,S$GLB, | Performed by: PHYSICIAN ASSISTANT

## 2022-01-21 PROCEDURE — 99999 PR PBB SHADOW E&M-EST. PATIENT-LVL IV: ICD-10-PCS | Mod: PBBFAC,HCNC,, | Performed by: PHYSICIAN ASSISTANT

## 2022-01-21 PROCEDURE — 99205 PR OFFICE/OUTPT VISIT, NEW, LEVL V, 60-74 MIN: ICD-10-PCS | Mod: HCNC,S$GLB,, | Performed by: PHYSICIAN ASSISTANT

## 2022-01-21 PROCEDURE — 99499 UNLISTED E&M SERVICE: CPT | Mod: HCNC,S$GLB,, | Performed by: DIETITIAN, REGISTERED

## 2022-01-21 PROCEDURE — 99499 RISK ADDL DX/OHS AUDIT: ICD-10-PCS | Mod: S$GLB,,, | Performed by: PHYSICIAN ASSISTANT

## 2022-01-21 NOTE — PROGRESS NOTES
BARIATRIC NEW PATIENT EVALUATION    CHIEF COMPLAINT:   Morbid Obesity Body mass index is 48.7 kg/m². and inability to maintain weight loss.    HISTORY OF PRESENT ILLNESS:  Elayne Almanzar  is a 66 y.o. female presenting for morbid obesity Body mass index is 48.7 kg/m². and inability to maintain weight loss. Pt states that she ricardo not always been overweight states that she gradually gained. States that weight gain started after she stopped smoking in , states usually around 160 lbs. The patient has tried portion control calorie restrictions. Lost around 30 lbs once but gained it all back.    Pt goal: 140- 150 lbs  IBW: 135 lbs  PAST MEDICAL HISTORY:  Past Medical History:   Diagnosis Date    Acute on chronic diastolic congestive heart failure     Anticoagulant long-term use     Asthma     Atrial fibrillation     Cataract     Chronic kidney disease     COPD (chronic obstructive pulmonary disease)     Depression     HTN (hypertension)     Nephrolithiasis     KEYSHAWN (obstructive sleep apnea)     awaiting CPAP machine     Rheumatic disease of mitral valve     Uncontrolled type 2 diabetes mellitus with complication, with long-term current use of insulin 2020    Urinary incontinence          PAST SURGICAL HISTORY:  Past Surgical History:   Procedure Laterality Date    BREAST BIOPSY Left 10/2019     SECTION      COLONOSCOPY N/A 2019    Procedure: COLONOSCOPY;  Surgeon: Devon Bowling MD;  Location: [facility] (4TH FLR);  Service: Endoscopy;  Laterality: N/A;  ok to hold Coumadin x 5 days with Lovenox bridge per Coumadin clinic-[facility]    ESOPHAGOGASTRODUODENOSCOPY N/A 2019    Procedure: EGD (ESOPHAGOGASTRODUODENOSCOPY);  Surgeon: Smooth Torrez MD;  Location: [facility] (2ND FLR);  Service: Endoscopy;  Laterality: N/A;  2nd floor requested due to comorbidities, Hypertension, permanent A. fib, COPD, CHF, sleep apnea, status post mechanical mitral valve replacement  due to  rheumatic mitral stenosis, bm! 44     per Coumadin clinic-ok to hold for 5 days w/bridge    kidney stone removal      MITRAL VALVE REPLACEMENT  2004    TUBAL LIGATION         FAMILY HISTORY:  Family History   Problem Relation Age of Onset    Stroke Paternal Grandmother     Colon cancer Maternal Grandmother     Cancer Brother         testicular cancer    Diabetes Sister     Hypertension Sister     Breast cancer Paternal Aunt     Diabetes Sister     Diabetes Sister     Heart disease Father 70        MI    Diabetes Father     Colon cancer Mother 83    Ovarian cancer Neg Hx        SOCIAL HISTORY:  Social History     Socioeconomic History    Marital status:     Number of children: 2   Occupational History    Occupation: Cook     Comment: Retired   Tobacco Use    Smoking status: Former Smoker     Packs/day: 0.50     Years: 20.00     Pack years: 10.00     Types: Cigarettes     Quit date: 2002     Years since quittin.7    Smokeless tobacco: Never Used   Substance and Sexual Activity    Alcohol use: No    Drug use: No   Social History Narrative    Disability since .  Laid off .  Previously worked as cook in a kitchen.         MEDICATIONS:  Current Outpatient Medications   Medication Sig Dispense Refill    ADVAIR DISKUS 500-50 mcg/dose DsDv diskus inhaler INHALE 1 PUFF INTO THE LUNGS 2 (TWO) TIMES DAILY. 60 each 6    albuterol (VENTOLIN HFA) 90 mcg/actuation inhaler INHALE 2 PUFFS INTO THE LUNGS EVERY 6 (SIX) HOURS AS NEEDED FOR WHEEZING. RESCUE 18 g 6    amLODIPine (NORVASC) 2.5 MG tablet Take 1 tablet (2.5 mg total) by mouth once daily. 30 tablet 4    amoxicillin (AMOXIL) 500 MG Tab 4 tablets po 1 hour prior to procedure 4 tablet 0    aspirin (ECOTRIN) 81 MG EC tablet Take 81 mg by mouth once daily.       dipyridamole (PERSANTINE) 5 mg/mL injection       dorzolamide (TRUSOPT) 2 % ophthalmic solution Place 1 drop into the right eye 2 (two) times a day. 10 mL 3     fluticasone propionate (FLONASE) 50 mcg/actuation nasal spray 2 sprays (100 mcg total) by Each Nostril route once daily. 16 g 3    furosemide (LASIX) 20 MG tablet Take 1 tablet (20 mg total) by mouth once daily. 30 tablet 4    KENALOG 40 mg/mL injection       latanoprost (XALATAN) 0.005 % ophthalmic solution Place 1 drop into the right eye once daily. 7.5 mL 3    meclizine (ANTIVERT) 25 mg tablet Take 1 tablet (25 mg total) by mouth 2 (two) times daily as needed. 30 tablet 1    metoprolol succinate (TOPROL-XL) 200 MG 24 hr tablet Take 1 tablet (200 mg total) by mouth once daily. 30 tablet 4    nitroGLYCERIN (NITROSTAT) 0.4 MG SL tablet Place 1 tablet (0.4 mg total) under the tongue every 5 (five) minutes as needed for Chest pain. 30 tablet 1    oxybutynin (DITROPAN-XL) 5 MG TR24 Take 5 mg by mouth once daily.      pantoprazole (PROTONIX) 40 MG tablet Take 1 tablet (40 mg total) by mouth daily as needed. 30 tablet 3    rosuvastatin (CRESTOR) 20 MG tablet Take 1 tablet (20 mg total) by mouth once daily. 90 tablet 3    sertraline (ZOLOFT) 100 MG tablet Take 1 tablet (100 mg total) by mouth once daily. 30 tablet 3    tamsulosin (FLOMAX) 0.4 mg Cap TAKE 1 CAPSULE (0.4 MG TOTAL) BY MOUTH ONCE DAILY. 30 capsule 11    warfarin (COUMADIN) 5 MG tablet Take 1 tablets (5mg) by mouth Tuesday then 1.5 tablets (7.5mg) all other days or as instructed by Coumadin Clinic. 45 tablet 3     No current facility-administered medications for this visit.       ALLERGIES:  Review of patient's allergies indicates:  No Known Allergies    ROS:  Review of Systems   Constitutional: Negative for chills and fever.   HENT: Negative for sore throat and tinnitus.    Eyes: Negative for blurred vision and double vision.   Respiratory: Positive for shortness of breath (copd ). Negative for cough.    Cardiovascular: Positive for leg swelling. Negative for chest pain and palpitations.   Gastrointestinal: Negative for abdominal pain,  constipation, diarrhea, heartburn, nausea and vomiting.   Genitourinary: Negative for dysuria and hematuria.   Musculoskeletal: Negative for back pain, joint pain and neck pain.   Skin: Negative for rash.   Neurological: Negative for dizziness, tingling and headaches.   Endo/Heme/Allergies: Bruises/bleeds easily (anti coag use ).   Psychiatric/Behavioral: Positive for depression. Negative for suicidal ideas. The patient is nervous/anxious.        PE:  Vitals:    01/21/22 1319   Weight: 113.1 kg (249 lb 5.4 oz)   Height: 5' (1.524 m)       Physical Exam  Constitutional:       Appearance: She is obese.   HENT:      Head: Normocephalic and atraumatic.   Eyes:      Extraocular Movements: Extraocular movements intact.      Conjunctiva/sclera: Conjunctivae normal.      Pupils: Pupils are equal, round, and reactive to light.   Cardiovascular:      Rate and Rhythm: Normal rate and regular rhythm.   Pulmonary:      Effort: Pulmonary effort is normal. No respiratory distress.      Breath sounds: Normal breath sounds.   Abdominal:      General: Bowel sounds are normal.      Palpations: Abdomen is soft.      Tenderness: There is no abdominal tenderness.   Musculoskeletal:         General: No swelling. Normal range of motion.      Cervical back: Normal range of motion and neck supple.      Left lower leg: Edema present.   Skin:     Capillary Refill: Capillary refill takes less than 2 seconds.   Neurological:      General: No focal deficit present.      Mental Status: She is alert and oriented to person, place, and time.   Psychiatric:         Mood and Affect: Mood normal.         Behavior: Behavior normal.         Thought Content: Thought content normal.         Judgment: Judgment normal.          DIAGNOSIS:  1. Morbid Obesity with Body mass index is 48.7 kg/m². and inability to maintain weight loss.  2. Co-morbidities: HTN CHF HLD COPD A-fib     PLAN:  The patient is Good candidate for Bariatric Surgery. she is interested in  laparoscopic Aris-en-Y with Dr. Gallegos. The surgery and post-op care were discussed in detail with the patient. All questions were answered.    she understands that bariatric surgery is a tool to aid in weight loss and that she needs to be committed to the diet and exercise post-operatively for successful weight loss.  Discussed expected weight loss outcomes after surgery which is 50% of the excess weight on her frame.  Goal weight is 50%EWs.  Discussed with patient that bariatric surgery is not the easy way out and that it will take plenty of dedication on the patient's part to be successful. Also discussed the possibility of weight regain if the patient strays from the diet guidelines or exercise requirements. Patient verbalized understanding and wishes to proceed with the work-up.    ORDERS:  1. Stress Test, Chest X-Ray and EKG  2. Psychological Consult, Bariatric Dietician Consult and Cardiac Clearance  3. Bariatric Labs: BMP, CBC, Folate Serum, H. pylori, HgA1C, Hepatic Panel/LFT, Iron & TIBC, Lipid Profile, Magnesium, Phosphate, T3, T4, TSH, Free T4, Vitamin B12, and Vitamin B1.  4. No NSAIDS after surgery due to increased risk of stomach ulcers. Talk to your prescribing provider about alternative options for your pain.  5. Anticoagulation plan   6. GERTRUDIS from Dr. Dodge    Primary Physician: Nubia Crocker MD  RTC: As scheduled.    60 minute visit, over 50% of time spent counseling patient face to face on surgical options, risks, benefits, expected diet, recommended exercise regimen, and expected weight loss.

## 2022-01-21 NOTE — PROGRESS NOTES
NUTRITIONAL CONSULT    Referring Physician: Bird Gallegos M.D.   Reason for MNT Referral: Initial assessment for laparoscopic Aris-en-Y work-up    PAST MEDICAL HISTORY:   66 y.o. female  Body mass index is 48.7 kg/m².  Weight history includes gaining weight when she stopped working 2002, quit smoking, started eating more and became less active. She started work up for bariatric surgery with us in 2017 but did not go through with it.  Dieting attempts include cutting back by eating more salads and baked chicken. Lost about 30 lbs only to regain weight.    Past Medical History:   Diagnosis Date    Acute on chronic diastolic congestive heart failure     Anticoagulant long-term use     Asthma     Atrial fibrillation 2002    Cataract     Chronic kidney disease     COPD (chronic obstructive pulmonary disease)     Depression     HTN (hypertension)     Nephrolithiasis     KEYSHAWN (obstructive sleep apnea)     awaiting CPAP machine     Rheumatic disease of mitral valve     Uncontrolled type 2 diabetes mellitus with complication, with long-term current use of insulin 5/27/2020    Urinary incontinence        CLINICAL DATA:  66 y.o.-year-old Black or  female.  Height: 5 ft.   Weight: 249 lbs  IBW: 126 lbs  BMI: 48  The patient's goal weight (50% EBW): 187 lbs  Personal goal weight: 150 lbs    Goal for Bariatric Surgery: to improve health, to improve quality of life, to lose weight and to prevent future medical conditions    DAILY NUTRITIONAL NEEDS: pre-op nutritional bariatric guidelines to promote weight loss  6634-3700 Calories    Grams Protein    NUTRITION & HEALTH HISTORY:  Greatest challenge: sweets, starchy CHO, portion control, snacking at night, irregular meal patterns and emotional eating    Current diet recall:     - sips on water  L: cheese sandwich on hawaiian bread, 1/2 can of fruit punch  Sn: few handfuls of chips, 1/2 can of fruit punch  - banana popsicle  D: boiled brussels  sprouts with butter and pepper  Sn: bowl of ice cream    Current Diet:  Meal pattern: Irregular  Protein supplements: none  Snacking: chips, ice cream  Vegetables: Likes a variety. Eats daily.  Fruits: Likes a variety. Eats once per week.  Beverages: water and sugary beverages (fruit punch)  Dining out: Weekly. Mostly fast food, restaurants and take-out.  Cooking at home: Daily. Weekly. Mostly baked, grilled, smothered and fried meat, fish, starchy CHO and vegetables.    Exercise:  Past exercise: None    Current exercise: None  Restrictions to exercise: none    Vitamins / Minerals / Herbs:   none    Labs:   None available at time of visit    Food Allergies:   None known    Social:  No work.  Lives with her .  Grocery shopping and food prep done by the patient.  Patient believes the household will be supportive after surgery.  Alcohol: None.  Smoking: None.    ASSESSMENT:  · Patient reports attempts at weight loss, only to regain lost weight.  · Patient demonstrated knowledge of healthy eating behaviors and exercise patterns; admits to not eating healthy and not exercising at this point.  · Patient states willingness to change lifestyle and make behavior modifications.        Barriers to Education: none    Stage of change: determination    NUTRITION DIAGNOSIS:     Morbid Obesity related to Excessive carbohydrate intake and Physical inactivity as evidence by BMI.    BARIATRIC DIET DISCUSSION/PLAN:  Discussed diet after surgery and related to patient's food record.  Reviewed nutrition guidelines for before and after surgery.  Answered all questions.  Continue to review Bariatric Nutrition Guidebook at home and call with any questions.  Work on Bariatric Nutrition Checklist.  Work on expanding variety of vegetables.  Work on gradually cutting back on starchy CHO in the diet.  Begin trying various protein supplements to determine preference.  1200-calorie diet.  1500-calorie diet.  5-6 meals per day.  Start  including protein supplements in the diet plan daily.  Reduce frequency of dining out.  More grocery shopping and meal preparation at home.  Increase exercise.    RECOMMENDATIONS:  Patient is a potential candidate for bariatric surgery - Gastric Bypass.    Follow up in one month.  Needs additional visit(s) with RD.    Patient verbalized understanding.    Expect fair  compliance after surgery at this time.    Communicated nutrition plan with bariatric team.    SESSION TIME:  60 minutes

## 2022-01-21 NOTE — PATIENT INSTRUCTIONS
Prior to surgery you will need to complete:  - Dietitian consult and follow up appointments as needed  - Cardiology clearance  - Labs  - Chest X-ray ( completed)   - EKG ( completed)   - Psychological evaluation, Please call psychiatry [phone] to schedule  - Stress echo ( completed)   - GERTRUDIS from Dr Dodge     In preparation for bariatric surgery, please complete the following:   · Discuss your current medications with your primary care provider, remember your medications will need to be crushed, chewable, or in liquid form for the first 3-6 months after a gastric bypass or sleeve.  For a gastric band, your medications will need to be crushed indefinitely.    · If you take a blood thinner such as: Coumadin (warfarin), Pradaxa (dabigatran), or Plavix (clopidogrel), you will need to speak with your prescribing provider on how or if this medication can be stopped before surgery.   · If you take a medication for depression or anxiety, you will need to begin crushing or opening the capsule 1-3 months prior to surgery.  Remember to discuss this with the psychologist or psychiatrist that you see.   · If you take medication for arthritis on a daily basis that is considered a non-steroidal anti-inflammatory (NSAID), please discuss with your prescribing physician an alternative medication.  After having gastric bypass or gastric sleeve, this group of medications is not appropriate to take due to increased risk of bleeding stomach ulcers.      DEFINITIONS  Appointments: Pre-scheduled meetings or consultations with any physician, advanced practice provider, dietitian, or psychologist, and labs, imaging studies, sleep studies, etc.   Late cancellation: Cancelling an appointment 24-48 hours prior to scheduled time.  No-Show appointment:  is when    You do NOT arrive to your appointment at the time its scheduled.   You call to cancel or cancel via [facility] less than 24 hours in advance of your scheduled appointment   You  show up 15 minutes AFTER your scheduled appointment time without any notification of being late.     POLICY  1. You are allowed up to 3 cancellations for appointments.    After 3 cancellations your case will be placed on hold for 2 months and after that time you can resume the program.   2. You are allowed only 1 no-show for an appointment.    You will be re-scheduled one time and if there is a 2nd no-show at any point, your case will be placed on hold for 3 months.  After 3 months you can resume the program.     3. Upon resuming the program after being placed on hold for either above mentioned reasons, if you have a late cancel or no show for any appointment, the bariatric team will review if youre an appropriate candidate for surgery at the monthly meeting.

## 2022-01-21 NOTE — PATIENT INSTRUCTIONS
1200- 1500 calorie Sample meal plan  80-120g protein per day.   Protein drinks and bars: 0-4 grams sugar  Drink lots of water throughout the day and exercise!  MENU # 1  Breakfast: 2 eggs, 1 turkey sausage cornel, 1 apple  Snack: Atkins bar  Lunch: 2 roll-ups (sliced turkey, low-fat sliced cheese, mustard), 12 baby carrots dipped in 1 Tbsp natural peanut butter  Mid-Day Snack: ¼ cup unsalted almonds, ½ cup fruit  Dinner: 1 chicken thigh simmered in tomato sauce + 2 Tbsp mozzarella cheese, ½ cup black beans, 1/2 cup steamed carrots  Evening Snack: 1/2 cup grapes with 4 cubes low-fat cheddar cheese   ___________________________________________________  MENU # 2  Breakfast: 200 calorie protein drink  Mid-morning snack : ¼ cup unsalted nuts, medium banana  Lunch: 3oz tuna or chicken salad made with 2 tbsp light de la rosa, over salad with tomatoes and cucumbers.   Snack: low-fat cheese stick  Dinner: 3oz hamburger cornel, slice of low-fat cheese, 1 cup boiled yellow squash and zucchini.   Snack: 6oz light yogurt  _______________________________________________________  Menu #3  Breakfast: 6oz plain Greek yogurt + fruit (½ banana, ½ cup fruit - pineapple, blueberries, strawberries, peach), may add Splenda to jamshid.  Lunch: Grilled  chicken breast w/ slice low-fat pepper ana maria cheese, 1/2 cup grilled/baked asparagus and small salad with Salad Spritzer.    Mid-Day snack: 200 calorie protein drink   Dinner: 4oz Grilled fish, ½ cup white beans, ½ cup cooked spinach  Evening Snack: fudgsicle no-sugar-added    Menu # 4  Breakfast: 1 scoop vanilla protein powder + 4oz skim milk + 4oz coffee   Snack: Pure protein bar  Lunch: 2 Lettuce tacos: 3oz seasoned ground turkey wrapped in a Zacarias lettuce leaves with 1 Tbsp shredded cheese and dollop low-fat sour cream  Snack: ½ cup cottage cheese, ½ cup pineapple chunks  Dinner: Shrimp omelet: 2 eggs, ½ cup shrimp, green onions, and shredded  cheese  ______________________________________________________  Menu #5  Breakfast: 1 cup low-fat cottage cheese, ½ cup peaches (no sugar added)  Snack: 1 apple, 4 cubes cheese  Lunch: 3oz baked pork chop, 1 cup okra and tomato stew  Snack: 1/2 cup black beans + salsa + dollop light sour cream  Dinner: Caprese chicken salad: 3 oz chicken breast, 1oz fresh mozzarella, sliced tomato, 1 Tbsp olive oil, basil  Snack: sugar-free popsicle    Menu #6  Breakfast: ½ cup part-skim ricotta cheese, 2 Tbsp sugar-free strawberry preserves, sprinkle of slivered almonds  Snack: 1 orange  Lunch: 3 oz canned chicken, 1oz shredded cheddar cheese, ½  cup black beans, 2 Tbsp salsa  Snack: 200 calorie Protein drink  Dinner: 4 oz grilled salmon steak, over mixed green salad with 2 tbsp light dressing    Meal Ideas for Regular Bariatric Diet  *Recipes and products available at [email or web address]      Breakfast: (15-20g protein)    - Egg white omelet: 2 egg whites or ½ cup Egg Beaters. (Optional proteins: cheese, shrimp, black beans, chicken, sliced turkey) (Optional veggies: tomatoes, salsa, spinach, mushrooms, onions, green peppers, or small slice avocado)     - Egg and sausage: 1 egg or ¼ cup Egg Beaters (any variety), with 1 cornel or 2 links of Turkey sausage or Veggie breakfast sausage ([employer] or [employer])    - Crust-less breakfast quiche: To make a glass pie dish, mix 4oz part skim Ricotta, 1 cup skim milk, and 2 eggs as your base. Add protein: shredded cheese, sliced lean ham or turkey, turkey mujica/sausage. Add veggies: tomato, onion, green onion, mushroom, green pepper, spinach, etc.    - Yogurt parfait: Mix 1 - 6oz container Dannon Light N Fit vanilla yogurt, with ¼ cup crushed unsalted nuts    - Cottage cheese and fruit: ½ cup part-skim cottage cheese or ricotta cheese topped with fresh fruit or sugar free preserves     - Joy Dewey's Vanilla Egg custard* (add 2 Tbsp instant coffee granules to make Cappuccino  Custard*)    - Hi-Protein café latte (skim milk, decaf coffee, 1 scoop protein powder). Optional to add Sugar free syrup or extract flavoring.    - Breakfast Lox: spread fat free cream cheese on slices of smoked salmon. Serve over scrambled or egg over easy (sauteed with nonstick cookspray) OR on a cucumber slice    - Eggwhich: Scramble or cook 1 large egg over easy using nonstick cookspray. Place between 2 slices of Sw[place] mujica and low fat cheese.     Lunch: (20-30g protein)    - ½ cup Black bean soup (Homemade or Progresso), with ¼ cup shredded low-fat cheese. Top with chopped tomato or fresh salsa.     - Lean deli turkey breast and low-fat sliced cheese, mustard or light de la rosa to moisten, rolled up together, or wrapped in a Zacarias lettuce leaf    - Chicken salad made from dinner leftovers, moisten with low-fat salad dressing or light de la rosa. Also try leftover salmon, shrimp, tuna or boiled eggs. Serve ½ cup over dark green salad    - Fat-free canned refried beans, topped with ¼ cup shredded low-fat cheese. Top with chopped tomato or fresh salsa.     - Greek salad: Top mixed greens with 1-2oz grilled chicken, tomatoes, red onions, 2-3 kalamata olives, and sprinkle lightly with feta cheese. Spritz with Balsamic vinegar to taste.     - Crust-less lunch quiche: To make a glass pie dish, mix 4oz part skim Ricotta, 1 cup skim milk, and 2 eggs as your base. Add protein: shredded cheese, sliced lean ham or turkey, shrimp, chicken. Add veggies: tomato, onion, green onion, mushroom, green pepper, spinach, artichoke, broccoli, etc.    - Pizza bake: spread a  juan j bridgett mushroom with tomato sauce, low-fat shredded mozzarella and turkey pepperoni or [place] mujica. Add any veggies. Roast for 10-15 minutes, until cheese melted.     - Cucumber crab bites: Spread ¼ cup crab dip (lump crabmeat + light cream cheese and green onions) over sliced cucumber.     - Chicken with light spinach and artichoke dip*: Puree in food  processor: 6oz cooked and drained spinach, 2 cloves garlic, 1 can cannelloni beans, ½ cup chopped green onions, 1 can drained artichoke hearts (not marinated in oil), lemon juice and basil. Mix in 2oz chopped up chicken.    Supper: (20-30g protein)    - Serve grilled fish over dark green salad tossed with low-fat dressing, served with grilled asparagus kennedy     - Rotisserie chicken salad: served with sliced strawberries, walnuts, fat-free feta cheese crumbles and 1 tbsp Chesters Own Light Raspberry Pandora Vinaigrette    - Shrimp cocktail: Dip cold boiled shrimp in homemade low-sugar cocktail sauce (1/2 cup Candelaria One Carb ketchup, 2 tbsp horseradish, 1/4 tsp hot sauce, 1 tsp Worcestershire sauce, 1 tbsp freshly-squeezed lemon juice). Serve with dark green salad, walnuts, and crumbled blue cheese drizzled with olive oil and Balsamic vinegar    - Tuna Melt: Spread tuna salad onto 2 thick slices of tomato. Top with low-fat cheese and broil until cheese is melted. May also be made with chicken salad of shrimp salad. [place] with different types of cheeses.    - Chicken or beef fajitas (no tortilla, rice, beans, chips). Top meat and veggies w/ fresh salsa, fat free sour cream.     - Homemade low-fat Chili using extra lean ground beef or ground turkey. Top with shredded cheese and salsa as desired. May add dollop fat-free sour cream if desired    - Chicken parmesan: Top chicken breast w/ low sugar marinara sauce, mozzarella cheese and bake until chicken reaches 165*.  Serve w/ spaghetti SQUASH or Icelandic cut green beans    - Dinner Omelet with shrimp or chicken and onion, green peppers and chives.    - No noodle lasagna: Use sliced zucchini or eggplant in place of noodles.  Layer with part skim ricotta cheese and low sugar meat sauce (use very lean ground beef or ground turkey).    - Mexican chicken bake: Bake chunks of chicken breast or thigh with taco seasoning, Pace brand enchilada sauce, green onions and low-fat  cheese. Serve with ¼ cup black beans or fat free refried beans topped with chopped tomatoes or salsa.    - Dereje frozen meatballs, simmered in Classico Marinara sauce. Different flavors of salsa or spaghetti sauce create different dishes! Sprinkle with parmesan cheese. Serve with grilled or steamed veggies, or a dark green salad.    - Simmer boneless skinless chicken thigh chunks in Classico Marinara sauce or roasted salsa until tender with chopped onion, bell pepper, garlic, mushrooms, spinach, etc.     - Hamburger or veggie burger, without the bun, dressed the way you like. Served with grilled or steamed veggies.    - Eggplant parmesan: Bake slices of eggplant at 350 degrees for 15 minutes. Layer tomato sauce, sliced eggplant and low-fat mozzarella cheese in a baking dish and cover with foil. Bake 30-40 more minutes or until bubbly. Uncover and bake at 400 degrees for about 15 more minutes, or until top is slightly crisp.    - Fish tacos: grilled/baked white fish, wrapped in Zacarias lettuce leaf, topped with salsa, shredded low-fat cheese, and light coleslaw.    - Chicken rufus: Sprinkle chicken w/ 1 tsp of hidden [place] ranch dip mix. Then grill chicken and top with black beans, salsa and 1 tsp fat free sour cream.     - Cauliflower pizza crust: Use cauliflower as crust (see recipe on keyur, no flour!). Top w/ low fat cheese, turkey pepperoni and veggies and bake again    - chicken or turkey crust pizza: use ground chicken or turkey instead of cauliflower, spread in [place] and bake at 350 for about 20-30 minutes(may want to add garlic, black pepper, oregano and other herbs to ground meat mixture).  Remove and top w/ low fat cheese, turkey pepperoni and veggies and bake again for another 10 minutes or until cheese is browned.     Snacks: (100-200 calories; >5g protein)    - 1 low-fat cheese stick with 8 cherry tomatoes or 1 serving fresh fruit  - 4 thin slices fat-free turkey breast and 1 slice low-fat  cheese  - 4 thin slices fat-free honey ham with wedge of melon  - 6-8 edamame pods (equivalent to about 1/4 cup edamame without pods).   - 1/4 cup unsalted nuts with ½ cup fruit  - 6-oz container Dannon Light n Fit vanilla yogurt, topped with 1oz unsalted nuts         - apple, celery or baby carrots spread with 2 Tbsp PB2  - apple slices with 1 oz slice low-fat cheese  - Apple slices dipped in 2 Tbsp of PB2  - celery, cucumber, bell pepper or baby carrots dipped in ¼ cup hummus bean spread or light spinach and artichoke dip (*recipe in lunch section)  - celery, cucumber, baby carrots dipped in high protein greek yogurt (Mix 16 oz plain greek yogurt + 1 packet of hidden [place] ranch dip mix)  - Gilbert Links Beef Steak - 14g protein! (similar to beef jerky)  - 2 wedges Laughing Cow - Light Herb & Garlic Cheese with sliced cucumber or green bell pepper  - 1/2 cup low-fat cottage cheese with ¼ cup fruit or ¼ cup salsa  - RTD Protein drinks: Atkins, Low Carb Slim Fast, EAS light, Muscle Milk Light, etc.  - Homemade Protein drinks: GNC Soy95, Isopure, Nectar, UNJURY, Whey Gourmet, etc. Mix 1 scoop powder with 8oz skim/1% milk or light soymilk.  - Protein bars: Atkins, EAS, Pure Protein, Think Thin, Detour, etc. Must have 0-4 grams sugar - Read the label.    Takeout Options: No more than twice/week  Deli - Salads (no pasta or rice), meats, cheeses. Roasted chicken. Lox (salmon)    Mexican - Platters which don't include tortillas, chips, or rice. Go easy on the beans. Example: Fajitas without the tortillas. Ask the  not to bring chips to the table if they are too tempting.    Greek - Meat or fish and vegetable, but no bread or rice. Including hummus, baba ganoush, etc, is OK. Most sit-down Greek restaurants can provide you with cucumber slices for dipping instead of nigel bread.    Fast Food (Avoid as much as possible) - Salads (no croutons and limit salad dressing to 2 tbsp), grilled chicken sandwich without the bun  and ask for no de la rosa. Rachelles low fat chili or Taco Bell pintos and cheese.    BBQ - The meats are fine if you ask for sauces on the side, but most of the traditional side dishes are loaded with carbs. Phan slaw, baked beans and BBQ sauce are typically made with sugar.    Chinese - Nothing deep-fried, no rice or noodles. Many Chinese sauces have starch and sugar in them, so you'll have to use your judgement. If you find that these sauces trigger cravings, or cause Dumping, you can ask for the sauce to be made without sugar or just use soy sauce.

## 2022-01-24 ENCOUNTER — ANTI-COAG VISIT (OUTPATIENT)
Dept: CARDIOLOGY | Facility: CLINIC | Age: 67
End: 2022-01-24
Payer: MEDICARE

## 2022-01-24 ENCOUNTER — OFFICE VISIT (OUTPATIENT)
Dept: HEMATOLOGY/ONCOLOGY | Facility: CLINIC | Age: 67
End: 2022-01-24
Payer: MEDICARE

## 2022-01-24 ENCOUNTER — LAB VISIT (OUTPATIENT)
Dept: LAB | Facility: HOSPITAL | Age: 67
End: 2022-01-24
Payer: MEDICARE

## 2022-01-24 VITALS
SYSTOLIC BLOOD PRESSURE: 120 MMHG | WEIGHT: 248.88 LBS | HEIGHT: 60 IN | TEMPERATURE: 98 F | HEART RATE: 64 BPM | RESPIRATION RATE: 18 BRPM | OXYGEN SATURATION: 94 % | BODY MASS INDEX: 48.86 KG/M2 | DIASTOLIC BLOOD PRESSURE: 58 MMHG

## 2022-01-24 DIAGNOSIS — D56.3 ALPHA THALASSEMIA TRAIT: ICD-10-CM

## 2022-01-24 DIAGNOSIS — D50.0 ANEMIA DUE TO CHRONIC BLOOD LOSS: ICD-10-CM

## 2022-01-24 DIAGNOSIS — D50.8 IRON DEFICIENCY ANEMIA SECONDARY TO INADEQUATE DIETARY IRON INTAKE: ICD-10-CM

## 2022-01-24 DIAGNOSIS — I48.20 CHRONIC ATRIAL FIBRILLATION: ICD-10-CM

## 2022-01-24 DIAGNOSIS — I48.20 CHRONIC ATRIAL FIBRILLATION: Primary | ICD-10-CM

## 2022-01-24 DIAGNOSIS — Z79.01 CHRONIC ANTICOAGULATION: Chronic | ICD-10-CM

## 2022-01-24 DIAGNOSIS — D50.8 IRON DEFICIENCY ANEMIA SECONDARY TO INADEQUATE DIETARY IRON INTAKE: Primary | Chronic | ICD-10-CM

## 2022-01-24 LAB
ALBUMIN SERPL BCP-MCNC: 3.5 G/DL (ref 3.5–5.2)
ALP SERPL-CCNC: 64 U/L (ref 55–135)
ALT SERPL W/O P-5'-P-CCNC: 10 U/L (ref 10–44)
ANION GAP SERPL CALC-SCNC: 8 MMOL/L (ref 8–16)
AST SERPL-CCNC: 17 U/L (ref 10–40)
BASOPHILS # BLD AUTO: 0.03 K/UL (ref 0–0.2)
BASOPHILS NFR BLD: 0.6 % (ref 0–1.9)
BILIRUB SERPL-MCNC: 0.7 MG/DL (ref 0.1–1)
BUN SERPL-MCNC: 18 MG/DL (ref 8–23)
CALCIUM SERPL-MCNC: 9.2 MG/DL (ref 8.7–10.5)
CHLORIDE SERPL-SCNC: 104 MMOL/L (ref 95–110)
CO2 SERPL-SCNC: 28 MMOL/L (ref 23–29)
CREAT SERPL-MCNC: 1.2 MG/DL (ref 0.5–1.4)
DIFFERENTIAL METHOD: ABNORMAL
EOSINOPHIL # BLD AUTO: 0.3 K/UL (ref 0–0.5)
EOSINOPHIL NFR BLD: 5.5 % (ref 0–8)
ERYTHROCYTE [DISTWIDTH] IN BLOOD BY AUTOMATED COUNT: 16.2 % (ref 11.5–14.5)
EST. GFR  (AFRICAN AMERICAN): 54.8 ML/MIN/1.73 M^2
EST. GFR  (NON AFRICAN AMERICAN): 47.5 ML/MIN/1.73 M^2
FERRITIN SERPL-MCNC: 41 NG/ML (ref 20–300)
GLUCOSE SERPL-MCNC: 89 MG/DL (ref 70–110)
HCT VFR BLD AUTO: 38.9 % (ref 37–48.5)
HGB BLD-MCNC: 10.9 G/DL (ref 12–16)
IMM GRANULOCYTES # BLD AUTO: 0.02 K/UL (ref 0–0.04)
IMM GRANULOCYTES NFR BLD AUTO: 0.4 % (ref 0–0.5)
INR PPP: 3 (ref 0.8–1.2)
IRON SERPL-MCNC: 58 UG/DL (ref 30–160)
LYMPHOCYTES # BLD AUTO: 1.2 K/UL (ref 1–4.8)
LYMPHOCYTES NFR BLD: 26.2 % (ref 18–48)
MCH RBC QN AUTO: 21 PG (ref 27–31)
MCHC RBC AUTO-ENTMCNC: 28 G/DL (ref 32–36)
MCV RBC AUTO: 75 FL (ref 82–98)
MONOCYTES # BLD AUTO: 0.5 K/UL (ref 0.3–1)
MONOCYTES NFR BLD: 11.3 % (ref 4–15)
NEUTROPHILS # BLD AUTO: 2.6 K/UL (ref 1.8–7.7)
NEUTROPHILS NFR BLD: 56 % (ref 38–73)
NRBC BLD-RTO: 0 /100 WBC
PLATELET # BLD AUTO: 194 K/UL (ref 150–450)
PMV BLD AUTO: 11 FL (ref 9.2–12.9)
POTASSIUM SERPL-SCNC: 4.9 MMOL/L (ref 3.5–5.1)
PROT SERPL-MCNC: 7.3 G/DL (ref 6–8.4)
PROTHROMBIN TIME: 30.2 SEC (ref 9–12.5)
RBC # BLD AUTO: 5.19 M/UL (ref 4–5.4)
SATURATED IRON: 13 % (ref 20–50)
SODIUM SERPL-SCNC: 140 MMOL/L (ref 136–145)
TOTAL IRON BINDING CAPACITY: 463 UG/DL (ref 250–450)
TRANSFERRIN SERPL-MCNC: 313 MG/DL (ref 200–375)
WBC # BLD AUTO: 4.69 K/UL (ref 3.9–12.7)

## 2022-01-24 PROCEDURE — 3288F FALL RISK ASSESSMENT DOCD: CPT | Mod: HCNC,CPTII,S$GLB, | Performed by: INTERNAL MEDICINE

## 2022-01-24 PROCEDURE — 1101F PT FALLS ASSESS-DOCD LE1/YR: CPT | Mod: HCNC,CPTII,S$GLB, | Performed by: INTERNAL MEDICINE

## 2022-01-24 PROCEDURE — 93793 PR ANTICOAGULANT MGMT FOR PT TAKING WARFARIN: ICD-10-PCS | Mod: S$GLB,,,

## 2022-01-24 PROCEDURE — 99999 PR PBB SHADOW E&M-EST. PATIENT-LVL III: CPT | Mod: PBBFAC,HCNC,, | Performed by: INTERNAL MEDICINE

## 2022-01-24 PROCEDURE — 3288F PR FALLS RISK ASSESSMENT DOCUMENTED: ICD-10-PCS | Mod: HCNC,CPTII,S$GLB, | Performed by: INTERNAL MEDICINE

## 2022-01-24 PROCEDURE — 3074F PR MOST RECENT SYSTOLIC BLOOD PRESSURE < 130 MM HG: ICD-10-PCS | Mod: HCNC,CPTII,S$GLB, | Performed by: INTERNAL MEDICINE

## 2022-01-24 PROCEDURE — 3008F PR BODY MASS INDEX (BMI) DOCUMENTED: ICD-10-PCS | Mod: HCNC,CPTII,S$GLB, | Performed by: INTERNAL MEDICINE

## 2022-01-24 PROCEDURE — 3078F DIAST BP <80 MM HG: CPT | Mod: HCNC,CPTII,S$GLB, | Performed by: INTERNAL MEDICINE

## 2022-01-24 PROCEDURE — 84466 ASSAY OF TRANSFERRIN: CPT | Mod: HCNC | Performed by: INTERNAL MEDICINE

## 2022-01-24 PROCEDURE — 93793 ANTICOAG MGMT PT WARFARIN: CPT | Mod: S$GLB,,,

## 2022-01-24 PROCEDURE — 1101F PR PT FALLS ASSESS DOC 0-1 FALLS W/OUT INJ PAST YR: ICD-10-PCS | Mod: HCNC,CPTII,S$GLB, | Performed by: INTERNAL MEDICINE

## 2022-01-24 PROCEDURE — 36415 COLL VENOUS BLD VENIPUNCTURE: CPT | Mod: HCNC | Performed by: INTERNAL MEDICINE

## 2022-01-24 PROCEDURE — 3008F BODY MASS INDEX DOCD: CPT | Mod: HCNC,CPTII,S$GLB, | Performed by: INTERNAL MEDICINE

## 2022-01-24 PROCEDURE — 85025 COMPLETE CBC W/AUTO DIFF WBC: CPT | Mod: HCNC | Performed by: INTERNAL MEDICINE

## 2022-01-24 PROCEDURE — 99215 OFFICE O/P EST HI 40 MIN: CPT | Mod: HCNC,S$GLB,, | Performed by: INTERNAL MEDICINE

## 2022-01-24 PROCEDURE — 80053 COMPREHEN METABOLIC PANEL: CPT | Mod: HCNC | Performed by: INTERNAL MEDICINE

## 2022-01-24 PROCEDURE — 3074F SYST BP LT 130 MM HG: CPT | Mod: HCNC,CPTII,S$GLB, | Performed by: INTERNAL MEDICINE

## 2022-01-24 PROCEDURE — 99215 PR OFFICE/OUTPT VISIT, EST, LEVL V, 40-54 MIN: ICD-10-PCS | Mod: HCNC,S$GLB,, | Performed by: INTERNAL MEDICINE

## 2022-01-24 PROCEDURE — 99999 PR PBB SHADOW E&M-EST. PATIENT-LVL III: ICD-10-PCS | Mod: PBBFAC,HCNC,, | Performed by: INTERNAL MEDICINE

## 2022-01-24 PROCEDURE — 82728 ASSAY OF FERRITIN: CPT | Mod: HCNC | Performed by: INTERNAL MEDICINE

## 2022-01-24 PROCEDURE — 1126F AMNT PAIN NOTED NONE PRSNT: CPT | Mod: HCNC,CPTII,S$GLB, | Performed by: INTERNAL MEDICINE

## 2022-01-24 PROCEDURE — 85610 PROTHROMBIN TIME: CPT | Mod: HCNC | Performed by: INTERNAL MEDICINE

## 2022-01-24 PROCEDURE — 3078F PR MOST RECENT DIASTOLIC BLOOD PRESSURE < 80 MM HG: ICD-10-PCS | Mod: HCNC,CPTII,S$GLB, | Performed by: INTERNAL MEDICINE

## 2022-01-24 PROCEDURE — 1126F PR PAIN SEVERITY QUANTIFIED, NO PAIN PRESENT: ICD-10-PCS | Mod: HCNC,CPTII,S$GLB, | Performed by: INTERNAL MEDICINE

## 2022-01-24 NOTE — PROGRESS NOTES
"    PATIENT: Elayne Almanzar  MRN: [id number]  DATE: 1/24/2022    Chief Complaint: Anemia follow up    Subjective:    Initial History: Ms. Almanzar is a 66 y.o. female with pertinent PMHx of permanent atrial fibrillation on coumadin, CKD, KEYSHAWN compliant with CPAP, chronic diastolic heart failure, and s/p mechanical mitral valve placed in 2005 due to rheumatic mitral stenosis who was referred for evaluation of microcytic anemia.       Pt does not take ferrous sulfate due to constipation it causes. Is compliant with aspirin 81mg qday and is therapeutic on coumadin. Her last EGD 12/19/19 showing mild gastritis and her last colonoscopy 6/26/19 showed 3mm sessile polyp which was resected and a right colon "full of actively bleeding AVMs." Has had a stable persistent MCV of 68-70 since 2011 with a steady decline in her hgb/hct since that time. Most recent H&H 7.7 and 29% with MCV 65.  Her other cell lines have remained normal.  Has also had a persistently low/normal ferritin with previous iron studies indicating iron deficiency anemia and a persistently elevated RDW. B12 and folate WNL. Reticulocytes 1.4%. Does have a persistently elevated LDH with low/normal haptoglobin but normal t.bili.  CTabd May 2018 without splenomegaly. Has had TTG checked in the past which was negative.    Does feel fatigued and often cold but otherwise denies symptoms including fever, chills, shortness of breath, chest pain, hair loss, abdominal pain, n/v/d. Always has cravings for ice and feels she could eat ice all day which has been ongoing for many years. She denies a hx of heavy periods and underwent menopause at age 55. Denies maroon and black colored stools. Denies hematuria. Has intentionally lost about 20 lbs recently due to cutting out sodium and the associated foods. She quit smoking in 2002, denies alcohol use, and denies recreational drug use. Had her home redone after Marian and drinks bottled water. States her mothers and sister all " have iron deficiency anemia but denies any knowledge of a genetic condition. Denies personal hx of cancer but has significant family hx of mother and grandmother with colon cancer, aunt with breast cancer, and brother who passed away at age 23 due to testicular cancer.    20  Pt was prescribed IV iron at the last visit however did not want to commit to it at that time. Opted for oral iron. States pharmacy never received the rx so she has not been taking any iron supplement. Continues to feel fatigued with easy bruising. Has dyspnea on exertion but is able to walk an entire grocery store without needing to rest. Does not feel limited by her energy. Still craves ice. Denies any new complaints.     21:   Has been compliant with ferrous sulfate daily. Continues to have fatigue and dyspnea on exertion but does not feel limited in any activities. Continues to crave ice and has about 5 cups a day. Plans to have a gastric bypass later this year. Denies any new complaints.    Interval Hx:  Continues with ferrous sulfate daily. Overall feels better. No new issues or concerns.       Past Medical History:   Past Medical History:   Diagnosis Date    Acute on chronic diastolic congestive heart failure     Allergy     Anemia     Anticoagulant long-term use     Anxiety     Asthma     Atrial fibrillation     Cataract     Chronic kidney disease     COPD (chronic obstructive pulmonary disease)     Depression     Encounter for blood transfusion     HTN (hypertension)     Hyperlipidemia     Nephrolithiasis     KEYSHAWN (obstructive sleep apnea)     awaiting CPAP machine     Rheumatic disease of mitral valve     Uncontrolled type 2 diabetes mellitus with complication, with long-term current use of insulin 2020    Urinary incontinence        Past Surgical HIstory:   Past Surgical History:   Procedure Laterality Date    BREAST BIOPSY Left 10/2019     SECTION      COLONOSCOPY N/A 2019     Procedure: COLONOSCOPY;  Surgeon: Devon Bowling MD;  Location: [facility] ENDO (4TH FLR);  Service: Endoscopy;  Laterality: N/A;  ok to hold Coumadin x 5 days with Lovenox bridge per Coumadin clinic-[facility]    ESOPHAGOGASTRODUODENOSCOPY N/A 12/19/2019    Procedure: EGD (ESOPHAGOGASTRODUODENOSCOPY);  Surgeon: Smooth Torrez MD;  Location: [facility] ENDO (2ND FLR);  Service: Endoscopy;  Laterality: N/A;  2nd floor requested due to comorbidities, Hypertension, permanent A. fib, COPD, CHF, sleep apnea, status post mechanical mitral valve replacement 2005 due to rheumatic mitral stenosis, bm! 44     per Coumadin clinic-ok to hold for 5 days w/bridge    kidney stone removal      MITRAL VALVE REPLACEMENT  2/2004    TUBAL LIGATION         Family History:   Family History   Problem Relation Age of Onset    Stroke Paternal Grandmother     Colon cancer Maternal Grandmother     Cancer Brother         testicular cancer    Diabetes Sister     Hypertension Sister     Breast cancer Paternal Aunt     Diabetes Sister     Diabetes Sister     Heart disease Father 70        MI    Diabetes Father     Colon cancer Mother 83    Asthma Daughter     Asthma Son     Ovarian cancer Neg Hx        Social History:  reports that she quit smoking about 19 years ago. Her smoking use included cigarettes. She has a 10.00 pack-year smoking history. She has never used smokeless tobacco. She reports that she does not drink alcohol and does not use drugs.    Allergies:  Review of patient's allergies indicates:  No Known Allergies    Medications:  Current Outpatient Medications   Medication Sig Dispense Refill    ADVAIR DISKUS 500-50 mcg/dose DsDv diskus inhaler INHALE 1 PUFF INTO THE LUNGS 2 (TWO) TIMES DAILY. 60 each 6    albuterol (VENTOLIN HFA) 90 mcg/actuation inhaler INHALE 2 PUFFS INTO THE LUNGS EVERY 6 (SIX) HOURS AS NEEDED FOR WHEEZING. RESCUE 18 g 6    amLODIPine (NORVASC) 2.5 MG tablet Take 1 tablet (2.5 mg total) by mouth once daily. 30  tablet 4    aspirin (ECOTRIN) 81 MG EC tablet Take 81 mg by mouth once daily.       dipyridamole (PERSANTINE) 5 mg/mL injection       dorzolamide (TRUSOPT) 2 % ophthalmic solution Place 1 drop into the right eye 2 (two) times a day. 10 mL 3    fluticasone propionate (FLONASE) 50 mcg/actuation nasal spray 2 sprays (100 mcg total) by Each Nostril route once daily. 16 g 3    furosemide (LASIX) 20 MG tablet Take 1 tablet (20 mg total) by mouth once daily. 30 tablet 4    KENALOG 40 mg/mL injection       latanoprost (XALATAN) 0.005 % ophthalmic solution Place 1 drop into the right eye once daily. 7.5 mL 3    meclizine (ANTIVERT) 25 mg tablet Take 1 tablet (25 mg total) by mouth 2 (two) times daily as needed. 30 tablet 1    metoprolol succinate (TOPROL-XL) 200 MG 24 hr tablet Take 1 tablet (200 mg total) by mouth once daily. 30 tablet 4    oxybutynin (DITROPAN-XL) 5 MG TR24 Take 5 mg by mouth once daily.      pantoprazole (PROTONIX) 40 MG tablet Take 1 tablet (40 mg total) by mouth daily as needed. 30 tablet 3    rosuvastatin (CRESTOR) 20 MG tablet Take 1 tablet (20 mg total) by mouth once daily. 90 tablet 3    sertraline (ZOLOFT) 100 MG tablet Take 1 tablet (100 mg total) by mouth once daily. 30 tablet 3    tamsulosin (FLOMAX) 0.4 mg Cap TAKE 1 CAPSULE (0.4 MG TOTAL) BY MOUTH ONCE DAILY. 30 capsule 11    warfarin (COUMADIN) 5 MG tablet Take 1 tablets (5mg) by mouth Tuesday then 1.5 tablets (7.5mg) all other days or as instructed by Coumadin Clinic. 45 tablet 3    amoxicillin (AMOXIL) 500 MG Tab 4 tablets po 1 hour prior to procedure (Patient not taking: Reported on 1/24/2022) 4 tablet 0    nitroGLYCERIN (NITROSTAT) 0.4 MG SL tablet Place 1 tablet (0.4 mg total) under the tongue every 5 (five) minutes as needed for Chest pain. 30 tablet 1     No current facility-administered medications for this visit.       Review of Systems   Constitutional: Positive for fatigue. Negative for appetite change, chills and  fever.   HENT: Negative for nosebleeds and sore throat.    Eyes: Negative for photophobia and visual disturbance.   Respiratory: Positive for shortness of breath. Negative for cough.    Cardiovascular: Negative for chest pain, palpitations and leg swelling.   Gastrointestinal: Negative for abdominal pain, blood in stool, diarrhea, nausea and vomiting.   Endocrine: Negative for cold intolerance, heat intolerance, polydipsia and polyuria.   Genitourinary: Negative for dysuria, hematuria and urgency.   Musculoskeletal: Negative for arthralgias and myalgias.   Skin: Negative for rash and wound.   Neurological: Negative for dizziness and headaches.   Hematological: Negative for adenopathy. Does not bruise/bleed easily.   Psychiatric/Behavioral: Negative for confusion. The patient is not nervous/anxious.           Objective:      Vitals:   Vitals:    01/24/22 1352   BP: (!) 120/58   BP Location: Left arm   Patient Position: Sitting   BP Method: Medium (Automatic)   Pulse: 64   Resp: 18   Temp: 98 °F (36.7 °C)   TempSrc: Oral   SpO2: (!) 94%   Weight: 112.9 kg (248 lb 14.4 oz)   Height: 5' (1.524 m)       Physical Exam  Vitals reviewed.   Constitutional:       Appearance: Normal appearance.   HENT:      Head: Normocephalic and atraumatic.      Mouth/Throat:      Mouth: Mucous membranes are moist.   Eyes:      Extraocular Movements: Extraocular movements intact.   Cardiovascular:      Rate and Rhythm: Rhythm irregularly irregular.   Pulmonary:      Effort: Pulmonary effort is normal.      Breath sounds: Normal breath sounds.   Abdominal:      General: Bowel sounds are normal.      Palpations: Abdomen is soft.   Musculoskeletal:      Cervical back: Normal range of motion and neck supple.   Skin:     General: Skin is warm and dry.      Capillary Refill: Capillary refill takes more than 3 seconds.      Findings: No bruising.   Neurological:      General: No focal deficit present.      Mental Status: She is alert and oriented  "to person, place, and time.   Psychiatric:         Mood and Affect: Affect normal.         Behavior: Behavior is cooperative.         Laboratory Data:  Labs reviewed    Iron: 58  TIBC: 463  Ferritin: 41  Imaging: None  Assessment:       Iron Deficiency Anemia     Plan:     Iron Deficiency Anemia  Alpha Thalassemia Trait  Chronic blood loss Anemia  Chronic Atrial Fibrillation and Mechanical Valve on Chronic Anticoagulation  -- MCV persistently 68-70 since 2011 with most recent at 67  -- Does have alpha thalassemia trait  -- Down trending hgb from ~12 to 7.7 now recovered to 12  -- Persistently low/normal ferritin since 2013 with iron studies indicating iron deficiency anemia (low iron, low iron sat, elevated TIBC)  -- Likely chronic blood loss as follows: Previous colonoscopy 6/26/19  showed right colon "full of actively bleeding AVMs" with pt on aspirin and coumadin. UAs also persistently positive for blood in the setting of frequent nephrolithiasis although this is much less likely contributing  -- Retic 1.4, inappropriately normal indicating hypoproliferation  -- B12/ folate WNL  -- Has mechanical mitral valve on coumadin with persistently elevated LDH but haptoglobin and T. Bili normal without schistocytes seen on smear  -- Copper and hgb electropheresis WNL   -- Labs 10/27 showed continued MARTHA with ferritin of 19, iron of 29, and %sat of 5.   -- Noncompliant with ferrous sulfate 324mg TID in the past due to constipation; also did not want IV sucrose and preferred to attempt oral iron again first  -- Reports compliance with daily oral iron however labs remain consistent with iron deficiency anemia  --Received IV iron sucrose [id number]    RTC in 3 months with repeat iron studies at that time to assess response    So Linares MD  Hematology/Oncology           "

## 2022-02-02 ENCOUNTER — ANTI-COAG VISIT (OUTPATIENT)
Dept: CARDIOLOGY | Facility: CLINIC | Age: 67
End: 2022-02-02
Payer: MEDICARE

## 2022-02-02 ENCOUNTER — LAB VISIT (OUTPATIENT)
Dept: LAB | Facility: HOSPITAL | Age: 67
End: 2022-02-02
Attending: INTERNAL MEDICINE
Payer: MEDICARE

## 2022-02-02 DIAGNOSIS — I48.20 CHRONIC ATRIAL FIBRILLATION: Primary | ICD-10-CM

## 2022-02-02 DIAGNOSIS — I48.20 ATRIAL FIBRILLATION, CHRONIC: ICD-10-CM

## 2022-02-02 DIAGNOSIS — Z95.2 STATUS POST HEART VALVE REPLACEMENT WITH MECHANICAL VALVE: ICD-10-CM

## 2022-02-02 DIAGNOSIS — Z79.01 LONG TERM (CURRENT) USE OF ANTICOAGULANTS: ICD-10-CM

## 2022-02-02 DIAGNOSIS — I48.20 CHRONIC ATRIAL FIBRILLATION: ICD-10-CM

## 2022-02-02 LAB
INR PPP: 2.7 (ref 0.8–1.2)
PROTHROMBIN TIME: 28.2 SEC (ref 9–12.5)

## 2022-02-02 PROCEDURE — 85610 PROTHROMBIN TIME: CPT | Mod: HCNC | Performed by: INTERNAL MEDICINE

## 2022-02-02 PROCEDURE — 99211 OFF/OP EST MAY X REQ PHY/QHP: CPT | Mod: S$GLB,,, | Performed by: INTERNAL MEDICINE

## 2022-02-02 PROCEDURE — 99211 PR OFFICE/OUTPT VISIT, EST, LEVL I: ICD-10-PCS | Mod: S$GLB,,, | Performed by: INTERNAL MEDICINE

## 2022-02-02 PROCEDURE — 36415 COLL VENOUS BLD VENIPUNCTURE: CPT | Mod: HCNC | Performed by: INTERNAL MEDICINE

## 2022-02-02 NOTE — PROGRESS NOTES
Patient given further Coumadin instructions and re-draw date. Verbalized understanding and will comply.

## 2022-02-03 DIAGNOSIS — D50.8 IRON DEFICIENCY ANEMIA SECONDARY TO INADEQUATE DIETARY IRON INTAKE: Primary | ICD-10-CM

## 2022-02-09 RX ORDER — WARFARIN SODIUM 5 MG/1
TABLET ORAL
Qty: 45 TABLET | Refills: 3 | Status: SHIPPED | OUTPATIENT
Start: 2022-02-09 | End: 2022-07-08 | Stop reason: SDUPTHER

## 2022-02-16 ENCOUNTER — LAB VISIT (OUTPATIENT)
Dept: LAB | Facility: HOSPITAL | Age: 67
End: 2022-02-16
Attending: INTERNAL MEDICINE
Payer: MEDICARE

## 2022-02-16 ENCOUNTER — ANTI-COAG VISIT (OUTPATIENT)
Dept: CARDIOLOGY | Facility: CLINIC | Age: 67
End: 2022-02-16
Payer: MEDICARE

## 2022-02-16 DIAGNOSIS — I48.20 CHRONIC ATRIAL FIBRILLATION: Primary | ICD-10-CM

## 2022-02-16 DIAGNOSIS — I48.20 CHRONIC ATRIAL FIBRILLATION: ICD-10-CM

## 2022-02-16 LAB
INR PPP: 2.7 (ref 0.8–1.2)
PROTHROMBIN TIME: 26.6 SEC (ref 9–12.5)

## 2022-02-16 PROCEDURE — 99211 PR OFFICE/OUTPT VISIT, EST, LEVL I: ICD-10-PCS | Mod: S$GLB,,, | Performed by: INTERNAL MEDICINE

## 2022-02-16 PROCEDURE — 99211 OFF/OP EST MAY X REQ PHY/QHP: CPT | Mod: S$GLB,,, | Performed by: INTERNAL MEDICINE

## 2022-02-16 PROCEDURE — 36415 COLL VENOUS BLD VENIPUNCTURE: CPT | Mod: HCNC | Performed by: INTERNAL MEDICINE

## 2022-02-16 PROCEDURE — 85610 PROTHROMBIN TIME: CPT | Mod: HCNC | Performed by: INTERNAL MEDICINE

## 2022-02-16 NOTE — PROGRESS NOTES
Further coumadin instructions and re-draw date given, dosing repeated, verbalized understanding and will comply. No changes to report

## 2022-02-18 ENCOUNTER — CLINICAL SUPPORT (OUTPATIENT)
Dept: BARIATRICS | Facility: CLINIC | Age: 67
End: 2022-02-18
Payer: MEDICARE

## 2022-02-18 VITALS — WEIGHT: 240.06 LBS | BODY MASS INDEX: 46.89 KG/M2

## 2022-02-18 DIAGNOSIS — I10 ESSENTIAL (PRIMARY) HYPERTENSION: Chronic | ICD-10-CM

## 2022-02-18 DIAGNOSIS — E66.01 MORBID OBESITY WITH BMI OF 40.0-44.9, ADULT: Chronic | ICD-10-CM

## 2022-02-18 DIAGNOSIS — Z71.3 DIETARY COUNSELING: ICD-10-CM

## 2022-02-18 PROCEDURE — 99999 PR PBB SHADOW E&M-EST. PATIENT-LVL I: ICD-10-PCS | Mod: PBBFAC,HCNC,, | Performed by: DIETITIAN, REGISTERED

## 2022-02-18 PROCEDURE — 97802 PR MED NUTR THER, 1ST, INDIV, EA 15 MIN: ICD-10-PCS | Mod: HCNC,S$GLB,, | Performed by: DIETITIAN, REGISTERED

## 2022-02-18 PROCEDURE — 99499 NO LOS: ICD-10-PCS | Mod: HCNC,S$GLB,, | Performed by: DIETITIAN, REGISTERED

## 2022-02-18 PROCEDURE — 99499 UNLISTED E&M SERVICE: CPT | Mod: HCNC,S$GLB,, | Performed by: DIETITIAN, REGISTERED

## 2022-02-18 PROCEDURE — 99999 PR PBB SHADOW E&M-EST. PATIENT-LVL I: CPT | Mod: PBBFAC,HCNC,, | Performed by: DIETITIAN, REGISTERED

## 2022-02-18 PROCEDURE — 97802 MEDICAL NUTRITION INDIV IN: CPT | Mod: HCNC,S$GLB,, | Performed by: DIETITIAN, REGISTERED

## 2022-02-18 NOTE — PROGRESS NOTES
NUTRITIONAL Note     Referring Physician: Bird Gallegos M.D.   Reason for MNT Referral: Follow up assessment for laparoscopic Aris-en-Y work-up    66 y.o. female presents with 9 lbs weight loss over the past month by making dietary and lifestyle changes in preparation for bariatric surgery.          Past Medical History:   Diagnosis Date    Acute on chronic diastolic congestive heart failure      Anticoagulant long-term use      Asthma      Atrial fibrillation 2002    Cataract      Chronic kidney disease      COPD (chronic obstructive pulmonary disease)      Depression      HTN (hypertension)      Nephrolithiasis      KEYSHAWN (obstructive sleep apnea)       awaiting CPAP machine     Rheumatic disease of mitral valve      Uncontrolled type 2 diabetes mellitus with complication, with long-term current use of insulin 5/27/2020    Urinary incontinence           CLINICAL DATA:  66 y.o.-year-old Black or  female.  Height: 5 ft.   Weight: 240 lbs  Weight loss: 9 lbs  IBW: 126 lbs  BMI: 46.8 (from 48)     DAILY NUTRITIONAL NEEDS: pre-op nutritional bariatric guidelines to promote weight loss  2675-5454 Calories    Grams Protein     NUTRITION & HEALTH HISTORY:  Greatest challenge: sweets, starchy CHO, portion control, snacking at night, irregular meal patterns and emotional eating     Current diet recall:      - sips on water  - banana or grapes  L: salad with tomatoes, jalapeno peppers, shredded cheese, drizzled [place] and lemon juice  D: 2 boiled eggs and brussels sprouts with butter and pepper, 1/2 can of fruit punch  Sn: bowl of watermelon     Current Diet:  Meal pattern: 2-3 meals  Protein supplements: none  Snacking: fruits  Vegetables: Likes a variety. Eats daily.  Fruits: Likes a variety. Eats daily  Beverages: More water. Drinking less sugary beverages (fruit punch).   Dining out: Weekly. Mostly fast food, restaurants and take-out.  Cooking at home: Daily. Weekly. Mostly  baked, grilled, smothered and fried meat, fish, (limited starchy CHO) and vegetables.     Exercise:    Restrictions to exercise: none     Vitamins / Minerals / Herbs:   none     Labs:   None available at time of visit     Food Allergies:   None known    Lactose Intolerance     Social:  No work.  Lives with her .  Grocery shopping and food prep done by the patient.  Patient believes the household will be supportive after surgery.  Alcohol: None.  Smoking: None.     ASSESSMENT:  · Patient demonstrates willingness to change lifestyle and make behavior modifications AEB weight loss, dietary changes.        Barriers to Education: none     Stage of change: action     NUTRITION DIAGNOSIS:     Morbid Obesity related to Excessive carbohydrate intake and Physical inactivity as evidence by BMI.     BARIATRIC DIET DISCUSSION/PLAN:  Discussed diet after surgery and related to patient's food record.  Reviewed nutrition guidelines for before and after surgery.  Answered all questions.  Continue to review Bariatric Nutrition Guidebook at home and call with any questions.  Begin trying various protein supplements in preparation for surgery - list of Lactose Free provided reviewed.    RECOMMENDATIONS:  Patient is a good candidate for bariatric surgery - Gastric Bypass.     Patient verbalized understanding.     Expect good compliance after surgery at this time.    Will f/u before/after surgery as needed.     Communicated nutrition plan with bariatric team.     SESSION TIME:  30 minutes

## 2022-02-18 NOTE — PATIENT INSTRUCTIONS
Lactose-free & Artificial Sweetener-free Protein Powders    Remember, your protein shake should have less than 4 grams of sugar per serving. For whey powders, choose 100% whey pro isolate, not a blend. Blends add creatine in as a filler.    Natural Zero-calorie sweeteners  ? Stevia  ? Truvia  ? Swerve (http://www.Akredo/about-swerve/)     Lactose-free Protein Powders  ? % Egg Protein (Vanilla & Chocolate)  ? GNC Pro Performance 100% Soy Isolate (Vanilla & Chocolate)  ? Bluebonnet Protein Powder  ? Garden of Life ® raw organic protein  ? Isopure ® low carb protein powder  ? NutraBio ® 100% whey protein isolate  ? NutraBio ® organic plant protein - vegan  ? Now ® pea protein - vegan  ? Orgain ® Organic protein powder  ? Syntrax ® Nectar    Lactose-free Ready to drink Protein Shakes   Muscle Milk   GNC Total Lean Lean Shake 25   Isopure ® zero carb    Protein 2 O   [facility] Nutrition Plan   Core Power      Artificial Sweetener-free Protein Powders  ? Pure Protein Natural Whey Protein Powder (Vanilla, Chocolate & Strawberry)  ? Nile Morris Whey Pro Isolate (sweetened w/ stevia) (Vanilla, Chocolate, Strawberry, Pina Coloda & Tropical Dreamsicle)  ? Nature's Best Natural Isopure - Unflavored (zero carb, found at [facility])    Lactose & Artificial-Sweetener-Free Protein Powders  ? Nile Morris Egg White Protein (sweetened w/ stevia)  ? Garden of Life ® raw organic protein  ? Syntrax ® Nectar

## 2022-02-23 ENCOUNTER — TELEPHONE (OUTPATIENT)
Dept: BARIATRICS | Facility: CLINIC | Age: 67
End: 2022-02-23
Payer: MEDICARE

## 2022-02-23 NOTE — TELEPHONE ENCOUNTER
Phoned patient in response to phone message and LVM to RC to discuss when she will see the surgeon and sign consents and to confirm psych and cardiology clearances as well as and anticoagulation plan for coumadin.

## 2022-02-23 NOTE — TELEPHONE ENCOUNTER
----- Message from Lauren Gan, Patient Care Assistant sent at 2/23/2022  9:48 AM CST -----  Regarding: appt  Contact: Pt  Pt is requesting a call back in regards to an appt with physician. Pt states she has saw the nutritionist twice and is wonder when she will see the physician for appt.      Pt @ [phone] or [phone]

## 2022-02-23 NOTE — TELEPHONE ENCOUNTER
----- Message from Mone Berry sent at 2/23/2022  2:25 PM CST -----  Regarding: Next appointment  Patient want to know when she will see a physician; she saw a Kassi twice.

## 2022-02-25 ENCOUNTER — TELEPHONE (OUTPATIENT)
Dept: BARIATRICS | Facility: CLINIC | Age: 67
End: 2022-02-25
Payer: MEDICARE

## 2022-02-25 NOTE — TELEPHONE ENCOUNTER
Phoned Dr Eulalia Dodge's office and received the answering service.  Did not want to speak to one of the on call surgeons so will call back on Monday.

## 2022-02-25 NOTE — TELEPHONE ENCOUNTER
Phoned patient.  Discussed the workup.  Do not see the psych clearance from Dr Juvencio Krueger, nor do I see clearance from Dr Messina or Dr Crocker.  Will reach out to Dr Eulalia Donaldson office for the psych clearance and the other physicians through [employer].

## 2022-02-28 ENCOUNTER — TELEPHONE (OUTPATIENT)
Dept: INTERNAL MEDICINE | Facility: CLINIC | Age: 67
End: 2022-02-28
Payer: MEDICARE

## 2022-02-28 NOTE — TELEPHONE ENCOUNTER
----- Message from Pooja Logan RN sent at 2/25/2022  4:33 PM CST -----  Regarding: PCP clearance for gastric bypass  Hi Dr Crocker,  We will be scheduling this patient for a gastric bypass very soon and the surgeon, Dr Gallegos has requested that she get PCP clearance for the surgery.  You last saw her in clinic on 11/4/2022.  Do you need to see her in clinic again to clear or can you enter a current documentation note stating that she is cleared to proceed with the bypass surgery.    Thanks for your help,  GENOVEVA Paulino  Bariatrics

## 2022-03-02 ENCOUNTER — LAB VISIT (OUTPATIENT)
Dept: LAB | Facility: HOSPITAL | Age: 67
End: 2022-03-02
Attending: INTERNAL MEDICINE
Payer: MEDICARE

## 2022-03-02 ENCOUNTER — ANTI-COAG VISIT (OUTPATIENT)
Dept: CARDIOLOGY | Facility: CLINIC | Age: 67
End: 2022-03-02
Payer: MEDICARE

## 2022-03-02 DIAGNOSIS — I48.20 CHRONIC ATRIAL FIBRILLATION: ICD-10-CM

## 2022-03-02 DIAGNOSIS — I48.20 CHRONIC ATRIAL FIBRILLATION: Primary | ICD-10-CM

## 2022-03-02 LAB
INR PPP: 3.1 (ref 0.8–1.2)
PROTHROMBIN TIME: 30.5 SEC (ref 9–12.5)

## 2022-03-02 PROCEDURE — 36415 COLL VENOUS BLD VENIPUNCTURE: CPT | Mod: HCNC | Performed by: INTERNAL MEDICINE

## 2022-03-02 PROCEDURE — 85610 PROTHROMBIN TIME: CPT | Mod: HCNC | Performed by: INTERNAL MEDICINE

## 2022-03-02 PROCEDURE — 99211 PR OFFICE/OUTPT VISIT, EST, LEVL I: ICD-10-PCS | Mod: S$GLB,,, | Performed by: INTERNAL MEDICINE

## 2022-03-02 PROCEDURE — 99211 OFF/OP EST MAY X REQ PHY/QHP: CPT | Mod: S$GLB,,, | Performed by: INTERNAL MEDICINE

## 2022-03-04 ENCOUNTER — TELEPHONE (OUTPATIENT)
Dept: BARIATRICS | Facility: CLINIC | Age: 67
End: 2022-03-04
Payer: MEDICARE

## 2022-03-04 NOTE — TELEPHONE ENCOUNTER
When will the clearance be needed to the pt?    Please advise.   Thank you and have a wonderful day and weekend

## 2022-03-04 NOTE — TELEPHONE ENCOUNTER
Called pt and pt stated ASAP she didn't give me a date but I had also sent the message to the bariatric nurse.

## 2022-03-04 NOTE — TELEPHONE ENCOUNTER
Phoned patient and discussed the issues with the psych clearance.  Told her that Dr Krueger will not fax it over without a signed psych release of information form but that we reached ou to Dr Eulalia Dodge to see if she will fax the clearance over.  Also told her about the FC appointment next Friday at 1000. She verbalized understanding.  Also poke of the PCP clearance with Dr Crocker for 4/13/2022.

## 2022-03-07 ENCOUNTER — PATIENT MESSAGE (OUTPATIENT)
Dept: BARIATRICS | Facility: CLINIC | Age: 67
End: 2022-03-07
Payer: MEDICARE

## 2022-03-10 ENCOUNTER — HOSPITAL ENCOUNTER (OUTPATIENT)
Dept: RADIOLOGY | Facility: HOSPITAL | Age: 67
Discharge: HOME | End: 2022-03-10
Attending: INTERNAL MEDICINE
Payer: MEDICARE

## 2022-03-10 ENCOUNTER — OFFICE VISIT (OUTPATIENT)
Dept: INTERNAL MEDICINE | Facility: CLINIC | Age: 67
End: 2022-03-10
Payer: MEDICARE

## 2022-03-10 DIAGNOSIS — I50.33 ACUTE ON CHRONIC DIASTOLIC CONGESTIVE HEART FAILURE: ICD-10-CM

## 2022-03-10 DIAGNOSIS — I10 ESSENTIAL HYPERTENSION: ICD-10-CM

## 2022-03-10 DIAGNOSIS — Z01.818 PREOP EXAMINATION: Primary | ICD-10-CM

## 2022-03-10 DIAGNOSIS — I10 ESSENTIAL HYPERTENSION: Chronic | ICD-10-CM

## 2022-03-10 DIAGNOSIS — E11.9 TYPE 2 DIABETES MELLITUS WITHOUT OPHTHALMIC MANIFESTATIONS: ICD-10-CM

## 2022-03-10 PROCEDURE — 99999 PR PBB SHADOW E&M-EST. PATIENT-LVL IV: CPT | Mod: PBBFAC,HCNC,, | Performed by: INTERNAL MEDICINE

## 2022-03-10 PROCEDURE — 71046 XR CHEST PA AND LATERAL: ICD-10-PCS | Mod: 26,HCNC,, | Performed by: RADIOLOGY

## 2022-03-10 PROCEDURE — 99214 OFFICE O/P EST MOD 30 MIN: CPT | Mod: HCNC,S$GLB,, | Performed by: INTERNAL MEDICINE

## 2022-03-10 PROCEDURE — 1101F PT FALLS ASSESS-DOCD LE1/YR: CPT | Mod: HCNC,CPTII,S$GLB, | Performed by: INTERNAL MEDICINE

## 2022-03-10 PROCEDURE — 71046 X-RAY EXAM CHEST 2 VIEWS: CPT | Mod: TC,HCNC

## 2022-03-10 PROCEDURE — 99214 PR OFFICE/OUTPT VISIT, EST, LEVL IV, 30-39 MIN: ICD-10-PCS | Mod: HCNC,S$GLB,, | Performed by: INTERNAL MEDICINE

## 2022-03-10 PROCEDURE — 99999 PR PBB SHADOW E&M-EST. PATIENT-LVL IV: ICD-10-PCS | Mod: PBBFAC,HCNC,, | Performed by: INTERNAL MEDICINE

## 2022-03-10 PROCEDURE — 1126F AMNT PAIN NOTED NONE PRSNT: CPT | Mod: HCNC,CPTII,S$GLB, | Performed by: INTERNAL MEDICINE

## 2022-03-10 PROCEDURE — 3008F PR BODY MASS INDEX (BMI) DOCUMENTED: ICD-10-PCS | Mod: HCNC,CPTII,S$GLB, | Performed by: INTERNAL MEDICINE

## 2022-03-10 PROCEDURE — 1159F MED LIST DOCD IN RCRD: CPT | Mod: HCNC,CPTII,S$GLB, | Performed by: INTERNAL MEDICINE

## 2022-03-10 PROCEDURE — 3288F FALL RISK ASSESSMENT DOCD: CPT | Mod: HCNC,CPTII,S$GLB, | Performed by: INTERNAL MEDICINE

## 2022-03-10 PROCEDURE — 3288F PR FALLS RISK ASSESSMENT DOCUMENTED: ICD-10-PCS | Mod: HCNC,CPTII,S$GLB, | Performed by: INTERNAL MEDICINE

## 2022-03-10 PROCEDURE — 1101F PR PT FALLS ASSESS DOC 0-1 FALLS W/OUT INJ PAST YR: ICD-10-PCS | Mod: HCNC,CPTII,S$GLB, | Performed by: INTERNAL MEDICINE

## 2022-03-10 PROCEDURE — 3074F SYST BP LT 130 MM HG: CPT | Mod: HCNC,CPTII,S$GLB, | Performed by: INTERNAL MEDICINE

## 2022-03-10 PROCEDURE — 3078F DIAST BP <80 MM HG: CPT | Mod: HCNC,CPTII,S$GLB, | Performed by: INTERNAL MEDICINE

## 2022-03-10 PROCEDURE — 3008F BODY MASS INDEX DOCD: CPT | Mod: HCNC,CPTII,S$GLB, | Performed by: INTERNAL MEDICINE

## 2022-03-10 PROCEDURE — 1126F PR PAIN SEVERITY QUANTIFIED, NO PAIN PRESENT: ICD-10-PCS | Mod: HCNC,CPTII,S$GLB, | Performed by: INTERNAL MEDICINE

## 2022-03-10 PROCEDURE — 71046 X-RAY EXAM CHEST 2 VIEWS: CPT | Mod: 26,HCNC,, | Performed by: RADIOLOGY

## 2022-03-10 PROCEDURE — 3078F PR MOST RECENT DIASTOLIC BLOOD PRESSURE < 80 MM HG: ICD-10-PCS | Mod: HCNC,CPTII,S$GLB, | Performed by: INTERNAL MEDICINE

## 2022-03-10 PROCEDURE — 1159F PR MEDICATION LIST DOCUMENTED IN MEDICAL RECORD: ICD-10-PCS | Mod: HCNC,CPTII,S$GLB, | Performed by: INTERNAL MEDICINE

## 2022-03-10 PROCEDURE — 3074F PR MOST RECENT SYSTOLIC BLOOD PRESSURE < 130 MM HG: ICD-10-PCS | Mod: HCNC,CPTII,S$GLB, | Performed by: INTERNAL MEDICINE

## 2022-03-10 RX ORDER — FLUTICASONE PROPIONATE AND SALMETEROL 50; 500 UG/1; UG/1
POWDER RESPIRATORY (INHALATION)
Qty: 60 EACH | Refills: 6 | Status: SHIPPED | OUTPATIENT
Start: 2022-03-10 | End: 2023-01-01

## 2022-03-10 RX ORDER — TAMSULOSIN HYDROCHLORIDE 0.4 MG/1
0.4 CAPSULE ORAL DAILY
Qty: 30 CAPSULE | Refills: 11 | Status: SHIPPED | OUTPATIENT
Start: 2022-03-10 | End: 2022-07-11 | Stop reason: SDUPTHER

## 2022-03-10 RX ORDER — AMLODIPINE BESYLATE 2.5 MG/1
2.5 TABLET ORAL DAILY
Qty: 30 TABLET | Refills: 4 | Status: SHIPPED | OUTPATIENT
Start: 2022-03-10 | End: 2022-07-11 | Stop reason: SDUPTHER

## 2022-03-10 RX ORDER — SERTRALINE HYDROCHLORIDE 100 MG/1
100 TABLET, FILM COATED ORAL DAILY
Qty: 30 TABLET | Refills: 3 | Status: SHIPPED | OUTPATIENT
Start: 2022-03-10 | End: 2022-07-20

## 2022-03-10 RX ORDER — FUROSEMIDE 20 MG/1
20 TABLET ORAL DAILY
Qty: 30 TABLET | Refills: 4 | Status: SHIPPED | OUTPATIENT
Start: 2022-03-10 | End: 2022-07-11 | Stop reason: SDUPTHER

## 2022-03-10 RX ORDER — ALBUTEROL SULFATE 90 UG/1
AEROSOL, METERED RESPIRATORY (INHALATION)
Qty: 18 G | Refills: 6 | Status: SHIPPED | OUTPATIENT
Start: 2022-03-10 | End: 2022-06-01 | Stop reason: SDUPTHER

## 2022-03-10 RX ORDER — METOPROLOL SUCCINATE 200 MG/1
200 TABLET, EXTENDED RELEASE ORAL DAILY
Qty: 30 TABLET | Refills: 4 | Status: SHIPPED | OUTPATIENT
Start: 2022-03-10 | End: 2022-04-25

## 2022-03-14 ENCOUNTER — TELEPHONE (OUTPATIENT)
Dept: BARIATRICS | Facility: CLINIC | Age: 67
End: 2022-03-14
Payer: MEDICARE

## 2022-03-14 DIAGNOSIS — I10 ESSENTIAL (PRIMARY) HYPERTENSION: ICD-10-CM

## 2022-03-14 DIAGNOSIS — E66.01 MORBID OBESITY WITH BMI OF 45.0-49.9, ADULT: Primary | ICD-10-CM

## 2022-03-17 VITALS
HEART RATE: 85 BPM | DIASTOLIC BLOOD PRESSURE: 76 MMHG | WEIGHT: 235.69 LBS | TEMPERATURE: 99 F | OXYGEN SATURATION: 95 % | SYSTOLIC BLOOD PRESSURE: 124 MMHG | HEIGHT: 62 IN | BODY MASS INDEX: 43.37 KG/M2

## 2022-03-18 ENCOUNTER — PATIENT MESSAGE (OUTPATIENT)
Dept: ADMINISTRATIVE | Facility: HOSPITAL | Age: 67
End: 2022-03-18
Payer: MEDICARE

## 2022-03-18 NOTE — PROGRESS NOTES
Subjective:       Patient ID: Elayne Almanzar is a 66 y.o. female.    Chief Complaint: Hypertension    HPI  She returns for management of hypertension.  She has had hypertension for over a year.  Current treatment has included medications outlined in medication list.  She denies chest pain or shortness of breath.  No palpitations.  Denies left arm or neck pain.  She is in need of a preop clearance.  Surgery is not yet scheduled    Past medical history: Hypertension, A. fib, COPD, hyperlipidemia,pneumonia, nephrolithiasis , iron deficiency anemia, glaucoma, sleep apnea, status post mitral valve replacement , family history of colon cancer-mother. She had a colonoscopy June 2019      Medications: Proventil MDI 2 puffs 4 times a day when necessary,Advair 500/50 one puff twice a day, Lasix 20 mg daily,Toprol-XL 200 mg daily, Coumadin as monitored by Coumadin clinic , Zoloft 100 mg daily, xalatan, Norvasc 2.5 mg daily, crestor      No known drug allergies       Review of Systems   Constitutional: Negative for chills, fatigue, fever and unexpected weight change.   Respiratory: Negative for chest tightness and shortness of breath.    Cardiovascular: Negative for chest pain and palpitations.   Gastrointestinal: Negative for abdominal pain and blood in stool.   Neurological: Negative for dizziness, syncope, numbness and headaches.       Objective:      Physical Exam  HENT:      Right Ear: External ear normal.      Left Ear: External ear normal.      Nose: Nose normal.      Mouth/Throat:      Mouth: Mucous membranes are moist.      Pharynx: Oropharynx is clear.   Eyes:      Pupils: Pupils are equal, round, and reactive to light.   Cardiovascular:      Rate and Rhythm: Normal rate and regular rhythm.      Heart sounds: No murmur heard.  Pulmonary:      Breath sounds: Normal breath sounds.   Chest:   Breasts:      Right: No axillary adenopathy.      Left: No axillary adenopathy.       Abdominal:      General: There is no  distension.      Palpations: There is no hepatomegaly or splenomegaly.      Tenderness: There is no abdominal tenderness.   Musculoskeletal:      Cervical back: Normal range of motion.   Lymphadenopathy:      Cervical: No cervical adenopathy.      Upper Body:      Right upper body: No axillary adenopathy.      Left upper body: No axillary adenopathy.   Neurological:      Cranial Nerves: No cranial nerve deficit.      Sensory: No sensory deficit.      Motor: Motor function is intact.      Deep Tendon Reflexes: Reflexes are normal and symmetric.         Assessment/Plan       Assessment and plan:  1.  Hypertension:  Check CMP  2. Preop clearance:  Check CBC and chest x-ray.  She has an appoint with Cardiology for clearance on March 22nd

## 2022-03-18 NOTE — PLAN OF CARE
CM met with pt at bedside for initial discharge planning assessment. Pt has had 2 recent sleep studies and states she needs a bpap, however she states her co-pay is $700. And she is unable to afford this. JANETT informed pt she would call [facility] and check on whether they have an affordable unit. CM to follow.     09/08/17 1018   Discharge Assessment   Assessment Type Discharge Planning Assessment   Confirmed/corrected address and phone number on facesheet? Yes   Assessment information obtained from? Patient   Expected Length of Stay (days) 2   Communicated expected length of stay with patient/caregiver yes   Prior to hospitilization cognitive status: Alert/Oriented   Prior to hospitalization functional status: Independent   Current cognitive status: Alert/Oriented   Current Functional Status: Independent   Lives With spouse   Able to Return to Prior Arrangements yes   Is patient able to care for self after discharge? Yes   Who are your caregiver(s) and their phone number(s)? Leo Almanzar   Patient's perception of discharge disposition home or selfcare   Readmission Within The Last 30 Days no previous admission in last 30 days   Patient currently being followed by outpatient case management? No   Patient currently receives any other outside agency services? No   Equipment Currently Used at Home none   Do you have any problems affording any of your prescribed medications? No   Is the patient taking medications as prescribed? yes   Does the patient have transportation home? Yes   Transportation Available family or friend will provide   Does the patient receive services at the [facility]? Yes   Discharge Plan A Home   Discharge Plan B Home   Patient/Family In Agreement With Plan yes     
JANETT spoke with Carrillo at [facility]. Pt needs to fill out a hardship form, which I will provide, then mail it back in to address included. Pt will then be notified to schedule apt with the clinic to discuss options for obtaining unit. JANETT explained this info to patient and she is agreeable with plan.  
Problem: Patient Care Overview  Goal: Plan of Care Review  Outcome: Ongoing (interventions implemented as appropriate)  Patient in no distress on RA  . Placed on CPAP in order to sleep. Will continue to monitor.         
Problem: Patient Care Overview  Goal: Plan of Care Review  Outcome: Ongoing (interventions implemented as appropriate)  Pt remains on RA. Tx given and tolerated well. MDI given.      
Pt discharging home today, family to transport home. CM gave pt form to fill out for hardship request for waiving co-pay for BiPap unit. No further CM needs for discharge.     09/08/17 1341   Final Note   Assessment Type Final Discharge Note   Discharge Disposition Home   What phone number can be called within the next 1-3 days to see how you are doing after discharge? [phone]   Hospital Follow Up  Appt(s) scheduled? Yes   Discharge plans and expectations educations in teach back method with documentation complete? Yes   Right Care Referral Info   Post Acute Recommendation No Care     
show

## 2022-03-22 ENCOUNTER — OFFICE VISIT (OUTPATIENT)
Dept: CARDIOLOGY | Facility: CLINIC | Age: 67
End: 2022-03-22
Payer: MEDICARE

## 2022-03-22 ENCOUNTER — DOCUMENTATION ONLY (OUTPATIENT)
Dept: BARIATRICS | Facility: CLINIC | Age: 67
End: 2022-03-22
Payer: MEDICARE

## 2022-03-22 VITALS
HEIGHT: 62 IN | WEIGHT: 237.44 LBS | HEART RATE: 71 BPM | DIASTOLIC BLOOD PRESSURE: 63 MMHG | BODY MASS INDEX: 43.69 KG/M2 | SYSTOLIC BLOOD PRESSURE: 132 MMHG

## 2022-03-22 DIAGNOSIS — J44.9 CHRONIC OBSTRUCTIVE PULMONARY DISEASE, UNSPECIFIED COPD TYPE: Chronic | ICD-10-CM

## 2022-03-22 DIAGNOSIS — G47.33 OSA (OBSTRUCTIVE SLEEP APNEA): ICD-10-CM

## 2022-03-22 DIAGNOSIS — R73.02 GLUCOSE INTOLERANCE (IMPAIRED GLUCOSE TOLERANCE): ICD-10-CM

## 2022-03-22 DIAGNOSIS — E66.01 MORBID OBESITY WITH BMI OF 40.0-44.9, ADULT: Chronic | ICD-10-CM

## 2022-03-22 DIAGNOSIS — I50.32 CHRONIC DIASTOLIC CONGESTIVE HEART FAILURE: Primary | Chronic | ICD-10-CM

## 2022-03-22 DIAGNOSIS — I48.20 CHRONIC ATRIAL FIBRILLATION: Chronic | ICD-10-CM

## 2022-03-22 DIAGNOSIS — Z01.810 PREOP CARDIOVASCULAR EXAM: ICD-10-CM

## 2022-03-22 DIAGNOSIS — Z95.2 H/O MITRAL VALVE REPLACEMENT WITH MECHANICAL VALVE: Chronic | ICD-10-CM

## 2022-03-22 DIAGNOSIS — I25.10 CORONARY ARTERY CALCIFICATION SEEN ON CT SCAN: ICD-10-CM

## 2022-03-22 DIAGNOSIS — E78.2 MIXED HYPERLIPIDEMIA: ICD-10-CM

## 2022-03-22 PROCEDURE — 3008F PR BODY MASS INDEX (BMI) DOCUMENTED: ICD-10-PCS | Mod: HCNC,CPTII,S$GLB, | Performed by: INTERNAL MEDICINE

## 2022-03-22 PROCEDURE — 3078F PR MOST RECENT DIASTOLIC BLOOD PRESSURE < 80 MM HG: ICD-10-PCS | Mod: HCNC,CPTII,S$GLB, | Performed by: INTERNAL MEDICINE

## 2022-03-22 PROCEDURE — 3072F PR LOW RISK FOR RETINOPATHY: ICD-10-PCS | Mod: HCNC,CPTII,S$GLB, | Performed by: INTERNAL MEDICINE

## 2022-03-22 PROCEDURE — 99999 PR PBB SHADOW E&M-EST. PATIENT-LVL V: CPT | Mod: PBBFAC,HCNC,, | Performed by: INTERNAL MEDICINE

## 2022-03-22 PROCEDURE — 99214 OFFICE O/P EST MOD 30 MIN: CPT | Mod: HCNC,S$GLB,, | Performed by: INTERNAL MEDICINE

## 2022-03-22 PROCEDURE — 3072F LOW RISK FOR RETINOPATHY: CPT | Mod: HCNC,CPTII,S$GLB, | Performed by: INTERNAL MEDICINE

## 2022-03-22 PROCEDURE — 1126F PR PAIN SEVERITY QUANTIFIED, NO PAIN PRESENT: ICD-10-PCS | Mod: HCNC,CPTII,S$GLB, | Performed by: INTERNAL MEDICINE

## 2022-03-22 PROCEDURE — 3078F DIAST BP <80 MM HG: CPT | Mod: HCNC,CPTII,S$GLB, | Performed by: INTERNAL MEDICINE

## 2022-03-22 PROCEDURE — 1101F PT FALLS ASSESS-DOCD LE1/YR: CPT | Mod: HCNC,CPTII,S$GLB, | Performed by: INTERNAL MEDICINE

## 2022-03-22 PROCEDURE — 3075F SYST BP GE 130 - 139MM HG: CPT | Mod: HCNC,CPTII,S$GLB, | Performed by: INTERNAL MEDICINE

## 2022-03-22 PROCEDURE — 1159F MED LIST DOCD IN RCRD: CPT | Mod: HCNC,CPTII,S$GLB, | Performed by: INTERNAL MEDICINE

## 2022-03-22 PROCEDURE — 3288F FALL RISK ASSESSMENT DOCD: CPT | Mod: HCNC,CPTII,S$GLB, | Performed by: INTERNAL MEDICINE

## 2022-03-22 PROCEDURE — 3288F PR FALLS RISK ASSESSMENT DOCUMENTED: ICD-10-PCS | Mod: HCNC,CPTII,S$GLB, | Performed by: INTERNAL MEDICINE

## 2022-03-22 PROCEDURE — 1101F PR PT FALLS ASSESS DOC 0-1 FALLS W/OUT INJ PAST YR: ICD-10-PCS | Mod: HCNC,CPTII,S$GLB, | Performed by: INTERNAL MEDICINE

## 2022-03-22 PROCEDURE — 1159F PR MEDICATION LIST DOCUMENTED IN MEDICAL RECORD: ICD-10-PCS | Mod: HCNC,CPTII,S$GLB, | Performed by: INTERNAL MEDICINE

## 2022-03-22 PROCEDURE — 99214 PR OFFICE/OUTPT VISIT, EST, LEVL IV, 30-39 MIN: ICD-10-PCS | Mod: HCNC,S$GLB,, | Performed by: INTERNAL MEDICINE

## 2022-03-22 PROCEDURE — 3008F BODY MASS INDEX DOCD: CPT | Mod: HCNC,CPTII,S$GLB, | Performed by: INTERNAL MEDICINE

## 2022-03-22 PROCEDURE — 99999 PR PBB SHADOW E&M-EST. PATIENT-LVL V: ICD-10-PCS | Mod: PBBFAC,HCNC,, | Performed by: INTERNAL MEDICINE

## 2022-03-22 PROCEDURE — 1126F AMNT PAIN NOTED NONE PRSNT: CPT | Mod: HCNC,CPTII,S$GLB, | Performed by: INTERNAL MEDICINE

## 2022-03-22 PROCEDURE — 3075F PR MOST RECENT SYSTOLIC BLOOD PRESS GE 130-139MM HG: ICD-10-PCS | Mod: HCNC,CPTII,S$GLB, | Performed by: INTERNAL MEDICINE

## 2022-03-22 NOTE — PROGRESS NOTES
Subjective:       Patient ID: Elayne Almanzar is a 66 y.o. female.    Chief Complaint: Glaucoma     HPI     DLS: 1/19/2022    Pt here for HVF review/OCT;  Pt states no eye pain or discomfort. Pt states she's been having her   glasses for two weeks and cannot see out of them.     Meds;  Dorzolamide BID OD ONLY  Latanoprost Q HS OD ONLY  AT's BID OU        Last edited by Ruby Hyman on 3/23/2022 10:03 AM. (History)            Assessment & Plan   Normal tension glaucoma of right eye, moderate stage    Glaucoma suspect, left    Floppy eyelid syndrome of both eyes    Allergic conjunctivitis of both eyes         Referral from Dr. Funes OD for consideration of SLT or step-up Tx      NTG severe OD // Suspect OS  APD OD  -(-)Fhx, ( )Steroids, (-)Trauma  -Tm 21OU  -Drops:  Latanoprost qHS OD // Dorzolamide BID OD  -Drop intolerance/contraindication: COPD/KEYSHAWN may want to avoid BB, follicular conjunctivitis with brimonidine (mild)  -Laser: none  -Surgeries: none  -CCT: 563 // 561   -Gonio: 3+ with very lightly pigmented TM and steep approach N/T OU    3/22 HVF SAD/IAD OD // full OS --> stable OD  3/22 RNFL Dec G/T/TS/NS B N OD // full OS --> + prog OD    NTG Risk factors  Vascular risk factors: (+ in youth) migraines, (+)KEYSHAWN, (+ thalassemia trait, Hgh 9 with low Fe++) anemia, (+)transfusions, (+)hypotension, (-)h/o Raynaud's disease    Getting Tx for Fe-deficiency with IV iron  Recommend BP meds in AM instead of PM    IOP stable today on Dorzolamide and Latanoprost qHS OU  Since some mild progression OD on RNFL, likely needs lower IOP  Recommend SLT OD    APD OD  New today with decline in VA  VF loss into central degrees - likely glaucomatous   DFE no RVO/GREWAL        Floppy eyelid syndrome OU  Known KEYSHAWN  Rec re-start CPAP  Use AT celia qHS      Follicular conjunctivitis OU  Allergic - brimonidine  Stop brimonidine  Start antihistamine       NS OU  NVS, monitor      PLAN  Continue present management with drops for now    RTC  LASER DAY SLT OD, do MRx that day     Prisca Puckett M.D., M.S.  Department of Ophthalmology   Division of Glaucoma Surgery  [facility]

## 2022-03-22 NOTE — PROGRESS NOTES
Subjective:   Patient ID:  Elayne Almanzar is a 66 y.o. female who presents for follow-up of Pre-op Exam (Gastric bypass)    PROBLEM LIST:  Chronic diastolic heart failure  Permanent atrial fibrillation  Mechanical MV 2004  Coronary artery calcification on chest CT  HTN  HLD  IFG  KEYSHAWN  COPD      HPI:  She presents today for preoperative risk assessment prior to undergoing gastric bypass surgery.  She had been reporting some chest discomfort at her last visit.  A PET stress was performed which showed some heterogeneity on her perfusion images in diffusely low stress flows consistent with nonobstructive coronary artery disease.  She did not have any reversible areas of ischemia.  Her echocardiogram showed her mitral valve is well-functioning, and her overall heart function is normal.  She has not had any more chest discomfort.  She reports occasional wheezing at home which she attributes to her 's use of air freshners.  Her weight is down 11 lb since January.    [id number] 10:57:32:  Personally reviewed by me showed atrial fibrillation with a ventricular rate of 73 beats per minute.  There are no ischemic ST changes present.    TTE 11/21:  Summary    · The left ventricle is normal in size with normal systolic function. The estimated ejection fraction is 65%.  · Normal right ventricular size with normal right ventricular systolic function.  · Severe left atrial enlargement.  · There is a normally functioning mechanical mitral valve prosthesis. The mean gradient is 5mm Hg at a heart rate near 60 bpm.  · Atrial fibrillation observed.  · The estimated PA systolic pressure is 33 mmHg.  · Intermediate central venous pressure (8 mmHg).         TTE 3/13/2020  · Left ventricular systolic function. The estimated ejection fraction is 55%.  · Septal wall has abnormal motion.  · Severe biatrial enlargement.  · There is a mechanical mitral valve prosthesis.  · The mean diastolic gradient across the mitral valve is 5  mmHg at a heart rate of 57 bpm.  · Mild tricuspid regurgitation.  · The estimated PA systolic pressure is 33 mmHg.  · Intermediate central venous pressure (8 mmHg).  · Atrial fibrillation observed.     PET Stress Test 11/21:  Conclusion         There are no clinically significant perfusion abnormalities. There is a moderate (15%) amount of mild diffuse fixed heterogeneity that does not change with stress, indicative of non-obstructive disease.    The whole heart absolute myocardial perfusion values averaged 0.72 cc/min/g at rest, which is normal; 0.81 cc/min/g at stress, which is severely reduced; and CFR is 0.98 , which is severely reduced.    The gated perfusion images showed an ejection fraction of 55% at rest and 60% during stress. A normal ejection fraction is greater than 53%.    The wall motion is normal at rest and during stress.    The LV cavity size is normal at rest and stress.    The EKG portion of this study is negative for ischemia.    During stress, atrial fibrillation is noted.    The patient reported no chest pain during the stress test.    When compared to the previous study from 11/15/2019, diffuse heterogenity is now present on perfusion images.       24 Hour Holter Monitor 7/11/2019  · Persistent rate controlled AF  · 1% PVC burden with 1 5-beat run of nonsustained VT at a rate of 111bpm.     ABIs 9/19/2018  The right ankle brachial index was 1.06 which is normal.   The left ankle brachial index was 1.03 which is normal.        Past Medical History:   Diagnosis Date    Acute on chronic diastolic congestive heart failure     Allergy     Anemia     Anticoagulant long-term use     Anxiety     Asthma     Atrial fibrillation 2002    Cataract     Chronic kidney disease     COPD (chronic obstructive pulmonary disease)     Depression     Encounter for blood transfusion     HTN (hypertension)     Hyperlipidemia     Nephrolithiasis     KEYSHAWN (obstructive sleep apnea)     awaiting  CPAP machine     Rheumatic disease of mitral valve     Uncontrolled type 2 diabetes mellitus with complication, with long-term current use of insulin 2020    Urinary incontinence        Past Surgical History:   Procedure Laterality Date    BREAST BIOPSY Left 10/2019     SECTION      COLONOSCOPY N/A 2019    Procedure: COLONOSCOPY;  Surgeon: Devon Bowling MD;  Location: [facility] ENDO (4TH FLR);  Service: Endoscopy;  Laterality: N/A;  ok to hold Coumadin x 5 days with Lovenox bridge per Coumadin clinic-[facility]    ESOPHAGOGASTRODUODENOSCOPY N/A 2019    Procedure: EGD (ESOPHAGOGASTRODUODENOSCOPY);  Surgeon: Smooth Torrez MD;  Location: [facility] ENDO (2ND FLR);  Service: Endoscopy;  Laterality: N/A;  2nd floor requested due to comorbidities, Hypertension, permanent A. 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 43     per Coumadin clinic-ok to hold for 5 days w/bridge    kidney stone removal      MITRAL VALVE REPLACEMENT  2004    TUBAL LIGATION         Social History     Socioeconomic History    Marital status:     Number of children: 2   Occupational History    Occupation: Cook     Comment: Retired   Tobacco Use    Smoking status: Former Smoker     Packs/day: 0.50     Years: 20.00     Pack years: 10.00     Types: Cigarettes     Quit date: 2002     Years since quittin.8    Smokeless tobacco: Never Used   Substance and Sexual Activity    Alcohol use: No    Drug use: No   Social History Narrative    Disability since .  Laid off .  Previously worked as cook in a kitchen.         Family History   Problem Relation Age of Onset    Stroke Paternal Grandmother     Colon cancer Maternal Grandmother     Cancer Brother         testicular cancer    Diabetes Sister     Hypertension Sister     Breast cancer Paternal Aunt     Diabetes Sister     Diabetes Sister     Heart disease Father 70        MI     Diabetes Father     Colon cancer Mother 83    Asthma Daughter     Asthma Son     Ovarian cancer Neg Hx        Patient's Medications   New Prescriptions    No medications on file   Previous Medications    ADVAIR DISKUS 500-50 MCG/DOSE DSDV DISKUS INHALER    INHALE 1 PUFF INTO THE LUNGS 2 (TWO) TIMES DAILY.    ALBUTEROL (VENTOLIN HFA) 90 MCG/ACTUATION INHALER    INHALE 2 PUFFS INTO THE LUNGS EVERY 6 (SIX) HOURS AS NEEDED FOR WHEEZING. RESCUE    AMLODIPINE (NORVASC) 2.5 MG TABLET    Take 1 tablet (2.5 mg total) by mouth once daily.    AMOXICILLIN (AMOXIL) 500 MG TAB    4 tablets po 1 hour prior to procedure    ASPIRIN (ECOTRIN) 81 MG EC TABLET    Take 81 mg by mouth once daily.     DIPYRIDAMOLE (PERSANTINE) 5 MG/ML INJECTION        DORZOLAMIDE (TRUSOPT) 2 % OPHTHALMIC SOLUTION    Place 1 drop into the right eye 2 (two) times a day.    FLUTICASONE PROPIONATE (FLONASE) 50 MCG/ACTUATION NASAL SPRAY    2 sprays (100 mcg total) by Each Nostril route once daily.    FUROSEMIDE (LASIX) 20 MG TABLET    Take 1 tablet (20 mg total) by mouth once daily.    KENALOG 40 MG/ML INJECTION        LATANOPROST (XALATAN) 0.005 % OPHTHALMIC SOLUTION    Place 1 drop into the right eye once daily.    MECLIZINE (ANTIVERT) 25 MG TABLET    Take 1 tablet (25 mg total) by mouth 2 (two) times daily as needed.    METOPROLOL SUCCINATE (TOPROL-XL) 200 MG 24 HR TABLET    Take 1 tablet (200 mg total) by mouth once daily.    NITROGLYCERIN (NITROSTAT) 0.4 MG SL TABLET    Place 1 tablet (0.4 mg total) under the tongue every 5 (five) minutes as needed for Chest pain.    OXYBUTYNIN (DITROPAN-XL) 5 MG TR24    Take 5 mg by mouth once daily.    PANTOPRAZOLE (PROTONIX) 40 MG TABLET    Take 1 tablet (40 mg total) by mouth daily as needed.    ROSUVASTATIN (CRESTOR) 20 MG TABLET    Take 1 tablet (20 mg total) by mouth once daily.    SERTRALINE (ZOLOFT) 100 MG TABLET    Take 1 tablet (100 mg total) by mouth once daily.    TAMSULOSIN (FLOMAX) 0.4 MG CAP     "Take 1 capsule (0.4 mg total) by mouth once daily.    WARFARIN (COUMADIN) 5 MG TABLET    Take 1.5 tablets (7.5mg) by mouth Monday, Wednesday, Friday then 1 tablets (5mg) all other days or as instructed by Coumadin Clinic.   Modified Medications    No medications on file   Discontinued Medications    No medications on file       Review of Systems   Constitutional: Negative for malaise/fatigue and weight gain.   HENT: Negative for hearing loss.    Eyes: Negative for visual disturbance.   Cardiovascular: Negative for chest pain, claudication, dyspnea on exertion, leg swelling, near-syncope, orthopnea, palpitations, paroxysmal nocturnal dyspnea and syncope.   Respiratory: Positive for wheezing. Negative for cough, shortness of breath, sleep disturbances due to breathing and snoring.    Endocrine: Negative for cold intolerance, heat intolerance, polydipsia, polyphagia and polyuria.   Hematologic/Lymphatic: Negative for bleeding problem. Does not bruise/bleed easily.   Skin: Negative for rash and suspicious lesions.   Musculoskeletal: Negative for arthritis, falls, joint pain, muscle weakness and myalgias.   Gastrointestinal: Negative for abdominal pain, change in bowel habit, constipation, diarrhea, heartburn, hematochezia, melena and nausea.   Genitourinary: Negative for hematuria and nocturia.   Neurological: Negative for excessive daytime sleepiness, dizziness, headaches, light-headedness, loss of balance and weakness.   Psychiatric/Behavioral: Negative for depression. The patient is not nervous/anxious.    Allergic/Immunologic: Negative for environmental allergies.       /63 (BP Location: Left arm, Patient Position: Sitting, BP Method: Medium (Automatic))   Pulse 71   Ht 5' 2" (1.575 m)   Wt 107.7 kg (237 lb 7 oz)   LMP 07/22/2012   BMI 43.43 kg/m²     Objective:   Physical Exam  Constitutional:       Appearance: She is well-developed.      Comments:      HENT:      Head: Normocephalic and atraumatic. "   Eyes:      General: No scleral icterus.     Conjunctiva/sclera: Conjunctivae normal.      Pupils: Pupils are equal, round, and reactive to light.   Neck:      Thyroid: No thyromegaly.      Vascular: No hepatojugular reflux or JVD.      Trachea: No tracheal deviation.   Cardiovascular:      Rate and Rhythm: Normal rate. Rhythm irregularly irregular.      Chest Wall: PMI is not displaced.      Pulses: Intact distal pulses.           Carotid pulses are 2+ on the right side and 2+ on the left side.       Radial pulses are 2+ on the right side and 2+ on the left side.        Dorsalis pedis pulses are 2+ on the right side and 2+ on the left side.        Posterior tibial pulses are 2+ on the right side and 2+ on the left side.      Heart sounds: Normal heart sounds.      Comments: Mechanical click is present  Pulmonary:      Effort: Pulmonary effort is normal.      Breath sounds: Normal breath sounds.   Abdominal:      General: Bowel sounds are normal. There is no distension.      Palpations: Abdomen is soft. There is no mass.      Tenderness: There is no abdominal tenderness.   Musculoskeletal:         General: No tenderness.      Cervical back: Normal range of motion and neck supple.   Lymphadenopathy:      Cervical: No cervical adenopathy.   Skin:     General: Skin is warm and dry.      Findings: No erythema or rash.      Nails: There is no clubbing.   Neurological:      Mental Status: She is alert and oriented to person, place, and time.   Psychiatric:         Speech: Speech normal.         Behavior: Behavior normal.         Lab Results   Component Value Date     03/10/2022    K 4.6 03/10/2022     03/10/2022    CO2 29 03/10/2022    BUN 12 03/10/2022    CREATININE 0.9 03/10/2022    GLU 86 03/10/2022    HGBA1C 6.1 05/12/2017    MG 1.9 07/10/2020    AST 21 03/10/2022    ALT 10 03/10/2022    ALBUMIN 3.7 03/10/2022    PROT 7.3 03/10/2022    BILITOT 0.8 03/10/2022    WBC 4.25 03/10/2022    HGB 12.4 03/10/2022     HCT 44.0 03/10/2022    MCV 74 (L) 03/10/2022     03/10/2022    INR 3.1 (H) 03/02/2022    INR 6.39 11/08/2021    TSH 2.026 10/01/2021    CHOL 141 12/15/2021    HDL 54 12/15/2021    LDLCALC 70.2 12/15/2021    TRIG 84 12/15/2021     (H) 07/07/2020       Assessment:     1. Chronic obstructive pulmonary disease, unspecified COPD type :  She remains on Advair and albuterol.   2. Chronic atrial fibrillation :  She is rate controlled and anticoagulated with Coumadin.   3. Chronic diastolic congestive heart failure :  She appears well compensated and euvolemic today.  Continue her current dose of Lasix.   4. H/O mitral valve replacement with mechanical valve :  Her valve is 18 years old.  Her recent echo was normal.  SBE prophylaxis is recommended prior to dental procedures.   5. Essential (primary) hypertension : Blood pressure is at goal. I have made no changes. Continue current regimen.   6. Mixed hyperlipidemia :  Her ASCVD risk score is 17.7%.  Her lipids are at goal on rosuvastatin.  She does report some mild myalgias but would like to continue with her current dose.  We discussed trying magnesium or coenzyme Q10.   7. Glucose intolerance (impaired glucose tolerance)    8. Complex sleep apnea syndrome :  She is waiting her new mask after her system was recalled.       10. Chest pain, unspecified type :  Resolved.  Her recent PET stress was consistent with the presence of nonobstructive coronary artery disease.   11. Morbid obesity with BMI of 40.0-44.9, adult :  She is currently being evaluated by Bariatrics and is planning on undergoing a gastric bypass surgery.   12.    Preoperative cardiac risk assessment:  Her RCRI score is 2 placing her risk of major cardiac events at 6.6%.  Her recent stress test was negative for ischemia in her overall ejection fraction is normal.  She should remain on her rosuvastatin and her metoprolol during the perioperative period.  She will need bridging with Lovenox due  to her mechanical mitral valve which will be handled by the [facility]in Clinic.  Her aspirin can be held for 1 week prior to surgery if the bleeding risk is elevated.    Plan:     Elayne was seen today for pre-op exam.    Diagnoses and all orders for this visit:    Chronic diastolic congestive heart failure    Chronic atrial fibrillation    H/O mitral valve replacement with mechanical valve    Mixed hyperlipidemia    Morbid obesity with BMI of 40.0-44.9, adult    Glucose intolerance (impaired glucose tolerance)    KEYSHAWN (obstructive sleep apnea)    Chronic obstructive pulmonary disease, unspecified COPD type        Thank you for allowing me to participate in this patient's care. Please do not hesitate to contact me with any questions or concerns.

## 2022-03-23 ENCOUNTER — CLINICAL SUPPORT (OUTPATIENT)
Dept: OPHTHALMOLOGY | Facility: CLINIC | Age: 67
End: 2022-03-23
Payer: MEDICARE

## 2022-03-23 ENCOUNTER — OFFICE VISIT (OUTPATIENT)
Dept: OPHTHALMOLOGY | Facility: CLINIC | Age: 67
End: 2022-03-23
Payer: MEDICARE

## 2022-03-23 ENCOUNTER — ANTI-COAG VISIT (OUTPATIENT)
Dept: CARDIOLOGY | Facility: CLINIC | Age: 67
End: 2022-03-23
Payer: MEDICARE

## 2022-03-23 ENCOUNTER — LAB VISIT (OUTPATIENT)
Dept: LAB | Facility: HOSPITAL | Age: 67
End: 2022-03-23
Attending: INTERNAL MEDICINE
Payer: MEDICARE

## 2022-03-23 DIAGNOSIS — E11.9 TYPE 2 DIABETES MELLITUS WITHOUT COMPLICATION: ICD-10-CM

## 2022-03-23 DIAGNOSIS — H40.1213 NORMAL TENSION GLAUCOMA OF RIGHT EYE, SEVERE STAGE: Primary | ICD-10-CM

## 2022-03-23 DIAGNOSIS — H40.002 GLAUCOMA SUSPECT, LEFT: ICD-10-CM

## 2022-03-23 DIAGNOSIS — H21.561 AFFERENT PUPILLARY DEFECT OF RIGHT EYE: ICD-10-CM

## 2022-03-23 DIAGNOSIS — H02.59 FLOPPY EYELID SYNDROME OF BOTH EYES: ICD-10-CM

## 2022-03-23 DIAGNOSIS — H40.1212 NORMAL TENSION GLAUCOMA OF RIGHT EYE, MODERATE STAGE: ICD-10-CM

## 2022-03-23 DIAGNOSIS — I48.20 CHRONIC ATRIAL FIBRILLATION: ICD-10-CM

## 2022-03-23 DIAGNOSIS — I48.20 CHRONIC ATRIAL FIBRILLATION: Primary | ICD-10-CM

## 2022-03-23 LAB
INR PPP: 3.7 (ref 0.8–1.2)
PROTHROMBIN TIME: 35.7 SEC (ref 9–12.5)

## 2022-03-23 PROCEDURE — 1160F RVW MEDS BY RX/DR IN RCRD: CPT | Mod: HCNC,CPTII,S$GLB, | Performed by: STUDENT IN AN ORGANIZED HEALTH CARE EDUCATION/TRAINING PROGRAM

## 2022-03-23 PROCEDURE — 85610 PROTHROMBIN TIME: CPT | Mod: HCNC | Performed by: INTERNAL MEDICINE

## 2022-03-23 PROCEDURE — 99214 PR OFFICE/OUTPT VISIT, EST, LEVL IV, 30-39 MIN: ICD-10-PCS | Mod: HCNC,S$GLB,, | Performed by: STUDENT IN AN ORGANIZED HEALTH CARE EDUCATION/TRAINING PROGRAM

## 2022-03-23 PROCEDURE — 99211 OFF/OP EST MAY X REQ PHY/QHP: CPT | Mod: S$GLB,,, | Performed by: INTERNAL MEDICINE

## 2022-03-23 PROCEDURE — 1126F AMNT PAIN NOTED NONE PRSNT: CPT | Mod: HCNC,CPTII,S$GLB, | Performed by: STUDENT IN AN ORGANIZED HEALTH CARE EDUCATION/TRAINING PROGRAM

## 2022-03-23 PROCEDURE — 92020 PR SPECIAL EYE EVAL,GONIOSCOPY: ICD-10-PCS | Mod: HCNC,S$GLB,, | Performed by: STUDENT IN AN ORGANIZED HEALTH CARE EDUCATION/TRAINING PROGRAM

## 2022-03-23 PROCEDURE — 1159F PR MEDICATION LIST DOCUMENTED IN MEDICAL RECORD: ICD-10-PCS | Mod: HCNC,CPTII,S$GLB, | Performed by: STUDENT IN AN ORGANIZED HEALTH CARE EDUCATION/TRAINING PROGRAM

## 2022-03-23 PROCEDURE — 1101F PT FALLS ASSESS-DOCD LE1/YR: CPT | Mod: HCNC,CPTII,S$GLB, | Performed by: STUDENT IN AN ORGANIZED HEALTH CARE EDUCATION/TRAINING PROGRAM

## 2022-03-23 PROCEDURE — 1160F PR REVIEW ALL MEDS BY PRESCRIBER/CLIN PHARMACIST DOCUMENTED: ICD-10-PCS | Mod: HCNC,CPTII,S$GLB, | Performed by: STUDENT IN AN ORGANIZED HEALTH CARE EDUCATION/TRAINING PROGRAM

## 2022-03-23 PROCEDURE — 99999 PR PBB SHADOW E&M-EST. PATIENT-LVL III: CPT | Mod: PBBFAC,HCNC,, | Performed by: STUDENT IN AN ORGANIZED HEALTH CARE EDUCATION/TRAINING PROGRAM

## 2022-03-23 PROCEDURE — 1101F PR PT FALLS ASSESS DOC 0-1 FALLS W/OUT INJ PAST YR: ICD-10-PCS | Mod: HCNC,CPTII,S$GLB, | Performed by: STUDENT IN AN ORGANIZED HEALTH CARE EDUCATION/TRAINING PROGRAM

## 2022-03-23 PROCEDURE — 3288F FALL RISK ASSESSMENT DOCD: CPT | Mod: HCNC,CPTII,S$GLB, | Performed by: STUDENT IN AN ORGANIZED HEALTH CARE EDUCATION/TRAINING PROGRAM

## 2022-03-23 PROCEDURE — 1126F PR PAIN SEVERITY QUANTIFIED, NO PAIN PRESENT: ICD-10-PCS | Mod: HCNC,CPTII,S$GLB, | Performed by: STUDENT IN AN ORGANIZED HEALTH CARE EDUCATION/TRAINING PROGRAM

## 2022-03-23 PROCEDURE — 92020 GONIOSCOPY: CPT | Mod: HCNC,S$GLB,, | Performed by: STUDENT IN AN ORGANIZED HEALTH CARE EDUCATION/TRAINING PROGRAM

## 2022-03-23 PROCEDURE — 3288F PR FALLS RISK ASSESSMENT DOCUMENTED: ICD-10-PCS | Mod: HCNC,CPTII,S$GLB, | Performed by: STUDENT IN AN ORGANIZED HEALTH CARE EDUCATION/TRAINING PROGRAM

## 2022-03-23 PROCEDURE — 1159F MED LIST DOCD IN RCRD: CPT | Mod: HCNC,CPTII,S$GLB, | Performed by: STUDENT IN AN ORGANIZED HEALTH CARE EDUCATION/TRAINING PROGRAM

## 2022-03-23 PROCEDURE — 99999 PR PBB SHADOW E&M-EST. PATIENT-LVL III: ICD-10-PCS | Mod: PBBFAC,HCNC,, | Performed by: STUDENT IN AN ORGANIZED HEALTH CARE EDUCATION/TRAINING PROGRAM

## 2022-03-23 PROCEDURE — 36415 COLL VENOUS BLD VENIPUNCTURE: CPT | Mod: HCNC | Performed by: INTERNAL MEDICINE

## 2022-03-23 PROCEDURE — 99214 OFFICE O/P EST MOD 30 MIN: CPT | Mod: HCNC,S$GLB,, | Performed by: STUDENT IN AN ORGANIZED HEALTH CARE EDUCATION/TRAINING PROGRAM

## 2022-03-23 PROCEDURE — 99211 PR OFFICE/OUTPT VISIT, EST, LEVL I: ICD-10-PCS | Mod: S$GLB,,, | Performed by: INTERNAL MEDICINE

## 2022-03-23 NOTE — PROGRESS NOTES
HVF done ou./rel/fix/coop. good ou./chart checked for latex allergy./ .50 + 1.25 x 169/od .50 + 1.75 x 1/os-h

## 2022-04-07 ENCOUNTER — PATIENT MESSAGE (OUTPATIENT)
Dept: BARIATRICS | Facility: CLINIC | Age: 67
End: 2022-04-07
Payer: MEDICARE

## 2022-04-12 ENCOUNTER — OFFICE VISIT (OUTPATIENT)
Dept: PSYCHIATRY | Facility: CLINIC | Age: 67
End: 2022-04-12
Payer: MEDICARE

## 2022-04-12 ENCOUNTER — PATIENT MESSAGE (OUTPATIENT)
Dept: BARIATRICS | Facility: CLINIC | Age: 67
End: 2022-04-12
Payer: MEDICARE

## 2022-04-12 ENCOUNTER — LAB VISIT (OUTPATIENT)
Dept: LAB | Facility: HOSPITAL | Age: 67
End: 2022-04-12
Attending: INTERNAL MEDICINE
Payer: MEDICARE

## 2022-04-12 ENCOUNTER — ANTI-COAG VISIT (OUTPATIENT)
Dept: CARDIOLOGY | Facility: CLINIC | Age: 67
End: 2022-04-12
Payer: MEDICARE

## 2022-04-12 DIAGNOSIS — J44.9 CHRONIC OBSTRUCTIVE PULMONARY DISEASE, UNSPECIFIED COPD TYPE: ICD-10-CM

## 2022-04-12 DIAGNOSIS — I10 ESSENTIAL (PRIMARY) HYPERTENSION: ICD-10-CM

## 2022-04-12 DIAGNOSIS — H40.1212 NORMAL TENSION GLAUCOMA OF RIGHT EYE, MODERATE STAGE: ICD-10-CM

## 2022-04-12 DIAGNOSIS — I48.20 CHRONIC ATRIAL FIBRILLATION: ICD-10-CM

## 2022-04-12 DIAGNOSIS — I48.20 CHRONIC ATRIAL FIBRILLATION: Primary | ICD-10-CM

## 2022-04-12 DIAGNOSIS — E66.01 MORBID OBESITY DUE TO EXCESS CALORIES: ICD-10-CM

## 2022-04-12 DIAGNOSIS — Z01.818 PREOP EXAMINATION: Primary | ICD-10-CM

## 2022-04-12 DIAGNOSIS — Z95.2 H/O MITRAL VALVE REPLACEMENT WITH MECHANICAL VALVE: ICD-10-CM

## 2022-04-12 DIAGNOSIS — I50.32 CHRONIC DIASTOLIC CONGESTIVE HEART FAILURE: ICD-10-CM

## 2022-04-12 DIAGNOSIS — G47.33 OSA (OBSTRUCTIVE SLEEP APNEA): ICD-10-CM

## 2022-04-12 DIAGNOSIS — E78.2 MIXED HYPERLIPIDEMIA: ICD-10-CM

## 2022-04-12 LAB
INR PPP: 3.8 (ref 0.8–1.2)
PROTHROMBIN TIME: 37.3 SEC (ref 9–12.5)

## 2022-04-12 PROCEDURE — 96139 PR PSYCH/NEUROPSYCH TEST ADMIN/SCORING, BY TECH, 2+ TESTS, EA ADDTL 30 MIN: ICD-10-PCS | Mod: S$GLB,,, | Performed by: PSYCHOLOGIST

## 2022-04-12 PROCEDURE — 93793 ANTICOAG MGMT PT WARFARIN: CPT | Mod: S$GLB,,,

## 2022-04-12 PROCEDURE — 99999 PR PBB SHADOW E&M-EST. PATIENT-LVL I: ICD-10-PCS | Mod: PBBFAC,,, | Performed by: PSYCHOLOGIST

## 2022-04-12 PROCEDURE — 93793 PR ANTICOAGULANT MGMT FOR PT TAKING WARFARIN: ICD-10-PCS | Mod: S$GLB,,,

## 2022-04-12 PROCEDURE — 96130 PSYCL TST EVAL PHYS/QHP 1ST: CPT | Mod: S$GLB,,, | Performed by: PSYCHOLOGIST

## 2022-04-12 PROCEDURE — 96138 PR PSYCH/NEUROPSYCH TEST ADMIN/SCORING, BY TECH, 2+ TESTS, 1ST 30 MIN: ICD-10-PCS | Mod: S$GLB,,, | Performed by: PSYCHOLOGIST

## 2022-04-12 PROCEDURE — 3072F PR LOW RISK FOR RETINOPATHY: ICD-10-PCS | Mod: CPTII,S$GLB,, | Performed by: PSYCHOLOGIST

## 2022-04-12 PROCEDURE — 96131 PR PSYCHOLOGIC TEST EVAL SVCS, EA ADDTL HR: ICD-10-PCS | Mod: S$GLB,,, | Performed by: PSYCHOLOGIST

## 2022-04-12 PROCEDURE — 96131 PSYCL TST EVAL PHYS/QHP EA: CPT | Mod: S$GLB,,, | Performed by: PSYCHOLOGIST

## 2022-04-12 PROCEDURE — 96139 PSYCL/NRPSYC TST TECH EA: CPT | Mod: S$GLB,,, | Performed by: PSYCHOLOGIST

## 2022-04-12 PROCEDURE — 99999 PR PBB SHADOW E&M-EST. PATIENT-LVL I: CPT | Mod: PBBFAC,,, | Performed by: PSYCHOLOGIST

## 2022-04-12 PROCEDURE — 96138 PSYCL/NRPSYC TECH 1ST: CPT | Mod: S$GLB,,, | Performed by: PSYCHOLOGIST

## 2022-04-12 PROCEDURE — 85610 PROTHROMBIN TIME: CPT | Performed by: INTERNAL MEDICINE

## 2022-04-12 PROCEDURE — 90791 PSYCH DIAGNOSTIC EVALUATION: CPT | Mod: S$GLB,,, | Performed by: PSYCHOLOGIST

## 2022-04-12 PROCEDURE — 3072F LOW RISK FOR RETINOPATHY: CPT | Mod: CPTII,S$GLB,, | Performed by: PSYCHOLOGIST

## 2022-04-12 PROCEDURE — 36415 COLL VENOUS BLD VENIPUNCTURE: CPT | Performed by: INTERNAL MEDICINE

## 2022-04-12 PROCEDURE — 90791 PR PSYCHIATRIC DIAGNOSTIC EVALUATION: ICD-10-PCS | Mod: S$GLB,,, | Performed by: PSYCHOLOGIST

## 2022-04-12 PROCEDURE — 96130 PR PSYCHOLOGIC TEST EVAL SVCS, 1ST HR: ICD-10-PCS | Mod: S$GLB,,, | Performed by: PSYCHOLOGIST

## 2022-04-12 RX ORDER — LATANOPROST 50 UG/ML
1 SOLUTION/ DROPS OPHTHALMIC DAILY
Qty: 7.5 ML | Refills: 3 | Status: SHIPPED | OUTPATIENT
Start: 2022-04-12 | End: 2023-01-01 | Stop reason: SDUPTHER

## 2022-04-12 RX ORDER — DORZOLAMIDE HCL 20 MG/ML
1 SOLUTION/ DROPS OPHTHALMIC 2 TIMES DAILY
Qty: 10 ML | Refills: 3 | Status: SHIPPED | OUTPATIENT
Start: 2022-04-12 | End: 2023-01-01 | Stop reason: SDUPTHER

## 2022-04-12 NOTE — PSYCH TESTING
[facility]  [address] [place]  [address]  [phone]    REPORT OF PSYCHOLOGICAL TESTING    NAME: Elayne Almanzar  MRN #: [id number]  : 1955    REFERRED BY: Bird Gallegos Jr., MD    EVALUATED BY:  Audrey Brown, Ph.D., Psychologist  ANKIT Black Psychometrician    DATES OF EVALUATION: 2022, 2022    EVALUATION PROCEDURES AND TIMES:  Conducted by Psychologist (2 hours):  Integration of patient data, interpretation of standardized test results and clinical data, clinical decision-making, treatment planning and report, and interactive feedback to the patient  CPT Codes:  50649 - 1 hour; 44702 - 1 hour  Conducted by Technician (4 hours):  Psychological test administration and scoring by technician, two or more tests, any method:  [place] Multiphasic Personality Inventory - 2 - Restructured Form (MMPI-2-RF); [facility] Medicine Diagnostic (MBMD)  CPT Codes:  76815 - 30 minutes; 61043 - 30 minutes, 78499 - 30 minutes, 47083 - 30 minutes, 93501 - 30 minutes, 17052 - 30 minutes (5 additional units)    EVALUATION FINDINGS:  Before this evaluation was initiated, the purposes and process of the assessment and the limits of confidentiality were discussed with the patient who expressed understanding of these issues and orally consented to proceed with the evaluation.    Ms. Almanzar has struggled with weight since a six or seven years ago.  She stated, When I retired, then I got sick, I got well.  I found myself constantly snacking.  I eat once a day, but I eat junk food.  In addition, Ms. Almanzar acknowledges that she is an emotional eater, with stress as her primary emotional trigger to overeat.  She is working on increasing her coping mechanisms for stress, including drinking a lot of water, eating ice or fruit.  She has tried many weight loss methods on her own (i.e., portion control and calorie restriction) with temporary success and believes that her  biggest weight loss challenge is snacking.  Her motivation for seeking surgery now is to be healthy and see her grandkids grow up.  Her postsurgical goals include I want to go work.  Im bored at home.    Ms. Almanzar denied a psychiatric history and treatment.  She reported she was prescribed Zoloft since having heart surgery to help sleep at night, and she has continued to take it to deal with the stress of her sons employment situation.  She denied any history of suicidality or non-suicidal self-injury.  She denied a history of bulimic behaviors and binge eating episodes.    At her initial consultation with Rodney Al PA-C, on 1/21/2022, her BMI was 48.7 kg/m².  Her medical comorbidities include: HTN, CHF, HLD, COPD, A-fib.  She completed a nutritional consultation with Ms. Kassi Kirby RD, bariatric nutritionist, and reports that she has made many changes to her diet since beginning the program.  She has a good understanding regarding the risks and benefits of the procedure and appears motivated for change.  She was fully cooperative and engaged in the assessment process.     Ms. Almanzar produced an interpretable MMPI-2-RF profile.  Of note, validity scale results suggest Ms. Almanzar tended to endorse items that may be considered infrequent as compared to the population.  This level of responding may occur in individuals with genuine, severe problems; however, in the absence of corroborating evidence it may indicate over-reporting or exaggeration.  However, Ms. Almanzar reported a history of corroborating evidence and credible symptoms that may explain these endorsements.  Her scores indicated somatic complaints related to head pain, which she explained as occasional headaches and a history of migraines.  Her scored also indicated emotional-internalizing findings related to fears.  She explained in the clinical interview that she fears her health getting worse, including getting diabetes and needing to take insulin.   Lastly, her results indicated behavioral-externalizing problems relate to excessive activation and interpersonal difficulties relate to cynicism.  She reported she is bored at home and has energy to expend, which is why she wants to get healthier to return to working.  In relation to cynicism, she stated she is very careful with who she trusts, noting, People I thought were friend were tearing me down.  I dont have friends.  I have sisters and cousins.    The MBMD indicated that Ms. Almanzar is not reporting any significant psychiatric distress at this time.  She typically demonstrates conformity to the expectations of others, particularly those of medical professionals.  Though she identified no coping skills that are measured by this assessment, testing suggests that she is quite capable of handling the psychological discomfort of surgery.  According to test data, psychological factors are unlikely to contribute to excessive medical complications for Ms. Almanzar.  Her responses suggest that, compared to other patients seeking bariatric surgery, she is likely to experience an improved quality of life and functioning after surgery, and she is likely to follow through on making behavioral changes that will lead to optimal surgery results.  Psychological intervention is not necessary at this time.    DIAGNOSTIC IMPRESSIONS:    ICD-10-CM ICD-9-CM   1. Preop examination  Z01.818 V72.84   2. Morbid obesity due to excess calories  E66.01 278.01   3. Essential (primary) hypertension  I10 401.9   4. Chronic atrial fibrillation  I48.20 427.31   5. H/O mitral valve replacement with mechanical valve  Z95.2 V43.3   6. Chronic obstructive pulmonary disease, unspecified COPD type  J44.9 496   7. Mixed hyperlipidemia  E78.2 272.2   8. KEYSHAWN (obstructive sleep apnea)  G47.33 327.23   9. Chronic diastolic congestive heart failure  I50.32 428.32     428.0   10. BMI 45.0-49.9, adult  Z68.42 V85.42     SUMMARY AND RECOMMENDATIONS:  Ms. Almanzar  has a long history of weight problems and is pursuing bariatric surgery at this time in an effort to improve her health and quality of life.  Test results should be considered valid and indicate that she reports head pain, fears, excessive activation, and cynicism.  In the clinical interview, Ms. Almanzar reports some stress related to her sons employment problems, but she denied significant distress and reported healthy coping skills.  She reports good social support, demonstrates several characteristics that make her a good candidate for surgery, and testing suggests that she will benefit in multiple ways from bariatric surgery.  Test results show NO overt psychological contraindications for surgery.

## 2022-04-12 NOTE — PROGRESS NOTES
Psychiatry Initial Visit (PhD/LCSW)   Diagnostic Interview - CPT 76677     Date: 4/12/2022    Site: [facility]     Referral source: Bird Gallegos Jr., M.D.     Clinical status of patient: Outpatient     Ms. Almanzar, a 66 y.o.  female, presented for initial evaluation visit. Before this evaluation was initiated, the purposes and process of the assessment and the limits of confidentiality were discussed with the patient who expressed understanding of these issues and orally consented to proceed with the evaluation.     Chief complaint/reason for encounter: Routine psychological evaluation prior to bariatric surgery.     Type of surgery sought:  laparoscopic Aris-en-Y     History of present illness:  Ms. Almanzar is a 66 y.o.   female who is pursuing bariatric surgery to improve her health and quality of life.  She denied a psychiatric history and treatment.  She reported she was prescribed Zoloft since having heart surgery to help sleep at night, and she has continued to take it to deal with the stress of her sons employment situation.  She denied any history of suicidality or non-suicidal self-injury.  She currently appears psychologically stable and has good coping skills.  She has attempted making positive lifestyle changes in anticipation for surgery, with reported benefit.  The patient has a Body Mass Index of 48.7 kg/m² as documented by the referring provider.    Ms. Almanzar has struggled with weight since a six or seven years ago.  She stated, When I retired, then I got sick, I got well.  I found myself constantly snacking.  I eat once a day, but I eat junk food.  In addition, Ms. Almanzar acknowledges that she is an emotional eater, with stress as her primary emotional trigger to overeat.  She is working on increasing her coping mechanisms for stress, including drinking a lot of water, eating ice or fruit.  She has tried many weight loss methods on her own (i.e.,  portion control and calorie restriction) with temporary success and believes that her biggest weight loss challenge is snacking.  Her motivation for seeking surgery now is to be healthy and see her grandkids grow up.  Her postsurgical goals include I want to go work.  Im bored at home.    Ms. Almanzar has met with bariatric nutritionist Kassi Kirby RD, and reports that she has made the following lifestyle changes since beginning the bariatric program:  eating high protein, low carb diet.  She chose the gastric bypass because of the amount of weight she wants to lose.  She understood that she needs to focus on protein intake after surgery, which includes high protein meals.  She noted that she will need to stay away from junk food snacks, carbs, and large portions after surgery.  She hopes to lose at least 100 pounds and expects it will take about a year, something I will keep working at until I reach my goal.  Her daughter, , and her sisters will be available to help her during recovery.    Medical History:  HTN, CHF, HLD, COPD, A-fib     Current medications and drug reactions:  see medication list.    Pain:  She reported pain in her knee due to arthritis and growing old.    Psychiatric Symptoms:  Depression:  She reported feeling down at times in regard to the situation with her son.  He gets fired from jobs frequently.  She reported feeling down for a day or two.  She denied significant episodes of depressed mood or depression-related anhedonia.  Kym/Hypomania:  Denied.  She denied periods of elevated mood or abnormally increased energy or goal-directed activity.  Anxiety:  Denied.  She denied experiencing excessive, exaggerated anxiety that was unmanageable.  She reported her sons situation causes her stress, but she works to let it go because she cannot change it.  Thoughts:  Denied delusions, hallucinations.  Suicidal thoughts/behaviors:  Denied.  Self-injury:  Denied.  Sleep:  Denied  problems.  Cognitive functioning:  Denied problems.    History of eating disorders:  History of bulimia:  She denied recurrent episodes of binge eating and inappropriate compensatory behaviors.    History of binge-eating episodes:  She denied eating excessive amounts of food within a discrete time period with a lack of control during eating.  She denied eating more rapidly than normal, eating until uncomfortably full, eating large amounts of food when not physically hungry, eating alone due to embarrassment, or negative emotions (i.e., disgusted, guilty, depressed) afterwards.    Current psychosocial stressors:  Her son keeps losing jobs and her  argues with him about it.  Then she gets in the middle, and it stresses her out.  Report of coping skills:  She stated, I go sit on the back porch and take deep breaths, give me time to think or get in my car and take a ride, go sit on a park bench.  Support system:  her sisters  Strengths and liabilities:  Strength: Patient accepts guidance/feedback, Strength: Patient is expressive/articulate., Strength: Patient is motivated for change., Strength: Patient has positive support network., Strength: Patient has reasonable judgment., Strength: Patient is stable.    Current and past substance use:  Alcohol: Denied current use; denied history of abuse or dependency.   Drugs: Denied current use; denied history of abuse or dependency.  Tobacco: None.  She stopped smoking in 2002.   Caffeine: None.  She stopped drinking caffeine for lent.    Current Psychiatric Treatment:  Medications:  Zoloft (given after had open heart surgery because she could not sleep at night, it calms me).  Psychotherapy:  Denied.    Psychiatric History:  Previous diagnosis:  None reported.  Previous hospitalizations:  None reported.  History of outpatient treatment:  Denied.   Previous suicide attempt:  Denied.  Family history of psychiatric illness:  None reported.    Trauma history:   Denied.    Social history (marriage, employment, etc.):  Ms. Almanzar was born and raised in [place], [place] by her biological grandparents along with two sisters and one brother and also had other siblings from her fathers affair.  She described her childhood as nice, a good childhood.  She denied childhood trauma, abuse, and neglect.  She liked school and earned a high school diploma.  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  She stopped working in 2002 because she got sick and had to have open heart surgery.  She is currently retired.  She is not on disability and finances are adequate.  She has been  to her  for 41 years.  She has two living children (ages 41 and 36), and her first born only lived one day due to complications of placenta previa.  She currently lives with her  in [place], [place].  She enjoys music, reading a good book, watching movies, and going out with her grandkids.  She identifies as Restorationist.    Legal history: Denied.    Mental Status Exam:   General appearance:  appears stated age, neatly dressed, well groomed  Speech:  normal rate and tone  Level of cooperation:  cooperative  Thought processes:  logical, goal-directed  Mood:  euthymic  Thought content:  no illusions, no visual hallucinations, no auditory hallucinations, no delusions, no active or passive homicidal thoughts, no active or passive suicidal ideation, no obsessions, no compulsions, no violence  Affect:  appropriate  Orientation:  oriented to person, place, and date  Judgment and insight: fair  Language:  intact    Diagnostic Impression:    ICD-10-CM ICD-9-CM   1. Preop examination  Z01.818 V72.84   2. Morbid obesity due to excess calories  E66.01 278.01   3. Essential (primary) hypertension  I10 401.9   4. Chronic atrial fibrillation   I48.20 427.31   5. H/O mitral valve replacement with mechanical valve  Z95.2 V43.3   6. Chronic obstructive pulmonary disease, unspecified COPD type  J44.9 496   7. Mixed hyperlipidemia  E78.2 272.2   8. KEYSHAWN (obstructive sleep apnea)  G47.33 327.23   9. Chronic diastolic congestive heart failure  I50.32 428.32     428.0   10. BMI 45.0-49.9, adult  Z68.42 V85.42     Summary/Conclusion:   There are no overt psychological contraindications for proceeding with bariatric surgery.  The patient has no significant psychiatric history and reports no current psychiatric problems or major adjustment issues.  The patient has reasonable expectations for surgery, good social support, and has already begun making appropriate lifestyle changes in anticipation for surgery. The patient has verbalized appropriate awareness and commitment to the necessary behavioral changes associated with bariatric surgery and appears willing to comply with long-term lifestyle changes.  There are no recommendations for psychological treatment at this time.  The patient is aware of resources available should her needs change in the future.    Recommendations:    Clear     This patient is psychologically cleared to proceed with bariatric surgery.     Please see Psychological Testing report available in Notes tab of Chart Review in Epic for results of psychological testing.    Length of Session:  40 minutes

## 2022-04-12 NOTE — PROGRESS NOTES
INR not at goal. Medications, chart, and patient findings reviewed. See calendar for adjustments to dose and follow up plan.  Patient has been taking 5 mg on Mon, Wed and Fri and 7.5 mg all other days. No other changes to report

## 2022-04-14 ENCOUNTER — PATIENT OUTREACH (OUTPATIENT)
Dept: ADMINISTRATIVE | Facility: OTHER | Age: 67
End: 2022-04-14
Payer: MEDICARE

## 2022-04-14 NOTE — PROGRESS NOTES
Requested updates within Care Everywhere.  Patient's chart was reviewed for overdue ROCIO topics.  Health maintenance:updated  Immunizations:reconciled   Legacy:   Media:  Orders placed:  Tasked appts:  Labs Linked:  Upcoming appt:

## 2022-04-19 ENCOUNTER — ANTI-COAG VISIT (OUTPATIENT)
Dept: CARDIOLOGY | Facility: CLINIC | Age: 67
End: 2022-04-19
Payer: MEDICARE

## 2022-04-19 ENCOUNTER — LAB VISIT (OUTPATIENT)
Dept: LAB | Facility: HOSPITAL | Age: 67
End: 2022-04-19
Attending: INTERNAL MEDICINE
Payer: MEDICARE

## 2022-04-19 ENCOUNTER — TELEPHONE (OUTPATIENT)
Dept: BARIATRICS | Facility: CLINIC | Age: 67
End: 2022-04-19
Payer: MEDICARE

## 2022-04-19 DIAGNOSIS — I48.20 CHRONIC ATRIAL FIBRILLATION: Primary | ICD-10-CM

## 2022-04-19 DIAGNOSIS — Z95.2 H/O MITRAL VALVE REPLACEMENT WITH MECHANICAL VALVE: ICD-10-CM

## 2022-04-19 DIAGNOSIS — I48.20 CHRONIC ATRIAL FIBRILLATION: ICD-10-CM

## 2022-04-19 LAB
INR PPP: 2.8 (ref 0.8–1.2)
PROTHROMBIN TIME: 27.7 SEC (ref 9–12.5)

## 2022-04-19 PROCEDURE — 36415 COLL VENOUS BLD VENIPUNCTURE: CPT | Performed by: INTERNAL MEDICINE

## 2022-04-19 PROCEDURE — 93793 PR ANTICOAGULANT MGMT FOR PT TAKING WARFARIN: ICD-10-PCS | Mod: S$GLB,,,

## 2022-04-19 PROCEDURE — 85610 PROTHROMBIN TIME: CPT | Performed by: INTERNAL MEDICINE

## 2022-04-19 PROCEDURE — 93793 ANTICOAG MGMT PT WARFARIN: CPT | Mod: S$GLB,,,

## 2022-04-19 NOTE — TELEPHONE ENCOUNTER
----- Message from Kassi Kirby sent at 2/18/2022  9:30 AM CST -----  Regarding: pre op work up  Unsure if all her work up is complete. Looks like she has o/s records. Thanks LL

## 2022-04-20 DIAGNOSIS — E11.9 TYPE 2 DIABETES MELLITUS WITHOUT COMPLICATION: ICD-10-CM

## 2022-04-22 ENCOUNTER — TELEPHONE (OUTPATIENT)
Dept: BARIATRICS | Facility: CLINIC | Age: 67
End: 2022-04-22
Payer: MEDICARE

## 2022-04-22 DIAGNOSIS — E66.01 MORBID OBESITY: ICD-10-CM

## 2022-04-22 DIAGNOSIS — E78.5 HYPERLIPIDEMIA, UNSPECIFIED HYPERLIPIDEMIA TYPE: ICD-10-CM

## 2022-04-22 DIAGNOSIS — I48.20 CHRONIC ATRIAL FIBRILLATION: ICD-10-CM

## 2022-04-22 DIAGNOSIS — Z95.2 H/O MITRAL VALVE REPLACEMENT WITH MECHANICAL VALVE: ICD-10-CM

## 2022-04-22 DIAGNOSIS — I10 ESSENTIAL (PRIMARY) HYPERTENSION: ICD-10-CM

## 2022-04-22 DIAGNOSIS — J44.9 CHRONIC OBSTRUCTIVE PULMONARY DISEASE, UNSPECIFIED COPD TYPE: ICD-10-CM

## 2022-04-22 DIAGNOSIS — E55.9 VITAMIN D DEFICIENCY: Primary | ICD-10-CM

## 2022-04-22 DIAGNOSIS — Z79.899 ENCOUNTER FOR LONG-TERM CURRENT USE OF HIGH RISK MEDICATION: ICD-10-CM

## 2022-04-22 NOTE — TELEPHONE ENCOUNTER
Phoned patient and LVM to [facility] to discuss a surgery date of possibly 5/31/2022 and to call back quickly to secure the date.

## 2022-04-25 ENCOUNTER — TELEPHONE (OUTPATIENT)
Dept: CARDIOLOGY | Facility: CLINIC | Age: 67
End: 2022-04-25
Payer: MEDICARE

## 2022-04-25 ENCOUNTER — TELEPHONE (OUTPATIENT)
Dept: BARIATRICS | Facility: CLINIC | Age: 67
End: 2022-04-25
Payer: MEDICARE

## 2022-04-25 RX ORDER — METOPROLOL TARTRATE 100 MG/1
100 TABLET ORAL 2 TIMES DAILY
Qty: 60 TABLET | Refills: 11 | Status: SHIPPED | OUTPATIENT
Start: 2022-04-25 | End: 2023-01-01

## 2022-04-25 NOTE — TELEPHONE ENCOUNTER
----- Message from Pooja Logan RN sent at 4/22/2022  3:33 PM CDT -----  Regarding: bridging with Lovenox  To all,   We have now scheduled Ms Almanzar's surgery for Tuesday, May 31 with Dr Bird Gallegos.  She will need to be set up for bridging with Lovenox in preparation for her surgery.  Can you please contact her and get her set up.  Also Dr Messina, she is on Metoprolol Succinate 200 mg 24 hr tablet which will have to be changed to the immediate release form as she will be unable to swallow any pills larger that the tip of a pencil eraser for 4 weeks after her surgery.  This pill will not be able to be crushed.  Please let me know when all of this has been set up.  Thanks for your help,  GENOVEVA Paulino  Bariatrics

## 2022-04-25 NOTE — TELEPHONE ENCOUNTER
----- Message from Cade Lemus PharmD sent at 4/22/2022  4:29 PM CDT -----  Regarding: RE: bridging with Lovenox  Yes we can provide bridging instructions prior to procedure.  Thank you for notifying us.     Cade Lemus PharmD  [facility]   ----- Message -----  From: Nina Krishna PharmD  Sent: 4/22/2022   3:42 PM CDT  To: Cade Lemus PharmD  Subject: FW: bridging with Lovenox                          ----- Message -----  From: Pooja Logan RN  Sent: 4/22/2022   3:40 PM CDT  To: Pooja Logan RN, Lorraine Messina MD, #  Subject: bridging with Lovenox                            To all,   We have now scheduled Ms Almanzar's surgery for Tuesday, May 31 with Dr Bird Gallegos.  She will need to be set up for bridging with Lovenox in preparation for her surgery.  Can you please contact her and get her set up.  Also Dr Messina, she is on Metoprolol Succinate 200 mg 24 hr tablet which will have to be changed to the immediate release form as she will be unable to swallow any pills larger that the tip of a pencil eraser for 4 weeks after her surgery.  This pill will not be able to be crushed.  Please let me know when all of this has been set up.  Thanks for your help,  GENOVEVA Paulino  Bariatrics

## 2022-04-25 NOTE — TELEPHONE ENCOUNTER
Metoprolol succinate 200 mg daily changed to metoprolol tartrate 100 mg bid for upcoming bariatric surgery.

## 2022-04-27 DIAGNOSIS — E11.9 TYPE 2 DIABETES MELLITUS WITHOUT COMPLICATION: ICD-10-CM

## 2022-04-28 ENCOUNTER — TELEPHONE (OUTPATIENT)
Dept: BARIATRICS | Facility: CLINIC | Age: 67
End: 2022-04-28
Payer: MEDICARE

## 2022-04-28 ENCOUNTER — OFFICE VISIT (OUTPATIENT)
Dept: HEMATOLOGY/ONCOLOGY | Facility: CLINIC | Age: 67
End: 2022-04-28
Payer: MEDICARE

## 2022-04-28 ENCOUNTER — LAB VISIT (OUTPATIENT)
Dept: LAB | Facility: HOSPITAL | Age: 67
End: 2022-04-28
Payer: MEDICARE

## 2022-04-28 VITALS
RESPIRATION RATE: 16 BRPM | TEMPERATURE: 98 F | OXYGEN SATURATION: 95 % | HEIGHT: 62 IN | WEIGHT: 232.94 LBS | BODY MASS INDEX: 42.87 KG/M2 | DIASTOLIC BLOOD PRESSURE: 55 MMHG | SYSTOLIC BLOOD PRESSURE: 125 MMHG | HEART RATE: 73 BPM

## 2022-04-28 DIAGNOSIS — D50.0 ANEMIA DUE TO CHRONIC BLOOD LOSS: Primary | ICD-10-CM

## 2022-04-28 DIAGNOSIS — D50.8 IRON DEFICIENCY ANEMIA SECONDARY TO INADEQUATE DIETARY IRON INTAKE: ICD-10-CM

## 2022-04-28 DIAGNOSIS — Z79.01 CHRONIC ANTICOAGULATION: Chronic | ICD-10-CM

## 2022-04-28 DIAGNOSIS — E55.9 VITAMIN D DEFICIENCY: ICD-10-CM

## 2022-04-28 LAB
25(OH)D3+25(OH)D2 SERPL-MCNC: 12 NG/ML (ref 30–96)
ALBUMIN SERPL BCP-MCNC: 3.7 G/DL (ref 3.5–5.2)
ALP SERPL-CCNC: 67 U/L (ref 55–135)
ALT SERPL W/O P-5'-P-CCNC: 11 U/L (ref 10–44)
ANION GAP SERPL CALC-SCNC: 6 MMOL/L (ref 8–16)
AST SERPL-CCNC: 20 U/L (ref 10–40)
BASOPHILS # BLD AUTO: 0.02 K/UL (ref 0–0.2)
BASOPHILS NFR BLD: 0.5 % (ref 0–1.9)
BILIRUB SERPL-MCNC: 0.8 MG/DL (ref 0.1–1)
BUN SERPL-MCNC: 13 MG/DL (ref 8–23)
CALCIUM SERPL-MCNC: 9.5 MG/DL (ref 8.7–10.5)
CHLORIDE SERPL-SCNC: 104 MMOL/L (ref 95–110)
CO2 SERPL-SCNC: 31 MMOL/L (ref 23–29)
CREAT SERPL-MCNC: 0.8 MG/DL (ref 0.5–1.4)
DIFFERENTIAL METHOD: ABNORMAL
EOSINOPHIL # BLD AUTO: 0.2 K/UL (ref 0–0.5)
EOSINOPHIL NFR BLD: 4.5 % (ref 0–8)
ERYTHROCYTE [DISTWIDTH] IN BLOOD BY AUTOMATED COUNT: 17.3 % (ref 11.5–14.5)
EST. GFR  (AFRICAN AMERICAN): >60 ML/MIN/1.73 M^2
EST. GFR  (NON AFRICAN AMERICAN): >60 ML/MIN/1.73 M^2
FERRITIN SERPL-MCNC: 40 NG/ML (ref 20–300)
GLUCOSE SERPL-MCNC: 101 MG/DL (ref 70–110)
HCT VFR BLD AUTO: 39.2 % (ref 37–48.5)
HGB BLD-MCNC: 11.2 G/DL (ref 12–16)
IMM GRANULOCYTES # BLD AUTO: 0.01 K/UL (ref 0–0.04)
IMM GRANULOCYTES NFR BLD AUTO: 0.3 % (ref 0–0.5)
IRON SERPL-MCNC: 63 UG/DL (ref 30–160)
LYMPHOCYTES # BLD AUTO: 1.1 K/UL (ref 1–4.8)
LYMPHOCYTES NFR BLD: 28.3 % (ref 18–48)
MCH RBC QN AUTO: 20.7 PG (ref 27–31)
MCHC RBC AUTO-ENTMCNC: 28.6 G/DL (ref 32–36)
MCV RBC AUTO: 73 FL (ref 82–98)
MONOCYTES # BLD AUTO: 0.5 K/UL (ref 0.3–1)
MONOCYTES NFR BLD: 12.8 % (ref 4–15)
NEUTROPHILS # BLD AUTO: 2 K/UL (ref 1.8–7.7)
NEUTROPHILS NFR BLD: 53.6 % (ref 38–73)
NRBC BLD-RTO: 0 /100 WBC
PLATELET # BLD AUTO: 141 K/UL (ref 150–450)
PMV BLD AUTO: ABNORMAL FL (ref 9.2–12.9)
POTASSIUM SERPL-SCNC: 4.3 MMOL/L (ref 3.5–5.1)
PROT SERPL-MCNC: 7 G/DL (ref 6–8.4)
RBC # BLD AUTO: 5.4 M/UL (ref 4–5.4)
SATURATED IRON: 14 % (ref 20–50)
SODIUM SERPL-SCNC: 141 MMOL/L (ref 136–145)
TOTAL IRON BINDING CAPACITY: 444 UG/DL (ref 250–450)
TRANSFERRIN SERPL-MCNC: 300 MG/DL (ref 200–375)
WBC # BLD AUTO: 3.75 K/UL (ref 3.9–12.7)

## 2022-04-28 PROCEDURE — 3072F PR LOW RISK FOR RETINOPATHY: ICD-10-PCS | Mod: CPTII,S$GLB,, | Performed by: INTERNAL MEDICINE

## 2022-04-28 PROCEDURE — 3072F LOW RISK FOR RETINOPATHY: CPT | Mod: CPTII,S$GLB,, | Performed by: INTERNAL MEDICINE

## 2022-04-28 PROCEDURE — 85025 COMPLETE CBC W/AUTO DIFF WBC: CPT | Performed by: INTERNAL MEDICINE

## 2022-04-28 PROCEDURE — 1101F PR PT FALLS ASSESS DOC 0-1 FALLS W/OUT INJ PAST YR: ICD-10-PCS | Mod: CPTII,S$GLB,, | Performed by: INTERNAL MEDICINE

## 2022-04-28 PROCEDURE — 99999 PR PBB SHADOW E&M-EST. PATIENT-LVL V: ICD-10-PCS | Mod: PBBFAC,,, | Performed by: INTERNAL MEDICINE

## 2022-04-28 PROCEDURE — 84466 ASSAY OF TRANSFERRIN: CPT | Performed by: INTERNAL MEDICINE

## 2022-04-28 PROCEDURE — 82728 ASSAY OF FERRITIN: CPT | Performed by: INTERNAL MEDICINE

## 2022-04-28 PROCEDURE — 99999 PR PBB SHADOW E&M-EST. PATIENT-LVL V: CPT | Mod: PBBFAC,,, | Performed by: INTERNAL MEDICINE

## 2022-04-28 PROCEDURE — 3008F BODY MASS INDEX DOCD: CPT | Mod: CPTII,S$GLB,, | Performed by: INTERNAL MEDICINE

## 2022-04-28 PROCEDURE — 3074F PR MOST RECENT SYSTOLIC BLOOD PRESSURE < 130 MM HG: ICD-10-PCS | Mod: CPTII,S$GLB,, | Performed by: INTERNAL MEDICINE

## 2022-04-28 PROCEDURE — 1101F PT FALLS ASSESS-DOCD LE1/YR: CPT | Mod: CPTII,S$GLB,, | Performed by: INTERNAL MEDICINE

## 2022-04-28 PROCEDURE — 36415 COLL VENOUS BLD VENIPUNCTURE: CPT | Performed by: NURSE PRACTITIONER

## 2022-04-28 PROCEDURE — 1159F PR MEDICATION LIST DOCUMENTED IN MEDICAL RECORD: ICD-10-PCS | Mod: CPTII,S$GLB,, | Performed by: INTERNAL MEDICINE

## 2022-04-28 PROCEDURE — 3074F SYST BP LT 130 MM HG: CPT | Mod: CPTII,S$GLB,, | Performed by: INTERNAL MEDICINE

## 2022-04-28 PROCEDURE — 80053 COMPREHEN METABOLIC PANEL: CPT | Performed by: INTERNAL MEDICINE

## 2022-04-28 PROCEDURE — 3078F PR MOST RECENT DIASTOLIC BLOOD PRESSURE < 80 MM HG: ICD-10-PCS | Mod: CPTII,S$GLB,, | Performed by: INTERNAL MEDICINE

## 2022-04-28 PROCEDURE — 3008F PR BODY MASS INDEX (BMI) DOCUMENTED: ICD-10-PCS | Mod: CPTII,S$GLB,, | Performed by: INTERNAL MEDICINE

## 2022-04-28 PROCEDURE — 99215 OFFICE O/P EST HI 40 MIN: CPT | Mod: S$GLB,,, | Performed by: INTERNAL MEDICINE

## 2022-04-28 PROCEDURE — 1125F PR PAIN SEVERITY QUANTIFIED, PAIN PRESENT: ICD-10-PCS | Mod: CPTII,S$GLB,, | Performed by: INTERNAL MEDICINE

## 2022-04-28 PROCEDURE — 3288F FALL RISK ASSESSMENT DOCD: CPT | Mod: CPTII,S$GLB,, | Performed by: INTERNAL MEDICINE

## 2022-04-28 PROCEDURE — 1159F MED LIST DOCD IN RCRD: CPT | Mod: CPTII,S$GLB,, | Performed by: INTERNAL MEDICINE

## 2022-04-28 PROCEDURE — 99215 PR OFFICE/OUTPT VISIT, EST, LEVL V, 40-54 MIN: ICD-10-PCS | Mod: S$GLB,,, | Performed by: INTERNAL MEDICINE

## 2022-04-28 PROCEDURE — 82306 VITAMIN D 25 HYDROXY: CPT | Performed by: NURSE PRACTITIONER

## 2022-04-28 PROCEDURE — 1125F AMNT PAIN NOTED PAIN PRSNT: CPT | Mod: CPTII,S$GLB,, | Performed by: INTERNAL MEDICINE

## 2022-04-28 PROCEDURE — 3288F PR FALLS RISK ASSESSMENT DOCUMENTED: ICD-10-PCS | Mod: CPTII,S$GLB,, | Performed by: INTERNAL MEDICINE

## 2022-04-28 PROCEDURE — 3078F DIAST BP <80 MM HG: CPT | Mod: CPTII,S$GLB,, | Performed by: INTERNAL MEDICINE

## 2022-04-28 NOTE — PROGRESS NOTES
"    PATIENT: Elayne Almanzar  MRN: [id number]  DATE: 4/28/2022    Chief Complaint: Anemia follow up    Subjective:    Initial History: Ms. Almanzar is a 66 y.o. female with pertinent PMHx of permanent atrial fibrillation on coumadin, CKD, KEYSHAWN compliant with CPAP, chronic diastolic heart failure, and s/p mechanical mitral valve placed in 2005 due to rheumatic mitral stenosis who was referred for evaluation of microcytic anemia.       Pt does not take ferrous sulfate due to constipation it causes. Is compliant with aspirin 81mg qday and is therapeutic on coumadin. Her last EGD 12/19/19 showing mild gastritis and her last colonoscopy 6/26/19 showed 3mm sessile polyp which was resected and a right colon "full of actively bleeding AVMs." Has had a stable persistent MCV of 68-70 since 2011 with a steady decline in her hgb/hct since that time. Most recent H&H 7.7 and 29% with MCV 65.  Her other cell lines have remained normal.  Has also had a persistently low/normal ferritin with previous iron studies indicating iron deficiency anemia and a persistently elevated RDW. B12 and folate WNL. Reticulocytes 1.4%. Does have a persistently elevated LDH with low/normal haptoglobin but normal t.bili.  CTabd May 2018 without splenomegaly. Has had TTG checked in the past which was negative.    Does feel fatigued and often cold but otherwise denies symptoms including fever, chills, shortness of breath, chest pain, hair loss, abdominal pain, n/v/d. Always has cravings for ice and feels she could eat ice all day which has been ongoing for many years. She denies a hx of heavy periods and underwent menopause at age 55. Denies maroon and black colored stools. Denies hematuria. Has intentionally lost about 20 lbs recently due to cutting out sodium and the associated foods. She quit smoking in 2002, denies alcohol use, and denies recreational drug use. Had her home redone after Marian and drinks bottled water. States her mothers and sister all " have iron deficiency anemia but denies any knowledge of a genetic condition. Denies personal hx of cancer but has significant family hx of mother and grandmother with colon cancer, aunt with breast cancer, and brother who passed away at age 23 due to testicular cancer.    12/27/20  Pt was prescribed IV iron at the last visit however did not want to commit to it at that time. Opted for oral iron. States pharmacy never received the rx so she has not been taking any iron supplement. Continues to feel fatigued with easy bruising. Has dyspnea on exertion but is able to walk an entire grocery store without needing to rest. Does not feel limited by her energy. Still craves ice. Denies any new complaints.     02/23/21:   Has been compliant with ferrous sulfate daily. Continues to have fatigue and dyspnea on exertion but does not feel limited in any activities. Continues to crave ice and has about 5 cups a day. Plans to have a gastric bypass later this year. Denies any new complaints.    Interval Hx:  Previously received IV iron. Overall feels better. No new issues or concerns.      Planning to have gastric surgery in the coming months.      Past Medical History:   Past Medical History:   Diagnosis Date    Acute on chronic diastolic congestive heart failure     Allergy     Anemia     Anticoagulant long-term use     Anxiety     Asthma     Atrial fibrillation 2002    Cataract     Chronic kidney disease     COPD (chronic obstructive pulmonary disease)     Coronary artery calcification seen on CT scan 3/22/2022    Coronary artery calcification seen on CT scan 3/22/2022    Depression     Encounter for blood transfusion     HTN (hypertension)     Hyperlipidemia     Nephrolithiasis     KEYSHAWN (obstructive sleep apnea)     awaiting CPAP machine     Rheumatic disease of mitral valve     Uncontrolled type 2 diabetes mellitus with complication, with long-term current use of insulin 5/27/2020    Urinary incontinence         Past Surgical HIstory:   Past Surgical History:   Procedure Laterality Date    BREAST BIOPSY Left 10/2019    CARDIAC VALVE SURGERY  2004    mechanical mitral valve     SECTION      COLONOSCOPY N/A 2019    Procedure: COLONOSCOPY;  Surgeon: Devon Bowling MD;  Location: [facility] ENDO (4TH FLR);  Service: Endoscopy;  Laterality: N/A;  ok to hold Coumadin x 5 days with Lovenox bridge per Coumadin clinic-[facility]    ESOPHAGOGASTRODUODENOSCOPY N/A 2019    Procedure: EGD (ESOPHAGOGASTRODUODENOSCOPY);  Surgeon: Smooth Torrez MD;  Location: [facility] ENDO (2ND FLR);  Service: Endoscopy;  Laterality: N/A;  2nd floor requested due to comorbidities, Hypertension, permanent A. 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 44     per Coumadin clinic-ok to hold for 5 days w/bridge    kidney stone removal      MITRAL VALVE REPLACEMENT  2004    TUBAL LIGATION         Family History:   Family History   Problem Relation Age of Onset    Stroke Paternal Grandmother     Colon cancer Maternal Grandmother     Cancer Brother         testicular cancer    Diabetes Sister     Hypertension Sister     Breast cancer Paternal Aunt     Diabetes Sister     Diabetes Sister     Heart disease Father 70        MI    Diabetes Father     Colon cancer Mother 83    Asthma Daughter     Asthma Son     Ovarian cancer Neg Hx        Social History:  reports that she quit smoking about 19 years ago. Her smoking use included cigarettes. She has a 10.00 pack-year smoking history. She has never used smokeless tobacco. She reports that she does not drink alcohol and does not use drugs.    Allergies:  Review of patient's allergies indicates:   Allergen Reactions    Brimonidine        Medications:  Current Outpatient Medications   Medication Sig Dispense Refill    ADVAIR DISKUS 500-50 mcg/dose DsDv diskus inhaler INHALE 1 PUFF INTO THE LUNGS 2 (TWO) TIMES  DAILY. 60 each 6    albuterol (VENTOLIN HFA) 90 mcg/actuation inhaler INHALE 2 PUFFS INTO THE LUNGS EVERY 6 (SIX) HOURS AS NEEDED FOR WHEEZING. RESCUE 18 g 6    amLODIPine (NORVASC) 2.5 MG tablet Take 1 tablet (2.5 mg total) by mouth once daily. 30 tablet 4    amoxicillin (AMOXIL) 500 MG Tab 4 tablets po 1 hour prior to procedure 4 tablet 0    aspirin (ECOTRIN) 81 MG EC tablet Take 81 mg by mouth once daily.       dipyridamole (PERSANTINE) 5 mg/mL injection       dorzolamide (TRUSOPT) 2 % ophthalmic solution Place 1 drop into the right eye 2 (two) times a day. 10 mL 3    fluticasone propionate (FLONASE) 50 mcg/actuation nasal spray 2 sprays (100 mcg total) by Each Nostril route once daily. 16 g 3    furosemide (LASIX) 20 MG tablet Take 1 tablet (20 mg total) by mouth once daily. 30 tablet 4    KENALOG 40 mg/mL injection       latanoprost (XALATAN) 0.005 % ophthalmic solution Place 1 drop into the right eye once daily. 7.5 mL 3    meclizine (ANTIVERT) 25 mg tablet Take 1 tablet (25 mg total) by mouth 2 (two) times daily as needed. 30 tablet 1    metoprolol tartrate (LOPRESSOR) 100 MG tablet Take 1 tablet (100 mg total) by mouth 2 (two) times daily. 60 tablet 11    nitroGLYCERIN (NITROSTAT) 0.4 MG SL tablet Place 1 tablet (0.4 mg total) under the tongue every 5 (five) minutes as needed for Chest pain. 30 tablet 1    oxybutynin (DITROPAN-XL) 5 MG TR24 Take 5 mg by mouth once daily.      pantoprazole (PROTONIX) 40 MG tablet Take 1 tablet (40 mg total) by mouth daily as needed. 30 tablet 3    rosuvastatin (CRESTOR) 20 MG tablet Take 1 tablet (20 mg total) by mouth once daily. 90 tablet 3    sertraline (ZOLOFT) 100 MG tablet Take 1 tablet (100 mg total) by mouth once daily. 30 tablet 3    tamsulosin (FLOMAX) 0.4 mg Cap Take 1 capsule (0.4 mg total) by mouth once daily. 30 capsule 11    warfarin (COUMADIN) 5 MG tablet Take 1.5 tablets (7.5mg) by mouth Monday, Wednesday, Friday then 1 tablets (5mg) all  other days or as instructed by Coumadin Clinic. 45 tablet 3     No current facility-administered medications for this visit.       Review of Systems   Constitutional: Positive for fatigue. Negative for appetite change, chills and fever.   HENT: Negative for nosebleeds and sore throat.    Eyes: Negative for photophobia and visual disturbance.   Respiratory: Positive for shortness of breath. Negative for cough.    Cardiovascular: Negative for chest pain, palpitations and leg swelling.   Gastrointestinal: Negative for abdominal pain, blood in stool, diarrhea, nausea and vomiting.   Endocrine: Negative for cold intolerance, heat intolerance, polydipsia and polyuria.   Genitourinary: Negative for dysuria, hematuria and urgency.   Musculoskeletal: Negative for arthralgias and myalgias.   Skin: Negative for rash and wound.   Neurological: Negative for dizziness and headaches.   Hematological: Negative for adenopathy. Does not bruise/bleed easily.   Psychiatric/Behavioral: Negative for confusion. The patient is not nervous/anxious.           Objective:      Vitals:   Vitals:    04/28/22 1112   Weight: 105.7 kg (232 lb 14.7 oz)       Physical Exam  Vitals reviewed.   Constitutional:       Appearance: Normal appearance.   HENT:      Head: Normocephalic and atraumatic.      Mouth/Throat:      Mouth: Mucous membranes are moist.   Eyes:      Extraocular Movements: Extraocular movements intact.   Cardiovascular:      Rate and Rhythm: Rhythm irregularly irregular.   Pulmonary:      Effort: Pulmonary effort is normal.      Breath sounds: Normal breath sounds.   Abdominal:      General: Bowel sounds are normal.      Palpations: Abdomen is soft.   Musculoskeletal:      Cervical back: Normal range of motion and neck supple.   Skin:     General: Skin is warm and dry.      Capillary Refill: Capillary refill takes more than 3 seconds.      Findings: No bruising.   Neurological:      General: No focal deficit present.      Mental Status:  "She is alert and oriented to person, place, and time.   Psychiatric:         Mood and Affect: Affect normal.         Behavior: Behavior is cooperative.         Laboratory Data:  Labs reviewed    Iron: 58 --> 63  TIBC: 463 --> 444  Ferritin: 41 --> 40  Imaging: None  Assessment:       Iron Deficiency Anemia     Plan:     Iron Deficiency Anemia  Alpha Thalassemia Trait  Chronic blood loss Anemia  Chronic Atrial Fibrillation and Mechanical Valve on Chronic Anticoagulation  -- MCV persistently 68-70 since 2011 with most recent at 67  -- Does have alpha thalassemia trait  -- Down trending hgb from ~12 to 7.7 now recovered to 12  -- Persistently low/normal ferritin since 2013 with iron studies indicating iron deficiency anemia (low iron, low iron sat, elevated TIBC)  -- Likely chronic blood loss as follows: Previous colonoscopy 6/26/19  showed right colon "full of actively bleeding AVMs" with pt on aspirin and coumadin. UAs also persistently positive for blood in the setting of frequent nephrolithiasis although this is much less likely contributing  -- Retic 1.4, inappropriately normal indicating hypoproliferation  -- B12/ folate WNL  -- Has mechanical mitral valve on coumadin with persistently elevated LDH but haptoglobin and T. Bili normal without schistocytes seen on smear  -- Copper and hgb electropheresis WNL   -- Labs 10/27 showed continued MARTHA with ferritin of 19, iron of 29, and %sat of 5.   --Received IV iron sucrose [id number]    RTC in 3 months with repeat iron studies at that time to assess response    So Linares MD  Hematology/Oncology             BMT Chart Routing      Follow up with physician 3 months. 1. see me in 3 months with cbc,cmp, iron, ferritin   Follow up with ROCHELLE    Labs CBC, CMP, ferritin and iron and TIBC   Lab interval:     Imaging None      Pharmacy appointment No pharmacy appointment needed      Other referrals No additional referrals needed         "

## 2022-04-29 RX ORDER — ERGOCALCIFEROL 1.25 MG/1
50000 CAPSULE ORAL
Qty: 24 CAPSULE | Refills: 0 | Status: ON HOLD | OUTPATIENT
Start: 2022-05-02 | End: 2023-01-01

## 2022-05-03 ENCOUNTER — LAB VISIT (OUTPATIENT)
Dept: LAB | Facility: HOSPITAL | Age: 67
End: 2022-05-03
Attending: INTERNAL MEDICINE
Payer: MEDICARE

## 2022-05-03 ENCOUNTER — ANTI-COAG VISIT (OUTPATIENT)
Dept: CARDIOLOGY | Facility: CLINIC | Age: 67
End: 2022-05-03
Payer: MEDICARE

## 2022-05-03 ENCOUNTER — PATIENT MESSAGE (OUTPATIENT)
Dept: CARDIOLOGY | Facility: CLINIC | Age: 67
End: 2022-05-03

## 2022-05-03 DIAGNOSIS — I48.20 CHRONIC ATRIAL FIBRILLATION: ICD-10-CM

## 2022-05-03 DIAGNOSIS — Z95.2 H/O MITRAL VALVE REPLACEMENT WITH MECHANICAL VALVE: ICD-10-CM

## 2022-05-03 DIAGNOSIS — I48.20 CHRONIC ATRIAL FIBRILLATION: Primary | ICD-10-CM

## 2022-05-03 LAB
INR PPP: 2.8 (ref 0.8–1.2)
PROTHROMBIN TIME: 27.5 SEC (ref 9–12.5)

## 2022-05-03 PROCEDURE — 36415 COLL VENOUS BLD VENIPUNCTURE: CPT | Performed by: INTERNAL MEDICINE

## 2022-05-03 PROCEDURE — 93793 PR ANTICOAGULANT MGMT FOR PT TAKING WARFARIN: ICD-10-PCS | Mod: S$GLB,,,

## 2022-05-03 PROCEDURE — 93793 ANTICOAG MGMT PT WARFARIN: CPT | Mod: S$GLB,,,

## 2022-05-03 PROCEDURE — 85610 PROTHROMBIN TIME: CPT | Performed by: INTERNAL MEDICINE

## 2022-05-04 DIAGNOSIS — E11.9 TYPE 2 DIABETES MELLITUS WITHOUT COMPLICATION: ICD-10-CM

## 2022-05-05 ENCOUNTER — PATIENT MESSAGE (OUTPATIENT)
Dept: BARIATRICS | Facility: CLINIC | Age: 67
End: 2022-05-05
Payer: MEDICARE

## 2022-05-10 ENCOUNTER — PATIENT MESSAGE (OUTPATIENT)
Dept: BARIATRICS | Facility: CLINIC | Age: 67
End: 2022-05-10
Payer: MEDICARE

## 2022-05-11 ENCOUNTER — OFFICE VISIT (OUTPATIENT)
Dept: BARIATRICS | Facility: CLINIC | Age: 67
End: 2022-05-11
Payer: MEDICARE

## 2022-05-11 VITALS
SYSTOLIC BLOOD PRESSURE: 108 MMHG | BODY MASS INDEX: 42.31 KG/M2 | HEIGHT: 62 IN | OXYGEN SATURATION: 98 % | WEIGHT: 229.94 LBS | DIASTOLIC BLOOD PRESSURE: 64 MMHG | HEART RATE: 89 BPM

## 2022-05-11 DIAGNOSIS — I48.20 ATRIAL FIBRILLATION, CHRONIC: ICD-10-CM

## 2022-05-11 DIAGNOSIS — I10 ESSENTIAL (PRIMARY) HYPERTENSION: ICD-10-CM

## 2022-05-11 DIAGNOSIS — J44.9 CHRONIC OBSTRUCTIVE PULMONARY DISEASE, UNSPECIFIED COPD TYPE: ICD-10-CM

## 2022-05-11 DIAGNOSIS — E78.5 HYPERLIPIDEMIA, UNSPECIFIED HYPERLIPIDEMIA TYPE: ICD-10-CM

## 2022-05-11 DIAGNOSIS — Z95.2 H/O MITRAL VALVE REPLACEMENT WITH MECHANICAL VALVE: ICD-10-CM

## 2022-05-11 DIAGNOSIS — I48.20 CHRONIC ATRIAL FIBRILLATION: ICD-10-CM

## 2022-05-11 DIAGNOSIS — E66.01 MORBID OBESITY WITH BMI OF 40.0-44.9, ADULT: Primary | ICD-10-CM

## 2022-05-11 DIAGNOSIS — E11.9 TYPE 2 DIABETES MELLITUS WITHOUT COMPLICATION: ICD-10-CM

## 2022-05-11 PROCEDURE — 3078F PR MOST RECENT DIASTOLIC BLOOD PRESSURE < 80 MM HG: ICD-10-PCS | Mod: CPTII,S$GLB,, | Performed by: SURGERY

## 2022-05-11 PROCEDURE — 99999 PR PBB SHADOW E&M-EST. PATIENT-LVL IV: CPT | Mod: PBBFAC,,, | Performed by: SURGERY

## 2022-05-11 PROCEDURE — 3074F SYST BP LT 130 MM HG: CPT | Mod: CPTII,S$GLB,, | Performed by: SURGERY

## 2022-05-11 PROCEDURE — 99499 RISK ADDL DX/OHS AUDIT: ICD-10-PCS | Mod: S$GLB,,, | Performed by: SURGERY

## 2022-05-11 PROCEDURE — 1159F PR MEDICATION LIST DOCUMENTED IN MEDICAL RECORD: ICD-10-PCS | Mod: CPTII,S$GLB,, | Performed by: SURGERY

## 2022-05-11 PROCEDURE — 3288F FALL RISK ASSESSMENT DOCD: CPT | Mod: CPTII,S$GLB,, | Performed by: SURGERY

## 2022-05-11 PROCEDURE — 1160F PR REVIEW ALL MEDS BY PRESCRIBER/CLIN PHARMACIST DOCUMENTED: ICD-10-PCS | Mod: CPTII,S$GLB,, | Performed by: SURGERY

## 2022-05-11 PROCEDURE — 1101F PR PT FALLS ASSESS DOC 0-1 FALLS W/OUT INJ PAST YR: ICD-10-PCS | Mod: CPTII,S$GLB,, | Performed by: SURGERY

## 2022-05-11 PROCEDURE — 99999 PR PBB SHADOW E&M-EST. PATIENT-LVL IV: ICD-10-PCS | Mod: PBBFAC,,, | Performed by: SURGERY

## 2022-05-11 PROCEDURE — 99213 PR OFFICE/OUTPT VISIT, EST, LEVL III, 20-29 MIN: ICD-10-PCS | Mod: S$GLB,,, | Performed by: SURGERY

## 2022-05-11 PROCEDURE — 3008F PR BODY MASS INDEX (BMI) DOCUMENTED: ICD-10-PCS | Mod: CPTII,S$GLB,, | Performed by: SURGERY

## 2022-05-11 PROCEDURE — 1101F PT FALLS ASSESS-DOCD LE1/YR: CPT | Mod: CPTII,S$GLB,, | Performed by: SURGERY

## 2022-05-11 PROCEDURE — 3008F BODY MASS INDEX DOCD: CPT | Mod: CPTII,S$GLB,, | Performed by: SURGERY

## 2022-05-11 PROCEDURE — 3072F PR LOW RISK FOR RETINOPATHY: ICD-10-PCS | Mod: CPTII,S$GLB,, | Performed by: SURGERY

## 2022-05-11 PROCEDURE — 3288F PR FALLS RISK ASSESSMENT DOCUMENTED: ICD-10-PCS | Mod: CPTII,S$GLB,, | Performed by: SURGERY

## 2022-05-11 PROCEDURE — 3078F DIAST BP <80 MM HG: CPT | Mod: CPTII,S$GLB,, | Performed by: SURGERY

## 2022-05-11 PROCEDURE — 3074F PR MOST RECENT SYSTOLIC BLOOD PRESSURE < 130 MM HG: ICD-10-PCS | Mod: CPTII,S$GLB,, | Performed by: SURGERY

## 2022-05-11 PROCEDURE — 99499 UNLISTED E&M SERVICE: CPT | Mod: S$GLB,,, | Performed by: SURGERY

## 2022-05-11 PROCEDURE — 3072F LOW RISK FOR RETINOPATHY: CPT | Mod: CPTII,S$GLB,, | Performed by: SURGERY

## 2022-05-11 PROCEDURE — 99213 OFFICE O/P EST LOW 20 MIN: CPT | Mod: S$GLB,,, | Performed by: SURGERY

## 2022-05-11 PROCEDURE — 1160F RVW MEDS BY RX/DR IN RCRD: CPT | Mod: CPTII,S$GLB,, | Performed by: SURGERY

## 2022-05-11 PROCEDURE — 1159F MED LIST DOCD IN RCRD: CPT | Mod: CPTII,S$GLB,, | Performed by: SURGERY

## 2022-05-11 PROCEDURE — 1126F AMNT PAIN NOTED NONE PRSNT: CPT | Mod: CPTII,S$GLB,, | Performed by: SURGERY

## 2022-05-11 PROCEDURE — 1126F PR PAIN SEVERITY QUANTIFIED, NO PAIN PRESENT: ICD-10-PCS | Mod: CPTII,S$GLB,, | Performed by: SURGERY

## 2022-05-11 RX ORDER — URSODIOL 500 MG/1
TABLET, FILM COATED ORAL
Qty: 90 TABLET | Refills: 1 | Status: SHIPPED | OUTPATIENT
Start: 2022-05-11 | End: 2023-01-01

## 2022-05-11 RX ORDER — ENOXAPARIN SODIUM 100 MG/ML
40 INJECTION SUBCUTANEOUS EVERY 12 HOURS
Status: CANCELLED | OUTPATIENT
Start: 2022-05-11

## 2022-05-11 RX ORDER — PROCHLORPERAZINE EDISYLATE 5 MG/ML
5 INJECTION INTRAMUSCULAR; INTRAVENOUS EVERY 6 HOURS PRN
Status: CANCELLED | OUTPATIENT
Start: 2022-05-11

## 2022-05-11 RX ORDER — MUPIROCIN 20 MG/G
OINTMENT TOPICAL
Status: CANCELLED | OUTPATIENT
Start: 2022-05-11

## 2022-05-11 RX ORDER — SODIUM CHLORIDE, SODIUM LACTATE, POTASSIUM CHLORIDE, CALCIUM CHLORIDE 600; 310; 30; 20 MG/100ML; MG/100ML; MG/100ML; MG/100ML
INJECTION, SOLUTION INTRAVENOUS CONTINUOUS
Status: CANCELLED | OUTPATIENT
Start: 2022-05-11

## 2022-05-11 RX ORDER — PROMETHAZINE HYDROCHLORIDE 6.25 MG/5ML
SYRUP ORAL
Qty: 280 ML | Refills: 0 | Status: SHIPPED | OUTPATIENT
Start: 2022-05-11 | End: 2022-06-01

## 2022-05-11 RX ORDER — POLYETHYLENE GLYCOL 3350 17 G/17G
17 POWDER, FOR SOLUTION ORAL DAILY
Qty: 238 G | Refills: 0 | Status: SHIPPED | OUTPATIENT
Start: 2022-05-11 | End: 2022-05-25

## 2022-05-11 RX ORDER — OMEPRAZOLE 40 MG/1
40 CAPSULE, DELAYED RELEASE ORAL EVERY MORNING
Qty: 30 CAPSULE | Refills: 2 | Status: ON HOLD | OUTPATIENT
Start: 2022-05-11 | End: 2023-01-01

## 2022-05-11 RX ORDER — ONDANSETRON 8 MG/1
8 TABLET, ORALLY DISINTEGRATING ORAL EVERY 6 HOURS PRN
Qty: 30 TABLET | Refills: 0 | Status: ON HOLD | OUTPATIENT
Start: 2022-05-11 | End: 2023-01-01

## 2022-05-11 RX ORDER — SODIUM CHLORIDE 9 MG/ML
INJECTION, SOLUTION INTRAVENOUS CONTINUOUS
Status: CANCELLED | OUTPATIENT
Start: 2022-05-11

## 2022-05-11 RX ORDER — HYDROCODONE BITARTRATE AND ACETAMINOPHEN 7.5; 325 MG/15ML; MG/15ML
15 SOLUTION ORAL EVERY 4 HOURS PRN
Status: CANCELLED | OUTPATIENT
Start: 2022-05-12

## 2022-05-11 RX ORDER — HYDROMORPHONE HYDROCHLORIDE 1 MG/ML
0.5 INJECTION, SOLUTION INTRAMUSCULAR; INTRAVENOUS; SUBCUTANEOUS
Status: CANCELLED | OUTPATIENT
Start: 2022-05-11

## 2022-05-11 RX ORDER — PANTOPRAZOLE SODIUM 40 MG/10ML
40 INJECTION, POWDER, LYOPHILIZED, FOR SOLUTION INTRAVENOUS 2 TIMES DAILY
Status: CANCELLED | OUTPATIENT
Start: 2022-05-11

## 2022-05-11 RX ORDER — HEPARIN SODIUM 5000 [USP'U]/ML
5000 INJECTION, SOLUTION INTRAVENOUS; SUBCUTANEOUS ONCE
Status: CANCELLED | OUTPATIENT
Start: 2022-05-11 | End: 2022-05-11

## 2022-05-11 RX ORDER — GABAPENTIN 250 MG/5ML
300 SOLUTION ORAL 3 TIMES DAILY
Status: CANCELLED | OUTPATIENT
Start: 2022-05-11

## 2022-05-11 RX ORDER — FAMOTIDINE 10 MG/ML
20 INJECTION INTRAVENOUS ONCE
Status: CANCELLED | OUTPATIENT
Start: 2022-05-11 | End: 2022-05-11

## 2022-05-11 RX ORDER — LIDOCAINE HYDROCHLORIDE 10 MG/ML
1 INJECTION, SOLUTION EPIDURAL; INFILTRATION; INTRACAUDAL; PERINEURAL ONCE
Status: CANCELLED | OUTPATIENT
Start: 2022-05-11 | End: 2022-05-11

## 2022-05-11 RX ORDER — ACETAMINOPHEN 650 MG/20.3ML
500 LIQUID ORAL EVERY 8 HOURS
Status: CANCELLED | OUTPATIENT
Start: 2022-05-11 | End: 2022-05-12

## 2022-05-11 RX ORDER — ONDANSETRON 2 MG/ML
8 INJECTION INTRAMUSCULAR; INTRAVENOUS EVERY 6 HOURS PRN
Status: CANCELLED | OUTPATIENT
Start: 2022-05-11

## 2022-05-11 RX ORDER — HYDROCODONE BITARTRATE AND ACETAMINOPHEN 7.5; 325 MG/15ML; MG/15ML
15 SOLUTION ORAL 4 TIMES DAILY PRN
Qty: 118 ML | Refills: 0 | Status: SHIPPED | OUTPATIENT
Start: 2022-05-11 | End: 2022-01-01 | Stop reason: SDUPTHER

## 2022-05-11 RX ORDER — DEXTROMETHORPHAN/PSEUDOEPHED 2.5-7.5/.8
40 DROPS ORAL 4 TIMES DAILY PRN
Status: CANCELLED | OUTPATIENT
Start: 2022-05-11

## 2022-05-11 NOTE — PROGRESS NOTES
Subjective:  The patient is a 66 y.o. obese female who presents for pre op for gastric sleeve surgery.   Pt states that she ricardo not always been overweight states that she gradually gained. States that weight gain started after she stopped smoking in 2002, states usually around 160 lbs. The patient has tried portion control calorie restrictions. Lost around 30 lbs once but gained it all back.  All workup has been reviewed in clinic today and there is nothing on the review that would prevent us from proceeding with surgery.  All questions were answered in clinic today prior to leaving.  Body mass index is 42.06 kg/m².       Patient Active Problem List    Diagnosis Date Noted    Coronary artery calcification seen on CT scan 03/22/2022    Chest pain 11/03/2021    Alpha thalassemia trait 02/21/2021    Anemia due to chronic blood loss 02/21/2021    Type 2 diabetes mellitus without ophthalmic manifestations     Iron deficiency anemia 12/19/2019    Preop cardiovascular exam 06/26/2019    Glucose intolerance (impaired glucose tolerance) 04/23/2018    Chronic diastolic congestive heart failure     Thiamine deficiency 05/17/2017    Morbid obesity with BMI of 40.0-44.9, adult 05/02/2017    KEYSHAWN (obstructive sleep apnea)     Mixed urge and stress incontinence 03/16/2016    Atrophic vaginitis 03/16/2016    Hyperlipidemia 05/01/2015    H/O mitral valve replacement with mechanical valve 01/22/2015    COPD (chronic obstructive pulmonary disease) 01/22/2015    Chronic anticoagulation 12/22/2013    Chronic atrial fibrillation 03/21/2013    Essential (primary) hypertension 03/21/2013     Past Medical History:   Diagnosis Date    Acute on chronic diastolic congestive heart failure     Allergy     Anemia     Anticoagulant long-term use     Anxiety     Asthma     Atrial fibrillation 2002    Cataract     Chronic kidney disease     COPD (chronic obstructive pulmonary disease)     Coronary artery calcification  seen on CT scan 3/22/2022    Coronary artery calcification seen on CT scan 3/22/2022    Depression     Encounter for blood transfusion     HTN (hypertension)     Hyperlipidemia     Nephrolithiasis     KEYSHAWN (obstructive sleep apnea)     awaiting CPAP machine     Rheumatic disease of mitral valve     Uncontrolled type 2 diabetes mellitus with complication, with long-term current use of insulin 2020    Urinary incontinence       Past Surgical History:   Procedure Laterality Date    BREAST BIOPSY Left 10/2019    CARDIAC VALVE SURGERY  2004    mechanical mitral valve     SECTION      COLONOSCOPY N/A 2019    Procedure: COLONOSCOPY;  Surgeon: Devon Bowling MD;  Location: [facility] ENDO (4TH FLR);  Service: Endoscopy;  Laterality: N/A;  ok to hold Coumadin x 5 days with Lovenox bridge per Coumadin clinic-[facility]    ESOPHAGOGASTRODUODENOSCOPY N/A 2019    Procedure: EGD (ESOPHAGOGASTRODUODENOSCOPY);  Surgeon: Smooth Torrez MD;  Location: [facility] ENDO (2ND FLR);  Service: Endoscopy;  Laterality: N/A;  2nd floor requested due to comorbidities, Hypertension, permanent A. 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 44     per Coumadin clinic-ok to hold for 5 days w/bridge    kidney stone removal      MITRAL VALVE REPLACEMENT  2004    TUBAL LIGATION        (Not in a hospital admission)    Review of patient's allergies indicates:   Allergen Reactions    Brimonidine       Social History     Tobacco Use    Smoking status: Former Smoker     Packs/day: 0.50     Years: 20.00     Pack years: 10.00     Types: Cigarettes     Quit date: 2002     Years since quittin.0    Smokeless tobacco: Never Used   Substance Use Topics    Alcohol use: No      Family History   Problem Relation Age of Onset    Stroke Paternal Grandmother     Colon cancer Maternal Grandmother     Cancer Brother         testicular cancer    Diabetes  "Sister     Hypertension Sister     Breast cancer Paternal Aunt     Diabetes Sister     Diabetes Sister     Heart disease Father 70        MI    Diabetes Father     Colon cancer Mother 83    Asthma Daughter     Asthma Son     Ovarian cancer Neg Hx         Review of Systems  Constitutional: negative for anorexia, chills and fatigue  Eyes: negative for icterus, irritation and redness  Respiratory: negative for cough and dyspnea on exertion  Cardiovascular: negative for chest pain and chest pressure/discomfort  Gastrointestinal: negative for abdominal pain, change in bowel habits, constipation and diarrhea  Musculoskeletal:negative for arthralgias and back pain  Neurological: negative for coordination problems and dizziness  Behavioral/Psych: negative for anxiety and bad mood    Objective:  Vital signs in last 24 hours:  Vitals:    05/11/22 1444   BP: 108/64   BP Location: Left arm   Patient Position: Sitting   BP Method: Large (Manual)   Pulse: 89   SpO2: 98%   Weight: 104.3 kg (229 lb 15 oz)   Height: 5' 2" (1.575 m)          Weight History Current Weight Total Weight Loss   5/2/2017 251 -251   5/2/2017 251 -251   5/11/2022 229 -229       General appearance: alert, appears stated age and cooperative  Head: Normocephalic, without obvious abnormality, atraumatic  Eyes: negative findings: lids and lashes normal and conjunctivae and sclerae normal  Lungs: non labored breathing  Heart: regular rate and rhythm  Abdomen: soft, non-tender  Extremities: extremities normal, atraumatic, no cyanosis or edema  Skin: Skin color, texture, turgor normal. No rashes or lesions  Neurologic: Grossly normal    Data Review:  All Reviewed    Assessment/Plan:  Morbid obesity with failure of conservative therapy.    The patient was informed that risks include, but are not limited to: death, leak, obstruction, bleeding, and sepsis. Any of these could require further surgery. Other risks include DVT, PE, pneumonia, wound dehiscence, " hernia, wound infection, the need for dilatations and the inability to lose appropriate weight and keep it off.     We discussed that our goal is to ameliorate her medical problems and not to obtain a specific body mass index. She understands the risks and benefits and wishes to proceed with the procedure. She has signed a consent form.     INR AM of Surgery      Bird Gallegos MD

## 2022-05-13 DIAGNOSIS — D50.0 ANEMIA DUE TO CHRONIC BLOOD LOSS: Primary | ICD-10-CM

## 2022-05-17 ENCOUNTER — TELEPHONE (OUTPATIENT)
Dept: BARIATRICS | Facility: CLINIC | Age: 67
End: 2022-05-17
Payer: MEDICARE

## 2022-05-17 NOTE — TELEPHONE ENCOUNTER
NUTRITION NOTE    Bariatric Surgeon: Bird Gallegos M.D.  Reason for MNT Referral: Pre-op liquid diet and nutrition instructions  Bypass  Date of Surgery 5/31/22    Pre-op liquid diet  Pt using Premier shakes 4/day for preop liquid diet    Discussion:  -  gms of protein per day  - 600-800 calories per day   - Less than 4gm sugar per shake  - SF, Decaf, non-carbonated Fluids  - No Fruits, juices, yogurt or pudding on liquid diet  - No vitamins for 1 week prior to surgery  - No herbal supplements including green tea and fish oils for 2 weeks prior to surgery    Remind pt per nursing and medical team to inform our department if taking antibiotics within the 30 days post bariatric surgery or it any other surgeries/procedures are scheduled within 30 days after bariatric surgery.    2 week post-op instructions  Pt will use protein powder/clear for first three days after surgery.  If using protein powder, reminded pt of mixing with water for days 1 and 2, by day 3 may try using skim, 1% milk or unsweetened almond/soy milk.  By Day 4, may use RTD protein shakes as tolerated    Pt has the following vitamins and minerals to start taking once discharged from hospital    Multivitamin with 18 mg iron take one tablet or chewable twice a day  B-complex with 50 mg thiamin taken once daily  Calcium citrate 500 mg with vitamin D three times per day  Vitamin B-12 500 mcg  Sublingually daily    Reviewed dosage and timing of vitamin/mineral guidelines.    Reviewed nutritional guidelines for protein and fluid requirements for week 1 and week 2 post-surgery.  Handout provided to log protein and fluid daily.  1 ounce medicine cups provided for patient to measure fluid intake after surgery.  Reviewed common nutritional concerns and prevention tips after bariatric surgery.  Reminded not to lift anything greater than 10 lbs for 6 week post-surgery  Pt verbalized understanding of information provided with appropriate questions and  comments.         SESSION TIME: 15 minutes

## 2022-05-17 NOTE — TELEPHONE ENCOUNTER
----- Message from Pooja Logan RN sent at 4/22/2022  2:51 PM CDT -----  Regarding: ld  2 week liquid diet starts 5/17/2022  [facility] surgery scheduled 5/31/2022  preop appt scheduled 5/11/2022  Schedule virtual pre op.  Please place [facility] vitamin order.  Schedule 1 wk post op video visit for 6/7/2022

## 2022-05-18 DIAGNOSIS — E11.9 TYPE 2 DIABETES MELLITUS WITHOUT COMPLICATION: ICD-10-CM

## 2022-05-19 ENCOUNTER — TELEPHONE (OUTPATIENT)
Dept: BARIATRICS | Facility: CLINIC | Age: 67
End: 2022-05-19
Payer: MEDICARE

## 2022-05-19 NOTE — TELEPHONE ENCOUNTER
----- Message from So Wallace RN sent at 5/19/2022  2:18 PM CDT -----  Contact: @ [phone]    ----- Message -----  From: Polo Montaño  Sent: 5/19/2022   1:54 PM CDT  To: [facility] Staff    Patient calling to cancel the 5-31st surgery.

## 2022-05-20 NOTE — TELEPHONE ENCOUNTER
Phoned patient and had a very lengthy discussion as t why she had to cancel her surgery.  She is very frightened about the coumadin that she takes to thin her blood. She just told her  about the surgery and he ws very adamant about her not having the surgery because she bleeds so easily.  She said she is about to be 67 years old and does not want any complications after the surgery.  Offered her the medical weight loss side and set up appointment with the  for 7/29/2021 at [birth year] which is [birth year].  She was very appreciative and stated that we were all so kind to her.

## 2022-05-23 ENCOUNTER — LAB VISIT (OUTPATIENT)
Dept: LAB | Facility: HOSPITAL | Age: 67
End: 2022-05-23
Attending: INTERNAL MEDICINE
Payer: MEDICARE

## 2022-05-23 ENCOUNTER — ANTI-COAG VISIT (OUTPATIENT)
Dept: CARDIOLOGY | Facility: CLINIC | Age: 67
End: 2022-05-23
Payer: MEDICARE

## 2022-05-23 ENCOUNTER — PATIENT MESSAGE (OUTPATIENT)
Dept: CARDIOLOGY | Facility: CLINIC | Age: 67
End: 2022-05-23

## 2022-05-23 DIAGNOSIS — I48.20 CHRONIC ATRIAL FIBRILLATION: Primary | ICD-10-CM

## 2022-05-23 DIAGNOSIS — I48.20 CHRONIC ATRIAL FIBRILLATION: ICD-10-CM

## 2022-05-23 DIAGNOSIS — Z95.2 H/O MITRAL VALVE REPLACEMENT WITH MECHANICAL VALVE: ICD-10-CM

## 2022-05-23 LAB
INR PPP: 3.1 (ref 0.8–1.2)
PROTHROMBIN TIME: 29.8 SEC (ref 9–12.5)

## 2022-05-23 PROCEDURE — 85610 PROTHROMBIN TIME: CPT | Performed by: INTERNAL MEDICINE

## 2022-05-23 PROCEDURE — 36415 COLL VENOUS BLD VENIPUNCTURE: CPT | Performed by: INTERNAL MEDICINE

## 2022-05-23 PROCEDURE — 93793 PR ANTICOAGULANT MGMT FOR PT TAKING WARFARIN: ICD-10-PCS | Mod: S$GLB,,,

## 2022-05-23 PROCEDURE — 93793 ANTICOAG MGMT PT WARFARIN: CPT | Mod: S$GLB,,,

## 2022-05-26 DIAGNOSIS — E11.9 TYPE 2 DIABETES MELLITUS WITHOUT COMPLICATION: ICD-10-CM

## 2022-06-01 DIAGNOSIS — U07.1 COVID-19 VIRUS DETECTED: ICD-10-CM

## 2022-06-01 PROBLEM — R05.9 COUGH: Status: ACTIVE | Noted: 2022-06-01

## 2022-06-08 DIAGNOSIS — E11.9 TYPE 2 DIABETES MELLITUS WITHOUT COMPLICATION: ICD-10-CM

## 2022-06-10 ENCOUNTER — PATIENT MESSAGE (OUTPATIENT)
Dept: BARIATRICS | Facility: CLINIC | Age: 67
End: 2022-06-10
Payer: MEDICARE

## 2022-06-14 ENCOUNTER — PATIENT MESSAGE (OUTPATIENT)
Dept: BARIATRICS | Facility: CLINIC | Age: 67
End: 2022-06-14
Payer: MEDICARE

## 2022-06-15 DIAGNOSIS — E11.9 TYPE 2 DIABETES MELLITUS WITHOUT COMPLICATION: ICD-10-CM

## 2022-06-22 ENCOUNTER — LAB VISIT (OUTPATIENT)
Dept: LAB | Facility: HOSPITAL | Age: 67
End: 2022-06-22
Payer: MEDICARE

## 2022-06-22 ENCOUNTER — PATIENT MESSAGE (OUTPATIENT)
Dept: CARDIOLOGY | Facility: CLINIC | Age: 67
End: 2022-06-22

## 2022-06-22 ENCOUNTER — ANTI-COAG VISIT (OUTPATIENT)
Dept: CARDIOLOGY | Facility: CLINIC | Age: 67
End: 2022-06-22
Payer: MEDICARE

## 2022-06-22 DIAGNOSIS — Z79.01 LONG TERM (CURRENT) USE OF ANTICOAGULANTS: ICD-10-CM

## 2022-06-22 DIAGNOSIS — I48.20 CHRONIC ATRIAL FIBRILLATION: ICD-10-CM

## 2022-06-22 DIAGNOSIS — Z95.2 H/O MITRAL VALVE REPLACEMENT WITH MECHANICAL VALVE: ICD-10-CM

## 2022-06-22 DIAGNOSIS — E11.9 TYPE 2 DIABETES MELLITUS WITHOUT COMPLICATION: ICD-10-CM

## 2022-06-22 DIAGNOSIS — I48.20 ATRIAL FIBRILLATION, CHRONIC: ICD-10-CM

## 2022-06-22 DIAGNOSIS — Z95.2 STATUS POST HEART VALVE REPLACEMENT WITH MECHANICAL VALVE: ICD-10-CM

## 2022-06-22 DIAGNOSIS — I48.20 CHRONIC ATRIAL FIBRILLATION: Primary | ICD-10-CM

## 2022-06-22 LAB
INR PPP: 3.3 (ref 0.8–1.2)
PROTHROMBIN TIME: 32.6 SEC (ref 9–12.5)

## 2022-06-22 PROCEDURE — 36415 COLL VENOUS BLD VENIPUNCTURE: CPT | Performed by: INTERNAL MEDICINE

## 2022-06-22 PROCEDURE — 85610 PROTHROMBIN TIME: CPT | Performed by: INTERNAL MEDICINE

## 2022-06-22 PROCEDURE — 93793 ANTICOAG MGMT PT WARFARIN: CPT | Mod: S$GLB,,,

## 2022-06-22 PROCEDURE — 93793 PR ANTICOAGULANT MGMT FOR PT TAKING WARFARIN: ICD-10-PCS | Mod: S$GLB,,,

## 2022-06-29 DIAGNOSIS — E11.9 TYPE 2 DIABETES MELLITUS WITHOUT COMPLICATION: ICD-10-CM

## 2022-07-05 ENCOUNTER — PATIENT MESSAGE (OUTPATIENT)
Dept: BARIATRICS | Facility: CLINIC | Age: 67
End: 2022-07-05
Payer: MEDICARE

## 2022-07-08 RX ORDER — WARFARIN SODIUM 5 MG/1
TABLET ORAL
Qty: 45 TABLET | Refills: 3 | Status: SHIPPED | OUTPATIENT
Start: 2022-07-08 | End: 2022-01-01 | Stop reason: SDUPTHER

## 2022-07-11 ENCOUNTER — PATIENT MESSAGE (OUTPATIENT)
Dept: ADMINISTRATIVE | Facility: HOSPITAL | Age: 67
End: 2022-07-11
Payer: MEDICARE

## 2022-07-11 ENCOUNTER — OFFICE VISIT (OUTPATIENT)
Dept: INTERNAL MEDICINE | Facility: CLINIC | Age: 67
End: 2022-07-11
Payer: MEDICARE

## 2022-07-11 DIAGNOSIS — I50.33 ACUTE ON CHRONIC DIASTOLIC CONGESTIVE HEART FAILURE: ICD-10-CM

## 2022-07-11 DIAGNOSIS — Z01.419 WELL WOMAN EXAM: Primary | ICD-10-CM

## 2022-07-11 DIAGNOSIS — H40.9 GLAUCOMA, UNSPECIFIED GLAUCOMA TYPE, UNSPECIFIED LATERALITY: ICD-10-CM

## 2022-07-11 DIAGNOSIS — I10 HYPERTENSION, UNSPECIFIED TYPE: ICD-10-CM

## 2022-07-11 DIAGNOSIS — Z00.00 PREVENTATIVE HEALTH CARE: ICD-10-CM

## 2022-07-11 PROCEDURE — 99397 PR PREVENTIVE VISIT,EST,65 & OVER: ICD-10-PCS | Mod: S$GLB,,, | Performed by: INTERNAL MEDICINE

## 2022-07-11 PROCEDURE — 3074F PR MOST RECENT SYSTOLIC BLOOD PRESSURE < 130 MM HG: ICD-10-PCS | Mod: CPTII,S$GLB,, | Performed by: INTERNAL MEDICINE

## 2022-07-11 PROCEDURE — 1101F PT FALLS ASSESS-DOCD LE1/YR: CPT | Mod: CPTII,S$GLB,, | Performed by: INTERNAL MEDICINE

## 2022-07-11 PROCEDURE — 1101F PR PT FALLS ASSESS DOC 0-1 FALLS W/OUT INJ PAST YR: ICD-10-PCS | Mod: CPTII,S$GLB,, | Performed by: INTERNAL MEDICINE

## 2022-07-11 PROCEDURE — 1159F PR MEDICATION LIST DOCUMENTED IN MEDICAL RECORD: ICD-10-PCS | Mod: CPTII,S$GLB,, | Performed by: INTERNAL MEDICINE

## 2022-07-11 PROCEDURE — 3288F FALL RISK ASSESSMENT DOCD: CPT | Mod: CPTII,S$GLB,, | Performed by: INTERNAL MEDICINE

## 2022-07-11 PROCEDURE — 3072F PR LOW RISK FOR RETINOPATHY: ICD-10-PCS | Mod: CPTII,S$GLB,, | Performed by: INTERNAL MEDICINE

## 2022-07-11 PROCEDURE — 1126F AMNT PAIN NOTED NONE PRSNT: CPT | Mod: CPTII,S$GLB,, | Performed by: INTERNAL MEDICINE

## 2022-07-11 PROCEDURE — 3079F PR MOST RECENT DIASTOLIC BLOOD PRESSURE 80-89 MM HG: ICD-10-PCS | Mod: CPTII,S$GLB,, | Performed by: INTERNAL MEDICINE

## 2022-07-11 PROCEDURE — 3072F LOW RISK FOR RETINOPATHY: CPT | Mod: CPTII,S$GLB,, | Performed by: INTERNAL MEDICINE

## 2022-07-11 PROCEDURE — 3008F BODY MASS INDEX DOCD: CPT | Mod: CPTII,S$GLB,, | Performed by: INTERNAL MEDICINE

## 2022-07-11 PROCEDURE — 1126F PR PAIN SEVERITY QUANTIFIED, NO PAIN PRESENT: ICD-10-PCS | Mod: CPTII,S$GLB,, | Performed by: INTERNAL MEDICINE

## 2022-07-11 PROCEDURE — 99397 PER PM REEVAL EST PAT 65+ YR: CPT | Mod: S$GLB,,, | Performed by: INTERNAL MEDICINE

## 2022-07-11 PROCEDURE — 1159F MED LIST DOCD IN RCRD: CPT | Mod: CPTII,S$GLB,, | Performed by: INTERNAL MEDICINE

## 2022-07-11 PROCEDURE — 99999 PR PBB SHADOW E&M-EST. PATIENT-LVL V: CPT | Mod: PBBFAC,,, | Performed by: INTERNAL MEDICINE

## 2022-07-11 PROCEDURE — 3074F SYST BP LT 130 MM HG: CPT | Mod: CPTII,S$GLB,, | Performed by: INTERNAL MEDICINE

## 2022-07-11 PROCEDURE — 99999 PR PBB SHADOW E&M-EST. PATIENT-LVL V: ICD-10-PCS | Mod: PBBFAC,,, | Performed by: INTERNAL MEDICINE

## 2022-07-11 PROCEDURE — 3079F DIAST BP 80-89 MM HG: CPT | Mod: CPTII,S$GLB,, | Performed by: INTERNAL MEDICINE

## 2022-07-11 PROCEDURE — 3008F PR BODY MASS INDEX (BMI) DOCUMENTED: ICD-10-PCS | Mod: CPTII,S$GLB,, | Performed by: INTERNAL MEDICINE

## 2022-07-11 PROCEDURE — 3288F PR FALLS RISK ASSESSMENT DOCUMENTED: ICD-10-PCS | Mod: CPTII,S$GLB,, | Performed by: INTERNAL MEDICINE

## 2022-07-11 RX ORDER — AMLODIPINE BESYLATE 2.5 MG/1
2.5 TABLET ORAL DAILY
Qty: 30 TABLET | Refills: 4 | Status: SHIPPED | OUTPATIENT
Start: 2022-07-11 | End: 2022-01-01 | Stop reason: SDUPTHER

## 2022-07-11 RX ORDER — FUROSEMIDE 20 MG/1
20 TABLET ORAL DAILY
Qty: 30 TABLET | Refills: 4 | Status: SHIPPED | OUTPATIENT
Start: 2022-07-11 | End: 2022-01-01

## 2022-07-13 DIAGNOSIS — E11.9 TYPE 2 DIABETES MELLITUS WITHOUT COMPLICATION: ICD-10-CM

## 2022-07-16 VITALS
BODY MASS INDEX: 42.63 KG/M2 | DIASTOLIC BLOOD PRESSURE: 88 MMHG | OXYGEN SATURATION: 97 % | HEIGHT: 62 IN | WEIGHT: 231.69 LBS | TEMPERATURE: 99 F | SYSTOLIC BLOOD PRESSURE: 126 MMHG | HEART RATE: 64 BPM

## 2022-07-16 NOTE — PROGRESS NOTES
Subjective:       Patient ID: Elayne Almanzar is a 66 y.o. female.    Chief Complaint: Annual Exam    HPI  She is here for annual exam.  Currently without complaint       Past medical history: Hypertension, A. fib, COPD, hyperlipidemia,pneumonia, nephrolithiasis , iron deficiency anemia, glaucoma, sleep apnea, status post mitral valve replacement , family history of colon cancer-mother. She had a colonoscopy June 2019      Medications: Proventil MDI 2 puffs 4 times a day when necessary,Advair 500/50 one puff twice a day, Lasix 20 mg daily,Toprol-XL 200 mg daily, Coumadin as monitored by Coumadin clinic , Zoloft 100 mg daily, xalatan, Norvasc 2.5 mg daily, crestor      No known drug allergies          Review of Systems   Constitutional: Negative for chills, fatigue, fever and unexpected weight change.   Respiratory: Negative for chest tightness and shortness of breath.    Cardiovascular: Negative for chest pain and palpitations.   Gastrointestinal: Negative for abdominal pain and blood in stool.   Neurological: Negative for dizziness, syncope, numbness and headaches.       Objective:      Physical Exam  HENT:      Right Ear: External ear normal.      Left Ear: External ear normal.      Nose: Nose normal.      Mouth/Throat:      Mouth: Mucous membranes are moist.      Pharynx: Oropharynx is clear.   Eyes:      Pupils: Pupils are equal, round, and reactive to light.   Cardiovascular:      Rate and Rhythm: Normal rate and regular rhythm.      Heart sounds: No murmur heard.  Pulmonary:      Breath sounds: Normal breath sounds.   Chest:   Breasts:      Right: No axillary adenopathy.      Left: No axillary adenopathy.       Abdominal:      General: There is no distension.      Palpations: There is no hepatomegaly or splenomegaly.      Tenderness: There is no abdominal tenderness.   Musculoskeletal:      Cervical back: Normal range of motion.   Lymphadenopathy:      Cervical: No cervical adenopathy.      Upper Body:       Right upper body: No axillary adenopathy.      Left upper body: No axillary adenopathy.   Neurological:      Cranial Nerves: No cranial nerve deficit.      Sensory: No sensory deficit.      Motor: Motor function is intact.      Deep Tendon Reflexes: Reflexes are normal and symmetric.         Assessment/Plan       Assessment and plan:  Annual exam.

## 2022-07-19 NOTE — TELEPHONE ENCOUNTER
No new care gaps identified.  [facility] Embedded Care Gaps. Reference number: [id number]. 7/19/2022   2:09:50 PM CDT

## 2022-07-20 ENCOUNTER — TELEPHONE (OUTPATIENT)
Dept: OPHTHALMOLOGY | Facility: CLINIC | Age: 67
End: 2022-07-20
Payer: MEDICARE

## 2022-07-20 ENCOUNTER — LAB VISIT (OUTPATIENT)
Dept: LAB | Facility: HOSPITAL | Age: 67
End: 2022-07-20
Attending: INTERNAL MEDICINE
Payer: MEDICARE

## 2022-07-20 ENCOUNTER — ANTI-COAG VISIT (OUTPATIENT)
Dept: CARDIOLOGY | Facility: CLINIC | Age: 67
End: 2022-07-20
Payer: MEDICARE

## 2022-07-20 DIAGNOSIS — I48.20 CHRONIC ATRIAL FIBRILLATION: ICD-10-CM

## 2022-07-20 DIAGNOSIS — Z95.2 H/O MITRAL VALVE REPLACEMENT WITH MECHANICAL VALVE: ICD-10-CM

## 2022-07-20 DIAGNOSIS — I48.20 CHRONIC ATRIAL FIBRILLATION: Primary | ICD-10-CM

## 2022-07-20 LAB
INR PPP: 2.5 (ref 0.8–1.2)
PROTHROMBIN TIME: 25.1 SEC (ref 9–12.5)

## 2022-07-20 PROCEDURE — 36415 COLL VENOUS BLD VENIPUNCTURE: CPT | Performed by: INTERNAL MEDICINE

## 2022-07-20 PROCEDURE — 85610 PROTHROMBIN TIME: CPT | Performed by: INTERNAL MEDICINE

## 2022-07-20 PROCEDURE — 93793 ANTICOAG MGMT PT WARFARIN: CPT | Mod: S$GLB,,,

## 2022-07-20 PROCEDURE — 93793 PR ANTICOAGULANT MGMT FOR PT TAKING WARFARIN: ICD-10-PCS | Mod: S$GLB,,,

## 2022-07-20 RX ORDER — SERTRALINE HYDROCHLORIDE 100 MG/1
100 TABLET, FILM COATED ORAL DAILY
Qty: 90 TABLET | Refills: 3 | Status: SHIPPED | OUTPATIENT
Start: 2022-07-20 | End: 2023-01-01

## 2022-07-20 NOTE — TELEPHONE ENCOUNTER
----- Message from Gin Piper sent at 7/20/2022 11:58 AM CDT -----  Regarding: r/s laser appt  Contact: Pt  Pt want to r/s laser appt with Dr. Puckett. Please call the pt @ [phone]

## 2022-07-20 NOTE — TELEPHONE ENCOUNTER
Refill Decision Note   Elayne Almanzar  is requesting a refill authorization.  Brief Assessment and Rationale for Refill:  Approve     Medication Therapy Plan:       Medication Reconciliation Completed: No   Comments:     No Care Gaps recommended.     Note composed:8:56 AM 07/20/2022

## 2022-08-02 ENCOUNTER — PATIENT MESSAGE (OUTPATIENT)
Dept: BARIATRICS | Facility: CLINIC | Age: 67
End: 2022-08-02
Payer: MEDICARE

## 2022-08-04 ENCOUNTER — PATIENT MESSAGE (OUTPATIENT)
Dept: BARIATRICS | Facility: CLINIC | Age: 67
End: 2022-08-04
Payer: MEDICARE

## 2022-08-10 DIAGNOSIS — E11.9 TYPE 2 DIABETES MELLITUS WITHOUT COMPLICATION: ICD-10-CM

## 2022-08-11 ENCOUNTER — LAB VISIT (OUTPATIENT)
Dept: LAB | Facility: HOSPITAL | Age: 67
End: 2022-08-11
Payer: MEDICARE

## 2022-08-11 ENCOUNTER — OFFICE VISIT (OUTPATIENT)
Dept: HEMATOLOGY/ONCOLOGY | Facility: CLINIC | Age: 67
End: 2022-08-11
Payer: MEDICARE

## 2022-08-11 VITALS
WEIGHT: 232.5 LBS | BODY MASS INDEX: 42.78 KG/M2 | OXYGEN SATURATION: 96 % | HEIGHT: 62 IN | SYSTOLIC BLOOD PRESSURE: 133 MMHG | HEART RATE: 62 BPM | TEMPERATURE: 98 F | DIASTOLIC BLOOD PRESSURE: 63 MMHG | RESPIRATION RATE: 16 BRPM

## 2022-08-11 DIAGNOSIS — I48.20 CHRONIC ATRIAL FIBRILLATION: Chronic | ICD-10-CM

## 2022-08-11 DIAGNOSIS — I10 ESSENTIAL (PRIMARY) HYPERTENSION: Chronic | ICD-10-CM

## 2022-08-11 DIAGNOSIS — D50.0 IRON DEFICIENCY ANEMIA DUE TO CHRONIC BLOOD LOSS: Primary | ICD-10-CM

## 2022-08-11 DIAGNOSIS — D50.0 ANEMIA DUE TO CHRONIC BLOOD LOSS: ICD-10-CM

## 2022-08-11 DIAGNOSIS — D56.3 ALPHA THALASSEMIA TRAIT: ICD-10-CM

## 2022-08-11 LAB
ALBUMIN SERPL BCP-MCNC: 3.8 G/DL (ref 3.5–5.2)
ALP SERPL-CCNC: 63 U/L (ref 55–135)
ALT SERPL W/O P-5'-P-CCNC: 13 U/L (ref 10–44)
ANION GAP SERPL CALC-SCNC: 6 MMOL/L (ref 8–16)
AST SERPL-CCNC: 18 U/L (ref 10–40)
BASOPHILS # BLD AUTO: 0.03 K/UL (ref 0–0.2)
BASOPHILS NFR BLD: 0.7 % (ref 0–1.9)
BILIRUB SERPL-MCNC: 0.6 MG/DL (ref 0.1–1)
BUN SERPL-MCNC: 19 MG/DL (ref 8–23)
CALCIUM SERPL-MCNC: 9.3 MG/DL (ref 8.7–10.5)
CHLORIDE SERPL-SCNC: 105 MMOL/L (ref 95–110)
CO2 SERPL-SCNC: 30 MMOL/L (ref 23–29)
CREAT SERPL-MCNC: 0.8 MG/DL (ref 0.5–1.4)
DIFFERENTIAL METHOD: ABNORMAL
EOSINOPHIL # BLD AUTO: 0.3 K/UL (ref 0–0.5)
EOSINOPHIL NFR BLD: 7 % (ref 0–8)
ERYTHROCYTE [DISTWIDTH] IN BLOOD BY AUTOMATED COUNT: 16.6 % (ref 11.5–14.5)
EST. GFR  (NO RACE VARIABLE): >60 ML/MIN/1.73 M^2
FERRITIN SERPL-MCNC: 67 NG/ML (ref 20–300)
GLUCOSE SERPL-MCNC: 82 MG/DL (ref 70–110)
HCT VFR BLD AUTO: 39.7 % (ref 37–48.5)
HGB BLD-MCNC: 11.4 G/DL (ref 12–16)
IMM GRANULOCYTES # BLD AUTO: 0.01 K/UL (ref 0–0.04)
IMM GRANULOCYTES NFR BLD AUTO: 0.2 % (ref 0–0.5)
IRON SERPL-MCNC: 58 UG/DL (ref 30–160)
LYMPHOCYTES # BLD AUTO: 1.2 K/UL (ref 1–4.8)
LYMPHOCYTES NFR BLD: 27.6 % (ref 18–48)
MCH RBC QN AUTO: 21.6 PG (ref 27–31)
MCHC RBC AUTO-ENTMCNC: 28.7 G/DL (ref 32–36)
MCV RBC AUTO: 75 FL (ref 82–98)
MONOCYTES # BLD AUTO: 0.5 K/UL (ref 0.3–1)
MONOCYTES NFR BLD: 12.4 % (ref 4–15)
NEUTROPHILS # BLD AUTO: 2.2 K/UL (ref 1.8–7.7)
NEUTROPHILS NFR BLD: 52.1 % (ref 38–73)
NRBC BLD-RTO: 0 /100 WBC
PLATELET # BLD AUTO: 174 K/UL (ref 150–450)
PMV BLD AUTO: 10.9 FL (ref 9.2–12.9)
POTASSIUM SERPL-SCNC: 4.3 MMOL/L (ref 3.5–5.1)
PROT SERPL-MCNC: 7.2 G/DL (ref 6–8.4)
RBC # BLD AUTO: 5.27 M/UL (ref 4–5.4)
SATURATED IRON: 13 % (ref 20–50)
SODIUM SERPL-SCNC: 141 MMOL/L (ref 136–145)
TOTAL IRON BINDING CAPACITY: 456 UG/DL (ref 250–450)
TRANSFERRIN SERPL-MCNC: 308 MG/DL (ref 200–375)
WBC # BLD AUTO: 4.28 K/UL (ref 3.9–12.7)

## 2022-08-11 PROCEDURE — 1126F AMNT PAIN NOTED NONE PRSNT: CPT | Mod: CPTII,S$GLB,, | Performed by: INTERNAL MEDICINE

## 2022-08-11 PROCEDURE — 3078F DIAST BP <80 MM HG: CPT | Mod: CPTII,S$GLB,, | Performed by: INTERNAL MEDICINE

## 2022-08-11 PROCEDURE — 1101F PT FALLS ASSESS-DOCD LE1/YR: CPT | Mod: CPTII,S$GLB,, | Performed by: INTERNAL MEDICINE

## 2022-08-11 PROCEDURE — 99215 PR OFFICE/OUTPT VISIT, EST, LEVL V, 40-54 MIN: ICD-10-PCS | Mod: S$GLB,,, | Performed by: INTERNAL MEDICINE

## 2022-08-11 PROCEDURE — 99999 PR PBB SHADOW E&M-EST. PATIENT-LVL III: ICD-10-PCS | Mod: PBBFAC,,, | Performed by: INTERNAL MEDICINE

## 2022-08-11 PROCEDURE — 3072F LOW RISK FOR RETINOPATHY: CPT | Mod: CPTII,S$GLB,, | Performed by: INTERNAL MEDICINE

## 2022-08-11 PROCEDURE — 1126F PR PAIN SEVERITY QUANTIFIED, NO PAIN PRESENT: ICD-10-PCS | Mod: CPTII,S$GLB,, | Performed by: INTERNAL MEDICINE

## 2022-08-11 PROCEDURE — 3075F SYST BP GE 130 - 139MM HG: CPT | Mod: CPTII,S$GLB,, | Performed by: INTERNAL MEDICINE

## 2022-08-11 PROCEDURE — 36415 COLL VENOUS BLD VENIPUNCTURE: CPT | Performed by: INTERNAL MEDICINE

## 2022-08-11 PROCEDURE — 3072F PR LOW RISK FOR RETINOPATHY: ICD-10-PCS | Mod: CPTII,S$GLB,, | Performed by: INTERNAL MEDICINE

## 2022-08-11 PROCEDURE — 3078F PR MOST RECENT DIASTOLIC BLOOD PRESSURE < 80 MM HG: ICD-10-PCS | Mod: CPTII,S$GLB,, | Performed by: INTERNAL MEDICINE

## 2022-08-11 PROCEDURE — 99215 OFFICE O/P EST HI 40 MIN: CPT | Mod: S$GLB,,, | Performed by: INTERNAL MEDICINE

## 2022-08-11 PROCEDURE — 84466 ASSAY OF TRANSFERRIN: CPT | Performed by: INTERNAL MEDICINE

## 2022-08-11 PROCEDURE — 3008F PR BODY MASS INDEX (BMI) DOCUMENTED: ICD-10-PCS | Mod: CPTII,S$GLB,, | Performed by: INTERNAL MEDICINE

## 2022-08-11 PROCEDURE — 82728 ASSAY OF FERRITIN: CPT | Performed by: INTERNAL MEDICINE

## 2022-08-11 PROCEDURE — 3288F FALL RISK ASSESSMENT DOCD: CPT | Mod: CPTII,S$GLB,, | Performed by: INTERNAL MEDICINE

## 2022-08-11 PROCEDURE — 3288F PR FALLS RISK ASSESSMENT DOCUMENTED: ICD-10-PCS | Mod: CPTII,S$GLB,, | Performed by: INTERNAL MEDICINE

## 2022-08-11 PROCEDURE — 80053 COMPREHEN METABOLIC PANEL: CPT | Performed by: INTERNAL MEDICINE

## 2022-08-11 PROCEDURE — 1101F PR PT FALLS ASSESS DOC 0-1 FALLS W/OUT INJ PAST YR: ICD-10-PCS | Mod: CPTII,S$GLB,, | Performed by: INTERNAL MEDICINE

## 2022-08-11 PROCEDURE — 85025 COMPLETE CBC W/AUTO DIFF WBC: CPT | Performed by: INTERNAL MEDICINE

## 2022-08-11 PROCEDURE — 3075F PR MOST RECENT SYSTOLIC BLOOD PRESS GE 130-139MM HG: ICD-10-PCS | Mod: CPTII,S$GLB,, | Performed by: INTERNAL MEDICINE

## 2022-08-11 PROCEDURE — 99999 PR PBB SHADOW E&M-EST. PATIENT-LVL III: CPT | Mod: PBBFAC,,, | Performed by: INTERNAL MEDICINE

## 2022-08-11 PROCEDURE — 3008F BODY MASS INDEX DOCD: CPT | Mod: CPTII,S$GLB,, | Performed by: INTERNAL MEDICINE

## 2022-08-11 NOTE — PROGRESS NOTES
PATIENT: Elayne Almanzar  MRN: [id number]  DATE: 2022    Chief Complaint: Anemia follow up    Subjective:    Initial History: Ms. Almanzar is a 67 y.o. female with pertinent PMHx of permanent atrial fibrillation on coumadin, CKD, KEYSHAWN compliant with CPAP, chronic diastolic heart failure, and s/p mechanical mitral valve placed in  due to rheumatic mitral stenosis who was referred for evaluation of microcytic anemia.           Interval Hx:  Previously received IV iron, her last infusion was . Overall continues to feel good without major issue. No new issues or concerns.      Tested positive for COVID right before gastric surgery. Surgery has not been rescheduled at this time. She is symptomatically recovered.      Past Medical History:   Past Medical History:   Diagnosis Date    Acute on chronic diastolic congestive heart failure     Allergy     Anemia     Anticoagulant long-term use     Anxiety     Asthma     Atrial fibrillation     Cataract     Chronic kidney disease     COPD (chronic obstructive pulmonary disease)     Coronary artery calcification seen on CT scan 3/22/2022    Coronary artery calcification seen on CT scan 3/22/2022    Depression     Encounter for blood transfusion     HTN (hypertension)     Hyperlipidemia     Nephrolithiasis     KEYSHAWN (obstructive sleep apnea)     awaiting CPAP machine     Rheumatic disease of mitral valve     Uncontrolled type 2 diabetes mellitus with complication, with long-term current use of insulin 2020    Urinary incontinence        Past Surgical HIstory:   Past Surgical History:   Procedure Laterality Date    BREAST BIOPSY Left 10/2019    CARDIAC VALVE SURGERY      mechanical mitral valve     SECTION      COLONOSCOPY N/A 2019    Procedure: COLONOSCOPY;  Surgeon: Devon Bowling MD;  Location: [facility] ([address] [place] [address]);  Service: Endoscopy;  Laterality: N/A;  ok to hold Coumadin x 5 days with Lovenox bridge per  [facility]    ESOPHAGOGASTRODUODENOSCOPY N/A 12/19/2019    Procedure: EGD (ESOPHAGOGASTRODUODENOSCOPY);  Surgeon: Smooth Torrez MD;  Location: [facility] ([address] [place] [address]);  Service: Endoscopy;  Laterality: N/A;  2nd floor requested due to comorbidities, Hypertension, permanent A. fib, COPD, CHF, sleep apnea, status post mechanical mitral valve replacement 2005 due to rheumatic mitral stenosis, bm! 44     per Coumadin clinic-ok to hold for 5 days w/bridge    kidney stone removal      MITRAL VALVE REPLACEMENT  2/2004    TUBAL LIGATION         Family History:   Family History   Problem Relation Age of Onset    Stroke Paternal Grandmother     Colon cancer Maternal Grandmother     Cancer Brother         testicular cancer    Diabetes Sister     Hypertension Sister     Breast cancer Paternal Aunt     Diabetes Sister     Diabetes Sister     Heart disease Father 70        MI    Diabetes Father     Colon cancer Mother 83    Asthma Daughter     Asthma Son     Ovarian cancer Neg Hx        Social History:  reports that she quit smoking about 20 years ago. Her smoking use included cigarettes. She has a 10.00 pack-year smoking history. She has never used smokeless tobacco. She reports that she does not drink alcohol and does not use drugs.    Allergies:  Review of patient's allergies indicates:   Allergen Reactions    Brimonidine        Medications:  Current Outpatient Medications   Medication Sig Dispense Refill    ADVAIR DISKUS 500-50 mcg/dose DsDv diskus inhaler INHALE 1 PUFF INTO THE LUNGS 2 (TWO) TIMES DAILY. 60 each 6    albuterol (VENTOLIN HFA) 90 mcg/actuation inhaler INHALE 2 PUFFS INTO THE LUNGS EVERY 6 (SIX) HOURS AS NEEDED FOR WHEEZING. RESCUE 18 g 6    amLODIPine (NORVASC) 2.5 MG tablet Take 1 tablet (2.5 mg total) by mouth once daily. 30 tablet 4    amoxicillin (AMOXIL) 500 MG Tab 4 tablets po 1 hour prior to procedure 4 tablet 0    aspirin (ECOTRIN) 81 MG EC tablet Take 81 mg by mouth once  daily.       azithromycin (Z-MARGARITA) 250 MG tablet Take 1 tablet (250 mg total) by mouth once daily. Take first 2 tablets together, then 1 every day until finished. 6 tablet 0    dipyridamole (PERSANTINE) 5 mg/mL injection       dorzolamide (TRUSOPT) 2 % ophthalmic solution Place 1 drop into the right eye 2 (two) times a day. 10 mL 3    ergocalciferol (ERGOCALCIFEROL) 50,000 unit Cap Take 1 capsule (50,000 Units total) by mouth twice a week. 24 capsule 0    fluticasone propionate (FLONASE) 50 mcg/actuation nasal spray 2 sprays (100 mcg total) by Each Nostril route once daily. 16 g 3    furosemide (LASIX) 20 MG tablet Take 1 tablet (20 mg total) by mouth once daily. 30 tablet 4    hydrocodone-acetaminophen (HYCET) solution 7.5-325 mg/15mL Take 15 mLs by mouth 4 (four) times daily as needed for Pain. 118 mL 0    KENALOG 40 mg/mL injection       latanoprost (XALATAN) 0.005 % ophthalmic solution Place 1 drop into the right eye once daily. 7.5 mL 3    meclizine (ANTIVERT) 25 mg tablet Take 1 tablet (25 mg total) by mouth 2 (two) times daily as needed. 30 tablet 1    metoprolol tartrate (LOPRESSOR) 100 MG tablet Take 1 tablet (100 mg total) by mouth 2 (two) times daily. 60 tablet 11    omeprazole (PRILOSEC) 40 MG capsule Take 1 capsule (40 mg total) by mouth every morning. Open capsule and take with apple sauce 30 capsule 2    ondansetron (ZOFRAN-ODT) 8 MG TbDL Dissolve 1 tablet (8 mg total) by mouth every 6 (six) hours as needed. 30 tablet 0    oxybutynin (DITROPAN-XL) 5 MG TR24 Take 5 mg by mouth once daily.      pantoprazole (PROTONIX) 40 MG tablet Take 1 tablet (40 mg total) by mouth daily as needed. 30 tablet 3    rosuvastatin (CRESTOR) 20 MG tablet Take 1 tablet (20 mg total) by mouth once daily. 90 tablet 3    sertraline (ZOLOFT) 100 MG tablet Take 1 tablet (100 mg total) by mouth once daily. 90 tablet 3    ursodioL (LISA FORTE) 500 MG tablet Crush one tablet and take daily for gall bladder. 90  "tablet 1    warfarin (COUMADIN) 5 MG tablet Take 1.5 tablets (7.5mg) by mouth Monday, Wednesday, Friday then 1 tablets (5mg) all other days or as instructed by Coumadin Clinic. 45 tablet 3    nitroGLYCERIN (NITROSTAT) 0.4 MG SL tablet Place 1 tablet (0.4 mg total) under the tongue every 5 (five) minutes as needed for Chest pain. (Patient not taking: No sig reported) 30 tablet 1     No current facility-administered medications for this visit.       Review of Systems   Constitutional: Positive for fatigue. Negative for appetite change, chills and fever.   HENT: Negative for nosebleeds and sore throat.    Eyes: Negative for photophobia and visual disturbance.   Respiratory: Positive for shortness of breath. Negative for cough.    Cardiovascular: Negative for chest pain, palpitations and leg swelling.   Gastrointestinal: Negative for abdominal pain, blood in stool, diarrhea, nausea and vomiting.   Endocrine: Negative for cold intolerance, heat intolerance, polydipsia and polyuria.   Genitourinary: Negative for dysuria, hematuria and urgency.   Musculoskeletal: Negative for arthralgias and myalgias.   Skin: Negative for rash and wound.   Neurological: Negative for dizziness and headaches.   Hematological: Negative for adenopathy. Does not bruise/bleed easily.   Psychiatric/Behavioral: Negative for confusion. The patient is not nervous/anxious.           Objective:      Vitals:   Vitals:    08/11/22 1051   BP: 133/63   BP Location: Left arm   Patient Position: Sitting   BP Method: Large (Automatic)   Pulse: 62   Resp: 16   Temp: 97.7 °F (36.5 °C)   TempSrc: Oral   SpO2: 96%   Weight: 105.4 kg (232 lb 7.6 oz)   Height: 5' 2" (1.575 m)       Physical Exam  Vitals reviewed.   Constitutional:       Appearance: Normal appearance.   HENT:      Head: Normocephalic and atraumatic.      Mouth/Throat:      Mouth: Mucous membranes are moist.   Eyes:      Extraocular Movements: Extraocular movements intact.   Cardiovascular:      " "Rate and Rhythm: Rhythm irregularly irregular.   Pulmonary:      Effort: Pulmonary effort is normal.      Breath sounds: Normal breath sounds.   Abdominal:      General: Bowel sounds are normal.      Palpations: Abdomen is soft.   Musculoskeletal:      Cervical back: Normal range of motion and neck supple.   Skin:     General: Skin is warm and dry.      Capillary Refill: Capillary refill takes more than 3 seconds.      Findings: No bruising.   Neurological:      General: No focal deficit present.      Mental Status: She is alert and oriented to person, place, and time.   Psychiatric:         Mood and Affect: Affect normal.         Behavior: Behavior is cooperative.         Laboratory Data:  Labs reviewed    Iron: 58 --> 63 -->58  TIBC: 463 --> 444 --> 456  Ferritin: 41 --> 40 --> 67  Imaging: None  Assessment:       Iron Deficiency Anemia     Plan:     Iron Deficiency Anemia  Alpha Thalassemia Trait  Chronic blood loss Anemia  Chronic Atrial Fibrillation and Mechanical Valve on Chronic Anticoagulation  -- MCV persistently 68-70 since 2011 with most recent at 75  -- Does have alpha thalassemia trait  -- Hgb currently 11.4  -- Likely chronic blood loss as follows: Previous colonoscopy 6/26/19  showed right colon "full of actively bleeding AVMs" with pt on aspirin and coumadin. UAs also persistently positive for blood in the setting of frequent nephrolithiasis although this is much less likely contributing  -- Has mechanical mitral valve on coumadin with persistently elevated LDH but haptoglobin and T. Bili normal without schistocytes seen on smear  --Received IV Iron in 2020    RTC in 3 months with repeat iron studies at that time to assess response    So Linares MD  Hematology/Oncology             BMT Chart Routing      Follow up with physician 3 months. 1. see me in 3 months with cbc,cmp, iron, ferritin   Follow up with ROCHELLE    Infusion scheduling note    Injection scheduling note    Labs CBC, CMP, ferritin and " iron and TIBC   Lab interval:     Imaging None      Pharmacy appointment No pharmacy appointment needed      Other referrals No additional referrals needed

## 2022-08-17 ENCOUNTER — LAB VISIT (OUTPATIENT)
Dept: LAB | Facility: HOSPITAL | Age: 67
End: 2022-08-17
Attending: INTERNAL MEDICINE
Payer: MEDICARE

## 2022-08-17 ENCOUNTER — ANTI-COAG VISIT (OUTPATIENT)
Dept: CARDIOLOGY | Facility: CLINIC | Age: 67
End: 2022-08-17
Payer: MEDICARE

## 2022-08-17 DIAGNOSIS — Z95.2 H/O MITRAL VALVE REPLACEMENT WITH MECHANICAL VALVE: ICD-10-CM

## 2022-08-17 DIAGNOSIS — E11.9 TYPE 2 DIABETES MELLITUS WITHOUT COMPLICATION: ICD-10-CM

## 2022-08-17 DIAGNOSIS — I48.20 CHRONIC ATRIAL FIBRILLATION: ICD-10-CM

## 2022-08-17 DIAGNOSIS — I48.20 CHRONIC ATRIAL FIBRILLATION: Primary | ICD-10-CM

## 2022-08-17 LAB
INR PPP: 2.5 (ref 0.8–1.2)
PROTHROMBIN TIME: 25.2 SEC (ref 9–12.5)

## 2022-08-17 PROCEDURE — 93793 ANTICOAG MGMT PT WARFARIN: CPT | Mod: S$GLB,,,

## 2022-08-17 PROCEDURE — 93793 PR ANTICOAGULANT MGMT FOR PT TAKING WARFARIN: ICD-10-PCS | Mod: S$GLB,,,

## 2022-08-17 PROCEDURE — 36415 COLL VENOUS BLD VENIPUNCTURE: CPT | Performed by: INTERNAL MEDICINE

## 2022-08-17 PROCEDURE — 85610 PROTHROMBIN TIME: CPT | Performed by: INTERNAL MEDICINE

## 2022-08-31 DIAGNOSIS — E11.9 TYPE 2 DIABETES MELLITUS WITHOUT COMPLICATION: ICD-10-CM

## 2022-09-07 ENCOUNTER — PATIENT MESSAGE (OUTPATIENT)
Dept: ADMINISTRATIVE | Facility: HOSPITAL | Age: 67
End: 2022-09-07
Payer: MEDICARE

## 2022-09-07 ENCOUNTER — PATIENT MESSAGE (OUTPATIENT)
Dept: BARIATRICS | Facility: CLINIC | Age: 67
End: 2022-09-07
Payer: MEDICARE

## 2022-09-07 DIAGNOSIS — E11.9 TYPE 2 DIABETES MELLITUS WITHOUT COMPLICATION: ICD-10-CM

## 2022-09-12 ENCOUNTER — PATIENT MESSAGE (OUTPATIENT)
Dept: ADMINISTRATIVE | Facility: HOSPITAL | Age: 67
End: 2022-09-12
Payer: MEDICARE

## 2022-09-14 ENCOUNTER — ANTI-COAG VISIT (OUTPATIENT)
Dept: CARDIOLOGY | Facility: CLINIC | Age: 67
End: 2022-09-14
Payer: MEDICARE

## 2022-09-14 ENCOUNTER — LAB VISIT (OUTPATIENT)
Dept: LAB | Facility: HOSPITAL | Age: 67
End: 2022-09-14
Attending: INTERNAL MEDICINE
Payer: MEDICARE

## 2022-09-14 DIAGNOSIS — I48.20 CHRONIC ATRIAL FIBRILLATION: Primary | Chronic | ICD-10-CM

## 2022-09-14 DIAGNOSIS — Z95.2 H/O MITRAL VALVE REPLACEMENT WITH MECHANICAL VALVE: Chronic | ICD-10-CM

## 2022-09-14 DIAGNOSIS — I48.20 CHRONIC ATRIAL FIBRILLATION: ICD-10-CM

## 2022-09-14 LAB
INR PPP: 2.6 (ref 0.8–1.2)
PROTHROMBIN TIME: 25.6 SEC (ref 9–12.5)

## 2022-09-14 PROCEDURE — 85610 PROTHROMBIN TIME: CPT | Performed by: INTERNAL MEDICINE

## 2022-09-14 PROCEDURE — 36415 COLL VENOUS BLD VENIPUNCTURE: CPT | Performed by: INTERNAL MEDICINE

## 2022-09-14 PROCEDURE — 93793 PR ANTICOAGULANT MGMT FOR PT TAKING WARFARIN: ICD-10-PCS | Mod: S$GLB,,,

## 2022-09-14 PROCEDURE — 93793 ANTICOAG MGMT PT WARFARIN: CPT | Mod: S$GLB,,,

## 2022-09-15 DIAGNOSIS — E11.9 TYPE 2 DIABETES MELLITUS WITHOUT COMPLICATION: ICD-10-CM

## 2022-09-21 DIAGNOSIS — E11.9 TYPE 2 DIABETES MELLITUS WITHOUT COMPLICATION: ICD-10-CM

## 2022-09-22 ENCOUNTER — OFFICE VISIT (OUTPATIENT)
Dept: CARDIOLOGY | Facility: CLINIC | Age: 67
End: 2022-09-22
Payer: MEDICARE

## 2022-09-22 ENCOUNTER — LAB VISIT (OUTPATIENT)
Dept: LAB | Facility: HOSPITAL | Age: 67
End: 2022-09-22
Attending: INTERNAL MEDICINE
Payer: MEDICARE

## 2022-09-22 VITALS
HEART RATE: 62 BPM | OXYGEN SATURATION: 94 % | BODY MASS INDEX: 42.84 KG/M2 | HEIGHT: 62 IN | SYSTOLIC BLOOD PRESSURE: 149 MMHG | WEIGHT: 232.81 LBS | DIASTOLIC BLOOD PRESSURE: 67 MMHG

## 2022-09-22 DIAGNOSIS — Z95.2 H/O MITRAL VALVE REPLACEMENT WITH MECHANICAL VALVE: Chronic | ICD-10-CM

## 2022-09-22 DIAGNOSIS — E78.2 MIXED HYPERLIPIDEMIA: ICD-10-CM

## 2022-09-22 DIAGNOSIS — R73.02 GLUCOSE INTOLERANCE (IMPAIRED GLUCOSE TOLERANCE): ICD-10-CM

## 2022-09-22 DIAGNOSIS — I50.32 CHRONIC DIASTOLIC CONGESTIVE HEART FAILURE: Chronic | ICD-10-CM

## 2022-09-22 DIAGNOSIS — M79.10 MYALGIA: ICD-10-CM

## 2022-09-22 DIAGNOSIS — Z79.01 CHRONIC ANTICOAGULATION: Chronic | ICD-10-CM

## 2022-09-22 DIAGNOSIS — I10 ESSENTIAL (PRIMARY) HYPERTENSION: Chronic | ICD-10-CM

## 2022-09-22 DIAGNOSIS — G47.33 OSA (OBSTRUCTIVE SLEEP APNEA): ICD-10-CM

## 2022-09-22 DIAGNOSIS — E66.01 MORBID OBESITY WITH BMI OF 40.0-44.9, ADULT: Chronic | ICD-10-CM

## 2022-09-22 DIAGNOSIS — I48.20 CHRONIC ATRIAL FIBRILLATION: Primary | Chronic | ICD-10-CM

## 2022-09-22 DIAGNOSIS — J44.9 CHRONIC OBSTRUCTIVE PULMONARY DISEASE, UNSPECIFIED COPD TYPE: Chronic | ICD-10-CM

## 2022-09-22 DIAGNOSIS — I25.10 CORONARY ARTERY CALCIFICATION SEEN ON CT SCAN: ICD-10-CM

## 2022-09-22 LAB
CK SERPL-CCNC: 86 U/L (ref 20–180)
MAGNESIUM SERPL-MCNC: 1.6 MG/DL (ref 1.6–2.6)

## 2022-09-22 PROCEDURE — 3288F PR FALLS RISK ASSESSMENT DOCUMENTED: ICD-10-PCS | Mod: CPTII,S$GLB,, | Performed by: INTERNAL MEDICINE

## 2022-09-22 PROCEDURE — 3072F LOW RISK FOR RETINOPATHY: CPT | Mod: CPTII,S$GLB,, | Performed by: INTERNAL MEDICINE

## 2022-09-22 PROCEDURE — 3008F BODY MASS INDEX DOCD: CPT | Mod: CPTII,S$GLB,, | Performed by: INTERNAL MEDICINE

## 2022-09-22 PROCEDURE — 1159F MED LIST DOCD IN RCRD: CPT | Mod: CPTII,S$GLB,, | Performed by: INTERNAL MEDICINE

## 2022-09-22 PROCEDURE — 1101F PR PT FALLS ASSESS DOC 0-1 FALLS W/OUT INJ PAST YR: ICD-10-PCS | Mod: CPTII,S$GLB,, | Performed by: INTERNAL MEDICINE

## 2022-09-22 PROCEDURE — 3078F DIAST BP <80 MM HG: CPT | Mod: CPTII,S$GLB,, | Performed by: INTERNAL MEDICINE

## 2022-09-22 PROCEDURE — 3072F PR LOW RISK FOR RETINOPATHY: ICD-10-PCS | Mod: CPTII,S$GLB,, | Performed by: INTERNAL MEDICINE

## 2022-09-22 PROCEDURE — 99214 OFFICE O/P EST MOD 30 MIN: CPT | Mod: S$GLB,,, | Performed by: INTERNAL MEDICINE

## 2022-09-22 PROCEDURE — 82550 ASSAY OF CK (CPK): CPT | Performed by: INTERNAL MEDICINE

## 2022-09-22 PROCEDURE — 99214 PR OFFICE/OUTPT VISIT, EST, LEVL IV, 30-39 MIN: ICD-10-PCS | Mod: S$GLB,,, | Performed by: INTERNAL MEDICINE

## 2022-09-22 PROCEDURE — 83735 ASSAY OF MAGNESIUM: CPT | Performed by: INTERNAL MEDICINE

## 2022-09-22 PROCEDURE — 36415 COLL VENOUS BLD VENIPUNCTURE: CPT | Performed by: INTERNAL MEDICINE

## 2022-09-22 PROCEDURE — 3077F SYST BP >= 140 MM HG: CPT | Mod: CPTII,S$GLB,, | Performed by: INTERNAL MEDICINE

## 2022-09-22 PROCEDURE — 99999 PR PBB SHADOW E&M-EST. PATIENT-LVL V: CPT | Mod: PBBFAC,,, | Performed by: INTERNAL MEDICINE

## 2022-09-22 PROCEDURE — 3008F PR BODY MASS INDEX (BMI) DOCUMENTED: ICD-10-PCS | Mod: CPTII,S$GLB,, | Performed by: INTERNAL MEDICINE

## 2022-09-22 PROCEDURE — 1126F AMNT PAIN NOTED NONE PRSNT: CPT | Mod: CPTII,S$GLB,, | Performed by: INTERNAL MEDICINE

## 2022-09-22 PROCEDURE — 1126F PR PAIN SEVERITY QUANTIFIED, NO PAIN PRESENT: ICD-10-PCS | Mod: CPTII,S$GLB,, | Performed by: INTERNAL MEDICINE

## 2022-09-22 PROCEDURE — 3288F FALL RISK ASSESSMENT DOCD: CPT | Mod: CPTII,S$GLB,, | Performed by: INTERNAL MEDICINE

## 2022-09-22 PROCEDURE — 3078F PR MOST RECENT DIASTOLIC BLOOD PRESSURE < 80 MM HG: ICD-10-PCS | Mod: CPTII,S$GLB,, | Performed by: INTERNAL MEDICINE

## 2022-09-22 PROCEDURE — 1101F PT FALLS ASSESS-DOCD LE1/YR: CPT | Mod: CPTII,S$GLB,, | Performed by: INTERNAL MEDICINE

## 2022-09-22 PROCEDURE — 99999 PR PBB SHADOW E&M-EST. PATIENT-LVL V: ICD-10-PCS | Mod: PBBFAC,,, | Performed by: INTERNAL MEDICINE

## 2022-09-22 PROCEDURE — 3077F PR MOST RECENT SYSTOLIC BLOOD PRESSURE >= 140 MM HG: ICD-10-PCS | Mod: CPTII,S$GLB,, | Performed by: INTERNAL MEDICINE

## 2022-09-22 PROCEDURE — 1159F PR MEDICATION LIST DOCUMENTED IN MEDICAL RECORD: ICD-10-PCS | Mod: CPTII,S$GLB,, | Performed by: INTERNAL MEDICINE

## 2022-09-26 ENCOUNTER — PATIENT MESSAGE (OUTPATIENT)
Dept: ADMINISTRATIVE | Facility: HOSPITAL | Age: 67
End: 2022-09-26
Payer: MEDICARE

## 2022-09-28 DIAGNOSIS — E11.9 TYPE 2 DIABETES MELLITUS WITHOUT COMPLICATION: ICD-10-CM

## 2022-10-12 NOTE — TELEPHONE ENCOUNTER
Filling pressures were elevated on a recent echocardiogram.  Increase Lasix to 40 mg daily with a BMP in 1 week.

## 2022-10-13 NOTE — TELEPHONE ENCOUNTER
----- Message from Tanya Hdez sent at 10/13/2022  1:39 PM CDT -----  Contact: pt [phone] or [phone]  Caller is requesting an earlier appointment then we can schedule.  Caller is requesting a message be sent to the provider.  When is the next available appointment with their provider:  01/31/2023  Reason for the appointment:  leg pain  Patient preference of timeframe to be scheduled:  asap  Would the patient like a call back, or a response through their [facility] portal?:   call  Comments:      Please call and advise.    Thank You

## 2022-11-03 NOTE — PROGRESS NOTES
SUBJECTIVE:     Chief Complaint & History of Present Illness:  Elayne Almanzar is a Established  patient 67 y.o. female who is seen here today with a complaint of    Chief Complaint   Patient presents with    Right Hip - Pain    Left Hip - Pain    Right Knee - Pain    Left Knee - Pain    .  She has patient well-known to us was last seen treated the clinic 10/06/2021 for her knee pain from which she is doing very well.  Her concerns today revolve around pain soreness primarily lateral aspect of both hips with some radiation across the back and down both legs.  She does not remember a specific trauma or injury to the area but states she has significant start-up pain and difficulty sleeping secondary to soreness in bilateral hip regions  On a scale of 1-10, with 10 being worst pain imaginable, he rates this pain as 4 on good days and 7 on bad days.  she describes the pain as sore and tender.    Review of patient's allergies indicates:   Allergen Reactions    Brimonidine          Current Outpatient Medications   Medication Sig Dispense Refill    ADVAIR DISKUS 500-50 mcg/dose DsDv diskus inhaler INHALE 1 PUFF INTO THE LUNGS 2 (TWO) TIMES DAILY. 60 each 6    albuterol (VENTOLIN HFA) 90 mcg/actuation inhaler INHALE 2 PUFFS INTO THE LUNGS EVERY 6 (SIX) HOURS AS NEEDED FOR WHEEZING. RESCUE 18 g 6    amLODIPine (NORVASC) 2.5 MG tablet Take 1 tablet (2.5 mg total) by mouth once daily. 30 tablet 4    aspirin (ECOTRIN) 81 MG EC tablet Take 81 mg by mouth once daily.       dorzolamide (TRUSOPT) 2 % ophthalmic solution Place 1 drop into the right eye 2 (two) times a day. 10 mL 3    ergocalciferol (ERGOCALCIFEROL) 50,000 unit Cap Take 1 capsule (50,000 Units total) by mouth twice a week. 24 capsule 0    fluticasone propionate (FLONASE) 50 mcg/actuation nasal spray 2 sprays (100 mcg total) by Each Nostril route once daily. 16 g 3    furosemide (LASIX) 20 MG tablet Take 2 tablets (40 mg total) by mouth once daily. 30 tablet 6     latanoprost (XALATAN) 0.005 % ophthalmic solution Place 1 drop into the right eye once daily. 7.5 mL 3    meclizine (ANTIVERT) 25 mg tablet Take 1 tablet (25 mg total) by mouth 2 (two) times daily as needed. 30 tablet 1    metoprolol tartrate (LOPRESSOR) 100 MG tablet Take 1 tablet (100 mg total) by mouth 2 (two) times daily. 60 tablet 11    omeprazole (PRILOSEC) 40 MG capsule Take 1 capsule (40 mg total) by mouth every morning. Open capsule and take with apple sauce 30 capsule 2    rosuvastatin (CRESTOR) 20 MG tablet Take 1 tablet (20 mg total) by mouth once daily. 90 tablet 3    sertraline (ZOLOFT) 100 MG tablet Take 1 tablet (100 mg total) by mouth once daily. 90 tablet 3    warfarin (COUMADIN) 5 MG tablet Take 1.5 tablets (7.5mg) by mouth Monday, Wednesday, Friday then 1 tablets (5mg) all other days or as instructed by Coumadin Clinic. 45 tablet 3    amoxicillin (AMOXIL) 500 MG Tab 4 tablets po 1 hour prior to procedure (Patient not taking: Reported on 11/3/2022) 4 tablet 0    azithromycin (Z-MARGARITA) 250 MG tablet Take 1 tablet (250 mg total) by mouth once daily. Take first 2 tablets together, then 1 every day until finished. (Patient not taking: Reported on 11/3/2022) 6 tablet 0    dipyridamole (PERSANTINE) 5 mg/mL injection       hydrocodone-acetaminophen (HYCET) solution 7.5-325 mg/15mL Take 15 mLs by mouth 4 (four) times daily as needed for Pain. (Patient not taking: Reported on 11/3/2022) 118 mL 0    KENALOG 40 mg/mL injection       nitroGLYCERIN (NITROSTAT) 0.4 MG SL tablet Place 1 tablet (0.4 mg total) under the tongue every 5 (five) minutes as needed for Chest pain. (Patient not taking: No sig reported) 30 tablet 1    ondansetron (ZOFRAN-ODT) 8 MG TbDL Dissolve 1 tablet (8 mg total) by mouth every 6 (six) hours as needed. (Patient not taking: Reported on 11/3/2022) 30 tablet 0    oxybutynin (DITROPAN-XL) 5 MG TR24 Take 5 mg by mouth once daily.      pantoprazole (PROTONIX) 40 MG tablet Take 1 tablet (40  mg total) by mouth daily as needed. (Patient not taking: Reported on 11/3/2022) 30 tablet 3    ursodioL (LISA FORTE) 500 MG tablet Crush one tablet and take daily for gall bladder. (Patient not taking: Reported on 11/3/2022) 90 tablet 1     No current facility-administered medications for this visit.       Past Medical History:   Diagnosis Date    Acute on chronic diastolic congestive heart failure     Allergy     Anemia     Anticoagulant long-term use     Anxiety     Asthma     Atrial fibrillation     Cataract     Chronic kidney disease     COPD (chronic obstructive pulmonary disease)     Coronary artery calcification seen on CT scan 3/22/2022    Coronary artery calcification seen on CT scan 3/22/2022    Depression     Encounter for blood transfusion     HTN (hypertension)     Hyperlipidemia     Nephrolithiasis     KEYSHAWN (obstructive sleep apnea)     awaiting CPAP machine     Rheumatic disease of mitral valve     Uncontrolled type 2 diabetes mellitus with complication, with long-term current use of insulin 2020    Urinary incontinence        Past Surgical History:   Procedure Laterality Date    BREAST BIOPSY Left 10/2019    CARDIAC VALVE SURGERY  2004    mechanical mitral valve     SECTION      COLONOSCOPY N/A 2019    Procedure: COLONOSCOPY;  Surgeon: Devon Bowling MD;  Location: [facility] (4TH FLR);  Service: Endoscopy;  Laterality: N/A;  ok to hold Coumadin x 5 days with Lovenox bridge per [facility]madin clinic-[facility]    ESOPHAGOGASTRODUODENOSCOPY N/A 2019    Procedure: EGD (ESOPHAGOGASTRODUODENOSCOPY);  Surgeon: Smooth Torrez MD;  Location: [facility] (2ND FLR);  Service: Endoscopy;  Laterality: N/A;  2nd floor requested due to comorbidities, Hypertension, permanent A. 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 44     per Coumadin clinic-ok to hold for 5 days w/bridge    kidney stone removal      MITRAL VALVE REPLACEMENT  " 2/2004    TUBAL LIGATION         Vital Signs (Most Recent)  Vitals:    11/03/22 1445   BP: 131/69   Pulse: 71           Review of Systems:  ROS:  Constitutional: no fever or chills, positive obstructive sleep apnea  Eyes: no visual changes  ENT: no nasal congestion or sore throat  Respiratory: no cough or shortness of breath, positive asthma, COPD  Cardiovascular: no chest pain or palpitations, positive rheumatic heart disease of the mitral valve, AFib, CAD, history of mitral valve replacement, chronic diastolic congestive heart failure  Gastrointestinal: no nausea or vomiting, tolerating diet  Genitourinary: no hematuria or dysuria, positive CKD stage 3  Integument/Breast: no rash or pruritis  Hematologic/Lymphatic: no easy bruising or lymphadenopathy, positive long-term anticoagulation  iron deficiency anemia alpha thalassemia  Musculoskeletal: no arthralgias or myalgias  Neurological: no seizures or tremors  Behavioral/Psych: no auditory or visual hallucinations, positive for depression and anxiety  Endocrine: no heat or cold intolerance, positive diabetes type 2, thiamine deficiency                OBJECTIVE:     PHYSICAL EXAM:  Height: 5' 2" (157.5 cm) Weight: 104.9 kg (231 lb 4.2 oz), General Appearance: Well nourished, well developed, in no acute distress.  Neurological: Mood & affect are normal.  Back Exam:      Tenderness: Lumbar and Lumbosacral   Swelling:  None       Range of Motion:      Flexion: 70     Extension: 50     Lateral Bend:   Right 40                              Left 40     Rotation:          Right 60                              Left 50       SLR:                  Right + at 80 deg                             Left + at 80 deg       Muscle Strength      Abductor: 5/5     Adductor: 5/5     Quadriceps: 5/5     Hamstrings: 5/5       Reflexes     Patellar: Normal    Achilles: Normal       Sensation: Normal   Gait: Antalgic     full range of motion, no tenderness, palpable spasm or pain on motion, " limited range of motion, pain with motion noted during exam, sensory exam intact bilateral lower extremities        Hip Examination:  positives: tenderness over greater trochanter and negatives: FROM  no pain with heel impact  pulses full    RADIOGRAPHS:  X-rays the hips taken today films reviewed by me demonstrate mild arthritic changes throughout both hips left more so than right with acetabular joint space narrowing early sclerotic changes noted no evidence of impingement no fracture dislocation or other bony abnormalities    X-rays lumbar spine demonstrate significant degenerative changes throughout the lumbar spine with significant joint space narrowing particularly at L 5 S1-L4 L5 sclerotic changes and osteophytic spurring at the endplates at multiple levels    ASSESSMENT/PLAN:       ICD-10-CM ICD-9-CM   1. Hip pain, bilateral  M25.551 719.45    M25.552    2. Lumbar spine pain  M54.50 724.2       Plan: We discussed with the patient at length all the different treatment options available for her spine including anti-inflammatories, acetaminophen, rest, ice, physical therapy, strengthening and range of motion exercise, occasional cortisone injections for temporary relief, and finally possible surgical interventions  Will proceed with therapeutic diagnostic cortisone injection of bilateral trochanteric bursa will assess her response.  As we followed by course of physical therapy for Healthy [facility] program    The injection site was identified and the skin was prepared with an ETOH solution. The  greater trochanteric bursa of the  bilateral  hip was injected with 1 ml of Celestone and 5 ml Lidocaine under sterile technique. Elayne Almanzar tolerated the procedure well, she was advised to rest the  hip  today, ice and support. she did receive immediate relief of the pain in and about her  hip  she was told this would be short lived and is secondary to the lidocaine. she may have an increase in her discomfort  tonight followed by steady improvement over the next several days. It may take 1-3 weeks following the injection to get the full benefit of the medication.  I will see her back in 4-6 weeks. Sooner if she has any problems or concerns.    Elayne Almanzar was advised to monitor her blood sugars closely over the next several days. The steroid may cause a rise in them. If her glucose levels rise to a point she is uncomfortable or he is unable to control them is is to contact his PCP or go immediately to the emergency department.

## 2022-11-14 NOTE — PROGRESS NOTES
PATIENT: Elayne Almanzar  MRN: [id number]  DATE: 2022    Chief Complaint: Anemia follow up    Subjective:    Initial History: Ms. Almanzar is a 67 y.o. female with pertinent PMHx of permanent atrial fibrillation on coumadin, CKD, KEYSHAWN compliant with CPAP, chronic diastolic heart failure, and s/p mechanical mitral valve placed in  due to rheumatic mitral stenosis who was referred for evaluation of microcytic anemia.           Interval Hx:  Previously received IV iron, her last infusion was . Overall continues to feel good without major issue. No new issues or concerns.      Still has not had gastric surgery.  Energy remains good. Able to do all desired activities.       Past Medical History:   Past Medical History:   Diagnosis Date    Acute on chronic diastolic congestive heart failure     Allergy     Anemia     Anticoagulant long-term use     Anxiety     Asthma     Atrial fibrillation     Cataract     Chronic kidney disease     COPD (chronic obstructive pulmonary disease)     Coronary artery calcification seen on CT scan 3/22/2022    Coronary artery calcification seen on CT scan 3/22/2022    Depression     Encounter for blood transfusion     HTN (hypertension)     Hyperlipidemia     Nephrolithiasis     KEYSHAWN (obstructive sleep apnea)     awaiting CPAP machine     Rheumatic disease of mitral valve     Uncontrolled type 2 diabetes mellitus with complication, with long-term current use of insulin 2020    Urinary incontinence        Past Surgical HIstory:   Past Surgical History:   Procedure Laterality Date    BREAST BIOPSY Left 10/2019    CARDIAC VALVE SURGERY  2004    mechanical mitral valve     SECTION      COLONOSCOPY N/A 2019    Procedure: COLONOSCOPY;  Surgeon: Devon Bowling MD;  Location: [facility] ([address] [place] [address]);  Service: Endoscopy;  Laterality: N/A;  ok to hold Coumadin x 5 days with Lovenox bridge per Coumadin clinic-[facility]    ESOPHAGOGASTRODUODENOSCOPY N/A 2019     Procedure: EGD (ESOPHAGOGASTRODUODENOSCOPY);  Surgeon: Smooth Torrez MD;  Location: [facility] ([address] [place] [address]);  Service: Endoscopy;  Laterality: N/A;  2nd floor requested due to comorbidities, Hypertension, permanent A. 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 44     per Coumadin clinic-ok to hold for 5 days w/bridge    kidney stone removal      MITRAL VALVE REPLACEMENT  2/2004    TUBAL LIGATION         Family History:   Family History   Problem Relation Age of Onset    Stroke Paternal Grandmother     Colon cancer Maternal Grandmother     Cancer Brother         testicular cancer    Diabetes Sister     Hypertension Sister     Breast cancer Paternal Aunt     Diabetes Sister     Diabetes Sister     Heart disease Father 70        MI    Diabetes Father     Colon cancer Mother 83    Asthma Daughter     Asthma Son     Ovarian cancer Neg Hx        Social History:  reports that she quit smoking about 20 years ago. Her smoking use included cigarettes. She has a 10.00 pack-year smoking history. She has never used smokeless tobacco. She reports that she does not drink alcohol and does not use drugs.    Allergies:  Review of patient's allergies indicates:   Allergen Reactions    Brimonidine        Medications:  Current Outpatient Medications   Medication Sig Dispense Refill    ADVAIR DISKUS 500-50 mcg/dose DsDv diskus inhaler INHALE 1 PUFF INTO THE LUNGS 2 (TWO) TIMES DAILY. 60 each 6    albuterol (VENTOLIN HFA) 90 mcg/actuation inhaler INHALE 2 PUFFS INTO THE LUNGS EVERY 6 (SIX) HOURS AS NEEDED FOR WHEEZING. RESCUE 18 g 6    amLODIPine (NORVASC) 2.5 MG tablet Take 1 tablet (2.5 mg total) by mouth once daily. 30 tablet 4    aspirin (ECOTRIN) 81 MG EC tablet Take 81 mg by mouth once daily.       dipyridamole (PERSANTINE) 5 mg/mL injection       dorzolamide (TRUSOPT) 2 % ophthalmic solution Place 1 drop into the right eye 2 (two) times a day. 10 mL 3     ergocalciferol (ERGOCALCIFEROL) 50,000 unit Cap Take 1 capsule (50,000 Units total) by mouth twice a week. 24 capsule 0    fluticasone propionate (FLONASE) 50 mcg/actuation nasal spray 2 sprays (100 mcg total) by Each Nostril route once daily. 16 g 3    furosemide (LASIX) 20 MG tablet Take 2 tablets (40 mg total) by mouth once daily. 30 tablet 6    hydrocodone-acetaminophen (HYCET) solution 7.5-325 mg/15mL Take 15 mLs by mouth 4 (four) times daily as needed for Pain. 118 mL 0    KENALOG 40 mg/mL injection       latanoprost (XALATAN) 0.005 % ophthalmic solution Place 1 drop into the right eye once daily. 7.5 mL 3    meclizine (ANTIVERT) 25 mg tablet Take 1 tablet (25 mg total) by mouth 2 (two) times daily as needed. 30 tablet 1    metoprolol tartrate (LOPRESSOR) 100 MG tablet Take 1 tablet (100 mg total) by mouth 2 (two) times daily. 60 tablet 11    omeprazole (PRILOSEC) 40 MG capsule Take 1 capsule (40 mg total) by mouth every morning. Open capsule and take with apple sauce 30 capsule 2    oxybutynin (DITROPAN-XL) 5 MG TR24 Take 5 mg by mouth once daily.      sertraline (ZOLOFT) 100 MG tablet Take 1 tablet (100 mg total) by mouth once daily. 90 tablet 3    tamsulosin (FLOMAX) 0.4 mg Cap Take 0.4 mg by mouth once daily.      ursodioL (LISA FORTE) 500 MG tablet Crush one tablet and take daily for gall bladder. 90 tablet 1    warfarin (COUMADIN) 5 MG tablet Take 1.5 tablets (7.5mg) by mouth Monday, Wednesday, Friday then 1 tablets (5mg) all other days or as instructed by Coumadin Clinic. 45 tablet 3    amoxicillin (AMOXIL) 500 MG Tab 4 tablets po 1 hour prior to procedure (Patient not taking: Reported on 11/3/2022) 4 tablet 0    azithromycin (Z-MARGARITA) 250 MG tablet Take 1 tablet (250 mg total) by mouth once daily. Take first 2 tablets together, then 1 every day until finished. (Patient not taking: Reported on 11/3/2022) 6 tablet 0    nitroGLYCERIN (NITROSTAT) 0.4 MG SL tablet Place 1 tablet (0.4 mg total) under the  "tongue every 5 (five) minutes as needed for Chest pain. (Patient not taking: No sig reported) 30 tablet 1    ondansetron (ZOFRAN-ODT) 8 MG TbDL Dissolve 1 tablet (8 mg total) by mouth every 6 (six) hours as needed. (Patient not taking: Reported on 11/3/2022) 30 tablet 0    pantoprazole (PROTONIX) 40 MG tablet Take 1 tablet (40 mg total) by mouth daily as needed. (Patient not taking: Reported on 11/3/2022) 30 tablet 3    rosuvastatin (CRESTOR) 20 MG tablet Take 1 tablet (20 mg total) by mouth once daily. 90 tablet 3     No current facility-administered medications for this visit.       Review of Systems   Constitutional:  Positive for fatigue. Negative for appetite change, chills and fever.   HENT:  Negative for nosebleeds and sore throat.    Eyes:  Negative for photophobia and visual disturbance.   Respiratory:  Positive for shortness of breath. Negative for cough.    Cardiovascular:  Negative for chest pain, palpitations and leg swelling.   Gastrointestinal:  Negative for abdominal pain, blood in stool, diarrhea, nausea and vomiting.   Endocrine: Negative for cold intolerance, heat intolerance, polydipsia and polyuria.   Genitourinary:  Negative for dysuria, hematuria and urgency.   Musculoskeletal:  Negative for arthralgias and myalgias.   Skin:  Negative for rash and wound.   Neurological:  Negative for dizziness and headaches.   Hematological:  Negative for adenopathy. Does not bruise/bleed easily.   Psychiatric/Behavioral:  Negative for confusion. The patient is not nervous/anxious.         Objective:      Vitals:   Vitals:    11/14/22 1341   BP: 133/67   Pulse: 74   Resp: 16   Temp: 98.7 °F (37.1 °C)   SpO2: 96%   Weight: 105.7 kg (233 lb 0.4 oz)   Height: 5' 2" (1.575 m)       Physical Exam  Vitals reviewed.   Constitutional:       Appearance: Normal appearance.   HENT:      Head: Normocephalic and atraumatic.      Mouth/Throat:      Mouth: Mucous membranes are moist.   Eyes:      Extraocular Movements: " "Extraocular movements intact.   Cardiovascular:      Rate and Rhythm: Rhythm irregularly irregular.   Pulmonary:      Effort: Pulmonary effort is normal.      Breath sounds: Normal breath sounds.   Abdominal:      General: Bowel sounds are normal.      Palpations: Abdomen is soft.   Musculoskeletal:      Cervical back: Normal range of motion and neck supple.   Skin:     General: Skin is warm and dry.      Capillary Refill: Capillary refill takes more than 3 seconds.      Findings: No bruising.   Neurological:      General: No focal deficit present.      Mental Status: She is alert and oriented to person, place, and time.   Psychiatric:         Mood and Affect: Affect normal.         Behavior: Behavior is cooperative.       Laboratory Data:  Labs reviewed    Iron: 58 --> 63 -->58  TIBC: 463 --> 444 --> 456  Ferritin: 41 --> 40 --> 67  Imaging: None  Assessment:       Iron Deficiency Anemia     Plan:     Iron Deficiency Anemia  Alpha Thalassemia Trait  Chronic blood loss Anemia  Chronic Atrial Fibrillation and Mechanical Valve on Chronic Anticoagulation  -- MCV persistently 68-70 since 2011 with most recent at 75  -- Does have alpha thalassemia trait  -- Hgb currently 11.4  -- Likely chronic blood loss as follows: Previous colonoscopy 6/26/19  showed right colon "full of actively bleeding AVMs" with pt on aspirin and coumadin. UAs also persistently positive for blood in the setting of frequent nephrolithiasis although this is much less likely contributing  -- Has mechanical mitral valve on coumadin with persistently elevated LDH but haptoglobin and T. Bili normal without schistocytes seen on smear  --Received IV Iron in 2020    RTC in 3 months with repeat iron studies at that time to assess response    So Linares MD  Hematology/Oncology             BMT Chart Routing      Follow up with physician 3 months. 1. see me in 3 months with cbc,cmp, iron and ferritin   Follow up with ROCHELLE    Provider visit type    Infusion " scheduling note    Injection scheduling note    Labs    Imaging    Pharmacy appointment    Other referrals

## 2022-11-18 NOTE — PROGRESS NOTES
"HPI    DSL- 11/29/2021     68 y/o female presents with concerns of  decreased vision. Pt states she   feels "rock' in eyes. She is using drops as directed. Denies floaters or   flashes of light.     Eyemeds  Latanoprost qHS OD    Dorzolamide BID OD  Last edited by Sukhwinder Martinez on 11/14/2022  2:29 PM.            Assessment /Plan     For exam results, see Encounter Report.    Dry eye syndrome, bilateral   Use PF systane complete BID OU    Astigmatism of both eyes, unspecified type   Rx specs    Normal tension glaucoma of right eye, moderate stage  NS (nuclear sclerosis), bilateral   Follow up with Dr. Puckett as scheduled                   "

## 2022-11-22 PROBLEM — M54.50 LUMBAR SPINE PAIN: Status: ACTIVE | Noted: 2022-01-01

## 2022-12-01 NOTE — TELEPHONE ENCOUNTER
Pt is requesting a sooner appt then I can make.     Pt stated that she is having pain in her legs and has pain when walking.

## 2022-12-08 NOTE — PROGRESS NOTES
History & Physical  Gynecology      SUBJECTIVE:     Chief Complaint: Annual Exam       History of Present Illness:  Annual Exam-Postmenopausal  Patient presents for annual exam. She has no complaints today. Patient denies post-menopausal vaginal bleeding. She is not sexually active. She is not taking hormone replacement therapy.  She participates in regular exercise:  in PT .  She does not smoke.     GYN screening history: last pap: approximate date  and was normal  Mammogram history: - ordered  Colonoscopy history: 2019- 5 years  Dexa history:     FH:   Breast cancer: paternal aunt  Colon cancer: mother, mat gm  Ovarian cancer: neg    Review of patient's allergies indicates:   Allergen Reactions    Brimonidine        Past Medical History:   Diagnosis Date    Acute on chronic diastolic congestive heart failure     Allergy     Anemia     Anticoagulant long-term use     Anxiety     Asthma     Atrial fibrillation     Cataract     Chronic kidney disease     COPD (chronic obstructive pulmonary disease)     Coronary artery calcification seen on CT scan 3/22/2022    Coronary artery calcification seen on CT scan 3/22/2022    Depression     Encounter for blood transfusion     HTN (hypertension)     Hyperlipidemia     Nephrolithiasis     KEYSHAWN (obstructive sleep apnea)     awaiting CPAP machine     Rheumatic disease of mitral valve     Uncontrolled type 2 diabetes mellitus with complication, with long-term current use of insulin 2020    Urinary incontinence      Past Surgical History:   Procedure Laterality Date    BREAST BIOPSY Left 10/2019    CARDIAC VALVE SURGERY  2004    mechanical mitral valve     SECTION      COLONOSCOPY N/A 2019    Procedure: COLONOSCOPY;  Surgeon: Devon Bowling MD;  Location: [facility] ([address] [place] [address]);  Service: Endoscopy;  Laterality: N/A;  ok to hold Coumadin x 5 days with Lovenox bridge per Coumadin clinic-[facility]    ESOPHAGOGASTRODUODENOSCOPY N/A 2019    Procedure:  EGD (ESOPHAGOGASTRODUODENOSCOPY);  Surgeon: Smooth Torrez MD;  Location: [facility] ([address] [place] [address]);  Service: Endoscopy;  Laterality: N/A;  2nd floor requested due to comorbidities, Hypertension, permanent A. 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 44     per Coumadin clinic-ok to hold for 5 days w/bridge    kidney stone removal      MITRAL VALVE REPLACEMENT  2004    TUBAL LIGATION       OB History          4    Para   3    Term   3            AB   1    Living             SAB   1    IAB        Ectopic        Multiple        Live Births                   Family History   Problem Relation Age of Onset    Stroke Paternal Grandmother     Colon cancer Maternal Grandmother     Cancer Brother         testicular cancer    Diabetes Sister     Hypertension Sister     Breast cancer Paternal Aunt     Diabetes Sister     Diabetes Sister     Heart disease Father 70        MI    Diabetes Father     Colon cancer Mother 83    Asthma Daughter     Asthma Son     Ovarian cancer Neg Hx      Social History     Tobacco Use    Smoking status: Former     Packs/day: 0.50     Years: 20.00     Pack years: 10.00     Types: Cigarettes     Quit date: 2002     Years since quittin.6    Smokeless tobacco: Never   Substance Use Topics    Alcohol use: No    Drug use: No       Current Outpatient Medications   Medication Sig    ADVAIR DISKUS 500-50 mcg/dose DsDv diskus inhaler INHALE 1 PUFF INTO THE LUNGS 2 (TWO) TIMES DAILY.    albuterol (VENTOLIN HFA) 90 mcg/actuation inhaler INHALE 2 PUFFS INTO THE LUNGS EVERY 6 (SIX) HOURS AS NEEDED FOR WHEEZING. RESCUE    amLODIPine (NORVASC) 2.5 MG tablet Take 1 tablet (2.5 mg total) by mouth once daily.    amoxicillin (AMOXIL) 500 MG Tab 4 tablets po 1 hour prior to procedure    aspirin (ECOTRIN) 81 MG EC tablet Take 81 mg by mouth once daily.     dipyridamole (PERSANTINE) 5 mg/mL injection     dorzolamide (TRUSOPT) 2  % ophthalmic solution Place 1 drop into the right eye 2 (two) times a day.    ergocalciferol (ERGOCALCIFEROL) 50,000 unit Cap Take 1 capsule (50,000 Units total) by mouth twice a week.    fluticasone propionate (FLONASE) 50 mcg/actuation nasal spray 2 sprays (100 mcg total) by Each Nostril route once daily.    furosemide (LASIX) 20 MG tablet Take 2 tablets (40 mg total) by mouth once daily.    HYDROcodone-acetaminophen (NORCO) 5-325 mg per tablet Take 1 tablet by mouth every 6 (six) hours as needed for Pain.    KENALOG 40 mg/mL injection     latanoprost (XALATAN) 0.005 % ophthalmic solution Place 1 drop into the right eye once daily.    meclizine (ANTIVERT) 25 mg tablet Take 1 tablet (25 mg total) by mouth 2 (two) times daily as needed.    metoprolol tartrate (LOPRESSOR) 100 MG tablet Take 1 tablet (100 mg total) by mouth 2 (two) times daily.    omeprazole (PRILOSEC) 40 MG capsule Take 1 capsule (40 mg total) by mouth every morning. Open capsule and take with apple sauce    ondansetron (ZOFRAN-ODT) 8 MG TbDL Dissolve 1 tablet (8 mg total) by mouth every 6 (six) hours as needed.    oxybutynin (DITROPAN-XL) 5 MG TR24 Take 5 mg by mouth once daily.    pantoprazole (PROTONIX) 40 MG tablet Take 1 tablet (40 mg total) by mouth daily as needed.    rosuvastatin (CRESTOR) 20 MG tablet Take 1 tablet (20 mg total) by mouth once daily.    sertraline (ZOLOFT) 100 MG tablet Take 1 tablet (100 mg total) by mouth once daily.    tamsulosin (FLOMAX) 0.4 mg Cap Take 0.4 mg by mouth once daily.    ursodioL (LISA FORTE) 500 MG tablet Crush one tablet and take daily for gall bladder.    warfarin (COUMADIN) 5 MG tablet Take 1.5 tablets (7.5mg) by mouth Monday, Wednesday, Friday then 1 tablets (5mg) all other days or as instructed by Coumadin Clinic.    nitroGLYCERIN (NITROSTAT) 0.4 MG SL tablet Place 1 tablet (0.4 mg total) under the tongue every 5 (five) minutes as needed for Chest pain. (Patient not taking: No sig reported)     No  current facility-administered medications for this visit.       Review of Systems:  Review of Systems   Constitutional:  Negative for appetite change, fever and unexpected weight change.   Respiratory:  Negative for shortness of breath.    Cardiovascular:  Negative for chest pain.   Gastrointestinal:  Negative for nausea and vomiting.   Genitourinary:  Negative for dysuria, frequency, pelvic pain, urgency, vaginal bleeding, vaginal discharge, vaginal pain and postmenopausal bleeding.        Positive urine odor   Integumentary:  Negative for breast mass.   Breast: Negative for lump and mass     OBJECTIVE:     Physical Exam:  Physical Exam  Vitals and nursing note reviewed.   Constitutional:       Appearance: She is well-developed.   Cardiovascular:      Rate and Rhythm: Normal rate and regular rhythm.      Heart sounds: Normal heart sounds.   Pulmonary:      Effort: Pulmonary effort is normal.      Breath sounds: Normal breath sounds.   Chest:   Breasts:     Breasts are symmetrical.      Right: No inverted nipple, mass, nipple discharge, skin change or tenderness.      Left: No inverted nipple, mass, nipple discharge, skin change or tenderness.   Abdominal:      Palpations: Abdomen is soft.   Genitourinary:     General: Normal vulva.      Labia:         Right: No rash, tenderness, lesion or injury.         Left: No rash, tenderness, lesion or injury.       Urethra: No prolapse, urethral pain, urethral swelling or urethral lesion.      Vagina: Normal. No signs of injury and foreign body. No vaginal discharge, erythema, tenderness or bleeding.      Cervix: No cervical motion tenderness, discharge or friability.      Uterus: Not deviated, not enlarged, not fixed and not tender.       Adnexa:         Right: No mass, tenderness or fullness.          Left: No mass, tenderness or fullness.        Rectum: No anal fissure or external hemorrhoid.      Comments: Urethral meatus: normal size, anterior vaginal wall with no  prolapse, no lesions  Bladder: no fullness, masses or tenderness  Limited exam due to habitus  Musculoskeletal:      Cervical back: Normal range of motion.   Neurological:      Mental Status: She is alert and oriented to person, place, and time.   Psychiatric:         Behavior: Behavior normal.         Thought Content: Thought content normal.         Judgment: Judgment normal.       Chaperoned by: Nati    ASSESSMENT:       ICD-10-CM ICD-9-CM    1. Well woman exam with routine gynecological exam  Z01.419 V72.31       2. Well woman exam  Z01.419 V72.31 Ambulatory referral/consult to Obstetrics / Gynecology             Plan:      Elayne was seen today for annual exam.    Diagnoses and all orders for this visit:    Well woman exam with routine gynecological exam    Well woman exam  -     Ambulatory referral/consult to Obstetrics / Gynecology      No orders of the defined types were placed in this encounter.      Well Woman:   - Pap smear: not indicated  - Mammogram: ordered by pcp  - Colonoscopy: up to date  - Dexa: up to date  - Immunizations: with pcp  - Labs: with pcp  - Exercise recommended  - Tobacco Cessation counseling provided  - urine dipstick done today    Follow up in one year for annual, or prn.    Marina Ojeda

## 2022-12-14 NOTE — PROGRESS NOTES
Subjective:       Patient ID: Elayne Almanzar is a 67 y.o. female.    Chief Complaint: Hypertension    HPI  She returns for management of hypertension.  She has had hypertension for over a year.  Current treatment has included medications outlined in medication list.  She denies chest pain or shortness of breath.  No palpitations.  Denies left arm or neck pain.  She complains of lower back pain radiating to her bilateral legs.  Denies any acute trauma.  No numbness, no weakness      Past medical history: Hypertension, A. fib, COPD, hyperlipidemia,pneumonia, nephrolithiasis , iron deficiency anemia, glaucoma, sleep apnea, status post mitral valve replacement , family history of colon cancer-mother. She had a colonoscopy June 2019      Medications: Proventil MDI 2 puffs 4 times a day when necessary,Advair 500/50 one puff twice a day, Lasix 20 mg daily,Toprol- mg daily, Coumadin as monitored by Coumadin clinic , Zoloft 100 mg daily, xalatan, Norvasc 2.5 mg daily, crestor      No known drug allergies      Review of Systems   Constitutional:  Negative for chills, fatigue, fever and unexpected weight change.   Respiratory:  Negative for chest tightness and shortness of breath.    Cardiovascular:  Negative for chest pain and palpitations.   Gastrointestinal:  Negative for abdominal pain and blood in stool.   Neurological:  Negative for dizziness, syncope, numbness and headaches.     Objective:      Physical Exam  HENT:      Right Ear: External ear normal.      Left Ear: External ear normal.      Nose: Nose normal.      Mouth/Throat:      Mouth: Mucous membranes are moist.      Pharynx: Oropharynx is clear.   Eyes:      Pupils: Pupils are equal, round, and reactive to light.   Cardiovascular:      Rate and Rhythm: Normal rate and regular rhythm.      Heart sounds: No murmur heard.  Pulmonary:      Breath sounds: Normal breath sounds.   Abdominal:      General: There is no distension.      Palpations: There is no  hepatomegaly or splenomegaly.      Tenderness: There is no abdominal tenderness.   Musculoskeletal:      Cervical back: Normal range of motion.   Lymphadenopathy:      Cervical: No cervical adenopathy.      Upper Body:      Right upper body: No axillary adenopathy.      Left upper body: No axillary adenopathy.   Neurological:      Cranial Nerves: No cranial nerve deficit.      Sensory: No sensory deficit.      Motor: Motor function is intact.      Deep Tendon Reflexes: Reflexes are normal and symmetric.     Breast exam: No masses palpated, no nipple discharge expressed   Lumbar spine without tenderness or redness  Assessment/Plan       Assessment and plan:  1.  Hypertension:  Check lipid panel   2.  Lower back pain:  Schedule CT scan lumbar spine  3.  Schedule mammogram

## 2022-12-16 NOTE — TELEPHONE ENCOUNTER
----- Message from Cassie Dickerson RN sent at 12/15/2022  8:57 PM CST -----  Please call pt to see if interested in proceeding with scheduling medical wt loss appt

## 2022-12-30 NOTE — TELEPHONE ENCOUNTER
This message is for patient in regards to his/her appointment 1/3/22 at 10:00 am  With Dr. Alfaro        [facility] Policy: For the safety of all patients and staff members.   During this visit we're informing all patients and visitors to wear face mask at all time here at [facility].  If you have any questions or concerns please contact [phone]       also inquired IPM within message.    [facility] providers and Mid-levels offer interventional, procedure--based options to treat chronic pain. The goal is to manage chronic pain by reducing pain frequency and intensity and address your functional goals for activities of daily living while simultaneously reducing or eliminating your reliance on medications. Please bring any records or images that you have from prior treatments for your pain. You will be presented with multi-modal treatment plan that may or may not include imaging, interventional procedures, physical/occupational/aqua therapy, pain creams, and non-narcotic pain medications used for the treatments of chronic pain.

## 2022-12-31 PROBLEM — I70.0 AORTIC ATHEROSCLEROSIS: Status: ACTIVE | Noted: 2022-01-01

## 2022-12-31 NOTE — PROGRESS NOTES
Subjective:       Patient ID: Elayne Almanzar is a 67 y.o. female.    Chief Complaint: Hypertension    HPI  She returns for management of hypertension.  She has had hypertension for over a year.  Current treatment has included medications outlined in medication list.  She denies chest pain or shortness of breath.  No palpitations.  Denies left arm or neck pain.  She has hyperlipidemia.  Currently on Crestor     Medications:  See med list     Social history:  Does not smoke, does not drink alcohol       Review of Systems   Constitutional:  Negative for chills, fatigue, fever and unexpected weight change.   Respiratory:  Negative for chest tightness and shortness of breath.    Cardiovascular:  Negative for chest pain and palpitations.   Gastrointestinal:  Negative for abdominal pain and blood in stool.   Neurological:  Negative for dizziness, syncope, numbness and headaches.     Objective:      Physical Exam  HENT:      Right Ear: External ear normal.      Left Ear: External ear normal.      Nose: Nose normal.      Mouth/Throat:      Mouth: Mucous membranes are moist.      Pharynx: Oropharynx is clear.   Eyes:      Pupils: Pupils are equal, round, and reactive to light.   Cardiovascular:      Rate and Rhythm: Normal rate and regular rhythm.      Heart sounds: No murmur heard.  Pulmonary:      Breath sounds: Normal breath sounds.   Abdominal:      General: There is no distension.      Palpations: There is no hepatomegaly or splenomegaly.      Tenderness: There is no abdominal tenderness.   Musculoskeletal:      Cervical back: Normal range of motion.   Lymphadenopathy:      Cervical: No cervical adenopathy.      Upper Body:      Right upper body: No axillary adenopathy.      Left upper body: No axillary adenopathy.   Neurological:      Cranial Nerves: No cranial nerve deficit.      Sensory: No sensory deficit.      Motor: Motor function is intact.      Deep Tendon Reflexes: Reflexes are normal and symmetric.        Assessment/Plan       Assessment and plan:  1.  Hypertension:  Controlled. continue Norvasc  2.  Hyperlipidemia:  Continue Crestor

## 2023-01-01 ENCOUNTER — TELEPHONE (OUTPATIENT)
Dept: OPHTHALMOLOGY | Facility: CLINIC | Age: 68
End: 2023-01-01

## 2023-01-01 ENCOUNTER — LAB VISIT (OUTPATIENT)
Dept: LAB | Facility: HOSPITAL | Age: 68
End: 2023-01-01
Attending: INTERNAL MEDICINE
Payer: MEDICARE

## 2023-01-01 ENCOUNTER — ANTI-COAG VISIT (OUTPATIENT)
Dept: CARDIOLOGY | Facility: CLINIC | Age: 68
End: 2023-01-01
Payer: MEDICARE

## 2023-01-01 ENCOUNTER — TELEPHONE (OUTPATIENT)
Dept: BARIATRICS | Facility: CLINIC | Age: 68
End: 2023-01-01
Payer: MEDICARE

## 2023-01-01 ENCOUNTER — PATIENT MESSAGE (OUTPATIENT)
Dept: ADMINISTRATIVE | Facility: HOSPITAL | Age: 68
End: 2023-01-01
Payer: MEDICARE

## 2023-01-01 ENCOUNTER — TELEPHONE (OUTPATIENT)
Dept: INTERNAL MEDICINE | Facility: CLINIC | Age: 68
End: 2023-01-01

## 2023-01-01 ENCOUNTER — TELEPHONE (OUTPATIENT)
Dept: INTERNAL MEDICINE | Facility: CLINIC | Age: 68
End: 2023-01-01
Payer: MEDICARE

## 2023-01-01 ENCOUNTER — OFFICE VISIT (OUTPATIENT)
Dept: INTERNAL MEDICINE | Facility: CLINIC | Age: 68
End: 2023-01-01
Payer: MEDICARE

## 2023-01-01 ENCOUNTER — HOSPITAL ENCOUNTER (OUTPATIENT)
Facility: OTHER | Age: 68
Discharge: HOME | End: 2023-01-23
Attending: ANESTHESIOLOGY | Admitting: ANESTHESIOLOGY
Payer: MEDICARE

## 2023-01-01 ENCOUNTER — TELEPHONE (OUTPATIENT)
Dept: PAIN MEDICINE | Facility: CLINIC | Age: 68
End: 2023-01-01
Payer: MEDICARE

## 2023-01-01 ENCOUNTER — LAB VISIT (OUTPATIENT)
Dept: LAB | Facility: OTHER | Age: 68
End: 2023-01-01
Attending: INTERNAL MEDICINE
Payer: MEDICARE

## 2023-01-01 ENCOUNTER — OFFICE VISIT (OUTPATIENT)
Dept: OPHTHALMOLOGY | Facility: CLINIC | Age: 68
End: 2023-01-01
Payer: MEDICARE

## 2023-01-01 ENCOUNTER — HOSPITAL ENCOUNTER (OUTPATIENT)
Dept: RADIOLOGY | Facility: HOSPITAL | Age: 68
Discharge: HOME | End: 2023-04-26
Attending: NURSE PRACTITIONER
Payer: MEDICARE

## 2023-01-01 ENCOUNTER — PROCEDURE VISIT (OUTPATIENT)
Dept: PAIN MEDICINE | Facility: CLINIC | Age: 68
End: 2023-01-01
Attending: ANESTHESIOLOGY
Payer: MEDICARE

## 2023-01-01 ENCOUNTER — HOSPITAL ENCOUNTER (INPATIENT)
Facility: HOSPITAL | Age: 68
LOS: 9 days | DRG: 207 | End: 2023-10-05
Attending: INTERNAL MEDICINE | Admitting: INTERNAL MEDICINE
Payer: MEDICARE

## 2023-01-01 ENCOUNTER — HOSPITAL ENCOUNTER (OUTPATIENT)
Dept: RADIOLOGY | Facility: HOSPITAL | Age: 68
Discharge: HOME | End: 2023-02-03
Attending: INTERNAL MEDICINE
Payer: MEDICARE

## 2023-01-01 ENCOUNTER — TELEPHONE (OUTPATIENT)
Dept: ADMINISTRATIVE | Facility: OTHER | Age: 68
End: 2023-01-01
Payer: MEDICARE

## 2023-01-01 ENCOUNTER — OFFICE VISIT (OUTPATIENT)
Dept: HEMATOLOGY/ONCOLOGY | Facility: CLINIC | Age: 68
End: 2023-01-01
Payer: MEDICARE

## 2023-01-01 ENCOUNTER — ANTI-COAG VISIT (OUTPATIENT)
Dept: CARDIOLOGY | Facility: CLINIC | Age: 68
End: 2023-01-01

## 2023-01-01 ENCOUNTER — OFFICE VISIT (OUTPATIENT)
Dept: PAIN MEDICINE | Facility: CLINIC | Age: 68
End: 2023-01-01
Payer: MEDICARE

## 2023-01-01 ENCOUNTER — HOSPITAL ENCOUNTER (OUTPATIENT)
Dept: RADIOLOGY | Facility: HOSPITAL | Age: 68
Discharge: HOME | End: 2023-07-21
Attending: NURSE PRACTITIONER
Payer: MEDICARE

## 2023-01-01 ENCOUNTER — OFFICE VISIT (OUTPATIENT)
Dept: SLEEP MEDICINE | Facility: CLINIC | Age: 68
End: 2023-01-01
Payer: MEDICARE

## 2023-01-01 ENCOUNTER — CLINICAL SUPPORT (OUTPATIENT)
Dept: OPHTHALMOLOGY | Facility: CLINIC | Age: 68
End: 2023-01-01
Payer: MEDICARE

## 2023-01-01 ENCOUNTER — PES CALL (OUTPATIENT)
Dept: ADMINISTRATIVE | Facility: CLINIC | Age: 68
End: 2023-01-01
Payer: MEDICARE

## 2023-01-01 ENCOUNTER — LAB VISIT (OUTPATIENT)
Dept: LAB | Facility: HOSPITAL | Age: 68
End: 2023-01-01
Payer: MEDICARE

## 2023-01-01 ENCOUNTER — HOSPITAL ENCOUNTER (OUTPATIENT)
Dept: RADIOLOGY | Facility: HOSPITAL | Age: 68
Discharge: HOME | End: 2023-04-15
Attending: NURSE PRACTITIONER
Payer: MEDICARE

## 2023-01-01 ENCOUNTER — TELEPHONE (OUTPATIENT)
Dept: HEMATOLOGY/ONCOLOGY | Facility: CLINIC | Age: 68
End: 2023-01-01
Payer: MEDICARE

## 2023-01-01 ENCOUNTER — OFFICE VISIT (OUTPATIENT)
Dept: BARIATRICS | Facility: CLINIC | Age: 68
End: 2023-01-01
Payer: MEDICARE

## 2023-01-01 ENCOUNTER — HOSPITAL ENCOUNTER (OUTPATIENT)
Dept: RADIOLOGY | Facility: OTHER | Age: 68
Discharge: HOME | End: 2023-04-11
Attending: NURSE PRACTITIONER
Payer: MEDICARE

## 2023-01-01 ENCOUNTER — PATIENT MESSAGE (OUTPATIENT)
Dept: PAIN MEDICINE | Facility: OTHER | Age: 68
End: 2023-01-01
Payer: MEDICARE

## 2023-01-01 ENCOUNTER — TELEPHONE (OUTPATIENT)
Dept: CARDIOLOGY | Facility: CLINIC | Age: 68
End: 2023-01-01

## 2023-01-01 ENCOUNTER — OFFICE VISIT (OUTPATIENT)
Dept: ORTHOPEDICS | Facility: CLINIC | Age: 68
End: 2023-01-01
Payer: MEDICARE

## 2023-01-01 ENCOUNTER — OFFICE VISIT (OUTPATIENT)
Dept: SURGERY | Facility: CLINIC | Age: 68
End: 2023-01-01
Payer: MEDICARE

## 2023-01-01 ENCOUNTER — OFFICE VISIT (OUTPATIENT)
Dept: CARDIOLOGY | Facility: CLINIC | Age: 68
End: 2023-01-01
Payer: MEDICARE

## 2023-01-01 VITALS
HEART RATE: 70 BPM | WEIGHT: 236 LBS | DIASTOLIC BLOOD PRESSURE: 73 MMHG | BODY MASS INDEX: 43.43 KG/M2 | RESPIRATION RATE: 18 BRPM | HEIGHT: 62 IN | SYSTOLIC BLOOD PRESSURE: 139 MMHG

## 2023-01-01 VITALS
WEIGHT: 219.13 LBS | RESPIRATION RATE: 19 BRPM | HEART RATE: 70 BPM | BODY MASS INDEX: 40.33 KG/M2 | TEMPERATURE: 97 F | HEIGHT: 62 IN | DIASTOLIC BLOOD PRESSURE: 81 MMHG | SYSTOLIC BLOOD PRESSURE: 156 MMHG

## 2023-01-01 VITALS
DIASTOLIC BLOOD PRESSURE: 60 MMHG | RESPIRATION RATE: 16 BRPM | HEIGHT: 62 IN | HEART RATE: 66 BPM | OXYGEN SATURATION: 96 % | SYSTOLIC BLOOD PRESSURE: 127 MMHG | BODY MASS INDEX: 41.53 KG/M2

## 2023-01-01 VITALS
HEIGHT: 61 IN | TEMPERATURE: 97 F | SYSTOLIC BLOOD PRESSURE: 113 MMHG | HEART RATE: 84 BPM | DIASTOLIC BLOOD PRESSURE: 62 MMHG | RESPIRATION RATE: 18 BRPM | BODY MASS INDEX: 42.87 KG/M2 | WEIGHT: 227.06 LBS

## 2023-01-01 VITALS
BODY MASS INDEX: 43.36 KG/M2 | OXYGEN SATURATION: 97 % | SYSTOLIC BLOOD PRESSURE: 124 MMHG | HEART RATE: 65 BPM | HEIGHT: 62 IN | WEIGHT: 235.63 LBS | DIASTOLIC BLOOD PRESSURE: 76 MMHG

## 2023-01-01 VITALS
WEIGHT: 237.63 LBS | SYSTOLIC BLOOD PRESSURE: 136 MMHG | HEIGHT: 62 IN | DIASTOLIC BLOOD PRESSURE: 60 MMHG | HEART RATE: 64 BPM | BODY MASS INDEX: 43.73 KG/M2

## 2023-01-01 VITALS
BODY MASS INDEX: 40.33 KG/M2 | BODY MASS INDEX: 40.33 KG/M2 | WEIGHT: 219.13 LBS | HEIGHT: 62 IN | HEIGHT: 62 IN | TEMPERATURE: 99 F | DIASTOLIC BLOOD PRESSURE: 64 MMHG | SYSTOLIC BLOOD PRESSURE: 116 MMHG | OXYGEN SATURATION: 95 % | HEART RATE: 94 BPM | WEIGHT: 219.13 LBS

## 2023-01-01 VITALS
HEIGHT: 62 IN | SYSTOLIC BLOOD PRESSURE: 136 MMHG | BODY MASS INDEX: 41.78 KG/M2 | HEART RATE: 83 BPM | WEIGHT: 227.06 LBS | DIASTOLIC BLOOD PRESSURE: 81 MMHG

## 2023-01-01 VITALS
DIASTOLIC BLOOD PRESSURE: 63 MMHG | BODY MASS INDEX: 40.89 KG/M2 | SYSTOLIC BLOOD PRESSURE: 135 MMHG | OXYGEN SATURATION: 95 % | HEART RATE: 75 BPM | WEIGHT: 213.81 LBS

## 2023-01-01 VITALS — HEIGHT: 62 IN | WEIGHT: 219.13 LBS | BODY MASS INDEX: 40.33 KG/M2

## 2023-01-01 VITALS
WEIGHT: 199.06 LBS | DIASTOLIC BLOOD PRESSURE: 51 MMHG | RESPIRATION RATE: 33 BRPM | BODY MASS INDEX: 36.63 KG/M2 | SYSTOLIC BLOOD PRESSURE: 102 MMHG | TEMPERATURE: 97 F | OXYGEN SATURATION: 99 % | HEIGHT: 62 IN

## 2023-01-01 VITALS
DIASTOLIC BLOOD PRESSURE: 57 MMHG | HEIGHT: 61 IN | BODY MASS INDEX: 43.95 KG/M2 | SYSTOLIC BLOOD PRESSURE: 129 MMHG | HEART RATE: 67 BPM | OXYGEN SATURATION: 95 % | RESPIRATION RATE: 16 BRPM | WEIGHT: 232.81 LBS

## 2023-01-01 VITALS
SYSTOLIC BLOOD PRESSURE: 143 MMHG | OXYGEN SATURATION: 94 % | HEIGHT: 62 IN | HEART RATE: 89 BPM | WEIGHT: 220.44 LBS | DIASTOLIC BLOOD PRESSURE: 72 MMHG | BODY MASS INDEX: 40.57 KG/M2

## 2023-01-01 VITALS
TEMPERATURE: 98 F | DIASTOLIC BLOOD PRESSURE: 66 MMHG | RESPIRATION RATE: 16 BRPM | SYSTOLIC BLOOD PRESSURE: 142 MMHG | HEIGHT: 62 IN | OXYGEN SATURATION: 98 % | HEART RATE: 71 BPM | BODY MASS INDEX: 42.33 KG/M2 | WEIGHT: 230 LBS

## 2023-01-01 VITALS
SYSTOLIC BLOOD PRESSURE: 136 MMHG | DIASTOLIC BLOOD PRESSURE: 84 MMHG | WEIGHT: 226 LBS | BODY MASS INDEX: 42.67 KG/M2 | HEART RATE: 88 BPM | OXYGEN SATURATION: 98 % | HEIGHT: 61 IN

## 2023-01-01 VITALS
DIASTOLIC BLOOD PRESSURE: 68 MMHG | WEIGHT: 238.31 LBS | HEART RATE: 67 BPM | SYSTOLIC BLOOD PRESSURE: 161 MMHG | BODY MASS INDEX: 44.99 KG/M2 | HEIGHT: 61 IN

## 2023-01-01 VITALS
HEART RATE: 88 BPM | BODY MASS INDEX: 40.73 KG/M2 | SYSTOLIC BLOOD PRESSURE: 126 MMHG | DIASTOLIC BLOOD PRESSURE: 74 MMHG | RESPIRATION RATE: 18 BRPM | WEIGHT: 212.94 LBS | OXYGEN SATURATION: 100 %

## 2023-01-01 VITALS
BODY MASS INDEX: 42.92 KG/M2 | SYSTOLIC BLOOD PRESSURE: 134 MMHG | HEART RATE: 70 BPM | WEIGHT: 227.31 LBS | OXYGEN SATURATION: 98 % | HEIGHT: 61 IN | DIASTOLIC BLOOD PRESSURE: 72 MMHG

## 2023-01-01 VITALS — WEIGHT: 212.88 LBS | BODY MASS INDEX: 40.19 KG/M2 | HEIGHT: 61 IN

## 2023-01-01 DIAGNOSIS — E11.9 TYPE 2 DIABETES MELLITUS WITHOUT COMPLICATION: ICD-10-CM

## 2023-01-01 DIAGNOSIS — Z95.2 H/O MITRAL VALVE REPLACEMENT WITH MECHANICAL VALVE: Chronic | ICD-10-CM

## 2023-01-01 DIAGNOSIS — R52 PAIN: ICD-10-CM

## 2023-01-01 DIAGNOSIS — M25.551 RIGHT HIP PAIN: ICD-10-CM

## 2023-01-01 DIAGNOSIS — H40.1213 NORMAL TENSION GLAUCOMA OF RIGHT EYE, SEVERE STAGE: Primary | ICD-10-CM

## 2023-01-01 DIAGNOSIS — G47.33 OSA (OBSTRUCTIVE SLEEP APNEA): ICD-10-CM

## 2023-01-01 DIAGNOSIS — E78.2 MIXED HYPERLIPIDEMIA: ICD-10-CM

## 2023-01-01 DIAGNOSIS — H02.59 FLOPPY EYELID SYNDROME OF BOTH EYES: ICD-10-CM

## 2023-01-01 DIAGNOSIS — R10.31 RIGHT GROIN PAIN: Primary | ICD-10-CM

## 2023-01-01 DIAGNOSIS — E66.01 CLASS 2 SEVERE OBESITY DUE TO EXCESS CALORIES WITH SERIOUS COMORBIDITY AND BODY MASS INDEX (BMI) OF 39.0 TO 39.9 IN ADULT: Primary | ICD-10-CM

## 2023-01-01 DIAGNOSIS — M16.11 PRIMARY OSTEOARTHRITIS OF RIGHT HIP: Primary | ICD-10-CM

## 2023-01-01 DIAGNOSIS — J96.02 ACUTE RESPIRATORY FAILURE WITH HYPOXIA AND HYPERCARBIA: Primary | ICD-10-CM

## 2023-01-01 DIAGNOSIS — I48.20 CHRONIC ATRIAL FIBRILLATION: Primary | Chronic | ICD-10-CM

## 2023-01-01 DIAGNOSIS — R22.41 LOCALIZED SWELLING, MASS, OR LUMP OF RIGHT LOWER EXTREMITY: Primary | ICD-10-CM

## 2023-01-01 DIAGNOSIS — G47.33 OSA (OBSTRUCTIVE SLEEP APNEA): Primary | ICD-10-CM

## 2023-01-01 DIAGNOSIS — M25.561 CHRONIC PAIN OF RIGHT KNEE: ICD-10-CM

## 2023-01-01 DIAGNOSIS — Z79.01 LONG TERM (CURRENT) USE OF ANTICOAGULANTS: ICD-10-CM

## 2023-01-01 DIAGNOSIS — G89.29 CHRONIC HIP PAIN, UNSPECIFIED LATERALITY: ICD-10-CM

## 2023-01-01 DIAGNOSIS — Z79.01 CHRONIC ANTICOAGULATION: Chronic | ICD-10-CM

## 2023-01-01 DIAGNOSIS — E66.01 MORBID OBESITY WITH BMI OF 40.0-44.9, ADULT: Chronic | ICD-10-CM

## 2023-01-01 DIAGNOSIS — M79.651 PAIN IN RIGHT THIGH: ICD-10-CM

## 2023-01-01 DIAGNOSIS — I48.20 CHRONIC ATRIAL FIBRILLATION: Chronic | ICD-10-CM

## 2023-01-01 DIAGNOSIS — H40.002 GLAUCOMA SUSPECT, LEFT: ICD-10-CM

## 2023-01-01 DIAGNOSIS — J44.9 CHRONIC OBSTRUCTIVE PULMONARY DISEASE, UNSPECIFIED COPD TYPE: Chronic | ICD-10-CM

## 2023-01-01 DIAGNOSIS — Z95.2 STATUS POST HEART VALVE REPLACEMENT WITH MECHANICAL VALVE: ICD-10-CM

## 2023-01-01 DIAGNOSIS — M25.551 PAIN OF RIGHT HIP: Primary | ICD-10-CM

## 2023-01-01 DIAGNOSIS — I48.20 ATRIAL FIBRILLATION, CHRONIC: ICD-10-CM

## 2023-01-01 DIAGNOSIS — R93.89 ABNORMAL ULTRASOUND: ICD-10-CM

## 2023-01-01 DIAGNOSIS — M17.11 PRIMARY OSTEOARTHRITIS OF RIGHT KNEE: Primary | ICD-10-CM

## 2023-01-01 DIAGNOSIS — G89.29 CHRONIC PAIN OF RIGHT KNEE: ICD-10-CM

## 2023-01-01 DIAGNOSIS — Z00.00 ENCOUNTER FOR MEDICARE ANNUAL WELLNESS EXAM: ICD-10-CM

## 2023-01-01 DIAGNOSIS — H10.13 ALLERGIC CONJUNCTIVITIS OF BOTH EYES: ICD-10-CM

## 2023-01-01 DIAGNOSIS — R10.31 RIGHT GROIN PAIN: ICD-10-CM

## 2023-01-01 DIAGNOSIS — Z71.3 ENCOUNTER FOR WEIGHT LOSS COUNSELING: ICD-10-CM

## 2023-01-01 DIAGNOSIS — H11.32 SUBCONJUNCTIVAL HEMORRHAGE OF LEFT EYE: ICD-10-CM

## 2023-01-01 DIAGNOSIS — M79.89 SOFT TISSUE MASS: ICD-10-CM

## 2023-01-01 DIAGNOSIS — D50.8 IRON DEFICIENCY ANEMIA SECONDARY TO INADEQUATE DIETARY IRON INTAKE: Primary | ICD-10-CM

## 2023-01-01 DIAGNOSIS — D56.3 ALPHA THALASSEMIA TRAIT: ICD-10-CM

## 2023-01-01 DIAGNOSIS — G89.29 CHRONIC HIP PAIN, UNSPECIFIED LATERALITY: Primary | ICD-10-CM

## 2023-01-01 DIAGNOSIS — M25.559 CHRONIC HIP PAIN, UNSPECIFIED LATERALITY: ICD-10-CM

## 2023-01-01 DIAGNOSIS — I10 HYPERTENSION, UNSPECIFIED TYPE: Primary | ICD-10-CM

## 2023-01-01 DIAGNOSIS — R00.0 TACHYCARDIA: ICD-10-CM

## 2023-01-01 DIAGNOSIS — H40.1212 NORMAL TENSION GLAUCOMA OF RIGHT EYE, MODERATE STAGE: ICD-10-CM

## 2023-01-01 DIAGNOSIS — M25.559 CHRONIC HIP PAIN, UNSPECIFIED LATERALITY: Primary | ICD-10-CM

## 2023-01-01 DIAGNOSIS — R22.40 MASS OF THIGH, UNSPECIFIED LATERALITY: Primary | ICD-10-CM

## 2023-01-01 DIAGNOSIS — M54.9 DORSALGIA, UNSPECIFIED: ICD-10-CM

## 2023-01-01 DIAGNOSIS — H21.561 AFFERENT PUPILLARY DEFECT OF RIGHT EYE: ICD-10-CM

## 2023-01-01 DIAGNOSIS — M25.559 HIP PAIN, UNSPECIFIED LATERALITY: ICD-10-CM

## 2023-01-01 DIAGNOSIS — Z12.31 SCREENING MAMMOGRAM, ENCOUNTER FOR: ICD-10-CM

## 2023-01-01 DIAGNOSIS — I10 ESSENTIAL (PRIMARY) HYPERTENSION: Chronic | ICD-10-CM

## 2023-01-01 DIAGNOSIS — M16.0 BILATERAL PRIMARY OSTEOARTHRITIS OF HIP: ICD-10-CM

## 2023-01-01 DIAGNOSIS — H04.123 DRY EYE SYNDROME OF BOTH LACRIMAL GLANDS: ICD-10-CM

## 2023-01-01 DIAGNOSIS — M70.60 GREATER TROCHANTERIC BURSITIS, UNSPECIFIED LATERALITY: ICD-10-CM

## 2023-01-01 DIAGNOSIS — G47.31 COMPLEX SLEEP APNEA SYNDROME: ICD-10-CM

## 2023-01-01 DIAGNOSIS — M43.06 SPONDYLOLYSIS OF LUMBAR REGION: ICD-10-CM

## 2023-01-01 DIAGNOSIS — D50.8 IRON DEFICIENCY ANEMIA SECONDARY TO INADEQUATE DIETARY IRON INTAKE: ICD-10-CM

## 2023-01-01 DIAGNOSIS — E66.01 CLASS 3 SEVERE OBESITY DUE TO EXCESS CALORIES WITH SERIOUS COMORBIDITY AND BODY MASS INDEX (BMI) OF 40.0 TO 44.9 IN ADULT: Primary | ICD-10-CM

## 2023-01-01 DIAGNOSIS — M16.0 BILATERAL PRIMARY OSTEOARTHRITIS OF HIP: Primary | ICD-10-CM

## 2023-01-01 DIAGNOSIS — I50.32 CHRONIC DIASTOLIC CONGESTIVE HEART FAILURE: Chronic | ICD-10-CM

## 2023-01-01 DIAGNOSIS — M16.9 OSTEOARTHRITIS OF HIP, UNSPECIFIED LATERALITY, UNSPECIFIED OSTEOARTHRITIS TYPE: ICD-10-CM

## 2023-01-01 DIAGNOSIS — M16.0 PRIMARY OSTEOARTHRITIS OF BOTH HIPS: Primary | ICD-10-CM

## 2023-01-01 DIAGNOSIS — M17.11 PRIMARY OSTEOARTHRITIS OF RIGHT KNEE: ICD-10-CM

## 2023-01-01 DIAGNOSIS — R52 PAIN: Primary | ICD-10-CM

## 2023-01-01 DIAGNOSIS — M79.89 SOFT TISSUE MASS: Primary | ICD-10-CM

## 2023-01-01 DIAGNOSIS — J96.00 ACUTE RESPIRATORY FAILURE: ICD-10-CM

## 2023-01-01 DIAGNOSIS — Z00.00 PREVENTATIVE HEALTH CARE: ICD-10-CM

## 2023-01-01 DIAGNOSIS — J96.01 ACUTE RESPIRATORY FAILURE WITH HYPOXIA AND HYPERCARBIA: Primary | ICD-10-CM

## 2023-01-01 DIAGNOSIS — I26.99 PULMONARY EMBOLISM: ICD-10-CM

## 2023-01-01 DIAGNOSIS — R22.41 LOCALIZED SWELLING, MASS, OR LUMP OF RIGHT LOWER EXTREMITY: ICD-10-CM

## 2023-01-01 DIAGNOSIS — G62.9 NEUROPATHY: ICD-10-CM

## 2023-01-01 DIAGNOSIS — I50.33 ACUTE ON CHRONIC DIASTOLIC CONGESTIVE HEART FAILURE: ICD-10-CM

## 2023-01-01 DIAGNOSIS — R73.02 GLUCOSE INTOLERANCE (IMPAIRED GLUCOSE TOLERANCE): ICD-10-CM

## 2023-01-01 DIAGNOSIS — R73.03 PREDIABETES: ICD-10-CM

## 2023-01-01 DIAGNOSIS — I70.0 AORTIC ATHEROSCLEROSIS: ICD-10-CM

## 2023-01-01 DIAGNOSIS — I25.10 CORONARY ARTERY CALCIFICATION SEEN ON CT SCAN: ICD-10-CM

## 2023-01-01 DIAGNOSIS — R22.40 MASS OF THIGH, UNSPECIFIED LATERALITY: ICD-10-CM

## 2023-01-01 DIAGNOSIS — G89.29 CHRONIC PAIN: ICD-10-CM

## 2023-01-01 DIAGNOSIS — R19.09 RIGHT GROIN MASS: ICD-10-CM

## 2023-01-01 LAB
ABO + RH BLD: NORMAL
ABO + RH BLD: NORMAL
ADENOVIRUS: NOT DETECTED
ALBUMIN SERPL BCP-MCNC: 1.9 G/DL (ref 3.5–5.2)
ALBUMIN SERPL BCP-MCNC: 2 G/DL (ref 3.5–5.2)
ALBUMIN SERPL BCP-MCNC: 2.1 G/DL (ref 3.5–5.2)
ALBUMIN SERPL BCP-MCNC: 2.2 G/DL (ref 3.5–5.2)
ALBUMIN SERPL BCP-MCNC: 2.4 G/DL (ref 3.5–5.2)
ALBUMIN SERPL BCP-MCNC: 2.5 G/DL (ref 3.5–5.2)
ALBUMIN SERPL BCP-MCNC: 2.9 G/DL (ref 3.5–5.2)
ALBUMIN SERPL BCP-MCNC: 3.2 G/DL (ref 3.5–5.2)
ALBUMIN SERPL BCP-MCNC: 3.4 G/DL (ref 3.5–5.2)
ALBUMIN SERPL BCP-MCNC: 3.5 G/DL (ref 3.5–5.2)
ALBUMIN SERPL BCP-MCNC: 3.5 G/DL (ref 3.5–5.2)
ALBUMIN SERPL BCP-MCNC: 3.7 G/DL (ref 3.5–5.2)
ALLENS TEST: ABNORMAL
ALP SERPL-CCNC: 113 U/L (ref 55–135)
ALP SERPL-CCNC: 132 U/L (ref 55–135)
ALP SERPL-CCNC: 135 U/L (ref 55–135)
ALP SERPL-CCNC: 65 U/L (ref 55–135)
ALP SERPL-CCNC: 65 U/L (ref 55–135)
ALP SERPL-CCNC: 66 U/L (ref 55–135)
ALP SERPL-CCNC: 67 U/L (ref 55–135)
ALP SERPL-CCNC: 70 U/L (ref 55–135)
ALP SERPL-CCNC: 73 U/L (ref 55–135)
ALP SERPL-CCNC: 75 U/L (ref 55–135)
ALP SERPL-CCNC: 78 U/L (ref 55–135)
ALP SERPL-CCNC: 99 U/L (ref 55–135)
ALT SERPL W/O P-5'-P-CCNC: 10 U/L (ref 10–44)
ALT SERPL W/O P-5'-P-CCNC: 11 U/L (ref 10–44)
ALT SERPL W/O P-5'-P-CCNC: 11 U/L (ref 10–44)
ALT SERPL W/O P-5'-P-CCNC: 14 U/L (ref 10–44)
ALT SERPL W/O P-5'-P-CCNC: 16 U/L (ref 10–44)
ALT SERPL W/O P-5'-P-CCNC: 17 U/L (ref 10–44)
ALT SERPL W/O P-5'-P-CCNC: 18 U/L (ref 10–44)
ALT SERPL W/O P-5'-P-CCNC: 7 U/L (ref 10–44)
ALT SERPL W/O P-5'-P-CCNC: 9 U/L (ref 10–44)
ANION GAP SERPL CALC-SCNC: 10 MMOL/L (ref 8–16)
ANION GAP SERPL CALC-SCNC: 11 MMOL/L (ref 8–16)
ANION GAP SERPL CALC-SCNC: 11 MMOL/L (ref 8–16)
ANION GAP SERPL CALC-SCNC: 12 MMOL/L (ref 8–16)
ANION GAP SERPL CALC-SCNC: 12 MMOL/L (ref 8–16)
ANION GAP SERPL CALC-SCNC: 13 MMOL/L (ref 8–16)
ANION GAP SERPL CALC-SCNC: 13 MMOL/L (ref 8–16)
ANION GAP SERPL CALC-SCNC: 15 MMOL/L (ref 8–16)
ANION GAP SERPL CALC-SCNC: 18 MMOL/L (ref 8–16)
ANION GAP SERPL CALC-SCNC: 18 MMOL/L (ref 8–16)
ANION GAP SERPL CALC-SCNC: 8 MMOL/L (ref 8–16)
ANION GAP SERPL CALC-SCNC: 9 MMOL/L (ref 8–16)
ANISOCYTOSIS BLD QL SMEAR: ABNORMAL
ANISOCYTOSIS BLD QL SMEAR: SLIGHT
APTT PPP: 29 SEC (ref 21–32)
APTT PPP: 44.1 SEC (ref 21–32)
APTT PPP: 44.1 SEC (ref 21–32)
APTT PPP: 49.5 SEC (ref 21–32)
APTT PPP: 56.3 SEC (ref 21–32)
APTT PPP: 56.4 SEC (ref 21–32)
APTT PPP: 58.2 SEC (ref 21–32)
APTT PPP: 62 SEC (ref 21–32)
APTT PPP: 64.9 SEC (ref 21–32)
APTT PPP: 65.8 SEC (ref 21–32)
APTT PPP: 68.3 SEC (ref 21–32)
APTT PPP: 87.7 SEC (ref 21–32)
ASCENDING AORTA: 2.62 CM
AST SERPL-CCNC: 15 U/L (ref 10–40)
AST SERPL-CCNC: 16 U/L (ref 10–40)
AST SERPL-CCNC: 18 U/L (ref 10–40)
AST SERPL-CCNC: 18 U/L (ref 10–40)
AST SERPL-CCNC: 19 U/L (ref 10–40)
AST SERPL-CCNC: 22 U/L (ref 10–40)
AST SERPL-CCNC: 23 U/L (ref 10–40)
AST SERPL-CCNC: 24 U/L (ref 10–40)
AST SERPL-CCNC: 32 U/L (ref 10–40)
AST SERPL-CCNC: 38 U/L (ref 10–40)
AST SERPL-CCNC: 50 U/L (ref 10–40)
AST SERPL-CCNC: 61 U/L (ref 10–40)
AV INDEX (PROSTH): 1.14
AV MEAN GRADIENT: 1 MMHG
AV PEAK GRADIENT: 2 MMHG
AV VALVE AREA BY VELOCITY RATIO: 2.43 CM²
AV VALVE AREA: 3.19 CM²
AV VELOCITY RATIO: 0.87
BACTERIA BLD CULT: NORMAL
BACTERIA BLD CULT: NORMAL
BASO STIPL BLD QL SMEAR: ABNORMAL
BASOPHILS # BLD AUTO: 0.02 K/UL (ref 0–0.2)
BASOPHILS # BLD AUTO: 0.03 K/UL (ref 0–0.2)
BASOPHILS # BLD AUTO: 0.04 K/UL (ref 0–0.2)
BASOPHILS # BLD AUTO: 0.04 K/UL (ref 0–0.2)
BASOPHILS # BLD AUTO: 0.06 K/UL (ref 0–0.2)
BASOPHILS # BLD AUTO: ABNORMAL K/UL (ref 0–0.2)
BASOPHILS NFR BLD: 0 % (ref 0–1.9)
BASOPHILS NFR BLD: 0.1 % (ref 0–1.9)
BASOPHILS NFR BLD: 0.2 % (ref 0–1.9)
BASOPHILS NFR BLD: 0.2 % (ref 0–1.9)
BASOPHILS NFR BLD: 0.3 % (ref 0–1.9)
BASOPHILS NFR BLD: 0.5 % (ref 0–1.9)
BASOPHILS NFR BLD: 1 % (ref 0–1.9)
BASOPHILS NFR BLD: 1 % (ref 0–1.9)
BILIRUB SERPL-MCNC: 0.7 MG/DL (ref 0.1–1)
BILIRUB SERPL-MCNC: 0.7 MG/DL (ref 0.1–1)
BILIRUB SERPL-MCNC: 0.8 MG/DL (ref 0.1–1)
BILIRUB SERPL-MCNC: 0.9 MG/DL (ref 0.1–1)
BILIRUB SERPL-MCNC: 0.9 MG/DL (ref 0.1–1)
BILIRUB SERPL-MCNC: 1.3 MG/DL (ref 0.1–1)
BILIRUB SERPL-MCNC: 1.4 MG/DL (ref 0.1–1)
BILIRUB SERPL-MCNC: 1.5 MG/DL (ref 0.1–1)
BLD GP AB SCN CELLS X3 SERPL QL: NORMAL
BLD GP AB SCN CELLS X3 SERPL QL: NORMAL
BLD PROD TYP BPU: NORMAL
BLD PROD TYP BPU: NORMAL
BLOOD UNIT EXPIRATION DATE: NORMAL
BLOOD UNIT EXPIRATION DATE: NORMAL
BLOOD UNIT TYPE CODE: 5100
BLOOD UNIT TYPE CODE: 5100
BLOOD UNIT TYPE: NORMAL
BLOOD UNIT TYPE: NORMAL
BNP SERPL-MCNC: 170 PG/ML (ref 0–99)
BORDETELLA PARAPERTUSSIS (IS1001): NOT DETECTED
BORDETELLA PERTUSSIS (PTXP): NOT DETECTED
BSA FOR ECHO PROCEDURE: 1.99 M2
BUN SERPL-MCNC: 15 MG/DL (ref 8–23)
BUN SERPL-MCNC: 16 MG/DL (ref 8–23)
BUN SERPL-MCNC: 18 MG/DL (ref 8–23)
BUN SERPL-MCNC: 19 MG/DL (ref 8–23)
BUN SERPL-MCNC: 24 MG/DL (ref 8–23)
BUN SERPL-MCNC: 37 MG/DL (ref 8–23)
BUN SERPL-MCNC: 41 MG/DL (ref 8–23)
BUN SERPL-MCNC: 46 MG/DL (ref 8–23)
BUN SERPL-MCNC: 48 MG/DL (ref 8–23)
BUN SERPL-MCNC: 56 MG/DL (ref 8–23)
BUN SERPL-MCNC: 70 MG/DL (ref 8–23)
BUN SERPL-MCNC: 79 MG/DL (ref 8–23)
BUN SERPL-MCNC: 87 MG/DL (ref 8–23)
BUN SERPL-MCNC: 93 MG/DL (ref 8–23)
BURR CELLS BLD QL SMEAR: ABNORMAL
CALCIUM SERPL-MCNC: 7 MG/DL (ref 8.7–10.5)
CALCIUM SERPL-MCNC: 7 MG/DL (ref 8.7–10.5)
CALCIUM SERPL-MCNC: 7.1 MG/DL (ref 8.7–10.5)
CALCIUM SERPL-MCNC: 7.4 MG/DL (ref 8.7–10.5)
CALCIUM SERPL-MCNC: 7.6 MG/DL (ref 8.7–10.5)
CALCIUM SERPL-MCNC: 7.6 MG/DL (ref 8.7–10.5)
CALCIUM SERPL-MCNC: 7.9 MG/DL (ref 8.7–10.5)
CALCIUM SERPL-MCNC: 8 MG/DL (ref 8.7–10.5)
CALCIUM SERPL-MCNC: 9 MG/DL (ref 8.7–10.5)
CALCIUM SERPL-MCNC: 9.1 MG/DL (ref 8.7–10.5)
CALCIUM SERPL-MCNC: 9.3 MG/DL (ref 8.7–10.5)
CALCIUM SERPL-MCNC: 9.3 MG/DL (ref 8.7–10.5)
CALCIUM SERPL-MCNC: 9.5 MG/DL (ref 8.7–10.5)
CALCIUM SERPL-MCNC: 9.5 MG/DL (ref 8.7–10.5)
CHLAMYDIA PNEUMONIAE: NOT DETECTED
CHLORIDE SERPL-SCNC: 104 MMOL/L (ref 95–110)
CHLORIDE SERPL-SCNC: 105 MMOL/L (ref 95–110)
CHLORIDE SERPL-SCNC: 76 MMOL/L (ref 95–110)
CHLORIDE SERPL-SCNC: 80 MMOL/L (ref 95–110)
CHLORIDE SERPL-SCNC: 83 MMOL/L (ref 95–110)
CHLORIDE SERPL-SCNC: 84 MMOL/L (ref 95–110)
CHLORIDE SERPL-SCNC: 86 MMOL/L (ref 95–110)
CHLORIDE SERPL-SCNC: 86 MMOL/L (ref 95–110)
CHLORIDE SERPL-SCNC: 87 MMOL/L (ref 95–110)
CHLORIDE SERPL-SCNC: 87 MMOL/L (ref 95–110)
CHLORIDE SERPL-SCNC: 89 MMOL/L (ref 95–110)
CHLORIDE SERPL-SCNC: 91 MMOL/L (ref 95–110)
CHLORIDE SERPL-SCNC: 91 MMOL/L (ref 95–110)
CHLORIDE SERPL-SCNC: 94 MMOL/L (ref 95–110)
CO2 SERPL-SCNC: 23 MMOL/L (ref 23–29)
CO2 SERPL-SCNC: 26 MMOL/L (ref 23–29)
CO2 SERPL-SCNC: 30 MMOL/L (ref 23–29)
CO2 SERPL-SCNC: 30 MMOL/L (ref 23–29)
CO2 SERPL-SCNC: 33 MMOL/L (ref 23–29)
CO2 SERPL-SCNC: 35 MMOL/L (ref 23–29)
CO2 SERPL-SCNC: 35 MMOL/L (ref 23–29)
CO2 SERPL-SCNC: 36 MMOL/L (ref 23–29)
CO2 SERPL-SCNC: 37 MMOL/L (ref 23–29)
CO2 SERPL-SCNC: 37 MMOL/L (ref 23–29)
CO2 SERPL-SCNC: 38 MMOL/L (ref 23–29)
CO2 SERPL-SCNC: 39 MMOL/L (ref 23–29)
CODING SYSTEM: NORMAL
CODING SYSTEM: NORMAL
CORONAVIRUS 229E, COMMON COLD VIRUS: NOT DETECTED
CORONAVIRUS HKU1, COMMON COLD VIRUS: NOT DETECTED
CORONAVIRUS NL63, COMMON COLD VIRUS: NOT DETECTED
CORONAVIRUS OC43, COMMON COLD VIRUS: NOT DETECTED
CREAT SERPL-MCNC: 0.9 MG/DL (ref 0.5–1.4)
CREAT SERPL-MCNC: 1 MG/DL (ref 0.5–1.4)
CREAT SERPL-MCNC: 1.1 MG/DL (ref 0.5–1.4)
CREAT SERPL-MCNC: 1.3 MG/DL (ref 0.5–1.4)
CREAT SERPL-MCNC: 1.3 MG/DL (ref 0.5–1.4)
CREAT SERPL-MCNC: 1.4 MG/DL (ref 0.5–1.4)
CREAT SERPL-MCNC: 1.6 MG/DL (ref 0.5–1.4)
CREAT SERPL-MCNC: 1.8 MG/DL (ref 0.5–1.4)
CREAT SERPL-MCNC: 2.4 MG/DL (ref 0.5–1.4)
CREAT SERPL-MCNC: 2.4 MG/DL (ref 0.5–1.4)
CROSSMATCH INTERPRETATION: NORMAL
CROSSMATCH INTERPRETATION: NORMAL
CV ECHO LV RWT: 0.29 CM
DACRYOCYTES BLD QL SMEAR: ABNORMAL
DELSYS: ABNORMAL
DIFFERENTIAL METHOD: ABNORMAL
DISPENSE STATUS: NORMAL
DISPENSE STATUS: NORMAL
DOHLE BOD BLD QL SMEAR: PRESENT
DOP CALC AO PEAK VEL: 0.67 M/S
DOP CALC AO VTI: 10.56 CM
DOP CALC LVOT AREA: 2.8 CM2
DOP CALC LVOT DIAMETER: 1.89 CM
DOP CALC LVOT PEAK VEL: 0.58 M/S
DOP CALC LVOT STROKE VOLUME: 33.73 CM3
DOP CALCLVOT PEAK VEL VTI: 12.03 CM
E/E' RATIO: 18.8 M/S
ECHO LV POSTERIOR WALL: 0.6 CM (ref 0.6–1.1)
EOSINOPHIL # BLD AUTO: 0 K/UL (ref 0–0.5)
EOSINOPHIL # BLD AUTO: 0.1 K/UL (ref 0–0.5)
EOSINOPHIL # BLD AUTO: 0.2 K/UL (ref 0–0.5)
EOSINOPHIL # BLD AUTO: ABNORMAL K/UL (ref 0–0.5)
EOSINOPHIL NFR BLD: 0 % (ref 0–8)
EOSINOPHIL NFR BLD: 0.2 % (ref 0–8)
EOSINOPHIL NFR BLD: 0.5 % (ref 0–8)
EOSINOPHIL NFR BLD: 0.9 % (ref 0–8)
EOSINOPHIL NFR BLD: 1 % (ref 0–8)
EOSINOPHIL NFR BLD: 1.1 % (ref 0–8)
EOSINOPHIL NFR BLD: 1.1 % (ref 0–8)
EOSINOPHIL NFR BLD: 2 % (ref 0–8)
EOSINOPHIL NFR BLD: 2 % (ref 0–8)
EOSINOPHIL NFR BLD: 3.4 % (ref 0–8)
EOSINOPHIL NFR BLD: 4.3 % (ref 0–8)
EP: 8
ERYTHROCYTE [DISTWIDTH] IN BLOOD BY AUTOMATED COUNT: 15.5 % (ref 11.5–14.5)
ERYTHROCYTE [DISTWIDTH] IN BLOOD BY AUTOMATED COUNT: 15.6 % (ref 11.5–14.5)
ERYTHROCYTE [DISTWIDTH] IN BLOOD BY AUTOMATED COUNT: 15.7 % (ref 11.5–14.5)
ERYTHROCYTE [DISTWIDTH] IN BLOOD BY AUTOMATED COUNT: 15.8 % (ref 11.5–14.5)
ERYTHROCYTE [DISTWIDTH] IN BLOOD BY AUTOMATED COUNT: 15.9 % (ref 11.5–14.5)
ERYTHROCYTE [DISTWIDTH] IN BLOOD BY AUTOMATED COUNT: 15.9 % (ref 11.5–14.5)
ERYTHROCYTE [DISTWIDTH] IN BLOOD BY AUTOMATED COUNT: 16.1 % (ref 11.5–14.5)
ERYTHROCYTE [DISTWIDTH] IN BLOOD BY AUTOMATED COUNT: 16.2 % (ref 11.5–14.5)
ERYTHROCYTE [DISTWIDTH] IN BLOOD BY AUTOMATED COUNT: 16.2 % (ref 11.5–14.5)
ERYTHROCYTE [DISTWIDTH] IN BLOOD BY AUTOMATED COUNT: 16.3 % (ref 11.5–14.5)
ERYTHROCYTE [DISTWIDTH] IN BLOOD BY AUTOMATED COUNT: 16.8 % (ref 11.5–14.5)
ERYTHROCYTE [DISTWIDTH] IN BLOOD BY AUTOMATED COUNT: 17.2 % (ref 11.5–14.5)
ERYTHROCYTE [DISTWIDTH] IN BLOOD BY AUTOMATED COUNT: 17.2 % (ref 11.5–14.5)
ERYTHROCYTE [DISTWIDTH] IN BLOOD BY AUTOMATED COUNT: 19.9 % (ref 11.5–14.5)
ERYTHROCYTE [DISTWIDTH] IN BLOOD BY AUTOMATED COUNT: 20.4 % (ref 11.5–14.5)
ERYTHROCYTE [DISTWIDTH] IN BLOOD BY AUTOMATED COUNT: 20.6 % (ref 11.5–14.5)
ERYTHROCYTE [DISTWIDTH] IN BLOOD BY AUTOMATED COUNT: 20.8 % (ref 11.5–14.5)
ERYTHROCYTE [DISTWIDTH] IN BLOOD BY AUTOMATED COUNT: 22.2 % (ref 11.5–14.5)
ERYTHROCYTE [DISTWIDTH] IN BLOOD BY AUTOMATED COUNT: 22.6 % (ref 11.5–14.5)
ERYTHROCYTE [SEDIMENTATION RATE] IN BLOOD BY WESTERGREN METHOD: 24 MM/H
ERYTHROCYTE [SEDIMENTATION RATE] IN BLOOD BY WESTERGREN METHOD: 24 MM/H
ERYTHROCYTE [SEDIMENTATION RATE] IN BLOOD BY WESTERGREN METHOD: 28 MM/H
ERYTHROCYTE [SEDIMENTATION RATE] IN BLOOD BY WESTERGREN METHOD: 32 MM/H
ERYTHROCYTE [SEDIMENTATION RATE] IN BLOOD BY WESTERGREN METHOD: 33 MM/H
ERYTHROCYTE [SEDIMENTATION RATE] IN BLOOD BY WESTERGREN METHOD: 33 MM/H
ERYTHROCYTE [SEDIMENTATION RATE] IN BLOOD BY WESTERGREN METHOD: 35 MM/H
EST. GFR  (NO RACE VARIABLE): 21.6 ML/MIN/1.73 M^2
EST. GFR  (NO RACE VARIABLE): 21.6 ML/MIN/1.73 M^2
EST. GFR  (NO RACE VARIABLE): 30.5 ML/MIN/1.73 M^2
EST. GFR  (NO RACE VARIABLE): 35.1 ML/MIN/1.73 M^2
EST. GFR  (NO RACE VARIABLE): 41.2 ML/MIN/1.73 M^2
EST. GFR  (NO RACE VARIABLE): 45.1 ML/MIN/1.73 M^2
EST. GFR  (NO RACE VARIABLE): 45.1 ML/MIN/1.73 M^2
EST. GFR  (NO RACE VARIABLE): 55.1 ML/MIN/1.73 M^2
EST. GFR  (NO RACE VARIABLE): >60 ML/MIN/1.73 M^2
ESTIMATED AVG GLUCOSE: 103 MG/DL (ref 68–131)
ESTIMATED AVG GLUCOSE: 126 MG/DL (ref 68–131)
FERRITIN SERPL-MCNC: 36 NG/ML (ref 20–300)
FERRITIN SERPL-MCNC: 48 NG/ML (ref 20–300)
FIO2: 100
FIO2: 100
FIO2: 60
FIO2: 60
FIO2: 70
FIO2: 80
FIO2: 90
FLUBV RNA NPH QL NAA+NON-PROBE: NOT DETECTED
FRACTIONAL SHORTENING: 19 % (ref 28–44)
GIANT PLATELETS BLD QL SMEAR: PRESENT
GLUCOSE SERPL-MCNC: 118 MG/DL (ref 70–110)
GLUCOSE SERPL-MCNC: 141 MG/DL (ref 70–110)
GLUCOSE SERPL-MCNC: 162 MG/DL (ref 70–110)
GLUCOSE SERPL-MCNC: 203 MG/DL (ref 70–110)
GLUCOSE SERPL-MCNC: 209 MG/DL (ref 70–110)
GLUCOSE SERPL-MCNC: 217 MG/DL (ref 70–110)
GLUCOSE SERPL-MCNC: 257 MG/DL (ref 70–110)
GLUCOSE SERPL-MCNC: 258 MG/DL (ref 70–110)
GLUCOSE SERPL-MCNC: 263 MG/DL (ref 70–110)
GLUCOSE SERPL-MCNC: 273 MG/DL (ref 70–110)
GLUCOSE SERPL-MCNC: 277 MG/DL (ref 70–110)
GLUCOSE SERPL-MCNC: 281 MG/DL (ref 70–110)
GLUCOSE SERPL-MCNC: 83 MG/DL (ref 70–110)
GLUCOSE SERPL-MCNC: 92 MG/DL (ref 70–110)
HAPTOGLOB SERPL-MCNC: 138 MG/DL (ref 30–250)
HBA1C MFR BLD: 5.2 % (ref 4–5.6)
HBA1C MFR BLD: 6 % (ref 4–5.6)
HCO3 UR-SCNC: 29.6 MMOL/L (ref 24–28)
HCO3 UR-SCNC: 30.2 MMOL/L (ref 24–28)
HCO3 UR-SCNC: 31.1 MMOL/L (ref 24–28)
HCO3 UR-SCNC: 31.4 MMOL/L (ref 24–28)
HCO3 UR-SCNC: 34 MMOL/L (ref 24–28)
HCO3 UR-SCNC: 34.6 MMOL/L (ref 24–28)
HCO3 UR-SCNC: 35.6 MMOL/L (ref 24–28)
HCO3 UR-SCNC: 37.1 MMOL/L (ref 24–28)
HCO3 UR-SCNC: 37.5 MMOL/L (ref 24–28)
HCO3 UR-SCNC: 37.9 MMOL/L
HCO3 UR-SCNC: 38.2 MMOL/L
HCO3 UR-SCNC: 38.8 MMOL/L
HCO3 UR-SCNC: 39.1 MMOL/L (ref 24–28)
HCO3 UR-SCNC: 39.3 MMOL/L (ref 24–28)
HCO3 UR-SCNC: 39.7 MMOL/L (ref 24–28)
HCO3 UR-SCNC: 39.8 MMOL/L (ref 24–28)
HCO3 UR-SCNC: 39.9 MMOL/L (ref 24–28)
HCO3 UR-SCNC: 40 MMOL/L (ref 24–28)
HCO3 UR-SCNC: 40.1 MMOL/L (ref 24–28)
HCO3 UR-SCNC: 40.3 MMOL/L (ref 24–28)
HCO3 UR-SCNC: 40.4 MMOL/L (ref 24–28)
HCO3 UR-SCNC: 40.5 MMOL/L
HCO3 UR-SCNC: 40.5 MMOL/L (ref 24–28)
HCO3 UR-SCNC: 40.6 MMOL/L (ref 24–28)
HCO3 UR-SCNC: 40.6 MMOL/L (ref 24–28)
HCO3 UR-SCNC: 40.7 MMOL/L (ref 24–28)
HCO3 UR-SCNC: 40.9 MMOL/L (ref 24–28)
HCO3 UR-SCNC: 41 MMOL/L (ref 24–28)
HCO3 UR-SCNC: 41.2 MMOL/L (ref 24–28)
HCO3 UR-SCNC: 41.4 MMOL/L
HCO3 UR-SCNC: 41.5 MMOL/L (ref 24–28)
HCO3 UR-SCNC: 41.9 MMOL/L (ref 24–28)
HCO3 UR-SCNC: 42.4 MMOL/L (ref 24–28)
HCO3 UR-SCNC: 42.5 MMOL/L (ref 24–28)
HCO3 UR-SCNC: 42.6 MMOL/L
HCO3 UR-SCNC: 43 MMOL/L
HCO3 UR-SCNC: 43 MMOL/L (ref 24–28)
HCO3 UR-SCNC: 43.5 MMOL/L (ref 24–28)
HCO3 UR-SCNC: 43.8 MMOL/L (ref 24–28)
HCT VFR BLD AUTO: 19.8 % (ref 37–48.5)
HCT VFR BLD AUTO: 20.5 % (ref 37–48.5)
HCT VFR BLD AUTO: 20.7 % (ref 37–48.5)
HCT VFR BLD AUTO: 21.4 % (ref 37–48.5)
HCT VFR BLD AUTO: 21.8 % (ref 37–48.5)
HCT VFR BLD AUTO: 22 % (ref 37–48.5)
HCT VFR BLD AUTO: 22.3 % (ref 37–48.5)
HCT VFR BLD AUTO: 23.1 % (ref 37–48.5)
HCT VFR BLD AUTO: 23.2 % (ref 37–48.5)
HCT VFR BLD AUTO: 23.2 % (ref 37–48.5)
HCT VFR BLD AUTO: 23.6 % (ref 37–48.5)
HCT VFR BLD AUTO: 23.7 % (ref 37–48.5)
HCT VFR BLD AUTO: 30.6 % (ref 37–48.5)
HCT VFR BLD AUTO: 31.6 % (ref 37–48.5)
HCT VFR BLD AUTO: 34.2 % (ref 37–48.5)
HCT VFR BLD AUTO: 34.2 % (ref 37–48.5)
HCT VFR BLD AUTO: 34.9 % (ref 37–48.5)
HCT VFR BLD AUTO: 34.9 % (ref 37–48.5)
HCT VFR BLD AUTO: 35.4 % (ref 37–48.5)
HCT VFR BLD AUTO: 39.9 % (ref 37–48.5)
HCT VFR BLD AUTO: 45.3 % (ref 37–48.5)
HCT VFR BLD CALC: 29 %PCV (ref 36–54)
HCT VFR BLD CALC: 30 %PCV (ref 36–54)
HCT VFR BLD CALC: 31 %PCV (ref 36–54)
HCT VFR BLD CALC: 32 %PCV (ref 36–54)
HCT VFR BLD CALC: 34 %PCV (ref 36–54)
HCT VFR BLD CALC: 35 %PCV (ref 36–54)
HCT VFR BLD CALC: 36 %PCV (ref 36–54)
HCT VFR BLD CALC: 38 %PCV (ref 36–54)
HGB BLD-MCNC: 10.1 G/DL (ref 12–16)
HGB BLD-MCNC: 10.4 G/DL (ref 12–16)
HGB BLD-MCNC: 10.6 G/DL (ref 12–16)
HGB BLD-MCNC: 11.3 G/DL (ref 12–16)
HGB BLD-MCNC: 12.4 G/DL (ref 12–16)
HGB BLD-MCNC: 6.4 G/DL (ref 12–16)
HGB BLD-MCNC: 6.5 G/DL (ref 12–16)
HGB BLD-MCNC: 6.8 G/DL (ref 12–16)
HGB BLD-MCNC: 6.9 G/DL (ref 12–16)
HGB BLD-MCNC: 7 G/DL (ref 12–16)
HGB BLD-MCNC: 7.1 G/DL (ref 12–16)
HGB BLD-MCNC: 7.2 G/DL (ref 12–16)
HGB BLD-MCNC: 7.4 G/DL (ref 12–16)
HGB BLD-MCNC: 7.5 G/DL (ref 12–16)
HGB BLD-MCNC: 7.6 G/DL (ref 12–16)
HGB BLD-MCNC: 9 G/DL (ref 12–16)
HGB BLD-MCNC: 9.3 G/DL (ref 12–16)
HPIV1 RNA NPH QL NAA+NON-PROBE: NOT DETECTED
HPIV2 RNA NPH QL NAA+NON-PROBE: NOT DETECTED
HPIV3 RNA NPH QL NAA+NON-PROBE: NOT DETECTED
HPIV4 RNA NPH QL NAA+NON-PROBE: NOT DETECTED
HUMAN METAPNEUMOVIRUS: NOT DETECTED
HYPOCHROMIA BLD QL SMEAR: ABNORMAL
IMM GRANULOCYTES # BLD AUTO: 0 K/UL (ref 0–0.04)
IMM GRANULOCYTES # BLD AUTO: 0.01 K/UL (ref 0–0.04)
IMM GRANULOCYTES # BLD AUTO: 0.13 K/UL (ref 0–0.04)
IMM GRANULOCYTES # BLD AUTO: 0.13 K/UL (ref 0–0.04)
IMM GRANULOCYTES # BLD AUTO: 0.17 K/UL (ref 0–0.04)
IMM GRANULOCYTES # BLD AUTO: 0.34 K/UL (ref 0–0.04)
IMM GRANULOCYTES # BLD AUTO: 0.77 K/UL (ref 0–0.04)
IMM GRANULOCYTES # BLD AUTO: 0.79 K/UL (ref 0–0.04)
IMM GRANULOCYTES # BLD AUTO: 0.86 K/UL (ref 0–0.04)
IMM GRANULOCYTES # BLD AUTO: ABNORMAL K/UL (ref 0–0.04)
IMM GRANULOCYTES NFR BLD AUTO: 0 % (ref 0–0.5)
IMM GRANULOCYTES NFR BLD AUTO: 0.2 % (ref 0–0.5)
IMM GRANULOCYTES NFR BLD AUTO: 0.8 % (ref 0–0.5)
IMM GRANULOCYTES NFR BLD AUTO: 0.8 % (ref 0–0.5)
IMM GRANULOCYTES NFR BLD AUTO: 1 % (ref 0–0.5)
IMM GRANULOCYTES NFR BLD AUTO: 1.6 % (ref 0–0.5)
IMM GRANULOCYTES NFR BLD AUTO: 3.5 % (ref 0–0.5)
IMM GRANULOCYTES NFR BLD AUTO: 3.7 % (ref 0–0.5)
IMM GRANULOCYTES NFR BLD AUTO: 4.7 % (ref 0–0.5)
IMM GRANULOCYTES NFR BLD AUTO: ABNORMAL % (ref 0–0.5)
INFLUENZA A (SUBTYPES H1,H1-2009,H3): NOT DETECTED
INR PPP: 1.1 (ref 0.8–1.2)
INR PPP: 1.1 (ref 0.8–1.2)
INR PPP: 1.2 (ref 0.8–1.2)
INR PPP: 1.3 (ref 0.8–1.2)
INR PPP: 1.7 (ref 0.8–1.2)
INR PPP: 1.8 (ref 0.8–1.2)
INR PPP: 1.9 (ref 0.8–1.2)
INR PPP: 2.1 (ref 0.8–1.2)
INR PPP: 2.1 (ref 0.8–1.2)
INR PPP: 2.2 (ref 0.8–1.2)
INR PPP: 2.2 (ref 0.8–1.2)
INR PPP: 2.3 (ref 0.8–1.2)
INR PPP: 2.4 (ref 0.8–1.2)
INR PPP: 2.4 (ref 0.8–1.2)
INR PPP: 2.6 (ref 0.8–1.2)
INR PPP: 2.6 (ref 0.8–1.2)
INR PPP: 2.8 (ref 0.8–1.2)
INR PPP: 2.9 (ref 0.8–1.2)
INR PPP: 2.9 (ref 0.8–1.2)
INR PPP: 3.1 (ref 0.8–1.2)
INR PPP: 3.2 (ref 0.8–1.2)
INR PPP: 3.2 (ref 0.8–1.2)
INR PPP: 3.3 (ref 0.8–1.2)
INR PPP: 3.4 (ref 0.8–1.2)
INR PPP: 3.4 (ref 0.8–1.2)
INR PPP: 3.9 (ref 0.8–1.2)
INR PPP: 4.6 (ref 0.8–1.2)
INTERVENTRICULAR SEPTUM: 0.62 CM (ref 0.6–1.1)
IP: 15
IRON SERPL-MCNC: 59 UG/DL (ref 30–160)
IRON SERPL-MCNC: 74 UG/DL (ref 30–160)
LA MAJOR: 5.66 CM
LA MINOR: 5.24 CM
LA WIDTH: 3.89 CM
LACTATE SERPL-SCNC: 0.8 MMOL/L (ref 0.5–2.2)
LACTATE SERPL-SCNC: 1.6 MMOL/L (ref 0.5–2.2)
LDH SERPL L TO P-CCNC: 657 U/L (ref 110–260)
LEFT ATRIUM SIZE: 4.4 CM
LEFT ATRIUM VOLUME INDEX MOD: 26.4 ML/M2
LEFT ATRIUM VOLUME INDEX: 41.5 ML/M2
LEFT ATRIUM VOLUME MOD: 50.43 CM3
LEFT ATRIUM VOLUME: 79.17 CM3
LEFT INTERNAL DIMENSION IN SYSTOLE: 3.38 CM (ref 2.1–4)
LEFT VENTRICLE DIASTOLIC VOLUME INDEX: 40 ML/M2
LEFT VENTRICLE DIASTOLIC VOLUME: 76.4 ML
LEFT VENTRICLE MASS INDEX: 37 G/M2
LEFT VENTRICLE SYSTOLIC VOLUME INDEX: 24.6 ML/M2
LEFT VENTRICLE SYSTOLIC VOLUME: 46.93 ML
LEFT VENTRICULAR INTERNAL DIMENSION IN DIASTOLE: 4.15 CM (ref 3.5–6)
LEFT VENTRICULAR MASS: 69.97 G
LV LATERAL E/E' RATIO: 18.8 M/S
LV SEPTAL E/E' RATIO: 18.8 M/S
LYMPHOCYTES # BLD AUTO: 0.3 K/UL (ref 1–4.8)
LYMPHOCYTES # BLD AUTO: 0.4 K/UL (ref 1–4.8)
LYMPHOCYTES # BLD AUTO: 0.5 K/UL (ref 1–4.8)
LYMPHOCYTES # BLD AUTO: 0.6 K/UL (ref 1–4.8)
LYMPHOCYTES # BLD AUTO: 0.8 K/UL (ref 1–4.8)
LYMPHOCYTES # BLD AUTO: 1 K/UL (ref 1–4.8)
LYMPHOCYTES # BLD AUTO: 1.2 K/UL (ref 1–4.8)
LYMPHOCYTES # BLD AUTO: ABNORMAL K/UL (ref 1–4.8)
LYMPHOCYTES NFR BLD: 1.7 % (ref 18–48)
LYMPHOCYTES NFR BLD: 10 % (ref 18–48)
LYMPHOCYTES NFR BLD: 14 % (ref 18–48)
LYMPHOCYTES NFR BLD: 17 % (ref 18–48)
LYMPHOCYTES NFR BLD: 19 % (ref 18–48)
LYMPHOCYTES NFR BLD: 2 % (ref 18–48)
LYMPHOCYTES NFR BLD: 2.3 % (ref 18–48)
LYMPHOCYTES NFR BLD: 2.3 % (ref 18–48)
LYMPHOCYTES NFR BLD: 2.5 % (ref 18–48)
LYMPHOCYTES NFR BLD: 26 % (ref 18–48)
LYMPHOCYTES NFR BLD: 28 % (ref 18–48)
LYMPHOCYTES NFR BLD: 3 % (ref 18–48)
LYMPHOCYTES NFR BLD: 3.4 % (ref 18–48)
LYMPHOCYTES NFR BLD: 4 % (ref 18–48)
LYMPHOCYTES NFR BLD: 4 % (ref 18–48)
LYMPHOCYTES NFR BLD: 5 % (ref 18–48)
LYMPHOCYTES NFR BLD: 6 % (ref 18–48)
LYMPHOCYTES NFR BLD: 8.5 % (ref 18–48)
MAGNESIUM SERPL-MCNC: 1.6 MG/DL (ref 1.6–2.6)
MAGNESIUM SERPL-MCNC: 1.9 MG/DL (ref 1.6–2.6)
MAGNESIUM SERPL-MCNC: 1.9 MG/DL (ref 1.6–2.6)
MAGNESIUM SERPL-MCNC: 2 MG/DL (ref 1.6–2.6)
MAGNESIUM SERPL-MCNC: 2.1 MG/DL (ref 1.6–2.6)
MAGNESIUM SERPL-MCNC: 2.2 MG/DL (ref 1.6–2.6)
MAGNESIUM SERPL-MCNC: 2.4 MG/DL (ref 1.6–2.6)
MAGNESIUM SERPL-MCNC: 2.7 MG/DL (ref 1.6–2.6)
MCH RBC QN AUTO: 20.6 PG (ref 27–31)
MCH RBC QN AUTO: 21.2 PG (ref 27–31)
MCH RBC QN AUTO: 21.4 PG (ref 27–31)
MCH RBC QN AUTO: 21.5 PG (ref 27–31)
MCH RBC QN AUTO: 21.6 PG (ref 27–31)
MCH RBC QN AUTO: 21.6 PG (ref 27–31)
MCH RBC QN AUTO: 21.7 PG (ref 27–31)
MCH RBC QN AUTO: 21.8 PG (ref 27–31)
MCH RBC QN AUTO: 21.8 PG (ref 27–31)
MCH RBC QN AUTO: 21.9 PG (ref 27–31)
MCH RBC QN AUTO: 21.9 PG (ref 27–31)
MCH RBC QN AUTO: 22.2 PG (ref 27–31)
MCH RBC QN AUTO: 22.4 PG (ref 27–31)
MCH RBC QN AUTO: 23.5 PG (ref 27–31)
MCH RBC QN AUTO: 23.9 PG (ref 27–31)
MCH RBC QN AUTO: 24.7 PG (ref 27–31)
MCH RBC QN AUTO: 24.9 PG (ref 27–31)
MCH RBC QN AUTO: 25.5 PG (ref 27–31)
MCH RBC QN AUTO: 25.6 PG (ref 27–31)
MCH RBC QN AUTO: 25.8 PG (ref 27–31)
MCH RBC QN AUTO: 26.1 PG (ref 27–31)
MCHC RBC AUTO-ENTMCNC: 27.4 G/DL (ref 32–36)
MCHC RBC AUTO-ENTMCNC: 28.3 G/DL (ref 32–36)
MCHC RBC AUTO-ENTMCNC: 28.9 G/DL (ref 32–36)
MCHC RBC AUTO-ENTMCNC: 29.4 G/DL (ref 32–36)
MCHC RBC AUTO-ENTMCNC: 29.4 G/DL (ref 32–36)
MCHC RBC AUTO-ENTMCNC: 29.7 G/DL (ref 32–36)
MCHC RBC AUTO-ENTMCNC: 29.8 G/DL (ref 32–36)
MCHC RBC AUTO-ENTMCNC: 29.9 G/DL (ref 32–36)
MCHC RBC AUTO-ENTMCNC: 30 G/DL (ref 32–36)
MCHC RBC AUTO-ENTMCNC: 30.4 G/DL (ref 32–36)
MCHC RBC AUTO-ENTMCNC: 30.4 G/DL (ref 32–36)
MCHC RBC AUTO-ENTMCNC: 31.4 G/DL (ref 32–36)
MCHC RBC AUTO-ENTMCNC: 31.7 G/DL (ref 32–36)
MCHC RBC AUTO-ENTMCNC: 31.8 G/DL (ref 32–36)
MCHC RBC AUTO-ENTMCNC: 32.3 G/DL (ref 32–36)
MCHC RBC AUTO-ENTMCNC: 32.3 G/DL (ref 32–36)
MCHC RBC AUTO-ENTMCNC: 32.5 G/DL (ref 32–36)
MCHC RBC AUTO-ENTMCNC: 32.8 G/DL (ref 32–36)
MCHC RBC AUTO-ENTMCNC: 33.6 G/DL (ref 32–36)
MCHC RBC AUTO-ENTMCNC: 33.8 G/DL (ref 32–36)
MCHC RBC AUTO-ENTMCNC: 33.9 G/DL (ref 32–36)
MCV RBC AUTO: 70 FL (ref 82–98)
MCV RBC AUTO: 71 FL (ref 82–98)
MCV RBC AUTO: 72 FL (ref 82–98)
MCV RBC AUTO: 73 FL (ref 82–98)
MCV RBC AUTO: 74 FL (ref 82–98)
MCV RBC AUTO: 75 FL (ref 82–98)
MCV RBC AUTO: 76 FL (ref 82–98)
MCV RBC AUTO: 77 FL (ref 82–98)
MCV RBC AUTO: 78 FL (ref 82–98)
METAMYELOCYTES NFR BLD MANUAL: 1 %
METAMYELOCYTES NFR BLD MANUAL: 2 %
METAMYELOCYTES NFR BLD MANUAL: 2.5 %
METAMYELOCYTES NFR BLD MANUAL: 3 %
METAMYELOCYTES NFR BLD MANUAL: 4 %
METAMYELOCYTES NFR BLD MANUAL: 4 %
METAMYELOCYTES NFR BLD MANUAL: 5 %
METAMYELOCYTES NFR BLD MANUAL: 5 %
METAMYELOCYTES NFR BLD MANUAL: 5.5 %
METAMYELOCYTES NFR BLD MANUAL: 7 %
MIN VOL: 10.3
MIN VOL: 8.8
MODE: ABNORMAL
MONOCYTES # BLD AUTO: 0.5 K/UL (ref 0.3–1)
MONOCYTES # BLD AUTO: 0.6 K/UL (ref 0.3–1)
MONOCYTES # BLD AUTO: 1.2 K/UL (ref 0.3–1)
MONOCYTES # BLD AUTO: 1.5 K/UL (ref 0.3–1)
MONOCYTES # BLD AUTO: 1.6 K/UL (ref 0.3–1)
MONOCYTES # BLD AUTO: ABNORMAL K/UL (ref 0.3–1)
MONOCYTES NFR BLD: 10 % (ref 4–15)
MONOCYTES NFR BLD: 12.9 % (ref 4–15)
MONOCYTES NFR BLD: 14.1 % (ref 4–15)
MONOCYTES NFR BLD: 16 % (ref 4–15)
MONOCYTES NFR BLD: 17.5 % (ref 4–15)
MONOCYTES NFR BLD: 18 % (ref 4–15)
MONOCYTES NFR BLD: 19 % (ref 4–15)
MONOCYTES NFR BLD: 2 % (ref 4–15)
MONOCYTES NFR BLD: 2 % (ref 4–15)
MONOCYTES NFR BLD: 2.2 % (ref 4–15)
MONOCYTES NFR BLD: 3.2 % (ref 4–15)
MONOCYTES NFR BLD: 3.9 % (ref 4–15)
MONOCYTES NFR BLD: 3.9 % (ref 4–15)
MONOCYTES NFR BLD: 5 % (ref 4–15)
MONOCYTES NFR BLD: 6.5 % (ref 4–15)
MONOCYTES NFR BLD: 7.1 % (ref 4–15)
MONOCYTES NFR BLD: 7.4 % (ref 4–15)
MONOCYTES NFR BLD: 7.5 % (ref 4–15)
MONOCYTES NFR BLD: 7.5 % (ref 4–15)
MV PEAK E VEL: 0.94 M/S
MYCOPLASMA PNEUMONIAE: NOT DETECTED
MYELOCYTES NFR BLD MANUAL: 0.5 %
MYELOCYTES NFR BLD MANUAL: 1 %
MYELOCYTES NFR BLD MANUAL: 1.5 %
MYELOCYTES NFR BLD MANUAL: 2.5 %
MYELOCYTES NFR BLD MANUAL: 2.5 %
MYELOCYTES NFR BLD MANUAL: 3 %
MYELOCYTES NFR BLD MANUAL: 4 %
NEUTROPHILS # BLD AUTO: 14.3 K/UL (ref 1.8–7.7)
NEUTROPHILS # BLD AUTO: 14.8 K/UL (ref 1.8–7.7)
NEUTROPHILS # BLD AUTO: 14.8 K/UL (ref 1.8–7.7)
NEUTROPHILS # BLD AUTO: 15.7 K/UL (ref 1.8–7.7)
NEUTROPHILS # BLD AUTO: 18.6 K/UL (ref 1.8–7.7)
NEUTROPHILS # BLD AUTO: 2.2 K/UL (ref 1.8–7.7)
NEUTROPHILS # BLD AUTO: 2.2 K/UL (ref 1.8–7.7)
NEUTROPHILS # BLD AUTO: 20 K/UL (ref 1.8–7.7)
NEUTROPHILS # BLD AUTO: 20.4 K/UL (ref 1.8–7.7)
NEUTROPHILS NFR BLD: 44 % (ref 38–73)
NEUTROPHILS NFR BLD: 52 % (ref 38–73)
NEUTROPHILS NFR BLD: 53.3 % (ref 38–73)
NEUTROPHILS NFR BLD: 55 % (ref 38–73)
NEUTROPHILS NFR BLD: 56.3 % (ref 38–73)
NEUTROPHILS NFR BLD: 57 % (ref 38–73)
NEUTROPHILS NFR BLD: 68 % (ref 38–73)
NEUTROPHILS NFR BLD: 70 % (ref 38–73)
NEUTROPHILS NFR BLD: 71 % (ref 38–73)
NEUTROPHILS NFR BLD: 74 % (ref 38–73)
NEUTROPHILS NFR BLD: 75.5 % (ref 38–73)
NEUTROPHILS NFR BLD: 83 % (ref 38–73)
NEUTROPHILS NFR BLD: 83.5 % (ref 38–73)
NEUTROPHILS NFR BLD: 84.1 % (ref 38–73)
NEUTROPHILS NFR BLD: 85.4 % (ref 38–73)
NEUTROPHILS NFR BLD: 87.1 % (ref 38–73)
NEUTROPHILS NFR BLD: 88 % (ref 38–73)
NEUTROPHILS NFR BLD: 91.8 % (ref 38–73)
NEUTROPHILS NFR BLD: 91.8 % (ref 38–73)
NEUTROPHILS NFR BLD: 93.8 % (ref 38–73)
NEUTROPHILS NFR BLD: 94.1 % (ref 38–73)
NEUTS BAND NFR BLD MANUAL: 0.5 %
NEUTS BAND NFR BLD MANUAL: 1 %
NEUTS BAND NFR BLD MANUAL: 1.5 %
NEUTS BAND NFR BLD MANUAL: 10 %
NEUTS BAND NFR BLD MANUAL: 11 %
NEUTS BAND NFR BLD MANUAL: 13 %
NEUTS BAND NFR BLD MANUAL: 2 %
NEUTS BAND NFR BLD MANUAL: 2 %
NEUTS BAND NFR BLD MANUAL: 3 %
NEUTS BAND NFR BLD MANUAL: 3 %
NEUTS BAND NFR BLD MANUAL: 7 %
NEUTS BAND NFR BLD MANUAL: 7 %
NRBC BLD-RTO: 0 /100 WBC
NRBC BLD-RTO: 1 /100 WBC
NRBC BLD-RTO: 1 /100 WBC
NRBC BLD-RTO: 12 /100 WBC
NRBC BLD-RTO: 13 /100 WBC
NRBC BLD-RTO: 15 /100 WBC
NRBC BLD-RTO: 17 /100 WBC
NRBC BLD-RTO: 2 /100 WBC
NRBC BLD-RTO: 2 /100 WBC
NRBC BLD-RTO: 3 /100 WBC
NRBC BLD-RTO: 31 /100 WBC
NRBC BLD-RTO: 39 /100 WBC
NRBC BLD-RTO: 4 /100 WBC
NRBC BLD-RTO: 45 /100 WBC
NRBC BLD-RTO: 9 /100 WBC
NUM UNITS TRANS PACKED RBC: NORMAL
OVALOCYTES BLD QL SMEAR: ABNORMAL
PATH REV BLD -IMP: NORMAL
PATH REV BLD -IMP: NORMAL
PCO2 BLDA: 101.9 MMHG (ref 35–45)
PCO2 BLDA: 43.7 MMHG (ref 35–45)
PCO2 BLDA: 44.9 MMHG (ref 35–45)
PCO2 BLDA: 45.7 MMHG (ref 35–45)
PCO2 BLDA: 49.1 MMHG (ref 35–45)
PCO2 BLDA: 55.4 MMHG (ref 35–45)
PCO2 BLDA: 57.4 MMHG (ref 35–45)
PCO2 BLDA: 59 MMHG (ref 35–45)
PCO2 BLDA: 60.1 MMHG (ref 35–45)
PCO2 BLDA: 60.7 MMHG (ref 35–45)
PCO2 BLDA: 61.5 MMHG (ref 35–45)
PCO2 BLDA: 64.7 MMHG (ref 35–45)
PCO2 BLDA: 64.7 MMHG (ref 35–45)
PCO2 BLDA: 67.6 MMHG (ref 35–45)
PCO2 BLDA: 67.6 MMHG (ref 35–45)
PCO2 BLDA: 68.5 MMHG (ref 35–45)
PCO2 BLDA: 69.5 MMHG (ref 35–45)
PCO2 BLDA: 69.6 MMHG (ref 35–45)
PCO2 BLDA: 72.4 MMHG (ref 35–45)
PCO2 BLDA: 72.6 MMHG (ref 35–45)
PCO2 BLDA: 72.9 MMHG (ref 35–45)
PCO2 BLDA: 73.1 MMHG (ref 35–45)
PCO2 BLDA: 73.6 MMHG (ref 35–45)
PCO2 BLDA: 74.1 MMHG (ref 35–45)
PCO2 BLDA: 74.3 MMHG (ref 35–45)
PCO2 BLDA: 74.6 MMHG (ref 35–45)
PCO2 BLDA: 75.2 MMHG (ref 35–45)
PCO2 BLDA: 76.6 MMHG (ref 35–45)
PCO2 BLDA: 76.7 MMHG (ref 35–45)
PCO2 BLDA: 76.9 MMHG (ref 35–45)
PCO2 BLDA: 76.9 MMHG (ref 35–45)
PCO2 BLDA: 78.3 MMHG (ref 35–45)
PCO2 BLDA: 78.4 MMHG (ref 35–45)
PCO2 BLDA: 79.6 MMHG (ref 35–45)
PCO2 BLDA: 80.4 MMHG (ref 35–45)
PCO2 BLDA: 80.6 MMHG (ref 35–45)
PCO2 BLDA: 81.3 MMHG (ref 35–45)
PCO2 BLDA: 84.6 MMHG (ref 35–45)
PCO2 BLDA: 89.1 MMHG (ref 35–45)
PEEP: 10
PEEP: 12
PEEP: 8
PH SMN: 7.23 [PH] (ref 7.35–7.45)
PH SMN: 7.27 [PH] (ref 7.35–7.45)
PH SMN: 7.29 [PH] (ref 7.35–7.45)
PH SMN: 7.3 [PH] (ref 7.35–7.45)
PH SMN: 7.31 [PH] (ref 7.35–7.45)
PH SMN: 7.32 [PH] (ref 7.35–7.45)
PH SMN: 7.33 [PH] (ref 7.35–7.45)
PH SMN: 7.34 [PH] (ref 7.35–7.45)
PH SMN: 7.34 [PH] (ref 7.35–7.45)
PH SMN: 7.35 [PH] (ref 7.35–7.45)
PH SMN: 7.36 [PH] (ref 7.35–7.45)
PH SMN: 7.36 [PH] (ref 7.35–7.45)
PH SMN: 7.37 [PH] (ref 7.35–7.45)
PH SMN: 7.37 [PH] (ref 7.35–7.45)
PH SMN: 7.38 [PH] (ref 7.35–7.45)
PH SMN: 7.41 [PH] (ref 7.35–7.45)
PH SMN: 7.41 [PH] (ref 7.35–7.45)
PH SMN: 7.43 [PH] (ref 7.35–7.45)
PH SMN: 7.43 [PH] (ref 7.35–7.45)
PH SMN: 7.44 [PH] (ref 7.35–7.45)
PH SMN: 7.44 [PH] (ref 7.35–7.45)
PH SMN: 7.45 [PH] (ref 7.35–7.45)
PH SMN: 7.45 [PH] (ref 7.35–7.45)
PH SMN: 7.46 [PH] (ref 7.35–7.45)
PH SMN: 7.51 [PH] (ref 7.35–7.45)
PHOSPHATE SERPL-MCNC: 2.6 MG/DL (ref 2.7–4.5)
PHOSPHATE SERPL-MCNC: 3 MG/DL (ref 2.7–4.5)
PHOSPHATE SERPL-MCNC: 3.2 MG/DL (ref 2.7–4.5)
PHOSPHATE SERPL-MCNC: 3.6 MG/DL (ref 2.7–4.5)
PHOSPHATE SERPL-MCNC: 3.9 MG/DL (ref 2.7–4.5)
PHOSPHATE SERPL-MCNC: 4.9 MG/DL (ref 2.7–4.5)
PHOSPHATE SERPL-MCNC: 5.8 MG/DL (ref 2.7–4.5)
PHOSPHATE SERPL-MCNC: 6.7 MG/DL (ref 2.7–4.5)
PHOSPHATE SERPL-MCNC: 6.9 MG/DL (ref 2.7–4.5)
PIP: 33
PIP: 37
PLATELET # BLD AUTO: 147 K/UL (ref 150–450)
PLATELET # BLD AUTO: 153 K/UL (ref 150–450)
PLATELET # BLD AUTO: 158 K/UL (ref 150–450)
PLATELET # BLD AUTO: 162 K/UL (ref 150–450)
PLATELET # BLD AUTO: 164 K/UL (ref 150–450)
PLATELET # BLD AUTO: 178 K/UL (ref 150–450)
PLATELET # BLD AUTO: 178 K/UL (ref 150–450)
PLATELET # BLD AUTO: 181 K/UL (ref 150–450)
PLATELET # BLD AUTO: 181 K/UL (ref 150–450)
PLATELET # BLD AUTO: 182 K/UL (ref 150–450)
PLATELET # BLD AUTO: 195 K/UL (ref 150–450)
PLATELET # BLD AUTO: 229 K/UL (ref 150–450)
PLATELET # BLD AUTO: 229 K/UL (ref 150–450)
PLATELET # BLD AUTO: 230 K/UL (ref 150–450)
PLATELET # BLD AUTO: 232 K/UL (ref 150–450)
PLATELET # BLD AUTO: 233 K/UL (ref 150–450)
PLATELET # BLD AUTO: 246 K/UL (ref 150–450)
PLATELET # BLD AUTO: 248 K/UL (ref 150–450)
PLATELET # BLD AUTO: 250 K/UL (ref 150–450)
PLATELET # BLD AUTO: 253 K/UL (ref 150–450)
PLATELET # BLD AUTO: 291 K/UL (ref 150–450)
PLATELET BLD QL SMEAR: ABNORMAL
PMV BLD AUTO: 10.9 FL (ref 9.2–12.9)
PMV BLD AUTO: 10.9 FL (ref 9.2–12.9)
PMV BLD AUTO: 11 FL (ref 9.2–12.9)
PMV BLD AUTO: 11.1 FL (ref 9.2–12.9)
PMV BLD AUTO: 11.1 FL (ref 9.2–12.9)
PMV BLD AUTO: 11.2 FL (ref 9.2–12.9)
PMV BLD AUTO: 11.5 FL (ref 9.2–12.9)
PMV BLD AUTO: 11.5 FL (ref 9.2–12.9)
PMV BLD AUTO: 11.6 FL (ref 9.2–12.9)
PMV BLD AUTO: 11.7 FL (ref 9.2–12.9)
PMV BLD AUTO: 11.9 FL (ref 9.2–12.9)
PMV BLD AUTO: 12 FL (ref 9.2–12.9)
PMV BLD AUTO: 12.1 FL (ref 9.2–12.9)
PMV BLD AUTO: 12.1 FL (ref 9.2–12.9)
PMV BLD AUTO: 9.5 FL (ref 9.2–12.9)
PO2 BLDA: 100 MMHG (ref 80–100)
PO2 BLDA: 108 MMHG (ref 80–100)
PO2 BLDA: 51 MMHG (ref 80–100)
PO2 BLDA: 57 MMHG (ref 80–100)
PO2 BLDA: 57 MMHG (ref 80–100)
PO2 BLDA: 61 MMHG (ref 80–100)
PO2 BLDA: 66 MMHG (ref 80–100)
PO2 BLDA: 68 MMHG (ref 80–100)
PO2 BLDA: 69 MMHG (ref 80–100)
PO2 BLDA: 70 MMHG (ref 80–100)
PO2 BLDA: 70 MMHG (ref 80–100)
PO2 BLDA: 71 MMHG (ref 80–100)
PO2 BLDA: 72 MMHG (ref 80–100)
PO2 BLDA: 73 MMHG (ref 80–100)
PO2 BLDA: 74 MMHG (ref 80–100)
PO2 BLDA: 75 MMHG (ref 80–100)
PO2 BLDA: 76 MMHG (ref 80–100)
PO2 BLDA: 76 MMHG (ref 80–100)
PO2 BLDA: 77 MMHG (ref 80–100)
PO2 BLDA: 77 MMHG (ref 80–100)
PO2 BLDA: 78 MMHG (ref 80–100)
PO2 BLDA: 78 MMHG (ref 80–100)
PO2 BLDA: 79 MMHG (ref 80–100)
PO2 BLDA: 80 MMHG (ref 80–100)
PO2 BLDA: 81 MMHG (ref 80–100)
PO2 BLDA: 81 MMHG (ref 80–100)
PO2 BLDA: 83 MMHG (ref 80–100)
PO2 BLDA: 83 MMHG (ref 80–100)
PO2 BLDA: 84 MMHG (ref 80–100)
PO2 BLDA: 87 MMHG (ref 80–100)
POC BE: 10 MMOL/L
POC BE: 11 MMOL/L
POC BE: 12 MMOL/L
POC BE: 13 MMOL/L
POC BE: 14 MMOL/L
POC BE: 15 MMOL/L
POC BE: 16 MMOL/L
POC BE: 17 MMOL/L
POC BE: 18 MMOL/L
POC BE: 19 MMOL/L
POC BE: 3 MMOL/L
POC BE: 6 MMOL/L
POC BE: 7 MMOL/L
POC BE: 8 MMOL/L
POC BE: 8 MMOL/L
POC BE: 9 MMOL/L
POC IONIZED CALCIUM: 1.04 MMOL/L (ref 1.06–1.42)
POC IONIZED CALCIUM: 1.05 MMOL/L (ref 1.06–1.42)
POC IONIZED CALCIUM: 1.05 MMOL/L (ref 1.06–1.42)
POC IONIZED CALCIUM: 1.06 MMOL/L (ref 1.06–1.42)
POC IONIZED CALCIUM: 1.11 MMOL/L (ref 1.06–1.42)
POC IONIZED CALCIUM: 1.14 MMOL/L (ref 1.06–1.42)
POC IONIZED CALCIUM: 1.15 MMOL/L (ref 1.06–1.42)
POC IONIZED CALCIUM: 1.16 MMOL/L (ref 1.06–1.42)
POC SATURATED O2: 86 % (ref 95–100)
POC SATURATED O2: 86 % (ref 95–100)
POC SATURATED O2: 89 % (ref 95–100)
POC SATURATED O2: 90 % (ref 95–100)
POC SATURATED O2: 92 % (ref 95–100)
POC SATURATED O2: 93 % (ref 95–100)
POC SATURATED O2: 94 % (ref 95–100)
POC SATURATED O2: 95 % (ref 95–100)
POC SATURATED O2: 96 % (ref 95–100)
POC SATURATED O2: 96 % (ref 95–100)
POC SATURATED O2: 98 % (ref 95–100)
POC TCO2: 31 MMOL/L (ref 23–27)
POC TCO2: 32 MMOL/L (ref 23–27)
POC TCO2: 32 MMOL/L (ref 23–27)
POC TCO2: 33 MMOL/L (ref 23–27)
POC TCO2: 36 MMOL/L (ref 23–27)
POC TCO2: 37 MMOL/L (ref 23–27)
POC TCO2: 38 MMOL/L (ref 23–27)
POC TCO2: 39 MMOL/L (ref 23–27)
POC TCO2: 39 MMOL/L (ref 23–27)
POC TCO2: 40 MMOL/L (ref 23–27)
POC TCO2: 41 MMOL/L (ref 23–27)
POC TCO2: 41 MMOL/L (ref 23–27)
POC TCO2: 42 MMOL/L (ref 23–27)
POC TCO2: 43 MMOL/L (ref 23–27)
POC TCO2: 44 MMOL/L (ref 23–27)
POC TCO2: 44 MMOL/L (ref 23–27)
POC TCO2: 45 MMOL/L (ref 23–27)
POC TCO2: 46 MMOL/L (ref 23–27)
POC TCO2: 46 MMOL/L (ref 23–27)
POCT GLUCOSE: 142 MG/DL (ref 70–110)
POCT GLUCOSE: 145 MG/DL (ref 70–110)
POCT GLUCOSE: 152 MG/DL (ref 70–110)
POCT GLUCOSE: 153 MG/DL (ref 70–110)
POCT GLUCOSE: 155 MG/DL (ref 70–110)
POCT GLUCOSE: 160 MG/DL (ref 70–110)
POCT GLUCOSE: 161 MG/DL (ref 70–110)
POCT GLUCOSE: 163 MG/DL (ref 70–110)
POCT GLUCOSE: 163 MG/DL (ref 70–110)
POCT GLUCOSE: 165 MG/DL (ref 70–110)
POCT GLUCOSE: 167 MG/DL (ref 70–110)
POCT GLUCOSE: 168 MG/DL (ref 70–110)
POCT GLUCOSE: 171 MG/DL (ref 70–110)
POCT GLUCOSE: 173 MG/DL (ref 70–110)
POCT GLUCOSE: 173 MG/DL (ref 70–110)
POCT GLUCOSE: 174 MG/DL (ref 70–110)
POCT GLUCOSE: 177 MG/DL (ref 70–110)
POCT GLUCOSE: 178 MG/DL (ref 70–110)
POCT GLUCOSE: 178 MG/DL (ref 70–110)
POCT GLUCOSE: 179 MG/DL (ref 70–110)
POCT GLUCOSE: 180 MG/DL (ref 70–110)
POCT GLUCOSE: 186 MG/DL (ref 70–110)
POCT GLUCOSE: 188 MG/DL (ref 70–110)
POCT GLUCOSE: 189 MG/DL (ref 70–110)
POCT GLUCOSE: 196 MG/DL (ref 70–110)
POCT GLUCOSE: 202 MG/DL (ref 70–110)
POCT GLUCOSE: 204 MG/DL (ref 70–110)
POCT GLUCOSE: 208 MG/DL (ref 70–110)
POCT GLUCOSE: 215 MG/DL (ref 70–110)
POCT GLUCOSE: 217 MG/DL (ref 70–110)
POCT GLUCOSE: 218 MG/DL (ref 70–110)
POCT GLUCOSE: 221 MG/DL (ref 70–110)
POCT GLUCOSE: 222 MG/DL (ref 70–110)
POCT GLUCOSE: 224 MG/DL (ref 70–110)
POCT GLUCOSE: 228 MG/DL (ref 70–110)
POCT GLUCOSE: 232 MG/DL (ref 70–110)
POCT GLUCOSE: 240 MG/DL (ref 70–110)
POCT GLUCOSE: 247 MG/DL (ref 70–110)
POCT GLUCOSE: 253 MG/DL (ref 70–110)
POCT GLUCOSE: 255 MG/DL (ref 70–110)
POCT GLUCOSE: 264 MG/DL (ref 70–110)
POCT GLUCOSE: 276 MG/DL (ref 70–110)
POCT GLUCOSE: 278 MG/DL (ref 70–110)
POCT GLUCOSE: 278 MG/DL (ref 70–110)
POCT GLUCOSE: 280 MG/DL (ref 70–110)
POCT GLUCOSE: 280 MG/DL (ref 70–110)
POCT GLUCOSE: 281 MG/DL (ref 70–110)
POCT GLUCOSE: 282 MG/DL (ref 70–110)
POCT GLUCOSE: 282 MG/DL (ref 70–110)
POCT GLUCOSE: 289 MG/DL (ref 70–110)
POCT GLUCOSE: 294 MG/DL (ref 70–110)
POCT GLUCOSE: 298 MG/DL (ref 70–110)
POCT GLUCOSE: 302 MG/DL (ref 70–110)
POCT GLUCOSE: 305 MG/DL (ref 70–110)
POCT GLUCOSE: 307 MG/DL (ref 70–110)
POCT GLUCOSE: 308 MG/DL (ref 70–110)
POCT GLUCOSE: 316 MG/DL (ref 70–110)
POCT GLUCOSE: 319 MG/DL (ref 70–110)
POCT GLUCOSE: 320 MG/DL (ref 70–110)
POCT GLUCOSE: 338 MG/DL (ref 70–110)
POCT GLUCOSE: 344 MG/DL (ref 70–110)
POIKILOCYTOSIS BLD QL SMEAR: SLIGHT
POLYCHROMASIA BLD QL SMEAR: ABNORMAL
POTASSIUM BLD-SCNC: 3.6 MMOL/L (ref 3.5–5.1)
POTASSIUM BLD-SCNC: 3.7 MMOL/L (ref 3.5–5.1)
POTASSIUM BLD-SCNC: 3.9 MMOL/L (ref 3.5–5.1)
POTASSIUM BLD-SCNC: 4 MMOL/L (ref 3.5–5.1)
POTASSIUM BLD-SCNC: 4.3 MMOL/L (ref 3.5–5.1)
POTASSIUM BLD-SCNC: 4.3 MMOL/L (ref 3.5–5.1)
POTASSIUM BLD-SCNC: 4.4 MMOL/L (ref 3.5–5.1)
POTASSIUM SERPL-SCNC: 3.3 MMOL/L (ref 3.5–5.1)
POTASSIUM SERPL-SCNC: 3.7 MMOL/L (ref 3.5–5.1)
POTASSIUM SERPL-SCNC: 3.8 MMOL/L (ref 3.5–5.1)
POTASSIUM SERPL-SCNC: 3.9 MMOL/L (ref 3.5–5.1)
POTASSIUM SERPL-SCNC: 4 MMOL/L (ref 3.5–5.1)
POTASSIUM SERPL-SCNC: 4.2 MMOL/L (ref 3.5–5.1)
POTASSIUM SERPL-SCNC: 4.3 MMOL/L (ref 3.5–5.1)
POTASSIUM SERPL-SCNC: 4.4 MMOL/L (ref 3.5–5.1)
POTASSIUM SERPL-SCNC: 4.5 MMOL/L (ref 3.5–5.1)
POTASSIUM SERPL-SCNC: 4.5 MMOL/L (ref 3.5–5.1)
POTASSIUM SERPL-SCNC: 4.6 MMOL/L (ref 3.5–5.1)
POTASSIUM SERPL-SCNC: 4.6 MMOL/L (ref 3.5–5.1)
POTASSIUM SERPL-SCNC: 4.7 MMOL/L (ref 3.5–5.1)
POTASSIUM SERPL-SCNC: 4.9 MMOL/L (ref 3.5–5.1)
PROCALCITONIN SERPL IA-MCNC: 0.9 NG/ML
PROCALCITONIN SERPL IA-MCNC: 1.23 NG/ML
PROMYELOCYTES NFR BLD MANUAL: 0.5 %
PROMYELOCYTES NFR BLD MANUAL: 0.5 %
PROMYELOCYTES NFR BLD MANUAL: 1 %
PROT SERPL-MCNC: 4.8 G/DL (ref 6–8.4)
PROT SERPL-MCNC: 4.9 G/DL (ref 6–8.4)
PROT SERPL-MCNC: 5 G/DL (ref 6–8.4)
PROT SERPL-MCNC: 5.1 G/DL (ref 6–8.4)
PROT SERPL-MCNC: 5.6 G/DL (ref 6–8.4)
PROT SERPL-MCNC: 5.9 G/DL (ref 6–8.4)
PROT SERPL-MCNC: 6.7 G/DL (ref 6–8.4)
PROT SERPL-MCNC: 6.7 G/DL (ref 6–8.4)
PROT SERPL-MCNC: 6.8 G/DL (ref 6–8.4)
PROT SERPL-MCNC: 6.9 G/DL (ref 6–8.4)
PROT SERPL-MCNC: 7 G/DL (ref 6–8.4)
PROT SERPL-MCNC: 7.1 G/DL (ref 6–8.4)
PROTHROMBIN TIME: 11.8 SEC (ref 9–12.5)
PROTHROMBIN TIME: 11.9 SEC (ref 9–12.5)
PROTHROMBIN TIME: 12.8 SEC (ref 9–12.5)
PROTHROMBIN TIME: 13.9 SEC (ref 9–12.5)
PROTHROMBIN TIME: 17.2 SEC (ref 9–12.5)
PROTHROMBIN TIME: 18.7 SEC (ref 9–12.5)
PROTHROMBIN TIME: 19.4 SEC (ref 9–12.5)
PROTHROMBIN TIME: 21.9 SEC (ref 9–12.5)
PROTHROMBIN TIME: 22.2 SEC (ref 9–12.5)
PROTHROMBIN TIME: 22.5 SEC (ref 9–12.5)
PROTHROMBIN TIME: 22.6 SEC (ref 9–12.5)
PROTHROMBIN TIME: 23 SEC (ref 9–12.5)
PROTHROMBIN TIME: 23.2 SEC (ref 9–12.5)
PROTHROMBIN TIME: 23.4 SEC (ref 9–12.5)
PROTHROMBIN TIME: 23.9 SEC (ref 9–12.5)
PROTHROMBIN TIME: 24 SEC (ref 9–12.5)
PROTHROMBIN TIME: 24.1 SEC (ref 9–12.5)
PROTHROMBIN TIME: 26.2 SEC (ref 9–12.5)
PROTHROMBIN TIME: 26.6 SEC (ref 9–12.5)
PROTHROMBIN TIME: 27.8 SEC (ref 9–12.5)
PROTHROMBIN TIME: 29.1 SEC (ref 9–12.5)
PROTHROMBIN TIME: 29.2 SEC (ref 9–12.5)
PROTHROMBIN TIME: 31 SEC (ref 9–12.5)
PROTHROMBIN TIME: 31.7 SEC (ref 9–12.5)
PROTHROMBIN TIME: 31.7 SEC (ref 9–12.5)
PROTHROMBIN TIME: 32.9 SEC (ref 9–12.5)
PROTHROMBIN TIME: 32.9 SEC (ref 9–12.5)
PROTHROMBIN TIME: 34.1 SEC (ref 9–12.5)
PROTHROMBIN TIME: 38.1 SEC (ref 9–12.5)
PROTHROMBIN TIME: 44 SEC (ref 9–12.5)
PROVIDER NOTIFIED: ABNORMAL
RA PRESSURE ESTIMATED: 8 MMHG
RBC # BLD AUTO: 2.57 M/UL (ref 4–5.4)
RBC # BLD AUTO: 2.72 M/UL (ref 4–5.4)
RBC # BLD AUTO: 2.73 M/UL (ref 4–5.4)
RBC # BLD AUTO: 2.84 M/UL (ref 4–5.4)
RBC # BLD AUTO: 2.87 M/UL (ref 4–5.4)
RBC # BLD AUTO: 2.92 M/UL (ref 4–5.4)
RBC # BLD AUTO: 2.98 M/UL (ref 4–5.4)
RBC # BLD AUTO: 3.03 M/UL (ref 4–5.4)
RBC # BLD AUTO: 3.19 M/UL (ref 4–5.4)
RBC # BLD AUTO: 3.2 M/UL (ref 4–5.4)
RBC # BLD AUTO: 3.21 M/UL (ref 4–5.4)
RBC # BLD AUTO: 3.39 M/UL (ref 4–5.4)
RBC # BLD AUTO: 4.14 M/UL (ref 4–5.4)
RBC # BLD AUTO: 4.27 M/UL (ref 4–5.4)
RBC # BLD AUTO: 4.62 M/UL (ref 4–5.4)
RBC # BLD AUTO: 4.81 M/UL (ref 4–5.4)
RBC # BLD AUTO: 4.81 M/UL (ref 4–5.4)
RBC # BLD AUTO: 4.85 M/UL (ref 4–5.4)
RBC # BLD AUTO: 4.87 M/UL (ref 4–5.4)
RBC # BLD AUTO: 5.32 M/UL (ref 4–5.4)
RBC # BLD AUTO: 6.01 M/UL (ref 4–5.4)
RESPIRATORY INFECTION PANEL SOURCE: ABNORMAL
RSV RNA NPH QL NAA+NON-PROBE: NOT DETECTED
RV+EV RNA NPH QL NAA+NON-PROBE: DETECTED
SAMPLE: ABNORMAL
SARS-COV-2 RNA RESP QL NAA+PROBE: NOT DETECTED
SATURATED IRON: 14 % (ref 20–50)
SATURATED IRON: 17 % (ref 20–50)
SCHISTOCYTES BLD QL SMEAR: ABNORMAL
SCHISTOCYTES BLD QL SMEAR: PRESENT
SICKLE CELLS BLD QL SMEAR: ABNORMAL
SINUS: 2.5 CM
SITE: ABNORMAL
SMUDGE CELLS BLD QL SMEAR: PRESENT
SODIUM BLD-SCNC: 130 MMOL/L (ref 136–145)
SODIUM BLD-SCNC: 131 MMOL/L (ref 136–145)
SODIUM BLD-SCNC: 132 MMOL/L (ref 136–145)
SODIUM BLD-SCNC: 135 MMOL/L (ref 136–145)
SODIUM BLD-SCNC: 136 MMOL/L (ref 136–145)
SODIUM BLD-SCNC: 137 MMOL/L (ref 136–145)
SODIUM BLD-SCNC: 137 MMOL/L (ref 136–145)
SODIUM SERPL-SCNC: 117 MMOL/L (ref 136–145)
SODIUM SERPL-SCNC: 120 MMOL/L (ref 136–145)
SODIUM SERPL-SCNC: 120 MMOL/L (ref 136–145)
SODIUM SERPL-SCNC: 122 MMOL/L (ref 136–145)
SODIUM SERPL-SCNC: 125 MMOL/L (ref 136–145)
SODIUM SERPL-SCNC: 126 MMOL/L (ref 136–145)
SODIUM SERPL-SCNC: 127 MMOL/L (ref 136–145)
SODIUM SERPL-SCNC: 129 MMOL/L (ref 136–145)
SODIUM SERPL-SCNC: 130 MMOL/L (ref 136–145)
SODIUM SERPL-SCNC: 131 MMOL/L (ref 136–145)
SODIUM SERPL-SCNC: 133 MMOL/L (ref 136–145)
SODIUM SERPL-SCNC: 135 MMOL/L (ref 136–145)
SODIUM SERPL-SCNC: 135 MMOL/L (ref 136–145)
SODIUM SERPL-SCNC: 137 MMOL/L (ref 136–145)
SODIUM SERPL-SCNC: 142 MMOL/L (ref 136–145)
SODIUM SERPL-SCNC: 142 MMOL/L (ref 136–145)
SODIUM SERPL-SCNC: 143 MMOL/L (ref 136–145)
SODIUM SERPL-SCNC: 143 MMOL/L (ref 136–145)
SP02: 92
SP02: 92
SP02: 93
SP02: 96
SP02: 99
SPECIMEN OUTDATE: NORMAL
SPECIMEN OUTDATE: NORMAL
SPHEROCYTES BLD QL SMEAR: ABNORMAL
STJ: 2.23 CM
TARGETS BLD QL SMEAR: ABNORMAL
TDI LATERAL: 0.05 M/S
TDI SEPTAL: 0.05 M/S
TDI: 0.05 M/S
TOTAL IRON BINDING CAPACITY: 426 UG/DL (ref 250–450)
TOTAL IRON BINDING CAPACITY: 434 UG/DL (ref 250–450)
TOXIC GRANULES BLD QL SMEAR: PRESENT
TRANS ERYTHROCYTES VOL PATIENT: NORMAL ML
TRANSFERRIN SERPL-MCNC: 288 MG/DL (ref 200–375)
TRANSFERRIN SERPL-MCNC: 293 MG/DL (ref 200–375)
TRIGL SERPL-MCNC: 154 MG/DL (ref 30–150)
TRIGL SERPL-MCNC: 212 MG/DL (ref 30–150)
TROPONIN I SERPL DL<=0.01 NG/ML-MCNC: 0.01 NG/ML (ref 0–0.03)
VANCOMYCIN SERPL-MCNC: 17.4 UG/ML
VANCOMYCIN SERPL-MCNC: 25.2 UG/ML
VT: 250
VT: 275
VT: 280
VT: 300
VT: 360
VT: 360
WBC # BLD AUTO: 12.1 K/UL (ref 3.9–12.7)
WBC # BLD AUTO: 13.14 K/UL (ref 3.9–12.7)
WBC # BLD AUTO: 16.08 K/UL (ref 3.9–12.7)
WBC # BLD AUTO: 16.08 K/UL (ref 3.9–12.7)
WBC # BLD AUTO: 16.59 K/UL (ref 3.9–12.7)
WBC # BLD AUTO: 16.75 K/UL (ref 3.9–12.7)
WBC # BLD AUTO: 16.94 K/UL (ref 3.9–12.7)
WBC # BLD AUTO: 19.16 K/UL (ref 3.9–12.7)
WBC # BLD AUTO: 19.95 K/UL (ref 3.9–12.7)
WBC # BLD AUTO: 20.39 K/UL (ref 3.9–12.7)
WBC # BLD AUTO: 21.2 K/UL (ref 3.9–12.7)
WBC # BLD AUTO: 21.8 K/UL (ref 3.9–12.7)
WBC # BLD AUTO: 23.46 K/UL (ref 3.9–12.7)
WBC # BLD AUTO: 29.34 K/UL (ref 3.9–12.7)
WBC # BLD AUTO: 29.9 K/UL (ref 3.9–12.7)
WBC # BLD AUTO: 3.96 K/UL (ref 3.9–12.7)
WBC # BLD AUTO: 30.66 K/UL (ref 3.9–12.7)
WBC # BLD AUTO: 35 K/UL (ref 3.9–12.7)
WBC # BLD AUTO: 35.52 K/UL (ref 3.9–12.7)
WBC # BLD AUTO: 35.63 K/UL (ref 3.9–12.7)
WBC # BLD AUTO: 4.1 K/UL (ref 3.9–12.7)
Z-SCORE OF LEFT VENTRICULAR DIMENSION IN END DIASTOLE: -2.59
Z-SCORE OF LEFT VENTRICULAR DIMENSION IN END SYSTOLE: 0.16

## 2023-01-01 PROCEDURE — 3074F PR MOST RECENT SYSTOLIC BLOOD PRESSURE < 130 MM HG: ICD-10-PCS | Mod: HCNC,CPTII,S$GLB, | Performed by: INTERNAL MEDICINE

## 2023-01-01 PROCEDURE — 1160F RVW MEDS BY RX/DR IN RCRD: CPT | Mod: HCNC,CPTII,S$GLB, | Performed by: ANESTHESIOLOGY

## 2023-01-01 PROCEDURE — 93793 PR ANTICOAGULANT MGMT FOR PT TAKING WARFARIN: ICD-10-PCS | Mod: S$GLB,,,

## 2023-01-01 PROCEDURE — 25000003 PHARM REV CODE 250: Performed by: STUDENT IN AN ORGANIZED HEALTH CARE EDUCATION/TRAINING PROGRAM

## 2023-01-01 PROCEDURE — 85610 PROTHROMBIN TIME: CPT | Performed by: INTERNAL MEDICINE

## 2023-01-01 PROCEDURE — 85347 COAGULATION TIME ACTIVATED: CPT

## 2023-01-01 PROCEDURE — 83010 ASSAY OF HAPTOGLOBIN QUANT: CPT

## 2023-01-01 PROCEDURE — 63600175 PHARM REV CODE 636 W HCPCS: Performed by: INTERNAL MEDICINE

## 2023-01-01 PROCEDURE — 99999 PR PBB SHADOW E&M-EST. PATIENT-LVL III: CPT | Mod: PBBFAC,HCNC,, | Performed by: STUDENT IN AN ORGANIZED HEALTH CARE EDUCATION/TRAINING PROGRAM

## 2023-01-01 PROCEDURE — 1125F AMNT PAIN NOTED PAIN PRSNT: CPT | Mod: HCNC,CPTII,S$GLB, | Performed by: PHYSICIAN ASSISTANT

## 2023-01-01 PROCEDURE — 1101F PT FALLS ASSESS-DOCD LE1/YR: CPT | Mod: HCNC,CPTII,S$GLB, | Performed by: NURSE PRACTITIONER

## 2023-01-01 PROCEDURE — 63600175 PHARM REV CODE 636 W HCPCS: Performed by: STUDENT IN AN ORGANIZED HEALTH CARE EDUCATION/TRAINING PROGRAM

## 2023-01-01 PROCEDURE — 63600175 PHARM REV CODE 636 W HCPCS

## 2023-01-01 PROCEDURE — 99999 PR PBB SHADOW E&M-EST. PATIENT-LVL V: ICD-10-PCS | Mod: PBBFAC,HCNC,, | Performed by: STUDENT IN AN ORGANIZED HEALTH CARE EDUCATION/TRAINING PROGRAM

## 2023-01-01 PROCEDURE — 85610 PROTHROMBIN TIME: CPT | Mod: HCNC | Performed by: INTERNAL MEDICINE

## 2023-01-01 PROCEDURE — 1125F AMNT PAIN NOTED PAIN PRSNT: CPT | Mod: HCNC,CPTII,S$GLB, | Performed by: ANESTHESIOLOGY

## 2023-01-01 PROCEDURE — 99223 1ST HOSP IP/OBS HIGH 75: CPT | Mod: GC,,, | Performed by: STUDENT IN AN ORGANIZED HEALTH CARE EDUCATION/TRAINING PROGRAM

## 2023-01-01 PROCEDURE — 94640 AIRWAY INHALATION TREATMENT: CPT

## 2023-01-01 PROCEDURE — 77002 NEEDLE LOCALIZATION BY XRAY: CPT | Mod: HCNC | Performed by: ANESTHESIOLOGY

## 2023-01-01 PROCEDURE — 27200966 HC CLOSED SUCTION SYSTEM

## 2023-01-01 PROCEDURE — 72192 CT PELVIS WITHOUT CONTRAST: ICD-10-PCS | Mod: 26,HCNC,, | Performed by: RADIOLOGY

## 2023-01-01 PROCEDURE — 27100171 HC OXYGEN HIGH FLOW UP TO 24 HOURS

## 2023-01-01 PROCEDURE — 3008F BODY MASS INDEX DOCD: CPT | Mod: HCNC,CPTII,S$GLB, | Performed by: NURSE PRACTITIONER

## 2023-01-01 PROCEDURE — 3074F PR MOST RECENT SYSTOLIC BLOOD PRESSURE < 130 MM HG: ICD-10-PCS | Mod: HCNC,CPTII,S$GLB, | Performed by: STUDENT IN AN ORGANIZED HEALTH CARE EDUCATION/TRAINING PROGRAM

## 2023-01-01 PROCEDURE — 99999 PR PBB SHADOW E&M-EST. PATIENT-LVL IV: ICD-10-PCS | Mod: PBBFAC,HCNC,, | Performed by: NURSE PRACTITIONER

## 2023-01-01 PROCEDURE — 37799 UNLISTED PX VASCULAR SURGERY: CPT

## 2023-01-01 PROCEDURE — 3008F PR BODY MASS INDEX (BMI) DOCUMENTED: ICD-10-PCS | Mod: HCNC,CPTII,S$GLB, | Performed by: NURSE PRACTITIONER

## 2023-01-01 PROCEDURE — 99900035 HC TECH TIME PER 15 MIN (STAT)

## 2023-01-01 PROCEDURE — 1159F PR MEDICATION LIST DOCUMENTED IN MEDICAL RECORD: ICD-10-PCS | Mod: HCNC,CPTII,S$GLB, | Performed by: PHYSICIAN ASSISTANT

## 2023-01-01 PROCEDURE — 25000003 PHARM REV CODE 250

## 2023-01-01 PROCEDURE — 3008F PR BODY MASS INDEX (BMI) DOCUMENTED: ICD-10-PCS | Mod: HCNC,CPTII,S$GLB, | Performed by: INTERNAL MEDICINE

## 2023-01-01 PROCEDURE — 80202 ASSAY OF VANCOMYCIN: CPT | Performed by: INTERNAL MEDICINE

## 2023-01-01 PROCEDURE — 20000000 HC ICU ROOM

## 2023-01-01 PROCEDURE — 99215 OFFICE O/P EST HI 40 MIN: CPT | Mod: HCNC,S$GLB,, | Performed by: INTERNAL MEDICINE

## 2023-01-01 PROCEDURE — 36415 COLL VENOUS BLD VENIPUNCTURE: CPT | Mod: HCNC | Performed by: INTERNAL MEDICINE

## 2023-01-01 PROCEDURE — 1101F PT FALLS ASSESS-DOCD LE1/YR: CPT | Mod: HCNC,CPTII,S$GLB, | Performed by: INTERNAL MEDICINE

## 2023-01-01 PROCEDURE — 93793 ANTICOAG MGMT PT WARFARIN: CPT | Mod: S$GLB,,,

## 2023-01-01 PROCEDURE — 99214 OFFICE O/P EST MOD 30 MIN: CPT | Mod: HCNC,S$GLB,, | Performed by: PHYSICIAN ASSISTANT

## 2023-01-01 PROCEDURE — 3044F PR MOST RECENT HEMOGLOBIN A1C LEVEL <7.0%: ICD-10-PCS | Mod: HCNC,CPTII,S$GLB, | Performed by: STUDENT IN AN ORGANIZED HEALTH CARE EDUCATION/TRAINING PROGRAM

## 2023-01-01 PROCEDURE — 99233 SBSQ HOSP IP/OBS HIGH 50: CPT | Mod: GC,,, | Performed by: INTERNAL MEDICINE

## 2023-01-01 PROCEDURE — 84295 ASSAY OF SERUM SODIUM: CPT | Mod: 91

## 2023-01-01 PROCEDURE — 99397 PR PREVENTIVE VISIT,EST,65 & OVER: ICD-10-PCS | Mod: HCNC,S$GLB,, | Performed by: INTERNAL MEDICINE

## 2023-01-01 PROCEDURE — 25000003 PHARM REV CODE 250: Performed by: NURSE PRACTITIONER

## 2023-01-01 PROCEDURE — 99291 CRITICAL CARE FIRST HOUR: CPT | Mod: GC,,, | Performed by: INTERNAL MEDICINE

## 2023-01-01 PROCEDURE — 1159F PR MEDICATION LIST DOCUMENTED IN MEDICAL RECORD: ICD-10-PCS | Mod: HCNC,CPTII,S$GLB, | Performed by: STUDENT IN AN ORGANIZED HEALTH CARE EDUCATION/TRAINING PROGRAM

## 2023-01-01 PROCEDURE — 83735 ASSAY OF MAGNESIUM: CPT | Performed by: NURSE PRACTITIONER

## 2023-01-01 PROCEDURE — 99214 PR OFFICE/OUTPT VISIT, EST, LEVL IV, 30-39 MIN: ICD-10-PCS | Mod: HCNC,S$GLB,, | Performed by: NURSE PRACTITIONER

## 2023-01-01 PROCEDURE — 63600175 PHARM REV CODE 636 W HCPCS: Performed by: NURSE PRACTITIONER

## 2023-01-01 PROCEDURE — 99214 PR OFFICE/OUTPT VISIT, EST, LEVL IV, 30-39 MIN: ICD-10-PCS | Mod: HCNC,S$GLB,, | Performed by: STUDENT IN AN ORGANIZED HEALTH CARE EDUCATION/TRAINING PROGRAM

## 2023-01-01 PROCEDURE — P9016 RBC LEUKOCYTES REDUCED: HCPCS

## 2023-01-01 PROCEDURE — 99999 PR PBB SHADOW E&M-EST. PATIENT-LVL V: CPT | Mod: PBBFAC,HCNC,, | Performed by: INTERNAL MEDICINE

## 2023-01-01 PROCEDURE — 36620 INSERTION CATHETER ARTERY: CPT

## 2023-01-01 PROCEDURE — 1159F MED LIST DOCD IN RCRD: CPT | Mod: HCNC,CPTII,S$GLB, | Performed by: STUDENT IN AN ORGANIZED HEALTH CARE EDUCATION/TRAINING PROGRAM

## 2023-01-01 PROCEDURE — 73564 X-RAY EXAM KNEE 4 OR MORE: CPT | Mod: 26,HCNC,RT, | Performed by: RADIOLOGY

## 2023-01-01 PROCEDURE — 82728 ASSAY OF FERRITIN: CPT | Mod: HCNC | Performed by: INTERNAL MEDICINE

## 2023-01-01 PROCEDURE — 25000003 PHARM REV CODE 250: Mod: HCNC | Performed by: ANESTHESIOLOGY

## 2023-01-01 PROCEDURE — 84466 ASSAY OF TRANSFERRIN: CPT | Mod: HCNC | Performed by: PHYSICIAN ASSISTANT

## 2023-01-01 PROCEDURE — 3288F FALL RISK ASSESSMENT DOCD: CPT | Mod: HCNC,CPTII,S$GLB, | Performed by: STUDENT IN AN ORGANIZED HEALTH CARE EDUCATION/TRAINING PROGRAM

## 2023-01-01 PROCEDURE — 1160F RVW MEDS BY RX/DR IN RCRD: CPT | Mod: HCNC,CPTII,S$GLB, | Performed by: STUDENT IN AN ORGANIZED HEALTH CARE EDUCATION/TRAINING PROGRAM

## 2023-01-01 PROCEDURE — 25500020 PHARM REV CODE 255: Performed by: INTERNAL MEDICINE

## 2023-01-01 PROCEDURE — 3288F FALL RISK ASSESSMENT DOCD: CPT | Mod: HCNC,CPTII,S$GLB, | Performed by: ORTHOPAEDIC SURGERY

## 2023-01-01 PROCEDURE — 85025 COMPLETE CBC W/AUTO DIFF WBC: CPT | Performed by: NURSE PRACTITIONER

## 2023-01-01 PROCEDURE — 3288F PR FALLS RISK ASSESSMENT DOCUMENTED: ICD-10-PCS | Mod: HCNC,CPTII,S$GLB, | Performed by: STUDENT IN AN ORGANIZED HEALTH CARE EDUCATION/TRAINING PROGRAM

## 2023-01-01 PROCEDURE — 85025 COMPLETE CBC W/AUTO DIFF WBC: CPT | Mod: HCNC | Performed by: PHYSICIAN ASSISTANT

## 2023-01-01 PROCEDURE — 77067 MAMMO DIGITAL SCREENING BILAT WITH TOMO: ICD-10-PCS | Mod: 26,HCNC,, | Performed by: RADIOLOGY

## 2023-01-01 PROCEDURE — 73502 XR HIP WITH PELVIS WHEN PERFORMED, 2 OR 3  VIEWS RIGHT: ICD-10-PCS | Mod: 26,HCNC,RT, | Performed by: RADIOLOGY

## 2023-01-01 PROCEDURE — 3074F SYST BP LT 130 MM HG: CPT | Mod: HCNC,CPTII,S$GLB, | Performed by: INTERNAL MEDICINE

## 2023-01-01 PROCEDURE — 3288F FALL RISK ASSESSMENT DOCD: CPT | Mod: HCNC,CPTII,S$GLB, | Performed by: INTERNAL MEDICINE

## 2023-01-01 PROCEDURE — 1125F AMNT PAIN NOTED PAIN PRSNT: CPT | Mod: HCNC,CPTII,S$GLB, | Performed by: ORTHOPAEDIC SURGERY

## 2023-01-01 PROCEDURE — 1160F PR REVIEW ALL MEDS BY PRESCRIBER/CLIN PHARMACIST DOCUMENTED: ICD-10-PCS | Mod: HCNC,CPTII,S$GLB, | Performed by: ORTHOPAEDIC SURGERY

## 2023-01-01 PROCEDURE — 25000242 PHARM REV CODE 250 ALT 637 W/ HCPCS: Performed by: NURSE PRACTITIONER

## 2023-01-01 PROCEDURE — 77063 BREAST TOMOSYNTHESIS BI: CPT | Mod: 26,HCNC,, | Performed by: RADIOLOGY

## 2023-01-01 PROCEDURE — 82803 BLOOD GASES ANY COMBINATION: CPT

## 2023-01-01 PROCEDURE — 99900026 HC AIRWAY MAINTENANCE (STAT)

## 2023-01-01 PROCEDURE — 85027 COMPLETE CBC AUTOMATED: CPT | Performed by: NURSE PRACTITIONER

## 2023-01-01 PROCEDURE — 1101F PR PT FALLS ASSESS DOC 0-1 FALLS W/OUT INJ PAST YR: ICD-10-PCS | Mod: HCNC,CPTII,S$GLB, | Performed by: PHYSICIAN ASSISTANT

## 2023-01-01 PROCEDURE — 3044F HG A1C LEVEL LT 7.0%: CPT | Mod: HCNC,CPTII,S$GLB, | Performed by: ANESTHESIOLOGY

## 2023-01-01 PROCEDURE — 1125F AMNT PAIN NOTED PAIN PRSNT: CPT | Mod: HCNC,CPTII,S$GLB, | Performed by: NURSE PRACTITIONER

## 2023-01-01 PROCEDURE — 99999 PR PBB SHADOW E&M-EST. PATIENT-LVL V: CPT | Mod: PBBFAC,HCNC,, | Performed by: NURSE PRACTITIONER

## 2023-01-01 PROCEDURE — 94003 VENT MGMT INPAT SUBQ DAY: CPT

## 2023-01-01 PROCEDURE — 99292 PR CRITICAL CARE, ADDL 30 MIN: ICD-10-PCS | Mod: GC,,, | Performed by: INTERNAL MEDICINE

## 2023-01-01 PROCEDURE — 1160F PR REVIEW ALL MEDS BY PRESCRIBER/CLIN PHARMACIST DOCUMENTED: ICD-10-PCS | Mod: HCNC,CPTII,S$GLB, | Performed by: STUDENT IN AN ORGANIZED HEALTH CARE EDUCATION/TRAINING PROGRAM

## 2023-01-01 PROCEDURE — 99999 PR PBB SHADOW E&M-EST. PATIENT-LVL IV: CPT | Mod: PBBFAC,HCNC,, | Performed by: STUDENT IN AN ORGANIZED HEALTH CARE EDUCATION/TRAINING PROGRAM

## 2023-01-01 PROCEDURE — 94761 N-INVAS EAR/PLS OXIMETRY MLT: CPT

## 2023-01-01 PROCEDURE — 85730 THROMBOPLASTIN TIME PARTIAL: CPT | Performed by: NURSE PRACTITIONER

## 2023-01-01 PROCEDURE — 3044F HG A1C LEVEL LT 7.0%: CPT | Mod: HCNC,CPTII,S$GLB, | Performed by: INTERNAL MEDICINE

## 2023-01-01 PROCEDURE — 99291 CRITICAL CARE FIRST HOUR: CPT | Mod: ,,, | Performed by: INTERNAL MEDICINE

## 2023-01-01 PROCEDURE — 85014 HEMATOCRIT: CPT

## 2023-01-01 PROCEDURE — 3044F HG A1C LEVEL LT 7.0%: CPT | Mod: HCNC,CPTII,S$GLB, | Performed by: NURSE PRACTITIONER

## 2023-01-01 PROCEDURE — 3288F PR FALLS RISK ASSESSMENT DOCUMENTED: ICD-10-PCS | Mod: HCNC,CPTII,S$GLB, | Performed by: INTERNAL MEDICINE

## 2023-01-01 PROCEDURE — 1126F AMNT PAIN NOTED NONE PRSNT: CPT | Mod: HCNC,CPTII,S$GLB, | Performed by: STUDENT IN AN ORGANIZED HEALTH CARE EDUCATION/TRAINING PROGRAM

## 2023-01-01 PROCEDURE — 36600 WITHDRAWAL OF ARTERIAL BLOOD: CPT

## 2023-01-01 PROCEDURE — 85007 BL SMEAR W/DIFF WBC COUNT: CPT

## 2023-01-01 PROCEDURE — 99291 PR CRITICAL CARE, E/M 30-74 MINUTES: ICD-10-PCS | Mod: GC,,, | Performed by: INTERNAL MEDICINE

## 2023-01-01 PROCEDURE — C9113 INJ PANTOPRAZOLE SODIUM, VIA: HCPCS

## 2023-01-01 PROCEDURE — 27000207 HC ISOLATION

## 2023-01-01 PROCEDURE — 1160F PR REVIEW ALL MEDS BY PRESCRIBER/CLIN PHARMACIST DOCUMENTED: ICD-10-PCS | Mod: HCNC,CPTII,S$GLB, | Performed by: ANESTHESIOLOGY

## 2023-01-01 PROCEDURE — 3078F PR MOST RECENT DIASTOLIC BLOOD PRESSURE < 80 MM HG: ICD-10-PCS | Mod: HCNC,CPTII,S$GLB, | Performed by: INTERNAL MEDICINE

## 2023-01-01 PROCEDURE — 3075F SYST BP GE 130 - 139MM HG: CPT | Mod: HCNC,CPTII,S$GLB, | Performed by: INTERNAL MEDICINE

## 2023-01-01 PROCEDURE — 3288F FALL RISK ASSESSMENT DOCD: CPT | Mod: HCNC,CPTII,S$GLB, | Performed by: NURSE PRACTITIONER

## 2023-01-01 PROCEDURE — 3008F BODY MASS INDEX DOCD: CPT | Mod: HCNC,CPTII,S$GLB, | Performed by: STUDENT IN AN ORGANIZED HEALTH CARE EDUCATION/TRAINING PROGRAM

## 2023-01-01 PROCEDURE — 3075F PR MOST RECENT SYSTOLIC BLOOD PRESS GE 130-139MM HG: ICD-10-PCS | Mod: HCNC,CPTII,S$GLB, | Performed by: NURSE PRACTITIONER

## 2023-01-01 PROCEDURE — 84145 PROCALCITONIN (PCT): CPT

## 2023-01-01 PROCEDURE — 3077F PR MOST RECENT SYSTOLIC BLOOD PRESSURE >= 140 MM HG: ICD-10-PCS | Mod: HCNC,CPTII,S$GLB, | Performed by: ANESTHESIOLOGY

## 2023-01-01 PROCEDURE — 3044F PR MOST RECENT HEMOGLOBIN A1C LEVEL <7.0%: ICD-10-PCS | Mod: HCNC,CPTII,S$GLB, | Performed by: PHYSICIAN ASSISTANT

## 2023-01-01 PROCEDURE — 99214 OFFICE O/P EST MOD 30 MIN: CPT | Mod: HCNC,S$GLB,, | Performed by: STUDENT IN AN ORGANIZED HEALTH CARE EDUCATION/TRAINING PROGRAM

## 2023-01-01 PROCEDURE — 99999 PR PBB SHADOW E&M-EST. PATIENT-LVL IV: ICD-10-PCS | Mod: PBBFAC,HCNC,, | Performed by: ANESTHESIOLOGY

## 2023-01-01 PROCEDURE — 3008F BODY MASS INDEX DOCD: CPT | Mod: HCNC,CPTII,S$GLB, | Performed by: ANESTHESIOLOGY

## 2023-01-01 PROCEDURE — 99291 CRITICAL CARE FIRST HOUR: CPT | Mod: 25,,, | Performed by: INTERNAL MEDICINE

## 2023-01-01 PROCEDURE — 3078F PR MOST RECENT DIASTOLIC BLOOD PRESSURE < 80 MM HG: ICD-10-PCS | Mod: HCNC,CPTII,S$GLB, | Performed by: ANESTHESIOLOGY

## 2023-01-01 PROCEDURE — 94660 CPAP INITIATION&MGMT: CPT

## 2023-01-01 PROCEDURE — 73502 X-RAY EXAM HIP UNI 2-3 VIEWS: CPT | Mod: 26,HCNC,RT, | Performed by: RADIOLOGY

## 2023-01-01 PROCEDURE — 99499 UNLISTED E&M SERVICE: CPT | Mod: S$GLB,,, | Performed by: INTERNAL MEDICINE

## 2023-01-01 PROCEDURE — 3008F PR BODY MASS INDEX (BMI) DOCUMENTED: ICD-10-PCS | Mod: HCNC,CPTII,S$GLB, | Performed by: ANESTHESIOLOGY

## 2023-01-01 PROCEDURE — 3008F PR BODY MASS INDEX (BMI) DOCUMENTED: ICD-10-PCS | Mod: HCNC,CPTII,S$GLB, | Performed by: STUDENT IN AN ORGANIZED HEALTH CARE EDUCATION/TRAINING PROGRAM

## 2023-01-01 PROCEDURE — 99291 PR CRITICAL CARE, E/M 30-74 MINUTES: ICD-10-PCS | Mod: ,,, | Performed by: INTERNAL MEDICINE

## 2023-01-01 PROCEDURE — 73700 CT LOWER EXTREMITY W/O DYE: CPT | Mod: TC,HCNC,RT

## 2023-01-01 PROCEDURE — 1160F RVW MEDS BY RX/DR IN RCRD: CPT | Mod: HCNC,CPTII,S$GLB, | Performed by: NURSE PRACTITIONER

## 2023-01-01 PROCEDURE — 3078F PR MOST RECENT DIASTOLIC BLOOD PRESSURE < 80 MM HG: ICD-10-PCS | Mod: HCNC,CPTII,S$GLB, | Performed by: STUDENT IN AN ORGANIZED HEALTH CARE EDUCATION/TRAINING PROGRAM

## 2023-01-01 PROCEDURE — 84132 ASSAY OF SERUM POTASSIUM: CPT

## 2023-01-01 PROCEDURE — 25000003 PHARM REV CODE 250: Performed by: INTERNAL MEDICINE

## 2023-01-01 PROCEDURE — 99214 OFFICE O/P EST MOD 30 MIN: CPT | Mod: HCNC,S$GLB,, | Performed by: INTERNAL MEDICINE

## 2023-01-01 PROCEDURE — 80053 COMPREHEN METABOLIC PANEL: CPT | Performed by: NURSE PRACTITIONER

## 2023-01-01 PROCEDURE — 99999 PR PBB SHADOW E&M-EST. PATIENT-LVL I: ICD-10-PCS | Mod: PBBFAC,HCNC,, | Performed by: STUDENT IN AN ORGANIZED HEALTH CARE EDUCATION/TRAINING PROGRAM

## 2023-01-01 PROCEDURE — 99292 CRITICAL CARE ADDL 30 MIN: CPT | Mod: GC,,, | Performed by: INTERNAL MEDICINE

## 2023-01-01 PROCEDURE — 84100 ASSAY OF PHOSPHORUS: CPT | Performed by: NURSE PRACTITIONER

## 2023-01-01 PROCEDURE — 1160F RVW MEDS BY RX/DR IN RCRD: CPT | Mod: HCNC,CPTII,S$GLB, | Performed by: ORTHOPAEDIC SURGERY

## 2023-01-01 PROCEDURE — 85007 BL SMEAR W/DIFF WBC COUNT: CPT | Mod: 91

## 2023-01-01 PROCEDURE — 1125F AMNT PAIN NOTED PAIN PRSNT: CPT | Mod: HCNC,CPTII,S$GLB, | Performed by: STUDENT IN AN ORGANIZED HEALTH CARE EDUCATION/TRAINING PROGRAM

## 2023-01-01 PROCEDURE — 99214 OFFICE O/P EST MOD 30 MIN: CPT | Mod: HCNC,S$GLB,, | Performed by: NURSE PRACTITIONER

## 2023-01-01 PROCEDURE — 99205 PR OFFICE/OUTPT VISIT, NEW, LEVL V, 60-74 MIN: ICD-10-PCS | Mod: HCNC,GC,S$GLB, | Performed by: ANESTHESIOLOGY

## 2023-01-01 PROCEDURE — 3074F SYST BP LT 130 MM HG: CPT | Mod: HCNC,CPTII,S$GLB, | Performed by: PHYSICIAN ASSISTANT

## 2023-01-01 PROCEDURE — 99999 PR PBB SHADOW E&M-EST. PATIENT-LVL III: ICD-10-PCS | Mod: PBBFAC,HCNC,, | Performed by: ORTHOPAEDIC SURGERY

## 2023-01-01 PROCEDURE — C9113 INJ PANTOPRAZOLE SODIUM, VIA: HCPCS | Performed by: NURSE PRACTITIONER

## 2023-01-01 PROCEDURE — 3078F DIAST BP <80 MM HG: CPT | Mod: HCNC,CPTII,S$GLB, | Performed by: ANESTHESIOLOGY

## 2023-01-01 PROCEDURE — 3077F SYST BP >= 140 MM HG: CPT | Mod: HCNC,CPTII,S$GLB, | Performed by: ANESTHESIOLOGY

## 2023-01-01 PROCEDURE — 85027 COMPLETE CBC AUTOMATED: CPT | Mod: 91

## 2023-01-01 PROCEDURE — 3288F FALL RISK ASSESSMENT DOCD: CPT | Mod: HCNC,CPTII,S$GLB, | Performed by: ANESTHESIOLOGY

## 2023-01-01 PROCEDURE — 1125F PR PAIN SEVERITY QUANTIFIED, PAIN PRESENT: ICD-10-PCS | Mod: HCNC,CPTII,S$GLB, | Performed by: ANESTHESIOLOGY

## 2023-01-01 PROCEDURE — 92020 GONIOSCOPY: CPT | Mod: HCNC,S$GLB,, | Performed by: STUDENT IN AN ORGANIZED HEALTH CARE EDUCATION/TRAINING PROGRAM

## 2023-01-01 PROCEDURE — 85060 PATHOLOGIST REVIEW: ICD-10-PCS | Mod: ,,, | Performed by: PATHOLOGY

## 2023-01-01 PROCEDURE — 99214 PR OFFICE/OUTPT VISIT, EST, LEVL IV, 30-39 MIN: ICD-10-PCS | Mod: HCNC,S$GLB,, | Performed by: INTERNAL MEDICINE

## 2023-01-01 PROCEDURE — 3078F DIAST BP <80 MM HG: CPT | Mod: HCNC,CPTII,S$GLB, | Performed by: NURSE PRACTITIONER

## 2023-01-01 PROCEDURE — 1101F PT FALLS ASSESS-DOCD LE1/YR: CPT | Mod: HCNC,CPTII,S$GLB, | Performed by: PHYSICIAN ASSISTANT

## 2023-01-01 PROCEDURE — 1125F PR PAIN SEVERITY QUANTIFIED, PAIN PRESENT: ICD-10-PCS | Mod: HCNC,CPTII,S$GLB, | Performed by: PHYSICIAN ASSISTANT

## 2023-01-01 PROCEDURE — 1125F AMNT PAIN NOTED PAIN PRSNT: CPT | Mod: HCNC,CPTII,S$GLB, | Performed by: INTERNAL MEDICINE

## 2023-01-01 PROCEDURE — 99499 UNLISTED E&M SERVICE: CPT | Mod: S$GLB,,, | Performed by: PHYSICIAN ASSISTANT

## 2023-01-01 PROCEDURE — 3008F BODY MASS INDEX DOCD: CPT | Mod: HCNC,CPTII,S$GLB, | Performed by: INTERNAL MEDICINE

## 2023-01-01 PROCEDURE — 85007 BL SMEAR W/DIFF WBC COUNT: CPT | Performed by: NURSE PRACTITIONER

## 2023-01-01 PROCEDURE — 87205 SMEAR GRAM STAIN: CPT

## 2023-01-01 PROCEDURE — 82800 BLOOD PH: CPT

## 2023-01-01 PROCEDURE — 1126F PR PAIN SEVERITY QUANTIFIED, NO PAIN PRESENT: ICD-10-PCS | Mod: HCNC,CPTII,S$GLB, | Performed by: STUDENT IN AN ORGANIZED HEALTH CARE EDUCATION/TRAINING PROGRAM

## 2023-01-01 PROCEDURE — 1101F PR PT FALLS ASSESS DOC 0-1 FALLS W/OUT INJ PAST YR: ICD-10-PCS | Mod: HCNC,CPTII,S$GLB, | Performed by: ANESTHESIOLOGY

## 2023-01-01 PROCEDURE — 1160F PR REVIEW ALL MEDS BY PRESCRIBER/CLIN PHARMACIST DOCUMENTED: ICD-10-PCS | Mod: HCNC,CPTII,S$GLB, | Performed by: PHYSICIAN ASSISTANT

## 2023-01-01 PROCEDURE — 85730 THROMBOPLASTIN TIME PARTIAL: CPT | Mod: 91 | Performed by: NURSE PRACTITIONER

## 2023-01-01 PROCEDURE — 1159F MED LIST DOCD IN RCRD: CPT | Mod: HCNC,CPTII,S$GLB, | Performed by: ANESTHESIOLOGY

## 2023-01-01 PROCEDURE — 36415 COLL VENOUS BLD VENIPUNCTURE: CPT | Performed by: NURSE PRACTITIONER

## 2023-01-01 PROCEDURE — 99215 PR OFFICE/OUTPT VISIT, EST, LEVL V, 40-54 MIN: ICD-10-PCS | Mod: HCNC,S$GLB,, | Performed by: INTERNAL MEDICINE

## 2023-01-01 PROCEDURE — 3044F PR MOST RECENT HEMOGLOBIN A1C LEVEL <7.0%: ICD-10-PCS | Mod: HCNC,CPTII,S$GLB, | Performed by: ORTHOPAEDIC SURGERY

## 2023-01-01 PROCEDURE — 3008F BODY MASS INDEX DOCD: CPT | Mod: HCNC,CPTII,S$GLB, | Performed by: PHYSICIAN ASSISTANT

## 2023-01-01 PROCEDURE — 36430 TRANSFUSION BLD/BLD COMPNT: CPT

## 2023-01-01 PROCEDURE — 87106 FUNGI IDENTIFICATION YEAST: CPT

## 2023-01-01 PROCEDURE — 3008F BODY MASS INDEX DOCD: CPT | Mod: HCNC,CPTII,S$GLB, | Performed by: ORTHOPAEDIC SURGERY

## 2023-01-01 PROCEDURE — 85025 COMPLETE CBC W/AUTO DIFF WBC: CPT | Mod: HCNC | Performed by: INTERNAL MEDICINE

## 2023-01-01 PROCEDURE — 1101F PR PT FALLS ASSESS DOC 0-1 FALLS W/OUT INJ PAST YR: ICD-10-PCS | Mod: HCNC,CPTII,S$GLB, | Performed by: NURSE PRACTITIONER

## 2023-01-01 PROCEDURE — 85025 COMPLETE CBC W/AUTO DIFF WBC: CPT | Mod: 91 | Performed by: NURSE PRACTITIONER

## 2023-01-01 PROCEDURE — 99999 PR PBB SHADOW E&M-EST. PATIENT-LVL IV: ICD-10-PCS | Mod: PBBFAC,HCNC,, | Performed by: PHYSICIAN ASSISTANT

## 2023-01-01 PROCEDURE — 63600175 PHARM REV CODE 636 W HCPCS: Mod: HCNC | Performed by: ANESTHESIOLOGY

## 2023-01-01 PROCEDURE — 20611 PR DRAIN/ASP/INJECT MAJOR JOINT/BURSA W/US GUIDANCE: ICD-10-PCS | Mod: HCNC,RT,S$GLB, | Performed by: ANESTHESIOLOGY

## 2023-01-01 PROCEDURE — 3078F DIAST BP <80 MM HG: CPT | Mod: HCNC,CPTII,S$GLB, | Performed by: STUDENT IN AN ORGANIZED HEALTH CARE EDUCATION/TRAINING PROGRAM

## 2023-01-01 PROCEDURE — 99499 NO LOS: ICD-10-PCS | Mod: HCNC,S$GLB,, | Performed by: STUDENT IN AN ORGANIZED HEALTH CARE EDUCATION/TRAINING PROGRAM

## 2023-01-01 PROCEDURE — 25500020 PHARM REV CODE 255: Mod: HCNC | Performed by: ANESTHESIOLOGY

## 2023-01-01 PROCEDURE — 3078F DIAST BP <80 MM HG: CPT | Mod: HCNC,CPTII,S$GLB, | Performed by: PHYSICIAN ASSISTANT

## 2023-01-01 PROCEDURE — 85060 BLOOD SMEAR INTERPRETATION: CPT | Mod: ,,, | Performed by: PATHOLOGY

## 2023-01-01 PROCEDURE — 84466 ASSAY OF TRANSFERRIN: CPT | Mod: HCNC | Performed by: INTERNAL MEDICINE

## 2023-01-01 PROCEDURE — 73562 X-RAY EXAM OF KNEE 3: CPT | Mod: 26,HCNC,LT, | Performed by: RADIOLOGY

## 2023-01-01 PROCEDURE — 99213 OFFICE O/P EST LOW 20 MIN: CPT | Mod: HCNC,S$GLB,, | Performed by: ORTHOPAEDIC SURGERY

## 2023-01-01 PROCEDURE — 80048 BASIC METABOLIC PNL TOTAL CA: CPT | Mod: XB | Performed by: INTERNAL MEDICINE

## 2023-01-01 PROCEDURE — 77002 NEEDLE LOCALIZATION BY XRAY: CPT | Mod: 26,HCNC,, | Performed by: ANESTHESIOLOGY

## 2023-01-01 PROCEDURE — P9021 RED BLOOD CELLS UNIT: HCPCS

## 2023-01-01 PROCEDURE — 99999 PR PBB SHADOW E&M-EST. PATIENT-LVL II: ICD-10-PCS | Mod: PBBFAC,HCNC,, | Performed by: INTERNAL MEDICINE

## 2023-01-01 PROCEDURE — 99292 PR CRITICAL CARE, ADDL 30 MIN: ICD-10-PCS | Mod: ,,, | Performed by: INTERNAL MEDICINE

## 2023-01-01 PROCEDURE — 83605 ASSAY OF LACTIC ACID: CPT

## 2023-01-01 PROCEDURE — 20610 DRAIN/INJ JOINT/BURSA W/O US: CPT | Mod: 50,HCNC,, | Performed by: ANESTHESIOLOGY

## 2023-01-01 PROCEDURE — 20610 DRAIN/INJ JOINT/BURSA W/O US: CPT | Mod: 59,HCNC,RT | Performed by: ANESTHESIOLOGY

## 2023-01-01 PROCEDURE — 3074F SYST BP LT 130 MM HG: CPT | Mod: HCNC,CPTII,S$GLB, | Performed by: ANESTHESIOLOGY

## 2023-01-01 PROCEDURE — 83036 HEMOGLOBIN GLYCOSYLATED A1C: CPT | Mod: HCNC | Performed by: INTERNAL MEDICINE

## 2023-01-01 PROCEDURE — 1101F PR PT FALLS ASSESS DOC 0-1 FALLS W/OUT INJ PAST YR: ICD-10-PCS | Mod: HCNC,CPTII,S$GLB, | Performed by: INTERNAL MEDICINE

## 2023-01-01 PROCEDURE — 99292 PR CRITICAL CARE, ADDL 30 MIN: ICD-10-PCS | Mod: 25,ICN,, | Performed by: INTERNAL MEDICINE

## 2023-01-01 PROCEDURE — 92083 EXTENDED VISUAL FIELD XM: CPT | Mod: HCNC,S$GLB,, | Performed by: STUDENT IN AN ORGANIZED HEALTH CARE EDUCATION/TRAINING PROGRAM

## 2023-01-01 PROCEDURE — 73502 X-RAY EXAM HIP UNI 2-3 VIEWS: CPT | Mod: TC,HCNC,RT

## 2023-01-01 PROCEDURE — 1160F RVW MEDS BY RX/DR IN RCRD: CPT | Mod: HCNC,CPTII,S$GLB, | Performed by: PHYSICIAN ASSISTANT

## 2023-01-01 PROCEDURE — 1159F PR MEDICATION LIST DOCUMENTED IN MEDICAL RECORD: ICD-10-PCS | Mod: HCNC,CPTII,S$GLB, | Performed by: NURSE PRACTITIONER

## 2023-01-01 PROCEDURE — 99213 PR OFFICE/OUTPT VISIT, EST, LEVL III, 20-29 MIN: ICD-10-PCS | Mod: HCNC,S$GLB,, | Performed by: STUDENT IN AN ORGANIZED HEALTH CARE EDUCATION/TRAINING PROGRAM

## 2023-01-01 PROCEDURE — 3075F PR MOST RECENT SYSTOLIC BLOOD PRESS GE 130-139MM HG: ICD-10-PCS | Mod: HCNC,CPTII,S$GLB, | Performed by: ANESTHESIOLOGY

## 2023-01-01 PROCEDURE — 99214 PR OFFICE/OUTPT VISIT, EST, LEVL IV, 30-39 MIN: ICD-10-PCS | Mod: HCNC,S$GLB,, | Performed by: PHYSICIAN ASSISTANT

## 2023-01-01 PROCEDURE — 76882 US SOFT TISSUE, LOWER EXTREMITY, RIGHT: ICD-10-PCS | Mod: 26,HCNC,RT, | Performed by: RADIOLOGY

## 2023-01-01 PROCEDURE — 3288F PR FALLS RISK ASSESSMENT DOCUMENTED: ICD-10-PCS | Mod: HCNC,CPTII,S$GLB, | Performed by: ANESTHESIOLOGY

## 2023-01-01 PROCEDURE — 1101F PT FALLS ASSESS-DOCD LE1/YR: CPT | Mod: HCNC,CPTII,S$GLB, | Performed by: ANESTHESIOLOGY

## 2023-01-01 PROCEDURE — 1101F PR PT FALLS ASSESS DOC 0-1 FALLS W/OUT INJ PAST YR: ICD-10-PCS | Mod: HCNC,CPTII,S$GLB, | Performed by: STUDENT IN AN ORGANIZED HEALTH CARE EDUCATION/TRAINING PROGRAM

## 2023-01-01 PROCEDURE — 99213 OFFICE O/P EST LOW 20 MIN: CPT | Mod: HCNC,S$GLB,, | Performed by: NURSE PRACTITIONER

## 2023-01-01 PROCEDURE — 85610 PROTHROMBIN TIME: CPT | Mod: 91 | Performed by: NURSE PRACTITIONER

## 2023-01-01 PROCEDURE — 3008F PR BODY MASS INDEX (BMI) DOCUMENTED: ICD-10-PCS | Mod: HCNC,CPTII,S$GLB, | Performed by: PHYSICIAN ASSISTANT

## 2023-01-01 PROCEDURE — 93010 ELECTROCARDIOGRAM REPORT: CPT | Mod: ,,, | Performed by: INTERNAL MEDICINE

## 2023-01-01 PROCEDURE — 84478 ASSAY OF TRIGLYCERIDES: CPT | Performed by: INTERNAL MEDICINE

## 2023-01-01 PROCEDURE — 99999 PR PBB SHADOW E&M-EST. PATIENT-LVL III: ICD-10-PCS | Mod: PBBFAC,HCNC,, | Performed by: STUDENT IN AN ORGANIZED HEALTH CARE EDUCATION/TRAINING PROGRAM

## 2023-01-01 PROCEDURE — 99999 PR PBB SHADOW E&M-EST. PATIENT-LVL V: CPT | Mod: PBBFAC,HCNC,, | Performed by: STUDENT IN AN ORGANIZED HEALTH CARE EDUCATION/TRAINING PROGRAM

## 2023-01-01 PROCEDURE — 1159F PR MEDICATION LIST DOCUMENTED IN MEDICAL RECORD: ICD-10-PCS | Mod: HCNC,CPTII,S$GLB, | Performed by: INTERNAL MEDICINE

## 2023-01-01 PROCEDURE — 3044F PR MOST RECENT HEMOGLOBIN A1C LEVEL <7.0%: ICD-10-PCS | Mod: HCNC,CPTII,S$GLB, | Performed by: INTERNAL MEDICINE

## 2023-01-01 PROCEDURE — 99205 OFFICE O/P NEW HI 60 MIN: CPT | Mod: HCNC,GC,S$GLB, | Performed by: ANESTHESIOLOGY

## 2023-01-01 PROCEDURE — 3075F SYST BP GE 130 - 139MM HG: CPT | Mod: HCNC,CPTII,S$GLB, | Performed by: NURSE PRACTITIONER

## 2023-01-01 PROCEDURE — 83605 ASSAY OF LACTIC ACID: CPT | Performed by: NURSE PRACTITIONER

## 2023-01-01 PROCEDURE — 3288F PR FALLS RISK ASSESSMENT DOCUMENTED: ICD-10-PCS | Mod: HCNC,CPTII,S$GLB, | Performed by: PHYSICIAN ASSISTANT

## 2023-01-01 PROCEDURE — 87186 SC STD MICRODIL/AGAR DIL: CPT

## 2023-01-01 PROCEDURE — 3079F DIAST BP 80-89 MM HG: CPT | Mod: HCNC,CPTII,S$GLB, | Performed by: NURSE PRACTITIONER

## 2023-01-01 PROCEDURE — 84132 ASSAY OF SERUM POTASSIUM: CPT | Performed by: INTERNAL MEDICINE

## 2023-01-01 PROCEDURE — 80053 COMPREHEN METABOLIC PANEL: CPT | Mod: 91 | Performed by: NURSE PRACTITIONER

## 2023-01-01 PROCEDURE — 99204 OFFICE O/P NEW MOD 45 MIN: CPT | Mod: HCNC,S$GLB,, | Performed by: STUDENT IN AN ORGANIZED HEALTH CARE EDUCATION/TRAINING PROGRAM

## 2023-01-01 PROCEDURE — 99999 PR PBB SHADOW E&M-EST. PATIENT-LVL V: ICD-10-PCS | Mod: PBBFAC,HCNC,, | Performed by: NURSE PRACTITIONER

## 2023-01-01 PROCEDURE — 31500 PR INSERT, EMERGENCY ENDOTRACH AIRWAY: ICD-10-PCS | Mod: GC,,, | Performed by: INTERNAL MEDICINE

## 2023-01-01 PROCEDURE — 99999 PR PBB SHADOW E&M-EST. PATIENT-LVL V: ICD-10-PCS | Mod: PBBFAC,HCNC,, | Performed by: ANESTHESIOLOGY

## 2023-01-01 PROCEDURE — 1101F PT FALLS ASSESS-DOCD LE1/YR: CPT | Mod: HCNC,CPTII,S$GLB, | Performed by: STUDENT IN AN ORGANIZED HEALTH CARE EDUCATION/TRAINING PROGRAM

## 2023-01-01 PROCEDURE — 1159F MED LIST DOCD IN RCRD: CPT | Mod: HCNC,CPTII,S$GLB, | Performed by: NURSE PRACTITIONER

## 2023-01-01 PROCEDURE — 99213 PR OFFICE/OUTPT VISIT, EST, LEVL III, 20-29 MIN: ICD-10-PCS | Mod: HCNC,S$GLB,, | Performed by: NURSE PRACTITIONER

## 2023-01-01 PROCEDURE — 3074F PR MOST RECENT SYSTOLIC BLOOD PRESSURE < 130 MM HG: ICD-10-PCS | Mod: HCNC,CPTII,S$GLB, | Performed by: ANESTHESIOLOGY

## 2023-01-01 PROCEDURE — 99999 PR PBB SHADOW E&M-EST. PATIENT-LVL V: CPT | Mod: PBBFAC,HCNC,, | Performed by: ANESTHESIOLOGY

## 2023-01-01 PROCEDURE — 51702 INSERT TEMP BLADDER CATH: CPT

## 2023-01-01 PROCEDURE — 1101F PT FALLS ASSESS-DOCD LE1/YR: CPT | Mod: HCNC,CPTII,S$GLB, | Performed by: ORTHOPAEDIC SURGERY

## 2023-01-01 PROCEDURE — 99397 PER PM REEVAL EST PAT 65+ YR: CPT | Mod: HCNC,S$GLB,, | Performed by: INTERNAL MEDICINE

## 2023-01-01 PROCEDURE — 1125F PR PAIN SEVERITY QUANTIFIED, PAIN PRESENT: ICD-10-PCS | Mod: HCNC,CPTII,S$GLB, | Performed by: ORTHOPAEDIC SURGERY

## 2023-01-01 PROCEDURE — 73564 XR KNEE ORTHO RIGHT WITH FLEXION: ICD-10-PCS | Mod: 26,HCNC,RT, | Performed by: RADIOLOGY

## 2023-01-01 PROCEDURE — 85007 BL SMEAR W/DIFF WBC COUNT: CPT | Mod: 91 | Performed by: INTERNAL MEDICINE

## 2023-01-01 PROCEDURE — 99213 OFFICE O/P EST LOW 20 MIN: CPT | Mod: HCNC,S$GLB,, | Performed by: STUDENT IN AN ORGANIZED HEALTH CARE EDUCATION/TRAINING PROGRAM

## 2023-01-01 PROCEDURE — 99213 PR OFFICE/OUTPT VISIT, EST, LEVL III, 20-29 MIN: ICD-10-PCS | Mod: HCNC,S$GLB,, | Performed by: ORTHOPAEDIC SURGERY

## 2023-01-01 PROCEDURE — 3074F SYST BP LT 130 MM HG: CPT | Mod: HCNC,CPTII,S$GLB, | Performed by: STUDENT IN AN ORGANIZED HEALTH CARE EDUCATION/TRAINING PROGRAM

## 2023-01-01 PROCEDURE — 3288F PR FALLS RISK ASSESSMENT DOCUMENTED: ICD-10-PCS | Mod: HCNC,CPTII,S$GLB, | Performed by: ORTHOPAEDIC SURGERY

## 2023-01-01 PROCEDURE — 3044F HG A1C LEVEL LT 7.0%: CPT | Mod: HCNC,CPTII,S$GLB, | Performed by: ORTHOPAEDIC SURGERY

## 2023-01-01 PROCEDURE — 99215 OFFICE O/P EST HI 40 MIN: CPT | Mod: HCNC,S$GLB,, | Performed by: NURSE PRACTITIONER

## 2023-01-01 PROCEDURE — 36415 COLL VENOUS BLD VENIPUNCTURE: CPT | Performed by: INTERNAL MEDICINE

## 2023-01-01 PROCEDURE — 86901 BLOOD TYPING SEROLOGIC RH(D): CPT | Performed by: INTERNAL MEDICINE

## 2023-01-01 PROCEDURE — 20611 DRAIN/INJ JOINT/BURSA W/US: CPT | Mod: HCNC,RT,S$GLB, | Performed by: ANESTHESIOLOGY

## 2023-01-01 PROCEDURE — 80053 COMPREHEN METABOLIC PANEL: CPT | Mod: HCNC | Performed by: INTERNAL MEDICINE

## 2023-01-01 PROCEDURE — 99213 OFFICE O/P EST LOW 20 MIN: CPT | Mod: HCNC,GC,S$GLB, | Performed by: ANESTHESIOLOGY

## 2023-01-01 PROCEDURE — 25000003 PHARM REV CODE 250: Mod: HCNC | Performed by: STUDENT IN AN ORGANIZED HEALTH CARE EDUCATION/TRAINING PROGRAM

## 2023-01-01 PROCEDURE — 99999 PR PBB SHADOW E&M-EST. PATIENT-LVL I: CPT | Mod: PBBFAC,HCNC,, | Performed by: STUDENT IN AN ORGANIZED HEALTH CARE EDUCATION/TRAINING PROGRAM

## 2023-01-01 PROCEDURE — 99999 PR PBB SHADOW E&M-EST. PATIENT-LVL III: CPT | Mod: PBBFAC,HCNC,, | Performed by: PHYSICIAN ASSISTANT

## 2023-01-01 PROCEDURE — 1160F PR REVIEW ALL MEDS BY PRESCRIBER/CLIN PHARMACIST DOCUMENTED: ICD-10-PCS | Mod: HCNC,CPTII,S$GLB, | Performed by: NURSE PRACTITIONER

## 2023-01-01 PROCEDURE — 99291 CRITICAL CARE FIRST HOUR: CPT | Mod: GC,,, | Performed by: PSYCHIATRY & NEUROLOGY

## 2023-01-01 PROCEDURE — 99999 PR PBB SHADOW E&M-EST. PATIENT-LVL IV: CPT | Mod: PBBFAC,HCNC,, | Performed by: NURSE PRACTITIONER

## 2023-01-01 PROCEDURE — 77063 MAMMO DIGITAL SCREENING BILAT WITH TOMO: ICD-10-PCS | Mod: 26,HCNC,, | Performed by: RADIOLOGY

## 2023-01-01 PROCEDURE — 1125F PR PAIN SEVERITY QUANTIFIED, PAIN PRESENT: ICD-10-PCS | Mod: HCNC,CPTII,S$GLB, | Performed by: STUDENT IN AN ORGANIZED HEALTH CARE EDUCATION/TRAINING PROGRAM

## 2023-01-01 PROCEDURE — 72192 CT PELVIS W/O DYE: CPT | Mod: TC,HCNC

## 2023-01-01 PROCEDURE — 83615 LACTATE (LD) (LDH) ENZYME: CPT

## 2023-01-01 PROCEDURE — 3079F PR MOST RECENT DIASTOLIC BLOOD PRESSURE 80-89 MM HG: ICD-10-PCS | Mod: HCNC,CPTII,S$GLB, | Performed by: NURSE PRACTITIONER

## 2023-01-01 PROCEDURE — 1101F PR PT FALLS ASSESS DOC 0-1 FALLS W/OUT INJ PAST YR: ICD-10-PCS | Mod: HCNC,CPTII,S$GLB, | Performed by: ORTHOPAEDIC SURGERY

## 2023-01-01 PROCEDURE — 99999 PR PBB SHADOW E&M-EST. PATIENT-LVL V: ICD-10-PCS | Mod: PBBFAC,HCNC,, | Performed by: INTERNAL MEDICINE

## 2023-01-01 PROCEDURE — 84478 ASSAY OF TRIGLYCERIDES: CPT

## 2023-01-01 PROCEDURE — 99999 PR PBB SHADOW E&M-EST. PATIENT-LVL III: ICD-10-PCS | Mod: PBBFAC,HCNC,, | Performed by: PHYSICIAN ASSISTANT

## 2023-01-01 PROCEDURE — 3288F PR FALLS RISK ASSESSMENT DOCUMENTED: ICD-10-PCS | Mod: HCNC,CPTII,S$GLB, | Performed by: NURSE PRACTITIONER

## 2023-01-01 PROCEDURE — 99999 PR PBB SHADOW E&M-EST. PATIENT-LVL II: CPT | Mod: PBBFAC,HCNC,, | Performed by: INTERNAL MEDICINE

## 2023-01-01 PROCEDURE — 3008F PR BODY MASS INDEX (BMI) DOCUMENTED: ICD-10-PCS | Mod: HCNC,CPTII,S$GLB, | Performed by: ORTHOPAEDIC SURGERY

## 2023-01-01 PROCEDURE — 85027 COMPLETE CBC AUTOMATED: CPT | Mod: 91 | Performed by: INTERNAL MEDICINE

## 2023-01-01 PROCEDURE — 86901 BLOOD TYPING SEROLOGIC RH(D): CPT | Performed by: NURSE PRACTITIONER

## 2023-01-01 PROCEDURE — 99499 UNLISTED E&M SERVICE: CPT | Mod: HCNC,S$GLB,, | Performed by: STUDENT IN AN ORGANIZED HEALTH CARE EDUCATION/TRAINING PROGRAM

## 2023-01-01 PROCEDURE — 77067 SCR MAMMO BI INCL CAD: CPT | Mod: 26,HCNC,, | Performed by: RADIOLOGY

## 2023-01-01 PROCEDURE — 3078F PR MOST RECENT DIASTOLIC BLOOD PRESSURE < 80 MM HG: ICD-10-PCS | Mod: HCNC,CPTII,S$GLB, | Performed by: NURSE PRACTITIONER

## 2023-01-01 PROCEDURE — 65855 PR TRABECULOPLASTY LASER SURGERY: ICD-10-PCS | Mod: HCNC,RT,S$GLB, | Performed by: STUDENT IN AN ORGANIZED HEALTH CARE EDUCATION/TRAINING PROGRAM

## 2023-01-01 PROCEDURE — 87077 CULTURE AEROBIC IDENTIFY: CPT

## 2023-01-01 PROCEDURE — 83880 ASSAY OF NATRIURETIC PEPTIDE: CPT | Mod: 91 | Performed by: NURSE PRACTITIONER

## 2023-01-01 PROCEDURE — 3044F HG A1C LEVEL LT 7.0%: CPT | Mod: HCNC,CPTII,S$GLB, | Performed by: STUDENT IN AN ORGANIZED HEALTH CARE EDUCATION/TRAINING PROGRAM

## 2023-01-01 PROCEDURE — 87633 RESP VIRUS 12-25 TARGETS: CPT

## 2023-01-01 PROCEDURE — 77002 PR FLUOROSCOPIC GUIDANCE NEEDLE PLACEMENT: ICD-10-PCS | Mod: 26,HCNC,, | Performed by: ANESTHESIOLOGY

## 2023-01-01 PROCEDURE — 93005 ELECTROCARDIOGRAM TRACING: CPT

## 2023-01-01 PROCEDURE — 72192 CT PELVIS W/O DYE: CPT | Mod: 26,HCNC,, | Performed by: RADIOLOGY

## 2023-01-01 PROCEDURE — 82247 BILIRUBIN TOTAL: CPT

## 2023-01-01 PROCEDURE — 99291 PR CRITICAL CARE, E/M 30-74 MINUTES: ICD-10-PCS | Mod: GC,,, | Performed by: PSYCHIATRY & NEUROLOGY

## 2023-01-01 PROCEDURE — 73564 X-RAY EXAM KNEE 4 OR MORE: CPT | Mod: TC,HCNC,RT

## 2023-01-01 PROCEDURE — 3077F PR MOST RECENT SYSTOLIC BLOOD PRESSURE >= 140 MM HG: ICD-10-PCS | Mod: HCNC,CPTII,S$GLB, | Performed by: STUDENT IN AN ORGANIZED HEALTH CARE EDUCATION/TRAINING PROGRAM

## 2023-01-01 PROCEDURE — 1159F MED LIST DOCD IN RCRD: CPT | Mod: HCNC,CPTII,S$GLB, | Performed by: PHYSICIAN ASSISTANT

## 2023-01-01 PROCEDURE — 73562 XR KNEE ORTHO RIGHT WITH FLEXION: ICD-10-PCS | Mod: 26,HCNC,LT, | Performed by: RADIOLOGY

## 2023-01-01 PROCEDURE — 99223 PR INITIAL HOSPITAL CARE,LEVL III: ICD-10-PCS | Mod: GC,,, | Performed by: STUDENT IN AN ORGANIZED HEALTH CARE EDUCATION/TRAINING PROGRAM

## 2023-01-01 PROCEDURE — 83036 HEMOGLOBIN GLYCOSYLATED A1C: CPT | Performed by: NURSE PRACTITIONER

## 2023-01-01 PROCEDURE — 99204 PR OFFICE/OUTPT VISIT, NEW, LEVL IV, 45-59 MIN: ICD-10-PCS | Mod: HCNC,S$GLB,, | Performed by: STUDENT IN AN ORGANIZED HEALTH CARE EDUCATION/TRAINING PROGRAM

## 2023-01-01 PROCEDURE — 3078F PR MOST RECENT DIASTOLIC BLOOD PRESSURE < 80 MM HG: ICD-10-PCS | Mod: HCNC,CPTII,S$GLB, | Performed by: PHYSICIAN ASSISTANT

## 2023-01-01 PROCEDURE — 1125F PR PAIN SEVERITY QUANTIFIED, PAIN PRESENT: ICD-10-PCS | Mod: HCNC,CPTII,S$GLB, | Performed by: NURSE PRACTITIONER

## 2023-01-01 PROCEDURE — 3288F FALL RISK ASSESSMENT DOCD: CPT | Mod: HCNC,CPTII,S$GLB, | Performed by: PHYSICIAN ASSISTANT

## 2023-01-01 PROCEDURE — 87070 CULTURE OTHR SPECIMN AEROBIC: CPT

## 2023-01-01 PROCEDURE — 99291 PR CRITICAL CARE, E/M 30-74 MINUTES: ICD-10-PCS | Mod: FS,,, | Performed by: PSYCHIATRY & NEUROLOGY

## 2023-01-01 PROCEDURE — 87798 DETECT AGENT NOS DNA AMP: CPT | Mod: 59

## 2023-01-01 PROCEDURE — 3075F SYST BP GE 130 - 139MM HG: CPT | Mod: HCNC,CPTII,S$GLB, | Performed by: ANESTHESIOLOGY

## 2023-01-01 PROCEDURE — 3075F PR MOST RECENT SYSTOLIC BLOOD PRESS GE 130-139MM HG: ICD-10-PCS | Mod: HCNC,CPTII,S$GLB, | Performed by: STUDENT IN AN ORGANIZED HEALTH CARE EDUCATION/TRAINING PROGRAM

## 2023-01-01 PROCEDURE — 20610 PR DRAIN/INJECT LARGE JOINT/BURSA: ICD-10-PCS | Mod: 50,HCNC,, | Performed by: ANESTHESIOLOGY

## 2023-01-01 PROCEDURE — 1125F PR PAIN SEVERITY QUANTIFIED, PAIN PRESENT: ICD-10-PCS | Mod: HCNC,CPTII,S$GLB, | Performed by: INTERNAL MEDICINE

## 2023-01-01 PROCEDURE — 93010 EKG 12-LEAD: ICD-10-PCS | Mod: ,,, | Performed by: INTERNAL MEDICINE

## 2023-01-01 PROCEDURE — 3077F SYST BP >= 140 MM HG: CPT | Mod: HCNC,CPTII,S$GLB, | Performed by: STUDENT IN AN ORGANIZED HEALTH CARE EDUCATION/TRAINING PROGRAM

## 2023-01-01 PROCEDURE — 73700 CT LOWER EXTREMITY W/O DYE: CPT | Mod: 26,HCNC,RT, | Performed by: INTERNAL MEDICINE

## 2023-01-01 PROCEDURE — 99291 PR CRITICAL CARE, E/M 30-74 MINUTES: ICD-10-PCS | Mod: 25,,, | Performed by: INTERNAL MEDICINE

## 2023-01-01 PROCEDURE — 77067 SCR MAMMO BI INCL CAD: CPT | Mod: TC,HCNC

## 2023-01-01 PROCEDURE — 1159F PR MEDICATION LIST DOCUMENTED IN MEDICAL RECORD: ICD-10-PCS | Mod: HCNC,CPTII,S$GLB, | Performed by: ANESTHESIOLOGY

## 2023-01-01 PROCEDURE — 65855 TRABECULOPLASTY LASER SURG: CPT | Mod: HCNC,RT,S$GLB, | Performed by: STUDENT IN AN ORGANIZED HEALTH CARE EDUCATION/TRAINING PROGRAM

## 2023-01-01 PROCEDURE — 87040 BLOOD CULTURE FOR BACTERIA: CPT | Mod: 59 | Performed by: INTERNAL MEDICINE

## 2023-01-01 PROCEDURE — 87040 BLOOD CULTURE FOR BACTERIA: CPT

## 2023-01-01 PROCEDURE — 99233 PR SUBSEQUENT HOSPITAL CARE,LEVL III: ICD-10-PCS | Mod: GC,,, | Performed by: INTERNAL MEDICINE

## 2023-01-01 PROCEDURE — 76882 US LMTD JT/FCL EVL NVASC XTR: CPT | Mod: TC,HCNC,RT

## 2023-01-01 PROCEDURE — 99999 PR PBB SHADOW E&M-EST. PATIENT-LVL III: CPT | Mod: PBBFAC,HCNC,, | Performed by: ORTHOPAEDIC SURGERY

## 2023-01-01 PROCEDURE — 1159F MED LIST DOCD IN RCRD: CPT | Mod: HCNC,CPTII,S$GLB, | Performed by: INTERNAL MEDICINE

## 2023-01-01 PROCEDURE — 84295 ASSAY OF SERUM SODIUM: CPT

## 2023-01-01 PROCEDURE — 3078F DIAST BP <80 MM HG: CPT | Mod: HCNC,CPTII,S$GLB, | Performed by: INTERNAL MEDICINE

## 2023-01-01 PROCEDURE — 76882 US LMTD JT/FCL EVL NVASC XTR: CPT | Mod: 26,HCNC,RT, | Performed by: RADIOLOGY

## 2023-01-01 PROCEDURE — 99213 PR OFFICE/OUTPT VISIT, EST, LEVL III, 20-29 MIN: ICD-10-PCS | Mod: HCNC,GC,S$GLB, | Performed by: ANESTHESIOLOGY

## 2023-01-01 PROCEDURE — 92133 CPTRZD OPH DX IMG PST SGM ON: CPT | Mod: HCNC,S$GLB,, | Performed by: STUDENT IN AN ORGANIZED HEALTH CARE EDUCATION/TRAINING PROGRAM

## 2023-01-01 PROCEDURE — 99999 PR PBB SHADOW E&M-EST. PATIENT-LVL IV: CPT | Mod: PBBFAC,HCNC,, | Performed by: PHYSICIAN ASSISTANT

## 2023-01-01 PROCEDURE — 92083 HUMPHREY VISUAL FIELD - OU - BOTH EYES: ICD-10-PCS | Mod: HCNC,S$GLB,, | Performed by: STUDENT IN AN ORGANIZED HEALTH CARE EDUCATION/TRAINING PROGRAM

## 2023-01-01 PROCEDURE — 3044F HG A1C LEVEL LT 7.0%: CPT | Mod: HCNC,CPTII,S$GLB, | Performed by: PHYSICIAN ASSISTANT

## 2023-01-01 PROCEDURE — 86920 COMPATIBILITY TEST SPIN: CPT

## 2023-01-01 PROCEDURE — 99499 RISK ADDL DX/OHS AUDIT: ICD-10-PCS | Mod: S$GLB,,, | Performed by: PHYSICIAN ASSISTANT

## 2023-01-01 PROCEDURE — 1126F AMNT PAIN NOTED NONE PRSNT: CPT | Mod: HCNC,CPTII,S$GLB, | Performed by: INTERNAL MEDICINE

## 2023-01-01 PROCEDURE — 3044F PR MOST RECENT HEMOGLOBIN A1C LEVEL <7.0%: ICD-10-PCS | Mod: HCNC,CPTII,S$GLB, | Performed by: ANESTHESIOLOGY

## 2023-01-01 PROCEDURE — 99292 CRITICAL CARE ADDL 30 MIN: CPT | Mod: ,,, | Performed by: INTERNAL MEDICINE

## 2023-01-01 PROCEDURE — 1126F PR PAIN SEVERITY QUANTIFIED, NO PAIN PRESENT: ICD-10-PCS | Mod: HCNC,CPTII,S$GLB, | Performed by: INTERNAL MEDICINE

## 2023-01-01 PROCEDURE — 99215 PR OFFICE/OUTPT VISIT, EST, LEVL V, 40-54 MIN: ICD-10-PCS | Mod: HCNC,S$GLB,, | Performed by: NURSE PRACTITIONER

## 2023-01-01 PROCEDURE — 80048 BASIC METABOLIC PNL TOTAL CA: CPT | Mod: XB

## 2023-01-01 PROCEDURE — 1159F PR MEDICATION LIST DOCUMENTED IN MEDICAL RECORD: ICD-10-PCS | Mod: HCNC,CPTII,S$GLB, | Performed by: ORTHOPAEDIC SURGERY

## 2023-01-01 PROCEDURE — 99999 PR PBB SHADOW E&M-EST. PATIENT-LVL IV: CPT | Mod: PBBFAC,HCNC,, | Performed by: ANESTHESIOLOGY

## 2023-01-01 PROCEDURE — 99499 RISK ADDL DX/OHS AUDIT: ICD-10-PCS | Mod: S$GLB,,, | Performed by: INTERNAL MEDICINE

## 2023-01-01 PROCEDURE — 3074F PR MOST RECENT SYSTOLIC BLOOD PRESSURE < 130 MM HG: ICD-10-PCS | Mod: HCNC,CPTII,S$GLB, | Performed by: PHYSICIAN ASSISTANT

## 2023-01-01 PROCEDURE — 82330 ASSAY OF CALCIUM: CPT

## 2023-01-01 PROCEDURE — 73700 CT THIGH WITHOUT CONTRAST RIGHT: ICD-10-PCS | Mod: 26,HCNC,RT, | Performed by: INTERNAL MEDICINE

## 2023-01-01 PROCEDURE — 85730 THROMBOPLASTIN TIME PARTIAL: CPT | Mod: 91 | Performed by: INTERNAL MEDICINE

## 2023-01-01 PROCEDURE — 94002 VENT MGMT INPAT INIT DAY: CPT

## 2023-01-01 PROCEDURE — 99292 CRITICAL CARE ADDL 30 MIN: CPT | Mod: 25,ICN,, | Performed by: INTERNAL MEDICINE

## 2023-01-01 PROCEDURE — 84484 ASSAY OF TROPONIN QUANT: CPT | Performed by: NURSE PRACTITIONER

## 2023-01-01 PROCEDURE — 99291 CRITICAL CARE FIRST HOUR: CPT | Mod: FS,,, | Performed by: PSYCHIATRY & NEUROLOGY

## 2023-01-01 PROCEDURE — 82728 ASSAY OF FERRITIN: CPT | Mod: HCNC | Performed by: PHYSICIAN ASSISTANT

## 2023-01-01 PROCEDURE — 27000190 HC CPAP FULL FACE MASK W/VALVE

## 2023-01-01 PROCEDURE — 92133 POSTERIOR SEGMENT OCT OPTIC NERVE(OCULAR COHERENCE TOMOGRAPHY) - OU - BOTH EYES: ICD-10-PCS | Mod: HCNC,S$GLB,, | Performed by: STUDENT IN AN ORGANIZED HEALTH CARE EDUCATION/TRAINING PROGRAM

## 2023-01-01 PROCEDURE — 3075F PR MOST RECENT SYSTOLIC BLOOD PRESS GE 130-139MM HG: ICD-10-PCS | Mod: HCNC,CPTII,S$GLB, | Performed by: INTERNAL MEDICINE

## 2023-01-01 PROCEDURE — 99999 PR PBB SHADOW E&M-EST. PATIENT-LVL IV: ICD-10-PCS | Mod: PBBFAC,HCNC,, | Performed by: STUDENT IN AN ORGANIZED HEALTH CARE EDUCATION/TRAINING PROGRAM

## 2023-01-01 PROCEDURE — 3044F PR MOST RECENT HEMOGLOBIN A1C LEVEL <7.0%: ICD-10-PCS | Mod: HCNC,CPTII,S$GLB, | Performed by: NURSE PRACTITIONER

## 2023-01-01 PROCEDURE — 31500 INSERT EMERGENCY AIRWAY: CPT | Mod: GC,,, | Performed by: INTERNAL MEDICINE

## 2023-01-01 PROCEDURE — 3075F SYST BP GE 130 - 139MM HG: CPT | Mod: HCNC,CPTII,S$GLB, | Performed by: STUDENT IN AN ORGANIZED HEALTH CARE EDUCATION/TRAINING PROGRAM

## 2023-01-01 PROCEDURE — 1159F MED LIST DOCD IN RCRD: CPT | Mod: HCNC,CPTII,S$GLB, | Performed by: ORTHOPAEDIC SURGERY

## 2023-01-01 PROCEDURE — 92020 PR SPECIAL EYE EVAL,GONIOSCOPY: ICD-10-PCS | Mod: HCNC,S$GLB,, | Performed by: STUDENT IN AN ORGANIZED HEALTH CARE EDUCATION/TRAINING PROGRAM

## 2023-01-01 PROCEDURE — 80053 COMPREHEN METABOLIC PANEL: CPT | Mod: HCNC | Performed by: PHYSICIAN ASSISTANT

## 2023-01-01 RX ORDER — INSULIN ASPART 100 [IU]/ML
4 INJECTION, SOLUTION INTRAVENOUS; SUBCUTANEOUS
Status: DISCONTINUED | OUTPATIENT
Start: 2023-01-01 | End: 2023-01-01

## 2023-01-01 RX ORDER — PANTOPRAZOLE SODIUM 40 MG/1
40 TABLET, DELAYED RELEASE ORAL DAILY
Status: DISCONTINUED | OUTPATIENT
Start: 2023-01-01 | End: 2023-01-01

## 2023-01-01 RX ORDER — SODIUM CHLORIDE 9 MG/ML
500 INJECTION, SOLUTION INTRAVENOUS CONTINUOUS
Status: DISCONTINUED | OUTPATIENT
Start: 2023-01-01 | End: 2023-01-01 | Stop reason: HOSPADM

## 2023-01-01 RX ORDER — INSULIN ASPART 100 [IU]/ML
3 INJECTION, SOLUTION INTRAVENOUS; SUBCUTANEOUS
Status: CANCELLED | OUTPATIENT
Start: 2023-01-01

## 2023-01-01 RX ORDER — FENTANYL CITRATE 50 UG/ML
25 INJECTION, SOLUTION INTRAMUSCULAR; INTRAVENOUS ONCE
Status: COMPLETED | OUTPATIENT
Start: 2023-01-01 | End: 2023-01-01

## 2023-01-01 RX ORDER — SODIUM CHLORIDE 0.9 % (FLUSH) 0.9 %
10 SYRINGE (ML) INJECTION
Status: DISCONTINUED | OUTPATIENT
Start: 2023-01-01 | End: 2023-01-01

## 2023-01-01 RX ORDER — FUROSEMIDE 10 MG/ML
80 INJECTION INTRAMUSCULAR; INTRAVENOUS
Status: DISCONTINUED | OUTPATIENT
Start: 2023-01-01 | End: 2023-01-01

## 2023-01-01 RX ORDER — HYDROCODONE BITARTRATE AND ACETAMINOPHEN 500; 5 MG/1; MG/1
TABLET ORAL
Status: DISCONTINUED | OUTPATIENT
Start: 2023-01-01 | End: 2023-01-01

## 2023-01-01 RX ORDER — ROCURONIUM BROMIDE 10 MG/ML
100 INJECTION, SOLUTION INTRAVENOUS ONCE
Status: COMPLETED | OUTPATIENT
Start: 2023-01-01 | End: 2023-01-01

## 2023-01-01 RX ORDER — NOREPINEPHRINE BITARTRATE/D5W 4MG/250ML
PLASTIC BAG, INJECTION (ML) INTRAVENOUS
Status: DISPENSED
Start: 2023-01-01 | End: 2023-01-01

## 2023-01-01 RX ORDER — WARFARIN SODIUM 5 MG/1
TABLET ORAL
Qty: 45 TABLET | Refills: 0 | Status: SHIPPED | OUTPATIENT
Start: 2023-01-01 | End: 2023-01-01 | Stop reason: SDUPTHER

## 2023-01-01 RX ORDER — DEXTROSE MONOHYDRATE 100 MG/ML
INJECTION, SOLUTION INTRAVENOUS CONTINUOUS PRN
Status: DISCONTINUED | OUTPATIENT
Start: 2023-01-01 | End: 2023-01-01

## 2023-01-01 RX ORDER — MUPIROCIN 20 MG/G
OINTMENT TOPICAL 2 TIMES DAILY
Status: DISPENSED | OUTPATIENT
Start: 2023-01-01 | End: 2023-01-01

## 2023-01-01 RX ORDER — NOREPINEPHRINE BITARTRATE/D5W 4MG/250ML
PLASTIC BAG, INJECTION (ML) INTRAVENOUS
Status: COMPLETED
Start: 2023-01-01 | End: 2023-01-01

## 2023-01-01 RX ORDER — PROPOFOL 10 MG/ML
0-50 INJECTION, EMULSION INTRAVENOUS CONTINUOUS
Status: CANCELLED | OUTPATIENT
Start: 2023-01-01

## 2023-01-01 RX ORDER — FENTANYL CITRATE 50 UG/ML
50 INJECTION, SOLUTION INTRAMUSCULAR; INTRAVENOUS ONCE
Status: COMPLETED | OUTPATIENT
Start: 2023-01-01 | End: 2023-01-01

## 2023-01-01 RX ORDER — WARFARIN SODIUM 5 MG/1
TABLET ORAL
Qty: 30 TABLET | Refills: 0 | Status: SHIPPED | OUTPATIENT
Start: 2023-01-01 | End: 2023-01-01 | Stop reason: SDUPTHER

## 2023-01-01 RX ORDER — INSULIN ASPART 100 [IU]/ML
0-5 INJECTION, SOLUTION INTRAVENOUS; SUBCUTANEOUS EVERY 6 HOURS PRN
Status: DISCONTINUED | OUTPATIENT
Start: 2023-01-01 | End: 2023-01-01

## 2023-01-01 RX ORDER — FUROSEMIDE 10 MG/ML
120 INJECTION INTRAMUSCULAR; INTRAVENOUS
Status: DISCONTINUED | OUTPATIENT
Start: 2023-01-01 | End: 2023-01-01

## 2023-01-01 RX ORDER — LORAZEPAM 2 MG/ML
1 INJECTION INTRAMUSCULAR EVERY 10 MIN PRN
Status: DISCONTINUED | OUTPATIENT
Start: 2023-01-01 | End: 2023-01-01 | Stop reason: HOSPADM

## 2023-01-01 RX ORDER — METOPROLOL TARTRATE 1 MG/ML
5 INJECTION, SOLUTION INTRAVENOUS ONCE
Status: COMPLETED | OUTPATIENT
Start: 2023-01-01 | End: 2023-01-01

## 2023-01-01 RX ORDER — 3% SODIUM CHLORIDE 3 G/100ML
50 INJECTION, SOLUTION INTRAVENOUS CONTINUOUS
Status: DISCONTINUED | OUTPATIENT
Start: 2023-01-01 | End: 2023-01-01

## 2023-01-01 RX ORDER — METOPROLOL TARTRATE 1 MG/ML
5 INJECTION, SOLUTION INTRAVENOUS EVERY 6 HOURS
Status: DISCONTINUED | OUTPATIENT
Start: 2023-01-01 | End: 2023-01-01

## 2023-01-01 RX ORDER — NOREPINEPHRINE BITARTRATE/D5W 4MG/250ML
0-.2 PLASTIC BAG, INJECTION (ML) INTRAVENOUS CONTINUOUS
Status: DISCONTINUED | OUTPATIENT
Start: 2023-01-01 | End: 2023-01-01

## 2023-01-01 RX ORDER — LORAZEPAM 2 MG/ML
2 INJECTION INTRAMUSCULAR ONCE
Status: COMPLETED | OUTPATIENT
Start: 2023-01-01 | End: 2023-01-01

## 2023-01-01 RX ORDER — LEVETIRACETAM 500 MG/5ML
500 INJECTION, SOLUTION, CONCENTRATE INTRAVENOUS EVERY 12 HOURS
Status: DISCONTINUED | OUTPATIENT
Start: 2023-01-01 | End: 2023-01-01

## 2023-01-01 RX ORDER — METOPROLOL TARTRATE 50 MG/1
50 TABLET ORAL 2 TIMES DAILY
Status: DISCONTINUED | OUTPATIENT
Start: 2023-01-01 | End: 2023-01-01

## 2023-01-01 RX ORDER — SUCCINYLCHOLINE CHLORIDE 20 MG/ML
INJECTION INTRAMUSCULAR; INTRAVENOUS
Status: COMPLETED
Start: 2023-01-01 | End: 2023-01-01

## 2023-01-01 RX ORDER — PANTOPRAZOLE SODIUM 40 MG/10ML
40 INJECTION, POWDER, LYOPHILIZED, FOR SOLUTION INTRAVENOUS 2 TIMES DAILY
Status: DISCONTINUED | OUTPATIENT
Start: 2023-01-01 | End: 2023-01-01

## 2023-01-01 RX ORDER — FUROSEMIDE 40 MG/1
40 TABLET ORAL DAILY
Qty: 90 TABLET | Refills: 3 | Status: ON HOLD | OUTPATIENT
Start: 2023-01-01 | End: 2023-01-01

## 2023-01-01 RX ORDER — DIGOXIN 0.25 MG/ML
250 INJECTION INTRAMUSCULAR; INTRAVENOUS ONCE
Status: DISCONTINUED | OUTPATIENT
Start: 2023-01-01 | End: 2023-01-01

## 2023-01-01 RX ORDER — NOREPINEPHRINE BIT/0.9 % NACL 32MG/250ML
0.18 PLASTIC BAG, INJECTION (ML) INTRAVENOUS CONTINUOUS
Status: DISCONTINUED | OUTPATIENT
Start: 2023-01-01 | End: 2023-01-01

## 2023-01-01 RX ORDER — PHENYLEPHRINE HCL IN 0.9% NACL 1 MG/10 ML
100 SYRINGE (ML) INTRAVENOUS ONCE
Status: DISCONTINUED | OUTPATIENT
Start: 2023-01-01 | End: 2023-01-01

## 2023-01-01 RX ORDER — SEMAGLUTIDE 1.34 MG/ML
0.5 INJECTION, SOLUTION SUBCUTANEOUS
Qty: 1.5 ML | Refills: 2 | Status: SHIPPED | OUTPATIENT
Start: 2023-01-01 | End: 2023-01-01

## 2023-01-01 RX ORDER — IPRATROPIUM BROMIDE AND ALBUTEROL SULFATE 2.5; .5 MG/3ML; MG/3ML
3 SOLUTION RESPIRATORY (INHALATION) EVERY 6 HOURS
Status: DISCONTINUED | OUTPATIENT
Start: 2023-01-01 | End: 2023-01-01

## 2023-01-01 RX ORDER — FENTANYL CITRATE 50 UG/ML
INJECTION, SOLUTION INTRAMUSCULAR; INTRAVENOUS
Status: DISCONTINUED | OUTPATIENT
Start: 2023-01-01 | End: 2023-01-01 | Stop reason: HOSPADM

## 2023-01-01 RX ORDER — NOREPINEPHRINE BITARTRATE/D5W 4MG/250ML
0-3 PLASTIC BAG, INJECTION (ML) INTRAVENOUS CONTINUOUS
Status: DISCONTINUED | OUTPATIENT
Start: 2023-01-01 | End: 2023-01-01

## 2023-01-01 RX ORDER — ACETAMINOPHEN 325 MG/1
650 TABLET ORAL EVERY 6 HOURS PRN
Status: DISCONTINUED | OUTPATIENT
Start: 2023-01-01 | End: 2023-01-01

## 2023-01-01 RX ORDER — AMOXICILLIN 250 MG
2 CAPSULE ORAL DAILY
Status: DISCONTINUED | OUTPATIENT
Start: 2023-01-01 | End: 2023-01-01

## 2023-01-01 RX ORDER — MIDAZOLAM HYDROCHLORIDE 1 MG/ML
INJECTION INTRAMUSCULAR; INTRAVENOUS
Status: DISCONTINUED | OUTPATIENT
Start: 2023-01-01 | End: 2023-01-01 | Stop reason: HOSPADM

## 2023-01-01 RX ORDER — ROCURONIUM BROMIDE 10 MG/ML
INJECTION, SOLUTION INTRAVENOUS
Status: COMPLETED
Start: 2023-01-01 | End: 2023-01-01

## 2023-01-01 RX ORDER — POTASSIUM CHLORIDE 7.45 MG/ML
10 INJECTION INTRAVENOUS
Status: COMPLETED | OUTPATIENT
Start: 2023-01-01 | End: 2023-01-01

## 2023-01-01 RX ORDER — POLYETHYLENE GLYCOL 3350 17 G/17G
17 POWDER, FOR SOLUTION ORAL DAILY
Status: DISCONTINUED | OUTPATIENT
Start: 2023-01-01 | End: 2023-01-01

## 2023-01-01 RX ORDER — LIDOCAINE HYDROCHLORIDE 20 MG/ML
INJECTION, SOLUTION INFILTRATION; PERINEURAL
Status: DISCONTINUED | OUTPATIENT
Start: 2023-01-01 | End: 2023-01-01 | Stop reason: HOSPADM

## 2023-01-01 RX ORDER — PROPOFOL 10 MG/ML
INJECTION, EMULSION INTRAVENOUS
Status: COMPLETED
Start: 2023-01-01 | End: 2023-01-01

## 2023-01-01 RX ORDER — GLUCAGON 1 MG
1 KIT INJECTION
Status: DISCONTINUED | OUTPATIENT
Start: 2023-01-01 | End: 2023-01-01

## 2023-01-01 RX ORDER — MAGNESIUM SULFATE HEPTAHYDRATE 40 MG/ML
2 INJECTION, SOLUTION INTRAVENOUS ONCE
Status: COMPLETED | OUTPATIENT
Start: 2023-01-01 | End: 2023-01-01

## 2023-01-01 RX ORDER — GLYCOPYRROLATE 0.2 MG/ML
0.3 INJECTION INTRAMUSCULAR; INTRAVENOUS ONCE
Status: COMPLETED | OUTPATIENT
Start: 2023-01-01 | End: 2023-01-01

## 2023-01-01 RX ORDER — ZINC SULFATE 50(220)MG
220 CAPSULE ORAL DAILY
Status: DISCONTINUED | OUTPATIENT
Start: 2023-01-01 | End: 2023-01-01

## 2023-01-01 RX ORDER — ASPIRIN 81 MG/1
81 TABLET ORAL DAILY
Status: DISCONTINUED | OUTPATIENT
Start: 2023-01-01 | End: 2023-01-01

## 2023-01-01 RX ORDER — FENTANYL CITRATE-0.9 % NACL/PF 10 MCG/ML
0-250 PLASTIC BAG, INJECTION (ML) INTRAVENOUS CONTINUOUS
Status: DISCONTINUED | OUTPATIENT
Start: 2023-01-01 | End: 2023-01-01

## 2023-01-01 RX ORDER — DIGOXIN 0.25 MG/ML
125 INJECTION INTRAMUSCULAR; INTRAVENOUS
Status: DISCONTINUED | OUTPATIENT
Start: 2023-01-01 | End: 2023-01-01

## 2023-01-01 RX ORDER — LATANOPROST 50 UG/ML
1 SOLUTION/ DROPS OPHTHALMIC DAILY
Qty: 7.5 ML | Refills: 3 | Status: ON HOLD | OUTPATIENT
Start: 2023-01-01 | End: 2023-01-01

## 2023-01-01 RX ORDER — SEMAGLUTIDE 0.68 MG/ML
0.5 INJECTION, SOLUTION SUBCUTANEOUS
Qty: 3 ML | Refills: 2 | Status: SHIPPED | OUTPATIENT
Start: 2023-01-01 | End: 2023-01-01 | Stop reason: SDUPTHER

## 2023-01-01 RX ORDER — INSULIN ASPART 100 [IU]/ML
6 INJECTION, SOLUTION INTRAVENOUS; SUBCUTANEOUS
Status: DISCONTINUED | OUTPATIENT
Start: 2023-01-01 | End: 2023-01-01

## 2023-01-01 RX ORDER — INSULIN ASPART 100 [IU]/ML
0-10 INJECTION, SOLUTION INTRAVENOUS; SUBCUTANEOUS EVERY 6 HOURS PRN
Status: DISCONTINUED | OUTPATIENT
Start: 2023-01-01 | End: 2023-01-01

## 2023-01-01 RX ORDER — TALC
6 POWDER (GRAM) TOPICAL NIGHTLY PRN
Status: DISCONTINUED | OUTPATIENT
Start: 2023-01-01 | End: 2023-01-01

## 2023-01-01 RX ORDER — METOPROLOL TARTRATE 50 MG/1
50 TABLET ORAL ONCE
Status: COMPLETED | OUTPATIENT
Start: 2023-01-01 | End: 2023-01-01

## 2023-01-01 RX ORDER — INSULIN ASPART 100 [IU]/ML
0-10 INJECTION, SOLUTION INTRAVENOUS; SUBCUTANEOUS EVERY 4 HOURS PRN
Status: DISCONTINUED | OUTPATIENT
Start: 2023-01-01 | End: 2023-01-01

## 2023-01-01 RX ORDER — GABAPENTIN 300 MG/1
300 CAPSULE ORAL NIGHTLY
Qty: 30 CAPSULE | Refills: 11 | Status: ON HOLD | OUTPATIENT
Start: 2023-01-01 | End: 2023-01-01

## 2023-01-01 RX ORDER — FLUTICASONE FUROATE AND VILANTEROL 200; 25 UG/1; UG/1
1 POWDER RESPIRATORY (INHALATION) DAILY
Status: DISCONTINUED | OUTPATIENT
Start: 2023-01-01 | End: 2023-01-01

## 2023-01-01 RX ORDER — NAPROXEN SODIUM 220 MG/1
81 TABLET, FILM COATED ORAL DAILY
Status: DISCONTINUED | OUTPATIENT
Start: 2023-01-01 | End: 2023-01-01

## 2023-01-01 RX ORDER — WARFARIN SODIUM 5 MG/1
5 TABLET ORAL DAILY
Qty: 40 TABLET | Refills: 0 | Status: ON HOLD | OUTPATIENT
Start: 2023-01-01 | End: 2023-01-01

## 2023-01-01 RX ORDER — DORZOLAMIDE HCL 20 MG/ML
1 SOLUTION/ DROPS OPHTHALMIC 2 TIMES DAILY
Qty: 10 ML | Refills: 3 | Status: ON HOLD | OUTPATIENT
Start: 2023-01-01 | End: 2023-01-01

## 2023-01-01 RX ORDER — ATORVASTATIN CALCIUM 40 MG/1
80 TABLET, FILM COATED ORAL DAILY
Status: DISCONTINUED | OUTPATIENT
Start: 2023-01-01 | End: 2023-01-01

## 2023-01-01 RX ORDER — LIDOCAINE HYDROCHLORIDE 10 MG/ML
INJECTION INFILTRATION; PERINEURAL
Status: COMPLETED
Start: 2023-01-01 | End: 2023-01-01

## 2023-01-01 RX ORDER — NOREPINEPHRINE BITARTRATE 0.03 MG/ML
0-3 INJECTION, SOLUTION INTRAVENOUS CONTINUOUS
Status: DISCONTINUED | OUTPATIENT
Start: 2023-01-01 | End: 2023-01-01

## 2023-01-01 RX ORDER — ETOMIDATE 2 MG/ML
30 INJECTION INTRAVENOUS ONCE
Status: COMPLETED | OUTPATIENT
Start: 2023-01-01 | End: 2023-01-01

## 2023-01-01 RX ORDER — FUROSEMIDE 10 MG/ML
80 INJECTION INTRAMUSCULAR; INTRAVENOUS 3 TIMES DAILY
Status: DISCONTINUED | OUTPATIENT
Start: 2023-01-01 | End: 2023-01-01

## 2023-01-01 RX ORDER — METOPROLOL TARTRATE 50 MG/1
100 TABLET ORAL 2 TIMES DAILY
Status: DISCONTINUED | OUTPATIENT
Start: 2023-01-01 | End: 2023-01-01

## 2023-01-01 RX ORDER — DILTIAZEM HCL 1 MG/ML
0-15 INJECTION, SOLUTION INTRAVENOUS CONTINUOUS
Status: DISCONTINUED | OUTPATIENT
Start: 2023-01-01 | End: 2023-01-01

## 2023-01-01 RX ORDER — FUROSEMIDE 10 MG/ML
120 INJECTION INTRAMUSCULAR; INTRAVENOUS 2 TIMES DAILY
Status: DISCONTINUED | OUTPATIENT
Start: 2023-01-01 | End: 2023-01-01

## 2023-01-01 RX ORDER — LABETALOL HCL 20 MG/4 ML
10 SYRINGE (ML) INTRAVENOUS EVERY 4 HOURS PRN
Status: DISCONTINUED | OUTPATIENT
Start: 2023-01-01 | End: 2023-01-01

## 2023-01-01 RX ORDER — TRIAMCINOLONE ACETONIDE 40 MG/ML
40 INJECTION, SUSPENSION INTRA-ARTICULAR; INTRAMUSCULAR
Status: COMPLETED | OUTPATIENT
Start: 2023-01-01 | End: 2023-01-01

## 2023-01-01 RX ORDER — HYDROMORPHONE HYDROCHLORIDE 1 MG/ML
2 INJECTION, SOLUTION INTRAMUSCULAR; INTRAVENOUS; SUBCUTANEOUS ONCE
Status: COMPLETED | OUTPATIENT
Start: 2023-01-01 | End: 2023-01-01

## 2023-01-01 RX ORDER — FUROSEMIDE 10 MG/ML
60 INJECTION INTRAMUSCULAR; INTRAVENOUS ONCE
Status: COMPLETED | OUTPATIENT
Start: 2023-01-01 | End: 2023-01-01

## 2023-01-01 RX ORDER — PHENYLEPHRINE HCL IN 0.9% NACL 1 MG/10 ML
SYRINGE (ML) INTRAVENOUS
Status: COMPLETED
Start: 2023-01-01 | End: 2023-01-01

## 2023-01-01 RX ORDER — AMLODIPINE BESYLATE 2.5 MG/1
2.5 TABLET ORAL DAILY
Status: DISCONTINUED | OUTPATIENT
Start: 2023-01-01 | End: 2023-01-01

## 2023-01-01 RX ORDER — CYCLOSPORINE 0.5 MG/ML
1 EMULSION OPHTHALMIC 2 TIMES DAILY
Qty: 60 EACH | Refills: 12 | Status: ON HOLD | OUTPATIENT
Start: 2023-01-01 | End: 2023-01-01

## 2023-01-01 RX ORDER — SERTRALINE HYDROCHLORIDE 50 MG/1
100 TABLET, FILM COATED ORAL NIGHTLY
Status: DISCONTINUED | OUTPATIENT
Start: 2023-01-01 | End: 2023-01-01

## 2023-01-01 RX ORDER — PHENYLEPHRINE HYDROCHLORIDE 10 MG/ML
100 INJECTION INTRAVENOUS ONCE
Status: DISCONTINUED | OUTPATIENT
Start: 2023-01-01 | End: 2023-01-01

## 2023-01-01 RX ORDER — KETOROLAC TROMETHAMINE 5 MG/ML
1 SOLUTION OPHTHALMIC 4 TIMES DAILY
Qty: 5 ML | Refills: 0 | Status: SHIPPED | OUTPATIENT
Start: 2023-01-01 | End: 2023-01-01

## 2023-01-01 RX ORDER — PANTOPRAZOLE SODIUM 40 MG/10ML
40 INJECTION, POWDER, LYOPHILIZED, FOR SOLUTION INTRAVENOUS DAILY
Status: DISCONTINUED | OUTPATIENT
Start: 2023-01-01 | End: 2023-01-01

## 2023-01-01 RX ORDER — BUPIVACAINE HYDROCHLORIDE 2.5 MG/ML
INJECTION, SOLUTION EPIDURAL; INFILTRATION; INTRACAUDAL
Status: DISCONTINUED | OUTPATIENT
Start: 2023-01-01 | End: 2023-01-01 | Stop reason: HOSPADM

## 2023-01-01 RX ORDER — AMLODIPINE BESYLATE 2.5 MG/1
2.5 TABLET ORAL DAILY
Qty: 90 TABLET | Refills: 2 | Status: ON HOLD | OUTPATIENT
Start: 2023-01-01 | End: 2023-01-01

## 2023-01-01 RX ORDER — SEMAGLUTIDE 0.68 MG/ML
0.5 INJECTION, SOLUTION SUBCUTANEOUS
Qty: 3 ML | Refills: 2 | Status: ON HOLD | OUTPATIENT
Start: 2023-01-01 | End: 2023-01-01

## 2023-01-01 RX ORDER — LIDOCAINE HYDROCHLORIDE 10 MG/ML
5 INJECTION INFILTRATION; PERINEURAL ONCE
Status: COMPLETED | OUTPATIENT
Start: 2023-01-01 | End: 2023-01-01

## 2023-01-01 RX ORDER — HEPARIN SODIUM,PORCINE/D5W 25000/250
0-40 INTRAVENOUS SOLUTION INTRAVENOUS CONTINUOUS
Status: DISCONTINUED | OUTPATIENT
Start: 2023-01-01 | End: 2023-01-01

## 2023-01-01 RX ORDER — ETOMIDATE 2 MG/ML
INJECTION INTRAVENOUS
Status: COMPLETED
Start: 2023-01-01 | End: 2023-01-01

## 2023-01-01 RX ORDER — PROPOFOL 10 MG/ML
0-50 INJECTION, EMULSION INTRAVENOUS CONTINUOUS
Status: DISCONTINUED | OUTPATIENT
Start: 2023-01-01 | End: 2023-01-01

## 2023-01-01 RX ORDER — DEXAMETHASONE SODIUM PHOSPHATE 4 MG/ML
10 INJECTION, SOLUTION INTRA-ARTICULAR; INTRALESIONAL; INTRAMUSCULAR; INTRAVENOUS; SOFT TISSUE EVERY 24 HOURS
Status: DISCONTINUED | OUTPATIENT
Start: 2023-01-01 | End: 2023-01-01

## 2023-01-01 RX ORDER — TAMSULOSIN HYDROCHLORIDE 0.4 MG/1
CAPSULE ORAL
Qty: 30 CAPSULE | Refills: 11 | OUTPATIENT
Start: 2023-01-01

## 2023-01-01 RX ORDER — HYDROMORPHONE HYDROCHLORIDE 1 MG/ML
0.5 INJECTION, SOLUTION INTRAMUSCULAR; INTRAVENOUS; SUBCUTANEOUS
Status: DISCONTINUED | OUTPATIENT
Start: 2023-01-01 | End: 2023-01-01 | Stop reason: HOSPADM

## 2023-01-01 RX ORDER — HYDRALAZINE HYDROCHLORIDE 20 MG/ML
10 INJECTION INTRAMUSCULAR; INTRAVENOUS EVERY 8 HOURS PRN
Status: DISCONTINUED | OUTPATIENT
Start: 2023-01-01 | End: 2023-01-01

## 2023-01-01 RX ORDER — TRIAMCINOLONE ACETONIDE 40 MG/ML
INJECTION, SUSPENSION INTRA-ARTICULAR; INTRAMUSCULAR
Status: DISCONTINUED | OUTPATIENT
Start: 2023-01-01 | End: 2023-01-01 | Stop reason: HOSPADM

## 2023-01-01 RX ORDER — NOREPINEPHRINE BIT/0.9 % NACL 32MG/250ML
0-3 PLASTIC BAG, INJECTION (ML) INTRAVENOUS CONTINUOUS
Status: DISCONTINUED | OUTPATIENT
Start: 2023-01-01 | End: 2023-01-01

## 2023-01-01 RX ADMIN — IPRATROPIUM BROMIDE AND ALBUTEROL SULFATE 3 ML: .5; 3 SOLUTION RESPIRATORY (INHALATION) at 07:09

## 2023-01-01 RX ADMIN — INSULIN ASPART 6 UNITS: 100 INJECTION, SOLUTION INTRAVENOUS; SUBCUTANEOUS at 07:10

## 2023-01-01 RX ADMIN — IPRATROPIUM BROMIDE AND ALBUTEROL SULFATE 3 ML: .5; 3 SOLUTION RESPIRATORY (INHALATION) at 12:10

## 2023-01-01 RX ADMIN — ASPIRIN 81 MG CHEWABLE TABLET 81 MG: 81 TABLET CHEWABLE at 08:09

## 2023-01-01 RX ADMIN — ZINC SULFATE 220 MG (50 MG) CAPSULE 220 MG: CAPSULE at 08:10

## 2023-01-01 RX ADMIN — INSULIN ASPART 8 UNITS: 100 INJECTION, SOLUTION INTRAVENOUS; SUBCUTANEOUS at 09:10

## 2023-01-01 RX ADMIN — DILTIAZEM HYDROCHLORIDE 9 MG/HR: 5 INJECTION INTRAVENOUS at 06:09

## 2023-01-01 RX ADMIN — INSULIN ASPART 6 UNITS: 100 INJECTION, SOLUTION INTRAVENOUS; SUBCUTANEOUS at 08:09

## 2023-01-01 RX ADMIN — NOREPINEPHRINE BITARTRATE 0.1 MCG/KG/MIN: 4 INJECTION, SOLUTION INTRAVENOUS at 03:10

## 2023-01-01 RX ADMIN — SENNOSIDES AND DOCUSATE SODIUM 2 TABLET: 50; 8.6 TABLET ORAL at 08:10

## 2023-01-01 RX ADMIN — PROPOFOL 50 MCG/KG/MIN: 10 INJECTION, EMULSION INTRAVENOUS at 05:09

## 2023-01-01 RX ADMIN — SENNOSIDES AND DOCUSATE SODIUM 2 TABLET: 50; 8.6 TABLET ORAL at 09:10

## 2023-01-01 RX ADMIN — FUROSEMIDE 80 MG: 10 INJECTION, SOLUTION INTRAVENOUS at 09:09

## 2023-01-01 RX ADMIN — HYDROMORPHONE HYDROCHLORIDE 2 MG: 1 INJECTION, SOLUTION INTRAMUSCULAR; INTRAVENOUS; SUBCUTANEOUS at 03:10

## 2023-01-01 RX ADMIN — INSULIN ASPART 4 UNITS: 100 INJECTION, SOLUTION INTRAVENOUS; SUBCUTANEOUS at 12:09

## 2023-01-01 RX ADMIN — PIPERACILLIN AND TAZOBACTAM 4.5 G: 4; .5 INJECTION, POWDER, LYOPHILIZED, FOR SOLUTION INTRAVENOUS; PARENTERAL at 10:09

## 2023-01-01 RX ADMIN — INSULIN ASPART 4 UNITS: 100 INJECTION, SOLUTION INTRAVENOUS; SUBCUTANEOUS at 03:10

## 2023-01-01 RX ADMIN — IPRATROPIUM BROMIDE AND ALBUTEROL SULFATE 3 ML: .5; 3 SOLUTION RESPIRATORY (INHALATION) at 07:10

## 2023-01-01 RX ADMIN — PROPOFOL 50 MCG/KG/MIN: 10 INJECTION, EMULSION INTRAVENOUS at 12:09

## 2023-01-01 RX ADMIN — INSULIN ASPART 6 UNITS: 100 INJECTION, SOLUTION INTRAVENOUS; SUBCUTANEOUS at 11:10

## 2023-01-01 RX ADMIN — METOPROLOL TARTRATE 50 MG: 50 TABLET, FILM COATED ORAL at 08:09

## 2023-01-01 RX ADMIN — INSULIN ASPART 2 UNITS: 100 INJECTION, SOLUTION INTRAVENOUS; SUBCUTANEOUS at 09:10

## 2023-01-01 RX ADMIN — ZINC SULFATE 220 MG (50 MG) CAPSULE 220 MG: CAPSULE at 08:09

## 2023-01-01 RX ADMIN — TRIAMCINOLONE ACETONIDE 40 MG: 40 INJECTION, SUSPENSION INTRA-ARTICULAR; INTRAMUSCULAR at 11:05

## 2023-01-01 RX ADMIN — PROPOFOL 50 MCG/KG/MIN: 10 INJECTION, EMULSION INTRAVENOUS at 06:10

## 2023-01-01 RX ADMIN — ZINC SULFATE 220 MG (50 MG) CAPSULE 220 MG: CAPSULE at 09:10

## 2023-01-01 RX ADMIN — Medication 175 MCG/HR: at 03:10

## 2023-01-01 RX ADMIN — MINERAL OIL AND WHITE PETROLATUM: 30; 940 OINTMENT OPHTHALMIC at 09:10

## 2023-01-01 RX ADMIN — ATORVASTATIN CALCIUM 80 MG: 40 TABLET, FILM COATED ORAL at 08:09

## 2023-01-01 RX ADMIN — INSULIN ASPART 4 UNITS: 100 INJECTION, SOLUTION INTRAVENOUS; SUBCUTANEOUS at 08:09

## 2023-01-01 RX ADMIN — PANTOPRAZOLE SODIUM 40 MG: 40 INJECTION, POWDER, FOR SOLUTION INTRAVENOUS at 08:10

## 2023-01-01 RX ADMIN — PANTOPRAZOLE SODIUM 40 MG: 40 INJECTION, POWDER, FOR SOLUTION INTRAVENOUS at 09:09

## 2023-01-01 RX ADMIN — HYDROCORTISONE SODIUM SUCCINATE 100 MG: 100 INJECTION, POWDER, FOR SOLUTION INTRAMUSCULAR; INTRAVENOUS at 12:09

## 2023-01-01 RX ADMIN — CISATRACURIUM BESYLATE 1.5 MCG/KG/MIN: 10 INJECTION, SOLUTION INTRAVENOUS at 05:10

## 2023-01-01 RX ADMIN — Medication 225 MCG/HR: at 08:10

## 2023-01-01 RX ADMIN — FENTANYL CITRATE 50 MCG: 50 INJECTION, SOLUTION INTRAMUSCULAR; INTRAVENOUS at 06:10

## 2023-01-01 RX ADMIN — INSULIN ASPART 6 UNITS: 100 INJECTION, SOLUTION INTRAVENOUS; SUBCUTANEOUS at 04:09

## 2023-01-01 RX ADMIN — LIDOCAINE HYDROCHLORIDE 5 ML: 10 INJECTION INFILTRATION; PERINEURAL at 04:09

## 2023-01-01 RX ADMIN — FUROSEMIDE 60 MG: 10 INJECTION, SOLUTION INTRAVENOUS at 09:09

## 2023-01-01 RX ADMIN — VANCOMYCIN HYDROCHLORIDE 1500 MG: 1.5 INJECTION, POWDER, LYOPHILIZED, FOR SOLUTION INTRAVENOUS at 01:10

## 2023-01-01 RX ADMIN — INSULIN ASPART 8 UNITS: 100 INJECTION, SOLUTION INTRAVENOUS; SUBCUTANEOUS at 07:10

## 2023-01-01 RX ADMIN — PIPERACILLIN AND TAZOBACTAM 4.5 G: 4; .5 INJECTION, POWDER, LYOPHILIZED, FOR SOLUTION INTRAVENOUS; PARENTERAL at 01:10

## 2023-01-01 RX ADMIN — PROPOFOL 50 MCG/KG/MIN: 10 INJECTION, EMULSION INTRAVENOUS at 05:10

## 2023-01-01 RX ADMIN — INSULIN ASPART 8 UNITS: 100 INJECTION, SOLUTION INTRAVENOUS; SUBCUTANEOUS at 03:10

## 2023-01-01 RX ADMIN — Medication 225 MCG/HR: at 06:10

## 2023-01-01 RX ADMIN — CISATRACURIUM BESYLATE 1.5 MCG/KG/MIN: 10 INJECTION, SOLUTION INTRAVENOUS at 05:09

## 2023-01-01 RX ADMIN — MINERAL OIL AND WHITE PETROLATUM: 30; 940 OINTMENT OPHTHALMIC at 10:10

## 2023-01-01 RX ADMIN — IPRATROPIUM BROMIDE AND ALBUTEROL SULFATE 3 ML: .5; 3 SOLUTION RESPIRATORY (INHALATION) at 12:09

## 2023-01-01 RX ADMIN — IPRATROPIUM BROMIDE AND ALBUTEROL SULFATE 3 ML: .5; 3 SOLUTION RESPIRATORY (INHALATION) at 02:09

## 2023-01-01 RX ADMIN — INSULIN ASPART 6 UNITS: 100 INJECTION, SOLUTION INTRAVENOUS; SUBCUTANEOUS at 12:10

## 2023-01-01 RX ADMIN — NOREPINEPHRINE BITARTRATE 0.02 MCG/KG/MIN: 4 INJECTION, SOLUTION INTRAVENOUS at 05:09

## 2023-01-01 RX ADMIN — Medication 225 MCG/HR: at 12:09

## 2023-01-01 RX ADMIN — NOREPINEPHRINE BITARTRATE 0.08 MCG/KG/MIN: 4 INJECTION, SOLUTION INTRAVENOUS at 04:10

## 2023-01-01 RX ADMIN — SENNOSIDES AND DOCUSATE SODIUM 2 TABLET: 50; 8.6 TABLET ORAL at 08:09

## 2023-01-01 RX ADMIN — DEXAMETHASONE SODIUM PHOSPHATE 10 MG: 4 INJECTION INTRA-ARTICULAR; INTRALESIONAL; INTRAMUSCULAR; INTRAVENOUS; SOFT TISSUE at 09:10

## 2023-01-01 RX ADMIN — HEPARIN SODIUM 14 UNITS/KG/HR: 10000 INJECTION, SOLUTION INTRAVENOUS at 06:09

## 2023-01-01 RX ADMIN — MINERAL OIL AND WHITE PETROLATUM: 30; 940 OINTMENT OPHTHALMIC at 07:10

## 2023-01-01 RX ADMIN — PIPERACILLIN AND TAZOBACTAM 4.5 G: 4; .5 INJECTION, POWDER, LYOPHILIZED, FOR SOLUTION INTRAVENOUS; PARENTERAL at 09:10

## 2023-01-01 RX ADMIN — PANTOPRAZOLE SODIUM 40 MG: 40 INJECTION, POWDER, FOR SOLUTION INTRAVENOUS at 10:09

## 2023-01-01 RX ADMIN — PIPERACILLIN AND TAZOBACTAM 4.5 G: 4; .5 INJECTION, POWDER, LYOPHILIZED, FOR SOLUTION INTRAVENOUS; PARENTERAL at 11:10

## 2023-01-01 RX ADMIN — PROPOFOL 50 MCG/KG/MIN: 10 INJECTION, EMULSION INTRAVENOUS at 08:09

## 2023-01-01 RX ADMIN — PROPOFOL 50 MCG/KG/MIN: 10 INJECTION, EMULSION INTRAVENOUS at 02:10

## 2023-01-01 RX ADMIN — MUPIROCIN: 20 OINTMENT TOPICAL at 08:09

## 2023-01-01 RX ADMIN — PROPOFOL 50 MCG/KG/MIN: 10 INJECTION, EMULSION INTRAVENOUS at 11:10

## 2023-01-01 RX ADMIN — MINERAL OIL AND WHITE PETROLATUM: 30; 940 OINTMENT OPHTHALMIC at 01:10

## 2023-01-01 RX ADMIN — INSULIN ASPART 6 UNITS: 100 INJECTION, SOLUTION INTRAVENOUS; SUBCUTANEOUS at 07:09

## 2023-01-01 RX ADMIN — CISATRACURIUM BESYLATE 1.5 MCG/KG/MIN: 10 INJECTION, SOLUTION INTRAVENOUS at 03:10

## 2023-01-01 RX ADMIN — SERTRALINE HYDROCHLORIDE 100 MG: 50 TABLET ORAL at 08:10

## 2023-01-01 RX ADMIN — PROPOFOL 50 MCG/KG/MIN: 10 INJECTION, EMULSION INTRAVENOUS at 06:09

## 2023-01-01 RX ADMIN — ATORVASTATIN CALCIUM 80 MG: 40 TABLET, FILM COATED ORAL at 09:10

## 2023-01-01 RX ADMIN — CHLOROTHIAZIDE SODIUM 500 MG: 500 INJECTION, POWDER, LYOPHILIZED, FOR SOLUTION INTRAVENOUS at 09:10

## 2023-01-01 RX ADMIN — PROPOFOL 50 MCG/KG/MIN: 10 INJECTION, EMULSION INTRAVENOUS at 10:09

## 2023-01-01 RX ADMIN — HEPARIN SODIUM 11 UNITS/KG/HR: 10000 INJECTION, SOLUTION INTRAVENOUS at 11:10

## 2023-01-01 RX ADMIN — NOREPINEPHRINE BITARTRATE 0.18 MCG/KG/MIN: 1 INJECTION, SOLUTION, CONCENTRATE INTRAVENOUS at 11:10

## 2023-01-01 RX ADMIN — INSULIN ASPART 6 UNITS: 100 INJECTION, SOLUTION INTRAVENOUS; SUBCUTANEOUS at 06:10

## 2023-01-01 RX ADMIN — PROPOFOL 50 MCG/KG/MIN: 10 INJECTION, EMULSION INTRAVENOUS at 03:10

## 2023-01-01 RX ADMIN — MINERAL OIL AND WHITE PETROLATUM: 30; 940 OINTMENT OPHTHALMIC at 05:09

## 2023-01-01 RX ADMIN — PROPOFOL 50 MCG/KG/MIN: 10 INJECTION, EMULSION INTRAVENOUS at 08:10

## 2023-01-01 RX ADMIN — NOREPINEPHRINE BITARTRATE 0.1 MCG/KG/MIN: 4 INJECTION, SOLUTION INTRAVENOUS at 07:09

## 2023-01-01 RX ADMIN — NOREPINEPHRINE BITARTRATE 0.03 MCG/KG/MIN: 4 INJECTION, SOLUTION INTRAVENOUS at 02:10

## 2023-01-01 RX ADMIN — MUPIROCIN: 20 OINTMENT TOPICAL at 09:09

## 2023-01-01 RX ADMIN — IPRATROPIUM BROMIDE AND ALBUTEROL SULFATE 3 ML: .5; 3 SOLUTION RESPIRATORY (INHALATION) at 01:10

## 2023-01-01 RX ADMIN — ZINC SULFATE 220 MG (50 MG) CAPSULE 220 MG: CAPSULE at 11:09

## 2023-01-01 RX ADMIN — HEPARIN SODIUM 14 UNITS/KG/HR: 10000 INJECTION, SOLUTION INTRAVENOUS at 01:09

## 2023-01-01 RX ADMIN — INSULIN ASPART 4 UNITS: 100 INJECTION, SOLUTION INTRAVENOUS; SUBCUTANEOUS at 01:10

## 2023-01-01 RX ADMIN — MUPIROCIN: 20 OINTMENT TOPICAL at 10:09

## 2023-01-01 RX ADMIN — PROPOFOL 50 MCG/KG/MIN: 10 INJECTION, EMULSION INTRAVENOUS at 03:09

## 2023-01-01 RX ADMIN — INSULIN ASPART 8 UNITS: 100 INJECTION, SOLUTION INTRAVENOUS; SUBCUTANEOUS at 12:09

## 2023-01-01 RX ADMIN — PROPOFOL 50 MCG/KG/MIN: 10 INJECTION, EMULSION INTRAVENOUS at 04:09

## 2023-01-01 RX ADMIN — DEXAMETHASONE SODIUM PHOSPHATE 20 MG: 4 INJECTION INTRA-ARTICULAR; INTRALESIONAL; INTRAMUSCULAR; INTRAVENOUS; SOFT TISSUE at 08:10

## 2023-01-01 RX ADMIN — Medication 225 MCG/HR: at 10:09

## 2023-01-01 RX ADMIN — PIPERACILLIN AND TAZOBACTAM 4.5 G: 4; .5 INJECTION, POWDER, LYOPHILIZED, FOR SOLUTION INTRAVENOUS; PARENTERAL at 06:09

## 2023-01-01 RX ADMIN — POTASSIUM CHLORIDE 10 MEQ: 7.46 INJECTION, SOLUTION INTRAVENOUS at 12:09

## 2023-01-01 RX ADMIN — METOROPROLOL TARTRATE 5 MG: 5 INJECTION, SOLUTION INTRAVENOUS at 02:09

## 2023-01-01 RX ADMIN — METOROPROLOL TARTRATE 5 MG: 5 INJECTION, SOLUTION INTRAVENOUS at 05:09

## 2023-01-01 RX ADMIN — PROPOFOL 50 MCG/KG/MIN: 10 INJECTION, EMULSION INTRAVENOUS at 09:10

## 2023-01-01 RX ADMIN — PANTOPRAZOLE SODIUM 40 MG: 40 INJECTION, POWDER, FOR SOLUTION INTRAVENOUS at 09:10

## 2023-01-01 RX ADMIN — METOPROLOL TARTRATE 50 MG: 50 TABLET, FILM COATED ORAL at 10:10

## 2023-01-01 RX ADMIN — PIPERACILLIN AND TAZOBACTAM 4.5 G: 4; .5 INJECTION, POWDER, LYOPHILIZED, FOR SOLUTION INTRAVENOUS; PARENTERAL at 02:09

## 2023-01-01 RX ADMIN — IOHEXOL 75 ML: 350 INJECTION, SOLUTION INTRAVENOUS at 11:10

## 2023-01-01 RX ADMIN — HEPARIN SODIUM 11 UNITS/KG/HR: 10000 INJECTION, SOLUTION INTRAVENOUS at 10:10

## 2023-01-01 RX ADMIN — NOREPINEPHRINE BITARTRATE 0.08 MCG/KG/MIN: 4 INJECTION, SOLUTION INTRAVENOUS at 06:10

## 2023-01-01 RX ADMIN — CISATRACURIUM BESYLATE 1 MCG/KG/MIN: 10 INJECTION, SOLUTION INTRAVENOUS at 03:09

## 2023-01-01 RX ADMIN — HYDROCORTISONE SODIUM SUCCINATE 100 MG: 100 INJECTION, POWDER, FOR SOLUTION INTRAMUSCULAR; INTRAVENOUS at 06:09

## 2023-01-01 RX ADMIN — NOREPINEPHRINE BITARTRATE 0.18 MCG/KG/MIN: 4 INJECTION, SOLUTION INTRAVENOUS at 10:10

## 2023-01-01 RX ADMIN — PANTOPRAZOLE SODIUM 40 MG: 40 INJECTION, POWDER, FOR SOLUTION INTRAVENOUS at 08:09

## 2023-01-01 RX ADMIN — ASPIRIN 81 MG CHEWABLE TABLET 81 MG: 81 TABLET CHEWABLE at 08:10

## 2023-01-01 RX ADMIN — INSULIN ASPART 6 UNITS: 100 INJECTION, SOLUTION INTRAVENOUS; SUBCUTANEOUS at 03:10

## 2023-01-01 RX ADMIN — PROPOFOL 50 MCG/KG/MIN: 10 INJECTION, EMULSION INTRAVENOUS at 11:09

## 2023-01-01 RX ADMIN — MAGNESIUM SULFATE HEPTAHYDRATE 2 G: 40 INJECTION, SOLUTION INTRAVENOUS at 10:09

## 2023-01-01 RX ADMIN — IPRATROPIUM BROMIDE AND ALBUTEROL SULFATE 3 ML: .5; 3 SOLUTION RESPIRATORY (INHALATION) at 08:10

## 2023-01-01 RX ADMIN — ASPIRIN 81 MG CHEWABLE TABLET 81 MG: 81 TABLET CHEWABLE at 09:10

## 2023-01-01 RX ADMIN — METOPROLOL TARTRATE 100 MG: 50 TABLET, FILM COATED ORAL at 08:10

## 2023-01-01 RX ADMIN — IPRATROPIUM BROMIDE AND ALBUTEROL SULFATE 3 ML: .5; 3 SOLUTION RESPIRATORY (INHALATION) at 03:10

## 2023-01-01 RX ADMIN — PIPERACILLIN AND TAZOBACTAM 4.5 G: 4; .5 INJECTION, POWDER, LYOPHILIZED, FOR SOLUTION INTRAVENOUS; PARENTERAL at 05:10

## 2023-01-01 RX ADMIN — METOROPROLOL TARTRATE 5 MG: 5 INJECTION, SOLUTION INTRAVENOUS at 10:09

## 2023-01-01 RX ADMIN — POTASSIUM BICARBONATE 40 MEQ: 391 TABLET, EFFERVESCENT ORAL at 05:09

## 2023-01-01 RX ADMIN — FUROSEMIDE 120 MG: 10 INJECTION, SOLUTION INTRAVENOUS at 10:10

## 2023-01-01 RX ADMIN — PIPERACILLIN AND TAZOBACTAM 4.5 G: 4; .5 INJECTION, POWDER, LYOPHILIZED, FOR SOLUTION INTRAVENOUS; PARENTERAL at 02:10

## 2023-01-01 RX ADMIN — ASPIRIN 81 MG: 81 TABLET, COATED ORAL at 09:09

## 2023-01-01 RX ADMIN — IPRATROPIUM BROMIDE AND ALBUTEROL SULFATE 3 ML: .5; 3 SOLUTION RESPIRATORY (INHALATION) at 08:09

## 2023-01-01 RX ADMIN — SODIUM CHLORIDE 50 ML/HR: 3 INJECTION, SOLUTION INTRAVENOUS at 02:10

## 2023-01-01 RX ADMIN — DILTIAZEM HYDROCHLORIDE 2 MG/HR: 5 INJECTION INTRAVENOUS at 11:09

## 2023-01-01 RX ADMIN — INSULIN ASPART 6 UNITS: 100 INJECTION, SOLUTION INTRAVENOUS; SUBCUTANEOUS at 04:10

## 2023-01-01 RX ADMIN — LEVETIRACETAM 500 MG: 100 INJECTION INTRAVENOUS at 08:10

## 2023-01-01 RX ADMIN — MUPIROCIN: 20 OINTMENT TOPICAL at 08:10

## 2023-01-01 RX ADMIN — POLYETHYLENE GLYCOL 3350 17 G: 17 POWDER, FOR SOLUTION ORAL at 08:09

## 2023-01-01 RX ADMIN — PIPERACILLIN AND TAZOBACTAM 4.5 G: 4; .5 INJECTION, POWDER, LYOPHILIZED, FOR SOLUTION INTRAVENOUS; PARENTERAL at 06:10

## 2023-01-01 RX ADMIN — POLYETHYLENE GLYCOL 3350 17 G: 17 POWDER, FOR SOLUTION ORAL at 08:10

## 2023-01-01 RX ADMIN — INSULIN ASPART 2 UNITS: 100 INJECTION, SOLUTION INTRAVENOUS; SUBCUTANEOUS at 06:09

## 2023-01-01 RX ADMIN — INSULIN ASPART 4 UNITS: 100 INJECTION, SOLUTION INTRAVENOUS; SUBCUTANEOUS at 06:09

## 2023-01-01 RX ADMIN — POLYETHYLENE GLYCOL 3350 17 G: 17 POWDER, FOR SOLUTION ORAL at 09:10

## 2023-01-01 RX ADMIN — NOREPINEPHRINE BITARTRATE 0.05 MCG/KG/MIN: 4 INJECTION, SOLUTION INTRAVENOUS at 06:10

## 2023-01-01 RX ADMIN — METHYLPREDNISOLONE SODIUM SUCCINATE 60 MG: 40 INJECTION, POWDER, FOR SOLUTION INTRAMUSCULAR; INTRAVENOUS at 05:09

## 2023-01-01 RX ADMIN — INSULIN ASPART 8 UNITS: 100 INJECTION, SOLUTION INTRAVENOUS; SUBCUTANEOUS at 11:10

## 2023-01-01 RX ADMIN — PIPERACILLIN AND TAZOBACTAM 4.5 G: 4; .5 INJECTION, POWDER, LYOPHILIZED, FOR SOLUTION INTRAVENOUS; PARENTERAL at 05:09

## 2023-01-01 RX ADMIN — FUROSEMIDE 120 MG: 10 INJECTION, SOLUTION INTRAVENOUS at 08:10

## 2023-01-01 RX ADMIN — MINERAL OIL AND WHITE PETROLATUM: 30; 940 OINTMENT OPHTHALMIC at 01:09

## 2023-01-01 RX ADMIN — INSULIN ASPART 8 UNITS: 100 INJECTION, SOLUTION INTRAVENOUS; SUBCUTANEOUS at 05:10

## 2023-01-01 RX ADMIN — PROPOFOL 20 MCG/KG/MIN: 10 INJECTION, EMULSION INTRAVENOUS at 11:09

## 2023-01-01 RX ADMIN — CHLOROTHIAZIDE SODIUM 500 MG: 500 INJECTION, POWDER, LYOPHILIZED, FOR SOLUTION INTRAVENOUS at 10:10

## 2023-01-01 RX ADMIN — INSULIN ASPART 4 UNITS: 100 INJECTION, SOLUTION INTRAVENOUS; SUBCUTANEOUS at 07:10

## 2023-01-01 RX ADMIN — MINERAL OIL AND WHITE PETROLATUM: 30; 940 OINTMENT OPHTHALMIC at 02:09

## 2023-01-01 RX ADMIN — INSULIN ASPART 4 UNITS: 100 INJECTION, SOLUTION INTRAVENOUS; SUBCUTANEOUS at 09:10

## 2023-01-01 RX ADMIN — NOREPINEPHRINE BITARTRATE 0.1 MCG/KG/MIN: 4 INJECTION, SOLUTION INTRAVENOUS at 03:09

## 2023-01-01 RX ADMIN — INSULIN ASPART 6 UNITS: 100 INJECTION, SOLUTION INTRAVENOUS; SUBCUTANEOUS at 01:10

## 2023-01-01 RX ADMIN — MINERAL OIL AND WHITE PETROLATUM: 30; 940 OINTMENT OPHTHALMIC at 09:09

## 2023-01-01 RX ADMIN — POTASSIUM CHLORIDE 10 MEQ: 7.46 INJECTION, SOLUTION INTRAVENOUS at 07:09

## 2023-01-01 RX ADMIN — POTASSIUM CHLORIDE 10 MEQ: 7.46 INJECTION, SOLUTION INTRAVENOUS at 08:09

## 2023-01-01 RX ADMIN — FUROSEMIDE 80 MG: 10 INJECTION, SOLUTION INTRAVENOUS at 08:09

## 2023-01-01 RX ADMIN — DEXAMETHASONE SODIUM PHOSPHATE 20 MG: 4 INJECTION INTRA-ARTICULAR; INTRALESIONAL; INTRAMUSCULAR; INTRAVENOUS; SOFT TISSUE at 09:09

## 2023-01-01 RX ADMIN — LEVETIRACETAM 500 MG: 100 INJECTION INTRAVENOUS at 09:10

## 2023-01-01 RX ADMIN — FENTANYL CITRATE 25 MCG: 50 INJECTION INTRAMUSCULAR; INTRAVENOUS at 11:10

## 2023-01-01 RX ADMIN — METOPROLOL TARTRATE 50 MG: 50 TABLET, FILM COATED ORAL at 08:10

## 2023-01-01 RX ADMIN — NOREPINEPHRINE BITARTRATE 0.03 MCG/KG/MIN: 4 INJECTION, SOLUTION INTRAVENOUS at 07:09

## 2023-01-01 RX ADMIN — LIDOCAINE HYDROCHLORIDE 5 ML: 10 INJECTION, SOLUTION INFILTRATION; PERINEURAL at 04:09

## 2023-01-01 RX ADMIN — Medication 250 MCG/HR: at 05:10

## 2023-01-01 RX ADMIN — ATORVASTATIN CALCIUM 80 MG: 40 TABLET, FILM COATED ORAL at 08:10

## 2023-01-01 RX ADMIN — Medication 25 MCG/HR: at 04:09

## 2023-01-01 RX ADMIN — PROPOFOL 50 MCG/KG/MIN: 10 INJECTION, EMULSION INTRAVENOUS at 01:09

## 2023-01-01 RX ADMIN — Medication 225 MCG/HR: at 05:10

## 2023-01-01 RX ADMIN — PROPOFOL 50 MCG/KG/MIN: 10 INJECTION, EMULSION INTRAVENOUS at 07:10

## 2023-01-01 RX ADMIN — ETOMIDATE 30 MG: 2 INJECTION INTRAVENOUS at 11:09

## 2023-01-01 RX ADMIN — FUROSEMIDE 80 MG: 10 INJECTION, SOLUTION INTRAVENOUS at 08:10

## 2023-01-01 RX ADMIN — SERTRALINE HYDROCHLORIDE 100 MG: 50 TABLET ORAL at 09:09

## 2023-01-01 RX ADMIN — SENNOSIDES AND DOCUSATE SODIUM 2 TABLET: 50; 8.6 TABLET ORAL at 11:09

## 2023-01-01 RX ADMIN — HEPARIN SODIUM 14 UNITS/KG/HR: 10000 INJECTION, SOLUTION INTRAVENOUS at 07:09

## 2023-01-01 RX ADMIN — MINERAL OIL AND WHITE PETROLATUM: 30; 940 OINTMENT OPHTHALMIC at 06:09

## 2023-01-01 RX ADMIN — PIPERACILLIN AND TAZOBACTAM 4.5 G: 4; .5 INJECTION, POWDER, LYOPHILIZED, FOR SOLUTION INTRAVENOUS; PARENTERAL at 03:09

## 2023-01-01 RX ADMIN — DEXAMETHASONE SODIUM PHOSPHATE 20 MG: 4 INJECTION INTRA-ARTICULAR; INTRALESIONAL; INTRAMUSCULAR; INTRAVENOUS; SOFT TISSUE at 08:09

## 2023-01-01 RX ADMIN — MINERAL OIL AND WHITE PETROLATUM: 30; 940 OINTMENT OPHTHALMIC at 02:10

## 2023-01-01 RX ADMIN — METOPROLOL TARTRATE 50 MG: 50 TABLET, FILM COATED ORAL at 04:09

## 2023-01-01 RX ADMIN — INSULIN ASPART 4 UNITS: 100 INJECTION, SOLUTION INTRAVENOUS; SUBCUTANEOUS at 12:10

## 2023-01-01 RX ADMIN — HYDROMORPHONE HYDROCHLORIDE 2 MG: 1 INJECTION, SOLUTION INTRAMUSCULAR; INTRAVENOUS; SUBCUTANEOUS at 02:10

## 2023-01-01 RX ADMIN — INSULIN ASPART 4 UNITS: 100 INJECTION, SOLUTION INTRAVENOUS; SUBCUTANEOUS at 11:10

## 2023-01-01 RX ADMIN — SERTRALINE HYDROCHLORIDE 100 MG: 50 TABLET ORAL at 08:09

## 2023-01-01 RX ADMIN — PROPOFOL 50 MCG/KG/MIN: 10 INJECTION, EMULSION INTRAVENOUS at 09:09

## 2023-01-01 RX ADMIN — CHLOROTHIAZIDE SODIUM 500 MG: 500 INJECTION, POWDER, LYOPHILIZED, FOR SOLUTION INTRAVENOUS at 12:10

## 2023-01-01 RX ADMIN — POLYETHYLENE GLYCOL 3350 17 G: 17 POWDER, FOR SOLUTION ORAL at 11:09

## 2023-01-01 RX ADMIN — INSULIN ASPART 4 UNITS: 100 INJECTION, SOLUTION INTRAVENOUS; SUBCUTANEOUS at 09:09

## 2023-01-01 RX ADMIN — PROPOFOL 35 MCG/KG/MIN: 10 INJECTION, EMULSION INTRAVENOUS at 01:10

## 2023-01-01 RX ADMIN — PROPOFOL 50 MCG/KG/MIN: 10 INJECTION, EMULSION INTRAVENOUS at 01:10

## 2023-01-01 RX ADMIN — IPRATROPIUM BROMIDE AND ALBUTEROL SULFATE 3 ML: .5; 3 SOLUTION RESPIRATORY (INHALATION) at 01:09

## 2023-01-01 RX ADMIN — METOPROLOL TARTRATE 50 MG: 50 TABLET, FILM COATED ORAL at 09:09

## 2023-01-01 RX ADMIN — METHYLPREDNISOLONE SODIUM SUCCINATE 60 MG: 40 INJECTION, POWDER, FOR SOLUTION INTRAMUSCULAR; INTRAVENOUS at 11:09

## 2023-01-01 RX ADMIN — PIPERACILLIN AND TAZOBACTAM 4.5 G: 4; .5 INJECTION, POWDER, LYOPHILIZED, FOR SOLUTION INTRAVENOUS; PARENTERAL at 10:10

## 2023-01-01 RX ADMIN — NOREPINEPHRINE BITARTRATE 0.14 MCG/KG/MIN: 4 INJECTION, SOLUTION INTRAVENOUS at 11:10

## 2023-01-01 RX ADMIN — INSULIN ASPART 4 UNITS: 100 INJECTION, SOLUTION INTRAVENOUS; SUBCUTANEOUS at 05:09

## 2023-01-01 RX ADMIN — SODIUM CHLORIDE 50 ML/HR: 3 INJECTION, SOLUTION INTRAVENOUS at 01:10

## 2023-01-01 RX ADMIN — LORAZEPAM 2 MG: 2 INJECTION INTRAMUSCULAR; INTRAVENOUS at 03:10

## 2023-01-01 RX ADMIN — NOREPINEPHRINE BITARTRATE 0.12 MCG/KG/MIN: 4 INJECTION, SOLUTION INTRAVENOUS at 04:10

## 2023-01-01 RX ADMIN — POTASSIUM CHLORIDE 10 MEQ: 7.46 INJECTION, SOLUTION INTRAVENOUS at 09:09

## 2023-01-01 RX ADMIN — PROPOFOL 50 MCG/KG/MIN: 10 INJECTION, EMULSION INTRAVENOUS at 07:09

## 2023-01-01 RX ADMIN — INSULIN ASPART 6 UNITS: 100 INJECTION, SOLUTION INTRAVENOUS; SUBCUTANEOUS at 12:09

## 2023-01-01 RX ADMIN — INSULIN ASPART 2 UNITS: 100 INJECTION, SOLUTION INTRAVENOUS; SUBCUTANEOUS at 09:09

## 2023-01-01 RX ADMIN — MAGNESIUM SULFATE HEPTAHYDRATE 2 G: 40 INJECTION, SOLUTION INTRAVENOUS at 12:09

## 2023-01-01 RX ADMIN — POLYETHYLENE GLYCOL 3350 17 G: 17 POWDER, FOR SOLUTION ORAL at 10:10

## 2023-01-01 RX ADMIN — MINERAL OIL AND WHITE PETROLATUM: 30; 940 OINTMENT OPHTHALMIC at 05:10

## 2023-01-01 RX ADMIN — POTASSIUM BICARBONATE 40 MEQ: 391 TABLET, EFFERVESCENT ORAL at 03:09

## 2023-01-01 RX ADMIN — INSULIN ASPART 2 UNITS: 100 INJECTION, SOLUTION INTRAVENOUS; SUBCUTANEOUS at 05:09

## 2023-01-01 RX ADMIN — DILTIAZEM HYDROCHLORIDE 2 MG/HR: 5 INJECTION INTRAVENOUS at 07:09

## 2023-01-01 RX ADMIN — FUROSEMIDE 80 MG: 10 INJECTION, SOLUTION INTRAVENOUS at 10:09

## 2023-01-01 RX ADMIN — INSULIN HUMAN 1.5 UNITS/HR: 1 INJECTION, SOLUTION INTRAVENOUS at 10:10

## 2023-01-01 RX ADMIN — METOROPROLOL TARTRATE 5 MG: 5 INJECTION, SOLUTION INTRAVENOUS at 01:09

## 2023-01-01 RX ADMIN — SODIUM CHLORIDE 250 ML: 3 INJECTION, SOLUTION INTRAVENOUS at 10:10

## 2023-01-01 RX ADMIN — NOREPINEPHRINE BITARTRATE 0.04 MCG/KG/MIN: 4 INJECTION, SOLUTION INTRAVENOUS at 04:09

## 2023-01-01 RX ADMIN — FUROSEMIDE 120 MG: 10 INJECTION, SOLUTION INTRAVENOUS at 11:10

## 2023-01-01 RX ADMIN — MINERAL OIL AND WHITE PETROLATUM: 30; 940 OINTMENT OPHTHALMIC at 06:10

## 2023-01-01 RX ADMIN — Medication 0.18 MCG/KG/MIN: at 03:10

## 2023-01-01 RX ADMIN — ATORVASTATIN CALCIUM 80 MG: 40 TABLET, FILM COATED ORAL at 09:09

## 2023-01-01 RX ADMIN — AMIODARONE HYDROCHLORIDE 1 MG/MIN: 1.8 INJECTION, SOLUTION INTRAVENOUS at 05:10

## 2023-01-01 RX ADMIN — PIPERACILLIN AND TAZOBACTAM 4.5 G: 4; .5 INJECTION, POWDER, LYOPHILIZED, FOR SOLUTION INTRAVENOUS; PARENTERAL at 09:09

## 2023-01-01 RX ADMIN — AMIODARONE HYDROCHLORIDE 150 MG: 1.5 INJECTION, SOLUTION INTRAVENOUS at 05:10

## 2023-01-01 RX ADMIN — ROCURONIUM BROMIDE 100 MG: 10 INJECTION INTRAVENOUS at 11:09

## 2023-01-01 RX ADMIN — DILTIAZEM HYDROCHLORIDE 10 MG/HR: 5 INJECTION INTRAVENOUS at 07:09

## 2023-01-01 RX ADMIN — DEXAMETHASONE SODIUM PHOSPHATE 10 MG: 4 INJECTION INTRA-ARTICULAR; INTRALESIONAL; INTRAMUSCULAR; INTRAVENOUS; SOFT TISSUE at 08:10

## 2023-01-01 RX ADMIN — PIPERACILLIN AND TAZOBACTAM 4.5 G: 4; .5 INJECTION, POWDER, LYOPHILIZED, FOR SOLUTION INTRAVENOUS; PARENTERAL at 01:09

## 2023-01-01 RX ADMIN — SERTRALINE HYDROCHLORIDE 100 MG: 50 TABLET ORAL at 09:10

## 2023-01-01 RX ADMIN — Medication 225 MCG/HR: at 09:09

## 2023-01-01 RX ADMIN — FUROSEMIDE 80 MG: 10 INJECTION, SOLUTION INTRAVENOUS at 03:10

## 2023-01-01 RX ADMIN — IPRATROPIUM BROMIDE AND ALBUTEROL SULFATE 3 ML: .5; 3 SOLUTION RESPIRATORY (INHALATION) at 06:10

## 2023-01-01 RX ADMIN — CHLOROTHIAZIDE SODIUM 250 MG: 500 INJECTION, POWDER, LYOPHILIZED, FOR SOLUTION INTRAVENOUS at 10:10

## 2023-01-01 RX ADMIN — INSULIN ASPART 2 UNITS: 100 INJECTION, SOLUTION INTRAVENOUS; SUBCUTANEOUS at 12:09

## 2023-01-01 RX ADMIN — ROCURONIUM BROMIDE 100 MG: 10 INJECTION, SOLUTION INTRAVENOUS at 11:09

## 2023-01-01 RX ADMIN — CISATRACURIUM BESYLATE 1.5 MCG/KG/MIN: 10 INJECTION, SOLUTION INTRAVENOUS at 06:09

## 2023-01-01 RX ADMIN — PROPOFOL 50 MCG/KG/MIN: 10 INJECTION, EMULSION INTRAVENOUS at 10:10

## 2023-01-01 RX ADMIN — PROPOFOL 50 MCG/KG/MIN: 10 INJECTION, EMULSION INTRAVENOUS at 04:10

## 2023-01-01 RX ADMIN — INSULIN ASPART 6 UNITS: 100 INJECTION, SOLUTION INTRAVENOUS; SUBCUTANEOUS at 09:10

## 2023-01-01 RX ADMIN — HEPARIN SODIUM 14 UNITS/KG/HR: 10000 INJECTION, SOLUTION INTRAVENOUS at 02:09

## 2023-01-01 RX ADMIN — Medication 200 MCG/HR: at 12:09

## 2023-01-01 RX ADMIN — LORAZEPAM 2 MG: 2 INJECTION INTRAMUSCULAR; INTRAVENOUS at 02:10

## 2023-01-01 RX ADMIN — DILTIAZEM HYDROCHLORIDE 15 MG/HR: 5 INJECTION INTRAVENOUS at 09:10

## 2023-01-01 RX ADMIN — INSULIN ASPART 6 UNITS: 100 INJECTION, SOLUTION INTRAVENOUS; SUBCUTANEOUS at 08:10

## 2023-01-01 RX ADMIN — DEXAMETHASONE SODIUM PHOSPHATE 20 MG: 4 INJECTION INTRA-ARTICULAR; INTRALESIONAL; INTRAMUSCULAR; INTRAVENOUS; SOFT TISSUE at 11:09

## 2023-01-01 RX ADMIN — PROPOFOL 50 MCG/KG/MIN: 10 INJECTION, EMULSION INTRAVENOUS at 12:10

## 2023-01-01 RX ADMIN — NOREPINEPHRINE BITARTRATE 0.08 MCG/KG/MIN: 4 INJECTION, SOLUTION INTRAVENOUS at 10:09

## 2023-01-01 RX ADMIN — NOREPINEPHRINE BITARTRATE 0.05 MCG/KG/MIN: 4 INJECTION, SOLUTION INTRAVENOUS at 10:09

## 2023-01-01 RX ADMIN — METHYLPREDNISOLONE SODIUM SUCCINATE 60 MG: 40 INJECTION, POWDER, FOR SOLUTION INTRAMUSCULAR; INTRAVENOUS at 12:09

## 2023-01-01 RX ADMIN — GLYCOPYRROLATE 0.3 MG: 0.2 INJECTION INTRAMUSCULAR; INTRAVENOUS at 03:10

## 2023-01-01 RX ADMIN — Medication 50 MCG/HR: at 04:10

## 2023-01-01 RX ADMIN — NOREPINEPHRINE BITARTRATE 0.14 MCG/KG/MIN: 4 INJECTION, SOLUTION INTRAVENOUS at 09:10

## 2023-01-03 NOTE — PROGRESS NOTES
Subjective:      Patient ID: Elayne Almanzar is a 67 y.o. female.    Chief Complaint: Hip Pain (B/l) and Knee Pain (B/l)    Referred by: Nubia Crocker MD     Elayne Almanzar presents for bilateral hip/knee pain.  She states that last year in May she had COVID with resultant body aches/pain.  The COVID recovered, however her pain remained. 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  She describes the pain as burning pain. She admits to aching pain in her hips and knees. She denies any back pain. She denies pain in her neck/arms/shoulders.  She states that the hip pain occasionally radiates down to her knees (sharp pain). No bowel/bladder issues.  She takes Norco 5-325mg twice a day for pain, but takes it only if she needs it.  She does not take tylenol/advil. She has not tried gabapentin or a muscle relaxant in the past.  She was referred for physical therapy, but has only gone once so far.  She states she is supposed to go back this month.  She denies any home exercise program.    Pain Scales  Best: 7/10  Worst: 10/10  Usually: 8/10  Today: 8/10    Interventional Pain History  11/3/22 - bilateral hip GTB with steroid ([facility])    Past Medical History:   Diagnosis Date    Acute on chronic diastolic congestive heart failure     Allergy     Anemia     Anticoagulant long-term use     Anxiety     Asthma     Atrial fibrillation 2002    Cataract     Chronic kidney disease     COPD (chronic obstructive pulmonary disease)     Coronary artery calcification seen on CT scan 3/22/2022    Coronary artery calcification seen on CT scan 3/22/2022    Depression     Encounter for blood transfusion     HTN (hypertension)     Hyperlipidemia     Nephrolithiasis     KEYSHAWN (obstructive sleep apnea)     awaiting CPAP machine     Rheumatic disease of mitral valve     Uncontrolled type 2 diabetes mellitus with complication, with long-term current use of insulin 5/27/2020    Urinary incontinence         Past Surgical History:   Procedure Laterality Date    BREAST BIOPSY Left 10/2019    CARDIAC VALVE SURGERY  2004    mechanical mitral valve     SECTION      COLONOSCOPY N/A 2019    Procedure: COLONOSCOPY;  Surgeon: Devon Bowling MD;  Location: [facility] (4TH FLR);  Service: Endoscopy;  Laterality: N/A;  ok to hold Coumadin x 5 days with Lovenox bridge per Coumadin clinic-[facility]    ESOPHAGOGASTRODUODENOSCOPY N/A 2019    Procedure: EGD (ESOPHAGOGASTRODUODENOSCOPY);  Surgeon: Smooth Torrez MD;  Location: [facility] (2ND FLR);  Service: Endoscopy;  Laterality: N/A;  2nd floor requested due to comorbidities, Hypertension, permanent A. 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 44     per Coumadin clinic-ok to hold for 5 days w/bridge    kidney stone removal      MITRAL VALVE REPLACEMENT  2004    TUBAL LIGATION         Review of patient's allergies indicates:   Allergen Reactions    Brimonidine        Current Outpatient Medications   Medication Sig Dispense Refill    ADVAIR DISKUS 500-50 mcg/dose DsDv diskus inhaler INHALE 1 PUFF INTO THE LUNGS 2 (TWO) TIMES DAILY. 60 each 6    albuterol (VENTOLIN HFA) 90 mcg/actuation inhaler INHALE 2 PUFFS INTO THE LUNGS EVERY 6 (SIX) HOURS AS NEEDED FOR WHEEZING. RESCUE 18 g 6    amLODIPine (NORVASC) 2.5 MG tablet Take 1 tablet (2.5 mg total) by mouth once daily. 90 tablet 1    aspirin (ECOTRIN) 81 MG EC tablet Take 81 mg by mouth once daily.       dipyridamole (PERSANTINE) 5 mg/mL injection       dorzolamide (TRUSOPT) 2 % ophthalmic solution Place 1 drop into the right eye 2 (two) times a day. 10 mL 3    ergocalciferol (ERGOCALCIFEROL) 50,000 unit Cap Take 1 capsule (50,000 Units total) by mouth twice a week. 24 capsule 0    fluticasone propionate (FLONASE) 50 mcg/actuation nasal spray 2 sprays (100 mcg total) by Each Nostril route once daily. 16 g 3    furosemide (LASIX) 20 MG tablet  Take 2 tablets (40 mg total) by mouth once daily. 30 tablet 6    HYDROcodone-acetaminophen (NORCO) 5-325 mg per tablet Take 1 tablet by mouth every 6 (six) hours as needed for Pain. 28 tablet 0    KENALOG 40 mg/mL injection       latanoprost (XALATAN) 0.005 % ophthalmic solution Place 1 drop into the right eye once daily. 7.5 mL 3    meclizine (ANTIVERT) 25 mg tablet Take 1 tablet (25 mg total) by mouth 2 (two) times daily as needed. 30 tablet 1    metoprolol tartrate (LOPRESSOR) 100 MG tablet Take 1 tablet (100 mg total) by mouth 2 (two) times daily. 60 tablet 11    omeprazole (PRILOSEC) 40 MG capsule Take 1 capsule (40 mg total) by mouth every morning. Open capsule and take with apple sauce 30 capsule 2    ondansetron (ZOFRAN-ODT) 8 MG TbDL Dissolve 1 tablet (8 mg total) by mouth every 6 (six) hours as needed. 30 tablet 0    oxybutynin (DITROPAN-XL) 5 MG TR24 Take 5 mg by mouth once daily.      pantoprazole (PROTONIX) 40 MG tablet Take 1 tablet (40 mg total) by mouth daily as needed. 30 tablet 3    rosuvastatin (CRESTOR) 20 MG tablet Take 1 tablet (20 mg total) by mouth once daily. 90 tablet 3    sertraline (ZOLOFT) 100 MG tablet Take 1 tablet (100 mg total) by mouth once daily. 90 tablet 3    tamsulosin (FLOMAX) 0.4 mg Cap Take 0.4 mg by mouth once daily.      ursodioL (LISA FORTE) 500 MG tablet Crush one tablet and take daily for gall bladder. 90 tablet 1    warfarin (COUMADIN) 5 MG tablet Take 1.5 tablets (7.5mg) by mouth Monday, Wednesday, Friday then 1 tablets (5mg) all other days or as instructed by Coumadin Clinic. 45 tablet 3    amoxicillin (AMOXIL) 500 MG Tab 4 tablets po 1 hour prior to procedure (Patient not taking: Reported on 1/3/2023) 4 tablet 0    nitroGLYCERIN (NITROSTAT) 0.4 MG SL tablet Place 1 tablet (0.4 mg total) under the tongue every 5 (five) minutes as needed for Chest pain. (Patient not taking: Reported on 4/28/2022) 30 tablet 1     No current facility-administered medications for this  visit.       Family History   Problem Relation Age of Onset    Stroke Paternal Grandmother     Colon cancer Maternal Grandmother     Cancer Brother         testicular cancer    Diabetes Sister     Hypertension Sister     Breast cancer Paternal Aunt     Diabetes Sister     Diabetes Sister     Heart disease Father 70        MI    Diabetes Father     Colon cancer Mother 83    Asthma Daughter     Asthma Son     Ovarian cancer Neg Hx        Social History     Socioeconomic History    Marital status:     Number of children: 2   Occupational History    Occupation: [employer]     Comment: Retired   Tobacco Use    Smoking status: Former     Packs/day: 0.50     Years: 20.00     Pack years: 10.00     Types: Cigarettes     Quit date: 2002     Years since quittin.6    Smokeless tobacco: Never   Substance and Sexual Activity    Alcohol use: No    Drug use: No   Social History Narrative    Disability since .  Laid off .  Previously worked as cook in a kitchen.       Imaging:  XR HIPS BILATERAL 2 VIEW INCL AP PELVIS     CLINICAL HISTORY:  cintia hip pain;  Pain in right hip     TECHNIQUE:  AP view of the pelvis and frogleg lateral views of both hips were performed.     COMPARISON:  2016     FINDINGS:  Severe right hip joint space narrowing.  Mild sclerosis at the acetabular region and subchondral cystic geodes.  The right femoral head contour remains spherical.  No acute fracture, no osseous lesions.     Mild left hip joint space narrowing.  Minimal sclerosis and small geode at the acetabular region.  The left femoral head contour is maintained.     The sacroiliac joints appear normal.  The remainder of the visualized osseous structures and soft tissues appear normal.     Impression:     Severe right hip and mild left hip degenerative change        Electronically signed by: Ya Parra MD  Date:                                            2022  Time:                                            16:01    XR KNEE ORTHO RIGHT WITH FLEXION     CLINICAL HISTORY:  RM 20;  Pain in unspecified knee     FINDINGS:  Right: No fracture dislocation bone destruction or OCD seen.     Left: No fracture dislocation bone destruction or OCD seen.        Electronically signed by: Tre Galeana MD  Date:                                            10/06/2021  Time:                                           15:42    XR KNEE ORTHO LEFT     CLINICAL HISTORY:  Pain in unspecified knee     TECHNIQUE:  AP standing of both knees, Merchant views of both knees as well as a lateral view of the left knee were performed.     COMPARISON:  07/01/2016     FINDINGS:  No acute fracture or dislocation.  No left knee joint effusion.  No radiopaque foreign bodies.  Lucency noted at the medial aspect of the distal femur/medial condyle of the femur is again noted and appears similar to prior exam.     Impression:     No acute findings in the left knee.     Lucency through the medial femoral condyle on the right is noted and appears more conspicuous than when compared to prior exams.  If there is concern for right knee pathology, dedicated radiographs recommended.        Electronically signed by: Galileo Jarrett MD  Date:                                            05/03/2021  Time:                                           13:12    CT LUMBAR SPINE WITHOUT CONTRAST     CLINICAL HISTORY:  Low back pain, symptoms persist with > 6wks conservative treatment;  Dorsalgia, unspecified     TECHNIQUE:  Low-dose axial, sagittal and coronal reformations are obtained through the lumbar spine.  Contrast was not administered.     COMPARISON:  Radiograph 11/03/2022.     FINDINGS:  Lumbar spine alignment demonstrates dextroscoliosis and grade 1 retrolisthesis of L5 on S1.  No spondylolysis.  Vertebral body heights are well maintained without evidence for fracture.  There is heterogeneous attenuation of the visualized osseous structures.     There is degenerative  disc space narrowing throughout the visualized thoracolumbar spine most pronounced from L2-L3 through L5-S1 noting associated vacuum phenomenon and chronic degenerative endplate changes.     Partially visualized coronary artery atherosclerosis.  There is atherosclerotic calcification of the abdominal aorta.  Partially visualized low-attenuation left renal lesion, likely a cyst.  There is fatty degeneration of the posterior paraspinal musculature.  Symmetric degenerative changes of the SI joints.     T11-T12: Right facet arthropathy.  No spinal canal stenosis.  Mild right neural foraminal narrowing.     T12-L1: No spinal canal stenosis.  No neural foraminal narrowing.     L1-L2: Bilateral facet arthropathy and bilateral ligamentum flavum buckling.  No spinal canal stenosis.  No neural foraminal narrowing.     L2-L3: Circumferential disc osteophyte complex.  Bilateral facet arthropathy and bilateral ligamentum flavum buckling.  No spinal canal stenosis.  No neural foraminal narrowing.     L3-L4: Circumferential disc osteophyte complex.  Bilateral facet arthropathy and bilateral ligamentum flavum buckling.  Moderate spinal canal stenosis.  Stable mild bilateral neural foraminal narrowing.     L4-L5: Circumferential disc osteophyte complex.  Bilateral facet arthropathy and bilateral ligamentum flavum buckling.  Prominent posterior epidural fat.  Mild-to-moderate spinal canal stenosis.  Moderate right and mild left neural foraminal narrowing.     L5-S1: Circumferential disc bulge with a superimposed right sub are disc protrusion that effaces the right lateral recess and likely abuts the right descending S1 nerve root.  Bilateral facet arthropathy.  Moderate to severe right lateral recess stenosis.  Moderate to severe bilateral neural foraminal narrowing.     Impression:     1. Lumbar dextroscoliosis with advanced superimposed degenerative changes most pronounced from L3-L4 through L5-S1 as detailed above.  Note is made  of a right subarticular disc osteophyte protrusion at L5-S1 that effaces the right lateral recess and likely abuts the right descending S1 nerve root.        Electronically signed by: Christian Sen MD  Date:                                            12/14/2022  Time:                                           13:15      XR LUMBAR SPINE AP AND LAT WITH FLEX/EXT     CLINICAL HISTORY:  lumbar spine pain;  Low back pain, unspecified     TECHNIQUE:  AP and lateral views as well as lateral flexion and extension images are performed through the lumbar spine.     COMPARISON:  None     FINDINGS:  There is a mild dextrocurvature of the lumbar spine.  The vertebral body heights are well maintained.  Moderate disc space narrowing at all levels of the lumbar spine.  Moderate-sized anterior and lateral marginal osteophytes throughout the lumbar spine.  No fracture, no osseous lesions.  The bilateral sacroiliac joints appear normal.  Advanced degenerative change of the right hip joint.  Atherosclerotic plaque of the aorta.     Impression:     Spondylosis of the lumbar spine, no acute process seen.     Advanced degenerative change of the right hip joint.        Electronically signed by: Ya Parra MD  Date:                                            11/03/2022  Time:                                           16:03    XR HIPS BILATERAL 2 VIEW INCL AP PELVIS     CLINICAL HISTORY:  cintia hip pain;  Pain in right hip     TECHNIQUE:  AP view of the pelvis and frogleg lateral views of both hips were performed.     COMPARISON:  06/20/2016     FINDINGS:  Severe right hip joint space narrowing.  Mild sclerosis at the acetabular region and subchondral cystic geodes.  The right femoral head contour remains spherical.  No acute fracture, no osseous lesions.     Mild left hip joint space narrowing.  Minimal sclerosis and small geode at the acetabular region.  The left femoral head contour is maintained.     The sacroiliac joints  "appear normal.  The remainder of the visualized osseous structures and soft tissues appear normal.     Impression:     Severe right hip and mild left hip degenerative change        Electronically signed by: Ya Parra MD  Date:                                            11/03/2022  Time:                                           16:01      Review of Systems   Constitutional: Positive for diaphoresis. Negative for chills and fever.   HENT:  Negative for sore throat.    Eyes:  Negative for blurred vision and double vision.   Cardiovascular:  Negative for chest pain and palpitations.   Respiratory:  Negative for shortness of breath.    Hematologic/Lymphatic: Does not bruise/bleed easily.   Skin:  Negative for rash.   Musculoskeletal:  Positive for joint pain, joint swelling and myalgias. Negative for back pain.   Gastrointestinal:  Negative for abdominal pain, constipation, heartburn, nausea and vomiting.   Genitourinary:  Negative for dysuria, hematuria and urgency.   Neurological:  Negative for headaches and weakness.   Psychiatric/Behavioral:  Negative for depression and substance abuse. The patient has insomnia.          Objective:   /73   Pulse 70   Resp 18   Ht 5' 2" (1.575 m)   Wt 107 kg (236 lb)   LMP 07/22/2012   BMI 43.16 kg/m²   Pain Disability Index Review:  Last 3 PDI Scores 1/3/2023   Pain Disability Index (PDI) 40     Normocephalic.  Atraumatic.  Affect appropriate.  Breathing unlabored.  Extra ocular muscles intact.         General    Constitutional: She is oriented to person, place, and time. She appears well-developed and well-nourished.   HENT:   Head: Normocephalic and atraumatic.   Eyes: EOM are normal.   Cardiovascular:  Normal rate.            Pulmonary/Chest: Effort normal.   Abdominal: There is no abdominal tenderness.   Neurological: She is alert and oriented to person, place, and time. She has normal reflexes.   Psychiatric: She has a normal mood and affect. Her behavior " is normal. Judgment and thought content normal.           Right Knee Exam     Tenderness   The patient is tender to palpation of the medial joint line.    Crepitus   The patient has crepitus of the medial joint line.    Left Knee Exam     Tenderness   The patient tender to palpation of the medial joint line.    Crepitus   The patient has crepitus of the medial joint line.    Right Hip Exam     Tests   Pain w/ forced external rotation (FADIR): present  Left Hip Exam     Tests   Pain w/ forced external rotation (FADIR): present          Muscle Strength   Right Lower Extremity   Hip Flexion: 5/5   Hip Extensors: 5/5  Quadriceps:  5/5   Hamstrin/5   Anterior tibial:  5/5   Gastrocsoleus:  5/5   EHL:  5/5  Left Lower Extremity   Hip Flexion: 5/5   Hip Extensors: 5/5  Quadriceps:  5/5   Hamstrin/5   Anterior tibial:  5/5   Gastrocsoleus:  5/5   EHL:  5/5    Reflexes     Left Side  Biceps:  2+  Triceps:  2+  Brachioradialis:  2+  Achilles:  2+  Ankle Clonus:  absent  Quadriceps:  2+    Right Side   Biceps:  2+  Triceps:  2+  Brachioradialis:  2+  Achilles:  2+  Ankle Clonus:  absent  Quadriceps:  2+      Assessment:       Encounter Diagnoses   Name Primary?    Dorsalgia, unspecified     Primary osteoarthritis of both hips Yes         Plan:   We discussed with the patient the assessment and recommendations. The following is the plan we agreed on:        Elayne was seen today for hip pain and knee pain.    Diagnoses and all orders for this visit:    Primary osteoarthritis of both hips  -     Procedure Order to Pain Management; Future    Dorsalgia, unspecified  -     Ambulatory referral/consult to Pain Clinic     - schedule for bilateral GTB injection and right hip intra-articular injection under fluoro at the same time. Hip xray reviewed and discussed with patient of the extent of DJD R>>>L and may require surgical consultation at some point   - continue with physical therapy referral   - follow up in 6 weeks.       Rocio Castro MD [facility] Fellow   I have personally taken the history and examined this patient and agree with the fellow's note as stated above.

## 2023-01-03 NOTE — H&P (VIEW-ONLY)
Subjective:      Patient ID: Elayne Almanzar is a 67 y.o. female.    Chief Complaint: Hip Pain (B/l) and Knee Pain (B/l)    Referred by: Nubia Crocker MD     Elayne Almanzar presents for bilateral hip/knee pain.  She states that last year in May she had COVID with resultant body aches/pain.  The COVID recovered, however her pain remained. 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  She describes the pain as burning pain. She admits to aching pain in her hips and knees. She denies any back pain. She denies pain in her neck/arms/shoulders.  She states that the hip pain occasionally radiates down to her knees (sharp pain). No bowel/bladder issues.  She takes Norco 5-325mg twice a day for pain, but takes it only if she needs it.  She does not take tylenol/advil. She has not tried gabapentin or a muscle relaxant in the past.  She was referred for physical therapy, but has only gone once so far.  She states she is supposed to go back this month.  She denies any home exercise program.    Pain Scales  Best: 7/10  Worst: 10/10  Usually: 8/10  Today: 8/10    Interventional Pain History  11/3/22 - bilateral hip GTB with steroid ([facility])    Past Medical History:   Diagnosis Date    Acute on chronic diastolic congestive heart failure     Allergy     Anemia     Anticoagulant long-term use     Anxiety     Asthma     Atrial fibrillation 2002    Cataract     Chronic kidney disease     COPD (chronic obstructive pulmonary disease)     Coronary artery calcification seen on CT scan 3/22/2022    Coronary artery calcification seen on CT scan 3/22/2022    Depression     Encounter for blood transfusion     HTN (hypertension)     Hyperlipidemia     Nephrolithiasis     KEYSHAWN (obstructive sleep apnea)     awaiting CPAP machine     Rheumatic disease of mitral valve     Uncontrolled type 2 diabetes mellitus with complication, with long-term current use of insulin 5/27/2020    Urinary incontinence         Past Surgical History:   Procedure Laterality Date    BREAST BIOPSY Left 10/2019    CARDIAC VALVE SURGERY  2004    mechanical mitral valve     SECTION      COLONOSCOPY N/A 2019    Procedure: COLONOSCOPY;  Surgeon: Devon Bowling MD;  Location: [facility] (4TH FLR);  Service: Endoscopy;  Laterality: N/A;  ok to hold Coumadin x 5 days with Lovenox bridge per Coumadin clinic-[facility]    ESOPHAGOGASTRODUODENOSCOPY N/A 2019    Procedure: EGD (ESOPHAGOGASTRODUODENOSCOPY);  Surgeon: Smooth Torrez MD;  Location: [facility] (2ND FLR);  Service: Endoscopy;  Laterality: N/A;  2nd floor requested due to comorbidities, Hypertension, permanent A. 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 44     per Coumadin clinic-ok to hold for 5 days w/bridge    kidney stone removal      MITRAL VALVE REPLACEMENT  2004    TUBAL LIGATION         Review of patient's allergies indicates:   Allergen Reactions    Brimonidine        Current Outpatient Medications   Medication Sig Dispense Refill    ADVAIR DISKUS 500-50 mcg/dose DsDv diskus inhaler INHALE 1 PUFF INTO THE LUNGS 2 (TWO) TIMES DAILY. 60 each 6    albuterol (VENTOLIN HFA) 90 mcg/actuation inhaler INHALE 2 PUFFS INTO THE LUNGS EVERY 6 (SIX) HOURS AS NEEDED FOR WHEEZING. RESCUE 18 g 6    amLODIPine (NORVASC) 2.5 MG tablet Take 1 tablet (2.5 mg total) by mouth once daily. 90 tablet 1    aspirin (ECOTRIN) 81 MG EC tablet Take 81 mg by mouth once daily.       dipyridamole (PERSANTINE) 5 mg/mL injection       dorzolamide (TRUSOPT) 2 % ophthalmic solution Place 1 drop into the right eye 2 (two) times a day. 10 mL 3    ergocalciferol (ERGOCALCIFEROL) 50,000 unit Cap Take 1 capsule (50,000 Units total) by mouth twice a week. 24 capsule 0    fluticasone propionate (FLONASE) 50 mcg/actuation nasal spray 2 sprays (100 mcg total) by Each Nostril route once daily. 16 g 3    furosemide (LASIX) 20 MG tablet  Take 2 tablets (40 mg total) by mouth once daily. 30 tablet 6    HYDROcodone-acetaminophen (NORCO) 5-325 mg per tablet Take 1 tablet by mouth every 6 (six) hours as needed for Pain. 28 tablet 0    KENALOG 40 mg/mL injection       latanoprost (XALATAN) 0.005 % ophthalmic solution Place 1 drop into the right eye once daily. 7.5 mL 3    meclizine (ANTIVERT) 25 mg tablet Take 1 tablet (25 mg total) by mouth 2 (two) times daily as needed. 30 tablet 1    metoprolol tartrate (LOPRESSOR) 100 MG tablet Take 1 tablet (100 mg total) by mouth 2 (two) times daily. 60 tablet 11    omeprazole (PRILOSEC) 40 MG capsule Take 1 capsule (40 mg total) by mouth every morning. Open capsule and take with apple sauce 30 capsule 2    ondansetron (ZOFRAN-ODT) 8 MG TbDL Dissolve 1 tablet (8 mg total) by mouth every 6 (six) hours as needed. 30 tablet 0    oxybutynin (DITROPAN-XL) 5 MG TR24 Take 5 mg by mouth once daily.      pantoprazole (PROTONIX) 40 MG tablet Take 1 tablet (40 mg total) by mouth daily as needed. 30 tablet 3    rosuvastatin (CRESTOR) 20 MG tablet Take 1 tablet (20 mg total) by mouth once daily. 90 tablet 3    sertraline (ZOLOFT) 100 MG tablet Take 1 tablet (100 mg total) by mouth once daily. 90 tablet 3    tamsulosin (FLOMAX) 0.4 mg Cap Take 0.4 mg by mouth once daily.      ursodioL (LISA FORTE) 500 MG tablet Crush one tablet and take daily for gall bladder. 90 tablet 1    warfarin (COUMADIN) 5 MG tablet Take 1.5 tablets (7.5mg) by mouth Monday, Wednesday, Friday then 1 tablets (5mg) all other days or as instructed by Coumadin Clinic. 45 tablet 3    amoxicillin (AMOXIL) 500 MG Tab 4 tablets po 1 hour prior to procedure (Patient not taking: Reported on 1/3/2023) 4 tablet 0    nitroGLYCERIN (NITROSTAT) 0.4 MG SL tablet Place 1 tablet (0.4 mg total) under the tongue every 5 (five) minutes as needed for Chest pain. (Patient not taking: Reported on 4/28/2022) 30 tablet 1     No current facility-administered medications for this  visit.       Family History   Problem Relation Age of Onset    Stroke Paternal Grandmother     Colon cancer Maternal Grandmother     Cancer Brother         testicular cancer    Diabetes Sister     Hypertension Sister     Breast cancer Paternal Aunt     Diabetes Sister     Diabetes Sister     Heart disease Father 70        MI    Diabetes Father     Colon cancer Mother 83    Asthma Daughter     Asthma Son     Ovarian cancer Neg Hx        Social History     Socioeconomic History    Marital status:     Number of children: 2   Occupational History    Occupation: [employer]     Comment: Retired   Tobacco Use    Smoking status: Former     Packs/day: 0.50     Years: 20.00     Pack years: 10.00     Types: Cigarettes     Quit date: 2002     Years since quittin.6    Smokeless tobacco: Never   Substance and Sexual Activity    Alcohol use: No    Drug use: No   Social History Narrative    Disability since .  Laid off .  Previously worked as cook in a kitchen.       Imaging:  XR HIPS BILATERAL 2 VIEW INCL AP PELVIS     CLINICAL HISTORY:  cintia hip pain;  Pain in right hip     TECHNIQUE:  AP view of the pelvis and frogleg lateral views of both hips were performed.     COMPARISON:  2016     FINDINGS:  Severe right hip joint space narrowing.  Mild sclerosis at the acetabular region and subchondral cystic geodes.  The right femoral head contour remains spherical.  No acute fracture, no osseous lesions.     Mild left hip joint space narrowing.  Minimal sclerosis and small geode at the acetabular region.  The left femoral head contour is maintained.     The sacroiliac joints appear normal.  The remainder of the visualized osseous structures and soft tissues appear normal.     Impression:     Severe right hip and mild left hip degenerative change        Electronically signed by: Ya Parra MD  Date:                                            2022  Time:                                            16:01    XR KNEE ORTHO RIGHT WITH FLEXION     CLINICAL HISTORY:  RM 20;  Pain in unspecified knee     FINDINGS:  Right: No fracture dislocation bone destruction or OCD seen.     Left: No fracture dislocation bone destruction or OCD seen.        Electronically signed by: Tre Galeana MD  Date:                                            10/06/2021  Time:                                           15:42    XR KNEE ORTHO LEFT     CLINICAL HISTORY:  Pain in unspecified knee     TECHNIQUE:  AP standing of both knees, Merchant views of both knees as well as a lateral view of the left knee were performed.     COMPARISON:  07/01/2016     FINDINGS:  No acute fracture or dislocation.  No left knee joint effusion.  No radiopaque foreign bodies.  Lucency noted at the medial aspect of the distal femur/medial condyle of the femur is again noted and appears similar to prior exam.     Impression:     No acute findings in the left knee.     Lucency through the medial femoral condyle on the right is noted and appears more conspicuous than when compared to prior exams.  If there is concern for right knee pathology, dedicated radiographs recommended.        Electronically signed by: Galileo Jarrett MD  Date:                                            05/03/2021  Time:                                           13:12    CT LUMBAR SPINE WITHOUT CONTRAST     CLINICAL HISTORY:  Low back pain, symptoms persist with > 6wks conservative treatment;  Dorsalgia, unspecified     TECHNIQUE:  Low-dose axial, sagittal and coronal reformations are obtained through the lumbar spine.  Contrast was not administered.     COMPARISON:  Radiograph 11/03/2022.     FINDINGS:  Lumbar spine alignment demonstrates dextroscoliosis and grade 1 retrolisthesis of L5 on S1.  No spondylolysis.  Vertebral body heights are well maintained without evidence for fracture.  There is heterogeneous attenuation of the visualized osseous structures.     There is degenerative  disc space narrowing throughout the visualized thoracolumbar spine most pronounced from L2-L3 through L5-S1 noting associated vacuum phenomenon and chronic degenerative endplate changes.     Partially visualized coronary artery atherosclerosis.  There is atherosclerotic calcification of the abdominal aorta.  Partially visualized low-attenuation left renal lesion, likely a cyst.  There is fatty degeneration of the posterior paraspinal musculature.  Symmetric degenerative changes of the SI joints.     T11-T12: Right facet arthropathy.  No spinal canal stenosis.  Mild right neural foraminal narrowing.     T12-L1: No spinal canal stenosis.  No neural foraminal narrowing.     L1-L2: Bilateral facet arthropathy and bilateral ligamentum flavum buckling.  No spinal canal stenosis.  No neural foraminal narrowing.     L2-L3: Circumferential disc osteophyte complex.  Bilateral facet arthropathy and bilateral ligamentum flavum buckling.  No spinal canal stenosis.  No neural foraminal narrowing.     L3-L4: Circumferential disc osteophyte complex.  Bilateral facet arthropathy and bilateral ligamentum flavum buckling.  Moderate spinal canal stenosis.  Stable mild bilateral neural foraminal narrowing.     L4-L5: Circumferential disc osteophyte complex.  Bilateral facet arthropathy and bilateral ligamentum flavum buckling.  Prominent posterior epidural fat.  Mild-to-moderate spinal canal stenosis.  Moderate right and mild left neural foraminal narrowing.     L5-S1: Circumferential disc bulge with a superimposed right sub are disc protrusion that effaces the right lateral recess and likely abuts the right descending S1 nerve root.  Bilateral facet arthropathy.  Moderate to severe right lateral recess stenosis.  Moderate to severe bilateral neural foraminal narrowing.     Impression:     1. Lumbar dextroscoliosis with advanced superimposed degenerative changes most pronounced from L3-L4 through L5-S1 as detailed above.  Note is made  of a right subarticular disc osteophyte protrusion at L5-S1 that effaces the right lateral recess and likely abuts the right descending S1 nerve root.        Electronically signed by: Christian Sen MD  Date:                                            12/14/2022  Time:                                           13:15      XR LUMBAR SPINE AP AND LAT WITH FLEX/EXT     CLINICAL HISTORY:  lumbar spine pain;  Low back pain, unspecified     TECHNIQUE:  AP and lateral views as well as lateral flexion and extension images are performed through the lumbar spine.     COMPARISON:  None     FINDINGS:  There is a mild dextrocurvature of the lumbar spine.  The vertebral body heights are well maintained.  Moderate disc space narrowing at all levels of the lumbar spine.  Moderate-sized anterior and lateral marginal osteophytes throughout the lumbar spine.  No fracture, no osseous lesions.  The bilateral sacroiliac joints appear normal.  Advanced degenerative change of the right hip joint.  Atherosclerotic plaque of the aorta.     Impression:     Spondylosis of the lumbar spine, no acute process seen.     Advanced degenerative change of the right hip joint.        Electronically signed by: Ya Parra MD  Date:                                            11/03/2022  Time:                                           16:03    XR HIPS BILATERAL 2 VIEW INCL AP PELVIS     CLINICAL HISTORY:  cintia hip pain;  Pain in right hip     TECHNIQUE:  AP view of the pelvis and frogleg lateral views of both hips were performed.     COMPARISON:  06/20/2016     FINDINGS:  Severe right hip joint space narrowing.  Mild sclerosis at the acetabular region and subchondral cystic geodes.  The right femoral head contour remains spherical.  No acute fracture, no osseous lesions.     Mild left hip joint space narrowing.  Minimal sclerosis and small geode at the acetabular region.  The left femoral head contour is maintained.     The sacroiliac joints  "appear normal.  The remainder of the visualized osseous structures and soft tissues appear normal.     Impression:     Severe right hip and mild left hip degenerative change        Electronically signed by: Ya Parra MD  Date:                                            11/03/2022  Time:                                           16:01      Review of Systems   Constitutional: Positive for diaphoresis. Negative for chills and fever.   HENT:  Negative for sore throat.    Eyes:  Negative for blurred vision and double vision.   Cardiovascular:  Negative for chest pain and palpitations.   Respiratory:  Negative for shortness of breath.    Hematologic/Lymphatic: Does not bruise/bleed easily.   Skin:  Negative for rash.   Musculoskeletal:  Positive for joint pain, joint swelling and myalgias. Negative for back pain.   Gastrointestinal:  Negative for abdominal pain, constipation, heartburn, nausea and vomiting.   Genitourinary:  Negative for dysuria, hematuria and urgency.   Neurological:  Negative for headaches and weakness.   Psychiatric/Behavioral:  Negative for depression and substance abuse. The patient has insomnia.          Objective:   /73   Pulse 70   Resp 18   Ht 5' 2" (1.575 m)   Wt 107 kg (236 lb)   LMP 07/22/2012   BMI 43.16 kg/m²   Pain Disability Index Review:  Last 3 PDI Scores 1/3/2023   Pain Disability Index (PDI) 40     Normocephalic.  Atraumatic.  Affect appropriate.  Breathing unlabored.  Extra ocular muscles intact.         General    Constitutional: She is oriented to person, place, and time. She appears well-developed and well-nourished.   HENT:   Head: Normocephalic and atraumatic.   Eyes: EOM are normal.   Cardiovascular:  Normal rate.            Pulmonary/Chest: Effort normal.   Abdominal: There is no abdominal tenderness.   Neurological: She is alert and oriented to person, place, and time. She has normal reflexes.   Psychiatric: She has a normal mood and affect. Her behavior " is normal. Judgment and thought content normal.           Right Knee Exam     Tenderness   The patient is tender to palpation of the medial joint line.    Crepitus   The patient has crepitus of the medial joint line.    Left Knee Exam     Tenderness   The patient tender to palpation of the medial joint line.    Crepitus   The patient has crepitus of the medial joint line.    Right Hip Exam     Tests   Pain w/ forced external rotation (FADIR): present  Left Hip Exam     Tests   Pain w/ forced external rotation (FADIR): present          Muscle Strength   Right Lower Extremity   Hip Flexion: 5/5   Hip Extensors: 5/5  Quadriceps:  5/5   Hamstrin/5   Anterior tibial:  5/5   Gastrocsoleus:  5/5   EHL:  5/5  Left Lower Extremity   Hip Flexion: 5/5   Hip Extensors: 5/5  Quadriceps:  5/5   Hamstrin/5   Anterior tibial:  5/5   Gastrocsoleus:  5/5   EHL:  5/5    Reflexes     Left Side  Biceps:  2+  Triceps:  2+  Brachioradialis:  2+  Achilles:  2+  Ankle Clonus:  absent  Quadriceps:  2+    Right Side   Biceps:  2+  Triceps:  2+  Brachioradialis:  2+  Achilles:  2+  Ankle Clonus:  absent  Quadriceps:  2+      Assessment:       Encounter Diagnoses   Name Primary?    Dorsalgia, unspecified     Primary osteoarthritis of both hips Yes         Plan:   We discussed with the patient the assessment and recommendations. The following is the plan we agreed on:        Elayne was seen today for hip pain and knee pain.    Diagnoses and all orders for this visit:    Primary osteoarthritis of both hips  -     Procedure Order to Pain Management; Future    Dorsalgia, unspecified  -     Ambulatory referral/consult to Pain Clinic     - schedule for bilateral GTB injection and right hip intra-articular injection under fluoro at the same time. Hip xray reviewed and discussed with patient of the extent of DJD R>>>L and may require surgical consultation at some point   - continue with physical therapy referral   - follow up in 6 weeks.       Rocio Castro MD [facility] Fellow   I have personally taken the history and examined this patient and agree with the fellow's note as stated above.

## 2023-01-23 NOTE — INTERVAL H&P NOTE
The patient has been examined and the H&P has been reviewed:    I concur with the findings and no changes have occurred since H&P was written.    Procedure risks, benefits and alternative options discussed and understood by patient/family.

## 2023-01-23 NOTE — OP NOTE
Greater Trochanteric Bursa and Hip Injection under Fluoroscopic Guidance    The procedure, risks, benefits, and options were discussed with the patient. There are no contraindications to the procedure. The patent expressed understanding and agreed to the procedure. Informed written consent was obtained prior to the start of the procedure and can be found in the patient's chart.    PATIENT NAME: Mrs. Elayne Almanzar   MRN: [id number]     DATE OF PROCEDURE: 01/23/2023    PROCEDURE:   Bilateral Greater Trochanteric Bursa Injection under Fluoroscopic Guidance  Right Hip Joint Injection under Fluoroscopic Guidance    PRE-OP DIAGNOSIS: Primary osteoarthritis of both hips [M16.0]    POST-OP DIAGNOSIS: Same    PHYSICIAN: Beth Alfaro MD    ASSISTANTS: Devyn Murrell DO [employer] pain fellow     MEDICATIONS INJECTED: Preservative-free Kenalog 40mg with 7cc of Bupivacine 0.25%     LOCAL ANESTHETIC INJECTED: Xylocaine 2%     SEDATION: Versed 1mg and Fentanyl 25mcg                                                                                                                                                                                     Conscious sedation ordered by MRAMON. Patient re-evaluation prior to administration of conscious sedation. No changes noted in patient's status from initial evaluation. The patient's vital signs were monitored by RN and patient remained hemodynamically stable throughout the procedure.    Event Time In   Sedation Start 1209   Sedation End 1215       ESTIMATED BLOOD LOSS: None    COMPLICATIONS: None    TECHNIQUE: Time-out was performed to identify the patient and procedure to be performed. With the patient laying in a prone position, the surgical area was prepped and draped in the usual sterile fashion using ChloraPrep and a fenestrated drape. The area overlying the greater trochanteric bursa was determined under fluoroscopy guidance. Skin anesthesia was achieved by injecting Lidocaine 2% over the injection  sites. The greater trochanteric bursa was then approached with a 25 gauge, 5 inch spinal quinke needle that was introduced under fluoroscopic guidance in the AP view. Once the needle tip was in the area of the bursa, and there was no blood aspiration, Contrast dye  Omnipaque (300mg/mL) was injected to confirm placement and there was no vascular runoff. 4 mL of the medication mixture listed above was injected slowly. The needles were removed and bleeding was nil. A sterile dressing was applied. No specimens collected. The patient tolerated the procedure well.    TECHNIQUE: Time-out was performed to identify the patient and procedure to be performed. With the patient laying in a prone position, the surgical area was prepped and draped in the usual sterile fashion using ChloraPrep and a fenestrated drape. The area overlying the hip joint was determined under fluoroscopy guidance. Skin anesthesia was achieved by injecting Lidocaine 2% over the injection site. The acetabulofemoral joint was then approached with a 25 gauge, 5 inch spinal quinke needle that was introduced under fluoroscopic guidance in the AP view. Once the needle tip was in the area of the joint, and there was no blood aspiration,  Contrast dye  Omnipaque (300mg/mL) was injected to confirm placement and there was no vascular runoff. 4 mL of the medication mixture listed above was injected slowly. The needles were removed and bleeding was nil. A sterile dressing was applied. No specimens collected. The patient tolerated the procedure well.    PAIN BEFORE THE PROCEDURE: 9-10/10    PAIN AFTER THE PROCEDURE: 0/10   The patient was monitored after the procedure in the recovery area. They were given post-procedure and discharge instructions to follow at home. The patient was discharged in a stable condition.        Beth Alfaro MD

## 2023-01-23 NOTE — DISCHARGE INSTRUCTIONS
Thank you for allowing us to care for you today. You may receive a survey about the care we provided. Your feedback is valuable and helps us provide excellent care throughout the community.     Home Care Instructions for Pain Management:    1. DIET:   You may resume your normal diet today.   2. BATHING:   You may shower with luke warm water. No tub baths or anything that will soak injection sites under water for the next 24 hours.  3. DRESSING:   You may remove your bandage today.   4. ACTIVITY LEVEL:   You may resume your normal activities 24 hrs after your procedure. Nothing strenuous today.  5. MEDICATIONS:   You may resume your normal medications today. To restart blood thinners, ask your doctor.  6. DRIVING    If you have received any sedatives by mouth today, you may not drive for 12 hours.    If you have received any sedation through your IV, you may not drive for 24 hrs.   7. SPECIAL INSTRUCTIONS:   No heat to the injection site for 24 hrs including, hot bath or shower, heating pad, moist heat, or hot tubs.    Use ice pack to injection site for any pain or discomfort.  Apply ice packs for 20 minute intervals as needed.    IF you have diabetes, be sure to monitor your blood sugar more closely. IF your injection contained steroids your blood sugar levels may become higher than normal.    If you are still having pain upon discharge:  Your pain may improve over the next 48 hours. The anesthetic (numbing medication) works immediately to 48 hours. IF your injection contained a steroid (anti-inflammatory medication), it takes approximately 3 days to start feeling relief and 7-10 days to see your greatest results from the medication. It is possible you may need subsequent injections. This would be discussed at your follow up appointment with pain management or your referring doctor.    Please call the PAIN MANAGEMENT office at [phone] or ON CALL pager at [phone] if you experienced any:   -Weakness or  loss of sensation  -Fever > 101.5  -Pain uncontrolled with oral medications   -Persistent nausea, vomiting, or diarrhea  -Redness or drainage from the injection sites, or any other worrisome concerns.   If physician on call was not reached or could not communicate with our office for any reason please go to the nearest emergency department. Adult Procedural Sedation Instructions    Recovery After Procedural Sedation (Adult)  You have been given medicine by vein to make you sleep during your surgery. This may have included both a pain medicine and sleeping medicine. Most of the effects have worn off. But you may still have some drowsiness for the next 6 to 8 hours.  Home care  Follow these guidelines when you get home:  For the next 8 hours, you should be watched by a responsible adult. This person should make sure your condition is not getting worse.  Don't drink any alcohol for the next 24 hours.  Don't drive, operate dangerous machinery, or make important business or personal decisions during the next 24 hours.  Note: Your healthcare provider may tell you not to take any medicine by mouth for pain or sleep in the next 4 hours. These medicines may react with the medicines you were given in the hospital. This could cause a much stronger response than usual.  Follow-up care  Follow up with your healthcare provider if you are not alert and back to your usual level of activity within 12 hours.  When to seek medical advice  Call your healthcare provider right away if any of these occur:  Drowsiness gets worse  Weakness or dizziness gets worse  Repeated vomiting  You can't be awakened   Date Last Reviewed: 10/18/2016  © 2674-0716 [employer]. [address] [place] [address], [place] [address]. All rights reserved. This information is not intended as a substitute for professional medical care. Always follow your healthcare professional's instructions.

## 2023-01-23 NOTE — DISCHARGE SUMMARY
Discharge Note  Short Stay      SUMMARY     Admit Date: 1/23/2023    Attending Physician: Beth Alfaro      Discharge Physician: Beth Alfaro      Discharge Date: 1/23/2023 12:06 PM    Procedure(s) (LRB):  INJECTION, BILATERAL GTB AND RIGHT HIP INTRA-ARTICULAR CONTRAST (N/A)    Final Diagnosis: Primary osteoarthritis of both hips [M16.0]    Disposition: Home or self care    Patient Instructions:   Current Discharge Medication List        CONTINUE these medications which have NOT CHANGED    Details   ADVAIR DISKUS 500-50 mcg/dose DsDv diskus inhaler INHALE 1 PUFF INTO THE LUNGS 2 (TWO) TIMES DAILY.  Qty: 180 each, Refills: 3      albuterol (PROVENTIL/VENTOLIN HFA) 90 mcg/actuation inhaler INHALE 2 PUFFS INTO THE LUNGS EVERY 6 (SIX) HOURS AS NEEDED FOR WHEEZING. RESCUE  Qty: 18 g, Refills: 6      amLODIPine (NORVASC) 2.5 MG tablet Take 1 tablet (2.5 mg total) by mouth once daily.  Qty: 90 tablet, Refills: 1    Comments: .      amoxicillin (AMOXIL) 500 MG Tab 4 tablets po 1 hour prior to procedure  Qty: 4 tablet, Refills: 0      aspirin (ECOTRIN) 81 MG EC tablet Take 81 mg by mouth once daily.       dipyridamole (PERSANTINE) 5 mg/mL injection       dorzolamide (TRUSOPT) 2 % ophthalmic solution Place 1 drop into the right eye 2 (two) times a day.  Qty: 10 mL, Refills: 3    Associated Diagnoses: Normal tension glaucoma of right eye, moderate stage      ergocalciferol (ERGOCALCIFEROL) 50,000 unit Cap Take 1 capsule (50,000 Units total) by mouth twice a week.  Qty: 24 capsule, Refills: 0      fluticasone propionate (FLONASE) 50 mcg/actuation nasal spray 2 sprays (100 mcg total) by Each Nostril route once daily.  Qty: 16 g, Refills: 3    Associated Diagnoses: Seasonal allergic rhinitis due to pollen      furosemide (LASIX) 20 MG tablet Take 2 tablets (40 mg total) by mouth once daily.  Qty: 30 tablet, Refills: 6    Associated Diagnoses: Acute on chronic diastolic congestive heart failure      HYDROcodone-acetaminophen (NORCO)  5-325 mg per tablet Take 1 tablet by mouth every 6 (six) hours as needed for Pain.  Qty: 28 tablet, Refills: 0    Comments: Quantity prescribed more than 7 day supply? No      KENALOG 40 mg/mL injection       latanoprost (XALATAN) 0.005 % ophthalmic solution Place 1 drop into the right eye once daily.  Qty: 7.5 mL, Refills: 3      meclizine (ANTIVERT) 25 mg tablet Take 1 tablet (25 mg total) by mouth 2 (two) times daily as needed.  Qty: 30 tablet, Refills: 1      metoprolol tartrate (LOPRESSOR) 100 MG tablet Take 1 tablet (100 mg total) by mouth 2 (two) times daily.  Qty: 60 tablet, Refills: 11    Comments: .      nitroGLYCERIN (NITROSTAT) 0.4 MG SL tablet Place 1 tablet (0.4 mg total) under the tongue every 5 (five) minutes as needed for Chest pain.  Qty: 30 tablet, Refills: 1      omeprazole (PRILOSEC) 40 MG capsule Take 1 capsule (40 mg total) by mouth every morning. Open capsule and take with apple sauce  Qty: 30 capsule, Refills: 2    Comments: Bedside  Associated Diagnoses: Morbid obesity with BMI of 40.0-44.9, adult      ondansetron (ZOFRAN-ODT) 8 MG TbDL Dissolve 1 tablet (8 mg total) by mouth every 6 (six) hours as needed.  Qty: 30 tablet, Refills: 0    Comments: Bedside  Associated Diagnoses: Morbid obesity with BMI of 40.0-44.9, adult      oxybutynin (DITROPAN-XL) 5 MG TR24 Take 5 mg by mouth once daily.      pantoprazole (PROTONIX) 40 MG tablet Take 1 tablet (40 mg total) by mouth daily as needed.  Qty: 30 tablet, Refills: 3    Associated Diagnoses: Gastroesophageal reflux disease without esophagitis      rosuvastatin (CRESTOR) 20 MG tablet Take 1 tablet (20 mg total) by mouth once daily.  Qty: 90 tablet, Refills: 3    Associated Diagnoses: Mixed hyperlipidemia      sertraline (ZOLOFT) 100 MG tablet Take 1 tablet (100 mg total) by mouth once daily.  Qty: 90 tablet, Refills: 3      tamsulosin (FLOMAX) 0.4 mg Cap Take 0.4 mg by mouth once daily.      ursodioL (LISA FORTE) 500 MG tablet Crush one tablet and  take daily for gall bladder.  Qty: 90 tablet, Refills: 1    Comments: Bedside  Associated Diagnoses: Morbid obesity with BMI of 40.0-44.9, adult      warfarin (COUMADIN) 5 MG tablet Take 1.5 tablets (7.5mg) by mouth Monday, Wednesday, Friday then 1 tablets (5mg) all other days or as instructed by Coumadin Clinic.  Qty: 45 tablet, Refills: 3    Associated Diagnoses: Atrial fibrillation, chronic; Status post heart valve replacement with mechanical valve; Long term (current) use of anticoagulants                 Discharge Diagnosis: Primary osteoarthritis of both hips [M16.0]  Condition on Discharge: Stable with no complications to procedure   Diet on Discharge: Same as before.  Activity: as per instruction sheet.  Discharge to: Home with a responsible adult.  Follow up: 2-4 weeks       Please call my office or pager at [phone] if experienced any weakness or loss of sensation, fever > 101.5, pain uncontrolled with oral medications, persistent nausea/vomiting/or diarrhea, redness or drainage from the incisions, or any other worrisome concerns. If physician on call was not reached or could not communicate with our office for any reason please go to the nearest emergency department
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
## 2023-02-23 NOTE — PROGRESS NOTES
PATIENT: Elayne Almanzar  MRN: [id number]  DATE: 2023    Chief Complaint: Anemia follow up    Subjective:    Initial History: Ms. Almanzar is a 67 y.o. female with pertinent PMHx of permanent atrial fibrillation on coumadin, CKD, KEYSHAWN compliant with CPAP, chronic diastolic heart failure, and s/p mechanical mitral valve placed in  due to rheumatic mitral stenosis who was referred for evaluation of microcytic anemia.         Interval Hx:  Previously received IV iron, her last infusion was . Overall continues to feel good without major issue. No new issues or concerns.  Her Hgb is stable, remains mildly iron deficient but has not been taking PO iron. Still has not had gastric surgery.  Energy remains good. Able to do all desired activities.       Past Medical History:   Past Medical History:   Diagnosis Date    Acute on chronic diastolic congestive heart failure     Allergy     Anemia     Anticoagulant long-term use     Anxiety     Asthma     Atrial fibrillation     Cataract     Chronic kidney disease     COPD (chronic obstructive pulmonary disease)     Coronary artery calcification seen on CT scan 3/22/2022    Coronary artery calcification seen on CT scan 3/22/2022    Depression     Encounter for blood transfusion     HTN (hypertension)     Hyperlipidemia     Nephrolithiasis     KEYSHAWN (obstructive sleep apnea)     awaiting CPAP machine     Rheumatic disease of mitral valve     Uncontrolled type 2 diabetes mellitus with complication, with long-term current use of insulin 2020    Urinary incontinence        Past Surgical HIstory:   Past Surgical History:   Procedure Laterality Date    BREAST BIOPSY Left 10/2019    CARDIAC VALVE SURGERY      mechanical mitral valve     SECTION      COLONOSCOPY N/A 2019    Procedure: COLONOSCOPY;  Surgeon: Devon Bowling MD;  Location: [address]);  Service: Endoscopy;  Laterality: N/A;  ok to hold Coumadin x 5 days with Lovenox  bridge per Coumadin clinic-MS    ESOPHAGOGASTRODUODENOSCOPY N/A 12/19/2019    Procedure: EGD (ESOPHAGOGASTRODUODENOSCOPY);  Surgeon: Smooth Torrez MD;  [facility]  Service: Endoscopy;  Laterality: N/A;  2nd floor requested due to comorbidities, Hypertension, permanent A. 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 44     per Coumadin clinic-ok to hold for 5 days w/bridge    INJECTION N/A 1/23/2023    Procedure: INJECTION, BILATERAL GTB AND RIGHT HIP INTRA-ARTICULAR CONTRAST;  Surgeon: Beth Alfaro MD;  Location: [facility] PAIN MGT;  Service: Pain Management;  Laterality: N/A;    kidney stone removal      MITRAL VALVE REPLACEMENT  2/2004    TUBAL LIGATION         Family History:   Family History   Problem Relation Age of Onset    Stroke Paternal Grandmother     Colon cancer Maternal Grandmother     Cancer Brother         testicular cancer    Diabetes Sister     Hypertension Sister     Breast cancer Paternal Aunt     Diabetes Sister     Diabetes Sister     Heart disease Father 70        MI    Diabetes Father     Colon cancer Mother 83    Asthma Daughter     Asthma Son     Ovarian cancer Neg Hx        Social History:  reports that she quit smoking about 20 years ago. Her smoking use included cigarettes. She has a 10.00 pack-year smoking history. She has never used smokeless tobacco. She reports that she does not drink alcohol and does not use drugs.    Allergies:  Review of patient's allergies indicates:   Allergen Reactions    Brimonidine        Medications:  Current Outpatient Medications   Medication Sig Dispense Refill    ADVAIR DISKUS 500-50 mcg/dose DsDv diskus inhaler INHALE 1 PUFF INTO THE LUNGS 2 (TWO) TIMES DAILY. 180 each 3    albuterol (PROVENTIL/VENTOLIN HFA) 90 mcg/actuation inhaler INHALE 2 PUFFS INTO THE LUNGS EVERY 6 (SIX) HOURS AS NEEDED FOR WHEEZING. RESCUE 18 g 6    amLODIPine (NORVASC) 2.5 MG tablet Take 1  tablet (2.5 mg total) by mouth once daily. 90 tablet 1    aspirin (ECOTRIN) 81 MG EC tablet Take 81 mg by mouth once daily.       dipyridamole (PERSANTINE) 5 mg/mL injection       dorzolamide (TRUSOPT) 2 % ophthalmic solution Place 1 drop into the right eye 2 (two) times a day. 10 mL 3    ergocalciferol (ERGOCALCIFEROL) 50,000 unit Cap Take 1 capsule (50,000 Units total) by mouth twice a week. 24 capsule 0    fluticasone propionate (FLONASE) 50 mcg/actuation nasal spray 2 sprays (100 mcg total) by Each Nostril route once daily. 16 g 3    furosemide (LASIX) 20 MG tablet TAKE 1 TABLET BY MOUTH ONCE DAILY. 30 tablet 4    HYDROcodone-acetaminophen (NORCO) 5-325 mg per tablet Take 1 tablet by mouth every 6 (six) hours as needed for Pain. 28 tablet 0    KENALOG 40 mg/mL injection       latanoprost (XALATAN) 0.005 % ophthalmic solution Place 1 drop into the right eye once daily. 7.5 mL 3    meclizine (ANTIVERT) 25 mg tablet Take 1 tablet (25 mg total) by mouth 2 (two) times daily as needed. 30 tablet 1    metoprolol tartrate (LOPRESSOR) 100 MG tablet Take 1 tablet (100 mg total) by mouth 2 (two) times daily. 60 tablet 11    omeprazole (PRILOSEC) 40 MG capsule Take 1 capsule (40 mg total) by mouth every morning. Open capsule and take with apple sauce 30 capsule 2    ondansetron (ZOFRAN-ODT) 8 MG TbDL Dissolve 1 tablet (8 mg total) by mouth every 6 (six) hours as needed. 30 tablet 0    oxybutynin (DITROPAN-XL) 5 MG TR24 Take 5 mg by mouth once daily.      pantoprazole (PROTONIX) 40 MG tablet Take 1 tablet (40 mg total) by mouth daily as needed. 30 tablet 3    rosuvastatin (CRESTOR) 20 MG tablet Take 1 tablet (20 mg total) by mouth once daily. 90 tablet 3    sertraline (ZOLOFT) 100 MG tablet Take 1 tablet (100 mg total) by mouth once daily. 90 tablet 3    tamsulosin (FLOMAX) 0.4 mg Cap Take 0.4 mg by mouth once daily.      ursodioL (LISA FORTE) 500 MG tablet Crush one tablet and take daily for gall bladder. 90 tablet 1     "warfarin (COUMADIN) 5 MG tablet Take 1.5 tablets (7.5mg) by mouth Monday, Wednesday, Friday then 1 tablets (5mg) all other days or as instructed by Coumadin Clinic. 45 tablet 3    amoxicillin (AMOXIL) 500 MG Tab 4 tablets po 1 hour prior to procedure (Patient not taking: Reported on 1/3/2023) 4 tablet 0    nitroGLYCERIN (NITROSTAT) 0.4 MG SL tablet Place 1 tablet (0.4 mg total) under the tongue every 5 (five) minutes as needed for Chest pain. (Patient not taking: Reported on 4/28/2022) 30 tablet 1     No current facility-administered medications for this visit.       Review of Systems   Constitutional:  Positive for fatigue. Negative for appetite change, chills and fever.   HENT:  Negative for nosebleeds and sore throat.    Eyes:  Negative for photophobia and visual disturbance.   Respiratory:  Positive for shortness of breath. Negative for cough.    Cardiovascular:  Negative for chest pain, palpitations and leg swelling.   Gastrointestinal:  Negative for abdominal pain, blood in stool, diarrhea, nausea and vomiting.   Endocrine: Negative for cold intolerance, heat intolerance, polydipsia and polyuria.   Genitourinary:  Negative for dysuria, hematuria and urgency.   Musculoskeletal:  Negative for arthralgias and myalgias.   Skin:  Negative for rash and wound.   Neurological:  Negative for dizziness and headaches.   Hematological:  Negative for adenopathy. Does not bruise/bleed easily.   Psychiatric/Behavioral:  Negative for confusion. The patient is not nervous/anxious.         Objective:      Vitals:   Vitals:    02/23/23 1237   BP: (!) 129/57   Pulse: 67   Resp: 16   SpO2: 95%   Weight: 105.6 kg (232 lb 12.9 oz)   Height: 5' 1" (1.549 m)       Physical Exam  Vitals reviewed.   Constitutional:       Appearance: Normal appearance.   HENT:      Head: Normocephalic and atraumatic.      Mouth/Throat:      Mouth: Mucous membranes are moist.   Eyes:      Extraocular Movements: Extraocular movements intact. " "  Cardiovascular:      Rate and Rhythm: Rhythm irregularly irregular.   Pulmonary:      Effort: Pulmonary effort is normal.      Breath sounds: Normal breath sounds.   Abdominal:      General: Bowel sounds are normal.      Palpations: Abdomen is soft.   Musculoskeletal:      Cervical back: Normal range of motion and neck supple.   Skin:     General: Skin is warm and dry.      Capillary Refill: Capillary refill takes more than 3 seconds.      Findings: No bruising.   Neurological:      General: No focal deficit present.      Mental Status: She is alert and oriented to person, place, and time.   Psychiatric:         Mood and Affect: Affect normal.         Behavior: Behavior is cooperative.       Laboratory Data:  Labs reviewed  Lab Results   Component Value Date    WBC 3.96 02/23/2023    HGB 11.3 (L) 02/23/2023    HCT 39.9 02/23/2023    MCV 75 (L) 02/23/2023     02/23/2023          Imaging: None  Assessment:       Iron Deficiency Anemia     Plan:     Iron Deficiency Anemia  Alpha Thalassemia Trait  Chronic blood loss Anemia  Chronic Atrial Fibrillation and Mechanical Valve on Chronic Anticoagulation  -- MCV persistently 68-70 since 2011 with most recent at 75  -- Does have alpha thalassemia trait  -- Likely chronic blood loss as follows: Previous colonoscopy 6/26/19  showed right colon "full of actively bleeding AVMs" with pt on aspirin and coumadin. UAs also persistently positive for blood in the setting of frequent nephrolithiasis although this is much less likely contributing  -- Has mechanical mitral valve on coumadin with persistently elevated LDH but haptoglobin and T. Bili normal without schistocytes seen on smear  --Received IV Iron in 2020  - Hgb 11.3 today with MCV 75 and ferritin 36, resume PO iron    RTC in 3 months with repeat iron studies at that time to assess response      BMT Chart Routing  Urgent    Follow up with physician . F/u with serafin and labs in 3 months   Follow up with ROCHELLE  "   Provider visit type    Infusion scheduling note    Injection scheduling note    Labs CBC, CMP, ferritin and iron and TIBC   Lab interval:     Imaging    Pharmacy appointment    Other referrals            Shalonda Spencer PA-C  Malignant Hematology & Bone Marrow Transplant

## 2023-03-06 NOTE — PROGRESS NOTES
INR at goal. Medications and chart reviewed. No changes noted to necessitate adjustment of warfarin or follow-up plan. See calendar.  Findings: Pt states she is having some bleeding when she blows her nose; she has been having flu-like symptoms for the past 2 weeks for which she has been taking Mucinex; she's had a decrease in her appetite; she has not been having her greens as a result of not feeling well; she has bruising due to use; & she denies any other changes.  Pt to return to normal diet, go to urgent care & resume maintenance regimen.  Plan to re-assess in 2 weeks.   Patient was re-educated on situations that would require placing a call to the Coumadin Clinic, including bleeding or unusual bruising issues, changes in health, diet or medications,upcoming procedures that require warfarin interruption, and missed Coumadin dose(s). Patient expressed understanding that avoidance of consistency with these parameters could cause fluctuations in INR, leading to more frequent visits and increase risk of adverse events.       IR PARACENTESIS Procedure Note    PATIENT NAME: Hawa Banda  : 1984  MRN: [id number]    Pre-op Diagnosis:   1  Ascites    2  Hyponatremia    3  Hypokalemia    4  Cirrhosis ([facility] [address] )    5  Alcoholism ([facility] [address] )      Post-op Diagnosis:   1  Ascites    2  Hyponatremia    3  Hypokalemia    4  Cirrhosis ([facility] [address] )    5  Alcoholism [facility])        Surgeon:   Alayna Gilbert  Assistants:     No qualified resident was available, Resident is only observing    Estimated Blood Loss: none  Findings: no significant intra-abdominal ascites fluid  Perihepatic fluid was seen with ultrasound  No paracentesis was performed through an intercostal approach due to increased risk for pneumothorax and pleural effusion      Specimens: none    Complications:  None immediate    Anesthesia: none    [address]     Date: 3/6/2023  Time: 11:27 AM

## 2023-03-07 NOTE — PROGRESS NOTES
Subjective:       Patient ID: Elayne Almanzar is a 67 y.o. female.    Chief Complaint: Glaucoma     HPI    DLS:03/23/2022    Pt states red eye OS and previously on OD. Reports OD a line in vision   that is stable. States she is on coumadin.     OVERDUE IOP check   Glaucoma   NSC OU     Dorzolamide BID OD   Latanoprost Q HS OD    Last edited by Prisca Puckett MD on 3/8/2023  3:54 PM.              Assessment & Plan   Normal tension glaucoma of right eye, severe stage  -     Posterior Segment OCT Optic Nerve- Both eyes  -     Anderson Visual Field - OU - Extended - Both Eyes; Future    Subconjunctival hemorrhage of left eye    Afferent pupillary defect of right eye    Glaucoma suspect, left    Floppy eyelid syndrome of both eyes         Referral from Dr. Funes OD for consideration of SLT or step-up Tx      NTG severe OD // Suspect OS  APD OD  -(-)Fhx, ( )Steroids, (-)Trauma  -Tm 21OU  -Drops:  Latanoprost qHS OD // Dorzolamide BID OD  -Drop intolerance/contraindication: COPD/KEYSHAWN may want to avoid BB, follicular conjunctivitis with brimonidine (mild)  -Laser: none  -Surgeries: none  -CCT: 563 // 561   -Gonio: 3+ with very lightly pigmented TM and steep approach N/T OU    NTG Risk factors  Vascular risk factors: (+ in youth) migraines, (+)KEYSHAWN, (+ thalassemia trait, Hgh 9 with low Fe++) anemia, (+)transfusions, (+)hypotension, (-)h/o Raynaud's disease    3/22 HVF SAD/IAD OD // full OS --> stable OD  3/23 RNFL dec G/T/TS/NS/N // B T OS --> +prog OD    Since some mild progression OD on RNFL, likely needs lower IOP  Recommend SLT OD    Check HVF in 2-3 months    GIRISH OS  On Coumadin  AT QID       Floppy eyelid syndrome OU  Known KEYSHAWN  Rec re-start CPAP  Use AT celia qHS    NS OU  NVS, monitor      PLAN  Continue glaucoma gtts and artificial tears  Recommend SLT OD    RTC LASER day SLT OD     2 months HVF 24-2 ptosis taped        Prisca Puckett M.D., M.S.  Department of Ophthalmology   Division of Glaucoma  Surgery  [facility]

## 2023-03-09 NOTE — PROGRESS NOTES
INR not at goal. Medications, chart, and patient findings reviewed. See calendar for adjustments to dose and follow up plan.  Pt took a higher dose than recommended.

## 2023-03-14 PROBLEM — M16.0 BILATERAL PRIMARY OSTEOARTHRITIS OF HIP: Status: ACTIVE | Noted: 2023-01-01

## 2023-03-14 PROBLEM — M25.551 RIGHT HIP PAIN: Status: ACTIVE | Noted: 2023-01-01

## 2023-03-14 PROBLEM — M43.06 SPONDYLOLYSIS OF LUMBAR REGION: Status: ACTIVE | Noted: 2023-01-01

## 2023-03-14 NOTE — PROGRESS NOTES
Subjective:      Patient ID: Elayne Almanzar is a 67 y.o. female.    Chief Complaint: No chief complaint on file.    Referred by: No ref. provider found       Interval History 3/14/23: Mrs. Almanzar 68 y/o female who came for follow up after 2/16/23 right hip intra articular injection and bilateral GTB injection which provide her 90 % of pain relieve and her hip injection provide her 90% of pain relieve for a couple of weeks. . She continues to complain of right groin pain since a couple of weeks after the injection. Her pain is mainly in her right side groin medial area that is worsen with ambulation, weightbearing activities and during walking/PT. Patient denies any fever, chills sick contact or any fall trauma after the procedure was done. She is not using any pain meds right now. Also complaint of right knee pain her last knee intraarticular injection was done more than one year.     Blood thinner: coumadin 5 mg           Interval History: 1/3/23: Elayne Almanzar presents for bilateral hip/knee pain.  She states that last year in May she had COVID with resultant body aches/pain.  The COVID recovered, however her pain remained. 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  She describes the pain as burning pain. She admits to aching pain in her hips and knees. She denies any back pain. She denies pain in her neck/arms/shoulders.  She states that the hip pain occasionally radiates down to her knees (sharp pain). No bowel/bladder issues.  She takes Norco 5-325mg twice a day for pain, but takes it only if she needs it.  She does not take tylenol/advil. She has not tried gabapentin or a muscle relaxant in the past.  She was referred for physical therapy, but has only gone once so far.  She states she is supposed to go back this month.  She denies any home exercise program.    Interventional Pain History  11/3/22 - bilateral hip GTB with steroid ([facility])    Past Medical History:    Diagnosis Date    Acute on chronic diastolic congestive heart failure     Allergy     Anemia     Anticoagulant long-term use     Anxiety     Asthma     Atrial fibrillation     Bilateral primary osteoarthritis of hip 3/14/2023    Cataract     Chronic kidney disease     COPD (chronic obstructive pulmonary disease)     Coronary artery calcification seen on CT scan 3/22/2022    Coronary artery calcification seen on CT scan 3/22/2022    Depression     Encounter for blood transfusion     HTN (hypertension)     Hyperlipidemia     Nephrolithiasis     KEYSHAWN (obstructive sleep apnea)     awaiting CPAP machine     Rheumatic disease of mitral valve     Uncontrolled type 2 diabetes mellitus with complication, with long-term current use of insulin 2020    Urinary incontinence        Past Surgical History:   Procedure Laterality Date    BREAST BIOPSY Left 10/2019    CARDIAC VALVE SURGERY      mechanical mitral valve     SECTION      COLONOSCOPY N/A 2019    Procedure: COLONOSCOPY;  Surgeon: Devon Bowling MD;  Location: [facility] (4TH FLR);  Service: Endoscopy;  Laterality: N/A;  ok to hold Coumadin x 5 days with Lovenox bridge per Coumadin clinic-[facility]    ESOPHAGOGASTRODUODENOSCOPY N/A 2019    Procedure: EGD (ESOPHAGOGASTRODUODENOSCOPY);  Surgeon: Smooth Torrez MD;  Location: [facility] (2ND FLR);  Service: Endoscopy;  Laterality: N/A;  2nd floor requested due to comorbidities, Hypertension, permanent A. 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 44     per Coumadin clinic-ok to hold for 5 days w/bridge    INJECTION N/A 2023    Procedure: INJECTION, BILATERAL GTB AND RIGHT HIP INTRA-ARTICULAR CONTRAST;  Surgeon: Beth Alfaro MD;  Location: [facility];  Service: Pain Management;  Laterality: N/A;    kidney stone removal      MITRAL VALVE REPLACEMENT  2004    TUBAL LIGATION         Review of patient's allergies  indicates:   Allergen Reactions    Brimonidine        Current Outpatient Medications   Medication Sig Dispense Refill    ADVAIR DISKUS 500-50 mcg/dose DsDv diskus inhaler INHALE 1 PUFF INTO THE LUNGS 2 (TWO) TIMES DAILY. 180 each 3    albuterol (PROVENTIL/VENTOLIN HFA) 90 mcg/actuation inhaler INHALE 2 PUFFS INTO THE LUNGS EVERY 6 (SIX) HOURS AS NEEDED FOR WHEEZING. RESCUE 18 g 6    amLODIPine (NORVASC) 2.5 MG tablet Take 1 tablet (2.5 mg total) by mouth once daily. 90 tablet 1    amoxicillin (AMOXIL) 500 MG Tab 4 tablets po 1 hour prior to procedure 4 tablet 0    aspirin (ECOTRIN) 81 MG EC tablet Take 81 mg by mouth once daily.       dipyridamole (PERSANTINE) 5 mg/mL injection       dorzolamide (TRUSOPT) 2 % ophthalmic solution Place 1 drop into the right eye 2 (two) times a day. 10 mL 3    ergocalciferol (ERGOCALCIFEROL) 50,000 unit Cap Take 1 capsule (50,000 Units total) by mouth twice a week. 24 capsule 0    fluticasone propionate (FLONASE) 50 mcg/actuation nasal spray 2 sprays (100 mcg total) by Each Nostril route once daily. 16 g 3    furosemide (LASIX) 20 MG tablet TAKE 1 TABLET BY MOUTH ONCE DAILY. 30 tablet 4    HYDROcodone-acetaminophen (NORCO) 5-325 mg per tablet Take 1 tablet by mouth every 6 (six) hours as needed for Pain. 28 tablet 0    KENALOG 40 mg/mL injection       latanoprost (XALATAN) 0.005 % ophthalmic solution Place 1 drop into the right eye once daily. 7.5 mL 3    meclizine (ANTIVERT) 25 mg tablet Take 1 tablet (25 mg total) by mouth 2 (two) times daily as needed. 30 tablet 1    metoprolol tartrate (LOPRESSOR) 100 MG tablet Take 1 tablet (100 mg total) by mouth 2 (two) times daily. 60 tablet 11    omeprazole (PRILOSEC) 40 MG capsule Take 1 capsule (40 mg total) by mouth every morning. Open capsule and take with apple sauce 30 capsule 2    ondansetron (ZOFRAN-ODT) 8 MG TbDL Dissolve 1 tablet (8 mg total) by mouth every 6 (six) hours as needed. 30 tablet 0    oxybutynin (DITROPAN-XL) 5 MG TR24  Take 5 mg by mouth once daily.      pantoprazole (PROTONIX) 40 MG tablet Take 1 tablet (40 mg total) by mouth daily as needed. 30 tablet 3    rosuvastatin (CRESTOR) 20 MG tablet Take 1 tablet (20 mg total) by mouth once daily. 90 tablet 3    sertraline (ZOLOFT) 100 MG tablet Take 1 tablet (100 mg total) by mouth once daily. 90 tablet 3    tamsulosin (FLOMAX) 0.4 mg Cap Take 0.4 mg by mouth once daily.      ursodioL (LISA FORTE) 500 MG tablet Crush one tablet and take daily for gall bladder. 90 tablet 1    warfarin (COUMADIN) 5 MG tablet Take 1.5 tablets (7.5mg) by mouth Monday, Wednesday, Friday then 1 tablets (5mg) all other days or as instructed by Coumadin Clinic. 45 tablet 3    nitroGLYCERIN (NITROSTAT) 0.4 MG SL tablet Place 1 tablet (0.4 mg total) under the tongue every 5 (five) minutes as needed for Chest pain. (Patient not taking: Reported on 2022) 30 tablet 1     No current facility-administered medications for this visit.       Family History   Problem Relation Age of Onset    Stroke Paternal Grandmother     Colon cancer Maternal Grandmother     Cancer Brother         testicular cancer    Diabetes Sister     Hypertension Sister     Breast cancer Paternal Aunt     Diabetes Sister     Diabetes Sister     Heart disease Father 70        MI    Diabetes Father     Colon cancer Mother 83    Asthma Daughter     Asthma Son     Ovarian cancer Neg Hx        Social History     Socioeconomic History    Marital status:     Number of children: 2   Occupational History    Occupation: [employer]     Comment: Retired   Tobacco Use    Smoking status: Former     Packs/day: 0.50     Years: 20.00     Pack years: 10.00     Types: Cigarettes     Quit date: 2002     Years since quittin.8    Smokeless tobacco: Never   Substance and Sexual Activity    Alcohol use: No    Drug use: No   Social History Narrative    Disability since .  Laid off .  Previously worked as cook in a kitchen.       Imaging:  XR HIPS  BILATERAL 2 VIEW INCL AP PELVIS     CLINICAL HISTORY:  cintia hip pain;  Pain in right hip     TECHNIQUE:  AP view of the pelvis and frogleg lateral views of both hips were performed.     COMPARISON:  06/20/2016     FINDINGS:  Severe right hip joint space narrowing.  Mild sclerosis at the acetabular region and subchondral cystic geodes.  The right femoral head contour remains spherical.  No acute fracture, no osseous lesions.     Mild left hip joint space narrowing.  Minimal sclerosis and small geode at the acetabular region.  The left femoral head contour is maintained.     The sacroiliac joints appear normal.  The remainder of the visualized osseous structures and soft tissues appear normal.     Impression:     Severe right hip and mild left hip degenerative change        Electronically signed by: Ya Parra MD  Date:                                            11/03/2022  Time:                                           16:01    XR KNEE ORTHO RIGHT WITH FLEXION     CLINICAL HISTORY:  RM 20;  Pain in unspecified knee     FINDINGS:  Right: No fracture dislocation bone destruction or OCD seen.     Left: No fracture dislocation bone destruction or OCD seen.        Electronically signed by: Tre Galeana MD  Date:                                            10/06/2021  Time:                                           15:42    XR KNEE ORTHO LEFT     CLINICAL HISTORY:  Pain in unspecified knee     TECHNIQUE:  AP standing of both knees, Merchant views of both knees as well as a lateral view of the left knee were performed.     COMPARISON:  07/01/2016     FINDINGS:  No acute fracture or dislocation.  No left knee joint effusion.  No radiopaque foreign bodies.  Lucency noted at the medial aspect of the distal femur/medial condyle of the femur is again noted and appears similar to prior exam.     Impression:     No acute findings in the left knee.     Lucency through the medial femoral condyle on the right is noted and  appears more conspicuous than when compared to prior exams.  If there is concern for right knee pathology, dedicated radiographs recommended.        Electronically signed by: Galileo Jarrett MD  Date:                                            05/03/2021  Time:                                           13:12    CT LUMBAR SPINE WITHOUT CONTRAST     CLINICAL HISTORY:  Low back pain, symptoms persist with > 6wks conservative treatment;  Dorsalgia, unspecified     TECHNIQUE:  Low-dose axial, sagittal and coronal reformations are obtained through the lumbar spine.  Contrast was not administered.     COMPARISON:  Radiograph 11/03/2022.     FINDINGS:  Lumbar spine alignment demonstrates dextroscoliosis and grade 1 retrolisthesis of L5 on S1.  No spondylolysis.  Vertebral body heights are well maintained without evidence for fracture.  There is heterogeneous attenuation of the visualized osseous structures.     There is degenerative disc space narrowing throughout the visualized thoracolumbar spine most pronounced from L2-L3 through L5-S1 noting associated vacuum phenomenon and chronic degenerative endplate changes.     Partially visualized coronary artery atherosclerosis.  There is atherosclerotic calcification of the abdominal aorta.  Partially visualized low-attenuation left renal lesion, likely a cyst.  There is fatty degeneration of the posterior paraspinal musculature.  Symmetric degenerative changes of the SI joints.     T11-T12: Right facet arthropathy.  No spinal canal stenosis.  Mild right neural foraminal narrowing.     T12-L1: No spinal canal stenosis.  No neural foraminal narrowing.     L1-L2: Bilateral facet arthropathy and bilateral ligamentum flavum buckling.  No spinal canal stenosis.  No neural foraminal narrowing.     L2-L3: Circumferential disc osteophyte complex.  Bilateral facet arthropathy and bilateral ligamentum flavum buckling.  No spinal canal stenosis.  No neural foraminal narrowing.     L3-L4:  Circumferential disc osteophyte complex.  Bilateral facet arthropathy and bilateral ligamentum flavum buckling.  Moderate spinal canal stenosis.  Stable mild bilateral neural foraminal narrowing.     L4-L5: Circumferential disc osteophyte complex.  Bilateral facet arthropathy and bilateral ligamentum flavum buckling.  Prominent posterior epidural fat.  Mild-to-moderate spinal canal stenosis.  Moderate right and mild left neural foraminal narrowing.     L5-S1: Circumferential disc bulge with a superimposed right sub are disc protrusion that effaces the right lateral recess and likely abuts the right descending S1 nerve root.  Bilateral facet arthropathy.  Moderate to severe right lateral recess stenosis.  Moderate to severe bilateral neural foraminal narrowing.     Impression:     1. Lumbar dextroscoliosis with advanced superimposed degenerative changes most pronounced from L3-L4 through L5-S1 as detailed above.  Note is made of a right subarticular disc osteophyte protrusion at L5-S1 that effaces the right lateral recess and likely abuts the right descending S1 nerve root.        Electronically signed by: Christian Sen MD  Date:                                            12/14/2022  Time:                                           13:15      XR LUMBAR SPINE AP AND LAT WITH FLEX/EXT     CLINICAL HISTORY:  lumbar spine pain;  Low back pain, unspecified     TECHNIQUE:  AP and lateral views as well as lateral flexion and extension images are performed through the lumbar spine.     COMPARISON:  None     FINDINGS:  There is a mild dextrocurvature of the lumbar spine.  The vertebral body heights are well maintained.  Moderate disc space narrowing at all levels of the lumbar spine.  Moderate-sized anterior and lateral marginal osteophytes throughout the lumbar spine.  No fracture, no osseous lesions.  The bilateral sacroiliac joints appear normal.  Advanced degenerative change of the right hip joint.  Atherosclerotic  plaque of the aorta.     Impression:     Spondylosis of the lumbar spine, no acute process seen.     Advanced degenerative change of the right hip joint.        Electronically signed by: Ya Parra MD  Date:                                            11/03/2022  Time:                                           16:03    XR HIPS BILATERAL 2 VIEW INCL AP PELVIS     CLINICAL HISTORY:  cintia hip pain;  Pain in right hip     TECHNIQUE:  AP view of the pelvis and frogleg lateral views of both hips were performed.     COMPARISON:  06/20/2016     FINDINGS:  Severe right hip joint space narrowing.  Mild sclerosis at the acetabular region and subchondral cystic geodes.  The right femoral head contour remains spherical.  No acute fracture, no osseous lesions.     Mild left hip joint space narrowing.  Minimal sclerosis and small geode at the acetabular region.  The left femoral head contour is maintained.     The sacroiliac joints appear normal.  The remainder of the visualized osseous structures and soft tissues appear normal.     Impression:     Severe right hip and mild left hip degenerative change        Electronically signed by: Ya Parra MD  Date:                                            11/03/2022  Time:                                           16:01      Review of Systems   Constitutional: Positive for diaphoresis. Negative for chills and fever.   HENT:  Negative for sore throat.    Eyes:  Negative for blurred vision and double vision.   Cardiovascular:  Negative for chest pain and palpitations.   Respiratory:  Negative for shortness of breath.    Hematologic/Lymphatic: Does not bruise/bleed easily.   Skin:  Negative for rash.   Musculoskeletal:  Positive for joint pain, joint swelling and myalgias. Negative for back pain.   Gastrointestinal:  Negative for abdominal pain, constipation, heartburn, nausea and vomiting.   Genitourinary:  Negative for dysuria, hematuria and urgency.   Neurological:   "Negative for headaches and weakness.   Psychiatric/Behavioral:  Negative for depression and substance abuse. The patient has insomnia.    All other systems reviewed and are negative.        Objective:   BP (!) 161/68 (BP Location: Left arm, Patient Position: Sitting, BP Method: Medium (Automatic))   Pulse 67   Ht 5' 1" (1.549 m)   Wt 108.1 kg (238 lb 5.1 oz)   LMP 07/22/2012   BMI 45.03 kg/m²   Pain Disability Index Review:  Last 3 PDI Scores 3/14/2023 1/3/2023   Pain Disability Index (PDI) 45 40     Normocephalic.  Atraumatic.  Affect appropriate.  Breathing unlabored.  Extra ocular muscles intact.         General    Constitutional: She is oriented to person, place, and time. She appears well-developed and well-nourished.   HENT:   Head: Normocephalic and atraumatic.   Eyes: EOM are normal.   Cardiovascular:  Normal rate.            Pulmonary/Chest: Effort normal.   Abdominal: There is no abdominal tenderness.   Neurological: She is alert and oriented to person, place, and time. She has normal reflexes.   Psychiatric: She has a normal mood and affect. Her behavior is normal. Judgment and thought content normal.     General Musculoskeletal Exam   Gait: antalgic       Right Knee Exam     Tenderness   The patient is tender to palpation of the medial joint line.    Crepitus   The patient has crepitus of the medial joint line.    Left Knee Exam     Tenderness   The patient tender to palpation of the medial joint line.    Crepitus   The patient has crepitus of the medial joint line.    Right Hip Exam     Tenderness   The patient tender to palpation of the adductor insertion.    Tests   Pain w/ forced internal rotation (MERVIN): present  Pain w/ forced external rotation (FADIR): present  Log Roll: positive    Comments:  Positive LOG roll, Positive IROP and stinchfield test.   Left Hip Exam   Left hip exam is normal.    Tests   Pain w/ forced external rotation (FADIR): present      Back (L-Spine & T-Spine) / Neck " (C-Spine) Exam     Back (L-Spine & T-Spine) Tests   Right Side Tests  Straight leg raise: + at 90 deg            Muscle Strength   Right Lower Extremity   Hip Abduction: 4/5   Hip Adduction: 4/5   Hip Flexion: 5/5   Hip Extensors: 5/5  Quadriceps:  5/5   Hamstrin/5   Ankle Dorsiflexion:  5/5   Anterior tibial:  5/5   Gastrocsoleus:  5/5   EHL:  5/5  Left Lower Extremity   Hip Flexion: 5/5   Hip Extensors: 5/5  Quadriceps:  5/5   Hamstrin/5   Anterior tibial:  5/5   Gastrocsoleus:  5/5   EHL:  5/5    Reflexes     Left Side  Biceps:  2+  Triceps:  2+  Brachioradialis:  2+  Achilles:  2+  Ankle Clonus:  absent  Quadriceps:  2+    Right Side   Biceps:  2+  Triceps:  2+  Brachioradialis:  2+  Achilles:  2+  Ankle Clonus:  absent  Quadriceps:  2+      Right groin pain, unable to palpated for hernia.   Assessment:       Encounter Diagnoses   Name Primary?    Spondylolysis of lumbar region Yes    Bilateral primary osteoarthritis of hip     Right hip pain            Plan:   We discussed with the patient the assessment and recommendations. The following is the plan we agreed on:  -  Will message her PCP for evaluation of a possible right inguinal hernia  -  Will schedule for right knee steroid injection with 40 mg of depomedrol in office under ultrasound    - Continue with physical therapy   - RTC in 2 weeks after procedure.       Diagnoses and all orders for this visit:    Spondylolysis of lumbar region    Bilateral primary osteoarthritis of hip    Right hip pain              Navdeep Rodriguez MD [facility] Fellow       I have personally taken the history and examined this patient and agree with the fellow's note as stated above.

## 2023-03-17 NOTE — PATIENT INSTRUCTIONS
Start Ozempic once a week. Start with 0.25 mg once a week x 4 weeks, then 0.5 mg weekly.     Decrease portions as soon as you start Ozempic. Avoid fried, greasy, fatty foods.     Some nausea in the first 2 weeks is not unusual.     If you get pain across the upper abdomen and around to your back, please call the office.             Copyright © 2011, [facility]. For more information about The Healthy Eating Plate, please see The [employer], Department of Nutrition, [employer], www.thenutritionsource.org, and [employer], www.health.[place].edu.      Meal Planning & Grocery Shopping    Meal planning builds the foundation for healthy eating. When you have structured ideas for healthy meals and foods available at home to prepare those meals, weight control becomes easier.  If only healthy foods are available at home, then you will be much more likely to eat healthy foods. And you will be less likely to go to a restaurant or  a fast food meal, which tend to be unhealthy and higher in calories than meals prepared at home.      Take 5-10 minutes each week to plan meals for the next 7 days.  Make a grocery list based on the meal plan.    Grocery Shopping Tips:  Shop on a full stomach.  Schedule your shopping for times when you are most motivated and able to be disciplined about your purchases. For example, after a stressful day at work it may be difficult to make the healthiest choices. Shopping at other times, such as early in the morning or after dinner, may be easier.  Focus your shopping on the outside aisles of the store, which tend to contain more fresh foods and lower calorie foods. The inside aisles tend to have more processed foods.  Stick to your list. Avoid buying unhealthy items just because they are on sale.   Compare nutrition labels to check the number of calories and percentage of fat.      What to buy:    Vegetables  Fresh  vegetables  Frozen vegetables with no sauce or added salt  Canned vegetables with no sauce or added salt    Protein  Lean meats, such as chicken and turkey  Limit red meats, such as beef to no more than 1x/week  Limit processed meats, such as cold cuts, mujica, sausage, and hot dogs. Look for brands that have no nitrites and are minimally processed. Consider turkey sausage or turkey mujica.  Fish and Shellfish  Eggs  Dried beans  Canned beans (reduced sodium)    Fat  Use healthy oils, such as olive oil or canola oil, for cooking, salad dressings, etc.  Unflavored nuts and seeds  Nut butters (no added sugar)    Dairy  Yogurt (no sugar added)  Cheese  Low-fat milk  Unsweetened nondairy milks (almond milk, soy milk, etc)    Fruit  Fresh Fruit  Frozen fruit with no added sugar  Canned fruit with no added sugar  Dried fruit with no added sugar  100% fruit juice    Whole Grains  Single ingredient grains, such as oats, quinoa, brown rice  Whole-wheat pasta  Sprouted whole-grain bread    What to avoid:  - Avoid fried foods  - Avoid foods with added sugar  - Avoid sugar-sweetened beverages  - Avoid ultra-processed foods      SEATED RESISTANCE BAND EXERCISES    If you do not have a resistance band, or do not feel comfortable using a resistance band, these exercises can also be done holding a light hand weight or water bottle.  If you are just starting to exercise, you may want to go through the motions without any weights or resistance till you become comfortable with the movements.    Do each of the movements shown 10 times (10 repetitions). You can repeat the exercises a second or  third time as well for greater benefit. The amount of tension on the resistance bands should be adjusted so  you can complete one set of 10 repetitions with effort. Increase the tension every few weeks. Do these  exercises 2 or 3 times a week.    * If an exercise hurts your back or joints, stop doing that particular exercise, but keep doing all  the others *      CHEST EXERCISE        Start Position:   Sit tall, with feet shoulder width apart and feet in front of knees.   Belly pulled in.   Place the band around your upper back, grasp band in each hand, knuckles (rings) facing front. Arms  should be bent so that knuckles are in front of elbows   Slide shoulder blades down your back and slightly together (as if making a V).   Relax your neck.    To Perform This Exercise:   Press hands forward to lengthen arms using chest muscles (not arms!).   Dont arch your back!)   Return to start position and repeat 10 times.   Breathe!        BACK EXERCISE        Start Position:   Sit tall, with feet shoulder width apart and feet in front of knees.   Belly pulled in.   Grasp band in each hand and raise overhead. Arms should be slightly wider than shoulder width and no  slack in the band.   Slide shoulder blades down your back and slightly together (as if making a V).   Relax your neck.    To Perform This Exercise:   Open arms pulling down towards chest using upper back muscles (not arms!).   Squeeze through shoulder blades at the bottom of the movement (dont arch your back!).   Return to start position and repeat 10 times.   Breathe!        SHOULDER EXERCISE        Start Position:   Sit tall, with feet shoulder width apart and feet in front of knees.   Belly pulled in.   Sit on band so that you can grasp band in one hand with tension on the band, but not so much tension that  you cannot straighten the arm. It may take a few tries to find the right amount of tension.   Slide shoulder blades down your back and slightly together (as if making a V).   Relax your neck.    To Perform This Exercise:   Press fist to the ceiling, slightly in front of the body.   SLOWLY return to start position and repeat 10 times.   Switch sides and repeat on the other side.   Breathe!        TRICEPS EXERCISE        Start Position:   Sit tall, with feet shoulder width apart and feet in  front of knees.   Belly pulled in.   Sit on one end of the band. Grasp other end of band in one hand.   Point elbow directly toward the ceiling (if this is difficult, you may support the upper arm with the opposite  hand)   Be sure there is no slack in the band in the starting position.   Slide shoulder blades down your back and slightly together (as if making a V).   Relax your neck.    To Perform This Exercise:   Extend your arm up to the ceiling, as shown.   Squeeze through shoulder blades at the bottom of the movement (dont arch your back!).   SLOWLY return to start position and repeat 10 times.   Repeat on the other side.   Breathe!        BICEP EXERCISE        Start Position:   Sit tall, with feet shoulder width apart and feet in front of knees.   Belly pulled in.   Place center of exercise band under one foot and step the end of the band under the other foot.   Grasp each end of the band with one hand. Make sure there is no slack between your foot and the hand  that holds the band.   Hold your elbows to your sides.   Pull abdominals in, lift chest, press shoulders down and back.    To Perform This Exercise:   As you curl up, keep your wrist from changing position in relation to your forearm and your arm stable  from the shoulder to the elbow.   Bend and straighten your elbow in a slow and controlled movement. Repeat this motion 10 times.   Repeat on the other side.   Breathe!

## 2023-03-17 NOTE — PROGRESS NOTES
Subjective:       Patient ID: Elayne Almanzar is a 67 y.o. female.    Chief Complaint: No chief complaint on file.    Patient presents for treatment of obesity.   Was scheduled for gastric bypass in 5/2022, but got COVID and never had surgery    Co-morbidities  HTN  HLD  CHF  CAD  F-fib  KEYSHAWN  Glaucoma  H/o kidney stones      Current Eating Habits  Breakfast - skips  Lunch - skips  Dinner - salads, baked potato, fish, avocado  Snacks - chips, bananas, Babybel cheese  Beverages - soft drinks, water    Medical Weight Loss  3/17/2023: 235.6 lbs, BMI 43.8, BFP 54.3%,  lbs, SMM 57.3 lbs, BMR 1425 kcal        Review of Systems   Constitutional:  Negative for chills and fever.   Respiratory:  Negative for shortness of breath.    Cardiovascular:  Negative for chest pain and palpitations.   Gastrointestinal:  Negative for abdominal pain, nausea and vomiting.   Neurological:  Negative for dizziness and light-headedness.   Psychiatric/Behavioral:  The patient is not nervous/anxious.        Objective:       Latest Reference Range & Units 02/23/23 12:00   WBC 3.90 - 12.70 K/uL 3.96   RBC 4.00 - 5.40 M/uL 5.32   Hemoglobin 12.0 - 16.0 g/dL 11.3 (L)   Hematocrit 37.0 - 48.5 % 39.9   MCV 82 - 98 fL 75 (L)   MCH 27.0 - 31.0 pg 21.2 (L)   MCHC 32.0 - 36.0 g/dL 28.3 (L)   RDW 11.5 - 14.5 % 16.2 (H)   Platelets 150 - 450 K/uL 162   MPV 9.2 - 12.9 fL 10.9   Gran % 38.0 - 73.0 % 56.3   Lymph % 18.0 - 48.0 % 26.0   Mono % 4.0 - 15.0 % 12.9   Eosinophil % 0.0 - 8.0 % 4.3   Basophil % 0.0 - 1.9 % 0.5   Immature Granulocytes 0.0 - 0.5 % 0.0   Gran # (ANC) 1.8 - 7.7 K/uL 2.2   Lymph # 1.0 - 4.8 K/uL 1.0   Mono # 0.3 - 1.0 K/uL 0.5   Eos # 0.0 - 0.5 K/uL 0.2   Baso # 0.00 - 0.20 K/uL 0.02   Immature Grans (Abs) 0.00 - 0.04 K/uL 0.00   nRBC 0 /100 WBC 0   Differential Method  Automated   Iron 30 - 160 ug/dL 59   TIBC 250 - 450 ug/dL 434   Saturated Iron 20 - 50 % 14 (L)   Transferrin 200 - 375 mg/dL 293   Ferritin 20.0 - 300.0 ng/mL  36   Sodium 136 - 145 mmol/L 143   Potassium 3.5 - 5.1 mmol/L 4.4   Chloride 95 - 110 mmol/L 104   CO2 23 - 29 mmol/L 30 (H)   Anion Gap 8 - 16 mmol/L 9   BUN 8 - 23 mg/dL 16   Creatinine 0.5 - 1.4 mg/dL 1.0   eGFR >60 mL/min/1.73 m^2 >60.0   Glucose 70 - 110 mg/dL 92   Calcium 8.7 - 10.5 mg/dL 9.3   Alkaline Phosphatase 55 - 135 U/L 65   PROTEIN TOTAL 6.0 - 8.4 g/dL 6.7   Albumin 3.5 - 5.2 g/dL 3.5   BILIRUBIN TOTAL 0.1 - 1.0 mg/dL 0.9   AST 10 - 40 U/L 18   ALT 10 - 44 U/L 11   (L): Data is abnormally low  (H): Data is abnormally high    Vitals:    03/17/23 1310   BP: 124/76   Pulse: 65       Physical Exam  Vitals reviewed.   Constitutional:       General: She is not in acute distress.     Appearance: Normal appearance. She is obese. She is not ill-appearing, toxic-appearing or diaphoretic.   HENT:      Head: Normocephalic and atraumatic.   Cardiovascular:      Rate and Rhythm: Normal rate.   Pulmonary:      Effort: Pulmonary effort is normal. No respiratory distress.   Skin:     General: Skin is warm and dry.   Neurological:      Mental Status: She is alert and oriented to person, place, and time.       Assessment:       Problem List Items Addressed This Visit       Essential (primary) hypertension (Chronic)    Hyperlipidemia    KEYSHAWN (obstructive sleep apnea)     Other Visit Diagnoses       Class 3 severe obesity due to excess calories with serious comorbidity and body mass index (BMI) of 40.0 to 44.9 in adult    -  Primary    Encounter for weight loss counseling                Plan:   - Start Ozempic once a week. Start with 0.25mg once a week x 4 weeks, then 0.5 mg weekly.     - Log all food and beverage intake with a daily calorie goal of 9974-3757 calories per day    - Seated resistance band exercises given in AVS

## 2023-03-22 NOTE — Clinical Note
Georges Delacruz,  Ms Almanzar is concerned about pain in her groin and possible hernia. I advised her to reach out to you.  Thanks, Lorraine

## 2023-03-22 NOTE — PROGRESS NOTES
Subjective:   Patient ID:  Elayne Almanzar is a 67 y.o. female who presents for follow-up of Chronic diastolic congestive heart failure      PROBLEM LIST:  Chronic diastolic heart failure  Permanent atrial fibrillation  Mechanical MV 2004  Coronary artery calcification on chest CT  HTN  HLD  IFG  KEYSHAWN  COPD      HPI:  She has been suffering a lot with arthritis in her hip.  She recently saw pain management and was felt to have a possible right inguinal hernia.  She has been doing okay from a cardiac standpoint. Elayne Almanzar denies any chest pain, shortness of breath, PND, orthopnea, palpitations, leg edema, or syncope.  Her weight has been stable.  She is seeing bariatric in is going to be starting Ozempic.    [id number] 10:57:32:  Personally reviewed by me showed atrial fibrillation with a ventricular rate of 73 beats per minute.  There are no ischemic ST changes present.    TTE 10/22:  Summary    The left ventricle is normal in size with normal systolic function.  The estimated ejection fraction is 58%.  There is abnormal septal wall motion.  Severe left atrial enlargement.  Mild right ventricular enlargement with low normal right ventricular systolic function.  Mild right atrial enlargement.  There is a mechanical mitral valve prosthesis. There is trace central insufficiency present; Mean gradient 11 mmHg; MVA 2.4  Mild tricuspid regurgitation.  Elevated central venous pressure (15 mmHg).  The estimated PA systolic pressure is 54 mmHg.  There is mild-moderate pulmonary hypertension.       TTE 3/13/2020  Left ventricular systolic function. The estimated ejection fraction is 55%.  Septal wall has abnormal motion.  Severe biatrial enlargement.  There is a mechanical mitral valve prosthesis.  The mean diastolic gradient across the mitral valve is 5 mmHg at a heart rate of 57 bpm.  Mild tricuspid regurgitation.  The estimated PA systolic pressure is 33 mmHg.  Intermediate central venous pressure (8  mmHg).  Atrial fibrillation observed.     PET Stress Test 11/21:  Conclusion         There are no clinically significant perfusion abnormalities. There is a moderate (15%) amount of mild diffuse fixed heterogeneity that does not change with stress, indicative of non-obstructive disease.    The whole heart absolute myocardial perfusion values averaged 0.72 cc/min/g at rest, which is normal; 0.81 cc/min/g at stress, which is severely reduced; and CFR is 0.98 , which is severely reduced.    The gated perfusion images showed an ejection fraction of 55% at rest and 60% during stress. A normal ejection fraction is greater than 53%.    The wall motion is normal at rest and during stress.    The LV cavity size is normal at rest and stress.    The EKG portion of this study is negative for ischemia.    During stress, atrial fibrillation is noted.    The patient reported no chest pain during the stress test.    When compared to the previous study from 11/15/2019, diffuse heterogenity is now present on perfusion images.       24 Hour Holter Monitor 7/11/2019  Persistent rate controlled AF  1% PVC burden with 1 5-beat run of nonsustained VT at a rate of 111bpm.     ABIs 9/19/2018  The right ankle brachial index was 1.06 which is normal.   The left ankle brachial index was 1.03 which is normal.        Past Medical History:   Diagnosis Date    Acute on chronic diastolic congestive heart failure     Allergy     Anemia     Anticoagulant long-term use     Anxiety     Asthma     Atrial fibrillation 2002    Bilateral primary osteoarthritis of hip 3/14/2023    Cataract     Chronic kidney disease     COPD (chronic obstructive pulmonary disease)     Coronary artery calcification seen on CT scan 3/22/2022    Coronary artery calcification seen on CT scan 3/22/2022    Depression     Encounter for blood transfusion     HTN (hypertension)     Hyperlipidemia     Nephrolithiasis     KEYSHAWN (obstructive sleep apnea)     awaiting CPAP machine      Rheumatic disease of mitral valve     Uncontrolled type 2 diabetes mellitus with complication, with long-term current use of insulin 2020    Urinary incontinence        Past Surgical History:   Procedure Laterality Date    BREAST BIOPSY Left 10/2019    CARDIAC VALVE SURGERY  2004    mechanical mitral valve     SECTION      COLONOSCOPY N/A 2019    Procedure: COLONOSCOPY;  Surgeon: Devon Bowling MD;  Location: [facility] ENDO (4TH FLR);  Service: Endoscopy;  Laterality: N/A;  ok to hold Coumadin x 5 days with Lovenox bridge per Coumadin clinic-[facility]    ESOPHAGOGASTRODUODENOSCOPY N/A 2019    Procedure: EGD (ESOPHAGOGASTRODUODENOSCOPY);  Surgeon: Smooth Torrez MD;  Location: [facility] ENDO (2ND FLR);  Service: Endoscopy;  Laterality: N/A;  2nd floor requested due to comorbidities, Hypertension, permanent A. 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 44     per Coumadin clinic-ok to hold for 5 days w/bridge    INJECTION N/A 2023    Procedure: INJECTION, BILATERAL GTB AND RIGHT HIP INTRA-ARTICULAR CONTRAST;  Surgeon: Beth Alfaro MD;  Location: [facility] PAIN MGT;  Service: Pain Management;  Laterality: N/A;    kidney stone removal      MITRAL VALVE REPLACEMENT  2004    TUBAL LIGATION         Social History     Socioeconomic History    Marital status:     Number of children: 2   Occupational History    Occupation: Cook     Comment: Retired   Tobacco Use    Smoking status: Former     Packs/day: 0.50     Years: 20.00     Pack years: 10.00     Types: Cigarettes     Quit date: 2002     Years since quittin.8    Smokeless tobacco: Never   Substance and Sexual Activity    Alcohol use: No    Drug use: No   Social History Narrative    Disability since .  Laid off .  Previously worked as cook in a kitchen.         Family History   Problem Relation Age of Onset    Stroke Paternal Grandmother     Colon cancer Maternal  Grandmother     Cancer Brother         testicular cancer    Diabetes Sister     Hypertension Sister     Breast cancer Paternal Aunt     Diabetes Sister     Diabetes Sister     Heart disease Father 70        MI    Diabetes Father     Colon cancer Mother 83    Asthma Daughter     Asthma Son     Ovarian cancer Neg Hx        Patient's Medications   New Prescriptions    No medications on file   Previous Medications    ADVAIR DISKUS 500-50 MCG/DOSE DSDV DISKUS INHALER    INHALE 1 PUFF INTO THE LUNGS 2 (TWO) TIMES DAILY.    ALBUTEROL (PROVENTIL/VENTOLIN HFA) 90 MCG/ACTUATION INHALER    INHALE 2 PUFFS INTO THE LUNGS EVERY 6 (SIX) HOURS AS NEEDED FOR WHEEZING. RESCUE    AMLODIPINE (NORVASC) 2.5 MG TABLET    Take 1 tablet (2.5 mg total) by mouth once daily.    AMOXICILLIN (AMOXIL) 500 MG TAB    4 tablets po 1 hour prior to procedure    ASPIRIN (ECOTRIN) 81 MG EC TABLET    Take 81 mg by mouth once daily.     DIPYRIDAMOLE (PERSANTINE) 5 MG/ML INJECTION        DORZOLAMIDE (TRUSOPT) 2 % OPHTHALMIC SOLUTION    Place 1 drop into the right eye 2 (two) times a day.    ERGOCALCIFEROL (ERGOCALCIFEROL) 50,000 UNIT CAP    Take 1 capsule (50,000 Units total) by mouth twice a week.    FLUTICASONE PROPIONATE (FLONASE) 50 MCG/ACTUATION NASAL SPRAY    2 sprays (100 mcg total) by Each Nostril route once daily.    FUROSEMIDE (LASIX) 20 MG TABLET    TAKE 1 TABLET BY MOUTH ONCE DAILY.    HYDROCODONE-ACETAMINOPHEN (NORCO) 5-325 MG PER TABLET    Take 1 tablet by mouth every 6 (six) hours as needed for Pain.    KENALOG 40 MG/ML INJECTION        LATANOPROST (XALATAN) 0.005 % OPHTHALMIC SOLUTION    Place 1 drop into the right eye once daily.    MECLIZINE (ANTIVERT) 25 MG TABLET    Take 1 tablet (25 mg total) by mouth 2 (two) times daily as needed.    METOPROLOL TARTRATE (LOPRESSOR) 100 MG TABLET    Take 1 tablet (100 mg total) by mouth 2 (two) times daily.    NITROGLYCERIN (NITROSTAT) 0.4 MG SL TABLET    Place 1 tablet (0.4 mg total) under the tongue  every 5 (five) minutes as needed for Chest pain.    OMEPRAZOLE (PRILOSEC) 40 MG CAPSULE    Take 1 capsule (40 mg total) by mouth every morning. Open capsule and take with apple sauce    ONDANSETRON (ZOFRAN-ODT) 8 MG TBDL    Dissolve 1 tablet (8 mg total) by mouth every 6 (six) hours as needed.    OXYBUTYNIN (DITROPAN-XL) 5 MG TR24    Take 5 mg by mouth once daily.    PANTOPRAZOLE (PROTONIX) 40 MG TABLET    Take 1 tablet (40 mg total) by mouth daily as needed.    ROSUVASTATIN (CRESTOR) 20 MG TABLET    Take 1 tablet (20 mg total) by mouth once daily.    SEMAGLUTIDE (OZEMPIC) 0.25 MG OR 0.5 MG(2 MG/1.5 ML) PEN INJECTOR    Inject 0.5 mg into the skin every 7 days. Start with 0.25 mg once a week x 4 weeks, then increase to 0.5 mg once a week for 12 doses    SERTRALINE (ZOLOFT) 100 MG TABLET    Take 1 tablet (100 mg total) by mouth once daily.    TAMSULOSIN (FLOMAX) 0.4 MG CAP    Take 0.4 mg by mouth once daily.    URSODIOL (LISA FORTE) 500 MG TABLET    Crush one tablet and take daily for gall bladder.    WARFARIN (COUMADIN) 5 MG TABLET    Take 1 tablets (5mg) by mouth Tuesday, Saturday then 1.5 tablets (7.5mg) all other days or as instructed by Coumadin Clinic.   Modified Medications    No medications on file   Discontinued Medications    No medications on file       Review of Systems   Constitutional: Negative for malaise/fatigue and weight gain.   HENT:  Negative for hearing loss.    Eyes:  Negative for visual disturbance.   Cardiovascular:  Negative for chest pain, claudication, dyspnea on exertion, leg swelling, near-syncope, orthopnea, palpitations, paroxysmal nocturnal dyspnea and syncope.   Respiratory:  Negative for cough, shortness of breath, sleep disturbances due to breathing and snoring.    Endocrine: Negative for cold intolerance, heat intolerance, polydipsia, polyphagia and polyuria.   Hematologic/Lymphatic: Negative for bleeding problem. Does not bruise/bleed easily.   Skin:  Negative for rash and  "suspicious lesions.   Musculoskeletal:  Positive for joint pain. Negative for arthritis, falls and muscle weakness.   Gastrointestinal:  Negative for abdominal pain, change in bowel habit, constipation, diarrhea, heartburn, hematochezia, melena and nausea.   Genitourinary:  Negative for hematuria and nocturia.   Neurological:  Negative for excessive daytime sleepiness, dizziness, headaches, light-headedness, loss of balance and weakness.   Psychiatric/Behavioral:  Negative for depression. The patient is not nervous/anxious.    Allergic/Immunologic: Negative for environmental allergies.     /60 (BP Location: Left arm, Patient Position: Sitting)   Pulse 64   Ht 5' 1.5" (1.562 m)   Wt 107.8 kg (237 lb 10.5 oz)   LMP 07/22/2012   BMI 44.18 kg/m²     Objective:   Physical Exam  Constitutional:       Appearance: She is well-developed. She is obese.      Comments:      HENT:      Head: Normocephalic and atraumatic.   Eyes:      General: No scleral icterus.     Conjunctiva/sclera: Conjunctivae normal.      Pupils: Pupils are equal, round, and reactive to light.   Neck:      Thyroid: No thyromegaly.      Vascular: No hepatojugular reflux or JVD.      Trachea: No tracheal deviation.   Cardiovascular:      Rate and Rhythm: Normal rate. Rhythm irregularly irregular.      Chest Wall: PMI is not displaced.      Pulses: Intact distal pulses.           Carotid pulses are 2+ on the right side and 2+ on the left side.       Radial pulses are 2+ on the right side and 2+ on the left side.        Dorsalis pedis pulses are 2+ on the right side and 2+ on the left side.        Posterior tibial pulses are 2+ on the right side and 2+ on the left side.      Heart sounds: Normal heart sounds.      Comments: Mechanical click is present  Pulmonary:      Effort: Pulmonary effort is normal.   Abdominal:      General: Bowel sounds are normal. There is no distension.      Palpations: Abdomen is soft. There is no mass.      Tenderness: " There is no abdominal tenderness.   Musculoskeletal:         General: No tenderness.      Cervical back: Normal range of motion and neck supple.   Lymphadenopathy:      Cervical: No cervical adenopathy.   Skin:     General: Skin is warm and dry.      Findings: No erythema or rash.      Nails: There is no clubbing.   Neurological:      Mental Status: She is alert and oriented to person, place, and time.   Psychiatric:         Speech: Speech normal.         Behavior: Behavior normal.       Lab Results   Component Value Date     02/23/2023    K 4.4 02/23/2023     02/23/2023    CO2 30 (H) 02/23/2023    BUN 16 02/23/2023    CREATININE 1.0 02/23/2023    GLU 92 02/23/2023    HGBA1C 6.1 05/12/2017    MG 1.6 09/22/2022    AST 18 02/23/2023    ALT 11 02/23/2023    ALBUMIN 3.5 02/23/2023    PROT 6.7 02/23/2023    BILITOT 0.9 02/23/2023    WBC 3.96 02/23/2023    HGB 11.3 (L) 02/23/2023    HCT 39.9 02/23/2023    MCV 75 (L) 02/23/2023     02/23/2023    INR 3.4 (H) 03/17/2023    INR 6.39 11/08/2021    TSH 2.026 10/01/2021    CHOL 147 12/07/2022    HDL 61 12/07/2022    LDLCALC 72.2 12/07/2022    TRIG 69 12/07/2022     (H) 07/07/2020       Assessment:     1. Chronic obstructive pulmonary disease, unspecified COPD type :  She remains on Advair and albuterol.   2. Chronic atrial fibrillation :  She is rate controlled and anticoagulated with Coumadin.   3. Chronic diastolic congestive heart failure :  She appears well compensated and euvolemic today.  Continue her current dose of Lasix.   4. H/O mitral valve replacement with mechanical valve :  Her valve is 18 years old.  Her recent echo was normal.  SBE prophylaxis is recommended prior to dental procedures.  An annual echocardiogram is recommended.   5. Essential (primary) hypertension :  Blood pressure is at goal. I have made no changes. Continue current regimen. Limit sodium intake to < 1500mg daily and follow the DASH diet plan.   6. Mixed hyperlipidemia :  Lipids are at goal. Continue statin therapy.   7. Glucose intolerance (impaired glucose tolerance) : Starting ozempic.   8. Complex sleep apnea syndrome :  She is waiting her new mask after her system was recalled.           11. Morbid obesity with BMI of 40.0-44.9, adult :  She is currently being evaluated by Bariatrics and starting ozempic.       Plan:     Elayne was seen today for chronic diastolic congestive heart failure.    Diagnoses and all orders for this visit:    Chronic atrial fibrillation    Chronic diastolic congestive heart failure    Chronic obstructive pulmonary disease, unspecified COPD type    Essential (primary) hypertension    H/O mitral valve replacement with mechanical valve    Coronary artery calcification seen on CT scan    Mixed hyperlipidemia    Chronic anticoagulation    Morbid obesity with BMI of 40.0-44.9, adult    Glucose intolerance (impaired glucose tolerance)    KEYSHAWN (obstructive sleep apnea)      Thank you for allowing me to participate in this patient's care. Please do not hesitate to contact me with any questions or concerns.

## 2023-03-24 NOTE — TELEPHONE ENCOUNTER
----- Message from Theron Johnston sent at 3/24/2023  2:04 PM CDT -----  Contact: self [phone]  Pt requesting a call in regards to notes her other providers wanted pcp to see and will like to know did pcp review the notes.    Please call and advise

## 2023-03-27 NOTE — TELEPHONE ENCOUNTER
----- Message from Samantha Ahumada sent at 3/27/2023  4:03 PM CDT -----  Contact: Elayne   Patient is returning a phone call.  Who left a message for the patient: Nurse  Does patient know what this is regarding:  a message from 3/24/2023  Would you like a call back, or a response through your [facility] portal?:  Call back  Comments:

## 2023-03-28 NOTE — TELEPHONE ENCOUNTER
Called and spoke  to patient about her groin area being red and sore. Patient concerned that it maybe a hernia and would like to be evaluated just in case. Offered patient an appt with another provider. Patient accepted.

## 2023-03-28 NOTE — TELEPHONE ENCOUNTER
The only way to fix a hernia is with surgery, which is only recommended if it is very large or painful. Does she want to see a surgeon about it?

## 2023-04-03 NOTE — PROGRESS NOTES
INTERNAL MEDICINE CLINIC - SAME DAY APPOINTMENT  Progress Note    PRESENTING HISTORY     PCP: Nubia Crocker MD    Chief Complaint/Reason for Visit:   No chief complaint on file.    History of Present Illness & ROS : Ms. Elayne Almanzar is a 67 y.o. female.    Same day apt.   New to me.   Estd with Dr. Crocker.   Pleasant lady.   Here today with concerns about a possible 'hernia to right groin for the past 2 months'. Reports that her Pain Mgt doctor, Dr. Alfaro noticed that 'may have a hernia to her groin'. Feels pain to the 'area' and was informed to get 'checked out to see if a hernia'. Hurts to the right groin area with sitting and walking.   No symptoms of dysuria or cystitis.     Review of Systems:  Eyes: denies visual changes at this time denies floaters   ENT: no nasal congestion or sore throat  Respiratory: no cough or shorness of breath  Cardiovascular: no chest pain or palpitations  Gastrointestinal: no nausea or vomiting, no abdominal pain or change in bowel habits  Genitourinary: no hematuria or dysuria; denies frequency  Hematologic/Lymphatic: no easy bruising or lymphadenopathy  Musculoskeletal: no arthralgias or myalgias  Neurological: no seizures or tremors  Endocrine: no heat or cold intolerance      PAST HISTORY:     Past Medical History:   Diagnosis Date    Acute on chronic diastolic congestive heart failure     Allergy     Anemia     Anticoagulant long-term use     Anxiety     Asthma     Atrial fibrillation 2002    Bilateral primary osteoarthritis of hip 3/14/2023    Cataract     Chronic kidney disease     COPD (chronic obstructive pulmonary disease)     Coronary artery calcification seen on CT scan 3/22/2022    Coronary artery calcification seen on CT scan 3/22/2022    Depression     Encounter for blood transfusion     HTN (hypertension)     Hyperlipidemia     Nephrolithiasis     KEYSHAWN (obstructive sleep apnea)     awaiting CPAP machine     Rheumatic disease of mitral valve     Uncontrolled  type 2 diabetes mellitus with complication, with long-term current use of insulin 2020    Urinary incontinence        Past Surgical History:   Procedure Laterality Date    BREAST BIOPSY Left 10/2019    CARDIAC VALVE SURGERY  2004    mechanical mitral valve     SECTION      COLONOSCOPY N/A 2019    Procedure: COLONOSCOPY;  Surgeon: Devon Bowling MD;  Location: [facility] ENDO (4TH FLR);  Service: Endoscopy;  Laterality: N/A;  ok to hold Coumadin x 5 days with Lovenox bridge per Coumadin clinic-[facility]    ESOPHAGOGASTRODUODENOSCOPY N/A 2019    Procedure: EGD (ESOPHAGOGASTRODUODENOSCOPY);  Surgeon: Smooth Torrez MD;  Location: [facility] ENDO (2ND FLR);  Service: Endoscopy;  Laterality: N/A;  2nd floor requested due to comorbidities, Hypertension, permanent A. 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 44     per Coumadin clinic-ok to hold for 5 days w/bridge    INJECTION N/A 2023    Procedure: INJECTION, BILATERAL GTB AND RIGHT HIP INTRA-ARTICULAR CONTRAST;  Surgeon: Beth Alfaro MD;  Location: [facility] PAIN MGT;  Service: Pain Management;  Laterality: N/A;    kidney stone removal      MITRAL VALVE REPLACEMENT  2004    TUBAL LIGATION         Family History   Problem Relation Age of Onset    Stroke Paternal Grandmother     Colon cancer Maternal Grandmother     Cancer Brother         testicular cancer    Diabetes Sister     Hypertension Sister     Breast cancer Paternal Aunt     Diabetes Sister     Diabetes Sister     Heart disease Father 70        MI    Diabetes Father     Colon cancer Mother 83    Asthma Daughter     Asthma Son     Ovarian cancer Neg Hx        Social History     Socioeconomic History    Marital status:     Number of children: 2   Occupational History    Occupation: [employer]     Comment: Retired   Tobacco Use    Smoking status: Former     Packs/day: 0.50     Years: 20.00     Pack years: 10.00     Types: Cigarettes      Quit date: 2002     Years since quittin.9    Smokeless tobacco: Never   Substance and Sexual Activity    Alcohol use: No    Drug use: No   Social History Narrative    Disability since .  Laid off .  Previously worked as cook in a kitchen.         MEDICATIONS & ALLERGIES:     Current Outpatient Medications on File Prior to Visit   Medication Sig Dispense Refill    ADVAIR DISKUS 500-50 mcg/dose DsDv diskus inhaler INHALE 1 PUFF INTO THE LUNGS 2 (TWO) TIMES DAILY. 180 each 3    albuterol (PROVENTIL/VENTOLIN HFA) 90 mcg/actuation inhaler INHALE 2 PUFFS INTO THE LUNGS EVERY 6 (SIX) HOURS AS NEEDED FOR WHEEZING. RESCUE 18 g 6    amLODIPine (NORVASC) 2.5 MG tablet Take 1 tablet (2.5 mg total) by mouth once daily. 90 tablet 1    aspirin (ECOTRIN) 81 MG EC tablet Take 81 mg by mouth once daily.       dipyridamole (PERSANTINE) 5 mg/mL injection       dorzolamide (TRUSOPT) 2 % ophthalmic solution Place 1 drop into the right eye 2 (two) times a day. 10 mL 3    ergocalciferol (ERGOCALCIFEROL) 50,000 unit Cap Take 1 capsule (50,000 Units total) by mouth twice a week. 24 capsule 0    fluticasone propionate (FLONASE) 50 mcg/actuation nasal spray 2 sprays (100 mcg total) by Each Nostril route once daily. 16 g 3    furosemide (LASIX) 40 MG tablet Take 1 tablet (40 mg total) by mouth once daily. 90 tablet 3    latanoprost (XALATAN) 0.005 % ophthalmic solution Place 1 drop into the right eye once daily. 7.5 mL 3    meclizine (ANTIVERT) 25 mg tablet Take 1 tablet (25 mg total) by mouth 2 (two) times daily as needed. 30 tablet 1    metoprolol tartrate (LOPRESSOR) 100 MG tablet Take 1 tablet (100 mg total) by mouth 2 (two) times daily. 60 tablet 11    nitroGLYCERIN (NITROSTAT) 0.4 MG SL tablet Place 1 tablet (0.4 mg total) under the tongue every 5 (five) minutes as needed for Chest pain. (Patient not taking: Reported on 2022) 30 tablet 1    omeprazole (PRILOSEC) 40 MG capsule Take 1 capsule (40 mg total) by mouth  every morning. Open capsule and take with apple sauce (Patient not taking: Reported on 3/22/2023) 30 capsule 2    ondansetron (ZOFRAN-ODT) 8 MG TbDL Dissolve 1 tablet (8 mg total) by mouth every 6 (six) hours as needed. (Patient not taking: Reported on 3/22/2023) 30 tablet 0    oxybutynin (DITROPAN-XL) 5 MG TR24 Take 5 mg by mouth once daily.      pantoprazole (PROTONIX) 40 MG tablet Take 1 tablet (40 mg total) by mouth daily as needed. 30 tablet 3    rosuvastatin (CRESTOR) 20 MG tablet Take 1 tablet (20 mg total) by mouth once daily. 90 tablet 3    semaglutide (OZEMPIC) 0.25 mg or 0.5 mg(2 mg/1.5 mL) pen injector Inject 0.5 mg into the skin every 7 days. Start with 0.25 mg once a week x 4 weeks, then increase to 0.5 mg once a week for 12 doses (Patient not taking: Reported on 3/22/2023) 1.5 mL 2    sertraline (ZOLOFT) 100 MG tablet Take 1 tablet (100 mg total) by mouth once daily. 90 tablet 3    tamsulosin (FLOMAX) 0.4 mg Cap Take 0.4 mg by mouth once daily.      warfarin (COUMADIN) 5 MG tablet Take 1 tablets (5mg) by mouth Tuesday, Saturday then 1.5 tablets (7.5mg) all other days or as instructed by Coumadin Clinic. 45 tablet 0     No current facility-administered medications on file prior to visit.        Review of patient's allergies indicates:   Allergen Reactions    Brimonidine        Medications Reconciliation:   I have reconciled the patient's home medications with the patient/family. I have updated all changes.  Refer to After-Visit Medication List.    OBJECTIVE:     Vital Signs:  There were no vitals filed for this visit.  Wt Readings from Last 3 Encounters:   03/22/23 0949 107.8 kg (237 lb 10.5 oz)   03/17/23 1310 106.9 kg (235 lb 9.6 oz)   03/14/23 1046 108.1 kg (238 lb 5.1 oz)     There is no height or weight on file to calculate BMI.   Wt Readings from Last 3 Encounters:   04/04/23 102.5 kg (225 lb 15.5 oz)   03/22/23 107.8 kg (237 lb 10.5 oz)   03/17/23 106.9 kg (235 lb 9.6 oz)     Temp Readings from  Last 3 Encounters:   01/23/23 98 °F (36.7 °C) (Oral)   12/30/22 98.5 °F (36.9 °C)   12/07/22 98.5 °F (36.9 °C)     BP Readings from Last 3 Encounters:   04/04/23 136/84   03/22/23 136/60   03/17/23 124/76     Pulse Readings from Last 3 Encounters:   04/04/23 88   03/22/23 64   03/17/23 65       Physical Exam:  (Focused Exam)  General: Well developed, well nourished. No distress.  HEENT: Head is normocephalic, atraumatic  Eyes: Clear conjunctiva.  Neck: Supple, symmetrical neck; trachea midline.  Extremities: No LE edema. Pulses 2+ and symmetric.   Abdomen: Abdomen is soft, non-tender non-distended with normal bowel sounds.  +palpably induced fullness and verbally expressed discomfort to right groin region   Skin: Skin color, texture, turgor normal. No rashes.  Musculoskeletal: Normal gait.   Lymph Nodes: No cervical or supraclavicular adenopathy.  Neurologic: Normal strength and tone. No focal numbness or weakness.     Laboratory  Lab Results   Component Value Date    WBC 3.96 02/23/2023    HGB 11.3 (L) 02/23/2023    HCT 39.9 02/23/2023     02/23/2023    CHOL 147 12/07/2022    TRIG 69 12/07/2022    HDL 61 12/07/2022    ALT 11 02/23/2023    AST 18 02/23/2023     02/23/2023    K 4.4 02/23/2023     02/23/2023    CREATININE 1.0 02/23/2023    BUN 16 02/23/2023    CO2 30 (H) 02/23/2023    TSH 2.026 10/01/2021    INR 3.4 (H) 03/17/2023    HGBA1C 6.1 05/12/2017       ASSESSMENT & PLAN:     Same day apt.     Right groin pain / Pain in right Groin / Upper thigh region  (Suspect possible Hernia and will need to be further evaluated with imaging)  -     US Soft Tissue, Groin Right; Future; Expected date: 04/04/2023  *Await imaging to determine next step    Future Appointments   Date Time Provider Department Center   4/17/2023 11:30 AM LAB, APPOINTMENT [facility] LAB VNP [facility]   5/2/2023  9:00 AM Beth Alfaro MD [facility] PAINMGT [facility]   5/10/2023 11:00 AM Prisca Puckett MD [facility]  [facility]   5/22/2023 10:30 AM [facility] LAB [facility] LABBMT Chad Melgoza   5/22/2023 11:30 AM So Linares MD [facility] HC BMT [facility]   5/23/2023 10:00 AM [address] [facility] PAINMGT [facility]   5/26/2023  9:45 AM PERIMETRY, HUMPH [facility]   5/26/2023 11:00 AM Prisca Puckett MD [facility] OPHTHAL [facility]   6/6/2023  8:45 AM Eulalia Wise MD [facility] BARIAT [facility]   6/30/2023  9:00 AM Nubia Crocker MD [facility]        Medication List            Accurate as of April 4, 2023  9:31 AM. If you have any questions, ask your nurse or doctor.                CONTINUE taking these medications      ADVAIR DISKUS 500-50 mcg/dose Dsdv diskus inhaler  Generic drug: fluticasone-salmeterol 500-50 mcg/dose  INHALE 1 PUFF INTO THE LUNGS 2 (TWO) TIMES DAILY.     albuterol 90 mcg/actuation inhaler  Commonly known as: PROVENTIL/VENTOLIN HFA  INHALE 2 PUFFS INTO THE LUNGS EVERY 6 (SIX) HOURS AS NEEDED FOR WHEEZING. RESCUE     amLODIPine 2.5 MG tablet  Commonly known as: NORVASC  Take 1 tablet (2.5 mg total) by mouth once daily.     aspirin 81 MG EC tablet  Commonly known as: ECOTRIN     dipyridamole 5 mg/mL injection  Commonly known as: PERSANTINE     dorzolamide 2 % ophthalmic solution  Commonly known as: TRUSOPT  Place 1 drop into the right eye 2 (two) times a day.     ergocalciferol 50,000 unit Cap  Commonly known as: ERGOCALCIFEROL  Take 1 capsule (50,000 Units total) by mouth twice a week.     fluticasone propionate 50 mcg/actuation nasal spray  Commonly known as: FLONASE  2 sprays (100 mcg total) by Each Nostril route once daily.     furosemide 40 MG tablet  Commonly known as: LASIX  Take 1 tablet (40 mg total) by mouth once daily.     ketorolac 0.5% 0.5 % Drop  Commonly known as: ACULAR  Place 1 drop into the right eye 4 (four) times daily. for 5 days     latanoprost 0.005 % ophthalmic solution  Commonly known as: XALATAN  Place 1 drop into the right eye once daily.     meclizine 25 mg  tablet  Commonly known as: ANTIVERT  Take 1 tablet (25 mg total) by mouth 2 (two) times daily as needed.     metoprolol tartrate 100 MG tablet  Commonly known as: LOPRESSOR  Take 1 tablet (100 mg total) by mouth 2 (two) times daily.     nitroGLYCERIN 0.4 MG SL tablet  Commonly known as: NITROSTAT  Place 1 tablet (0.4 mg total) under the tongue every 5 (five) minutes as needed for Chest pain.     omeprazole 40 MG capsule  Commonly known as: PRILOSEC  Take 1 capsule (40 mg total) by mouth every morning. Open capsule and take with apple sauce     ondansetron 8 MG Tbdl  Commonly known as: ZOFRAN-ODT  Dissolve 1 tablet (8 mg total) by mouth every 6 (six) hours as needed.     oxybutynin 5 MG Tr24  Commonly known as: DITROPAN-XL     OZEMPIC 0.25 mg or 0.5 mg(2 mg/1.5 mL) pen injector  Generic drug: semaglutide  Inject 0.5 mg into the skin every 7 days. Start with 0.25 mg once a week x 4 weeks, then increase to 0.5 mg once a week for 12 doses     pantoprazole 40 MG tablet  Commonly known as: PROTONIX  Take 1 tablet (40 mg total) by mouth daily as needed.     rosuvastatin 20 MG tablet  Commonly known as: CRESTOR  Take 1 tablet (20 mg total) by mouth once daily.     sertraline 100 MG tablet  Commonly known as: ZOLOFT  Take 1 tablet (100 mg total) by mouth once daily.     tamsulosin 0.4 mg Cap  Commonly known as: FLOMAX     warfarin 5 MG tablet  Commonly known as: COUMADIN  Take 1 tablets (5mg) by mouth Tuesday, Saturday then 1.5 tablets (7.5mg) all other days or as instructed by Coumadin Clinic.            Signing Physician:  SHERIF Prieto

## 2023-04-03 NOTE — PATIENT INSTRUCTIONS
POST LASER INSTRUCTIONS  SELECTIVE LASER TRABECULOPLASTY    Start KETOROLAC four times a day for 5 days in the RIGHT eye (grey top)  Breakfast / Lunch / Dinner / Bedtime  5 days only then STOP    If you use other eye drops at home - CONTINUE THESE     It is normal to have a headache after the laser - it should go away by the end of the day    If the eye feels sticky, use eye wash or artificial tears     You can use artificial tears throughout the day for comfort    PLEASE return to clinic on your scheduled day for a follow up examination     Call or return to clinic sooner if new symptoms develop (example: pain that is getting worse, bad red eye, loss of vision)

## 2023-04-03 NOTE — PROGRESS NOTES
Subjective:       Patient ID: Elayne Almanzar is a 67 y.o. female.    Chief Complaint: Laser Treatment     HPI    SLT OD     Pt sts no changes, problems or concerns       Dorzolamide BID OD   Latanoprost Q HS OD     Last edited by Lauren So on 4/3/2023 10:15 AM.              Assessment & Plan   Normal tension glaucoma of right eye, severe stage  -     ketorolac 0.5% (ACULAR) 0.5 % Drop; Place 1 drop into the right eye 4 (four) times daily. for 5 days  Dispense: 5 mL; Refill: 0    Afferent pupillary defect of right eye  -     ketorolac 0.5% (ACULAR) 0.5 % Drop; Place 1 drop into the right eye 4 (four) times daily. for 5 days  Dispense: 5 mL; Refill: 0    Glaucoma suspect, left  -     ketorolac 0.5% (ACULAR) 0.5 % Drop; Place 1 drop into the right eye 4 (four) times daily. for 5 days  Dispense: 5 mL; Refill: 0    Floppy eyelid syndrome of both eyes  -     ketorolac 0.5% (ACULAR) 0.5 % Drop; Place 1 drop into the right eye 4 (four) times daily. for 5 days  Dispense: 5 mL; Refill: 0         Referral from Dr. Funes OD for consideration of SLT or step-up Tx      NTG severe OD // Suspect OS  APD OD  -(-)Fhx, ( )Steroids, (-)Trauma  -Tm 21OU  -Drops:  Latanoprost qHS OD // Dorzolamide BID OD  -Drop intolerance/contraindication: COPD/KEYSHAWN may want to avoid BB, follicular conjunctivitis with brimonidine (mild)  -Laser: none  -Surgeries: none  -CCT: 563 // 561   -Gonio: 3+ with very lightly pigmented TM and steep approach N/T OU    NTG Risk factors  Vascular risk factors: (+ in youth) migraines, (+)KEYSHAWN, (+ thalassemia trait, Hgh 9 with low Fe++) anemia, (+)transfusions, (+)hypotension, (-)h/o Raynaud's disease    3/22 HVF SAD/IAD OD // full OS --> stable OD  3/23 RNFL dec G/T/TS/NS/N // B T OS --> +prog OD    Since some mild progression OD on RNFL, likely needs lower IOP    Recommend SLT OD 4/3/23 IOP --->    Check HVF in 2-3 months    GIRISH OS  On Coumadin  AT D       Floppy eyelid syndrome OU  Known KEYSHAWN  Rec  "re-start CPAP  Use AT celia qHS    NS OU  NVS, monitor      PLAN  Continue glaucoma gtts and artificial tears  Proceed SLT OD    1 month need HVF 24-2 ptosis taped    RTC 6 weeks IOP check post SLT OD      Prisca Puckett M.D., M.S.  Department of Ophthalmology   Division of Glaucoma Surgery  [facility]  ================================================================================================  Laser Trabeculoplasty right eye    Glaucoma Laser Trabeculoplasty Consent: Patient with poorly controlled glaucoma and IOP deemed too high for the health of the eye.  Discussed with patient the options, risks and benefits, expectations of glaucoma laser surgery, with questions and answers to facilitate discussion.  We specifically covered the risks of IOP spike, bleeding, cataract formation, iritis & pain, and the need for further surgery.  The patient voice good understanding and patient wishes to proceed with surgery.  The patient will likely benefit from laser surgery and patient signed consent for Right eye.    A single drop of pilocarpine 1-2% followed by a single drop of apraclonidine 0.5%  were both instilled into the operative eye. The patient was informed that the pilocarpine may induce a temporal headache.     A verbal time out was performed to correctly identify the patient and the operative eye. The patient correctly identified the eye prior to start of the procedure.    Performed with Q-Switched YAG: Selective Laser Trabeculoplasty    Power Range:  1.0 mJ - titrated to small "champagne bubbles"  Total Energy:   110 mJ  Extent   360 Degrees  Total # of Exposures: 110 Applications    Patient tolerated procedure very well. The eye was then washed with sterile BSS and then a single drop of apraclonidine 0.5% was instilled into the operative eye. After fifteen minutes the IOP was checked.    The patient was instructed that Ketorolac 0.5% was sent to the pharmacy for them to use four times a day for " five days. The patient was informed that use of this medication increases the likelihood of success after SLT. The patient was instructed to continue to use artificial tears and BSS eye wash for comfort. They were instructed to continue to use any glaucoma medications they were using prior to the laser procedure.  Elayne understands the information Dr. Puckett provided at the time of visit.    The patient voices good understanding of these these instructions and agrees with the plan.  Patient will return to clinic sooner as needed for pain, decreasing vision etc.    Plan  - Start Ketorolac  0.5% four times a day for 5 days in the right eye  - Continue all previously using glaucoma eye drops  - OK to use Tylenol OTC for headache from Pilocarpine  - OK to continue to use eye wash to remove goniosol   - Use artificial tears throughout the day for comfort  - RTC 6 weeks for assess efficacy  - At that time, if the IOP improves in the operative eye, we will consider SLT in the fellow eye if needed    RTC sooner prn with good understanding

## 2023-04-03 NOTE — TELEPHONE ENCOUNTER
----- Message from Amie Lopez sent at 4/3/2023  3:35 PM CDT -----  Consult/Advisory    Name Of Caller:Elayne       Contact Preference:Pharmacy # [phone]    Nature of call: Pt called regarding her rx ketorolac 0.5% (ACULAR) 0.5 % Drop. She called saying that she need it sent to Pharmacy [phone]

## 2023-04-11 NOTE — TELEPHONE ENCOUNTER
Refill Routing Note   Medication(s) are not appropriate for processing by [facility] for the following reason(s):      No active prescription written by PCP    [facility] action(s):  Defer  Approve            Appointments  past 12m or future 3m with PCP    Date Provider   Last Visit   12/30/2022 Nubia Crocker MD   Next Visit   6/30/2023 Nubia Crocker MD   ED visits in past 90 days: 0        Note composed:1:25 PM 04/11/2023

## 2023-04-11 NOTE — PROGRESS NOTES
Established Patient - Audio Only Telehealth Visit     The patient location is: HOME  The chief complaint leading to consultation is: Review US and discuss plans for next step  Visit type: Virtual visit with audio only (telephone)  Total time spent with patient:15 mins.       The reason for the audio only service rather than synchronous audio and video virtual visit was related to technical difficulties or patient preference/necessity.     Each patient to whom I provide medical services by telemedicine is:  (1) informed of the relationship between the physician and patient and the respective role of any other health care provider with respect to management of the patient; and (2) notified that they may decline to receive medical services by telemedicine and may withdraw from such care at any time. Patient verbally consented to receive this service via voice-only telephone call.     CT ABD and Pelvis  IMPRESSION:    (2018)  No acute abdominal or pelvic pathology.     Stable left simple renal cyst, as well as a focal hypodensity that is too small to definitively characterize; possible cyst.     Findings suggestive of chronic medical renal disease, right greater than left.     Diastasis of the lower rectus musculature with eventration of few small bowel loops, no obstruction.     Small fat containing umbilical hernia.        HPI:  Ms. Holly is a very pleasant lady.   Seen by me on 4/4/2023 for right Groin pain; having 'pain' to area. Sent for US; findings are of a 'mass', appears to be 'soft tissue, non specific, ? Of a lipoma.  Discussed her imaging and in agreement with proceeding with the next step study. Unable to have an MRI. Will plan for CT    Assessment and plan:   Abnormal US Right Groin\ /   Right groin pain  Soft tissue mass  ? Lipoma  ` CT to further delineate this finding  -     CT Pelvis Without Contrast; Future; Expected date: 04/12/2023    Future Appointments   Date Time Provider Department Center    4/12/2023  3:30 PM Joy Hurd, [facility] [address] PCW   4/17/2023 11:30 AM LAB, APPOINTMENT [facility] [place] [facility] LAB VNP [facility]   5/2/2023  9:00 AM Beth Alfaro MD [facility]   5/10/2023 11:00 AM Prisca Puckett MD [facility] OPHTHAL [facility]   5/22/2023 10:30 AM NOM LAB BMT [facility] LABBMT [facility]   5/22/2023 11:30 AM So Linares MD [facility] HC BMT [facility]   5/23/2023 10:00 AM [address], [facility]   5/26/2023  9:45 AM PERIMETRY, HUMPH [facility] OPHTHAL [facility]   5/26/2023 11:00 AM Prisca Puckett MD [facility] OPHTHAL [facility]   6/6/2023  8:45 AM Eulalia Wise MD [facility] BARIAT [facility]   6/30/2023  9:00 AM Nubia Crocker MD [facility]        Medication List            Accurate as of April 12, 2023  1:20 PM. If you have any questions, ask your nurse or doctor.                CONTINUE taking these medications      ADVAIR DISKUS 500-50 mcg/dose Dsdv diskus inhaler  Generic drug: fluticasone-salmeterol 500-50 mcg/dose  INHALE 1 PUFF INTO THE LUNGS 2 (TWO) TIMES DAILY.     albuterol 90 mcg/actuation inhaler  Commonly known as: PROVENTIL/VENTOLIN HFA  INHALE 2 PUFFS INTO THE LUNGS EVERY 6 (SIX) HOURS AS NEEDED FOR WHEEZING. RESCUE     amLODIPine 2.5 MG tablet  Commonly known as: NORVASC  Take 1 tablet (2.5 mg total) by mouth once daily.     aspirin 81 MG EC tablet  Commonly known as: ECOTRIN     dipyridamole 5 mg/mL injection  Commonly known as: PERSANTINE     dorzolamide 2 % ophthalmic solution  Commonly known as: TRUSOPT  Place 1 drop into the right eye 2 (two) times a day.     ergocalciferol 50,000 unit Cap  Commonly known as: ERGOCALCIFEROL  Take 1 capsule (50,000 Units total) by mouth twice a week.     fluticasone propionate 50 mcg/actuation nasal spray  Commonly known as: FLONASE  2 sprays (100 mcg total) by Each Nostril route once daily.     furosemide 40 MG tablet  Commonly known as: LASIX  Take 1 tablet (40 mg total) by mouth once  daily.     latanoprost 0.005 % ophthalmic solution  Commonly known as: XALATAN  Place 1 drop into the right eye once daily.     meclizine 25 mg tablet  Commonly known as: ANTIVERT  Take 1 tablet (25 mg total) by mouth 2 (two) times daily as needed.     metoprolol tartrate 100 MG tablet  Commonly known as: LOPRESSOR  TAKE ONE TABLET BY MOUTH TWICE DAILY     nitroGLYCERIN 0.4 MG SL tablet  Commonly known as: NITROSTAT  Place 1 tablet (0.4 mg total) under the tongue every 5 (five) minutes as needed for Chest pain.     omeprazole 40 MG capsule  Commonly known as: PRILOSEC  Take 1 capsule (40 mg total) by mouth every morning. Open capsule and take with apple sauce     ondansetron 8 MG Tbdl  Commonly known as: ZOFRAN-ODT  Dissolve 1 tablet (8 mg total) by mouth every 6 (six) hours as needed.     oxybutynin 5 MG Tr24  Commonly known as: DITROPAN-XL     OZEMPIC 0.25 mg or 0.5 mg(2 mg/1.5 mL) pen injector  Generic drug: semaglutide  Inject 0.5 mg into the skin every 7 days. Start with 0.25 mg once a week x 4 weeks, then increase to 0.5 mg once a week for 12 doses     pantoprazole 40 MG tablet  Commonly known as: PROTONIX  Take 1 tablet (40 mg total) by mouth daily as needed.     rosuvastatin 20 MG tablet  Commonly known as: CRESTOR  Take 1 tablet (20 mg total) by mouth once daily.     sertraline 100 MG tablet  Commonly known as: ZOLOFT  Take 1 tablet (100 mg total) by mouth once daily.     tamsulosin 0.4 mg Cap  Commonly known as: FLOMAX     warfarin 5 MG tablet  Commonly known as: COUMADIN  Take 1 tablets (5mg) by mouth Tuesday, Saturday then 1.5 tablets (7.5mg) all other days or as instructed by Coumadin Clinic.              NELLY Syed NP     This service was not originating from a related E/M service provided within the previous 7 days nor will  to an E/M service or procedure within the next 24 hours or my soonest available appointment.  Prevailing standard of care was able to be met in this audio-only  visit.

## 2023-04-11 NOTE — TELEPHONE ENCOUNTER
No new care gaps identified.  [facility] Embedded Care Gaps. Reference number: [id number]. 4/11/2023   10:14:18 AM CDT

## 2023-04-17 NOTE — PROGRESS NOTES
INTERNAL MEDICINE CLINIC - SAME DAY APPOINTMENT  Progress Note    PRESENTING HISTORY     PCP: Nubia Crocker MD    Chief Complaint/Reason for Visit:   No chief complaint on file.     History of Present Illness & ROS : Ms. Elayne Almanzar is a 67 y.o. female.   Same day apt.   Very pleasant lady.   Here to review the recent imaging results.     Review of Systems:  Eyes: denies visual changes at this time denies floaters   ENT: no nasal congestion or sore throat  Respiratory: no cough or shorness of breath  Cardiovascular: no chest pain or palpitations  Gastrointestinal: no nausea or vomiting, no abdominal pain or change in bowel habits  Genitourinary: no hematuria or dysuria; denies frequency  Hematologic/Lymphatic: no easy bruising or lymphadenopathy  Musculoskeletal: no arthralgias or myalgias  Neurological: no seizures or tremors  Endocrine: no heat or cold intolerance      PAST HISTORY:     Past Medical History:   Diagnosis Date    Acute on chronic diastolic congestive heart failure     Allergy     Anemia     Anticoagulant long-term use     Anxiety     Asthma     Atrial fibrillation     Bilateral primary osteoarthritis of hip 3/14/2023    Cataract     Chronic kidney disease     COPD (chronic obstructive pulmonary disease)     Coronary artery calcification seen on CT scan 3/22/2022    Coronary artery calcification seen on CT scan 3/22/2022    Depression     Encounter for blood transfusion     HTN (hypertension)     Hyperlipidemia     Nephrolithiasis     KEYSHAWN (obstructive sleep apnea)     awaiting CPAP machine     Rheumatic disease of mitral valve     Uncontrolled type 2 diabetes mellitus with complication, with long-term current use of insulin 2020    Urinary incontinence        Past Surgical History:   Procedure Laterality Date    BREAST BIOPSY Left 10/2019    CARDIAC VALVE SURGERY  2004    mechanical mitral valve     SECTION      COLONOSCOPY N/A 2019    Procedure: COLONOSCOPY;   Surgeon: Devon Bowling MD;  Location: [facility] ENDO (4TH FLR);  Service: Endoscopy;  Laterality: N/A;  ok to hold Coumadin x 5 days with Lovenox bridge per Coumadin clinic-MS    ESOPHAGOGASTRODUODENOSCOPY N/A 2019    Procedure: EGD (ESOPHAGOGASTRODUODENOSCOPY);  Surgeon: Smooth Torrez MD;  Location: [facility] ENDO (2ND FLR);  Service: Endoscopy;  Laterality: N/A;  2nd floor requested due to comorbidities, Hypertension, permanent A. 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 44     per Coumadin clinic-ok to hold for 5 days w/bridge    INJECTION N/A 2023    Procedure: INJECTION, BILATERAL GTB AND RIGHT HIP INTRA-ARTICULAR CONTRAST;  Surgeon: Beth Alfaro MD;  Location: [facility] PAIN MGT;  Service: Pain Management;  Laterality: N/A;    kidney stone removal      MITRAL VALVE REPLACEMENT  2004    TUBAL LIGATION         Family History   Problem Relation Age of Onset    Stroke Paternal Grandmother     Colon cancer Maternal Grandmother     Cancer Brother         testicular cancer    Diabetes Sister     Hypertension Sister     Breast cancer Paternal Aunt     Diabetes Sister     Diabetes Sister     Heart disease Father 70        MI    Diabetes Father     Colon cancer Mother 83    Asthma Daughter     Asthma Son     Ovarian cancer Neg Hx        Social History     Socioeconomic History    Marital status:     Number of children: 2   Occupational History    Occupation: Cook     Comment: Retired   Tobacco Use    Smoking status: Former     Packs/day: 0.50     Years: 20.00     Pack years: 10.00     Types: Cigarettes     Quit date: 2002     Years since quittin.9    Smokeless tobacco: Never   Substance and Sexual Activity    Alcohol use: No    Drug use: No   Social History Narrative    Disability since .  Laid off .  Previously worked as cook in a kitchen.         MEDICATIONS & ALLERGIES:     Current Outpatient Medications on File  Prior to Visit   Medication Sig Dispense Refill    ADVAIR DISKUS 500-50 mcg/dose DsDv diskus inhaler INHALE 1 PUFF INTO THE LUNGS 2 (TWO) TIMES DAILY. 180 each 3    albuterol (PROVENTIL/VENTOLIN HFA) 90 mcg/actuation inhaler INHALE 2 PUFFS INTO THE LUNGS EVERY 6 (SIX) HOURS AS NEEDED FOR WHEEZING. RESCUE 18 g 6    amLODIPine (NORVASC) 2.5 MG tablet Take 1 tablet (2.5 mg total) by mouth once daily. 90 tablet 2    aspirin (ECOTRIN) 81 MG EC tablet Take 81 mg by mouth once daily.       dipyridamole (PERSANTINE) 5 mg/mL injection       dorzolamide (TRUSOPT) 2 % ophthalmic solution Place 1 drop into the right eye 2 (two) times a day. 10 mL 3    ergocalciferol (ERGOCALCIFEROL) 50,000 unit Cap Take 1 capsule (50,000 Units total) by mouth twice a week. 24 capsule 0    fluticasone propionate (FLONASE) 50 mcg/actuation nasal spray 2 sprays (100 mcg total) by Each Nostril route once daily. 16 g 3    furosemide (LASIX) 40 MG tablet Take 1 tablet (40 mg total) by mouth once daily. 90 tablet 3    latanoprost (XALATAN) 0.005 % ophthalmic solution Place 1 drop into the right eye once daily. 7.5 mL 3    meclizine (ANTIVERT) 25 mg tablet Take 1 tablet (25 mg total) by mouth 2 (two) times daily as needed. 30 tablet 1    metoprolol tartrate (LOPRESSOR) 100 MG tablet TAKE ONE TABLET BY MOUTH TWICE DAILY 60 tablet 11    nitroGLYCERIN (NITROSTAT) 0.4 MG SL tablet Place 1 tablet (0.4 mg total) under the tongue every 5 (five) minutes as needed for Chest pain. (Patient not taking: Reported on 4/28/2022) 30 tablet 1    omeprazole (PRILOSEC) 40 MG capsule Take 1 capsule (40 mg total) by mouth every morning. Open capsule and take with apple sauce 30 capsule 2    ondansetron (ZOFRAN-ODT) 8 MG TbDL Dissolve 1 tablet (8 mg total) by mouth every 6 (six) hours as needed. 30 tablet 0    oxybutynin (DITROPAN-XL) 5 MG TR24 Take 5 mg by mouth once daily.      pantoprazole (PROTONIX) 40 MG tablet Take 1 tablet (40 mg total) by mouth daily as needed. 30  tablet 3    rosuvastatin (CRESTOR) 20 MG tablet Take 1 tablet (20 mg total) by mouth once daily. 90 tablet 3    semaglutide (OZEMPIC) 0.25 mg or 0.5 mg(2 mg/1.5 mL) pen injector Inject 0.5 mg into the skin every 7 days. Start with 0.25 mg once a week x 4 weeks, then increase to 0.5 mg once a week for 12 doses 1.5 mL 2    sertraline (ZOLOFT) 100 MG tablet Take 1 tablet (100 mg total) by mouth once daily. 90 tablet 3    tamsulosin (FLOMAX) 0.4 mg Cap Take 0.4 mg by mouth once daily.      warfarin (COUMADIN) 5 MG tablet Take 1 tablets (5mg) by mouth Tuesday, Saturday then 1.5 tablets (7.5mg) all other days or as instructed by Coumadin Clinic. 45 tablet 0     No current facility-administered medications on file prior to visit.        Review of patient's allergies indicates:   Allergen Reactions    Brimonidine        Medications Reconciliation:   I have reconciled the patient's home medications with the patient/family. I have updated all changes.  Refer to After-Visit Medication List.    OBJECTIVE:     Vital Signs:  There were no vitals filed for this visit.  Wt Readings from Last 3 Encounters:   04/04/23 0910 102.5 kg (225 lb 15.5 oz)   03/22/23 0949 107.8 kg (237 lb 10.5 oz)   03/17/23 1310 106.9 kg (235 lb 9.6 oz)     There is no height or weight on file to calculate BMI.   Wt Readings from Last 3 Encounters:   04/19/23 103.1 kg (227 lb 4.7 oz)   04/04/23 102.5 kg (225 lb 15.5 oz)   03/22/23 107.8 kg (237 lb 10.5 oz)     Temp Readings from Last 3 Encounters:   01/23/23 98 °F (36.7 °C) (Oral)   12/30/22 98.5 °F (36.9 °C)   12/07/22 98.5 °F (36.9 °C)     BP Readings from Last 3 Encounters:   04/19/23 134/72   04/04/23 136/84   03/22/23 136/60     Pulse Readings from Last 3 Encounters:   04/19/23 70   04/04/23 88   03/22/23 64       Physical Exam:  General: Well developed, well nourished. No distress.  HEENT: Head is normocephalic, atraumatic  Eyes: Clear conjunctiva.  Neck: Supple, symmetrical neck; trachea  midline.  Lungs: Clear to auscultation bilaterally and normal respiratory effort.  Cardiovascular: Heart with regular rate and rhythm. No murmurs, gallops or rubs  Extremities: No LE edema. Pulses 2+ and symmetric.   Right upper inner (medial) thigh region with palpable, ~ 1/2 dollar size nodule, + sensitivity to palpation, mobile  Skin: Skin color, texture, turgor normal. No rashes.  Neurologic:  No focal numbness or weakness.       Laboratory  Lab Results   Component Value Date    WBC 3.96 02/23/2023    HGB 11.3 (L) 02/23/2023    HCT 39.9 02/23/2023     02/23/2023    CHOL 147 12/07/2022    TRIG 69 12/07/2022    HDL 61 12/07/2022    ALT 11 02/23/2023    AST 18 02/23/2023     02/23/2023    K 4.4 02/23/2023     02/23/2023    CREATININE 1.0 02/23/2023    BUN 16 02/23/2023    CO2 30 (H) 02/23/2023    TSH 2.026 10/01/2021    INR 3.9 (H) 04/17/2023    HGBA1C 6.1 05/12/2017       ASSESSMENT & PLAN:     Follow up.     Localized swelling, mass, or lump of right lower extremity    Pain in right thigh    Right groin pain    Abnormal ultrasound    Soft tissue mass, groin, deep;Abnormal US finding that need to be further delineated; US: ? of Lipoma to rigth groin; having right groin pain:   Seen for ? Right Groin Soft Tissue Mass on 4/4, US inconclusive, sent to have a CT with findings that have questionably captured area of need...  Plan:   *Reassess the area today and discussed the umbilical hernia (of no discomfort or concern). Clinically, the particular area of concern is as defined under assessment above..  She is unable to have an MRI due to value.   Proceed with additional imaging today and she is in agreement.   -     CT Thigh Without Contrast Right; Future; Expected date: 04/19/2023  Future Appointments   Date Time Provider Department Center   4/19/2023  1:30 PM SHERIF Chaparro [facility] NIKKO TIRADO   4/24/2023 11:35 AM LAB, APPOINTMENT [facility] LAB VNP [facility]   5/2/2023   9:00 AM Beth Alfaro MD [facility]   5/10/2023 11:00 AM Prisca Puckett MD [facility] OPHTHAL [facility]   5/22/2023 10:30 AM [facility] LAB BMT [facility] LABBMT [facility]   5/22/2023 11:30 AM So Linares MD [facility] HC BMT [facility]   5/23/2023 10:00 AM [address] [facility]GT [facility]   5/26/2023  9:45 AM PERIMETRY, HUMPH [facility] OPHTHAL [facility]   5/26/2023 11:00 AM Prisca Puckett MD [facility] OPHTHAL [facility]   6/6/2023  8:45 AM Eulalia Wise MD [facility] BARIAT [facility]   6/30/2023  9:00 AM Nubia Crocker MD [facility] PCW       After Visit Medication List :     Medication List            Accurate as of April 17, 2023  5:10 PM. If you have any questions, ask your nurse or doctor.                CONTINUE taking these medications      ADVAIR DISKUS 500-50 mcg/dose Dsdv diskus inhaler  Generic drug: fluticasone-salmeterol 500-50 mcg/dose  INHALE 1 PUFF INTO THE LUNGS 2 (TWO) TIMES DAILY.     albuterol 90 mcg/actuation inhaler  Commonly known as: PROVENTIL/VENTOLIN HFA  INHALE 2 PUFFS INTO THE LUNGS EVERY 6 (SIX) HOURS AS NEEDED FOR WHEEZING. RESCUE     amLODIPine 2.5 MG tablet  Commonly known as: NORVASC  Take 1 tablet (2.5 mg total) by mouth once daily.     aspirin 81 MG EC tablet  Commonly known as: ECOTRIN     dipyridamole 5 mg/mL injection  Commonly known as: PERSANTINE     dorzolamide 2 % ophthalmic solution  Commonly known as: TRUSOPT  Place 1 drop into the right eye 2 (two) times a day.     ergocalciferol 50,000 unit Cap  Commonly known as: ERGOCALCIFEROL  Take 1 capsule (50,000 Units total) by mouth twice a week.     fluticasone propionate 50 mcg/actuation nasal spray  Commonly known as: FLONASE  2 sprays (100 mcg total) by Each Nostril route once daily.     furosemide 40 MG tablet  Commonly known as: LASIX  Take 1 tablet (40 mg total) by mouth once daily.     latanoprost 0.005 % ophthalmic solution  Commonly known as: XALATAN  Place 1 drop into the right eye once daily.      meclizine 25 mg tablet  Commonly known as: ANTIVERT  Take 1 tablet (25 mg total) by mouth 2 (two) times daily as needed.     metoprolol tartrate 100 MG tablet  Commonly known as: LOPRESSOR  TAKE ONE TABLET BY MOUTH TWICE DAILY     nitroGLYCERIN 0.4 MG SL tablet  Commonly known as: NITROSTAT  Place 1 tablet (0.4 mg total) under the tongue every 5 (five) minutes as needed for Chest pain.     omeprazole 40 MG capsule  Commonly known as: PRILOSEC  Take 1 capsule (40 mg total) by mouth every morning. Open capsule and take with apple sauce     ondansetron 8 MG Tbdl  Commonly known as: ZOFRAN-ODT  Dissolve 1 tablet (8 mg total) by mouth every 6 (six) hours as needed.     oxybutynin 5 MG Tr24  Commonly known as: DITROPAN-XL     OZEMPIC 0.25 mg or 0.5 mg(2 mg/1.5 mL) pen injector  Generic drug: semaglutide  Inject 0.5 mg into the skin every 7 days. Start with 0.25 mg once a week x 4 weeks, then increase to 0.5 mg once a week for 12 doses     pantoprazole 40 MG tablet  Commonly known as: PROTONIX  Take 1 tablet (40 mg total) by mouth daily as needed.     rosuvastatin 20 MG tablet  Commonly known as: CRESTOR  Take 1 tablet (20 mg total) by mouth once daily.     sertraline 100 MG tablet  Commonly known as: ZOLOFT  Take 1 tablet (100 mg total) by mouth once daily.     tamsulosin 0.4 mg Cap  Commonly known as: FLOMAX     warfarin 5 MG tablet  Commonly known as: COUMADIN  Take 1 tablets (5mg) by mouth Tuesday, Saturday then 1.5 tablets (7.5mg) all other days or as instructed by Coumadin Clinic.              Signing Physician:  SHERIF Prieto

## 2023-04-26 NOTE — PROGRESS NOTES
INR not at goal. Medications, chart, and patient findings reviewed. Patient denies any new changes that would affect INR. INR is 5.9 so will have patient hold warfarin until next INR check and report to emergency room if any s/sx of bleeding. See calendar for adjustments to dose and follow up plan.

## 2023-05-02 NOTE — PROCEDURES
Procedures  Knee  Injection Ultrasound guidance  Time-out taken to identify patient and procedure side prior to starting the procedure.   I attest that I have reviewed the patient's home medications prior to the procedure and no contraindication have been identified. I  re-evaluated the patient after the patient was positioned for the procedure in the procedure room immediately before the procedural time-out. The vital signs are current and represent the current state of the patient which has not significantly changed since the preprocedure assessment.                   Date of Service: 05/02/2023    PCP: Nubia Crocker MD    Referring Physician:                                                                                                                                   PROCEDURE:  right knee injection under ultrasound guidance                                                                                             REASON FOR PROCEDURE:right  knee * No surgery found *  1. Primary osteoarthritis of right knee            POSTOP DIAGNOSIS: right  knee * No surgery found *     1. Primary osteoarthritis of right knee      PHYSICIAN: Beth Alfaro MD  ASSISTANTS: None                                                                                LOCAL ANESTHESIA:  Xylocaine 1% 2 ml plus 2ml Bupivicaine 0.25% per side.                                                                                              MEDICATION INJECTED:  0.5 ml Triamcinolone 40mg/ml plus 3.5 ml Bupivicaine 0.25%  SEDATION MEDICATIONS: None                                                                               COMPLICATIONS:  None.                                                                                                                                     ESTIMATED BLOOD LOSS:  None.                                                                                                                              TECHNIQUE:  With  the patient laying supine and the knees semi-flexed on a    wedge, the appropriate knee was prepped and draped in the usual sterile fashion    using ChloraPrep and a fenestrated drape.  Knee joint line determined under    Ultrasound guidance.  The local anesthetic was given using a 27-gauge      needle.  The 1.5in 22-gauge needle was introduced into the joint space. Medication was then    injected.  The patient tolerated the procedure well.                                                                                                                                                                                                                                                                           The patient was given post procedure discharge instructions and follow-up  instructions. The patient was discharged in a stable condition.

## 2023-05-03 NOTE — PROGRESS NOTES
INR not at goal. Medications, chart, and patient findings reviewed. See calendar for adjustments to dose and follow up plan.  Recommend to decrease maintenance dose per calendar so pt receives ~32.5mg/week & re-assess in 1 week.

## 2023-05-08 NOTE — TELEPHONE ENCOUNTER
----- Message from Sandra Ponce sent at 5/8/2023  3:57 PM CDT -----  Contact: Patient @ [phone]  Good Afternoon  Patient is calling to see if she can talk to  due to her CT Scan    Please call and advise

## 2023-05-08 NOTE — TELEPHONE ENCOUNTER
----- Message from Yvonne Yusuf sent at 5/8/2023  3:25 PM CDT -----  Contact: [phone]  Pt is calling for her CT scan results she states it has been 2 weeks please give return call

## 2023-05-09 NOTE — PROGRESS NOTES
INR not at goal. Medications, chart, and patient findings reviewed. See calendar for adjustments to dose and follow up plan.  Recommend to re-challenge dosing & re-assess.

## 2023-05-18 NOTE — TELEPHONE ENCOUNTER
----- Message from Ginna Herrera sent at 5/18/2023  2:24 PM CDT -----  Contact: [phone]  .1 Patient would like to get medical advice.  Symptoms (please be specific): Right leg upper part thigh  How long has patient had these symptoms: a while  Pharmacy name and phone#:[facility] - [place], [place] [address]  Phone:  [phone].Fax:  [phone]   Any drug allergies: on file  Comments: Patient would like to get medical advice. Patient stated that she had done two ct scan of which she has not receive results from pcp. Would like a call back.  Thank you

## 2023-05-19 NOTE — TELEPHONE ENCOUNTER
Pt wants to know if there is anything else that she can do for the pain. Pt rates the pain at a 9 on her right upper inner thigh.     Wants to know if she can see someone for this or how can the pain be treated.

## 2023-05-22 NOTE — PROGRESS NOTES
PATIENT: Elayne Almanzar  MRN: [id number]  DATE: 2023    Chief Complaint: Anemia follow up    Subjective:    Initial History: Ms. Almanzar is a 67 y.o. female with pertinent PMHx of permanent atrial fibrillation on coumadin, CKD, KEYSHAWN compliant with CPAP, chronic diastolic heart failure, and s/p mechanical mitral valve placed in  due to rheumatic mitral stenosis who was referred for evaluation of microcytic anemia.         Interval Hx:  Previously received IV iron, her last infusion was . Overall continues to feel good without major issue or concerns.  Her Hgb is stable, iron numbers are normal. Still has not had gastric surgery.  Energy remains good. Able to do all desired activities.     Having significant pain in her right upper groin. Pain is always there for the last year. Needs a cane.    The pain has been present since COVID in may 2022. Now on ozempic for weight loss.     Past Medical History:   Past Medical History:   Diagnosis Date    Acute on chronic diastolic congestive heart failure     Allergy     Anemia     Anticoagulant long-term use     Anxiety     Asthma     Atrial fibrillation     Bilateral primary osteoarthritis of hip 3/14/2023    Cataract     Chronic kidney disease     COPD (chronic obstructive pulmonary disease)     Coronary artery calcification seen on CT scan 3/22/2022    Coronary artery calcification seen on CT scan 3/22/2022    Depression     Encounter for blood transfusion     HTN (hypertension)     Hyperlipidemia     Nephrolithiasis     KEYSHAWN (obstructive sleep apnea)     awaiting CPAP machine     Rheumatic disease of mitral valve     Uncontrolled type 2 diabetes mellitus with complication, with long-term current use of insulin 2020    Urinary incontinence        Past Surgical HIstory:   Past Surgical History:   Procedure Laterality Date    BREAST BIOPSY Left 10/2019    CARDIAC VALVE SURGERY  2004    mechanical mitral valve      SECTION      COLONOSCOPY N/A 6/26/2019    Procedure: COLONOSCOPY;  Surgeon: Devon Bowling MD;  Location: [facility] ENDO (4TH FLR);  Service: Endoscopy;  Laterality: N/A;  ok to hold Coumadin x 5 days with Lovenox bridge per Coumadin clinic-[facility]    ESOPHAGOGASTRODUODENOSCOPY N/A 12/19/2019    Procedure: EGD (ESOPHAGOGASTRODUODENOSCOPY);  Surgeon: Smooth Torrez MD;  Location: [facility] ENDO (2ND FLR);  Service: Endoscopy;  Laterality: N/A;  2nd floor requested due to comorbidities, Hypertension, permanent A. 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 44     per Coumadin clinic-ok to hold for 5 days w/bridge    INJECTION N/A 1/23/2023    Procedure: INJECTION, BILATERAL GTB AND RIGHT HIP INTRA-ARTICULAR CONTRAST;  Surgeon: Beth Alfaro MD;  Location: [facility] PAIN MGT;  Service: Pain Management;  Laterality: N/A;    kidney stone removal      MITRAL VALVE REPLACEMENT  2/2004    TUBAL LIGATION         Family History:   Family History   Problem Relation Age of Onset    Stroke Paternal Grandmother     Colon cancer Maternal Grandmother     Cancer Brother         testicular cancer    Diabetes Sister     Hypertension Sister     Breast cancer Paternal Aunt     Diabetes Sister     Diabetes Sister     Heart disease Father 70        MI    Diabetes Father     Colon cancer Mother 83    Asthma Daughter     Asthma Son     Ovarian cancer Neg Hx        Social History:  reports that she quit smoking about 21 years ago. Her smoking use included cigarettes. She has a 10.00 pack-year smoking history. She has never used smokeless tobacco. She reports that she does not drink alcohol and does not use drugs.    Allergies:  Review of patient's allergies indicates:   Allergen Reactions    Brimonidine        Medications:  Current Outpatient Medications   Medication Sig Dispense Refill    ADVAIR DISKUS 500-50 mcg/dose DsDv diskus inhaler INHALE 1 PUFF INTO THE LUNGS  2 (TWO) TIMES DAILY. 180 each 3    albuterol (PROVENTIL/VENTOLIN HFA) 90 mcg/actuation inhaler INHALE 2 PUFFS INTO THE LUNGS EVERY 6 (SIX) HOURS AS NEEDED FOR WHEEZING. RESCUE 18 g 6    amLODIPine (NORVASC) 2.5 MG tablet Take 1 tablet (2.5 mg total) by mouth once daily. 90 tablet 2    aspirin (ECOTRIN) 81 MG EC tablet Take 81 mg by mouth once daily.       dipyridamole (PERSANTINE) 5 mg/mL injection       dorzolamide (TRUSOPT) 2 % ophthalmic solution Place 1 drop into the right eye 2 (two) times a day. 10 mL 3    ergocalciferol (ERGOCALCIFEROL) 50,000 unit Cap Take 1 capsule (50,000 Units total) by mouth twice a week. 24 capsule 0    fluticasone propionate (FLONASE) 50 mcg/actuation nasal spray 2 sprays (100 mcg total) by Each Nostril route once daily. 16 g 3    furosemide (LASIX) 40 MG tablet Take 1 tablet (40 mg total) by mouth once daily. 90 tablet 3    latanoprost (XALATAN) 0.005 % ophthalmic solution Place 1 drop into the right eye once daily. 7.5 mL 3    meclizine (ANTIVERT) 25 mg tablet Take 1 tablet (25 mg total) by mouth 2 (two) times daily as needed. 30 tablet 1    metoprolol tartrate (LOPRESSOR) 100 MG tablet TAKE ONE TABLET BY MOUTH TWICE DAILY 60 tablet 11    nitroGLYCERIN (NITROSTAT) 0.4 MG SL tablet Place 1 tablet (0.4 mg total) under the tongue every 5 (five) minutes as needed for Chest pain. (Patient not taking: Reported on 4/28/2022) 30 tablet 1    omeprazole (PRILOSEC) 40 MG capsule Take 1 capsule (40 mg total) by mouth every morning. Open capsule and take with apple sauce 30 capsule 2    ondansetron (ZOFRAN-ODT) 8 MG TbDL Dissolve 1 tablet (8 mg total) by mouth every 6 (six) hours as needed. 30 tablet 0    oxybutynin (DITROPAN-XL) 5 MG TR24 Take 5 mg by mouth once daily.      pantoprazole (PROTONIX) 40 MG tablet Take 1 tablet (40 mg total) by mouth daily as needed. 30 tablet 3    rosuvastatin (CRESTOR) 20 MG tablet Take 1 tablet (20 mg total) by mouth once daily. 90 tablet 3     "semaglutide (OZEMPIC) 0.25 mg or 0.5 mg(2 mg/1.5 mL) pen injector Inject 0.5 mg into the skin every 7 days. Start with 0.25 mg once a week x 4 weeks, then increase to 0.5 mg once a week for 12 doses 1.5 mL 2    sertraline (ZOLOFT) 100 MG tablet Take 1 tablet (100 mg total) by mouth once daily. 90 tablet 3    tamsulosin (FLOMAX) 0.4 mg Cap Take 0.4 mg by mouth once daily.      warfarin (COUMADIN) 5 MG tablet Take 1 tablets (5mg) by mouth Tuesday, Saturday then 1.5 tablets (7.5mg) all other days or as instructed by Coumadin Clinic. 45 tablet 0     No current facility-administered medications for this visit.       Review of Systems   Constitutional:  Positive for fatigue. Negative for appetite change, chills and fever.   HENT:  Negative for nosebleeds and sore throat.    Eyes:  Negative for photophobia and visual disturbance.   Respiratory:  Positive for shortness of breath. Negative for cough.    Cardiovascular:  Negative for chest pain, palpitations and leg swelling.   Gastrointestinal:  Negative for abdominal pain, blood in stool, diarrhea, nausea and vomiting.   Endocrine: Negative for cold intolerance, heat intolerance, polydipsia and polyuria.   Genitourinary:  Negative for dysuria, hematuria and urgency.   Musculoskeletal:  Negative for arthralgias and myalgias.   Skin:  Negative for rash and wound.   Neurological:  Negative for dizziness and headaches.   Hematological:  Negative for adenopathy. Does not bruise/bleed easily.   Psychiatric/Behavioral:  Negative for confusion. The patient is not nervous/anxious.         Objective:      Vitals:   Vitals:    05/22/23 1313   BP: 127/60   Pulse: 66   Resp: 16   SpO2: 96%   Height: 5' 2" (1.575 m)       Physical Exam  Vitals reviewed.   Constitutional:       Appearance: Normal appearance.   HENT:      Head: Normocephalic and atraumatic.      Mouth/Throat:      Mouth: Mucous membranes are moist.   Eyes:      Extraocular Movements: Extraocular movements intact. " "  Cardiovascular:      Rate and Rhythm: Rhythm irregularly irregular.   Pulmonary:      Effort: Pulmonary effort is normal.      Breath sounds: Normal breath sounds.   Abdominal:      General: Bowel sounds are normal.      Palpations: Abdomen is soft.   Musculoskeletal:      Cervical back: Normal range of motion and neck supple.   Skin:     General: Skin is warm and dry.      Capillary Refill: Capillary refill takes more than 3 seconds.      Findings: No bruising.   Neurological:      General: No focal deficit present.      Mental Status: She is alert and oriented to person, place, and time.   Psychiatric:         Mood and Affect: Affect normal.         Behavior: Behavior is cooperative.       Laboratory Data:  Labs reviewed  Lab Results   Component Value Date    WBC 4.10 05/22/2023    HGB 12.4 05/22/2023    HCT 45.3 05/22/2023    MCV 75 (L) 05/22/2023     05/22/2023          Imaging: Previous imaging has been reviewed    Assessment:       Iron Deficiency Anemia     Plan:     Iron Deficiency Anemia  Alpha Thalassemia Trait  Chronic blood loss Anemia  Chronic Atrial Fibrillation and Mechanical Valve on Chronic Anticoagulation  -- MCV persistently 68-70 since 2011 with most recent at 75  -- Does have alpha thalassemia trait  -- Likely chronic blood loss as follows: Previous colonoscopy 6/26/19  showed right colon "full of actively bleeding AVMs" with pt on aspirin and coumadin. UAs also persistently positive for blood in the setting of frequent nephrolithiasis although this is much less likely contributing  -- Has mechanical mitral valve on coumadin with persistently elevated LDH but haptoglobin and T. Bili normal without schistocytes seen on smear  --Received IV Iron in 2020  - Hgb 12.4 today with MCV 75 and ferritin 48 on PO iron    RTC in 6 months with repeat iron studies at that time to assess response      BMT Chart Routing      Follow up with physician 6 months. 1. see me in 6 months with cbc,cmp, iron, " ferritin   Follow up with ROCHELLE    Provider visit type    Infusion scheduling note    Injection scheduling note    Labs    Imaging    Pharmacy appointment    Other referrals                So Linares MD  [facility]ology [facility] Transplant

## 2023-05-23 NOTE — PROGRESS NOTES
Subjective:      Patient ID: Elayne Almanzar is a 67 y.o. female.    Chief Complaint: No chief complaint on file.    Referred by: No ref. provider found     Interval History 5/23/2023:  The patient presents for follow up of right hip and right knee pain. She is now s/p right knee steroid injection on 5/2/23. She is reporting about 50% relief for about one week only. She is again having significant right knee pain. The pain is sharp and stabbing. It worsens with standing and walking. Previous XRAYs show mild DJD. She has not had visco-supplemention. She is also having some return of right hip pain. The pain radiates into the thigh and groin. Previous hip XRAYs show severe DJD. She is considering hip replacement down the road but would like to avoid as long as possible. She was started on Gabapentin yesterday by another provider but has not yet started the medication. Her pain today is 9/10.      Interval History 3/14/23: Mrs. Almanzar 66 y/o female who came for follow up after 2/16/23 right hip intra articular injection and bilateral GTB injection which provide her 90 % of pain relieve and her hip injection provide her 90% of pain relieve for a couple of weeks. . She continues to complain of right groin pain since a couple of weeks after the injection. Her pain is mainly in her right side groin medial area that is worsen with ambulation, weightbearing activities and during walking/PT. Patient denies any fever, chills sick contact or any fall trauma after the procedure was done. She is not using any pain meds right now. Also complaint of right knee pain her last knee intraarticular injection was done more than one year.     Blood thinner: coumadin 5 mg           Interval History: 1/3/23: Elayne Almanzar presents for bilateral hip/knee pain.  She states that last year in May she had COVID with resultant body aches/pain.  The COVID recovered, however her pain remained. 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  She describes the pain as burning pain. She admits to aching pain in her hips and knees. She denies any back pain. She denies pain in her neck/arms/shoulders.  She states that the hip pain occasionally radiates down to her knees (sharp pain). No bowel/bladder issues.  She takes Norco 5-325mg twice a day for pain, but takes it only if she needs it.  She does not take tylenol/advil. She has not tried gabapentin or a muscle relaxant in the past.  She was referred for physical therapy, but has only gone once so far.  She states she is supposed to go back this month.  She denies any home exercise program.      Interventional Pain History  11/3/22 - bilateral hip GTB with steroid ([facility])  23 Bilateral hip joint and GTB injections- 90% relief  23 Right knee steroid injection- 50% relief for one week    Past Medical History:   Diagnosis Date    Acute on chronic diastolic congestive heart failure     Allergy     Anemia     Anticoagulant long-term use     Anxiety     Asthma     Atrial fibrillation     Bilateral primary osteoarthritis of hip 3/14/2023    Cataract     Chronic kidney disease     COPD (chronic obstructive pulmonary disease)     Coronary artery calcification seen on CT scan 3/22/2022    Coronary artery calcification seen on CT scan 3/22/2022    Depression     Encounter for blood transfusion     HTN (hypertension)     Hyperlipidemia     Nephrolithiasis     KEYSHAWN (obstructive sleep apnea)     awaiting CPAP machine     Rheumatic disease of mitral valve     Uncontrolled type 2 diabetes mellitus with complication, with long-term current use of insulin 2020    Urinary incontinence        Past Surgical History:   Procedure Laterality Date    BREAST BIOPSY Left 10/2019    CARDIAC VALVE SURGERY  2004    mechanical mitral valve     SECTION      COLONOSCOPY N/A 2019    Procedure: COLONOSCOPY;  Surgeon: Devon Bowling MD;  Location: [facility] ENDO ([address] [place] [address]);   Service: Endoscopy;  Laterality: N/A;  ok to hold Coumadin x 5 days with Lovenox bridge per Coumadin clinic-[facility]    ESOPHAGOGASTRODUODENOSCOPY N/A 12/19/2019    Procedure: EGD (ESOPHAGOGASTRODUODENOSCOPY);  Surgeon: Smooth Torrez MD;  Location: [facility] ENDO (2ND FLR);  Service: Endoscopy;  Laterality: N/A;  2nd floor requested due to comorbidities, Hypertension, permanent A. 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 44     per Coumadin clinic-ok to hold for 5 days w/bridge    INJECTION N/A 1/23/2023    Procedure: INJECTION, BILATERAL GTB AND RIGHT HIP INTRA-ARTICULAR CONTRAST;  Surgeon: Beth Alfaro MD;  Location: [facility];  Service: Pain Management;  Laterality: N/A;    kidney stone removal      MITRAL VALVE REPLACEMENT  2/2004    TUBAL LIGATION         Review of patient's allergies indicates:   Allergen Reactions    Brimonidine        Current Outpatient Medications   Medication Sig Dispense Refill    ADVAIR DISKUS 500-50 mcg/dose DsDv diskus inhaler INHALE 1 PUFF INTO THE LUNGS 2 (TWO) TIMES DAILY. 180 each 3    albuterol (PROVENTIL/VENTOLIN HFA) 90 mcg/actuation inhaler INHALE 2 PUFFS INTO THE LUNGS EVERY 6 (SIX) HOURS AS NEEDED FOR WHEEZING. RESCUE 18 g 6    amLODIPine (NORVASC) 2.5 MG tablet Take 1 tablet (2.5 mg total) by mouth once daily. 90 tablet 2    aspirin (ECOTRIN) 81 MG EC tablet Take 81 mg by mouth once daily.       dipyridamole (PERSANTINE) 5 mg/mL injection       ergocalciferol (ERGOCALCIFEROL) 50,000 unit Cap Take 1 capsule (50,000 Units total) by mouth twice a week. 24 capsule 0    fluticasone propionate (FLONASE) 50 mcg/actuation nasal spray 2 sprays (100 mcg total) by Each Nostril route once daily. 16 g 3    furosemide (LASIX) 40 MG tablet Take 1 tablet (40 mg total) by mouth once daily. 90 tablet 3    gabapentin (NEURONTIN) 300 MG capsule Take 1 capsule (300 mg total) by mouth every evening. 30 capsule 11    meclizine  (ANTIVERT) 25 mg tablet Take 1 tablet (25 mg total) by mouth 2 (two) times daily as needed. 30 tablet 1    metoprolol tartrate (LOPRESSOR) 100 MG tablet TAKE ONE TABLET BY MOUTH TWICE DAILY 60 tablet 11    omeprazole (PRILOSEC) 40 MG capsule Take 1 capsule (40 mg total) by mouth every morning. Open capsule and take with apple sauce 30 capsule 2    ondansetron (ZOFRAN-ODT) 8 MG TbDL Dissolve 1 tablet (8 mg total) by mouth every 6 (six) hours as needed. 30 tablet 0    oxybutynin (DITROPAN-XL) 5 MG TR24 Take 5 mg by mouth once daily.      pantoprazole (PROTONIX) 40 MG tablet Take 1 tablet (40 mg total) by mouth daily as needed. 30 tablet 3    rosuvastatin (CRESTOR) 20 MG tablet Take 1 tablet (20 mg total) by mouth once daily. 90 tablet 3    semaglutide (OZEMPIC) 0.25 mg or 0.5 mg(2 mg/1.5 mL) pen injector Inject 0.5 mg into the skin every 7 days. Start with 0.25 mg once a week x 4 weeks, then increase to 0.5 mg once a week for 12 doses 1.5 mL 2    sertraline (ZOLOFT) 100 MG tablet Take 1 tablet (100 mg total) by mouth once daily. 90 tablet 3    tamsulosin (FLOMAX) 0.4 mg Cap Take 0.4 mg by mouth once daily.      warfarin (COUMADIN) 5 MG tablet Take 1 tablets (5mg) by mouth Tuesday, Saturday then 1.5 tablets (7.5mg) all other days or as instructed by Coumadin Clinic. 45 tablet 0    dorzolamide (TRUSOPT) 2 % ophthalmic solution Place 1 drop into the right eye 2 (two) times a day. 10 mL 3    latanoprost (XALATAN) 0.005 % ophthalmic solution Place 1 drop into the right eye once daily. 7.5 mL 3    nitroGLYCERIN (NITROSTAT) 0.4 MG SL tablet Place 1 tablet (0.4 mg total) under the tongue every 5 (five) minutes as needed for Chest pain. (Patient not taking: Reported on 4/28/2022) 30 tablet 1     No current facility-administered medications for this visit.       Family History   Problem Relation Age of Onset    Stroke Paternal Grandmother     Colon cancer Maternal Grandmother     Cancer Brother         testicular cancer     Diabetes Sister     Hypertension Sister     Breast cancer Paternal Aunt     Diabetes Sister     Diabetes Sister     Heart disease Father 70        MI    Diabetes Father     Colon cancer Mother 83    Asthma Daughter     Asthma Son     Ovarian cancer Neg Hx        Social History     Socioeconomic History    Marital status:     Number of children: 2   Occupational History    Occupation: [employer]     Comment: Retired   Tobacco Use    Smoking status: Former     Packs/day: 0.50     Years: 20.00     Pack years: 10.00     Types: Cigarettes     Quit date: 2002     Years since quittin.0    Smokeless tobacco: Never   Substance and Sexual Activity    Alcohol use: No    Drug use: No   Social History Narrative    Disability since .  Laid off .  Previously worked as cook in a kitchen.       Imaging:  XR HIPS BILATERAL 2 VIEW INCL AP PELVIS     CLINICAL HISTORY:  cintia hip pain;  Pain in right hip     TECHNIQUE:  AP view of the pelvis and frogleg lateral views of both hips were performed.     COMPARISON:  2016     FINDINGS:  Severe right hip joint space narrowing.  Mild sclerosis at the acetabular region and subchondral cystic geodes.  The right femoral head contour remains spherical.  No acute fracture, no osseous lesions.     Mild left hip joint space narrowing.  Minimal sclerosis and small geode at the acetabular region.  The left femoral head contour is maintained.     The sacroiliac joints appear normal.  The remainder of the visualized osseous structures and soft tissues appear normal.     Impression:     Severe right hip and mild left hip degenerative change        Electronically signed by: Ya Parra MD  Date:                                            2022  Time:                                           16:01    XR KNEE ORTHO RIGHT WITH FLEXION     CLINICAL HISTORY:  RM 20;  Pain in unspecified knee     FINDINGS:  Right: No fracture dislocation bone destruction or OCD seen.      Left: No fracture dislocation bone destruction or OCD seen.        Electronically signed by: Tre Galeana MD  Date:                                            10/06/2021  Time:                                           15:42    XR KNEE ORTHO LEFT     CLINICAL HISTORY:  Pain in unspecified knee     TECHNIQUE:  AP standing of both knees, Merchant views of both knees as well as a lateral view of the left knee were performed.     COMPARISON:  07/01/2016     FINDINGS:  No acute fracture or dislocation.  No left knee joint effusion.  No radiopaque foreign bodies.  Lucency noted at the medial aspect of the distal femur/medial condyle of the femur is again noted and appears similar to prior exam.     Impression:     No acute findings in the left knee.     Lucency through the medial femoral condyle on the right is noted and appears more conspicuous than when compared to prior exams.  If there is concern for right knee pathology, dedicated radiographs recommended.        Electronically signed by: Galileo Jarrett MD  Date:                                            05/03/2021  Time:                                           13:12    CT LUMBAR SPINE WITHOUT CONTRAST     CLINICAL HISTORY:  Low back pain, symptoms persist with > 6wks conservative treatment;  Dorsalgia, unspecified     TECHNIQUE:  Low-dose axial, sagittal and coronal reformations are obtained through the lumbar spine.  Contrast was not administered.     COMPARISON:  Radiograph 11/03/2022.     FINDINGS:  Lumbar spine alignment demonstrates dextroscoliosis and grade 1 retrolisthesis of L5 on S1.  No spondylolysis.  Vertebral body heights are well maintained without evidence for fracture.  There is heterogeneous attenuation of the visualized osseous structures.     There is degenerative disc space narrowing throughout the visualized thoracolumbar spine most pronounced from L2-L3 through L5-S1 noting associated vacuum phenomenon and chronic degenerative endplate  changes.     Partially visualized coronary artery atherosclerosis.  There is atherosclerotic calcification of the abdominal aorta.  Partially visualized low-attenuation left renal lesion, likely a cyst.  There is fatty degeneration of the posterior paraspinal musculature.  Symmetric degenerative changes of the SI joints.     T11-T12: Right facet arthropathy.  No spinal canal stenosis.  Mild right neural foraminal narrowing.     T12-L1: No spinal canal stenosis.  No neural foraminal narrowing.     L1-L2: Bilateral facet arthropathy and bilateral ligamentum flavum buckling.  No spinal canal stenosis.  No neural foraminal narrowing.     L2-L3: Circumferential disc osteophyte complex.  Bilateral facet arthropathy and bilateral ligamentum flavum buckling.  No spinal canal stenosis.  No neural foraminal narrowing.     L3-L4: Circumferential disc osteophyte complex.  Bilateral facet arthropathy and bilateral ligamentum flavum buckling.  Moderate spinal canal stenosis.  Stable mild bilateral neural foraminal narrowing.     L4-L5: Circumferential disc osteophyte complex.  Bilateral facet arthropathy and bilateral ligamentum flavum buckling.  Prominent posterior epidural fat.  Mild-to-moderate spinal canal stenosis.  Moderate right and mild left neural foraminal narrowing.     L5-S1: Circumferential disc bulge with a superimposed right sub are disc protrusion that effaces the right lateral recess and likely abuts the right descending S1 nerve root.  Bilateral facet arthropathy.  Moderate to severe right lateral recess stenosis.  Moderate to severe bilateral neural foraminal narrowing.     Impression:     1. Lumbar dextroscoliosis with advanced superimposed degenerative changes most pronounced from L3-L4 through L5-S1 as detailed above.  Note is made of a right subarticular disc osteophyte protrusion at L5-S1 that effaces the right lateral recess and likely abuts the right descending S1 nerve root.        Electronically signed  by: Christian Sen MD  Date:                                            12/14/2022  Time:                                           13:15      XR LUMBAR SPINE AP AND LAT WITH FLEX/EXT     CLINICAL HISTORY:  lumbar spine pain;  Low back pain, unspecified     TECHNIQUE:  AP and lateral views as well as lateral flexion and extension images are performed through the lumbar spine.     COMPARISON:  None     FINDINGS:  There is a mild dextrocurvature of the lumbar spine.  The vertebral body heights are well maintained.  Moderate disc space narrowing at all levels of the lumbar spine.  Moderate-sized anterior and lateral marginal osteophytes throughout the lumbar spine.  No fracture, no osseous lesions.  The bilateral sacroiliac joints appear normal.  Advanced degenerative change of the right hip joint.  Atherosclerotic plaque of the aorta.     Impression:     Spondylosis of the lumbar spine, no acute process seen.     Advanced degenerative change of the right hip joint.        Electronically signed by: Ya Parra MD  Date:                                            11/03/2022  Time:                                           16:03    XR HIPS BILATERAL 2 VIEW INCL AP PELVIS     CLINICAL HISTORY:  cintia hip pain;  Pain in right hip     TECHNIQUE:  AP view of the pelvis and frogleg lateral views of both hips were performed.     COMPARISON:  06/20/2016     FINDINGS:  Severe right hip joint space narrowing.  Mild sclerosis at the acetabular region and subchondral cystic geodes.  The right femoral head contour remains spherical.  No acute fracture, no osseous lesions.     Mild left hip joint space narrowing.  Minimal sclerosis and small geode at the acetabular region.  The left femoral head contour is maintained.     The sacroiliac joints appear normal.  The remainder of the visualized osseous structures and soft tissues appear normal.     Impression:     Severe right hip and mild left hip degenerative change         Electronically signed by: Ya Parra MD  Date:                                            11/03/2022  Time:                                           16:01      Review of Systems   Constitutional: Negative for chills and fever.   HENT:  Negative for sore throat.    Eyes:  Negative for blurred vision and double vision.   Cardiovascular:  Negative for chest pain and palpitations.   Respiratory:  Negative for shortness of breath.    Hematologic/Lymphatic: Does not bruise/bleed easily.   Skin:  Negative for rash.   Musculoskeletal:  Positive for joint pain, joint swelling and myalgias. Negative for back pain.   Gastrointestinal:  Negative for abdominal pain, constipation, heartburn, nausea and vomiting.   Genitourinary:  Negative for dysuria, hematuria and urgency.   Neurological:  Negative for headaches and weakness.   Psychiatric/Behavioral:  Negative for depression and substance abuse.    All other systems reviewed and are negative.        Objective:   LMP 07/22/2012   Pain Disability Index Review:  Last 3 PDI Scores 5/2/2023 3/14/2023 1/3/2023   Pain Disability Index (PDI) 40 45 40     Normocephalic.  Atraumatic.  Affect appropriate.  Breathing unlabored.  Extra ocular muscles intact.         General    Constitutional: She is oriented to person, place, and time. She appears well-developed and well-nourished.   HENT:   Head: Normocephalic and atraumatic.   Eyes: EOM are normal.   Cardiovascular:  Normal rate.            Pulmonary/Chest: Effort normal.   Abdominal: There is no abdominal tenderness.   Neurological: She is alert and oriented to person, place, and time.   Psychiatric: She has a normal mood and affect. Her behavior is normal. Judgment and thought content normal.     General Musculoskeletal Exam   Gait: antalgic       Right Knee Exam     Tenderness   The patient is tender to palpation of the medial joint line.    Crepitus   The patient has crepitus of the medial joint line.    Range of Motion    The patient has normal right knee ROM.    Left Knee Exam     Tenderness   The patient tender to palpation of the medial joint line.    Range of Motion   The patient has normal left knee ROM.    Right Hip Exam     Tests   Pain w/ forced internal rotation (MERVIN): present  Log Roll: positive  Left Hip Exam   Left hip exam is normal.    Tests   Pain w/ forced external rotation (FADIR): present      Back (L-Spine & T-Spine) / Neck (C-Spine) Exam     Back (L-Spine & T-Spine) Tests   Right Side Tests  Straight leg raise: + at 90 deg            Muscle Strength   Right Lower Extremity   Hip Abduction: 4/5   Hip Adduction: 4/5   Hip Flexion: 5/5   Hip Extensors: 5/5  Quadriceps:  5/5   Hamstrin/5   Ankle Dorsiflexion:  5/5   Anterior tibial:  5/5   Gastrocsoleus:  5/5   EHL:  5/5  Left Lower Extremity   Hip Flexion: 5/5   Hip Extensors: 5/5  Quadriceps:  5/5   Hamstrin/5   Anterior tibial:  5/5   Gastrocsoleus:  5/5   EHL:  5/5    Reflexes     Left Side  Biceps:  2+  Triceps:  2+  Brachioradialis:  2+  Ankle Clonus:  absent    Right Side   Biceps:  2+  Triceps:  2+  Brachioradialis:  2+  Ankle Clonus:  absent      Right groin pain, unable to palpated for hernia.   Assessment:       Encounter Diagnoses   Name Primary?    Primary osteoarthritis of right knee Yes    Right hip pain     Spondylolysis of lumbar region     Bilateral primary osteoarthritis of hip     Chronic hip pain, unspecified laterality              Plan:   We discussed with the patient the assessment and recommendations. The following is the plan we agreed on:  - She is seeing general surgery tomorrow for right inguinal hernia. Will continue to monitor progress from previous hip injection.  - Primary complaint today is right knee pain. She had minimal benefit with recent knee steroid injection. Will schedule for right knee Synvisc injection.   - We also discussed peripheral block and burn as an option for both knee and hip pain.  - Continue with  physical therapy   - RTC 4 weeks after procedure.           Eufemia Street, SHERIF  05/23/2023

## 2023-05-24 NOTE — PROGRESS NOTES
General Surgery Office Visit   History and Physical    Patient Name: Elayne Almanzar  YOB: 1955 (67 y.o.)  MRN: [id number]  Today's Date: 05/24/2023    Referring Md:   Nubia Crocker Md  [address] [place] [address]    SUBJECTIVE:     Chief Complaint: R leg pain    History of Present Illness:  Elayne Almanzar is a 67 y.o. female with PMHx of COPD, A fib (on coumadin), CHF (EF 58% in Oct. '22), HLD, anemia, morbid obesity (BMI 40), KEYSHAWN, OA who presents to the clinic today complaining of R leg pain which she first noticed last year and has gotten worse over time. She reports seeing pain management for R hip and knee pain, steroid injections in the past with some relief. States that she was told that sh has a mass in her inner R thigh that could be contributing to this pain. Pain is sharp, constant. Radiates down her leg. Worse with ambulation. No other known aggravating or alleviating factors. Denies redness or drainage from area. She denies fever, chills, unintentional weight loss, diaphoresis, and all other symptoms. Patient reports being compliant with home medication regimen.   Seen by PCP for these symptoms, CT with prominent subcutaneous fat, no discrete masses identified on CT pelvis or thigh. Unable to get MRI due to heart valve.     Patient denies personal history of MI, CVA, DM  Former smoker. Denies alcohol, current tobacco, and elicit drug use.   On Coumadin       Review of patient's allergies indicates:   Allergen Reactions    Brimonidine        Past Medical History:   Diagnosis Date    Acute on chronic diastolic congestive heart failure     Allergy     Anemia     Anticoagulant long-term use     Anxiety     Asthma     Atrial fibrillation 2002    Bilateral primary osteoarthritis of hip 3/14/2023    Cataract     Chronic kidney disease     COPD (chronic obstructive pulmonary disease)     Coronary artery calcification seen on CT scan 3/22/2022    Coronary artery  calcification seen on CT scan 3/22/2022    Depression     Encounter for blood transfusion     HTN (hypertension)     Hyperlipidemia     Nephrolithiasis     KEYSHAWN (obstructive sleep apnea)     awaiting CPAP machine     Rheumatic disease of mitral valve     Uncontrolled type 2 diabetes mellitus with complication, with long-term current use of insulin 2020    Urinary incontinence      Past Surgical History:   Procedure Laterality Date    BREAST BIOPSY Left 10/2019    CARDIAC VALVE SURGERY  2004    mechanical mitral valve     SECTION      COLONOSCOPY N/A 2019    Procedure: COLONOSCOPY;  Surgeon: Devon Bowling MD;  Location: [facility] ENDO (4TH FLR);  Service: Endoscopy;  Laterality: N/A;  ok to hold Coumadin x 5 days with Lovenox bridge per Coumadin clinic-MS    ESOPHAGOGASTRODUODENOSCOPY N/A 2019    Procedure: EGD (ESOPHAGOGASTRODUODENOSCOPY);  Surgeon: Smooth Torrez MD;  Location: [facility] ADRIAN (2ND FLR);  Service: Endoscopy;  Laterality: N/A;  2nd floor requested due to comorbidities, Hypertension, permanent A. 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 44     per Coumadin clinic-ok to hold for 5 days w/bridge    INJECTION N/A 2023    Procedure: INJECTION, BILATERAL GTB AND RIGHT HIP INTRA-ARTICULAR CONTRAST;  Surgeon: Beth Alfaro MD;  Location: [facility] PAIN MGT;  Service: Pain Management;  Laterality: N/A;    kidney stone removal      MITRAL VALVE REPLACEMENT  2004    TUBAL LIGATION       Family History   Problem Relation Age of Onset    Stroke Paternal Grandmother     Colon cancer Maternal Grandmother     Cancer Brother         testicular cancer    Diabetes Sister     Hypertension Sister     Breast cancer Paternal Aunt     Diabetes Sister     Diabetes Sister     Heart disease Father 70        MI    Diabetes Father     Colon cancer Mother 83    Asthma Daughter     Asthma Son     Ovarian cancer Neg Hx      Social History  "    Tobacco Use    Smoking status: Former     Packs/day: 0.50     Years: 20.00     Pack years: 10.00     Types: Cigarettes     Quit date: 2002     Years since quittin.0    Smokeless tobacco: Never   Substance Use Topics    Alcohol use: No    Drug use: No        Review of Systems:  Review of Systems   Constitutional:  Negative for chills and fever.   Respiratory:  Negative for cough and shortness of breath.    Cardiovascular:  Negative for chest pain.   Gastrointestinal:  Negative for abdominal pain, diarrhea, nausea and vomiting.   Genitourinary:  Negative for dysuria and hematuria.   Musculoskeletal:  Positive for back pain, joint pain and myalgias. Negative for falls.        (+) R thigh mass   Skin:  Negative for rash.   Neurological:  Negative for dizziness and headaches.   Psychiatric/Behavioral:  Negative for substance abuse. The patient is not nervous/anxious.    All other systems reviewed and are negative.    OBJECTIVE:     Vital Signs (Most Recent)  BP (!) 143/72 (BP Location: Left arm, Patient Position: Sitting)   Pulse 89   Ht 5' 2" (1.575 m)   Wt 100 kg (220 lb 7.4 oz)   LMP 2012   SpO2 (!) 94%   BMI 40.32 kg/m²     Physical Exam  Vitals and nursing note reviewed.   Constitutional:       General: She is not in acute distress.     Appearance: She is obese. She is not diaphoretic.      Comments: Room air  Uses cane for ambulation    HENT:      Head: Normocephalic and atraumatic.      Mouth/Throat:      Mouth: Mucous membranes are moist.      Pharynx: Oropharynx is clear.   Eyes:      Extraocular Movements: Extraocular movements intact.      Conjunctiva/sclera: Conjunctivae normal.   Cardiovascular:      Rate and Rhythm: Normal rate.   Pulmonary:      Effort: Pulmonary effort is normal. No respiratory distress.   Musculoskeletal:         General: No deformity.      Cervical back: Normal range of motion.      Comments: No palpable masses appreciated in region of concern. No TTP. No " overlying skin changes. Neurovascularly intact.   Skin:     General: Skin is warm and dry.   Neurological:      Mental Status: She is alert and oriented to person, place, and time.     Imaging:   CT Thigh Without Contrast Right  Order: [id number]  Status: Final result     Visible to patient: Yes (not seen)     Next appt: 05/26/2023 at 09:45 AM in Ophthalmology ([facility], [facility])     Dx: Localized swelling, mass, or lump of ...     1 Result Note  Details    Reading Physician Reading Date Result Priority   Sam Clayton MD  [phone]  [phone] 4/26/2023 Routine     Narrative & Impression  EXAMINATION:  CT THIGH WITHOUT CONTRAST RIGHT     CLINICAL HISTORY:  Soft tissue mass, thigh, US/xray nondiagnostic;  Localized swelling, mass and lump, right lower limb     TECHNIQUE:  CT of the right thigh, without intravenous contrast.     COMPARISON:  CT pelvis 04/15/2023; ultrasound 04/11/2023     FINDINGS:  BONE: No fracture, osteonecrosis, or focal lesion.     JOINT: Advanced degenerative changes in the hip joint.  Knee joint spaces are preserved.  No joint effusion or bursal collection.     SOFT TISSUE: Prominent subcutaneous fat.  No focal abnormality in the upper inner thigh in the reported area of clinical concern, although no skin marker was placed to indicate that area.     Major tendons are grossly intact.  Regions of muscle atrophy.  No popliteal or inguinal lymphadenopathy.     MISCELLANEOUS: No visceral pelvic soft tissue abnormality.     Impression:     Clinical and sonographic findings could be explained by prominent subcutaneous fat or a subcutaneous lipoma, although no discrete mass is seen.     Advanced degenerative changes in the right hip.     CT Pelvis Without Contrast  Order: [id number]  Status: Final result     Visible to patient: Yes (not seen)     Next appt: 05/26/2023 at 09:45 AM in Ophthalmology ([facility], [facility])     Dx: Right groin pain; Soft tissue mass; A...     0 Result  Notes  Details    Reading Physician Reading Date Result Priority   Pankaj Pang MD  [phone] 4/15/2023 Routine     Narrative & Impression  EXAMINATION:  CT PELVIS WITHOUT CONTRAST     CLINICAL HISTORY:  Soft tissue mass, groin, deep;Abnormal US finding that need to be further delineated; US: ? of Lipoma to rigth groin; having right groin pain.;  Other specified soft tissue disorders     TECHNIQUE:  Axial images were obtained through the pelvis per protocol.  No intravenous contrast was administered.     COMPARISON:  CT abdomen pelvis with contrast dated 05/09/2018 and ultrasound lower extremity soft tissues dated 04/11/2023     FINDINGS:  Urinary bladder is mostly decompressed.  The uterus is present.  No suspicious adnexal mass.  The lower GI tract is normal in caliber with few lower colonic diverticula.  Visualized appendix is normal.  No significant free fluid or pelvic adenopathy.  Scattered aortoiliac atherosclerosis.  There is lower rectus diastasis with some protrusion of anterior abdominal fat with associated fat containing umbilical hernia containing trace fluid.     No discrete soft tissue mass identified.  No aggressive osseous lesion.  Lower lumbar spondylosis with right greater than left hip DJD.     Impression:     No discrete soft tissue mass identified.  Given lack of overlying skin marker, it is uncertain if the area of clinical concern was included in the field of view (noted at the upper inner thigh on comparison ultrasound).  Further correlation and subsequent follow-up including repeat imaging with skin marker as warranted.     Lower rectus diastasis with some protrusion of abdominal fat with associated fat-containing umbilical hernia containing trace fluid.     Additional findings as above.       ASSESSMENT/PLAN:     Elayne Almanzar is a 67 y.o. female with PMHx of COPD, A fib (on coumadin), CHF (EF 58% in Oct. '22), HLD, anemia, morbid obesity (BMI 40), KEYSHAWN, OA who presents to  the clinic today complaining of R leg pain. Clinically stable without signs of infection or B symptoms     Elayne was seen today for consult.    Diagnoses and all orders for this visit:    Mass of thigh, unspecified laterality  -     Ambulatory referral/consult to General Surgery    - No masses appreciated on exam or CT imaging. Unfortunately, patient unable to get MRI due to mechanical valve. Recommend continued treatment of DJD and OA of both R hip and R knee   - ED precautions given  - RTC PRN  - Continue any current medications  - Call with any questions or concerns  - Discussed POC with patient. All questions were answered. Patient is agreeable to plan and verbalized understanding.     Case discussed and imaging reviewed with Dr. Gross. Patient seen and examined by Dr. Gross.      ANNY Nolen, PA-C  General Surgery  - [facility]

## 2023-05-26 NOTE — PROGRESS NOTES
HVF done ou. Rel/fix/good od poor os coop. Good ou./chart checked for latex allergy./ plano + 1.50 x 180/od .50 + 1.25 x 5/os-[facility]

## 2023-05-26 NOTE — PROGRESS NOTES
Subjective:       Patient ID: Elayne Almanzar is a 67 y.o. female.    Chief Complaint: Glaucoma     HPI    SLT OD f/u    Pt states that OD has been doing well. Pt denies eye pain.     GTTS:  Dorzolamide BID OD   Latanoprost QHS OD    Afferent pupillary defect of right eye  Normal tension glaucoma of right eye, severe stage  Glaucoma suspect, left  Floppy eyelid syndrome of both eyes    Last edited by Faby Castro MA on 5/26/2023 11:43 AM.              Assessment & Plan   Normal tension glaucoma of right eye, severe stage  -     Anderson Visual Field - OU - Extended - Both Eyes    Afferent pupillary defect of right eye    Glaucoma suspect, left    Floppy eyelid syndrome of both eyes    Subconjunctival hemorrhage of left eye    Normal tension glaucoma of right eye, moderate stage  -     dorzolamide (TRUSOPT) 2 % ophthalmic solution; Place 1 drop into the right eye 2 (two) times a day.  Dispense: 10 mL; Refill: 3    Other orders  -     latanoprost (XALATAN) 0.005 % ophthalmic solution; Place 1 drop into both eyes once daily.  Dispense: 7.5 mL; Refill: 3       Referral from Dr. Marin MICHAEL for consideration of SLT or step-up Tx      NTG severe OD // mild OS  APD OD  -(-)Fhx, ( )Steroids, (-)Trauma  -Tm 21OU  -Drops:  Latanoprost qHS OU // Dorzolamide BID OD  -Drop intolerance/contraindication: COPD/KEYSHAWN may want to avoid BB, follicular conjunctivitis with brimonidine (mild)  -Laser: SLT OD 4/3/23 IOP --->10  -Surgeries: none  -CCT: 563 // 561   -Gonio: 3+ with very lightly pigmented TM and steep approach N/T OU    NTG Risk factors  Vascular risk factors: (+ in youth) migraines, (+)KEYSHAWN, (+ thalassemia trait, Hgh 9 with low Fe++) anemia, (+)transfusions, (+)hypotension, (-)h/o Raynaud's disease    5/23 HVF 2-2 SAD/IAD VFI 60% OD // new IAD VFI 97% OS --> stable OD and +prog OS  3/23 RNFL dec G/T/TS/NS/N // B T OS --> +prog OD    Since some mild progression OS on HVF     Start latan in OS      GIRISH OS  On Coumadin  AT QID    resolved      Floppy eyelid syndrome OU  Known KEYSHAWN  Rec re-start CPAP  Use AT celia qHS    NS OU  NVS, monitor      PLAN  Continue glaucoma gtts and artificial tears  Latan qHS OU   Dorzolamide BID OD      RTC 3 months IOP check OU      Prisca Puckett M.D., M.S.  Department of Ophthalmology   Division of Glaucoma Surgery  [facility]

## 2023-06-07 NOTE — PROGRESS NOTES
6/07/23 Patient called and rescheduled 6/06 missed lab appointment to 6/12--stated car is in the shop, also ran out of warfarin and needs a prescription for Warfarin -5 mg [facility] ph # [phone]

## 2023-06-28 NOTE — TELEPHONE ENCOUNTER
----- Message from Kerri Ríos sent at 6/28/2023  2:53 PM CDT -----  Regarding: appt access  Contact: [phone]  Pt calling in regards to scheduling. Pls call

## 2023-07-06 NOTE — PROGRESS NOTES
Subjective:       Patient ID: Elayne Almanzar is a 67 y.o. female.    Chief Complaint: Annual Exam    HPI  She is here for annual exam    Past medical history: Hypertension, A. fib, COPD, hyperlipidemia, pre diabetes, pneumonia, nephrolithiasis , iron deficiency anemia, glaucoma, sleep apnea, status post mitral valve replacement , family history of colon cancer-mother. She had a colonoscopy June 2019      Medications: Proventil MDI 2 puffs 4 times a day when necessary,Advair 500/50 one puff twice a day, Lasix 40 mg daily,Toprol- mg daily, Coumadin as monitored by Coumadin clinic ,  xalatan, Norvasc 2.5 mg daily, crestor      No known drug allergies     Review of Systems   Constitutional:  Negative for chills, fatigue, fever and unexpected weight change.   Respiratory:  Negative for chest tightness and shortness of breath.    Cardiovascular:  Negative for chest pain and palpitations.   Gastrointestinal:  Negative for abdominal pain and blood in stool.   Neurological:  Negative for dizziness, syncope, numbness and headaches.     Objective:      Physical Exam  HENT:      Right Ear: External ear normal.      Left Ear: External ear normal.      Nose: Nose normal.      Mouth/Throat:      Mouth: Mucous membranes are moist.      Pharynx: Oropharynx is clear.   Eyes:      Pupils: Pupils are equal, round, and reactive to light.   Cardiovascular:      Rate and Rhythm: Normal rate and regular rhythm.      Heart sounds: No murmur heard.  Pulmonary:      Breath sounds: Normal breath sounds.   Abdominal:      General: There is no distension.      Palpations: There is no hepatomegaly or splenomegaly.      Tenderness: There is no abdominal tenderness.   Musculoskeletal:      Cervical back: Normal range of motion.   Lymphadenopathy:      Cervical: No cervical adenopathy.      Upper Body:      Right upper body: No axillary adenopathy.      Left upper body: No axillary adenopathy.   Neurological:      Cranial Nerves: No  cranial nerve deficit.      Sensory: No sensory deficit.      Motor: Motor function is intact.      Deep Tendon Reflexes: Reflexes are normal and symmetric.       Assessment/Plan       Assessment and plan:  Annual exam.  Check A1c

## 2023-07-10 NOTE — PROGRESS NOTES
[facility]    07/10/2023 1:09 PM    Assessment/Plan:    Patient presents today with subtherapeutic  INR.    Assessment of patient findings and chart review: pt remains subtherapeutic after dosing increase.  Will increase per calendar & continue to monitor pt closely    Recommend repeat INR in 1 week.  _________________________________________________________________    Elayne Almanzar (67 y.o.) is followed by the [facility].    Anticoagulation Summary  As of 7/10/2023      INR goal:  2.5-3.5   TTR:  64.8 % (9.2 y)   INR used for dosin.2 (7/10/2023)   Warfarin maintenance plan:  5 mg (5 mg x 1) every day   Weekly warfarin total:  35 mg   Plan last modified:  Cade Lemus, PharmD (2023)   Next INR check:  2023   Target end date:  Indefinite    Indications    Chronic atrial fibrillation [I48.20]  H/O mitral valve replacement with mechanical valve [Z95.2]                 Anticoagulation Episode Summary       INR check location:  Clinic Lab    Preferred lab:  [facility]    Send INR reminders to:  [facility] COUMADIN MONITORING POOL    Comments:  [facility]          Anticoagulation Care Providers       Provider Role Specialty Phone number    Lorraine Messina MD Responsible Cardiology [phone]            Patient Findings       Positives:  Upcoming invasive procedure    Negatives:  Signs/symptoms of thrombosis, Signs/symptoms of bleeding, Laboratory test error suspected, Change in health, Change in alcohol use, Change in activity, Emergency department visit, Upcoming dental procedure, Missed doses, Extra doses, Change in medications, Change in diet/appetite, Hospital admission, Bruising, Other complaints    Comments:  Pt verified correct dose, reports having an injection in a knee , no other changes, Needs warfarin  - 5 mg Rx called in with increased amount and refills  called in to [facility] [phone]

## 2023-07-11 NOTE — PROCEDURES
Procedures  Knee  Injection Ultrasound guidance  Time-out taken to identify patient and procedure side prior to starting the procedure.   I attest that I have reviewed the patient's home medications prior to the procedure and no contraindication have been identified. I  re-evaluated the patient after the patient was positioned for the procedure in the procedure room immediately before the procedural time-out. The vital signs are current and represent the current state of the patient which has not significantly changed since the preprocedure assessment.                   Date of Service: 07/11/2023    PCP: Nubia Crocker MD    Referring Physician:                                                                                                                                   PROCEDURE:  right knee injection under ultrasound guidance                                                                                             REASON FOR PROCEDURE:right  knee * No surgery found *  1. Primary osteoarthritis of right knee            POSTOP DIAGNOSIS: right  knee * No surgery found *     1. Primary osteoarthritis of right knee      PHYSICIAN: Beth Alfaro MD  ASSISTANTS: none                                                                                LOCAL ANESTHESIA:  Xylocaine 1% 2 ml plus 2ml Bupivicaine 0.25% per side.                                                                                              MEDICATION INJECTED:   Synvisc One 6 ml     SEDATION MEDICATIONS: None                                                                               COMPLICATIONS:  None.                                                                                                                                     ESTIMATED BLOOD LOSS:  None.                                                                                                                              TECHNIQUE:  With the patient laying supine and the knees  semi-flexed on a    wedge, the appropriate knee was prepped and draped in the usual sterile fashion    using ChloraPrep and a fenestrated drape.  Knee joint line determined under    Ultrasound guidance.  The local anesthetic was given using a 27-gauge      needle.  The 1.5in 22-gauge needle was introduced into the joint space. Medication was then    injected.  The patient tolerated the procedure well.                                                                                                                                                                                                                                                                           The patient was given post procedure discharge instructions and follow-up  instructions. The patient was discharged in a stable condition.

## 2023-07-22 NOTE — PROGRESS NOTES
SUBJECTIVE:     Chief Complaint & History of Present Illness:  Elayne Almanzar is a Established 67 y.o. year old female patient presenting today for constant right hip and right knee pain which is chronic.  There is not a history of trauma.  The pain is located in the right hip/groin area and right lateral knee.  The pain is described as burning/fire, 8-9/10.  It is is aggravated by walking, radiates to knee, getting in/out of vehicle, and going up and down steps .  There is radiation into the knee.  Previous treatments include steroid injection into her knee which have provided minimal relief.  She reports she is going to pain management, sees Dr. Alfaro and was last given a steroid injection to her right knee 2 weeks ago.  There is not a history of previous injury or surgery to the hip.  The patient does not use an assistive device but now has to rely on a straight cane for support.      Past Medical History:   Diagnosis Date    Acute on chronic diastolic congestive heart failure     Allergy     Anemia     Anticoagulant long-term use     Anxiety     Asthma     Atrial fibrillation     Bilateral primary osteoarthritis of hip 3/14/2023    Cataract     Chronic kidney disease     COPD (chronic obstructive pulmonary disease)     Coronary artery calcification seen on CT scan 3/22/2022    Coronary artery calcification seen on CT scan 3/22/2022    Depression     Encounter for blood transfusion     HTN (hypertension)     Hyperlipidemia     Nephrolithiasis     KEYSHAWN (obstructive sleep apnea)     awaiting CPAP machine     Rheumatic disease of mitral valve     Uncontrolled type 2 diabetes mellitus with complication, with long-term current use of insulin 2020    Urinary incontinence        Past Surgical History:   Procedure Laterality Date    BREAST BIOPSY Left 10/2019    CARDIAC VALVE SURGERY  2004    mechanical mitral valve     SECTION      COLONOSCOPY N/A 2019    Procedure: COLONOSCOPY;  Surgeon:  Devon Bowling MD;  Location: [facility] ENDO (4TH FLR);  Service: Endoscopy;  Laterality: N/A;  ok to hold Coumadin x 5 days with Lovenox bridge per Coumadin clinic-[facility]    ESOPHAGOGASTRODUODENOSCOPY N/A 12/19/2019    Procedure: EGD (ESOPHAGOGASTRODUODENOSCOPY);  Surgeon: Smooth Torrez MD;  Location: [facility] ENDO (2ND FLR);  Service: Endoscopy;  Laterality: N/A;  2nd floor requested due to comorbidities, Hypertension, permanent A. 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 44     per Coumadin clinic-ok to hold for 5 days w/bridge    INJECTION N/A 1/23/2023    Procedure: INJECTION, BILATERAL GTB AND RIGHT HIP INTRA-ARTICULAR CONTRAST;  Surgeon: Beth Alfaro MD;  Location: [facility] PAIN MGT;  Service: Pain Management;  Laterality: N/A;    kidney stone removal      MITRAL VALVE REPLACEMENT  2/2004    TUBAL LIGATION         Family History   Problem Relation Age of Onset    Stroke Paternal Grandmother     Colon cancer Maternal Grandmother     Cancer Brother         testicular cancer    Diabetes Sister     Hypertension Sister     Breast cancer Paternal Aunt     Diabetes Sister     Diabetes Sister     Heart disease Father 70        MI    Diabetes Father     Colon cancer Mother 83    Asthma Daughter     Asthma Son     Ovarian cancer Neg Hx        Review of patient's allergies indicates:   Allergen Reactions    Brimonidine          Current Outpatient Medications:     ADVAIR DISKUS 500-50 mcg/dose DsDv diskus inhaler, INHALE 1 PUFF INTO THE LUNGS 2 (TWO) TIMES DAILY., Disp: 180 each, Rfl: 3    albuterol (PROVENTIL/VENTOLIN HFA) 90 mcg/actuation inhaler, INHALE 2 PUFFS INTO THE LUNGS EVERY 6 (SIX) HOURS AS NEEDED FOR WHEEZING. RESCUE, Disp: 18 g, Rfl: 6    amLODIPine (NORVASC) 2.5 MG tablet, Take 1 tablet (2.5 mg total) by mouth once daily., Disp: 90 tablet, Rfl: 2    aspirin (ECOTRIN) 81 MG EC tablet, Take 81 mg by mouth once daily. , Disp: , Rfl:     dipyridamole  (PERSANTINE) 5 mg/mL injection, , Disp: , Rfl:     dorzolamide (TRUSOPT) 2 % ophthalmic solution, Place 1 drop into the right eye 2 (two) times a day., Disp: 10 mL, Rfl: 3    ergocalciferol (ERGOCALCIFEROL) 50,000 unit Cap, Take 1 capsule (50,000 Units total) by mouth twice a week., Disp: 24 capsule, Rfl: 0    fluticasone propionate (FLONASE) 50 mcg/actuation nasal spray, 2 sprays (100 mcg total) by Each Nostril route once daily., Disp: 16 g, Rfl: 3    furosemide (LASIX) 40 MG tablet, Take 1 tablet (40 mg total) by mouth once daily., Disp: 90 tablet, Rfl: 3    gabapentin (NEURONTIN) 300 MG capsule, Take 1 capsule (300 mg total) by mouth every evening., Disp: 30 capsule, Rfl: 11    latanoprost (XALATAN) 0.005 % ophthalmic solution, Place 1 drop into both eyes once daily., Disp: 7.5 mL, Rfl: 3    meclizine (ANTIVERT) 25 mg tablet, Take 1 tablet (25 mg total) by mouth 2 (two) times daily as needed., Disp: 30 tablet, Rfl: 1    metoprolol tartrate (LOPRESSOR) 100 MG tablet, TAKE ONE TABLET BY MOUTH TWICE DAILY, Disp: 60 tablet, Rfl: 11    omeprazole (PRILOSEC) 40 MG capsule, Take 1 capsule (40 mg total) by mouth every morning. Open capsule and take with apple sauce, Disp: 30 capsule, Rfl: 2    ondansetron (ZOFRAN-ODT) 8 MG TbDL, Dissolve 1 tablet (8 mg total) by mouth every 6 (six) hours as needed., Disp: 30 tablet, Rfl: 0    oxybutynin (DITROPAN-XL) 5 MG TR24, Take 5 mg by mouth once daily., Disp: , Rfl:     pantoprazole (PROTONIX) 40 MG tablet, Take 1 tablet (40 mg total) by mouth daily as needed., Disp: 30 tablet, Rfl: 3    rosuvastatin (CRESTOR) 20 MG tablet, Take 1 tablet (20 mg total) by mouth once daily., Disp: 90 tablet, Rfl: 3    semaglutide (OZEMPIC) 0.25 mg or 0.5 mg (2 mg/3 mL) pen injector, Inject 0.5 mg into the skin every 7 days., Disp: 3 mL, Rfl: 2    sertraline (ZOLOFT) 100 MG tablet, Take 1 tablet (100 mg total) by mouth once daily., Disp: 90 tablet, Rfl: 3    tamsulosin (FLOMAX) 0.4 mg Cap, Take 0.4 mg  "by mouth once daily., Disp: , Rfl:     warfarin (COUMADIN) 5 MG tablet, Take 1 tablet (5 mg total) by mouth Daily. or as instructed by Coumadin Clinic., Disp: 40 tablet, Rfl: 0    nitroGLYCERIN (NITROSTAT) 0.4 MG SL tablet, Place 1 tablet (0.4 mg total) under the tongue every 5 (five) minutes as needed for Chest pain. (Patient not taking: Reported on 4/28/2022), Disp: 30 tablet, Rfl: 1    Review of Systems:  ROS:  Constitutional: no fever or chills  Eyes: no visual changes  ENT: no nasal congestion or sore throat  Respiratory: no cough or shortness of breath  Cardiovascular: no chest pain or palpitations  Gastrointestinal: no nausea or vomiting, tolerating diet  Genitourinary: no hematuria or dysuria  Integument/Breast: no rash or pruritis  Hematologic/Lymphatic: no easy bruising or lymphadenopathy  Musculoskeletal: positive for arthralgias  Neurological: no seizures or tremors  Behavioral/Psych: no auditory or visual hallucinations  Endocrine: no heat or cold intolerance      PE:  Ht 5' 2" (1.575 m)   Wt 99.4 kg (219 lb 2.2 oz)   LMP 07/22/2012   BMI 40.08 kg/m²   Estimated body mass index is 40.08 kg/m² as calculated from the following:    Height as of this encounter: 5' 2" (1.575 m).    Weight as of this encounter: 99.4 kg (219 lb 2.2 oz).   General: Pleasant, cooperative, NAD   HEENT: NCAT, sclera nonicteric   Lungs: Respirations are equal and unlabored.   Abdomen: Soft and non-tender.  CV: 2+ bilateral upper and lower extremity pulses.   Skin: Intact throughout LE with no rashes, erythema, or lesions  Extremities: No LE edema, NVI lower extremities      Hip Exam:   rightpositives: pain with flexion, pain with rotation, and pain with axial loading and internal rotation and negatives: pulses full    70 degrees flexion  0 degrees extension   25 degrees internal rotation  30 degrees external rotation  15 degrees abduction  15 degrees adduction       right  Knee Exam:  Knee Range of Motion:pain with terminal " flexion   Effusion:none  Condition of skin:intact  Location of tenderness:Medial joint line and Lateral joint line   Strength:normal  Stability:  stable to testing, Lachman: stable, LCL: stable, MCL: stable, and PCL: stable  Varus /Valgus stress:  normal  Reid:   negative      RADIOGRAPHS:  Xray of the right knee and right hip was obtained, findings shows no acute fractures.  Knee xray shows no significant joint space narrowing.  Her right hip shows advance DJD, no fractures seen.  All radiographs were personally reviewed by me.    ASSESSMENT/PLAN:       ICD-10-CM ICD-9-CM   1. Pain of right hip  M25.551 719.45   2. Chronic pain of right knee  M25.561 719.46    G89.29 338.29       -Elayne Almanzar presents to clinic today with c/c right hip and right knee pain for the past year.  She reports pain is worse with walking, going up/down steps, and getting in/out of vehicle.  She reports she is going to bariatrics for weight loss management, she quit smoking several years ago.  PMH consistent of A-fib currently on Coumadin    -X-ray as above.    I advised patient I believe her knee pain is due to her right hip OA.  When I compare her current hip xray to previous, it appears her OA has gotten worse.  I will refer her to joint team to discuss hip replacement.

## 2023-07-27 NOTE — PROGRESS NOTES
Referred by No ref. provider found     ESTABLISHED PATIENT VISIT  New to me. Previously followed by Dr Jones and Dr aZragoza      Elayne Almanzar  is a pleasant 67 y.o. female  with PMH significant for  HTN, HLD, hx of mitral value replacement, cheonic Afib, CHF, COPD, preDM, MARTHA, BMI 40+, KEYSHAWN      Here today for : follow up    PLAN last visit 5/20/21:    Patient is compliant and benefits from use.   Machine is broken and beyond repair.   Replacement machine ordered.   Compliance follow up      Since last visit:   Using ASV machine nightly with more consolidated and restful sleep. Here today to see about getting new machine as this one is starting to get outdated and also to get new supply order.      PAP history   Problems    Mask FFM   Pressure 25; EPAP Max 10, EPAP Min 6; PS 2-16   DME HME   Machine age DS ASV 2017    Download N/a       SLEEP SCHEDULE   Environment    Bed Time 10;10:30PM   Sleep Latency 20-30mins   Arousals 0   Nocturia 0   Back to sleep N/a   Wake time 7-7:30AM   Naps 6 x weekly (30mins)   Work          Past Medical History:   Diagnosis Date    Acute on chronic diastolic congestive heart failure     Allergy     Anemia     Anticoagulant long-term use     Anxiety     Asthma     Atrial fibrillation 2002    Bilateral primary osteoarthritis of hip 3/14/2023    Cataract     Chronic kidney disease     COPD (chronic obstructive pulmonary disease)     Coronary artery calcification seen on CT scan 3/22/2022    Coronary artery calcification seen on CT scan 3/22/2022    Depression     Encounter for blood transfusion     HTN (hypertension)     Hyperlipidemia     Nephrolithiasis     KEYSHAWN (obstructive sleep apnea)     awaiting CPAP machine     Rheumatic disease of mitral valve     Uncontrolled type 2 diabetes mellitus with complication, with long-term current use of insulin 5/27/2020    Urinary incontinence      Patient Active Problem List   Diagnosis    Chronic atrial fibrillation    Essential (primary)  hypertension    Chronic anticoagulation    H/O mitral valve replacement with mechanical valve    COPD (chronic obstructive pulmonary disease)    Hyperlipidemia    Mixed urge and stress incontinence    Atrophic vaginitis    KEYSHAWN (obstructive sleep apnea)    Morbid obesity with BMI of 40.0-44.9, adult    Chronic diastolic congestive heart failure    Iron deficiency anemia    Alpha thalassemia trait    Anemia due to chronic blood loss    Coronary artery calcification seen on CT scan    COVID-19    Lumbar spine pain    Aortic atherosclerosis    Spondylolysis of lumbar region    Bilateral primary osteoarthritis of hip    Right hip pain       Current Outpatient Medications:     ADVAIR DISKUS 500-50 mcg/dose DsDv diskus inhaler, INHALE 1 PUFF INTO THE LUNGS 2 (TWO) TIMES DAILY., Disp: 180 each, Rfl: 3    albuterol (PROVENTIL/VENTOLIN HFA) 90 mcg/actuation inhaler, INHALE 2 PUFFS INTO THE LUNGS EVERY 6 (SIX) HOURS AS NEEDED FOR WHEEZING. RESCUE, Disp: 18 g, Rfl: 6    amLODIPine (NORVASC) 2.5 MG tablet, Take 1 tablet (2.5 mg total) by mouth once daily., Disp: 90 tablet, Rfl: 2    aspirin (ECOTRIN) 81 MG EC tablet, Take 81 mg by mouth once daily. , Disp: , Rfl:     dipyridamole (PERSANTINE) 5 mg/mL injection, , Disp: , Rfl:     dorzolamide (TRUSOPT) 2 % ophthalmic solution, Place 1 drop into the right eye 2 (two) times a day., Disp: 10 mL, Rfl: 3    ergocalciferol (ERGOCALCIFEROL) 50,000 unit Cap, Take 1 capsule (50,000 Units total) by mouth twice a week., Disp: 24 capsule, Rfl: 0    fluticasone propionate (FLONASE) 50 mcg/actuation nasal spray, 2 sprays (100 mcg total) by Each Nostril route once daily., Disp: 16 g, Rfl: 3    furosemide (LASIX) 40 MG tablet, Take 1 tablet (40 mg total) by mouth once daily., Disp: 90 tablet, Rfl: 3    gabapentin (NEURONTIN) 300 MG capsule, Take 1 capsule (300 mg total) by mouth every evening., Disp: 30 capsule, Rfl: 11    latanoprost (XALATAN) 0.005 % ophthalmic solution, Place 1 drop into both  eyes once daily., Disp: 7.5 mL, Rfl: 3    meclizine (ANTIVERT) 25 mg tablet, Take 1 tablet (25 mg total) by mouth 2 (two) times daily as needed., Disp: 30 tablet, Rfl: 1    metoprolol tartrate (LOPRESSOR) 100 MG tablet, TAKE ONE TABLET BY MOUTH TWICE DAILY, Disp: 60 tablet, Rfl: 11    nitroGLYCERIN (NITROSTAT) 0.4 MG SL tablet, Place 1 tablet (0.4 mg total) under the tongue every 5 (five) minutes as needed for Chest pain. (Patient not taking: Reported on 4/28/2022), Disp: 30 tablet, Rfl: 1    omeprazole (PRILOSEC) 40 MG capsule, Take 1 capsule (40 mg total) by mouth every morning. Open capsule and take with apple sauce, Disp: 30 capsule, Rfl: 2    ondansetron (ZOFRAN-ODT) 8 MG TbDL, Dissolve 1 tablet (8 mg total) by mouth every 6 (six) hours as needed., Disp: 30 tablet, Rfl: 0    oxybutynin (DITROPAN-XL) 5 MG TR24, Take 5 mg by mouth once daily., Disp: , Rfl:     pantoprazole (PROTONIX) 40 MG tablet, Take 1 tablet (40 mg total) by mouth daily as needed., Disp: 30 tablet, Rfl: 3    rosuvastatin (CRESTOR) 20 MG tablet, Take 1 tablet (20 mg total) by mouth once daily., Disp: 90 tablet, Rfl: 3    semaglutide (OZEMPIC) 0.25 mg or 0.5 mg (2 mg/3 mL) pen injector, Inject 0.5 mg into the skin every 7 days., Disp: 3 mL, Rfl: 2    sertraline (ZOLOFT) 100 MG tablet, Take 1 tablet (100 mg total) by mouth once daily., Disp: 90 tablet, Rfl: 3    tamsulosin (FLOMAX) 0.4 mg Cap, Take 0.4 mg by mouth once daily., Disp: , Rfl:     warfarin (COUMADIN) 5 MG tablet, Take 1 tablet (5 mg total) by mouth Daily. or as instructed by Coumadin Clinic., Disp: 40 tablet, Rfl: 0     There were no vitals filed for this visit.  Physical Exam:    GEN:   Well-appearing  Psych:  Appropriate affect, demonstrates insight  SKIN:  No rash on the face or bridge of the nose      LABS:   Lab Results   Component Value Date    HGB 12.4 05/22/2023    CO2 30 (H) 05/22/2023       RECORDS REVIEWED PREVIOUSLY:    PSG 1/10/17: Significant Obstructive sleep apnea  (KEYSHAWN) with AHI (apnea hypopnea Index) of 116 and SaO2 of 68% (weight  255 lbs).Efective control of SDB was not achieved due to central apneas in the treatment part on every tested pressure (some centrals were present in the diagnostic part).    CPAP titration on 2017: Effective control of respiratory events was not achieved on CPAP/BPAP. Best results on ASV at default settings.      22 Echo:  The left ventricle is normal in size with normal systolic function.  The estimated ejection fraction is 58%.  There is abnormal septal wall motion.  Severe left atrial enlargement.  Mild right ventricular enlargement with low normal right ventricular systolic function.  Mild right atrial enlargement.  There is a mechanical mitral valve prosthesis. There is trace central insufficiency present; Mean gradient 11 mmHg; MVA 2.4  Mild tricuspid regurgitation.  Elevated central venous pressure (15 mmHg).  The estimated PA systolic pressure is 54 mmHg.  There is mild-moderate pulmonary hypertension.    ASSESSMENT    [place] Sleepiness Scale:  Sitting and readin  Watching TV:    2  Passenger in a car x 1 hr:  1  Sitting quietly after lunch:  1  Lying down to rest in PM:  2  Sitting, inactive in public:  0  Sitting+ talking to someone:  0  Stopped in traffic:   0  Total        PROBLEM DESCRIPTION/ Sx on Presentation Interval Hx STATUS    KEYSHAWN   + snoring, + witnessed apneas   Dx 2017  Reports nightly usage Likely controlled    Daytime Sx   + sleepiness when inactive   ESS  on intake (reviewed from 16)  ESS  last visit (reviewed from 21) Less sleepy on ASV New   Insomnia Trouble going to sleep: no  Trouble maintaining sleep: wakes frequently, not difficult to return to sleep  Prior hypnotics:  Current Hypnotics: Sleeping more consolidated, no longer waking frequently in the night on ASV Improved    Other issues:     PLAN     -using and benefiting from ASV therapy  -ASV nearing the end of its usage  life, will order a new one (EF>45% on most recent echo in 2022)  -ASV and supplies ordered  -discussed KEYSHAWN and CPAP with patient in detail, including possible complications of untreated KEYSHAWN like heart attack/stroke  -advised on strict driving precautions; advised never to drive drowsy    Advised on plan of care. Answered all patient questions. Patient verbalized understanding and voiced agreement with plan of care.       RTC will need follow up 31-90 days after receiving new ASVmachine for compliance      The patient was given open opportunity to ask questions and/or express concerns about treatment plan.   All questions/concerns were discussed.     Two patient identifiers used prior to evaluation.

## 2023-08-04 NOTE — TELEPHONE ENCOUNTER
----- Message from Jessica Perkins sent at 8/4/2023  1:30 PM CDT -----  Regarding: Pt calling needing to schedule a visit, hoping for date  Contact: @[phone]  Pt calling needing to schedule a visit, hoping for date On 10th at 10:30am pt is coming to the lab, hoping to come in that day for appt as well , call back @[phone]

## 2023-08-18 NOTE — PROGRESS NOTES
Subjective:      Patient ID: Elayne Almanzar is a 68 y.o. female.    Chief Complaint:   Pain of the Right Knee and Pain of the Right Hip    HPI  She comes in today to discuss right hip replacement.  She has started to have to walk on a cane, and her pain is up to 9/10.  No falls.  No trauma or previous surgery related to the complaint.  From her recent detailed office visit with another provider in this clinic, reviewed with the patient and confirmed:  Elayne Almanzar is a Established 67 y.o. year old female patient presenting today for constant right hip and right knee pain which is chronic.  There is not a history of trauma.  The pain is located in the right hip/groin area and right lateral knee.  The pain is described as burning/fire, 8-9/10.  It is is aggravated by walking, radiates to knee, getting in/out of vehicle, and going up and down steps .  There is radiation into the knee.  Previous treatments include steroid injection into her knee which have provided minimal relief.  She reports she is going to pain management, sees Dr. Alfaro and was last given a steroid injection to her right knee 2 weeks ago.  There is not a history of previous injury or surgery to the hip.  The patient does not use an assistive device but now has to rely on a straight cane for support.      Objective:        Ortho/SPM Exam    Positive C sign, positive Stinchfield sign.  No tenderness at the greater trochanter or iliotibial band.  No tenderness at the SI joint.  The patient has pain in the groin with passive flexion and internal rotation of the hip which is limited.  Knee benign without tenderness or effusion, with normal range of motion.  Skin intact.  Distal neurovascular intact.  Flip test negative.        IMAGING:  Recent x-rays showed nearly bone-on-bone arthrosis of the right hip, with subchondral cyst formation.  Much milder disease on the left.  Knee x-rays showed minimal signs of arthrosis.  Assessment:   VENUS,  right hip      Plan:   I think her knee pain is likely related to her hip arthritis.  She will give us a call once she has discussed with her family to set up right hip replacement.  The surgical process of joint replacement was discussed in detail with the patient including a detailed discussion of the procedure itself (including visual model, x-ray review). The typical perioperative and postoperative course was discussed and perioperative risks were discussed to the patient's satisfaction.  Risks and complications discussed included but were not limited to the risks of anesthetic complications, infection, bleeding, wound healing complications, aseptic loosening, instability, limb length inequality, neurologic dysfunction including numbness,  DVT, pulmonary embolism, perioperative medical risks (cardiac, pulmonary, renal, neurologic), and death and the patient elects to proceed. We will initiate pre-operative medical evaluation and clearance and set a provisional date for surgical intervention according to the patient's schedule. We discussed surgical options including implant type, and why I believe the implant selected is a good match for the patient's needs. The patient expressed understanding and would like to proceed with surgery.       The patient's pathophysiology was explained in detail with reference to x-rays, models, other visual aids as appropriate.  Treatment options were discussed in detail.  Questions were invited and answered to the patient's satisfaction.      Leo Mccartney MD  Orthopedic Surgery

## 2023-08-21 NOTE — PROGRESS NOTES
8/21/23 Patient called to reschedule 8/22 lab appointment to Friday 8/25 at [facility] after her 10:00am appointment

## 2023-08-22 NOTE — PROGRESS NOTES
Subjective:      Patient ID: Elayne Almanzar is a 68 y.o. female.    Chief Complaint: Hip Pain, Leg Pain, and Knee Pain    Referred by: No ref. provider found     Interval History:  The patient returns to clinic today for f/u of right hip and knee pain. The pt is s/p right knee monovisc injection on 7/11/23 from which she reports only one week of relief and her pain ultimately returned. She describes knee pain that comes up into her thigh and also pain from her right hip. She describes difficulty walking and trouble sleeping 2/2 to pain. She saw Dr. Mccartney with orthopedic surgery last week and they discussed her need for right hip replacement. She is hesitant to schedule surgery because she needs to have her affairs in order before she can commit to surgery and the recovery process.       Interval History 5/23/2023:  The patient presents for follow up of right hip and right knee pain. She is now s/p right knee steroid injection on 5/2/23. She is reporting about 50% relief for about one week only. She is again having significant right knee pain. The pain is sharp and stabbing. It worsens with standing and walking. Previous XRAYs show mild DJD. She has not had visco-supplemention. She is also having some return of right hip pain. The pain radiates into the thigh and groin. Previous hip XRAYs show severe DJD. She is considering hip replacement down the road but would like to avoid as long as possible. She was started on Gabapentin yesterday by another provider but has not yet started the medication. Her pain today is 9/10.      Interval History 3/14/23: Mrs. Almanzar 66 y/o female who came for follow up after 2/16/23 right hip intra articular injection and bilateral GTB injection which provide her 90 % of pain relieve and her hip injection provide her 90% of pain relieve for a couple of weeks. . She continues to complain of right groin pain since a couple of weeks after the injection. Her pain is mainly in her right  side groin medial area that is worsen with ambulation, weightbearing activities and during walking/PT. Patient denies any fever, chills sick contact or any fall trauma after the procedure was done. She is not using any pain meds right now. Also complaint of right knee pain her last knee intraarticular injection was done more than one year.     Blood thinner: coumadin 5 mg           Interval History: 1/3/23: Elayne Almanzar presents for bilateral hip/knee pain.  She states that last year in May she had COVID with resultant body aches/pain.  The COVID recovered, however her pain remained. 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  She describes the pain as burning pain. She admits to aching pain in her hips and knees. She denies any back pain. She denies pain in her neck/arms/shoulders.  She states that the hip pain occasionally radiates down to her knees (sharp pain). No bowel/bladder issues.  She takes Norco 5-325mg twice a day for pain, but takes it only if she needs it.  She does not take tylenol/advil. She has not tried gabapentin or a muscle relaxant in the past.  She was referred for physical therapy, but has only gone once so far.  She states she is supposed to go back this month.  She denies any home exercise program.      Interventional Pain History  11/3/22 - bilateral hip GTB with steroid ([facility])  1/23/23 Bilateral hip joint and GTB injections- 90% relief  5/2/23 Right knee steroid injection- 50% relief for one week  7/11/23 Right knee monovisc injection - 50% relief for one week     Past Medical History:   Diagnosis Date    Acute on chronic diastolic congestive heart failure     Allergy     Anemia     Anticoagulant long-term use     Anxiety     Asthma     Atrial fibrillation 2002    Bilateral primary osteoarthritis of hip 3/14/2023    Cataract     Chronic kidney disease     COPD (chronic obstructive pulmonary disease)     Coronary artery calcification seen on CT  scan 3/22/2022    Coronary artery calcification seen on CT scan 3/22/2022    Depression     Encounter for blood transfusion     HTN (hypertension)     Hyperlipidemia     Nephrolithiasis     KEYSHAWN (obstructive sleep apnea)     awaiting CPAP machine     Rheumatic disease of mitral valve     Uncontrolled type 2 diabetes mellitus with complication, with long-term current use of insulin 2020    Urinary incontinence        Past Surgical History:   Procedure Laterality Date    BREAST BIOPSY Left 10/2019    CARDIAC VALVE SURGERY  2004    mechanical mitral valve     SECTION      COLONOSCOPY N/A 2019    Procedure: COLONOSCOPY;  Surgeon: Devon Bowling MD;  Location: [facility] ENDO (4TH FLR);  Service: Endoscopy;  Laterality: N/A;  ok to hold Coumadin x 5 days with Lovenox bridge per Coumadin clinic-[facility]    ESOPHAGOGASTRODUODENOSCOPY N/A 2019    Procedure: EGD (ESOPHAGOGASTRODUODENOSCOPY);  Surgeon: Smooth Torrez MD;  Location: [facility] ENDO (2ND FLR);  Service: Endoscopy;  Laterality: N/A;  2nd floor requested due to comorbidities, Hypertension, permanent A. 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 44     per Coumadin clinic-ok to hold for 5 days w/bridge    INJECTION N/A 2023    Procedure: INJECTION, BILATERAL GTB AND RIGHT HIP INTRA-ARTICULAR CONTRAST;  Surgeon: Beth Alfaro MD;  Location: [facility] PAIN MGT;  Service: Pain Management;  Laterality: N/A;    kidney stone removal      MITRAL VALVE REPLACEMENT  2004    TUBAL LIGATION         Review of patient's allergies indicates:   Allergen Reactions    Brimonidine        Current Outpatient Medications   Medication Sig Dispense Refill    ADVAIR DISKUS 500-50 mcg/dose DsDv diskus inhaler INHALE 1 PUFF INTO THE LUNGS 2 (TWO) TIMES DAILY. 180 each 3    albuterol (PROVENTIL/VENTOLIN HFA) 90 mcg/actuation inhaler INHALE 2 PUFFS INTO THE LUNGS EVERY 6 (SIX) HOURS AS NEEDED FOR WHEEZING.  RESCUE 18 g 6    amLODIPine (NORVASC) 2.5 MG tablet Take 1 tablet (2.5 mg total) by mouth once daily. 90 tablet 2    aspirin (ECOTRIN) 81 MG EC tablet Take 81 mg by mouth once daily.       dipyridamole (PERSANTINE) 5 mg/mL injection       dorzolamide (TRUSOPT) 2 % ophthalmic solution Place 1 drop into the right eye 2 (two) times a day. 10 mL 3    ergocalciferol (ERGOCALCIFEROL) 50,000 unit Cap Take 1 capsule (50,000 Units total) by mouth twice a week. 24 capsule 0    fluticasone propionate (FLONASE) 50 mcg/actuation nasal spray 2 sprays (100 mcg total) by Each Nostril route once daily. 16 g 3    furosemide (LASIX) 40 MG tablet Take 1 tablet (40 mg total) by mouth once daily. 90 tablet 3    gabapentin (NEURONTIN) 300 MG capsule Take 1 capsule (300 mg total) by mouth every evening. 30 capsule 11    latanoprost (XALATAN) 0.005 % ophthalmic solution Place 1 drop into both eyes once daily. 7.5 mL 3    meclizine (ANTIVERT) 25 mg tablet Take 1 tablet (25 mg total) by mouth 2 (two) times daily as needed. 30 tablet 1    metoprolol tartrate (LOPRESSOR) 100 MG tablet TAKE ONE TABLET BY MOUTH TWICE DAILY 60 tablet 11    omeprazole (PRILOSEC) 40 MG capsule Take 1 capsule (40 mg total) by mouth every morning. Open capsule and take with apple sauce 30 capsule 2    ondansetron (ZOFRAN-ODT) 8 MG TbDL Dissolve 1 tablet (8 mg total) by mouth every 6 (six) hours as needed. 30 tablet 0    oxybutynin (DITROPAN-XL) 5 MG TR24 Take 5 mg by mouth once daily.      pantoprazole (PROTONIX) 40 MG tablet Take 1 tablet (40 mg total) by mouth daily as needed. 30 tablet 3    rosuvastatin (CRESTOR) 20 MG tablet Take 1 tablet (20 mg total) by mouth once daily. 90 tablet 3    semaglutide (OZEMPIC) 0.25 mg or 0.5 mg (2 mg/3 mL) pen injector Inject 0.5 mg into the skin every 7 days. 3 mL 2    sertraline (ZOLOFT) 100 MG tablet TAKE 1 TABLET BY MOUTH ONCE DAILY. 90 tablet 3    tamsulosin (FLOMAX) 0.4 mg Cap Take 0.4 mg by mouth once daily.      warfarin  (COUMADIN) 5 MG tablet Take 1 tablet (5 mg total) by mouth Daily. or as instructed by Coumadin Clinic. 40 tablet 0    nitroGLYCERIN (NITROSTAT) 0.4 MG SL tablet Place 1 tablet (0.4 mg total) under the tongue every 5 (five) minutes as needed for Chest pain. (Patient not taking: Reported on 2022) 30 tablet 1     No current facility-administered medications for this visit.       Family History   Problem Relation Age of Onset    Stroke Paternal Grandmother     Colon cancer Maternal Grandmother     Cancer Brother         testicular cancer    Diabetes Sister     Hypertension Sister     Breast cancer Paternal Aunt     Diabetes Sister     Diabetes Sister     Heart disease Father 70        MI    Diabetes Father     Colon cancer Mother 83    Asthma Daughter     Asthma Son     Ovarian cancer Neg Hx        Social History     Socioeconomic History    Marital status:     Number of children: 2   Occupational History    Occupation: [employer]     Comment: Retired   Tobacco Use    Smoking status: Former     Current packs/day: 0.00     Average packs/day: 0.5 packs/day for 20.0 years (10.0 ttl pk-yrs)     Types: Cigarettes     Start date: 1982     Quit date: 2002     Years since quittin.3    Smokeless tobacco: Never   Substance and Sexual Activity    Alcohol use: No    Drug use: No   Social History Narrative    Disability since .  Laid off .  Previously worked as cook in a kitchen.       Imaging:  XR HIPS BILATERAL 2 VIEW INCL AP PELVIS     CLINICAL HISTORY:  cintia hip pain;  Pain in right hip     TECHNIQUE:  AP view of the pelvis and frogleg lateral views of both hips were performed.     COMPARISON:  2016     FINDINGS:  Severe right hip joint space narrowing.  Mild sclerosis at the acetabular region and subchondral cystic geodes.  The right femoral head contour remains spherical.  No acute fracture, no osseous lesions.     Mild left hip joint space narrowing.  Minimal sclerosis and small geode at the  acetabular region.  The left femoral head contour is maintained.     The sacroiliac joints appear normal.  The remainder of the visualized osseous structures and soft tissues appear normal.     Impression:     Severe right hip and mild left hip degenerative change        Electronically signed by: Ya Parra MD  Date:                                            11/03/2022  Time:                                           16:01    XR KNEE ORTHO RIGHT WITH FLEXION     CLINICAL HISTORY:  RM 20;  Pain in unspecified knee     FINDINGS:  Right: No fracture dislocation bone destruction or OCD seen.     Left: No fracture dislocation bone destruction or OCD seen.        Electronically signed by: Tre Galeana MD  Date:                                            10/06/2021  Time:                                           15:42    XR KNEE ORTHO LEFT     CLINICAL HISTORY:  Pain in unspecified knee     TECHNIQUE:  AP standing of both knees, Merchant views of both knees as well as a lateral view of the left knee were performed.     COMPARISON:  07/01/2016     FINDINGS:  No acute fracture or dislocation.  No left knee joint effusion.  No radiopaque foreign bodies.  Lucency noted at the medial aspect of the distal femur/medial condyle of the femur is again noted and appears similar to prior exam.     Impression:     No acute findings in the left knee.     Lucency through the medial femoral condyle on the right is noted and appears more conspicuous than when compared to prior exams.  If there is concern for right knee pathology, dedicated radiographs recommended.        Electronically signed by: Galileo Jarrett MD  Date:                                            05/03/2021  Time:                                           13:12    CT LUMBAR SPINE WITHOUT CONTRAST     CLINICAL HISTORY:  Low back pain, symptoms persist with > 6wks conservative treatment;  Dorsalgia, unspecified     TECHNIQUE:  Low-dose axial, sagittal and  coronal reformations are obtained through the lumbar spine.  Contrast was not administered.     COMPARISON:  Radiograph 11/03/2022.     FINDINGS:  Lumbar spine alignment demonstrates dextroscoliosis and grade 1 retrolisthesis of L5 on S1.  No spondylolysis.  Vertebral body heights are well maintained without evidence for fracture.  There is heterogeneous attenuation of the visualized osseous structures.     There is degenerative disc space narrowing throughout the visualized thoracolumbar spine most pronounced from L2-L3 through L5-S1 noting associated vacuum phenomenon and chronic degenerative endplate changes.     Partially visualized coronary artery atherosclerosis.  There is atherosclerotic calcification of the abdominal aorta.  Partially visualized low-attenuation left renal lesion, likely a cyst.  There is fatty degeneration of the posterior paraspinal musculature.  Symmetric degenerative changes of the SI joints.     T11-T12: Right facet arthropathy.  No spinal canal stenosis.  Mild right neural foraminal narrowing.     T12-L1: No spinal canal stenosis.  No neural foraminal narrowing.     L1-L2: Bilateral facet arthropathy and bilateral ligamentum flavum buckling.  No spinal canal stenosis.  No neural foraminal narrowing.     L2-L3: Circumferential disc osteophyte complex.  Bilateral facet arthropathy and bilateral ligamentum flavum buckling.  No spinal canal stenosis.  No neural foraminal narrowing.     L3-L4: Circumferential disc osteophyte complex.  Bilateral facet arthropathy and bilateral ligamentum flavum buckling.  Moderate spinal canal stenosis.  Stable mild bilateral neural foraminal narrowing.     L4-L5: Circumferential disc osteophyte complex.  Bilateral facet arthropathy and bilateral ligamentum flavum buckling.  Prominent posterior epidural fat.  Mild-to-moderate spinal canal stenosis.  Moderate right and mild left neural foraminal narrowing.     L5-S1: Circumferential disc bulge with a  superimposed right sub are disc protrusion that effaces the right lateral recess and likely abuts the right descending S1 nerve root.  Bilateral facet arthropathy.  Moderate to severe right lateral recess stenosis.  Moderate to severe bilateral neural foraminal narrowing.     Impression:     1. Lumbar dextroscoliosis with advanced superimposed degenerative changes most pronounced from L3-L4 through L5-S1 as detailed above.  Note is made of a right subarticular disc osteophyte protrusion at L5-S1 that effaces the right lateral recess and likely abuts the right descending S1 nerve root.        Electronically signed by: Christian Sen MD  Date:                                            12/14/2022  Time:                                           13:15      XR LUMBAR SPINE AP AND LAT WITH FLEX/EXT     CLINICAL HISTORY:  lumbar spine pain;  Low back pain, unspecified     TECHNIQUE:  AP and lateral views as well as lateral flexion and extension images are performed through the lumbar spine.     COMPARISON:  None     FINDINGS:  There is a mild dextrocurvature of the lumbar spine.  The vertebral body heights are well maintained.  Moderate disc space narrowing at all levels of the lumbar spine.  Moderate-sized anterior and lateral marginal osteophytes throughout the lumbar spine.  No fracture, no osseous lesions.  The bilateral sacroiliac joints appear normal.  Advanced degenerative change of the right hip joint.  Atherosclerotic plaque of the aorta.     Impression:     Spondylosis of the lumbar spine, no acute process seen.     Advanced degenerative change of the right hip joint.        Electronically signed by: Ya aPrra MD  Date:                                            11/03/2022  Time:                                           16:03    XR HIPS BILATERAL 2 VIEW INCL AP PELVIS     CLINICAL HISTORY:  cintia hip pain;  Pain in right hip     TECHNIQUE:  AP view of the pelvis and frogleg lateral views of both hips  were performed.     COMPARISON:  06/20/2016     FINDINGS:  Severe right hip joint space narrowing.  Mild sclerosis at the acetabular region and subchondral cystic geodes.  The right femoral head contour remains spherical.  No acute fracture, no osseous lesions.     Mild left hip joint space narrowing.  Minimal sclerosis and small geode at the acetabular region.  The left femoral head contour is maintained.     The sacroiliac joints appear normal.  The remainder of the visualized osseous structures and soft tissues appear normal.     Impression:     Severe right hip and mild left hip degenerative change        Electronically signed by: Ya Parra MD  Date:                                            11/03/2022  Time:                                           16:01      Review of Systems   Constitutional: Negative for chills and fever.   HENT:  Negative for sore throat.    Eyes:  Negative for blurred vision and double vision.   Cardiovascular:  Negative for chest pain and palpitations.   Respiratory:  Negative for shortness of breath.    Hematologic/Lymphatic: Does not bruise/bleed easily.   Skin:  Negative for rash.   Musculoskeletal:  Positive for joint pain, joint swelling and myalgias. Negative for back pain.   Gastrointestinal:  Negative for abdominal pain, constipation, heartburn, nausea and vomiting.   Genitourinary:  Negative for dysuria, hematuria and urgency.   Neurological:  Negative for headaches and weakness.   Psychiatric/Behavioral:  Negative for depression and substance abuse.    All other systems reviewed and are negative.          Objective:   /74   Pulse 88   Resp 18   Wt 96.6 kg (212 lb 15.4 oz)   LMP 07/22/2012   SpO2 100%   BMI 40.73 kg/m²   Pain Disability Index Review:  Last 3 PDI Scores 8/22/2023 7/11/2023 5/2/2023   Pain Disability Index (PDI) 60 45 40     Normocephalic.  Atraumatic.  Affect appropriate.  Breathing unlabored.  Extra ocular muscles intact.          General    Constitutional: She is oriented to person, place, and time. She appears well-developed and well-nourished.   HENT:   Head: Normocephalic and atraumatic.   Eyes: EOM are normal.   Cardiovascular:  Normal rate.            Pulmonary/Chest: Effort normal.   Abdominal: There is no abdominal tenderness.   Neurological: She is alert and oriented to person, place, and time.   Psychiatric: She has a normal mood and affect. Her behavior is normal. Judgment and thought content normal.     General Musculoskeletal Exam   Gait: antalgic       Right Knee Exam     Tenderness   The patient is tender to palpation of the medial joint line.    Crepitus   The patient has crepitus of the medial joint line.    Range of Motion   The patient has normal right knee ROM.    Left Knee Exam     Inspection   Swelling: absent  Bruising: absent    Range of Motion   The patient has normal left knee ROM.    Right Hip Exam     Tenderness   The patient tender to palpation of the trochanteric bursa.    Tests   Pain w/ forced internal rotation (MERVIN): present  Log Roll: positive  Left Hip Exam   Left hip exam is normal.    Tests   Pain w/ forced external rotation (FADIR): present      Back (L-Spine & T-Spine) / Neck (C-Spine) Exam     Back (L-Spine & T-Spine) Tests   Right Side Tests  Straight leg raise: + at 90 deg            Muscle Strength   Right Lower Extremity   Hip Abduction: 4/5   Hip Adduction: 4/5   Hip Flexion: 5/5   Hip Extensors: 5/5  Quadriceps:  5/5   Hamstrin/5   Ankle Dorsiflexion:  5/5   Anterior tibial:  5/5   Gastrocsoleus:  5/5   EHL:  5/5  Left Lower Extremity   Hip Flexion: 5/5   Hip Extensors: 5/5  Quadriceps:  5/5   Hamstrin/5   Anterior tibial:  5/5   Gastrocsoleus:  5/5   EHL:  5/5    Reflexes     Left Side  Biceps:  2+  Triceps:  2+  Brachioradialis:  2+  Ankle Clonus:  absent    Right Side   Biceps:  2+  Triceps:  2+  Brachioradialis:  2+  Ankle Clonus:  absent        Right groin pain, unable to  palpated for hernia.   Assessment:       Encounter Diagnoses   Name Primary?    Primary osteoarthritis of right knee     Right hip pain     Chronic hip pain, unspecified laterality     Bilateral primary osteoarthritis of hip Yes             Plan:   We discussed with the patient the assessment and recommendations. The following is the plan we agreed on:  - Prior records reviewed and imaging interpreted in clinic. Right hip x-ray showing severe osteoarthritis in right hop joint. Pt planning for surgery in the future, just unsure of when that will be  - Will schedule for right hip join injection and right GTB under fluoro  - Discussed the importance of continued lifestyle modifications and weight loss.   - RTC 2-4 weeks after procedure for f/u.         Rziwana Deleon MD PGY-3  [facility]   08/22/2023  I have personally taken the history and examined this patient and agree with the resident's note as stated above.

## 2023-08-24 NOTE — PROGRESS NOTES
8/24/23  INR was due 8/22, Patient has a 10:00 am appointment at [facility] 8/25/23 and would like lab done then after the 10:00 am appointment

## 2023-08-25 PROBLEM — M16.11 PRIMARY OSTEOARTHRITIS OF RIGHT HIP: Status: ACTIVE | Noted: 2023-01-01

## 2023-08-25 NOTE — PROGRESS NOTES
Subjective:       Patient ID: Elayne Almanzar is a 68 y.o. female.    Chief Complaint: Obesity, Follow-up, and Weight Check    Patient presents for treatment of obesity.   Was scheduled for gastric bypass in 5/2022, but got COVID and never had surgery    Co-morbidities  HTN  HLD  CHF  CAD  F-fib  KEYSHAWN  Glaucoma  H/o kidney stones    Physical Activity  Limited by hip pain    Current Eating Habits  Breakfast - skips  Lunch - skips  Dinner - salads, baked potato, fish, avocado  Snacks - chips, bananas, Babybel cheese  Beverages - soft drinks, water    Medical Weight Loss  3/17/2023: 235.6 lbs, BMI 43.8, BFP 54.3%,  lbs, SMM 57.3 lbs, BMR 1425 kcal  8/25/2023: 213.8 lbs, BMI 39.7, BFP 52.4%,  lbs, SMM 55.3 lbs, BMR 1367 kcal          Review of Systems   Constitutional:  Negative for chills and fever.   Respiratory:  Negative for shortness of breath.    Cardiovascular:  Negative for chest pain and palpitations.   Gastrointestinal:  Negative for abdominal pain, nausea and vomiting.   Musculoskeletal:  Positive for arthralgias.   Neurological:  Negative for dizziness and light-headedness.   Psychiatric/Behavioral:  The patient is not nervous/anxious.          Objective:       Latest Reference Range & Units 02/23/23 12:00   WBC 3.90 - 12.70 K/uL 3.96   RBC 4.00 - 5.40 M/uL 5.32   Hemoglobin 12.0 - 16.0 g/dL 11.3 (L)   Hematocrit 37.0 - 48.5 % 39.9   MCV 82 - 98 fL 75 (L)   MCH 27.0 - 31.0 pg 21.2 (L)   MCHC 32.0 - 36.0 g/dL 28.3 (L)   RDW 11.5 - 14.5 % 16.2 (H)   Platelets 150 - 450 K/uL 162   MPV 9.2 - 12.9 fL 10.9   Gran % 38.0 - 73.0 % 56.3   Lymph % 18.0 - 48.0 % 26.0   Mono % 4.0 - 15.0 % 12.9   Eosinophil % 0.0 - 8.0 % 4.3   Basophil % 0.0 - 1.9 % 0.5   Immature Granulocytes 0.0 - 0.5 % 0.0   Gran # (ANC) 1.8 - 7.7 K/uL 2.2   Lymph # 1.0 - 4.8 K/uL 1.0   Mono # 0.3 - 1.0 K/uL 0.5   Eos # 0.0 - 0.5 K/uL 0.2   Baso # 0.00 - 0.20 K/uL 0.02   Immature Grans (Abs) 0.00 - 0.04 K/uL 0.00   nRBC 0 /100 WBC 0    Differential Method  Automated   Iron 30 - 160 ug/dL 59   TIBC 250 - 450 ug/dL 434   Saturated Iron 20 - 50 % 14 (L)   Transferrin 200 - 375 mg/dL 293   Ferritin 20.0 - 300.0 ng/mL 36   Sodium 136 - 145 mmol/L 143   Potassium 3.5 - 5.1 mmol/L 4.4   Chloride 95 - 110 mmol/L 104   CO2 23 - 29 mmol/L 30 (H)   Anion Gap 8 - 16 mmol/L 9   BUN 8 - 23 mg/dL 16   Creatinine 0.5 - 1.4 mg/dL 1.0   eGFR >60 mL/min/1.73 m^2 >60.0   Glucose 70 - 110 mg/dL 92   Calcium 8.7 - 10.5 mg/dL 9.3   Alkaline Phosphatase 55 - 135 U/L 65   PROTEIN TOTAL 6.0 - 8.4 g/dL 6.7   Albumin 3.5 - 5.2 g/dL 3.5   BILIRUBIN TOTAL 0.1 - 1.0 mg/dL 0.9   AST 10 - 40 U/L 18   ALT 10 - 44 U/L 11   (L): Data is abnormally low  (H): Data is abnormally high    Vitals:    08/25/23 0955   BP: 135/63   Pulse: 75         Physical Exam  Vitals reviewed.   Constitutional:       General: She is not in acute distress.     Appearance: Normal appearance. She is obese. She is not ill-appearing, toxic-appearing or diaphoretic.   HENT:      Head: Normocephalic and atraumatic.   Cardiovascular:      Rate and Rhythm: Normal rate.   Pulmonary:      Effort: Pulmonary effort is normal. No respiratory distress.   Skin:     General: Skin is warm and dry.   Neurological:      Mental Status: She is alert and oriented to person, place, and time.         Assessment:       Problem List Items Addressed This Visit       Essential (primary) hypertension (Chronic)    Hyperlipidemia    KEYSHAWN (obstructive sleep apnea)     Other Visit Diagnoses       Class 2 severe obesity due to excess calories with serious comorbidity and body mass index (BMI) of 39.0 to 39.9 in adult    -  Primary    Encounter for weight loss counseling                  Plan:   - Ozempic 0.5 mg weekly.     - Log all food and beverage intake with a daily calorie goal of 6292-7862 calories per day    - Seated resistance band exercises given in AVS

## 2023-08-29 NOTE — PROGRESS NOTES
Subjective:       Patient ID: Elayne Almanzar is a 68 y.o. female.    Chief Complaint: 3 month f/u  glauoma  OD  low tension (Low tension glaucoma  OD/S/P SLT  OD /AFFERENT PUPIL DEFECT  od /PTOSIS  OU )     HPI     3 month f/u  glauoma  OD  low tension            Comments: Low tension glaucoma  OD  S/P SLT  OD   AFFERENT PUPIL DEFECT  od   PTOSIS  OU           Comments    DLS 05/26/23  ART TEARS OU QID   LATANOPROST OU QHS   DORZOLAMIDE  BID  OD       Pt c/o sometimes both eyes feel fb sensation slight burning before using   art  tears ou           Last edited by Victoria Wright on 8/30/2023  9:33 AM.            Assessment & Plan   Normal tension glaucoma of right eye, severe stage    Afferent pupillary defect of right eye    Glaucoma suspect, left    Floppy eyelid syndrome of both eyes    Subconjunctival hemorrhage of left eye    Allergic conjunctivitis of both eyes    Dry eye syndrome of both lacrimal glands  -     cycloSPORINE (RESTASIS) 0.05 % ophthalmic emulsion; Place 1 drop into both eyes 2 (two) times daily.  Dispense: 60 each; Refill: 12       Referral from Dr. Funes OD for consideration of SLT or step-up Tx      NTG severe OD // mild OS  APD OD  -(-)Fhx, ( )Steroids, (-)Trauma  -Drops:  Latanoprost qHS OU // Dorzolamide BID OD  -Drop intolerance/contraindication: COPD/KEYSHAWN may want to avoid BB, follicular conjunctivitis with brimonidine (mild)  -Laser: SLT OD 4/3/23 IOP --->10  -Surgeries: none  -CCT: 563 // 561   -Gonio: 3+ with very lightly pigmented TM and steep approach N/T OU  IOP max: 21 OU  IOP goal: <12 // low-teens    NTG Risk factors  Vascular risk factors: (+ in youth) migraines, (+)KEYSHAWN, (+ thalassemia trait, Hgh 9 with low Fe++) anemia, (+)transfusions, (+)hypotension, (-)h/o Raynaud's disease    5/23 HVF 2-2 SAD/IAD VFI 60% OD // new IAD VFI 97% OS --> stable OD and +prog OS  3/23 RNFL dec G/T/TS/NS/N // B T OS --> +prog OD    IOP at goal OU today     KIM OU  Failing artifical tears  Use  AT 15 min before latanoprost  Start restasis BID OU        Floppy eyelid syndrome OU  Known KEYSHAWN  Rec re-start CPAP  Use AT celia qHS -encourage    NS OU  NVS, monitor      PLAN  Continue glaucoma gtts and artificial tears  Latan qHS OU  --> Use AT 15 min before latanoprost  Dorzolamide BID OD    Start restasis BID OU  If too expensive - call - can try Xiidra      RTC 4 months IOP check OU and pupil check         Prisca Puckett M.D., M.S.  Department of Ophthalmology   Division of Glaucoma Surgery  [facility]

## 2023-08-30 NOTE — TELEPHONE ENCOUNTER
Patient contacted and would like to stay with her local pharmacy-[facility] notified that patient does not want to switch these two drugs.

## 2023-08-30 NOTE — TELEPHONE ENCOUNTER
----- Message from Lourdes Johnston sent at 8/29/2023  2:52 PM CDT -----  Contact: [facility]/[phone]  1MEDICALADVICE     Patient is calling for Medical Advice regarding:    How long has patient had these symptoms:    Pharmacy name and phone#:         Would like response via [email or web address]:      Comments: pharmacy is calling to verify fax that was sent on 8/26 to transfer pt medications over.   Fax [phone]

## 2023-08-30 NOTE — TELEPHONE ENCOUNTER
----- Message from Bailee Lange MA sent at 8/29/2023  3:14 PM CDT -----  Contact: [facility] [phone]  Did you receive paper work on Warfarin and Lasix? Please call Pt of Dr.Nichole Messina

## 2023-09-11 NOTE — TELEPHONE ENCOUNTER
----- Message from Мария Ramirez sent at 9/11/2023 12:38 PM CDT -----  Regarding: Post ED visit follow up appt within 7 days of d/c date 9/9/23  Good afternoon: Pt was seen in the ED on 9/9/23 for Dyspnea/COPD exacerbation. Pt requires a Post ED visit follow up appt within 7 days of d/c date. Please contact pt to schedule a fu appt by 9/16/23 if possible.    Thank you  Мария Ramirez         History & Physical    Name: Thanh Edwards MRN: [id number]  SSN: [id number]    YOB: 1994  Age: 23 y.o. Sex: female        Subjective:     Estimated Date of Delivery: 20  OB History        1    Para   0    Term   0       0    AB   0    Living   0       SAB   0    TAB   0    Ectopic   0    Molar        Multiple   0    Live Births   0                Ms. Lalo Hackett is admitted with pregnancy at 37w0d for ECV. Prenatal course was complicated by elevated MSAFP, breech. Please see prenatal records for details. Past Medical History:   Diagnosis Date    Acne     HPV vaccine counseling     Pt completed Gardasil series 10/2013     Past Surgical History:   Procedure Laterality Date    HX HEENT      Cyst removed     HX OTHER SURGICAL      wisdoms teeth, cyst above eye removed as child     Social History     Occupational History    Not on file   Tobacco Use    Smoking status: Never Smoker    Smokeless tobacco: Never Used   Substance and Sexual Activity    Alcohol use: No    Drug use: Never    Sexual activity: Yes     Partners: Male     Birth control/protection: None     History reviewed. No pertinent family history. No Known Allergies  Prior to Admission medications    Medication Sig Start Date End Date Taking? Authorizing Provider   ferrous sulfate (Iron) 325 mg (65 mg iron) tablet Take 325 mg by mouth Daily (before breakfast). Yes Provider, Historical   psyllium (METAMUCIL) packet Take 1 Packet by mouth daily. Yes Provider, Historical   prenatal 88/EIWG fum/folic/dha (PRENATAL-1 PO) Take  by mouth. Yes Provider, Historical   promethazine (PHENERGAN) 25 mg tablet Take one po every 8 hours prn nausea 19   Aurea Larsen MD        Review of Systems: A comprehensive review of systems was negative except for that written in the HPI.     Objective:     Vitals:  Vitals:    20 1207 20 1212 20 1216 20 1217   BP:   112/64    Pulse:   75    Resp:   16 Temp:   98.1 °F (36.7 °C)    SpO2: 100% 100%  100%   Weight:       Height:            Physical Exam:  Heart: Regular rate and rhythm  Lung: clear to auscultation throughout lung fields, no wheezes, no rales, no rhonchi and normal respiratory effort  Abdomen: soft, nontender  Fundus: soft and non tender  Membranes:  Intact  Fetal Heart Rate: Reactive    Prenatal Labs:   Lab Results   Component Value Date/Time    Rubella, External Immune 12/23/2019    GrBStrep, External Positive 07/24/2020    HBsAg, External Negative 12/23/2019    HIV, External non-reactive 12/23/2019    Gonorrhea, External Negative 12/23/2019    Chlamydia, External Negative 12/23/2019        Assessment/Plan:     Plan: Admit for external cephalic version. R/B of procedure have been discussed with patient including risk of abruption, emergent delivery today due to fetal distress, risk of baby moving back to breech. Pt desires to proceed.     Signed By:  Freddy Cross MD     July 28, 2020

## 2023-09-14 NOTE — TELEPHONE ENCOUNTER
----- Message from Rogelio Gambino sent at 9/14/2023  1:44 PM CDT -----  Contact: [facility] [phone] CASE MANGEMENT  Patient needs a Hosp follow up appt with their PCP only.       When is the next available appointment:  10/09/23    Symptoms:      Discharge date:9/14/24    Needs to be seen by: 7 DAYS     Would you prefer an answer via Zazengohart?: CALL BACK      Comments:

## 2023-09-16 PROBLEM — J96.02 ACUTE RESPIRATORY FAILURE WITH HYPOXIA AND HYPERCARBIA: Status: ACTIVE | Noted: 2023-01-01

## 2023-09-16 PROBLEM — J96.01 ACUTE RESPIRATORY FAILURE WITH HYPOXIA AND HYPERCARBIA: Status: ACTIVE | Noted: 2023-01-01

## 2023-09-18 PROBLEM — I27.20 PULMONARY HYPERTENSION: Status: ACTIVE | Noted: 2023-01-01

## 2023-09-21 PROBLEM — J20.6 ACUTE BRONCHITIS DUE TO RHINOVIRUS: Status: ACTIVE | Noted: 2023-01-01

## 2023-09-22 NOTE — TELEPHONE ENCOUNTER
Staff reached out to the patient regarding appointment scheduled on 10/18/23 with . Staff reached out to inform the patient that his appointment will need to be rescheduled due to provider being out of office. Staff was unable to leave a message due to voicemail box not being set up.

## 2023-09-24 PROBLEM — I26.99 ACUTE PULMONARY EMBOLISM: Status: ACTIVE | Noted: 2023-01-01

## 2023-09-26 PROBLEM — I82.402 ACUTE DEEP VEIN THROMBOSIS (DVT) OF LEFT LOWER EXTREMITY: Status: ACTIVE | Noted: 2023-01-01

## 2023-09-26 PROBLEM — I82.403 ACUTE DEEP VEIN THROMBOSIS (DVT) OF BOTH LOWER EXTREMITIES: Status: ACTIVE | Noted: 2023-01-01

## 2023-09-26 PROBLEM — I82.409 ACUTE DVT (DEEP VENOUS THROMBOSIS): Status: ACTIVE | Noted: 2023-01-01

## 2023-09-26 PROBLEM — R79.1 SUPRATHERAPEUTIC INR: Status: RESOLVED | Noted: 2017-09-07 | Resolved: 2023-01-01

## 2023-09-26 PROBLEM — I82.401 ACUTE DEEP VEIN THROMBOSIS (DVT) OF RIGHT LOWER EXTREMITY: Status: ACTIVE | Noted: 2023-01-01

## 2023-09-26 PROBLEM — F32.A DEPRESSION: Status: ACTIVE | Noted: 2023-01-01

## 2023-09-26 NOTE — HPI
Ms. Servando Almanzar is a 68 year old female with HFpEF (60-65%), Pulmonary HTN, MVR on coumadin, permanent afib, COPD, HTN, T2DM, and CKD3. He is presenting as transfer from [facility] ICU for evaluation of acute hypoxemic respiratory failure likely secondary to PE vs pulmonary HTN noted on imaging with evidence of right heart strain on TTE.     She initially presented to OSH for SOB and cough. Infectious workup notable for enterovirus/rhinovirus and CT chest 9/18 with multifocal GGO bilaterally. She was trialed on IV abx, corticosteroids, and lasix without improvement with subsequent CTA chest positive acute PE, more specifically a nonocclusive filling defect within the right lower lobe superior segmental pulmonary artery with extension into the inter lobar artery. Lower extremity US also noted DVT to right peroneal vein and the left posterior tibial vein. TTE performed with septal flattening in diastole consistent with RV volume overload. PASP 52mmHg.    Given DVT/PE despite therapeutic INR on warfarin, patient transitioned to IV heparin ggt. She continued to deteriorate requiring continous BIPAP thus patient transferred to [facility] for higher lever of care and interventional cardiology consult for consideration of catheter-directed thrombolysis.

## 2023-09-27 PROBLEM — E11.9 TYPE 2 DIABETES MELLITUS: Status: ACTIVE | Noted: 2023-01-01

## 2023-09-27 NOTE — ASSESSMENT & PLAN NOTE
In AF w/ RVR since admission confirmed no EKG, -150 s/p intubation    -- Rate controlled on 50 metoprolol BID

## 2023-09-27 NOTE — ASSESSMENT & PLAN NOTE
Patient with Hypercapnic and Hypoxic Respiratory failure which is Acute on chronic.  she is not on home oxygen. Supplemental oxygen was provided and noted- Oxygen Concentration (%):  [] 100  Resp Rate Total:  [21 br/min-75 br/min] 42 br/min    .   Signs/symptoms of respiratory failure include- tachypnea, increased work of breathing, respiratory distress and use of accessory muscles. Contributing diagnoses includes - COPD and Pulmonary Embolus Labs and images were reviewed. Patient Has recent ABG, which has been reviewed. Will treat underlying causes and adjust management of respiratory failure as follows-     69yo F with history of HFpEF, pulm htn, MVR on warfarin, AF, COPD, htn, T2DM, and CKD3 presenting as transfer from [facility] ICU for AHRF. Etiology due to PE with evidence of R heart strain on TTE vs pulmonary htn with PASP 52 mmhg vs viral illness (positive enterovirus and rhinovirus).  Pt has been treated with broad spectrun antibiotics and steroids at OSH.   She follows with Dr. Carvalho as outpatient.    --Wean oxygen as tolerated  --ABG/VBG prn   --Continue Zosyn  --Change hydrocortisone to IV  --Scheduled Duo Nebs  --Continue breo, spiriva  --CXR

## 2023-09-27 NOTE — PLAN OF CARE
[facility] - Cardiac Medical ICU  Initial Discharge Assessment       Primary Care Provider: Nubia Crocker MD    Admission Diagnosis: Acute respiratory failure [J96.00]    Admission Date: 9/26/2023  Expected Discharge Date: 10/3/2023    Transition of Care Barriers: None    Payor: [employer]ED [employer]ICARE / Plan: [employer] / Product Type: Medicare Advantage /     Extended Emergency Contact Information  Primary Emergency Contact: Leo Almanzar  Address: [address] [place] [id number] [facility]  Home Phone: [phone]  Mobile Phone: [phone]  Relation: Spouse  Secondary Emergency Contact: Kostas Almanzar  Mobile Phone: [phone]  Relation: Daughter  Preferred language: English   needed? No    Discharge Plan A: Long-term acute care facility (LTAC)  Discharge Plan B: Comfort care/withdrawal      [facility] - [place], [place] - [address]  [address]  [place] [address]  Phone: [phone] Fax: [phone]    [facility] - [place], [place] - [address]  [address] [facility]  [place] [address]  Phone: [phone] Fax: [phone]    [facility] - [place] - [address]  [address] [address]  Phone: [phone] Fax: [phone]    [facility] [place] - [address] [place] [address]  Phone: [phone] Fax: [phone]      Initial Assessment (most recent)       Adult Discharge Assessment - 09/27/23 1249          Discharge Assessment    Assessment Type Discharge Planning Assessment     Confirmed/corrected address, phone number and insurance Yes     Confirmed Demographics Correct on Facesheet     Source of Information family     If unable to respond/provide information was family/caregiver contacted? Yes     Contact Name/Number prosper Bone/cp# [phone]     When was your  last doctors appointment? --   3 months ago    Does patient/caregiver understand observation status Yes     Communicated RUMA with patient/caregiver Date not available/Unable to determine     Reason For Admission Acute Respiratory Failure     People in Home spouse;child(wilton), adult     Facility Arrived From: [facility]     Do you expect to return to your current living situation? Other (see comments)     Do you have help at home or someone to help you manage your care at home? Yes     Who are your caregiver(s) and their phone number(s)? Leo Almanzar, spouse/cp# [phone] and Leo Almanzar Jr., son/cp# [phone]     Prior to hospitilization cognitive status: Alert/Oriented     Current cognitive status: Unable to Assess     Walking or Climbing Stairs ambulation difficulty, requires equipment     Mobility Management Cane     Home Layout Able to live on 1st floor     Equipment Currently Used at Home cane, straight     Readmission within 30 days? No     Patient currently being followed by outpatient case management? No     Do you currently have service(s) that help you manage your care at home? No     Do you take prescription medications? Yes     Do you have prescription coverage? Yes     Coverage [employer] Managed Medicare     Do you have any problems affording any of your prescribed medications? No     Is the patient taking medications as prescribed? yes     Who is going to help you get home at discharge? Family     How do you get to doctors appointments? family or friend will provide     Are you on dialysis? No     Do you take coumadin? Yes     Who monitors your labs? [facility].     DME Needed Upon Discharge  other (see comments)   TBD    Discharge Plan discussed with: Adult children     Transition of Care Barriers None     Discharge Plan A Long-term acute care facility (LTAC)     Discharge Plan B Comfort care/withdrawal        Physical Activity    On average, how many minutes do you engage in  exercise at this level? Patient refused        Financial Resource Strain    How hard is it for you to pay for the very basics like food, housing, medical care, and heating? Patient refused        Housing Stability    In the last 12 months, was there a time when you were not able to pay the mortgage or rent on time? No     In the last 12 months, how many places have you lived? 1     In the last 12 months, was there a time when you did not have a steady place to sleep or slept in a shelter (including now)? No        Transportation Needs    In the past 12 months, has lack of transportation kept you from medical appointments or from getting medications? No     In the past 12 months, has lack of transportation kept you from meetings, work, or from getting things needed for daily living? No        Food Insecurity    Within the past 12 months, you worried that your food would run out before you got the money to buy more. Never true     Within the past 12 months, the food you bought just didn't last and you didn't have money to get more. Never true        Stress    Do you feel stress - tense, restless, nervous, or anxious, or unable to sleep at night because your mind is troubled all the time - these days? To some extent        Social Connections    In a typical week, how many times do you talk on the phone with family, friends, or neighbors? More than three times a week     How often do you get together with friends or relatives? More than three times a week     How often do you attend Judaism or Sikhism services? Never     Do you belong to any clubs or organizations such as Judaism groups, unions, fraternal or athletic groups, or school groups? No     How often do you attend meetings of the clubs or organizations you belong to? Never     Are you , , , , never , or living with a partner?         Alcohol Use    Q1: How often do you have a drink containing alcohol? Never     Q2:  How many drinks containing alcohol do you have on a typical day when you are drinking? Patient does not drink     Q3: How often do you have six or more drinks on one occasion? Never        OTHER    Name(s) of People in Home Leo, , spouse and Jr Leo., son                      Spoke to prosper Bone.  Patient lives with spouse and son. Post hospital stay spouse and adult children will be her support person and help in the home.  Patient has transportation at discharge with a family member.  There have been no hospitalizations within the last 30 days per daughter but is a transfer from [facility]. Verified patient's PCP and preferred Pharmacy.  Daughter states patient is on Coumadin and levels are checked at [facility] coumadin [facility] and is not receiving dialysis.  All questions answered regarding Case Management Discharge Planning,daughter verbalized understanding.  Discharge booklet with SW's contact information given to daughter.    Oskar Crocker LMSW  [facility]  X [phone]

## 2023-09-27 NOTE — EICU
Intervention Initiated From:  Bedside    Denis intervened regarding:  Time-Out    Nurse Notified:  Yes    Doctor Notified:  Yes    Comments: 1121 called into room for intubation with consent, for Dr Holbrook & Dr Moreno, privileges checked  1124 30 Etomidate IVP  1125 100 Rocuronium IVP  1128 7.5 Ett 21 teeth, + color change, + breath sounds  1128 100 mcg Dominic IVP MAP 60  1135 OG tube placed by MD  1152 cuevas placed

## 2023-09-27 NOTE — ASSESSMENT & PLAN NOTE
Patient is identified as having Diastolic (HFpEF) heart failure that is Chronic. CHF is currently uncontrolled due to Rales/crackles on pulmonary exam and Pulmonary edema/pleural effusion on CXR. Latest ECHO performed and demonstrates- Results for orders placed during the hospital encounter of 09/12/23    Echo    Interpretation Summary    Left Ventricle: The left ventricle is normal in size.  Mildly to moderately increased wall thickness. Septal motion is consistent with post-operative status. Septal flattening in diastole consistent with right ventricular volume overload. There is normal systolic function with a visually estimated ejection fraction of 60 - 65%. There is indeterminate diastolic function.    Left Atrium: Left atrium is severely dilated.    Mitral Valve: There is a mechanical valve in the mitral position. The mean pressure gradient across the mitral valve is 5.5 mmHg at a heart rate of 108 bpm. There is trace to mild regurgitation.    Right Ventricle: Right ventricle was not well visualized due to poor acoustic window.  Likely dilated to some extent based on subcostal images.  Systolic function appears normal based on limited images.    Right Atrium: Right atrium is dilated.    Tricuspid Valve: There is trace to mild regurgitation.    The pulmonary artery systolic pressure is approximally 52 mm Hg.    IVC/SVC: Intermediate venous pressure at 8 mmHg.  . Continue Beta Blocker and monitor clinical status closely. Monitor on telemetry. Patient is off CHF pathway.  Monitor strict Is&Os and daily weights.  Place on fluid restriction of 1.5 L. Cardiology has been consulted. Continue to stress to patient importance of self efficacy and  on diet for CHF. Last BNP reviewed- and noted below   Recent Labs   Lab 09/26/23  0507   *   .

## 2023-09-27 NOTE — PLAN OF CARE
MICU DAILY GOALS     Family/Goals of care/Code Status   Code Status: Full Code    24H Vital Sign Range  Temp:  [97.2 °F (36.2 °C)-98.2 °F (36.8 °C)]   Pulse:  []   Resp:  [24-58]   BP: ()/(51-95)   SpO2:  [86 %-99 %]   Arterial Line BP: (100-105)/(54-59)      Shift Events     Patient respiratory status worsened throughout the morning. On continuous bipap at 100% with O2 saturation 85-87%. Intubated and put on 100% FiO2 and PEEP of 12. Patient O2 saturation 90-95%. Fentanyl and propofol started for sedation and requiring low dose levo. Arterial line placed for frequent ABGs.  and daughter at bedside and updated throughout the shift.     AWAKE RASS: Goal -    Actual - RASS (Quiroz Agitation-Sedation Scale): deep sedation    Restraint necessity: Treatment interference   BREATHE SBT: Not attempted    Coordinate A & B, analgesics/sedatives Pain: managed   SAT: Not attempted   Delirium CAM-ICU: Overall CAM-ICU: Positive   Early(intubated/ Progressive (non-intubated) Mobility MOVE Screen (INTUBATED ONLY): Fail    Activity: Activity Management: Rolling - L1   Feeding/Nutrition Diet order: Diet/Nutrition Received: NPO,     Thrombus DVT prophylaxis: VTE Required Core Measure: Pharmacological prophylaxis initiated/maintained   HOB Elevation Head of Bed (HOB) Positioning: HOB at 30-45 degrees   Ulcer Prophylaxis GI: yes   Glucose control managed Glycemic Management: blood glucose monitored   Skin Skin assessed during: Daily Assessment    [x] No Altered Skin Integrity Present    []Prevention Measures Documented      [] Yes- Altered Skin Integrity Present or Discovered   [] LDA Added if Not in Epic (Describe Wound)   [] New Altered Skin Integrity was Present on Admit and Documented in LDA   [] Wound Image Taken    Wound Care Consulted? No    Attending Nurse:  Toan Barbour RN/Staff Member:      Bowel Function no issues    Indwelling Catheter Necessity      Urethral Catheter 09/27/23 1151 16 Fr.-Reason for  Continuing Urinary Catheterization: Critically ill in ICU and requiring hourly monitoring of intake/output          De-escalation Antibiotics Yes       VS and assessment per flow sheet, patient progressing towards goals as tolerated, plan of care reviewed with family, all concerns addressed, will continue to monitor.

## 2023-09-27 NOTE — HOSPITAL COURSE
68F w/ HFpEF (EF 60-65%), COPD, pHTN (PASP 52), MVR on warfarin, persistent afib, COPD, HTN, T2DM, CKD3 presented as transfer from [facility] from [facility], possible contributing factors in addition to her comorbidities include pulmonary HTN, non-obstructing PE (likely 2/2 known DVT) found on imaging with evidence of right heart strain, viral pna (rhino/enterovirus positive). She was placed on IV heparin and had increasing BIPAP requirements overnight, this morning her O2 sats were declining to mid 80's maxed on BIPAP w/ 100% O2, so she was electively intubated before she further deteriorated. She briefly required pressor support post-intubation and was started on diuresis and stress dose steroids. She is now off pressors but remains in AF w/ RVR confirmed on EKG with HR ~130-150, was restarted on half her home dose of metoprolol tartrate at 50 BID with good rate control HR <100.  Treating as ARDS via protocols described in the PROSEVA, dexa-ARDS, and ARMA trials. Pt noted to have anisocoria on exam after supination, CT revealed diffuse SAH, neurocritical care, neurosurgery consulted. Heparin stopped, repeat CTH showed stable SAH. No acute intervention indicated at this time. Transitioned to comfort care. Withdrawing pressor/ventilation support per MPOA / family wishes.

## 2023-09-27 NOTE — PROCEDURES
"Elayne Almanzar is a 68 y.o. female patient.    Temp: 97.7 °F (36.5 °C) (09/27/23 0700)  Pulse: 106 (09/27/23 1713)  Resp: (!) 26 (09/27/23 1713)  BP: 122/89 (09/27/23 1713)  SpO2: 99 % (09/27/23 1713)  Weight: 90.3 kg (199 lb) (09/27/23 1418)  Height: 5' 2" (157.5 cm) (09/27/23 1418)       Arterial Line    Date/Time: 9/27/2023 5:24 PM  Location procedure was performed: [facility] CRITICAL CARE MEDICINE    Performed by: Juvencio Hinkle MD  Authorized by: Juvencio Hinkle MD  Assisting provider: Tariq Amor MD  Consent Done: Yes  Consent: Verbal consent obtained.  Consent given by: spouse  Time out: Immediately prior to procedure a "time out" was called to verify the correct patient, procedure, equipment, support staff and site/side marked as required.  Preparation: Patient was prepped and draped in the usual sterile fashion.  Indications: multiple ABGs  Location: right radial    Anesthesia:  Local Anesthetic: lidocaine 1% without epinephrine    Patient sedated: yes  Vitals: Vital signs were monitored during sedation.  Needle gauge: 20  Seldinger technique: Seldinger technique used  Number of attempts: 2  Complications: No  Estimated blood loss (mL): 0  Specimens: No  Implants: No  Post-procedure: line sutured and dressing applied  Patient tolerance: Patient tolerated the procedure well with no immediate complications          9/27/2023    "

## 2023-09-27 NOTE — ASSESSMENT & PLAN NOTE
BLE Ultrasound: deep vein thrombosis of the right peroneal vein and the left posterior tibial vein.    --Continue heparin gtt per protocol

## 2023-09-27 NOTE — PLAN OF CARE
09/27/23 1457   Readmission   Why were you hospitalized in the last 30 days? Acute Respiratory Failure   Why were you readmitted? Planned readmission   When you left the hospital how did you feel? very sick   When you left the hospital where did you go? Other  (to [facility] for a higher level of care.)   Did patient/caregiver refused recommended DC plan? No   Tell me about what happened between when you left the hospital and the day you returned. n/a   When did you start not feeling well? Patient started fell bad a few day before admission to [facility].   Did you try to manage your symptoms your self? No   Did you try to see or did see a doctor or nurse before you came?   (n/a)   Did you have  a follow-up appointment on discharge?   (patient is a transfer)   Was this a planned readmission? Yes       Oskar Crocker LMSW  [facility]  [phone]

## 2023-09-27 NOTE — SUBJECTIVE & OBJECTIVE
Past Medical History:   Diagnosis Date    Acute DVT (deep venous thrombosis) 2023    Acute on chronic diastolic congestive heart failure     Acute pulmonary embolism 2023    Allergy     Anemia     Anticoagulant long-term use     Anxiety     Asthma     Atrial fibrillation     Bilateral primary osteoarthritis of hip 3/14/2023    Cataract     Chronic kidney disease     COPD (chronic obstructive pulmonary disease)     Coronary artery calcification seen on CT scan 3/22/2022    Coronary artery calcification seen on CT scan 3/22/2022    Depression     Encounter for blood transfusion     HTN (hypertension)     Hyperlipidemia     Nephrolithiasis     KEYSHAWN (obstructive sleep apnea)     awaiting CPAP machine     Rheumatic disease of mitral valve     Uncontrolled type 2 diabetes mellitus with complication, with long-term current use of insulin 2020    Urinary incontinence        Past Surgical History:   Procedure Laterality Date    BREAST BIOPSY Left 10/2019    CARDIAC VALVE SURGERY  2004    mechanical mitral valve     SECTION      COLONOSCOPY N/A 2019    Procedure: COLONOSCOPY;  Surgeon: Devon Bowling MD;  Location: [facility] ENDO (4TH FLR);  Service: Endoscopy;  Laterality: N/A;  ok to hold Coumadin x 5 days with Lovenox bridge per Coumadin clinic-[facility]    ESOPHAGOGASTRODUODENOSCOPY N/A 2019    Procedure: EGD (ESOPHAGOGASTRODUODENOSCOPY);  Surgeon: Smooth Torrez MD;  Location: [facility] (2ND FLR);  Service: Endoscopy;  Laterality: N/A;  2nd floor requested due to comorbidities, Hypertension, permanent A. 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 44     per Coumadin clinic-ok to hold for 5 days w/bridge    INJECTION N/A 2023    Procedure: INJECTION, BILATERAL GTB AND RIGHT HIP INTRA-ARTICULAR CONTRAST;  Surgeon: Beth Alfaro MD;  Location: [facility] PAIN MGT;  Service: Pain Management;  Laterality: N/A;    kidney stone  removal      MITRAL VALVE REPLACEMENT  2004    TUBAL LIGATION         Review of patient's allergies indicates:   Allergen Reactions    Brimonidine        Family History       Problem Relation (Age of Onset)    Asthma Daughter, Son    Breast cancer Paternal Aunt    Cancer Brother    Colon cancer Maternal Grandmother, Mother (83)    Diabetes Sister, Sister, Sister, Father    Heart disease Father (70)    Hypertension Sister    Stroke Paternal Grandmother          Tobacco Use    Smoking status: Former     Current packs/day: 0.00     Average packs/day: 0.5 packs/day for 20.0 years (10.0 ttl pk-yrs)     Types: Cigarettes     Start date: 1982     Quit date: 2002     Years since quittin.4    Smokeless tobacco: Never   Substance and Sexual Activity    Alcohol use: No    Drug use: No    Sexual activity: Not on file      Review of Systems   Unable to perform ROS: Acuity of condition   Constitutional:  Positive for fatigue. Negative for chills and fever.   Respiratory:  Positive for cough, chest tightness and shortness of breath.    Cardiovascular:  Negative for chest pain and leg swelling.   Gastrointestinal:  Negative for abdominal pain and nausea.     Objective:     Vital Signs (Most Recent):  Temp: 98.1 °F (36.7 °C) (23)  Pulse: 105 (23)  Resp: (!) 56 (23)  BP: 137/83 (23)  SpO2: (!) 87 % (23) Vital Signs (24h Range):  Temp:  [97.8 °F (36.6 °C)-98.9 °F (37.2 °C)] 98.1 °F (36.7 °C)  Pulse:  [] 105  Resp:  [20-56] 56  SpO2:  [81 %-95 %] 87 %  BP: (108-146)/() 137/83   Weight: 90.7 kg (199 lb 15.3 oz)  Body mass index is 36.57 kg/m².    No intake or output data in the 24 hours ending 23       Physical Exam  Vitals and nursing note reviewed.   Constitutional:       Appearance: She is not toxic-appearing or diaphoretic.   HENT:      Head: Normocephalic and atraumatic.      Nose: Nose normal.      Mouth/Throat:      Mouth: Mucous  membranes are moist.   Eyes:      Extraocular Movements: Extraocular movements intact.      Pupils: Pupils are equal, round, and reactive to light.   Cardiovascular:      Rate and Rhythm: Regular rhythm. Tachycardia present.      Pulses: Normal pulses.   Pulmonary:      Effort: Respiratory distress present.      Breath sounds: Wheezing present.      Comments: Tachypnea. Increased work of breathing on BIPAP FiO2 100% settings 18/10 satting 97%   Abdominal:      General: There is no distension.      Palpations: Abdomen is soft.      Tenderness: There is no abdominal tenderness.   Musculoskeletal:         General: No swelling or tenderness.      Cervical back: Normal range of motion and neck supple.   Skin:     General: Skin is warm and dry.      Capillary Refill: Capillary refill takes less than 2 seconds.   Neurological:      Mental Status: She is alert.      Comments: Moving all extremities symmetrically    Psychiatric:         Mood and Affect: Mood normal.         Behavior: Behavior normal.            Vents:  Oxygen Concentration (%): 100 (09/26/23 2155)  Lines/Drains/Airways       Drain  Duration             Female External Urinary Catheter 09/26/23 1125 <1 day              Peripheral Intravenous Line  Duration                  Peripheral IV - Single Lumen 09/23/23 1753 20 G Right Antecubital 3 days                  Significant Labs:    CBC/Anemia Profile:  Recent Labs   Lab 09/25/23  0427 09/26/23  0507 09/26/23  0632 09/26/23  1318 09/26/23  1959   WBC 8.82 16.12*  --  15.32*  --    HGB 10.5* 11.4*  --  10.8*  --    HCT 36.6* 39.2 37 37.2 35*    207  --  190  --    MCV 76* 75*  --  76*  --    RDW 15.3* 15.4*  --  15.4*  --         Chemistries:  Recent Labs   Lab 09/25/23  0427 09/26/23  0507    141   K 3.4* 4.7   CL 93* 95   CO2 35* 31*   BUN 15 19   CREATININE 0.8 0.8   CALCIUM 8.9 9.5   ALBUMIN 3.2* 3.6   PROT 6.5 7.2   BILITOT 1.0 1.2*   ALKPHOS 102 126   ALT 11 8*   AST 20 24       All pertinent  labs within the past 24 hours have been reviewed.    Significant Imaging: I have reviewed all pertinent imaging results/findings within the past 24 hours.

## 2023-09-27 NOTE — PROCEDURES
"Elayne Almanzar is a 68 y.o. female patient.    Temp: 97.7 °F (36.5 °C) (23 0700)  Pulse: 100 (23 1418)  Resp: (!) 27 (23 1400)  BP: 122/83 (23 1400)  SpO2: (!) 93 % (23 1400)  Weight: 90.3 kg (199 lb) (23 1418)  Height: 5' 2" (157.5 cm) (23 1418)       Intubation    Date/Time: 2023 2:25 PM  Location procedure was performed: [facility] CRITICAL CARE MEDICINE    Performed by: Tariq Amor MD  Authorized by: Tariq Amor MD  Assisting provider: Samreen Moreno  Pre-operative diagnosis: Acute Hypoxic Hypercapnic Respiratory Failure  Post-operative diagnosis: same  Consent Done: Yes  Consent: Verbal consent obtained.  Risks and benefits: risks, benefits and alternatives were discussed  Consent given by: patient  Patient understanding: patient states understanding of the procedure being performed  Patient identity confirmed: , MRN and name  Time out: Immediately prior to procedure a "time out" was called to verify the correct patient, procedure, equipment, support staff and site/side marked as required.  Indications: respiratory distress, respiratory failure, hypercapnia and hypoxemia  Description of findings: rapid desaturation once Bipap mask removed.   Intubation method: video-assisted  Patient status: paralyzed (RSI)  Preoxygenation: mask (Bipap mask)  Sedatives: etomidate  Paralytic: rocuronium  Laryngoscope size: Glide 3  Tube size: 7.5 mm  Tube type: cuffed  Number of attempts: 1  Cricoid pressure: no  Cords visualized: yes  Post-procedure assessment: chest rise, ETCO2 monitor and CO2 detector  Breath sounds: rales/crackles and equal and absent over the epigastrium  Cuff inflated: yes  ETT to teeth: 21 cm  Tube secured with: ETT rodriguez  Chest x-ray interpreted by radiologist.  Chest x-ray findings: endotracheal tube in appropriate position  Patient tolerance: Patient tolerated the procedure well with no immediate complications  Technical " procedures used: RSI  Significant surgical tasks conducted by the assistant(s): Supervised  Complications: No  Estimated blood loss (mL): 0  Specimens: No  Implants: No  Comments: Rapid desaturation upon removing BIPAP mask and unable to pre-oxygenate patient back to 100%. Successful intubation on first attempt with easy visualization of vocal chords.         Tariq Holbrook MD  [employer] Pulmonary Critical Care Medicine Fellow    9/27/2023

## 2023-09-27 NOTE — ASSESSMENT & PLAN NOTE
-Last A1c reviewed-   Lab Results   Component Value Date    HGBA1C 5.2 06/30/2023     Home regimen: ozempic  Hold oral antihyperglycemics while inpatient  SSI

## 2023-09-27 NOTE — ASSESSMENT & PLAN NOTE
Home regimen: amlodipine 2.5mg and metoprolol  - Continue metoprolol for rate control  - hold amlodipine in setting of acute illness, low threshold to restart

## 2023-09-27 NOTE — SUBJECTIVE & OBJECTIVE
Interval History/Significant Events: NAEON. Pt rate controlled on half home dose of metoprolol, plan on proning today      Objective:     Vital Signs (Most Recent):  Temp: 97.7 °F (36.5 °C) (09/27/23 0700)  Pulse: 100 (09/27/23 1418)  Resp: (!) 27 (09/27/23 1400)  BP: 122/83 (09/27/23 1400)  SpO2: (!) 93 % (09/27/23 1400) Vital Signs (24h Range):  Temp:  [97.7 °F (36.5 °C)-98.3 °F (36.8 °C)] 97.7 °F (36.5 °C)  Pulse:  [] 100  Resp:  [24-58] 27  SpO2:  [86 %-99 %] 93 %  BP: (100-144)/(51-95) 122/83   Weight: 90.3 kg (199 lb)  Body mass index is 36.4 kg/m².      Intake/Output Summary (Last 24 hours) at 9/27/2023 1442  Last data filed at 9/27/2023 1000  Gross per 24 hour   Intake 492.61 ml   Output 900 ml   Net -407.39 ml          Physical Exam  Vitals and nursing note reviewed.   Constitutional:       General: She is in acute distress.      Appearance: She is not toxic-appearing or diaphoretic.   HENT:      Head: Normocephalic and atraumatic.      Right Ear: External ear normal.      Left Ear: External ear normal.      Nose: Nose normal.      Mouth/Throat:      Mouth: Mucous membranes are moist.   Eyes:      Extraocular Movements: Extraocular movements intact.      Pupils: Pupils are equal, round, and reactive to light.   Cardiovascular:      Rate and Rhythm: Tachycardia present. Rhythm irregular.      Pulses: Normal pulses.   Pulmonary:      Effort: Respiratory distress present.      Breath sounds: Wheezing present.      Comments: Tachypnea. Increased work of breathing on BIPAP FiO2 100% settings 18/8 satting 84-86%  Abdominal:      General: There is no distension.      Palpations: Abdomen is soft.      Tenderness: There is no abdominal tenderness.   Musculoskeletal:         General: No swelling or tenderness.      Cervical back: Normal range of motion and neck supple.   Skin:     General: Skin is warm and dry.      Capillary Refill: Capillary refill takes less than 2 seconds.   Neurological:      General: No  focal deficit present.      Mental Status: She is alert and oriented to person, place, and time.      Comments: Moving all extremities symmetrically    Psychiatric:         Mood and Affect: Mood normal.         Behavior: Behavior normal.            Vents:  Vent Mode: A/C (09/27/23 1232)  Ventilator Initiated: Yes (09/27/23 1158)  Set Rate: 24 BPM (09/27/23 1232)  Vt Set: 360 mL (09/27/23 1232)  PEEP/CPAP: 12 cmH20 (09/27/23 1232)  Oxygen Concentration (%): 100 (09/27/23 1400)  Peak Airway Pressure: 49 cmH20 (09/27/23 1232)  Plateau Pressure: 44 cmH20 (09/27/23 1232)  Total Ve: 8.94 L/m (09/27/23 1232)  Negative Inspiratory Force (cm H2O): 0 (09/27/23 1232)  F/VT Ratio<105 (RSBI): (!) 91.4 (09/27/23 1232)  Lines/Drains/Airways       Drain  Duration             Female External Urinary Catheter 09/26/23 1125 1 day         NG/OG Tube 09/27/23 1141 orogastric Center mouth <1 day         Urethral Catheter 09/27/23 1151 16 Fr. <1 day              Airway  Duration                  Airway - Non-Surgical 09/27/23 1139 Endotracheal Tube <1 day              Peripheral Intravenous Line  Duration                  Midline Catheter Insertion/Assessment  - Double Lumen Left basilic vein (medial side of arm) -- days         Peripheral IV - Single Lumen 09/23/23 1753 20 G Right Antecubital 3 days                  Significant Labs:    CBC/Anemia Profile:  Recent Labs   Lab 09/26/23  1318 09/26/23  1959 09/26/23  2243 09/27/23  0336 09/27/23  0823 09/27/23  1232   WBC 15.32*  --  16.08*  16.08* 16.75*  --   --    HGB 10.8*  --  10.4*  10.4* 10.4*  --   --    HCT 37.2   < > 34.2*  34.2* 34.9* 36 34*     --  181  181 195  --   --    MCV 76*  --  71*  71* 72*  --   --    RDW 15.4*  --  15.7*  15.7* 15.6*  --   --     < > = values in this interval not displayed.        Chemistries:  Recent Labs   Lab 09/26/23  0507 09/26/23  2243 09/27/23  0336    142 142   K 4.7 3.9 3.8   CL 95 94* 91*   CO2 31* 35* 36*   BUN 19 19 18    CREATININE 0.8 0.9 0.9   CALCIUM 9.5 9.1 9.3   ALBUMIN 3.6 3.4* 3.5   PROT 7.2 6.8 7.1   BILITOT 1.2* 1.5* 1.4*   ALKPHOS 126 132 135   ALT 8* 9* 9*   AST 24 23 24   MG  --  1.6 2.7*   PHOS  --   --  3.0       All pertinent labs within the past 24 hours have been reviewed.    Significant Imaging:  I have reviewed all pertinent imaging results/findings within the past 24 hours.

## 2023-09-27 NOTE — ASSESSMENT & PLAN NOTE
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 On coumadin. Follows with Dr. Messina outpatient.     --Hold Coumadin in setting of acute illness  --Continue heparin gtt  --Repeat ECHO

## 2023-09-27 NOTE — NURSING
MICU DAILY GOALS     Family/Goals of care/Code Status   Code Status: Full Code    24H Vital Sign Range  Temp:  [98 °F (36.7 °C)-98.9 °F (37.2 °C)]   Pulse:  []   Resp:  [25-56]   BP: (108-146)/()   SpO2:  [81 %-99 %]      Shift Events   No acute events throughout shift    AWAKE RASS: Goal -    Actual -      Restraint necessity: Not necessary   BREATHE SBT: Not intubated    Coordinate A & B, analgesics/sedatives Pain: managed   SAT: Not intubated   Delirium CAM-ICU:     Early(intubated/ Progressive (non-intubated) Mobility MOVE Screen (INTUBATED ONLY): Not intubated    Activity: Activity Management: Arm raise - L1, Rolling - L1   Feeding/Nutrition Diet order: Diet/Nutrition Received: NPO,     Thrombus DVT prophylaxis:     HOB Elevation Head of Bed (HOB) Positioning: HOB elevated   Ulcer Prophylaxis GI: no   Glucose control managed     Skin Skin assessed during: Daily Assessment    [] No Altered Skin Integrity Present    []Prevention Measures Documented      [] Yes- Altered Skin Integrity Present or Discovered   [] LDA Added if Not in Epic (Describe Wound)   [] New Altered Skin Integrity was Present on Admit and Documented in LDA   [] Wound Image Taken    Wound Care Consulted? Yes    Attending Nurse:  Charlotte Barbour RN/Staff Member:      Bowel Function no issues    Indwelling Catheter Necessity         purewick   De-escalation Antibiotics Yes       VS and assessment per flow sheet, patient progressing towards goals as tolerated, plan of care reviewed with family, all concerns addressed, will continue to monitor.

## 2023-09-27 NOTE — ASSESSMENT & PLAN NOTE
CTA 09/23: a nonocclusive filling defect within the right lower lobe superior segmental pulmonary artery with extension into the inter lobar artery, multifocal pneumonia or pulmonary edema progressed from prior    --Continue heparin gtt per protocol  --Repeat ECHO  Pending clinical course, would benefit from continued systemic anticoagulation vs interventional cardiology consultation for thrombolysis vs thrombectomy

## 2023-09-27 NOTE — H&P
[facility] - Cardiac Medical [facility]  Critical Care Medicine  History & Physical    Patient Name: Elayne Alamnzar  MRN: [id number]  Admission Date: 9/26/2023  Hospital Length of Stay: 1 days  Code Status: Full Code  Attending Physician: Lio Snell MD   Primary Care Provider: Nubia Crocker MD   Principal Problem: Acute respiratory failure with hypoxia and hypercarbia    Subjective:     HPI:  Ms. Servando Almanzar is a 68 year old female with HFpEF (60-65%), Pulmonary HTN, MVR on coumadin, permanent afib, COPD, HTN, T2DM, and CKD3. He is presenting as transfer from [facility] ICU for evaluation of acute hypoxemic respiratory failure likely secondary to PE vs pulmonary HTN noted on imaging with evidence of right heart strain on TTE.     She initially presented to OSH for SOB and cough. Infectious workup notable for enterovirus/rhinovirus and CT chest 9/18 with multifocal GGO bilaterally. She was trialed on IV abx, corticosteroids, and lasix without improvement with subsequent CTA chest positive acute PE, more specifically a nonocclusive filling defect within the right lower lobe superior segmental pulmonary artery with extension into the inter lobar artery. Lower extremity US also noted DVT to right peroneal vein and the left posterior tibial vein. TTE performed with septal flattening in diastole consistent with RV volume overload. PASP 52mmHg.    Given DVT/PE despite therapeutic INR on warfarin, patient transitioned to IV heparin ggt. She continued to deteriorate requiring continous BIPAP thus patient transferred to [facility]U for higher lever of care and interventional cardiology consult for consideration of catheter-directed thrombolysis.       Hospital/ICU Course:  No notes on file     Past Medical History:   Diagnosis Date    Acute DVT (deep venous thrombosis) 9/26/2023    Acute on chronic diastolic congestive heart failure     Acute pulmonary embolism 9/24/2023    Allergy     Anemia     Anticoagulant long-term use      Anxiety     Asthma     Atrial fibrillation 2002    Bilateral primary osteoarthritis of hip 3/14/2023    Cataract     Chronic kidney disease     COPD (chronic obstructive pulmonary disease)     Coronary artery calcification seen on CT scan 3/22/2022    Coronary artery calcification seen on CT scan 3/22/2022    Depression     Encounter for blood transfusion     HTN (hypertension)     Hyperlipidemia     Nephrolithiasis     KEYSHAWN (obstructive sleep apnea)     awaiting CPAP machine     Rheumatic disease of mitral valve     Uncontrolled type 2 diabetes mellitus with complication, with long-term current use of insulin 2020    Urinary incontinence        Past Surgical History:   Procedure Laterality Date    BREAST BIOPSY Left 10/2019    CARDIAC VALVE SURGERY  2004    mechanical mitral valve     SECTION      COLONOSCOPY N/A 2019    Procedure: COLONOSCOPY;  Surgeon: Devon Bowling MD;  Location: [facility] ENDO (4TH FLR);  Service: Endoscopy;  Laterality: N/A;  ok to hold Coumadin x 5 days with Lovenox bridge per Coumadin clinic-[facility]    ESOPHAGOGASTRODUODENOSCOPY N/A 2019    Procedure: EGD (ESOPHAGOGASTRODUODENOSCOPY);  Surgeon: Smooth Torrez MD;  Location: [facility] (2ND FLR);  Service: Endoscopy;  Laterality: N/A;  2nd floor requested due to comorbidities, Hypertension, permanent A. 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 44     per Coumadin clinic-ok to hold for 5 days w/bridge    INJECTION N/A 2023    Procedure: INJECTION, BILATERAL GTB AND RIGHT HIP INTRA-ARTICULAR CONTRAST;  Surgeon: Beth Alfaro MD;  Location: [facility] PAIN MGT;  Service: Pain Management;  Laterality: N/A;    kidney stone removal      MITRAL VALVE REPLACEMENT  2004    TUBAL LIGATION         Review of patient's allergies indicates:   Allergen Reactions    Brimonidine        Family History       Problem Relation (Age of Onset)    Asthma Daughter, Son     Breast cancer Paternal Aunt    Cancer Brother    Colon cancer Maternal Grandmother, Mother (83)    Diabetes Sister, Sister, Sister, Father    Heart disease Father (70)    Hypertension Sister    Stroke Paternal Grandmother          Tobacco Use    Smoking status: Former     Current packs/day: 0.00     Average packs/day: 0.5 packs/day for 20.0 years (10.0 ttl pk-yrs)     Types: Cigarettes     Start date: 1982     Quit date: 2002     Years since quittin.4    Smokeless tobacco: Never   Substance and Sexual Activity    Alcohol use: No    Drug use: No    Sexual activity: Not on file      Review of Systems   Unable to perform ROS: Acuity of condition   Constitutional:  Positive for fatigue. Negative for chills and fever.   Respiratory:  Positive for cough, chest tightness and shortness of breath.    Cardiovascular:  Negative for chest pain and leg swelling.   Gastrointestinal:  Negative for abdominal pain and nausea.     Objective:     Vital Signs (Most Recent):  Temp: 98.1 °F (36.7 °C) (23)  Pulse: 105 (23)  Resp: (!) 56 (23)  BP: 137/83 (23)  SpO2: (!) 87 % (23) Vital Signs (24h Range):  Temp:  [97.8 °F (36.6 °C)-98.9 °F (37.2 °C)] 98.1 °F (36.7 °C)  Pulse:  [] 105  Resp:  [20-56] 56  SpO2:  [81 %-95 %] 87 %  BP: (108-146)/() 137/83   Weight: 90.7 kg (199 lb 15.3 oz)  Body mass index is 36.57 kg/m².    No intake or output data in the 24 hours ending 23       Physical Exam  Vitals and nursing note reviewed.   Constitutional:       Appearance: She is not toxic-appearing or diaphoretic.   HENT:      Head: Normocephalic and atraumatic.      Nose: Nose normal.      Mouth/Throat:      Mouth: Mucous membranes are moist.   Eyes:      Extraocular Movements: Extraocular movements intact.      Pupils: Pupils are equal, round, and reactive to light.   Cardiovascular:      Rate and Rhythm: Regular rhythm. Tachycardia present.      Pulses: Normal  pulses.   Pulmonary:      Effort: Respiratory distress present.      Breath sounds: Wheezing present.      Comments: Tachypnea. Increased work of breathing on BIPAP FiO2 100% settings 18/10 satting 97%   Abdominal:      General: There is no distension.      Palpations: Abdomen is soft.      Tenderness: There is no abdominal tenderness.   Musculoskeletal:         General: No swelling or tenderness.      Cervical back: Normal range of motion and neck supple.   Skin:     General: Skin is warm and dry.      Capillary Refill: Capillary refill takes less than 2 seconds.   Neurological:      Mental Status: She is alert.      Comments: Moving all extremities symmetrically    Psychiatric:         Mood and Affect: Mood normal.         Behavior: Behavior normal.            Vents:  Oxygen Concentration (%): 100 (09/26/23 2155)  Lines/Drains/Airways       Drain  Duration             Female External Urinary Catheter 09/26/23 1125 <1 day              Peripheral Intravenous Line  Duration                  Peripheral IV - Single Lumen 09/23/23 1753 20 G Right Antecubital 3 days                  Significant Labs:    CBC/Anemia Profile:  Recent Labs   Lab 09/25/23  0427 09/26/23  0507 09/26/23  0632 09/26/23  1318 09/26/23  1959   WBC 8.82 16.12*  --  15.32*  --    HGB 10.5* 11.4*  --  10.8*  --    HCT 36.6* 39.2 37 37.2 35*    207  --  190  --    MCV 76* 75*  --  76*  --    RDW 15.3* 15.4*  --  15.4*  --         Chemistries:  Recent Labs   Lab 09/25/23  0427 09/26/23  0507    141   K 3.4* 4.7   CL 93* 95   CO2 35* 31*   BUN 15 19   CREATININE 0.8 0.8   CALCIUM 8.9 9.5   ALBUMIN 3.2* 3.6   PROT 6.5 7.2   BILITOT 1.0 1.2*   ALKPHOS 102 126   ALT 11 8*   AST 20 24       All pertinent labs within the past 24 hours have been reviewed.    Significant Imaging: I have reviewed all pertinent imaging results/findings within the past 24 hours.  Assessment/Plan:     Pulmonary  * Acute respiratory failure with hypoxia and  hypercarbia  Patient with Hypercapnic and Hypoxic Respiratory failure which is Acute on chronic.  she is not on home oxygen. Supplemental oxygen was provided and noted- Oxygen Concentration (%):  [] 100  Resp Rate Total:  [21 br/min-75 br/min] 42 br/min    .   Signs/symptoms of respiratory failure include- tachypnea, increased work of breathing, respiratory distress and use of accessory muscles. Contributing diagnoses includes - COPD and Pulmonary Embolus Labs and images were reviewed. Patient Has recent ABG, which has been reviewed. Will treat underlying causes and adjust management of respiratory failure as follows-     69yo F with history of HFpEF, pulm htn, MVR on warfarin, AF, COPD, htn, T2DM, and CKD3 presenting as transfer from [facility] ICU for AHRF. Etiology due to PE with evidence of R heart strain on TTE vs pulmonary htn with PASP 52 mmhg vs viral illness (positive enterovirus and rhinovirus).  Pt has been treated with broad spectrun antibiotics and steroids at OSH.   She follows with Dr. Carvalho as outpatient.    --Wean oxygen as tolerated  --ABG/VBG prn   --Continue Zosyn  --Change hydrocortisone to IV  --Scheduled Duo Nebs  --Continue breo, spiriva  --CXR      Acute bronchitis due to Rhinovirus  --See AHRF    COPD (chronic obstructive pulmonary disease)  --See respiratory failure    Cardiac/Vascular  Pulmonary hypertension  PASP on TTE 52mmHg. Noted to be 54mmHg on TTE in 2022.   Likely contributing to resp failure.     Chronic diastolic congestive heart failure  Patient is identified as having Diastolic (HFpEF) heart failure that is Chronic. CHF is currently uncontrolled due to Rales/crackles on pulmonary exam and Pulmonary edema/pleural effusion on CXR. Latest ECHO performed and demonstrates- Results for orders placed during the hospital encounter of 09/12/23    Echo    Interpretation Summary    Left Ventricle: The left ventricle is normal in size.  Mildly to moderately increased wall thickness. Septal  motion is consistent with post-operative status. Septal flattening in diastole consistent with right ventricular volume overload. There is normal systolic function with a visually estimated ejection fraction of 60 - 65%. There is indeterminate diastolic function.    Left Atrium: Left atrium is severely dilated.    Mitral Valve: There is a mechanical valve in the mitral position. The mean pressure gradient across the mitral valve is 5.5 mmHg at a heart rate of 108 bpm. There is trace to mild regurgitation.    Right Ventricle: Right ventricle was not well visualized due to poor acoustic window.  Likely dilated to some extent based on subcostal images.  Systolic function appears normal based on limited images.    Right Atrium: Right atrium is dilated.    Tricuspid Valve: There is trace to mild regurgitation.    The pulmonary artery systolic pressure is approximally 52 mm Hg.    IVC/SVC: Intermediate venous pressure at 8 mmHg.  . Continue Beta Blocker and monitor clinical status closely. Monitor on telemetry. Patient is off CHF pathway.  Monitor strict Is&Os and daily weights.  Place on fluid restriction of 1.5 L. Cardiology has been consulted. Continue to stress to patient importance of self efficacy and  on diet for CHF. Last BNP reviewed- and noted below   Recent Labs   Lab 09/26/23  0507   *   .    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 On coumadin. Follows with Dr. Messina outpatient.     --Hold Coumadin in setting of acute illness  --Continue heparin gtt  --Repeat ECHO    Essential (primary) hypertension  Home regimen: amlodipine 2.5mg and metoprolol  - Continue metoprolol for rate control  - hold amlodipine in setting of acute illness, low threshold to restart    Chronic atrial fibrillation  --Continue home metoprolol 100 mg BID    Hematology  Acute deep vein thrombosis (DVT) of both lower extremities  BLE Ultrasound:  deep vein thrombosis of the right peroneal vein and the left posterior tibial vein.    --Continue heparin gtt per protocol       Acute pulmonary embolism  CTA 09/23: a nonocclusive filling defect within the right lower lobe superior segmental pulmonary artery with extension into the inter lobar artery, multifocal pneumonia or pulmonary edema progressed from prior    --Continue heparin gtt per protocol  --Repeat ECHO  Pending clinical course, would benefit from continued systemic anticoagulation vs interventional cardiology consultation for thrombolysis vs thrombectomy       Endocrine  Type 2 diabetes mellitus  -Last A1c reviewed-   Lab Results   Component Value Date    HGBA1C 5.2 06/30/2023     Home regimen: ozempic  Hold oral antihyperglycemics while inpatient  SSI        Critical Care Daily Checklist:    A: Awake: RASS Goal/Actual Goal:    Actual:     B: Spontaneous Breathing Trial Performed?     C: SAT & SBT Coordinated?  n/a                      D: Delirium: CAM-ICU     E: Early Mobility Performed? No   F: Feeding Goal:    Status:     Current Diet Order   Procedures    Diet NPO      AS: Analgesia/Sedation n/a   T: Thromboembolic Prophylaxis Heparin gtt   H: HOB > 300 Yes   U: Stress Ulcer Prophylaxis (if needed) protonix   G: Glucose Control SSI   B: Bowel Function Stool Occurrence: (P) 0   I: Indwelling Catheter (Lines & Barnett) Necessity yes   D: De-escalation of Antimicrobials/Pharmacotherapies Broad spectrum    Plan for the day/ETD admission    Code Status:  Family/Goals of Care: Full Code         Critical secondary to Patient has a condition that poses threat to life and bodily function: Severe Respiratory Distress     Critical care was time spent personally by me on the following activities: development of treatment plan with patient or surrogate and bedside caregivers, discussions with consultants, evaluation of patient's response to treatment, examination of patient, ordering and performing treatments and  interventions, ordering and review of laboratory studies, ordering and review of radiographic studies, pulse oximetry, re-evaluation of patient's condition. This critical care time did not overlap with that of any other provider or involve time for any procedures.     Zamzam Enriquez DO  Critical Care Medicine  [facility] - Cardiac Medical ICU

## 2023-09-27 NOTE — NURSING
Arterial line placement done at bedside by MD Yusef and MD Manan. Timeout done and documented in the chart. Right radial ruthy placed. Will continue to monitor.

## 2023-09-28 NOTE — ASSESSMENT & PLAN NOTE
Patient with Hypercapnic and Hypoxic Respiratory failure which is Acute on chronic.  she is not on home oxygen. Supplemental oxygen was provided and noted- Vent Mode: A/C  Oxygen Concentration (%):  [] 90  Resp Rate Total:  [20 br/min-35 br/min] 30 br/min  Vt Set:  [250 mL-360 mL] 250 mL  PEEP/CPAP:  [8 hqR75-99 cmH20] 12 cmH20  Mean Airway Pressure:  [14 ilQ33-84 cmH20] 19 cmH20    .   Signs/symptoms of respiratory failure include- tachypnea, increased work of breathing, respiratory distress and use of accessory muscles. Contributing diagnoses includes - COPD and Pulmonary Embolus Labs and images were reviewed. Patient Has recent ABG, which has been reviewed. Will treat underlying causes and adjust management of respiratory failure as follows-     69yo F with history of HFpEF, pulm htn, MVR on warfarin, AF, COPD, htn, T2DM, and CKD3 presenting as transfer from [facility] ICU for AHRF. Etiology due to PE with evidence of R heart strain on TTE vs pulmonary htn with PASP 52 mmhg vs viral illness (positive enterovirus and rhinovirus).  Pt has been treated with broad spectrun antibiotics and steroids at OSH.   She follows with Dr. Carvalho as outpatient.    --Wean oxygen as tolerated  --Serial ABG's  --Continue Zosyn  --Solumedrol stopped, started decadron 20mg QD for 5d followed by 10mg QD for 5d  --Scheduled Duo Nebs  --Continue brebrittany spiriva  --CXR  -- proning today 9/28

## 2023-09-28 NOTE — PLAN OF CARE
Recommendations     If/when able, initiate TFs.   - With current Propofol rate, rec'd Peptamen Intense VHP @ 35 mL/hr to provide 1558 kcals (718 from Propofol), 77 g of protein, 706 mL fluid.  - When Propofol discontinued, rec'd Impact Peptide @ 45 mL/hr to provide 1620 kcals, 101 g of protein, 832 mL fluid.   RD to monitor & follow-up.     Goals: Meet % EEN, EPN by RD f/u date  Nutrition Goal Status: new  Communication of RD Recs: discussed on rounds

## 2023-09-28 NOTE — ASSESSMENT & PLAN NOTE
Patient is identified as having Diastolic (HFpEF) heart failure that is Chronic. CHF is currently uncontrolled due to Rales/crackles on pulmonary exam and Pulmonary edema/pleural effusion on CXR. Latest ECHO performed and demonstrates- Results for orders placed during the hospital encounter of 09/12/23    Echo    Interpretation Summary    Left Ventricle: The left ventricle is normal in size.  Mildly to moderately increased wall thickness. Septal motion is consistent with post-operative status. Septal flattening in diastole consistent with right ventricular volume overload. There is normal systolic function with a visually estimated ejection fraction of 60 - 65%. There is indeterminate diastolic function.    Left Atrium: Left atrium is severely dilated.    Mitral Valve: There is a mechanical valve in the mitral position. The mean pressure gradient across the mitral valve is 5.5 mmHg at a heart rate of 108 bpm. There is trace to mild regurgitation.    Right Ventricle: Right ventricle was not well visualized due to poor acoustic window.  Likely dilated to some extent based on subcostal images.  Systolic function appears normal based on limited images.    Right Atrium: Right atrium is dilated.    Tricuspid Valve: There is trace to mild regurgitation.    The pulmonary artery systolic pressure is approximally 52 mm Hg.    IVC/SVC: Intermediate venous pressure at 8 mmHg.  . Continue Beta Blocker and monitor clinical status closely. Monitor on telemetry. Patient is off CHF pathway.  Monitor strict Is&Os and daily weights.  Place on fluid restriction of 1.5 L. Cardiology has been consulted. Continue to stress to patient importance of self efficacy and  on diet for CHF. Last BNP reviewed- and noted below   Recent Labs   Lab 09/26/23  4153   *   .

## 2023-09-28 NOTE — PROGRESS NOTES
[facility] - Cardiac Medical [facility]  Critical Care Medicine  Progress Note    Patient Name: Elayne Almanzar  MRN: [id number]  Admission Date: 9/26/2023  Hospital Length of Stay: 2 days  Code Status: Full Code  Attending Provider: Lio Snell MD  Primary Care Provider: Nubia Crocker MD   Principal Problem: Acute respiratory failure with hypoxia and hypercarbia    Subjective:     HPI:  Ms. Servando Almanzar is a 68 year old female with HFpEF (60-65%), Pulmonary HTN, MVR on coumadin, permanent afib, COPD, HTN, T2DM, and CKD3. He is presenting as transfer from [facility] ICU for evaluation of acute hypoxemic respiratory failure likely secondary to PE vs pulmonary HTN noted on imaging with evidence of right heart strain on TTE.     She initially presented to OSH for SOB and cough. Infectious workup notable for enterovirus/rhinovirus and CT chest 9/18 with multifocal GGO bilaterally. She was trialed on IV abx, corticosteroids, and lasix without improvement with subsequent CTA chest positive acute PE, more specifically a nonocclusive filling defect within the right lower lobe superior segmental pulmonary artery with extension into the inter lobar artery. Lower extremity US also noted DVT to right peroneal vein and the left posterior tibial vein. TTE performed with septal flattening in diastole consistent with RV volume overload. PASP 52mmHg.    Given DVT/PE despite therapeutic INR on warfarin, patient transitioned to IV heparin ggt. She continued to deteriorate requiring continous BIPAP thus patient transferred to [facility] MICU for higher lever of care and interventional cardiology consult for consideration of catheter-directed thrombolysis.       Hospital/ICU Course:  68F w/ HFpEF (EF 60-65%), COPD, pHTN (PASP 52), MVR on warfarin, persistent afib, COPD, HTN, T2DM, CKD3 presented as transfer from [facility] from [facility], possible contributing factors in addition to her comorbidities include pulmonary HTN, non-obstructing PE (likely 2/2  known DVT) found on imaging with evidence of right heart strain, viral pna (rhino/enterovirus positive). She was placed on IV heparin and had increasing BIPAP requirements overnight, this morning her O2 sats were declining to mid 80's maxed on BIPAP w/ 100% O2, so she was electively intubated before she further deteriorated. She briefly required pressor support post-intubation and was started on diuresis and stress dose steroids. She is now off pressors but remains in AF w/ RVR confirmed on EKG with HR ~130-150, was restarted on half her home dose of metoprolol tartrate at 50 BID with good rate control HR <100.  Will prone on vent per PROSEVA trial      Interval History/Significant Events: NAEON. Pt rate controlled on half home dose of metoprolol, plan on proning today      Objective:     Vital Signs (Most Recent):  Temp: 97.7 °F (36.5 °C) (09/27/23 0700)  Pulse: 100 (09/27/23 1418)  Resp: (!) 27 (09/27/23 1400)  BP: 122/83 (09/27/23 1400)  SpO2: (!) 93 % (09/27/23 1400) Vital Signs (24h Range):  Temp:  [97.7 °F (36.5 °C)-98.3 °F (36.8 °C)] 97.7 °F (36.5 °C)  Pulse:  [] 100  Resp:  [24-58] 27  SpO2:  [86 %-99 %] 93 %  BP: (100-144)/(51-95) 122/83   Weight: 90.3 kg (199 lb)  Body mass index is 36.4 kg/m².      Intake/Output Summary (Last 24 hours) at 9/27/2023 1442  Last data filed at 9/27/2023 1000  Gross per 24 hour   Intake 492.61 ml   Output 900 ml   Net -407.39 ml          Physical Exam  Vitals and nursing note reviewed.   Constitutional:       General: She is in acute distress.      Appearance: She is not toxic-appearing or diaphoretic.   HENT:      Head: Normocephalic and atraumatic.      Right Ear: External ear normal.      Left Ear: External ear normal.      Nose: Nose normal.      Mouth/Throat:      Mouth: Mucous membranes are moist.   Eyes:      Extraocular Movements: Extraocular movements intact.      Pupils: Pupils are equal, round, and reactive to light.   Cardiovascular:      Rate and Rhythm:  Tachycardia present. Rhythm irregular.      Pulses: Normal pulses.   Pulmonary:      Effort: Respiratory distress present.      Breath sounds: Wheezing present.      Comments: Tachypnea. Increased work of breathing on BIPAP FiO2 100% settings 18/8 satting 84-86%  Abdominal:      General: There is no distension.      Palpations: Abdomen is soft.      Tenderness: There is no abdominal tenderness.   Musculoskeletal:         General: No swelling or tenderness.      Cervical back: Normal range of motion and neck supple.   Skin:     General: Skin is warm and dry.      Capillary Refill: Capillary refill takes less than 2 seconds.   Neurological:      General: No focal deficit present.      Mental Status: She is alert and oriented to person, place, and time.      Comments: Moving all extremities symmetrically    Psychiatric:         Mood and Affect: Mood normal.         Behavior: Behavior normal.            Vents:  Vent Mode: A/C (09/27/23 1232)  Ventilator Initiated: Yes (09/27/23 1158)  Set Rate: 24 BPM (09/27/23 1232)  Vt Set: 360 mL (09/27/23 1232)  PEEP/CPAP: 12 cmH20 (09/27/23 1232)  Oxygen Concentration (%): 100 (09/27/23 1400)  Peak Airway Pressure: 49 cmH20 (09/27/23 1232)  Plateau Pressure: 44 cmH20 (09/27/23 1232)  Total Ve: 8.94 L/m (09/27/23 1232)  Negative Inspiratory Force (cm H2O): 0 (09/27/23 1232)  F/VT Ratio<105 (RSBI): (!) 91.4 (09/27/23 1232)  Lines/Drains/Airways       Drain  Duration             Female External Urinary Catheter 09/26/23 1125 1 day         NG/OG Tube 09/27/23 1141 orogastric Center mouth <1 day         Urethral Catheter 09/27/23 1151 16 Fr. <1 day              Airway  Duration                  Airway - Non-Surgical 09/27/23 1139 Endotracheal Tube <1 day              Peripheral Intravenous Line  Duration                  Midline Catheter Insertion/Assessment  - Double Lumen Left basilic vein (medial side of arm) -- days         Peripheral IV - Single Lumen 09/23/23 1753 20 G Right  Antecubital 3 days                  Significant Labs:    CBC/Anemia Profile:  Recent Labs   Lab 09/26/23  1318 09/26/23  1959 09/26/23  2243 09/27/23  0336 09/27/23  0823 09/27/23  1232   WBC 15.32*  --  16.08*  16.08* 16.75*  --   --    HGB 10.8*  --  10.4*  10.4* 10.4*  --   --    HCT 37.2   < > 34.2*  34.2* 34.9* 36 34*     --  181  181 195  --   --    MCV 76*  --  71*  71* 72*  --   --    RDW 15.4*  --  15.7*  15.7* 15.6*  --   --     < > = values in this interval not displayed.        Chemistries:  Recent Labs   Lab 09/26/23  0507 09/26/23 2243 09/27/23  0336    142 142   K 4.7 3.9 3.8   CL 95 94* 91*   CO2 31* 35* 36*   BUN 19 19 18   CREATININE 0.8 0.9 0.9   CALCIUM 9.5 9.1 9.3   ALBUMIN 3.6 3.4* 3.5   PROT 7.2 6.8 7.1   BILITOT 1.2* 1.5* 1.4*   ALKPHOS 126 132 135   ALT 8* 9* 9*   AST 24 23 24   MG  --  1.6 2.7*   PHOS  --   --  3.0       All pertinent labs within the past 24 hours have been reviewed.    Significant Imaging:  I have reviewed all pertinent imaging results/findings within the past 24 hours.      ABG  Recent Labs   Lab 09/28/23  1217   PH 7.412   PO2 74*   PCO2 67.6*   HCO3 43.0   BE 18     Assessment/Plan:     Pulmonary  * Acute respiratory failure with hypoxia and hypercarbia  Patient with Hypercapnic and Hypoxic Respiratory failure which is Acute on chronic.  she is not on home oxygen. Supplemental oxygen was provided and noted- Vent Mode: A/C  Oxygen Concentration (%):  [] 90  Resp Rate Total:  [20 br/min-35 br/min] 30 br/min  Vt Set:  [250 mL-360 mL] 250 mL  PEEP/CPAP:  [8 heL03-01 cmH20] 12 cmH20  Mean Airway Pressure:  [14 qcO58-62 cmH20] 19 cmH20    .   Signs/symptoms of respiratory failure include- tachypnea, increased work of breathing, respiratory distress and use of accessory muscles. Contributing diagnoses includes - COPD and Pulmonary Embolus Labs and images were reviewed. Patient Has recent ABG, which has been reviewed. Will treat underlying causes and  adjust management of respiratory failure as follows-     69yo F with history of HFpEF, pulm htn, MVR on warfarin, AF, COPD, htn, T2DM, and CKD3 presenting as transfer from [facility] ICU for AHRF. Etiology due to PE with evidence of R heart strain on TTE vs pulmonary htn with PASP 52 mmhg vs viral illness (positive enterovirus and rhinovirus).  Pt has been treated with broad spectrun antibiotics and steroids at OSH.   She follows with Dr. Carvalho as outpatient.    --Wean oxygen as tolerated  --Serial ABG's  --Continue Zosyn  --Solumedrol stopped, started decadron 20mg QD for 5d followed by 10mg QD for 5d  --Scheduled Duo Nebs  --Continue breo, spiriva  --CXR  -- proning today 9/28    Acute bronchitis due to Rhinovirus  --See AHRF    COPD (chronic obstructive pulmonary disease)  --See respiratory failure    Cardiac/Vascular  Pulmonary hypertension  PASP on TTE 52mmHg. Noted to be 54mmHg on TTE in 2022.   Likely contributing to resp failure.     Chronic diastolic congestive heart failure  Patient is identified as having Diastolic (HFpEF) heart failure that is Chronic. CHF is currently uncontrolled due to Rales/crackles on pulmonary exam and Pulmonary edema/pleural effusion on CXR. Latest ECHO performed and demonstrates- Results for orders placed during the hospital encounter of 09/12/23    Echo    Interpretation Summary    Left Ventricle: The left ventricle is normal in size.  Mildly to moderately increased wall thickness. Septal motion is consistent with post-operative status. Septal flattening in diastole consistent with right ventricular volume overload. There is normal systolic function with a visually estimated ejection fraction of 60 - 65%. There is indeterminate diastolic function.    Left Atrium: Left atrium is severely dilated.    Mitral Valve: There is a mechanical valve in the mitral position. The mean pressure gradient across the mitral valve is 5.5 mmHg at a heart rate of 108 bpm. There is trace to mild  regurgitation.    Right Ventricle: Right ventricle was not well visualized due to poor acoustic window.  Likely dilated to some extent based on subcostal images.  Systolic function appears normal based on limited images.    Right Atrium: Right atrium is dilated.    Tricuspid Valve: There is trace to mild regurgitation.    The pulmonary artery systolic pressure is approximally 52 mm Hg.    IVC/SVC: Intermediate venous pressure at 8 mmHg.  . Continue Beta Blocker and monitor clinical status closely. Monitor on telemetry. Patient is off CHF pathway.  Monitor strict Is&Os and daily weights.  Place on fluid restriction of 1.5 L. Cardiology has been consulted. Continue to stress to patient importance of self efficacy and  on diet for CHF. Last BNP reviewed- and noted below   Recent Labs   Lab 09/26/23  2243   *   .    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 On coumadin. Follows with Dr. Messina outpatient.     --Hold Coumadin in setting of acute illness  --Continue heparin gtt  --Repeat ECHO    Essential (primary) hypertension  Home regimen: amlodipine 2.5mg and metoprolol  - Continue metoprolol for rate control  - hold amlodipine in setting of acute illness, low threshold to restart    Chronic atrial fibrillation  In AF w/ RVR since admission confirmed no EKG, -150 s/p intubation    -- Rate controlled on 50 metoprolol BID    Hematology  Acute deep vein thrombosis (DVT) of both lower extremities  BLE Ultrasound: deep vein thrombosis of the right peroneal vein and the left posterior tibial vein.    --Continue heparin gtt per protocol       Acute pulmonary embolism  CTA 09/23: a nonocclusive filling defect within the right lower lobe superior segmental pulmonary artery with extension into the inter lobar artery, multifocal pneumonia or pulmonary edema progressed from prior    --Continue heparin gtt per protocol  --Repeat  ECHO  Pending clinical course, would benefit from continued systemic anticoagulation vs interventional cardiology consultation for thrombolysis vs thrombectomy       Endocrine  Type 2 diabetes mellitus  -Last A1c reviewed-   Lab Results   Component Value Date    HGBA1C 5.2 06/30/2023     Home regimen: ozempic  Hold oral antihyperglycemics while inpatient  SSI         Critical Care Daily Checklist:    A: Awake: RASS Goal/Actual Goal:    Actual:     B: Spontaneous Breathing Trial Performed?     C: SAT & SBT Coordinated?  yes                      D: Delirium: CAM-ICU Overall CAM-ICU: Positive   E: Early Mobility Performed? Yes   F: Feeding Goal: Goals: Meet % EEN, EPN by RD f/u date  Status: Nutrition Goal Status: new   Current Diet Order   Procedures    Diet NPO      AS: Analgesia/Sedation Fentanyl, norepi, propofol   T: Thromboembolic Prophylaxis heparin   H: HOB > 300 Yes   U: Stress Ulcer Prophylaxis (if needed) pantoprazole   G: Glucose Control ssi   B: Bowel Function Stool Occurrence: 0   I: Indwelling Catheter (Lines & Cuevas) Necessity    D: De-escalation of Antimicrobials/Pharmacotherapies zosyn    Plan for the day/ETD R IJ triple lumen, midline L, PIV, OG, cuevas, ETT, A line    Code Status:  Family/Goals of Care: Full Code  Family updated       Critical secondary to Patient has a condition that poses threat to life and bodily function: Severe Respiratory Distress      Critical care was time spent personally by me on the following activities: development of treatment plan with patient or surrogate and bedside caregivers, discussions with consultants, evaluation of patient's response to treatment, examination of patient, ordering and performing treatments and interventions, ordering and review of laboratory studies, ordering and review of radiographic studies, pulse oximetry, re-evaluation of patient's condition. This critical care time did not overlap with that of any other provider or involve time for  any procedures.     Juvencio Hinkle MD  Critical Care Medicine  [facility] - Cardiac Medical ICU   0

## 2023-09-28 NOTE — PROGRESS NOTES
Pt currently admitted w/ acute respiratory failure, COPD, acute bronchitis due to rhinovirus, pulm HTN, HFpEF, acute DVT & PE.  Pt currently being managed on heparin gtt.

## 2023-09-28 NOTE — CONSULTS
"  [facility] - Cardiac Medical ICU  Adult Nutrition  Consult Note    SUMMARY     Recommendations    If/when able, initiate TFs.   - With current Propofol rate, rec'd Peptamen Intense VHP @ 35 mL/hr to provide 1558 kcals (718 from Propofol), 77 g of protein, 706 mL fluid.  - When Propofol discontinued, rec'd Impact Peptide @ 45 mL/hr to provide 1620 kcals, 101 g of protein, 832 mL fluid.   RD to monitor & follow-up.    Goals: Meet % EEN, EPN by RD f/u date  Nutrition Goal Status: new  Communication of RD Recs: discussed on rounds    Assessment and Plan    Nutrition Problem:  Inadequate energy intake    Related to (etiology):   Inability to consume sufficient energy     Signs and Symptoms (as evidenced by):   NPO    Interventions(treatment strategy):  Collaboration of nutrition care w/ other providers  EN    Nutrition Diagnosis Status:   New    Reason for Assessment    Reason For Assessment: consult  Diagnosis: other (see comments) (Resp. fx)  Relevant Medical History: HF, HTN, DM, CKD3  Interdisciplinary Rounds: attended    General Information Comments: Intubated (sedated) yesterday - TFs to be initiated. No family at bedside; per St. Youssef RD assessment (9/26): Pt reported good appetite PTA & UBW of of 200#. Pt appears nourished w/ no physical signs of malnutrition. Possibly proning today, per huddle.  Nutrition Discharge Planning: Pending clinical course    Nutrition/Diet History    Factors Affecting Nutritional Intake: NPO, on mechanical ventilation    Anthropometrics    Temp: 97.3 °F (36.3 °C)  Height: 5' 2" (157.5 cm)  Height (inches): 62 in  Weight: 90.3 kg (199 lb 1.2 oz)  Weight (lb): 199.08 lb  Ideal Body Weight (IBW), Female: 110 lb  % Ideal Body Weight, Female (lb): 180.98 %  BMI (Calculated): 36.4  BMI Grade: 35 - 39.9 - obesity - grade II    Lab/Procedures/Meds    Pertinent Labs Reviewed: reviewed  Pertinent Labs Comments: A1C 5.2 (6/2023)  Pertinent Medications Reviewed: reviewed  Pertinent " Medications Comments: Fentanyl, Heparin, Levophed, Propofol    Estimated/Assessed Needs    Weight Used For Calorie Calculations: 90.3 kg (199 lb 1.2 oz)    Energy Calorie Requirements (kcal): 1598 kcal/d  Energy Need Method: [place] (modified)    Protein Requirements: 100-125 g/d (2-2.5 g/kg IBW)  Weight Used For Protein Calculations: 50 kg (110 lb 3.7 oz)    Estimated Fluid Requirement Method: other (see comments) (Per MD or 1 mL/kcal)  RDA Method (mL): 1598    CHO Requirement: 200g    Nutrition Prescription Ordered    Current Diet Order: NPO  Current Nutrition Support Formula Ordered: Impact Peptide 1.5    Evaluation of Received Nutrient/Fluid Intake    Other Calories (kcal): 718 (Propofol)    I/O: -1.1L since admit    Comments: LBM: 9/26    Nutrition Risk    Level of Risk/Frequency of Follow-up:  (1x/week)     Monitor and Evaluation    Food and Nutrient Intake: enteral nutrition intake, food and beverage intake, energy intake  Food and Nutrient Adminstration: enteral and parenteral nutrition administration, diet order  Physical Activity and Function: nutrition-related ADLs and IADLs  Anthropometric Measurements: weight, weight change  Biochemical Data, Medical Tests and Procedures: inflammatory profile, lipid profile, glucose/endocrine profile, gastrointestinal profile, electrolyte and renal panel  Nutrition-Focused Physical Findings: overall appearance     Nutrition Follow-Up    RD Follow-up?: Yes

## 2023-09-28 NOTE — LOPA/MORA/SWTA/AOC/AEB
[facility] [facility] ([facility])  On-Site Evaluation  [facility] Contact # [phone]        Thank you for the referral of this patient to determine suitability for organ, tissue, and eye donation.  A chart review has been conducted  09/28/2023  at    1200.  [facility] findings are as follows:    ? Potential candidate for organ donation - [facility] following patient. Any changes in patients condition, discussion of withdrawing the vent or brain death exams, or family mention of donation immediately call [phone].    ? Potential for candidate for tissue and eye donation- call [facility] at [phone] within 2 hours of death for screening as a potential tissue and/or eye donor.        [facility] Representative:  Emily Abadie           [facility] Referral Number:   [id number]

## 2023-09-28 NOTE — NURSING
MICU DAILY GOALS     Family/Goals of care/Code Status   Code Status: Full Code    24H Vital Sign Range  Temp:  [96.4 °F (35.8 °C)-98.2 °F (36.8 °C)]   Pulse:  []   Resp:  [24-58]   BP: ()/(51-95)   SpO2:  [86 %-100 %]   Arterial Line BP: ()/(48-62)      Shift Events   No acute events throughout shift    AWAKE RASS: Goal -    Actual - RASS (Quiroz Agitation-Sedation Scale): deep sedation    Restraint necessity: Not necessary   BREATHE SBT: Not attempted   Coordinate A & B, analgesics/sedatives Pain: managed   SAT: Not attempted   Delirium CAM-ICU: Overall CAM-ICU: Positive   Early(intubated/ Progressive (non-intubated) Mobility MOVE Screen (INTUBATED ONLY): Not attempted    Activity: Activity Management: Patient unable to perform activities   Feeding/Nutrition Diet order: Diet/Nutrition Received: NPO,     Thrombus DVT prophylaxis: VTE Required Core Measure: Pharmacological prophylaxis initiated/maintained   HOB Elevation Head of Bed (HOB) Positioning: HOB at 30-45 degrees   Ulcer Prophylaxis GI: no   Glucose control managed Glycemic Management: blood glucose monitored   Skin Skin assessed during: Q Shift Change    [] No Altered Skin Integrity Present    []Prevention Measures Documented      [] Yes- Altered Skin Integrity Present or Discovered   [] LDA Added if Not in Epic (Describe Wound)   [] New Altered Skin Integrity was Present on Admit and Documented in LDA   [] Wound Image Taken    Wound Care Consulted? No    Attending Nurse:  Charlotte Barbour RN/Staff Member:      Bowel Function no issues    Indwelling Catheter Necessity      Urethral Catheter 09/27/23 1151 16 Fr.-Reason for Continuing Urinary Catheterization: Critically ill in ICU and requiring hourly monitoring of intake/output          De-escalation Antibiotics Yes       VS and assessment per flow sheet, patient progressing towards goals as tolerated, plan of care reviewed with family, all concerns addressed, will continue to monitor.

## 2023-09-28 NOTE — PROCEDURES
"Elayne Almanzar is a 68 y.o. female patient.    Temp: 97.2 °F (36.2 °C) (09/28/23 0705)  Pulse: 92 (09/28/23 0853)  Resp: (!) 21 (09/28/23 0845)  BP: 116/82 (09/28/23 0800)  SpO2: (!) 91 % (09/28/23 0845)  Weight: 90.3 kg (199 lb) (09/27/23 1418)  Height: 5' 2" (157.5 cm) (09/27/23 1418)       Central Line    Date/Time: 9/28/2023 10:40 AM    Performed by: Tariq Amor MD  Authorized by: Tariq Amor MD    Location procedure was performed:  [facility] CRITICAL CARE MEDICINE  Assisting Provider:  Lio Snell MD  Pre-operative diagnosis:  Acute hypoxic hypercapnic respiratory failure, shock  Post-operative diagnosis:  Same  Consent Done ?:  Yes  Time out complete?: Verified correct patient, procedure, equipment, staff, and site/side    Indications:  Med administration and vascular access  Anesthesia:  Local infiltration  Local anesthetic:  Lidocaine 1% without epinephrine  Anesthetic total (ml):  5  Description of findings:  None  Preparation:  Skin prepped with ChloraPrep  Skin prep agent dried: Skin prep agent completely dried prior to procedure    Sterile barriers: All five maximal sterile barriers used - gloves, gown, cap, mask and large sterile sheet    Hand hygiene: Hand hygiene performed immediately prior to central venous catheter insertion    Location:  Right internal jugular  Catheter type:  Triple lumen  Catheter size:  7 Fr  Inserted Catheter Length (cm):  16  Ultrasound guidance: Yes    Vessel Caliber:  Medium   patent  Comprressibility:  Normal  Needle advanced into vessel with real time ultrasound guidance.    Guidewire confirmed in vessel.    Steril sheath on probe.    Sterile gel used.  Manometry: Yes    Number of attempts:  1  Technical Procedures Used:  Seldinger technique  Significant surgical tasks conducted by the assistant(s):  Supervised  Complications: No    Estimated blood loss (mL):  10  Specimens: No    Implants: No    XRay:  Placement verified by " x-ray  Adverse Events:  NoneTermination Site: superior vena cava      Tariq Holbrook MD  [employer] Pulmonary Critical Care Medicine Fellow  9/28/2023

## 2023-09-28 NOTE — MEDICAL/APP STUDENT
History & Physical  [facility] [place] 2    SUBJECTIVE:   Chief Complaint/Reason for Admission: Acute respiratory failure with hypoxia and hypercarbia    History of Present Illness:  Patient is a 68 y.o. female w/HFpEF (60-65%), Pulmonary HTN, MVR on coumadin, permanent afib, COPD, HTN, T2DM, and CKD3. She presented as a transfer from [facility] ICU for evaluation of acute hypoxemic respiratory failure likely secondary to PE vs pulmonary HTN noted on imaging with evidence of right heart strain on TTE.      Infectious workup notable for enterovirus/rhinovirus and CT chest 9/18 with multifocal GGO bilaterally. She was trialed on IV abx, corticosteroids, and lasix without improvement with subsequent CTA chest positive acute PE, nonocclusive filling defect within the right lower lobe superior segmental pulmonary artery with extension into the inter lobar artery. Lower extremity US also noted DVT to right peroneal vein and the left posterior tibial vein. TTE performed with septal flattening in diastole consistent with RV volume overload. PASP 52mmHg.     Given DVT/PE despite therapeutic INR on warfarin, patient transitioned to IV heparin ggt. She continued to deteriorate requiring continous BIPAP thus patient transferred to [facility] MICU for higher lever of care and interventional cardiology consult for consideration of catheter-directed thrombolysis.     Hospital/ICU Course:  Patient continued to experience hypoxemia while on BIPAP, and was intubated yesterday morning. Overnight she has had no adverse events. Ventilator settings were titrated to Vt 250, PEEP of 12, Fi02 90%.       Past Medical History:   Diagnosis Date    Acute DVT (deep venous thrombosis) 9/26/2023    Acute on chronic diastolic congestive heart failure     Acute pulmonary embolism 9/24/2023    Allergy     Anemia     Anticoagulant long-term use     Anxiety     Asthma     Atrial fibrillation 2002    Bilateral primary osteoarthritis of hip 3/14/2023     Cataract     Chronic kidney disease     COPD (chronic obstructive pulmonary disease)     Coronary artery calcification seen on CT scan 3/22/2022    Coronary artery calcification seen on CT scan 3/22/2022    Depression     Encounter for blood transfusion     HTN (hypertension)     Hyperlipidemia     Nephrolithiasis     KEYSHAWN (obstructive sleep apnea)     awaiting CPAP machine     Rheumatic disease of mitral valve     Uncontrolled type 2 diabetes mellitus with complication, with long-term current use of insulin 2020    Urinary incontinence        Past Surgical History:   Procedure Laterality Date    BREAST BIOPSY Left 10/2019    CARDIAC VALVE SURGERY  2004    mechanical mitral valve     SECTION      COLONOSCOPY N/A 2019    Procedure: COLONOSCOPY;  Surgeon: Devon Bowling MD;  Location: [facility] ENDO (4TH FLR);  Service: Endoscopy;  Laterality: N/A;  ok to hold Coumadin x 5 days with Lovenox bridge per Coumadin clinic-[facility]    ESOPHAGOGASTRODUODENOSCOPY N/A 2019    Procedure: EGD (ESOPHAGOGASTRODUODENOSCOPY);  Surgeon: Smooth Torrez MD;  Location: [facility] ENDO (2ND FLR);  Service: Endoscopy;  Laterality: N/A;  2nd floor requested due to comorbidities, Hypertension, permanent A. 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 44     per Coumadin clinic-ok to hold for 5 days w/bridge    INJECTION N/A 2023    Procedure: INJECTION, BILATERAL GTB AND RIGHT HIP INTRA-ARTICULAR CONTRAST;  Surgeon: Beth Alfaro MD;  Location: [facility] PAIN MGT;  Service: Pain Management;  Laterality: N/A;    kidney stone removal      MITRAL VALVE REPLACEMENT  2004    TUBAL LIGATION        Family History   Problem Relation Age of Onset    Stroke Paternal Grandmother     Colon cancer Maternal Grandmother     Cancer Brother         testicular cancer    Diabetes Sister     Hypertension Sister     Breast cancer Paternal Aunt     Diabetes Sister     Diabetes  Sister     Heart disease Father 70        MI    Diabetes Father     Colon cancer Mother 83    Asthma Daughter     Asthma Son     Ovarian cancer Neg Hx        Social History     Tobacco Use    Smoking status: Former     Current packs/day: 0.00     Average packs/day: 0.5 packs/day for 20.0 years (10.0 ttl pk-yrs)     Types: Cigarettes     Start date: 1982     Quit date: 2002     Years since quittin.4    Smokeless tobacco: Never   Substance Use Topics    Alcohol use: No    Drug use: No      Review of patient's allergies indicates:   Allergen Reactions    Brimonidine        No current facility-administered medications on file prior to encounter.     Current Outpatient Medications on File Prior to Encounter   Medication Sig Dispense Refill    ADVAIR DISKUS 500-50 mcg/dose DsDv diskus inhaler INHALE 1 PUFF INTO THE LUNGS 2 (TWO) TIMES DAILY. 180 each 3    albuterol (PROVENTIL/VENTOLIN HFA) 90 mcg/actuation inhaler INHALE 2 PUFFS INTO THE LUNGS EVERY 6 (SIX) HOURS AS NEEDED FOR WHEEZING. RESCUE 18 g 6    albuterol sulfate 2.5 mg/0.5 mL Nebu Take 2.5 mg by nebulization every 4 (four) hours as needed (wheezing). Rescue 1 each 0    amLODIPine (NORVASC) 2.5 MG tablet Take 1 tablet (2.5 mg total) by mouth once daily. 90 tablet 2    aspirin (ECOTRIN) 81 MG EC tablet Take 81 mg by mouth once daily.       cycloSPORINE (RESTASIS) 0.05 % ophthalmic emulsion Place 1 drop into both eyes 2 (two) times daily. 60 each 12    dipyridamole (PERSANTINE) 5 mg/mL injection       dorzolamide (TRUSOPT) 2 % ophthalmic solution Place 1 drop into the right eye 2 (two) times a day. 10 mL 3    ergocalciferol (ERGOCALCIFEROL) 50,000 unit Cap Take 1 capsule (50,000 Units total) by mouth twice a week. 24 capsule 0    fluticasone propionate (FLONASE) 50 mcg/actuation nasal spray 2 sprays (100 mcg total) by Each Nostril route once daily. 16 g 3    furosemide (LASIX) 40 MG tablet Take 1 tablet (40 mg total) by mouth once daily. 90 tablet 3     gabapentin (NEURONTIN) 300 MG capsule Take 1 capsule (300 mg total) by mouth every evening. 30 capsule 11    latanoprost (XALATAN) 0.005 % ophthalmic solution Place 1 drop into both eyes once daily. 7.5 mL 3    meclizine (ANTIVERT) 25 mg tablet Take 1 tablet (25 mg total) by mouth 2 (two) times daily as needed. 30 tablet 1    metoprolol tartrate (LOPRESSOR) 100 MG tablet TAKE ONE TABLET BY MOUTH TWICE DAILY 60 tablet 11    nitroGLYCERIN (NITROSTAT) 0.4 MG SL tablet Place 1 tablet (0.4 mg total) under the tongue every 5 (five) minutes as needed for Chest pain. (Patient not taking: Reported on 4/28/2022) 30 tablet 1    omeprazole (PRILOSEC) 40 MG capsule Take 1 capsule (40 mg total) by mouth every morning. Open capsule and take with apple sauce 30 capsule 2    ondansetron (ZOFRAN-ODT) 8 MG TbDL Dissolve 1 tablet (8 mg total) by mouth every 6 (six) hours as needed. 30 tablet 0    oxybutynin (DITROPAN-XL) 5 MG TR24 Take 5 mg by mouth once daily.      pantoprazole (PROTONIX) 40 MG tablet Take 1 tablet (40 mg total) by mouth daily as needed. 30 tablet 3    rosuvastatin (CRESTOR) 20 MG tablet Take 1 tablet (20 mg total) by mouth once daily. 90 tablet 3    semaglutide (OZEMPIC) 0.25 mg or 0.5 mg (2 mg/3 mL) pen injector Inject 0.5 mg into the skin every 7 days. 3 mL 2    sertraline (ZOLOFT) 100 MG tablet TAKE 1 TABLET BY MOUTH ONCE DAILY. 90 tablet 3    tamsulosin (FLOMAX) 0.4 mg Cap Take 0.4 mg by mouth once daily.      warfarin (COUMADIN) 5 MG tablet Take 1 tablet (5 mg total) by mouth Daily. or as instructed by Coumadin Clinic. 40 tablet 0        Review of Systems:  Review of Systems   Reason unable to perform ROS: Acuity of condition.        OBJECTIVE:     Vital Signs (Most Recent)   Temp: 97.3 °F (36.3 °C) (09/28/23 1100)  Pulse: 89 (09/28/23 1403)  Resp: (!) 29 (09/28/23 1403)  BP: 116/82 (09/28/23 0800)  SpO2: (!) 93 % (09/28/23 1403)  Body mass index is 36.41 kg/m².      Physical Exam:  Physical Exam  Vitals  reviewed.   Constitutional:       Interventions: She is sedated and intubated.   HENT:      Head: Normocephalic and atraumatic.      Nose: Nose normal.   Cardiovascular:      Rate and Rhythm: Tachycardia present. Rhythm irregular.      Heart sounds: Normal heart sounds.      Comments: Afib     Pulmonary:      Effort: She is intubated.      Breath sounds: Stridor present.      Comments: Ventilator settings FiO2 90%, Vt 250, PEEP 12  Abdominal:      General: Abdomen is flat. There is no distension.      Tenderness: There is no abdominal tenderness.       Laboratory:  CBC/Anemia Labs: Coags:    Recent Labs   Lab 09/26/23 2243 09/27/23  0336 09/27/23  0823 09/27/23  1727 09/28/23  0241 09/28/23  1217   WBC 16.08*  16.08* 16.75*  --   --  21.20*  --    HGB 10.4*  10.4* 10.4*  --   --  10.6*  --    HCT 34.2*  34.2* 34.9*   < > 35* 35.4* 36     181 195  --   --  230  --    MCV 71*  71* 72*  --   --  73*  --    RDW 15.7*  15.7* 15.6*  --   --  15.7*  --     < > = values in this interval not displayed.    Recent Labs   Lab 09/26/23 2243 09/27/23  0602 09/27/23  0907 09/28/23  0241   INR 3.2*  3.2*  --  3.1* 2.9*   APTT 44.1*  44.1* 49.5*  --  56.3*        Chemistries: ABG:   Recent Labs   Lab 09/26/23 2243 09/27/23  0336 09/27/23  1732 09/28/23  0241    142  --  137   K 3.9 3.8 4.4 3.3*   CL 94* 91*  --  91*   CO2 35* 36*  --  33*   BUN 19 18  --  24*   CREATININE 0.9 0.9  --  1.1   CALCIUM 9.1 9.3  --  9.5   PROT 6.8 7.1  --  6.9   BILITOT 1.5* 1.4*  --  1.3*   ALKPHOS 132 135  --  113   ALT 9* 9*  --  10   AST 23 24  --  18   MG 1.6 2.7*  --  1.9   PHOS  --  3.0  --  2.6*    Recent Labs   Lab 09/27/23  2143 09/28/23  0500 09/28/23  1217   PH 7.411 7.506* 7.412   PCO2 60.1* 49.1* 67.6*   PO2 81 61* 74*   HCO3 38.2 38.8 43.0   POCSATURATED 96 93* 94*   BE 14 16 18              ASSESSMENT/PLAN:     Active Hospital Problems    Diagnosis  POA    *Acute respiratory failure with hypoxia and hypercarbia  [J96.01, J96.02]  Yes    Type 2 diabetes mellitus [E11.9]  Yes    Acute deep vein thrombosis (DVT) of both lower extremities [I82.403]  Yes    Acute pulmonary embolism [I26.99]  Yes     CTA 9/23:  -Nonocclusive right-sided pulmonary embolism as detailed above.  - Multifocal pneumonia or pulmonary edema, progressed from prior.      Acute bronchitis due to Rhinovirus [J20.6]  Yes    Pulmonary hypertension [I27.20]  Yes    Chronic diastolic congestive heart failure [I50.32]  Yes     Chronic    COPD (chronic obstructive pulmonary disease) [J44.9]  Yes     Chronic    H/O mitral valve replacement with mechanical valve [Z95.2]  Not Applicable     Chronic    Chronic atrial fibrillation [I48.20]  Yes     Chronic    Essential (primary) hypertension [I10]  Yes     Chronic      Resolved Hospital Problems   No resolved problems to display.     Pulmonary  *Acute respiratory failure with hypoxia and hypercarbia  Patient with Hypercapnic and Hypoxic Respiratory failure which is Acute on chronic.  she is not on home oxygen. Supplemental oxygen was provided and noted- Oxygen Concentration (%):  [] 100  Resp Rate Total:  [21 br/min-75 br/min] 42 br/min     .   Signs/symptoms of respiratory failure include- tachypnea, increased work of breathing, respiratory distress and use of accessory muscles. Contributing diagnoses includes - COPD and Pulmonary Embolus Labs and images were reviewed. Patient Has recent ABG, which has been reviewed. Will treat underlying causes and adjust management of respiratory failure as follows-      67yo F with history of HFpEF, pulm htn, MVR on warfarin, AF, COPD, htn, T2DM, and CKD3 presenting as transfer from [facility] ICU for AHRF. Etiology due to PE with evidence of R heart strain on TTE vs pulmonary htn with PASP 52 mmhg vs viral illness (positive enterovirus and rhinovirus).  Pt has been treated with broad spectrun antibiotics and steroids at OSH.   She follows with Dr. Carvalho as outpatient.     --Will  continue to titrate ventilator settings; patient placed in prone position this afternoon.   --Monitor ABG/VBG q4h   --Continue Zosyn  --Changing IV solumedrol to Dexamethasone    Viral pneumonia  Viral panel positive for rhinovirus/enterovirus. Will continue to provide supportive measures.     COPD      Cardiac/Vascular  Pulmonary hypertension  PASP on TTE 52mmHg. Noted to be 54mmHg on TTE in 2022.   Likely contributing to resp failure.     Chronic diastolic congestive heart failure  Echo performed on 9/28 demonstrated estimated LVEF of 55-60%; unchanged from prior. No wall thickness, wall motion, or systolic function issues noted.          Critical Care Daily Checklist:     A: Awake: RASS Goal/Actual Goal:    Actual:     B: Spontaneous Breathing Trial Performed?    C: SAT & SBT Coordinated?  n/a                      D: Delirium: CAM-ICU    E: Early Mobility Performed? No   F: Feeding Goal:    Status:         Current Diet Order   Procedures    Diet NPO      AS: Analgesia/Sedation Propofol, fentanyl   T: Thromboembolic Prophylaxis Heparin gtt   H: HOB > 300 Yes   U: Stress Ulcer Prophylaxis (if needed) Protonix   G: Glucose Control SSI   B: Bowel Function Stool Occurrence: (P) 0   I: Indwelling Catheter (Lines & Barnett) Necessity Central venous catheter placed 9/28, Arterial line placed 9/27   D: De-escalation of Antimicrobials/Pharmacotherapies Broad spectrum; pending cultures     Plan for the day/ETD admission     Code Status:  Family/Goals of Care: Full Code             Gustavo Mendez  MS4, [employer]

## 2023-09-29 NOTE — PLAN OF CARE
MICU DAILY GOALS     Family/Goals of care/Code Status   Code Status: Full Code    24H Vital Sign Range  Temp:  [97.5 °F (36.4 °C)-98.8 °F (37.1 °C)]   Pulse:  []   Resp:  [5-32]   BP: (123)/(55)   SpO2:  [80 %-99 %]   Arterial Line BP: ()/(49-61)      Shift Events   No acute events throughout shift. Pt's ABG at 0730 P/F 105 - pt supinated at 0900. Repeated ABG at 1500 P/F 96 - Proned at 1600. Pt remains on Levo, Prop, Fent, Nimb, Dilt, and Heparin. Family updated. WCTM    AWAKE RASS: Goal -    Actual - RASS (Quiroz Agitation-Sedation Scale): unarousable    Restraint necessity: Not necessary   BREATHE SBT: Not attempted    Coordinate A & B, analgesics/sedatives Pain: managed   SAT: Not attempted   Delirium CAM-ICU: Overall CAM-ICU: Positive   Early(intubated/ Progressive (non-intubated) Mobility MOVE Screen (INTUBATED ONLY): Fail    Activity: Activity Management: Rolling - L1   Feeding/Nutrition Diet order: Diet/Nutrition Received: tube feeding,     Thrombus DVT prophylaxis: VTE Required Core Measure: Pharmacological prophylaxis initiated/maintained   HOB Elevation Head of Bed (HOB) Positioning: HOB at 30-45 degrees   Ulcer Prophylaxis GI: yes   Glucose control managed Glycemic Management: blood glucose monitored   Skin Skin assessed during: Daily Assessment    [x] No Altered Skin Integrity Present    [x]Prevention Measures Documented      [] Yes- Altered Skin Integrity Present or Discovered   [] LDA Added if Not in Epic (Describe Wound)   [] New Altered Skin Integrity was Present on Admit and Documented in LDA   [] Wound Image Taken    Wound Care Consulted? No    Attending Nurse:  Beverley Barbour RN/Staff Member:   Myranda   Bowel Function no issues    Indwelling Catheter Necessity      Urethral Catheter 09/27/23 1151 16 Fr.-Reason for Continuing Urinary Catheterization: Critically ill in ICU and requiring hourly monitoring of intake/output    Percutaneous Central Line Insertion/Assessment - Triple Lumen   09/28/23 0845 Internal Jugular Right-Line Necessity Review: Hemodynamic instability, Medication caustic to vasculature  YES   De-escalation Antibiotics No       VS and assessment per flow sheet, patient progressing towards goals as tolerated, plan of care reviewed with family, all concerns addressed, will continue to monitor.\

## 2023-09-29 NOTE — PROGRESS NOTES
[facility] Cardiac Medical [facility]  Critical Care Medicine  Progress Note    Patient Name: Elayne Almanzar  MRN: [id number]  Admission Date: 9/26/2023  Hospital Length of Stay: 3 days  Code Status: Full Code  Attending Provider: Lio Snell MD  Primary Care Provider: Nubia Crocker MD   Principal Problem: Acute respiratory failure with hypoxia and hypercarbia    Subjective:     HPI:  Ms. Servando Almanzar is a 68 year old female with HFpEF (60-65%), Pulmonary HTN, MVR on coumadin, permanent afib, COPD, HTN, T2DM, and CKD3. He is presenting as transfer from [facility] ICU for evaluation of acute hypoxemic respiratory failure likely secondary to PE vs pulmonary HTN noted on imaging with evidence of right heart strain on TTE.     She initially presented to OSH for SOB and cough. Infectious workup notable for enterovirus/rhinovirus and CT chest 9/18 with multifocal GGO bilaterally. She was trialed on IV abx, corticosteroids, and lasix without improvement with subsequent CTA chest positive acute PE, more specifically a nonocclusive filling defect within the right lower lobe superior segmental pulmonary artery with extension into the inter lobar artery. Lower extremity US also noted DVT to right peroneal vein and the left posterior tibial vein. TTE performed with septal flattening in diastole consistent with RV volume overload. PASP 52mmHg.    Given DVT/PE despite therapeutic INR on warfarin, patient transitioned to IV heparin ggt. She continued to deteriorate requiring continous BIPAP thus patient transferred to [facility] MICU for higher lever of care and interventional cardiology consult for consideration of catheter-directed thrombolysis.       Hospital/ICU Course:  68F w/ HFpEF (EF 60-65%), COPD, pHTN (PASP 52), MVR on warfarin, persistent afib, COPD, HTN, T2DM, CKD3 presented as transfer from [facility] from [facility], possible contributing factors in addition to her comorbidities include pulmonary HTN, non-obstructing PE (likely 2/2  known DVT) found on imaging with evidence of right heart strain, viral pna (rhino/enterovirus positive). She was placed on IV heparin and had increasing BIPAP requirements overnight, this morning her O2 sats were declining to mid 80's maxed on BIPAP w/ 100% O2, so she was electively intubated before she further deteriorated. She briefly required pressor support post-intubation and was started on diuresis and stress dose steroids. She is now off pressors but remains in AF w/ RVR confirmed on EKG with HR ~130-150, was restarted on half her home dose of metoprolol tartrate at 50 BID with good rate control HR <100.  Proning daily on vent per PROSEVA trial      Interval History/Significant Events: Initially increasing pressor requirements overnight, back to previous dose by morning. Able to wean FiO2 to 80% with prone positioning. Supinated on AM rounds without complication      Objective:     Vital Signs (Most Recent):  Temp: 97.9 °F (36.6 °C) (09/29/23 1100)  Pulse: 100 (09/29/23 1400)  Resp: (!) 29 (09/29/23 1400)  BP: (!) 123/55 (09/28/23 2136)  SpO2: (!) 91 % (09/29/23 1400) Vital Signs (24h Range):  Temp:  [97.5 °F (36.4 °C)-98.8 °F (37.1 °C)] 97.9 °F (36.6 °C)  Pulse:  [] 100  Resp:  [5-39] 29  SpO2:  [80 %-99 %] 91 %  BP: (123)/(55) 123/55  Arterial Line BP: ()/(31-61) 100/49   Weight: 90.3 kg (199 lb 1.2 oz)  Body mass index is 36.41 kg/m².      Intake/Output Summary (Last 24 hours) at 9/29/2023 1417  Last data filed at 9/29/2023 1400  Gross per 24 hour   Intake 3388.19 ml   Output 1205 ml   Net 2183.19 ml          Physical Exam  Vitals and nursing note reviewed.   Constitutional:       General: She is not in acute distress.     Appearance: She is not diaphoretic.   HENT:      Head: Normocephalic and atraumatic.      Right Ear: External ear normal.      Left Ear: External ear normal.      Nose: Nose normal.      Mouth/Throat:      Mouth: Mucous membranes are moist.   Eyes:      Extraocular Movements:  Extraocular movements intact.      Pupils: Pupils are equal, round, and reactive to light.   Cardiovascular:      Rate and Rhythm: Normal rate. Rhythm irregular.      Pulses: Normal pulses.   Pulmonary:      Comments: Mechanical breath sounds  Abdominal:      General: There is no distension.      Palpations: Abdomen is soft.      Tenderness: There is no abdominal tenderness.   Musculoskeletal:         General: No swelling or tenderness.      Cervical back: Normal range of motion and neck supple.   Skin:     General: Skin is warm and dry.      Capillary Refill: Capillary refill takes less than 2 seconds.   Neurological:      General: No focal deficit present.      Mental Status: She is alert and oriented to person, place, and time.      Comments: Sedated / paralyzed   Psychiatric:         Mood and Affect: Mood normal.         Behavior: Behavior normal.            Vents:  Vent Mode: A/C (09/29/23 1220)  Ventilator Initiated: Yes (09/27/23 1158)  Set Rate: 28 BPM (09/29/23 1220)  Vt Set: 275 mL (09/29/23 1220)  PEEP/CPAP: 10 cmH20 (09/29/23 1220)  Oxygen Concentration (%): 80 (09/29/23 1400)  Peak Airway Pressure: 37 cmH20 (09/29/23 1220)  Plateau Pressure: 31 cmH20 (09/29/23 1220)  Total Ve: 8.61 L/m (09/29/23 1220)  Negative Inspiratory Force (cm H2O): 0 (09/29/23 1220)  F/VT Ratio<105 (RSBI): (!) 90.61 (09/29/23 1220)  Lines/Drains/Airways       Central Venous Catheter Line  Duration             Percutaneous Central Line Insertion/Assessment - Triple Lumen  09/28/23 0845 Internal Jugular Right 1 day              Drain  Duration                  NG/OG Tube 09/27/23 1141 orogastric Center mouth 2 days         Urethral Catheter 09/27/23 1151 16 Fr. 2 days              Airway  Duration                  Airway - Non-Surgical 09/27/23 1139 Endotracheal Tube 2 days              Arterial Line  Duration             Arterial Line 09/27/23 1713 Right Radial 1 day              Peripheral Intravenous Line  Duration                   Midline Catheter Insertion/Assessment  - Double Lumen Left basilic vein (medial side of arm) -- days         Peripheral IV - Single Lumen 09/23/23 1753 20 G Right Antecubital 5 days                  Significant Labs:    CBC/Anemia Profile:  Recent Labs   Lab 09/28/23  0241 09/28/23  1217 09/28/23  1516 09/28/23  1924 09/29/23  0347   WBC 21.20*  --   --   --  23.46*   HGB 10.6*  --   --   --  10.1*   HCT 35.4*   < > 36 38 34.9*     --   --   --  291   MCV 73*  --   --   --  76*   RDW 15.7*  --   --   --  15.9*    < > = values in this interval not displayed.        Chemistries:  Recent Labs   Lab 09/27/23  1732 09/28/23 0241 09/29/23  0347   NA  --  137 133*   K 4.4 3.3* 4.0   CL  --  91* 89*   CO2  --  33* 33*   BUN  --  24* 37*   CREATININE  --  1.1 1.3   CALCIUM  --  9.5 9.0   ALBUMIN  --  3.2* 2.9*   PROT  --  6.9 6.7   BILITOT  --  1.3* 0.8   ALKPHOS  --  113 99   ALT  --  10 9*   AST  --  18 16   MG  --  1.9 1.9   PHOS  --  2.6* 3.9       All pertinent labs within the past 24 hours have been reviewed.    Significant Imaging:  I have reviewed all pertinent imaging results/findings within the past 24 hours.      ABG  Recent Labs   Lab 09/29/23  1252   PH 7.290*   PO2 84   PCO2 84.6*   HCO3 40.7*   BE 14     Assessment/Plan:     Pulmonary  * Acute respiratory failure with hypoxia and hypercarbia  Patient with Hypercapnic and Hypoxic Respiratory failure which is Acute on chronic.  she is not on home oxygen. Supplemental oxygen was provided and noted- Vent Mode: A/C  Oxygen Concentration (%):  [] 80  Resp Rate Total:  [28 br/min-33 br/min] 28 br/min  Vt Set:  [250 mL-300 mL] 275 mL  PEEP/CPAP:  [8 gmV40-85 cmH20] 10 cmH20  Mean Airway Pressure:  [15 wfB40-59 cmH20] 17 cmH20    .   Signs/symptoms of respiratory failure include- tachypnea, increased work of breathing, respiratory distress and use of accessory muscles. Contributing diagnoses includes - COPD and Pulmonary Embolus Labs and images were  reviewed. Patient Has recent ABG, which has been reviewed. Will treat underlying causes and adjust management of respiratory failure as follows-     67yo F with history of HFpEF, pulm htn, MVR on warfarin, AF, COPD, htn, T2DM, and CKD3 presenting as transfer from [facility] ICU for AHRF. Etiology due to PE with evidence of R heart strain on TTE vs pulmonary htn with PASP 52 mmhg vs viral illness (positive enterovirus and rhinovirus).  Pt has been treated with broad spectrun antibiotics and steroids at OSH.   She follows with Dr. Carvalho as outpatient.    --Wean oxygen as tolerated  --Serial ABG's  --Continue Zosyn  --Solumedrol stopped, started decadron 20mg QD for 5d followed by 10mg QD for 5d  --Scheduled Duo Nebs  --Continue breo, spiriva  --CXR  -- Daily proning    Acute bronchitis due to Rhinovirus  --See AHRF    COPD (chronic obstructive pulmonary disease)  --See respiratory failure    Cardiac/Vascular  Pulmonary hypertension  PASP on TTE 52mmHg. Noted to be 54mmHg on TTE in 2022.   Likely contributing to resp failure.     Chronic diastolic congestive heart failure  Patient is identified as having Diastolic (HFpEF) heart failure that is Chronic. CHF is currently uncontrolled due to Rales/crackles on pulmonary exam and Pulmonary edema/pleural effusion on CXR. Latest ECHO performed and demonstrates- Results for orders placed during the hospital encounter of 09/12/23    Echo    Interpretation Summary    Left Ventricle: The left ventricle is normal in size.  Mildly to moderately increased wall thickness. Septal motion is consistent with post-operative status. Septal flattening in diastole consistent with right ventricular volume overload. There is normal systolic function with a visually estimated ejection fraction of 60 - 65%. There is indeterminate diastolic function.    Left Atrium: Left atrium is severely dilated.    Mitral Valve: There is a mechanical valve in the mitral position. The mean pressure gradient across  the mitral valve is 5.5 mmHg at a heart rate of 108 bpm. There is trace to mild regurgitation.    Right Ventricle: Right ventricle was not well visualized due to poor acoustic window.  Likely dilated to some extent based on subcostal images.  Systolic function appears normal based on limited images.    Right Atrium: Right atrium is dilated.    Tricuspid Valve: There is trace to mild regurgitation.    The pulmonary artery systolic pressure is approximally 52 mm Hg.    IVC/SVC: Intermediate venous pressure at 8 mmHg.  . Continue Beta Blocker and monitor clinical status closely. Monitor on telemetry. Patient is off CHF pathway.  Monitor strict Is&Os and daily weights.  Place on fluid restriction of 1.5 L. Cardiology has been consulted. Continue to stress to patient importance of self efficacy and  on diet for CHF. Last BNP reviewed- and noted below   Recent Labs   Lab 09/26/23  2243   *   .    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 On coumadin. Follows with Dr. Messina outpatient.     --Hold Coumadin in setting of acute illness  --Continue heparin gtt  --Repeat ECHO    Essential (primary) hypertension  Home regimen: amlodipine 2.5mg and metoprolol  - Continue metoprolol tartrate 50 BID for rate control  - hold amlodipine in setting of acute illness, low threshold to restart    Chronic atrial fibrillation  In AF w/ RVR since admission confirmed no EKG, -150 s/p intubation    -- Rate controlled on 50 metoprolol BID  -- diltiazem drip started overnight 9/29 for HR persistently 140's    Hematology  Acute deep vein thrombosis (DVT) of both lower extremities  BLE Ultrasound: deep vein thrombosis of the right peroneal vein and the left posterior tibial vein.    --Continue heparin gtt per protocol       Acute pulmonary embolism  CTA 09/23: a nonocclusive filling defect within the right lower lobe superior segmental pulmonary  artery with extension into the inter lobar artery, multifocal pneumonia or pulmonary edema progressed from prior    --Continue heparin gtt per protocol  --Repeat ECHO  Pending clinical course, would benefit from continued systemic anticoagulation vs interventional cardiology consultation for thrombolysis vs thrombectomy       Endocrine  Type 2 diabetes mellitus  -Last A1c reviewed-   Lab Results   Component Value Date    HGBA1C 5.2 06/30/2023     Home regimen: ozempic  Hold oral antihyperglycemics while inpatient  SSI         Critical Care Daily Checklist:    A: Awake: RASS Goal/Actual Goal:    Actual:     B: Spontaneous Breathing Trial Performed?     C: SAT & SBT Coordinated?  yes                      D: Delirium: CAM-ICU Overall CAM-ICU: Positive   E: Early Mobility Performed? Yes   F: Feeding Goal: Goals: Meet % EEN, EPN by RD f/u date  Status: Nutrition Goal Status: new   Current Diet Order   Procedures    Diet NPO      AS: Analgesia/Sedation Fentanyl/propofol   T: Thromboembolic Prophylaxis heparin   H: HOB > 300 Yes   U: Stress Ulcer Prophylaxis (if needed) pantoprazole   G: Glucose Control ssi   B: Bowel Function Stool Occurrence: 0   I: Indwelling Catheter (Lines & Cuevas) Necessity R IJ triple lumen, L midline, R antecubital piv, OG, cuevas, ETT, Art line   D: De-escalation of Antimicrobials/Pharmacotherapies zosyn    Plan for the day/ETD Supinate, continue    Code Status:  Family/Goals of Care: Full Code  updated       Critical secondary to Patient has a condition that poses threat to life and bodily function: Severe Respiratory Distress      Critical care was time spent personally by me on the following activities: development of treatment plan with patient or surrogate and bedside caregivers, discussions with consultants, evaluation of patient's response to treatment, examination of patient, ordering and performing treatments and interventions, ordering and review of laboratory studies, ordering and  review of radiographic studies, pulse oximetry, re-evaluation of patient's condition. This critical care time did not overlap with that of any other provider or involve time for any procedures.     Juevncio Hinkle MD  Critical Care Medicine  [facility] - Cardiac Medical ICU

## 2023-09-29 NOTE — SUBJECTIVE & OBJECTIVE
Interval History/Significant Events: Initially increasing pressor requirements overnight, back to previous dose by morning. Able to wean FiO2 to 80% with prone positioning. Supinated on AM rounds without complication      Objective:     Vital Signs (Most Recent):  Temp: 97.9 °F (36.6 °C) (09/29/23 1100)  Pulse: 100 (09/29/23 1400)  Resp: (!) 29 (09/29/23 1400)  BP: (!) 123/55 (09/28/23 2136)  SpO2: (!) 91 % (09/29/23 1400) Vital Signs (24h Range):  Temp:  [97.5 °F (36.4 °C)-98.8 °F (37.1 °C)] 97.9 °F (36.6 °C)  Pulse:  [] 100  Resp:  [5-39] 29  SpO2:  [80 %-99 %] 91 %  BP: (123)/(55) 123/55  Arterial Line BP: ()/(31-61) 100/49   Weight: 90.3 kg (199 lb 1.2 oz)  Body mass index is 36.41 kg/m².      Intake/Output Summary (Last 24 hours) at 9/29/2023 1417  Last data filed at 9/29/2023 1400  Gross per 24 hour   Intake 3388.19 ml   Output 1205 ml   Net 2183.19 ml          Physical Exam  Vitals and nursing note reviewed.   Constitutional:       General: She is not in acute distress.     Appearance: She is not diaphoretic.   HENT:      Head: Normocephalic and atraumatic.      Right Ear: External ear normal.      Left Ear: External ear normal.      Nose: Nose normal.      Mouth/Throat:      Mouth: Mucous membranes are moist.   Eyes:      Extraocular Movements: Extraocular movements intact.      Pupils: Pupils are equal, round, and reactive to light.   Cardiovascular:      Rate and Rhythm: Normal rate. Rhythm irregular.      Pulses: Normal pulses.   Pulmonary:      Comments: Mechanical breath sounds  Abdominal:      General: There is no distension.      Palpations: Abdomen is soft.      Tenderness: There is no abdominal tenderness.   Musculoskeletal:         General: No swelling or tenderness.      Cervical back: Normal range of motion and neck supple.   Skin:     General: Skin is warm and dry.      Capillary Refill: Capillary refill takes less than 2 seconds.   Neurological:      General: No focal deficit  present.      Mental Status: She is alert and oriented to person, place, and time.      Comments: Sedated / paralyzed   Psychiatric:         Mood and Affect: Mood normal.         Behavior: Behavior normal.            Vents:  Vent Mode: A/C (09/29/23 1220)  Ventilator Initiated: Yes (09/27/23 1158)  Set Rate: 28 BPM (09/29/23 1220)  Vt Set: 275 mL (09/29/23 1220)  PEEP/CPAP: 10 cmH20 (09/29/23 1220)  Oxygen Concentration (%): 80 (09/29/23 1400)  Peak Airway Pressure: 37 cmH20 (09/29/23 1220)  Plateau Pressure: 31 cmH20 (09/29/23 1220)  Total Ve: 8.61 L/m (09/29/23 1220)  Negative Inspiratory Force (cm H2O): 0 (09/29/23 1220)  F/VT Ratio<105 (RSBI): (!) 90.61 (09/29/23 1220)  Lines/Drains/Airways       Central Venous Catheter Line  Duration             Percutaneous Central Line Insertion/Assessment - Triple Lumen  09/28/23 0845 Internal Jugular Right 1 day              Drain  Duration                  NG/OG Tube 09/27/23 1141 orogastric Center mouth 2 days         Urethral Catheter 09/27/23 1151 16 Fr. 2 days              Airway  Duration                  Airway - Non-Surgical 09/27/23 1139 Endotracheal Tube 2 days              Arterial Line  Duration             Arterial Line 09/27/23 1713 Right Radial 1 day              Peripheral Intravenous Line  Duration                  Midline Catheter Insertion/Assessment  - Double Lumen Left basilic vein (medial side of arm) -- days         Peripheral IV - Single Lumen 09/23/23 1753 20 G Right Antecubital 5 days                  Significant Labs:    CBC/Anemia Profile:  Recent Labs   Lab 09/28/23  0241 09/28/23  1217 09/28/23  1516 09/28/23  1924 09/29/23  0347   WBC 21.20*  --   --   --  23.46*   HGB 10.6*  --   --   --  10.1*   HCT 35.4*   < > 36 38 34.9*     --   --   --  291   MCV 73*  --   --   --  76*   RDW 15.7*  --   --   --  15.9*    < > = values in this interval not displayed.        Chemistries:  Recent Labs   Lab 09/27/23  1732 09/28/23  0241 09/29/23  0349    NA  --  137 133*   K 4.4 3.3* 4.0   CL  --  91* 89*   CO2  --  33* 33*   BUN  --  24* 37*   CREATININE  --  1.1 1.3   CALCIUM  --  9.5 9.0   ALBUMIN  --  3.2* 2.9*   PROT  --  6.9 6.7   BILITOT  --  1.3* 0.8   ALKPHOS  --  113 99   ALT  --  10 9*   AST  --  18 16   MG  --  1.9 1.9   PHOS  --  2.6* 3.9       All pertinent labs within the past 24 hours have been reviewed.    Significant Imaging:  I have reviewed all pertinent imaging results/findings within the past 24 hours.

## 2023-09-29 NOTE — ASSESSMENT & PLAN NOTE
Patient is identified as having Diastolic (HFpEF) heart failure that is Chronic. CHF is currently uncontrolled due to Rales/crackles on pulmonary exam and Pulmonary edema/pleural effusion on CXR. Latest ECHO performed and demonstrates- Results for orders placed during the hospital encounter of 09/12/23    Echo    Interpretation Summary    Left Ventricle: The left ventricle is normal in size.  Mildly to moderately increased wall thickness. Septal motion is consistent with post-operative status. Septal flattening in diastole consistent with right ventricular volume overload. There is normal systolic function with a visually estimated ejection fraction of 60 - 65%. There is indeterminate diastolic function.    Left Atrium: Left atrium is severely dilated.    Mitral Valve: There is a mechanical valve in the mitral position. The mean pressure gradient across the mitral valve is 5.5 mmHg at a heart rate of 108 bpm. There is trace to mild regurgitation.    Right Ventricle: Right ventricle was not well visualized due to poor acoustic window.  Likely dilated to some extent based on subcostal images.  Systolic function appears normal based on limited images.    Right Atrium: Right atrium is dilated.    Tricuspid Valve: There is trace to mild regurgitation.    The pulmonary artery systolic pressure is approximally 52 mm Hg.    IVC/SVC: Intermediate venous pressure at 8 mmHg.  . Continue Beta Blocker and monitor clinical status closely. Monitor on telemetry. Patient is off CHF pathway.  Monitor strict Is&Os and daily weights.  Place on fluid restriction of 1.5 L. Cardiology has been consulted. Continue to stress to patient importance of self efficacy and  on diet for CHF. Last BNP reviewed- and noted below   Recent Labs   Lab 09/26/23  3153   *   .

## 2023-09-29 NOTE — PLAN OF CARE
[facility] - Cardiac Medical ICU  Discharge Reassessment    Primary Care Provider: Nubia Crocker MD    Expected Discharge Date: 10/3/2023    Reassessment (most recent)       Discharge Reassessment - 09/29/23 1636          Discharge Reassessment    Assessment Type Discharge Planning Reassessment     Did the patient's condition or plan change since previous assessment? No     Discharge Plan discussed with: Spouse/sig other     Name(s) and Number(s) Leo Almanzar, spouse/cp# [phone]     Communicated RUMA with patient/caregiver Date not available/Unable to determine     Discharge Plan A Long-term acute care facility (LTAC)     Discharge Plan B Skilled Nursing Facility     DME Needed Upon Discharge  other (see comments)   TBd    Transition of Care Barriers None     Why the patient remains in the hospital Requires continued medical care        Post-Acute Status    Post-Acute Authorization Placement     Post-Acute Placement Status Pending medical clearance/testing     Coverage PHN Managed Medicare     Discharge Delays None known at this time                   Patient not stable for discharge at this time.  SW will continue to follow patient's progress to discharge from MICU.  SW remains available for any family concerns or needs.    Oskar Crocker, IKE  [facility]  [phone]

## 2023-09-29 NOTE — ASSESSMENT & PLAN NOTE
Patient with Hypercapnic and Hypoxic Respiratory failure which is Acute on chronic.  she is not on home oxygen. Supplemental oxygen was provided and noted- Vent Mode: A/C  Oxygen Concentration (%):  [] 80  Resp Rate Total:  [28 br/min-33 br/min] 28 br/min  Vt Set:  [250 mL-300 mL] 275 mL  PEEP/CPAP:  [8 gcI72-81 cmH20] 10 cmH20  Mean Airway Pressure:  [15 naL02-72 cmH20] 17 cmH20    .   Signs/symptoms of respiratory failure include- tachypnea, increased work of breathing, respiratory distress and use of accessory muscles. Contributing diagnoses includes - COPD and Pulmonary Embolus Labs and images were reviewed. Patient Has recent ABG, which has been reviewed. Will treat underlying causes and adjust management of respiratory failure as follows-     69yo F with history of HFpEF, pulm htn, MVR on warfarin, AF, COPD, htn, T2DM, and CKD3 presenting as transfer from [facility] ICU for AHRF. Etiology due to PE with evidence of R heart strain on TTE vs pulmonary htn with PASP 52 mmhg vs viral illness (positive enterovirus and rhinovirus).  Pt has been treated with broad spectrun antibiotics and steroids at OSH.   She follows with Dr. Carvalho as outpatient.    --Wean oxygen as tolerated  --Serial ABG's  --Continue Zosyn  --Solumedrol stopped, started decadron 20mg QD for 5d followed by 10mg QD for 5d  --Scheduled Duo Nebs  --Continue breo, spiriva  --CXR  -- Daily proning

## 2023-09-29 NOTE — ASSESSMENT & PLAN NOTE
Home regimen: amlodipine 2.5mg and metoprolol  - Continue metoprolol tartrate 50 BID for rate control  - hold amlodipine in setting of acute illness, low threshold to restart

## 2023-09-29 NOTE — ASSESSMENT & PLAN NOTE
In AF w/ RVR since admission confirmed no EKG, -150 s/p intubation    -- Rate controlled on 50 metoprolol BID  -- diltiazem drip started overnight 9/29 for HR persistently 140's

## 2023-09-30 NOTE — PLAN OF CARE
MICU DAILY GOALS     Family/Goals of care/Code Status   Code Status: Full Code    24H Vital Sign Range  Temp:  [96.7 °F (35.9 °C)-97.7 °F (36.5 °C)]   Pulse:  [69-92]   Resp:  [28-35]   BP: (119-120)/(53-54)   SpO2:  [92 %-100 %]   Arterial Line BP: (110-127)/(48-56)      Shift Events   No acute events throughout shift. Vent changes made today; currently at a rate of 32, , Fio2 80%. Last p/f ratio was 93    AWAKE RASS: Goal -    Actual - RASS (Quiroz Agitation-Sedation Scale): unarousable    Restraint necessity: Not necessary   BREATHE SBT: Not attempted    Coordinate A & B, analgesics/sedatives Pain: managed   SAT: Not attempted   Delirium CAM-ICU: Overall CAM-ICU: Positive   Early(intubated/ Progressive (non-intubated) Mobility MOVE Screen (INTUBATED ONLY): Not attempted    Activity: Activity Management: Patient unable to perform activities   Feeding/Nutrition Diet order: Diet/Nutrition Received: NPO, tube feeding,     Thrombus DVT prophylaxis: VTE Required Core Measure: Pharmacological prophylaxis initiated/maintained   HOB Elevation Head of Bed (HOB) Positioning: HOB flat   Ulcer Prophylaxis GI: yes   Glucose control managed Glycemic Management: blood glucose monitored   Skin Skin assessed during: Daily Assessment    [] No Altered Skin Integrity Present    []Prevention Measures Documented      [] Yes- Altered Skin Integrity Present or Discovered   [] LDA Added if Not in Epic (Describe Wound)   [] New Altered Skin Integrity was Present on Admit and Documented in LDA   [] Wound Image Taken    Wound Care Consulted? No    Attending Nurse:  Kristina Barbour RN/Staff Member:      Bowel Function no issues    Indwelling Catheter Necessity      Urethral Catheter 09/27/23 1151 16 Fr.-Reason for Continuing Urinary Catheterization: Critically ill in ICU and requiring hourly monitoring of intake/output    Percutaneous Central Line Insertion/Assessment - Triple Lumen  09/28/23 0845 Internal Jugular Right-Line  Necessity Review: Hemodynamic instability       De-escalation Antibiotics No       VS and assessment per flow sheet, patient progressing towards goals as tolerated, plan of care reviewed with family, all concerns addressed, will continue to monitor.

## 2023-09-30 NOTE — SUBJECTIVE & OBJECTIVE
Interval History/Significant Events: Proning daily. Able to wean down to 70% FiO2. RR 35 and Vt of 275. Will trend ABG to see if there is any improvement. Continue ARDSnet protocol.    Review of Systems   Unable to perform ROS: Intubated     Objective:     Vital Signs (Most Recent):  Temp: 97 °F (36.1 °C) (09/30/23 1101)  Pulse: 69 (09/30/23 1400)  Resp: (!) 35 (09/30/23 1400)  BP: (!) 120/54 (09/30/23 0848)  SpO2: 95 % (09/30/23 1400) Vital Signs (24h Range):  Temp:  [96.7 °F (35.9 °C)-98.3 °F (36.8 °C)] 97 °F (36.1 °C)  Pulse:  [] 69  Resp:  [28-35] 35  SpO2:  [91 %-100 %] 95 %  BP: (119-120)/(53-54) 120/54  Arterial Line BP: (103-127)/(48-56) 122/56   Weight: 90.3 kg (199 lb 1.2 oz)  Body mass index is 36.41 kg/m².      Intake/Output Summary (Last 24 hours) at 9/30/2023 1430  Last data filed at 9/30/2023 1400  Gross per 24 hour   Intake 3356.49 ml   Output 2145 ml   Net 1211.49 ml          Physical Exam  Vitals and nursing note reviewed.   Constitutional:       General: She is not in acute distress.     Appearance: She is not diaphoretic.   HENT:      Head: Normocephalic and atraumatic.      Right Ear: External ear normal.      Left Ear: External ear normal.      Nose: Nose normal.      Mouth/Throat:      Mouth: Mucous membranes are moist.   Eyes:      Extraocular Movements: Extraocular movements intact.      Pupils: Pupils are equal, round, and reactive to light.   Cardiovascular:      Rate and Rhythm: Normal rate. Rhythm irregular.      Pulses: Normal pulses.   Pulmonary:      Comments: Mechanical breath sounds  Abdominal:      General: There is no distension.      Palpations: Abdomen is soft.      Tenderness: There is no abdominal tenderness.   Musculoskeletal:         General: No swelling or tenderness.      Cervical back: Normal range of motion and neck supple.   Skin:     General: Skin is warm and dry.      Capillary Refill: Capillary refill takes less than 2 seconds.   Neurological:      General: No  focal deficit present.      Mental Status: She is alert and oriented to person, place, and time.      Comments: Sedated / paralyzed   Psychiatric:         Mood and Affect: Mood normal.         Behavior: Behavior normal.            Vents:  Vent Mode: A/C (09/30/23 1116)  Ventilator Initiated: Yes (09/27/23 1158)  Set Rate: 35 BPM (09/30/23 1116)  Vt Set: 275 mL (09/30/23 1116)  PEEP/CPAP: 10 cmH20 (09/30/23 1116)  Oxygen Concentration (%): 60 (09/30/23 1400)  Peak Airway Pressure: 36 cmH20 (09/30/23 1116)  Plateau Pressure: 28 cmH20 (09/30/23 1116)  Total Ve: 9.48 L/m (09/30/23 1116)  Negative Inspiratory Force (cm H2O): 0 (09/30/23 1301)  F/VT Ratio<105 (RSBI): 129.63 (09/30/23 1116)  Lines/Drains/Airways       Central Venous Catheter Line  Duration             Percutaneous Central Line Insertion/Assessment - Triple Lumen  09/28/23 0845 Internal Jugular Right 2 days              Drain  Duration                  NG/OG Tube 09/27/23 1141 orogastric Center mouth 3 days         Urethral Catheter 09/27/23 1151 16 Fr. 3 days         Fecal Incontinence  09/30/23 0224 <1 day              Airway  Duration                  Airway - Non-Surgical 09/27/23 1139 Endotracheal Tube 3 days              Arterial Line  Duration             Arterial Line 09/27/23 1713 Right Radial 2 days              Peripheral Intravenous Line  Duration                  Midline Catheter Insertion/Assessment  - Double Lumen Left basilic vein (medial side of arm) -- days         Peripheral IV - Single Lumen 09/23/23 1753 20 G Right Antecubital 6 days                  Significant Labs:    CBC/Anemia Profile:  Recent Labs   Lab 09/29/23  0347 09/29/23  2026 09/30/23  0314 09/30/23  0454 09/30/23  0723 09/30/23  1004   WBC 23.46*  --  16.94*  --   --   --    HGB 10.1*  --  9.3*  --   --   --    HCT 34.9*   < > 31.6* 31* 32* 32*     --  229  --   --   --    MCV 76*  --  74*  --   --   --    RDW 15.9*  --  15.8*  --   --   --     < > = values  in this interval not displayed.        Chemistries:  Recent Labs   Lab 09/29/23  0347 09/30/23  0314   * 133*   K 4.0 3.7   CL 89* 87*   CO2 33* 37*   BUN 37* 48*   CREATININE 1.3 1.3   CALCIUM 9.0 8.0*   ALBUMIN 2.9* 2.5*   PROT 6.7 5.9*   BILITOT 0.8 0.7   ALKPHOS 99 78   ALT 9* 7*   AST 16 15   MG 1.9 2.2   PHOS 3.9 3.2       ABGs:   Recent Labs   Lab 09/30/23  1004   PH 7.360   PCO2 72.4*   HCO3 41.0*   POCSATURATED 86   BE 16     CMP:   Recent Labs   Lab 09/29/23  0347 09/30/23 0314   * 133*   K 4.0 3.7   CL 89* 87*   CO2 33* 37*   * 257*   BUN 37* 48*   CREATININE 1.3 1.3   CALCIUM 9.0 8.0*   PROT 6.7 5.9*   ALBUMIN 2.9* 2.5*   BILITOT 0.8 0.7   ALKPHOS 99 78   AST 16 15   ALT 9* 7*   ANIONGAP 11 9     All pertinent labs within the past 24 hours have been reviewed.    Significant Imaging:  I have reviewed all pertinent imaging results/findings within the past 24 hours.

## 2023-09-30 NOTE — ASSESSMENT & PLAN NOTE
-Last A1c reviewed-   Lab Results   Component Value Date    HGBA1C 5.2 06/30/2023       Inpatient Antihyperglycemic Regiment:  Antihyperglycemics (From admission, onward)    Start     Stop Route Frequency Ordered    09/28/23 1037  insulin aspart U-100 pen 0-10 Units         -- SubQ Every 4 hours PRN 09/28/23 0947          Blood Sugars (AccuCheck):  Recent Labs     09/29/23  1620 09/29/23  2006 09/30/23  0009 09/30/23  0431 09/30/23  0859 09/30/23  1216   POCTGLUCOSE 278* 278* 308* 255* 240* 264*

## 2023-09-30 NOTE — PLAN OF CARE
MICU DAILY GOALS     Family/Goals of care/Code Status   Code Status: Full Code    24H Vital Sign Range  Temp:  [96.7 °F (35.9 °C)-98.3 °F (36.8 °C)]   Pulse:  []   Resp:  [15-32]   BP: (119)/(53)   SpO2:  [80 %-100 %]   Arterial Line BP: (100-131)/(48-57)      Shift Events   No acute events throughout shift Patient P/F ratio remains low. Tidal volume, PEEP, and O2 adjusted.     AWAKE RASS: Goal -    Actual - RASS (Quiroz Agitation-Sedation Scale): unarousable    Restraint necessity: Not necessary   BREATHE SBT: Not attempted    Coordinate A & B, analgesics/sedatives Pain: managed   SAT: Not attempted   Delirium CAM-ICU: Overall CAM-ICU: Positive   Early(intubated/ Progressive (non-intubated) Mobility MOVE Screen (INTUBATED ONLY): Fail    Activity: Activity Management: Patient unable to perform activities   Feeding/Nutrition Diet order: Diet/Nutrition Received: NPO, tube feeding,     Thrombus DVT prophylaxis: VTE Required Core Measure: Pharmacological prophylaxis initiated/maintained   HOB Elevation Head of Bed (HOB) Positioning: HOB flat   Ulcer Prophylaxis GI: yes   Glucose control managed Glycemic Management: blood glucose monitored   Skin Skin assessed during: Admit    [] No Altered Skin Integrity Present    []Prevention Measures Documented      [] Yes- Altered Skin Integrity Present or Discovered   [] LDA Added if Not in Epic (Describe Wound)   [] New Altered Skin Integrity was Present on Admit and Documented in LDA   [] Wound Image Taken    Wound Care Consulted? No    Attending Nurse:  Darryl Barbour RN/Staff Member:      Bowel Function diarrhea    Indwelling Catheter Necessity      Urethral Catheter 09/27/23 1151 16 Fr.-Reason for Continuing Urinary Catheterization: Critically ill in ICU and requiring hourly monitoring of intake/output    Percutaneous Central Line Insertion/Assessment - Triple Lumen  09/28/23 0845 Internal Jugular Right-Line Necessity Review: Hemodynamic instability, Medication  caustic to vasculature     De-escalation Antibiotics No       VS and assessment per flow sheet, patient progressing towards goals as tolerated, plan of care reviewed with [unfilled] and family, all concerns addressed, will continue to monitor.

## 2023-09-30 NOTE — PROGRESS NOTES
[facility] - Cardiac Medical ICU  Critical Care Medicine  Progress Note    Patient Name: Elayne Almanzar  MRN: [id number]  Admission Date: 9/26/2023  Hospital Length of Stay: 4 days  Code Status: Full Code  Attending Provider: Lio Snell MD  Primary Care Provider: Nubia Crocker MD   Principal Problem: Acute respiratory failure with hypoxia and hypercarbia    Subjective:     HPI:  Ms. Servando Almanzar is a 68 year old female with HFpEF (60-65%), Pulmonary HTN, MVR on coumadin, permanent afib, COPD, HTN, T2DM, and CKD3. He is presenting as transfer from [facility] ICU for evaluation of acute hypoxemic respiratory failure likely secondary to PE vs pulmonary HTN noted on imaging with evidence of right heart strain on TTE.     She initially presented to OSH for SOB and cough. Infectious workup notable for enterovirus/rhinovirus and CT chest 9/18 with multifocal GGO bilaterally. She was trialed on IV abx, corticosteroids, and lasix without improvement with subsequent CTA chest positive acute PE, more specifically a nonocclusive filling defect within the right lower lobe superior segmental pulmonary artery with extension into the inter lobar artery. Lower extremity US also noted DVT to right peroneal vein and the left posterior tibial vein. TTE performed with septal flattening in diastole consistent with RV volume overload. PASP 52mmHg.    Given DVT/PE despite therapeutic INR on warfarin, patient transitioned to IV heparin ggt. She continued to deteriorate requiring continous BIPAP thus patient [place] [facility] for higher lever of care and interventional cardiology consult for consideration of catheter-directed thrombolysis.       Hospital/ICU Course:  68F w/ HFpEF (EF 60-65%), COPD, pHTN (PASP 52), MVR on warfarin, persistent afib, COPD, HTN, T2DM, CKD3 presented as transfer from [facility] from [facility], possible contributing factors in addition to her comorbidities include pulmonary HTN, non-obstructing PE (likely 2/2  known DVT) found on imaging with evidence of right heart strain, viral pna (rhino/enterovirus positive). She was placed on IV heparin and had increasing BIPAP requirements overnight, this morning her O2 sats were declining to mid 80's maxed on BIPAP w/ 100% O2, so she was electively intubated before she further deteriorated. She briefly required pressor support post-intubation and was started on diuresis and stress dose steroids. She is now off pressors but remains in AF w/ RVR confirmed on EKG with HR ~130-150, was restarted on half her home dose of metoprolol tartrate at 50 BID with good rate control HR <100.  Proning daily on vent per PROSEVA trial      Interval History/Significant Events: Proning daily. Able to wean down to 70% FiO2. RR 35 and Vt of 275. Will trend ABG to see if there is any improvement. Continue ARDSnet protocol.    Review of Systems   Unable to perform ROS: Intubated     Objective:     Vital Signs (Most Recent):  Temp: 97 °F (36.1 °C) (09/30/23 1101)  Pulse: 69 (09/30/23 1400)  Resp: (!) 35 (09/30/23 1400)  BP: (!) 120/54 (09/30/23 0848)  SpO2: 95 % (09/30/23 1400) Vital Signs (24h Range):  Temp:  [96.7 °F (35.9 °C)-98.3 °F (36.8 °C)] 97 °F (36.1 °C)  Pulse:  [] 69  Resp:  [28-35] 35  SpO2:  [91 %-100 %] 95 %  BP: (119-120)/(53-54) 120/54  Arterial Line BP: (103-127)/(48-56) 122/56   Weight: 90.3 kg (199 lb 1.2 oz)  Body mass index is 36.41 kg/m².      Intake/Output Summary (Last 24 hours) at 9/30/2023 1430  Last data filed at 9/30/2023 1400  Gross per 24 hour   Intake 3356.49 ml   Output 2145 ml   Net 1211.49 ml          Physical Exam  Vitals and nursing note reviewed.   Constitutional:       General: She is not in acute distress.     Appearance: She is not diaphoretic.   HENT:      Head: Normocephalic and atraumatic.      Right Ear: External ear normal.      Left Ear: External ear normal.      Nose: Nose normal.      Mouth/Throat:      Mouth: Mucous membranes are moist.   Eyes:       Extraocular Movements: Extraocular movements intact.      Pupils: Pupils are equal, round, and reactive to light.   Cardiovascular:      Rate and Rhythm: Normal rate. Rhythm irregular.      Pulses: Normal pulses.   Pulmonary:      Comments: Mechanical breath sounds  Abdominal:      General: There is no distension.      Palpations: Abdomen is soft.      Tenderness: There is no abdominal tenderness.   Musculoskeletal:         General: No swelling or tenderness.      Cervical back: Normal range of motion and neck supple.   Skin:     General: Skin is warm and dry.      Capillary Refill: Capillary refill takes less than 2 seconds.   Neurological:      General: No focal deficit present.      Mental Status: She is alert and oriented to person, place, and time.      Comments: Sedated / paralyzed   Psychiatric:         Mood and Affect: Mood normal.         Behavior: Behavior normal.            Vents:  Vent Mode: A/C (09/30/23 1116)  Ventilator Initiated: Yes (09/27/23 1158)  Set Rate: 35 BPM (09/30/23 1116)  Vt Set: 275 mL (09/30/23 1116)  PEEP/CPAP: 10 cmH20 (09/30/23 1116)  Oxygen Concentration (%): 60 (09/30/23 1400)  Peak Airway Pressure: 36 cmH20 (09/30/23 1116)  Plateau Pressure: 28 cmH20 (09/30/23 1116)  Total Ve: 9.48 L/m (09/30/23 1116)  Negative Inspiratory Force (cm H2O): 0 (09/30/23 1301)  F/VT Ratio<105 (RSBI): 129.63 (09/30/23 1116)  Lines/Drains/Airways       Central Venous Catheter Line  Duration             Percutaneous Central Line Insertion/Assessment - Triple Lumen  09/28/23 0845 Internal Jugular Right 2 days              Drain  Duration                  NG/OG Tube 09/27/23 1141 orogastric Center mouth 3 days         Urethral Catheter 09/27/23 1151 16 Fr. 3 days         Fecal Incontinence  09/30/23 0224 <1 day              Airway  Duration                  Airway - Non-Surgical 09/27/23 1139 Endotracheal Tube 3 days              Arterial Line  Duration             Arterial Line 09/27/23 1713  Right Radial 2 days              Peripheral Intravenous Line  Duration                  Midline Catheter Insertion/Assessment  - Double Lumen Left basilic vein (medial side of arm) -- days         Peripheral IV - Single Lumen 09/23/23 1753 20 G Right Antecubital 6 days                  Significant Labs:    CBC/Anemia Profile:  Recent Labs   Lab 09/29/23 0347 09/29/23 2026 09/30/23  0314 09/30/23  0454 09/30/23  0723 09/30/23  1004   WBC 23.46*  --  16.94*  --   --   --    HGB 10.1*  --  9.3*  --   --   --    HCT 34.9*   < > 31.6* 31* 32* 32*     --  229  --   --   --    MCV 76*  --  74*  --   --   --    RDW 15.9*  --  15.8*  --   --   --     < > = values in this interval not displayed.        Chemistries:  Recent Labs   Lab 09/29/23 0347 09/30/23 0314   * 133*   K 4.0 3.7   CL 89* 87*   CO2 33* 37*   BUN 37* 48*   CREATININE 1.3 1.3   CALCIUM 9.0 8.0*   ALBUMIN 2.9* 2.5*   PROT 6.7 5.9*   BILITOT 0.8 0.7   ALKPHOS 99 78   ALT 9* 7*   AST 16 15   MG 1.9 2.2   PHOS 3.9 3.2       ABGs:   Recent Labs   Lab 09/30/23  1004   PH 7.360   PCO2 72.4*   HCO3 41.0*   POCSATURATED 86   BE 16     CMP:   Recent Labs   Lab 09/29/23 0347 09/30/23  0314   * 133*   K 4.0 3.7   CL 89* 87*   CO2 33* 37*   * 257*   BUN 37* 48*   CREATININE 1.3 1.3   CALCIUM 9.0 8.0*   PROT 6.7 5.9*   ALBUMIN 2.9* 2.5*   BILITOT 0.8 0.7   ALKPHOS 99 78   AST 16 15   ALT 9* 7*   ANIONGAP 11 9     All pertinent labs within the past 24 hours have been reviewed.    Significant Imaging:  I have reviewed all pertinent imaging results/findings within the past 24 hours.      ABG  Recent Labs   Lab 09/30/23  1004   PH 7.360   PO2 57*   PCO2 72.4*   HCO3 41.0*   BE 16     Assessment/Plan:     Pulmonary  * Acute respiratory failure with hypoxia and hypercarbia  Patient with Hypercapnic and Hypoxic Respiratory failure which is Acute on chronic.  she is not on home oxygen. Supplemental oxygen was provided and noted- Vent Mode:  A/C  Oxygen Concentration (%):  [60-80] 60  Resp Rate Total:  [28 br/min-35 br/min] 35 br/min  Vt Set:  [275 mL-300 mL] 275 mL  PEEP/CPAP:  [8 cmH20-10 cmH20] 10 cmH20  Mean Airway Pressure:  [16 ivK53-95 cmH20] 19 cmH20    .   Signs/symptoms of respiratory failure include- tachypnea, increased work of breathing, respiratory distress and use of accessory muscles. Contributing diagnoses includes - COPD and Pulmonary Embolus Labs and images were reviewed. Patient Has recent ABG, which has been reviewed. Will treat underlying causes and adjust management of respiratory failure as follows-     67yo F with history of HFpEF, pulm htn, MVR on warfarin, AF, COPD, htn, T2DM, and CKD3 presenting as transfer from [facility] ICU for AHRF. Etiology due to PE with evidence of R heart strain on TTE vs pulmonary htn with PASP 52 mmhg vs viral illness (positive enterovirus and rhinovirus).  Pt has been treated with broad spectrun antibiotics and steroids at OSH.   She follows with Dr. Carvalho as outpatient.    --Wean oxygen as tolerated  --Serial ABG's  --Continue Zosyn  --Solumedrol stopped, started decadron 20mg QD for 5d followed by 10mg QD for 5d  --Scheduled Duo Nebs  --Continue breo, spiriva  --CXR  -- Daily proning, trend ABG    Acute bronchitis due to Rhinovirus  --See AHRF    COPD (chronic obstructive pulmonary disease)  --See respiratory failure    Cardiac/Vascular  Pulmonary hypertension  PASP on TTE 52mmHg. Noted to be 54mmHg on TTE in 2022.   Likely contributing to resp failure.     Chronic diastolic congestive heart failure  Patient is identified as having Diastolic (HFpEF) heart failure that is Chronic. CHF is currently uncontrolled due to Rales/crackles on pulmonary exam and Pulmonary edema/pleural effusion on CXR. Latest ECHO performed and demonstrates- Results for orders placed during the hospital encounter of 09/12/23    Echo    Interpretation Summary    Left Ventricle: The left ventricle is normal in size.  Mildly to  moderately increased wall thickness. Septal motion is consistent with post-operative status. Septal flattening in diastole consistent with right ventricular volume overload. There is normal systolic function with a visually estimated ejection fraction of 60 - 65%. There is indeterminate diastolic function.    Left Atrium: Left atrium is severely dilated.    Mitral Valve: There is a mechanical valve in the mitral position. The mean pressure gradient across the mitral valve is 5.5 mmHg at a heart rate of 108 bpm. There is trace to mild regurgitation.    Right Ventricle: Right ventricle was not well visualized due to poor acoustic window.  Likely dilated to some extent based on subcostal images.  Systolic function appears normal based on limited images.    Right Atrium: Right atrium is dilated.    Tricuspid Valve: There is trace to mild regurgitation.    The pulmonary artery systolic pressure is approximally 52 mm Hg.    IVC/SVC: Intermediate venous pressure at 8 mmHg.  . Continue Beta Blocker and monitor clinical status closely. Monitor on telemetry. Patient is off CHF pathway.  Monitor strict Is&Os and daily weights.  Place on fluid restriction of 1.5 L. Cardiology has been consulted. Continue to stress to patient importance of self efficacy and  on diet for CHF. Last BNP reviewed- and noted below   Recent Labs   Lab 09/26/23  2243   *   .    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 On coumadin. Follows with Dr. Messina outpatient.     --Hold Coumadin in setting of acute illness  --Continue heparin gtt  --Repeat ECHO    Essential (primary) hypertension  Home regimen: amlodipine 2.5mg and metoprolol  - Continue metoprolol tartrate 50 BID for rate control  - hold amlodipine in setting of acute illness, low threshold to restart    Chronic atrial fibrillation  In AF w/ RVR since admission confirmed no EKG, -150 s/p  intubation    -- Rate controlled on 50 metoprolol BID  -- diltiazem drip started overnight 9/29 for HR persistently 140's    Hematology  Acute deep vein thrombosis (DVT) of both lower extremities  BLE Ultrasound: deep vein thrombosis of the right peroneal vein and the left posterior tibial vein.    --Continue heparin gtt per protocol       Acute pulmonary embolism  CTA 09/23: a nonocclusive filling defect within the right lower lobe superior segmental pulmonary artery with extension into the inter lobar artery, multifocal pneumonia or pulmonary edema progressed from prior    --Continue heparin gtt per protocol  --Repeat ECHO  Pending clinical course, would benefit from continued systemic anticoagulation vs interventional cardiology consultation for thrombolysis vs thrombectomy       Endocrine  Type 2 diabetes mellitus  -Last A1c reviewed-   Lab Results   Component Value Date    HGBA1C 5.2 06/30/2023       Inpatient Antihyperglycemic Regiment:  Antihyperglycemics (From admission, onward)    Start     Stop Route Frequency Ordered    09/28/23 1037  insulin aspart U-100 pen 0-10 Units         -- SubQ Every 4 hours PRN 09/28/23 0947          Blood Sugars (AccuCheck):  Recent Labs     09/29/23  1620 09/29/23 2006 09/30/23  0009 09/30/23  0431 09/30/23  0859 09/30/23  1216   POCTGLUCOSE 278* 278* 308* 255* 240* 264*            Critical Care Daily Checklist:    A: Awake: RASS Goal/Actual Goal:    Actual:     B: Spontaneous Breathing Trial Performed?     C: SAT & SBT Coordinated?  yes                      D: Delirium: CAM-ICU Overall CAM-ICU: Positive   E: Early Mobility Performed? Yes   F: Feeding Goal: Goals: Meet % EEN, EPN by RD f/u date  Status: Nutrition Goal Status: new   Current Diet Order   Procedures    Diet NPO      AS: Analgesia/Sedation Fentanyl and propofol   T: Thromboembolic Prophylaxis Heparin gtt   H: HOB > 300 Yes   U: Stress Ulcer Prophylaxis (if needed) pantoprazole   G: Glucose Control Sliding  scale insulin   B: Bowel Function Stool Occurrence: 1   I: Indwelling Catheter (Lines & Barnett) Necessity R IJ triple lumen, L midline, R antecubital, PIV, OG, Barnett, ETT, A-line   D: De-escalation of Antimicrobials/Pharmacotherapies Zosyn    Plan for the day/ETD Supinate, prone, trend ABG    Code Status:  Family/Goals of Care: Full Code  updated       Critical secondary to Patient has a condition that poses threat to life and bodily function: Severe Respiratory Distress      Critical care was time spent personally by me on the following activities: development of treatment plan with patient or surrogate and bedside caregivers, discussions with consultants, evaluation of patient's response to treatment, examination of patient, ordering and performing treatments and interventions, ordering and review of laboratory studies, ordering and review of radiographic studies, pulse oximetry, re-evaluation of patient's condition. This critical care time did not overlap with that of any other provider or involve time for any procedures.     Bird Evangelista,   Critical Care Medicine  [facility] - Cardiac Medical ICU

## 2023-09-30 NOTE — ASSESSMENT & PLAN NOTE
Patient with Hypercapnic and Hypoxic Respiratory failure which is Acute on chronic.  she is not on home oxygen. Supplemental oxygen was provided and noted- Vent Mode: A/C  Oxygen Concentration (%):  [60-80] 60  Resp Rate Total:  [28 br/min-35 br/min] 35 br/min  Vt Set:  [275 mL-300 mL] 275 mL  PEEP/CPAP:  [8 cmH20-10 cmH20] 10 cmH20  Mean Airway Pressure:  [16 qcO46-26 cmH20] 19 cmH20    .   Signs/symptoms of respiratory failure include- tachypnea, increased work of breathing, respiratory distress and use of accessory muscles. Contributing diagnoses includes - COPD and Pulmonary Embolus Labs and images were reviewed. Patient Has recent ABG, which has been reviewed. Will treat underlying causes and adjust management of respiratory failure as follows-     69yo F with history of HFpEF, pulm htn, MVR on warfarin, AF, COPD, htn, T2DM, and CKD3 presenting as transfer from [facility] ICU for AHRF. Etiology due to PE with evidence of R heart strain on TTE vs pulmonary htn with PASP 52 mmhg vs viral illness (positive enterovirus and rhinovirus).  Pt has been treated with broad spectrun antibiotics and steroids at OSH.   She follows with Dr. Carvalho as outpatient.    --Wean oxygen as tolerated  --Serial ABG's  --Continue Zosyn  --Solumedrol stopped, started decadron 20mg QD for 5d followed by 10mg QD for 5d  --Scheduled Duo Nebs  --Continue breo, spiriva  --CXR  -- Daily proning, trend ABG

## 2023-09-30 NOTE — ASSESSMENT & PLAN NOTE
Patient is identified as having Diastolic (HFpEF) heart failure that is Chronic. CHF is currently uncontrolled due to Rales/crackles on pulmonary exam and Pulmonary edema/pleural effusion on CXR. Latest ECHO performed and demonstrates- Results for orders placed during the hospital encounter of 09/12/23    Echo    Interpretation Summary    Left Ventricle: The left ventricle is normal in size.  Mildly to moderately increased wall thickness. Septal motion is consistent with post-operative status. Septal flattening in diastole consistent with right ventricular volume overload. There is normal systolic function with a visually estimated ejection fraction of 60 - 65%. There is indeterminate diastolic function.    Left Atrium: Left atrium is severely dilated.    Mitral Valve: There is a mechanical valve in the mitral position. The mean pressure gradient across the mitral valve is 5.5 mmHg at a heart rate of 108 bpm. There is trace to mild regurgitation.    Right Ventricle: Right ventricle was not well visualized due to poor acoustic window.  Likely dilated to some extent based on subcostal images.  Systolic function appears normal based on limited images.    Right Atrium: Right atrium is dilated.    Tricuspid Valve: There is trace to mild regurgitation.    The pulmonary artery systolic pressure is approximally 52 mm Hg.    IVC/SVC: Intermediate venous pressure at 8 mmHg.  . Continue Beta Blocker and monitor clinical status closely. Monitor on telemetry. Patient is off CHF pathway.  Monitor strict Is&Os and daily weights.  Place on fluid restriction of 1.5 L. Cardiology has been consulted. Continue to stress to patient importance of self efficacy and  on diet for CHF. Last BNP reviewed- and noted below   Recent Labs   Lab 09/26/23  7873   *   .

## 2023-10-01 NOTE — ASSESSMENT & PLAN NOTE
CTA 09/23: a nonocclusive filling defect within the right lower lobe superior segmental pulmonary artery with extension into the inter lobar artery, multifocal pneumonia or pulmonary edema progressed from prior    --Continue heparin gtt per protocol -- paused for supratherapeutic levels  --Repeat ECHO  Pending clinical course, would benefit from continued systemic anticoagulation vs interventional cardiology consultation for thrombolysis vs thrombectomy

## 2023-10-01 NOTE — SUBJECTIVE & OBJECTIVE
Interval History/Significant Events: Overnight HR persistently 120-130s, diltiazem drip + amiodarone were added with good control. FiO2 was increased to 90% d/t pulse oximetry showing O2 in low 90s. CXR unchanged per read. Pt experienced some oral bleeding 2/2 supratherapeutic heparin drip which was temporarily stopped.      Objective:     Vital Signs (Most Recent):  Temp: 98.2 °F (36.8 °C) (10/01/23 1101)  Pulse: 92 (10/01/23 1200)  Resp: (!) 31 (10/01/23 1200)  BP: 101/68 (10/01/23 1024)  SpO2: 96 % (10/01/23 1200) Vital Signs (24h Range):  Temp:  [94.4 °F (34.7 °C)-98.6 °F (37 °C)] 98.2 °F (36.8 °C)  Pulse:  [] 92  Resp:  [9-35] 31  SpO2:  [90 %-99 %] 96 %  BP: (101-118)/(56-68) 101/68  Arterial Line BP: ()/(51-62) 102/58   Weight: 90.3 kg (199 lb 1.2 oz)  Body mass index is 36.41 kg/m².      Intake/Output Summary (Last 24 hours) at 10/1/2023 1300  Last data filed at 10/1/2023 1200  Gross per 24 hour   Intake 3482.93 ml   Output 1485 ml   Net 1997.93 ml          Physical Exam  Vitals and nursing note reviewed.   Constitutional:       General: She is not in acute distress.     Appearance: She is not diaphoretic.   HENT:      Head: Normocephalic and atraumatic.      Right Ear: External ear normal.      Left Ear: External ear normal.      Nose: Nose normal.      Mouth/Throat:      Mouth: Mucous membranes are moist.   Eyes:      Extraocular Movements: Extraocular movements intact.      Pupils: Pupils are equal, round, and reactive to light.   Cardiovascular:      Rate and Rhythm: Normal rate. Rhythm irregular.      Pulses: Normal pulses.   Pulmonary:      Comments: Mechanical breath sounds  Abdominal:      General: There is no distension.      Palpations: Abdomen is soft.      Tenderness: There is no abdominal tenderness.   Musculoskeletal:         General: No swelling or tenderness.      Cervical back: Normal range of motion and neck supple.   Skin:     General: Skin is warm and dry.      Capillary  Refill: Capillary refill takes less than 2 seconds.   Neurological:      General: No focal deficit present.      Mental Status: She is alert and oriented to person, place, and time.      Comments: Sedated / paralyzed   Psychiatric:         Mood and Affect: Mood normal.         Behavior: Behavior normal.            Vents:  Vent Mode: A/C (10/01/23 1130)  Ventilator Initiated: Yes (09/27/23 1158)  Set Rate: 32 BPM (10/01/23 1130)  Vt Set: 275 mL (10/01/23 1130)  PEEP/CPAP: 10 cmH20 (10/01/23 1130)  Oxygen Concentration (%): 80 (10/01/23 1200)  Peak Airway Pressure: 35 cmH20 (10/01/23 1130)  Plateau Pressure: 28 cmH20 (10/01/23 1130)  Total Ve: 9.12 L/m (10/01/23 1130)  Negative Inspiratory Force (cm H2O): 0 (10/01/23 1130)  F/VT Ratio<105 (RSBI): 111.89 (10/01/23 1130)  Lines/Drains/Airways       Central Venous Catheter Line  Duration             Percutaneous Central Line Insertion/Assessment - Triple Lumen  09/28/23 0845 Internal Jugular Right 3 days              Drain  Duration                  NG/OG Tube 09/27/23 1141 orogastric Center mouth 4 days         Urethral Catheter 09/27/23 1151 16 Fr. 4 days         Fecal Incontinence  09/30/23 0224 1 day              Airway  Duration                  Airway - Non-Surgical 09/27/23 1139 Endotracheal Tube 4 days              Arterial Line  Duration             Arterial Line 09/27/23 1713 Right Radial 3 days              Peripheral Intravenous Line  Duration                  Midline Catheter Insertion/Assessment  - Double Lumen Left basilic vein (medial side of arm) -- days         Peripheral IV - Single Lumen 09/23/23 1753 20 G Right Antecubital 7 days                  Significant Labs:    CBC/Anemia Profile:  Recent Labs   Lab 09/30/23  0314 09/30/23  0454 10/01/23  0257 10/01/23  0744 10/01/23  1137   WBC 16.94*  --  21.80*  --   --    HGB 9.3*  --  9.0*  --   --    HCT 31.6*   < > 30.6* 31* 30*     --  250  --   --    MCV 74*  --  74*  --   --    RDW  15.8*  --  15.9*  --   --     < > = values in this interval not displayed.        Chemistries:  Recent Labs   Lab 09/30/23  0314 09/30/23  2129 10/01/23  0257   * 135* 135*   K 3.7 4.2 4.4   CL 87* 87* 86*   CO2 37* 39* 38*   BUN 48* 41* 46*   CREATININE 1.3 1.0 1.0   CALCIUM 8.0* 7.6* 7.9*   ALBUMIN 2.5*  --  2.4*   PROT 5.9*  --  5.6*   BILITOT 0.7  --  0.7   ALKPHOS 78  --  70   ALT 7*  --  9*   AST 15  --  19   MG 2.2  --  2.1   PHOS 3.2  --  3.6       All pertinent labs within the past 24 hours have been reviewed.    Significant Imaging:  I have reviewed all pertinent imaging results/findings within the past 24 hours.

## 2023-10-01 NOTE — ASSESSMENT & PLAN NOTE
Patient with Hypercapnic and Hypoxic Respiratory failure which is Acute on chronic.  she is not on home oxygen. Supplemental oxygen was provided and noted- Vent Mode: A/C  Oxygen Concentration (%):  [60-90] 80  Resp Rate Total:  [32 br/min-35 br/min] 32 br/min  Vt Set:  [275 mL] 275 mL  PEEP/CPAP:  [10 cmH20] 10 cmH20  Mean Airway Pressure:  [18 flI32-18 cmH20] 18 cmH20    .   Signs/symptoms of respiratory failure include- tachypnea, increased work of breathing, respiratory distress and use of accessory muscles. Contributing diagnoses includes - COPD and Pulmonary Embolus Labs and images were reviewed. Patient Has recent ABG, which has been reviewed. Will treat underlying causes and adjust management of respiratory failure as follows-     67yo F with history of HFpEF, pulm htn, MVR on warfarin, AF, COPD, htn, T2DM, and CKD3 presenting as transfer from [facility] ICU for AHRF. Etiology due to PE with evidence of R heart strain on TTE vs pulmonary htn with PASP 52 mmhg vs viral illness (positive enterovirus and rhinovirus).  Pt has been treated with broad spectrun antibiotics and steroids at OSH.   She follows with Dr. Carvalho as outpatient.    Treating via ARDSnet protocol informed by results of ARMA, PROSEVA, and dexa-ARDS trials    --Wean oxygen as tolerated  --Serial ABG's  --Continue Zosyn  --Solumedrol stopped, started decadron 20mg QD for 5d followed by 10mg QD for 5d  --Scheduled Duo Nebs  --Continue breo, spiriva  --CXR  -- Daily proning, trend ABG    -- adding diurel for vol status net (+) 4L since admission

## 2023-10-01 NOTE — ASSESSMENT & PLAN NOTE
In AF w/ RVR since admission confirmed no EKG, -150 s/p intubation      - diltiazem drip + amiodarone added 9/29 for refractory tachycardia, will increase metoprolol to home dose of 100 BID and wean amio/dilt as tolerated

## 2023-10-01 NOTE — NURSING
Pt. Temp at 94.4, down from previous check at 95.8. Janeth patel initiated at high setting. Will continue to monitor.

## 2023-10-01 NOTE — ASSESSMENT & PLAN NOTE
-Last A1c reviewed-   Lab Results   Component Value Date    HGBA1C 6.0 (H) 09/30/2023       Inpatient Antihyperglycemic Regiment:  Antihyperglycemics (From admission, onward)    Start     Stop Route Frequency Ordered    09/28/23 1037  insulin aspart U-100 pen 0-10 Units         -- SubQ Every 4 hours PRN 09/28/23 0947          Blood Sugars (AccuCheck):    Recent Labs     09/30/23  1725 09/30/23  1930 10/01/23  0003 10/01/23  0400 10/01/23  0831 10/01/23  1128   POCTGLUCOSE 247* 264* 253* 221* 264* 298*

## 2023-10-01 NOTE — PROGRESS NOTES
[facility] - Cardiac Medical ICU  Critical Care Medicine  Progress Note    Patient Name: Elayne Almanzar  MRN: [id number]  Admission Date: 9/26/2023  Hospital Length of Stay: 5 days  Code Status: Full Code  Attending Provider: Lio Snell MD  Primary Care Provider: Nubia Crocker MD   Principal Problem: Acute respiratory failure with hypoxia and hypercarbia    Subjective:     HPI:  Ms. Servando Almanzar is a 68 year old female with HFpEF (60-65%), Pulmonary HTN, MVR on coumadin, permanent afib, COPD, HTN, T2DM, and CKD3. He is presenting as transfer from [facility] ICU for evaluation of acute hypoxemic respiratory failure likely secondary to PE vs pulmonary HTN noted on imaging with evidence of right heart strain on TTE.     She initially presented to OSH for SOB and cough. Infectious workup notable for enterovirus/rhinovirus and CT chest 9/18 with multifocal GGO bilaterally. She was trialed on IV abx, corticosteroids, and lasix without improvement with subsequent CTA chest positive acute PE, more specifically a nonocclusive filling defect within the right lower lobe superior segmental pulmonary artery with extension into the inter lobar artery. Lower extremity US also noted DVT to right peroneal vein and the left posterior tibial vein. TTE performed with septal flattening in diastole consistent with RV volume overload. PASP 52mmHg.    Given DVT/PE despite therapeutic INR on warfarin, patient transitioned to IV heparin ggt. She continued to deteriorate requiring continous BIPAP thus patient transferred to [facility] MICU for higher lever of care and interventional cardiology consult for consideration of catheter-directed thrombolysis.       Hospital/ICU Course:  68F w/ HFpEF (EF 60-65%), COPD, pHTN (PASP 52), MVR on warfarin, persistent afib, COPD, HTN, T2DM, CKD3 presented as transfer from [facility] from [facility], possible contributing factors in addition to her comorbidities include pulmonary HTN, non-obstructing PE (likely 2/2  known DVT) found on imaging with evidence of right heart strain, viral pna (rhino/enterovirus positive). She was placed on IV heparin and had increasing BIPAP requirements overnight, this morning her O2 sats were declining to mid 80's maxed on BIPAP w/ 100% O2, so she was electively intubated before she further deteriorated. She briefly required pressor support post-intubation and was started on diuresis and stress dose steroids. She is now off pressors but remains in AF w/ RVR confirmed on EKG with HR ~130-150, was restarted on half her home dose of metoprolol tartrate at 50 BID with good rate control HR <100.  Treating as ARDS via protocols described in the PROSEVA, dexa-ARDS, and ARMA trials      Interval History/Significant Events: Overnight HR persistently 120-130s, diltiazem drip + amiodarone were added with good control. FiO2 was increased to 90% d/t pulse oximetry showing O2 in low 90s. CXR unchanged per read. Pt experienced some oral bleeding 2/2 supratherapeutic heparin drip which was temporarily stopped.      Objective:     Vital Signs (Most Recent):  Temp: 98.2 °F (36.8 °C) (10/01/23 1101)  Pulse: 92 (10/01/23 1200)  Resp: (!) 31 (10/01/23 1200)  BP: 101/68 (10/01/23 1024)  SpO2: 96 % (10/01/23 1200) Vital Signs (24h Range):  Temp:  [94.4 °F (34.7 °C)-98.6 °F (37 °C)] 98.2 °F (36.8 °C)  Pulse:  [] 92  Resp:  [9-35] 31  SpO2:  [90 %-99 %] 96 %  BP: (101-118)/(56-68) 101/68  Arterial Line BP: ()/(51-62) 102/58   Weight: 90.3 kg (199 lb 1.2 oz)  Body mass index is 36.41 kg/m².      Intake/Output Summary (Last 24 hours) at 10/1/2023 1300  Last data filed at 10/1/2023 1200  Gross per 24 hour   Intake 3482.93 ml   Output 1485 ml   Net 1997.93 ml          Physical Exam  Vitals and nursing note reviewed.   Constitutional:       General: She is not in acute distress.     Appearance: She is not diaphoretic.   HENT:      Head: Normocephalic and atraumatic.      Right Ear: External ear normal.      Left  Ear: External ear normal.      Nose: Nose normal.      Mouth/Throat:      Mouth: Mucous membranes are moist.   Eyes:      Extraocular Movements: Extraocular movements intact.      Pupils: Pupils are equal, round, and reactive to light.   Cardiovascular:      Rate and Rhythm: Normal rate. Rhythm irregular.      Pulses: Normal pulses.   Pulmonary:      Comments: Mechanical breath sounds  Abdominal:      General: There is no distension.      Palpations: Abdomen is soft.      Tenderness: There is no abdominal tenderness.   Musculoskeletal:         General: No swelling or tenderness.      Cervical back: Normal range of motion and neck supple.   Skin:     General: Skin is warm and dry.      Capillary Refill: Capillary refill takes less than 2 seconds.   Neurological:      General: No focal deficit present.      Mental Status: She is alert and oriented to person, place, and time.      Comments: Sedated / paralyzed   Psychiatric:         Mood and Affect: Mood normal.         Behavior: Behavior normal.            Vents:  Vent Mode: A/C (10/01/23 1130)  Ventilator Initiated: Yes (09/27/23 1158)  Set Rate: 32 BPM (10/01/23 1130)  Vt Set: 275 mL (10/01/23 1130)  PEEP/CPAP: 10 cmH20 (10/01/23 1130)  Oxygen Concentration (%): 80 (10/01/23 1200)  Peak Airway Pressure: 35 cmH20 (10/01/23 1130)  Plateau Pressure: 28 cmH20 (10/01/23 1130)  Total Ve: 9.12 L/m (10/01/23 1130)  Negative Inspiratory Force (cm H2O): 0 (10/01/23 1130)  F/VT Ratio<105 (RSBI): 111.89 (10/01/23 1130)  Lines/Drains/Airways       Central Venous Catheter Line  Duration             Percutaneous Central Line Insertion/Assessment - Triple Lumen  09/28/23 0845 Internal Jugular Right 3 days              Drain  Duration                  NG/OG Tube 09/27/23 1141 orogastric Center mouth 4 days         Urethral Catheter 09/27/23 1151 16 Fr. 4 days         Fecal Incontinence  09/30/23 0224 1 day              Airway  Duration                  Airway - Non-Surgical  09/27/23 1139 Endotracheal Tube 4 days              Arterial Line  Duration             Arterial Line 09/27/23 1713 Right Radial 3 days              Peripheral Intravenous Line  Duration                  Midline Catheter Insertion/Assessment  - Double Lumen Left basilic vein (medial side of arm) -- days         Peripheral IV - Single Lumen 09/23/23 1753 20 G Right Antecubital 7 days                  Significant Labs:    CBC/Anemia Profile:  Recent Labs   Lab 09/30/23 0314 09/30/23  0454 10/01/23  0257 10/01/23  0744 10/01/23  1137   WBC 16.94*  --  21.80*  --   --    HGB 9.3*  --  9.0*  --   --    HCT 31.6*   < > 30.6* 31* 30*     --  250  --   --    MCV 74*  --  74*  --   --    RDW 15.8*  --  15.9*  --   --     < > = values in this interval not displayed.        Chemistries:  Recent Labs   Lab 09/30/23  0314 09/30/23  2129 10/01/23  0257   * 135* 135*   K 3.7 4.2 4.4   CL 87* 87* 86*   CO2 37* 39* 38*   BUN 48* 41* 46*   CREATININE 1.3 1.0 1.0   CALCIUM 8.0* 7.6* 7.9*   ALBUMIN 2.5*  --  2.4*   PROT 5.9*  --  5.6*   BILITOT 0.7  --  0.7   ALKPHOS 78  --  70   ALT 7*  --  9*   AST 15  --  19   MG 2.2  --  2.1   PHOS 3.2  --  3.6       All pertinent labs within the past 24 hours have been reviewed.    Significant Imaging:  I have reviewed all pertinent imaging results/findings within the past 24 hours.      ABG  Recent Labs   Lab 10/01/23  1137   PH 7.321*   PO2 78*   PCO2 78.4*   HCO3 40.5*   BE 14     Assessment/Plan:     Pulmonary  * Acute respiratory failure with hypoxia and hypercarbia  Patient with Hypercapnic and Hypoxic Respiratory failure which is Acute on chronic.  she is not on home oxygen. Supplemental oxygen was provided and noted- Vent Mode: A/C  Oxygen Concentration (%):  [60-90] 80  Resp Rate Total:  [32 br/min-35 br/min] 32 br/min  Vt Set:  [275 mL] 275 mL  PEEP/CPAP:  [10 cmH20] 10 cmH20  Mean Airway Pressure:  [18 lrF15-68 cmH20] 18 cmH20    .   Signs/symptoms of respiratory failure  include- tachypnea, increased work of breathing, respiratory distress and use of accessory muscles. Contributing diagnoses includes - COPD and Pulmonary Embolus Labs and images were reviewed. Patient Has recent ABG, which has been reviewed. Will treat underlying causes and adjust management of respiratory failure as follows-     67yo F with history of HFpEF, pulm htn, MVR on warfarin, AF, COPD, htn, T2DM, and CKD3 presenting as transfer from [facility] ICU for AHRF. Etiology due to PE with evidence of R heart strain on TTE vs pulmonary htn with PASP 52 mmhg vs viral illness (positive enterovirus and rhinovirus).  Pt has been treated with broad spectrun antibiotics and steroids at OSH.   She follows with Dr. Carvalho as outpatient.    Treating via ARDSnet protocol informed by results of ARMA, PROSEVA, and dexa-ARDS trials    --Wean oxygen as tolerated  --Serial ABG's  --Continue Zosyn  --Solumedrol stopped, started decadron 20mg QD for 5d followed by 10mg QD for 5d  --Scheduled Duo Nebs  --Continue breo, spiriva  --CXR  -- Daily proning, trend ABG    -- adding diurel for vol status net (+) 4L since admission    Acute bronchitis due to Rhinovirus  --See AHRF    COPD (chronic obstructive pulmonary disease)  --See respiratory failure    Cardiac/Vascular  Pulmonary hypertension  PASP on TTE 52mmHg. Noted to be 54mmHg on TTE in 2022.   Likely contributing to resp failure.     Chronic diastolic congestive heart failure  Patient is identified as having Diastolic (HFpEF) heart failure that is Chronic. CHF is currently uncontrolled due to Rales/crackles on pulmonary exam and Pulmonary edema/pleural effusion on CXR. Latest ECHO performed and demonstrates- Results for orders placed during the hospital encounter of 09/12/23    Echo    Interpretation Summary    Left Ventricle: The left ventricle is normal in size.  Mildly to moderately increased wall thickness. Septal motion is consistent with post-operative status. Septal flattening in  diastole consistent with right ventricular volume overload. There is normal systolic function with a visually estimated ejection fraction of 60 - 65%. There is indeterminate diastolic function.    Left Atrium: Left atrium is severely dilated.    Mitral Valve: There is a mechanical valve in the mitral position. The mean pressure gradient across the mitral valve is 5.5 mmHg at a heart rate of 108 bpm. There is trace to mild regurgitation.    Right Ventricle: Right ventricle was not well visualized due to poor acoustic window.  Likely dilated to some extent based on subcostal images.  Systolic function appears normal based on limited images.    Right Atrium: Right atrium is dilated.    Tricuspid Valve: There is trace to mild regurgitation.    The pulmonary artery systolic pressure is approximally 52 mm Hg.    IVC/SVC: Intermediate venous pressure at 8 mmHg.  . Continue Beta Blocker and monitor clinical status closely. Monitor on telemetry. Patient is off CHF pathway.  Monitor strict Is&Os and daily weights.  Place on fluid restriction of 1.5 L. Cardiology has been consulted. Continue to stress to patient importance of self efficacy and  on diet for CHF. Last BNP reviewed- and noted below   Recent Labs   Lab 09/26/23  2243   *   .    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 On coumadin. Follows with Dr. Messina outpatient.     --Hold Coumadin in setting of acute illness  -- Heparin paused 9/29 d/t supratherapeutic levels with oral bleeding, rechecking labs and will resume as tolerated    Essential (primary) hypertension  Home regimen: amlodipine 2.5mg and metoprolol  - Continue metoprolol tartrate 50 BID for rate control  - hold amlodipine in setting of acute illness, low threshold to restart    Chronic atrial fibrillation  In AF w/ RVR since admission confirmed no EKG, -150 s/p intubation      - diltiazem drip +  amiodarone added 9/29 for refractory tachycardia, will increase metoprolol to home dose of 100 BID and wean amio/dilt as tolerated    Hematology  Acute deep vein thrombosis (DVT) of both lower extremities  BLE Ultrasound: deep vein thrombosis of the right peroneal vein and the left posterior tibial vein.    --Continue heparin gtt per protocol -- paused d/t supratherapeutic levels       Acute pulmonary embolism  CTA 09/23: a nonocclusive filling defect within the right lower lobe superior segmental pulmonary artery with extension into the inter lobar artery, multifocal pneumonia or pulmonary edema progressed from prior    --Continue heparin gtt per protocol -- paused for supratherapeutic levels  --Repeat ECHO  Pending clinical course, would benefit from continued systemic anticoagulation vs interventional cardiology consultation for thrombolysis vs thrombectomy       Endocrine  Type 2 diabetes mellitus  -Last A1c reviewed-   Lab Results   Component Value Date    HGBA1C 6.0 (H) 09/30/2023       Inpatient Antihyperglycemic Regiment:  Antihyperglycemics (From admission, onward)    Start     Stop Route Frequency Ordered    09/28/23 1037  insulin aspart U-100 pen 0-10 Units         -- SubQ Every 4 hours PRN 09/28/23 0947          Blood Sugars (AccuCheck):    Recent Labs     09/30/23  1725 09/30/23  1930 10/01/23  0003 10/01/23  0400 10/01/23  0831 10/01/23  1128   POCTGLUCOSE 247* 264* 253* 221* 264* 298*            Critical Care Daily Checklist:    A: Awake: RASS Goal/Actual Goal:    Actual:     B: Spontaneous Breathing Trial Performed? Spon. Breathing Trial Initiated?: Not initiated (10/01/23 1024)   C: SAT & SBT Coordinated?  yes                      D: Delirium: CAM-ICU Overall CAM-ICU: Positive   E: Early Mobility Performed? Yes   F: Feeding Goal: Goals: Meet % EEN, EPN by RD f/u date  Status: Nutrition Goal Status: new   Current Diet Order   Procedures    Diet NPO      AS: Analgesia/Sedation Propofol,  fentanyl, nimbex   T: Thromboembolic Prophylaxis Heparin paused   H: HOB > 300 Yes   U: Stress Ulcer Prophylaxis (if needed) pantoprazole   G: Glucose Control x   B: Bowel Function Stool Occurrence: 1   I: Indwelling Catheter (Lines & Barnett) Necessity R IJ triple lumen, L midline, R PIV, OG/ETT, Barnett, rectal fecal pouch? Art line   D: De-escalation of Antimicrobials/Pharmacotherapies Day 5 of zosyn    Plan for the day/ETD Wean FiO2, pronate/supinate, HR control    Code Status:  Family/Goals of Care: Full Code  Family updated       Critical secondary to Patient has a condition that poses threat to life and bodily function: Severe Respiratory Distress      Critical care was time spent personally by me on the following activities: development of treatment plan with patient or surrogate and bedside caregivers, discussions with consultants, evaluation of patient's response to treatment, examination of patient, ordering and performing treatments and interventions, ordering and review of laboratory studies, ordering and review of radiographic studies, pulse oximetry, re-evaluation of patient's condition. This critical care time did not overlap with that of any other provider or involve time for any procedures.     Juvencio Hinkle MD  Critical Care Medicine  [facility] - Cardiac Medical ICU

## 2023-10-01 NOTE — ASSESSMENT & PLAN NOTE
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 On coumadin. Follows with Dr. Messina outpatient.     --Hold Coumadin in setting of acute illness  -- Heparin paused 9/29 d/t supratherapeutic levels with oral bleeding, rechecking labs and will resume as tolerated

## 2023-10-01 NOTE — ASSESSMENT & PLAN NOTE
Patient is identified as having Diastolic (HFpEF) heart failure that is Chronic. CHF is currently uncontrolled due to Rales/crackles on pulmonary exam and Pulmonary edema/pleural effusion on CXR. Latest ECHO performed and demonstrates- Results for orders placed during the hospital encounter of 09/12/23    Echo    Interpretation Summary    Left Ventricle: The left ventricle is normal in size.  Mildly to moderately increased wall thickness. Septal motion is consistent with post-operative status. Septal flattening in diastole consistent with right ventricular volume overload. There is normal systolic function with a visually estimated ejection fraction of 60 - 65%. There is indeterminate diastolic function.    Left Atrium: Left atrium is severely dilated.    Mitral Valve: There is a mechanical valve in the mitral position. The mean pressure gradient across the mitral valve is 5.5 mmHg at a heart rate of 108 bpm. There is trace to mild regurgitation.    Right Ventricle: Right ventricle was not well visualized due to poor acoustic window.  Likely dilated to some extent based on subcostal images.  Systolic function appears normal based on limited images.    Right Atrium: Right atrium is dilated.    Tricuspid Valve: There is trace to mild regurgitation.    The pulmonary artery systolic pressure is approximally 52 mm Hg.    IVC/SVC: Intermediate venous pressure at 8 mmHg.  . Continue Beta Blocker and monitor clinical status closely. Monitor on telemetry. Patient is off CHF pathway.  Monitor strict Is&Os and daily weights.  Place on fluid restriction of 1.5 L. Cardiology has been consulted. Continue to stress to patient importance of self efficacy and  on diet for CHF. Last BNP reviewed- and noted below   Recent Labs   Lab 09/26/23  7723   *   .

## 2023-10-01 NOTE — ASSESSMENT & PLAN NOTE
BLE Ultrasound: deep vein thrombosis of the right peroneal vein and the left posterior tibial vein.    --Continue heparin gtt per protocol -- paused d/t supratherapeutic levels

## 2023-10-02 NOTE — ASSESSMENT & PLAN NOTE
CTA 09/23: a nonocclusive filling defect within the right lower lobe superior segmental pulmonary artery with extension into the inter lobar artery, multifocal pneumonia or pulmonary edema progressed from prior    --Continue heparin gtt per protocol -- paused for Hgb drop

## 2023-10-02 NOTE — PLAN OF CARE
MICU DAILY GOALS     Family/Goals of care/Code Status   Code Status: Full Code    24H Vital Sign Range  Temp:  [96.7 °F (35.9 °C)-98.6 °F (37 °C)]   Pulse:  []   Resp:  [9-33]   BP: (101-116)/(48-68)   SpO2:  [90 %-100 %]   Arterial Line BP: ()/(48-61)      Shift Events   No acute events throughout shift. Pt. Weaned from diltiazem. Heparin therapeutic, but hgb dropped to 7.4 from 9. Team notified, no obvious blood in OGT residuals or stool. Small bleed from lip. Tube feeds paused for high residuals and poor blood glucose control. WCTM    AWAKE RASS: Goal -    Actual - RASS (Quiroz Agitation-Sedation Scale): unarousable    Restraint necessity: Not necessary   BREATHE SBT: Not attempted    Coordinate A & B, analgesics/sedatives Pain: managed   SAT: Not attempted   Delirium CAM-ICU: Overall CAM-ICU: Positive   Early(intubated/ Progressive (non-intubated) Mobility MOVE Screen (INTUBATED ONLY): Not attempted    Activity: Activity Management: Patient unable to perform activities   Feeding/Nutrition Diet order: Diet/Nutrition Received: NPO, tube feeding,     Thrombus DVT prophylaxis: VTE Required Core Measure: Pharmacological prophylaxis initiated/maintained   HOB Elevation Head of Bed (HOB) Positioning: HOB flat   Ulcer Prophylaxis GI: yes   Glucose control uncontrolled Glycemic Management: blood glucose monitored   Skin Skin assessed during: Daily Assessment    [] No Altered Skin Integrity Present    []Prevention Measures Documented      [] Yes- Altered Skin Integrity Present or Discovered   [] LDA Added if Not in Epic (Describe Wound)   [] New Altered Skin Integrity was Present on Admit and Documented in LDA   [] Wound Image Taken    Wound Care Consulted? No    Attending Nurse:  [place] RN/Staff Member:      Bowel Function diarrhea    Indwelling Catheter Necessity      Urethral Catheter 09/27/23 1151 16 Fr.-Reason for Continuing Urinary Catheterization: Critically ill in ICU and requiring hourly  monitoring of intake/output    Percutaneous Central Line Insertion/Assessment - Triple Lumen  09/28/23 0845 Internal Jugular Right-Line Necessity Review: Hemodynamic instability     De-escalation Antibiotics No       VS and assessment per flow sheet, patient progressing towards goals as tolerated, plan of care reviewed with [unfilled] and family, all concerns addressed, will continue to monitor.

## 2023-10-02 NOTE — ASSESSMENT & PLAN NOTE
In AF w/ RVR since admission confirmed on EKG. Rate controlled, now off metoprolol d/t norepinephrine drip, will add amio if needed

## 2023-10-02 NOTE — ASSESSMENT & PLAN NOTE
Home regimen: amlodipine 2.5mg and metoprolol  - Pressures stable  - hold amlodipine in setting of acute illness, low threshold to restart

## 2023-10-02 NOTE — ASSESSMENT & PLAN NOTE
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 On coumadin. Follows with Dr. Messina outpatient.     --Hold Coumadin in setting of acute illness  -- Heparin paused 9/29 d/t supratherapeutic levels with oral bleeding, rechecking labs  -- 10/2 heparin paused d/t drop in Hb 9->7.1, no signs of overt bleeding, will monitor and resume as tolerated

## 2023-10-02 NOTE — ASSESSMENT & PLAN NOTE
Patient is identified as having Diastolic (HFpEF) heart failure that is Chronic. CHF is currently uncontrolled due to Rales/crackles on pulmonary exam and Pulmonary edema/pleural effusion on CXR. Latest ECHO performed and demonstrates- Results for orders placed during the hospital encounter of 09/12/23    Echo    Interpretation Summary    Left Ventricle: The left ventricle is normal in size.  Mildly to moderately increased wall thickness. Septal motion is consistent with post-operative status. Septal flattening in diastole consistent with right ventricular volume overload. There is normal systolic function with a visually estimated ejection fraction of 60 - 65%. There is indeterminate diastolic function.    Left Atrium: Left atrium is severely dilated.    Mitral Valve: There is a mechanical valve in the mitral position. The mean pressure gradient across the mitral valve is 5.5 mmHg at a heart rate of 108 bpm. There is trace to mild regurgitation.    Right Ventricle: Right ventricle was not well visualized due to poor acoustic window.  Likely dilated to some extent based on subcostal images.  Systolic function appears normal based on limited images.    Right Atrium: Right atrium is dilated.    Tricuspid Valve: There is trace to mild regurgitation.    The pulmonary artery systolic pressure is approximally 52 mm Hg.    IVC/SVC: Intermediate venous pressure at 8 mmHg.  . Continue Beta Blocker and monitor clinical status closely. Monitor on telemetry. Patient is off CHF pathway.  Monitor strict Is&Os and daily weights.  Place on fluid restriction of 1.5 L. Cardiology has been consulted. Continue to stress to patient importance of self efficacy and  on diet for CHF. Last BNP reviewed- and noted below   Recent Labs   Lab 09/26/23  9153   *   .

## 2023-10-02 NOTE — PLAN OF CARE
MICU DAILY GOALS     Family/Goals of care/Code Status   Code Status: Full Code    24H Vital Sign Range  Temp:  [96.3 °F (35.7 °C)-97.3 °F (36.3 °C)]   Pulse:  [69-89]   Resp:  [32-33]   BP: (113-121)/(48-52)   SpO2:  [93 %-100 %]   Arterial Line BP: ()/(48-55)      Shift Events   No acute events throughout shift. Pt's ABG 0830 p/f 135 - Supinated at 1000. Pt's ABG at 1500 p/f 105 - Prone at 1600. Lasix 120mg and Diuril started - -150s per hour. Wound care consult ordered for sacrum's skin tear. Pt remains on Levo, Fent, Prop, Heparin, Nimbex. BG controlled with scheduled and PRN insulin aspart.      AWAKE RASS: Goal -    Actual - RASS (Quiroz Agitation-Sedation Scale): unarousable    Restraint necessity: Not necessary   BREATHE SBT: Not attempted    Coordinate A & B, analgesics/sedatives Pain: managed   SAT: Not attempted   Delirium CAM-ICU: Overall CAM-ICU: Positive   Early(intubated/ Progressive (non-intubated) Mobility MOVE Screen (INTUBATED ONLY): Fail    Activity: Activity Management: Rolling - L1   Feeding/Nutrition Diet order: Diet/Nutrition Received: tube feeding,     Thrombus DVT prophylaxis: VTE Required Core Measure: Pharmacological prophylaxis initiated/maintained   HOB Elevation Head of Bed (HOB) Positioning: HOB at 30 degrees   Ulcer Prophylaxis GI: yes   Glucose control managed Glycemic Management: blood glucose monitored, supplemental insulin given   Skin Skin assessed during: Daily Assessment    [] No Altered Skin Integrity Present    []Prevention Measures Documented      [x] Yes- Altered Skin Integrity Present or Discovered   [] LDA Added if Not in Epic (Describe Wound)   [] New Altered Skin Integrity was Present on Admit and Documented in LDA   [] Wound Image Taken    Wound Care Consulted? No    Attending Nurse:  Beverley Barbour RN/Staff Member:   Thi   Bowel Function diarrhea    Indwelling Catheter Necessity      Urethral Catheter 09/27/23 1151 16 Fr.-Reason for Continuing Urinary  Catheterization: Critically ill in ICU and requiring hourly monitoring of intake/output    Percutaneous Central Line Insertion/Assessment - Triple Lumen  09/28/23 0845 Internal Jugular Right-Line Necessity Review: Hemodynamic instability, Medication caustic to vasculature  YES   De-escalation Antibiotics No       VS and assessment per flow sheet, patient progressing towards goals as tolerated, plan of care reviewed with  pt and family , all concerns addressed, will continue to monitor.

## 2023-10-02 NOTE — ASSESSMENT & PLAN NOTE
BLE Ultrasound: deep vein thrombosis of the right peroneal vein and the left posterior tibial vein.    --Continue heparin gtt per protocol -- paused d/t Hgb drop

## 2023-10-02 NOTE — ASSESSMENT & PLAN NOTE
Patient with Hypercapnic and Hypoxic Respiratory failure which is Acute on chronic.  she is not on home oxygen. Supplemental oxygen was provided and noted- Vent Mode: A/C  Oxygen Concentration (%):  [70-80] 70  Resp Rate Total:  [32 br/min] 32 br/min  Vt Set:  [275 mL] 275 mL  PEEP/CPAP:  [10 cmH20] 10 cmH20  Mean Airway Pressure:  [18 cmH20] 18 cmH20    .   Signs/symptoms of respiratory failure include- tachypnea, increased work of breathing, respiratory distress and use of accessory muscles. Contributing diagnoses includes - COPD and Pulmonary Embolus Labs and images were reviewed. Patient Has recent ABG, which has been reviewed. Will treat underlying causes and adjust management of respiratory failure as follows-     69yo F with history of HFpEF, pulm htn, MVR on warfarin, AF, COPD, htn, T2DM, and CKD3 presenting as transfer from [facility] ICU for AHRF. Etiology due to PE with evidence of R heart strain on TTE vs pulmonary htn with PASP 52 mmhg vs viral illness (positive enterovirus and rhinovirus).  Pt has been treated with broad spectrun antibiotics and steroids at OSH.   She follows with Dr. Carvalho as outpatient.    Treating via ARDSnet protocol informed by results of ARMA, PROSEVA, and dexa-ARDS trials    --Wean oxygen as tolerated  --Serial ABG's  --Continue Zosyn  --Solumedrol stopped, started decadron 20mg QD for 5d followed by 10mg QD for 5d  --Scheduled Duo Nebs  --Continue breo, spiriva  --CXR  -- Daily proning, trend ABG    -- diuresing with lasix 120 BID, diurel 500 BID

## 2023-10-02 NOTE — PROGRESS NOTES
[facility] - Cardiac Medical ICU  Critical Care Medicine  Progress Note    Patient Name: Elayne Almanzar  MRN: [id number]  Admission Date: 9/26/2023  Hospital Length of Stay: 6 days  Code Status: Full Code  Attending Provider: Que Muniz MD  Primary Care Provider: Nubia Crocker MD   Principal Problem: Acute respiratory failure with hypoxia and hypercarbia    Subjective:     HPI:  Ms. Servando Almanzar is a 68 year old female with HFpEF (60-65%), Pulmonary HTN, MVR on coumadin, permanent afib, COPD, HTN, T2DM, and CKD3. He is presenting as transfer from [facility] ICU for evaluation of acute hypoxemic respiratory failure likely secondary to PE vs pulmonary HTN noted on imaging with evidence of right heart strain on TTE.     She initially presented to OSH for SOB and cough. Infectious workup notable for enterovirus/rhinovirus and CT chest 9/18 with multifocal GGO bilaterally. She was trialed on IV abx, corticosteroids, and lasix without improvement with subsequent CTA chest positive acute PE, more specifically a nonocclusive filling defect within the right lower lobe superior segmental pulmonary artery with extension into the inter lobar artery. Lower extremity US also noted DVT to right peroneal vein and the left posterior tibial vein. TTE performed with septal flattening in diastole consistent with RV volume overload. PASP 52mmHg.    Given DVT/PE despite therapeutic INR on warfarin, patient transitioned to IV heparin ggt. She continued to deteriorate requiring continous BIPAP thus patient transferred to [facility] for higher lever of care and interventional cardiology consult for consideration of catheter-directed thrombolysis.       Hospital/ICU Course:  68F w/ HFpEF (EF 60-65%), COPD, pHTN (PASP 52), MVR on warfarin, persistent afib, COPD, HTN, T2DM, CKD3 presented as transfer from [facility] from [facility], possible contributing factors in addition to her comorbidities include pulmonary HTN, non-obstructing PE (likely  2/2 known DVT) found on imaging with evidence of right heart strain, viral pna (rhino/enterovirus positive). She was placed on IV heparin and had increasing BIPAP requirements overnight, this morning her O2 sats were declining to mid 80's maxed on BIPAP w/ 100% O2, so she was electively intubated before she further deteriorated. She briefly required pressor support post-intubation and was started on diuresis and stress dose steroids. She is now off pressors but remains in AF w/ RVR confirmed on EKG with HR ~130-150, was restarted on half her home dose of metoprolol tartrate at 50 BID with good rate control HR <100.  Treating as ARDS via protocols described in the PROSEVA, dexa-ARDS, and ARMA trials      Interval History/Significant Events: Overnight team encountered resistance in OG tube, a small clot was flushed and pt was found to have residual 400cc in stomach. TF paused overnight, resumed in AM at trickle rate. Pt stooling appropriately per nursing staff.       Objective:     Vital Signs (Most Recent):  Temp: 96.3 °F (35.7 °C) (10/02/23 1100)  Pulse: 82 (10/02/23 1300)  Resp: (!) 32 (10/02/23 1300)  BP: (!) 121/52 (10/02/23 0831)  SpO2: 95 % (10/02/23 1300) Vital Signs (24h Range):  Temp:  [96.3 °F (35.7 °C)-97.3 °F (36.3 °C)] 96.3 °F (35.7 °C)  Pulse:  [69-89] 82  Resp:  [9-33] 32  SpO2:  [93 %-100 %] 95 %  BP: (113-121)/(48-52) 121/52  Arterial Line BP: ()/(48-57) 96/49   Weight: 90.3 kg (199 lb 1.2 oz)  Body mass index is 36.41 kg/m².      Intake/Output Summary (Last 24 hours) at 10/2/2023 1359  Last data filed at 10/2/2023 1300  Gross per 24 hour   Intake 2848.28 ml   Output 1300 ml   Net 1548.28 ml          Physical Exam  Vitals and nursing note reviewed.   Constitutional:       General: She is not in acute distress.     Appearance: She is ill-appearing. She is not diaphoretic.   HENT:      Head: Normocephalic and atraumatic.      Right Ear: External ear normal.      Left Ear: External ear normal.       Nose: Nose normal.      Mouth/Throat:      Mouth: Mucous membranes are moist.      Comments: Oral bleeding  Eyes:      Extraocular Movements: Extraocular movements intact.      Pupils: Pupils are equal, round, and reactive to light.   Cardiovascular:      Rate and Rhythm: Normal rate. Rhythm irregular.      Pulses: Normal pulses.   Pulmonary:      Comments: Mechanical breath sounds  Abdominal:      General: There is no distension.      Palpations: Abdomen is soft. There is no mass.   Musculoskeletal:         General: No swelling or tenderness.      Cervical back: Normal range of motion and neck supple.   Skin:     General: Skin is warm and dry.      Capillary Refill: Capillary refill takes less than 2 seconds.      Comments: Scattered purpura over upper chest   Neurological:      Mental Status: She is alert.      Comments: Sedated / paralyzed   Psychiatric:         Mood and Affect: Mood normal.         Behavior: Behavior normal.            Vents:  Vent Mode: A/C (10/02/23 1217)  Ventilator Initiated: Yes (09/27/23 1158)  Set Rate: 32 BPM (10/02/23 1217)  Vt Set: 275 mL (10/02/23 1217)  PEEP/CPAP: 10 cmH20 (10/02/23 1217)  Oxygen Concentration (%): 70 (10/02/23 1300)  Peak Airway Pressure: 33 cmH20 (10/02/23 1217)  Plateau Pressure: 29 cmH20 (10/02/23 1217)  Total Ve: 9.11 L/m (10/02/23 1217)  Negative Inspiratory Force (cm H2O): 0 (10/02/23 1217)  F/VT Ratio<105 (RSBI): 112.68 (10/02/23 1217)  Lines/Drains/Airways       Central Venous Catheter Line  Duration             Percutaneous Central Line Insertion/Assessment - Triple Lumen  09/28/23 0845 Internal Jugular Right 4 days              Drain  Duration                  NG/OG Tube 09/27/23 1141 orogastric Center mouth 5 days         Urethral Catheter 09/27/23 1151 16 Fr. 5 days         Rectal Tube 10/01/23 2000 <1 day              Airway  Duration                  Airway - Non-Surgical 09/27/23 1139 Endotracheal Tube 5 days              Arterial Line  Duration              Arterial Line 09/27/23 1713 Right Radial 4 days              Peripheral Intravenous Line  Duration                  Midline Catheter Insertion/Assessment  - Double Lumen Left basilic vein (medial side of arm) -- days         Peripheral IV - Single Lumen 09/23/23 1753 20 G Right Antecubital 8 days                  Significant Labs:    CBC/Anemia Profile:  Recent Labs   Lab 10/01/23  0257 10/01/23  0745 10/01/23  1517 10/02/23  0240 10/02/23  1212   WBC 21.80*  --   --  29.34* 29.90*   HGB 9.0*  --   --  7.4* 7.1*   HCT 30.6*   < > 29* 23.6* 23.7*     --   --  232 248   MCV 74*  --   --  70* 74*   RDW 15.9*  --   --  16.2* 16.1*    < > = values in this interval not displayed.        Chemistries:  Recent Labs   Lab 10/01/23  0257 10/01/23  1245 10/02/23  0240   * 131* 130*   K 4.4 4.5 4.5   CL 86* 84* 83*   CO2 38* 35* 37*   BUN 46* 56* 70*   CREATININE 1.0 1.4 1.6*   CALCIUM 7.9* 7.6* 7.4*   ALBUMIN 2.4*  --  2.2*   PROT 5.6*  --  5.1*   BILITOT 0.7  --  0.8   ALKPHOS 70  --  67   ALT 9*  --  14   AST 19  --  32   MG 2.1  --  2.2   PHOS 3.6  --  4.9*       All pertinent labs within the past 24 hours have been reviewed.    Significant Imaging:  I have reviewed all pertinent imaging results/findings within the past 24 hours.      ABG  Recent Labs   Lab 10/02/23  1213   PH 7.314*   PO2 76*   PCO2 76.9*   HCO3 39.1*   BE 13     Assessment/Plan:     Pulmonary  * Acute respiratory failure with hypoxia and hypercarbia  Patient with Hypercapnic and Hypoxic Respiratory failure which is Acute on chronic.  she is not on home oxygen. Supplemental oxygen was provided and noted- Vent Mode: A/C  Oxygen Concentration (%):  [70-80] 70  Resp Rate Total:  [32 br/min] 32 br/min  Vt Set:  [275 mL] 275 mL  PEEP/CPAP:  [10 cmH20] 10 cmH20  Mean Airway Pressure:  [18 cmH20] 18 cmH20    .   Signs/symptoms of respiratory failure include- tachypnea, increased work of breathing, respiratory distress and use of accessory  muscles. Contributing diagnoses includes - COPD and Pulmonary Embolus Labs and images were reviewed. Patient Has recent ABG, which has been reviewed. Will treat underlying causes and adjust management of respiratory failure as follows-     69yo F with history of HFpEF, pulm htn, MVR on warfarin, AF, COPD, htn, T2DM, and CKD3 presenting as transfer from [facility] ICU for AHRF. Etiology due to PE with evidence of R heart strain on TTE vs pulmonary htn with PASP 52 mmhg vs viral illness (positive enterovirus and rhinovirus).  Pt has been treated with broad spectrun antibiotics and steroids at OSH.   She follows with Dr. Carvalho as outpatient.    Treating via ARDSnet protocol informed by results of ARMA, PROSEVA, and dexa-ARDS trials    --Wean oxygen as tolerated  --Serial ABG's  --Continue Zosyn  --Solumedrol stopped, started decadron 20mg QD for 5d followed by 10mg QD for 5d  --Scheduled Duo Nebs  --Continue breo, spiriva  --CXR  -- Daily proning, trend ABG    -- diuresing with lasix 120 BID, diurel 500 BID    Acute bronchitis due to Rhinovirus  --See AHRF, on empiric zosyn for bacterial coverage    COPD (chronic obstructive pulmonary disease)  --See respiratory failure    Cardiac/Vascular  Pulmonary hypertension  PASP on TTE 52mmHg. Noted to be 54mmHg on TTE in 2022.   Likely contributing to resp failure.     Chronic diastolic congestive heart failure  Patient is identified as having Diastolic (HFpEF) heart failure that is Chronic. CHF is currently uncontrolled due to Rales/crackles on pulmonary exam and Pulmonary edema/pleural effusion on CXR. Latest ECHO performed and demonstrates- Results for orders placed during the hospital encounter of 09/12/23    Echo    Interpretation Summary    Left Ventricle: The left ventricle is normal in size.  Mildly to moderately increased wall thickness. Septal motion is consistent with post-operative status. Septal flattening in diastole consistent with right ventricular volume overload.  There is normal systolic function with a visually estimated ejection fraction of 60 - 65%. There is indeterminate diastolic function.    Left Atrium: Left atrium is severely dilated.    Mitral Valve: There is a mechanical valve in the mitral position. The mean pressure gradient across the mitral valve is 5.5 mmHg at a heart rate of 108 bpm. There is trace to mild regurgitation.    Right Ventricle: Right ventricle was not well visualized due to poor acoustic window.  Likely dilated to some extent based on subcostal images.  Systolic function appears normal based on limited images.    Right Atrium: Right atrium is dilated.    Tricuspid Valve: There is trace to mild regurgitation.    The pulmonary artery systolic pressure is approximally 52 mm Hg.    IVC/SVC: Intermediate venous pressure at 8 mmHg.  . Continue Beta Blocker and monitor clinical status closely. Monitor on telemetry. Patient is off CHF pathway.  Monitor strict Is&Os and daily weights.  Place on fluid restriction of 1.5 L. Cardiology has been consulted. Continue to stress to patient importance of self efficacy and  on diet for CHF. Last BNP reviewed- and noted below   Recent Labs   Lab 09/26/23  2243   *   .    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 On coumadin. Follows with Dr. Messina outpatient.     --Hold Coumadin in setting of acute illness  -- Heparin paused 9/29 d/t supratherapeutic levels with oral bleeding, rechecking labs  -- 10/2 heparin paused d/t drop in Hb 9->7.1, no signs of overt bleeding, will monitor and resume as tolerated    Essential (primary) hypertension  Home regimen: amlodipine 2.5mg and metoprolol  - Pressures stable  - hold amlodipine in setting of acute illness, low threshold to restart    Chronic atrial fibrillation  In AF w/ RVR since admission confirmed on EKG. Rate controlled, now off metoprolol d/t norepinephrine drip, will  add amio if needed    Hematology  Acute deep vein thrombosis (DVT) of both lower extremities  BLE Ultrasound: deep vein thrombosis of the right peroneal vein and the left posterior tibial vein.    --Continue heparin gtt per protocol -- paused d/t Hgb drop       Acute pulmonary embolism  CTA 09/23: a nonocclusive filling defect within the right lower lobe superior segmental pulmonary artery with extension into the inter lobar artery, multifocal pneumonia or pulmonary edema progressed from prior    --Continue heparin gtt per protocol -- paused for Hgb drop        Endocrine  Type 2 diabetes mellitus  -Last A1c reviewed-   Lab Results   Component Value Date    HGBA1C 6.0 (H) 09/30/2023       Inpatient Antihyperglycemic Regiment:  Antihyperglycemics (From admission, onward)    Start     Stop Route Frequency Ordered    10/02/23 1600  insulin aspart U-100 pen 4 Units         -- SubQ 6 times per day 10/02/23 1324    09/28/23 1037  insulin aspart U-100 pen 0-10 Units         -- SubQ Every 4 hours PRN 09/28/23 0947          Blood Sugars (AccuCheck):    Recent Labs     10/01/23  1714 10/01/23  2115 10/01/23  2358 10/02/23  0341 10/02/23  0736 10/02/23  1211   POCTGLUCOSE 319* 338* 289* 280* 276* 282*            Critical Care Daily Checklist:    A: Awake: RASS Goal/Actual Goal:    Actual:     B: Spontaneous Breathing Trial Performed? Spon. Breathing Trial Initiated?: Not initiated (10/02/23 1217)   C: SAT & SBT Coordinated?  No, d/t patient condition                      D: Delirium: CAM-ICU Overall CAM-ICU: Positive   E: Early Mobility Performed? Yes   F: Feeding Goal: Goals: Meet % EEN, EPN by RD f/u date  Status: Nutrition Goal Status: new   Current Diet Order   Procedures    Diet NPO      AS: Analgesia/Sedation Propofol, fentanyl, nimbex   T: Thromboembolic Prophylaxis Heparin (paused d/t low Hgb)   H: HOB > 300 Yes   U: Stress Ulcer Prophylaxis (if needed) Pantoprazole   G: Glucose Control SSI + 4u basal BID   B:  Bowel Function Stool Occurrence: 1   I: Indwelling Catheter (Lines & Cuevas) Necessity Central line R IJ, midline L, PIV, OG, cuevas, rectal tube, ETT, art line   D: De-escalation of Antimicrobials/Pharmacotherapies Zosyn d5/7    Plan for the day/ETD Wean FiO2, diurese, prone    Code Status:  Family/Goals of Care: Full Code  Updated bedside       Critical secondary to Patient has a condition that poses threat to life and bodily function: Severe Respiratory Distress      Critical care was time spent personally by me on the following activities: development of treatment plan with patient or surrogate and bedside caregivers, discussions with consultants, evaluation of patient's response to treatment, examination of patient, ordering and performing treatments and interventions, ordering and review of laboratory studies, ordering and review of radiographic studies, pulse oximetry, re-evaluation of patient's condition. This critical care time did not overlap with that of any other provider or involve time for any procedures.     Juvencio Hinkle MD  Critical Care Medicine  [facility] - Cardiac Medical ICU

## 2023-10-02 NOTE — SUBJECTIVE & OBJECTIVE
Interval History/Significant Events: Overnight team encountered resistance in OG tube, a small clot was flushed and pt was found to have residual 400cc in stomach. TF paused overnight, resumed in AM at trickle rate. Pt stooling appropriately per nursing staff.       Objective:     Vital Signs (Most Recent):  Temp: 96.3 °F (35.7 °C) (10/02/23 1100)  Pulse: 82 (10/02/23 1300)  Resp: (!) 32 (10/02/23 1300)  BP: (!) 121/52 (10/02/23 0831)  SpO2: 95 % (10/02/23 1300) Vital Signs (24h Range):  Temp:  [96.3 °F (35.7 °C)-97.3 °F (36.3 °C)] 96.3 °F (35.7 °C)  Pulse:  [69-89] 82  Resp:  [9-33] 32  SpO2:  [93 %-100 %] 95 %  BP: (113-121)/(48-52) 121/52  Arterial Line BP: ()/(48-57) 96/49   Weight: 90.3 kg (199 lb 1.2 oz)  Body mass index is 36.41 kg/m².      Intake/Output Summary (Last 24 hours) at 10/2/2023 1359  Last data filed at 10/2/2023 1300  Gross per 24 hour   Intake 2848.28 ml   Output 1300 ml   Net 1548.28 ml          Physical Exam  Vitals and nursing note reviewed.   Constitutional:       General: She is not in acute distress.     Appearance: She is ill-appearing. She is not diaphoretic.   HENT:      Head: Normocephalic and atraumatic.      Right Ear: External ear normal.      Left Ear: External ear normal.      Nose: Nose normal.      Mouth/Throat:      Mouth: Mucous membranes are moist.      Comments: Oral bleeding  Eyes:      Extraocular Movements: Extraocular movements intact.      Pupils: Pupils are equal, round, and reactive to light.   Cardiovascular:      Rate and Rhythm: Normal rate. Rhythm irregular.      Pulses: Normal pulses.   Pulmonary:      Comments: Mechanical breath sounds  Abdominal:      General: There is no distension.      Palpations: Abdomen is soft. There is no mass.   Musculoskeletal:         General: No swelling or tenderness.      Cervical back: Normal range of motion and neck supple.   Skin:     General: Skin is warm and dry.      Capillary Refill: Capillary refill takes less than 2  seconds.      Comments: Scattered purpura over upper chest   Neurological:      Mental Status: She is alert.      Comments: Sedated / paralyzed   Psychiatric:         Mood and Affect: Mood normal.         Behavior: Behavior normal.            Vents:  Vent Mode: A/C (10/02/23 1217)  Ventilator Initiated: Yes (09/27/23 1158)  Set Rate: 32 BPM (10/02/23 1217)  Vt Set: 275 mL (10/02/23 1217)  PEEP/CPAP: 10 cmH20 (10/02/23 1217)  Oxygen Concentration (%): 70 (10/02/23 1300)  Peak Airway Pressure: 33 cmH20 (10/02/23 1217)  Plateau Pressure: 29 cmH20 (10/02/23 1217)  Total Ve: 9.11 L/m (10/02/23 1217)  Negative Inspiratory Force (cm H2O): 0 (10/02/23 1217)  F/VT Ratio<105 (RSBI): 112.68 (10/02/23 1217)  Lines/Drains/Airways       Central Venous Catheter Line  Duration             Percutaneous Central Line Insertion/Assessment - Triple Lumen  09/28/23 0845 Internal Jugular Right 4 days              Drain  Duration                  NG/OG Tube 09/27/23 1141 orogastric Center mouth 5 days         Urethral Catheter 09/27/23 1151 16 Fr. 5 days         Rectal Tube 10/01/23 2000 <1 day              Airway  Duration                  Airway - Non-Surgical 09/27/23 1139 Endotracheal Tube 5 days              Arterial Line  Duration             Arterial Line 09/27/23 1713 Right Radial 4 days              Peripheral Intravenous Line  Duration                  Midline Catheter Insertion/Assessment  - Double Lumen Left basilic vein (medial side of arm) -- days         Peripheral IV - Single Lumen 09/23/23 1753 20 G Right Antecubital 8 days                  Significant Labs:    CBC/Anemia Profile:  Recent Labs   Lab 10/01/23  0257 10/01/23  0745 10/01/23  1517 10/02/23  0240 10/02/23  1212   WBC 21.80*  --   --  29.34* 29.90*   HGB 9.0*  --   --  7.4* 7.1*   HCT 30.6*   < > 29* 23.6* 23.7*     --   --  232 248   MCV 74*  --   --  70* 74*   RDW 15.9*  --   --  16.2* 16.1*    < > = values in this interval not displayed.         Chemistries:  Recent Labs   Lab 10/01/23  0257 10/01/23  1245 10/02/23  0240   * 131* 130*   K 4.4 4.5 4.5   CL 86* 84* 83*   CO2 38* 35* 37*   BUN 46* 56* 70*   CREATININE 1.0 1.4 1.6*   CALCIUM 7.9* 7.6* 7.4*   ALBUMIN 2.4*  --  2.2*   PROT 5.6*  --  5.1*   BILITOT 0.7  --  0.8   ALKPHOS 70  --  67   ALT 9*  --  14   AST 19  --  32   MG 2.1  --  2.2   PHOS 3.6  --  4.9*       All pertinent labs within the past 24 hours have been reviewed.    Significant Imaging:  I have reviewed all pertinent imaging results/findings within the past 24 hours.

## 2023-10-02 NOTE — ASSESSMENT & PLAN NOTE
-Last A1c reviewed-   Lab Results   Component Value Date    HGBA1C 6.0 (H) 09/30/2023       Inpatient Antihyperglycemic Regiment:  Antihyperglycemics (From admission, onward)    Start     Stop Route Frequency Ordered    10/02/23 1600  insulin aspart U-100 pen 4 Units         -- SubQ 6 times per day 10/02/23 1324    09/28/23 1037  insulin aspart U-100 pen 0-10 Units         -- SubQ Every 4 hours PRN 09/28/23 0947          Blood Sugars (AccuCheck):    Recent Labs     10/01/23  1714 10/01/23  2115 10/01/23  2358 10/02/23  0341 10/02/23  0736 10/02/23  1211   POCTGLUCOSE 319* 338* 289* 280* 276* 282*

## 2023-10-03 PROBLEM — I60.9 SUBARACHNOID HEMORRHAGE: Status: ACTIVE | Noted: 2023-01-01

## 2023-10-03 NOTE — ASSESSMENT & PLAN NOTE
Home regimen: amlodipine 2.5mg and metoprolol  - Pressures stable  - hold amlodipine in setting of acute illness, low threshold to restart  - MAP goal >65, SBP <160 per neurology in setting of SAH

## 2023-10-03 NOTE — ASSESSMENT & PLAN NOTE
BLE Ultrasound: deep vein thrombosis of the right peroneal vein and the left posterior tibial vein.    Heparin stopped d/t SAH

## 2023-10-03 NOTE — SUBJECTIVE & OBJECTIVE
Medications Prior to Admission   Medication Sig Dispense Refill Last Dose    ADVAIR DISKUS 500-50 mcg/dose DsDv diskus inhaler INHALE 1 PUFF INTO THE LUNGS 2 (TWO) TIMES DAILY. 180 each 3     albuterol (PROVENTIL/VENTOLIN HFA) 90 mcg/actuation inhaler INHALE 2 PUFFS INTO THE LUNGS EVERY 6 (SIX) HOURS AS NEEDED FOR WHEEZING. RESCUE 18 g 6     albuterol sulfate 2.5 mg/0.5 mL Nebu Take 2.5 mg by nebulization every 4 (four) hours as needed (wheezing). Rescue 1 each 0     amLODIPine (NORVASC) 2.5 MG tablet Take 1 tablet (2.5 mg total) by mouth once daily. 90 tablet 2     aspirin (ECOTRIN) 81 MG EC tablet Take 81 mg by mouth once daily.        cycloSPORINE (RESTASIS) 0.05 % ophthalmic emulsion Place 1 drop into both eyes 2 (two) times daily. 60 each 12     dipyridamole (PERSANTINE) 5 mg/mL injection        dorzolamide (TRUSOPT) 2 % ophthalmic solution Place 1 drop into the right eye 2 (two) times a day. 10 mL 3     ergocalciferol (ERGOCALCIFEROL) 50,000 unit Cap Take 1 capsule (50,000 Units total) by mouth twice a week. 24 capsule 0     fluticasone propionate (FLONASE) 50 mcg/actuation nasal spray 2 sprays (100 mcg total) by Each Nostril route once daily. 16 g 3     furosemide (LASIX) 40 MG tablet Take 1 tablet (40 mg total) by mouth once daily. 90 tablet 3     gabapentin (NEURONTIN) 300 MG capsule Take 1 capsule (300 mg total) by mouth every evening. 30 capsule 11     latanoprost (XALATAN) 0.005 % ophthalmic solution Place 1 drop into both eyes once daily. 7.5 mL 3     meclizine (ANTIVERT) 25 mg tablet Take 1 tablet (25 mg total) by mouth 2 (two) times daily as needed. 30 tablet 1     metoprolol tartrate (LOPRESSOR) 100 MG tablet TAKE ONE TABLET BY MOUTH TWICE DAILY 60 tablet 11     nitroGLYCERIN (NITROSTAT) 0.4 MG SL tablet Place 1 tablet (0.4 mg total) under the tongue every 5 (five) minutes as needed for Chest pain. (Patient not taking: Reported on 4/28/2022) 30 tablet 1     omeprazole (PRILOSEC) 40 MG capsule Take 1  capsule (40 mg total) by mouth every morning. Open capsule and take with apple sauce 30 capsule 2     ondansetron (ZOFRAN-ODT) 8 MG TbDL Dissolve 1 tablet (8 mg total) by mouth every 6 (six) hours as needed. 30 tablet 0     oxybutynin (DITROPAN-XL) 5 MG TR24 Take 5 mg by mouth once daily.       pantoprazole (PROTONIX) 40 MG tablet Take 1 tablet (40 mg total) by mouth daily as needed. 30 tablet 3     rosuvastatin (CRESTOR) 20 MG tablet Take 1 tablet (20 mg total) by mouth once daily. 90 tablet 3     semaglutide (OZEMPIC) 0.25 mg or 0.5 mg (2 mg/3 mL) pen injector Inject 0.5 mg into the skin every 7 days. 3 mL 2     sertraline (ZOLOFT) 100 MG tablet TAKE 1 TABLET BY MOUTH ONCE DAILY. 90 tablet 3     tamsulosin (FLOMAX) 0.4 mg Cap Take 0.4 mg by mouth once daily.       warfarin (COUMADIN) 5 MG tablet Take 1 tablet (5 mg total) by mouth Daily. or as instructed by [facility] Clinic. 40 tablet 0        Review of patient's allergies indicates:   Allergen Reactions    Brimonidine        Past Medical History:   Diagnosis Date    Acute DVT (deep venous thrombosis) 9/26/2023    Acute on chronic diastolic congestive heart failure     Acute pulmonary embolism 9/24/2023    Allergy     Anemia     Anticoagulant long-term use     Anxiety     Asthma     Atrial fibrillation 2002    Bilateral primary osteoarthritis of hip 3/14/2023    Cataract     Chronic kidney disease     COPD (chronic obstructive pulmonary disease)     Coronary artery calcification seen on CT scan 3/22/2022    Coronary artery calcification seen on CT scan 3/22/2022    Depression     Encounter for blood transfusion     HTN (hypertension)     Hyperlipidemia     Nephrolithiasis     KEYSHAWN (obstructive sleep apnea)     awaiting CPAP machine     Rheumatic disease of mitral valve     Uncontrolled type 2 diabetes mellitus with complication, with long-term current use of insulin 5/27/2020    Urinary incontinence      Past Surgical History:   Procedure Laterality Date    BREAST  BIOPSY Left 10/2019    CARDIAC VALVE SURGERY  2004    mechanical mitral valve     SECTION      COLONOSCOPY N/A 2019    Procedure: COLONOSCOPY;  Surgeon: Devon Bowling MD;  Location: [facility] ENDO (4TH FLR);  Service: Endoscopy;  Laterality: N/A;  ok to hold Coumadin x 5 days with Lovenox bridge per Coumadin clinic-[facility]    ESOPHAGOGASTRODUODENOSCOPY N/A 2019    Procedure: EGD (ESOPHAGOGASTRODUODENOSCOPY);  Surgeon: Smooth Torrez MD;  Location: [facility] ENDO (2ND FLR);  Service: Endoscopy;  Laterality: N/A;  2nd floor requested due to comorbidities, Hypertension, permanent A. 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 44     per Coumadin clinic-ok to hold for 5 days w/bridge    INJECTION N/A 2023    Procedure: INJECTION, BILATERAL GTB AND RIGHT HIP INTRA-ARTICULAR CONTRAST;  Surgeon: Beth Alfaro MD;  Location: [facility] PAIN MGT;  Service: Pain Management;  Laterality: N/A;    kidney stone removal      MITRAL VALVE REPLACEMENT  2004    TUBAL LIGATION       Family History       Problem Relation (Age of Onset)    Asthma Daughter, Son    Breast cancer Paternal Aunt    Cancer Brother    Colon cancer Maternal Grandmother, Mother (83)    Diabetes Sister, Sister, Sister, Father    Heart disease Father (70)    Hypertension Sister    Stroke Paternal Grandmother          Tobacco Use    Smoking status: Former     Current packs/day: 0.00     Average packs/day: 0.5 packs/day for 20.0 years (10.0 ttl pk-yrs)     Types: Cigarettes     Start date: 1982     Quit date: 2002     Years since quittin.4    Smokeless tobacco: Never   Substance and Sexual Activity    Alcohol use: No    Drug use: No    Sexual activity: Not on file     Review of Systems   Unable to perform ROS: Intubated     Objective:     Weight: 90.3 kg (199 lb 1.2 oz)  Body mass index is 36.41 kg/m².  Vital Signs (Most Recent):  Temp: 96.9 °F (36.1 °C) (10/03/23  1230)  Pulse: 93 (10/03/23 1645)  Resp: (!) 33 (10/03/23 1645)  BP: (!) 98/50 (10/03/23 1345)  SpO2: 100 % (10/03/23 1645) Vital Signs (24h Range):  Temp:  [96 °F (35.6 °C)-97.9 °F (36.6 °C)] 96.9 °F (36.1 °C)  Pulse:  [] 93  Resp:  [0-33] 33  SpO2:  [87 %-100 %] 100 %  BP: ()/(50-58) 98/50  Arterial Line BP: ()/(45-63) 99/48     Date 10/03/23 0700 - 10/04/23 0659   Shift 1018-4807 0571-4732 5881-7806 24 Hour Total   INTAKE   I.V.(mL/kg) 680.3(7.5) 140.8(1.6)  821(9.1)   NG/   220   IV Piggyback 220.8 125.1  345.9   Shift Total(mL/kg) 1121(12.4) 265.9(2.9)  1386.9(15.4)   OUTPUT   Urine(mL/kg/hr) 200(0.3)   200   Shift Total(mL/kg) 200(2.2)   200(2.2)   Weight (kg) 90.3 90.3 90.3 90.3              Vent Mode: A/C  Oxygen Concentration (%):  [] 90  Resp Rate Total:  [28 br/min-38 br/min] 33 br/min  Vt Set:  [275 mL-300 mL] 300 mL  PEEP/CPAP:  [8 cmH20-10 cmH20] 8 cmH20  Mean Airway Pressure:  [16 cnS01-38 cmH20] 18 cmH20             NG/OG Tube 09/27/23 1141 orogastric Center mouth (Active)   Placement Check placement verified by aspirate characteristics 10/03/23 1501   Tolerance no signs/symptoms of discomfort 10/03/23 1501   Securement secured to commercial device 10/03/23 1501   Clamp Status/Tolerance clamped 10/03/23 1501   Suction Setting/Drainage Method suction at the bedside 10/03/23 1501   Insertion Site Appearance no redness, warmth, tenderness, skin breakdown, drainage 10/03/23 1501   Drainage Milky 10/03/23 0501   Flush/Irrigation flushed w/;water 10/03/23 0701   Feeding Type continuous;by pump 10/03/23 0901   Feeding Action feeding held 10/03/23 1501   Current Rate (mL/hr) 20 mL/hr 10/03/23 0901   Goal Rate (mL/hr) 35 mL/hr 10/03/23 0901   Intake (mL) 80 mL 10/03/23 0701   Water Bolus (mL) 60 mL 09/27/23 1505   Formula Name Impact Peptide 10/03/23 1501   Intake (mL) - Formula Tube Feeding 20 10/03/23 0901   Residual Amount (ml) 14 ml 10/02/23 1901            Rectal Tube  "10/01/23 2000 (Active)   Balloon Inflation Volume (mL) 45 10/03/23 1501   Reposition drainage bags for BMS & Barnett on opposite sides of bed 10/03/23 1501   Outcome gas expelled, stool evacuated 10/03/23 1501   Stool Color Green;Brown 10/03/23 1501   Insertion Site Appearance no redness, warmth, tenderness, skin breakdown, drainage 10/03/23 1501   Flush/Irrigation irrigated w/;water 10/03/23 0901   Intake (mL) 50 mL 10/03/23 0901   Rectal Tube Output 200 mL 10/02/23 2201            Urethral Catheter 09/27/23 1151 16 Fr. (Active)   $ Barnett Insertion Bedside Insertion Performed 09/27/23 1505   Site Assessment Clean;Intact 10/03/23 1501   Collection Container Urimeter 10/03/23 1501   Securement Method secured to top of thigh w/ adhesive device 10/03/23 1501   Catheter Care Performed yes 10/03/23 1501   Reason for Continuing Urinary Catheterization Critically ill in ICU and requiring hourly monitoring of intake/output 10/03/23 1501   CAUTI Prevention Bundle Securement Device in place with 1" slack;Intact seal between catheter & drainage tubing;Drainage bag/urimeter off the floor;No dependent loops or kinks;Sheeting clip in use;Drainage bag/urimeter not overfilled (<2/3 full);Drainage bag/urimeter below bladder 10/03/23 1501   Output (mL) 200 mL 10/03/23 1340          Physical Exam   Neurosurgery Physical Exam  GCS 3t, previously on nimbex;  RP fixed, LP reactive, NR right corneal, reactive left corneal, +cough, neg gag;  No movement in BUE/BLE to noxious stimuli    General: Intubated  Head: Nontraumatic, normocephalic  Eyes: Nontraumatic  Neck: Supple  CVS: Normal rate and rhythm, distal pulses present  Pulm: Symmetric expansion, no respiratory distress  GI: Abdomen nondistended  MSK: Non-traumatic  Skin: Dry, intact  Psych: Intubated      Significant Labs:  Recent Labs   Lab 10/02/23  0240 10/03/23  0226   * 281*   * 125*   K 4.5 4.6   CL 83* 80*   CO2 37* 33*   BUN 70* 79*   CREATININE 1.6* 1.8*   CALCIUM " 7.4* 7.1*   MG 2.2 2.2     Recent Labs   Lab 10/02/23  1451 10/02/23  1811 10/03/23  0226   WBC 30.66* 35.00* 35.63*   HGB 6.9* 6.8* 7.5*   HCT 23.2* 21.4* 23.1*    246 233     Recent Labs   Lab 10/02/23  0240 10/03/23  0226 10/03/23  1519   INR 1.3* 1.2  --    APTT 62.0* 68.3* 29.0     Microbiology Results (last 7 days)       Procedure Component Value Units Date/Time    Blood culture [[id number]]     Order Status: No result Specimen: Blood     Blood culture [[id number]]     Order Status: No result Specimen: Blood     Blood culture [[id number]] Collected: 09/27/23 1229    Order Status: Completed Specimen: Blood from Peripheral, Forearm, Left Updated: 10/02/23 1412     Blood Culture, Routine No growth after 5 days.    Blood culture [[id number]] Collected: 09/27/23 1221    Order Status: Completed Specimen: Blood from Peripheral, Wrist, Left Updated: 10/02/23 1412     Blood Culture, Routine No growth after 5 days.    Respiratory Infection Panel (PCR), Nasopharyngeal [[id number]]  (Abnormal) Collected: 09/27/23 1755    Order Status: Completed Specimen: Nasopharyngeal Swab Updated: 09/27/23 1926     Respiratory Infection Panel Source NP Swab     Adenovirus Not Detected     Coronavirus 229E, Common Cold Virus Not Detected     Coronavirus HKU1, Common Cold Virus Not Detected     Coronavirus NL63, Common Cold Virus Not Detected     Coronavirus OC43, Common Cold Virus Not Detected     Comment: The Coronavirus strains detected in this test cause the common cold.  These strains are not the COVID-19 (novel Coronavirus)strain   associated with the respiratory disease outbreak.          SARS-CoV2 (COVID-19) Qualitative PCR Not Detected     Human Metapneumovirus Not Detected     Human Rhinovirus/Enterovirus Detected     Influenza A (subtypes H1, H1-2009,H3) Not Detected     Influenza B Not Detected     Parainfluenza Virus 1 Not Detected     Parainfluenza Virus 2 Not Detected     Parainfluenza Virus 3 Not Detected      "Parainfluenza Virus 4 Not Detected     Respiratory Syncytial Virus Not Detected     Bordetella Parapertussis (SJ2016) Not Detected     Bordetella pertussis (ptxP) Not Detected     Chlamydia pneumoniae Not Detected     Mycoplasma pneumoniae Not Detected    Narrative:      For all other respiratory sources, order [id number] -  Respiratory Viral Panel by PCR    Culture, Respiratory with Gram Stain [[id number]]     Order Status: No result Specimen: Respiratory           ABGs:   Recent Labs   Lab 10/02/23  1936 10/02/23  2155 10/03/23  1413   PH 7.298* 7.330* 7.314*   PCO2 69.5* 67.6* 73.1*   PO2 68* 66* 83   HCO3 34.0* 35.6* 37.1*   POCSATURATED 90 90 94   BE 8 10 11     Cardiac markers: No results for input(s): "CKMB", "CPKMB", "TROPONINT", "TROPONINI", "MYOGLOBIN" in the last 48 hours.  CMP:   Recent Labs   Lab 10/02/23  0240 10/02/23  1748 10/03/23  0226   *  --  281*   CALCIUM 7.4*  --  7.1*   ALBUMIN 2.2*  --  2.1*   PROT 5.1*  --  4.9*   *  --  125*   K 4.5  --  4.6   CO2 37*  --  33*   CL 83*  --  80*   BUN 70*  --  79*   CREATININE 1.6*  --  1.8*   ALKPHOS 67  --  65   ALT 14  --  16   AST 32  --  38   BILITOT 0.8 0.8 0.9     CRP: No results for input(s): "CRP" in the last 48 hours.  ESR: No results for input(s): "POCESR", "ERYTHROCYTES" in the last 48 hours.  LFTs:   Recent Labs   Lab 10/02/23  0240 10/02/23  1748 10/03/23  0226   ALT 14  --  16   AST 32  --  38   ALKPHOS 67  --  65   BILITOT 0.8 0.8 0.9   PROT 5.1*  --  4.9*   ALBUMIN 2.2*  --  2.1*     Procalcitonin:   Recent Labs   Lab 10/03/23  1203   PROCAL 1.23*       Significant Diagnostics:  I have reviewed all pertinent imaging results/findings within the past 24 hours.  "

## 2023-10-03 NOTE — ASSESSMENT & PLAN NOTE
68F w/ PMH COPD, Pulm HTN, MVR on Warfarin, A-fib, HTN, T2DM, CKD3 who originally presented to [facility] in ARDS requiring paralysis/proning now found to have HH4F3 SAH:    Neuro:  --Patient admitted to Neuro-ICU; preponderance of medical care per NCC      -Q1h neurochecks while in ICU  --All labs and diagnostics reviewed      -CTA 10/3: Diffuse cisternal blood dissecting into anterior interhemispheric fissure; no underlying vascular lesion      -CTH @ 6h      -DSA cerebral only when healthy enough to tolerate  --HOB@30  --Keppra 500 BID x7d  --TCDs daily x14d  --Nimotop 60q4 x 21d (as able to tolerate if not requiring pressors)  --EVD watch  --Continue to follow clinically and notify NSGY immediately if any neurostatus change    CVS/Pulm:  --SBP <140 mmHg (cardene ggt; hydralazine & labetalol PRN; transition to home meds when appropriate per NCC)  --Aggressive cardiopulmonary management per NCC/MICU    GIT/Electrolytes:  --CMP daily      -Replete electrolytes PRN per NCC  --Na goal >135  --PPI    Renal:  --Strict I/Os; goal for euvolemia per primary teams    Heme/ID:  --Hold/Reverse anti-plt/coag medications per primary teams given significant medical illness acknowledging risks vs benefits of systemic anticoagulation in setting of SAH  --Daily CBC      -Transfuse PRN  --Trend WBC and Temp    PPx:  --TEDs/SCDs  --PPI    Dispo: Continued ICU care at this time

## 2023-10-03 NOTE — ASSESSMENT & PLAN NOTE
-Last A1c reviewed-   Lab Results   Component Value Date    HGBA1C 6.0 (H) 09/30/2023       Inpatient Antihyperglycemic Regiment:  Antihyperglycemics (From admission, onward)    Start     Stop Route Frequency Ordered    10/03/23 1200  insulin aspart U-100 pen 6 Units         -- SubQ 6 times per day 10/03/23 1132    09/28/23 1037  insulin aspart U-100 pen 0-10 Units         -- SubQ Every 4 hours PRN 09/28/23 0947          Blood Sugars (AccuCheck):    Recent Labs     10/02/23  1527 10/02/23  1945 10/02/23  2343 10/03/23  0301 10/03/23  0353 10/03/23  0931   POCTGLUCOSE 307* 316* 320* 280* 281* 217*

## 2023-10-03 NOTE — PLAN OF CARE
MICU DAILY GOALS     Family/Goals of care/Code Status   Code Status: Full Code    24H Vital Sign Range  Temp:  [96 °F (35.6 °C)-97.2 °F (36.2 °C)]   Pulse:  []   Resp:  [0-33]   BP: (100-121)/(51-58)   SpO2:  [87 %-100 %]   Arterial Line BP: ()/(46-57)      Shift Events   Given 1 unit of PRBC's. Vent settings changed to AC/VC+ with a rate of 28, tidal volume of 300 and PEEP of 8. NICK called and is following.     AWAKE RASS: Goal -    Actual - RASS (Quiroz Agitation-Sedation Scale): unarousable    Restraint necessity: Not necessary   BREATHE SBT: Fail    Coordinate A & B, analgesics/sedatives Pain: managed   SAT: Fail   Delirium CAM-ICU: Overall CAM-ICU: Positive   Early(intubated/ Progressive (non-intubated) Mobility MOVE Screen (INTUBATED ONLY): Fail    Activity: Activity Management: Patient unable to perform activities   Feeding/Nutrition Diet order: Diet/Nutrition Received: tube feeding,     Thrombus DVT prophylaxis: VTE Required Core Measure: Pharmacological prophylaxis initiated/maintained   HOB Elevation Head of Bed (HOB) Positioning: HOB flat   Ulcer Prophylaxis GI: yes   Glucose control managed Glycemic Management: blood glucose monitored, supplemental insulin given   Skin Skin assessed during: Daily Assessment    [] No Altered Skin Integrity Present    []Prevention Measures Documented      [x] Yes- Altered Skin Integrity Present or Discovered   [x] LDA Added if Not in Epic (Describe Wound)   [] New Altered Skin Integrity was Present on Admit and Documented in LDA   [] Wound Image Taken    Wound Care Consulted? Yes    Attending Nurse:  Belle Barbour RN/Staff Member:  GENOVEVA Odonnell RN   Bowel Function diarrhea    Indwelling Catheter Necessity      Urethral Catheter 09/27/23 1151 16 Fr.-Reason for Continuing Urinary Catheterization: Critically ill in ICU and requiring hourly monitoring of intake/output    Percutaneous Central Line Insertion/Assessment - Triple Lumen  09/28/23 9804  Internal Jugular Right-Line Necessity Review: Hemodynamic instability, Medication caustic to vasculature  yes   De-escalation Antibiotics No       VS and assessment per flow sheet, patient progressing towards goals as tolerated, plan of care reviewed with Elayne Almanzar and daughter, all concerns addressed, will continue to monitor.

## 2023-10-03 NOTE — ASSESSMENT & PLAN NOTE
Pt noted to have anisocoria after AM supination on 10/3. Reflexes were blunted by her paralytic drip. A stroke code was called and patient was taken for urgent CT/CTA head. CT showing large acute subarachnoid hemorrhage centered in basal cisterns, no definite aneurysm but pattern is suspicious and correlation with DSA is recommended. Neurology recommended stopping heparin, controlling BP, consulting neurocritical care and neurosurgery. Family updated.    Plan:  - Neurosurgery, neurocrit consulted  - Heparin stopped  - BP goals: MAPs >65, SBP <180, PRN's in place

## 2023-10-03 NOTE — ASSESSMENT & PLAN NOTE
Patient is identified as having Diastolic (HFpEF) heart failure that is Chronic. CHF is currently uncontrolled due to Rales/crackles on pulmonary exam and Pulmonary edema/pleural effusion on CXR. Latest ECHO performed and demonstrates- Results for orders placed during the hospital encounter of 09/12/23    Echo    Interpretation Summary    Left Ventricle: The left ventricle is normal in size.  Mildly to moderately increased wall thickness. Septal motion is consistent with post-operative status. Septal flattening in diastole consistent with right ventricular volume overload. There is normal systolic function with a visually estimated ejection fraction of 60 - 65%. There is indeterminate diastolic function.    Left Atrium: Left atrium is severely dilated.    Mitral Valve: There is a mechanical valve in the mitral position. The mean pressure gradient across the mitral valve is 5.5 mmHg at a heart rate of 108 bpm. There is trace to mild regurgitation.    Right Ventricle: Right ventricle was not well visualized due to poor acoustic window.  Likely dilated to some extent based on subcostal images.  Systolic function appears normal based on limited images.    Right Atrium: Right atrium is dilated.    Tricuspid Valve: There is trace to mild regurgitation.    The pulmonary artery systolic pressure is approximally 52 mm Hg.    IVC/SVC: Intermediate venous pressure at 8 mmHg.  . Continue Beta Blocker and monitor clinical status closely. Monitor on telemetry. Patient is off CHF pathway.  Monitor strict Is&Os and daily weights.  Place on fluid restriction of 1.5 L. Cardiology has been consulted. Continue to stress to patient importance of self efficacy and  on diet for CHF. Last BNP reviewed- and noted below   Recent Labs   Lab 09/26/23  5483   *   .

## 2023-10-03 NOTE — HPI
Elayne Almanzar is a 68 year old female with history of HFpEF (EF 55-60%), COPD, pulmonary hypertension, MVR on warfarin, persistent afib, COPD, HTN, T2DM, CKD3 presented as transsfer for AHRF. Non obstructing PE seen on imaging believed secondary to known DVT. The patient is being treated for viral pneumonia. She was started on IV heparin and had increased BiPAP requirments. She was electively intubated and required pressor support. During her course she was in Afib with RVR and started on home metoprolol. Given her clinic presentation MICU is treating for ARDS. Stroke code was called 10/03. The patient had a sluggishly reactive right pupil. Initial review of CT H is consistent with subarachnoid hemorrhage. We ordered simultaneous CTA to assess for aneurysmal source.

## 2023-10-03 NOTE — PROGRESS NOTES
Pharmacokinetic Initial Assessment: IV Vancomycin    Assessment/Plan:    Initiate IV vancomycin with loading dose of 1500 mg once with subsequent doses when random concentrations are less than 20 mcg/mL    Draw vancomycin random level with morning labs on 10/4. Desired empiric serum trough concentration is 10 to 20 mcg/mL.    Pharmacy will continue to follow and monitor vancomycin.      Please contact pharmacy at extension [phone] with any questions regarding this assessment.     Thank you for the consult,   Margaret Abrams, PharmD, BCCCP                 Patient brief summary:  Elayne Almanzar is a 68 y.o. female initiated on antimicrobial therapy with IV vancomycin for treatment of suspected sepsis    Drug Allergies:   Review of patient's allergies indicates:   Allergen Reactions    Brimonidine        Actual Body Weight:   90.3 kg     Renal Function:   Estimated Creatinine Clearance: 31.7 mL/min (A) (based on SCr of 1.8 mg/dL (H)).    Dialysis Method (if applicable):  N/A    CBC (last 72 hours):  Recent Labs   Lab Result Units 10/01/23  0257 10/02/23  0240 10/02/23  1212 10/02/23  1451 10/02/23  1811 10/03/23  0226   WBC K/uL 21.80* 29.34* 29.90* 30.66* 35.00* 35.63*   Hemoglobin g/dL 9.0* 7.4* 7.1* 6.9* 6.8* 7.5*   Hematocrit % 30.6* 23.6* 23.7* 23.2* 21.4* 23.1*   Platelets K/uL 250 232 248 253 246 233   Gran % % 85.4* 83.0* 88.0* 83.5* 75.5* 70.0   Lymph % % 3.4* 10.0* 4.0* 4.0* 8.5* 10.0*   Mono % % 7.4 2.0* 2.0* 7.5 7.5 10.0   Eosinophil % % 0.0 0.0 0.0 0.5 0.5 0.5   Basophil % % 0.3 0.0 0.0 0.0 0.0 0.0   Differential Method  Automated Manual Manual Manual Manual Manual       Metabolic Panel (last 72 hours):  Recent Labs   Lab Result Units 09/30/23  2129 10/01/23  0257 10/01/23  1245 10/02/23  0240 10/02/23  1748 10/03/23  0226   Sodium mmol/L 135* 135* 131* 130*  --  125*   Potassium mmol/L 4.2 4.4 4.5 4.5  --  4.6   Chloride mmol/L 87* 86* 84* 83*  --  80*   CO2 mmol/L 39* 38* 35* 37*  --  33*   Glucose  "mg/dL 263* 217* 273* 277*  --  281*   BUN mg/dL 41* 46* 56* 70*  --  79*   Creatinine mg/dL 1.0 1.0 1.4 1.6*  --  1.8*   Albumin g/dL  --  2.4*  --  2.2*  --  2.1*   Total Bilirubin mg/dL  --  0.7  --  0.8 0.8 0.9   Alkaline Phosphatase U/L  --  70  --  67  --  65   AST U/L  --  19  --  32  --  38   ALT U/L  --  9*  --  14  --  16   Magnesium mg/dL  --  2.1  --  2.2  --  2.2   Phosphorus mg/dL  --  3.6  --  4.9*  --  5.8*       Drug levels (last 3 results):  No results for input(s): "VANCOMYCINRA", "VANCORANDOM", "VANCOMYCINPE", "VANCOPEAK", "VANCOMYCINTR", "VANCOTROUGH" in the last 72 hours.    Microbiologic Results:  Microbiology Results (last 7 days)       Procedure Component Value Units Date/Time    Blood culture [[id number]]     Order Status: No result Specimen: Blood     Blood culture [[id number]]     Order Status: No result Specimen: Blood     Blood culture [[id number]] Collected: 09/27/23 1229    Order Status: Completed Specimen: Blood from Peripheral, Forearm, Left Updated: 10/02/23 1412     Blood Culture, Routine No growth after 5 days.    Blood culture [[id number]] Collected: 09/27/23 1221    Order Status: Completed Specimen: Blood from Peripheral, Wrist, Left Updated: 10/02/23 1412     Blood Culture, Routine No growth after 5 days.    Respiratory Infection Panel (PCR), Nasopharyngeal [[id number]]  (Abnormal) Collected: 09/27/23 1755    Order Status: Completed Specimen: Nasopharyngeal Swab Updated: 09/27/23 1926     Respiratory Infection Panel Source NP Swab     Adenovirus Not Detected     Coronavirus 229E, Common Cold Virus Not Detected     Coronavirus HKU1, Common Cold Virus Not Detected     Coronavirus NL63, Common Cold Virus Not Detected     Coronavirus OC43, Common Cold Virus Not Detected     Comment: The Coronavirus strains detected in this test cause the common cold.  These strains are not the COVID-19 (novel Coronavirus)strain   associated with the respiratory disease outbreak.          SARS-CoV2 " (COVID-19) Qualitative PCR Not Detected     Human Metapneumovirus Not Detected     Human Rhinovirus/Enterovirus Detected     Influenza A (subtypes H1, H1-2009,H3) Not Detected     Influenza B Not Detected     Parainfluenza Virus 1 Not Detected     Parainfluenza Virus 2 Not Detected     Parainfluenza Virus 3 Not Detected     Parainfluenza Virus 4 Not Detected     Respiratory Syncytial Virus Not Detected     Bordetella Parapertussis (RR9582) Not Detected     Bordetella pertussis (ptxP) Not Detected     Chlamydia pneumoniae Not Detected     Mycoplasma pneumoniae Not Detected    Narrative:      For all other respiratory sources, order [id number] -  Respiratory Viral Panel by PCR    Culture, Respiratory with Gram Stain [[id number]]     Order Status: No result Specimen: Respiratory

## 2023-10-03 NOTE — NURSING
Supinated patient at approximately 1015. Upon assessing patients pupils the left pupil was found at 1 round and fixed and the right pupil was 5 and sluggish (previously only the left pupil was able to be assessed because the patient was proned and it was a 3 and reactive). CC2 was called to the bedside to assess the patient, heparin gtt and nimbex were turned off and a stroke code was called. The patient was transported to CT scan were a SAH was found. Consults to neurocritical care and neurosurgery were placed but the patient did return to MICU. Family called to the bedside were the fellow updated them on this new finding and plan.

## 2023-10-03 NOTE — PLAN OF CARE
[facility] - Cardiac Medical [facility]  Discharge Reassessment    Primary Care Provider: Nubia Crocker MD    Expected Discharge Date: 10/17/2023    Reassessment (most recent)       Discharge Reassessment - 10/03/23 1422          Discharge Reassessment    Assessment Type Discharge Planning Reassessment     Did the patient's condition or plan change since previous assessment? Yes     Discharge Plan discussed with: Spouse/sig other     Communicated RUMA with patient/caregiver Yes     Discharge Plan A Long-term acute care facility (LTAC)     Discharge Plan B Skilled Nursing Facility     DME Needed Upon Discharge  other (see comments)   TBD    Transition of Care Barriers None     Why the patient remains in the hospital Requires continued medical care        Post-Acute Status    Post-Acute Authorization Placement     Post-Acute Placement Status Pending payor review/awaiting authorization (if required)     Coverage PHN     Discharge Delays None known at this time                     Patient is not medically ready for discharge.  Per Md; No acute events overnight. On rounds after supinating patient was noted to have anisocoria. Reflexes blunted d/t paralytic. Stroke code was called, pt taken for stat CT/CTA showing a diffuse SAH. Neurocritical care, neurosurgery consulted         Ximena Starkey LMSW  PRN - [facility]  [phone]

## 2023-10-03 NOTE — ASSESSMENT & PLAN NOTE
--See AHRF, on empiric zosyn for bacterial coverage  - Vancomycin added for empiric coverage in setting of worsening leukocytosis + tachycardia

## 2023-10-03 NOTE — SUBJECTIVE & OBJECTIVE
Interval History/Significant Events: No acute events overnight. On rounds after supinating patient was noted to have anisocoria. Reflexes blunted d/t paralytic. Stroke code was called, pt taken for stat CT/CTA showing a diffuse SAH. Neurocritical care, neurosurgery consulted      Objective:     Vital Signs (Most Recent):  Temp: 97.9 °F (36.6 °C) (10/03/23 0701)  Pulse: 86 (10/03/23 1200)  Resp: (!) 28 (10/03/23 1200)  BP: (!) 96/55 (10/03/23 0701)  SpO2: 97 % (10/03/23 1200) Vital Signs (24h Range):  Temp:  [96 °F (35.6 °C)-97.9 °F (36.6 °C)] 97.9 °F (36.6 °C)  Pulse:  [] 86  Resp:  [0-33] 28  SpO2:  [87 %-98 %] 97 %  BP: ()/(51-58) 96/55  Arterial Line BP: ()/(46-59) 96/55   Weight: 90.3 kg (199 lb 1.2 oz)  Body mass index is 36.41 kg/m².      Intake/Output Summary (Last 24 hours) at 10/3/2023 1200  Last data filed at 10/3/2023 0701  Gross per 24 hour   Intake 2779.74 ml   Output 1820 ml   Net 959.74 ml          Physical Exam  Vitals and nursing note reviewed.   Constitutional:       General: She is not in acute distress.     Appearance: She is ill-appearing. She is not diaphoretic.   HENT:      Head: Normocephalic and atraumatic.      Right Ear: External ear normal.      Left Ear: External ear normal.      Nose: Nose normal.      Mouth/Throat:      Mouth: Mucous membranes are moist.      Comments: Oral bleeding/oozing, improved from yesterday  Cardiovascular:      Rate and Rhythm: Normal rate. Rhythm irregular.      Pulses: Normal pulses.   Pulmonary:      Comments: Mechanical breath sounds; patient proned on vent  Abdominal:      General: There is no distension.      Palpations: Abdomen is soft. There is no mass.   Musculoskeletal:         General: No swelling or tenderness.      Cervical back: Normal range of motion and neck supple.   Skin:     General: Skin is warm and dry.      Capillary Refill: Capillary refill takes less than 2 seconds.      Comments: Scattered purpura/hematomas over upper  chest   Neurological:      Mental Status: She is alert.      Comments: Sedated / paralyzed   Psychiatric:         Mood and Affect: Mood normal.         Behavior: Behavior normal.            Vents:  Vent Mode: A/C (10/03/23 1200)  Ventilator Initiated: Yes (09/27/23 1158)  Set Rate: 28 BPM (10/03/23 1200)  Vt Set: 300 mL (10/03/23 1200)  PEEP/CPAP: 8 cmH20 (10/03/23 1200)  Oxygen Concentration (%): 100 (10/03/23 1200)  Peak Airway Pressure: 33 cmH20 (10/03/23 1200)  Plateau Pressure: 29 cmH20 (10/03/23 1200)  Total Ve: 9.07 L/m (10/03/23 1200)  Negative Inspiratory Force (cm H2O): 0 (10/03/23 1200)  F/VT Ratio<105 (RSBI): (!) 86.15 (10/03/23 1200)  Lines/Drains/Airways       Central Venous Catheter Line  Duration             Percutaneous Central Line Insertion/Assessment - Triple Lumen  09/28/23 0845 Internal Jugular Right 5 days              Drain  Duration                  NG/OG Tube 09/27/23 1141 orogastric Center mouth 6 days         Urethral Catheter 09/27/23 1151 16 Fr. 6 days         Rectal Tube 10/01/23 2000 1 day              Airway  Duration                  Airway - Non-Surgical 09/27/23 1139 Endotracheal Tube 6 days              Arterial Line  Duration             Arterial Line 09/27/23 1713 Right Radial 5 days                  Significant Labs:    CBC/Anemia Profile:  Recent Labs   Lab 10/02/23  1451 10/02/23  1811 10/03/23  0226   WBC 30.66* 35.00* 35.63*   HGB 6.9* 6.8* 7.5*   HCT 23.2* 21.4* 23.1*    246 233   MCV 72* 71* 72*   RDW 15.5* 16.3* 17.2*        Chemistries:  Recent Labs   Lab 10/01/23  1245 10/02/23  0240 10/02/23  1748 10/03/23  0226   * 130*  --  125*   K 4.5 4.5  --  4.6   CL 84* 83*  --  80*   CO2 35* 37*  --  33*   BUN 56* 70*  --  79*   CREATININE 1.4 1.6*  --  1.8*   CALCIUM 7.6* 7.4*  --  7.1*   ALBUMIN  --  2.2*  --  2.1*   PROT  --  5.1*  --  4.9*   BILITOT  --  0.8 0.8 0.9   ALKPHOS  --  67  --  65   ALT  --  14  --  16   AST  --  32  --  38   MG  --  2.2  --  2.2    PHOS  --  4.9*  --  5.8*       All pertinent labs within the past 24 hours have been reviewed.    Significant Imaging:  I have reviewed all pertinent imaging results/findings within the past 24 hours.

## 2023-10-03 NOTE — ACP (ADVANCE CARE PLANNING)
Called patient's  Mr. Leo Almanzar at 6:40 p.m. and updated about Ms. Almanzar's clinical situation and how we're concerned about her prognosis in light of her newly diagnosed subarachnoid hemorrhage and her high ventilator requirements. I did discuss that if patient's heart were to stop, it would be unlikely that she would benefit from CPR and thus recommended against that procedure.  expressed understanding and expressed that, knowing Ms. Almanzar, CPR would not be compatible with her previously expressed wishes in the current setting.   I then discussed that I would be changing the code status on the chart to Partial Code (NO CPR) and will continue medical management pending further goals of care discussions. Mr. Almanzar expressed understanding and would like to be updated about any significant changes in clinical status at any time of the day.     Tariq Holbrook MD  [employer] [facility] Critical Care Medicine Fellow

## 2023-10-03 NOTE — HPI
68F w/ PMH COPD, Pulm HTN, MVR on Warfarin, A-fib, HTN, T2DM, CKD3 who [place] [facility] [place]/proning now found to have HH4F3 SAH. Patient originally pre[place] [facility] [place] in hypoxemic/hypercapnic ARDS requiring paralysis & proning. She was found to have rhinovirus and nonocclusive right PE (on heparine gtt), and bilateral DVTs. Additionally her stay has been complicated by JENN, hyponatremia, anemia and shock. Today, she was found to have aniscoria for which she underwent CTH/CTA and was found to have diffuse cisternal SAH without obvious aneurysm. Paralytics and heparin were held with minimal exam.

## 2023-10-03 NOTE — SUBJECTIVE & OBJECTIVE
Past Medical History:   Diagnosis Date    Acute DVT (deep venous thrombosis) 2023    Acute on chronic diastolic congestive heart failure     Acute pulmonary embolism 2023    Allergy     Anemia     Anticoagulant long-term use     Anxiety     Asthma     Atrial fibrillation     Bilateral primary osteoarthritis of hip 3/14/2023    Cataract     Chronic kidney disease     COPD (chronic obstructive pulmonary disease)     Coronary artery calcification seen on CT scan 3/22/2022    Coronary artery calcification seen on CT scan 3/22/2022    Depression     Encounter for blood transfusion     HTN (hypertension)     Hyperlipidemia     Nephrolithiasis     KEYSHAWN (obstructive sleep apnea)     awaiting CPAP machine     Rheumatic disease of mitral valve     Uncontrolled type 2 diabetes mellitus with complication, with long-term current use of insulin 2020    Urinary incontinence      Past Surgical History:   Procedure Laterality Date    BREAST BIOPSY Left 10/2019    CARDIAC VALVE SURGERY  2004    mechanical mitral valve     SECTION      COLONOSCOPY N/A 2019    Procedure: COLONOSCOPY;  Surgeon: Devon Bowling MD;  Location: [facility] ENDO (4TH FLR);  Service: Endoscopy;  Laterality: N/A;  ok to hold Coumadin x 5 days with Lovenox bridge per Coumadin clinic-[facility]    ESOPHAGOGASTRODUODENOSCOPY N/A 2019    Procedure: EGD (ESOPHAGOGASTRODUODENOSCOPY);  Surgeon: Smooth Torrez MD;  Location: [facility] (2ND FLR);  Service: Endoscopy;  Laterality: N/A;  2nd floor requested due to comorbidities, Hypertension, permanent A. 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 44     per Coumadin clinic-ok to hold for 5 days w/bridge    INJECTION N/A 2023    Procedure: INJECTION, BILATERAL GTB AND RIGHT HIP INTRA-ARTICULAR CONTRAST;  Surgeon: Beth Alfaro MD;  Location: [facility] PAIN MGT;  Service: Pain Management;  Laterality: N/A;    kidney stone  removal      MITRAL VALVE REPLACEMENT  2004    TUBAL LIGATION       Family History   Problem Relation Age of Onset    Stroke Paternal Grandmother     Colon cancer Maternal Grandmother     Cancer Brother         testicular cancer    Diabetes Sister     Hypertension Sister     Breast cancer Paternal Aunt     Diabetes Sister     Diabetes Sister     Heart disease Father 70        MI    Diabetes Father     Colon cancer Mother 83    Asthma Daughter     Asthma Son     Ovarian cancer Neg Hx      Social History     Tobacco Use    Smoking status: Former     Current packs/day: 0.00     Average packs/day: 0.5 packs/day for 20.0 years (10.0 ttl pk-yrs)     Types: Cigarettes     Start date: 1982     Quit date: 2002     Years since quittin.4    Smokeless tobacco: Never   Substance Use Topics    Alcohol use: No    Drug use: No     Review of patient's allergies indicates:   Allergen Reactions    Brimonidine        Medications: I have reviewed the current medication administration record.    Medications Prior to Admission   Medication Sig Dispense Refill Last Dose    ADVAIR DISKUS 500-50 mcg/dose DsDv diskus inhaler INHALE 1 PUFF INTO THE LUNGS 2 (TWO) TIMES DAILY. 180 each 3     albuterol (PROVENTIL/VENTOLIN HFA) 90 mcg/actuation inhaler INHALE 2 PUFFS INTO THE LUNGS EVERY 6 (SIX) HOURS AS NEEDED FOR WHEEZING. RESCUE 18 g 6     albuterol sulfate 2.5 mg/0.5 mL Nebu Take 2.5 mg by nebulization every 4 (four) hours as needed (wheezing). Rescue 1 each 0     amLODIPine (NORVASC) 2.5 MG tablet Take 1 tablet (2.5 mg total) by mouth once daily. 90 tablet 2     aspirin (ECOTRIN) 81 MG EC tablet Take 81 mg by mouth once daily.        cycloSPORINE (RESTASIS) 0.05 % ophthalmic emulsion Place 1 drop into both eyes 2 (two) times daily. 60 each 12     dipyridamole (PERSANTINE) 5 mg/mL injection        dorzolamide (TRUSOPT) 2 % ophthalmic solution Place 1 drop into the right eye 2 (two) times a day. 10 mL 3     ergocalciferol  (ERGOCALCIFEROL) 50,000 unit Cap Take 1 capsule (50,000 Units total) by mouth twice a week. 24 capsule 0     fluticasone propionate (FLONASE) 50 mcg/actuation nasal spray 2 sprays (100 mcg total) by Each Nostril route once daily. 16 g 3     furosemide (LASIX) 40 MG tablet Take 1 tablet (40 mg total) by mouth once daily. 90 tablet 3     gabapentin (NEURONTIN) 300 MG capsule Take 1 capsule (300 mg total) by mouth every evening. 30 capsule 11     latanoprost (XALATAN) 0.005 % ophthalmic solution Place 1 drop into both eyes once daily. 7.5 mL 3     meclizine (ANTIVERT) 25 mg tablet Take 1 tablet (25 mg total) by mouth 2 (two) times daily as needed. 30 tablet 1     metoprolol tartrate (LOPRESSOR) 100 MG tablet TAKE ONE TABLET BY MOUTH TWICE DAILY 60 tablet 11     nitroGLYCERIN (NITROSTAT) 0.4 MG SL tablet Place 1 tablet (0.4 mg total) under the tongue every 5 (five) minutes as needed for Chest pain. (Patient not taking: Reported on 4/28/2022) 30 tablet 1     omeprazole (PRILOSEC) 40 MG capsule Take 1 capsule (40 mg total) by mouth every morning. Open capsule and take with apple sauce 30 capsule 2     ondansetron (ZOFRAN-ODT) 8 MG TbDL Dissolve 1 tablet (8 mg total) by mouth every 6 (six) hours as needed. 30 tablet 0     oxybutynin (DITROPAN-XL) 5 MG TR24 Take 5 mg by mouth once daily.       pantoprazole (PROTONIX) 40 MG tablet Take 1 tablet (40 mg total) by mouth daily as needed. 30 tablet 3     rosuvastatin (CRESTOR) 20 MG tablet Take 1 tablet (20 mg total) by mouth once daily. 90 tablet 3     semaglutide (OZEMPIC) 0.25 mg or 0.5 mg (2 mg/3 mL) pen injector Inject 0.5 mg into the skin every 7 days. 3 mL 2     sertraline (ZOLOFT) 100 MG tablet TAKE 1 TABLET BY MOUTH ONCE DAILY. 90 tablet 3     tamsulosin (FLOMAX) 0.4 mg Cap Take 0.4 mg by mouth once daily.       warfarin (COUMADIN) 5 MG tablet Take 1 tablet (5 mg total) by mouth Daily. or as instructed by Coumadin Clinic. 40 tablet 0        Review of Systems   Reason  unable to perform ROS: Patient intubated.     Objective:     Vital Signs (Most Recent):  Temp: 97.9 °F (36.6 °C) (10/03/23 0701)  Pulse: 110 (10/03/23 0733)  Resp: (!) 28 (10/03/23 0733)  BP: (!) 96/55 (10/03/23 0701)  SpO2: (!) 92 % (10/03/23 0733)    Vital Signs Range (Last 24H):  Temp:  [96 °F (35.6 °C)-97.9 °F (36.6 °C)]   Pulse:  []   Resp:  [0-33]   BP: ()/(51-58)   SpO2:  [87 %-99 %]   Arterial Line BP: ()/(46-59)        Physical Exam         Neurological Exam:   LOC: comatose  Attention Span: poor  Orientation: Not oriented to person, place, and time  Pupils (CN II, III): Anisocoria Side: R pupil 3-4 mm Reactive: Yes Sluggish      Laboratory:  CMP:   Recent Labs   Lab 10/03/23  0226   CALCIUM 7.1*   ALBUMIN 2.1*   PROT 4.9*   *   K 4.6   CO2 33*   CL 80*   BUN 79*   CREATININE 1.8*   ALKPHOS 65   ALT 16   AST 38   BILITOT 0.9     CBC:   Recent Labs   Lab 10/03/23  0226   WBC 35.63*   RBC 3.19*   HGB 7.5*   HCT 23.1*      MCV 72*   MCH 23.5*   MCHC 32.5       Diagnostic Results:      Brain imaging:  CT H, repeat in 2 days    Vessel Imaging:  Pending CTA    Cardiac Evaluation:   EF 55-60%

## 2023-10-03 NOTE — ASSESSMENT & PLAN NOTE
Elayne Almanzar is a 68 year old female with history of HFpEF (EF 55-60%), COPD, pulmonary hypertension, MVR on warfarin, persistent afib, COPD, HTN, T2DM, CKD3 presented as transfer for [facility]. Stroke code was called 10/03. The patient had a sluggishly reactive right pupil. Initial review of CT H is consistent with subarachnoid hemorrhage. Pending CTA.        Antithrombotics for secondary stroke prevention: None, hold heparin     Statins for secondary stroke prevention and hyperlipidemia, if present:   Statins: Atorvastatin- 80 mg daily    Diagnostics ordered/pending: CT scan of head without contrast to asses brain parenchyma, CTA Head to assess vasculature       - NCC and NSGY consulted  - Maintain blood pressure control < 160  - Hold blood thinners and anticoagulants  - Assess for aneurysmal source with CTA

## 2023-10-03 NOTE — PROGRESS NOTES
[facility] - Cardiac Medical ICU  Critical Care Medicine  Progress Note    Patient Name: Elayne Almanzar  MRN: [id number]  Admission Date: 9/26/2023  Hospital Length of Stay: 7 days  Code Status: Full Code  Attending Provider: Que Muniz MD  Primary Care Provider: Nubia Crocker MD   Principal Problem: Acute respiratory failure with hypoxia and hypercarbia    Subjective:     HPI:  Ms. Servando Almanzar is a 68 year old female with HFpEF (60-65%), Pulmonary HTN, MVR on coumadin, permanent afib, COPD, HTN, T2DM, and CKD3. He is presenting as transfer from [facility] ICU for evaluation of acute hypoxemic respiratory failure likely secondary to PE vs pulmonary HTN noted on imaging with evidence of right heart strain on TTE.     She initially presented to OSH for SOB and cough. Infectious workup notable for enterovirus/rhinovirus and CT chest 9/18 with multifocal GGO bilaterally. She was trialed on IV abx, corticosteroids, and lasix without improvement with subsequent CTA chest positive acute PE, more specifically a nonocclusive filling defect within the right lower lobe superior segmental pulmonary artery with extension into the inter lobar artery. Lower extremity US also noted DVT to right peroneal vein and the left posterior tibial vein. TTE performed with septal flattening in diastole consistent with RV volume overload. PASP 52mmHg.    Given DVT/PE despite therapeutic INR on warfarin, patient transitioned to IV heparin ggt. She continued to deteriorate requiring continous BIPAP thus patient [place] [facility] for higher lever of care and interventional cardiology consult for consideration of catheter-directed thrombolysis.       Hospital/ICU Course:  68F w/ HFpEF (EF 60-65%), COPD, pHTN (PASP 52), MVR on warfarin, persistent afib, COPD, HTN, T2DM, CKD3 presented as transfer from [facility] from [facility], possible contributing factors in addition to her comorbidities include pulmonary HTN, non-obstructing PE (likely  2/2 known DVT) found on imaging with evidence of right heart strain, viral pna (rhino/enterovirus positive). She was placed on IV heparin and had increasing BIPAP requirements overnight, this morning her O2 sats were declining to mid 80's maxed on BIPAP w/ 100% O2, so she was electively intubated before she further deteriorated. She briefly required pressor support post-intubation and was started on diuresis and stress dose steroids. She is now off pressors but remains in AF w/ RVR confirmed on EKG with HR ~130-150, was restarted on half her home dose of metoprolol tartrate at 50 BID with good rate control HR <100.  Treating as ARDS via protocols described in the PROSEVA, dexa-ARDS, and ARMA trials      Interval History/Significant Events: No acute events overnight. On rounds after supinating patient was noted to have anisocoria. Reflexes blunted d/t paralytic. Stroke code was called, pt taken for stat CT/CTA showing a diffuse SAH. Neurocritical care, neurosurgery consulted      Objective:     Vital Signs (Most Recent):  Temp: 97.9 °F (36.6 °C) (10/03/23 0701)  Pulse: 86 (10/03/23 1200)  Resp: (!) 28 (10/03/23 1200)  BP: (!) 96/55 (10/03/23 0701)  SpO2: 97 % (10/03/23 1200) Vital Signs (24h Range):  Temp:  [96 °F (35.6 °C)-97.9 °F (36.6 °C)] 97.9 °F (36.6 °C)  Pulse:  [] 86  Resp:  [0-33] 28  SpO2:  [87 %-98 %] 97 %  BP: ()/(51-58) 96/55  Arterial Line BP: ()/(46-59) 96/55   Weight: 90.3 kg (199 lb 1.2 oz)  Body mass index is 36.41 kg/m².      Intake/Output Summary (Last 24 hours) at 10/3/2023 1200  Last data filed at 10/3/2023 0701  Gross per 24 hour   Intake 2779.74 ml   Output 1820 ml   Net 959.74 ml          Physical Exam  Vitals and nursing note reviewed.   Constitutional:       General: She is not in acute distress.     Appearance: She is ill-appearing. She is not diaphoretic.   HENT:      Head: Normocephalic and atraumatic.      Right Ear: External ear normal.      Left Ear: External ear  normal.      Nose: Nose normal.      Mouth/Throat:      Mouth: Mucous membranes are moist.      Comments: Oral bleeding/oozing, improved from yesterday  Cardiovascular:      Rate and Rhythm: Normal rate. Rhythm irregular.      Pulses: Normal pulses.   Pulmonary:      Comments: Mechanical breath sounds; patient proned on vent  Abdominal:      General: There is no distension.      Palpations: Abdomen is soft. There is no mass.   Musculoskeletal:         General: No swelling or tenderness.      Cervical back: Normal range of motion and neck supple.   Skin:     General: Skin is warm and dry.      Capillary Refill: Capillary refill takes less than 2 seconds.      Comments: Scattered purpura/hematomas over upper chest   Neurological:      Mental Status: She is alert.      Comments: Sedated / paralyzed   Psychiatric:         Mood and Affect: Mood normal.         Behavior: Behavior normal.            Vents:  Vent Mode: A/C (10/03/23 1200)  Ventilator Initiated: Yes (09/27/23 1158)  Set Rate: 28 BPM (10/03/23 1200)  Vt Set: 300 mL (10/03/23 1200)  PEEP/CPAP: 8 cmH20 (10/03/23 1200)  Oxygen Concentration (%): 100 (10/03/23 1200)  Peak Airway Pressure: 33 cmH20 (10/03/23 1200)  Plateau Pressure: 29 cmH20 (10/03/23 1200)  Total Ve: 9.07 L/m (10/03/23 1200)  Negative Inspiratory Force (cm H2O): 0 (10/03/23 1200)  F/VT Ratio<105 (RSBI): (!) 86.15 (10/03/23 1200)  Lines/Drains/Airways       Central Venous Catheter Line  Duration             Percutaneous Central Line Insertion/Assessment - Triple Lumen  09/28/23 0845 Internal Jugular Right 5 days              Drain  Duration                  NG/OG Tube 09/27/23 1141 orogastric Center mouth 6 days         Urethral Catheter 09/27/23 1151 16 Fr. 6 days         Rectal Tube 10/01/23 2000 1 day              Airway  Duration                  Airway - Non-Surgical 09/27/23 1139 Endotracheal Tube 6 days              Arterial Line  Duration             Arterial Line 09/27/23 1713 Right  Radial 5 days                  Significant Labs:    CBC/Anemia Profile:  Recent Labs   Lab 10/02/23  1451 10/02/23  1811 10/03/23  0226   WBC 30.66* 35.00* 35.63*   HGB 6.9* 6.8* 7.5*   HCT 23.2* 21.4* 23.1*    246 233   MCV 72* 71* 72*   RDW 15.5* 16.3* 17.2*        Chemistries:  Recent Labs   Lab 10/01/23  1245 10/02/23  0240 10/02/23  1748 10/03/23  0226   * 130*  --  125*   K 4.5 4.5  --  4.6   CL 84* 83*  --  80*   CO2 35* 37*  --  33*   BUN 56* 70*  --  79*   CREATININE 1.4 1.6*  --  1.8*   CALCIUM 7.6* 7.4*  --  7.1*   ALBUMIN  --  2.2*  --  2.1*   PROT  --  5.1*  --  4.9*   BILITOT  --  0.8 0.8 0.9   ALKPHOS  --  67  --  65   ALT  --  14  --  16   AST  --  32  --  38   MG  --  2.2  --  2.2   PHOS  --  4.9*  --  5.8*       All pertinent labs within the past 24 hours have been reviewed.    Significant Imaging:  I have reviewed all pertinent imaging results/findings within the past 24 hours.      ABG  Recent Labs   Lab 10/02/23  2155   PH 7.330*   PO2 66*   PCO2 67.6*   HCO3 35.6*   BE 10     Assessment/Plan:     Neuro  Subarachnoid hemorrhage  Pt noted to have anisocoria after AM supination on 10/3. Reflexes were blunted by her paralytic drip. A stroke code was called and patient was taken for urgent CT/CTA head. CT showing large acute subarachnoid hemorrhage centered in basal cisterns, no definite aneurysm but pattern is suspicious and correlation with DSA is recommended. Neurology recommended stopping heparin, controlling BP, consulting neurocritical care and neurosurgery. Family updated.    Plan:  - Neurosurgery, neurocrit consulted  - Heparin stopped  - BP goals: MAPs >65, SBP <180, PRN's in place        Pulmonary  * Acute respiratory failure with hypoxia and hypercarbia  Patient with Hypercapnic and Hypoxic Respiratory failure which is Acute on chronic.  she is not on home oxygen. Supplemental oxygen was provided and noted- Vent Mode: A/C  Oxygen Concentration (%):  [] 100  Resp Rate  Total:  [28 br/min-32 br/min] 28 br/min  Vt Set:  [275 mL-300 mL] 300 mL  PEEP/CPAP:  [8 cmH20-10 cmH20] 8 cmH20  Mean Airway Pressure:  [16 saS93-00 cmH20] 16 cmH20    .   Signs/symptoms of respiratory failure include- tachypnea, increased work of breathing, respiratory distress and use of accessory muscles. Contributing diagnoses includes - COPD and Pulmonary Embolus Labs and images were reviewed. Patient Has recent ABG, which has been reviewed. Will treat underlying causes and adjust management of respiratory failure as follows-     67yo F with history of HFpEF, pulm htn, MVR on warfarin, AF, COPD, htn, T2DM, and CKD3 presenting as transfer from [facility] ICU for AHRF. Etiology due to PE with evidence of R heart strain on TTE vs pulmonary htn with PASP 52 mmhg vs viral illness (positive enterovirus and rhinovirus).  Pt has been treated with broad spectrun antibiotics and steroids at OSH.   She follows with Dr. Carvalho as outpatient.    Treating via ARDSnet protocol informed by results of ARMA, PROSEVA, and dexa-ARDS trials    --Wean oxygen as tolerated  --Serial ABG's  --Continue Zosyn  --Solumedrol stopped, started decadron 20mg QD for 5d followed by 10mg QD for 5d  --Scheduled Duo Nebs  --Continue breo, spiriva  --CXR  -- Daily proning, trend ABG    -- diuresing with lasix 120 BID, diurel 500 BID    Acute bronchitis due to Rhinovirus  --See AHRF, on empiric zosyn for bacterial coverage  - Vancomycin added for empiric coverage in setting of worsening leukocytosis + tachycardia    COPD (chronic obstructive pulmonary disease)  --See respiratory failure    Cardiac/Vascular  Pulmonary hypertension  PASP on TTE 52mmHg. Noted to be 54mmHg on TTE in 2022.   Likely contributing to resp failure.     Chronic diastolic congestive heart failure  Patient is identified as having Diastolic (HFpEF) heart failure that is Chronic. CHF is currently uncontrolled due to Rales/crackles on pulmonary exam and Pulmonary edema/pleural effusion  on CXR. Latest ECHO performed and demonstrates- Results for orders placed during the hospital encounter of 09/12/23    Echo    Interpretation Summary    Left Ventricle: The left ventricle is normal in size.  Mildly to moderately increased wall thickness. Septal motion is consistent with post-operative status. Septal flattening in diastole consistent with right ventricular volume overload. There is normal systolic function with a visually estimated ejection fraction of 60 - 65%. There is indeterminate diastolic function.    Left Atrium: Left atrium is severely dilated.    Mitral Valve: There is a mechanical valve in the mitral position. The mean pressure gradient across the mitral valve is 5.5 mmHg at a heart rate of 108 bpm. There is trace to mild regurgitation.    Right Ventricle: Right ventricle was not well visualized due to poor acoustic window.  Likely dilated to some extent based on subcostal images.  Systolic function appears normal based on limited images.    Right Atrium: Right atrium is dilated.    Tricuspid Valve: There is trace to mild regurgitation.    The pulmonary artery systolic pressure is approximally 52 mm Hg.    IVC/SVC: Intermediate venous pressure at 8 mmHg.  . Continue Beta Blocker and monitor clinical status closely. Monitor on telemetry. Patient is off CHF pathway.  Monitor strict Is&Os and daily weights.  Place on fluid restriction of 1.5 L. Cardiology has been consulted. Continue to stress to patient importance of self efficacy and  on diet for CHF. Last BNP reviewed- and noted below   Recent Labs   Lab 09/26/23  2243   *   .    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 On coumadin. Follows with Dr. eMssina outpatient.     --Hold Coumadin in setting of acute illness  -- Heparin paused 9/29 d/t supratherapeutic levels with oral bleeding, rechecking labs  -- 10/2 heparin paused d/t drop in Hb  9->7.1, no signs of overt bleeding, will monitor and resume as tolerated  10/3 heparin stopped per neuro d/t SAH    Essential (primary) hypertension  Home regimen: amlodipine 2.5mg and metoprolol  - Pressures stable  - hold amlodipine in setting of acute illness, low threshold to restart  - MAP goal >65, SBP <160 per neurology in setting of SAH    Chronic atrial fibrillation  In AF w/ RVR since admission confirmed on EKG. Rate controlled, now off metoprolol d/t norepinephrine drip, will add amio if needed    Hematology  Acute deep vein thrombosis (DVT) of both lower extremities  BLE Ultrasound: deep vein thrombosis of the right peroneal vein and the left posterior tibial vein.    Heparin stopped d/t SAH       Acute pulmonary embolism  CTA 09/23: a nonocclusive filling defect within the right lower lobe superior segmental pulmonary artery with extension into the inter lobar artery, multifocal pneumonia or pulmonary edema progressed from prior    Heparin stopped d/t SAH        Endocrine  Type 2 diabetes mellitus  -Last A1c reviewed-   Lab Results   Component Value Date    HGBA1C 6.0 (H) 09/30/2023       Inpatient Antihyperglycemic Regiment:  Antihyperglycemics (From admission, onward)    Start     Stop Route Frequency Ordered    10/03/23 1200  insulin aspart U-100 pen 6 Units         -- SubQ 6 times per day 10/03/23 1132    09/28/23 1037  insulin aspart U-100 pen 0-10 Units         -- SubQ Every 4 hours PRN 09/28/23 0947          Blood Sugars (AccuCheck):    Recent Labs     10/02/23  1527 10/02/23  1945 10/02/23  2343 10/03/23  0301 10/03/23  0353 10/03/23  0931   POCTGLUCOSE 307* 316* 320* 280* 281* 217*            Critical Care Daily Checklist:    A: Awake: RASS Goal/Actual Goal:    Actual:     B: Spontaneous Breathing Trial Performed? Spon. Breathing Trial Initiated?: Not initiated (10/02/23 1217)   C: SAT & SBT Coordinated?  x                      D: Delirium: CAM-ICU Overall CAM-ICU: Positive   E: Early Mobility  Performed? Yes   F: Feeding Goal: Goals: Meet % EEN, EPN by RD f/u date  Status: Nutrition Goal Status: new   Current Diet Order   Procedures    Diet NPO      AS: Analgesia/Sedation fent/prop   T: Thromboembolic Prophylaxis None, stopped d/t SAH   H: HOB > 300 Yes   U: Stress Ulcer Prophylaxis (if needed) Pantoprazole   G: Glucose Control x   B: Bowel Function Stool Occurrence: 1   I: Indwelling Catheter (Lines & Cuevas) Necessity Art line, central line, OG, cuevas, rectal tube, PIV   D: De-escalation of Antimicrobials/Pharmacotherapies On Vanc/Zosyn    Plan for the day/ETD Neurocrit/neurosurg consulted, heparin stopped    Code Status:  Family/Goals of Care: Full Code  Family updated about acute changes.       Critical secondary to Patient has a condition that poses threat to life and bodily function: Severe Respiratory Distress  Patient has an abrupt change in neurologic status: SAH      Critical care was time spent personally by me on the following activities: development of treatment plan with patient or surrogate and bedside caregivers, discussions with consultants, evaluation of patient's response to treatment, examination of patient, ordering and performing treatments and interventions, ordering and review of laboratory studies, ordering and review of radiographic studies, pulse oximetry, re-evaluation of patient's condition. This critical care time did not overlap with that of any other provider or involve time for any procedures.     Juvencio Hinkle MD  Critical Care Medicine  [facility] - Cardiac Medical ICU

## 2023-10-03 NOTE — ASSESSMENT & PLAN NOTE
Patient with Hypercapnic and Hypoxic Respiratory failure which is Acute on chronic.  she is not on home oxygen. Supplemental oxygen was provided and noted- Vent Mode: A/C  Oxygen Concentration (%):  [] 100  Resp Rate Total:  [28 br/min-32 br/min] 28 br/min  Vt Set:  [275 mL-300 mL] 300 mL  PEEP/CPAP:  [8 cmH20-10 cmH20] 8 cmH20  Mean Airway Pressure:  [16 xgG88-30 cmH20] 16 cmH20    .   Signs/symptoms of respiratory failure include- tachypnea, increased work of breathing, respiratory distress and use of accessory muscles. Contributing diagnoses includes - COPD and Pulmonary Embolus Labs and images were reviewed. Patient Has recent ABG, which has been reviewed. Will treat underlying causes and adjust management of respiratory failure as follows-     69yo F with history of HFpEF, pulm htn, MVR on warfarin, AF, COPD, htn, T2DM, and CKD3 presenting as transfer from [facility] ICU for AHRF. Etiology due to PE with evidence of R heart strain on TTE vs pulmonary htn with PASP 52 mmhg vs viral illness (positive enterovirus and rhinovirus).  Pt has been treated with broad spectrun antibiotics and steroids at OSH.   She follows with Dr. Carvalho as outpatient.    Treating via ARDSnet protocol informed by results of ARMA, PROSEVA, and dexa-ARDS trials    --Wean oxygen as tolerated  --Serial ABG's  --Continue Zosyn  --Solumedrol stopped, started decadron 20mg QD for 5d followed by 10mg QD for 5d  --Scheduled Duo Nebs  --Continue breo, spiriva  --CXR  -- Daily proning, trend ABG    -- diuresing with lasix 120 BID, diurel 500 BID

## 2023-10-03 NOTE — PROGRESS NOTES
Attempted to see patient for wound care consult. Per bedside RN, unable to turn pt to assess the sacrum at this time. Will follow-up tomorrow.

## 2023-10-03 NOTE — CONSULTS
[facility] - Cardiac Medical ICU  Neurosurgery  Consult Note    Inpatient consult to Neurosurgery  Consult performed by: Kendrick Aguilar MD  Consult ordered by: Zamzam Enriquez DO        Subjective:     Chief Complaint/Reason for Admission: SAH    History of Present Illness: 68F w/ PMH COPD, Pulm HTN, MVR on Warfarin, A-fib, HTN, T2DM, CKD3 who [facility] requiring paralysis/proning now found to have HH4F3 SAH. Patient [facility] pre[facility] on 9/26 in hypoxemic/hypercapnic ARDS requiring paralysis & proning. She was found to have rhinovirus and nonocclusive right PE (on heparine gtt), and bilateral DVTs. Additionally her stay has been complicated by JENN, hyponatremia, anemia and shock. Today, she was found to have aniscoria for which she underwent CTH/CTA and was found to have diffuse cisternal SAH without obvious aneurysm. Paralytics and heparin were held with minimal exam.      Medications Prior to Admission   Medication Sig Dispense Refill Last Dose    ADVAIR DISKUS 500-50 mcg/dose DsDv diskus inhaler INHALE 1 PUFF INTO THE LUNGS 2 (TWO) TIMES DAILY. 180 each 3     albuterol (PROVENTIL/VENTOLIN HFA) 90 mcg/actuation inhaler INHALE 2 PUFFS INTO THE LUNGS EVERY 6 (SIX) HOURS AS NEEDED FOR WHEEZING. RESCUE 18 g 6     albuterol sulfate 2.5 mg/0.5 mL Nebu Take 2.5 mg by nebulization every 4 (four) hours as needed (wheezing). Rescue 1 each 0     amLODIPine (NORVASC) 2.5 MG tablet Take 1 tablet (2.5 mg total) by mouth once daily. 90 tablet 2     aspirin (ECOTRIN) 81 MG EC tablet Take 81 mg by mouth once daily.        cycloSPORINE (RESTASIS) 0.05 % ophthalmic emulsion Place 1 drop into both eyes 2 (two) times daily. 60 each 12     dipyridamole (PERSANTINE) 5 mg/mL injection        dorzolamide (TRUSOPT) 2 % ophthalmic solution Place 1 drop into the right eye 2 (two) times a day. 10 mL 3     ergocalciferol (ERGOCALCIFEROL) 50,000 unit Cap Take 1 capsule (50,000 Units total) by mouth twice a week.  24 capsule 0     fluticasone propionate (FLONASE) 50 mcg/actuation nasal spray 2 sprays (100 mcg total) by Each Nostril route once daily. 16 g 3     furosemide (LASIX) 40 MG tablet Take 1 tablet (40 mg total) by mouth once daily. 90 tablet 3     gabapentin (NEURONTIN) 300 MG capsule Take 1 capsule (300 mg total) by mouth every evening. 30 capsule 11     latanoprost (XALATAN) 0.005 % ophthalmic solution Place 1 drop into both eyes once daily. 7.5 mL 3     meclizine (ANTIVERT) 25 mg tablet Take 1 tablet (25 mg total) by mouth 2 (two) times daily as needed. 30 tablet 1     metoprolol tartrate (LOPRESSOR) 100 MG tablet TAKE ONE TABLET BY MOUTH TWICE DAILY 60 tablet 11     nitroGLYCERIN (NITROSTAT) 0.4 MG SL tablet Place 1 tablet (0.4 mg total) under the tongue every 5 (five) minutes as needed for Chest pain. (Patient not taking: Reported on 4/28/2022) 30 tablet 1     omeprazole (PRILOSEC) 40 MG capsule Take 1 capsule (40 mg total) by mouth every morning. Open capsule and take with apple sauce 30 capsule 2     ondansetron (ZOFRAN-ODT) 8 MG TbDL Dissolve 1 tablet (8 mg total) by mouth every 6 (six) hours as needed. 30 tablet 0     oxybutynin (DITROPAN-XL) 5 MG TR24 Take 5 mg by mouth once daily.       pantoprazole (PROTONIX) 40 MG tablet Take 1 tablet (40 mg total) by mouth daily as needed. 30 tablet 3     rosuvastatin (CRESTOR) 20 MG tablet Take 1 tablet (20 mg total) by mouth once daily. 90 tablet 3     semaglutide (OZEMPIC) 0.25 mg or 0.5 mg (2 mg/3 mL) pen injector Inject 0.5 mg into the skin every 7 days. 3 mL 2     sertraline (ZOLOFT) 100 MG tablet TAKE 1 TABLET BY MOUTH ONCE DAILY. 90 tablet 3     tamsulosin (FLOMAX) 0.4 mg Cap Take 0.4 mg by mouth once daily.       warfarin (COUMADIN) 5 MG tablet Take 1 tablet (5 mg total) by mouth Daily. or as instructed by [facility]. 40 tablet 0        Review of patient's allergies indicates:   Allergen Reactions    Brimonidine        Past Medical History:   Diagnosis Date     Acute DVT (deep venous thrombosis) 2023    Acute on chronic diastolic congestive heart failure     Acute pulmonary embolism 2023    Allergy     Anemia     Anticoagulant long-term use     Anxiety     Asthma     Atrial fibrillation     Bilateral primary osteoarthritis of hip 3/14/2023    Cataract     Chronic kidney disease     COPD (chronic obstructive pulmonary disease)     Coronary artery calcification seen on CT scan 3/22/2022    Coronary artery calcification seen on CT scan 3/22/2022    Depression     Encounter for blood transfusion     HTN (hypertension)     Hyperlipidemia     Nephrolithiasis     KEYSHAWN (obstructive sleep apnea)     awaiting CPAP machine     Rheumatic disease of mitral valve     Uncontrolled type 2 diabetes mellitus with complication, with long-term current use of insulin 2020    Urinary incontinence      Past Surgical History:   Procedure Laterality Date    BREAST BIOPSY Left 10/2019    CARDIAC VALVE SURGERY  2004    mechanical mitral valve     SECTION      COLONOSCOPY N/A 2019    Procedure: COLONOSCOPY;  Surgeon: Devon Bowling MD;  Location: [facility] (4TH FLR);  Service: Endoscopy;  Laterality: N/A;  ok to hold Coumadin x 5 days with Lovenox bridge per Coumadin clinic-[facility]    ESOPHAGOGASTRODUODENOSCOPY N/A 2019    Procedure: EGD (ESOPHAGOGASTRODUODENOSCOPY);  Surgeon: Smooth Torrez MD;  Location: [facility] (2ND FLR);  Service: Endoscopy;  Laterality: N/A;  2nd floor requested due to comorbidities, Hypertension, permanent A. 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 44     per Coumadin clinic-ok to hold for 5 days w/bridge    INJECTION N/A 2023    Procedure: INJECTION, BILATERAL GTB AND RIGHT HIP INTRA-ARTICULAR CONTRAST;  Surgeon: Beth Alfaro MD;  Location: [facility] PAIN MGT;  Service: Pain Management;  Laterality: N/A;    kidney stone removal      MITRAL VALVE REPLACEMENT   2004    TUBAL LIGATION       Family History       Problem Relation (Age of Onset)    Asthma Daughter, Son    Breast cancer Paternal Aunt    Cancer Brother    Colon cancer Maternal Grandmother, Mother (83)    Diabetes Sister, Sister, Sister, Father    Heart disease Father (70)    Hypertension Sister    Stroke Paternal Grandmother          Tobacco Use    Smoking status: Former     Current packs/day: 0.00     Average packs/day: 0.5 packs/day for 20.0 years (10.0 ttl pk-yrs)     Types: Cigarettes     Start date: 1982     Quit date: 2002     Years since quittin.4    Smokeless tobacco: Never   Substance and Sexual Activity    Alcohol use: No    Drug use: No    Sexual activity: Not on file     Review of Systems   Unable to perform ROS: Intubated     Objective:     Weight: 90.3 kg (199 lb 1.2 oz)  Body mass index is 36.41 kg/m².  Vital Signs (Most Recent):  Temp: 96.9 °F (36.1 °C) (10/03/23 1230)  Pulse: 93 (10/03/23 1645)  Resp: (!) 33 (10/03/23 1645)  BP: (!) 98/50 (10/03/23 1345)  SpO2: 100 % (10/03/23 1645) Vital Signs (24h Range):  Temp:  [96 °F (35.6 °C)-97.9 °F (36.6 °C)] 96.9 °F (36.1 °C)  Pulse:  [] 93  Resp:  [0-33] 33  SpO2:  [87 %-100 %] 100 %  BP: ()/(50-58) 98/50  Arterial Line BP: ()/(45-63) 99/48     Date 10/03/23 0700 - 10/04/23 0659   Shift 9074-4185 6517-2434 4380-1942 24 Hour Total   INTAKE   I.V.(mL/kg) 680.3(7.5) 140.8(1.6)  821(9.1)   NG/   220   IV Piggyback 220.8 125.1  345.9   Shift Total(mL/kg) 1121(12.4) 265.9(2.9)  1386.9(15.4)   OUTPUT   Urine(mL/kg/hr) 200(0.3)   200   Shift Total(mL/kg) 200(2.2)   200(2.2)   Weight (kg) 90.3 90.3 90.3 90.3              Vent Mode: A/C  Oxygen Concentration (%):  [] 90  Resp Rate Total:  [28 br/min-38 br/min] 33 br/min  Vt Set:  [275 mL-300 mL] 300 mL  PEEP/CPAP:  [8 cmH20-10 cmH20] 8 cmH20  Mean Airway Pressure:  [16 abQ80-37 cmH20] 18 cmH20             NG/OG Tube 23 1141 orogastric Center mouth (Active)  "  Placement Check placement verified by aspirate characteristics 10/03/23 1501   Tolerance no signs/symptoms of discomfort 10/03/23 1501   Securement secured to commercial device 10/03/23 1501   Clamp Status/Tolerance clamped 10/03/23 1501   Suction Setting/Drainage Method suction at the bedside 10/03/23 1501   Insertion Site Appearance no redness, warmth, tenderness, skin breakdown, drainage 10/03/23 1501   Drainage Milky 10/03/23 0501   Flush/Irrigation flushed w/;water 10/03/23 0701   Feeding Type continuous;by pump 10/03/23 0901   Feeding Action feeding held 10/03/23 1501   Current Rate (mL/hr) 20 mL/hr 10/03/23 0901   Goal Rate (mL/hr) 35 mL/hr 10/03/23 0901   Intake (mL) 80 mL 10/03/23 0701   Water Bolus (mL) 60 mL 09/27/23 1505   Formula Name Impact Peptide 10/03/23 1501   Intake (mL) - Formula Tube Feeding 20 10/03/23 0901   Residual Amount (ml) 14 ml 10/02/23 1901            Rectal Tube 10/01/23 2000 (Active)   Balloon Inflation Volume (mL) 45 10/03/23 1501   Reposition drainage bags for BMS & Barnett on opposite sides of bed 10/03/23 1501   Outcome gas expelled, stool evacuated 10/03/23 1501   Stool Color Green;Brown 10/03/23 1501   Insertion Site Appearance no redness, warmth, tenderness, skin breakdown, drainage 10/03/23 1501   Flush/Irrigation irrigated w/;water 10/03/23 0901   Intake (mL) 50 mL 10/03/23 0901   Rectal Tube Output 200 mL 10/02/23 2201            Urethral Catheter 09/27/23 1151 16 Fr. (Active)   $ Barnett Insertion Bedside Insertion Performed 09/27/23 1505   Site Assessment Clean;Intact 10/03/23 1501   Collection Container Urimeter 10/03/23 1501   Securement Method secured to top of thigh w/ adhesive device 10/03/23 1501   Catheter Care Performed yes 10/03/23 1501   Reason for Continuing Urinary Catheterization Critically ill in ICU and requiring hourly monitoring of intake/output 10/03/23 1501   CAUTI Prevention Bundle Securement Device in place with 1" slack;Intact seal between catheter & " drainage tubing;Drainage bag/urimeter off the floor;No dependent loops or kinks;Sheeting clip in use;Drainage bag/urimeter not overfilled (<2/3 full);Drainage bag/urimeter below bladder 10/03/23 1501   Output (mL) 200 mL 10/03/23 1340          Physical Exam   Neurosurgery Physical Exam  GCS 3t, previously on nimbex;  RP fixed, LP reactive, NR right corneal, reactive left corneal, +cough, neg gag;  No movement in BUE/BLE to noxious stimuli    General: Intubated  Head: Nontraumatic, normocephalic  Eyes: Nontraumatic  Neck: Supple  CVS: Normal rate and rhythm, distal pulses present  Pulm: Symmetric expansion, no respiratory distress  GI: Abdomen nondistended  MSK: Non-traumatic  Skin: Dry, intact  Psych: Intubated      Significant Labs:  Recent Labs   Lab 10/02/23  0240 10/03/23  0226   * 281*   * 125*   K 4.5 4.6   CL 83* 80*   CO2 37* 33*   BUN 70* 79*   CREATININE 1.6* 1.8*   CALCIUM 7.4* 7.1*   MG 2.2 2.2     Recent Labs   Lab 10/02/23  1451 10/02/23  1811 10/03/23  0226   WBC 30.66* 35.00* 35.63*   HGB 6.9* 6.8* 7.5*   HCT 23.2* 21.4* 23.1*    246 233     Recent Labs   Lab 10/02/23  0240 10/03/23  0226 10/03/23  1519   INR 1.3* 1.2  --    APTT 62.0* 68.3* 29.0     Microbiology Results (last 7 days)       Procedure Component Value Units Date/Time    Blood culture [[id number]]     Order Status: No result Specimen: Blood     Blood culture [[id number]]     Order Status: No result Specimen: Blood     Blood culture [[id number]] Collected: 09/27/23 1229    Order Status: Completed Specimen: Blood from Peripheral, Forearm, Left Updated: 10/02/23 1412     Blood Culture, Routine No growth after 5 days.    Blood culture [[id number]] Collected: 09/27/23 1221    Order Status: Completed Specimen: Blood from Peripheral, Wrist, Left Updated: 10/02/23 1412     Blood Culture, Routine No growth after 5 days.    Respiratory Infection Panel (PCR), Nasopharyngeal [[id number]]  (Abnormal) Collected: 09/27/23 3796     "Order Status: Completed Specimen: Nasopharyngeal Swab Updated: 09/27/23 1926     Respiratory Infection Panel Source NP Swab     Adenovirus Not Detected     Coronavirus 229E, Common Cold Virus Not Detected     Coronavirus HKU1, Common Cold Virus Not Detected     Coronavirus NL63, Common Cold Virus Not Detected     Coronavirus OC43, Common Cold Virus Not Detected     Comment: The Coronavirus strains detected in this test cause the common cold.  These strains are not the COVID-19 (novel Coronavirus)strain   associated with the respiratory disease outbreak.          SARS-CoV2 (COVID-19) Qualitative PCR Not Detected     Human Metapneumovirus Not Detected     Human Rhinovirus/Enterovirus Detected     Influenza A (subtypes H1, H1-2009,H3) Not Detected     Influenza B Not Detected     Parainfluenza Virus 1 Not Detected     Parainfluenza Virus 2 Not Detected     Parainfluenza Virus 3 Not Detected     Parainfluenza Virus 4 Not Detected     Respiratory Syncytial Virus Not Detected     Bordetella Parapertussis (GF8282) Not Detected     Bordetella pertussis (ptxP) Not Detected     Chlamydia pneumoniae Not Detected     Mycoplasma pneumoniae Not Detected    Narrative:      For all other respiratory sources, order [id number] -  Respiratory Viral Panel by PCR    Culture, Respiratory with Gram Stain [[id number]]     Order Status: No result Specimen: Respiratory           ABGs:   Recent Labs   Lab 10/02/23  1936 10/02/23  2155 10/03/23  1413   PH 7.298* 7.330* 7.314*   PCO2 69.5* 67.6* 73.1*   PO2 68* 66* 83   HCO3 34.0* 35.6* 37.1*   POCSATURATED 90 90 94   BE 8 10 11     Cardiac markers: No results for input(s): "CKMB", "CPKMB", "TROPONINT", "TROPONINI", "MYOGLOBIN" in the last 48 hours.  CMP:   Recent Labs   Lab 10/02/23  0240 10/02/23  1748 10/03/23  0226   *  --  281*   CALCIUM 7.4*  --  7.1*   ALBUMIN 2.2*  --  2.1*   PROT 5.1*  --  4.9*   *  --  125*   K 4.5  --  4.6   CO2 37*  --  33*   CL 83*  --  80*   BUN 70*  --  " "79*   CREATININE 1.6*  --  1.8*   ALKPHOS 67  --  65   ALT 14  --  16   AST 32  --  38   BILITOT 0.8 0.8 0.9     CRP: No results for input(s): "CRP" in the last 48 hours.  ESR: No results for input(s): "POCESR", "ERYTHROCYTES" in the last 48 hours.  LFTs:   Recent Labs   Lab 10/02/23  0240 10/02/23  1748 10/03/23  0226   ALT 14  --  16   AST 32  --  38   ALKPHOS 67  --  65   BILITOT 0.8 0.8 0.9   PROT 5.1*  --  4.9*   ALBUMIN 2.2*  --  2.1*     Procalcitonin:   Recent Labs   Lab 10/03/23  1203   PROCAL 1.23*       Significant Diagnostics:  I have reviewed all pertinent imaging results/findings within the past 24 hours.    Assessment/Plan:     Subarachnoid hemorrhage  68F w/ PMH COPD, Pulm HTN, MVR on Warfarin, A-fib, HTN, T2DM, CKD3 who originally presented to [facility] in ARDS requiring paralysis/proning now found to have HH4F3 SAH:    Neuro:  --Patient admitted to Neuro-ICU; preponderance of medical care per NCC      -Q1h neurochecks while in ICU  --All labs and diagnostics reviewed      -CTA 10/3: Diffuse cisternal blood dissecting into anterior interhemispheric fissure; no underlying vascular lesion      -CTH @ 6h      -DSA cerebral only when healthy enough to tolerate  --HOB@30  --Keppra 500 BID x7d  --TCDs daily x14d  --Nimotop 60q4 x 21d (as able to tolerate if not requiring pressors)  --EVD watch  --Continue to follow clinically and notify NSGY immediately if any neurostatus change    CVS/Pulm:  --SBP <140 mmHg (cardene ggt; hydralazine & labetalol PRN; transition to home meds when appropriate per NCC)  --Aggressive cardiopulmonary management per NCC/MICU    GIT/Electrolytes:  --CMP daily      -Replete electrolytes PRN per NCC  --Na goal >135  --PPI    Renal:  --Strict I/Os; goal for euvolemia per primary teams    Heme/ID:  --Hold/Reverse anti-plt/coag medications per primary teams given significant medical illness acknowledging risks vs benefits of systemic anticoagulation in setting of SAH  --Daily CBC      " -Transfuse PRN  --Trend WBC and Temp    PPx:  --TEDs/SCDs  --PPI    Dispo: Continued ICU care at this time        Thank you for your consult. I will follow-up with patient. Please contact us if you have any additional questions.    Pineda Zaragoza MD  Neurosurgery  [facility] [address] [facility] - Cardiac Medical ICU

## 2023-10-03 NOTE — ASSESSMENT & PLAN NOTE
CTA 09/23: a nonocclusive filling defect within the right lower lobe superior segmental pulmonary artery with extension into the inter lobar artery, multifocal pneumonia or pulmonary edema progressed from prior    Heparin stopped d/t SAH

## 2023-10-03 NOTE — CONSULTS
[facility] - Cardiac Medical ICU  Vascular Neurology  Comprehensive Stroke Center  Consult Note    Consults  Assessment/Plan:     Patient is a 68 y.o. year old female with:    Subarachnoid hemorrhage  Elayne Almanzar is a 68 year old female with history of HFpEF (EF 55-60%), COPD, pulmonary hypertension, MVR on warfarin, persistent afib, COPD, HTN, T2DM, CKD3 presented as transfer for [facility]. Stroke code was called 10/03. The patient had a sluggishly reactive right pupil. Initial review of CT H is consistent with subarachnoid hemorrhage. Pending CTA.        Antithrombotics for secondary stroke prevention: None, hold heparin     Statins for secondary stroke prevention and hyperlipidemia, if present:   Statins: Atorvastatin- 80 mg daily    Diagnostics ordered/pending: CT scan of head without contrast to asses brain parenchyma, CTA Head to assess vasculature       - NCC and NSGY consulted  - Maintain blood pressure control < 160  - Hold blood thinners and anticoagulants  - Assess for aneurysmal source with CTA            STROKE DOCUMENTATION          NIH Scale:  1a. Level of Consciousness: 3-->Responds only with reflex motor or autonomic effects or totally unresponsive, flaccid, and areflexic  1b. LOC Questions: 2-->Answers neither question correctly  1c. LOC Commands: 2-->Performs neither task correctly  2. Best Gaze: 0-->Normal  3. Visual: 0-->No visual loss  4. Facial Palsy: 0-->Normal symmetrical movements  5a. Motor Arm, Left: 0-->No drift, limb holds 90 (or 45) degrees for full 10 secs  5b. Motor Arm, Right: 0-->No drift, limb holds 90 (or 45) degrees for full 10 secs  6a. Motor Leg, Left: 0-->No drift, leg holds 30 degree position for full 5 secs  6b. Motor Leg, Right: 0-->No drift, leg holds 30 degree position for full 5 secs  7. Limb Ataxia: 0-->Absent  8. Sensory: 0-->Normal, no sensory loss  9. Best Language: 0-->No aphasia, normal  10. Dysarthria: 0-->Normal  11. Extinction and Inattention (formerly Neglect):  0-->No abnormality  Total (NIH Stroke Scale): 7    Modified Christian Score: 5  Suzan Coma Scale:    ABCD2 Score:    [id number] Score:   HAS -BLED Score:   ICH Score:   Hunt & Oseguera Classification:       Thrombolysis Candidate? No, CT findings (ICH, SAH, Large core infarct)     Delays to Thrombolysis?  Not Applicable    Interventional Revascularization Candidate?   Is the patient eligible for mechanical endovascular reperfusion (ZACH)?  No, hemorrhage    Delays to Thrombectomy? Not Applicable    Hemorrhagic change of an Ischemic Stroke: Does this patient have an ischemic stroke with hemorrhagic changes? No     Subjective:     History of Present Illness:  Elayne Almanzar is a 68 year old female with history of HFpEF (EF 55-60%), COPD, pulmonary hypertension, MVR on warfarin, persistent afib, COPD, HTN, T2DM, CKD3 presented as transsfer for AHRF. Non obstructing PE seen on imaging believed secondary to known DVT. The patient is being treated for viral pneumonia. She was started on IV heparin and had increased BiPAP requirments. She was electively intubated and required pressor support. During her course she was in Afib with RVR and started on home metoprolol. Given her clinic presentation MICU is treating for ARDS. Stroke code was called 10/03. The patient had a sluggishly reactive right pupil. Initial review of CT H is consistent with subarachnoid hemorrhage. We ordered simultaneous CTA to assess for aneurysmal source.           Past Medical History:   Diagnosis Date    Acute DVT (deep venous thrombosis) 9/26/2023    Acute on chronic diastolic congestive heart failure     Acute pulmonary embolism 9/24/2023    Allergy     Anemia     Anticoagulant long-term use     Anxiety     Asthma     Atrial fibrillation 2002    Bilateral primary osteoarthritis of hip 3/14/2023    Cataract     Chronic kidney disease     COPD (chronic obstructive pulmonary disease)     Coronary artery calcification seen on CT scan 3/22/2022     Coronary artery calcification seen on CT scan 3/22/2022    Depression     Encounter for blood transfusion     HTN (hypertension)     Hyperlipidemia     Nephrolithiasis     KEYSHAWN (obstructive sleep apnea)     awaiting CPAP machine     Rheumatic disease of mitral valve     Uncontrolled type 2 diabetes mellitus with complication, with long-term current use of insulin 2020    Urinary incontinence      Past Surgical History:   Procedure Laterality Date    BREAST BIOPSY Left 10/2019    CARDIAC VALVE SURGERY  2004    mechanical mitral valve     SECTION      COLONOSCOPY N/A 2019    Procedure: COLONOSCOPY;  Surgeon: Devon Bowling MD;  Location: [facility] ENDO (4TH FLR);  Service: Endoscopy;  Laterality: N/A;  ok to hold Coumadin x 5 days with Lovenox bridge per Coumadin clinic-[facility]    ESOPHAGOGASTRODUODENOSCOPY N/A 2019    Procedure: EGD (ESOPHAGOGASTRODUODENOSCOPY);  Surgeon: Smooth Torrez MD;  Location: [facility] ENDO (2ND FLR);  Service: Endoscopy;  Laterality: N/A;  2nd floor requested due to comorbidities, Hypertension, permanent A. 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 44     per Coumadin clinic-ok to hold for 5 days w/bridge    INJECTION N/A 2023    Procedure: INJECTION, BILATERAL GTB AND RIGHT HIP INTRA-ARTICULAR CONTRAST;  Surgeon: Beth Alfaro MD;  Location: [facility] PAIN MGT;  Service: Pain Management;  Laterality: N/A;    kidney stone removal      MITRAL VALVE REPLACEMENT  2004    TUBAL LIGATION       Family History   Problem Relation Age of Onset    Stroke Paternal Grandmother     Colon cancer Maternal Grandmother     Cancer Brother         testicular cancer    Diabetes Sister     Hypertension Sister     Breast cancer Paternal Aunt     Diabetes Sister     Diabetes Sister     Heart disease Father 70        MI    Diabetes Father     Colon cancer Mother 83    Asthma Daughter     Asthma  Son     Ovarian cancer Neg Hx      Social History     Tobacco Use    Smoking status: Former     Current packs/day: 0.00     Average packs/day: 0.5 packs/day for 20.0 years (10.0 ttl pk-yrs)     Types: Cigarettes     Start date: 1982     Quit date: 2002     Years since quittin.4    Smokeless tobacco: Never   Substance Use Topics    Alcohol use: No    Drug use: No     Review of patient's allergies indicates:   Allergen Reactions    Brimonidine        Medications: I have reviewed the current medication administration record.    Medications Prior to Admission   Medication Sig Dispense Refill Last Dose    ADVAIR DISKUS 500-50 mcg/dose DsDv diskus inhaler INHALE 1 PUFF INTO THE LUNGS 2 (TWO) TIMES DAILY. 180 each 3     albuterol (PROVENTIL/VENTOLIN HFA) 90 mcg/actuation inhaler INHALE 2 PUFFS INTO THE LUNGS EVERY 6 (SIX) HOURS AS NEEDED FOR WHEEZING. RESCUE 18 g 6     albuterol sulfate 2.5 mg/0.5 mL Nebu Take 2.5 mg by nebulization every 4 (four) hours as needed (wheezing). Rescue 1 each 0     amLODIPine (NORVASC) 2.5 MG tablet Take 1 tablet (2.5 mg total) by mouth once daily. 90 tablet 2     aspirin (ECOTRIN) 81 MG EC tablet Take 81 mg by mouth once daily.        cycloSPORINE (RESTASIS) 0.05 % ophthalmic emulsion Place 1 drop into both eyes 2 (two) times daily. 60 each 12     dipyridamole (PERSANTINE) 5 mg/mL injection        dorzolamide (TRUSOPT) 2 % ophthalmic solution Place 1 drop into the right eye 2 (two) times a day. 10 mL 3     ergocalciferol (ERGOCALCIFEROL) 50,000 unit Cap Take 1 capsule (50,000 Units total) by mouth twice a week. 24 capsule 0     fluticasone propionate (FLONASE) 50 mcg/actuation nasal spray 2 sprays (100 mcg total) by Each Nostril route once daily. 16 g 3     furosemide (LASIX) 40 MG tablet Take 1 tablet (40 mg total) by mouth once daily. 90 tablet 3     gabapentin (NEURONTIN) 300 MG capsule Take 1 capsule (300 mg total) by mouth every evening. 30 capsule 11      latanoprost (XALATAN) 0.005 % ophthalmic solution Place 1 drop into both eyes once daily. 7.5 mL 3     meclizine (ANTIVERT) 25 mg tablet Take 1 tablet (25 mg total) by mouth 2 (two) times daily as needed. 30 tablet 1     metoprolol tartrate (LOPRESSOR) 100 MG tablet TAKE ONE TABLET BY MOUTH TWICE DAILY 60 tablet 11     nitroGLYCERIN (NITROSTAT) 0.4 MG SL tablet Place 1 tablet (0.4 mg total) under the tongue every 5 (five) minutes as needed for Chest pain. (Patient not taking: Reported on 4/28/2022) 30 tablet 1     omeprazole (PRILOSEC) 40 MG capsule Take 1 capsule (40 mg total) by mouth every morning. Open capsule and take with apple sauce 30 capsule 2     ondansetron (ZOFRAN-ODT) 8 MG TbDL Dissolve 1 tablet (8 mg total) by mouth every 6 (six) hours as needed. 30 tablet 0     oxybutynin (DITROPAN-XL) 5 MG TR24 Take 5 mg by mouth once daily.       pantoprazole (PROTONIX) 40 MG tablet Take 1 tablet (40 mg total) by mouth daily as needed. 30 tablet 3     rosuvastatin (CRESTOR) 20 MG tablet Take 1 tablet (20 mg total) by mouth once daily. 90 tablet 3     semaglutide (OZEMPIC) 0.25 mg or 0.5 mg (2 mg/3 mL) pen injector Inject 0.5 mg into the skin every 7 days. 3 mL 2     sertraline (ZOLOFT) 100 MG tablet TAKE 1 TABLET BY MOUTH ONCE DAILY. 90 tablet 3     tamsulosin (FLOMAX) 0.4 mg Cap Take 0.4 mg by mouth once daily.       warfarin (COUMADIN) 5 MG tablet Take 1 tablet (5 mg total) by mouth Daily. or as instructed by Coumadin Clinic. 40 tablet 0        Review of Systems   Reason unable to perform ROS: Patient intubated.     Objective:     Vital Signs (Most Recent):  Temp: 97.9 °F (36.6 °C) (10/03/23 0701)  Pulse: 110 (10/03/23 0733)  Resp: (!) 28 (10/03/23 0733)  BP: (!) 96/55 (10/03/23 0701)  SpO2: (!) 92 % (10/03/23 0282)    Vital Signs Range (Last 24H):  Temp:  [96 °F (35.6 °C)-97.9 °F (36.6 °C)]   Pulse:  []   Resp:  [0-33]   BP: ()/(51-58)   SpO2:  [87 %-99 %]   Arterial Line BP:  ()/(46-59)        Physical Exam         Neurological Exam:   LOC: comatose  Attention Span: poor  Orientation: Not oriented to person, place, and time  Pupils (CN II, III): Anisocoria Side: R pupil 3-4 mm Reactive: Yes Sluggish      Laboratory:  CMP:   Recent Labs   Lab 10/03/23  0226   CALCIUM 7.1*   ALBUMIN 2.1*   PROT 4.9*   *   K 4.6   CO2 33*   CL 80*   BUN 79*   CREATININE 1.8*   ALKPHOS 65   ALT 16   AST 38   BILITOT 0.9     CBC:   Recent Labs   Lab 10/03/23  0226   WBC 35.63*   RBC 3.19*   HGB 7.5*   HCT 23.1*      MCV 72*   MCH 23.5*   MCHC 32.5       Diagnostic Results:      Brain imaging:  CT H, repeat in 2 days    Vessel Imaging:  Pending CTA    Cardiac Evaluation:   EF 55-60%        Reggie Waldrop MD  [facility] [email or web address]] Stroke Center  Department of Vascular Neurology   [facility] - Cardiac Medical ICU

## 2023-10-03 NOTE — PLAN OF CARE
Patient not medically stable at this time, per team; CT- brain bleeding and Neuro transfer  Will continue to follow.           Ximena Starkey LMSW  PRN - [facility]  [phone]

## 2023-10-03 NOTE — ASSESSMENT & PLAN NOTE
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 On coumadin. Follows with Dr. Messina outpatient.     --Hold Coumadin in setting of acute illness  -- Heparin paused 9/29 d/t supratherapeutic levels with oral bleeding, rechecking labs  -- 10/2 heparin paused d/t drop in Hb 9->7.1, no signs of overt bleeding, will monitor and resume as tolerated  10/3 heparin stopped per neuro d/t SAH

## 2023-10-03 NOTE — ASSESSMENT & PLAN NOTE
69 y/o female with extensive PMH and hospital course in multisystem organ failure, including ARDS, PE, shock, DVTs, and JENN with oliguria despite aggressive diuresis with new anisocoria this AM (R>L), found to have large diffuse SAH centered along basal cisterns concerning for aneurysmal etiology. Therapeutic on heparin gtt at time of neurological change. Heparin gtt was discontinued, not reversed. CTA without obvious aneurysm.     Recommendations:  - Hold heparin gtt. No reversal recommended given high risk for complication in setting of mechanical mitral valve presence. Recommendation would be for 2 weeks off anticoagulation in setting of suspected unsecured aneurysm. Patient's high risk for stroke, mechanical valve thrombosis, and extension of PE during this cessation of anticoagulation were discussed with patient's daughter who was at bedside. Unfortunately, given patient's complex picture, she is patient has high risk of morbidity on or off anticoagulation.  - Repeat CTH now for stability scan  - Begin nimodipine 30 mg q2h or 60 mg q4h if tolerated from hemodynamic standpoint.   - Begin Keppra 500 mg BID for seizure ppx in setting of suspected unsecured aneurysmal SAH  - Maintain strict SBP <140  - Recommend maintaining euvolemia   - Recommend eunatremia, would correct current hyponatremia   - Wean sedation as is able and safe to from respiratory perspective and obtain hourly neuro checks  - Ideally, obtain DSA. Will defer to NSGY, IR and anesthesia teams whether this is feasible at this time given severity of respiratory instability. Concern that patient's JENN will be furthrer exacerbated by contrast load needed for DSA. This would also need to be thoroughly discussed with family prior to procedure.     We will continue to follow this patient. Please call [phone] for any neurological decompensation or further questions regarding neurological management.

## 2023-10-03 NOTE — HPI
Ms. Almanzar is a 69 y/o female with PMH significant for MVR s/p mechanical valve on coumadin, AF, COPD, HFpEF, DM, HTN, and CKD currently admitted to pulmonary critical care medicine for hypoxemic + hypercapnic respiratory failure 2/2 ARDS (+ rhinovirus), nonocclusive right PE (on heparin gtt), bilateral lower extremity DVTs and additional complications of JENN, hyponatremia, anemia, shock, and hyperglycemia who was found to have anasicoria this morning (R pupil dilated and extremely sluggish vs nonreactive), prompting stroke code activation. CTH showed large diffuse SAH, centered along basal cisterns, no obvious aneurysm visualized on CTA brain. Heparin gtt and paralytics were discontinued at that time. NCC and neurosurgery teams consulted.

## 2023-10-03 NOTE — CONSULTS
[facility]  Neurocritical Care  Consult Note    Admit Date: 9/26/2023  Service Date: 10/03/2023  Length of Stay: 7    Inpatient consult to Neuro Critical Care  Consult performed by: Merced Price PA-C  Consult ordered by: Zamzam Enriquez DO        Subjective:     Chief Complaint: Acute respiratory failure with hypoxia and hypercarbia    History of Present Illness: Ms. Almanzar is a 69 y/o female with PMH significant for MVR s/p mechanical valve on coumadin, AF, COPD, HFpEF, DM, HTN, and CKD currently admitted to pulmonary critical care medicine for hypoxemic + hypercapnic respiratory failure 2/2 ARDS (+ rhinovirus), nonocclusive right PE (on heparin gtt), bilateral lower extremity DVTs and additional complications of JENN, hyponatremia, anemia, shock, and hyperglycemia who was found to have anasicoria this morning (R pupil dilated and extremely sluggish vs nonreactive), prompting stroke code activation. CTH showed large diffuse SAH, centered along basal cisterns, no obvious aneurysm visualized on CTA brain. Heparin gtt and paralytics were discontinued at that time. NCC and neurosurgery teams consulted.       Past Medical History:   Diagnosis Date    Acute DVT (deep venous thrombosis) 9/26/2023    Acute on chronic diastolic congestive heart failure     Acute pulmonary embolism 9/24/2023    Allergy     Anemia     Anticoagulant long-term use     Anxiety     Asthma     Atrial fibrillation 2002    Bilateral primary osteoarthritis of hip 3/14/2023    Cataract     Chronic kidney disease     COPD (chronic obstructive pulmonary disease)     Coronary artery calcification seen on CT scan 3/22/2022    Coronary artery calcification seen on CT scan 3/22/2022    Depression     Encounter for blood transfusion     HTN (hypertension)     Hyperlipidemia     Nephrolithiasis     KEYSHAWN (obstructive sleep apnea)     awaiting CPAP machine     Rheumatic disease of mitral valve     Uncontrolled type 2  diabetes mellitus with complication, with long-term current use of insulin 2020    Urinary incontinence      Past Surgical History:   Procedure Laterality Date    BREAST BIOPSY Left 10/2019    CARDIAC VALVE SURGERY  2004    mechanical mitral valve     SECTION      COLONOSCOPY N/A 2019    Procedure: COLONOSCOPY;  Surgeon: Devon Bowling MD;  Location: [facility] ENDO (4TH FLR);  Service: Endoscopy;  Laterality: N/A;  ok to hold Coumadin x 5 days with Lovenox bridge per Coumadin clinic-[facility]    ESOPHAGOGASTRODUODENOSCOPY N/A 2019    Procedure: EGD (ESOPHAGOGASTRODUODENOSCOPY);  Surgeon: Smooth Torrez MD;  Location: [facility] ENDO (2ND FLR);  Service: Endoscopy;  Laterality: N/A;  2nd floor requested due to comorbidities, Hypertension, permanent A. 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 44     per Coumadin clinic-ok to hold for 5 days w/bridge    INJECTION N/A 2023    Procedure: INJECTION, BILATERAL GTB AND RIGHT HIP INTRA-ARTICULAR CONTRAST;  Surgeon: Beth Alfaro MD;  Location: [facility] PAIN MGT;  Service: Pain Management;  Laterality: N/A;    kidney stone removal      MITRAL VALVE REPLACEMENT  2004    TUBAL LIGATION         No current facility-administered medications on file prior to encounter.     Current Outpatient Medications on File Prior to Encounter   Medication Sig Dispense Refill    ADVAIR DISKUS 500-50 mcg/dose DsDv diskus inhaler INHALE 1 PUFF INTO THE LUNGS 2 (TWO) TIMES DAILY. 180 each 3    albuterol (PROVENTIL/VENTOLIN HFA) 90 mcg/actuation inhaler INHALE 2 PUFFS INTO THE LUNGS EVERY 6 (SIX) HOURS AS NEEDED FOR WHEEZING. RESCUE 18 g 6    albuterol sulfate 2.5 mg/0.5 mL Nebu Take 2.5 mg by nebulization every 4 (four) hours as needed (wheezing). Rescue 1 each 0    amLODIPine (NORVASC) 2.5 MG tablet Take 1 tablet (2.5 mg total) by mouth once daily. 90 tablet 2    aspirin (ECOTRIN) 81 MG EC tablet  Take 81 mg by mouth once daily.       cycloSPORINE (RESTASIS) 0.05 % ophthalmic emulsion Place 1 drop into both eyes 2 (two) times daily. 60 each 12    dipyridamole (PERSANTINE) 5 mg/mL injection       dorzolamide (TRUSOPT) 2 % ophthalmic solution Place 1 drop into the right eye 2 (two) times a day. 10 mL 3    ergocalciferol (ERGOCALCIFEROL) 50,000 unit Cap Take 1 capsule (50,000 Units total) by mouth twice a week. 24 capsule 0    fluticasone propionate (FLONASE) 50 mcg/actuation nasal spray 2 sprays (100 mcg total) by Each Nostril route once daily. 16 g 3    furosemide (LASIX) 40 MG tablet Take 1 tablet (40 mg total) by mouth once daily. 90 tablet 3    gabapentin (NEURONTIN) 300 MG capsule Take 1 capsule (300 mg total) by mouth every evening. 30 capsule 11    latanoprost (XALATAN) 0.005 % ophthalmic solution Place 1 drop into both eyes once daily. 7.5 mL 3    meclizine (ANTIVERT) 25 mg tablet Take 1 tablet (25 mg total) by mouth 2 (two) times daily as needed. 30 tablet 1    metoprolol tartrate (LOPRESSOR) 100 MG tablet TAKE ONE TABLET BY MOUTH TWICE DAILY 60 tablet 11    nitroGLYCERIN (NITROSTAT) 0.4 MG SL tablet Place 1 tablet (0.4 mg total) under the tongue every 5 (five) minutes as needed for Chest pain. (Patient not taking: Reported on 4/28/2022) 30 tablet 1    omeprazole (PRILOSEC) 40 MG capsule Take 1 capsule (40 mg total) by mouth every morning. Open capsule and take with apple sauce 30 capsule 2    ondansetron (ZOFRAN-ODT) 8 MG TbDL Dissolve 1 tablet (8 mg total) by mouth every 6 (six) hours as needed. 30 tablet 0    oxybutynin (DITROPAN-XL) 5 MG TR24 Take 5 mg by mouth once daily.      pantoprazole (PROTONIX) 40 MG tablet Take 1 tablet (40 mg total) by mouth daily as needed. 30 tablet 3    rosuvastatin (CRESTOR) 20 MG tablet Take 1 tablet (20 mg total) by mouth once daily. 90 tablet 3    semaglutide (OZEMPIC) 0.25 mg or 0.5 mg (2 mg/3 mL) pen injector Inject 0.5 mg into the skin every 7  days. 3 mL 2    sertraline (ZOLOFT) 100 MG tablet TAKE 1 TABLET BY MOUTH ONCE DAILY. 90 tablet 3    tamsulosin (FLOMAX) 0.4 mg Cap Take 0.4 mg by mouth once daily.      warfarin (COUMADIN) 5 MG tablet Take 1 tablet (5 mg total) by mouth Daily. or as instructed by Coumadin Clinic. 40 tablet 0     Allergies: Brimonidine    Family History   Problem Relation Age of Onset    Stroke Paternal Grandmother     Colon cancer Maternal Grandmother     Cancer Brother         testicular cancer    Diabetes Sister     Hypertension Sister     Breast cancer Paternal Aunt     Diabetes Sister     Diabetes Sister     Heart disease Father 70        MI    Diabetes Father     Colon cancer Mother 83    Asthma Daughter     Asthma Son     Ovarian cancer Neg Hx        Social History     Tobacco Use    Smoking status: Former     Current packs/day: 0.00     Average packs/day: 0.5 packs/day for 20.0 years (10.0 ttl pk-yrs)     Types: Cigarettes     Start date: 1982     Quit date: 2002     Years since quittin.4    Smokeless tobacco: Never   Substance Use Topics    Alcohol use: No    Drug use: No      Review of Systems:  Unable to obtain a complete ROS due to level of consciousness.     Vitals:   Temp: 96.9 °F (36.1 °C)  Pulse: 92  Rhythm: atrial rhythm  BP: (!) 98/50  MAP (mmHg): 68  Resp: (!) 28  SpO2: 97 %  Oxygen Concentration (%): 90  Vent Mode: A/C  Set Rate: 33 BPM  Vt Set: 300 mL  PEEP/CPAP: 8 cmH20  Peak Airway Pressure: 35 cmH20  Mean Airway Pressure: 18 cmH20  Plateau Pressure: 29 cmH20    Temp  Min: 96 °F (35.6 °C)  Max: 97.9 °F (36.6 °C)  Pulse  Min: 84  Max: 110  BP  Min: 96/55  Max: 111/57  MAP (mmHg)  Min: 68  Max: 70  Resp  Min: 0  Max: 32  SpO2  Min: 87 %  Max: 98 %  Oxygen Concentration (%)  Min: 70  Max: 100    10/02 0701 - 10/03 0700  In: 3122.9 [I.V.:0.6]  Out:  [Urine:1625]   Unmeasured Output  Urine Occurrence: 1  Stool Occurrence: 1     Examination:   Constitutional: Critically ill.  No apparent distress.   Eyes: Conjunctiva clear, anicteric. Lids no lesions.  Head/Ears/Nose/Mouth/Throat/Neck: Endotracheally intubated. Moist mucous membranes. External ears, nose atraumatic.   Cardiovascular: Regular rhythm.  Respiratory: On mechanical ventilation.   Gastrointestinal:Soft, nondistended, nontender. + bowel sounds.    Neurologic: Off propofol x10 minutes, fentanyl de-escalated 250mcg/hr--->50 mcg/hr 30 minutes prior to exam off paralytics x5 hours    -[id number]  -Comatose. Does not respond to stimuli. Follows no commands.   -Cranial nerves: R pupil 5 mm and fixed, L pupil 3 mm and reactive to light. +R corneal reflex, Absent L corneal reflex. Weak cough present. No gag reflex. - OCR.   -No movement in any extremity to central and peripheral painful stimuli.   -Sensation as above    Unable to test orientation, language, memory, hearing, shoulder shrug, tongue protrusion, coordination, gait due to level of consciousness.    Today I independently reviewed pertinent medications, lines/drains/airways, imaging, cardiology results, laboratory results, notably:     - nonocclusive R PE  - BLE DVTs  - Echo 9/27:   Left Ventricle: The left ventricle is normal in size. Normal wall thickness. Normal wall motion. There is normal systolic function with a visually estimated ejection fraction of 55 - 60%. Diastolic function cannot be reliably determined in the presence of mitral valve ring/replacement.    Right Ventricle: Right ventricle was not well visualized due to poor acoustic window.    Mitral Valve: There is a mechanical valve in the mitral position. Vmax < 2 m/s, mean gradient 4 mmHg at 122 bpm.    IVC/SVC: Intermediate venous pressure at 8 mmHg.  - Na 125  - CTH with diffuse SAH centered around basal cisterns no obvious aneurysm on CTA      Assessment/Plan:     Neuro  Subarachnoid hemorrhage  69 y/o female with extensive PMH and hospital course in multisystem organ failure, including ARDS, PE, shock,  DVTs, and JENN with oliguria despite aggressive diuresis with new anisocoria this AM (R>L), found to have large diffuse SAH centered along basal cisterns concerning for aneurysmal etiology. Therapeutic on heparin gtt at time of neurological change. Heparin gtt was discontinued, not reversed. CTA without obvious aneurysm.     Recommendations:  - Hold heparin gtt. No reversal recommended given high risk for complication in setting of mechanical mitral valve presence. Recommendation would be for 2 weeks off anticoagulation in setting of suspected unsecured aneurysm. Patient's high risk for stroke, mechanical valve thrombosis, and extension of PE during this cessation of anticoagulation were discussed with patient's daughter who was at bedside. Unfortunately, given patient's complex picture, she is patient has high risk of morbidity on or off anticoagulation.  - Repeat CTH now for stability scan  - Begin nimodipine 30 mg q2h or 60 mg q4h if tolerated from hemodynamic standpoint.   - Begin Keppra 500 mg BID for seizure ppx in setting of suspected unsecured aneurysmal SAH  - Maintain strict SBP <140  - Recommend maintaining euvolemia   - Recommend eunatremia, would correct current hyponatremia   - Wean sedation as is able and safe to from respiratory perspective and obtain hourly neuro checks  - Ideally, obtain DSA. Will defer to NSGY, IR and anesthesia teams whether this is feasible at this time given severity of respiratory instability. Concern that patient's JENN will be furthrer exacerbated by contrast load needed for DSA. This would also need to be thoroughly discussed with family prior to procedure.     We will continue to follow this patient. Please call [phone] for any neurological decompensation or further questions regarding neurological management.           Merced Price PA-C  Neurocritical Care  [facility]

## 2023-10-04 NOTE — SUBJECTIVE & OBJECTIVE
Interval History/Significant Events: Repeat CTH revealed stable SAH. Received 1u PRBC for Hgb of 6.5 overnight. WBC dropped from 30-12. On rounds HR elevated, 12-lead showed AF w/ RVR.       Objective:     Vital Signs (Most Recent):  Temp: 98.3 °F (36.8 °C) (10/04/23 1230)  Pulse: 102 (10/04/23 1305)  Resp: (!) 33 (10/04/23 1305)  BP: (!) 102/51 (10/04/23 0701)  SpO2: 100 % (10/04/23 1305) Vital Signs (24h Range):  Temp:  [94.4 °F (34.7 °C)-98.3 °F (36.8 °C)] 98.3 °F (36.8 °C)  Pulse:  [] 102  Resp:  [4-33] 33  SpO2:  [96 %-100 %] 100 %  BP: ()/(47-58) 102/51  Arterial Line BP: ()/(43-55) 106/44   Weight: 90.3 kg (199 lb 1.2 oz)  Body mass index is 36.41 kg/m².      Intake/Output Summary (Last 24 hours) at 10/4/2023 1437  Last data filed at 10/4/2023 1400  Gross per 24 hour   Intake 3053.4 ml   Output 670 ml   Net 2383.4 ml          Physical Exam  Vitals and nursing note reviewed.   Constitutional:       General: She is not in acute distress.     Appearance: She is obese. She is ill-appearing. She is not diaphoretic.   HENT:      Head: Normocephalic and atraumatic.      Right Ear: External ear normal.      Left Ear: External ear normal.      Nose: Nose normal.      Mouth/Throat:      Mouth: Mucous membranes are moist.      Comments: Oral bleeding/oozing, improved from yesterday  Cardiovascular:      Rate and Rhythm: Normal rate. Rhythm irregular.      Pulses: Normal pulses.   Pulmonary:      Comments: Mechanical breath sounds; patient proned on vent  Abdominal:      General: There is no distension.      Palpations: Abdomen is soft. There is no mass.   Musculoskeletal:         General: No swelling or tenderness.      Cervical back: Normal range of motion and neck supple.   Skin:     General: Skin is warm and dry.      Capillary Refill: Capillary refill takes less than 2 seconds.      Findings: Bruising present.      Comments: Scattered purpura/hematomas over upper chest   Neurological:      Mental  Status: She is alert.      Cranial Nerves: Cranial nerve deficit present.   Psychiatric:         Mood and Affect: Mood normal.         Behavior: Behavior normal.            Vents:  Vent Mode: A/C (10/04/23 1305)  Ventilator Initiated: Yes (09/27/23 1158)  Set Rate: 33 BPM (10/04/23 1305)  Vt Set: 300 mL (10/04/23 1305)  PEEP/CPAP: 8 cmH20 (10/04/23 1305)  Oxygen Concentration (%): 90 (10/04/23 1305)  Peak Airway Pressure: 37 cmH20 (10/04/23 1305)  Plateau Pressure: 35 cmH20 (10/04/23 1305)  Total Ve: 10.3 L/m (10/04/23 1305)  Negative Inspiratory Force (cm H2O): 0 (10/04/23 1305)  F/VT Ratio<105 (RSBI): 106.11 (10/04/23 1305)  Lines/Drains/Airways       Central Venous Catheter Line  Duration             Percutaneous Central Line Insertion/Assessment - Triple Lumen  09/28/23 0845 Internal Jugular Right 6 days              Drain  Duration                  NG/OG Tube 09/27/23 1141 orogastric Center mouth 7 days         Urethral Catheter 09/27/23 1151 16 Fr. 7 days         Rectal Tube 10/01/23 2000 2 days              Airway  Duration                  Airway - Non-Surgical 09/27/23 1139 Endotracheal Tube 7 days              Arterial Line  Duration             Arterial Line 09/27/23 1713 Right Radial 6 days              Peripheral Intravenous Line  Duration                  Peripheral IV - Single Lumen 10/03/23 1213 18 G;1 3/4 in Left Antecubital 1 day                  Significant Labs:    CBC/Anemia Profile:  Recent Labs   Lab 10/04/23  0114 10/04/23  0321 10/04/23  0951   WBC 12.10 13.14* 16.59*   HGB 7.6* 7.4* 7.4*   HCT 23.2* 22.0* 21.8*    147* 178   MCV 78* 77* 77*   RDW 20.6* 19.9* 20.4*        Chemistries:  Recent Labs   Lab 10/02/23  1748 10/03/23  0226 10/03/23  0226 10/04/23  0317 10/04/23  0951 10/04/23  1226   NA  --  125*   < > 120* 117* 120*   K  --  4.6  --  4.6 4.9  --    CL  --  80*  --  76*  --   --    CO2  --  33*  --  26  --   --    BUN  --  79*  --  87*  --   --    CREATININE  --  1.8*  --   2.4*  --   --    CALCIUM  --  7.1*  --  7.0*  --   --    ALBUMIN  --  2.1*  --  2.0*  --   --    PROT  --  4.9*  --  4.8*  --   --    BILITOT 0.8 0.9  --  1.4*  --   --    ALKPHOS  --  65  --  73  --   --    ALT  --  16  --  17  --   --    AST  --  38  --  50*  --   --    MG  --  2.2  --  2.0  --   --    PHOS  --  5.8*  --  6.7*  --   --     < > = values in this interval not displayed.       All pertinent labs within the past 24 hours have been reviewed.    Significant Imaging:  I have reviewed all pertinent imaging results/findings within the past 24 hours.

## 2023-10-04 NOTE — PROGRESS NOTES
[facility] - Cardiac Medical ICU  Neurocritical Care  Progress Note    Admit Date: 9/26/2023  Service Date: 10/04/2023  Length of Stay: 8    Subjective:     Chief Complaint: Acute respiratory failure with hypoxia and hypercarbia    History of Present Illness: Ms. Almanzar is a 69 y/o female with PMH significant for MVR s/p mechanical valve on coumadin, AF, COPD, HFpEF, DM, HTN, and CKD currently admitted to pulmonary critical care medicine for hypoxemic + hypercapnic respiratory failure 2/2 ARDS (+ rhinovirus), nonocclusive right PE (on heparin gtt), bilateral lower extremity DVTs and additional complications of JENN, hyponatremia, anemia, shock, and hyperglycemia who was found to have anasicoria this morning (R pupil dilated and extremely sluggish vs nonreactive), prompting stroke code activation. CTH showed large diffuse SAH, centered along basal cisterns, no obvious aneurysm visualized on CTA brain. Heparin gtt and paralytics were discontinued at that time. NCC and neurosurgery teams consulted.       Hospital Course: 10/04/2023 Sodium downtrending to 117 overnight, given 3% hypertonic bolus and started on gtt, goal eunatremia. Repeat head CT yesterday stable, TCDs w/ markedly limited windows, unable to calculate LR on R, L MCA/ROBERT w/o clear vasospasm w/ technically limited data. Worsening UOP (0.3 cc/kg/hr, oliguric) and serum creatinine, additionally w/ uptrending levophed requirements, P/F ratio downtrending to 78 this AM, remains w/ high vent requirements. Made DNR by  yesterday evening.       Interval History:  Please see hospital course above     Review of Systems   Unable to perform ROS: Intubated     Objective:     Vitals:  Temp: 98 °F (36.7 °C)  Pulse: 93  Rhythm: normal sinus rhythm  BP: (!) 102/51  MAP (mmHg): 68  Resp: (!) 33  SpO2: 100 %  Oxygen Concentration (%): 90  Vent Mode: A/C  Set Rate: 33 BPM  Vt Set: 300 mL  PEEP/CPAP: 8 cmH20  Peak Airway Pressure: 35 cmH20  Mean Airway Pressure: 18  cmH20  Plateau Pressure: 35 cmH20    Temp  Min: 94.4 °F (34.7 °C)  Max: 98 °F (36.7 °C)  Pulse  Min: 77  Max: 140  BP  Min: 93/47  Max: 136/58  MAP (mmHg)  Min: 68  Max: 68  Resp  Min: 15  Max: 33  SpO2  Min: 94 %  Max: 100 %  Oxygen Concentration (%)  Min: 90  Max: 100    10/03 0701 - 10/04 0700  In: 3414.1 [I.V.:2093.1]  Out: 870 [Urine:620]   Unmeasured Output  Urine Occurrence: 1  Stool Occurrence: 1        Physical Exam  General Appearance: Elderly woman resting in bed, obese, +diffuse anasarca, additionally w/ diffuse purpura over chest and arms. Intubated, on fentanyl @ 125, propofol held for exam   Mental Status Exam: No response to verbal, tactile or noxious stimuli. Not tracking or regarding. Not following commands  Cranial Nerves: Gaze midline, R pupil 4->3 mm and sluggishly reactive, improved from prior. L pupil 3->2 mm and sluggishly reactive, stable from prior. +corneal to saline b/l, R>L. Negative OCRs. Negative gag. Very minimal delayed cough.  Motor: No movement to distal or proximal stimuli in b/l upper extremities. Flicker in RLE when noxious applied to LLE, no clear movement in LLE, no w/d in RLE   Sensory: No response to noxious x4 extremities  Coordination: Unable to assess  Vascular: Irregularly irregular rhythm. Distant heart sounds w/o clear m/r/g  Lungs: Coarse breath sounds b/l. Diminished air movement at b/l bases   Abdomen: Soft, non-distended, non-tender; minimal BS appreciated  Extremities: cool and dry to touch. Distal pulses not palpable, per nursing are dopplerable b/l.          Medications:  Continuouscisatracurium (NIMBEX) 200 mg in dextrose 5 % (D5W) 100 mL infusion, Last Rate: Stopped (10/03/23 1039)  fentanyl, Last Rate: 125 mcg/hr (10/04/23 0701)  insulin regular 1 units/mL infusion orderable (DKA), Last Rate: 1.5 Units/hr (10/04/23 1031)  NORepinephrine bitartrate-NaCl, Last Rate: 0.18 mcg/kg/min (10/04/23 1127)  propofoL, Last Rate: 15 mcg/kg/min (10/04/23  0701)    Scheduledalbuterol-ipratropium, 3 mL, Q6H  atorvastatin, 80 mg, Daily  dexAMETHasone, 10 mg, Daily  digoxin, 250 mcg, Once   Followed by  digoxin, 125 mcg, Q6H  levETIRAcetam (Keppra) IV (PEDS and ADULTS), 500 mg, Q12H  pantoprazole, 40 mg, BID  piperacillin-tazobactam (Zosyn) IV (PEDS and ADULTS) (extended infusion is not appropriate), 4.5 g, Q8H  polyethylene glycol, 17 g, Daily  senna-docusate 8.6-50 mg, 2 tablet, Daily  sertraline, 100 mg, Nightly  white petrolatum-mineral oiL, , Q8H  zinc sulfate, 220 mg, Daily    PRN0.9%  NaCl infusion (for blood administration), , Q24H PRN  acetaminophen, 650 mg, Q6H PRN  dextrose 10%, 12.5 g, PRN  dextrose 10%, 25 g, PRN  hydrALAZINE, 10 mg, Q8H PRN  labetalol, 10 mg, Q4H PRN  melatonin, 6 mg, Nightly PRN  sodium chloride 0.9%, 10 mL, PRN  vancomycin - pharmacy to dose, , pharmacy to manage frequency      Today I personally reviewed pertinent imaging, cardiology results, laboratory results, microbiology results, notably: CT head stable. TCDs technically limited, however no clear evidence of vasospasm on L. CBC w/ drop in leukocytosis and platelets concerning for possible impending bone marrow failure in setting of prolonged critical illness. P/F ratio 78, on AC 33/300/8/90%. CMP w/ Na 117, Cl 76, BUN/creatinine uptrending to 87/2.4. Phos elevated to 6.7. Uptrending bilirubin and AST. SOFA score 15    Diet  Diet NPO  Diet NPO        Assessment/Plan:     Neuro  Subarachnoid hemorrhage  69 y/o female with extensive PMH and hospital course in multisystem organ failure, including ARDS, PE, shock, DVTs, and JENN with oliguria despite aggressive diuresis with new anisocoria this AM (R>L), found to have large diffuse SAH centered along basal cisterns concerning for aneurysmal etiology. Therapeutic on heparin gtt at time of neurological change. Heparin gtt was discontinued, not reversed. CTA without obvious aneurysm. Concern for underlying unsecured aneurysm vs  spontaneous/perimesencephalic SAH in setting of critical illness and therapeutic AC.     Unfortunately as discussed extensively in attending attestation on 10/3 and discussed w/ primary team, management of patient's SAH severely limited by overall critical illness. Pt not stable for DSA to confirm if there is an underlying unsecured aneurysm, in absence of confirmation cannot safely resume AC, also could not treat vasospasm (if occurs) with induced hypertension. Pt additionally unlikely to tolerate induced hypertension, already with uptrending levophed requirements to maintain MAP>65. Neuro exam remains poor, although difficult to fully neuroprognosticate given uremia, critical illness and sedation likely confounding exam; however even in the absence of formal neuroprognostication, concerned that patient w/ progressive multisystem organ dysfunction/failure despite ongoing aggressive management by MICU team. Overall prognosis extremely poor.     Recommendations:  - Hold heparin gtt. No reversal recommended given high risk for complication in setting of mechanical mitral valve presence. Recommendation would be for 2 weeks off anticoagulation in setting of suspected unsecured aneurysm. Patient's high risk for stroke, mechanical valve thrombosis, and extension of PE during this cessation of anticoagulation were discussed with patient's daughter who was at bedside. Unfortunately, given patient's complex picture, she is patient has high risk of morbidity on or off anticoagulation.  - Begin nimodipine 30 mg q2h or 60 mg q4h if tolerated from hemodynamic standpoint -> on hold 2/2 vasopressor requirement  - C/w Keppra 500 mg BID for seizure ppx in setting of suspected unsecured aneurysmal SAH  - Trend TCDs daily -> limited windows, however given some data obtained on L would continue daily dopplers for neuroprognostication. Note made that ability to tx pt for vasospasm if found extremely limited, as discussed above  -  Maintain strict SBP <140  - Recommend maintaining euvolemia for admission -> currently hypervolemic. Note made that if MICU and family elect to pursue renal replacement therapy for worsening oliguric renal failure despite overall grim prognosis, pt would need CRRT (Prismax vs SLED), iHD contraindicated due to concern for rapid fluid shifts/worsening cerebral edema with SAH  - Recommend eunatremia -> given hypertonic bolus this AM, recommend starting 3% hypertonic gtt @ 50 cc/hr, trend Na q6hrs w/ goal eunatremia  - Wean sedation as is able and safe to from respiratory perspective and obtain hourly neuro checks  - Ideally, obtain DSA. Will defer to NSGY, IR and anesthesia teams whether this is feasible at this time given severity of respiratory instability. Concern that patient's JENN will be furthrer exacerbated by contrast load needed for DSA. This would also need to be thoroughly discussed with family prior to procedure.   - Recommend palliative care consult    Given extensive medical problems with prolonged MICU stay, limited interventions available for SAH due to patient's current medical condition, believe pt remains best served in MICU. We will continue to follow as a consulting service, and remain readily available for questions/concerns and/or family meetings. Please call [phone] for any neurological decompensation or further questions regarding neurological management.           The patient is being Prophylaxed for:  Venous Thromboembolism with: Mechanical  Stress Ulcer with: H2B  Ventilator Pneumonia with: chlorhexidine oral care    Activity Orders          Turn patient starting at 09/28 1400    Progressive Mobility Protocol (mobilize patient to their highest level of functioning at least twice daily) starting at 09/27 0800    Diet NPO: NPO starting at 09/26 2159        DNR     Uninterrupted Critical Care/Counseling Time (not including procedures): 60 minutes  This patient is critically ill due  to non-traumatic SAH, comatose exam, ARDS, acute hypoxemic respiratory failure, shock, rhinovirus infection, JENN on CKD, hyponatremia, anemia, mechanical heart valve, PE, anasarca. There is high probability for acute neurological change leading to clinical and possibly life-threatening deterioration requiring highest level of physician preparedness for urgent intervention. I spent greater than 50% of the documented critical care time assessing and making medical decisions for this patient.     Faye Balbuena MD  Neurocritical Care  [facility] - Cardiac Medical ICU

## 2023-10-04 NOTE — HOSPITAL COURSE
10/04/2023 Sodium downtrending to 117 overnight, given 3% hypertonic bolus and started on gtt, goal eunatremia. Repeat head CT yesterday stable, TCDs w/ markedly limited windows, unable to calculate LR on R, L MCA/ROBERT w/o clear vasospasm w/ technically limited data. Worsening UOP (0.3 cc/kg/hr, oliguric) and serum creatinine, additionally w/ uptrending levophed requirements, P/F ratio downtrending to 78 this AM, remains w/ high vent requirements. Made DNR by  yesterday evening.

## 2023-10-04 NOTE — ASSESSMENT & PLAN NOTE
68F w/ PMH COPD, Pulm HTN, MVR on Warfarin, A-fib, HTN, T2DM, CKD3 [place] [facility] [place] now found to have HH4F3 SAH:    Neuro:  --Patient admitted to MICU; preponderance of medical care per MICU/NCC      -Q1h neurochecks while in ICU  --All labs and diagnostics reviewed      -CTA 10/3: Diffuse cisternal blood dissecting into anterior interhemispheric fissure; no underlying vascular lesion      -CTH 10/3 6h interval overall stable      -DSA cerebral only when healthy enough to tolerate  --HOB@30  --Keppra 500 BID x7d  --TCDs daily x14d  --Nimotop 60q4 x 21d (as able to tolerate if not requiring pressors)  --EVD watch  --Continue to follow clinically and notify NSGY immediately if any neurostatus change    CVS/Pulm:  --SBP <140 mmHg (cardene ggt; hydralazine & labetalol PRN; transition to home meds when appropriate per NCC)  --Aggressive cardiopulmonary management per NCC/MICU    GIT/Electrolytes:  --CMP daily      -Replete electrolytes PRN per NCC  --Na goal >135  --PPI    Renal:  --Strict I/Os; goal for euvolemia per primary teams    Heme/ID:  --Hold/Reverse anti-plt/coag medications per primary teams given significant medical illness acknowledging risks vs benefits of systemic anticoagulation in setting of SAH  --Daily CBC      -Transfuse PRN  --Trend WBC and Temp    PPx:  --TEDs/SCDs  --PPI    Dispo: Continued ICU care at this time, FU GOC per primary

## 2023-10-04 NOTE — ASSESSMENT & PLAN NOTE
Patient with Hypercapnic and Hypoxic Respiratory failure which is Acute on chronic.  she is not on home oxygen. Supplemental oxygen was provided and noted- Vent Mode: A/C  Oxygen Concentration (%):  [90] 90  Resp Rate Total:  [31 br/min-37 br/min] 33 br/min  Vt Set:  [300 mL] 300 mL  PEEP/CPAP:  [8 cmH20] 8 cmH20  Mean Airway Pressure:  [18 nvT83-29 cmH20] 19 cmH20    .   Signs/symptoms of respiratory failure include- tachypnea, increased work of breathing, respiratory distress and use of accessory muscles. Contributing diagnoses includes - COPD and Pulmonary Embolus Labs and images were reviewed. Patient Has recent ABG, which has been reviewed. Will treat underlying causes and adjust management of respiratory failure as follows-     67yo F with history of HFpEF, pulm htn, MVR on warfarin, AF, COPD, htn, T2DM, and CKD3 presenting as transfer from [facility] ICU for AHRF. Etiology due to PE with evidence of R heart strain on TTE vs pulmonary htn with PASP 52 mmhg vs viral illness (positive enterovirus and rhinovirus).  Pt has been treated with broad spectrun antibiotics and steroids at OSH.   She follows with Dr. Carvalho as outpatient.    Treating via ARDSnet protocol informed by results of ARMA, PROSEVA, and dexa-ARDS trials    --Wean oxygen as tolerated  --Serial ABG's  --Continue Zosyn  --Solumedrol stopped, started decadron 20mg QD for 5d followed by 10mg QD for 5d  --Scheduled Duo Nebs  --Continue breo, spiriva  --CXR  -- Not proning d/t SAH  -- Diuresis held d/t worsening renal function

## 2023-10-04 NOTE — PLAN OF CARE
Recommendations     If/when able, resume TFs. Rec'd Novasource @ 40 mL/hr to provide 1920 kcals, 87 g of protein, 688 mL fluid.  RD to monitor & follow-up.     Goals: Meet % EEN, EPN by RD f/u date  Nutrition Goal Status: goal not met  Communication of RD Recs: reviewed with RN

## 2023-10-04 NOTE — PLAN OF CARE
MICU DAILY GOALS     Family/Goals of care/Code Status   Code Status: DNR    24H Vital Sign Range  Temp:  [94.4 °F (34.7 °C)-97.9 °F (36.6 °C)]   Pulse:  []   Resp:  [1-33]   BP: ()/(50-58)   SpO2:  [91 %-100 %]   Arterial Line BP: ()/(45-63)      Shift Events   CTA of head for SAH. 1 unit of PRBCs given. NICK updated about SAH and asked to be notified if there is any change in plan of care with the family. Currently weaning sedation meds. US of transcranial doppler ordered. New neuro assessment, corneal reflex in both eyes, cough reflex weak, no gag, upper extremities no response, LLE extend toes, RLE flex toes. Rapid decrease in WBC so trending cbc's q8 hours.     AWAKE RASS: Goal - RASS Goal: -5-->unarousable  Actual - RASS (Quiroz Agitation-Sedation Scale): unarousable    Restraint necessity: Treatment interference   BREATHE SBT: Fail    Coordinate A & B, analgesics/sedatives Pain: managed   SAT: Fail   Delirium CAM-ICU: Overall CAM-ICU: Positive   Early(intubated/ Progressive (non-intubated) Mobility MOVE Screen (INTUBATED ONLY): Fail    Activity: Activity Management: Patient unable to perform activities   Feeding/Nutrition Diet order: Diet/Nutrition Received: tube feeding,     Thrombus DVT prophylaxis: VTE Required Core Measure: Pharmacological prophylaxis initiated/maintained   HOB Elevation Head of Bed (HOB) Positioning: HOB at 30 degrees   Ulcer Prophylaxis GI: yes   Glucose control managed Glycemic Management: blood glucose monitored, supplemental insulin given   Skin Skin assessed during: Daily Assessment    [] No Altered Skin Integrity Present    []Prevention Measures Documented      [x] Yes- Altered Skin Integrity Present or Discovered   [x] LDA Added if Not in Epic (Describe Wound)   [] New Altered Skin Integrity was Present on Admit and Documented in LDA   [] Wound Image Taken    Wound Care Consulted? Yes    Attending Nurse:  GENOVEVA Odonnell     Second RN/Staff Member:  GENOVEVA Diez    Bowel Function diarrhea    Indwelling Catheter Necessity      Urethral Catheter 09/27/23 1151 16 Fr.-Reason for Continuing Urinary Catheterization: Urinary retention, Critically ill in ICU and requiring hourly monitoring of intake/output    Percutaneous Central Line Insertion/Assessment - Triple Lumen  09/28/23 0845 Internal Jugular Right-Line Necessity Review: Hemodynamic instability, Medication caustic to vasculature  yes   De-escalation Antibiotics No       VS and assessment per flow sheet, patient progressing towards goals as tolerated, plan of care reviewed with Elayne Almanzar and family, all concerns addressed, will continue to monitor.

## 2023-10-04 NOTE — ASSESSMENT & PLAN NOTE
69 y/o female with extensive PMH and hospital course in multisystem organ failure, including ARDS, PE, shock, DVTs, and JENN with oliguria despite aggressive diuresis with new anisocoria this AM (R>L), found to have large diffuse SAH centered along basal cisterns concerning for aneurysmal etiology. Therapeutic on heparin gtt at time of neurological change. Heparin gtt was discontinued, not reversed. CTA without obvious aneurysm. Concern for underlying unsecured aneurysm vs spontaneous/perimesencephalic SAH in setting of critical illness and therapeutic AC.     Unfortunately as discussed extensively in attending attestation on 10/3 and discussed w/ primary team, management of patient's SAH severely limited by overall critical illness. Pt not stable for DSA to confirm if there is an underlying unsecured aneurysm, in absence of confirmation cannot safely resume AC, also could not treat vasospasm (if occurs) with induced hypertension. Pt additionally unlikely to tolerate induced hypertension, already with uptrending levophed requirements to maintain MAP>65. Neuro exam remains poor, although difficult to fully neuroprognosticate given uremia, critical illness and sedation likely confounding exam; however even in the absence of formal neuroprognostication, concerned that patient w/ progressive multisystem organ dysfunction/failure despite ongoing aggressive management by MICU team. Overall prognosis extremely poor.     Recommendations:  - Hold heparin gtt. No reversal recommended given high risk for complication in setting of mechanical mitral valve presence. Recommendation would be for 2 weeks off anticoagulation in setting of suspected unsecured aneurysm. Patient's high risk for stroke, mechanical valve thrombosis, and extension of PE during this cessation of anticoagulation were discussed with patient's daughter who was at bedside. Unfortunately, given patient's complex picture, she is patient has high risk of morbidity  on or off anticoagulation.  - Begin nimodipine 30 mg q2h or 60 mg q4h if tolerated from hemodynamic standpoint -> on hold 2/2 vasopressor requirement  - C/w Keppra 500 mg BID for seizure ppx in setting of suspected unsecured aneurysmal SAH  - Trend TCDs daily -> limited windows, however given some data obtained on L would continue daily dopplers for neuroprognostication. Note made that ability to tx pt for vasospasm if found extremely limited, as discussed above  - Maintain strict SBP <140  - Recommend maintaining euvolemia for admission -> currently hypervolemic. Note made that if MICU and family elect to pursue renal replacement therapy for worsening oliguric renal failure despite overall grim prognosis, pt would need CRRT (Prismax vs SLED), iHD contraindicated due to concern for rapid fluid shifts/worsening cerebral edema with SAH  - Recommend eunatremia -> given hypertonic bolus this AM, recommend starting 3% hypertonic gtt @ 50 cc/hr, trend Na q6hrs w/ goal eunatremia  - Wean sedation as is able and safe to from respiratory perspective and obtain hourly neuro checks  - Ideally, obtain DSA. Will defer to NSGY, IR and anesthesia teams whether this is feasible at this time given severity of respiratory instability. Concern that patient's JENN will be furthrer exacerbated by contrast load needed for DSA. This would also need to be thoroughly discussed with family prior to procedure.   - Recommend palliative care consult    Given extensive medical problems with prolonged MICU stay, limited interventions available for SAH due to patient's current medical condition, believe pt remains best served in MICU. We will continue to follow as a consulting service, and remain readily available for questions/concerns and/or family meetings. Please call [phone] for any neurological decompensation or further questions regarding neurological management.

## 2023-10-04 NOTE — SUBJECTIVE & OBJECTIVE
Medications Prior to Admission   Medication Sig Dispense Refill Last Dose    ADVAIR DISKUS 500-50 mcg/dose DsDv diskus inhaler INHALE 1 PUFF INTO THE LUNGS 2 (TWO) TIMES DAILY. 180 each 3     albuterol (PROVENTIL/VENTOLIN HFA) 90 mcg/actuation inhaler INHALE 2 PUFFS INTO THE LUNGS EVERY 6 (SIX) HOURS AS NEEDED FOR WHEEZING. RESCUE 18 g 6     albuterol sulfate 2.5 mg/0.5 mL Nebu Take 2.5 mg by nebulization every 4 (four) hours as needed (wheezing). Rescue 1 each 0     amLODIPine (NORVASC) 2.5 MG tablet Take 1 tablet (2.5 mg total) by mouth once daily. 90 tablet 2     aspirin (ECOTRIN) 81 MG EC tablet Take 81 mg by mouth once daily.        cycloSPORINE (RESTASIS) 0.05 % ophthalmic emulsion Place 1 drop into both eyes 2 (two) times daily. 60 each 12     dipyridamole (PERSANTINE) 5 mg/mL injection        dorzolamide (TRUSOPT) 2 % ophthalmic solution Place 1 drop into the right eye 2 (two) times a day. 10 mL 3     ergocalciferol (ERGOCALCIFEROL) 50,000 unit Cap Take 1 capsule (50,000 Units total) by mouth twice a week. 24 capsule 0     fluticasone propionate (FLONASE) 50 mcg/actuation nasal spray 2 sprays (100 mcg total) by Each Nostril route once daily. 16 g 3     furosemide (LASIX) 40 MG tablet Take 1 tablet (40 mg total) by mouth once daily. 90 tablet 3     gabapentin (NEURONTIN) 300 MG capsule Take 1 capsule (300 mg total) by mouth every evening. 30 capsule 11     latanoprost (XALATAN) 0.005 % ophthalmic solution Place 1 drop into both eyes once daily. 7.5 mL 3     meclizine (ANTIVERT) 25 mg tablet Take 1 tablet (25 mg total) by mouth 2 (two) times daily as needed. 30 tablet 1     metoprolol tartrate (LOPRESSOR) 100 MG tablet TAKE ONE TABLET BY MOUTH TWICE DAILY 60 tablet 11     nitroGLYCERIN (NITROSTAT) 0.4 MG SL tablet Place 1 tablet (0.4 mg total) under the tongue every 5 (five) minutes as needed for Chest pain. (Patient not taking: Reported on 4/28/2022) 30 tablet 1     omeprazole (PRILOSEC) 40 MG capsule Take 1  capsule (40 mg total) by mouth every morning. Open capsule and take with apple sauce 30 capsule 2     ondansetron (ZOFRAN-ODT) 8 MG TbDL Dissolve 1 tablet (8 mg total) by mouth every 6 (six) hours as needed. 30 tablet 0     oxybutynin (DITROPAN-XL) 5 MG TR24 Take 5 mg by mouth once daily.       pantoprazole (PROTONIX) 40 MG tablet Take 1 tablet (40 mg total) by mouth daily as needed. 30 tablet 3     rosuvastatin (CRESTOR) 20 MG tablet Take 1 tablet (20 mg total) by mouth once daily. 90 tablet 3     semaglutide (OZEMPIC) 0.25 mg or 0.5 mg (2 mg/3 mL) pen injector Inject 0.5 mg into the skin every 7 days. 3 mL 2     sertraline (ZOLOFT) 100 MG tablet TAKE 1 TABLET BY MOUTH ONCE DAILY. 90 tablet 3     tamsulosin (FLOMAX) 0.4 mg Cap Take 0.4 mg by mouth once daily.       warfarin (COUMADIN) 5 MG tablet Take 1 tablet (5 mg total) by mouth Daily. or as instructed by [facility]. 40 tablet 0        Review of patient's allergies indicates:   Allergen Reactions    Brimonidine        Past Medical History:   Diagnosis Date    Acute DVT (deep venous thrombosis) 9/26/2023    Acute on chronic diastolic congestive heart failure     Acute pulmonary embolism 9/24/2023    Allergy     Anemia     Anticoagulant long-term use     Anxiety     Asthma     Atrial fibrillation 2002    Bilateral primary osteoarthritis of hip 3/14/2023    Cataract     Chronic kidney disease     COPD (chronic obstructive pulmonary disease)     Coronary artery calcification seen on CT scan 3/22/2022    Coronary artery calcification seen on CT scan 3/22/2022    Depression     Encounter for blood transfusion     HTN (hypertension)     Hyperlipidemia     Nephrolithiasis     KEYSHAWN (obstructive sleep apnea)     awaiting CPAP machine     Rheumatic disease of mitral valve     Uncontrolled type 2 diabetes mellitus with complication, with long-term current use of insulin 5/27/2020    Urinary incontinence      Past Surgical History:   Procedure Laterality Date    BREAST  BIOPSY Left 10/2019    CARDIAC VALVE SURGERY  2004    mechanical mitral valve     SECTION      COLONOSCOPY N/A 2019    Procedure: COLONOSCOPY;  Surgeon: Devon Bowling MD;  Location: [facility] ENDO (4TH FLR);  Service: Endoscopy;  Laterality: N/A;  ok to hold Coumadin x 5 days with Lovenox bridge per Coumadin clinic-[facility]    ESOPHAGOGASTRODUODENOSCOPY N/A 2019    Procedure: EGD (ESOPHAGOGASTRODUODENOSCOPY);  Surgeon: Smooth Torrez MD;  Location: [facility] ENDO (2ND FLR);  Service: Endoscopy;  Laterality: N/A;  2nd floor requested due to comorbidities, Hypertension, permanent A. 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 44     per Coumadin clinic-ok to hold for 5 days w/bridge    INJECTION N/A 2023    Procedure: INJECTION, BILATERAL GTB AND RIGHT HIP INTRA-ARTICULAR CONTRAST;  Surgeon: Beth Alfaro MD;  Location: [facility];  Service: Pain Management;  Laterality: N/A;    kidney stone removal      MITRAL VALVE REPLACEMENT  2004    TUBAL LIGATION       Family History       Problem Relation (Age of Onset)    Asthma Daughter, Son    Breast cancer Paternal Aunt    Cancer Brother    Colon cancer Maternal Grandmother, Mother (83)    Diabetes Sister, Sister, Sister, Father    Heart disease Father (70)    Hypertension Sister    Stroke Paternal Grandmother          Tobacco Use    Smoking status: Former     Current packs/day: 0.00     Average packs/day: 0.5 packs/day for 20.0 years (10.0 ttl pk-yrs)     Types: Cigarettes     Start date: 1982     Quit date: 2002     Years since quittin.4    Smokeless tobacco: Never   Substance and Sexual Activity    Alcohol use: No    Drug use: No    Sexual activity: Not on file     Review of Systems   Unable to perform ROS: Intubated     Objective:     Weight: 90.3 kg (199 lb 1.2 oz)  Body mass index is 36.41 kg/m².  Vital Signs (Most Recent):  Temp: 98 °F (36.7 °C) (10/04/23  0701)  Pulse: 108 (10/04/23 0701)  Resp: (!) 33 (10/04/23 0701)  BP: (!) 102/51 (10/04/23 0701)  SpO2: 98 % (10/04/23 0701) Vital Signs (24h Range):  Temp:  [94.4 °F (34.7 °C)-98 °F (36.7 °C)] 98 °F (36.7 °C)  Pulse:  [] 108  Resp:  [15-33] 33  SpO2:  [92 %-100 %] 98 %  BP: ()/(47-58) 102/51  Arterial Line BP: ()/(45-63) 102/51     Date 10/04/23 0700 - 10/05/23 0659   Shift 3545-1491 0185-5718 3051-3468 24 Hour Total   INTAKE   I.V.(mL/kg) 19.4(0.2)   19.4(0.2)   Shift Total(mL/kg) 19.4(0.2)   19.4(0.2)   OUTPUT   Shift Total(mL/kg)       Weight (kg) 90.3 90.3 90.3 90.3                Vent Mode: A/C  Oxygen Concentration (%):  [] 90  Resp Rate Total:  [28 br/min-38 br/min] 33 br/min  Vt Set:  [300 mL] 300 mL  PEEP/CPAP:  [8 cmH20] 8 cmH20  Mean Airway Pressure:  [16 dzV47-26 cmH20] 19 cmH20             NG/OG Tube 09/27/23 1141 orogastric Center mouth (Active)   Placement Check placement verified by aspirate characteristics 10/03/23 1501   Tolerance no signs/symptoms of discomfort 10/03/23 1501   Securement secured to commercial device 10/03/23 1501   Clamp Status/Tolerance clamped 10/03/23 1501   Suction Setting/Drainage Method suction at the bedside 10/03/23 1501   Insertion Site Appearance no redness, warmth, tenderness, skin breakdown, drainage 10/03/23 1501   Drainage Milky 10/03/23 0501   Flush/Irrigation flushed w/;water 10/03/23 0701   Feeding Type continuous;by pump 10/03/23 0901   Feeding Action feeding held 10/03/23 1501   Current Rate (mL/hr) 20 mL/hr 10/03/23 0901   Goal Rate (mL/hr) 35 mL/hr 10/03/23 0901   Intake (mL) 80 mL 10/03/23 0701   Water Bolus (mL) 60 mL 09/27/23 1505   Formula Name Impact Peptide 10/03/23 1501   Intake (mL) - Formula Tube Feeding 20 10/03/23 0901   Residual Amount (ml) 14 ml 10/02/23 1901            Rectal Tube 10/01/23 2000 (Active)   Balloon Inflation Volume (mL) 45 10/03/23 1501   Reposition drainage bags for BMS & Barnett on opposite sides of bed  "10/03/23 1501   Outcome gas expelled, stool evacuated 10/03/23 1501   Stool Color Green;Brown 10/03/23 1501   Insertion Site Appearance no redness, warmth, tenderness, skin breakdown, drainage 10/03/23 1501   Flush/Irrigation irrigated w/;water 10/03/23 0901   Intake (mL) 50 mL 10/03/23 0901   Rectal Tube Output 200 mL 10/02/23 2201            Urethral Catheter 09/27/23 1151 16 Fr. (Active)   $ Barnett Insertion Bedside Insertion Performed 09/27/23 1505   Site Assessment Clean;Intact 10/03/23 1501   Collection Container Urimeter 10/03/23 1501   Securement Method secured to top of thigh w/ adhesive device 10/03/23 1501   Catheter Care Performed yes 10/03/23 1501   Reason for Continuing Urinary Catheterization Critically ill in ICU and requiring hourly monitoring of intake/output 10/03/23 1501   CAUTI Prevention Bundle Securement Device in place with 1" slack;Intact seal between catheter & drainage tubing;Drainage bag/urimeter off the floor;No dependent loops or kinks;Sheeting clip in use;Drainage bag/urimeter not overfilled (<2/3 full);Drainage bag/urimeter below bladder 10/03/23 1501   Output (mL) 200 mL 10/03/23 1340          Physical Exam   Neurosurgery Physical Exam  GCS 3t, previously on nimbex;  PERRL, +corneals +cough, neg gag;  No movement in BUE/BLE to noxious stimuli    General: Intubated  Head: Nontraumatic, normocephalic  Eyes: Nontraumatic  Neck: Supple  CVS: Normal rate and rhythm, distal pulses present  Pulm: Symmetric expansion, no respiratory distress  GI: Abdomen nondistended  MSK: Non-traumatic  Skin: Dry, intact  Psych: Intubated      Significant Labs:  Recent Labs   Lab 10/03/23  0226 10/04/23  0317   * 258*   * 120*   K 4.6 4.6   CL 80* 76*   CO2 33* 26   BUN 79* 87*   CREATININE 1.8* 2.4*   CALCIUM 7.1* 7.0*   MG 2.2 2.0       Recent Labs   Lab 10/03/23  1820 10/04/23  0114 10/04/23  0321   WBC 35.52* 12.10 13.14*   HGB 6.5* 7.6* 7.4*   HCT 20.5* 23.2* 22.0*    158 147* "       Recent Labs   Lab 10/03/23  0226 10/03/23  1519 10/04/23  0317   INR 1.2  --  1.1   APTT 68.3* 29.0  --        Microbiology Results (last 7 days)       Procedure Component Value Units Date/Time    Culture, Respiratory with Gram Stain [[id number]] Collected: 10/03/23 2104    Order Status: Completed Specimen: Respiratory from Sputum, Expectorated Updated: 10/04/23 0332     Gram Stain (Respiratory) <10 epithelial cells per low power field.     Gram Stain (Respiratory) Moderate WBC's     Gram Stain (Respiratory) Few Gram negative rods     Gram Stain (Respiratory) Rare yeast    Blood culture [[id number]]     Order Status: No result Specimen: Blood     Blood culture [[id number]]     Order Status: No result Specimen: Blood     Blood culture [[id number]] Collected: 09/27/23 1229    Order Status: Completed Specimen: Blood from Peripheral, Forearm, Left Updated: 10/02/23 1412     Blood Culture, Routine No growth after 5 days.    Blood culture [[id number]] Collected: 09/27/23 1221    Order Status: Completed Specimen: Blood from Peripheral, Wrist, Left Updated: 10/02/23 1412     Blood Culture, Routine No growth after 5 days.    Respiratory Infection Panel (PCR), Nasopharyngeal [[id number]]  (Abnormal) Collected: 09/27/23 1755    Order Status: Completed Specimen: Nasopharyngeal Swab Updated: 09/27/23 1926     Respiratory Infection Panel Source NP Swab     Adenovirus Not Detected     Coronavirus 229E, Common Cold Virus Not Detected     Coronavirus HKU1, Common Cold Virus Not Detected     Coronavirus NL63, Common Cold Virus Not Detected     Coronavirus OC43, Common Cold Virus Not Detected     Comment: The Coronavirus strains detected in this test cause the common cold.  These strains are not the COVID-19 (novel Coronavirus)strain   associated with the respiratory disease outbreak.          SARS-CoV2 (COVID-19) Qualitative PCR Not Detected     Human Metapneumovirus Not Detected     Human Rhinovirus/Enterovirus Detected      "Influenza A (subtypes H1, H1-2009,H3) Not Detected     Influenza B Not Detected     Parainfluenza Virus 1 Not Detected     Parainfluenza Virus 2 Not Detected     Parainfluenza Virus 3 Not Detected     Parainfluenza Virus 4 Not Detected     Respiratory Syncytial Virus Not Detected     Bordetella Parapertussis ([id number]) Not Detected     Bordetella pertussis (ptxP) Not Detected     Chlamydia pneumoniae Not Detected     Mycoplasma pneumoniae Not Detected    Narrative:      For all other respiratory sources, order [id number] -  Respiratory Viral Panel by PCR          ABGs:   Recent Labs   Lab 10/03/23  1413 10/03/23  2038 10/04/23  0439   PH 7.314* 7.336* 7.448   PCO2 73.1* 64.7* 44.9   PO2 83 70* 71*   HCO3 37.1* 34.6* 31.1*   POCSATURATED 94 92 95   BE 11 9 7       Cardiac markers: No results for input(s): "CKMB", "CPKMB", "TROPONINT", "TROPONINI", "MYOGLOBIN" in the last 48 hours.  CMP:   Recent Labs   Lab 10/02/23  1748 10/03/23  0226 10/04/23  0317   GLU  --  281* 258*   CALCIUM  --  7.1* 7.0*   ALBUMIN  --  2.1* 2.0*   PROT  --  4.9* 4.8*   NA  --  125* 120*   K  --  4.6 4.6   CO2  --  33* 26   CL  --  80* 76*   BUN  --  79* 87*   CREATININE  --  1.8* 2.4*   ALKPHOS  --  65 73   ALT  --  16 17   AST  --  38 50*   BILITOT 0.8 0.9 1.4*       CRP: No results for input(s): "CRP" in the last 48 hours.  ESR: No results for input(s): "POCESR", "ERYTHROCYTES" in the last 48 hours.  LFTs:   Recent Labs   Lab 10/02/23  1748 10/03/23  0226 10/04/23  0317   ALT  --  16 17   AST  --  38 50*   ALKPHOS  --  65 73   BILITOT 0.8 0.9 1.4*   PROT  --  4.9* 4.8*   ALBUMIN  --  2.1* 2.0*       Procalcitonin:   Recent Labs   Lab 10/03/23  1203   PROCAL 1.23*         Significant Diagnostics:  I have reviewed all pertinent imaging results/findings within the past 24 hours.  "

## 2023-10-04 NOTE — SUBJECTIVE & OBJECTIVE
Interval History:  Please see hospital course above     Review of Systems   Unable to perform ROS: Intubated     Objective:     Vitals:  Temp: 98 °F (36.7 °C)  Pulse: 93  Rhythm: normal sinus rhythm  BP: (!) 102/51  MAP (mmHg): 68  Resp: (!) 33  SpO2: 100 %  Oxygen Concentration (%): 90  Vent Mode: A/C  Set Rate: 33 BPM  Vt Set: 300 mL  PEEP/CPAP: 8 cmH20  Peak Airway Pressure: 35 cmH20  Mean Airway Pressure: 18 cmH20  Plateau Pressure: 35 cmH20    Temp  Min: 94.4 °F (34.7 °C)  Max: 98 °F (36.7 °C)  Pulse  Min: 77  Max: 140  BP  Min: 93/47  Max: 136/58  MAP (mmHg)  Min: 68  Max: 68  Resp  Min: 15  Max: 33  SpO2  Min: 94 %  Max: 100 %  Oxygen Concentration (%)  Min: 90  Max: 100    10/03 0701 - 10/04 0700  In: 3414.1 [I.V.:2093.1]  Out: 870 [Urine:620]   Unmeasured Output  Urine Occurrence: 1  Stool Occurrence: 1        Physical Exam  General Appearance: Elderly woman resting in bed, obese, +diffuse anasarca, additionally w/ diffuse purpura over chest and arms. Intubated, on fentanyl @ 125, propofol held for exam   Mental Status Exam: No response to verbal, tactile or noxious stimuli. Not tracking or regarding. Not following commands  Cranial Nerves: Gaze midline, R pupil 4->3 mm and sluggishly reactive, improved from prior. L pupil 3->2 mm and sluggishly reactive, stable from prior. +corneal to saline b/l, R>L. Negative OCRs. Negative gag. Very minimal delayed cough.  Motor: No movement to distal or proximal stimuli in b/l upper extremities. Flicker in RLE when noxious applied to LLE, no clear movement in LLE, no w/d in RLE   Sensory: No response to noxious x4 extremities  Coordination: Unable to assess  Vascular: Irregularly irregular rhythm. Distant heart sounds w/o clear m/r/g  Lungs: Coarse breath sounds b/l. Diminished air movement at b/l bases   Abdomen: Soft, non-distended, non-tender; minimal BS appreciated  Extremities: cool and dry to touch. Distal pulses not palpable, per nursing are dopplerable b/l.           Medications:  Continuouscisatracurium (NIMBEX) 200 mg in dextrose 5 % (D5W) 100 mL infusion, Last Rate: Stopped (10/03/23 1039)  fentanyl, Last Rate: 125 mcg/hr (10/04/23 0701)  insulin regular 1 units/mL infusion orderable (DKA), Last Rate: 1.5 Units/hr (10/04/23 1031)  NORepinephrine bitartrate-NaCl, Last Rate: 0.18 mcg/kg/min (10/04/23 1127)  propofoL, Last Rate: 15 mcg/kg/min (10/04/23 0701)    Scheduledalbuterol-ipratropium, 3 mL, Q6H  atorvastatin, 80 mg, Daily  dexAMETHasone, 10 mg, Daily  digoxin, 250 mcg, Once   Followed by  digoxin, 125 mcg, Q6H  levETIRAcetam (Keppra) IV (PEDS and ADULTS), 500 mg, Q12H  pantoprazole, 40 mg, BID  piperacillin-tazobactam (Zosyn) IV (PEDS and ADULTS) (extended infusion is not appropriate), 4.5 g, Q8H  polyethylene glycol, 17 g, Daily  senna-docusate 8.6-50 mg, 2 tablet, Daily  sertraline, 100 mg, Nightly  white petrolatum-mineral oiL, , Q8H  zinc sulfate, 220 mg, Daily    PRN0.9%  NaCl infusion (for blood administration), , Q24H PRN  acetaminophen, 650 mg, Q6H PRN  dextrose 10%, 12.5 g, PRN  dextrose 10%, 25 g, PRN  hydrALAZINE, 10 mg, Q8H PRN  labetalol, 10 mg, Q4H PRN  melatonin, 6 mg, Nightly PRN  sodium chloride 0.9%, 10 mL, PRN  vancomycin - pharmacy to dose, , pharmacy to manage frequency      Today I personally reviewed pertinent imaging, cardiology results, laboratory results, microbiology results, notably: CT head stable. TCDs technically limited, however no clear evidence of vasospasm on L. CBC w/ drop in leukocytosis and platelets concerning for possible impending bone marrow failure in setting of prolonged critical illness. P/F ratio 78, on AC 33/300/8/90%. CMP w/ Na 117, Cl 76, BUN/creatinine uptrending to 87/2.4. Phos elevated to 6.7. Uptrending bilirubin and AST. SOFA score 15    Diet  Diet NPO  Diet NPO

## 2023-10-04 NOTE — ASSESSMENT & PLAN NOTE
Pt noted to have anisocoria after AM supination on 10/3. Reflexes were blunted by her paralytic drip. A stroke code was called and patient was taken for urgent CT/CTA head. CT showing large acute subarachnoid hemorrhage centered in basal cisterns, no definite aneurysm but pattern is suspicious and correlation with DSA is recommended. Neurology recommended stopping heparin, controlling BP, consulting neurocritical care and neurosurgery. Family updated.    Plan:  - Neurosurgery, neurocrit consulted  - Heparin stopped  - BP goals: MAPs >65, SBP <180, PRN's in place  - Hypertonic saline drip 50cc/hr per neurocrit for Na 120 to prevent herniation

## 2023-10-04 NOTE — PROGRESS NOTES
Pharmacokinetic Assessment Follow Up: IV Vancomycin    Vancomycin Regimen Assessment/Plan:    - Vancomycin random level resulted above goal as 25.2 mcg/mL, goal 10-20 mcg/mL.  - Continue pulse dosing in the setting of JENN.   - No additional vancomycin is required today.   - A random level is ordered tomorrow to assess clearance.  Pharmacy to re-dose based on level and renal function.     Drug levels (last 3 results):  Recent Labs   Lab Result Units 10/04/23  0317   Vancomycin, Random ug/mL 25.2       Pharmacy will continue to follow and monitor vancomycin.    Please contact pharmacy at extension [phone] for questions regarding this assessment.    Thank you for the consult,   Rylee Bunn, PharmD, [facility]       Patient brief summary:  Elayne Almanzar is a 68 y.o. female initiated on antimicrobial therapy with IV Vancomycin for treatment of sepsis    The patient's current regimen is pulse dosing.    Drug Allergies:   Review of patient's allergies indicates:   Allergen Reactions    Brimonidine        Actual Body Weight:   90.3 kg    Renal Function:   Estimated Creatinine Clearance: 23.8 mL/min (A) (based on SCr of 2.4 mg/dL (H)).,     Dialysis Method (if applicable):  N/A    CBC (last 72 hours):  Recent Labs   Lab Result Units 10/02/23  0240 10/02/23  1212 10/02/23  1451 10/02/23  1811 10/03/23  0226 10/03/23  1820 10/04/23  0114 10/04/23  0321   WBC K/uL 29.34* 29.90* 30.66* 35.00* 35.63* 35.52* 12.10 13.14*   Hemoglobin g/dL 7.4* 7.1* 6.9* 6.8* 7.5* 6.5* 7.6* 7.4*   Hematocrit % 23.6* 23.7* 23.2* 21.4* 23.1* 20.5* 23.2* 22.0*   Platelets K/uL 232 248 253 246 233 229 158 147*   Gran % % 83.0* 88.0* 83.5* 75.5* 70.0 74.0* 55.0 44.0   Lymph % % 10.0* 4.0* 4.0* 8.5* 10.0* 14.0* 17.0* 19.0   Mono % % 2.0* 2.0* 7.5 7.5 10.0 5.0 10.0 19.0*   Eosinophil % % 0.0 0.0 0.5 0.5 0.5 2.0 0.0 2.0   Basophil % % 0.0 0.0 0.0 0.0 0.0 0.0 0.0 0.0   Differential Method  Manual Manual Manual Manual Manual Manual Manual Manual        Metabolic Panel (last 72 hours):  Recent Labs   Lab Result Units 10/01/23  1245 10/02/23  0240 10/02/23  1748 10/03/23  0226 10/04/23  0317   Sodium mmol/L 131* 130*  --  125* 120*   Potassium mmol/L 4.5 4.5  --  4.6 4.6   Chloride mmol/L 84* 83*  --  80* 76*   CO2 mmol/L 35* 37*  --  33* 26   Glucose mg/dL 273* 277*  --  281* 258*   BUN mg/dL 56* 70*  --  79* 87*   Creatinine mg/dL 1.4 1.6*  --  1.8* 2.4*   Albumin g/dL  --  2.2*  --  2.1* 2.0*   Total Bilirubin mg/dL  --  0.8 0.8 0.9 1.4*   Alkaline Phosphatase U/L  --  67  --  65 73   AST U/L  --  32  --  38 50*   ALT U/L  --  14  --  16 17   Magnesium mg/dL  --  2.2  --  2.2 2.0   Phosphorus mg/dL  --  4.9*  --  5.8* 6.7*       Vancomycin Administrations:  vancomycin given in the last 96 hours                     vancomycin 1,500 mg in dextrose 5 % (D5W) 250 mL IVPB (Vial-Mate) (mg) 1,500 mg New Bag 10/03/23 1315                    Microbiologic Results:  Microbiology Results (last 7 days)       Procedure Component Value Units Date/Time    Culture, Respiratory with Gram Stain [[id number]] Collected: 10/03/23 2104    Order Status: Completed Specimen: Respiratory from Sputum, Expectorated Updated: 10/04/23 0332     Gram Stain (Respiratory) <10 epithelial cells per low power field.     Gram Stain (Respiratory) Moderate WBC's     Gram Stain (Respiratory) Few Gram negative rods     Gram Stain (Respiratory) Rare yeast    Blood culture [[id number]]     Order Status: No result Specimen: Blood     Blood culture [[id number]]     Order Status: No result Specimen: Blood     Blood culture [[id number]] Collected: 09/27/23 1229    Order Status: Completed Specimen: Blood from Peripheral, Forearm, Left Updated: 10/02/23 1412     Blood Culture, Routine No growth after 5 days.    Blood culture [[id number]] Collected: 09/27/23 1221    Order Status: Completed Specimen: Blood from Peripheral, Wrist, Left Updated: 10/02/23 1412     Blood Culture, Routine No growth after 5 days.     Respiratory Infection Panel (PCR), Nasopharyngeal [[id number]]  (Abnormal) Collected: 09/27/23 1756    Order Status: Completed Specimen: Nasopharyngeal Swab Updated: 09/27/23 1926     Respiratory Infection Panel Source NP Swab     Adenovirus Not Detected     Coronavirus 229E, Common Cold Virus Not Detected     Coronavirus HKU1, Common Cold Virus Not Detected     Coronavirus NL63, Common Cold Virus Not Detected     Coronavirus OC43, Common Cold Virus Not Detected     Comment: The Coronavirus strains detected in this test cause the common cold.  These strains are not the COVID-19 (novel Coronavirus)strain   associated with the respiratory disease outbreak.          SARS-CoV2 (COVID-19) Qualitative PCR Not Detected     Human Metapneumovirus Not Detected     Human Rhinovirus/Enterovirus Detected     Influenza A (subtypes H1, H1-2009,H3) Not Detected     Influenza B Not Detected     Parainfluenza Virus 1 Not Detected     Parainfluenza Virus 2 Not Detected     Parainfluenza Virus 3 Not Detected     Parainfluenza Virus 4 Not Detected     Respiratory Syncytial Virus Not Detected     Bordetella Parapertussis ([id number]) Not Detected     Bordetella pertussis (ptxP) Not Detected     Chlamydia pneumoniae Not Detected     Mycoplasma pneumoniae Not Detected    Narrative:      For all other respiratory sources, order [id number] -  Respiratory Viral Panel by PCR

## 2023-10-04 NOTE — NURSING
Notified team of patient periodically going into Afib w/RVR, pressures sustaining, and then will return to heart rate in higher 110s.

## 2023-10-04 NOTE — PROGRESS NOTES
"[facility] - Cardiac Medical ICU  Adult Nutrition  Progress Note    SUMMARY       Recommendations    If/when able, resume TFs. Rec'd Novasource @ 40 mL/hr to provide 1920 kcals, 87 g of protein, 688 mL fluid.  RD to monitor & follow-up.    Goals: Meet % EEN, EPN by RD f/u date  Nutrition Goal Status: goal not met  Communication of RD Recs: reviewed with RN    Assessment and Plan    Nutrition Problem:  Inadequate energy intake     Related to (etiology):   Inability to consume sufficient energy      Signs and Symptoms (as evidenced by):   NPO     Interventions(treatment strategy):  Collaboration of nutrition care w/ other providers  EN     Nutrition Diagnosis Status:   Continues    Reason for Assessment    Reason For Assessment: RD follow-up  Diagnosis: other (see comments) (Resp. fx)  Relevant Medical History: HF, HTN, DM, CKD3  Interdisciplinary Rounds: attended    General Information Comments: Remains intubated, sedated - TFs currently on hold 2/2 GI absorption (per RN). JENN noted. Per [facility] RD assessment (9/26): Pt reported good appetite PTA & UBW of of 200#. Pt appears nourished w/ no physical signs of malnutrition.   Nutrition Discharge Planning: Pending clinical course    Nutrition/Diet History    Factors Affecting Nutritional Intake: NPO, on mechanical ventilation    Anthropometrics    Temp: 98 °F (36.7 °C)  Height: 5' 2" (157.5 cm)  Height (inches): 62 in  Weight: 90.3 kg (199 lb 1.2 oz)  Weight (lb): 199.08 lb  Ideal Body Weight (IBW), Female: 110 lb  % Ideal Body Weight, Female (lb): 180.98 %  BMI (Calculated): 36.4  BMI Grade: 35 - 39.9 - obesity - grade II    Lab/Procedures/Meds    Pertinent Labs Reviewed: reviewed  Pertinent Labs Comments: Na 120, Creat 2.4, GFR 21.6, P 6.7,   Pertinent Medications Reviewed: reviewed  Pertinent Medications Comments: Nimbex, Fentanyl, Propofol, Levophed    Estimated/Assessed Needs    Weight Used For Calorie Calculations: 90.3 kg (199 lb 1.2 oz)    Energy " Calorie Requirements (kcal): 1672 kcal/d  Energy Need Method: [place] (modified)    Protein Requirements: 100-125 g/d (2-2.5 g/kg IBW)  Weight Used For Protein Calculations: 50 kg (110 lb 3.7 oz)    Estimated Fluid Requirement Method: other (see comments) (Per MD or 1 mL/kcal)  RDA Method (mL): 1672    CHO Requirement: 200g    Nutrition Prescription Ordered    Current Diet Order: NPO  Current Nutrition Support Formula Ordered: Peptamen Intense VHP    Evaluation of Received Nutrient/Fluid Intake    Other Calories (kcal): 216 (Propofol)    I/O: +9.4L since admit    Comments: LBM: 10/3    Nutrition Risk    Level of Risk/Frequency of Follow-up:  (1x/week)     Monitor and Evaluation    Food and Nutrient Intake: enteral nutrition intake, food and beverage intake, energy intake  Food and Nutrient Adminstration: enteral and parenteral nutrition administration, diet order  Physical Activity and Function: nutrition-related ADLs and IADLs  Anthropometric Measurements: weight, weight change  Biochemical Data, Medical Tests and Procedures: inflammatory profile, lipid profile, glucose/endocrine profile, gastrointestinal profile, electrolyte and renal panel  Nutrition-Focused Physical Findings: overall appearance     Nutrition Follow-Up    RD Follow-up?: Yes

## 2023-10-04 NOTE — ASSESSMENT & PLAN NOTE
Home regimen: amlodipine 2.5mg and metoprolol  - Pressures stable  - hold amlodipine in setting of acute illness, low threshold to restart  - MAP goal >65, SBP <140 per neurology in setting of SAH

## 2023-10-04 NOTE — ASSESSMENT & PLAN NOTE
"Patient is identified as having Diastolic (HFpEF) heart failure that is Chronic. CHF is currently uncontrolled due to Rales/crackles on pulmonary exam and Pulmonary edema/pleural effusion on CXR. Latest ECHO performed and demonstrates- Results for orders placed during the hospital encounter of 09/12/23    Echo    Interpretation Summary    Left Ventricle: The left ventricle is normal in size.  Mildly to moderately increased wall thickness. Septal motion is consistent with post-operative status. Septal flattening in diastole consistent with right ventricular volume overload. There is normal systolic function with a visually estimated ejection fraction of 60 - 65%. There is indeterminate diastolic function.    Left Atrium: Left atrium is severely dilated.    Mitral Valve: There is a mechanical valve in the mitral position. The mean pressure gradient across the mitral valve is 5.5 mmHg at a heart rate of 108 bpm. There is trace to mild regurgitation.    Right Ventricle: Right ventricle was not well visualized due to poor acoustic window.  Likely dilated to some extent based on subcostal images.  Systolic function appears normal based on limited images.    Right Atrium: Right atrium is dilated.    Tricuspid Valve: There is trace to mild regurgitation.    The pulmonary artery systolic pressure is approximally 52 mm Hg.    IVC/SVC: Intermediate venous pressure at 8 mmHg.  . Continue Beta Blocker and monitor clinical status closely. Monitor on telemetry. Patient is off CHF pathway.  Monitor strict Is&Os and daily weights.  Place on fluid restriction of 1.5 L. Cardiology has been consulted. Continue to stress to patient importance of self efficacy and  on diet for CHF. Last BNP reviewed- and noted below   No results for input(s): "BNP", "BNPTRIAGEBLO" in the last 168 hours..  "

## 2023-10-04 NOTE — ASSESSMENT & PLAN NOTE
In AF w/ RVR since admission confirmed on EKG. 10/4 HR elevated, AF w RVR confirmed on 12-lead, metoprolol/diltiazem held d/t norepinephrine requirements, pt started on digoxin

## 2023-10-04 NOTE — PROGRESS NOTES
[facility] - Cardiac Medical ICU  Critical Care Medicine  Progress Note    Patient Name: Elayne Almanzar  MRN: [id number]  Admission Date: 9/26/2023  Hospital Length of Stay: 8 days  Code Status: DNR  Attending Provider: Que Muniz MD  Primary Care Provider: Nubia Crocker MD   Principal Problem: Acute respiratory failure with hypoxia and hypercarbia    Subjective:     HPI:  Ms. Servando Almanzar is a 68 year old female with HFpEF (60-65%), Pulmonary HTN, MVR on coumadin, permanent afib, COPD, HTN, T2DM, and CKD3. He is presenting as transfer from [facility] ICU for evaluation of acute hypoxemic respiratory failure likely secondary to PE vs pulmonary HTN noted on imaging with evidence of right heart strain on TTE.     She initially presented to OSH for SOB and cough. Infectious workup notable for enterovirus/rhinovirus and CT chest 9/18 with multifocal GGO bilaterally. She was trialed on IV abx, corticosteroids, and lasix without improvement with subsequent CTA chest positive acute PE, more specifically a nonocclusive filling defect within the right lower lobe superior segmental pulmonary artery with extension into the inter lobar artery. Lower extremity US also noted DVT to right peroneal vein and the left posterior tibial vein. TTE performed with septal flattening in diastole consistent with RV volume overload. PASP 52mmHg.    Given DVT/PE despite therapeutic INR on warfarin, patient transitioned to IV heparin ggt. She continued to deteriorate requiring continous BIPAP thus patient transferred to [facility] MICU for higher lever of care and interventional cardiology consult for consideration of catheter-directed thrombolysis.       Hospital/ICU Course:  68F w/ HFpEF (EF 60-65%), COPD, pHTN (PASP 52), MVR on warfarin, persistent afib, COPD, HTN, T2DM, CKD3 presented as transfer from [facility] from [facility], possible contributing factors in addition to her comorbidities include pulmonary HTN, non-obstructing PE (likely 2/2  known DVT) found on imaging with evidence of right heart strain, viral pna (rhino/enterovirus positive). She was placed on IV heparin and had increasing BIPAP requirements overnight, this morning her O2 sats were declining to mid 80's maxed on BIPAP w/ 100% O2, so she was electively intubated before she further deteriorated. She briefly required pressor support post-intubation and was started on diuresis and stress dose steroids. She is now off pressors but remains in AF w/ RVR confirmed on EKG with HR ~130-150, was restarted on half her home dose of metoprolol tartrate at 50 BID with good rate control HR <100.  Treating as ARDS via protocols described in the PROSEVA, dexa-ARDS, and ARMA trials. Pt noted to have anisocoria on exam after supination, CT revealed diffuse SAH, neurocritical care, neurosurgery consulted. Heparin stopped, repeat CTH showed stable SAH. No acute intervention indicated at this time. Ongoing GOC conversation with family      Interval History/Significant Events: Repeat CTH revealed stable SAH. Received 1u PRBC for Hgb of 6.5 overnight. WBC dropped from 30-12. On rounds HR elevated, 12-lead showed AF w/ RVR.       Objective:     Vital Signs (Most Recent):  Temp: 98.3 °F (36.8 °C) (10/04/23 1230)  Pulse: 102 (10/04/23 1305)  Resp: (!) 33 (10/04/23 1305)  BP: (!) 102/51 (10/04/23 0701)  SpO2: 100 % (10/04/23 1305) Vital Signs (24h Range):  Temp:  [94.4 °F (34.7 °C)-98.3 °F (36.8 °C)] 98.3 °F (36.8 °C)  Pulse:  [] 102  Resp:  [4-33] 33  SpO2:  [96 %-100 %] 100 %  BP: ()/(47-58) 102/51  Arterial Line BP: ()/(43-55) 106/44   Weight: 90.3 kg (199 lb 1.2 oz)  Body mass index is 36.41 kg/m².      Intake/Output Summary (Last 24 hours) at 10/4/2023 1437  Last data filed at 10/4/2023 1400  Gross per 24 hour   Intake 3053.4 ml   Output 670 ml   Net 2383.4 ml          Physical Exam  Vitals and nursing note reviewed.   Constitutional:       General: She is not in acute distress.      Appearance: She is obese. She is ill-appearing. She is not diaphoretic.   HENT:      Head: Normocephalic and atraumatic.      Right Ear: External ear normal.      Left Ear: External ear normal.      Nose: Nose normal.      Mouth/Throat:      Mouth: Mucous membranes are moist.      Comments: Oral bleeding/oozing, improved from yesterday  Cardiovascular:      Rate and Rhythm: Normal rate. Rhythm irregular.      Pulses: Normal pulses.   Pulmonary:      Comments: Mechanical breath sounds; patient proned on vent  Abdominal:      General: There is no distension.      Palpations: Abdomen is soft. There is no mass.   Musculoskeletal:         General: No swelling or tenderness.      Cervical back: Normal range of motion and neck supple.   Skin:     General: Skin is warm and dry.      Capillary Refill: Capillary refill takes less than 2 seconds.      Findings: Bruising present.      Comments: Scattered purpura/hematomas over upper chest   Neurological:      Mental Status: She is alert.      Cranial Nerves: Cranial nerve deficit present.   Psychiatric:         Mood and Affect: Mood normal.         Behavior: Behavior normal.            Vents:  Vent Mode: A/C (10/04/23 1305)  Ventilator Initiated: Yes (09/27/23 1158)  Set Rate: 33 BPM (10/04/23 1305)  Vt Set: 300 mL (10/04/23 1305)  PEEP/CPAP: 8 cmH20 (10/04/23 1305)  Oxygen Concentration (%): 90 (10/04/23 1305)  Peak Airway Pressure: 37 cmH20 (10/04/23 1305)  Plateau Pressure: 35 cmH20 (10/04/23 1305)  Total Ve: 10.3 L/m (10/04/23 1305)  Negative Inspiratory Force (cm H2O): 0 (10/04/23 1305)  F/VT Ratio<105 (RSBI): 106.11 (10/04/23 1305)  Lines/Drains/Airways       Central Venous Catheter Line  Duration             Percutaneous Central Line Insertion/Assessment - Triple Lumen  09/28/23 0845 Internal Jugular Right 6 days              Drain  Duration                  NG/OG Tube 09/27/23 1141 orogastric Center mouth 7 days         Urethral Catheter 09/27/23 1151 16 Fr. 7 days          Rectal Tube 10/01/23 2000 2 days              Airway  Duration                  Airway - Non-Surgical 09/27/23 1139 Endotracheal Tube 7 days              Arterial Line  Duration             Arterial Line 09/27/23 1713 Right Radial 6 days              Peripheral Intravenous Line  Duration                  Peripheral IV - Single Lumen 10/03/23 1213 18 G;1 3/4 in Left Antecubital 1 day                  Significant Labs:    CBC/Anemia Profile:  Recent Labs   Lab 10/04/23  0114 10/04/23  0321 10/04/23  0951   WBC 12.10 13.14* 16.59*   HGB 7.6* 7.4* 7.4*   HCT 23.2* 22.0* 21.8*    147* 178   MCV 78* 77* 77*   RDW 20.6* 19.9* 20.4*        Chemistries:  Recent Labs   Lab 10/02/23  1748 10/03/23  0226 10/03/23  0226 10/04/23  0317 10/04/23  0951 10/04/23  1226   NA  --  125*   < > 120* 117* 120*   K  --  4.6  --  4.6 4.9  --    CL  --  80*  --  76*  --   --    CO2  --  33*  --  26  --   --    BUN  --  79*  --  87*  --   --    CREATININE  --  1.8*  --  2.4*  --   --    CALCIUM  --  7.1*  --  7.0*  --   --    ALBUMIN  --  2.1*  --  2.0*  --   --    PROT  --  4.9*  --  4.8*  --   --    BILITOT 0.8 0.9  --  1.4*  --   --    ALKPHOS  --  65  --  73  --   --    ALT  --  16  --  17  --   --    AST  --  38  --  50*  --   --    MG  --  2.2  --  2.0  --   --    PHOS  --  5.8*  --  6.7*  --   --     < > = values in this interval not displayed.       All pertinent labs within the past 24 hours have been reviewed.    Significant Imaging:  I have reviewed all pertinent imaging results/findings within the past 24 hours.      ABG  Recent Labs   Lab 10/04/23  0439   PH 7.448   PO2 71*   PCO2 44.9   HCO3 31.1*   BE 7     Assessment/Plan:     Neuro  Subarachnoid hemorrhage  Pt noted to have anisocoria after AM supination on 10/3. Reflexes were blunted by her paralytic drip. A stroke code was called and patient was taken for urgent CT/CTA head. CT showing large acute subarachnoid hemorrhage centered in basal cisterns, no definite  aneurysm but pattern is suspicious and correlation with DSA is recommended. Neurology recommended stopping heparin, controlling BP, consulting neurocritical care and neurosurgery. Family updated.    Plan:  - Neurosurgery, neurocrit consulted  - Heparin stopped  - BP goals: MAPs >65, SBP <180, PRN's in place  - Hypertonic saline drip 50cc/hr per neurocrit for Na 120 to prevent herniation        Pulmonary  * Acute respiratory failure with hypoxia and hypercarbia  Patient with Hypercapnic and Hypoxic Respiratory failure which is Acute on chronic.  she is not on home oxygen. Supplemental oxygen was provided and noted- Vent Mode: A/C  Oxygen Concentration (%):  [90] 90  Resp Rate Total:  [31 br/min-37 br/min] 33 br/min  Vt Set:  [300 mL] 300 mL  PEEP/CPAP:  [8 cmH20] 8 cmH20  Mean Airway Pressure:  [18 ftH84-13 cmH20] 19 cmH20    .   Signs/symptoms of respiratory failure include- tachypnea, increased work of breathing, respiratory distress and use of accessory muscles. Contributing diagnoses includes - COPD and Pulmonary Embolus Labs and images were reviewed. Patient Has recent ABG, which has been reviewed. Will treat underlying causes and adjust management of respiratory failure as follows-     69yo F with history of HFpEF, pulm htn, MVR on warfarin, AF, COPD, htn, T2DM, and CKD3 presenting as transfer from [facility] ICU for AHRF. Etiology due to PE with evidence of R heart strain on TTE vs pulmonary htn with PASP 52 mmhg vs viral illness (positive enterovirus and rhinovirus).  Pt has been treated with broad spectrun antibiotics and steroids at OSH.   She follows with Dr. Carvalho as outpatient.    Treating via ARDSnet protocol informed by results of ARMA, PROSEVA, and dexa-ARDS trials    --Wean oxygen as tolerated  --Serial ABG's  --Continue Zosyn  --Solumedrol stopped, started decadron 20mg QD for 5d followed by 10mg QD for 5d  --Scheduled Duo Nebs  --Continue breo spiriva  --CXR  -- Not proning d/t SAH  -- Diuresis held d/t  worsening renal function    Acute bronchitis due to Rhinovirus  --See AHRF, on empiric zosyn for bacterial coverage  - Vancomycin added for empiric coverage in setting of worsening leukocytosis + tachycardia    COPD (chronic obstructive pulmonary disease)  --See respiratory failure    Cardiac/Vascular  Pulmonary hypertension  PASP on TTE 52mmHg. Noted to be 54mmHg on TTE in 2022.   Likely contributing to resp failure.     Chronic diastolic congestive heart failure  Patient is identified as having Diastolic (HFpEF) heart failure that is Chronic. CHF is currently uncontrolled due to Rales/crackles on pulmonary exam and Pulmonary edema/pleural effusion on CXR. Latest ECHO performed and demonstrates- Results for orders placed during the hospital encounter of 09/12/23    Echo    Interpretation Summary    Left Ventricle: The left ventricle is normal in size.  Mildly to moderately increased wall thickness. Septal motion is consistent with post-operative status. Septal flattening in diastole consistent with right ventricular volume overload. There is normal systolic function with a visually estimated ejection fraction of 60 - 65%. There is indeterminate diastolic function.    Left Atrium: Left atrium is severely dilated.    Mitral Valve: There is a mechanical valve in the mitral position. The mean pressure gradient across the mitral valve is 5.5 mmHg at a heart rate of 108 bpm. There is trace to mild regurgitation.    Right Ventricle: Right ventricle was not well visualized due to poor acoustic window.  Likely dilated to some extent based on subcostal images.  Systolic function appears normal based on limited images.    Right Atrium: Right atrium is dilated.    Tricuspid Valve: There is trace to mild regurgitation.    The pulmonary artery systolic pressure is approximally 52 mm Hg.    IVC/SVC: Intermediate venous pressure at 8 mmHg.  . Continue Beta Blocker and monitor clinical status closely. Monitor on telemetry.  "Patient is off CHF pathway.  Monitor strict Is&Os and daily weights.  Place on fluid restriction of 1.5 L. Cardiology has been consulted. Continue to stress to patient importance of self efficacy and  on diet for CHF. Last BNP reviewed- and noted below   No results for input(s): "BNP", "BNPTRIAGEBLO" in the last 168 hours..    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 On coumadin. Follows with Dr. Messina outpatient.     --Hold Coumadin in setting of acute illness  -- Heparin paused 9/29 d/t supratherapeutic levels with oral bleeding, rechecking labs  -- 10/2 heparin paused d/t drop in Hb 9->7.1, no signs of overt bleeding, will monitor and resume as tolerated  10/3 heparin stopped per neuro d/t SAH    Essential (primary) hypertension  Home regimen: amlodipine 2.5mg and metoprolol  - Pressures stable  - hold amlodipine in setting of acute illness, low threshold to restart  - MAP goal >65, SBP <140 per neurology in setting of SAH    Chronic atrial fibrillation  In AF w/ RVR since admission confirmed on EKG. 10/4 HR elevated, AF w RVR confirmed on 12-lead, metoprolol/diltiazem held d/t norepinephrine requirements, pt started on digoxin    Hematology  Acute deep vein thrombosis (DVT) of both lower extremities  BLE Ultrasound: deep vein thrombosis of the right peroneal vein and the left posterior tibial vein.    Heparin stopped d/t SAH       Acute pulmonary embolism  CTA 09/23: a nonocclusive filling defect within the right lower lobe superior segmental pulmonary artery with extension into the inter lobar artery, multifocal pneumonia or pulmonary edema progressed from prior    Heparin stopped d/t SAH        Endocrine  Type 2 diabetes mellitus  -Last A1c reviewed-   Lab Results   Component Value Date    HGBA1C 6.0 (H) 09/30/2023       Inpatient Antihyperglycemic Regiment:  Antihyperglycemics (From admission, onward)    Start     Stop Route " Frequency Ordered    10/04/23 1030  insulin regular in 0.9 % NaCl 100 unit/100 mL (1 unit/mL) infusion        Question:  Enter initial dose from Infusion Protocol Chart (Units/hr):  Answer:  1.5    -- IV Continuous 10/04/23 0931          Blood Sugars (AccuCheck):    Recent Labs     10/04/23  0025 10/04/23  0405 10/04/23  0950 10/04/23  1132 10/04/23  1224 10/04/23  1318   POCTGLUCOSE 344* 282* 189* 177* 174* 152*            Critical Care Daily Checklist:    A: Awake: RASS Goal/Actual Goal: RASS Goal: -5-->unarousable  Actual:     B: Spontaneous Breathing Trial Performed? Spon. Breathing Trial Initiated?: Not initiated (10/02/23 1217)   C: SAT & SBT Coordinated?  x                      D: Delirium: CAM-ICU Overall CAM-ICU: Positive   E: Early Mobility Performed? Yes   F: Feeding Goal: Goals: Meet % EEN, EPN by RD f/u date  Status: Nutrition Goal Status: goal not met   Current Diet Order   Procedures    Diet NPO      AS: Analgesia/Sedation Fentanyl/propofol   T: Thromboembolic Prophylaxis Held d/t SAH   H: HOB > 300 Yes   U: Stress Ulcer Prophylaxis (if needed) Pantoprazole   G: Glucose Control Insulin gtt   B: Bowel Function Stool Occurrence: 1   I: Indwelling Catheter (Lines & Barnett) Necessity Triple lumen R IJ, PIV, NG, Barnett, Rectal tube, Art line   D: De-escalation of Antimicrobials/Pharmacotherapies Vancomycin/zosyn    Plan for the day/ETD GOC conversation with family, electrolyte correction, BG monitoring    Code Status:  Family/Goals of Care: DNR  Ongoing GOC conversations       Critical secondary to Patient has a condition that poses threat to life and bodily function: Pulmonary Embolism and Severe Respiratory Distress      Critical care was time spent personally by me on the following activities: development of treatment plan with patient or surrogate and bedside caregivers, discussions with consultants, evaluation of patient's response to treatment, examination of patient, ordering and performing  treatments and interventions, ordering and review of laboratory studies, ordering and review of radiographic studies, pulse oximetry, re-evaluation of patient's condition. This critical care time did not overlap with that of any other provider or involve time for any procedures.     Juvencio Hinkle MD  Critical Care Medicine  [facility] - Cardiac Medical ICU

## 2023-10-04 NOTE — ASSESSMENT & PLAN NOTE
-Last A1c reviewed-   Lab Results   Component Value Date    HGBA1C 6.0 (H) 09/30/2023       Inpatient Antihyperglycemic Regiment:  Antihyperglycemics (From admission, onward)    Start     Stop Route Frequency Ordered    10/04/23 1030  insulin regular in 0.9 % NaCl 100 unit/100 mL (1 unit/mL) infusion        Question:  Enter initial dose from Infusion Protocol Chart (Units/hr):  Answer:  1.5    -- IV Continuous 10/04/23 0931          Blood Sugars (AccuCheck):    Recent Labs     10/04/23  0025 10/04/23  0405 10/04/23  0950 10/04/23  1132 10/04/23  1224 10/04/23  1318   POCTGLUCOSE 344* 282* 189* 177* 174* 152*

## 2023-10-04 NOTE — PROGRESS NOTES
[facility] - [facility] Medical [facility]  Neurosurgery  Progress Note    Subjective:     History of Present Illness: 68F w/ PMH COPD, Pulm HTN, MVR on Warfarin, A-fib, HTN, T2DM, CKD3 who originally presented to [facility] in ARDS requiring paralysis/proning now found to have HH4F3 SAH. Patient originally preented to [facility] on 9/26 in hypoxemic/hypercapnic ARDS requiring paralysis & proning. She was found to have rhinovirus and nonocclusive right PE (on heparine gtt), and bilateral DVTs. Additionally her stay has been complicated by JENN, hyponatremia, anemia and shock. Today, she was found to have aniscoria for which she underwent CTH/CTA and was found to have diffuse cisternal SAH without obvious aneurysm. Paralytics and heparin were held with minimal exam.      Post-Op Info:  * No surgery found *         Medications Prior to Admission   Medication Sig Dispense Refill Last Dose    ADVAIR DISKUS 500-50 mcg/dose DsDv diskus inhaler INHALE 1 PUFF INTO THE LUNGS 2 (TWO) TIMES DAILY. 180 each 3     albuterol (PROVENTIL/VENTOLIN HFA) 90 mcg/actuation inhaler INHALE 2 PUFFS INTO THE LUNGS EVERY 6 (SIX) HOURS AS NEEDED FOR WHEEZING. RESCUE 18 g 6     albuterol sulfate 2.5 mg/0.5 mL Nebu Take 2.5 mg by nebulization every 4 (four) hours as needed (wheezing). Rescue 1 each 0     amLODIPine (NORVASC) 2.5 MG tablet Take 1 tablet (2.5 mg total) by mouth once daily. 90 tablet 2     aspirin (ECOTRIN) 81 MG EC tablet Take 81 mg by mouth once daily.        cycloSPORINE (RESTASIS) 0.05 % ophthalmic emulsion Place 1 drop into both eyes 2 (two) times daily. 60 each 12     dipyridamole (PERSANTINE) 5 mg/mL injection        dorzolamide (TRUSOPT) 2 % ophthalmic solution Place 1 drop into the right eye 2 (two) times a day. 10 mL 3     ergocalciferol (ERGOCALCIFEROL) 50,000 unit Cap Take 1 capsule (50,000 Units total) by mouth twice a week. 24 capsule 0     fluticasone propionate (FLONASE) 50 mcg/actuation nasal spray 2 sprays (100 mcg total) by Each  Nostril route once daily. 16 g 3     furosemide (LASIX) 40 MG tablet Take 1 tablet (40 mg total) by mouth once daily. 90 tablet 3     gabapentin (NEURONTIN) 300 MG capsule Take 1 capsule (300 mg total) by mouth every evening. 30 capsule 11     latanoprost (XALATAN) 0.005 % ophthalmic solution Place 1 drop into both eyes once daily. 7.5 mL 3     meclizine (ANTIVERT) 25 mg tablet Take 1 tablet (25 mg total) by mouth 2 (two) times daily as needed. 30 tablet 1     metoprolol tartrate (LOPRESSOR) 100 MG tablet TAKE ONE TABLET BY MOUTH TWICE DAILY 60 tablet 11     nitroGLYCERIN (NITROSTAT) 0.4 MG SL tablet Place 1 tablet (0.4 mg total) under the tongue every 5 (five) minutes as needed for Chest pain. (Patient not taking: Reported on 4/28/2022) 30 tablet 1     omeprazole (PRILOSEC) 40 MG capsule Take 1 capsule (40 mg total) by mouth every morning. Open capsule and take with apple sauce 30 capsule 2     ondansetron (ZOFRAN-ODT) 8 MG TbDL Dissolve 1 tablet (8 mg total) by mouth every 6 (six) hours as needed. 30 tablet 0     oxybutynin (DITROPAN-XL) 5 MG TR24 Take 5 mg by mouth once daily.       pantoprazole (PROTONIX) 40 MG tablet Take 1 tablet (40 mg total) by mouth daily as needed. 30 tablet 3     rosuvastatin (CRESTOR) 20 MG tablet Take 1 tablet (20 mg total) by mouth once daily. 90 tablet 3     semaglutide (OZEMPIC) 0.25 mg or 0.5 mg (2 mg/3 mL) pen injector Inject 0.5 mg into the skin every 7 days. 3 mL 2     sertraline (ZOLOFT) 100 MG tablet TAKE 1 TABLET BY MOUTH ONCE DAILY. 90 tablet 3     tamsulosin (FLOMAX) 0.4 mg Cap Take 0.4 mg by mouth once daily.       warfarin (COUMADIN) 5 MG tablet Take 1 tablet (5 mg total) by mouth Daily. or as instructed by [facility]madin Clinic. 40 tablet 0        Review of patient's allergies indicates:   Allergen Reactions    Brimonidine        Past Medical History:   Diagnosis Date    Acute DVT (deep venous thrombosis) 9/26/2023    Acute on chronic diastolic congestive  heart failure     Acute pulmonary embolism 2023    Allergy     Anemia     Anticoagulant long-term use     Anxiety     Asthma     Atrial fibrillation     Bilateral primary osteoarthritis of hip 3/14/2023    Cataract     Chronic kidney disease     COPD (chronic obstructive pulmonary disease)     Coronary artery calcification seen on CT scan 3/22/2022    Coronary artery calcification seen on CT scan 3/22/2022    Depression     Encounter for blood transfusion     HTN (hypertension)     Hyperlipidemia     Nephrolithiasis     KEYSHAWN (obstructive sleep apnea)     awaiting CPAP machine     Rheumatic disease of mitral valve     Uncontrolled type 2 diabetes mellitus with complication, with long-term current use of insulin 2020    Urinary incontinence      Past Surgical History:   Procedure Laterality Date    BREAST BIOPSY Left 10/2019    CARDIAC VALVE SURGERY      mechanical mitral valve     SECTION      COLONOSCOPY N/A 2019    Procedure: COLONOSCOPY;  Surgeon: Devon Bowling MD;  Location: [facility] ENDO (4TH FLR);  Service: Endoscopy;  Laterality: N/A;  ok to hold Coumadin x 5 days with Lovenox bridge per Coumadin clinic-[facility]    ESOPHAGOGASTRODUODENOSCOPY N/A 2019    Procedure: EGD (ESOPHAGOGASTRODUODENOSCOPY);  Surgeon: Smooth Torrez MD;  Location: [facility] (2ND FLR);  Service: Endoscopy;  Laterality: N/A;  2nd floor requested due to comorbidities, Hypertension, permanent A. 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 44     per Coumadin clinic-ok to hold for 5 days w/bridge    INJECTION N/A 2023    Procedure: INJECTION, BILATERAL GTB AND RIGHT HIP INTRA-ARTICULAR CONTRAST;  Surgeon: Beth Alfaro MD;  Location: [facility] PAIN MGT;  Service: Pain Management;  Laterality: N/A;    kidney stone removal      MITRAL VALVE REPLACEMENT  2004    TUBAL LIGATION       Family History       Problem  Relation (Age of Onset)    Asthma Daughter, Son    Breast cancer Paternal Aunt    Cancer Brother    Colon cancer Maternal Grandmother, Mother (83)    Diabetes Sister, Sister, Sister, Father    Heart disease Father (70)    Hypertension Sister    Stroke Paternal Grandmother          Tobacco Use    Smoking status: Former     Current packs/day: 0.00     Average packs/day: 0.5 packs/day for 20.0 years (10.0 ttl pk-yrs)     Types: Cigarettes     Start date: 1982     Quit date: 2002     Years since quittin.4    Smokeless tobacco: Never   Substance and Sexual Activity    Alcohol use: No    Drug use: No    Sexual activity: Not on file     Review of Systems   Unable to perform ROS: Intubated     Objective:     Weight: 90.3 kg (199 lb 1.2 oz)  Body mass index is 36.41 kg/m².  Vital Signs (Most Recent):  Temp: 98 °F (36.7 °C) (10/04/23 0701)  Pulse: 108 (10/04/23 0701)  Resp: (!) 33 (10/04/23 0701)  BP: (!) 102/51 (10/04/23 0701)  SpO2: 98 % (10/04/23 0701) Vital Signs (24h Range):  Temp:  [94.4 °F (34.7 °C)-98 °F (36.7 °C)] 98 °F (36.7 °C)  Pulse:  [] 108  Resp:  [15-33] 33  SpO2:  [92 %-100 %] 98 %  BP: ()/(47-58) 102/51  Arterial Line BP: ()/(45-63) 102/51     Date 10/04/23 0700 - 10/05/23 0659   Shift 9572-3367 1601-3235 2693-1639 24 Hour Total   INTAKE   I.V.(mL/kg) 19.4(0.2)   19.4(0.2)   Shift Total(mL/kg) 19.4(0.2)   19.4(0.2)   OUTPUT   Shift Total(mL/kg)       Weight (kg) 90.3 90.3 90.3 90.3                Vent Mode: A/C  Oxygen Concentration (%):  [] 90  Resp Rate Total:  [28 br/min-38 br/min] 33 br/min  Vt Set:  [300 mL] 300 mL  PEEP/CPAP:  [8 cmH20] 8 cmH20  Mean Airway Pressure:  [16 idP18-71 cmH20] 19 cmH20             NG/OG Tube 23 1141 orogastric Center mouth (Active)   Placement Check placement verified by aspirate characteristics 10/03/23 1501   Tolerance no signs/symptoms of discomfort 10/03/23 1501   Securement secured to commercial device 10/03/23 1501  "  Clamp Status/Tolerance clamped 10/03/23 1501   Suction Setting/Drainage Method suction at the bedside 10/03/23 1501   Insertion Site Appearance no redness, warmth, tenderness, skin breakdown, drainage 10/03/23 1501   Drainage Milky 10/03/23 0501   Flush/Irrigation flushed w/;water 10/03/23 0701   Feeding Type continuous;by pump 10/03/23 0901   Feeding Action feeding held 10/03/23 1501   Current Rate (mL/hr) 20 mL/hr 10/03/23 0901   Goal Rate (mL/hr) 35 mL/hr 10/03/23 0901   Intake (mL) 80 mL 10/03/23 0701   Water Bolus (mL) 60 mL 09/27/23 1505   Formula Name Impact Peptide 10/03/23 1501   Intake (mL) - Formula Tube Feeding 20 10/03/23 0901   Residual Amount (ml) 14 ml 10/02/23 1901            Rectal Tube 10/01/23 2000 (Active)   Balloon Inflation Volume (mL) 45 10/03/23 1501   Reposition drainage bags for BMS & Barnett on opposite sides of bed 10/03/23 1501   Outcome gas expelled, stool evacuated 10/03/23 1501   Stool Color Green;Brown 10/03/23 1501   Insertion Site Appearance no redness, warmth, tenderness, skin breakdown, drainage 10/03/23 1501   Flush/Irrigation irrigated w/;water 10/03/23 0901   Intake (mL) 50 mL 10/03/23 0901   Rectal Tube Output 200 mL 10/02/23 2201            Urethral Catheter 09/27/23 1151 16 Fr. (Active)   $ Barnett Insertion Bedside Insertion Performed 09/27/23 1505   Site Assessment Clean;Intact 10/03/23 1501   Collection Container Urimeter 10/03/23 1501   Securement Method secured to top of thigh w/ adhesive device 10/03/23 1501   Catheter Care Performed yes 10/03/23 1501   Reason for Continuing Urinary Catheterization Critically ill in ICU and requiring hourly monitoring of intake/output 10/03/23 1501   CAUTI Prevention Bundle Securement Device in place with 1" slack;Intact seal between catheter & drainage tubing;Drainage bag/urimeter off the floor;No dependent loops or kinks;Sheeting clip in use;Drainage bag/urimeter not overfilled (<2/3 full);Drainage bag/urimeter below bladder 10/03/23 " 1501   Output (mL) 200 mL 10/03/23 1340          Physical Exam   Neurosurgery Physical Exam  GCS 3t, previously on nimbex;  PERRL, +corneals +cough, neg gag;  No movement in BUE/BLE to noxious stimuli    General: Intubated  Head: Nontraumatic, normocephalic  Eyes: Nontraumatic  Neck: Supple  CVS: Normal rate and rhythm, distal pulses present  Pulm: Symmetric expansion, no respiratory distress  GI: Abdomen nondistended  MSK: Non-traumatic  Skin: Dry, intact  Psych: Intubated      Significant Labs:  Recent Labs   Lab 10/03/23  0226 10/04/23  0317   * 258*   * 120*   K 4.6 4.6   CL 80* 76*   CO2 33* 26   BUN 79* 87*   CREATININE 1.8* 2.4*   CALCIUM 7.1* 7.0*   MG 2.2 2.0       Recent Labs   Lab 10/03/23  1820 10/04/23  0114 10/04/23  0321   WBC 35.52* 12.10 13.14*   HGB 6.5* 7.6* 7.4*   HCT 20.5* 23.2* 22.0*    158 147*       Recent Labs   Lab 10/03/23  0226 10/03/23  1519 10/04/23  0317   INR 1.2  --  1.1   APTT 68.3* 29.0  --        Microbiology Results (last 7 days)       Procedure Component Value Units Date/Time    Culture, Respiratory with Gram Stain [[id number]] Collected: 10/03/23 2104    Order Status: Completed Specimen: Respiratory from Sputum, Expectorated Updated: 10/04/23 0332     Gram Stain (Respiratory) <10 epithelial cells per low power field.     Gram Stain (Respiratory) Moderate WBC's     Gram Stain (Respiratory) Few Gram negative rods     Gram Stain (Respiratory) Rare yeast    Blood culture [[id number]]     Order Status: No result Specimen: Blood     Blood culture [[id number]]     Order Status: No result Specimen: Blood     Blood culture [[id number]] Collected: 09/27/23 1229    Order Status: Completed Specimen: Blood from Peripheral, Forearm, Left Updated: 10/02/23 1412     Blood Culture, Routine No growth after 5 days.    Blood culture [[id number]] Collected: 09/27/23 1221    Order Status: Completed Specimen: Blood from Peripheral, Wrist, Left Updated: 10/02/23 1412     Blood  "Culture, Routine No growth after 5 days.    Respiratory Infection Panel (PCR), Nasopharyngeal [[id number]]  (Abnormal) Collected: 09/27/23 1755    Order Status: Completed Specimen: Nasopharyngeal Swab Updated: 09/27/23 1926     Respiratory Infection Panel Source NP Swab     Adenovirus Not Detected     Coronavirus 229E, Common Cold Virus Not Detected     Coronavirus HKU1, Common Cold Virus Not Detected     Coronavirus NL63, Common Cold Virus Not Detected     Coronavirus OC43, Common Cold Virus Not Detected     Comment: The Coronavirus strains detected in this test cause the common cold.  These strains are not the COVID-19 (novel Coronavirus)strain   associated with the respiratory disease outbreak.          SARS-CoV2 (COVID-19) Qualitative PCR Not Detected     Human Metapneumovirus Not Detected     Human Rhinovirus/Enterovirus Detected     Influenza A (subtypes H1, H1-2009,H3) Not Detected     Influenza B Not Detected     Parainfluenza Virus 1 Not Detected     Parainfluenza Virus 2 Not Detected     Parainfluenza Virus 3 Not Detected     Parainfluenza Virus 4 Not Detected     Respiratory Syncytial Virus Not Detected     Bordetella Parapertussis (BV3650) Not Detected     Bordetella pertussis (ptxP) Not Detected     Chlamydia pneumoniae Not Detected     Mycoplasma pneumoniae Not Detected    Narrative:      For all other respiratory sources, order [id number] -  Respiratory Viral Panel by PCR          ABGs:   Recent Labs   Lab 10/03/23  1413 10/03/23  2038 10/04/23  0439   PH 7.314* 7.336* 7.448   PCO2 73.1* 64.7* 44.9   PO2 83 70* 71*   HCO3 37.1* 34.6* 31.1*   POCSATURATED 94 92 95   BE 11 9 7       Cardiac markers: No results for input(s): "CKMB", "CPKMB", "TROPONINT", "TROPONINI", "MYOGLOBIN" in the last 48 hours.  CMP:   Recent Labs   Lab 10/02/23  1748 10/03/23  0226 10/04/23  0317   GLU  --  281* 258*   CALCIUM  --  7.1* 7.0*   ALBUMIN  --  2.1* 2.0*   PROT  --  4.9* 4.8*   NA  --  125* 120*   K  --  4.6 4.6   CO2  -- " " 33* 26   CL  --  80* 76*   BUN  --  79* 87*   CREATININE  --  1.8* 2.4*   ALKPHOS  --  65 73   ALT  --  16 17   AST  --  38 50*   BILITOT 0.8 0.9 1.4*       CRP: No results for input(s): "CRP" in the last 48 hours.  ESR: No results for input(s): "POCESR", "ERYTHROCYTES" in the last 48 hours.  LFTs:   Recent Labs   Lab 10/02/23  1748 10/03/23  0226 10/04/23  0317   ALT  --  16 17   AST  --  38 50*   ALKPHOS  --  65 73   BILITOT 0.8 0.9 1.4*   PROT  --  4.9* 4.8*   ALBUMIN  --  2.1* 2.0*       Procalcitonin:   Recent Labs   Lab 10/03/23  1203   PROCAL 1.23*         Significant Diagnostics:  I have reviewed all pertinent imaging results/findings within the past 24 hours.    Assessment/Plan:     Subarachnoid hemorrhage  68F w/ PMH COPD, Pulm HTN, MVR on Warfarin, A-fib, HTN, T2DM, CKD3 who originally presented to [facility] in ARDS requiring paralysis/proning now found to have HH4F3 SAH:    Neuro:  --Patient admitted to MICU; preponderance of medical care per MICU/NCC      -Q1h neurochecks while in ICU  --All labs and diagnostics reviewed      -CTA 10/3: Diffuse cisternal blood dissecting into anterior interhemispheric fissure; no underlying vascular lesion      -CTH 10/3 6h interval overall stable      -DSA cerebral only when healthy enough to tolerate  --HOB@30  --Keppra 500 BID x7d  --TCDs daily x14d  --Nimotop 60q4 x 21d (as able to tolerate if not requiring pressors)  --EVD watch  --Continue to follow clinically and notify NSGY immediately if any neurostatus change    CVS/Pulm:  --SBP <140 mmHg (cardene ggt; hydralazine & labetalol PRN; transition to home meds when appropriate per NCC)  --Aggressive cardiopulmonary management per NCC/MICU    GIT/Electrolytes:  --CMP daily      -Replete electrolytes PRN per NCC  --Na goal >135  --PPI    Renal:  --Strict I/Os; goal for euvolemia per primary teams    Heme/ID:  --Hold/Reverse anti-plt/coag medications per primary teams given significant medical illness acknowledging risks vs " benefits of systemic anticoagulation in setting of SAH  --Daily CBC      -Transfuse PRN  --Trend WBC and Temp    PPx:  --TEDs/SCDs  --PPI    Dispo: Continued ICU care at this time, [facility] per primary        Alberto Tubbs MD  Neurosurgery  [facility] - Cardiac Medical ICU

## 2023-10-04 NOTE — PLAN OF CARE
MICU DAILY GOALS     Family/Goals of care/Code Status   Code Status: DNR    24H Vital Sign Range  Temp:  [95.7 °F (35.4 °C)-98.3 °F (36.8 °C)]   Pulse:  []   Resp:  [4-34]   BP: ()/(47-58)   SpO2:  [95 %-100 %]   Arterial Line BP: ()/(32-74)      Shift Events   Initiated insulin gtt this shift. Patients daughter came to bedside and requested to turn off all of the patient sedation to see if the patient would have improvement in her neuro status, with Dr. Muniz at the bedside sedation was stopped. The patient remains off of sedation at this time and has had no improvement in assessment. Verbal orders from Dr. Muniz to not escalate care, family is aware of this.     AWAKE RASS: Goal - RASS Goal: -5-->unarousable  Actual - RASS (Quiroz Agitation-Sedation Scale): unarousable    Restraint necessity: Treatment interference   BREATHE SBT: Fail    Coordinate A & B, analgesics/sedatives Pain: managed   SAT: Pass   Delirium CAM-ICU: Overall CAM-ICU: Positive   Early(intubated/ Progressive (non-intubated) Mobility MOVE Screen (INTUBATED ONLY): Fail    Activity: Activity Management: Patient unable to perform activities   Feeding/Nutrition Diet order: Diet/Nutrition Received: tube feeding,     Thrombus DVT prophylaxis: VTE Required Core Measure: Pharmacological prophylaxis initiated/maintained   HOB Elevation Head of Bed (HOB) Positioning: HOB at 20-30 degrees   Ulcer Prophylaxis GI: yes   Glucose control uncontrolled Glycemic Management: blood glucose monitored, supplemental insulin given   Skin Skin assessed during: Daily Assessment    [] No Altered Skin Integrity Present    []Prevention Measures Documented      [] Yes- Altered Skin Integrity Present or Discovered   [] LDA Added if Not in Epic (Describe Wound)   [] New Altered Skin Integrity was Present on Admit and Documented in LDA   [] Wound Image Taken    Wound Care Consulted? Yes    Attending Nurse:  Tammy Barbour RN/Staff Member:  Chary URBINA  RN   Bowel Function diarrhea    Indwelling Catheter Necessity      Urethral Catheter 09/27/23 1151 16 Fr.-Reason for Continuing Urinary Catheterization: Critically ill in ICU and requiring hourly monitoring of intake/output    Percutaneous Central Line Insertion/Assessment - Triple Lumen  09/28/23 0845 Internal Jugular Right-Line Necessity Review: Hemodynamic instability, Medication caustic to vasculature  ICU   De-escalation Antibiotics No       VS and assessment per flow sheet, patient progressing towards goals as tolerated, plan of care reviewed with [unfilled] and family, all concerns addressed, will continue to monitor.

## 2023-10-05 PROBLEM — Z51.5 COMFORT MEASURES ONLY STATUS: Status: ACTIVE | Noted: 2023-01-01

## 2023-10-05 NOTE — ASSESSMENT & PLAN NOTE
68F w/ PMH COPD, Pulm HTN, MVR on Warfarin, A-fib, HTN, T2DM, CKD3 who originally presented to [facility] in ARDS requiring paralysis/proning now found to have HH4F3 SAH:    Neuro:  --Patient admitted to MICU; preponderance of medical care per MICU/NCC      -Q1h neurochecks while in ICU  --All labs and diagnostics reviewed      -CTA 10/3: Diffuse cisternal blood dissecting into anterior interhemispheric fissure; no underlying vascular lesion      -CTH 10/3 6h interval overall stable      -DSA cerebral only when healthy enough to tolerate  --HOB@30  --Keppra 500 BID x7d  --TCDs daily x14d  --Nimotop 60q4 x 21d (as able to tolerate if not requiring pressors)  --EVD watch  --Continue to follow clinically and notify NSGY immediately if any neurostatus change    CVS/Pulm:  --SBP <140 mmHg (cardene ggt; hydralazine & labetalol PRN; transition to home meds when appropriate per NCC)  --Aggressive cardiopulmonary management per NCC/MICU    GIT/Electrolytes:  --CMP daily      -Replete electrolytes PRN per NCC  --Na goal >135  --PPI    Renal:  --Strict I/Os; goal for euvolemia per primary teams    Heme/ID:  --Hold/Reverse anti-plt/coag medications per primary teams given significant medical illness acknowledging risks vs benefits of systemic anticoagulation in setting of SAH  --Daily CBC      -Transfuse PRN  --Trend WBC and Temp    PPx:  --TEDs/SCDs  --PPI    Dispo: Continued ICU care at this time, FU GOC per primary

## 2023-10-05 NOTE — ASSESSMENT & PLAN NOTE
Patient with Hypercapnic and Hypoxic Respiratory failure which is Acute on chronic.  she is not on home oxygen. Supplemental oxygen was provided and noted- Vent Mode: A/C  Oxygen Concentration (%):  [90] 90  Resp Rate Total:  [33 br/min-34 br/min] 33 br/min  Vt Set:  [300 mL] 300 mL  PEEP/CPAP:  [8 cmH20] 8 cmH20  Mean Airway Pressure:  [18 xxA38-38 cmH20] 19 cmH20    .   Signs/symptoms of respiratory failure include- tachypnea, increased work of breathing, respiratory distress and use of accessory muscles. Contributing diagnoses includes - COPD and Pulmonary Embolus Labs and images were reviewed. Patient Has recent ABG, which has been reviewed. Will treat underlying causes and adjust management of respiratory failure as follows-     69yo F with history of HFpEF, pulm htn, MVR on warfarin, AF, COPD, htn, T2DM, and CKD3 presenting as transfer from [facility] ICU for AHRF. Etiology due to PE with evidence of R heart strain on TTE vs pulmonary htn with PASP 52 mmhg vs viral illness (positive enterovirus and rhinovirus).  Pt has been treated with broad spectrun antibiotics and steroids at OSH.   She follows with Dr. Carvalho as outpatient.    Treating via ARDSnet protocol informed by results of ARMA, PROSEVA, and dexa-ARDS trials    --Wean oxygen as tolerated  --Serial ABG's  --Continue Zosyn  --Solumedrol stopped, started decadron 20mg QD for 5d followed by 10mg QD for 5d  --Scheduled Duo Nebs  --Continue breo, spiriva  --CXR  -- Not proning d/t SAH  -- Diuresis held d/t worsening renal function

## 2023-10-05 NOTE — DISCHARGE SUMMARY
[facility] - Cardiac Medical ICU  Critical Care Medicine  Discharge Summary      Patient Name: Elayne Almanzar  MRN: [id number]  Admission Date: 9/26/2023  Hospital Length of Stay: 9 days  Discharge Date and Time: 10/17/2023     Attending Physician: Que Muniz MD   Discharging Provider: Juvencio Hinkle MD  Primary Care Provider: Nubia Crocker MD  Reason for Admission: Acute hypoxic/hypoxemic respiratory failure    HPI:   Ms. Servando Almanzar is a 68 year old female with HFpEF (60-65%), Pulmonary HTN, MVR on coumadin, permanent afib, COPD, HTN, T2DM, and CKD3. He is presenting as transfer from [facility] ICU for evaluation of acute hypoxemic respiratory failure likely secondary to PE vs pulmonary HTN noted on imaging with evidence of right heart strain on TTE.     She initially presented to OSH for SOB and cough. Infectious workup notable for enterovirus/rhinovirus and CT chest 9/18 with multifocal GGO bilaterally. She was trialed on IV abx, corticosteroids, and lasix without improvement with subsequent CTA chest positive acute PE, more specifically a nonocclusive filling defect within the right lower lobe superior segmental pulmonary artery with extension into the inter lobar artery. Lower extremity US also noted DVT to right peroneal vein and the left posterior tibial vein. TTE performed with septal flattening in diastole consistent with RV volume overload. PASP 52mmHg.    Given DVT/PE despite therapeutic INR on warfarin, patient transitioned to IV heparin ggt. She continued to deteriorate requiring continous BIPAP thus patient transferred to [facility] MICU for higher lever of care and interventional cardiology consult for consideration of catheter-directed thrombolysis.       * No surgery found *    Indwelling Lines/Drains at Time of Discharge:   Lines/Drains/Airways     Central Venous Catheter Line  Duration           Percutaneous Central Line Insertion/Assessment - Triple Lumen  09/28/23 0845 Internal Jugular  Right 7 days          Drain  Duration                NG/OG Tube 09/27/23 1141 orogastric Center mouth 8 days         Urethral Catheter 09/27/23 1151 16 Fr. 8 days         Rectal Tube 10/01/23 2000 3 days          Airway  Duration                Airway - Non-Surgical 09/27/23 1139 Endotracheal Tube 8 days          Arterial Line  Duration           Arterial Line 09/27/23 1713 Right Radial 7 days              Hospital Course:   68F w/ HFpEF (EF 60-65%), COPD, pHTN (PASP 52), MVR on warfarin, persistent afib, COPD, HTN, T2DM, CKD3 presented as transfer from [facility] from [facility], possible contributing factors in addition to her comorbidities include pulmonary HTN, non-obstructing PE (likely 2/2 known DVT) found on imaging with evidence of right heart strain, viral pna (rhino/enterovirus positive). She was placed on IV heparin and had increasing BIPAP requirements overnight, this morning her O2 sats were declining to mid 80's maxed on BIPAP w/ 100% O2, so she was electively intubated before she further deteriorated. She briefly required pressor support post-intubation and was started on diuresis and stress dose steroids. She is now off pressors but remains in AF w/ RVR confirmed on EKG with HR ~130-150, was restarted on half her home dose of metoprolol tartrate at 50 BID with good rate control HR <100.  Treating as ARDS via protocols described in the PROSEVA, dexa-ARDS, and ARMA trials. Pt noted to have anisocoria on exam after supination, CT revealed diffuse SAH, neurocritical care, neurosurgery consulted. Heparin stopped, repeat CTH showed stable SAH. No acute intervention indicated at this time. Transitioned to comfort care. Withdrawing pressor/ventilation support per MPOA / family wishes.      Consults (From admission, onward)        Status Ordering Provider     Inpatient consult to Neurosurgery  Once        Provider:  (Not yet assigned)    DAVE Santos     Inpatient consult to Neuro Critical Care  Once         Provider:  (Not yet assigned)    Completed DAVE DENNIS     Inpatient consult to Midline team  Once        Provider:  (Not yet assigned)    Completed MATIAS EDMONDS     Inpatient consult to Registered Dietitian/Nutritionist  Once        Provider:  (Not yet assigned)    Completed SADIE NEVAREZ     Inpatient consult to Registered Dietitian/Nutritionist  Once        Provider:  (Not yet assigned)    Completed MARY ALICE BRISENO        Significant Labs:  All pertinent labs within the past 24 hours have been reviewed.    Significant Imaging:  I have reviewed all pertinent imaging results/findings within the past 24 hours.    Pending Diagnostic Studies:     Procedure Component Value Units Date/Time    APTT [[id number]] Collected: 09/27/23 0602    Order Status: Sent Lab Status: In process Updated: 09/27/23 0602    Specimen: Blood         Final Active Diagnoses:    Diagnosis Date Noted POA    PRINCIPAL PROBLEM:  Acute respiratory failure with hypoxia and hypercarbia [J96.01, J96.02] 09/16/2023 Yes    Comfort measures only status [Z51.5] 10/05/2023 Not Applicable    Subarachnoid hemorrhage [I60.9] 10/03/2023 No    Type 2 diabetes mellitus [E11.9] 09/27/2023 Yes    Acute deep vein thrombosis (DVT) of both lower extremities [I82.403] 09/26/2023 Yes    Acute pulmonary embolism [I26.99] 09/24/2023 Yes    Acute bronchitis due to Rhinovirus [J20.6] 09/21/2023 Yes    Pulmonary hypertension [I27.20] 09/18/2023 Yes    Chronic diastolic congestive heart failure [I50.32]  Yes     Chronic    COPD (chronic obstructive pulmonary disease) [J44.9] 01/22/2015 Yes     Chronic    H/O mitral valve replacement with mechanical valve [Z95.2] 01/22/2015 Not Applicable     Chronic    Chronic atrial fibrillation [I48.20] 03/21/2013 Yes     Chronic    Essential (primary) hypertension [I10] 03/21/2013 Yes     Chronic      Problems Resolved During this Admission:     Neuro  Subarachnoid hemorrhage  Pt noted to have anisocoria after AM  supination on 10/3. Reflexes were blunted by her paralytic drip. A stroke code was called and patient was taken for urgent CT/CTA head. CT showing large acute subarachnoid hemorrhage centered in basal cisterns, no definite aneurysm but pattern is suspicious and correlation with DSA is recommended. Neurology recommended stopping heparin, controlling BP, consulting neurocritical care and neurosurgery. Family updated.    Plan:  - Neurosurgery, neurocrit consulted  - Heparin stopped        Pulmonary  * Acute respiratory failure with hypoxia and hypercarbia  Patient with Hypercapnic and Hypoxic Respiratory failure which is Acute on chronic.  she is not on home oxygen. Supplemental oxygen was provided and noted- Vent Mode: A/C  Oxygen Concentration (%):  [90] 90  Resp Rate Total:  [33 br/min-34 br/min] 33 br/min  Vt Set:  [300 mL] 300 mL  PEEP/CPAP:  [8 cmH20] 8 cmH20  Mean Airway Pressure:  [18 kyC20-40 cmH20] 19 cmH20    .   Signs/symptoms of respiratory failure include- tachypnea, increased work of breathing, respiratory distress and use of accessory muscles. Contributing diagnoses includes - COPD and Pulmonary Embolus Labs and images were reviewed. Patient Has recent ABG, which has been reviewed. Will treat underlying causes and adjust management of respiratory failure as follows-     67yo F with history of HFpEF, pulm htn, MVR on warfarin, AF, COPD, htn, T2DM, and CKD3 presenting as transfer from [facility] ICU for AHRF. Etiology due to PE with evidence of R heart strain on TTE vs pulmonary htn with PASP 52 mmhg vs viral illness (positive enterovirus and rhinovirus).  Pt has been treated with broad spectrun antibiotics and steroids at OSH.   She follows with Dr. Carvalho as outpatient.    Treating via ARDSnet protocol informed by results of ARMA, PROSEVA, and dexa-ARDS trials    --Wean oxygen as tolerated  --Serial ABG's  --Continue Zosyn  --Solumedrol stopped, started decadron 20mg QD for 5d followed by 10mg QD for  5d  --Scheduled Duo Nebs  --Continue breo, spiriva  --CXR  -- Not proning d/t SAH  -- Diuresis held d/t worsening renal function  -- Pt terminally extubated per family wishes 10/5/23, see ACP note for details    Acute bronchitis due to Rhinovirus  --See AHRF, on empiric zosyn for bacterial coverage  - Vancomycin added for empiric coverage in setting of worsening leukocytosis + tachycardia    COPD (chronic obstructive pulmonary disease)  --See respiratory failure    Cardiac/Vascular  Pulmonary hypertension  PASP on TTE 52mmHg. Noted to be 54mmHg on TTE in 2022.   Likely contributing to resp failure.     Chronic diastolic congestive heart failure  Patient is identified as having Diastolic (HFpEF) heart failure that is Chronic. CHF is currently uncontrolled due to Rales/crackles on pulmonary exam and Pulmonary edema/pleural effusion on CXR. Latest ECHO performed and demonstrates- Results for orders placed during the hospital encounter of 09/12/23    Echo    Interpretation Summary    Left Ventricle: The left ventricle is normal in size.  Mildly to moderately increased wall thickness. Septal motion is consistent with post-operative status. Septal flattening in diastole consistent with right ventricular volume overload. There is normal systolic function with a visually estimated ejection fraction of 60 - 65%. There is indeterminate diastolic function.    Left Atrium: Left atrium is severely dilated.    Mitral Valve: There is a mechanical valve in the mitral position. The mean pressure gradient across the mitral valve is 5.5 mmHg at a heart rate of 108 bpm. There is trace to mild regurgitation.    Right Ventricle: Right ventricle was not well visualized due to poor acoustic window.  Likely dilated to some extent based on subcostal images.  Systolic function appears normal based on limited images.    Right Atrium: Right atrium is dilated.    Tricuspid Valve: There is trace to mild regurgitation.    The pulmonary  "artery systolic pressure is approximally 52 mm Hg.    IVC/SVC: Intermediate venous pressure at 8 mmHg.  . Continue Beta Blocker and monitor clinical status closely. Monitor on telemetry. Patient is off CHF pathway.  Monitor strict Is&Os and daily weights.  Place on fluid restriction of 1.5 L. Cardiology has been consulted. Continue to stress to patient importance of self efficacy and  on diet for CHF. Last BNP reviewed- and noted below   No results for input(s): "BNP", "BNPTRIAGEBLO" in the last 168 hours..    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 On coumadin. Follows with Dr. Messina outpatient.     --Hold Coumadin in setting of acute illness  -- Heparin paused 9/29 d/t supratherapeutic levels with oral bleeding, rechecking labs  -- 10/2 heparin paused d/t drop in Hb 9->7.1, no signs of overt bleeding, will monitor and resume as tolerated  10/3 heparin stopped per neuro d/t SAH    Essential (primary) hypertension  Home regimen: amlodipine 2.5mg and metoprolol        Chronic atrial fibrillation  In AF w/ RVR since admission confirmed on EKG. 10/4 HR elevated, AF w RVR confirmed on 12-lead, metoprolol/diltiazem held d/t norepinephrine requirements    Hematology  Acute deep vein thrombosis (DVT) of both lower extremities  BLE Ultrasound: deep vein thrombosis of the right peroneal vein and the left posterior tibial vein.    Heparin stopped d/t SAH       Acute pulmonary embolism  CTA 09/23: a nonocclusive filling defect within the right lower lobe superior segmental pulmonary artery with extension into the inter lobar artery, multifocal pneumonia or pulmonary edema progressed from prior    Heparin stopped d/t SAH        Endocrine  Type 2 diabetes mellitus  -Last A1c reviewed-   Lab Results   Component Value Date    HGBA1C 6.0 (H) 09/30/2023       Inpatient Antihyperglycemic Regiment:  Antihyperglycemics (From admission, onward)    " None          Blood Sugars (AccuCheck):    Recent Labs     10/05/23  0321 10/05/23  0415 10/05/23  0523 10/05/23  0626 10/05/23  0738 10/05/23  0833   POCTGLUCOSE 163* 168* 160* 163* 188* 173*         Palliative Care  Comfort measures only status  Per GOC discussion with family patient to be comfort measures only, life-support measures to be withdrawn today. See ACP note for details      Discharged Condition:     Disposition:   , time of death 1518    Patient Instructions:   No discharge procedures on file.  Medications:  None     Juvencio Hinkle MD  Critical Care Medicine  [facility] - Cardiac Medical ICU

## 2023-10-05 NOTE — ASSESSMENT & PLAN NOTE
In AF w/ RVR since admission confirmed on EKG. 10/4 HR elevated, AF w RVR confirmed on 12-lead, metoprolol/diltiazem held d/t norepinephrine requirements

## 2023-10-05 NOTE — ASSESSMENT & PLAN NOTE
Patient with Hypercapnic and Hypoxic Respiratory failure which is Acute on chronic.  she is not on home oxygen. Supplemental oxygen was provided and noted- Vent Mode: A/C  Oxygen Concentration (%):  [90] 90  Resp Rate Total:  [33 br/min-34 br/min] 33 br/min  Vt Set:  [300 mL] 300 mL  PEEP/CPAP:  [8 cmH20] 8 cmH20  Mean Airway Pressure:  [18 ycI56-93 cmH20] 19 cmH20    .   Signs/symptoms of respiratory failure include- tachypnea, increased work of breathing, respiratory distress and use of accessory muscles. Contributing diagnoses includes - COPD and Pulmonary Embolus Labs and images were reviewed. Patient Has recent ABG, which has been reviewed. Will treat underlying causes and adjust management of respiratory failure as follows-     67yo F with history of HFpEF, pulm htn, MVR on warfarin, AF, COPD, htn, T2DM, and CKD3 presenting as transfer from [facility] ICU for AHRF. Etiology due to PE with evidence of R heart strain on TTE vs pulmonary htn with PASP 52 mmhg vs viral illness (positive enterovirus and rhinovirus).  Pt has been treated with broad spectrun antibiotics and steroids at OSH.   She follows with Dr. Carvalho as outpatient.    Treating via ARDSnet protocol informed by results of ARMA, PROSEVA, and dexa-ARDS trials    --Wean oxygen as tolerated  --Serial ABG's  --Continue Zosyn  --Solumedrol stopped, started decadron 20mg QD for 5d followed by 10mg QD for 5d  --Scheduled Duo Nebs  --Continue breo, spiriva  --CXR  -- Not proning d/t SAH  -- Diuresis held d/t worsening renal function  -- Pt terminally extubated per family wishes 10/5/23, see ACP note for details

## 2023-10-05 NOTE — ACP (ADVANCE CARE PLANNING)
Patient Elayne Almanzar currently does not have decision-making capacity. Patient is currently not interactive, unresponsive, and is not aware of current situation. Initiated the discussion of goals of care and code status with patient's  (MPOA) and daughter.      Patient's / family's understanding of illness: Good, they understand Mrs. Almanzar has [id number] chance of meaningful recovery  Patient's prognosis and understanding thereof: Poor.     In the event where patient's heart stops or patient stops breathing, patient would not want life-sustaining measures (ie CPR, intubation). Per patient's wishes, code status confirmed as DNR; status confirmed/order placed in chart.     After reviewing the patient's values, hopes, fears, and outcomes they defined as unacceptable, we concluded that overall treatment goals/wishes are to transition Mrs. Almanzar to comfort care and withdraw all life-sustaining measures.     Designated MPOA - Leo Almanzar () [phone]    Greater than 15 minutes were spent having this conversation.     Juvencio Hinkle  Medical Resident  [facility]

## 2023-10-05 NOTE — SUBJECTIVE & OBJECTIVE
Interval history: 10/5: No acute events overnight. Pt seen in ICU. Exam stable.        Medications Prior to Admission   Medication Sig Dispense Refill Last Dose    ADVAIR DISKUS 500-50 mcg/dose DsDv diskus inhaler INHALE 1 PUFF INTO THE LUNGS 2 (TWO) TIMES DAILY. 180 each 3     albuterol (PROVENTIL/VENTOLIN HFA) 90 mcg/actuation inhaler INHALE 2 PUFFS INTO THE LUNGS EVERY 6 (SIX) HOURS AS NEEDED FOR WHEEZING. RESCUE 18 g 6     albuterol sulfate 2.5 mg/0.5 mL Nebu Take 2.5 mg by nebulization every 4 (four) hours as needed (wheezing). Rescue 1 each 0     amLODIPine (NORVASC) 2.5 MG tablet Take 1 tablet (2.5 mg total) by mouth once daily. 90 tablet 2     aspirin (ECOTRIN) 81 MG EC tablet Take 81 mg by mouth once daily.        cycloSPORINE (RESTASIS) 0.05 % ophthalmic emulsion Place 1 drop into both eyes 2 (two) times daily. 60 each 12     dipyridamole (PERSANTINE) 5 mg/mL injection        dorzolamide (TRUSOPT) 2 % ophthalmic solution Place 1 drop into the right eye 2 (two) times a day. 10 mL 3     ergocalciferol (ERGOCALCIFEROL) 50,000 unit Cap Take 1 capsule (50,000 Units total) by mouth twice a week. 24 capsule 0     fluticasone propionate (FLONASE) 50 mcg/actuation nasal spray 2 sprays (100 mcg total) by Each Nostril route once daily. 16 g 3     furosemide (LASIX) 40 MG tablet Take 1 tablet (40 mg total) by mouth once daily. 90 tablet 3     gabapentin (NEURONTIN) 300 MG capsule Take 1 capsule (300 mg total) by mouth every evening. 30 capsule 11     latanoprost (XALATAN) 0.005 % ophthalmic solution Place 1 drop into both eyes once daily. 7.5 mL 3     meclizine (ANTIVERT) 25 mg tablet Take 1 tablet (25 mg total) by mouth 2 (two) times daily as needed. 30 tablet 1     metoprolol tartrate (LOPRESSOR) 100 MG tablet TAKE ONE TABLET BY MOUTH TWICE DAILY 60 tablet 11     nitroGLYCERIN (NITROSTAT) 0.4 MG SL tablet Place 1 tablet (0.4 mg total) under the tongue every 5 (five) minutes as needed for Chest pain. (Patient not  taking: Reported on 4/28/2022) 30 tablet 1     omeprazole (PRILOSEC) 40 MG capsule Take 1 capsule (40 mg total) by mouth every morning. Open capsule and take with apple sauce 30 capsule 2     ondansetron (ZOFRAN-ODT) 8 MG TbDL Dissolve 1 tablet (8 mg total) by mouth every 6 (six) hours as needed. 30 tablet 0     oxybutynin (DITROPAN-XL) 5 MG TR24 Take 5 mg by mouth once daily.       pantoprazole (PROTONIX) 40 MG tablet Take 1 tablet (40 mg total) by mouth daily as needed. 30 tablet 3     rosuvastatin (CRESTOR) 20 MG tablet Take 1 tablet (20 mg total) by mouth once daily. 90 tablet 3     semaglutide (OZEMPIC) 0.25 mg or 0.5 mg (2 mg/3 mL) pen injector Inject 0.5 mg into the skin every 7 days. 3 mL 2     sertraline (ZOLOFT) 100 MG tablet TAKE 1 TABLET BY MOUTH ONCE DAILY. 90 tablet 3     tamsulosin (FLOMAX) 0.4 mg Cap Take 0.4 mg by mouth once daily.       warfarin (COUMADIN) 5 MG tablet Take 1 tablet (5 mg total) by mouth Daily. or as instructed by [facility]. 40 tablet 0        Review of patient's allergies indicates:   Allergen Reactions    Brimonidine        Past Medical History:   Diagnosis Date    Acute DVT (deep venous thrombosis) 9/26/2023    Acute on chronic diastolic congestive heart failure     Acute pulmonary embolism 9/24/2023    Allergy     Anemia     Anticoagulant long-term use     Anxiety     Asthma     Atrial fibrillation 2002    Bilateral primary osteoarthritis of hip 3/14/2023    Cataract     Chronic kidney disease     COPD (chronic obstructive pulmonary disease)     Coronary artery calcification seen on CT scan 3/22/2022    Coronary artery calcification seen on CT scan 3/22/2022    Depression     Encounter for blood transfusion     HTN (hypertension)     Hyperlipidemia     Nephrolithiasis     KEYSHAWN (obstructive sleep apnea)     awaiting CPAP machine     Rheumatic disease of mitral valve     Uncontrolled type 2 diabetes mellitus with complication, with long-term current use of insulin 5/27/2020     Urinary incontinence      Past Surgical History:   Procedure Laterality Date    BREAST BIOPSY Left 10/2019    CARDIAC VALVE SURGERY  2004    mechanical mitral valve     SECTION      COLONOSCOPY N/A 2019    Procedure: COLONOSCOPY;  Surgeon: Devon Bowling MD;  Location: [facility] ENDO (4TH FLR);  Service: Endoscopy;  Laterality: N/A;  ok to hold Coumadin x 5 days with Lovenox bridge per Coumadin clinic-[facility]    ESOPHAGOGASTRODUODENOSCOPY N/A 2019    Procedure: EGD (ESOPHAGOGASTRODUODENOSCOPY);  Surgeon: Smooth Torrez MD;  Location: [facility] ENDO (2ND FLR);  Service: Endoscopy;  Laterality: N/A;  2nd floor requested due to comorbidities, Hypertension, permanent A. 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 44     per Coumadin clinic-ok to hold for 5 days w/bridge    INJECTION N/A 2023    Procedure: INJECTION, BILATERAL GTB AND RIGHT HIP INTRA-ARTICULAR CONTRAST;  Surgeon: Beth Alfaro MD;  Location: [facility] PAIN MGT;  Service: Pain Management;  Laterality: N/A;    kidney stone removal      MITRAL VALVE REPLACEMENT  2004    TUBAL LIGATION       Family History       Problem Relation (Age of Onset)    Asthma Daughter, Son    Breast cancer Paternal Aunt    Cancer Brother    Colon cancer Maternal Grandmother, Mother (83)    Diabetes Sister, Sister, Sister, Father    Heart disease Father (70)    Hypertension Sister    Stroke Paternal Grandmother          Tobacco Use    Smoking status: Former     Current packs/day: 0.00     Average packs/day: 0.5 packs/day for 20.0 years (10.0 ttl pk-yrs)     Types: Cigarettes     Start date: 1982     Quit date: 2002     Years since quittin.4    Smokeless tobacco: Never   Substance and Sexual Activity    Alcohol use: No    Drug use: No    Sexual activity: Not on file     Review of Systems   Unable to perform ROS: Intubated     Objective:     Weight: 90.3 kg (199 lb 1.2 oz)  Body mass index  is 36.41 kg/m².  Vital Signs (Most Recent):  Temp: 98.6 °F (37 °C) (10/05/23 0300)  Pulse: (!) 131 (10/05/23 1000)  Resp: (!) 33 (10/05/23 1000)  BP: (!) 102/51 (10/04/23 0701)  SpO2: 100 % (10/05/23 1000) Vital Signs (24h Range):  Temp:  [98.3 °F (36.8 °C)-98.6 °F (37 °C)] 98.6 °F (37 °C)  Pulse:  [] 131  Resp:  [26-34] 33  SpO2:  [92 %-100 %] 100 %  Arterial Line BP: ()/(32-74) 105/47     Date 10/05/23 0700 - 10/06/23 0659   Shift 9958-4526 0427-9040 4082-6904 24 Hour Total   INTAKE   NG/   200   Shift Total(mL/kg) 200(2.2)   200(2.2)   OUTPUT   Urine(mL/kg/hr) 175   175   Shift Total(mL/kg) 175(1.9)   175(1.9)   Weight (kg) 90.3 90.3 90.3 90.3                Vent Mode: A/C  Oxygen Concentration (%):  [90] 90  Resp Rate Total:  [33 br/min-34 br/min] 33 br/min  Vt Set:  [300 mL] 300 mL  PEEP/CPAP:  [8 cmH20] 8 cmH20  Mean Airway Pressure:  [18 oeV72-20 cmH20] 18 cmH20             NG/OG Tube 09/27/23 1141 orogastric Center mouth (Active)   Placement Check placement verified by aspirate characteristics 10/03/23 1501   Tolerance no signs/symptoms of discomfort 10/03/23 1501   Securement secured to commercial device 10/03/23 1501   Clamp Status/Tolerance clamped 10/03/23 1501   Suction Setting/Drainage Method suction at the bedside 10/03/23 1501   Insertion Site Appearance no redness, warmth, tenderness, skin breakdown, drainage 10/03/23 1501   Drainage Milky 10/03/23 0501   Flush/Irrigation flushed w/;water 10/03/23 0701   Feeding Type continuous;by pump 10/03/23 0901   Feeding Action feeding held 10/03/23 1501   Current Rate (mL/hr) 20 mL/hr 10/03/23 0901   Goal Rate (mL/hr) 35 mL/hr 10/03/23 0901   Intake (mL) 80 mL 10/03/23 0701   Water Bolus (mL) 60 mL 09/27/23 1505   Formula Name Impact Peptide 10/03/23 1501   Intake (mL) - Formula Tube Feeding 20 10/03/23 0901   Residual Amount (ml) 14 ml 10/02/23 1901            Rectal Tube 10/01/23 2000 (Active)   Balloon Inflation Volume (mL) 45 10/03/23  "1501   Reposition drainage bags for BMS & Barnett on opposite sides of bed 10/03/23 1501   Outcome gas expelled, stool evacuated 10/03/23 1501   Stool Color Green;Brown 10/03/23 1501   Insertion Site Appearance no redness, warmth, tenderness, skin breakdown, drainage 10/03/23 1501   Flush/Irrigation irrigated w/;water 10/03/23 0901   Intake (mL) 50 mL 10/03/23 0901   Rectal Tube Output 200 mL 10/02/23 2201            Urethral Catheter 09/27/23 1151 16 Fr. (Active)   $ Barnett Insertion Bedside Insertion Performed 09/27/23 1505   Site Assessment Clean;Intact 10/03/23 1501   Collection Container Urimeter 10/03/23 1501   Securement Method secured to top of thigh w/ adhesive device 10/03/23 1501   Catheter Care Performed yes 10/03/23 1501   Reason for Continuing Urinary Catheterization Critically ill in ICU and requiring hourly monitoring of intake/output 10/03/23 1501   CAUTI Prevention Bundle Securement Device in place with 1" slack;Intact seal between catheter & drainage tubing;Drainage bag/urimeter off the floor;No dependent loops or kinks;Sheeting clip in use;Drainage bag/urimeter not overfilled (<2/3 full);Drainage bag/urimeter below bladder 10/03/23 1501   Output (mL) 200 mL 10/03/23 1340          Physical Exam   Neurosurgery Physical Exam  GCS 3t, previously on nimbex;  PERRL, +corneals +cough, neg gag;  No movement in BUE/BLE to noxious stimuli    General: Intubated  Head: Nontraumatic, normocephalic  Eyes: Nontraumatic  Neck: Supple  CVS: Normal rate and rhythm, distal pulses present  Pulm: Symmetric expansion, no respiratory distress  GI: Abdomen nondistended  MSK: Non-traumatic  Skin: Dry, intact  Psych: Intubated      Significant Labs:  Recent Labs   Lab 10/04/23  0317 10/04/23  0951 10/04/23  1226 10/05/23  0016 10/05/23  0321 10/05/23  0627   *  --   --   --  141*  --    * 117*   < > 126* 127* 129*   K 4.6 4.9  --   --  4.7  --    CL 76*  --   --   --  86*  --    CO2 26  --   --   --  23  --  " "  BUN 87*  --   --   --  93*  --    CREATININE 2.4*  --   --   --  2.4*  --    CALCIUM 7.0*  --   --   --  7.0*  --    MG 2.0  --   --   --  2.4  --     < > = values in this interval not displayed.       Recent Labs   Lab 10/04/23  0951 10/04/23  1623 10/05/23  0016   WBC 16.59* 19.16* 20.39*   HGB 7.4* 7.2* 7.0*   HCT 21.8* 22.3* 20.7*    182 178       Recent Labs   Lab 10/03/23  1519 10/04/23  0317 10/05/23  0321   INR  --  1.1 1.1   APTT 29.0  --   --        Microbiology Results (last 7 days)       Procedure Component Value Units Date/Time    Culture, Respiratory with Gram Stain [[id number]]  (Abnormal) Collected: 10/03/23 2104    Order Status: Completed Specimen: Respiratory from Sputum, Expectorated Updated: 10/05/23 0916     Respiratory Culture PRESUMPTIVE PSEUDOMONAS SPECIES  Many  Identification and susceptibility pending        YEAST   Few  Identification pending       Gram Stain (Respiratory) <10 epithelial cells per low power field.     Gram Stain (Respiratory) Moderate WBC's     Gram Stain (Respiratory) Few Gram negative rods     Gram Stain (Respiratory) Rare yeast    Blood culture [[id number]]     Order Status: No result Specimen: Blood     Blood culture [[id number]]     Order Status: No result Specimen: Blood     Blood culture [[id number]] Collected: 09/27/23 1229    Order Status: Completed Specimen: Blood from Peripheral, Forearm, Left Updated: 10/02/23 1412     Blood Culture, Routine No growth after 5 days.    Blood culture [[id number]] Collected: 09/27/23 1221    Order Status: Completed Specimen: Blood from Peripheral, Wrist, Left Updated: 10/02/23 1412     Blood Culture, Routine No growth after 5 days.          ABGs:   Recent Labs   Lab 10/04/23  0439 10/04/23  1744 10/05/23  0359   PH 7.448 7.464* 7.429   PCO2 44.9 43.7 45.7*   PO2 71* 69* 70*   HCO3 31.1* 31.4* 30.2*   POCSATURATED 95 94 94   BE 7 8 6       Cardiac markers: No results for input(s): "CKMB", "CPKMB", "TROPONINT", " ""TROPONINI", "MYOGLOBIN" in the last 48 hours.  CMP:   Recent Labs   Lab 10/04/23  0317 10/04/23  0951 10/04/23  1226 10/05/23  0016 10/05/23  0321 10/05/23  0627   *  --   --   --  141*  --    CALCIUM 7.0*  --   --   --  7.0*  --    ALBUMIN 2.0*  --   --   --  1.9*  --    PROT 4.8*  --   --   --  5.0*  --    * 117*   < > 126* 127* 129*   K 4.6 4.9  --   --  4.7  --    CO2 26  --   --   --  23  --    CL 76*  --   --   --  86*  --    BUN 87*  --   --   --  93*  --    CREATININE 2.4*  --   --   --  2.4*  --    ALKPHOS 73  --   --   --  75  --    ALT 17  --   --   --  18  --    AST 50*  --   --   --  61*  --    BILITOT 1.4*  --   --   --  1.4*  --     < > = values in this interval not displayed.       CRP: No results for input(s): "CRP" in the last 48 hours.  ESR: No results for input(s): "POCESR", "ERYTHROCYTES" in the last 48 hours.  LFTs:   Recent Labs   Lab 10/04/23  0317 10/05/23  0321   ALT 17 18   AST 50* 61*   ALKPHOS 73 75   BILITOT 1.4* 1.4*   PROT 4.8* 5.0*   ALBUMIN 2.0* 1.9*       Procalcitonin:   Recent Labs   Lab 10/03/23  1203   PROCAL 1.23*         Significant Diagnostics:  I have reviewed all pertinent imaging results/findings within the past 24 hours.  "

## 2023-10-05 NOTE — PLAN OF CARE
[facility] - Card[facility] Medical [facility]  Discharge Final Note    Primary Care Provider: Nubia Crocker MD    Expected Discharge Date: 10/5/2023    Time of Death: 1518  Date of Death: 10/5/2023  Cause of Death: Acute Respiratory Failure with Hypoxia and Hypercarbia secondary to rhinovirus and Pulmonary Embolism      Final Discharge Note (most recent)       Final Note - 10/05/23 1621          Final Note    Assessment Type Final Discharge Note     Anticipated Discharge Disposition                    Oskar Crocker LMSW  [facility]  X [phone]        Important Message from Medicare

## 2023-10-05 NOTE — SIGNIFICANT EVENT
Death Note    Called to bedside by patient's nurse.  has been called and is also at bedside. Per family / MPOA wishes life support measures have been withdrawn.    Patient is not responding to verbal or tactile stimuli. Patient does not have a papillary or corneal reflex. Her pupils are fixed and dilated. No heart or breath sounds on auscultation. No respirations. No palpable pulses.     Time of death:  10/05/2023 1375     Cause of Death:  Acute respiratory failure with hypoxia and hypercarbia secondary to rhinovirus and pulmonary embolism    Email: [email or web address]

## 2023-10-05 NOTE — PROGRESS NOTES
BRIEF NEUROCRITICAL CARE PROGRESS NOTE    Spoke w/ primary MICU team, at this time family electing to proceed with comfort based care given patient's grim prognosis. NCC to sign off given GOC, please contact with any further questions or concerns.     Faye Balbuena MD, MPH  Neurocritical Care Physician

## 2023-10-05 NOTE — PROGRESS NOTES
Therapy with vancomycin was discontinued by the provider. Patient is transitioning to comfort care. Pharmacy will sign off.     Thank you for the consult,   Rylee Bunn, PharmD, [facility]  [phone]

## 2023-10-05 NOTE — PROGRESS NOTES
[facility] - Cardiac Medical ICU  Neurosurgery  Progress Note    Subjective:     History of Present Illness: 68F w/ PMH COPD, Pulm HTN, MVR on Warfarin, A-fib, HTN, T2DM, CKD3 who originally presented to [facility] [place] in ARDS requiring paralysis/proning now found to have HH4F3 SAH. Patient originally preented to [facility] [place] on 9/26 in hypoxemic/hypercapnic ARDS requiring paralysis & proning. She was found to have rhinovirus and nonocclusive right PE (on heparine gtt), and bilateral DVTs. Additionally her stay has been complicated by JENN, hyponatremia, anemia and shock. Today, she was found to have aniscoria for which she underwent CTH/CTA and was found to have diffuse cisternal SAH without obvious aneurysm. Paralytics and heparin were held with minimal exam.      Post-Op Info:  * No surgery found *           Interval history: 10/5: No acute events overnight. Pt seen in ICU. Exam stable.        Medications Prior to Admission   Medication Sig Dispense Refill Last Dose    ADVAIR DISKUS 500-50 mcg/dose DsDv diskus inhaler INHALE 1 PUFF INTO THE LUNGS 2 (TWO) TIMES DAILY. 180 each 3     albuterol (PROVENTIL/VENTOLIN HFA) 90 mcg/actuation inhaler INHALE 2 PUFFS INTO THE LUNGS EVERY 6 (SIX) HOURS AS NEEDED FOR WHEEZING. RESCUE 18 g 6     albuterol sulfate 2.5 mg/0.5 mL Nebu Take 2.5 mg by nebulization every 4 (four) hours as needed (wheezing). Rescue 1 each 0     amLODIPine (NORVASC) 2.5 MG tablet Take 1 tablet (2.5 mg total) by mouth once daily. 90 tablet 2     aspirin (ECOTRIN) 81 MG EC tablet Take 81 mg by mouth once daily.        cycloSPORINE (RESTASIS) 0.05 % ophthalmic emulsion Place 1 drop into both eyes 2 (two) times daily. 60 each 12     dipyridamole (PERSANTINE) 5 mg/mL injection        dorzolamide (TRUSOPT) 2 % ophthalmic solution Place 1 drop into the right eye 2 (two) times a day. 10 mL 3     ergocalciferol (ERGOCALCIFEROL) 50,000 unit Cap Take 1 capsule (50,000 Units total) by mouth twice a week. 24 capsule 0      fluticasone propionate (FLONASE) 50 mcg/actuation nasal spray 2 sprays (100 mcg total) by Each Nostril route once daily. 16 g 3     furosemide (LASIX) 40 MG tablet Take 1 tablet (40 mg total) by mouth once daily. 90 tablet 3     gabapentin (NEURONTIN) 300 MG capsule Take 1 capsule (300 mg total) by mouth every evening. 30 capsule 11     latanoprost (XALATAN) 0.005 % ophthalmic solution Place 1 drop into both eyes once daily. 7.5 mL 3     meclizine (ANTIVERT) 25 mg tablet Take 1 tablet (25 mg total) by mouth 2 (two) times daily as needed. 30 tablet 1     metoprolol tartrate (LOPRESSOR) 100 MG tablet TAKE ONE TABLET BY MOUTH TWICE DAILY 60 tablet 11     nitroGLYCERIN (NITROSTAT) 0.4 MG SL tablet Place 1 tablet (0.4 mg total) under the tongue every 5 (five) minutes as needed for Chest pain. (Patient not taking: Reported on 4/28/2022) 30 tablet 1     omeprazole (PRILOSEC) 40 MG capsule Take 1 capsule (40 mg total) by mouth every morning. Open capsule and take with apple sauce 30 capsule 2     ondansetron (ZOFRAN-ODT) 8 MG TbDL Dissolve 1 tablet (8 mg total) by mouth every 6 (six) hours as needed. 30 tablet 0     oxybutynin (DITROPAN-XL) 5 MG TR24 Take 5 mg by mouth once daily.       pantoprazole (PROTONIX) 40 MG tablet Take 1 tablet (40 mg total) by mouth daily as needed. 30 tablet 3     rosuvastatin (CRESTOR) 20 MG tablet Take 1 tablet (20 mg total) by mouth once daily. 90 tablet 3     semaglutide (OZEMPIC) 0.25 mg or 0.5 mg (2 mg/3 mL) pen injector Inject 0.5 mg into the skin every 7 days. 3 mL 2     sertraline (ZOLOFT) 100 MG tablet TAKE 1 TABLET BY MOUTH ONCE DAILY. 90 tablet 3     tamsulosin (FLOMAX) 0.4 mg Cap Take 0.4 mg by mouth once daily.       warfarin (COUMADIN) 5 MG tablet Take 1 tablet (5 mg total) by mouth Daily. or as instructed by [facility]. 40 tablet 0        Review of patient's allergies indicates:   Allergen Reactions    Brimonidine        Past Medical History:   Diagnosis Date     Acute DVT (deep venous thrombosis) 2023    Acute on chronic diastolic congestive heart failure     Acute pulmonary embolism 2023    Allergy     Anemia     Anticoagulant long-term use     Anxiety     Asthma     Atrial fibrillation     Bilateral primary osteoarthritis of hip 3/14/2023    Cataract     Chronic kidney disease     COPD (chronic obstructive pulmonary disease)     Coronary artery calcification seen on CT scan 3/22/2022    Coronary artery calcification seen on CT scan 3/22/2022    Depression     Encounter for blood transfusion     HTN (hypertension)     Hyperlipidemia     Nephrolithiasis     KEYSHAWN (obstructive sleep apnea)     awaiting CPAP machine     Rheumatic disease of mitral valve     Uncontrolled type 2 diabetes mellitus with complication, with long-term current use of insulin 2020    Urinary incontinence      Past Surgical History:   Procedure Laterality Date    BREAST BIOPSY Left 10/2019    CARDIAC VALVE SURGERY  2004    mechanical mitral valve     SECTION      COLONOSCOPY N/A 2019    Procedure: COLONOSCOPY;  Surgeon: Devon Bowling MD;  Location: [facility] (4TH FLR);  Service: Endoscopy;  Laterality: N/A;  ok to hold Coumadin x 5 days with Lovenox bridge per Coumadin clinic-[facility]    ESOPHAGOGASTRODUODENOSCOPY N/A 2019    Procedure: EGD (ESOPHAGOGASTRODUODENOSCOPY);  Surgeon: Smooth Torrez MD;  Location: [facility] (2ND FLR);  Service: Endoscopy;  Laterality: N/A;  2nd floor requested due to comorbidities, Hypertension, permanent A. 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 44     per Coumadin clinic-ok to hold for 5 days w/bridge    INJECTION N/A 2023    Procedure: INJECTION, BILATERAL GTB AND RIGHT HIP INTRA-ARTICULAR CONTRAST;  Surgeon: Beth Alfaro MD;  Location: [facility] PAIN [facility]GT;  Service: Pain Management;  Laterality: N/A;    kidney stone removal       MITRAL VALVE REPLACEMENT  2004    TUBAL LIGATION       Family History       Problem Relation (Age of Onset)    Asthma Daughter, Son    Breast cancer Paternal Aunt    Cancer Brother    Colon cancer Maternal Grandmother, Mother (83)    Diabetes Sister, Sister, Sister, Father    Heart disease Father (70)    Hypertension Sister    Stroke Paternal Grandmother          Tobacco Use    Smoking status: Former     Current packs/day: 0.00     Average packs/day: 0.5 packs/day for 20.0 years (10.0 ttl pk-yrs)     Types: Cigarettes     Start date: 1982     Quit date: 2002     Years since quittin.4    Smokeless tobacco: Never   Substance and Sexual Activity    Alcohol use: No    Drug use: No    Sexual activity: Not on file     Review of Systems   Unable to perform ROS: Intubated     Objective:     Weight: 90.3 kg (199 lb 1.2 oz)  Body mass index is 36.41 kg/m².  Vital Signs (Most Recent):  Temp: 98.6 °F (37 °C) (10/05/23 0300)  Pulse: (!) 131 (10/05/23 1000)  Resp: (!) 33 (10/05/23 1000)  BP: (!) 102/51 (10/04/23 0701)  SpO2: 100 % (10/05/23 1000) Vital Signs (24h Range):  Temp:  [98.3 °F (36.8 °C)-98.6 °F (37 °C)] 98.6 °F (37 °C)  Pulse:  [] 131  Resp:  [26-34] 33  SpO2:  [92 %-100 %] 100 %  Arterial Line BP: ()/(32-74) 105/47     Date 10/05/23 0700 - 10/06/23 0659   Shift 0711-9720 0574-0369 7328-9525 24 Hour Total   INTAKE   NG/   200   Shift Total(mL/kg) 200(2.2)   200(2.2)   OUTPUT   Urine(mL/kg/hr) 175   175   Shift Total(mL/kg) 175(1.9)   175(1.9)   Weight (kg) 90.3 90.3 90.3 90.3                Vent Mode: A/C  Oxygen Concentration (%):  [90] 90  Resp Rate Total:  [33 br/min-34 br/min] 33 br/min  Vt Set:  [300 mL] 300 mL  PEEP/CPAP:  [8 cmH20] 8 cmH20  Mean Airway Pressure:  [18 zzU83-82 cmH20] 18 cmH20             NG/OG Tube 23 1141 orogastric Center mouth (Active)   Placement Check placement verified by aspirate characteristics 10/03/23 1501   Tolerance no signs/symptoms of  "discomfort 10/03/23 1501   Securement secured to commercial device 10/03/23 1501   Clamp Status/Tolerance clamped 10/03/23 1501   Suction Setting/Drainage Method suction at the bedside 10/03/23 1501   Insertion Site Appearance no redness, warmth, tenderness, skin breakdown, drainage 10/03/23 1501   Drainage Milky 10/03/23 0501   Flush/Irrigation flushed w/;water 10/03/23 0701   Feeding Type continuous;by pump 10/03/23 0901   Feeding Action feeding held 10/03/23 1501   Current Rate (mL/hr) 20 mL/hr 10/03/23 0901   Goal Rate (mL/hr) 35 mL/hr 10/03/23 0901   Intake (mL) 80 mL 10/03/23 0701   Water Bolus (mL) 60 mL 09/27/23 1505   Formula Name Impact Peptide 10/03/23 1501   Intake (mL) - Formula Tube Feeding 20 10/03/23 0901   Residual Amount (ml) 14 ml 10/02/23 1901            Rectal Tube 10/01/23 2000 (Active)   Balloon Inflation Volume (mL) 45 10/03/23 1501   Reposition drainage bags for BMS & Barnett on opposite sides of bed 10/03/23 1501   Outcome gas expelled, stool evacuated 10/03/23 1501   Stool Color Green;Brown 10/03/23 1501   Insertion Site Appearance no redness, warmth, tenderness, skin breakdown, drainage 10/03/23 1501   Flush/Irrigation irrigated w/;water 10/03/23 0901   Intake (mL) 50 mL 10/03/23 0901   Rectal Tube Output 200 mL 10/02/23 2201            Urethral Catheter 09/27/23 1151 16 Fr. (Active)   $ Barnett Insertion Bedside Insertion Performed 09/27/23 1505   Site Assessment Clean;Intact 10/03/23 1501   Collection Container Urimeter 10/03/23 1501   Securement Method secured to top of thigh w/ adhesive device 10/03/23 1501   Catheter Care Performed yes 10/03/23 1501   Reason for Continuing Urinary Catheterization Critically ill in ICU and requiring hourly monitoring of intake/output 10/03/23 1501   CAUTI Prevention Bundle Securement Device in place with 1" slack;Intact seal between catheter & drainage tubing;Drainage bag/urimeter off the floor;No dependent loops or kinks;Sheeting clip in use;Drainage " bag/urimeter not overfilled (<2/3 full);Drainage bag/urimeter below bladder 10/03/23 1501   Output (mL) 200 mL 10/03/23 1340          Physical Exam   Neurosurgery Physical Exam  GCS 3t, previously on nimbex;  PERRL, +corneals +cough, neg gag;  No movement in BUE/BLE to noxious stimuli    General: Intubated  Head: Nontraumatic, normocephalic  Eyes: Nontraumatic  Neck: Supple  CVS: Normal rate and rhythm, distal pulses present  Pulm: Symmetric expansion, no respiratory distress  GI: Abdomen nondistended  MSK: Non-traumatic  Skin: Dry, intact  Psych: Intubated      Significant Labs:  Recent Labs   Lab 10/04/23  0317 10/04/23  0951 10/04/23  1226 10/05/23  0016 10/05/23  0321 10/05/23  0627   *  --   --   --  141*  --    * 117*   < > 126* 127* 129*   K 4.6 4.9  --   --  4.7  --    CL 76*  --   --   --  86*  --    CO2 26  --   --   --  23  --    BUN 87*  --   --   --  93*  --    CREATININE 2.4*  --   --   --  2.4*  --    CALCIUM 7.0*  --   --   --  7.0*  --    MG 2.0  --   --   --  2.4  --     < > = values in this interval not displayed.       Recent Labs   Lab 10/04/23  0951 10/04/23  1623 10/05/23  0016   WBC 16.59* 19.16* 20.39*   HGB 7.4* 7.2* 7.0*   HCT 21.8* 22.3* 20.7*    182 178       Recent Labs   Lab 10/03/23  1519 10/04/23  0317 10/05/23  0321   INR  --  1.1 1.1   APTT 29.0  --   --        Microbiology Results (last 7 days)       Procedure Component Value Units Date/Time    Culture, Respiratory with Gram Stain [[id number]]  (Abnormal) Collected: 10/03/23 2104    Order Status: Completed Specimen: Respiratory from Sputum, Expectorated Updated: 10/05/23 0916     Respiratory Culture PRESUMPTIVE PSEUDOMONAS SPECIES  Many  Identification and susceptibility pending        YEAST   Few  Identification pending       Gram Stain (Respiratory) <10 epithelial cells per low power field.     Gram Stain (Respiratory) Moderate WBC's     Gram Stain (Respiratory) Few Gram negative rods     Gram Stain  "(Respiratory) Rare yeast    Blood culture [[id number]]     Order Status: No result Specimen: Blood     Blood culture [[id number]]     Order Status: No result Specimen: Blood     Blood culture [[id number]] Collected: 09/27/23 1229    Order Status: Completed Specimen: Blood from Peripheral, Forearm, Left Updated: 10/02/23 1412     Blood Culture, Routine No growth after 5 days.    Blood culture [[id number]] Collected: 09/27/23 1221    Order Status: Completed Specimen: Blood from Peripheral, Wrist, Left Updated: 10/02/23 1412     Blood Culture, Routine No growth after 5 days.          ABGs:   Recent Labs   Lab 10/04/23  0439 10/04/23  1744 10/05/23  0359   PH 7.448 7.464* 7.429   PCO2 44.9 43.7 45.7*   PO2 71* 69* 70*   HCO3 31.1* 31.4* 30.2*   POCSATURATED 95 94 94   BE 7 8 6       Cardiac markers: No results for input(s): "CKMB", "CPKMB", "TROPONINT", "TROPONINI", "MYOGLOBIN" in the last 48 hours.  CMP:   Recent Labs   Lab 10/04/23  0317 10/04/23  0951 10/04/23  1226 10/05/23  0016 10/05/23  0321 10/05/23  0627   *  --   --   --  141*  --    CALCIUM 7.0*  --   --   --  7.0*  --    ALBUMIN 2.0*  --   --   --  1.9*  --    PROT 4.8*  --   --   --  5.0*  --    * 117*   < > 126* 127* 129*   K 4.6 4.9  --   --  4.7  --    CO2 26  --   --   --  23  --    CL 76*  --   --   --  86*  --    BUN 87*  --   --   --  93*  --    CREATININE 2.4*  --   --   --  2.4*  --    ALKPHOS 73  --   --   --  75  --    ALT 17  --   --   --  18  --    AST 50*  --   --   --  61*  --    BILITOT 1.4*  --   --   --  1.4*  --     < > = values in this interval not displayed.       CRP: No results for input(s): "CRP" in the last 48 hours.  ESR: No results for input(s): "POCESR", "ERYTHROCYTES" in the last 48 hours.  LFTs:   Recent Labs   Lab 10/04/23  0317 10/05/23  0321   ALT 17 18   AST 50* 61*   ALKPHOS 73 75   BILITOT 1.4* 1.4*   PROT 4.8* 5.0*   ALBUMIN 2.0* 1.9*       Procalcitonin:   Recent Labs   Lab 10/03/23  1203   PROCAL 1.23* "         Significant Diagnostics:  I have reviewed all pertinent imaging results/findings within the past 24 hours.    Assessment/Plan:     Subarachnoid hemorrhage  68F w/ PMH COPD, Pulm HTN, MVR on Warfarin, A-fib, HTN, T2DM, CKD3 who originally presented to [facility] in ARDS requiring paralysis/proning now found to have HH4F3 SAH:    Neuro:  --Patient admitted to MICU; preponderance of medical care per MICU/NCC      -Q1h neurochecks while in ICU  --All labs and diagnostics reviewed      -CTA 10/3: Diffuse cisternal blood dissecting into anterior interhemispheric fissure; no underlying vascular lesion      -CTH 10/3 6h interval overall stable      -DSA cerebral only when healthy enough to tolerate  --HOB@30  --Keppra 500 BID x7d  --TCDs daily x14d  --Nimotop 60q4 x 21d (as able to tolerate if not requiring pressors)  --EVD watch  --Continue to follow clinically and notify NSGY immediately if any neurostatus change    CVS/Pulm:  --SBP <140 mmHg (cardene ggt; hydralazine & labetalol PRN; transition to home meds when appropriate per NCC)  --Aggressive cardiopulmonary management per NCC/MICU    GIT/Electrolytes:  --CMP daily      -Replete electrolytes PRN per NCC  --Na goal >135  --PPI    Renal:  --Strict I/Os; goal for euvolemia per primary teams    Heme/ID:  --Hold/Reverse anti-plt/coag medications per primary teams given significant medical illness acknowledging risks vs benefits of systemic anticoagulation in setting of SAH  --Daily CBC      -Transfuse PRN  --Trend WBC and Temp    PPx:  --TEDs/SCDs  --PPI    Dispo: Continued ICU care at this time, FU GOC per primary        Andrez Santana MD  Neurosurgery  [facility] - Cardiac Medical ICU

## 2023-10-05 NOTE — PROGRESS NOTES
[facility]  Critical Care Medicine  Progress Note    Patient Name: Elayne Almanzar  MRN: [id number]  Admission Date: 9/26/2023  Hospital Length of Stay: 9 days  Code Status: DNR  Attending Provider: Que Muniz MD  Primary Care Provider: Nubia Crocker MD   Principal Problem: Acute respiratory failure with hypoxia and hypercarbia    Subjective:     HPI:  Ms. Servando Almanzar is a 68 year old female with HFpEF (60-65%), Pulmonary HTN, MVR on coumadin, permanent afib, COPD, HTN, T2DM, and CKD3. He is presenting as transfer from [facility] ICU for evaluation of acute hypoxemic respiratory failure likely secondary to PE vs pulmonary HTN noted on imaging with evidence of right heart strain on TTE.     She initially presented to OSH for SOB and cough. Infectious workup notable for enterovirus/rhinovirus and CT chest 9/18 with multifocal GGO bilaterally. She was trialed on IV abx, corticosteroids, and lasix without improvement with subsequent CTA chest positive acute PE, more specifically a nonocclusive filling defect within the right lower lobe superior segmental pulmonary artery with extension into the inter lobar artery. Lower extremity US also noted DVT to right peroneal vein and the left posterior tibial vein. TTE performed with septal flattening in diastole consistent with RV volume overload. PASP 52mmHg.    Given DVT/PE despite therapeutic INR on warfarin, patient transitioned to IV heparin ggt. She continued to deteriorate requiring continous BIPAP thus patient transferred to [facility] MICU for higher lever of care and interventional cardiology consult for consideration of catheter-directed thrombolysis.       Hospital/ICU Course:  68F w/ HFpEF (EF 60-65%), COPD, pHTN (PASP 52), MVR on warfarin, persistent afib, COPD, HTN, T2DM, CKD3 presented as transfer from [facility] from [facility], possible contributing factors in addition to her comorbidities include pulmonary HTN, non-obstructing PE (likely 2/2  known DVT) found on imaging with evidence of right heart strain, viral pna (rhino/enterovirus positive). She was placed on IV heparin and had increasing BIPAP requirements overnight, this morning her O2 sats were declining to mid 80's maxed on BIPAP w/ 100% O2, so she was electively intubated before she further deteriorated. She briefly required pressor support post-intubation and was started on diuresis and stress dose steroids. She is now off pressors but remains in AF w/ RVR confirmed on EKG with HR ~130-150, was restarted on half her home dose of metoprolol tartrate at 50 BID with good rate control HR <100.  Treating as ARDS via protocols described in the PROSEVA, dexa-ARDS, and ARMA trials. Pt noted to have anisocoria on exam after supination, CT revealed diffuse SAH, neurocritical care, neurosurgery consulted. Heparin stopped, repeat CTH showed stable SAH. No acute intervention indicated at this time. Transitioned to comfort care. Withdrawing pressor/ventilation support per MPOA / family wishes.      Interval History/Significant Events: NAEON. After discussion with patient's family, pt transitioned to comfort care. Life support measures to be withdrawn this afternoon.       Objective:     Vital Signs (Most Recent):  Temp: 97.4 °F (36.3 °C) (10/05/23 1100)  Pulse: (!) 138 (10/05/23 1312)  Resp: (!) 34 (10/05/23 1312)  BP: (!) 102/51 (10/04/23 0701)  SpO2: 99 % (10/05/23 1312) Vital Signs (24h Range):  Temp:  [97.4 °F (36.3 °C)-98.6 °F (37 °C)] 97.4 °F (36.3 °C)  Pulse:  [105-141] 138  Resp:  [26-34] 34  SpO2:  [92 %-100 %] 99 %  Arterial Line BP: ()/(32-55) 113/49   Weight: 90.3 kg (199 lb 1.2 oz)  Body mass index is 36.41 kg/m².      Intake/Output Summary (Last 24 hours) at 10/5/2023 1421  Last data filed at 10/5/2023 1333  Gross per 24 hour   Intake 1700.08 ml   Output 1605 ml   Net 95.08 ml          Physical Exam  Vitals and nursing note reviewed.   Constitutional:       General: She is not in acute  distress.     Appearance: She is obese. She is ill-appearing. She is not diaphoretic.   HENT:      Head: Normocephalic and atraumatic.      Right Ear: External ear normal.      Left Ear: External ear normal.      Nose: Nose normal.      Mouth/Throat:      Mouth: Mucous membranes are moist.   Cardiovascular:      Rate and Rhythm: Normal rate. Rhythm irregular.      Pulses: Normal pulses.   Pulmonary:      Comments: Mechanical breath sounds  Abdominal:      General: There is no distension.      Palpations: Abdomen is soft. There is no mass.   Musculoskeletal:         General: No swelling or tenderness.      Cervical back: Normal range of motion and neck supple.   Skin:     General: Skin is warm and dry.      Capillary Refill: Capillary refill takes less than 2 seconds.      Findings: Bruising present.      Comments: Scattered purpura/hematomas over upper chest   Neurological:      Mental Status: She is alert.      Cranial Nerves: Cranial nerve deficit present.   Psychiatric:         Mood and Affect: Mood normal.         Behavior: Behavior normal.            Vents:  Vent Mode: A/C (10/05/23 1312)  Ventilator Initiated: Yes (09/27/23 1158)  Set Rate: 33 BPM (10/05/23 1312)  Vt Set: 300 mL (10/05/23 1312)  PEEP/CPAP: 8 cmH20 (10/05/23 1312)  Oxygen Concentration (%): 90 (10/05/23 1312)  Peak Airway Pressure: 37 cmH20 (10/05/23 1312)  Plateau Pressure: 34 cmH20 (10/05/23 1312)  Total Ve: 10.1 L/m (10/05/23 1312)  Negative Inspiratory Force (cm H2O): 0 (10/05/23 1312)  F/VT Ratio<105 (RSBI): 110.39 (10/05/23 1312)  Lines/Drains/Airways       Central Venous Catheter Line  Duration             Percutaneous Central Line Insertion/Assessment - Triple Lumen  09/28/23 0845 Internal Jugular Right 7 days              Drain  Duration                  NG/OG Tube 09/27/23 1141 orogastric Center mouth 8 days         Urethral Catheter 09/27/23 1151 16 Fr. 8 days         Rectal Tube 10/01/23 2000 3 days              Airway  Duration                   Airway - Non-Surgical 09/27/23 1139 Endotracheal Tube 8 days              Arterial Line  Duration             Arterial Line 09/27/23 1713 Right Radial 7 days              Peripheral Intravenous Line  Duration                  Peripheral IV - Single Lumen 10/03/23 1213 18 G;1 3/4 in Left Antecubital 2 days                  Significant Labs:    CBC/Anemia Profile:  Recent Labs   Lab 10/04/23  1623 10/05/23  0016 10/05/23  0908   WBC 19.16* 20.39* 19.95*   HGB 7.2* 7.0* 6.4*   HCT 22.3* 20.7* 19.8*    178 164   MCV 76* 76* 77*   RDW 20.8* 22.2* 22.6*        Chemistries:  Recent Labs   Lab 10/04/23  0317 10/04/23  0951 10/04/23  1226 10/05/23  0321 10/05/23  0627 10/05/23  1332   * 117*   < > 127* 129* 133*   K 4.6 4.9  --  4.7  --   --    CL 76*  --   --  86*  --   --    CO2 26  --   --  23  --   --    BUN 87*  --   --  93*  --   --    CREATININE 2.4*  --   --  2.4*  --   --    CALCIUM 7.0*  --   --  7.0*  --   --    ALBUMIN 2.0*  --   --  1.9*  --   --    PROT 4.8*  --   --  5.0*  --   --    BILITOT 1.4*  --   --  1.4*  --   --    ALKPHOS 73  --   --  75  --   --    ALT 17  --   --  18  --   --    AST 50*  --   --  61*  --   --    MG 2.0  --   --  2.4  --   --    PHOS 6.7*  --   --  6.9*  --   --     < > = values in this interval not displayed.       All pertinent labs within the past 24 hours have been reviewed.    Significant Imaging:  I have reviewed all pertinent imaging results/findings within the past 24 hours.      ABG  Recent Labs   Lab 10/05/23  0359   PH 7.429   PO2 70*   PCO2 45.7*   HCO3 30.2*   BE 6     Assessment/Plan:     Neuro  Subarachnoid hemorrhage  Pt noted to have anisocoria after AM supination on 10/3. Reflexes were blunted by her paralytic drip. A stroke code was called and patient was taken for urgent CT/CTA head. CT showing large acute subarachnoid hemorrhage centered in basal cisterns, no definite aneurysm but pattern is suspicious and correlation with DSA is  recommended. Neurology recommended stopping heparin, controlling BP, consulting neurocritical care and neurosurgery. Family updated.    Plan:  - Neurosurgery, neurocrit consulted  - Heparin stopped  - BP goals: MAPs >65, SBP <180, PRN's in place  - Hypertonic saline drip 50cc/hr per neurocrit for Na 120 to prevent herniation        Pulmonary  * Acute respiratory failure with hypoxia and hypercarbia  Patient with Hypercapnic and Hypoxic Respiratory failure which is Acute on chronic.  she is not on home oxygen. Supplemental oxygen was provided and noted- Vent Mode: A/C  Oxygen Concentration (%):  [90] 90  Resp Rate Total:  [33 br/min-34 br/min] 33 br/min  Vt Set:  [300 mL] 300 mL  PEEP/CPAP:  [8 cmH20] 8 cmH20  Mean Airway Pressure:  [18 wlV60-02 cmH20] 19 cmH20    .   Signs/symptoms of respiratory failure include- tachypnea, increased work of breathing, respiratory distress and use of accessory muscles. Contributing diagnoses includes - COPD and Pulmonary Embolus Labs and images were reviewed. Patient Has recent ABG, which has been reviewed. Will treat underlying causes and adjust management of respiratory failure as follows-     67yo F with history of HFpEF, pulm htn, MVR on warfarin, AF, COPD, htn, T2DM, and CKD3 presenting as transfer from [facility] ICU for AHRF. Etiology due to PE with evidence of R heart strain on TTE vs pulmonary htn with PASP 52 mmhg vs viral illness (positive enterovirus and rhinovirus).  Pt has been treated with broad spectrun antibiotics and steroids at OSH.   She follows with Dr. Carvalho as outpatient.    Treating via ARDSnet protocol informed by results of ARMA, PROSEVA, and dexa-ARDS trials    --Wean oxygen as tolerated  --Serial ABG's  --Continue Zosyn  --Solumedrol stopped, started decadron 20mg QD for 5d followed by 10mg QD for 5d  --Scheduled Duo Nebs  --Continue breo, spiriva  --CXR  -- Not proning d/t SAH  -- Diuresis held d/t worsening renal function    Acute bronchitis due to  Rhinovirus  --See AHRF, on empiric zosyn for bacterial coverage  - Vancomycin added for empiric coverage in setting of worsening leukocytosis + tachycardia    COPD (chronic obstructive pulmonary disease)  --See respiratory failure    Cardiac/Vascular  Pulmonary hypertension  PASP on TTE 52mmHg. Noted to be 54mmHg on TTE in 2022.   Likely contributing to resp failure.     Chronic diastolic congestive heart failure  Patient is identified as having Diastolic (HFpEF) heart failure that is Chronic. CHF is currently uncontrolled due to Rales/crackles on pulmonary exam and Pulmonary edema/pleural effusion on CXR. Latest ECHO performed and demonstrates- Results for orders placed during the hospital encounter of 09/12/23    Echo    Interpretation Summary    Left Ventricle: The left ventricle is normal in size.  Mildly to moderately increased wall thickness. Septal motion is consistent with post-operative status. Septal flattening in diastole consistent with right ventricular volume overload. There is normal systolic function with a visually estimated ejection fraction of 60 - 65%. There is indeterminate diastolic function.    Left Atrium: Left atrium is severely dilated.    Mitral Valve: There is a mechanical valve in the mitral position. The mean pressure gradient across the mitral valve is 5.5 mmHg at a heart rate of 108 bpm. There is trace to mild regurgitation.    Right Ventricle: Right ventricle was not well visualized due to poor acoustic window.  Likely dilated to some extent based on subcostal images.  Systolic function appears normal based on limited images.    Right Atrium: Right atrium is dilated.    Tricuspid Valve: There is trace to mild regurgitation.    The pulmonary artery systolic pressure is approximally 52 mm Hg.    IVC/SVC: Intermediate venous pressure at 8 mmHg.  . Continue Beta Blocker and monitor clinical status closely. Monitor on telemetry. Patient is off CHF pathway.  Monitor strict Is&Os  "and daily weights.  Place on fluid restriction of 1.5 L. Cardiology has been consulted. Continue to stress to patient importance of self efficacy and  on diet for CHF. Last BNP reviewed- and noted below   No results for input(s): "BNP", "BNPTRIAGEBLO" in the last 168 hours..    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 On coumadin. Follows with Dr. Messina outpatient.     --Hold Coumadin in setting of acute illness  -- Heparin paused 9/29 d/t supratherapeutic levels with oral bleeding, rechecking labs  -- 10/2 heparin paused d/t drop in Hb 9->7.1, no signs of overt bleeding, will monitor and resume as tolerated  10/3 heparin stopped per neuro d/t SAH    Essential (primary) hypertension  Home regimen: amlodipine 2.5mg and metoprolol  - Pressures stable      Chronic atrial fibrillation  In AF w/ RVR since admission confirmed on EKG. 10/4 HR elevated, AF w RVR confirmed on 12-lead, metoprolol/diltiazem held d/t norepinephrine requirements    Hematology  Acute deep vein thrombosis (DVT) of both lower extremities  BLE Ultrasound: deep vein thrombosis of the right peroneal vein and the left posterior tibial vein.    Heparin stopped d/t SAH       Acute pulmonary embolism  CTA 09/23: a nonocclusive filling defect within the right lower lobe superior segmental pulmonary artery with extension into the inter lobar artery, multifocal pneumonia or pulmonary edema progressed from prior    Heparin stopped d/t SAH        Endocrine  Type 2 diabetes mellitus  -Last A1c reviewed-   Lab Results   Component Value Date    HGBA1C 6.0 (H) 09/30/2023       Inpatient Antihyperglycemic Regiment:  Antihyperglycemics (From admission, onward)    None          Blood Sugars (AccuCheck):    Recent Labs     10/05/23  0321 10/05/23  0415 10/05/23  0523 10/05/23  0626 10/05/23  0738 10/05/23  0833   POCTGLUCOSE 163* 168* 160* 163* 188* 173*         Palliative " Care  Comfort measures only status  Per GOC discussion with family patient to be comfort measures only, life-support measures to be withdrawn today. See ACP note for details       Critical Care Daily Checklist:    A: Awake: RASS Goal/Actual Goal: RASS Goal: -5-->unarousable  Actual:     B: Spontaneous Breathing Trial Performed? Spon. Breathing Trial Initiated?: Not initiated (10/02/23 1217)   C: SAT & SBT Coordinated?  x                      D: Delirium: CAM-ICU Overall CAM-ICU: Positive   E: Early Mobility Performed? Yes   F: Feeding Goal: Goals: Meet % EEN, EPN by RD f/u date  Status: Nutrition Goal Status: goal not met   Current Diet Order   No orders of the defined types were placed in this encounter.      AS: Analgesia/Sedation x   T: Thromboembolic Prophylaxis x   H: HOB > 300 Yes   U: Stress Ulcer Prophylaxis (if needed) pantoprazole   G: Glucose Control Insulin ssi   B: Bowel Function Stool Occurrence: 1   I: Indwelling Catheter (Lines & Barnett) Necessity CVL, piv, art line, OG, ETT, rectal tube   D: De-escalation of Antimicrobials/Pharmacotherapies Removing for cc measures    Plan for the day/ETD Withdrawing life-support per MPOA    Code Status:  Family/Goals of Care: DNR  GOC updated see ACP note       Critical secondary to Patient has a condition that poses threat to life and bodily function: Severe Respiratory Distress      Critical care was time spent personally by me on the following activities: development of treatment plan with patient or surrogate and bedside caregivers, discussions with consultants, evaluation of patient's response to treatment, examination of patient, ordering and performing treatments and interventions, ordering and review of laboratory studies, ordering and review of radiographic studies, pulse oximetry, re-evaluation of patient's condition. This critical care time did not overlap with that of any other provider or involve time for any procedures.     Juvencio Hinkle,  MD  Critical Care Medicine  [facility]

## 2023-10-05 NOTE — ASSESSMENT & PLAN NOTE
Pt noted to have anisocoria after AM supination on 10/3. Reflexes were blunted by her paralytic drip. A stroke code was called and patient was taken for urgent CT/CTA head. CT showing large acute subarachnoid hemorrhage centered in basal cisterns, no definite aneurysm but pattern is suspicious and correlation with DSA is recommended. Neurology recommended stopping heparin, controlling BP, consulting neurocritical care and neurosurgery. Family updated.    Plan:  - Neurosurgery, neurocrit consulted  - Heparin stopped

## 2023-10-05 NOTE — SUBJECTIVE & OBJECTIVE
Interval History/Significant Events: NAEON. After discussion with patient's family, pt transitioned to comfort care. Life support measures to be withdrawn this afternoon.       Objective:     Vital Signs (Most Recent):  Temp: 97.4 °F (36.3 °C) (10/05/23 1100)  Pulse: (!) 138 (10/05/23 1312)  Resp: (!) 34 (10/05/23 1312)  BP: (!) 102/51 (10/04/23 0701)  SpO2: 99 % (10/05/23 1312) Vital Signs (24h Range):  Temp:  [97.4 °F (36.3 °C)-98.6 °F (37 °C)] 97.4 °F (36.3 °C)  Pulse:  [105-141] 138  Resp:  [26-34] 34  SpO2:  [92 %-100 %] 99 %  Arterial Line BP: ()/(32-55) 113/49   Weight: 90.3 kg (199 lb 1.2 oz)  Body mass index is 36.41 kg/m².      Intake/Output Summary (Last 24 hours) at 10/5/2023 1421  Last data filed at 10/5/2023 1333  Gross per 24 hour   Intake 1700.08 ml   Output 1605 ml   Net 95.08 ml          Physical Exam  Vitals and nursing note reviewed.   Constitutional:       General: She is not in acute distress.     Appearance: She is obese. She is ill-appearing. She is not diaphoretic.   HENT:      Head: Normocephalic and atraumatic.      Right Ear: External ear normal.      Left Ear: External ear normal.      Nose: Nose normal.      Mouth/Throat:      Mouth: Mucous membranes are moist.   Cardiovascular:      Rate and Rhythm: Normal rate. Rhythm irregular.      Pulses: Normal pulses.   Pulmonary:      Comments: Mechanical breath sounds  Abdominal:      General: There is no distension.      Palpations: Abdomen is soft. There is no mass.   Musculoskeletal:         General: No swelling or tenderness.      Cervical back: Normal range of motion and neck supple.   Skin:     General: Skin is warm and dry.      Capillary Refill: Capillary refill takes less than 2 seconds.      Findings: Bruising present.      Comments: Scattered purpura/hematomas over upper chest   Neurological:      Mental Status: She is alert.      Cranial Nerves: Cranial nerve deficit present.   Psychiatric:         Mood and Affect: Mood  normal.         Behavior: Behavior normal.            Vents:  Vent Mode: A/C (10/05/23 1312)  Ventilator Initiated: Yes (09/27/23 1158)  Set Rate: 33 BPM (10/05/23 1312)  Vt Set: 300 mL (10/05/23 1312)  PEEP/CPAP: 8 cmH20 (10/05/23 1312)  Oxygen Concentration (%): 90 (10/05/23 1312)  Peak Airway Pressure: 37 cmH20 (10/05/23 1312)  Plateau Pressure: 34 cmH20 (10/05/23 1312)  Total Ve: 10.1 L/m (10/05/23 1312)  Negative Inspiratory Force (cm H2O): 0 (10/05/23 1312)  F/VT Ratio<105 (RSBI): 110.39 (10/05/23 1312)  Lines/Drains/Airways       Central Venous Catheter Line  Duration             Percutaneous Central Line Insertion/Assessment - Triple Lumen  09/28/23 0845 Internal Jugular Right 7 days              Drain  Duration                  NG/OG Tube 09/27/23 1141 orogastric Center mouth 8 days         Urethral Catheter 09/27/23 1151 16 Fr. 8 days         Rectal Tube 10/01/23 2000 3 days              Airway  Duration                  Airway - Non-Surgical 09/27/23 1139 Endotracheal Tube 8 days              Arterial Line  Duration             Arterial Line 09/27/23 1713 Right Radial 7 days              Peripheral Intravenous Line  Duration                  Peripheral IV - Single Lumen 10/03/23 1213 18 G;1 3/4 in Left Antecubital 2 days                  Significant Labs:    CBC/Anemia Profile:  Recent Labs   Lab 10/04/23  1623 10/05/23  0016 10/05/23  0908   WBC 19.16* 20.39* 19.95*   HGB 7.2* 7.0* 6.4*   HCT 22.3* 20.7* 19.8*    178 164   MCV 76* 76* 77*   RDW 20.8* 22.2* 22.6*        Chemistries:  Recent Labs   Lab 10/04/23  0317 10/04/23  0951 10/04/23  1226 10/05/23  0321 10/05/23  0627 10/05/23  1332   * 117*   < > 127* 129* 133*   K 4.6 4.9  --  4.7  --   --    CL 76*  --   --  86*  --   --    CO2 26  --   --  23  --   --    BUN 87*  --   --  93*  --   --    CREATININE 2.4*  --   --  2.4*  --   --    CALCIUM 7.0*  --   --  7.0*  --   --    ALBUMIN 2.0*  --   --  1.9*  --   --    PROT 4.8*  --   --   5.0*  --   --    BILITOT 1.4*  --   --  1.4*  --   --    ALKPHOS 73  --   --  75  --   --    ALT 17  --   --  18  --   --    AST 50*  --   --  61*  --   --    MG 2.0  --   --  2.4  --   --    PHOS 6.7*  --   --  6.9*  --   --     < > = values in this interval not displayed.       All pertinent labs within the past 24 hours have been reviewed.    Significant Imaging:  I have reviewed all pertinent imaging results/findings within the past 24 hours.

## 2023-10-05 NOTE — ASSESSMENT & PLAN NOTE
-Last A1c reviewed-   Lab Results   Component Value Date    HGBA1C 6.0 (H) 09/30/2023       Inpatient Antihyperglycemic Regiment:  Antihyperglycemics (From admission, onward)    None          Blood Sugars (AccuCheck):    Recent Labs     10/05/23  0321 10/05/23  0415 10/05/23  0523 10/05/23  0626 10/05/23  0738 10/05/23  0833   POCTGLUCOSE 163* 168* 160* 163* 188* 173*

## 2023-10-05 NOTE — CONSULTS
Wound care consult received for pts sacrum.  Reviewed chart for this encounter.    Per chart review, pt transitioning to comfort focused care. Wound care will sign off at this time.

## 2023-10-05 NOTE — ASSESSMENT & PLAN NOTE
Per GOC discussion with family patient to be comfort measures only, life-support measures to be withdrawn today. See ACP note for details

## 2023-10-05 NOTE — PLAN OF CARE
MICU DAILY GOALS     Family/Goals of care/Code Status   Code Status: DNR    24H Vital Sign Range  Temp:  [98 °F (36.7 °C)-98.6 °F (37 °C)]   Pulse:  []   Resp:  [4-34]   BP: (101-102)/(50-51)   SpO2:  [92 %-100 %]   Arterial Line BP: ()/(32-74)      Shift Events   Insulin gtt, 3% NS, & Levo continued @ 0.18 MAPs in high 60s- low 70s this shift w/ no escalation in place. HR Afib 130s-140s sedation D/c'd No acute events throughout shift    AWAKE RASS: Goal - RASS Goal: -5-->unarousable  Actual - RASS (Quiroz Agitation-Sedation Scale): unarousable    Restraint necessity: Not necessary   BREATHE SBT: Not attempted    Coordinate A & B, analgesics/sedatives Pain: managed   SAT: Not attempted   Delirium CAM-ICU: Overall CAM-ICU: Positive   Early(intubated/ Progressive (non-intubated) Mobility MOVE Screen (INTUBATED ONLY): Fail    Activity: Activity Management: Patient unable to perform activities   Feeding/Nutrition Diet order: Diet/Nutrition Received: NPO,     Thrombus DVT prophylaxis: VTE Required Core Measure: Pharmacological prophylaxis initiated/maintained   HOB Elevation Head of Bed (HOB) Positioning: HOB at 30-45 degrees   Ulcer Prophylaxis GI: yes   Glucose control uncontrolled Glycemic Management: blood glucose monitored, supplemental insulin given   Skin Skin assessed during: Q Shift Change    [] No Altered Skin Integrity Present    []Prevention Measures Documented      [] Yes- Altered Skin Integrity Present or Discovered   [] LDA Added if Not in Epic (Describe Wound)   [] New Altered Skin Integrity was Present on Admit and Documented in LDA   [] Wound Image Taken    Wound Care Consulted? No    Attending Nurse:  Kassandra Barbour RN/Staff Member:  Chary URBINA RN   Bowel Function diarrhea    Indwelling Catheter Necessity      Urethral Catheter 09/27/23 1151 16 Fr.-Reason for Continuing Urinary Catheterization: Critically ill in ICU and requiring hourly monitoring of intake/output    Percutaneous  Central Line Insertion/Assessment - Triple Lumen  09/28/23 0845 Internal Jugular Right-Line Necessity Review: Hemodynamic instability     De-escalation Antibiotics Yes       VS and assessment per flow sheet, patient progressing towards goals as tolerated, plan of care reviewed with  daughter , all concerns addressed, will continue to monitor.

## 2023-10-06 LAB
BACTERIA SPEC AEROBE CULT: ABNORMAL
GRAM STN SPEC: ABNORMAL

## 2023-12-18 ENCOUNTER — TELEPHONE (OUTPATIENT)
Dept: INTERNAL MEDICINE | Facility: CLINIC | Age: 68
End: 2023-12-18
Payer: MEDICARE

## 2023-12-18 NOTE — TELEPHONE ENCOUNTER
----- Message from Ginna Herrera sent at 2023  4:10 PM CST -----  Contact: Professional  Services/[phone]  Professional  Services call to find out if Dr Crocker will be signing patient death certificates in [facility]. Please call and advise. Thank you.

## 2024-01-19 ENCOUNTER — DOCUMENTATION ONLY (OUTPATIENT)
Dept: NEUROLOGY | Facility: HOSPITAL | Age: 69
End: 2024-01-19

## 2024-09-24 NOTE — TELEPHONE ENCOUNTER
----- Message from Negin Restrepo sent at 2/24/2022  3:21 PM CST -----  Contact: pt  Pt returning call to staff.     Confirmed contact below:  Contact Name:Elayne Almanzar  Phone Number: [phone] or [phone]     [FreeTextEntry1] : Epley performed f/u 4 months for annual audio

## 2025-02-03 NOTE — PROGRESS NOTES
PCP Please advise.  Patient has an appt in 03/14/2025   Subjective:   Patient ID:  Elayne Almanzar is a 67 y.o. female who presents for follow-up of No chief complaint on file.    PROBLEM LIST:  Chronic diastolic heart failure  Permanent atrial fibrillation  Mechanical MV 2004  Coronary artery calcification on chest CT  HTN  HLD  IFG  KEYSHAWN  COPD      HPI:  Was unable to have her bariatric surgery if she came down with COVID the week before.  She states she has had terrible burning pain in her legs since becoming ill with COVID.  It occurs at rest.  She also complains of pain in her knees.  She has chronic stable dyspnea secondary to her COPD.  She is not having any chest pain, syncope, PND orthopnea.    [id number] 10:57:32:  Personally reviewed by me showed atrial fibrillation with a ventricular rate of 73 beats per minute.  There are no ischemic ST changes present.    TTE 11/21:  Summary    The left ventricle is normal in size with normal systolic function. The estimated ejection fraction is 65%.  Normal right ventricular size with normal right ventricular systolic function.  Severe left atrial enlargement.  There is a normally functioning mechanical mitral valve prosthesis. The mean gradient is 5mm Hg at a heart rate near 60 bpm.  Atrial fibrillation observed.  The estimated PA systolic pressure is 33 mmHg.  Intermediate central venous pressure (8 mmHg).         TTE 3/13/2020  Left ventricular systolic function. The estimated ejection fraction is 55%.  Septal wall has abnormal motion.  Severe biatrial enlargement.  There is a mechanical mitral valve prosthesis.  The mean diastolic gradient across the mitral valve is 5 mmHg at a heart rate of 57 bpm.  Mild tricuspid regurgitation.  The estimated PA systolic pressure is 33 mmHg.  Intermediate central venous pressure (8 mmHg).  Atrial fibrillation observed.     PET Stress Test 11/21:  Conclusion         There are no clinically significant perfusion abnormalities. There is a moderate (15%) amount of mild diffuse  fixed heterogeneity that does not change with stress, indicative of non-obstructive disease.    The whole heart absolute myocardial perfusion values averaged 0.72 cc/min/g at rest, which is normal; 0.81 cc/min/g at stress, which is severely reduced; and CFR is 0.98 , which is severely reduced.    The gated perfusion images showed an ejection fraction of 55% at rest and 60% during stress. A normal ejection fraction is greater than 53%.    The wall motion is normal at rest and during stress.    The LV cavity size is normal at rest and stress.    The EKG portion of this study is negative for ischemia.    During stress, atrial fibrillation is noted.    The patient reported no chest pain during the stress test.    When compared to the previous study from 11/15/2019, diffuse heterogenity is now present on perfusion images.       24 Hour Holter Monitor 7/11/2019  Persistent rate controlled AF  1% PVC burden with 1 5-beat run of nonsustained VT at a rate of 111bpm.     ABIs 9/19/2018  The right ankle brachial index was 1.06 which is normal.   The left ankle brachial index was 1.03 which is normal.        Past Medical History:   Diagnosis Date    Acute on chronic diastolic congestive heart failure     Allergy     Anemia     Anticoagulant long-term use     Anxiety     Asthma     Atrial fibrillation 2002    Cataract     Chronic kidney disease     COPD (chronic obstructive pulmonary disease)     Coronary artery calcification seen on CT scan 3/22/2022    Coronary artery calcification seen on CT scan 3/22/2022    Depression     Encounter for blood transfusion     HTN (hypertension)     Hyperlipidemia     Nephrolithiasis     KEYSHAWN (obstructive sleep apnea)     awaiting CPAP machine     Rheumatic disease of mitral valve     Uncontrolled type 2 diabetes mellitus with complication, with long-term current use of insulin 5/27/2020    Urinary incontinence        Past Surgical History:   Procedure Laterality Date    BREAST BIOPSY Left  10/2019    CARDIAC VALVE SURGERY  2004    mechanical mitral valve     SECTION      COLONOSCOPY N/A 2019    Procedure: COLONOSCOPY;  Surgeon: Devon Bowling MD;  Location: [facility] ENDO (4TH FLR);  Service: Endoscopy;  Laterality: N/A;  ok to hold Coumadin x 5 days with Lovenox bridge per Coumadin clinic-[facility]    ESOPHAGOGASTRODUODENOSCOPY N/A 2019    Procedure: EGD (ESOPHAGOGASTRODUODENOSCOPY);  Surgeon: Smooth Torrez MD;  Location: [facility] ENDO (2ND FLR);  Service: Endoscopy;  Laterality: N/A;  2nd floor requested due to comorbidities, Hypertension, permanent A. 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 44     per Coumadin clinic-ok to hold for 5 days w/bridge    kidney stone removal      MITRAL VALVE REPLACEMENT  2004    TUBAL LIGATION         Social History     Socioeconomic History    Marital status:     Number of children: 2   Occupational History    Occupation: Cook     Comment: Retired   Tobacco Use    Smoking status: Former     Packs/day: 0.50     Years: 20.00     Pack years: 10.00     Types: Cigarettes     Quit date: 2002     Years since quittin.3    Smokeless tobacco: Never   Substance and Sexual Activity    Alcohol use: No    Drug use: No   Social History Narrative    Disability since .  Laid off .  Previously worked as cook in a kitchen.         Family History   Problem Relation Age of Onset    Stroke Paternal Grandmother     Colon cancer Maternal Grandmother     Cancer Brother         testicular cancer    Diabetes Sister     Hypertension Sister     Breast cancer Paternal Aunt     Diabetes Sister     Diabetes Sister     Heart disease Father 70        MI    Diabetes Father     Colon cancer Mother 83    Asthma Daughter     Asthma Son     Ovarian cancer Neg Hx        Patient's Medications   New Prescriptions    No medications on file   Previous Medications    ADVAIR DISKUS 500-50 MCG/DOSE DSDV  DISKUS INHALER    INHALE 1 PUFF INTO THE LUNGS 2 (TWO) TIMES DAILY.    ALBUTEROL (VENTOLIN HFA) 90 MCG/ACTUATION INHALER    INHALE 2 PUFFS INTO THE LUNGS EVERY 6 (SIX) HOURS AS NEEDED FOR WHEEZING. RESCUE    AMLODIPINE (NORVASC) 2.5 MG TABLET    Take 1 tablet (2.5 mg total) by mouth once daily.    AMOXICILLIN (AMOXIL) 500 MG TAB    4 tablets po 1 hour prior to procedure    ASPIRIN (ECOTRIN) 81 MG EC TABLET    Take 81 mg by mouth once daily.     AZITHROMYCIN (Z-MARGARITA) 250 MG TABLET    Take 1 tablet (250 mg total) by mouth once daily. Take first 2 tablets together, then 1 every day until finished.    DIPYRIDAMOLE (PERSANTINE) 5 MG/ML INJECTION        DORZOLAMIDE (TRUSOPT) 2 % OPHTHALMIC SOLUTION    Place 1 drop into the right eye 2 (two) times a day.    ERGOCALCIFEROL (ERGOCALCIFEROL) 50,000 UNIT CAP    Take 1 capsule (50,000 Units total) by mouth twice a week.    FLUTICASONE PROPIONATE (FLONASE) 50 MCG/ACTUATION NASAL SPRAY    2 sprays (100 mcg total) by Each Nostril route once daily.    FUROSEMIDE (LASIX) 20 MG TABLET    Take 1 tablet (20 mg total) by mouth once daily.    HYDROCODONE-ACETAMINOPHEN (HYCET) SOLUTION 7.5-325 MG/15ML    Take 15 mLs by mouth 4 (four) times daily as needed for Pain.    KENALOG 40 MG/ML INJECTION        LATANOPROST (XALATAN) 0.005 % OPHTHALMIC SOLUTION    Place 1 drop into the right eye once daily.    MECLIZINE (ANTIVERT) 25 MG TABLET    Take 1 tablet (25 mg total) by mouth 2 (two) times daily as needed.    METOPROLOL TARTRATE (LOPRESSOR) 100 MG TABLET    Take 1 tablet (100 mg total) by mouth 2 (two) times daily.    NITROGLYCERIN (NITROSTAT) 0.4 MG SL TABLET    Place 1 tablet (0.4 mg total) under the tongue every 5 (five) minutes as needed for Chest pain.    OMEPRAZOLE (PRILOSEC) 40 MG CAPSULE    Take 1 capsule (40 mg total) by mouth every morning. Open capsule and take with apple sauce    ONDANSETRON (ZOFRAN-ODT) 8 MG TBDL    Dissolve 1 tablet (8 mg total) by mouth every 6 (six) hours as  needed.    OXYBUTYNIN (DITROPAN-XL) 5 MG TR24    Take 5 mg by mouth once daily.    PANTOPRAZOLE (PROTONIX) 40 MG TABLET    Take 1 tablet (40 mg total) by mouth daily as needed.    ROSUVASTATIN (CRESTOR) 20 MG TABLET    Take 1 tablet (20 mg total) by mouth once daily.    SERTRALINE (ZOLOFT) 100 MG TABLET    Take 1 tablet (100 mg total) by mouth once daily.    URSODIOL (LISA FORTE) 500 MG TABLET    Crush one tablet and take daily for gall bladder.    WARFARIN (COUMADIN) 5 MG TABLET    Take 1.5 tablets (7.5mg) by mouth Monday, Wednesday, Friday then 1 tablets (5mg) all other days or as instructed by Coumadin Clinic.   Modified Medications    No medications on file   Discontinued Medications    No medications on file       Review of Systems   Constitutional: Negative for malaise/fatigue and weight gain.   HENT:  Negative for hearing loss.    Eyes:  Negative for visual disturbance.   Cardiovascular:  Negative for chest pain, claudication, dyspnea on exertion, leg swelling, near-syncope, orthopnea, palpitations, paroxysmal nocturnal dyspnea and syncope.   Respiratory:  Positive for wheezing. Negative for cough, shortness of breath, sleep disturbances due to breathing and snoring.    Endocrine: Negative for cold intolerance, heat intolerance, polydipsia, polyphagia and polyuria.   Hematologic/Lymphatic: Negative for bleeding problem. Does not bruise/bleed easily.   Skin:  Negative for rash and suspicious lesions.   Musculoskeletal:  Positive for joint pain and myalgias. Negative for arthritis, falls and muscle weakness.   Gastrointestinal:  Negative for abdominal pain, change in bowel habit, constipation, diarrhea, heartburn, hematochezia, melena and nausea.   Genitourinary:  Negative for hematuria and nocturia.   Neurological:  Negative for excessive daytime sleepiness, dizziness, headaches, light-headedness, loss of balance and weakness.   Psychiatric/Behavioral:  Negative for depression. The patient is not  nervous/anxious.    Allergic/Immunologic: Negative for environmental allergies.     LMP 07/22/2012     Objective:   Physical Exam  Constitutional:       Appearance: She is well-developed.      Comments:      HENT:      Head: Normocephalic and atraumatic.   Eyes:      General: No scleral icterus.     Conjunctiva/sclera: Conjunctivae normal.      Pupils: Pupils are equal, round, and reactive to light.   Neck:      Thyroid: No thyromegaly.      Vascular: No hepatojugular reflux or JVD.      Trachea: No tracheal deviation.   Cardiovascular:      Rate and Rhythm: Normal rate. Rhythm irregularly irregular.      Chest Wall: PMI is not displaced.      Pulses: Intact distal pulses.           Carotid pulses are 2+ on the right side and 2+ on the left side.       Radial pulses are 2+ on the right side and 2+ on the left side.        Dorsalis pedis pulses are 2+ on the right side and 2+ on the left side.        Posterior tibial pulses are 2+ on the right side and 2+ on the left side.      Heart sounds: Normal heart sounds.      Comments: Mechanical click is present  Pulmonary:      Effort: Pulmonary effort is normal.      Breath sounds: Wheezing present.   Abdominal:      General: Bowel sounds are normal. There is no distension.      Palpations: Abdomen is soft. There is no mass.      Tenderness: There is no abdominal tenderness.   Musculoskeletal:         General: No tenderness.      Cervical back: Normal range of motion and neck supple.   Lymphadenopathy:      Cervical: No cervical adenopathy.   Skin:     General: Skin is warm and dry.      Findings: No erythema or rash.      Nails: There is no clubbing.   Neurological:      Mental Status: She is alert and oriented to person, place, and time.   Psychiatric:         Speech: Speech normal.         Behavior: Behavior normal.       Lab Results   Component Value Date     08/11/2022    K 4.3 08/11/2022     08/11/2022    CO2 30 (H) 08/11/2022    BUN 19 08/11/2022     CREATININE 0.8 08/11/2022    GLU 82 08/11/2022    HGBA1C 6.1 05/12/2017    MG 1.8 02/11/2021    AST 18 08/11/2022    ALT 13 08/11/2022    ALBUMIN 3.8 08/11/2022    PROT 7.2 08/11/2022    BILITOT 0.6 08/11/2022    WBC 4.28 08/11/2022    HGB 11.4 (L) 08/11/2022    HCT 39.7 08/11/2022    MCV 75 (L) 08/11/2022     08/11/2022    INR 2.6 (H) 09/14/2022    INR 6.39 11/08/2021    TSH 2.026 10/01/2021    CHOL 141 12/15/2021    HDL 54 12/15/2021    LDLCALC 70.2 12/15/2021    TRIG 84 12/15/2021     (H) 07/07/2020       Assessment:     1. Chronic obstructive pulmonary disease, unspecified COPD type :  She remains on Advair and albuterol.   2. Chronic atrial fibrillation :  She is rate controlled and anticoagulated with Coumadin.   3. Chronic diastolic congestive heart failure :  She appears well compensated and euvolemic today.  Continue her current dose of Lasix.   4. H/O mitral valve replacement with mechanical valve :  Her valve is 18 years old.  Her recent echo was normal.  SBE prophylaxis is recommended prior to dental procedures.  An annual echocardiogram is recommended.   5. Essential (primary) hypertension :  Her blood pressure was elevated this morning, however she had not taken her medications yet.  Her other readings in epic have been at goal.  I did enroll her in the digital hypertension program.   6. Mixed hyperlipidemia :  Her ASCVD risk score is 17.7%.  Her lipids are at goal on rosuvastatin.  She does report some mild myalgias but would like to continue with her current dose.  We discussed trying magnesium or coenzyme Q10.  I will check a CK level as well as magnesium level.   7. Glucose intolerance (impaired glucose tolerance)    8. Complex sleep apnea syndrome :  She is waiting her new mask after her system was recalled.           11. Morbid obesity with BMI of 40.0-44.9, adult :  She is currently being evaluated by Bariatrics.       Plan:     Diagnoses and all orders for this visit:    Chronic  atrial fibrillation    Chronic diastolic congestive heart failure    Coronary artery calcification seen on CT scan    Essential (primary) hypertension    Mixed hyperlipidemia    H/O mitral valve replacement with mechanical valve    Chronic obstructive pulmonary disease, unspecified COPD type    Chronic anticoagulation    Glucose intolerance (impaired glucose tolerance)    KEYSHAWN (obstructive sleep apnea)    Morbid obesity with BMI of 40.0-44.9, adult      Thank you for allowing me to participate in this patient's care. Please do not hesitate to contact me with any questions or concerns.

## 2025-07-09 NOTE — TELEPHONE ENCOUNTER
OUTPATIENT PROGRESS NOTE  CHIEF COMPLAINT:  Chronic Pain    HISTORY:    HISTORY OF PRESENT ILLNESS:  This is a 58 year old female who presents to the clinic today for follow up of her chronic pain.  She states that she is doing okay.  She is taking Tramdol every 6 hours.  She would like to take it more frequently as she reports her pain is not controlled.  However, patient weaned herself off of gabapentin as she wondered if it was causing swelling in her legs.  She still has the swelling in her legs off of the gabapentin and notes that her pain is worse since stopping the medication.  She denies any other concerns.    ALLERGIES:   Allergen Reactions   • Gemfibrozil GI UPSET     heartburn   • Lyrica RASH   • Nsaids Other (See Comments)     Bleeding - had ulcer   • Pregabalin RASH       Current Outpatient Medications   Medication Sig Dispense Refill   • traMADol (ULTRAM) 50 MG tablet Take 2 tablets by mouth every 6 hours as needed for Pain. 180 tablet 0   • pantoprazole (PROTONIX) 40 MG tablet Take 1 tablet by mouth daily. 30 tablet 5   • hyoscyamine (LEVSIN SL) 0.125 MG sublingual tablet Place 1 tablet under the tongue every 6 hours as needed for Cramping. 30 tablet 5   • gabapentin (NEURONTIN) 600 MG tablet Take 1 tablet by mouth in the morning and 1 tablet at noon and 1 tablet in the evening. 90 tablet 5   • metoCLOPramide (REGLAN) 5 MG tablet Take 5 mg by mouth in the morning and 5 mg at noon and 5 mg in the evening. Take before meals.     • glycopyrrolate (ROBINUL) 1 MG tablet Take 1 tablet by mouth in the morning and 1 tablet in the evening. 180 tablet 1   • rOPINIRole (REQUIP) 0.25 MG tablet Take 1 tablet by mouth at bedtime. 90 tablet 1   • busPIRone (BUSPAR) 10 MG tablet Take 1 tablet by mouth in the morning and 1 tablet at noon and 1 tablet in the evening.     • ondansetron (ZOFRAN ODT) 4 MG disintegrating tablet Place 4 mg onto the tongue every 8 hours as needed for Nausea.     • metoPROLOL succinate  Pt aware and understanding of Enrike message below:       Please notify the patient that her blood count has only decreased slightly since last checked 2 months ago    (TOPROL-XL) 25 MG 24 hr tablet Take 1 tablet by mouth daily. (Patient not taking: Reported on 7/9/2025)     • mirtazapine (REMERON SOL-TAB) 30 MG disintegrating tablet Take 30 mg by mouth nightly.     • tiZANidine (ZANAFLEX) 4 MG tablet Take 1 tablet by mouth every 8 hours as needed (muscle spasm). 270 tablet 1   • acetaminophen (TYLENOL) 500 MG tablet 500 mg by Enteral route every 4 hours as needed.     • QUEtiapine (SEROquel) 300 MG tablet 300 mg by Enteral route.     • naLOXone (NARCAN) 4 MG/0.1ML nasal liquid Spray the content of 1 device into 1 nostril. Call 911. May repeat with 2nd device in alternate nostril if no response in 2-3 minutes. (Patient not taking: Reported on 6/9/2025) 2 each 1   • polyethylene glycol (MIRALAX) 17 g packet Take 17 g by mouth daily. Stir and dissolve powder in any 4 to 8 ounces of beverage, then drink. 30 each 0   • lidocaine (LIDODERM) 5 % patch Place 1 patch onto the skin every 24 hours. Remove patch 12 hours after applying 90 patch 1   • acetaminophen (TYLENOL) 325 MG tablet Take 2 tablets by mouth every 4 hours as needed for Fever or Pain. Do not exceed greater than 4,000 mg of acetaminophen in a 24 hour period.     • DISPENSE Seated wheeled walker  0   • fluticasone (FLONASE) 50 MCG/ACT nasal spray Spray 2 sprays in each nostril daily. 16 g 11     No current facility-administered medications for this visit.         PHYSICAL EXAM:  Alert, oriented x 3 in no apparent distress.  Vitals:  Blood pressure 122/64, pulse 94, weight 45 kg (99 lb 3.2 oz), last menstrual period 05/30/2001, SpO2 97%, not currently breastfeeding.  HEART:  Regular rate and rhythm without murmurs, rubs or gallops.  LUNGS:  Clear to auscultation bilaterally with easy respirations.  PSYCHIATRIC:  Affect is animated.  Patient makes good eye contact.  She answers questions appropriately.  There are no psychotic features noted.  Insight and judgment are good      ASSESSMENT/PLAN:  58 year old female who presents  to the clinic with chronic pain   Continue Tramadol every 6 hours as needed for pain  Restart Gabapentin and slowly titrate back to 600 mg three times daily  Compression socks for bilateral lower extremity edema  Follow up in 3 months for recheck, sooner if concerns.

## (undated) DEVICE — DRESSING LEUKOPLAST FLEX 1X3IN